# Patient Record
Sex: FEMALE | Race: BLACK OR AFRICAN AMERICAN | NOT HISPANIC OR LATINO | Employment: OTHER | URBAN - METROPOLITAN AREA
[De-identification: names, ages, dates, MRNs, and addresses within clinical notes are randomized per-mention and may not be internally consistent; named-entity substitution may affect disease eponyms.]

---

## 2020-10-11 ENCOUNTER — OFFICE VISIT (OUTPATIENT)
Dept: URGENT CARE | Facility: CLINIC | Age: 65
End: 2020-10-11
Payer: MEDICARE

## 2020-10-11 VITALS
WEIGHT: 165 LBS | RESPIRATION RATE: 20 BRPM | TEMPERATURE: 97 F | SYSTOLIC BLOOD PRESSURE: 157 MMHG | HEIGHT: 65 IN | DIASTOLIC BLOOD PRESSURE: 64 MMHG | BODY MASS INDEX: 27.49 KG/M2 | OXYGEN SATURATION: 97 % | HEART RATE: 76 BPM

## 2020-10-11 DIAGNOSIS — M54.12 CERVICAL RADICULOPATHY: Primary | ICD-10-CM

## 2020-10-11 PROCEDURE — 99214 PR OFFICE/OUTPT VISIT, EST, LEVL IV, 30-39 MIN: ICD-10-PCS | Mod: 25,S$GLB,, | Performed by: INTERNAL MEDICINE

## 2020-10-11 PROCEDURE — 96372 THER/PROPH/DIAG INJ SC/IM: CPT | Mod: S$GLB,,, | Performed by: INTERNAL MEDICINE

## 2020-10-11 PROCEDURE — 99214 OFFICE O/P EST MOD 30 MIN: CPT | Mod: 25,S$GLB,, | Performed by: INTERNAL MEDICINE

## 2020-10-11 PROCEDURE — 96372 PR INJECTION,THERAP/PROPH/DIAG2ST, IM OR SUBCUT: ICD-10-PCS | Mod: S$GLB,,, | Performed by: INTERNAL MEDICINE

## 2020-10-11 RX ORDER — METHYLPREDNISOLONE 4 MG/1
TABLET ORAL
Qty: 1 PACKAGE | Refills: 0 | Status: ON HOLD | OUTPATIENT
Start: 2020-10-11 | End: 2023-04-24

## 2020-10-11 RX ORDER — ROFLUMILAST 500 UG/1
500 TABLET ORAL DAILY
COMMUNITY
Start: 2020-09-16 | End: 2023-07-09 | Stop reason: SDUPTHER

## 2020-10-11 RX ORDER — KETOROLAC TROMETHAMINE 30 MG/ML
30 INJECTION, SOLUTION INTRAMUSCULAR; INTRAVENOUS
Status: COMPLETED | OUTPATIENT
Start: 2020-10-11 | End: 2020-10-11

## 2020-10-11 RX ORDER — NAPROXEN SODIUM 220 MG/1
81 TABLET, FILM COATED ORAL DAILY
Status: ON HOLD | COMMUNITY
End: 2023-12-20 | Stop reason: HOSPADM

## 2020-10-11 RX ORDER — CYCLOBENZAPRINE HCL 5 MG
5 TABLET ORAL 3 TIMES DAILY PRN
Qty: 21 TABLET | Refills: 0 | Status: SHIPPED | OUTPATIENT
Start: 2020-10-11 | End: 2020-10-21

## 2020-10-11 RX ADMIN — KETOROLAC TROMETHAMINE 30 MG: 30 INJECTION, SOLUTION INTRAMUSCULAR; INTRAVENOUS at 03:10

## 2020-10-11 NOTE — PROGRESS NOTES
"Subjective:       Patient ID: Terri Phelan is a 65 y.o. female.    Vitals:  height is 5' 5" (1.651 m) and weight is 74.8 kg (165 lb). Her temperature is 97.3 °F (36.3 °C). Her blood pressure is 157/64 (abnormal) and her pulse is 76. Her respiration is 20 and oxygen saturation is 97%.     Chief Complaint: Neck Pain and Shoulder Pain    Pt has left neck, shoulder, and arm pain that started on Thursday. Some relief when she is sleeping. Took otc pain meds with some no reilief       Constitution: Negative for chills, fatigue and fever.   HENT: Negative for congestion and sore throat.    Neck: Negative for painful lymph nodes.   Cardiovascular: Negative for chest pain and leg swelling.   Eyes: Negative for double vision and blurred vision.   Respiratory: Negative for cough and shortness of breath.    Gastrointestinal: Negative for nausea, vomiting and diarrhea.   Genitourinary: Negative for dysuria, frequency, urgency and history of kidney stones.   Musculoskeletal: Positive for pain and joint pain. Negative for joint swelling, muscle cramps and muscle ache.   Skin: Negative for color change, pale, rash and bruising.   Allergic/Immunologic: Negative for seasonal allergies.   Neurological: Negative for dizziness, history of vertigo, light-headedness, passing out and headaches.   Hematologic/Lymphatic: Negative for swollen lymph nodes.   Psychiatric/Behavioral: Negative for nervous/anxious, sleep disturbance and depression. The patient is not nervous/anxious.        Objective:      Physical Exam   Constitutional: normal  HENT:   Head: Normocephalic and atraumatic.   Neck:      Comments: Increased muscle ton L post neck and upper shoulder with tenderness to palpation same areas with radiation to upper arm   Cardiovascular: Normal rate, regular rhythm and normal pulses.   Pulmonary/Chest: Effort normal and breath sounds normal.   Abdominal: Normal appearance.   Neurological: She is alert.   Skin: Skin is warm and dry. "   Nursing note and vitals reviewed.        Assessment:       1. Cervical radiculopathy        Plan:         Cervical radiculopathy  -     ketorolac injection 30 mg  -     methylPREDNISolone (MEDROL DOSEPACK) 4 mg tablet; use as directed  Dispense: 1 Package; Refill: 0  -     cyclobenzaprine (FLEXERIL) 5 MG tablet; Take 1 tablet (5 mg total) by mouth 3 (three) times daily as needed.  Dispense: 21 tablet; Refill: 0

## 2022-05-15 ENCOUNTER — HOSPITAL ENCOUNTER (EMERGENCY)
Facility: HOSPITAL | Age: 67
Discharge: HOME | End: 2022-05-15
Attending: EMERGENCY MEDICINE
Payer: OTHER GOVERNMENT

## 2022-05-15 VITALS
HEIGHT: 66 IN | SYSTOLIC BLOOD PRESSURE: 151 MMHG | BODY MASS INDEX: 27.32 KG/M2 | DIASTOLIC BLOOD PRESSURE: 72 MMHG | HEART RATE: 90 BPM | TEMPERATURE: 100 F | RESPIRATION RATE: 20 BRPM | WEIGHT: 170 LBS | OXYGEN SATURATION: 98 %

## 2022-05-15 DIAGNOSIS — F41.9 ANXIETY: ICD-10-CM

## 2022-05-15 DIAGNOSIS — Z87.09 HISTORY OF COPD: Primary | ICD-10-CM

## 2022-05-15 PROCEDURE — 99283 EMERGENCY DEPT VISIT LOW MDM: CPT

## 2022-05-15 RX ORDER — LORAZEPAM 1 MG/1
0.5 TABLET ORAL EVERY 8 HOURS PRN
Qty: 5 TABLET | Refills: 0 | Status: SHIPPED | OUTPATIENT
Start: 2022-05-15 | End: 2023-05-15 | Stop reason: SDUPTHER

## 2022-05-16 ENCOUNTER — EXTERNAL HOME HEALTH (OUTPATIENT)
Dept: HOME HEALTH SERVICES | Facility: HOSPITAL | Age: 67
End: 2022-05-16
Payer: OTHER GOVERNMENT
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
## 2022-06-15 ENCOUNTER — DOCUMENT SCAN (OUTPATIENT)
Dept: HOME HEALTH SERVICES | Facility: HOSPITAL | Age: 67
End: 2022-06-15
Payer: OTHER GOVERNMENT

## 2022-06-16 ENCOUNTER — EXTERNAL HOME HEALTH (OUTPATIENT)
Dept: HOME HEALTH SERVICES | Facility: HOSPITAL | Age: 67
End: 2022-06-16
Payer: OTHER GOVERNMENT

## 2022-07-08 ENCOUNTER — DOCUMENT SCAN (OUTPATIENT)
Dept: HOME HEALTH SERVICES | Facility: HOSPITAL | Age: 67
End: 2022-07-08
Payer: OTHER GOVERNMENT

## 2022-09-09 ENCOUNTER — OFFICE VISIT (OUTPATIENT)
Dept: URGENT CARE | Facility: CLINIC | Age: 67
End: 2022-09-09
Payer: MEDICARE

## 2022-09-09 VITALS
BODY MASS INDEX: 28.93 KG/M2 | OXYGEN SATURATION: 95 % | WEIGHT: 180 LBS | HEIGHT: 66 IN | DIASTOLIC BLOOD PRESSURE: 84 MMHG | SYSTOLIC BLOOD PRESSURE: 149 MMHG | RESPIRATION RATE: 14 BRPM | HEART RATE: 68 BPM | TEMPERATURE: 99 F

## 2022-09-09 DIAGNOSIS — J44.1 COPD WITH EXACERBATION: Primary | ICD-10-CM

## 2022-09-09 PROBLEM — Z99.81 OXYGEN DEPENDENT: Status: ACTIVE | Noted: 2022-09-09

## 2022-09-09 PROBLEM — K64.8 PROLAPSED INTERNAL HEMORRHOIDS: Status: ACTIVE | Noted: 2022-09-09

## 2022-09-09 PROBLEM — I25.10 CAD (CORONARY ARTERY DISEASE): Status: ACTIVE | Noted: 2022-09-09

## 2022-09-09 PROBLEM — J43.2 CENTRILOBULAR EMPHYSEMA: Status: ACTIVE | Noted: 2022-06-16

## 2022-09-09 PROBLEM — I25.2 HISTORY OF MYOCARDIAL INFARCTION: Status: ACTIVE | Noted: 2022-09-09

## 2022-09-09 PROBLEM — J96.21 ACUTE ON CHRONIC RESPIRATORY FAILURE WITH HYPOXIA AND HYPERCAPNIA: Status: ACTIVE | Noted: 2017-05-01

## 2022-09-09 PROBLEM — D12.2 ADENOMA OF ASCENDING COLON: Status: ACTIVE | Noted: 2022-09-09

## 2022-09-09 PROBLEM — J40 BRONCHITIS: Status: ACTIVE | Noted: 2020-09-01

## 2022-09-09 PROBLEM — R19.5 MUCOUS IN STOOLS: Status: ACTIVE | Noted: 2022-09-09

## 2022-09-09 PROBLEM — J96.22 ACUTE ON CHRONIC RESPIRATORY FAILURE WITH HYPOXIA AND HYPERCAPNIA: Status: ACTIVE | Noted: 2017-05-01

## 2022-09-09 PROBLEM — Z01.818 ENCOUNTER FOR OTHER PREPROCEDURAL EXAMINATION: Status: ACTIVE | Noted: 2022-07-06

## 2022-09-09 PROBLEM — K64.9 HEMORRHOIDS: Status: ACTIVE | Noted: 2022-09-09

## 2022-09-09 PROBLEM — K57.30 DIVERTICULAR DISEASE OF COLON: Status: ACTIVE | Noted: 2022-09-09

## 2022-09-09 LAB
CTP QC/QA: YES
SARS-COV-2 RDRP RESP QL NAA+PROBE: NEGATIVE

## 2022-09-09 PROCEDURE — U0002 COVID-19 LAB TEST NON-CDC: HCPCS | Mod: QW,S$GLB,, | Performed by: STUDENT IN AN ORGANIZED HEALTH CARE EDUCATION/TRAINING PROGRAM

## 2022-09-09 PROCEDURE — 94640 AIRWAY INHALATION TREATMENT: CPT | Mod: S$GLB,,, | Performed by: STUDENT IN AN ORGANIZED HEALTH CARE EDUCATION/TRAINING PROGRAM

## 2022-09-09 PROCEDURE — 71046 X-RAY EXAM CHEST 2 VIEWS: CPT | Mod: FY,S$GLB,, | Performed by: RADIOLOGY

## 2022-09-09 PROCEDURE — 94640 PR INHAL RX, AIRWAY OBST/DX SPUTUM INDUCT: ICD-10-PCS | Mod: S$GLB,,, | Performed by: STUDENT IN AN ORGANIZED HEALTH CARE EDUCATION/TRAINING PROGRAM

## 2022-09-09 PROCEDURE — 99214 PR OFFICE/OUTPT VISIT, EST, LEVL IV, 30-39 MIN: ICD-10-PCS | Mod: 25,S$GLB,, | Performed by: STUDENT IN AN ORGANIZED HEALTH CARE EDUCATION/TRAINING PROGRAM

## 2022-09-09 PROCEDURE — 71046 XR CHEST PA AND LATERAL: ICD-10-PCS | Mod: FY,S$GLB,, | Performed by: RADIOLOGY

## 2022-09-09 PROCEDURE — 99214 OFFICE O/P EST MOD 30 MIN: CPT | Mod: 25,S$GLB,, | Performed by: STUDENT IN AN ORGANIZED HEALTH CARE EDUCATION/TRAINING PROGRAM

## 2022-09-09 PROCEDURE — U0002: ICD-10-PCS | Mod: QW,S$GLB,, | Performed by: STUDENT IN AN ORGANIZED HEALTH CARE EDUCATION/TRAINING PROGRAM

## 2022-09-09 RX ORDER — ACYCLOVIR 400 MG/1
TABLET ORAL
COMMUNITY
Start: 2022-07-18 | End: 2023-07-09

## 2022-09-09 RX ORDER — BUDESONIDE, GLYCOPYRROLATE, AND FORMOTEROL FUMARATE 160; 9; 4.8 UG/1; UG/1; UG/1
2 AEROSOL, METERED RESPIRATORY (INHALATION) 2 TIMES DAILY
COMMUNITY
Start: 2022-04-20

## 2022-09-09 RX ORDER — ALBUTEROL SULFATE 0.83 MG/ML
2.5 SOLUTION RESPIRATORY (INHALATION)
Status: COMPLETED | OUTPATIENT
Start: 2022-09-09 | End: 2022-09-09

## 2022-09-09 RX ORDER — BUSPIRONE HYDROCHLORIDE 7.5 MG/1
TABLET ORAL
Status: ON HOLD | COMMUNITY
Start: 2022-07-18 | End: 2023-04-25

## 2022-09-09 RX ORDER — IPRATROPIUM BROMIDE 0.5 MG/2.5ML
0.5 SOLUTION RESPIRATORY (INHALATION)
Status: COMPLETED | OUTPATIENT
Start: 2022-09-09 | End: 2022-09-09

## 2022-09-09 RX ORDER — PREDNISONE 20 MG/1
TABLET ORAL
Qty: 10 TABLET | Refills: 0 | OUTPATIENT
Start: 2022-09-09 | End: 2023-02-20

## 2022-09-09 RX ORDER — LEVOCETIRIZINE DIHYDROCHLORIDE 5 MG/1
TABLET, FILM COATED ORAL
Status: ON HOLD | COMMUNITY
Start: 2022-07-18 | End: 2023-04-25

## 2022-09-09 RX ORDER — DICYCLOMINE HYDROCHLORIDE 20 MG/1
TABLET ORAL
COMMUNITY
End: 2023-05-15 | Stop reason: SDUPTHER

## 2022-09-09 RX ORDER — HYDROCHLOROTHIAZIDE 12.5 MG/1
12.5 CAPSULE ORAL DAILY
Status: ON HOLD | COMMUNITY
Start: 2022-05-09 | End: 2023-04-25

## 2022-09-09 RX ORDER — ALENDRONATE SODIUM 35 MG/1
35 TABLET ORAL
COMMUNITY
Start: 2022-07-15

## 2022-09-09 RX ORDER — ROFLUMILAST 500 UG/1
1 TABLET ORAL
Status: ON HOLD | COMMUNITY
End: 2023-04-24

## 2022-09-09 RX ORDER — IPRATROPIUM BROMIDE AND ALBUTEROL SULFATE 2.5; .5 MG/3ML; MG/3ML
SOLUTION RESPIRATORY (INHALATION) EVERY 4 HOURS
Status: ON HOLD | COMMUNITY
Start: 2022-05-06 | End: 2023-07-06 | Stop reason: SDUPTHER

## 2022-09-09 RX ORDER — ALBUTEROL SULFATE 90 UG/1
2 AEROSOL, METERED RESPIRATORY (INHALATION) EVERY 6 HOURS PRN
COMMUNITY
Start: 2022-02-08 | End: 2023-08-17 | Stop reason: SDUPTHER

## 2022-09-09 RX ORDER — ESCITALOPRAM OXALATE 20 MG/1
20 TABLET ORAL DAILY
Status: ON HOLD | COMMUNITY
Start: 2022-07-18 | End: 2023-04-25

## 2022-09-09 RX ADMIN — ALBUTEROL SULFATE 2.5 MG: 0.83 SOLUTION RESPIRATORY (INHALATION) at 06:09

## 2022-09-09 RX ADMIN — IPRATROPIUM BROMIDE 0.5 MG: 0.5 SOLUTION RESPIRATORY (INHALATION) at 06:09

## 2022-09-09 NOTE — PROGRESS NOTES
"Subjective:       Patient ID: Terri Phelan is a 67 y.o. female.    Vitals:  height is 5' 6" (1.676 m) and weight is 81.6 kg (180 lb). Her oral temperature is 98.5 °F (36.9 °C). Her blood pressure is 149/84 (abnormal) and her pulse is 68. Her respiration is 14 and oxygen saturation is 95%.     Chief Complaint: Shortness of Breath    68 yo F with PMH that includes COPD with hypoxia and hypercapnia, home O2 requirement of 3L that keeps her at 94-95%. She has been having increased cough, SOB, and chest tightness for the past 2 days. Denies sputum production, fever, or chills.  She has used her home nebulizer 3 times a day prior to arrival.    Shortness of Breath  This is a new problem. The current episode started in the past 7 days (about 2 days ago). The problem occurs constantly. The problem has been gradually worsening. Associated symptoms include headaches and rhinorrhea. Pertinent negatives include no abdominal pain, chest pain, claudication, coryza, ear pain, fever, hemoptysis, leg pain, leg swelling, neck pain, orthopnea, PND, rash, sore throat, sputum production, swollen glands, syncope, vomiting or wheezing. The symptoms are aggravated by any activity. Associated symptoms comments: cough. She has tried beta agonist inhalers for the symptoms. The treatment provided no relief. Her past medical history is significant for COPD. There is no history of allergies, aspirin allergies, asthma, bronchiolitis, CAD, chronic lung disease, DVT, a heart failure, PE, pneumonia or a recent surgery.     Constitution: Negative for fever.   HENT:  Negative for ear pain and sore throat.    Neck: Negative for neck pain.   Cardiovascular:  Negative for chest pain, leg swelling and passing out.   Respiratory:  Positive for shortness of breath. Negative for sputum production, bloody sputum and wheezing.    Gastrointestinal:  Negative for abdominal pain and vomiting.   Skin:  Negative for rash.   Neurological:  Positive for " headaches.     Objective:      Physical Exam   Constitutional: She is oriented to person, place, and time. She does not appear ill. No distress.   HENT:   Head: Normocephalic.   Eyes: Conjunctivae are normal.   Cardiovascular: Normal rate and regular rhythm.   Pulmonary/Chest: Tachypnea noted. She is in respiratory distress. She has decreased breath sounds (diffuse, slight). She has no wheezes. She has no rhonchi. She has no rales.   Neurological: She is alert and oriented to person, place, and time. Coordination normal.   Psychiatric: Her behavior is normal.   Nursing note and vitals reviewed.      Assessment:       1. COPD with exacerbation        EXAMINATION:  XR CHEST PA AND LATERAL     CLINICAL HISTORY:  Shortness of breath     TECHNIQUE:  PA and lateral views of the chest were performed.     COMPARISON:  09/08/2015.     FINDINGS:  The lungs are hyperexpanded and there is flattening of the hemidiaphragms.  No focal opacities are seen. The pleural spaces are clear.     The cardiac silhouette is unremarkable.  There are atherosclerotic calcifications of the aortic arch.     The visualized osseous structures demonstrate degenerative changes.     Impression:     No acute abnormality.     Hyperexpanded lungs which can be seen with COPD.        Electronically signed by: Arnaud Cunningham  Date:                                            09/09/2022  Time:                                           18:19  Plan:         COPD with exacerbation  -     POCT COVID-19 Rapid Screening  -     XR CHEST PA AND LATERAL; Future; Expected date: 09/09/2022  -     albuterol nebulizer solution 2.5 mg  -     ipratropium 0.02 % nebulizer solution 0.5 mg  -     predniSONE (DELTASONE) 20 MG tablet; Take 2 tabs daily x 3 days, then one tab daily x 4 days  Dispense: 10 tablet; Refill: 0       Initial vitals and exam as documented. Patient treated with a DuoNeb and both respiratory rate and oxygen saturation improved.  Patient was able to be  titrated back down to her home oxygen requirement of 3 L and maintained 95% during her visit.  Patient reports subjective improvement.  Prednisone prescribed per seven-day course with taper.  Patient advised to go to the emergency department for any severe worsening symptoms.  Otherwise, follow-up with pulmonologist or PCP.

## 2022-09-09 NOTE — PATIENT INSTRUCTIONS
Continue with your home albuterol or DuoNeb nebulized treatment every 6 hours for shortness of breath or wheezing for the next 24 hours, then resume your as needed use.    Follow-up with your pulmonologist or primary care physician.

## 2022-12-12 PROBLEM — J96.22 ACUTE ON CHRONIC RESPIRATORY FAILURE WITH HYPOXIA AND HYPERCAPNIA: Status: RESOLVED | Noted: 2017-05-01 | Resolved: 2022-12-12

## 2022-12-12 PROBLEM — J96.21 ACUTE ON CHRONIC RESPIRATORY FAILURE WITH HYPOXIA AND HYPERCAPNIA: Status: RESOLVED | Noted: 2017-05-01 | Resolved: 2022-12-12

## 2023-01-12 ENCOUNTER — OFFICE VISIT (OUTPATIENT)
Dept: URGENT CARE | Facility: CLINIC | Age: 68
End: 2023-01-12
Payer: MEDICARE

## 2023-01-12 VITALS
HEART RATE: 73 BPM | SYSTOLIC BLOOD PRESSURE: 141 MMHG | WEIGHT: 180 LBS | HEIGHT: 66 IN | DIASTOLIC BLOOD PRESSURE: 75 MMHG | TEMPERATURE: 99 F | RESPIRATION RATE: 20 BRPM | OXYGEN SATURATION: 95 % | BODY MASS INDEX: 28.93 KG/M2

## 2023-01-12 DIAGNOSIS — R06.2 WHEEZING: ICD-10-CM

## 2023-01-12 DIAGNOSIS — R09.89 CHEST CONGESTION: Primary | ICD-10-CM

## 2023-01-12 PROCEDURE — 3078F PR MOST RECENT DIASTOLIC BLOOD PRESSURE < 80 MM HG: ICD-10-PCS | Mod: CPTII,S$GLB,,

## 2023-01-12 PROCEDURE — 3078F DIAST BP <80 MM HG: CPT | Mod: CPTII,S$GLB,,

## 2023-01-12 PROCEDURE — 1160F PR REVIEW ALL MEDS BY PRESCRIBER/CLIN PHARMACIST DOCUMENTED: ICD-10-PCS | Mod: CPTII,S$GLB,,

## 2023-01-12 PROCEDURE — 3077F SYST BP >= 140 MM HG: CPT | Mod: CPTII,S$GLB,,

## 2023-01-12 PROCEDURE — 71046 XR CHEST PA AND LATERAL: ICD-10-PCS | Mod: FY,S$GLB,, | Performed by: RADIOLOGY

## 2023-01-12 PROCEDURE — 99213 PR OFFICE/OUTPT VISIT, EST, LEVL III, 20-29 MIN: ICD-10-PCS | Mod: S$GLB,,,

## 2023-01-12 PROCEDURE — 3008F BODY MASS INDEX DOCD: CPT | Mod: CPTII,S$GLB,,

## 2023-01-12 PROCEDURE — 1160F RVW MEDS BY RX/DR IN RCRD: CPT | Mod: CPTII,S$GLB,,

## 2023-01-12 PROCEDURE — 1159F PR MEDICATION LIST DOCUMENTED IN MEDICAL RECORD: ICD-10-PCS | Mod: CPTII,S$GLB,,

## 2023-01-12 PROCEDURE — 71046 X-RAY EXAM CHEST 2 VIEWS: CPT | Mod: FY,S$GLB,, | Performed by: RADIOLOGY

## 2023-01-12 PROCEDURE — 3008F PR BODY MASS INDEX (BMI) DOCUMENTED: ICD-10-PCS | Mod: CPTII,S$GLB,,

## 2023-01-12 PROCEDURE — 99213 OFFICE O/P EST LOW 20 MIN: CPT | Mod: S$GLB,,,

## 2023-01-12 PROCEDURE — 1159F MED LIST DOCD IN RCRD: CPT | Mod: CPTII,S$GLB,,

## 2023-01-12 PROCEDURE — 3077F PR MOST RECENT SYSTOLIC BLOOD PRESSURE >= 140 MM HG: ICD-10-PCS | Mod: CPTII,S$GLB,,

## 2023-01-12 RX ORDER — PREDNISONE 20 MG/1
40 TABLET ORAL DAILY
Qty: 10 TABLET | Refills: 0 | Status: SHIPPED | OUTPATIENT
Start: 2023-01-12 | End: 2023-01-17

## 2023-01-12 NOTE — PROGRESS NOTES
"Subjective:       Patient ID: Terri Phelan is a 67 y.o. female.    Vitals:  height is 5' 6" (1.676 m) and weight is 81.6 kg (180 lb). Her oral temperature is 98.7 °F (37.1 °C). Her blood pressure is 141/75 (abnormal) and her pulse is 73. Her respiration is 20 and oxygen saturation is 95%.     Chief Complaint: Cough    Pt states she has had symptoms since Monday or Sunday. Pt states symptoms are worsening. Pt states she has a hx of bronchitis and feels she has related symptoms to that. Pt denies covid flu exposure and declined covid testing. Pt has been trying albuterol inhaler but continues with chest congestion. She is getting relief from inhaler. She does not believe this is a COPD exacerbation. She is on continuous 3 liters NC and BiPAP at night. She has not needed to increase oxygen. She reports upon symptom start, she had facial/nasal congestion but that has resolved. Post-nasal drip persists with productive cough. She has been taking mucinex since symptom start. Shortness of breath has not increased since symptom start.     Cough  This is a new problem. The current episode started in the past 7 days. The problem has been gradually worsening. The problem occurs constantly. The cough is Productive of sputum. Associated symptoms include chest pain ("chest congestion" when coughing) and wheezing. Pertinent negatives include no chills, fever or sore throat. The symptoms are aggravated by lying down. Treatments tried: mucinex decongestion dm, herbal teas, and soups. The treatment provided mild relief.     Constitution: Negative for chills, fatigue and fever.   HENT:  Positive for congestion. Negative for sinus pain, sinus pressure and sore throat.    Cardiovascular:  Positive for chest pain ("chest congestion" when coughing).   Respiratory:  Positive for cough, COPD and wheezing.    Gastrointestinal:  Negative for nausea, vomiting and diarrhea.     Objective:      Physical Exam   Constitutional: She is oriented " to person, place, and time. She appears well-developed. She is cooperative.  Non-toxic appearance. She does not appear ill. No distress.   HENT:   Head: Normocephalic and atraumatic.   Ears:   Right Ear: Hearing, tympanic membrane, external ear and ear canal normal.   Left Ear: Hearing, tympanic membrane, external ear and ear canal normal.   Nose: Rhinorrhea and congestion present. No mucosal edema or nasal deformity. No epistaxis. Right sinus exhibits no maxillary sinus tenderness and no frontal sinus tenderness. Left sinus exhibits no maxillary sinus tenderness and no frontal sinus tenderness.   Mouth/Throat: Uvula is midline and mucous membranes are normal. No trismus in the jaw. Normal dentition. No uvula swelling. Posterior oropharyngeal erythema present. No oropharyngeal exudate or posterior oropharyngeal edema.   Eyes: Conjunctivae and lids are normal. Pupils are equal, round, and reactive to light. No scleral icterus.   Neck: Trachea normal and phonation normal. Neck supple. No edema present. No erythema present. No neck rigidity present.   Cardiovascular: Normal rate, regular rhythm, normal heart sounds and normal pulses.   Pulmonary/Chest: Effort normal. No accessory muscle usage. No tachypnea. No respiratory distress. She has no decreased breath sounds. She has wheezes (mild) in the right lower field, the left upper field and the left lower field. She has no rhonchi. She has no rales.   Abdominal: Normal appearance.   Musculoskeletal: Normal range of motion.         General: No deformity. Normal range of motion.   Neurological: She is alert and oriented to person, place, and time. She exhibits normal muscle tone. Coordination normal.   Skin: Skin is warm, dry, intact, not diaphoretic and not pale.   Psychiatric: Her speech is normal and behavior is normal. Judgment and thought content normal.   Nursing note and vitals reviewed.        EXAMINATION:  XR CHEST PA AND LATERAL     CLINICAL HISTORY:  Other  specified symptoms and signs involving the circulatory and respiratory systems     TECHNIQUE:  PA and lateral views of the chest were performed.     COMPARISON:  09/09/2022     FINDINGS:  Ninety surgical clip right Randee areolar breast region.  Flattened hemidiaphragms, increased AP dimension of chest, chronic interstitial change fibrosis posterior basilar segment lower lobes.     Impression:     Stable chest.  No active process.        Electronically signed by: Damon Reynolds MD  Date:                                            01/12/2023  Time:                                           15:29  Assessment:       1. Chest congestion    2. Wheezing          Plan:         Chest congestion  -     XR CHEST PA AND LATERAL; Future; Expected date: 01/12/2023  -     predniSONE (DELTASONE) 20 MG tablet; Take 2 tablets (40 mg total) by mouth once daily. for 5 days  Dispense: 10 tablet; Refill: 0    Wheezing  -     XR CHEST PA AND LATERAL; Future; Expected date: 01/12/2023  -     predniSONE (DELTASONE) 20 MG tablet; Take 2 tablets (40 mg total) by mouth once daily. for 5 days  Dispense: 10 tablet; Refill: 0           Discussed results/diagnosis/plan with patient in clinic. Strict precautions given to patient to monitor for worsening signs and symptoms. Advised to follow up with PCP or specialist.  Explained side effects of medications prescribed with patient and informed him/her to discontinue use if he/she has any side effects and to inform UC or PCP if this occurs. All questions answered. Strict ED verses clinic return precautions stressed and given in depth. Advised if symptoms worsens of fail to improve he/she should go to the Emergency Room. Discharge and follow-up instructions given verbally/printed with the patient who expressed understanding and willingness to comply with my recommendations. Patient voiced understanding and in agreement with current treatment plan. Patient exits the exam room in no acute distress.  Conversant and engaged during discharge discussion, verbalized understanding.

## 2023-01-12 NOTE — PATIENT INSTRUCTIONS
X-ray is not concerning for pneumonia. Continue albuterol inhaler for wheezing. Start prednisone daily for the next 5 days. Continue mucinex x 10 days total.     Try a decongestant and corticosteroid nasal spray like flonase for the next few days for sinus relief. An antihistamine like zyrtec, claritin, allegra can also be helpful for sinus relief. Neti pot irrigation, humidifier in your room, saline nasal spray and warm compresses to face can help. (Guaifenesin) Mucinex 1200 mg twice per day for 10 days can help thin secretions for better clearance. Drink plenty of fluids with this. Honey is a natural cough suppressant.     Please only use over the counter cough and cold medications for 3-5 days at a time to avoid rebound symptoms.     Getting plenty of rest can aid in a faster recovery of illnesses.     Avoid OTC cough and cold medications that can raise your BP such as dextromethorphan, pseudoephedrine, phenylephrine, naphazoline and oxymetazoline. Try DASH diet and buy a blood pressure cuff for home monitoring. Check blood pressure at least 2 times per day and create a log. Avoid eating foods that are high in salt.     Alternate Tylenol and ibuprofen every 4 hours as needed for fever and body aches.  Please take NSAIDs with a full glass of water and food to avoid GI upset.  Get plenty of rest.  Drink at least 2 L of water per day to clear/thin secretions.      Please follow-up with your primary care provider for ongoing symptoms and report to the ER if you have chest pain, shortness of breath, palpitations.

## 2023-02-20 ENCOUNTER — HOSPITAL ENCOUNTER (EMERGENCY)
Facility: HOSPITAL | Age: 68
Discharge: HOME | End: 2023-02-20
Attending: EMERGENCY MEDICINE
Payer: MEDICARE

## 2023-02-20 VITALS
DIASTOLIC BLOOD PRESSURE: 75 MMHG | OXYGEN SATURATION: 95 % | RESPIRATION RATE: 18 BRPM | TEMPERATURE: 98 F | SYSTOLIC BLOOD PRESSURE: 172 MMHG | HEART RATE: 103 BPM

## 2023-02-20 DIAGNOSIS — R06.02 SOB (SHORTNESS OF BREATH): ICD-10-CM

## 2023-02-20 DIAGNOSIS — J44.1 COPD EXACERBATION: Primary | ICD-10-CM

## 2023-02-20 DIAGNOSIS — R06.02 SHORTNESS OF BREATH: ICD-10-CM

## 2023-02-20 LAB
ALBUMIN SERPL BCP-MCNC: 3.8 G/DL (ref 3.5–5.2)
ALLENS TEST: ABNORMAL
ALP SERPL-CCNC: 61 U/L (ref 55–135)
ALT SERPL W/O P-5'-P-CCNC: 14 U/L (ref 10–44)
ANION GAP SERPL CALC-SCNC: 13 MMOL/L (ref 8–16)
AST SERPL-CCNC: 18 U/L (ref 10–40)
BASOPHILS # BLD AUTO: 0.03 K/UL (ref 0–0.2)
BASOPHILS NFR BLD: 0.4 % (ref 0–1.9)
BILIRUB SERPL-MCNC: 0.1 MG/DL (ref 0.1–1)
BNP SERPL-MCNC: 23 PG/ML (ref 0–99)
BUN SERPL-MCNC: 14 MG/DL (ref 8–23)
CALCIUM SERPL-MCNC: 9.6 MG/DL (ref 8.7–10.5)
CHLORIDE SERPL-SCNC: 95 MMOL/L (ref 95–110)
CO2 SERPL-SCNC: 36 MMOL/L (ref 23–29)
CREAT SERPL-MCNC: 0.6 MG/DL (ref 0.5–1.4)
DIFFERENTIAL METHOD: ABNORMAL
EOSINOPHIL # BLD AUTO: 0 K/UL (ref 0–0.5)
EOSINOPHIL NFR BLD: 0 % (ref 0–8)
ERYTHROCYTE [DISTWIDTH] IN BLOOD BY AUTOMATED COUNT: 13.3 % (ref 11.5–14.5)
EST. GFR  (NO RACE VARIABLE): >60 ML/MIN/1.73 M^2
GLUCOSE SERPL-MCNC: 160 MG/DL (ref 70–110)
HCO3 UR-SCNC: 41.9 MMOL/L (ref 24–28)
HCT VFR BLD AUTO: 46.3 % (ref 37–48.5)
HCV AB SERPL QL IA: NORMAL
HGB BLD-MCNC: 13.2 G/DL (ref 12–16)
IMM GRANULOCYTES # BLD AUTO: 0.01 K/UL (ref 0–0.04)
IMM GRANULOCYTES NFR BLD AUTO: 0.1 % (ref 0–0.5)
LYMPHOCYTES # BLD AUTO: 0.4 K/UL (ref 1–4.8)
LYMPHOCYTES NFR BLD: 4.6 % (ref 18–48)
MCH RBC QN AUTO: 27.4 PG (ref 27–31)
MCHC RBC AUTO-ENTMCNC: 28.5 G/DL (ref 32–36)
MCV RBC AUTO: 96 FL (ref 82–98)
MONOCYTES # BLD AUTO: 0.1 K/UL (ref 0.3–1)
MONOCYTES NFR BLD: 1.3 % (ref 4–15)
NEUTROPHILS # BLD AUTO: 7.9 K/UL (ref 1.8–7.7)
NEUTROPHILS NFR BLD: 93.6 % (ref 38–73)
NRBC BLD-RTO: 0 /100 WBC
PCO2 BLDA: 87.3 MMHG (ref 35–45)
PH SMN: 7.29 [PH] (ref 7.35–7.45)
PLATELET # BLD AUTO: 171 K/UL (ref 150–450)
PMV BLD AUTO: 10.9 FL (ref 9.2–12.9)
PO2 BLDA: 43 MMHG (ref 40–60)
POC BE: 15 MMOL/L
POC SATURATED O2: 69 % (ref 95–100)
POC TCO2: 44 MMOL/L (ref 24–29)
POTASSIUM SERPL-SCNC: 3.5 MMOL/L (ref 3.5–5.1)
PROT SERPL-MCNC: 7.6 G/DL (ref 6–8.4)
RBC # BLD AUTO: 4.82 M/UL (ref 4–5.4)
SAMPLE: ABNORMAL
SITE: ABNORMAL
SODIUM SERPL-SCNC: 144 MMOL/L (ref 136–145)
TROPONIN I SERPL DL<=0.01 NG/ML-MCNC: <0.006 NG/ML (ref 0–0.03)
WBC # BLD AUTO: 8.43 K/UL (ref 3.9–12.7)

## 2023-02-20 PROCEDURE — 27000221 HC OXYGEN, UP TO 24 HOURS

## 2023-02-20 PROCEDURE — 63600175 PHARM REV CODE 636 W HCPCS: Performed by: EMERGENCY MEDICINE

## 2023-02-20 PROCEDURE — 93005 ELECTROCARDIOGRAM TRACING: CPT

## 2023-02-20 PROCEDURE — 25000242 PHARM REV CODE 250 ALT 637 W/ HCPCS: Performed by: EMERGENCY MEDICINE

## 2023-02-20 PROCEDURE — 93010 EKG 12-LEAD: ICD-10-PCS | Mod: ,,, | Performed by: INTERNAL MEDICINE

## 2023-02-20 PROCEDURE — 99900035 HC TECH TIME PER 15 MIN (STAT)

## 2023-02-20 PROCEDURE — 96374 THER/PROPH/DIAG INJ IV PUSH: CPT

## 2023-02-20 PROCEDURE — 80053 COMPREHEN METABOLIC PANEL: CPT | Performed by: EMERGENCY MEDICINE

## 2023-02-20 PROCEDURE — 85025 COMPLETE CBC W/AUTO DIFF WBC: CPT | Performed by: EMERGENCY MEDICINE

## 2023-02-20 PROCEDURE — 99285 EMERGENCY DEPT VISIT HI MDM: CPT | Mod: 25

## 2023-02-20 PROCEDURE — 83880 ASSAY OF NATRIURETIC PEPTIDE: CPT | Performed by: EMERGENCY MEDICINE

## 2023-02-20 PROCEDURE — 99285 PR EMERGENCY DEPT VISIT,LEVEL V: ICD-10-PCS | Mod: ,,, | Performed by: EMERGENCY MEDICINE

## 2023-02-20 PROCEDURE — 27100098 HC SPACER

## 2023-02-20 PROCEDURE — 86803 HEPATITIS C AB TEST: CPT | Performed by: PHYSICIAN ASSISTANT

## 2023-02-20 PROCEDURE — 99285 EMERGENCY DEPT VISIT HI MDM: CPT | Mod: ,,, | Performed by: EMERGENCY MEDICINE

## 2023-02-20 PROCEDURE — 94640 AIRWAY INHALATION TREATMENT: CPT | Mod: XB

## 2023-02-20 PROCEDURE — 94640 AIRWAY INHALATION TREATMENT: CPT

## 2023-02-20 PROCEDURE — 84484 ASSAY OF TROPONIN QUANT: CPT | Performed by: EMERGENCY MEDICINE

## 2023-02-20 PROCEDURE — 94761 N-INVAS EAR/PLS OXIMETRY MLT: CPT

## 2023-02-20 PROCEDURE — 93010 ELECTROCARDIOGRAM REPORT: CPT | Mod: ,,, | Performed by: INTERNAL MEDICINE

## 2023-02-20 RX ORDER — IPRATROPIUM BROMIDE AND ALBUTEROL SULFATE 2.5; .5 MG/3ML; MG/3ML
3 SOLUTION RESPIRATORY (INHALATION)
Status: COMPLETED | OUTPATIENT
Start: 2023-02-20 | End: 2023-02-20

## 2023-02-20 RX ORDER — ALBUTEROL SULFATE 90 UG/1
2 AEROSOL, METERED RESPIRATORY (INHALATION)
Status: COMPLETED | OUTPATIENT
Start: 2023-02-20 | End: 2023-02-20

## 2023-02-20 RX ORDER — LORAZEPAM 2 MG/ML
0.5 INJECTION INTRAMUSCULAR
Status: COMPLETED | OUTPATIENT
Start: 2023-02-20 | End: 2023-02-20

## 2023-02-20 RX ORDER — PREDNISONE 50 MG/1
50 TABLET ORAL DAILY
Qty: 4 TABLET | Refills: 0 | Status: SHIPPED | OUTPATIENT
Start: 2023-02-20 | End: 2023-02-24

## 2023-02-20 RX ADMIN — LORAZEPAM 0.5 MG: 2 INJECTION INTRAMUSCULAR; INTRAVENOUS at 08:02

## 2023-02-20 RX ADMIN — IPRATROPIUM BROMIDE AND ALBUTEROL SULFATE 3 ML: 2.5; .5 SOLUTION RESPIRATORY (INHALATION) at 06:02

## 2023-02-20 RX ADMIN — ALBUTEROL SULFATE 2 PUFF: 108 INHALANT RESPIRATORY (INHALATION) at 10:02

## 2023-02-21 NOTE — ED NOTES
Hourly patient round completed. Patient is resting comfortably in bed in lowest, locked position with bed rails raised x2 in NAD. Call light is within reach of patient. Patient denies further needs at this time. Daughter remains at bedside.

## 2023-02-21 NOTE — ED NOTES
Pt identifiers Terri Phelan were checked and are correct  LOC: The patient is awake, alert, aware of environment with an appropriate affect. Oriented x4, speaking appropriately  APPEARANCE: Pt resting comfortably, in no acute distress, pt is clean and well groomed, clothing properly fastened  SKIN: Skin warm, dry and intact, normal skin turgor, moist mucus membranes  RESPIRATORY: Airway is open and patent, respirations are spontaneous, even and unlabored, normal effort and rate Coarse wheezing auscultated to lower lung fields, crackles auscultated to upper lung fields   CARDIAC: Normal rate and rhythm, no peripheral edema noted, capillary refill < 3 seconds, bilateral radial pulses 2+  ABDOMEN: Soft, nontender, nondistended. Bowel sounds present to all four quad of abd on auscultation   NEUROLOGIC: PERRL, facial expression is symmetrical, patient moving all extremities spontaneously, normal sensation in all extremities when touched with a finger.  Follows all commands appropriately  MUSCULOSKELETAL: No obvious deformities.

## 2023-02-21 NOTE — ED PROVIDER NOTES
Encounter Date: 2023       History     Chief Complaint   Patient presents with    Shortness of Breath     Via ems ej10 from home - o2 dependent copd pt ran out of oxygen then activated 911 - RA SPO2 67 improved to 98 on 3lpm with improvement in dyspnea and work of breathing per ems     67-year-old female with a long history of COPD presents with shortness of breath.  Worsening symptoms x1 week.  She has only intermittently been taking her steroids and doing pulmonary rehab.  She has been compliant with her nebulizers.  She has had increased dyspnea on exertion and cough.  Today she ran out of her portable oxygen while out with her family had much worsening symptoms.  Improved once she got back on her oxygen.  Admits to having anxiety when she runs out oxygen.  She denies nausea, vomiting, diarrhea, fever, chest pain, abdominal pain, or dysuria.  The patients available PMH, PSH, Social History, medications, allergies, and triage vital signs were reviewed just prior to their medical evaluation.       Review of patient's allergies indicates:   Allergen Reactions    Codeine Nausea And Vomiting    Morphine Other (See Comments)     Past Medical History:   Diagnosis Date    COPD (chronic obstructive pulmonary disease)     Hypertension      Past Surgical History:   Procedure Laterality Date     SECTION      HERNIA REPAIR      HYSTERECTOMY       History reviewed. No pertinent family history.  Social History     Tobacco Use    Smoking status: Former     Packs/day: 1.00     Years: 20.00     Pack years: 20.00     Types: Cigarettes    Smokeless tobacco: Never   Substance Use Topics    Alcohol use: Not Currently     Alcohol/week: 1.0 standard drink     Types: 1 Glasses of wine per week    Drug use: No     Review of Systems   Constitutional:  Negative for fever.   Respiratory:  Positive for cough and shortness of breath.    Cardiovascular:  Negative for chest pain.   Gastrointestinal:  Negative for abdominal pain,  diarrhea, nausea and vomiting.   Genitourinary:  Negative for dysuria.     Physical Exam     Initial Vitals [02/20/23 1605]   BP Pulse Resp Temp SpO2   (!) 146/74 102 (!) 22 97.9 °F (36.6 °C) 98 %      MAP       --         Physical Exam    Nursing note and vitals reviewed.  Constitutional: She appears well-developed and well-nourished. She is not diaphoretic. No distress.   HENT:   Head: Normocephalic and atraumatic.   Nose: Nose normal.   Eyes: Conjunctivae are normal. Right eye exhibits no discharge. Left eye exhibits no discharge.   Neck: Neck supple.   Normal range of motion.  Cardiovascular:  Normal rate, regular rhythm and normal heart sounds.     Exam reveals no gallop and no friction rub.       No murmur heard.  Pulmonary/Chest: No respiratory distress. She has wheezes. She has no rhonchi. She has no rales.   Short choppy sentences, no increased WOB   Abdominal: Abdomen is soft. She exhibits no distension. There is no abdominal tenderness. There is no rebound and no guarding.   Musculoskeletal:         General: No tenderness or edema. Normal range of motion.      Cervical back: Normal range of motion and neck supple.     Neurological: She is alert and oriented to person, place, and time. She has normal strength. GCS score is 15. GCS eye subscore is 4. GCS verbal subscore is 5. GCS motor subscore is 6.   Skin: Skin is warm and dry. No rash noted. No erythema.   Psychiatric: She has a normal mood and affect. Her behavior is normal. Judgment and thought content normal.       ED Course   Procedures  Labs Reviewed   CBC W/ AUTO DIFFERENTIAL - Abnormal; Notable for the following components:       Result Value    MCHC 28.5 (*)     Gran # (ANC) 7.9 (*)     Lymph # 0.4 (*)     Mono # 0.1 (*)     Gran % 93.6 (*)     Lymph % 4.6 (*)     Mono % 1.3 (*)     All other components within normal limits   COMPREHENSIVE METABOLIC PANEL - Abnormal; Notable for the following components:    CO2 36 (*)     Glucose 160 (*)     All  other components within normal limits   ISTAT PROCEDURE - Abnormal; Notable for the following components:    POC PH 7.289 (*)     POC PCO2 87.3 (*)     POC HCO3 41.9 (*)     POC SATURATED O2 69 (*)     POC TCO2 44 (*)     All other components within normal limits   HEPATITIS C ANTIBODY    Narrative:     Release to patient->Immediate   TROPONIN I   B-TYPE NATRIURETIC PEPTIDE        ECG Results              EKG 12-lead (Final result)  Result time 02/21/23 07:54:36      Final result by Interface, Lab In [facility] (02/21/23 07:54:36)                   Narrative:    Test Reason : R06.02,    Vent. Rate : 122 BPM     Atrial Rate : 122 BPM     P-R Int : 142 ms          QRS Dur : 072 ms      QT Int : 332 ms       P-R-T Axes : 070 072 070 degrees     QTc Int : 473 ms    Sinus tachycardia  Possible Left atrial enlargement  Septal infarct ,age undetermined  Abnormal ECG  No previous ECGs available  Confirmed by JOSE RAMIREZ MD ([id number]) on 2/21/2023 7:54:26 AM    Referred By: [email or web address]   SELF           Confirmed By:JOSE RAMIREZ MD                                  Imaging Results              X-Ray Chest AP Portable (Final result)  Result time 02/20/23 21:43:44      Final result by Melissa Buckley MD (02/20/23 21:43:44)                   Impression:      CHF/volume overload.      Electronically signed by: Melissa Buckley  Date:    02/20/2023  Time:    21:43               Narrative:    EXAMINATION:  XR CHEST AP PORTABLE    CLINICAL HISTORY:  Shortness of breath    TECHNIQUE:  Single frontal view of the chest was performed.    COMPARISON:  01/12/2023    FINDINGS:  Cardiac silhouette is borderline enlarged.  Pulmonary vasculature is prominent.  Mild bibasilar scarring and/or atelectasis.  Suspect trace bilateral pleural effusions.                                       Medications   albuterol-ipratropium 2.5 mg-0.5 mg/3 mL nebulizer solution 3 mL (3 mLs Nebulization Given 2/20/23 6184)   LORazepam injection 0.5 mg (0.5  mg Intravenous Given 2/20/23 2008)   albuterol inhaler 2 puff (2 puffs Inhalation Given 2/20/23 5554)     Medical Decision Making:   History:   I obtained history from: someone other than patient.       <> Summary of History: Daughter assists HPI  Old Medical Records: I decided to obtain old medical records.  Clinical Tests:   Lab Tests: Ordered and Reviewed  Radiological Study: Ordered and Reviewed  Medical Tests: Ordered and Reviewed  ED Management:  67-year-old female with a long history of COPD on home oxygen presents with shortness of breath and cough.  Vitals here with hypoxia tachypnea.  Physical exam as above.  Given steroids EN route.  Treated with duo nebs.  Labs unremarkable.  Chest x-ray without evidence of pneumonia.  Suspect acute exacerbation of COPD.  After therapy in the emergency department she returns to her baseline.  We had extensive conversation at bedside.  Will discharge home.  She will call her regular physician tomorrow to arrange close follow-up.  She was given a new albuterol inhaler.  Will give prescription for home steroids.  Patient will return to ED for worsening symptoms, inability to eat/drink, fever greater than 100.4, or any other concerns. Did bedside teaching with return precautions.  All questions answered.  The patient acknowledges understanding.  Gave verbal discharge instructions.                         Clinical Impression:   Final diagnoses:  [R06.02] SOB (shortness of breath)  [R06.02] Shortness of breath  [J44.1] COPD exacerbation (Primary)        ED Disposition Condition    Discharge Stable          ED Prescriptions       Medication Sig Dispense Start Date End Date Auth. Provider    predniSONE (DELTASONE) 50 MG Tab Take 1 tablet (50 mg total) by mouth once daily. Next dose 2/21 with dinner for 4 days 4 tablet 2/20/2023 2/24/2023 Carlos Epperson MD          Follow-up Information       Follow up With Specialties Details Why Contact Info    Follow up with primary  physician as soon as possible.  Call tomorrow for an appointment.        [facility] - Emergency Dept Emergency Medicine  Return to ED for worsening symptoms, inability to eat/drink, fever greater than 100.4, or any other concerns. [address]  [facility] [id number] [phone]             Carlos Epperson MD  02/22/23 8118

## 2023-02-21 NOTE — ED NOTES
Received report from ELLIE Cuadra. Pt sitting up in bed receiving breathing tx. No complaints at this time. Bed locked in lowest position. SR up x 2. Call light within reach. VSS.

## 2023-04-17 ENCOUNTER — TELEPHONE (OUTPATIENT)
Dept: PULMONOLOGY | Facility: CLINIC | Age: 68
End: 2023-04-17
Payer: MEDICARE

## 2023-04-17 DIAGNOSIS — J44.9 CHRONIC OBSTRUCTIVE PULMONARY DISEASE, UNSPECIFIED COPD TYPE: Primary | ICD-10-CM

## 2023-04-18 ENCOUNTER — OFFICE VISIT (OUTPATIENT)
Dept: PULMONOLOGY | Facility: CLINIC | Age: 68
End: 2023-04-18
Payer: OTHER GOVERNMENT

## 2023-04-18 ENCOUNTER — HOSPITAL ENCOUNTER (OUTPATIENT)
Dept: PULMONOLOGY | Facility: CLINIC | Age: 68
Discharge: HOME | End: 2023-04-18
Payer: OTHER GOVERNMENT

## 2023-04-18 ENCOUNTER — HOSPITAL ENCOUNTER (OUTPATIENT)
Dept: PULMONOLOGY | Facility: CLINIC | Age: 68
Discharge: HOME | End: 2023-04-18
Payer: MEDICARE

## 2023-04-18 VITALS
WEIGHT: 185.19 LBS | SYSTOLIC BLOOD PRESSURE: 145 MMHG | OXYGEN SATURATION: 93 % | DIASTOLIC BLOOD PRESSURE: 70 MMHG | BODY MASS INDEX: 29.76 KG/M2 | HEIGHT: 66 IN | HEART RATE: 80 BPM

## 2023-04-18 DIAGNOSIS — J44.9 CHRONIC OBSTRUCTIVE PULMONARY DISEASE, UNSPECIFIED COPD TYPE: ICD-10-CM

## 2023-04-18 DIAGNOSIS — J43.2 CENTRILOBULAR EMPHYSEMA: Primary | ICD-10-CM

## 2023-04-18 PROCEDURE — 94726 PLETHYSMOGRAPHY LUNG VOLUMES: CPT | Mod: PBBFAC | Performed by: INTERNAL MEDICINE

## 2023-04-18 PROCEDURE — 99204 OFFICE O/P NEW MOD 45 MIN: CPT | Mod: 25,GC,S$GLB, | Performed by: INTERNAL MEDICINE

## 2023-04-18 PROCEDURE — 99204 PR OFFICE/OUTPT VISIT, NEW, LEVL IV, 45-59 MIN: ICD-10-PCS | Mod: 25,GC,S$GLB, | Performed by: INTERNAL MEDICINE

## 2023-04-18 PROCEDURE — 94729 DIFFUSING CAPACITY: CPT | Mod: 53,PBBFAC | Performed by: INTERNAL MEDICINE

## 2023-04-18 PROCEDURE — 94729 DIFFUSING CAPACITY: CPT | Mod: 26,53,S$PBB, | Performed by: INTERNAL MEDICINE

## 2023-04-18 PROCEDURE — 94726 PULM FUNCT TST PLETHYSMOGRAP: ICD-10-PCS | Mod: 26,S$PBB,, | Performed by: INTERNAL MEDICINE

## 2023-04-18 PROCEDURE — 94060 EVALUATION OF WHEEZING: CPT | Mod: 26,S$PBB,, | Performed by: INTERNAL MEDICINE

## 2023-04-18 PROCEDURE — 94060 EVALUATION OF WHEEZING: CPT | Mod: PBBFAC | Performed by: INTERNAL MEDICINE

## 2023-04-18 PROCEDURE — 94726 PLETHYSMOGRAPHY LUNG VOLUMES: CPT | Mod: 26,S$PBB,, | Performed by: INTERNAL MEDICINE

## 2023-04-18 PROCEDURE — 94729 PR C02/MEMBANE DIFFUSE CAPACITY: ICD-10-PCS | Mod: 26,53,S$PBB, | Performed by: INTERNAL MEDICINE

## 2023-04-18 PROCEDURE — 94060 PR EVAL OF BRONCHOSPASM: ICD-10-PCS | Mod: 26,S$PBB,, | Performed by: INTERNAL MEDICINE

## 2023-04-18 PROCEDURE — 99214 OFFICE O/P EST MOD 30 MIN: CPT | Mod: PBBFAC,25 | Performed by: INTERNAL MEDICINE

## 2023-04-18 NOTE — ASSESSMENT & PLAN NOTE
Mrs. Phelan has end stage COPD with continuous O2 requirement who is on maximized medical therapy, using her oxygen daily and using nightly positive pressure. She is going to pulmonary rehab at [facility] which she enjoys and is now paying for herself. She is meeting us today to establish care and to set goals of living a quality life. She is now living with her daughter in the area. We will plan to continue her current regimen without any changes at this time. She will follow up with us with PFTs if she cannot obtain recent PFTs performed as [facility].   - continue current medical therapy  - continue pulmonary rehabilitation  - encouraged continued smoking cessation

## 2023-04-18 NOTE — PROGRESS NOTES
Subjective:      Patient ID: Terri Phelan is a 67 y.o. female.    Chief Complaint: COPD    66yo F w/ PMH of end stage COPD, CAD, mood disorder who presents to pulmonary clinic to establish care. She has held a diagnosis of COPD for over 10 years and has been on oxygen for 8 years. She has a long standing smoking history having quit in 2021. She has not been hospitalized within the last year for any breathing issues. She is on continuous oxygen at 3LPM both at day and at night. She also uses a BiPAP nightly for some time, has a history of hypercapnic respiratory failure during her past hospitalizations. She is able to ambulate in her house with some difficulty and occasionally needs a rescue inhaler with her ADLs. She can ambulate with a cart in a store but has to stop and rest. She attributes her need to use a rescue inhaler to her anxiety. She has also been with pulmonary rehab at [facility] for the last year which she enjoys doing and is now paying for herself. She has recently moved to [place] and is looking to establish care with a new pulmonologist.     Her current regimen includes:  - Breztri 2 puffs BID  - Albuterol rescue inhaler 4-5 times per day (used mostly with transitioning)  - DuoNebs BID  - Azithromycin every other day  - roflumilast?   - pulmonary rehab 2x per week  - prednisone 40mg x 5 days PRN for exacerbations  - BiPAP nightly (using more nights than not)    COPD  Associated symptoms include coughing.   Review of Systems   Constitutional: Negative.    Respiratory:  Positive for cough, shortness of breath and dyspnea on extertion. Negative for sputum production, wheezing and previous hospitialization due to pulmonary problems.    Cardiovascular: Negative.    Neurological: Negative.    Psychiatric/Behavioral: Negative.     Objective:     Physical Exam   Constitutional: She is oriented to person, place, and time. She appears well-developed and well-nourished.   HENT:   Head:  Normocephalic.   Cardiovascular: Normal rate and regular rhythm.   No murmur heard.  Pulmonary/Chest: Normal expansion and symmetric chest wall expansion. No stridor. She has decreased breath sounds (diffusely throughout). She has no wheezes. She has no rhonchi. She has no rales.   Musculoskeletal:         General: No edema.   Neurological: She is alert and oriented to person, place, and time.   Skin: Skin is warm and dry. Nails show no clubbing.   Psychiatric: She has a normal mood and affect. Her behavior is normal.   Nursing note and vitals reviewed.  Personal Diagnostic Review    PFTs 4/18/23   FVC  (pre-BD) 1.01   FVC%  40   FEV1 0.44   FEV1%  22   FEV1/FVC  44   FEF 25-75  0.18   FEF 25-75%  10   FVC (post-BD) 0.95   FVC% -6   FEV1 0.42   FEV1% -4.3   FEV1/FVC 45   FEF 25-75 0.16   FEF 25-75% -   TLC  5.34   TLC%  108   RV  4.27   RV%  214   DLCO  -   DLCO%  -   [facility] -   IVC -   Six-Minute Walk    Distance (meter)    O2 jorge l    O2 change      CT Chest 7/21/22:    FINDINGS: There is no mediastinal or hilar adenopathy. The thoracic aorta, heart, and pericardium are within normal limits. There is no pleural effusion or pneumothorax.     There are stable manifestations of emphysema. There is been no change in a 4 mm micronodule in the right upper lobe seen on series 4 image 87. There are no suspicious lung lesions. There are some mild atelectasis in the lung bases.     There is a 2.4 cm left breast mass in the upper outer quadrant.    CXR 3/4/23:    FINDINGS:  Frontal view of the chest demonstrates heart size within normal limits. Atherosclerosis. Left base subsegmental atelectasis. Otherwise, lungs are clear.     IMPRESSION:   Left basilar atelectasis.     No flowsheet data found.     Assessment:     No diagnosis found.     Outpatient Encounter Medications as of 4/18/2023   Medication Sig Dispense Refill    acyclovir (ZOVIRAX) 400 MG tablet TAKE 1 TABLET BY MOUTH THREE TIMES DAILY FOR 10 DAYS AS NEEDED FOR  OUTBREAK      albuterol (PROVENTIL/VENTOLIN HFA) 90 mcg/actuation inhaler Inhale 2 puffs into the lungs every 6 (six) hours as needed.      ALBUTEROL SULFATE (PROVENTIL HFA INHL) Inhale into the lungs.      albuterol-ipratropium (DUO-NEB) 2.5 mg-0.5 mg/3 mL nebulizer solution Take by nebulization every 4 (four) hours.      alendronate (FOSAMAX) 35 MG tablet Take 35 mg by mouth.      aspirin 81 MG Chew Take 81 mg by mouth once daily.      budesonide-glycopyr-formoterol (BREZTRI AEROSPHERE) 160-9-4.8 mcg/actuation HFAA Inhale 2 puffs into the lungs 2 (two) times daily.      busPIRone (BUSPAR) 7.5 MG tablet TAKE 1 TABLET BY MOUTH TWICE DAILY AS NEEDED FOR BREAKTHROUGH ANXIETY      DALIRESP 500 mcg Tab TK 1 T PO D      dicyclomine (BENTYL) 20 mg tablet TAKE ONE TABLET BY MOUTH THREE TIMES DAILY AS NEEDED FOR STOMACH CRAMPS      EScitalopram oxalate (LEXAPRO) 20 MG tablet Take 20 mg by mouth once daily.      fluticasone propionate (FLONASE) 50 mcg/actuation nasal spray 1 spray by Each Nare route once daily.      fluticasone-umeclidin-vilanter (TRELEGY ELLIPTA) 100-62.5-25 mcg DsDv INHALE 1 PUFF BY MOUTH INTO THE LUNGS DAILY      hydroCHLOROthiazide (MICROZIDE) 12.5 mg capsule Take 12.5 mg by mouth once daily.      levocetirizine (XYZAL) 5 MG tablet TAKE 1 TABLET BY MOUTH EVERY DAY IN THE EVENING AS NEEDED FOR ALLERGIES      LORazepam (ATIVAN) 1 MG tablet Take 0.5 tablets (0.5 mg total) by mouth every 8 (eight) hours as needed for Anxiety. 5 tablet 0    losartan-hydrochlorothiazide 50-12.5 mg (HYZAAR) 50-12.5 mg per tablet Take 1 tablet by mouth once daily.      methylPREDNISolone (MEDROL DOSEPACK) 4 mg tablet use as directed (Patient not taking: Reported on 9/9/2022) 1 Package 0    OXYGEN-AIR DELIVERY SYSTEMS MISC 3 L by Nasal route.      promethazine-dextromethorphan (PROMETHAZINE-DM) 6.25-15 mg/5 mL Syrp Take by mouth.      roflumilast (DALIRESP) 500 mcg Tab 1 tablet.      TIOTROPIUM BROMIDE (SPIRIVA WITH HANDIHALER  INHL) Inhale into the lungs.       No facility-administered encounter medications on file as of 4/18/2023.     No orders of the defined types were placed in this encounter.      Plan:     Problem List Items Addressed This Visit          Pulmonary    Centrilobular emphysema - Primary    Overview     Severe obstructive disease, on home O2.            Current Assessment & Plan     Mrs. Phelan has end stage COPD with continuous O2 requirement who is on maximized medical therapy, using her oxygen daily and using nightly positive pressure. She is going to pulmonary rehab at [facility] which she enjoys and is now paying for herself. She is meeting us today to establish care and to set goals of living a quality life. She is now living with her daughter in the area. We will plan to continue her current regimen without any changes at this time. She will follow up with us with PFTs if she cannot obtain recent PFTs performed as [facility].   - continue current medical therapy  - continue pulmonary rehabilitation  - encouraged continued smoking cessation

## 2023-04-19 LAB
ERV LLN: -16449.29
ERV PRE REF: 48.9 %
ERV REF: 0.71
ERVULN: ABNORMAL
FEF 25 75 LLN: 0.7
FEF 25 75 PRE REF: 10.2 %
FEF 25 75 REF: 1.76
FET100 CHG: 2.1 %
FEV05 LLN: 0.9
FEV05 REF: 1.76
FEV1 CHG: -4.3 %
FEV1 FVC LLN: 66
FEV1 FVC PRE REF: 55.7 %
FEV1 FVC REF: 79
FEV1 LLN: 1.41
FEV1 PRE REF: 22.3 %
FEV1 REF: 1.99
FEV1 VOL CHG: -0.02
FRCPLETH LLN: 1.88
FRCPLETH PREREF: 170.5 %
FRCPLETH REF: 2.71
FRCPLETHULN: 3.53
FVC CHG: -5.6 %
FVC LLN: 1.82
FVC PRE REF: 39.8 %
FVC REF: 2.53
FVC VOL CHG: -0.06
LLN IC: -16448
PEF LLN: 3.17
PEF PRE REF: 28.7 %
PEF REF: 5.17
PHYSICIAN COMMENT: ABNORMAL
POST FEF 25 75: 0.16 L/S (ref 0.7–3.35)
POST FET 100: 6.04 SEC
POST FEV1 FVC: 44.56 % (ref 66.49–89.72)
POST FEV1: 0.42 L (ref 1.41–2.54)
POST FEV5: 0.29 L (ref 0.9–2.61)
POST FVC: 0.95 L (ref 1.82–3.28)
POST PEF: 1.57 L/S (ref 3.17–7.18)
PRE ERV: 0.35 L (ref -16449.29–16450.71)
PRE FEF 25 75: 0.18 L/S (ref 0.7–3.35)
PRE FET 100: 5.92 SEC
PRE FEV05 REF: 16.6 %
PRE FEV1 FVC: 43.98 % (ref 66.49–89.72)
PRE FEV1: 0.44 L (ref 1.41–2.54)
PRE FEV5: 0.29 L (ref 0.9–2.61)
PRE FRC PL: 4.62 L (ref 1.88–3.53)
PRE FVC: 1.01 L (ref 1.82–3.28)
PRE IC: 0.72 L (ref -16448–16452)
PRE PEF: 1.48 L/S (ref 3.17–7.18)
PRE REF IC: 36 %
PRE RV: 4.27 L (ref 1.42–2.57)
PRE TLC: 5.34 L (ref 3.95–5.93)
PRE VTG: 4.74 L
RAW PRE REF: 309.8 %
RAW PRE: 9.48 CMH2O*S/L (ref 3.06–3.06)
RAW REF: 3.06
REF IC: 2
RV LLN: 1.42
RV PRE REF: 213.7 %
RV REF: 2
RVTLC LLN: 32
RVTLC PRE REF: 193.2 %
RVTLC PRE: 79.99 % (ref 31.81–50.99)
RVTLC REF: 41
RVTLCULN: 51
RVULN: 2.57
SGAW PRE REF: 21.2 %
SGAW PRE: 0.02 1/(CMH2O*S) (ref 0.1–0.1)
SGAW REF: 0.1
TLC LLN: 3.95
TLC PRE REF: 108.1 %
TLC REF: 4.94
TLC ULN: 5.93
ULN IC: ABNORMAL
VC LLN: 1.82
VC PRE REF: 42.2 %
VC PRE: 1.07 L (ref 1.82–3.28)
VC REF: 2.53
VC ULN: 3.28

## 2023-04-23 ENCOUNTER — HOSPITAL ENCOUNTER (INPATIENT)
Facility: HOSPITAL | Age: 68
LOS: 2 days | Discharge: HOME | DRG: 189 | End: 2023-04-26
Attending: EMERGENCY MEDICINE | Admitting: STUDENT IN AN ORGANIZED HEALTH CARE EDUCATION/TRAINING PROGRAM
Payer: MEDICARE

## 2023-04-23 DIAGNOSIS — R06.02 SOB (SHORTNESS OF BREATH): ICD-10-CM

## 2023-04-23 DIAGNOSIS — J96.22 ACUTE ON CHRONIC RESPIRATORY FAILURE WITH HYPERCAPNIA: Primary | ICD-10-CM

## 2023-04-23 DIAGNOSIS — R00.1 BRADYCARDIA: ICD-10-CM

## 2023-04-23 DIAGNOSIS — J44.1 COPD EXACERBATION: ICD-10-CM

## 2023-04-23 LAB
ALBUMIN SERPL BCP-MCNC: 3.5 G/DL (ref 3.5–5.2)
ALP SERPL-CCNC: 42 U/L (ref 55–135)
ALT SERPL W/O P-5'-P-CCNC: 14 U/L (ref 10–44)
ANION GAP SERPL CALC-SCNC: 9 MMOL/L (ref 8–16)
AST SERPL-CCNC: 16 U/L (ref 10–40)
BASOPHILS # BLD AUTO: 0.04 K/UL (ref 0–0.2)
BASOPHILS NFR BLD: 0.6 % (ref 0–1.9)
BILIRUB SERPL-MCNC: 0.1 MG/DL (ref 0.1–1)
BUN SERPL-MCNC: 15 MG/DL (ref 8–23)
CALCIUM SERPL-MCNC: 9.1 MG/DL (ref 8.7–10.5)
CHLORIDE SERPL-SCNC: 96 MMOL/L (ref 95–110)
CO2 SERPL-SCNC: 39 MMOL/L (ref 23–29)
CREAT SERPL-MCNC: 0.6 MG/DL (ref 0.5–1.4)
DIFFERENTIAL METHOD: ABNORMAL
EOSINOPHIL # BLD AUTO: 0.1 K/UL (ref 0–0.5)
EOSINOPHIL NFR BLD: 1.7 % (ref 0–8)
ERYTHROCYTE [DISTWIDTH] IN BLOOD BY AUTOMATED COUNT: 13.2 % (ref 11.5–14.5)
EST. GFR  (NO RACE VARIABLE): >60 ML/MIN/1.73 M^2
GLUCOSE SERPL-MCNC: 136 MG/DL (ref 70–110)
HCT VFR BLD AUTO: 45.5 % (ref 37–48.5)
HGB BLD-MCNC: 12.9 G/DL (ref 12–16)
IMM GRANULOCYTES # BLD AUTO: 0.01 K/UL (ref 0–0.04)
IMM GRANULOCYTES NFR BLD AUTO: 0.1 % (ref 0–0.5)
LYMPHOCYTES # BLD AUTO: 1.8 K/UL (ref 1–4.8)
LYMPHOCYTES NFR BLD: 26.2 % (ref 18–48)
MCH RBC QN AUTO: 27.4 PG (ref 27–31)
MCHC RBC AUTO-ENTMCNC: 28.4 G/DL (ref 32–36)
MCV RBC AUTO: 97 FL (ref 82–98)
MONOCYTES # BLD AUTO: 0.7 K/UL (ref 0.3–1)
MONOCYTES NFR BLD: 10.4 % (ref 4–15)
NEUTROPHILS # BLD AUTO: 4.2 K/UL (ref 1.8–7.7)
NEUTROPHILS NFR BLD: 61 % (ref 38–73)
NRBC BLD-RTO: 0 /100 WBC
PLATELET # BLD AUTO: 155 K/UL (ref 150–450)
PMV BLD AUTO: 12 FL (ref 9.2–12.9)
POTASSIUM SERPL-SCNC: 4 MMOL/L (ref 3.5–5.1)
PROT SERPL-MCNC: 7 G/DL (ref 6–8.4)
RBC # BLD AUTO: 4.7 M/UL (ref 4–5.4)
SODIUM SERPL-SCNC: 144 MMOL/L (ref 136–145)
TROPONIN I SERPL DL<=0.01 NG/ML-MCNC: 0.01 NG/ML (ref 0–0.03)
WBC # BLD AUTO: 6.95 K/UL (ref 3.9–12.7)

## 2023-04-23 PROCEDURE — 82803 BLOOD GASES ANY COMBINATION: CPT

## 2023-04-23 PROCEDURE — 25000242 PHARM REV CODE 250 ALT 637 W/ HCPCS: Performed by: EMERGENCY MEDICINE

## 2023-04-23 PROCEDURE — 80053 COMPREHEN METABOLIC PANEL: CPT | Performed by: EMERGENCY MEDICINE

## 2023-04-23 PROCEDURE — 85025 COMPLETE CBC W/AUTO DIFF WBC: CPT | Performed by: EMERGENCY MEDICINE

## 2023-04-23 PROCEDURE — 96374 THER/PROPH/DIAG INJ IV PUSH: CPT

## 2023-04-23 PROCEDURE — 94640 AIRWAY INHALATION TREATMENT: CPT

## 2023-04-23 PROCEDURE — 83880 ASSAY OF NATRIURETIC PEPTIDE: CPT | Performed by: EMERGENCY MEDICINE

## 2023-04-23 PROCEDURE — 99285 EMERGENCY DEPT VISIT HI MDM: CPT | Mod: 25

## 2023-04-23 PROCEDURE — 27000190 HC CPAP FULL FACE MASK W/VALVE

## 2023-04-23 PROCEDURE — 99900035 HC TECH TIME PER 15 MIN (STAT)

## 2023-04-23 PROCEDURE — 84484 ASSAY OF TROPONIN QUANT: CPT | Performed by: EMERGENCY MEDICINE

## 2023-04-23 PROCEDURE — 94660 CPAP INITIATION&MGMT: CPT

## 2023-04-23 PROCEDURE — 93010 ELECTROCARDIOGRAM REPORT: CPT | Mod: ,,, | Performed by: INTERNAL MEDICINE

## 2023-04-23 PROCEDURE — 93010 EKG 12-LEAD: ICD-10-PCS | Mod: ,,, | Performed by: INTERNAL MEDICINE

## 2023-04-23 PROCEDURE — 63600175 PHARM REV CODE 636 W HCPCS: Performed by: EMERGENCY MEDICINE

## 2023-04-23 PROCEDURE — 93005 ELECTROCARDIOGRAM TRACING: CPT

## 2023-04-23 RX ORDER — ALBUTEROL SULFATE 2.5 MG/.5ML
10 SOLUTION RESPIRATORY (INHALATION) ONCE
Status: COMPLETED | OUTPATIENT
Start: 2023-04-23 | End: 2023-04-23

## 2023-04-23 RX ORDER — IPRATROPIUM BROMIDE AND ALBUTEROL SULFATE 2.5; .5 MG/3ML; MG/3ML
3 SOLUTION RESPIRATORY (INHALATION)
Status: COMPLETED | OUTPATIENT
Start: 2023-04-23 | End: 2023-04-23

## 2023-04-23 RX ORDER — METHYLPREDNISOLONE SOD SUCC 125 MG
125 VIAL (EA) INJECTION
Status: COMPLETED | OUTPATIENT
Start: 2023-04-23 | End: 2023-04-23

## 2023-04-23 RX ADMIN — ALBUTEROL SULFATE 10 MG: 2.5 SOLUTION RESPIRATORY (INHALATION) at 09:04

## 2023-04-23 RX ADMIN — METHYLPREDNISOLONE SODIUM SUCCINATE 125 MG: 125 INJECTION, POWDER, FOR SOLUTION INTRAMUSCULAR; INTRAVENOUS at 08:04

## 2023-04-23 RX ADMIN — IPRATROPIUM BROMIDE AND ALBUTEROL SULFATE 3 ML: .5; 3 SOLUTION RESPIRATORY (INHALATION) at 08:04

## 2023-04-24 PROBLEM — J96.02 ACUTE HYPERCAPNIC RESPIRATORY FAILURE: Status: ACTIVE | Noted: 2023-04-24

## 2023-04-24 PROBLEM — J96.22 ACUTE ON CHRONIC RESPIRATORY FAILURE WITH HYPERCAPNIA: Status: ACTIVE | Noted: 2023-04-24

## 2023-04-24 PROBLEM — R65.20 SEVERE SEPSIS: Status: ACTIVE | Noted: 2023-04-24

## 2023-04-24 PROBLEM — A41.9 SEVERE SEPSIS: Status: ACTIVE | Noted: 2023-04-24

## 2023-04-24 PROBLEM — G93.41 ACUTE METABOLIC ENCEPHALOPATHY: Status: ACTIVE | Noted: 2023-04-24

## 2023-04-24 LAB
ADENOVIRUS: NOT DETECTED
ALBUMIN SERPL BCP-MCNC: 3.5 G/DL (ref 3.5–5.2)
ALLENS TEST: ABNORMAL
ALP SERPL-CCNC: 45 U/L (ref 55–135)
ALT SERPL W/O P-5'-P-CCNC: 12 U/L (ref 10–44)
ANION GAP SERPL CALC-SCNC: 6 MMOL/L (ref 8–16)
ANION GAP SERPL CALC-SCNC: 6 MMOL/L (ref 8–16)
AST SERPL-CCNC: 16 U/L (ref 10–40)
BASOPHILS # BLD AUTO: 0.01 K/UL (ref 0–0.2)
BASOPHILS NFR BLD: 0.2 % (ref 0–1.9)
BILIRUB SERPL-MCNC: 0.2 MG/DL (ref 0.1–1)
BNP SERPL-MCNC: 15 PG/ML (ref 0–99)
BORDETELLA PARAPERTUSSIS (IS1001): NOT DETECTED
BORDETELLA PERTUSSIS (PTXP): NOT DETECTED
BUN SERPL-MCNC: 14 MG/DL (ref 8–23)
BUN SERPL-MCNC: 14 MG/DL (ref 8–23)
CALCIUM SERPL-MCNC: 9.4 MG/DL (ref 8.7–10.5)
CALCIUM SERPL-MCNC: 9.4 MG/DL (ref 8.7–10.5)
CHLAMYDIA PNEUMONIAE: NOT DETECTED
CHLORIDE SERPL-SCNC: 95 MMOL/L (ref 95–110)
CHLORIDE SERPL-SCNC: 95 MMOL/L (ref 95–110)
CO2 SERPL-SCNC: 42 MMOL/L (ref 23–29)
CO2 SERPL-SCNC: 42 MMOL/L (ref 23–29)
CORONAVIRUS 229E, COMMON COLD VIRUS: NOT DETECTED
CORONAVIRUS HKU1, COMMON COLD VIRUS: NOT DETECTED
CORONAVIRUS NL63, COMMON COLD VIRUS: NOT DETECTED
CORONAVIRUS OC43, COMMON COLD VIRUS: NOT DETECTED
CREAT SERPL-MCNC: 0.6 MG/DL (ref 0.5–1.4)
CREAT SERPL-MCNC: 0.6 MG/DL (ref 0.5–1.4)
DELSYS: ABNORMAL
DIFFERENTIAL METHOD: ABNORMAL
EOSINOPHIL # BLD AUTO: 0 K/UL (ref 0–0.5)
EOSINOPHIL NFR BLD: 0 % (ref 0–8)
EP: 5
EP: 5
ERYTHROCYTE [DISTWIDTH] IN BLOOD BY AUTOMATED COUNT: 13.2 % (ref 11.5–14.5)
ERYTHROCYTE [SEDIMENTATION RATE] IN BLOOD BY WESTERGREN METHOD: 16 MM/H
ERYTHROCYTE [SEDIMENTATION RATE] IN BLOOD BY WESTERGREN METHOD: 16 MM/H
EST. GFR  (NO RACE VARIABLE): >60 ML/MIN/1.73 M^2
EST. GFR  (NO RACE VARIABLE): >60 ML/MIN/1.73 M^2
FIO2: 35
FIO2: 35
FLOW: 3
FLUBV RNA NPH QL NAA+NON-PROBE: NOT DETECTED
GLUCOSE SERPL-MCNC: 136 MG/DL (ref 70–110)
GLUCOSE SERPL-MCNC: 136 MG/DL (ref 70–110)
HCO3 UR-SCNC: 44.8 MMOL/L (ref 24–28)
HCO3 UR-SCNC: 49.6 MMOL/L (ref 24–28)
HCO3 UR-SCNC: 51 MMOL/L (ref 24–28)
HCT VFR BLD AUTO: 41.9 % (ref 37–48.5)
HGB BLD-MCNC: 12.3 G/DL (ref 12–16)
HPIV1 RNA NPH QL NAA+NON-PROBE: NOT DETECTED
HPIV2 RNA NPH QL NAA+NON-PROBE: NOT DETECTED
HPIV3 RNA NPH QL NAA+NON-PROBE: NOT DETECTED
HPIV4 RNA NPH QL NAA+NON-PROBE: NOT DETECTED
HUMAN METAPNEUMOVIRUS: NOT DETECTED
IMM GRANULOCYTES # BLD AUTO: 0.02 K/UL (ref 0–0.04)
IMM GRANULOCYTES NFR BLD AUTO: 0.3 % (ref 0–0.5)
INFLUENZA A (SUBTYPES H1,H1-2009,H3): NOT DETECTED
INFLUENZA A, MOLECULAR: NOT DETECTED
INFLUENZA B, MOLECULAR: NOT DETECTED
IP: 17
IP: 17
LYMPHOCYTES # BLD AUTO: 0.4 K/UL (ref 1–4.8)
LYMPHOCYTES NFR BLD: 7.6 % (ref 18–48)
MAGNESIUM SERPL-MCNC: 1.8 MG/DL (ref 1.6–2.6)
MCH RBC QN AUTO: 27.6 PG (ref 27–31)
MCHC RBC AUTO-ENTMCNC: 29.4 G/DL (ref 32–36)
MCV RBC AUTO: 94 FL (ref 82–98)
MIN VOL: 11
MODE: ABNORMAL
MONOCYTES # BLD AUTO: 0 K/UL (ref 0.3–1)
MONOCYTES NFR BLD: 0.7 % (ref 4–15)
MYCOPLASMA PNEUMONIAE: NOT DETECTED
NEUTROPHILS # BLD AUTO: 5.3 K/UL (ref 1.8–7.7)
NEUTROPHILS NFR BLD: 91.2 % (ref 38–73)
NRBC BLD-RTO: 0 /100 WBC
PCO2 BLDA: 108.2 MMHG (ref 35–45)
PCO2 BLDA: 89.4 MMHG (ref 35–45)
PCO2 BLDA: 99.3 MMHG (ref 35–45)
PH SMN: 7.28 [PH] (ref 7.35–7.45)
PH SMN: 7.31 [PH] (ref 7.35–7.45)
PH SMN: 7.31 [PH] (ref 7.35–7.45)
PHOSPHATE SERPL-MCNC: 3.3 MG/DL (ref 2.7–4.5)
PLATELET # BLD AUTO: 174 K/UL (ref 150–450)
PMV BLD AUTO: 11 FL (ref 9.2–12.9)
PO2 BLDA: 41 MMHG (ref 40–60)
PO2 BLDA: 50 MMHG (ref 40–60)
PO2 BLDA: 61 MMHG (ref 80–100)
POC BE: 19 MMOL/L
POC BE: 23 MMOL/L
POC BE: 24 MMOL/L
POC SATURATED O2: 64 % (ref 95–100)
POC SATURATED O2: 77 % (ref 95–100)
POC SATURATED O2: 86 % (ref 95–100)
POC TCO2: 48 MMOL/L (ref 23–27)
POC TCO2: >50 MMOL/L (ref 24–29)
POC TCO2: >50 MMOL/L (ref 24–29)
POTASSIUM SERPL-SCNC: 4.6 MMOL/L (ref 3.5–5.1)
POTASSIUM SERPL-SCNC: 4.6 MMOL/L (ref 3.5–5.1)
PROCALCITONIN SERPL IA-MCNC: <0.02 NG/ML
PROT SERPL-MCNC: 7 G/DL (ref 6–8.4)
RBC # BLD AUTO: 4.46 M/UL (ref 4–5.4)
RESPIRATORY INFECTION PANEL SOURCE: NORMAL
RSV AG BY MOLECULAR METHOD: NOT DETECTED
RSV RNA NPH QL NAA+NON-PROBE: NOT DETECTED
RV+EV RNA NPH QL NAA+NON-PROBE: NOT DETECTED
SAMPLE: ABNORMAL
SARS-COV-2 RNA RESP QL NAA+PROBE: NOT DETECTED
SARS-COV-2 RNA RESP QL NAA+PROBE: NOT DETECTED
SITE: ABNORMAL
SODIUM SERPL-SCNC: 143 MMOL/L (ref 136–145)
SODIUM SERPL-SCNC: 143 MMOL/L (ref 136–145)
SP02: 93
SP02: 93
SPONT RATE: 12
WBC # BLD AUTO: 5.79 K/UL (ref 3.9–12.7)

## 2023-04-24 PROCEDURE — 99900035 HC TECH TIME PER 15 MIN (STAT)

## 2023-04-24 PROCEDURE — 84145 PROCALCITONIN (PCT): CPT | Performed by: STUDENT IN AN ORGANIZED HEALTH CARE EDUCATION/TRAINING PROGRAM

## 2023-04-24 PROCEDURE — 21400001 HC TELEMETRY ROOM

## 2023-04-24 PROCEDURE — 63700000 PHARM REV CODE 250 ALT 637 W/O HCPCS: Performed by: STUDENT IN AN ORGANIZED HEALTH CARE EDUCATION/TRAINING PROGRAM

## 2023-04-24 PROCEDURE — 27000221 HC OXYGEN, UP TO 24 HOURS

## 2023-04-24 PROCEDURE — 85025 COMPLETE CBC W/AUTO DIFF WBC: CPT | Performed by: STUDENT IN AN ORGANIZED HEALTH CARE EDUCATION/TRAINING PROGRAM

## 2023-04-24 PROCEDURE — 84100 ASSAY OF PHOSPHORUS: CPT | Performed by: STUDENT IN AN ORGANIZED HEALTH CARE EDUCATION/TRAINING PROGRAM

## 2023-04-24 PROCEDURE — 27000190 HC CPAP FULL FACE MASK W/VALVE

## 2023-04-24 PROCEDURE — 63600175 PHARM REV CODE 636 W HCPCS: Performed by: STUDENT IN AN ORGANIZED HEALTH CARE EDUCATION/TRAINING PROGRAM

## 2023-04-24 PROCEDURE — 36415 COLL VENOUS BLD VENIPUNCTURE: CPT | Performed by: STUDENT IN AN ORGANIZED HEALTH CARE EDUCATION/TRAINING PROGRAM

## 2023-04-24 PROCEDURE — 80053 COMPREHEN METABOLIC PANEL: CPT | Performed by: STUDENT IN AN ORGANIZED HEALTH CARE EDUCATION/TRAINING PROGRAM

## 2023-04-24 PROCEDURE — 94660 CPAP INITIATION&MGMT: CPT

## 2023-04-24 PROCEDURE — 25000003 PHARM REV CODE 250: Performed by: STUDENT IN AN ORGANIZED HEALTH CARE EDUCATION/TRAINING PROGRAM

## 2023-04-24 PROCEDURE — 36600 WITHDRAWAL OF ARTERIAL BLOOD: CPT

## 2023-04-24 PROCEDURE — 83735 ASSAY OF MAGNESIUM: CPT | Performed by: STUDENT IN AN ORGANIZED HEALTH CARE EDUCATION/TRAINING PROGRAM

## 2023-04-24 PROCEDURE — 82803 BLOOD GASES ANY COMBINATION: CPT

## 2023-04-24 PROCEDURE — 25000242 PHARM REV CODE 250 ALT 637 W/ HCPCS: Performed by: STUDENT IN AN ORGANIZED HEALTH CARE EDUCATION/TRAINING PROGRAM

## 2023-04-24 PROCEDURE — 87633 RESP VIRUS 12-25 TARGETS: CPT | Performed by: STUDENT IN AN ORGANIZED HEALTH CARE EDUCATION/TRAINING PROGRAM

## 2023-04-24 PROCEDURE — 94761 N-INVAS EAR/PLS OXIMETRY MLT: CPT

## 2023-04-24 PROCEDURE — 0241U SARS-COV2 (COVID) WITH FLU/RSV BY PCR: CPT | Performed by: STUDENT IN AN ORGANIZED HEALTH CARE EDUCATION/TRAINING PROGRAM

## 2023-04-24 PROCEDURE — 87798 DETECT AGENT NOS DNA AMP: CPT | Mod: 59 | Performed by: STUDENT IN AN ORGANIZED HEALTH CARE EDUCATION/TRAINING PROGRAM

## 2023-04-24 PROCEDURE — 94640 AIRWAY INHALATION TREATMENT: CPT

## 2023-04-24 RX ORDER — POLYETHYLENE GLYCOL 3350 17 G/17G
17 POWDER, FOR SOLUTION ORAL DAILY
Status: DISCONTINUED | OUTPATIENT
Start: 2023-04-24 | End: 2023-04-26 | Stop reason: HOSPADM

## 2023-04-24 RX ORDER — PREDNISONE 20 MG/1
40 TABLET ORAL DAILY PRN
Status: ON HOLD | COMMUNITY
Start: 2023-01-16 | End: 2023-04-26 | Stop reason: HOSPADM

## 2023-04-24 RX ORDER — PROCHLORPERAZINE EDISYLATE 5 MG/ML
5 INJECTION INTRAMUSCULAR; INTRAVENOUS EVERY 6 HOURS PRN
Status: DISCONTINUED | OUTPATIENT
Start: 2023-04-24 | End: 2023-04-26 | Stop reason: HOSPADM

## 2023-04-24 RX ORDER — IPRATROPIUM BROMIDE AND ALBUTEROL SULFATE 2.5; .5 MG/3ML; MG/3ML
3 SOLUTION RESPIRATORY (INHALATION)
Status: DISCONTINUED | OUTPATIENT
Start: 2023-04-24 | End: 2023-04-26 | Stop reason: HOSPADM

## 2023-04-24 RX ORDER — ACETAMINOPHEN 325 MG/1
650 TABLET ORAL EVERY 4 HOURS PRN
Status: DISCONTINUED | OUTPATIENT
Start: 2023-04-24 | End: 2023-04-26 | Stop reason: HOSPADM

## 2023-04-24 RX ORDER — SODIUM CHLORIDE 0.9 % (FLUSH) 0.9 %
3 SYRINGE (ML) INJECTION
Status: DISCONTINUED | OUTPATIENT
Start: 2023-04-24 | End: 2023-04-25

## 2023-04-24 RX ORDER — ERGOCALCIFEROL 1.25 MG/1
50000 CAPSULE ORAL
COMMUNITY
Start: 2022-06-06 | End: 2024-01-22

## 2023-04-24 RX ORDER — ACETAMINOPHEN 325 MG/1
650 TABLET ORAL EVERY 8 HOURS PRN
Status: DISCONTINUED | OUTPATIENT
Start: 2023-04-24 | End: 2023-04-26 | Stop reason: HOSPADM

## 2023-04-24 RX ORDER — SODIUM CHLORIDE 9 MG/ML
INJECTION, SOLUTION INTRAVENOUS CONTINUOUS
Status: DISCONTINUED | OUTPATIENT
Start: 2023-04-24 | End: 2023-04-25

## 2023-04-24 RX ORDER — AZITHROMYCIN 250 MG/1
500 TABLET, FILM COATED ORAL EVERY 24 HOURS
Status: DISCONTINUED | OUTPATIENT
Start: 2023-04-24 | End: 2023-04-26 | Stop reason: HOSPADM

## 2023-04-24 RX ORDER — SODIUM CHLORIDE 9 MG/ML
INJECTION, SOLUTION INTRAVENOUS CONTINUOUS
Status: DISCONTINUED | OUTPATIENT
Start: 2023-04-24 | End: 2023-04-24

## 2023-04-24 RX ORDER — ONDANSETRON 2 MG/ML
4 INJECTION INTRAMUSCULAR; INTRAVENOUS EVERY 12 HOURS PRN
Status: DISCONTINUED | OUTPATIENT
Start: 2023-04-24 | End: 2023-04-26 | Stop reason: HOSPADM

## 2023-04-24 RX ORDER — PREDNISONE 20 MG/1
40 TABLET ORAL DAILY
Status: DISCONTINUED | OUTPATIENT
Start: 2023-04-25 | End: 2023-04-26 | Stop reason: HOSPADM

## 2023-04-24 RX ORDER — ALBUTEROL SULFATE 2.5 MG/.5ML
2.5 SOLUTION RESPIRATORY (INHALATION) EVERY 6 HOURS
Status: DISCONTINUED | OUTPATIENT
Start: 2023-04-24 | End: 2023-04-24

## 2023-04-24 RX ORDER — ENOXAPARIN SODIUM 100 MG/ML
40 INJECTION SUBCUTANEOUS EVERY 24 HOURS
Status: DISCONTINUED | OUTPATIENT
Start: 2023-04-24 | End: 2023-04-26 | Stop reason: HOSPADM

## 2023-04-24 RX ORDER — ACETAMINOPHEN 500 MG
1000 TABLET ORAL EVERY 8 HOURS PRN
Status: DISCONTINUED | OUTPATIENT
Start: 2023-04-24 | End: 2023-04-26 | Stop reason: HOSPADM

## 2023-04-24 RX ORDER — AZITHROMYCIN 250 MG/1
250 TABLET, FILM COATED ORAL
Status: ON HOLD | COMMUNITY
Start: 2023-04-08 | End: 2023-04-26 | Stop reason: SDUPTHER

## 2023-04-24 RX ORDER — FLUTICASONE FUROATE AND VILANTEROL 200; 25 UG/1; UG/1
1 POWDER RESPIRATORY (INHALATION) DAILY
Status: DISCONTINUED | OUTPATIENT
Start: 2023-04-24 | End: 2023-04-26 | Stop reason: HOSPADM

## 2023-04-24 RX ORDER — IPRATROPIUM BROMIDE 0.5 MG/2.5ML
0.5 SOLUTION RESPIRATORY (INHALATION) EVERY 12 HOURS
Status: DISCONTINUED | OUTPATIENT
Start: 2023-04-24 | End: 2023-04-24

## 2023-04-24 RX ORDER — DICLOFENAC SODIUM 10 MG/G
2 GEL TOPICAL 2 TIMES DAILY
Status: DISCONTINUED | OUTPATIENT
Start: 2023-04-24 | End: 2023-04-26 | Stop reason: HOSPADM

## 2023-04-24 RX ORDER — MUPIROCIN 20 MG/G
OINTMENT TOPICAL 2 TIMES DAILY
Status: DISCONTINUED | OUTPATIENT
Start: 2023-04-24 | End: 2023-04-26 | Stop reason: HOSPADM

## 2023-04-24 RX ADMIN — MUPIROCIN: 20 OINTMENT TOPICAL at 08:04

## 2023-04-24 RX ADMIN — MUPIROCIN: 20 OINTMENT TOPICAL at 09:04

## 2023-04-24 RX ADMIN — ACETAMINOPHEN 650 MG: 325 TABLET ORAL at 07:04

## 2023-04-24 RX ADMIN — SODIUM CHLORIDE: 9 INJECTION, SOLUTION INTRAVENOUS at 05:04

## 2023-04-24 RX ADMIN — ENOXAPARIN SODIUM 40 MG: 40 INJECTION SUBCUTANEOUS at 05:04

## 2023-04-24 RX ADMIN — CEFTRIAXONE SODIUM 1 G: 1 INJECTION, POWDER, FOR SOLUTION INTRAMUSCULAR; INTRAVENOUS at 05:04

## 2023-04-24 RX ADMIN — METHYLPREDNISOLONE SODIUM SUCCINATE 40 MG: 40 INJECTION, POWDER, FOR SOLUTION INTRAMUSCULAR; INTRAVENOUS at 05:04

## 2023-04-24 RX ADMIN — AZITHROMYCIN MONOHYDRATE 500 MG: 250 TABLET ORAL at 08:04

## 2023-04-24 RX ADMIN — DICLOFENAC SODIUM 2 G: 10 GEL TOPICAL at 08:04

## 2023-04-24 RX ADMIN — POLYETHYLENE GLYCOL 3350 17 G: 17 POWDER, FOR SOLUTION ORAL at 08:04

## 2023-04-24 RX ADMIN — IPRATROPIUM BROMIDE AND ALBUTEROL SULFATE 3 ML: .5; 3 SOLUTION RESPIRATORY (INHALATION) at 01:04

## 2023-04-24 RX ADMIN — IPRATROPIUM BROMIDE AND ALBUTEROL SULFATE 3 ML: .5; 3 SOLUTION RESPIRATORY (INHALATION) at 08:04

## 2023-04-24 RX ADMIN — SODIUM CHLORIDE: 0.9 INJECTION, SOLUTION INTRAVENOUS at 05:04

## 2023-04-24 NOTE — ACP (ADVANCE CARE PLANNING)
Advance Care Planning     Date: 04/24/2023    Code Status  Patient is alert but on bipap during the encounter. In light of the patients advanced and life limiting illness,I engaged the the patient in a conversation about the patient's preferences for care at the very end of life. The patient states she has thought about it and discussed this before with her family. She does not want to have a breathing tube. She subsequently states, she has been having second thoughts on getting on the breathing machine so that if someone needs to see her a last time, can see her. she would like to have this conversation with daughter present. patient is aware she will remain a DNR until further discussion. Palliative care consulted to clarify code status. I spent a total of 8 minutes engaging the patient in this advance care planning discussion.    Alida Garza MD   Internal Medicine Hospitalist

## 2023-04-24 NOTE — ASSESSMENT & PLAN NOTE
Patient with Hypercapnic and Hypoxic Respiratory failure which is Acute on chronic.  she is on home oxygen at 3 LPM. Supplemental oxygen was provided and noted- Oxygen Concentration (%):  [35-40] 35    .   Signs/symptoms of respiratory failure include- respiratory distress. Contributing diagnoses includes - COPD Labs and images were reviewed. Patient Has recent ABG, which has been reviewed. Will treat underlying causes and adjust management of respiratory failure as follows-     Continue on BiPAP for now with current settings.  IPAP of 17, EPAP of 5, FiO2 of 50% and rate of 14.  Repeat ABG in an hour  As per patient's daughter at bedside, patient has indicated that she does not want to be intubated or resuscitated but she has no signed form of DNI and DNR.

## 2023-04-24 NOTE — PLAN OF CARE
The sw met with the pt and Mulugeta Phelan(dtr)[phone] who was at bedside during the assessment. The pt lives with Mulugeta in [place] as of the past 11 months. The pt's independent with her adl's and has a BIPAP,rollator,o2,nebulizer,bsc and shwr chair. The pt still drives but Mulugeta will transport her home at d/c. The pt's looking for local PCP and is agreeable to see Dr. Landry in the PCC after d/c. The sw requested the f/u with Dr. Landry staff. The sw completed the white board in the pt's room and gave her a d/c pamphlet with her name and contact info on it. The sw encouraged them to call if they have nay further questions or concerns. The sw will continue to follow the pt throughout her transitions of care and will assist with any d/c needs.       04/24/23 1305   Discharge Assessment   Assessment Type Discharge Planning Assessment   Confirmed/corrected address, phone number and insurance Yes   Confirmed Demographics Correct on Facesheet   Source of Information patient   When was your last doctors appointment? 04/18/23   Communicated RADHA with patient/caregiver Date not available/Unable to determine   Reason For Admission COPD exacerbation   People in Home child(mary), dependent   Do you expect to return to your current living situation? Yes   Do you have help at home or someone to help you manage your care at home? Yes   Who are your caregiver(s) and their phone number(s)? Mulugeta Phelan(dtr)[phone]   Prior to hospitilization cognitive status: Alert/Oriented   Current cognitive status: Alert/Oriented   Walking or Climbing Stairs ambulation difficulty, requires equipment;stair climbing difficulty, requires equipment;transferring difficulty, requires equipment   Dressing/Bathing bathing difficulty, requires equipment   Home Accessibility wheelchair accessible   Home Layout Able to live on 1st floor   Equipment Currently Used at Home BIPAP;rollator;oxygen;nebulizer;bedside commode;shower chair    Readmission within 30 days? No   Patient currently being followed by outpatient case management? No   Do you currently have service(s) that help you manage your care at home? No   Do you take prescription medications? Yes   Do you have prescription coverage? Yes   Coverage [place] [facility]   Do you have any problems affording any of your prescribed medications? No  ([facility] in [place])   Is the patient taking medications as prescribed? yes   Who is going to help you get home at discharge? Mulugeta Phelan(dtr)[phone]   How do you get to doctors appointments? car, drives self   Are you on dialysis? No   Do you take coumadin? No   Discharge Plan A Home with family   Discharge Plan B Home Health   DME Needed Upon Discharge  none   Discharge Plan discussed with: Patient;Adult children   Discharge Barriers Identified None   Housing Stability   In the last 12 months, how many places have you lived? 2   In the last 12 months, was there a time when you did not have a steady place to sleep or slept in a shelter (including now)? N   Transportation Needs   In the past 12 months, has lack of transportation kept you from medical appointments or from getting medications? no   In the past 12 months, has lack of transportation kept you from meetings, work, or from getting things needed for daily living? No   OTHER   Name(s) of People in Home Mulugeta Phelan(dtr)[phone]

## 2023-04-24 NOTE — HPI
Patient is a 63-year-old female with past medical history of end-stage COPD, CAD, hypertension JUAN who presented with complaints of shortness of breath, generalized weakness and altered mental status of several days duration.  Patient unable to give good history.  Most of the history obtained from daughter at bedside.  She lives for the past 2 days, patient has been feeling very weak, getting short of breath which has been worse despite using her albuterol and oxygen.  Under the of presentation, she was noted to be confused and was brought to the ER for worsening symptoms.  In ER, she was found to be hypoxic and hypercapnic.  She was placed on BiPAP.

## 2023-04-24 NOTE — ASSESSMENT & PLAN NOTE
This patient does have evidence of infective focus  My overall impression is sepsis.  Source: Respiratory  Antibiotics given-   Antibiotics (72h ago, onward)    None        Latest lactate reviewed-  No results for input(s): LACTATE in the last 72 hours.  Organ dysfunction indicated by Acute respiratory failure    Fluid challenge Contraindicated- Fluid bolus is contraindicated in this patient due to Volume overload due to- Lower extremity edema     Post- resuscitation assessment Yes Perfusion exam was performed within 6 hours of septic shock presentation after bolus shows Adequate tissue perfusion assessed by non-invasive monitoring       Will Not start Pressors- Levophed for MAP of 65  Source control achieved by:  Antibiotics, BiPAP

## 2023-04-24 NOTE — ED PROVIDER NOTES
Encounter Date: 2023       History     Chief Complaint   Patient presents with    Shortness of Breath     Pt c/o SOB x 1 day, pt on home O2 2 L NC now on 4.      Patient has a history of COPD and is on 3 L nasal cannula presents to the emergency department with increased shortness of breath that has been going on for the past 2 days.  She denies any chest pain/pressure/tightness.  Denies any fevers/chills.  Mild unproductive cough.  Denies any nausea/vomiting/diarrhea.    Review of patient's allergies indicates:   Allergen Reactions    Codeine Nausea And Vomiting    Morphine Other (See Comments)     Past Medical History:   Diagnosis Date    COPD (chronic obstructive pulmonary disease)     Hypertension      Past Surgical History:   Procedure Laterality Date     SECTION      HERNIA REPAIR      HYSTERECTOMY       History reviewed. No pertinent family history.  Social History     Tobacco Use    Smoking status: Former     Packs/day: 1.00     Years: 20.00     Pack years: 20.00     Types: Cigarettes    Smokeless tobacco: Never   Substance Use Topics    Alcohol use: Not Currently     Alcohol/week: 1.0 standard drink     Types: 1 Glasses of wine per week    Drug use: No     Review of Systems   Respiratory:  Positive for shortness of breath.      Physical Exam     Initial Vitals   BP Pulse Resp Temp SpO2   23 2203 23 2013 23   (!) 160/67 88 (!) 25 99.1 °F (37.3 °C) (!) 77 %      MAP       --                Physical Exam    Vitals reviewed.  Constitutional: She appears well-developed and well-nourished.   HENT:   Head: Normocephalic and atraumatic.   Cardiovascular:  Normal rate and regular rhythm.           No murmur heard.  Pulmonary/Chest:   Tachypneic, decreased breath sounds bilaterally   Abdominal: Abdomen is soft. There is no abdominal tenderness.   Musculoskeletal:         General: Normal range of motion.     Neurological: She is alert and oriented to  person, place, and time. GCS score is 15. GCS eye subscore is 4. GCS verbal subscore is 5. GCS motor subscore is 6.   Skin: Skin is warm and dry.   Psychiatric: She has a normal mood and affect.       ED Course   Critical Care    Date/Time: 4/23/2023 11:30 PM  Performed by: David Summers DO  Authorized by: David Summers DO   Direct patient critical care time: 20 minutes  Additional history critical care time: 10 minutes  Ordering / reviewing critical care time: 10 minutes  Documentation critical care time: 5 minutes  Consulting other physicians critical care time: 5 minutes  Consult with family critical care time: 5 minutes  Total critical care time (exclusive of procedural time) : 55 minutes  Critical care time was exclusive of separately billable procedures and treating other patients.  Critical care was necessary to treat or prevent imminent or life-threatening deterioration of the following conditions: respiratory failure.  Critical care was time spent personally by me on the following activities: development of treatment plan with patient or surrogate, discussions with consultants, evaluation of patient's response to treatment, examination of patient, obtaining history from patient or surrogate, ordering and performing treatments and interventions, ordering and review of laboratory studies, ordering and review of radiographic studies, pulse oximetry, re-evaluation of patient's condition, review of old charts and ventilator management.      Labs Reviewed   CBC W/ AUTO DIFFERENTIAL - Abnormal; Notable for the following components:       Result Value    MCHC 28.4 (*)     All other components within normal limits   COMPREHENSIVE METABOLIC PANEL - Abnormal; Notable for the following components:    CO2 39 (*)     Glucose 136 (*)     Alkaline Phosphatase 42 (*)     All other components within normal limits   TROPONIN I   B-TYPE NATRIURETIC PEPTIDE     EKG Readings: (Independently Interpreted)   Sinus  rhythm rate of 77, right axis deviation, nonspecific T-wave abnormalities interpreted by me   ECG Results              EKG 12-lead (Final result)  Result time 04/24/23 13:08:39      Final result by Interface, Lab In [facility] (04/24/23 13:08:39)                   Narrative:    Test Reason : R06.02,    Vent. Rate : 077 BPM     Atrial Rate : 077 BPM     P-R Int : 148 ms          QRS Dur : 074 ms      QT Int : 356 ms       P-R-T Axes : 070 081 081 degrees     QTc Int : 402 ms    Normal sinus rhythm with sinus arrhythmia  Normal ECG  When compared with ECG of 20-FEB-2023 19:32,  Vent. rate has decreased BY  45 BPM  Criteria for Septal infarct are no longer Present  Confirmed by Stuart Aguilera MD ([id number]) on 4/24/2023 1:08:29 PM    Referred By: DAVIDAERR   SELF           Confirmed By:Stuart Aguilera MD                                  Imaging Results              X-Ray Chest AP Portable (Final result)  Result time 04/23/23 20:56:18      Final result by Arnaud Cunningham DO (04/23/23 20:56:18)                   Impression:      No acute abnormality.      Electronically signed by: Arnaud Cunningham  Date:    04/23/2023  Time:    20:56               Narrative:    EXAMINATION:  XR CHEST AP PORTABLE    CLINICAL HISTORY:  shortness of breath;    TECHNIQUE:  Single frontal view of the chest was performed.    COMPARISON:  02/20/2023.    FINDINGS:  The lungs are hyperexpanded.  Again seen is an opacity in left lung base, stable across multiple priors and likely related to prominent epicardial fat.  The lungs are otherwise clear.  The pleural spaces are clear.  The cardiac silhouette is unremarkable.  There are calcifications of the aortic arch.  The visualized osseous structures demonstrate degenerative changes.                                       Medications   sodium chloride 0.9% flush 3 mL (has no administration in time range)   enoxaparin injection 40 mg (has no administration in time range)   cefTRIAXone (ROCEPHIN) 1 g in  dextrose 5 % in water (D5W) 5 % 50 mL IVPB (MB+) (0 g Intravenous Stopped 4/24/23 0612)     And   azithromycin tablet 500 mg (500 mg Oral Given 4/24/23 0817)   albuterol-ipratropium 2.5 mg-0.5 mg/3 mL nebulizer solution 3 mL (3 mLs Nebulization Given 4/24/23 1326)   ondansetron injection 4 mg (has no administration in time range)   prochlorperazine injection Soln 5 mg (has no administration in time range)   polyethylene glycol packet 17 g (17 g Oral Given 4/24/23 0817)   methylPREDNISolone sodium succinate injection 40 mg (40 mg Intravenous Given 4/24/23 0522)   acetaminophen tablet 650 mg (has no administration in time range)   acetaminophen tablet 650 mg (has no administration in time range)   acetaminophen tablet 1,000 mg (has no administration in time range)   mupirocin 2 % ointment ( Nasal Given 4/24/23 0817)   0.9%  NaCl infusion ( Intravenous Stopped 4/24/23 0827)   diclofenac sodium 1 % gel 2 g (2 g Topical (Top) Given 4/24/23 0817)   fluticasone furoate-vilanteroL 200-25 mcg/dose diskus inhaler 1 puff (0 puffs Inhalation Hold 4/24/23 0945)   albuterol-ipratropium 2.5 mg-0.5 mg/3 mL nebulizer solution 3 mL (3 mLs Nebulization Given 4/23/23 2046)   methylPREDNISolone sodium succinate injection 125 mg (125 mg Intravenous Given 4/23/23 2029)   albuterol sulfate nebulizer solution 10 mg (10 mg Nebulization Given 4/23/23 2157)     Medical Decision Making:   ED Management:  VBG performed showed respiratory acidosis with pCO2 of 108 and a pH is 7.28.  Patient was placed on BiPAP which she tolerated well and repeat VBG was reviewed with improvement however still shows respiratory acidosis.  Patient is more alert now and appears more comfortable.  Will admit for further workup.           ED Course as of 04/24/23 1353   Sun Apr 23, 2023 2124 Comprehensive metabolic panel(!)  Reviewed, nonspecific findings [CD]   2129 Troponin I #1  Reviewed, wnl [CD]   2129 CBC auto differential(!)  Reviewed, nonspecific findings  [CD]   2135 X-Ray Chest AP Portable  Reviewed, no acute findings [CD]   0234 Paged [facility]  [CD]      ED Course User Index  [CD] David Summers DO                 Clinical Impression:   Final diagnoses:  [R06.02] SOB (shortness of breath)  [J44.1] COPD exacerbation        ED Disposition Condition    Admit                 David Summers DO  04/24/23 8149

## 2023-04-24 NOTE — H&P
[facility] Emergency [facility] Medicine  History & Physical    Patient Name: Terri Phelan  MRN: [id number]  Patient Class: Emergency  Admission Date: 2023  Attending Physician: David Summers DO   Primary Care Provider: Sridevi Gibson MD         Patient information was obtained from relative(s) and ER records.     Subjective:     Principal Problem:COPD exacerbation    Chief Complaint:   Chief Complaint   Patient presents with    Shortness of Breath     Pt c/o SOB x 1 day, pt on home O2 2 L NC now on 4.         HPI: Patient is a 63-year-old female with past medical history of end-stage COPD, CAD, hypertension JUAN who presented with complaints of shortness of breath, generalized weakness and altered mental status of several days duration.  Patient unable to give good history.  Most of the history obtained from daughter at bedside.  She lives for the past 2 days, patient has been feeling very weak, getting short of breath which has been worse despite using her albuterol and oxygen.  Under the of presentation, she was noted to be confused and was brought to the ER for worsening symptoms.  In ER, she was found to be hypoxic and hypercapnic.  She was placed on BiPAP.      Past Medical History:   Diagnosis Date    COPD (chronic obstructive pulmonary disease)     Hypertension        Past Surgical History:   Procedure Laterality Date     SECTION      HERNIA REPAIR      HYSTERECTOMY         Review of patient's allergies indicates:   Allergen Reactions    Codeine Nausea And Vomiting    Morphine Other (See Comments)       No current facility-administered medications on file prior to encounter.     Current Outpatient Medications on File Prior to Encounter   Medication Sig    acyclovir (ZOVIRAX) 400 MG tablet TAKE 1 TABLET BY MOUTH THREE TIMES DAILY FOR 10 DAYS AS NEEDED FOR OUTBREAK    albuterol (PROVENTIL/VENTOLIN HFA) 90 mcg/actuation inhaler Inhale 2 puffs into the lungs every 6 (six)  hours as needed.    ALBUTEROL SULFATE (PROVENTIL HFA INHL) Inhale into the lungs.    albuterol-ipratropium (DUO-NEB) 2.5 mg-0.5 mg/3 mL nebulizer solution Take by nebulization every 4 (four) hours.    alendronate (FOSAMAX) 35 MG tablet Take 35 mg by mouth.    aspirin 81 MG Chew Take 81 mg by mouth once daily.    budesonide-glycopyr-formoterol (BREZTRI AEROSPHERE) 160-9-4.8 mcg/actuation HFAA Inhale 2 puffs into the lungs 2 (two) times daily.    busPIRone (BUSPAR) 7.5 MG tablet TAKE 1 TABLET BY MOUTH TWICE DAILY AS NEEDED FOR BREAKTHROUGH ANXIETY    DALIRESP 500 mcg Tab TK 1 T PO D    dicyclomine (BENTYL) 20 mg tablet TAKE ONE TABLET BY MOUTH THREE TIMES DAILY AS NEEDED FOR STOMACH CRAMPS    EScitalopram oxalate (LEXAPRO) 20 MG tablet Take 20 mg by mouth once daily.    fluticasone propionate (FLONASE) 50 mcg/actuation nasal spray 1 spray by Each Nare route once daily.    fluticasone-umeclidin-vilanter (TRELEGY ELLIPTA) 100-62.5-25 mcg DsDv INHALE 1 PUFF BY MOUTH INTO THE LUNGS DAILY    hydroCHLOROthiazide (MICROZIDE) 12.5 mg capsule Take 12.5 mg by mouth once daily.    levocetirizine (XYZAL) 5 MG tablet TAKE 1 TABLET BY MOUTH EVERY DAY IN THE EVENING AS NEEDED FOR ALLERGIES    LORazepam (ATIVAN) 1 MG tablet Take 0.5 tablets (0.5 mg total) by mouth every 8 (eight) hours as needed for Anxiety.    losartan-hydrochlorothiazide 50-12.5 mg (HYZAAR) 50-12.5 mg per tablet Take 1 tablet by mouth once daily.    methylPREDNISolone (MEDROL DOSEPACK) 4 mg tablet use as directed (Patient not taking: Reported on 9/9/2022)    OXYGEN-AIR DELIVERY SYSTEMS MISC 3 L by Nasal route.    promethazine-dextromethorphan (PROMETHAZINE-DM) 6.25-15 mg/5 mL Syrp Take by mouth.    roflumilast (DALIRESP) 500 mcg Tab 1 tablet.    TIOTROPIUM BROMIDE (SPIRIVA WITH HANDIHALER INHL) Inhale into the lungs.     Family History    None       Tobacco Use    Smoking status: Former     Packs/day: 1.00     Years: 20.00     Pack years: 20.00      Types: Cigarettes    Smokeless tobacco: Never   Substance and Sexual Activity    Alcohol use: Not Currently     Alcohol/week: 1.0 standard drink     Types: 1 Glasses of wine per week    Drug use: No    Sexual activity: Not Currently     Review of Systems   Reason unable to perform ROS: lethargic.   Objective:     Vital Signs (Most Recent):  Temp: 99.1 °F (37.3 °C) (04/23/23 2013)  Pulse: 82 (04/23/23 2333)  Resp: (!) 31 (04/23/23 2333)  BP: (!) 171/74 (04/23/23 2333)  SpO2: (!) 92 % (04/23/23 2333)   Vital Signs (24h Range):  Temp:  [99.1 °F (37.3 °C)] 99.1 °F (37.3 °C)  Pulse:  [76-96] 82  Resp:  [24-39] 31  SpO2:  [77 %-100 %] 92 %  BP: (160-171)/(67-74) 171/74     Weight: 80.7 kg (178 lb)  Body mass index is 28.73 kg/m².    Physical Exam  HENT:      Head: Normocephalic.   Cardiovascular:      Rate and Rhythm: Normal rate and regular rhythm.   Pulmonary:      Breath sounds: No rales.      Comments: Mild respiratory distress, BiPAP  Abdominal:      General: There is no distension.      Palpations: Abdomen is soft.   Musculoskeletal:      Cervical back: Neck supple.      Right lower leg: Edema present.      Left lower leg: Edema present.   Skin:     General: Skin is warm.   Neurological:      Comments: Lethargic, barely able to follow commands           Significant Labs: All pertinent labs within the past 24 hours have been reviewed.  CBC:   Recent Labs   Lab 04/23/23 2036   WBC 6.95   HGB 12.9   HCT 45.5        CMP:   Recent Labs   Lab 04/23/23 2036      K 4.0   CL 96   CO2 39*   *   BUN 15   CREATININE 0.6   CALCIUM 9.1   PROT 7.0   ALBUMIN 3.5   BILITOT 0.1   ALKPHOS 42*   AST 16   ALT 14   ANIONGAP 9     Troponin:   Recent Labs   Lab 04/23/23 2036   TROPONINI 0.007       Significant Imaging: I have reviewed all pertinent imaging results/findings within the past 24 hours.  CXR: I have reviewed all pertinent results/findings within the past 24 hours and my personal findings are:   Within normal limits, no acute process    Assessment/Plan:     * COPD exacerbation  Patient does have end-stage COPD  - I have independently reviewed the VBG and it showed pH of 7.28, pCO2 of 108.2, PO2 of 41, extremely high pCO2  - I have independently reviewed the CXR and it showed no acute finding at this time  - Placed on oxygen supplementation as needed. Currently on 3 liters/minutes.  Her baseline is 3 L  - Started on Solumedrol 40mg BID x 5 days. Day 1 of steroid.  She received 125 mg of Solu-Medrol in the ER  - Duonebs Q6h standing and Q4h PRN  - BiPAP continuously for now and then at night  - Continue antibiotics with Rocephin 1g daily and Azithromycin 500mg daily. Day 1 of antibiotics  - Advised smoking cessation  - I have reviewed the note from the ER provider and discussed with him about the results and management of the patient in the ER.        Severe sepsis  This patient does have evidence of infective focus  My overall impression is sepsis.  Source: Respiratory  Antibiotics given-   Antibiotics (72h ago, onward)    None        Latest lactate reviewed-  No results for input(s): LACTATE in the last 72 hours.  Organ dysfunction indicated by Acute respiratory failure    Fluid challenge Contraindicated- Fluid bolus is contraindicated in this patient due to Volume overload due to- Lower extremity edema     Post- resuscitation assessment Yes Perfusion exam was performed within 6 hours of septic shock presentation after bolus shows Adequate tissue perfusion assessed by non-invasive monitoring       Will Not start Pressors- Levophed for MAP of 65  Source control achieved by:  Antibiotics, BiPAP    Acute hypercapnic respiratory failure  Patient with Hypercapnic and Hypoxic Respiratory failure which is Acute on chronic.  she is on home oxygen at 3 LPM. Supplemental oxygen was provided and noted- Oxygen Concentration (%):  [35-40] 35    .   Signs/symptoms of respiratory failure include- respiratory distress.  Contributing diagnoses includes - COPD Labs and images were reviewed. Patient Has recent ABG, which has been reviewed. Will treat underlying causes and adjust management of respiratory failure as follows-     Continue on BiPAP for now with current settings.  IPAP of 17, EPAP of 5, FiO2 of 50% and rate of 14.  Repeat ABG in an hour  As per patient's daughter at bedside, patient has indicated that she does not want to be intubated or resuscitated but she has no signed form of DNI and DNR.    Acute metabolic encephalopathy    Likely secondary to CO2 narcosis  Continue BiPAP for now.  We will reassess mental status.  If not improving, we will consider having CT of the head    Essential hypertension  Continue rest of home medications after verification.  Not verified in the system yet        VTE Risk Mitigation (From admission, onward)    None                     Herman Blandon MD  Department of Hospital Medicine  [place] - Emergency Dept

## 2023-04-24 NOTE — PLAN OF CARE
"Pt is aaox3, clear speech and on topic with daughter at bedside most of the day, pt voiced "feeling better" compared to yesterday. Was able to get off BIPAP to 3L NC maintaining O2 sats >90%. Up to chair recliner earlier noting SOB with exertion but recovers well after 1-2mins with breathing exercise and on 3L. Pt has been on BIPAP PRN now while asleep then placed back on 2-3L while awake and for meals, tolerating change well.       Problem: Adjustment to Illness COPD (Chronic Obstructive Pulmonary Disease)  Goal: Optimal Chronic Illness Coping  Outcome: Ongoing, Progressing     Problem: Functional Ability Impaired COPD (Chronic Obstructive Pulmonary Disease)  Goal: Optimal Level of Functional [place]  Outcome: Ongoing, Progressing     Problem: Infection COPD (Chronic Obstructive Pulmonary Disease)  Goal: Absence of Infection Signs and Symptoms  Outcome: Ongoing, Progressing     Problem: Oral Intake Inadequate COPD (Chronic Obstructive Pulmonary Disease)  Goal: Improved Nutrition Intake  Outcome: Ongoing, Progressing     Problem: Adult Inpatient Plan of Care  Goal: Patient-Specific Goal (Individualized)  Outcome: Ongoing, Progressing     Problem: Nutrition Impaired (Sepsis/Septic Shock)  Goal: Optimal Nutrition Intake  Outcome: Ongoing, Progressing     Problem: Coping Ineffective  Goal: Effective Coping  Outcome: Ongoing, Progressing     "

## 2023-04-24 NOTE — PLAN OF CARE
Received pt drom ER on BIPAP at 35% Pt in no distress, AAOx4, answering questions appropriately.   Zia KENYON notified of patient being in room.  Daughter at bedside.    Problem: Adjustment to Illness COPD (Chronic Obstructive Pulmonary Disease)  Goal: Optimal Chronic Illness Coping  Outcome: Ongoing, Progressing     Problem: Functional Ability Impaired COPD (Chronic Obstructive Pulmonary Disease)  Goal: Optimal Level of Functional [place]  Outcome: Ongoing, Progressing     Problem: Infection COPD (Chronic Obstructive Pulmonary Disease)  Goal: Absence of Infection Signs and Symptoms  Outcome: Ongoing, Progressing     Problem: Oral Intake Inadequate COPD (Chronic Obstructive Pulmonary Disease)  Goal: Improved Nutrition Intake  Outcome: Ongoing, Progressing     Problem: Respiratory Compromise COPD (Chronic Obstructive Pulmonary Disease)  Goal: Effective Oxygenation and Ventilation  Outcome: Ongoing, Progressing     Problem: Adult Inpatient Plan of Care  Goal: Plan of Care Review  Outcome: Ongoing, Progressing  Goal: Patient-Specific Goal (Individualized)  Outcome: Ongoing, Progressing  Goal: Absence of Hospital-Acquired Illness or Injury  Outcome: Ongoing, Progressing  Goal: Optimal Comfort and Wellbeing  Outcome: Ongoing, Progressing  Goal: Readiness for Transition of Care  Outcome: Ongoing, Progressing     Problem: Adjustment to Illness (Sepsis/Septic Shock)  Goal: Optimal Coping  Outcome: Ongoing, Progressing     Problem: Bleeding (Sepsis/Septic Shock)  Goal: Absence of Bleeding  Outcome: Ongoing, Progressing     Problem: Glycemic Control Impaired (Sepsis/Septic Shock)  Goal: Blood Glucose Level Within Desired Range  Outcome: Ongoing, Progressing     Problem: Infection Progression (Sepsis/Septic Shock)  Goal: Absence of Infection Signs and Symptoms  Outcome: Ongoing, Progressing     Problem: Nutrition Impaired (Sepsis/Septic Shock)  Goal: Optimal Nutrition Intake  Outcome: Ongoing, Progressing

## 2023-04-24 NOTE — PROGRESS NOTES
[facility]/Critical Care Consult Note      Patient:Terri Phelan  Age:67 y.o.  MRN:[id number]  Admit date:2023  LOS:0 day(s)     Primary Team: [facility]   Primary Attending: Alida Garza MD    Reason for Consult: ICU admission for BiPAP     HPI:       Patient is a 64 yo F with a PMH of severe COPD on 2L NC at home, CAD, HTN, and JUAN. She presented to the ED with several days of SOB, weakness and worsening confusion. She was placed on BiPAP in the ED for hypoxia and hypercapnia.     She was on BiPAP this AM during rounds, but she reports feeling much better. Her daughter was at bedside, and she reports that the patients confusion is improving compared to admission.     Patient was placed on NC for lunch and tolerated it very well. She did not report any shortness of breath or hypoxia.      MEDICAL HISTORY:      Past Medical History:  Past Medical History:   Diagnosis Date    COPD (chronic obstructive pulmonary disease)     Hypertension         Past Surgical History:  Past Surgical History:   Procedure Laterality Date     SECTION      HERNIA REPAIR      HYSTERECTOMY           Family History:   History reviewed. No pertinent family history.      Social History:  Social History     Socioeconomic History    Marital status: Single   Tobacco Use    Smoking status: Former     Packs/day: 1.00     Years: 20.00     Pack years: 20.00     Types: Cigarettes    Smokeless tobacco: Never   Substance and Sexual Activity    Alcohol use: Not Currently     Alcohol/week: 1.0 standard drink     Types: 1 Glasses of wine per week    Drug use: No    Sexual activity: Not Currently     Social Determinants of Health     Transportation Needs: No Transportation Needs    Lack of Transportation (Medical): No    Lack of Transportation (Non-Medical): No   Housing Stability: Unknown    Number of Places Lived in the Last Year: 2    Unstable Housing in the Last Year: No        Allergies:  Review of  patient's allergies indicates:   Allergen Reactions    Codeine Nausea And Vomiting    Morphine Other (See Comments)       Home Medications:  Current Outpatient Medications   Medication Instructions    acyclovir (ZOVIRAX) 400 MG tablet TAKE 1 TABLET BY MOUTH THREE TIMES DAILY FOR 10 DAYS AS NEEDED FOR OUTBREAK    albuterol (PROVENTIL/VENTOLIN HFA) 90 mcg/actuation inhaler 2 puffs, Inhalation, Every 6 hours PRN    albuterol-ipratropium (DUO-NEB) 2.5 mg-0.5 mg/3 mL nebulizer solution Nebulization, Every 4 hours    alendronate (FOSAMAX) 35 mg, Oral, Every Tuesday    aspirin 81 mg, Oral, Daily    azithromycin (Z-OLY) 250 mg, Oral, Three times weekly    budesonide-glycopyr-formoterol (BREZTRI AEROSPHERE) 160-9-4.8 mcg/actuation HFAA 2 puffs, Inhalation, 2 times daily    busPIRone (BUSPAR) 7.5 MG tablet TAKE 1 TABLET BY MOUTH TWICE DAILY AS NEEDED FOR BREAKTHROUGH ANXIETY    DALIRESP 500 mcg Tab TK 1 T PO D    dextromethorphan-guaiFENesin  mg (MUCINEX DM)  mg per 12 hr tablet 1 tablet, Oral, Every 12 hours    dicyclomine (BENTYL) 20 mg tablet TAKE ONE TABLET BY MOUTH THREE TIMES DAILY AS NEEDED FOR STOMACH CRAMPS    ergocalciferol (ERGOCALCIFEROL) 50,000 Units, Oral, Every Tuesday    EScitalopram oxalate (LEXAPRO) 20 mg, Oral, Daily    fluticasone propionate (FLONASE) 50 mcg/actuation nasal spray 1 spray, Each Nostril, Daily PRN    hydroCHLOROthiazide (MICROZIDE) 12.5 mg, Oral, Daily    levocetirizine (XYZAL) 5 MG tablet TAKE 1 TABLET BY MOUTH EVERY DAY IN THE EVENING AS NEEDED FOR ALLERGIES    LORazepam (ATIVAN) 0.5 mg, Oral, Every 8 hours PRN    losartan-hydrochlorothiazide 50-12.5 mg (HYZAAR) 50-12.5 mg per tablet 1 tablet, Oral, Daily    OXYGEN-AIR DELIVERY SYSTEMS MISC 3 L, Nasal    predniSONE (DELTASONE) 40 mg, Oral, Daily PRN, Take 2 tablets (total of 40mg) PO daily for 5 days PRN for COPD exacerbation    promethazine-dextromethorphan (PROMETHAZINE-DM) 6.25-15 mg/5 mL Syrp Oral     TIOTROPIUM BROMIDE (SPIRIVA WITH HANDIHALER INHL) Inhalation        OBJECTIVE DATA:      Vital Signs:  Vitals:    04/24/23 1430   BP: (!) 127/58   Pulse: 75   Resp: (!) 28   Temp:        Intake/Output:    Intake/Output Summary (Last 24 hours) at 4/24/2023 1502  Last data filed at 4/24/2023 1355  Gross per 24 hour   Intake 769.66 ml   Output 1117 ml   Net -347.34 ml        Physical Exam:  Constitutional: cooperative, calm, appears stated age  HEENT: NCAT, EOMI, MMM  Neck: Supple, normal ROM  Resp: CTABL, no wheeze; on BiPAP  Cardio: RRR, S1 and S2 normal  GI: Soft, non-tender, bowel sounds active  Extremities: BL LE edema, radial peripheral pulses 2+ and symmetric  Skin: No rashes, bruises, or lesions  Neurologic: Alert, following commands, moves extremities spontaneously     Laboratory/Imaging:  Recent Labs   Lab 04/23/23 2036 04/24/23  0700   WBC 6.95 5.79   HGB 12.9 12.3   HCT 45.5 41.9    174    143  143   K 4.0 4.6  4.6   CL 96 95  95   CREATININE 0.6 0.6  0.6   BUN 15 14  14   CO2 39* 42*  42*   ALT 14 12   AST 16 16       Microbiology:  Respiratory PCR Panel negative      Imaging:    CXR FINDINGS: The lungs are hyperexpanded.  Again seen is an opacity in left lung base, stable across multiple priors and likely related to prominent epicardial fat.  The lungs are otherwise clear.  The pleural spaces are clear.  The cardiac silhouette is unremarkable.  There are calcifications of the aortic arch.  The visualized osseous structures demonstrate degenerative changes.    Medications:   albuterol-ipratropium  3 mL Nebulization Q6H WAKE    cefTRIAXone (ROCEPHIN) IVPB  1 g Intravenous Q24H    And    azithromycin  500 mg Oral Daily    diclofenac sodium  2 g Topical (Top) BID    enoxaparin  40 mg Subcutaneous Daily    fluticasone furoate-vilanteroL  1 puff Inhalation Daily    methylPREDNISolone sodium succinate injection  40 mg Intravenous Q12H    mupirocin   Nasal BID    polyethylene glycol   17 g Oral Daily         sodium chloride 0.9% Stopped (04/24/23 0889)        acetaminophen, acetaminophen, acetaminophen, ondansetron, prochlorperazine, sodium chloride 0.9%     Assessment/Plan:     Neuro  Acute Encephalopathy, improving   - Confusion present on admission now resolved with course of BiPAP overnight and this AM  - Alert and back at baseline this afternoon      Cardiovascular  HTN  - Home HCTZ held currently   - BP and HT stable     Respiratory  Acute on chronic hypoxemic and hypercapnic respiratory failure 2/2 COPD Exacerbation   - Patient required BiPAP last night and this AM  - Now back at baseline on NC  - Okay for BiPAP night and with naps  - Continue Duonebs and home inhaler equivalents   - Can switch to oral prednisone for a 5 day course      GI/FEN  - No acute issues      F: none  E: replete as needed   N: Cardiac     Renal   Cr stable at 0.6; net zero     Heme  - stable  - CTM with CBCs     Endocrine  - no acute issues     Infectious Disease  - Antibiotics per primary     Feeding: Cardiac   Analgesia: PRN Tylenol   Sedation: none  Thrombo PPX: Lovenox   Head of Bed: > 30 degrees  Ulcer PPX: n/a  Glucose: 140-180  SBT/SAT: n/a  Bowel Regimen: PEG  Indwelling Lines: PIV   Abx:     Code: Rocephin/Azithromycin      Dispo: Stable for floor with BiPAP night and when sleeping      Estevan Conley MD  [facility]   [facility]   04/24/2023 3:02 PM

## 2023-04-24 NOTE — NURSING
[facility] RN met with patient and daughter Mulugeta at bedside regarding [facility] hospital follow up upon discharge. Pt agreeable to hospital follow up .RN informed pt of scheduled appointment and that appointment will also appear on d/c AVS. Patient informed to bring portable home O2 if used and all medication bottles to [facility] follow up appointment.  [facility] information handout, appointment letter and folder provided to patient. Pt states daughter will provide transportation to appointment.    Patient Contact info: Contact Information   [phone]    Person providing transportation contact info: Mulugeta Phelan (Daughter)   [phone] (Mobile)    Barriers to attending PCC visit: none    Future Appointments   Date Time Provider Department Center   5/15/2023  1:00 PM Bethany Landry MD [facility]

## 2023-04-24 NOTE — ASSESSMENT & PLAN NOTE
Patient does have end-stage COPD  - I have independently reviewed the VBG and it showed pH of 7.28, pCO2 of 108.2, PO2 of 41, extremely high pCO2  - I have independently reviewed the CXR and it showed no acute finding at this time  - Placed on oxygen supplementation as needed. Currently on 3 liters/minutes.  Her baseline is 3 L  - Started on Solumedrol 40mg BID x 5 days. Day 1 of steroid.  She received 125 mg of Solu-Medrol in the ER  - Duonebs Q6h standing and Q4h PRN  - BiPAP continuously for now and then at night  - Continue antibiotics with Rocephin 1g daily and Azithromycin 500mg daily. Day 1 of antibiotics  - Advised smoking cessation  - I have reviewed the note from the ER provider and discussed with him about the results and management of the patient in the ER.

## 2023-04-24 NOTE — SUBJECTIVE & OBJECTIVE
Past Medical History:   Diagnosis Date    COPD (chronic obstructive pulmonary disease)     Hypertension        Past Surgical History:   Procedure Laterality Date     SECTION      HERNIA REPAIR      HYSTERECTOMY         Review of patient's allergies indicates:   Allergen Reactions    Codeine Nausea And Vomiting    Morphine Other (See Comments)       No current facility-administered medications on file prior to encounter.     Current Outpatient Medications on File Prior to Encounter   Medication Sig    acyclovir (ZOVIRAX) 400 MG tablet TAKE 1 TABLET BY MOUTH THREE TIMES DAILY FOR 10 DAYS AS NEEDED FOR OUTBREAK    albuterol (PROVENTIL/VENTOLIN HFA) 90 mcg/actuation inhaler Inhale 2 puffs into the lungs every 6 (six) hours as needed.    ALBUTEROL SULFATE (PROVENTIL HFA INHL) Inhale into the lungs.    albuterol-ipratropium (DUO-NEB) 2.5 mg-0.5 mg/3 mL nebulizer solution Take by nebulization every 4 (four) hours.    alendronate (FOSAMAX) 35 MG tablet Take 35 mg by mouth.    aspirin 81 MG Chew Take 81 mg by mouth once daily.    budesonide-glycopyr-formoterol (BREZTRI AEROSPHERE) 160-9-4.8 mcg/actuation HFAA Inhale 2 puffs into the lungs 2 (two) times daily.    busPIRone (BUSPAR) 7.5 MG tablet TAKE 1 TABLET BY MOUTH TWICE DAILY AS NEEDED FOR BREAKTHROUGH ANXIETY    DALIRESP 500 mcg Tab TK 1 T PO D    dicyclomine (BENTYL) 20 mg tablet TAKE ONE TABLET BY MOUTH THREE TIMES DAILY AS NEEDED FOR STOMACH CRAMPS    EScitalopram oxalate (LEXAPRO) 20 MG tablet Take 20 mg by mouth once daily.    fluticasone propionate (FLONASE) 50 mcg/actuation nasal spray 1 spray by Each Nare route once daily.    fluticasone-umeclidin-vilanter (TRELEGY ELLIPTA) 100-62.5-25 mcg DsDv INHALE 1 PUFF BY MOUTH INTO THE LUNGS DAILY    hydroCHLOROthiazide (MICROZIDE) 12.5 mg capsule Take 12.5 mg by mouth once daily.    levocetirizine (XYZAL) 5 MG tablet TAKE 1 TABLET BY MOUTH EVERY DAY IN THE EVENING AS NEEDED FOR ALLERGIES    LORazepam (ATIVAN) 1 MG  tablet Take 0.5 tablets (0.5 mg total) by mouth every 8 (eight) hours as needed for Anxiety.    losartan-hydrochlorothiazide 50-12.5 mg (HYZAAR) 50-12.5 mg per tablet Take 1 tablet by mouth once daily.    methylPREDNISolone (MEDROL DOSEPACK) 4 mg tablet use as directed (Patient not taking: Reported on 9/9/2022)    OXYGEN-AIR DELIVERY SYSTEMS MISC 3 L by Nasal route.    promethazine-dextromethorphan (PROMETHAZINE-DM) 6.25-15 mg/5 mL Syrp Take by mouth.    roflumilast (DALIRESP) 500 mcg Tab 1 tablet.    TIOTROPIUM BROMIDE (SPIRIVA WITH HANDIHALER INHL) Inhale into the lungs.     Family History    None       Tobacco Use    Smoking status: Former     Packs/day: 1.00     Years: 20.00     Pack years: 20.00     Types: Cigarettes    Smokeless tobacco: Never   Substance and Sexual Activity    Alcohol use: Not Currently     Alcohol/week: 1.0 standard drink     Types: 1 Glasses of wine per week    Drug use: No    Sexual activity: Not Currently     Review of Systems   Reason unable to perform ROS: lethargic.   Objective:     Vital Signs (Most Recent):  Temp: 99.1 °F (37.3 °C) (04/23/23 2013)  Pulse: 82 (04/23/23 2333)  Resp: (!) 31 (04/23/23 2333)  BP: (!) 171/74 (04/23/23 2333)  SpO2: (!) 92 % (04/23/23 2333)   Vital Signs (24h Range):  Temp:  [99.1 °F (37.3 °C)] 99.1 °F (37.3 °C)  Pulse:  [76-96] 82  Resp:  [24-39] 31  SpO2:  [77 %-100 %] 92 %  BP: (160-171)/(67-74) 171/74     Weight: 80.7 kg (178 lb)  Body mass index is 28.73 kg/m².    Physical Exam  HENT:      Head: Normocephalic.   Cardiovascular:      Rate and Rhythm: Normal rate and regular rhythm.   Pulmonary:      Breath sounds: No rales.      Comments: Mild respiratory distress, BiPAP  Abdominal:      General: There is no distension.      Palpations: Abdomen is soft.   Musculoskeletal:      Cervical back: Neck supple.      Right lower leg: Edema present.      Left lower leg: Edema present.   Skin:     General: Skin is warm.   Neurological:      Comments: Lethargic,  barely able to follow commands           Significant Labs: All pertinent labs within the past 24 hours have been reviewed.  CBC:   Recent Labs   Lab 04/23/23 2036   WBC 6.95   HGB 12.9   HCT 45.5        CMP:   Recent Labs   Lab 04/23/23 2036      K 4.0   CL 96   CO2 39*   *   BUN 15   CREATININE 0.6   CALCIUM 9.1   PROT 7.0   ALBUMIN 3.5   BILITOT 0.1   ALKPHOS 42*   AST 16   ALT 14   ANIONGAP 9     Troponin:   Recent Labs   Lab 04/23/23 2036   TROPONINI 0.007       Significant Imaging: I have reviewed all pertinent imaging results/findings within the past 24 hours.  CXR: I have reviewed all pertinent results/findings within the past 24 hours and my personal findings are:  Within normal limits, no acute process

## 2023-04-24 NOTE — EICU
EICU BRIEF ADMIT NOTE:     HISTORY:  Yes Please refer to ER notes for detail     CAMERA ASSESSMENT: Two way audiovisual assessment was done:   On BiPAP, stable vitals. 17/5/35%. Ve > 8 lit/min  Sats > 90%.  Talking to RN. Follows commands, protecting her airways.     Data:   Telemetry was reviewed. Medical records including notes, labs and imaging were reviewed.  Trop neg  Wbc normal  CxR no pneumonia    DISCUSSED with bedside nurse.     ASSESSMENT AND PLAN:    63-year-old female with past medical history of end-stage COPD-on home o2,  CAD, hypertension JUAN  admitted to ICU on BiPAP for    Sepsis: Acute on  chronic type 2 HHRF from COPD exacerbation.  - on duo nebs, steroids, antibiotics, received fluids. Not on pressors. Mental status improving.   -aspiration precautions.     Encephalopathy improving on BiPAP  CT head if not better    HTN  Stable.     BEST PRACTICES REVIEW:     INTUBATED: No   GLYCEMIN CONTROL:  Diabetes: No  STRESS ULCER PROPHYLAXIS: No.  DVT PROPHYLAXIS: Lovenox.           Thank You for allowing EICU to participate in the care of the patient. Please call as needed        Bennie Edouard MD, [facility]  EICU  Critical Care Medicine    6:58    ABG reviewed. Increase IPAP to 20. Follow ABG prn.

## 2023-04-24 NOTE — PHARMACY MED REC
"[facility]           Pharmacy  Admission Medication History     The home medication history was taken by Reinaldo Christensen PharmD.      Medication history obtained from Medications listed below were obtained from: Patient/family and Analytic software- "[employer]    Based on information gathered for medication list, you may go to "Admission" then "Reconcile Home Medications" tabs to review and/or act upon those items.     The home medication list has been updated by the Pharmacy department.   Please read ALL comments highlighted in yellow.   Please address this information as you see fit.    Feel free to contact us if you have any questions or require assistance.    The medications listed below were removed from the home medication list.  Please reorder if appropriate:    Patient reports NOT TAKING the following medication(s):  busPIRone (BUSPAR) 7.5 MG tablet   EScitalopram oxalate (LEXAPRO) 20 MG tablet   hydroCHLOROthiazide (MICROZIDE) 12.5 mg capsule   levocetirizine (XYZAL) 5 MG tablet   losartan-hydrochlorothiazide 50-12.5 mg (HYZAAR) 50-12.5 mg per tablet   promethazine-dextromethorphan (PROMETHAZINE-DM) 6.25-15 mg/5 mL Syrp   TIOTROPIUM BROMIDE (SPIRIVA WITH HANDIHALER INHL)   [DISCONTINUED] ALBUTEROL SULFATE (PROVENTIL HFA INHL)   [DISCONTINUED] fluticasone-umeclidin-vilanter (TRELEGY ELLIPTA) 100-62.5-25 mcg DsDv   [DISCONTINUED] methylPREDNISolone (MEDROL DOSEPACK) 4 mg tablet     Patient reports he/she IS TAKING the following which was not ordered upon admit  Acyclovir ( Not needed at the moment)  Breztri (NF - recommend switch to breo)  Fosamax ( hold while inpatient)  Daliresp ( NF)  Fluticasone nasal spray ( not needed at the moment)  Lorazepam PRN ( consider resuming)  Vit D weekly  Mucinex DM    Potential issues to be addressed PRIOR TO DISCHARGE      No current facility-administered medications on file prior to encounter.     Current Outpatient Medications on File Prior to Encounter "   Medication Sig Dispense Refill    acyclovir (ZOVIRAX) 400 MG tablet TAKE 1 TABLET BY MOUTH THREE TIMES DAILY FOR 10 DAYS AS NEEDED FOR OUTBREAK      albuterol (PROVENTIL/VENTOLIN HFA) 90 mcg/actuation inhaler Inhale 2 puffs into the lungs every 6 (six) hours as needed.      albuterol-ipratropium (DUO-NEB) 2.5 mg-0.5 mg/3 mL nebulizer solution Take by nebulization every 4 (four) hours.      alendronate (FOSAMAX) 35 MG tablet Take 35 mg by mouth every Tuesday.      aspirin 81 MG Chew Take 81 mg by mouth once daily.      azithromycin (Z-OLY) 250 MG tablet Take 250 mg by mouth 3 (three) times a week.      budesonide-glycopyr-formoterol (BREZTRI AEROSPHERE) 160-9-4.8 mcg/actuation HFAA Inhale 2 puffs into the lungs 2 (two) times daily.      DALIRESP 500 mcg Tab TK 1 T PO D      dextromethorphan-guaiFENesin  mg (MUCINEX DM)  mg per 12 hr tablet Take 1 tablet by mouth every 12 (twelve) hours.      dicyclomine (BENTYL) 20 mg tablet TAKE ONE TABLET BY MOUTH THREE TIMES DAILY AS NEEDED FOR STOMACH CRAMPS      ergocalciferol (ERGOCALCIFEROL) 50,000 unit Cap Take 50,000 Units by mouth every Tuesday.      fluticasone propionate (FLONASE) 50 mcg/actuation nasal spray 1 spray by Each Nostril route daily as needed for Allergies.      LORazepam (ATIVAN) 1 MG tablet Take 0.5 tablets (0.5 mg total) by mouth every 8 (eight) hours as needed for Anxiety. 5 tablet 0    OXYGEN-AIR DELIVERY SYSTEMS MISC 3 L by Nasal route.      predniSONE (DELTASONE) 20 MG tablet Take 40 mg by mouth daily as needed. Take 2 tablets (total of 40mg) PO daily for 5 days PRN for COPD exacerbation      busPIRone (BUSPAR) 7.5 MG tablet TAKE 1 TABLET BY MOUTH TWICE DAILY AS NEEDED FOR BREAKTHROUGH ANXIETY      EScitalopram oxalate (LEXAPRO) 20 MG tablet Take 20 mg by mouth once daily.      hydroCHLOROthiazide (MICROZIDE) 12.5 mg capsule Take 12.5 mg by mouth once daily.      levocetirizine (XYZAL) 5 MG tablet TAKE 1 TABLET BY MOUTH EVERY DAY IN THE  EVENING AS NEEDED FOR ALLERGIES      losartan-hydrochlorothiazide 50-12.5 mg (HYZAAR) 50-12.5 mg per tablet Take 1 tablet by mouth once daily.      promethazine-dextromethorphan (PROMETHAZINE-DM) 6.25-15 mg/5 mL Syrp Take by mouth.      TIOTROPIUM BROMIDE (SPIRIVA WITH HANDIHALER INHL) Inhale into the lungs.      [DISCONTINUED] ALBUTEROL SULFATE (PROVENTIL HFA INHL) Inhale into the lungs.      [DISCONTINUED] fluticasone-umeclidin-vilanter (TRELEGY ELLIPTA) 100-62.5-25 mcg DsDv INHALE 1 PUFF BY MOUTH INTO THE LUNGS DAILY      [DISCONTINUED] methylPREDNISolone (MEDROL DOSEPACK) 4 mg tablet use as directed (Patient not taking: Reported on 9/9/2022) 1 Package 0    [DISCONTINUED] roflumilast (DALIRESP) 500 mcg Tab 1 tablet.         Please address this information as you see fit.  Feel free to contact us if you have any questions or require assistance.    Reinaldo Christensen, PharmD  [phone]                 .

## 2023-04-24 NOTE — ASSESSMENT & PLAN NOTE
Likely secondary to CO2 narcosis  Continue BiPAP for now.  We will reassess mental status.  If not improving, we will consider having CT of the head

## 2023-04-25 PROBLEM — Z51.5 PALLIATIVE CARE ENCOUNTER: Status: ACTIVE | Noted: 2023-04-25

## 2023-04-25 PROBLEM — G93.41 ACUTE METABOLIC ENCEPHALOPATHY: Status: RESOLVED | Noted: 2023-04-24 | Resolved: 2023-04-25

## 2023-04-25 LAB
ANION GAP SERPL CALC-SCNC: 9 MMOL/L (ref 8–16)
BASOPHILS # BLD AUTO: 0.03 K/UL (ref 0–0.2)
BASOPHILS NFR BLD: 0.3 % (ref 0–1.9)
BUN SERPL-MCNC: 20 MG/DL (ref 8–23)
CALCIUM SERPL-MCNC: 8.9 MG/DL (ref 8.7–10.5)
CHLORIDE SERPL-SCNC: 99 MMOL/L (ref 95–110)
CO2 SERPL-SCNC: 38 MMOL/L (ref 23–29)
CREAT SERPL-MCNC: 0.7 MG/DL (ref 0.5–1.4)
DIFFERENTIAL METHOD: ABNORMAL
EOSINOPHIL # BLD AUTO: 0.1 K/UL (ref 0–0.5)
EOSINOPHIL NFR BLD: 0.5 % (ref 0–8)
ERYTHROCYTE [DISTWIDTH] IN BLOOD BY AUTOMATED COUNT: 13.4 % (ref 11.5–14.5)
EST. GFR  (NO RACE VARIABLE): >60 ML/MIN/1.73 M^2
GLUCOSE SERPL-MCNC: 98 MG/DL (ref 70–110)
HCT VFR BLD AUTO: 39 % (ref 37–48.5)
HGB BLD-MCNC: 11.4 G/DL (ref 12–16)
IMM GRANULOCYTES # BLD AUTO: 0.03 K/UL (ref 0–0.04)
IMM GRANULOCYTES NFR BLD AUTO: 0.3 % (ref 0–0.5)
LYMPHOCYTES # BLD AUTO: 2.7 K/UL (ref 1–4.8)
LYMPHOCYTES NFR BLD: 24.2 % (ref 18–48)
MCH RBC QN AUTO: 27.5 PG (ref 27–31)
MCHC RBC AUTO-ENTMCNC: 29.2 G/DL (ref 32–36)
MCV RBC AUTO: 94 FL (ref 82–98)
MONOCYTES # BLD AUTO: 1.1 K/UL (ref 0.3–1)
MONOCYTES NFR BLD: 9.7 % (ref 4–15)
NEUTROPHILS # BLD AUTO: 7.2 K/UL (ref 1.8–7.7)
NEUTROPHILS NFR BLD: 65 % (ref 38–73)
NRBC BLD-RTO: 0 /100 WBC
PLATELET # BLD AUTO: 161 K/UL (ref 150–450)
PMV BLD AUTO: 11.2 FL (ref 9.2–12.9)
POTASSIUM SERPL-SCNC: 4 MMOL/L (ref 3.5–5.1)
RBC # BLD AUTO: 4.14 M/UL (ref 4–5.4)
SODIUM SERPL-SCNC: 146 MMOL/L (ref 136–145)
WBC # BLD AUTO: 11.07 K/UL (ref 3.9–12.7)

## 2023-04-25 PROCEDURE — 25000003 PHARM REV CODE 250: Performed by: HOSPITALIST

## 2023-04-25 PROCEDURE — 94660 CPAP INITIATION&MGMT: CPT

## 2023-04-25 PROCEDURE — 99223 PR INITIAL HOSPITAL CARE,LEVL III: ICD-10-PCS | Mod: ,,,

## 2023-04-25 PROCEDURE — 94640 AIRWAY INHALATION TREATMENT: CPT

## 2023-04-25 PROCEDURE — 99497 PR ADVNCD CARE PLAN 30 MIN: ICD-10-PCS | Mod: 25,,,

## 2023-04-25 PROCEDURE — 99900035 HC TECH TIME PER 15 MIN (STAT)

## 2023-04-25 PROCEDURE — 25000003 PHARM REV CODE 250: Performed by: STUDENT IN AN ORGANIZED HEALTH CARE EDUCATION/TRAINING PROGRAM

## 2023-04-25 PROCEDURE — 63600175 PHARM REV CODE 636 W HCPCS: Performed by: STUDENT IN AN ORGANIZED HEALTH CARE EDUCATION/TRAINING PROGRAM

## 2023-04-25 PROCEDURE — 36415 COLL VENOUS BLD VENIPUNCTURE: CPT | Performed by: STUDENT IN AN ORGANIZED HEALTH CARE EDUCATION/TRAINING PROGRAM

## 2023-04-25 PROCEDURE — 80048 BASIC METABOLIC PNL TOTAL CA: CPT | Performed by: STUDENT IN AN ORGANIZED HEALTH CARE EDUCATION/TRAINING PROGRAM

## 2023-04-25 PROCEDURE — 25000242 PHARM REV CODE 250 ALT 637 W/ HCPCS: Performed by: STUDENT IN AN ORGANIZED HEALTH CARE EDUCATION/TRAINING PROGRAM

## 2023-04-25 PROCEDURE — 94761 N-INVAS EAR/PLS OXIMETRY MLT: CPT

## 2023-04-25 PROCEDURE — 99497 ADVNCD CARE PLAN 30 MIN: CPT | Mod: 25,,,

## 2023-04-25 PROCEDURE — 63700000 PHARM REV CODE 250 ALT 637 W/O HCPCS: Performed by: STUDENT IN AN ORGANIZED HEALTH CARE EDUCATION/TRAINING PROGRAM

## 2023-04-25 PROCEDURE — 99223 1ST HOSP IP/OBS HIGH 75: CPT | Mod: ,,,

## 2023-04-25 PROCEDURE — 11000001 HC ACUTE MED/SURG PRIVATE ROOM

## 2023-04-25 PROCEDURE — 85025 COMPLETE CBC W/AUTO DIFF WBC: CPT | Performed by: STUDENT IN AN ORGANIZED HEALTH CARE EDUCATION/TRAINING PROGRAM

## 2023-04-25 PROCEDURE — 27000221 HC OXYGEN, UP TO 24 HOURS

## 2023-04-25 RX ORDER — GUAIFENESIN 600 MG/1
1200 TABLET, EXTENDED RELEASE ORAL 2 TIMES DAILY
Status: DISCONTINUED | OUTPATIENT
Start: 2023-04-25 | End: 2023-04-26 | Stop reason: HOSPADM

## 2023-04-25 RX ADMIN — CEFTRIAXONE SODIUM 1 G: 1 INJECTION, POWDER, FOR SOLUTION INTRAMUSCULAR; INTRAVENOUS at 04:04

## 2023-04-25 RX ADMIN — MUPIROCIN: 20 OINTMENT TOPICAL at 09:04

## 2023-04-25 RX ADMIN — GUAIFENESIN 1200 MG: 600 TABLET, EXTENDED RELEASE ORAL at 08:04

## 2023-04-25 RX ADMIN — GUAIFENESIN 1200 MG: 600 TABLET, EXTENDED RELEASE ORAL at 09:04

## 2023-04-25 RX ADMIN — IPRATROPIUM BROMIDE AND ALBUTEROL SULFATE 3 ML: .5; 3 SOLUTION RESPIRATORY (INHALATION) at 07:04

## 2023-04-25 RX ADMIN — ENOXAPARIN SODIUM 40 MG: 40 INJECTION SUBCUTANEOUS at 05:04

## 2023-04-25 RX ADMIN — IPRATROPIUM BROMIDE AND ALBUTEROL SULFATE 3 ML: .5; 3 SOLUTION RESPIRATORY (INHALATION) at 08:04

## 2023-04-25 RX ADMIN — DICLOFENAC SODIUM 2 G: 10 GEL TOPICAL at 09:04

## 2023-04-25 RX ADMIN — PREDNISONE 40 MG: 20 TABLET ORAL at 08:04

## 2023-04-25 RX ADMIN — MUPIROCIN: 20 OINTMENT TOPICAL at 08:04

## 2023-04-25 RX ADMIN — FLUTICASONE FUROATE AND VILANTEROL TRIFENATATE 1 PUFF: 200; 25 POWDER RESPIRATORY (INHALATION) at 08:04

## 2023-04-25 RX ADMIN — AZITHROMYCIN MONOHYDRATE 500 MG: 250 TABLET ORAL at 08:04

## 2023-04-25 RX ADMIN — IPRATROPIUM BROMIDE AND ALBUTEROL SULFATE 3 ML: .5; 3 SOLUTION RESPIRATORY (INHALATION) at 01:04

## 2023-04-25 NOTE — NURSING TRANSFER
Nursing Transfer Note      4/25/2023     Reason patient is being transferred: med surg step down     Transfer To: med surg [id number]    Transfer via wheelchair    Transfer with 3 L O2, cardiac monitoring, bipap machine    Transported by RN x2     Medicines sent: inhalers     Chart send with patient: Yes    Upon arrival to floor: cardiac monitor applied, patient oriented to room, call bell in reach, and bed in lowest position, report given to ELLIE Valencia.

## 2023-04-25 NOTE — CONSULTS
See note from yesterday, patient seen by outpatient pulmonology on 4/18. Severe COPD (FEV1 16%-22%  predicted), on BiPAP at night - can discharge on similar setting as in in-patient. She has been maximized on outpatient therapy - pulmonary rehab, inhaler therapy. She has been denied LVRS with [place] valve 2/2 hypercarbia.     Would continue home regimen, patient can follow up outpt.     Diya Hernandez MD  Pulm/CC

## 2023-04-25 NOTE — NURSING TRANSFER
Nursing Transfer Note      4/25/2023     Reason patient is being transferred: Step down    Transfer To: [id number]    Transfer via wheelchair    Transfer with O2, cardiac monitoring    Transported by HAROON Reardon RN    Medicines sent: Ointments x's 2 and Breo    Any special needs or follow-up needed: none    Chart send with patient: Yes    Notified: daughter    Patient reassessed at: 4/25/23@1500    Upon arrival to floor: patient oriented to room, call bell in reach, and bed in lowest position, nurse Paulina at the bedside, chair alarm in place and assisted patient to chair on 3 L NC. Meds handed off and chart given to .

## 2023-04-25 NOTE — PLAN OF CARE
Pt tolerated breathing treatment well with NADR. Pt sat's is maintain at 94% on 3 LPM on nasal cannula.

## 2023-04-25 NOTE — CONSULTS
"[facility]  Palliative Medicine  Consult Note    Patient Name: Terri Phelan  MRN: [id number]  Admission Date: 4/23/2023  Hospital Length of Stay: 1 days  Code Status: DNR   Attending Provider: Tirso Valenzuela, *  Consulting Provider: Katie Mclaughlin NP  Primary Care Physician: Sridevi Gibson MD  Principal Problem:COPD exacerbation    Patient information was obtained from patient, relative(s), past medical records and primary team.      Inpatient consult to Palliative Care  Consult performed by: Katie Mclaughlin NP  Consult ordered by: Alida Garza MD  Reason for consult: code stsus clarification/ goasl of care        Assessment/Plan:     Palliative Care  Palliative care encounter  Ms Phelan is a 62 y/o F with a PMH of severe COPD on 2L NC at home, CAD, HTN, and JUAN. She presented to the ED with several days of SOB, weakness and worsening confusion. She was placed on BiPAP in the ED for hypoxia and hypercapnia. Pt seen in the ICU with reported improvement, pt on NC at time of encounter.  Palliative consulted for goals of care/ further clarification of code status.    -At time of initial encounter, daughter Mulugeta present at bedside. Pt eating breakfast with NC in place, sats remained stable throughout encounter.   -At introduction of self and the words "palliative care" pt instantly became tearful.  Emotional support provided as well as education on palliative care services and functions.  Pt expressed that she associated palliative care with imminent end of life.    -Pt did not initially wish to participate in conversation, but requested that I speak with her daughter at the bedside instead. Daughter very agreeable and open to palliative care visit.  -Pt is a , has one child, Mulugeta, who following legal hierarchy, would be pts MPOA.  Pt agrees that daughter is MPOA.   -Daughter reports that the pt has made it known in the past that she would not accept CPR/Intubation , which is why " "pts daughter elected for DNR status on admission.  Apparently family had requested to speak with someone to further understand code status. Upon further discussion, it was the daughter who had questions about temporary intubation.  She wanted to know if this would allow for family to come and say goodbyes to the pt if they were ever in that situation.  We discussed the reality of emergency intubation and what that would look like for the pt- short term and long term.  After further discussion, both pt and daughter are in agreement and pt to remain DNR at this time.  -Pt lives with her daughter, is independent of ADLs, still drives and cares for her self. Pt does use a walker for longer outings, has bipap and home O2 Pt does not currently have home health services but is open to services at time of d/c.  -Pt is current with [facility] pulmonary rehab program and attends therapies with the 3 days a week. Pt reports improvement in overall functional respiratory status since initiation of this program.   -Discussed overall goals of care.  Pt identifies independence as the highest priority.  She also hopes for improvements in her overall health but with  limits to invasive interventions that she would accept. We discussed the outpatient palliative clinic as a resource to the pt and family.  Pt requested that I leave a business with her daughter and they will reach out to me with any additional questions or if they decide they would like to be seen in clinic.          Subjective:     HPI:   Per chart, " Patient is a 63-year-old female with past medical history of end-stage COPD, CAD, hypertension JUAN who presented with complaints of shortness of breath, generalized weakness and altered mental status of several days duration.  Patient unable to give good history.  Most of the history obtained from daughter at bedside.  She lives for the past 2 days, patient has been feeling very weak, getting short of breath which has been " "worse despite using her albuterol and oxygen.  Under the of presentation, she was noted to be confused and was brought to the ER for worsening symptoms.  In ER, she was found to be hypoxic and hypercapnic.  She was placed on BiPAP."    Interval History: Pt seen off Bipap, tolerating NC well, sats stable. No acute events reported overnight.     Past Medical History:   Diagnosis Date    COPD (chronic obstructive pulmonary disease)     Hypertension        Past Surgical History:   Procedure Laterality Date     SECTION      HERNIA REPAIR      HYSTERECTOMY         Review of patient's allergies indicates:   Allergen Reactions    Codeine Nausea And Vomiting    Morphine Other (See Comments)       Medications:  Continuous Infusions:  Scheduled Meds:   albuterol-ipratropium  3 mL Nebulization Q6H WAKE    azithromycin  500 mg Oral Daily    diclofenac sodium  2 g Topical (Top) BID    enoxaparin  40 mg Subcutaneous Daily    fluticasone furoate-vilanteroL  1 puff Inhalation Daily    guaiFENesin  1,200 mg Oral BID    mupirocin   Nasal BID    polyethylene glycol  17 g Oral Daily    predniSONE  40 mg Oral Daily     PRN Meds:acetaminophen, acetaminophen, acetaminophen, ondansetron, prochlorperazine    Family History    None       Tobacco Use    Smoking status: Former     Packs/day: 1.00     Years: 20.00     Pack years: 20.00     Types: Cigarettes    Smokeless tobacco: Never   Substance and Sexual Activity    Alcohol use: Not Currently     Alcohol/week: 1.0 standard drink     Types: 1 Glasses of wine per week    Drug use: No    Sexual activity: Not Currently       Review of Systems   Constitutional:  Positive for activity change. Negative for appetite change, fatigue and fever.   Respiratory:  Positive for shortness of breath (resolving).    Cardiovascular:  Negative for chest pain.   Gastrointestinal: Negative.    Neurological:  Positive for weakness.   Objective:     Vital Signs (Most Recent):  Temp: 98.6 " °F (37 °C) (04/25/23 1110)  Pulse: 86 (04/25/23 1350)  Resp: 19 (04/25/23 1350)  BP: (!) 159/69 (04/25/23 1200)  SpO2: 95 % (04/25/23 1350)   Vital Signs (24h Range):  Temp:  [98.1 °F (36.7 °C)-99.3 °F (37.4 °C)] 98.6 °F (37 °C)  Pulse:  [52-95] 86  Resp:  [18-57] 19  SpO2:  [86 %-100 %] 95 %  BP: (107-159)/(52-90) 159/69     Weight: 83.5 kg (184 lb 1.4 oz)  Body mass index is 29.71 kg/m².    Physical Exam  Vitals and nursing note reviewed.   Constitutional:       General: She is awake. She is not in acute distress.     Appearance: She is overweight. She is not ill-appearing or toxic-appearing.      Interventions: Nasal cannula in place.   HENT:      Head: Normocephalic.      Nose: Nose normal.      Mouth/Throat:      Mouth: Mucous membranes are moist.   Cardiovascular:      Rate and Rhythm: Normal rate.      Pulses: Normal pulses.   Pulmonary:      Effort: Pulmonary effort is normal.   Abdominal:      General: Abdomen is flat.      Palpations: Abdomen is soft.   Musculoskeletal:         General: Normal range of motion.   Skin:     General: Skin is warm and dry.      Capillary Refill: Capillary refill takes less than 2 seconds.   Neurological:      General: No focal deficit present.      Mental Status: She is alert and oriented to person, place, and time.   Psychiatric:         Attention and Perception: Attention normal.         Mood and Affect: Affect is tearful.         Speech: Speech normal.         Behavior: Behavior is withdrawn. Behavior is cooperative.         Thought Content: Thought content normal.       Review of Symptoms      Symptom Assessment (ESAS 0-10 Scale)  Pain:  0  Dyspnea:  2  Anxiety:  4  Nausea:  0  Depression:  0  Anorexia:  0  Fatigue:  4  Insomnia:  0  Restlessness:  0  Agitation:  0         Performance Status:  50    Living Arrangements:  Lives with family and Lives in home    Psychosocial/Cultural:   See Palliative Psychosocial Note: No  Social Issues Identified: Coping deficit  pt/family  Bereavement Risk: No  Caregiver Needs Discussed. Caregiver Distress: Yes: Intensity of family caregiving and Caregiver Burnout Risk  Cultural: none identified   **Primary  to Follow**  Palliative Care  Consult: No    Spiritual:  F - Juhi and Belief:  Lutheran   I - Importance:  High   C - Community:  Family support   A - Address in Care:  Pastoral visits      Time-Based Charting:  Yes  Chart Review: 22 minutes  Face to Face: 18 minutes  Symptom Assessment: 12 minutes  Coordination of Care: 15 minutes  Discharge Planning: 10 minutes  Advance Care Planning: 10 minutes  Goals of Care: 10 minutes    Total Time Spent: 97 minutes      Advance Care Planning   Advance Directives:   Living Will: No    LaPOST: No    Do Not Resuscitate Status: Yes    Medical Power of : Yes    Agent's Name:  Mulugeta Phelan   Agent's Contact Number:  [phone]    Decision Making:  Patient answered questions and Family answered questions  Goals of Care: The patient and family endorses that what is most important right now is to focus on spending time at home, avoiding the hospital, remaining as independent as possible, and improvement in condition but with limits to invasive therapies    Accordingly, we have decided that the best plan to meet the patient's goals includes continuing with treatment       Significant Labs: All pertinent labs within the past 24 hours have been reviewed.  CBC:   Recent Labs   Lab 04/25/23  0506   WBC 11.07   HGB 11.4*   HCT 39.0   MCV 94        BMP:  Recent Labs   Lab 04/25/23  0506   GLU 98   *   K 4.0   CL 99   CO2 38*   BUN 20   CREATININE 0.7   CALCIUM 8.9     LFT:  Lab Results   Component Value Date    AST 16 04/24/2023    ALKPHOS 45 (L) 04/24/2023    BILITOT 0.2 04/24/2023     Albumin:   Albumin   Date Value Ref Range Status   04/24/2023 3.5 3.5 - 5.2 g/dL Final     Protein:   Total Protein   Date Value Ref Range Status   04/24/2023 7.0 6.0 - 8.4  g/dL Final     Lactic acid:   No results found for: LACTATE    Significant Imaging: I have reviewed all pertinent imaging results/findings within the past 24 hours.      Katie Mclaughlin NP  Palliative Medicine  [facility]    Advance Care Planning     Date: 04/25/2023    [facility]  I engaged the patient and family in a conversation about advance care planning and we specifically addressed what the goals of care would be moving forward, in light of the patient's change in clinical status, specifically end stage COPD.  We did specifically address the patient's likely prognosis, which is poor.  We explored the patient's values and preferences for future care.  The patient and family endorses that what is most important right now is to focus on spending time at home, avoiding the hospital, remaining as independent as possible, symptom/pain control and improvement in condition but with limits to invasive therapies    Accordingly, we have decided that the best plan to meet the patient's goals includes continuing with treatment    I did not explain the role for hospice care at this stage of the patient's illness, including its ability to help the patient live with the best quality of life possible.  We will not be making a hospice referral.      Pt will remain a DNR at this time.     I spent a total of 20 minutes engaging the patient in this advance care planning discussion.

## 2023-04-25 NOTE — SUBJECTIVE & OBJECTIVE
Interval History: Pt seen off Bipap, tolerating NC well, sats stable. No acute events reported overnight.     Past Medical History:   Diagnosis Date    COPD (chronic obstructive pulmonary disease)     Hypertension        Past Surgical History:   Procedure Laterality Date     SECTION      HERNIA REPAIR      HYSTERECTOMY         Review of patient's allergies indicates:   Allergen Reactions    Codeine Nausea And Vomiting    Morphine Other (See Comments)       Medications:  Continuous Infusions:  Scheduled Meds:   albuterol-ipratropium  3 mL Nebulization Q6H WAKE    azithromycin  500 mg Oral Daily    diclofenac sodium  2 g Topical (Top) BID    enoxaparin  40 mg Subcutaneous Daily    fluticasone furoate-vilanteroL  1 puff Inhalation Daily    guaiFENesin  1,200 mg Oral BID    mupirocin   Nasal BID    polyethylene glycol  17 g Oral Daily    predniSONE  40 mg Oral Daily     PRN Meds:acetaminophen, acetaminophen, acetaminophen, ondansetron, prochlorperazine    Family History    None       Tobacco Use    Smoking status: Former     Packs/day: 1.00     Years: 20.00     Pack years: 20.00     Types: Cigarettes    Smokeless tobacco: Never   Substance and Sexual Activity    Alcohol use: Not Currently     Alcohol/week: 1.0 standard drink     Types: 1 Glasses of wine per week    Drug use: No    Sexual activity: Not Currently       Review of Systems   Constitutional:  Positive for activity change. Negative for appetite change, fatigue and fever.   Respiratory:  Positive for shortness of breath (resolving).    Cardiovascular:  Negative for chest pain.   Gastrointestinal: Negative.    Neurological:  Positive for weakness.   Objective:     Vital Signs (Most Recent):  Temp: 98.6 °F (37 °C) (23 1110)  Pulse: 86 (23 1350)  Resp: 19 (23 1350)  BP: (!) 159/69 (23 1200)  SpO2: 95 % (23 1350)   Vital Signs (24h Range):  Temp:  [98.1 °F (36.7 °C)-99.3 °F (37.4 °C)] 98.6 °F (37 °C)  Pulse:  [52-95] 86  Resp:   [18-57] 19  SpO2:  [86 %-100 %] 95 %  BP: (107-159)/(52-90) 159/69     Weight: 83.5 kg (184 lb 1.4 oz)  Body mass index is 29.71 kg/m².    Physical Exam  Vitals and nursing note reviewed.   Constitutional:       General: She is awake. She is not in acute distress.     Appearance: She is overweight. She is not ill-appearing or toxic-appearing.      Interventions: Nasal cannula in place.   HENT:      Head: Normocephalic.      Nose: Nose normal.      Mouth/Throat:      Mouth: Mucous membranes are moist.   Cardiovascular:      Rate and Rhythm: Normal rate.      Pulses: Normal pulses.   Pulmonary:      Effort: Pulmonary effort is normal.   Abdominal:      General: Abdomen is flat.      Palpations: Abdomen is soft.   Musculoskeletal:         General: Normal range of motion.   Skin:     General: Skin is warm and dry.      Capillary Refill: Capillary refill takes less than 2 seconds.   Neurological:      General: No focal deficit present.      Mental Status: She is alert and oriented to person, place, and time.   Psychiatric:         Attention and Perception: Attention normal.         Mood and Affect: Affect is tearful.         Speech: Speech normal.         Behavior: Behavior is withdrawn. Behavior is cooperative.         Thought Content: Thought content normal.       Review of Symptoms      Symptom Assessment (ESAS 0-10 Scale)  Pain:  0  Dyspnea:  2  Anxiety:  4  Nausea:  0  Depression:  0  Anorexia:  0  Fatigue:  4  Insomnia:  0  Restlessness:  0  Agitation:  0         Performance Status:  50    Living Arrangements:  Lives with family and Lives in home    Psychosocial/Cultural:   See Palliative Psychosocial Note: No  Social Issues Identified: Coping deficit pt/family  Bereavement Risk: No  Caregiver Needs Discussed. Caregiver Distress: Yes: Intensity of family caregiving and Caregiver Burnout Risk  Cultural: none identified   **Primary  to Follow**  Palliative Care  Consult: No    Spiritual:  F  - Juhi and Belief:  Gnosticist   I - Importance:  High   C - Community:  Family support   A - Address in Care:  Pastoral visits      Time-Based Charting:  Yes  Chart Review: 22 minutes  Face to Face: 18 minutes  Symptom Assessment: 12 minutes  Coordination of Care: 15 minutes  Discharge Planning: 10 minutes  Advance Care Planning: 10 minutes  Goals of Care: 10 minutes    Total Time Spent: 97 minutes      Advance Care Planning   Advance Directives:   Living Will: No    LaPOST: No    Do Not Resuscitate Status: Yes    Medical Power of : Yes    Agent's Name:  Mulugeta Phelan   Agent's Contact Number:  [phone]    Decision Making:  Patient answered questions and Family answered questions  Goals of Care: The patient and family endorses that what is most important right now is to focus on spending time at home, avoiding the hospital, remaining as independent as possible, and improvement in condition but with limits to invasive therapies    Accordingly, we have decided that the best plan to meet the patient's goals includes continuing with treatment       Significant Labs: All pertinent labs within the past 24 hours have been reviewed.  CBC:   Recent Labs   Lab 04/25/23  0506   WBC 11.07   HGB 11.4*   HCT 39.0   MCV 94        BMP:  Recent Labs   Lab 04/25/23  0506   GLU 98   *   K 4.0   CL 99   CO2 38*   BUN 20   CREATININE 0.7   CALCIUM 8.9     LFT:  Lab Results   Component Value Date    AST 16 04/24/2023    ALKPHOS 45 (L) 04/24/2023    BILITOT 0.2 04/24/2023     Albumin:   Albumin   Date Value Ref Range Status   04/24/2023 3.5 3.5 - 5.2 g/dL Final     Protein:   Total Protein   Date Value Ref Range Status   04/24/2023 7.0 6.0 - 8.4 g/dL Final     Lactic acid:   No results found for: LACTATE    Significant Imaging: I have reviewed all pertinent imaging results/findings within the past 24 hours.

## 2023-04-25 NOTE — HPI
"Per chart, " Patient is a 63-year-old female with past medical history of end-stage COPD, CAD, hypertension JUAN who presented with complaints of shortness of breath, generalized weakness and altered mental status of several days duration.  Patient unable to give good history.  Most of the history obtained from daughter at bedside.  She lives for the past 2 days, patient has been feeling very weak, getting short of breath which has been worse despite using her albuterol and oxygen.  Under the of presentation, she was noted to be confused and was brought to the ER for worsening symptoms.  In ER, she was found to be hypoxic and hypercapnic.  She was placed on BiPAP."  "

## 2023-04-25 NOTE — PROGRESS NOTES
[facility] Medicine  Progress Note    Patient Name: Terri Phelan  MRN: [id number]  Patient Class: IP- Inpatient   Admission Date: 4/23/2023  Length of Stay: 1 days  Attending Physician: Tirso Valenzuela, *  Primary Care Provider: Sridevi Gibson MD        Subjective:     Principal Problem:COPD exacerbation        HPI:  Patient is a 63-year-old female with past medical history of end-stage COPD, CAD, hypertension JUAN who presented with complaints of shortness of breath, generalized weakness and altered mental status of several days duration.  Patient unable to give good history.  Most of the history obtained from daughter at bedside.  She lives for the past 2 days, patient has been feeling very weak, getting short of breath which has been worse despite using her albuterol and oxygen.  Under the of presentation, she was noted to be confused and was brought to the ER for worsening symptoms.  In ER, she was found to be hypoxic and hypercapnic.  She was placed on BiPAP.      Overview/Hospital Course:  No notes on file    Interval History:  Breathing doing much better today and improving near baseline.  Mild wheeze but overall greatly improved.    Review of Systems   Respiratory:  Positive for shortness of breath.    Objective:     Vital Signs (Most Recent):  Temp: 98.6 °F (37 °C) (04/25/23 1110)  Pulse: 86 (04/25/23 1350)  Resp: 19 (04/25/23 1350)  BP: (!) 159/69 (04/25/23 1200)  SpO2: 95 % (04/25/23 1350)   Vital Signs (24h Range):  Temp:  [98.1 °F (36.7 °C)-99.3 °F (37.4 °C)] 98.6 °F (37 °C)  Pulse:  [52-95] 86  Resp:  [18-57] 19  SpO2:  [86 %-100 %] 95 %  BP: (107-159)/(52-90) 159/69     Weight: 83.5 kg (184 lb 1.4 oz)  Body mass index is 29.71 kg/m².    Intake/Output Summary (Last 24 hours) at 4/25/2023 1505  Last data filed at 4/25/2023 1441  Gross per 24 hour   Intake 736.19 ml   Output 1000 ml   Net -263.81 ml      Physical Exam  HENT:      Head: Normocephalic.   Cardiovascular:       Rate and Rhythm: Normal rate and regular rhythm.   Pulmonary:      Effort: No respiratory distress.      Breath sounds: Wheezing present. No rales.      Comments: On supplemental O2  Abdominal:      General: There is no distension.      Palpations: Abdomen is soft.   Musculoskeletal:      Cervical back: Neck supple.   Skin:     General: Skin is warm.   Neurological:      Mental Status: She is oriented to person, place, and time. Mental status is at baseline.       Significant Labs: All pertinent labs within the past 24 hours have been reviewed.    Significant Imaging: I have reviewed all pertinent imaging results/findings within the past 24 hours.      Assessment/Plan:      * COPD exacerbation  Patient does have end-stage COPD  - I have independently reviewed the VBG and it showed pH of 7.28, pCO2 of 108.2, PO2 of 41, extremely high pCO2  - I have independently reviewed the CXR and it showed no acute finding at this time  - Placed on oxygen supplementation as needed. Currently on 3 liters/minutes.  Her baseline is 3 L  - Started on Solumedrol 40mg BID x 5 days. Day 2 of steroid.  She received 125 mg of Solu-Medrol in the ER; transition to prednisone  - Duonebs Q6h standing and Q4h PRN  - BiPAP nightly  - initially started on antibiotics with Rocephin 1g daily and Azithromycin 500mg daily; deescalate to azithromycin alone  - Advised smoking cessation    -discussed with pulmonology team and will arrange for follow-up as outpatient; she was resume her pulmonary rehab    Acute on chronic respiratory failure with hypercapnia  Patient with Hypercapnic and Hypoxic Respiratory failure which is Acute on chronic.  she is on home oxygen at 3 LPM. Supplemental oxygen was provided and noted- Oxygen Concentration (%):  [35] 35    .   Signs/symptoms of respiratory failure include- respiratory distress. Contributing diagnoses includes - COPD Labs and images were reviewed. Patient Has recent ABG, which has been reviewed. Will treat  underlying causes and adjust management of respiratory failure as follows-     Continue on BiPAP for now with current settings.  IPAP of 17, EPAP of 5, FiO2 of 50% and rate of 14.  As per patient's daughter at bedside, patient has indicated that she does not want to be intubated or resuscitated but she has no signed form of DNI and DNR.    Essential hypertension  -not currently on home meds; has been normotensive while in house        VTE Risk Mitigation (From admission, onward)         Ordered     enoxaparin injection 40 mg  Daily         04/24/23 0407     IP VTE HIGH RISK PATIENT  Once         04/24/23 0432     Place sequential compression device  Until discontinued         04/24/23 0407                Discharge Planning   RADHA:      Code Status: DNR   Is the patient medically ready for discharge?:     Reason for patient still in hospital (select all that apply): Patient trending condition and Treatment  Discharge Plan A: Home with family                  Tirso Valenzuela MD  Department of Hospital Medicine   [facility]

## 2023-04-25 NOTE — ASSESSMENT & PLAN NOTE
"Ms Phelan is a 64 y/o F with a PMH of severe COPD on 2L NC at home, CAD, HTN, and JUAN. She presented to the ED with several days of SOB, weakness and worsening confusion. She was placed on BiPAP in the ED for hypoxia and hypercapnia. Pt seen in the ICU with reported improvement, pt on NC at time of encounter.  Palliative consulted for goals of care/ further clarification of code status.    -At time of initial encounter, daughter Mulugeta present at bedside. Pt eating breakfast with NC in place, sats remained stable throughout encounter.   -At introduction of self and the words "palliative care" pt instantly became tearful.  Emotional support provided as well as education on palliative care services and functions.  Pt expressed that she associated palliative care with imminent end of life.    -Pt did not initially wish to participate in conversation, but requested that I speak with her daughter at the bedside instead. Daughter very agreeable and open to palliative care visit.  -Pt is a , has one child, Mulugeta, who following legal hierarchy, would be pts MPOA.  Pt agrees that daughter is MPOA.   -Daughter reports that the pt has made it known in the past that she would not accept CPR/Intubation , which is why pts daughter elected for DNR status on admission.  Apparently family had requested to speak with someone to further understand code status. Upon further discussion, it was the daughter who had questions about temporary intubation.  She wanted to know if this would allow for family to come and say goodbyes to the pt if they were ever in that situation.  We discussed the reality of emergency intubation and what that would look like for the pt- short term and long term.  After further discussion, both pt and daughter are in agreement and pt to remain DNR at this time.  -Pt lives with her daughter, is independent of ADLs, still drives and cares for her self. Pt does use a walker for longer outings, has bipap and " home O2 Pt does not currently have home health services but is open to services at time of d/c.  -Pt is current with [facility] pulmonary rehab program and attends therapies with the 3 days a week. Pt reports improvement in overall functional respiratory status since initiation of this program.   -Discussed overall goals of care.  Pt identifies independence as the highest priority.  She also hopes for improvements in her overall health but with  limits to invasive interventions that she would accept. We discussed the outpatient palliative clinic as a resource to the pt and family.  Pt requested that I leave a business with her daughter and they will reach out to me with any additional questions or if they decide they would like to be seen in clinic.

## 2023-04-25 NOTE — ASSESSMENT & PLAN NOTE
Patient does have end-stage COPD  - I have independently reviewed the VBG and it showed pH of 7.28, pCO2 of 108.2, PO2 of 41, extremely high pCO2  - I have independently reviewed the CXR and it showed no acute finding at this time  - Placed on oxygen supplementation as needed. Currently on 3 liters/minutes.  Her baseline is 3 L  - Started on Solumedrol 40mg BID x 5 days. Day 2 of steroid.  She received 125 mg of Solu-Medrol in the ER; transition to prednisone  - Duonebs Q6h standing and Q4h PRN  - BiPAP nightly  - initially started on antibiotics with Rocephin 1g daily and Azithromycin 500mg daily; deescalate to azithromycin alone  - Advised smoking cessation    -discussed with pulmonology team and will arrange for follow-up as outpatient; she was resume her pulmonary rehab

## 2023-04-25 NOTE — SUBJECTIVE & OBJECTIVE
Interval History:  Breathing doing much better today and improving near baseline.  Mild wheeze but overall greatly improved.    Review of Systems   Respiratory:  Positive for shortness of breath.    Objective:     Vital Signs (Most Recent):  Temp: 98.6 °F (37 °C) (04/25/23 1110)  Pulse: 86 (04/25/23 1350)  Resp: 19 (04/25/23 1350)  BP: (!) 159/69 (04/25/23 1200)  SpO2: 95 % (04/25/23 1350)   Vital Signs (24h Range):  Temp:  [98.1 °F (36.7 °C)-99.3 °F (37.4 °C)] 98.6 °F (37 °C)  Pulse:  [52-95] 86  Resp:  [18-57] 19  SpO2:  [86 %-100 %] 95 %  BP: (107-159)/(52-90) 159/69     Weight: 83.5 kg (184 lb 1.4 oz)  Body mass index is 29.71 kg/m².    Intake/Output Summary (Last 24 hours) at 4/25/2023 1505  Last data filed at 4/25/2023 1441  Gross per 24 hour   Intake 736.19 ml   Output 1000 ml   Net -263.81 ml      Physical Exam  HENT:      Head: Normocephalic.   Cardiovascular:      Rate and Rhythm: Normal rate and regular rhythm.   Pulmonary:      Effort: No respiratory distress.      Breath sounds: Wheezing present. No rales.      Comments: On supplemental O2  Abdominal:      General: There is no distension.      Palpations: Abdomen is soft.   Musculoskeletal:      Cervical back: Neck supple.   Skin:     General: Skin is warm.   Neurological:      Mental Status: She is oriented to person, place, and time. Mental status is at baseline.       Significant Labs: All pertinent labs within the past 24 hours have been reviewed.    Significant Imaging: I have reviewed all pertinent imaging results/findings within the past 24 hours.

## 2023-04-25 NOTE — ASSESSMENT & PLAN NOTE
This patient does have evidence of infective focus  My overall impression is sepsis.  Source: Respiratory  Antibiotics given-   Antibiotics (72h ago, onward)    Start     Stop Route Frequency Ordered    04/24/23 0900  azithromycin tablet 500 mg  (CEFTRIAXONE IV/ AZITHROMYCIN PO PANEL)        See Hyperspace for full Linked Orders Report.    -- Oral Daily 04/24/23 0432    04/24/23 0900  mupirocin 2 % ointment         04/29 0859 Nasl 2 times daily 04/24/23 0413        Latest lactate reviewed-  No results for input(s): LACTATE in the last 72 hours.  Organ dysfunction indicated by Acute respiratory failure    Fluid challenge Contraindicated- Fluid bolus is contraindicated in this patient due to Volume overload due to- Lower extremity edema     Post- resuscitation assessment Yes Perfusion exam was performed within 6 hours of septic shock presentation after bolus shows Adequate tissue perfusion assessed by non-invasive monitoring       Will Not start Pressors- Levophed for MAP of 65  Source control achieved by:  Antibiotics, BiPAP

## 2023-04-25 NOTE — ASSESSMENT & PLAN NOTE
Patient with Hypercapnic and Hypoxic Respiratory failure which is Acute on chronic.  she is on home oxygen at 3 LPM. Supplemental oxygen was provided and noted- Oxygen Concentration (%):  [35] 35    .   Signs/symptoms of respiratory failure include- respiratory distress. Contributing diagnoses includes - COPD Labs and images were reviewed. Patient Has recent ABG, which has been reviewed. Will treat underlying causes and adjust management of respiratory failure as follows-     Continue on BiPAP for now with current settings.  IPAP of 17, EPAP of 5, FiO2 of 50% and rate of 14.  As per patient's daughter at bedside, patient has indicated that she does not want to be intubated or resuscitated but she has no signed form of DNI and DNR.

## 2023-04-26 VITALS
BODY MASS INDEX: 29.58 KG/M2 | DIASTOLIC BLOOD PRESSURE: 69 MMHG | OXYGEN SATURATION: 92 % | HEIGHT: 66 IN | RESPIRATION RATE: 22 BRPM | SYSTOLIC BLOOD PRESSURE: 163 MMHG | WEIGHT: 184.06 LBS | HEART RATE: 79 BPM | TEMPERATURE: 98 F

## 2023-04-26 LAB
ANION GAP SERPL CALC-SCNC: 9 MMOL/L (ref 8–16)
BASOPHILS # BLD AUTO: 0.04 K/UL (ref 0–0.2)
BASOPHILS NFR BLD: 0.4 % (ref 0–1.9)
BUN SERPL-MCNC: 22 MG/DL (ref 8–23)
CALCIUM SERPL-MCNC: 8.9 MG/DL (ref 8.7–10.5)
CHLORIDE SERPL-SCNC: 101 MMOL/L (ref 95–110)
CO2 SERPL-SCNC: 36 MMOL/L (ref 23–29)
CREAT SERPL-MCNC: 0.7 MG/DL (ref 0.5–1.4)
DIFFERENTIAL METHOD: ABNORMAL
EOSINOPHIL # BLD AUTO: 0.1 K/UL (ref 0–0.5)
EOSINOPHIL NFR BLD: 1.4 % (ref 0–8)
ERYTHROCYTE [DISTWIDTH] IN BLOOD BY AUTOMATED COUNT: 13.9 % (ref 11.5–14.5)
EST. GFR  (NO RACE VARIABLE): >60 ML/MIN/1.73 M^2
GLUCOSE SERPL-MCNC: 93 MG/DL (ref 70–110)
HCT VFR BLD AUTO: 39.3 % (ref 37–48.5)
HGB BLD-MCNC: 11.6 G/DL (ref 12–16)
IMM GRANULOCYTES # BLD AUTO: 0.02 K/UL (ref 0–0.04)
IMM GRANULOCYTES NFR BLD AUTO: 0.2 % (ref 0–0.5)
LYMPHOCYTES # BLD AUTO: 3.6 K/UL (ref 1–4.8)
LYMPHOCYTES NFR BLD: 37.2 % (ref 18–48)
MCH RBC QN AUTO: 27.5 PG (ref 27–31)
MCHC RBC AUTO-ENTMCNC: 29.5 G/DL (ref 32–36)
MCV RBC AUTO: 93 FL (ref 82–98)
MONOCYTES # BLD AUTO: 0.9 K/UL (ref 0.3–1)
MONOCYTES NFR BLD: 9.6 % (ref 4–15)
NEUTROPHILS # BLD AUTO: 4.9 K/UL (ref 1.8–7.7)
NEUTROPHILS NFR BLD: 51.2 % (ref 38–73)
NRBC BLD-RTO: 0 /100 WBC
PLATELET # BLD AUTO: 171 K/UL (ref 150–450)
PMV BLD AUTO: 11 FL (ref 9.2–12.9)
POTASSIUM SERPL-SCNC: 4 MMOL/L (ref 3.5–5.1)
RBC # BLD AUTO: 4.22 M/UL (ref 4–5.4)
SODIUM SERPL-SCNC: 146 MMOL/L (ref 136–145)
WBC # BLD AUTO: 9.58 K/UL (ref 3.9–12.7)

## 2023-04-26 PROCEDURE — 90677 PCV20 VACCINE IM: CPT | Performed by: HOSPITALIST

## 2023-04-26 PROCEDURE — 94640 AIRWAY INHALATION TREATMENT: CPT

## 2023-04-26 PROCEDURE — 36415 COLL VENOUS BLD VENIPUNCTURE: CPT | Performed by: STUDENT IN AN ORGANIZED HEALTH CARE EDUCATION/TRAINING PROGRAM

## 2023-04-26 PROCEDURE — 94660 CPAP INITIATION&MGMT: CPT

## 2023-04-26 PROCEDURE — 25000242 PHARM REV CODE 250 ALT 637 W/ HCPCS: Performed by: STUDENT IN AN ORGANIZED HEALTH CARE EDUCATION/TRAINING PROGRAM

## 2023-04-26 PROCEDURE — 63600175 PHARM REV CODE 636 W HCPCS: Performed by: HOSPITALIST

## 2023-04-26 PROCEDURE — 94761 N-INVAS EAR/PLS OXIMETRY MLT: CPT

## 2023-04-26 PROCEDURE — 27000221 HC OXYGEN, UP TO 24 HOURS

## 2023-04-26 PROCEDURE — 93010 ELECTROCARDIOGRAM REPORT: CPT | Mod: ,,, | Performed by: INTERNAL MEDICINE

## 2023-04-26 PROCEDURE — 63600175 PHARM REV CODE 636 W HCPCS: Performed by: STUDENT IN AN ORGANIZED HEALTH CARE EDUCATION/TRAINING PROGRAM

## 2023-04-26 PROCEDURE — 85025 COMPLETE CBC W/AUTO DIFF WBC: CPT | Performed by: STUDENT IN AN ORGANIZED HEALTH CARE EDUCATION/TRAINING PROGRAM

## 2023-04-26 PROCEDURE — 25000003 PHARM REV CODE 250: Performed by: HOSPITALIST

## 2023-04-26 PROCEDURE — 99900035 HC TECH TIME PER 15 MIN (STAT)

## 2023-04-26 PROCEDURE — 93005 ELECTROCARDIOGRAM TRACING: CPT

## 2023-04-26 PROCEDURE — 93010 EKG 12-LEAD: ICD-10-PCS | Mod: ,,, | Performed by: INTERNAL MEDICINE

## 2023-04-26 PROCEDURE — 90471 IMMUNIZATION ADMIN: CPT | Performed by: HOSPITALIST

## 2023-04-26 PROCEDURE — 63700000 PHARM REV CODE 250 ALT 637 W/O HCPCS: Performed by: STUDENT IN AN ORGANIZED HEALTH CARE EDUCATION/TRAINING PROGRAM

## 2023-04-26 PROCEDURE — G0009 ADMIN PNEUMOCOCCAL VACCINE: HCPCS | Performed by: HOSPITALIST

## 2023-04-26 PROCEDURE — 80048 BASIC METABOLIC PNL TOTAL CA: CPT | Performed by: STUDENT IN AN ORGANIZED HEALTH CARE EDUCATION/TRAINING PROGRAM

## 2023-04-26 RX ORDER — PREDNISONE 20 MG/1
40 TABLET ORAL DAILY
Qty: 6 TABLET | Refills: 0 | Status: SHIPPED | OUTPATIENT
Start: 2023-04-27 | End: 2023-04-30

## 2023-04-26 RX ORDER — AZITHROMYCIN 250 MG/1
250 TABLET, FILM COATED ORAL
Qty: 12 TABLET | Refills: 11 | Status: SHIPPED | OUTPATIENT
Start: 2023-04-26 | End: 2023-11-22 | Stop reason: SDUPTHER

## 2023-04-26 RX ADMIN — PNEUMOCOCCAL 20-VALENT CONJUGATE VACCINE 0.5 ML
2.2; 2.2; 2.2; 2.2; 2.2; 2.2; 2.2; 2.2; 2.2; 2.2; 2.2; 2.2; 2.2; 2.2; 2.2; 2.2; 4.4; 2.2; 2.2; 2.2 INJECTION, SUSPENSION INTRAMUSCULAR at 02:04

## 2023-04-26 RX ADMIN — GUAIFENESIN 1200 MG: 600 TABLET, EXTENDED RELEASE ORAL at 09:04

## 2023-04-26 RX ADMIN — PREDNISONE 40 MG: 20 TABLET ORAL at 09:04

## 2023-04-26 RX ADMIN — IPRATROPIUM BROMIDE AND ALBUTEROL SULFATE 3 ML: .5; 3 SOLUTION RESPIRATORY (INHALATION) at 01:04

## 2023-04-26 RX ADMIN — FLUTICASONE FUROATE AND VILANTEROL TRIFENATATE 1 PUFF: 200; 25 POWDER RESPIRATORY (INHALATION) at 07:04

## 2023-04-26 RX ADMIN — DICLOFENAC SODIUM 2 G: 10 GEL TOPICAL at 09:04

## 2023-04-26 RX ADMIN — MUPIROCIN: 20 OINTMENT TOPICAL at 09:04

## 2023-04-26 RX ADMIN — AZITHROMYCIN MONOHYDRATE 500 MG: 250 TABLET ORAL at 09:04

## 2023-04-26 RX ADMIN — IPRATROPIUM BROMIDE AND ALBUTEROL SULFATE 3 ML: .5; 3 SOLUTION RESPIRATORY (INHALATION) at 07:04

## 2023-04-26 NOTE — PLAN OF CARE
Introduced as VN and will be reviewing discharge instructions.  Educated patient on reason for admission, home medication list, and discharge instructions including when to return to ED and the following doctor appointments.  Education per flowsheet.  Opportunity given for questions and questions answered.   Nurse notified of   completion of discharge education. Patient is getting respiratory treatment and has to get dressed

## 2023-04-26 NOTE — DISCHARGE SUMMARY
"[facility] Medicine  Discharge Summary      Patient Name: Terri Phelan  MRN: [id number]  SUNDEEP: [id number]  Patient Class: IP- Inpatient  Admission Date: 4/23/2023  Hospital Length of Stay: 2 days  Discharge Date and Time:  04/26/2023 12:43 PM  Attending Physician: Tirso Valenzuela, *   Discharging Provider: Tirso Valenzuela MD  Primary Care Provider: Sridevi Gibson MD    Primary Care Team: Networked reference to record PCT     HPI:   Patient is a 63-year-old female with past medical history of end-stage COPD, CAD, hypertension JUAN who presented with complaints of shortness of breath, generalized weakness and altered mental status of several days duration.  Patient unable to give good history.  Most of the history obtained from daughter at bedside.  She lives for the past 2 days, patient has been feeling very weak, getting short of breath which has been worse despite using her albuterol and oxygen.  Under the of presentation, she was noted to be confused and was brought to the ER for worsening symptoms.  In ER, she was found to be hypoxic and hypercapnic.  She was placed on BiPAP.      * No surgery found *      Hospital Course:   Ms. Phelan presented with acute on chronic hypercapnic respiratory failure secondary to COPD exacerbation.  She was initiated on BiPAP and COPD pathway with steroids, antibiotics, and nebulizer treatments.  She improved greatly with this regimen was able to be weaned off BiPAP back to her home supplemental oxygen.  Her breathing is essentially back to baseline at this time.  Will discharge with 5 day course of steroids.  She will continue her home inhaler and suppressive antibiotic regimen.  She already uses nocturnal NIV.  She will follow-up with pulmonology and continue her pulmonary rehab.    BP (!) 163/69 (Patient Position: Sitting)   Pulse 64   Temp 97.8 °F (36.6 °C) (Oral)   Resp 17   Ht 5' 6" (1.676 m)   Wt 83.5 kg (184 lb 1.4 oz)   SpO2 " (!) 92%   BMI 29.71 kg/m²   Physical Exam  HENT:      Head: Normocephalic.   Cardiovascular:      Rate and Rhythm: Normal rate and regular rhythm.   Pulmonary:      Effort: No respiratory distress.      Breath sounds: Wheezing improved. No rales.      Comments: On supplemental O2  Abdominal:      General: There is no distension.      Palpations: Abdomen is soft.   Musculoskeletal:      Cervical back: Neck supple.   Skin:     General: Skin is warm.   Neurological:      Mental Status: She is oriented to person, place, and time. Mental status is at baseline.        Goals of Care Treatment Preferences:  Code Status: DNR    Health care agent: Mulugeta Phelan  [facility] agent number: [phone]          What is most important right now is to focus on spending time at home, avoiding the hospital, remaining as independent as possible, symptom/pain control, improvement in condition but with limits to invasive therapies.  Accordingly, we have decided that the best plan to meet the patient's goals includes continuing with treatment.      Consults:   Consults (From admission, onward)        Status Ordering Provider     Inpatient consult to Palliative Care  Once        Provider:  Wei Mercado Jr., MD    Completed JERONIMO WELLS     Inpatient consult to Pulmonology  Once        Provider:  Herman Blandon MD    Completed HERMAN BLANDON          No new Assessment & Plan notes have been filed under this hospital service since the last note was generated.  Service: Hospital Medicine    Final Active Diagnoses:    Diagnosis Date Noted POA    PRINCIPAL PROBLEM:  COPD exacerbation [J44.1] 09/08/2015 Yes    Palliative care encounter [Z51.5] 04/25/2023 Not Applicable    Acute on chronic respiratory failure with hypercapnia [J96.22] 04/24/2023 Yes    Essential hypertension [I10] 09/08/2015 Yes      Problems Resolved During this Admission:    Diagnosis Date Noted Date Resolved POA    Acute metabolic encephalopathy [G93.41]  04/24/2023 04/25/2023 Yes       Discharged Condition: good    Disposition: Home or Self Care    Follow Up:   Follow-up Information     Bethany Landry MD Follow up on 5/15/2023.    Specialty: Internal Medicine  Why: 1:00pm [facility] F/U  Contact information:  [address]  [address]  [place] [address]  [phone]             MyChart Follow up.    Why: Please review MyChart for future follow up appointments.                     Patient Instructions:      Ambulatory referral/consult to [facility]   Standing Status: Future   Referral Priority: Routine Referral Type: Consultation   Referral Reason: Specialty Services Required   Number of Visits Requested: 1     Ambulatory referral/consult to Pulmonology   Standing Status: Future   Referral Priority: Routine Referral Type: Consultation   Referral Reason: Specialty Services Required   Requested Specialty: Pulmonary Disease   Number of Visits Requested: 1     Ambulatory referral/consult to Pulmonary Rehab   Standing Status: Future   Referral Priority: Routine Referral Type: Consultation   Referral Reason: Specialty Services Required   Requested Specialty: Pulmonary Disease   Number of Visits Requested: 1       Significant Diagnostic Studies: N/A    Pending Diagnostic Studies:     None         Medications:  Reconciled Home Medications:      Medication List      CHANGE how you take these medications    predniSONE 20 MG tablet  Commonly known as: DELTASONE  Take 2 tablets (40 mg total) by mouth once daily. for 3 days  Start taking on: April 27, 2023  What changed:   · when to take this  · reasons to take this  · additional instructions        CONTINUE taking these medications    acyclovir 400 MG tablet  Commonly known as: ZOVIRAX  TAKE 1 TABLET BY MOUTH THREE TIMES DAILY FOR 10 DAYS AS NEEDED FOR OUTBREAK     albuterol 90 mcg/actuation inhaler  Commonly known as: PROVENTIL/VENTOLIN HFA  Inhale 2 puffs into the lungs every 6 (six) hours as needed.      albuterol-ipratropium 2.5 mg-0.5 mg/3 mL nebulizer solution  Commonly known as: DUO-NEB  Take by nebulization every 4 (four) hours.     alendronate 35 MG tablet  Commonly known as: FOSAMAX  Take 35 mg by mouth every Tuesday.     aspirin 81 MG Chew  Take 81 mg by mouth once daily.     azithromycin 250 MG tablet  Commonly known as: Z-OLY  Take 1 tablet (250 mg total) by mouth 3 (three) times a week.     BREZTRI AEROSPHERE 160-9-4.8 mcg/actuation Hfaa  Generic drug: budesonide-glycopyr-formoterol  Inhale 2 puffs into the lungs 2 (two) times daily.     DALIRESP 500 mcg Tab  Generic drug: roflumilast  TK 1 T PO D     dextromethorphan-guaiFENesin  mg  mg per 12 hr tablet  Commonly known as: MUCINEX DM  Take 1 tablet by mouth every 12 (twelve) hours.     dicyclomine 20 mg tablet  Commonly known as: BENTYL  TAKE ONE TABLET BY MOUTH THREE TIMES DAILY AS NEEDED FOR STOMACH CRAMPS     ergocalciferol 50,000 unit Cap  Commonly known as: ERGOCALCIFEROL  Take 50,000 Units by mouth every Tuesday.     fluticasone propionate 50 mcg/actuation nasal spray  Commonly known as: FLONASE  1 spray by Each Nostril route daily as needed for Allergies.     LORazepam 1 MG tablet  Commonly known as: ATIVAN  Take 0.5 tablets (0.5 mg total) by mouth every 8 (eight) hours as needed for Anxiety.     OXYGEN-AIR DELIVERY SYSTEMS MISC  3 L by Nasal route.            Indwelling Lines/Drains at time of discharge:   Lines/Drains/Airways     Drain  Duration           Female External Urinary Catheter 04/24/23 0000 2 days                Time spent on the discharge of patient: 35 minutes         Tirso Vlaenzuela MD  Department of Hospital Medicine  [facility]

## 2023-04-26 NOTE — NURSING
Patient for discharge today. AVS printed and handed out to patient. Home med delivered at bedside. PIV and tele removed. VN to review AVS and request transport for dc.

## 2023-04-26 NOTE — NURSING
Justyna Pendleton NP notified regarding patients heart rate sinus kalyan. No new orders given at this time.

## 2023-04-26 NOTE — PROGRESS NOTES
[facility]                    Pharmacy       Discharge Medication Education    Patient ACCEPTED medication education. Pharmacy has provided education on the name, indication, and possible side effects of the medication(s) prescribed, using teach-back method.     The following medications have also been discussed, during this admission.        Medication List        CHANGE how you take these medications      predniSONE 20 MG tablet  Commonly known as: DELTASONE  Take 2 tablets (40 mg total) by mouth once daily. for 3 days  Start taking on: April 27, 2023  What changed:   when to take this  reasons to take this  additional instructions            CONTINUE taking these medications      acyclovir 400 MG tablet  Commonly known as: ZOVIRAX     albuterol 90 mcg/actuation inhaler  Commonly known as: PROVENTIL/VENTOLIN HFA     albuterol-ipratropium 2.5 mg-0.5 mg/3 mL nebulizer solution  Commonly known as: DUO-NEB     alendronate 35 MG tablet  Commonly known as: FOSAMAX     aspirin 81 MG Chew     azithromycin 250 MG tablet  Commonly known as: Z-OLY     BREZTRI AEROSPHERE 160-9-4.8 mcg/actuation Hfaa  Generic drug: budesonide-glycopyr-formoterol     DALIRESP 500 mcg Tab  Generic drug: roflumilast     dextromethorphan-guaiFENesin  mg  mg per 12 hr tablet  Commonly known as: MUCINEX DM     dicyclomine 20 mg tablet  Commonly known as: BENTYL     ergocalciferol 50,000 unit Cap  Commonly known as: ERGOCALCIFEROL     fluticasone propionate 50 mcg/actuation nasal spray  Commonly known as: FLONASE     LORazepam 1 MG tablet  Commonly known as: ATIVAN  Take 0.5 tablets (0.5 mg total) by mouth every 8 (eight) hours as needed for Anxiety.     OXYGEN-AIR DELIVERY SYSTEMS [facility]               Where to Get Your Medications        These medications were sent to [facility]  [address], [place] [address]      Hours: Mon-Fri, 8a-5:30p Phone: [phone]   predniSONE 20 MG tablet           Thank you  Reinaldo Christensen, PharmD  [phone]

## 2023-04-26 NOTE — HOSPITAL COURSE
"Ms. Phelan presented with acute on chronic hypercapnic respiratory failure secondary to COPD exacerbation.  She was initiated on BiPAP and COPD pathway with steroids, antibiotics, and nebulizer treatments.  She improved greatly with this regimen was able to be weaned off BiPAP back to her home supplemental oxygen.  Her breathing is essentially back to baseline at this time.  Will discharge with 5 day course of steroids.  She will continue her home inhaler and suppressive antibiotic regimen.  She already uses nocturnal NIV.  She will follow-up with pulmonology and continue her pulmonary rehab.    BP (!) 163/69 (Patient Position: Sitting)   Pulse 64   Temp 97.8 °F (36.6 °C) (Oral)   Resp 17   Ht 5' 6" (1.676 m)   Wt 83.5 kg (184 lb 1.4 oz)   SpO2 (!) 92%   BMI 29.71 kg/m²   Physical Exam  HENT:      Head: Normocephalic.   Cardiovascular:      Rate and Rhythm: Normal rate and regular rhythm.   Pulmonary:      Effort: No respiratory distress.      Breath sounds: Wheezing improved. No rales.      Comments: On supplemental O2  Abdominal:      General: There is no distension.      Palpations: Abdomen is soft.   Musculoskeletal:      Cervical back: Neck supple.   Skin:     General: Skin is warm.   Neurological:      Mental Status: She is oriented to person, place, and time. Mental status is at baseline.   "

## 2023-04-26 NOTE — PLAN OF CARE
Pt will dc with no needs noted. Pts daughter will provide transport home upon dc. SW will continue to follow pt throughout his transitions of care and assist with any dc needs. Pt reports that she is already current with Pulm rehab. Pt reports that her portable O2 tank is at bedside.     SW requested Pulm follow up    Future Appointments   Date Time Provider Department Center   5/15/2023  1:00 PM Bethany Landry MD [facility]        04/26/23 1155   Final Note   Assessment Type Final Discharge Note   Anticipated Discharge Disposition Home   Hospital Resources/Appts/Education Provided Appointments scheduled by Navigator/Coordinator   Post-Acute Status   Discharge Delays None known at this time

## 2023-04-28 ENCOUNTER — PATIENT OUTREACH (OUTPATIENT)
Dept: ADMINISTRATIVE | Facility: CLINIC | Age: 68
End: 2023-04-28
Payer: MEDICARE

## 2023-04-28 NOTE — PROGRESS NOTES
C3 nurse spoke with Terri Phelan for a TCC post hospital discharge follow up call. The patient has a scheduled HOSFU appointment with Bethany Landry on 05/15/2023 @ 1300.    HOSFU past recommended 5-7 days of discharge date 04/26/2023, message sent to physician staff.

## 2023-05-02 ENCOUNTER — TELEPHONE (OUTPATIENT)
Dept: ADMINISTRATIVE | Facility: CLINIC | Age: 68
End: 2023-05-02
Payer: MEDICARE

## 2023-05-02 NOTE — TELEPHONE ENCOUNTER
Patient called requesting a sooner HOSPFU. Message route to provider as there is no sooner appointments in [facility]. Patient verbalized understanding and will be expecting a call from the providers office to discuss appointment details. No further assistance needed.

## 2023-05-15 ENCOUNTER — OFFICE VISIT (OUTPATIENT)
Dept: PRIMARY CARE CLINIC | Facility: CLINIC | Age: 68
End: 2023-05-15
Payer: MEDICARE

## 2023-05-15 VITALS
HEART RATE: 99 BPM | BODY MASS INDEX: 29.96 KG/M2 | TEMPERATURE: 99 F | SYSTOLIC BLOOD PRESSURE: 136 MMHG | WEIGHT: 185.63 LBS | OXYGEN SATURATION: 91 % | DIASTOLIC BLOOD PRESSURE: 70 MMHG

## 2023-05-15 DIAGNOSIS — J96.22 ACUTE ON CHRONIC RESPIRATORY FAILURE WITH HYPOXIA AND HYPERCAPNIA: ICD-10-CM

## 2023-05-15 DIAGNOSIS — J96.21 ACUTE ON CHRONIC RESPIRATORY FAILURE WITH HYPOXIA AND HYPERCAPNIA: ICD-10-CM

## 2023-05-15 DIAGNOSIS — J96.22 ACUTE ON CHRONIC RESPIRATORY FAILURE WITH HYPERCAPNIA: ICD-10-CM

## 2023-05-15 DIAGNOSIS — Z99.81 OXYGEN DEPENDENT: ICD-10-CM

## 2023-05-15 DIAGNOSIS — J44.1 COPD EXACERBATION: Primary | ICD-10-CM

## 2023-05-15 PROBLEM — J40 BRONCHITIS: Status: RESOLVED | Noted: 2020-09-01 | Resolved: 2023-05-15

## 2023-05-15 PROCEDURE — 1159F MED LIST DOCD IN RCRD: CPT | Mod: CPTII,S$GLB,, | Performed by: INTERNAL MEDICINE

## 2023-05-15 PROCEDURE — 3288F PR FALLS RISK ASSESSMENT DOCUMENTED: ICD-10-PCS | Mod: CPTII,S$GLB,, | Performed by: INTERNAL MEDICINE

## 2023-05-15 PROCEDURE — 99205 OFFICE O/P NEW HI 60 MIN: CPT | Mod: S$GLB,,, | Performed by: INTERNAL MEDICINE

## 2023-05-15 PROCEDURE — 1126F PR PAIN SEVERITY QUANTIFIED, NO PAIN PRESENT: ICD-10-PCS | Mod: CPTII,S$GLB,, | Performed by: INTERNAL MEDICINE

## 2023-05-15 PROCEDURE — 1160F RVW MEDS BY RX/DR IN RCRD: CPT | Mod: CPTII,S$GLB,, | Performed by: INTERNAL MEDICINE

## 2023-05-15 PROCEDURE — 99999 PR PBB SHADOW E&M-EST. PATIENT-LVL IV: ICD-10-PCS | Mod: PBBFAC,,, | Performed by: INTERNAL MEDICINE

## 2023-05-15 PROCEDURE — 3288F FALL RISK ASSESSMENT DOCD: CPT | Mod: CPTII,S$GLB,, | Performed by: INTERNAL MEDICINE

## 2023-05-15 PROCEDURE — 3008F BODY MASS INDEX DOCD: CPT | Mod: CPTII,S$GLB,, | Performed by: INTERNAL MEDICINE

## 2023-05-15 PROCEDURE — 99205 PR OFFICE/OUTPT VISIT, NEW, LEVL V, 60-74 MIN: ICD-10-PCS | Mod: S$GLB,,, | Performed by: INTERNAL MEDICINE

## 2023-05-15 PROCEDURE — 3078F DIAST BP <80 MM HG: CPT | Mod: CPTII,S$GLB,, | Performed by: INTERNAL MEDICINE

## 2023-05-15 PROCEDURE — 1126F AMNT PAIN NOTED NONE PRSNT: CPT | Mod: CPTII,S$GLB,, | Performed by: INTERNAL MEDICINE

## 2023-05-15 PROCEDURE — 1111F PR DISCHARGE MEDS RECONCILED W/ CURRENT OUTPATIENT MED LIST: ICD-10-PCS | Mod: CPTII,S$GLB,, | Performed by: INTERNAL MEDICINE

## 2023-05-15 PROCEDURE — 1111F DSCHRG MED/CURRENT MED MERGE: CPT | Mod: CPTII,S$GLB,, | Performed by: INTERNAL MEDICINE

## 2023-05-15 PROCEDURE — 1160F PR REVIEW ALL MEDS BY PRESCRIBER/CLIN PHARMACIST DOCUMENTED: ICD-10-PCS | Mod: CPTII,S$GLB,, | Performed by: INTERNAL MEDICINE

## 2023-05-15 PROCEDURE — 1101F PT FALLS ASSESS-DOCD LE1/YR: CPT | Mod: CPTII,S$GLB,, | Performed by: INTERNAL MEDICINE

## 2023-05-15 PROCEDURE — 3078F PR MOST RECENT DIASTOLIC BLOOD PRESSURE < 80 MM HG: ICD-10-PCS | Mod: CPTII,S$GLB,, | Performed by: INTERNAL MEDICINE

## 2023-05-15 PROCEDURE — 3075F SYST BP GE 130 - 139MM HG: CPT | Mod: CPTII,S$GLB,, | Performed by: INTERNAL MEDICINE

## 2023-05-15 PROCEDURE — 3075F PR MOST RECENT SYSTOLIC BLOOD PRESS GE 130-139MM HG: ICD-10-PCS | Mod: CPTII,S$GLB,, | Performed by: INTERNAL MEDICINE

## 2023-05-15 PROCEDURE — 99999 PR PBB SHADOW E&M-EST. PATIENT-LVL IV: CPT | Mod: PBBFAC,,, | Performed by: INTERNAL MEDICINE

## 2023-05-15 PROCEDURE — 1101F PR PT FALLS ASSESS DOC 0-1 FALLS W/OUT INJ PAST YR: ICD-10-PCS | Mod: CPTII,S$GLB,, | Performed by: INTERNAL MEDICINE

## 2023-05-15 PROCEDURE — 1159F PR MEDICATION LIST DOCUMENTED IN MEDICAL RECORD: ICD-10-PCS | Mod: CPTII,S$GLB,, | Performed by: INTERNAL MEDICINE

## 2023-05-15 PROCEDURE — 3008F PR BODY MASS INDEX (BMI) DOCUMENTED: ICD-10-PCS | Mod: CPTII,S$GLB,, | Performed by: INTERNAL MEDICINE

## 2023-05-15 RX ORDER — PREDNISONE 20 MG/1
40 TABLET ORAL DAILY
Qty: 10 TABLET | Refills: 3 | Status: SHIPPED | OUTPATIENT
Start: 2023-05-15 | End: 2023-05-20

## 2023-05-15 RX ORDER — DICYCLOMINE HYDROCHLORIDE 20 MG/1
20 TABLET ORAL 3 TIMES DAILY PRN
Qty: 30 TABLET | Refills: 1 | Status: SHIPPED | OUTPATIENT
Start: 2023-05-15

## 2023-05-15 RX ORDER — LORAZEPAM 0.5 MG/1
0.5 TABLET ORAL EVERY 8 HOURS PRN
Qty: 30 TABLET | Refills: 0 | Status: ON HOLD | OUTPATIENT
Start: 2023-05-15 | End: 2023-06-16 | Stop reason: SDUPTHER

## 2023-05-15 NOTE — Clinical Note
Ms Phelan will see you on 5/30 in clinic. However, mobility is limited and appts are difficult for her. If you are willing/able to change her to a virtual appt that would be great. Thanks!

## 2023-05-15 NOTE — PROGRESS NOTES
[facility]   New Visit Progress Note   Recent Hospital Discharge     PRESENTING HISTORY     Chief Complaint/Reason for Admission:  Follow up Hospital Discharge   PCP: Sridevi Gibson MD    History of Present Illness:  Ms. Terri Phelan is a 67 y.o. female who was recently admitted to the hospital.    [facility] Medicine  Discharge Summary        Patient Name: Terri Phelan  MRN: [id number]  SUNDEEP: [id number]  Patient Class: IP- Inpatient  Admission Date: 4/23/2023  Hospital Length of Stay: 2 days  Discharge Date and Time:  04/26/2023 12:43 PM  Attending Physician: Tirso Valenzuela, *   Discharging Provider: Tirso Valenzuela MD  Primary Care Provider: Sridevi Gibson MD   ___________________________________________________________________    Today:  Presents to hospital follow up clinic for initial hospital follow up.  Recently hospitalized for management of COPD exacerbation.  Hypercapnic and hypoxic on arrival, requiring continuous Bipap and ICU level of care.   Admitted to [facility] Medicine service.  Steroids, antibiotics, and Nebulizer treatments initiated.   Patient responded well to above interventions and supportive care.  Seen in consultation with palliative care team to continue goals of care discussion and clarify DNR status.   Discharged to home on additional 3 days prednisone.  Already on home oxygen 3L and this was continued.     Patient newly established with [facility] team.  Seen 4/18/23 to establish new care after transferring from [place], [place].  Attending Pulmonary rehabilitation at [facility].  Pulmonary team plan to continue with current medication regimen as well as continuous oxygen and nocturnal BiPAP.    Patient accompanied today by family.  Ambulatory and independent with ADL's.  Still drives.  Reports compliance with all medication and brought medication bottles/inhalers for review.  Wearing 3L NC, pulse dose delivery which is her  baseline.  Does not have local PCP-> recently relocated to this area and needs to establish care.     Review of Systems  General ROS: negative for chills, fever or weight loss  Psychological ROS: negative for hallucination, depression or suicidal ideation  Ophthalmic ROS: negative for blurry vision, photophobia or eye pain  ENT ROS: negative for epistaxis, sore throat or rhinorrhea  Respiratory ROS: no cough, shortness of breath, or wheezing  + dyspnea at rest   Cardiovascular ROS: no chest pain or dyspnea on exertion  Gastrointestinal ROS: no abdominal pain, change in bowel habits, or black/ bloody stools  Genito-Urinary ROS: no dysuria, trouble voiding, or hematuria  Musculoskeletal ROS: negative for gait disturbance or muscular weakness  Neurological ROS: no syncope or seizures; no ataxia  Dermatological ROS: negative for pruritis, rash and jaundice      PAST HISTORY:     Past Medical History:   Diagnosis Date    COPD (chronic obstructive pulmonary disease)     Hypertension        Past Surgical History:   Procedure Laterality Date     SECTION      HERNIA REPAIR      HYSTERECTOMY         No family history on file.      MEDICATIONS & ALLERGIES:     Current Outpatient Medications on File Prior to Visit   Medication Sig Dispense Refill    acyclovir (ZOVIRAX) 400 MG tablet TAKE 1 TABLET BY MOUTH THREE TIMES DAILY FOR 10 DAYS AS NEEDED FOR OUTBREAK      albuterol (PROVENTIL/VENTOLIN HFA) 90 mcg/actuation inhaler Inhale 2 puffs into the lungs every 6 (six) hours as needed.      albuterol-ipratropium (DUO-NEB) 2.5 mg-0.5 mg/3 mL nebulizer solution Take by nebulization every 4 (four) hours.      alendronate (FOSAMAX) 35 MG tablet Take 35 mg by mouth every Tuesday.      aspirin 81 MG Chew Take 81 mg by mouth once daily.      azithromycin (Z-OLY) 250 MG tablet Take 1 tablet (250 mg total) by mouth 3 (three) times a week. 12 tablet 11    budesonide-glycopyr-formoterol (BREZTRI AEROSPHERE) 160-9-4.8 mcg/actuation HFAA  Inhale 2 puffs into the lungs 2 (two) times daily.      DALIRESP 500 mcg Tab TK 1 T PO D      dextromethorphan-guaiFENesin  mg (MUCINEX DM)  mg per 12 hr tablet Take 1 tablet by mouth every 12 (twelve) hours.      dicyclomine (BENTYL) 20 mg tablet TAKE ONE TABLET BY MOUTH THREE TIMES DAILY AS NEEDED FOR STOMACH CRAMPS      ergocalciferol (ERGOCALCIFEROL) 50,000 unit Cap Take 50,000 Units by mouth every Tuesday.      fluticasone propionate (FLONASE) 50 mcg/actuation nasal spray 1 spray by Each Nostril route daily as needed for Allergies.      LORazepam (ATIVAN) 1 MG tablet Take 0.5 tablets (0.5 mg total) by mouth every 8 (eight) hours as needed for Anxiety. 5 tablet 0    OXYGEN-AIR DELIVERY SYSTEMS MISC 3 L by Nasal route.       No current facility-administered medications on file prior to visit.        Review of patient's allergies indicates:   Allergen Reactions    Codeine Nausea And Vomiting    Morphine Other (See Comments)       OBJECTIVE:     Vital Signs:  /70 (BP Location: Left arm, Patient Position: Sitting, BP Method: Small (Automatic))   Pulse 99   Temp 98.6 °F (37 °C) (Oral)   Wt 84.2 kg (185 lb 10 oz)   SpO2 (!) 91%   BMI 29.96 kg/m²   Wt Readings from Last 3 Encounters:   04/24/23 0415 83.5 kg (184 lb 1.4 oz)   04/23/23 2013 80.7 kg (178 lb)   04/18/23 1607 84 kg (185 lb 3 oz)   01/12/23 1445 81.6 kg (180 lb)     Body mass index is 29.96 kg/m².        Physical Exam:  /70 (BP Location: Left arm, Patient Position: Sitting, BP Method: Small (Automatic))   Pulse 99   Temp 98.6 °F (37 °C) (Oral)   Wt 84.2 kg (185 lb 10 oz)   SpO2 (!) 91%   BMI 29.96 kg/m²   General appearance: alert, cooperative, no distress  Constitutional:Oriented to person, place, and time  + appears well-developed and well-nourished.   HEENT: Normocephalic, atraumatic, neck symmetrical, no nasal discharge   Eyes: conjunctivae/corneas clear, PERRL, EOM's intact  Lungs: clear to auscultation bilaterally, no  dullness to percussion bilaterally  + poor inspiratory effort  + visibly short of breath at rest, unable to complete sentences   Heart: regular rate and rhythm without rub; no displacement of the PMI   Abdomen: soft, non-tender; bowel sounds normoactive; no organomegaly  Extremities: extremities symmetric; no clubbing, cyanosis, or edema  Integument: Skin color, texture, turgor normal; no rashes; hair distrubution normal  Neurologic: Alert and oriented X 3, normal strength, normal coordination and gait  Psychiatric: no pressured speech; normal affect; no evidence of impaired cognition     Laboratory  Lab Results   Component Value Date    WBC 9.58 04/26/2023    HGB 11.6 (L) 04/26/2023    HCT 39.3 04/26/2023    MCV 93 04/26/2023     04/26/2023     BMP  Lab Results   Component Value Date     (H) 04/26/2023    K 4.0 04/26/2023     04/26/2023    CO2 36 (H) 04/26/2023    BUN 22 04/26/2023    CREATININE 0.7 04/26/2023    CALCIUM 8.9 04/26/2023    ANIONGAP 9 04/26/2023    EGFRNORACEVR >60 04/26/2023     Lab Results   Component Value Date    ALT 12 04/24/2023    AST 16 04/24/2023    ALKPHOS 45 (L) 04/24/2023    BILITOT 0.2 04/24/2023     No results found for: INR, PROTIME  Lab Results   Component Value Date    HGBA1C 5.9 09/08/2015       Diagnostic Results:    Chest X Ray 4/23/23:  The lungs are hyperexpanded.  Again seen is an opacity in left lung base, stable across multiple priors and likely related to prominent epicardial fat.  The lungs are otherwise clear.  The pleural spaces are clear.  The cardiac silhouette is unremarkable.  There are calcifications of the aortic arch.  The visualized osseous structures demonstrate degenerative changes.    TRANSITION OF CARE:     [facility] Contact Note: 4/28/23    Family and/or Caretaker present at visit?  Yes.  Diagnostic tests reviewed/disposition: No diagnosic tests pending after this hospitalization.  Disease/illness education: Yes   Home  health/community services discussion/referrals: Patient does not have home health established from hospital visit.  They do not need home health.  If needed, we will set up home health for the patient.   Establishment or re-establishment of referral orders for community resources: No other necessary community resources.   Discussion with other health care providers: No discussion with other health care providers necessary.     ASSESSMENT & PLAN:       COPD exacerbation  Acute on chronic respiratory failure with hypoxia and hypercapnia  Oxygen dependent  - recent hospitalization as above   - respiratory status currently at baseline  - refilled prn prednisone which she keeps on hand for exacerbations  - refilled Lorazepam which she uses for anxiety and air hunger  - patient with advanced disease but still fairly independent and quality of life meets her current goals  - continue current regimen  - continue outpatient Pulmonary Rehab at [facility]   - would benefit from follow up in Palliative Care clinic and she is agreeable to this - scheduled for 5/30/23   - see Pulmonary tem 6/13/23   -     predniSONE (DELTASONE) 20 MG tablet; Take 2 tablets (40 mg total) by mouth once daily. for 5 days  Dispense: 10 tablet; Refill: 3  -     Ambulatory referral/consult to CLINIC Palliative Care; Future; Expected date: 05/22/2023  -     LORazepam (ATIVAN) 0.5 MG tablet; Take 1 tablet (0.5 mg total) by mouth every 8 (eight) hours as needed for Anxiety.  Dispense: 30 tablet; Refill: 0      Other orders  -     dicyclomine (BENTYL) 20 mg tablet; Take 1 tablet (20 mg total) by mouth 3 (three) times daily as needed (abdominal pain).  Dispense: 30 tablet; Refill: 1    Patient will be released from hospital follow up clinic.  She will see Dr Sheree Banegas 6/15/23 to establish new primary care.     Instructions for the patient:      Scheduled Follow-up :  Future Appointments   Date Time Provider Department Center   5/30/2023 10:30 AM Katie Mclaughlin  NP [facility] P[facility] [place] [facility]   6/13/2023  3:00 PM Alex Cunningham MD [facility] [address]   6/15/2023  1:30 PM Sheree Banegas DO [facility] IM [place]       Post Visit Medication List:     Medication List            Accurate as of May 15, 2023  3:19 PM. If you have any questions, ask your nurse or doctor.                START taking these medications      predniSONE 20 MG tablet  Commonly known as: DELTASONE  Take 2 tablets (40 mg total) by mouth once daily. for 5 days  Started by: Bethany Landry MD            CHANGE how you take these medications      dicyclomine 20 mg tablet  Commonly known as: BENTYL  Take 1 tablet (20 mg total) by mouth 3 (three) times daily as needed (abdominal pain).  What changed: See the new instructions.  Changed by: Bethany Landry MD     LORazepam 0.5 MG tablet  Commonly known as: ATIVAN  Take 1 tablet (0.5 mg total) by mouth every 8 (eight) hours as needed for Anxiety.  What changed: medication strength  Changed by: Bethany Landry MD            CONTINUE taking these medications      acyclovir 400 MG tablet  Commonly known as: ZOVIRAX     albuterol 90 mcg/actuation inhaler  Commonly known as: PROVENTIL/VENTOLIN HFA     albuterol-ipratropium 2.5 mg-0.5 mg/3 mL nebulizer solution  Commonly known as: DUO-NEB     alendronate 35 MG tablet  Commonly known as: FOSAMAX     aspirin 81 MG Chew     azithromycin 250 MG tablet  Commonly known as: Z-OLY  Take 1 tablet (250 mg total) by mouth 3 (three) times a week.     BREZTRI AEROSPHERE 160-9-4.8 mcg/actuation Hfaa  Generic drug: budesonide-glycopyr-formoterol     DALIRESP 500 mcg Tab  Generic drug: roflumilast     dextromethorphan-guaiFENesin  mg  mg per 12 hr tablet  Commonly known as: MUCINEX DM     ergocalciferol 50,000 unit Cap  Commonly known as: ERGOCALCIFEROL     fluticasone propionate 50 mcg/actuation nasal spray  Commonly known as: FLONASE     OXYGEN-AIR DELIVERY SYSTEMS MISC               Where to Get Your Medications         These medications were sent to [id number] - [place] - [address] AT [address] & [address]  [address] [address]      Phone: [phone]   dicyclomine 20 mg tablet  LORazepam 0.5 MG tablet  predniSONE 20 MG tablet         Signing Physician:  Bethany Landry MD

## 2023-05-22 RX ORDER — TRAZODONE HYDROCHLORIDE 50 MG/1
50 TABLET ORAL NIGHTLY PRN
Qty: 30 TABLET | Refills: 6 | Status: SHIPPED | OUTPATIENT
Start: 2023-05-22 | End: 2024-05-21

## 2023-05-28 ENCOUNTER — HOSPITAL ENCOUNTER (EMERGENCY)
Facility: HOSPITAL | Age: 68
Discharge: HOME | End: 2023-05-28
Attending: EMERGENCY MEDICINE
Payer: MEDICARE

## 2023-05-28 VITALS
DIASTOLIC BLOOD PRESSURE: 59 MMHG | HEART RATE: 74 BPM | TEMPERATURE: 98 F | SYSTOLIC BLOOD PRESSURE: 126 MMHG | OXYGEN SATURATION: 92 % | RESPIRATION RATE: 24 BRPM

## 2023-05-28 DIAGNOSIS — J44.1 COPD EXACERBATION: Primary | ICD-10-CM

## 2023-05-28 DIAGNOSIS — R06.02 SHORTNESS OF BREATH: ICD-10-CM

## 2023-05-28 LAB
ALBUMIN SERPL BCP-MCNC: 3.7 G/DL (ref 3.5–5.2)
ALP SERPL-CCNC: 46 U/L (ref 55–135)
ALT SERPL W/O P-5'-P-CCNC: 16 U/L (ref 10–44)
ANION GAP SERPL CALC-SCNC: 8 MMOL/L (ref 8–16)
AST SERPL-CCNC: 23 U/L (ref 10–40)
BASOPHILS # BLD AUTO: 0.04 K/UL (ref 0–0.2)
BASOPHILS NFR BLD: 0.6 % (ref 0–1.9)
BILIRUB SERPL-MCNC: 0.2 MG/DL (ref 0.1–1)
BNP SERPL-MCNC: 17 PG/ML (ref 0–99)
BUN SERPL-MCNC: 12 MG/DL (ref 8–23)
CALCIUM SERPL-MCNC: 9.8 MG/DL (ref 8.7–10.5)
CHLORIDE SERPL-SCNC: 96 MMOL/L (ref 95–110)
CO2 SERPL-SCNC: 43 MMOL/L (ref 23–29)
CREAT SERPL-MCNC: 0.7 MG/DL (ref 0.5–1.4)
DIFFERENTIAL METHOD: ABNORMAL
EOSINOPHIL # BLD AUTO: 0.2 K/UL (ref 0–0.5)
EOSINOPHIL NFR BLD: 3.3 % (ref 0–8)
ERYTHROCYTE [DISTWIDTH] IN BLOOD BY AUTOMATED COUNT: 12.7 % (ref 11.5–14.5)
EST. GFR  (NO RACE VARIABLE): >60 ML/MIN/1.73 M^2
GLUCOSE SERPL-MCNC: 131 MG/DL (ref 70–110)
HCT VFR BLD AUTO: 45 % (ref 37–48.5)
HGB BLD-MCNC: 12.7 G/DL (ref 12–16)
IMM GRANULOCYTES # BLD AUTO: 0.01 K/UL (ref 0–0.04)
IMM GRANULOCYTES NFR BLD AUTO: 0.1 % (ref 0–0.5)
LYMPHOCYTES # BLD AUTO: 2.9 K/UL (ref 1–4.8)
LYMPHOCYTES NFR BLD: 40.9 % (ref 18–48)
MCH RBC QN AUTO: 27.3 PG (ref 27–31)
MCHC RBC AUTO-ENTMCNC: 28.2 G/DL (ref 32–36)
MCV RBC AUTO: 97 FL (ref 82–98)
MONOCYTES # BLD AUTO: 0.9 K/UL (ref 0.3–1)
MONOCYTES NFR BLD: 12.8 % (ref 4–15)
NEUTROPHILS # BLD AUTO: 3 K/UL (ref 1.8–7.7)
NEUTROPHILS NFR BLD: 42.3 % (ref 38–73)
NRBC BLD-RTO: 0 /100 WBC
PLATELET # BLD AUTO: 215 K/UL (ref 150–450)
PMV BLD AUTO: 11.2 FL (ref 9.2–12.9)
POTASSIUM SERPL-SCNC: 4 MMOL/L (ref 3.5–5.1)
PROT SERPL-MCNC: 7.5 G/DL (ref 6–8.4)
RBC # BLD AUTO: 4.66 M/UL (ref 4–5.4)
SODIUM SERPL-SCNC: 147 MMOL/L (ref 136–145)
TROPONIN I SERPL DL<=0.01 NG/ML-MCNC: <0.006 NG/ML (ref 0–0.03)
WBC # BLD AUTO: 7.05 K/UL (ref 3.9–12.7)

## 2023-05-28 PROCEDURE — 94660 CPAP INITIATION&MGMT: CPT

## 2023-05-28 PROCEDURE — 27000190 HC CPAP FULL FACE MASK W/VALVE

## 2023-05-28 PROCEDURE — 84484 ASSAY OF TROPONIN QUANT: CPT | Performed by: EMERGENCY MEDICINE

## 2023-05-28 PROCEDURE — 93010 EKG 12-LEAD: ICD-10-PCS | Mod: ,,, | Performed by: INTERNAL MEDICINE

## 2023-05-28 PROCEDURE — 80053 COMPREHEN METABOLIC PANEL: CPT | Performed by: EMERGENCY MEDICINE

## 2023-05-28 PROCEDURE — 99285 EMERGENCY DEPT VISIT HI MDM: CPT | Mod: 25

## 2023-05-28 PROCEDURE — 99900035 HC TECH TIME PER 15 MIN (STAT)

## 2023-05-28 PROCEDURE — 93005 ELECTROCARDIOGRAM TRACING: CPT

## 2023-05-28 PROCEDURE — 93010 ELECTROCARDIOGRAM REPORT: CPT | Mod: ,,, | Performed by: INTERNAL MEDICINE

## 2023-05-28 PROCEDURE — 83880 ASSAY OF NATRIURETIC PEPTIDE: CPT | Performed by: EMERGENCY MEDICINE

## 2023-05-28 PROCEDURE — 94640 AIRWAY INHALATION TREATMENT: CPT

## 2023-05-28 PROCEDURE — 25000242 PHARM REV CODE 250 ALT 637 W/ HCPCS: Performed by: EMERGENCY MEDICINE

## 2023-05-28 PROCEDURE — 85025 COMPLETE CBC W/AUTO DIFF WBC: CPT | Performed by: EMERGENCY MEDICINE

## 2023-05-28 PROCEDURE — 94761 N-INVAS EAR/PLS OXIMETRY MLT: CPT

## 2023-05-28 RX ORDER — PREDNISONE 20 MG/1
40 TABLET ORAL DAILY
Qty: 6 TABLET | Refills: 0 | Status: ON HOLD | OUTPATIENT
Start: 2023-05-28 | End: 2023-06-16 | Stop reason: HOSPADM

## 2023-05-28 RX ORDER — ALBUTEROL SULFATE 2.5 MG/.5ML
15 SOLUTION RESPIRATORY (INHALATION)
Status: COMPLETED | OUTPATIENT
Start: 2023-05-28 | End: 2023-05-28

## 2023-05-28 RX ADMIN — ALBUTEROL SULFATE 15 MG: 2.5 SOLUTION RESPIRATORY (INHALATION) at 07:05

## 2023-05-28 NOTE — ED PROVIDER NOTES
Encounter Date: 2023       History     Chief Complaint   Patient presents with    Shortness of Breath     Patient brought in by EMS from home. Reports SOB that started tonight. History of COPD. Patient initially sating 75% on her normal 2 L NC. EMS stated patient was waiting outside. Lips appeared blue. Patient given breathing tx and solumedrol en route with much improvement. Patient arrived on CPAP.      Patient is a 67-year-old female with a history of COPD brought in by EMS from home with shortness of breath.  Symptoms began last night and have worsened.  EMS reports an oxygen saturation of 75% on patient has normal 2 L of oxygen at home.  She was given breathing treatments and intravenous Solu-Medrol enroute to the hospital.  She is had no cough.  She denies chest pain.  No fever or chills.    Review of patient's allergies indicates:   Allergen Reactions    Codeine Nausea And Vomiting    Morphine Other (See Comments)     Past Medical History:   Diagnosis Date    COPD (chronic obstructive pulmonary disease)     Hypertension      Past Surgical History:   Procedure Laterality Date     SECTION      HERNIA REPAIR      HYSTERECTOMY       No family history on file.  Social History     Tobacco Use    Smoking status: Former     Packs/day: 1.00     Years: 20.00     Pack years: 20.00     Types: Cigarettes    Smokeless tobacco: Never   Substance Use Topics    Alcohol use: Not Currently     Alcohol/week: 1.0 standard drink     Types: 1 Glasses of wine per week    Drug use: No     Review of Systems   Constitutional:  Negative for chills and fever.   Respiratory:  Positive for shortness of breath. Negative for cough and chest tightness.    Cardiovascular:  Negative for chest pain.   Neurological:  Negative for syncope.     Physical Exam     Initial Vitals   BP Pulse Resp Temp SpO2   23 0634 23 0634 23 0634 23 0755 23 0634   (!) 162/110 80 (!) 22 97.8 °F (36.6 °C) 100 %      MAP        --                Physical Exam    Nursing note and vitals reviewed.  Constitutional: She appears distressed.   HENT:   Head: Atraumatic.   Eyes: EOM are normal.   Neck: Neck supple.   Cardiovascular:  Normal rate, regular rhythm and normal heart sounds.           Pulmonary/Chest:   Diminished breath sounds bilaterally with faint expiratory wheezing.   Musculoskeletal:         General: No edema. Normal range of motion.      Cervical back: Neck supple.     Neurological: She is alert and oriented to person, place, and time.   Skin: Skin is warm and dry.       ED Course   Critical Care    Date/Time: 5/28/2023 9:53 AM  Performed by: Alirio Talbot MD  Authorized by: Alirio Talbot MD   Direct patient critical care time: 60 minutes  Additional history critical care time: 5 minutes  Ordering / reviewing critical care time: 15 minutes  Documentation critical care time: 15 minutes  Total critical care time (exclusive of procedural time) : 95 minutes  Critical care was time spent personally by me on the following activities: examination of patient, obtaining history from patient or surrogate, ordering and performing treatments and interventions, ordering and review of laboratory studies, ordering and review of radiographic studies, pulse oximetry, re-evaluation of patient's condition, review of old charts and evaluation of patient's response to treatment.      Labs Reviewed   CBC W/ AUTO DIFFERENTIAL - Abnormal; Notable for the following components:       Result Value    MCHC 28.2 (*)     All other components within normal limits   COMPREHENSIVE METABOLIC PANEL - Abnormal; Notable for the following components:    Sodium 147 (*)     CO2 43 (*)     Glucose 131 (*)     Alkaline Phosphatase 46 (*)     All other components within normal limits    Narrative:     CO2 critical result(s) called and verbal readback obtained from   Mulugeta Taylor RN. by RADHA 05/28/2023 07:14   TROPONIN I   B-TYPE NATRIURETIC PEPTIDE      EKG Readings: (Independently Interpreted)   Rhythm: Normal Sinus Rhythm. Heart Rate: 80. Ectopy: No Ectopy. Conduction: Normal. ST Segments: Normal ST Segments. T Waves: Normal. Clinical Impression: Normal Sinus Rhythm     Imaging Results              X-Ray Chest 1 View (Final result)  Result time 05/28/23 08:22:32      Final result by Dayna Hebert MD (05/28/23 08:22:32)                   Impression:      No acute cardiopulmonary disease      Electronically signed by: Dayna Hebert  Date:    05/28/2023  Time:    08:22               Narrative:    EXAMINATION:  XR CHEST 1 VIEW    CLINICAL HISTORY:  COPD exacerbation;    TECHNIQUE:  Single frontal view of the chest was performed.    COMPARISON:  04/23/2023, 09/09/2022    FINDINGS:  The lungs are clear.  No pleural effusion.  Blunting of the left costophrenic angle is unchanged going back at least as far as 09/09/2022.  Heart size is unchanged.                                       Medications   albuterol sulfate nebulizer solution 15 mg (15 mg Nebulization Given 5/28/23 0727)     Medical Decision Making:   Initial Assessment:   67-year-old female with a history of COPD presenting with shortness of breath.  Differential Diagnosis:   COPD exacerbation, pneumonia, pleural effusion, pulmonary embolism, MI.  Independently Interpreted Test(s):   I have ordered and independently interpreted X-rays - see summary below.       <> Summary of X-Ray Reading(s): Chest x-ray without acute cardiopulmonary abnormalities.  Clinical Tests:   Lab Tests: Ordered and Reviewed  The following lab test(s) were unremarkable: CBC       <> Summary of Lab: CMP with a CO2 42 and glucose of 131.  Sodium is 147.  Troponin normal.  BNP normal.    ED Management:  Patient was given an hour long breathing treatment here in the emergency department.  She was able to be weaned off of BiPAP and has had oxygen saturations in the upper 90s on 2 L nasal cannula.  (Patient uses oxygen at home.)   She reported breathing back to baseline and I feel comfortable discharging her to home to continue her current medications as needed.  She should follow up with the primary physician when able but may report to the emergency room for any further breathing difficulties or any other urgent concerns.                        Clinical Impression:   Final diagnoses:  [R06.02] Shortness of breath  [J44.1] COPD exacerbation (Primary)        ED Disposition Condition    Discharge Stable          ED Prescriptions       Medication Sig Dispense Start Date End Date Auth. Provider    predniSONE (DELTASONE) 20 MG tablet Take 2 tablets (40 mg total) by mouth once daily. 6 tablet 5/28/2023 -- Alirio Talbot MD          Follow-up Information       Follow up With Specialties Details Why Contact Info    Sridevi Gibson MD Anesthesiology  As needed [address]  [facility] [address]  [phone]      [place] - Emergency Dept Emergency Medicine  If symptoms worsen [address] [address] [phone]             Alirio Talbot MD  05/28/23 0978

## 2023-05-28 NOTE — ED NOTES
Pt arrives via ems with complaint of shortness of breath. Pt has copd and is normally on 2L NC. Pt was found to be sating in the 70s on her home oxygen. Pt was placed on cpap. Pt was given solumedrol and duo nebs en route. Pt able to speak in clear complete sentences. Pt placed on bipap in emergency room by RT. Pt sating 100% on bipap.

## 2023-05-28 NOTE — RESPIRATORY THERAPY
Pt. on BiPAP with documented settings. Alarms on and functioning properly. Pt appears to be in respiratory distress. Will continue to monitor

## 2023-05-30 ENCOUNTER — OFFICE VISIT (OUTPATIENT)
Dept: PALLIATIVE MEDICINE | Facility: CLINIC | Age: 68
End: 2023-05-30
Payer: MEDICARE

## 2023-05-30 VITALS — OXYGEN SATURATION: 90 % | BODY MASS INDEX: 28.72 KG/M2 | HEART RATE: 91 BPM | WEIGHT: 177.94 LBS

## 2023-05-30 DIAGNOSIS — J44.1 COPD EXACERBATION: ICD-10-CM

## 2023-05-30 DIAGNOSIS — Z71.89 COUNSELING REGARDING ADVANCED CARE PLANNING AND GOALS OF CARE: ICD-10-CM

## 2023-05-30 DIAGNOSIS — J44.9 STAGE 4 VERY SEVERE COPD BY GOLD CLASSIFICATION: ICD-10-CM

## 2023-05-30 DIAGNOSIS — F41.1 GENERALIZED ANXIETY DISORDER: ICD-10-CM

## 2023-05-30 PROCEDURE — 99215 OFFICE O/P EST HI 40 MIN: CPT | Mod: S$GLB,,,

## 2023-05-30 PROCEDURE — 99215 PR OFFICE/OUTPT VISIT, EST, LEVL V, 40-54 MIN: ICD-10-PCS | Mod: S$GLB,,,

## 2023-05-30 PROCEDURE — 99999 PR PBB SHADOW E&M-EST. PATIENT-LVL III: CPT | Mod: PBBFAC,,,

## 2023-05-30 PROCEDURE — 99417 PR PROLONGED SVC, OUTPT, W/WO DIRECT PT CONTACT,  EA ADDTL 15 MIN: ICD-10-PCS | Mod: S$GLB,,,

## 2023-05-30 PROCEDURE — 1123F PR ADV CARE PLAN DISCUSSED, PLAN OR SURROGATE DOCUMENTED: ICD-10-PCS | Mod: CPTII,S$GLB,,

## 2023-05-30 PROCEDURE — 3008F BODY MASS INDEX DOCD: CPT | Mod: CPTII,S$GLB,,

## 2023-05-30 PROCEDURE — 1159F MED LIST DOCD IN RCRD: CPT | Mod: CPTII,S$GLB,,

## 2023-05-30 PROCEDURE — 3288F FALL RISK ASSESSMENT DOCD: CPT | Mod: CPTII,S$GLB,,

## 2023-05-30 PROCEDURE — 1159F PR MEDICATION LIST DOCUMENTED IN MEDICAL RECORD: ICD-10-PCS | Mod: CPTII,S$GLB,,

## 2023-05-30 PROCEDURE — 99417 PROLNG OP E/M EACH 15 MIN: CPT | Mod: S$GLB,,,

## 2023-05-30 PROCEDURE — 3288F PR FALLS RISK ASSESSMENT DOCUMENTED: ICD-10-PCS | Mod: CPTII,S$GLB,,

## 2023-05-30 PROCEDURE — 1101F PT FALLS ASSESS-DOCD LE1/YR: CPT | Mod: CPTII,S$GLB,,

## 2023-05-30 PROCEDURE — 1123F ACP DISCUSS/DSCN MKR DOCD: CPT | Mod: CPTII,S$GLB,,

## 2023-05-30 PROCEDURE — 3008F PR BODY MASS INDEX (BMI) DOCUMENTED: ICD-10-PCS | Mod: CPTII,S$GLB,,

## 2023-05-30 PROCEDURE — 1101F PR PT FALLS ASSESS DOC 0-1 FALLS W/OUT INJ PAST YR: ICD-10-PCS | Mod: CPTII,S$GLB,,

## 2023-05-30 PROCEDURE — 99999 PR PBB SHADOW E&M-EST. PATIENT-LVL III: ICD-10-PCS | Mod: PBBFAC,,,

## 2023-05-30 NOTE — PROGRESS NOTES
Subjective:       Patient ID: Terri Phelan is a 67 y.o. female.    Chief Complaint: No chief complaint on file.    Ms Phelan is a 64 y/o F with a PMH of severe COPD on 2L NC at home, CAD, HTN, and JUAN. Pt was initially seen by palliative care on 4/25/2023 as an inpatient in the ICU admitted for several days of SOB, weakness and worsening confusion. She was placed on BiPAP for hypoxia and hypercapnia. Pt was weaned to NC and discharged home. Pt was not initially receptive to encounter with palliative medicine but has since decided to schedule outpatient visit.     Pt presents today for initial palliative care clinic visit.   Pt recently established care with new pulmonary MD and is pleased with current regimen. Reviewed pts current med regimen and agree that it is medications are optimized. Pt participates in pulmonary rehab twice weekly at [facility].  Pt reports recent exacerbation requiring ED trip, without admission.   Pt currently has an appointment scheduled for 6/15/2025 to establish primary care.     Pt recently ( about 1 year ago) moved in with her daughter, Mulugeta Phelan. Remainder of pts family lives in [place].  Pt is independent of most of her ADLs but has reported increasing weakness and anxiety over the past few weeks that impacts her ability to care for herself.  Daughter reports feeling overwhelmed with the daily care she is providing for her mother. Daughter is requesting information about temporary ( about 1 month in duration) placement for her mother where she will be able to receive 24 hour assistance.  We discussed multiple options including independent living, assisted living and NH placement.  The topic oh home hospice was also carefully introduced givens pts end stage COPD is a qualifying diagnosis.  Pt and daughter agree that they are not ready for this type of service yet, but were open to the information and acknowledge that at some point pts COPD will be life limiting. Placed  outpatient care management referral to offer assistance with current placement request and education.   Pt also inquiring about transportation services.  Pt is still socially active and reports that there has been no decline in the overall quality of her life.      Pt continues to participate with pulmonary rehab at [facility] and reports that she notes much improvement since initiating this program.      Pt reports that her anxiety limits her enjoyment of activities.  Pt remains hyper focused on the fear of running out of O2 as well as not being able to reach services if she should need them- example EMS would have a long response time.  She also expresses a fear of dying.  Emotional support and actively listening utilized.   Pt currently takes ativan 0.5mg prn bid and reports that this offers her relief from excessive anxiety symptoms.     [facility].  Pt selected her daughter, Mulugeta Phelan [phone], as her primary decision maker and Sally Ramirez [phone] as her second choice decision maker. Paper work completed and scanned into chart.   Living will also completed stating that pt would not want nutrition and hydration administered if it were to only artificially prolong the dying process.  Daughter present and in agreement with pts decision.  Living Will document completed and scanned into pts chart. Pt did express that at the end of her life, she would like to be a organ donor and expresses that she is on the registry.     Pending placement, pts daughter will reach out to schedule follow up appointment as needed.     Review of Systems   Constitutional:  Positive for activity change and fatigue. Negative for appetite change.   Respiratory:  Positive for shortness of breath. Negative for chest tightness.    Psychiatric/Behavioral:  The patient is nervous/anxious.        Objective:      Physical Exam  Vitals and nursing note reviewed. Exam conducted with a chaperone present.   Constitutional:        General: She is not in acute distress.     Appearance: Normal appearance. She is well-developed and well-groomed. She is not ill-appearing or toxic-appearing.   HENT:      Head: Normocephalic.      Mouth/Throat:      Mouth: Mucous membranes are moist.   Cardiovascular:      Rate and Rhythm: Normal rate.      Pulses: Normal pulses.   Pulmonary:      Effort: Pulmonary effort is normal. No respiratory distress.      Breath sounds: Decreased breath sounds present. No wheezing.   Abdominal:      General: Abdomen is flat.      Palpations: Abdomen is soft.   Musculoskeletal:         General: Normal range of motion.   Skin:     General: Skin is warm and dry.      Capillary Refill: Capillary refill takes less than 2 seconds.   Neurological:      General: No focal deficit present.      Mental Status: She is alert and oriented to person, place, and time. Mental status is at baseline.   Psychiatric:         Attention and Perception: Attention normal.         Mood and Affect: Mood normal.         Behavior: Behavior is cooperative.         Thought Content: Thought content normal.         Cognition and Memory: Cognition and memory normal.      Comments: Clearly anxious with the introduction of difficult topics         Review of Symptoms      Symptom Assessment (ESAS 0-10 Scale)  Pain:  0  Dyspnea:  3  Anxiety:  5  Nausea:  0  Depression:  0  Anorexia:  0  Fatigue:  3  Insomnia:  0  Restlessness:  0  Agitation:  0       Anxiety:  Is nervous/anxious    Performance Status:  60    Living Arrangements:  Lives with family and Lives in home    Psychosocial/Cultural:   See Palliative Psychosocial Note: No  Social Issues Identified: Coping deficit pt/family  Bereavement Risk: No  Caregiver Needs Discussed. Caregiver Distress: Yes: Issues of guilt and Intensity of family caregiving  Cultural: none identified at this time   **Primary  to Follow**  Palliative Care  Consult: No    Spiritual:  F - Juhi and  Belief:  Restorationism   I - Importance:  High   C - Community:  Family support   A - Address in Care:  Placement, additional resources available      Time-Based Charting:  Yes  Chart Review: 18 minutes  Face to Face: 42 minutes  Symptom Assessment: 10 minutes  Coordination of Care: 5 minutes  Discharge Plannin minutes  Advance Care Plannin minutes  Goals of Care: 8 minutes    Total Time Spent: 91 minutes      Advance Care Planning   Advance Directives:   Living Will: Yes (completed today)    LaPOST: No    Do Not Resuscitate Status: Yes    Medical Power of : Yes    Agent's Name:  Mulugeta Phelan   Agent's Contact Number:  [phone]    Decision Making:  Patient answered questions and Family answered questions  Goals of Care: What is most important right now is to focus on spending time at home, avoiding the hospital, remaining as independent as possible, symptom/pain control, improvement in condition but with limits to invasive therapies. Accordingly, we have decided that the best plan to meet the patient's goals includes continuing with treatment.     Assessment:       1. Stage 4 very severe COPD by GOLD classification    2. COPD exacerbation    3. Counseling regarding advanced care planning and goals of care        Plan:     Diagnoses and all orders for this visit:    Stage 4 very severe COPD by GOLD classification  -     Ambulatory referral/consult to Outpatient Case Management    COPD exacerbation  -     Ambulatory referral/consult to [facility]IN[facility] Palliative Care  -     Ambulatory referral/consult to Outpatient Case Management    Counseling regarding advanced care planning and goals of care      -MPOA documentation completed       -Living will completed       Pt will RTC as needed.    Katie Mclaughlin NP  Palliative Medicine

## 2023-06-05 ENCOUNTER — TELEPHONE (OUTPATIENT)
Dept: PULMONOLOGY | Facility: CLINIC | Age: 68
End: 2023-06-05
Payer: MEDICARE

## 2023-06-07 ENCOUNTER — TELEPHONE (OUTPATIENT)
Dept: PULMONOLOGY | Facility: CLINIC | Age: 68
End: 2023-06-07
Payer: MEDICARE

## 2023-06-07 NOTE — TELEPHONE ENCOUNTER
----- Message from Va Adam CMA sent at 6/7/2023  3:15 PM CDT -----  Regarding: Appt change  Contact: [phone]  Reddy Mireles is requesting a virtual appt on 6/13/23 at 3p    Please call pt to advise.    Thank you

## 2023-06-07 NOTE — TELEPHONE ENCOUNTER
I spoke with patient. Patient has an appointment coming up with Dr Cunningham on 6/13/23 at 3pm and she was asking if it could be changed to a virtual visit . Patient stated it is difficult for her to get around and that is why she is requesting a virtual visit. I let her know I could send message to Dr Cunningham for review and to advise. I let her know once advised I will follow up. Patient verbalized understanding.

## 2023-06-12 ENCOUNTER — PATIENT MESSAGE (OUTPATIENT)
Dept: PALLIATIVE MEDICINE | Facility: CLINIC | Age: 68
End: 2023-06-12
Payer: MEDICARE

## 2023-06-12 NOTE — TELEPHONE ENCOUNTER
Cassy:  Thanks for your help.  If Ms. Hull can come in tomorrow at 4:00, that would work okay for me too.  Otherwise, it will likely be several weeks from now.  DET

## 2023-06-13 ENCOUNTER — OFFICE VISIT (OUTPATIENT)
Dept: PULMONOLOGY | Facility: CLINIC | Age: 68
End: 2023-06-13
Payer: MEDICARE

## 2023-06-13 VITALS
WEIGHT: 177.94 LBS | BODY MASS INDEX: 28.6 KG/M2 | SYSTOLIC BLOOD PRESSURE: 134 MMHG | OXYGEN SATURATION: 95 % | DIASTOLIC BLOOD PRESSURE: 56 MMHG | HEART RATE: 92 BPM | HEIGHT: 66 IN

## 2023-06-13 DIAGNOSIS — J96.22 ACUTE ON CHRONIC RESPIRATORY FAILURE WITH HYPERCAPNIA: ICD-10-CM

## 2023-06-13 DIAGNOSIS — J43.2 CENTRILOBULAR EMPHYSEMA: Primary | ICD-10-CM

## 2023-06-13 PROCEDURE — 99214 OFFICE O/P EST MOD 30 MIN: CPT | Mod: GC,S$GLB,, | Performed by: INTERNAL MEDICINE

## 2023-06-13 PROCEDURE — 99214 PR OFFICE/OUTPT VISIT, EST, LEVL IV, 30-39 MIN: ICD-10-PCS | Mod: GC,S$GLB,, | Performed by: INTERNAL MEDICINE

## 2023-06-13 PROCEDURE — 1126F PR PAIN SEVERITY QUANTIFIED, NO PAIN PRESENT: ICD-10-PCS | Mod: CPTII,S$GLB,, | Performed by: INTERNAL MEDICINE

## 2023-06-13 PROCEDURE — 3075F PR MOST RECENT SYSTOLIC BLOOD PRESS GE 130-139MM HG: ICD-10-PCS | Mod: CPTII,S$GLB,, | Performed by: INTERNAL MEDICINE

## 2023-06-13 PROCEDURE — 3078F DIAST BP <80 MM HG: CPT | Mod: CPTII,S$GLB,, | Performed by: INTERNAL MEDICINE

## 2023-06-13 PROCEDURE — 1101F PT FALLS ASSESS-DOCD LE1/YR: CPT | Mod: CPTII,S$GLB,, | Performed by: INTERNAL MEDICINE

## 2023-06-13 PROCEDURE — 3078F PR MOST RECENT DIASTOLIC BLOOD PRESSURE < 80 MM HG: ICD-10-PCS | Mod: CPTII,S$GLB,, | Performed by: INTERNAL MEDICINE

## 2023-06-13 PROCEDURE — 3288F PR FALLS RISK ASSESSMENT DOCUMENTED: ICD-10-PCS | Mod: CPTII,S$GLB,, | Performed by: INTERNAL MEDICINE

## 2023-06-13 PROCEDURE — 1101F PR PT FALLS ASSESS DOC 0-1 FALLS W/OUT INJ PAST YR: ICD-10-PCS | Mod: CPTII,S$GLB,, | Performed by: INTERNAL MEDICINE

## 2023-06-13 PROCEDURE — 3044F PR MOST RECENT HEMOGLOBIN A1C LEVEL <7.0%: ICD-10-PCS | Mod: CPTII,S$GLB,, | Performed by: INTERNAL MEDICINE

## 2023-06-13 PROCEDURE — 3008F BODY MASS INDEX DOCD: CPT | Mod: CPTII,S$GLB,, | Performed by: INTERNAL MEDICINE

## 2023-06-13 PROCEDURE — 3075F SYST BP GE 130 - 139MM HG: CPT | Mod: CPTII,S$GLB,, | Performed by: INTERNAL MEDICINE

## 2023-06-13 PROCEDURE — 3288F FALL RISK ASSESSMENT DOCD: CPT | Mod: CPTII,S$GLB,, | Performed by: INTERNAL MEDICINE

## 2023-06-13 PROCEDURE — 3008F PR BODY MASS INDEX (BMI) DOCUMENTED: ICD-10-PCS | Mod: CPTII,S$GLB,, | Performed by: INTERNAL MEDICINE

## 2023-06-13 PROCEDURE — 3044F HG A1C LEVEL LT 7.0%: CPT | Mod: CPTII,S$GLB,, | Performed by: INTERNAL MEDICINE

## 2023-06-13 PROCEDURE — 1126F AMNT PAIN NOTED NONE PRSNT: CPT | Mod: CPTII,S$GLB,, | Performed by: INTERNAL MEDICINE

## 2023-06-13 RX ORDER — CITALOPRAM 10 MG/1
10 TABLET ORAL DAILY
COMMUNITY
Start: 2023-04-10 | End: 2023-07-09

## 2023-06-13 NOTE — PROGRESS NOTES
Subjective:      Patient ID: Terri Phelan is a 67 y.o. female.    Chief Complaint: No chief complaint on file.    68yo F w/ PMH of end stage COPD, CAD, mood disorder who presents to pulmonary clinic to establish care. She has held a diagnosis of COPD for over 10 years and has been on oxygen for 8 years. She has a long standing smoking history having quit in 2021. She has not been hospitalized within the last year for any breathing issues. She is on continuous oxygen at 3LPM both at day and at night. She also uses a BiPAP nightly for some time, has a history of hypercapnic respiratory failure during her past hospitalizations. She is able to ambulate in her house with some difficulty and occasionally needs a rescue inhaler with her ADLs. She can ambulate with a cart in a store but has to stop and rest. She attributes her need to use a rescue inhaler to her anxiety. She has also been with pulmonary rehab at [facility] for the last year which she enjoys doing and is now paying for herself. She has recently moved to [place] and is looking to establish care with a new pulmonologist.      Her current regimen includes:  - Breztri 2 puffs BID  - Albuterol rescue inhaler 4-5 times per day (used mostly with transitioning)  - DuoNebs BID  - Azithromycin every other day  - roflumilast daily   - pulmonary rehab 1x per week  - prednisone 40mg x 5 days PRN for exacerbations  - BiPAP nightly    6/13/23: Pt returns to clinic for follow up after two visits to the ER and one hospitalization in the last 2 months. Her exacerbations have no clear etiology as she had no infectious symptoms. She was using her BiPAP more frequently and more regularly. She also tried taking steroids as well prior to going to the ER. She is now not using her albuterol nebulizer treatments BID as she was last time she was here, but otherwise, her regimen remains the same. She is concerned about having repeat hospitalizations and is asking about  ways to help prevent that. She is not going to pulmonary rehab in the last months or so, but plans to restart this as well. She is on 3L NC at home and occasionally bumps this up to 4L when she feels like she is having trouble.   Review of Systems   Constitutional: Negative.    Respiratory:  Positive for cough, shortness of breath and dyspnea on extertion. Negative for sputum production, wheezing and previous hospitialization due to pulmonary problems.    Cardiovascular: Negative.    Neurological: Negative.    Psychiatric/Behavioral: Negative.     All other systems reviewed and are negative.  Objective:     Physical Exam   Constitutional: She is oriented to person, place, and time. She appears well-developed and well-nourished.   HENT:   Head: Normocephalic.   Cardiovascular: Normal rate and regular rhythm.   No murmur heard.  Pulmonary/Chest: Normal expansion and symmetric chest wall expansion. No stridor. She has decreased breath sounds (diffusely throughout). She has no wheezes. She has no rhonchi. She has no rales.   Musculoskeletal:         General: No edema.   Neurological: She is alert and oriented to person, place, and time.   Skin: Skin is warm and dry. Nails show no clubbing.   Psychiatric: She has a normal mood and affect. Her behavior is normal.   Nursing note and vitals reviewed.    Personal Diagnostic Review    PFTs 4/18/23   FVC  (pre-BD) 1.01   FVC%  40   FEV1 0.44   FEV1%  22   FEV1/FVC  44   FEF 25-75  0.18   FEF 25-75%  10   FVC (post-BD) 0.95   FVC% -6   FEV1 0.42   FEV1% -4.3   FEV1/FVC 45   FEF 25-75 0.16   FEF 25-75% -   TLC  5.34   TLC%  108   RV  4.27   RV%  214   DLCO  -   DLCO%  -   [facility] -   IVC -   Six-Minute Walk     Distance (meter)     O2 jorge l     O2 change        CT Chest 7/21/22:  FINDINGS: There is no mediastinal or hilar adenopathy. The thoracic aorta, heart, and pericardium are within normal limits. There is no pleural effusion or pneumothorax.  There are stable manifestations of  emphysema. There is been no change in a 4 mm micronodule in the right upper lobe seen on series 4 image 87. There are no suspicious lung lesions. There are some mild atelectasis in the lung bases.   There is a 2.4 cm left breast mass in the upper outer quadrant.     CXR 3/4/23:  FINDINGS:  Frontal view of the chest demonstrates heart size within normal limits. Atherosclerosis. Left base subsegmental atelectasis. Otherwise, lungs are clear.   IMPRESSION:   Left basilar atelectasis.     No flowsheet data found.     Assessment:     No diagnosis found.     Outpatient Encounter Medications as of 6/13/2023   Medication Sig Dispense Refill    acyclovir (ZOVIRAX) 400 MG tablet TAKE 1 TABLET BY MOUTH THREE TIMES DAILY FOR 10 DAYS AS NEEDED FOR OUTBREAK      albuterol (PROVENTIL/VENTOLIN HFA) 90 mcg/actuation inhaler Inhale 2 puffs into the lungs every 6 (six) hours as needed.      albuterol-ipratropium (DUO-NEB) 2.5 mg-0.5 mg/3 mL nebulizer solution Take by nebulization every 4 (four) hours.      alendronate (FOSAMAX) 35 MG tablet Take 35 mg by mouth every Tuesday.      aspirin 81 MG Chew Take 81 mg by mouth once daily.      azithromycin (Z-OLY) 250 MG tablet Take 1 tablet (250 mg total) by mouth 3 (three) times a week. 12 tablet 11    budesonide-glycopyr-formoterol (BREZTRI AEROSPHERE) 160-9-4.8 mcg/actuation HFAA Inhale 2 puffs into the lungs 2 (two) times daily.      DALIRESP 500 mcg Tab TK 1 T PO D      dextromethorphan-guaiFENesin  mg (MUCINEX DM)  mg per 12 hr tablet Take 1 tablet by mouth every 12 (twelve) hours.      dicyclomine (BENTYL) 20 mg tablet Take 1 tablet (20 mg total) by mouth 3 (three) times daily as needed (abdominal pain). 30 tablet 1    ergocalciferol (ERGOCALCIFEROL) 50,000 unit Cap Take 50,000 Units by mouth every Tuesday.      fluticasone propionate (FLONASE) 50 mcg/actuation nasal spray 1 spray by Each Nostril route daily as needed for Allergies.      LORazepam (ATIVAN) 0.5 MG tablet  Take 1 tablet (0.5 mg total) by mouth every 8 (eight) hours as needed for Anxiety. 30 tablet 0    OXYGEN-AIR DELIVERY SYSTEMS MISC 3 L by Nasal route.      [] predniSONE (DELTASONE) 20 MG tablet Take 2 tablets (40 mg total) by mouth once daily. for 5 days 10 tablet 3    predniSONE (DELTASONE) 20 MG tablet Take 2 tablets (40 mg total) by mouth once daily. 6 tablet 0    traZODone (DESYREL) 50 MG tablet Take 1 tablet (50 mg total) by mouth nightly as needed for Insomnia. 30 tablet 6     No facility-administered encounter medications on file as of 2023.     No orders of the defined types were placed in this encounter.      Plan:     Problem List Items Addressed This Visit          Pulmonary    Centrilobular emphysema - Primary    Overview     Severe obstructive disease, on home O2.          Current Assessment & Plan     Mrs. Phelan continues to have recurrent hospitalizations secondary to her end stage COPD. We discussed restarting scheduled nebulizer treatments at home, starting her PRN steroids earlier, and potentially using her BiPAP throughout the day when she feels like things are starting to get worse. She wants to avoid the ER at all costs and has seen palliative care as well. I did recommend going to the ER if she did not feel better after 24 hours of BiPAP use without improvement as this was a worrisome sign. She continues to want everything done and is looking for an improved quality of life.   - continue maximal medical therapy    - start duonebs BID    - Breztri 2 puffs BID    - Azithromycin every other day    - Roflumilast daily    - Prednisone 40mg qd x 5 days for exacerbations    - BiPAP nightly    - Albuterol PRN    - pulmonary rehab as much as tolerated

## 2023-06-14 NOTE — ASSESSMENT & PLAN NOTE
Mrs. Phelan continues to have recurrent hospitalizations secondary to her end stage COPD. We discussed restarting scheduled nebulizer treatments at home, starting her PRN steroids earlier, and potentially using her BiPAP throughout the day when she feels like things are starting to get worse. She wants to avoid the ER at all costs and has seen palliative care as well. I did recommend going to the ER if she did not feel better after 24 hours of BiPAP use without improvement as this was a worrisome sign. She continues to want everything done and is looking for an improved quality of life.   - continue maximal medical therapy    - start duonebs BID    - Breztri 2 puffs BID    - Azithromycin every other day    - Roflumilast daily    - Prednisone 40mg qd x 5 days for exacerbations    - BiPAP nightly    - Albuterol PRN    - pulmonary rehab as much as tolerated

## 2023-06-15 ENCOUNTER — HOSPITAL ENCOUNTER (INPATIENT)
Facility: HOSPITAL | Age: 68
LOS: 1 days | Discharge: HOME | DRG: 189 | End: 2023-06-16
Attending: EMERGENCY MEDICINE | Admitting: FAMILY MEDICINE
Payer: MEDICARE

## 2023-06-15 DIAGNOSIS — J96.02 ACUTE HYPERCAPNIC RESPIRATORY FAILURE: ICD-10-CM

## 2023-06-15 DIAGNOSIS — Z87.891 FORMER SMOKER: ICD-10-CM

## 2023-06-15 DIAGNOSIS — J44.1 COPD EXACERBATION: Primary | ICD-10-CM

## 2023-06-15 DIAGNOSIS — Z99.81 OXYGEN DEPENDENT: ICD-10-CM

## 2023-06-15 DIAGNOSIS — R06.02 SHORTNESS OF BREATH: ICD-10-CM

## 2023-06-15 DIAGNOSIS — J96.22 ACUTE ON CHRONIC RESPIRATORY FAILURE WITH HYPOXIA AND HYPERCAPNIA: ICD-10-CM

## 2023-06-15 DIAGNOSIS — J96.21 ACUTE ON CHRONIC RESPIRATORY FAILURE WITH HYPOXIA AND HYPERCAPNIA: ICD-10-CM

## 2023-06-15 LAB
ALBUMIN SERPL BCP-MCNC: 3.7 G/DL (ref 3.5–5.2)
ALLENS TEST: ABNORMAL
ALLENS TEST: YES
ALLENS TEST: YES
ALP SERPL-CCNC: 46 U/L (ref 55–135)
ALT SERPL W/O P-5'-P-CCNC: 17 U/L (ref 10–44)
ANION GAP SERPL CALC-SCNC: 7 MMOL/L (ref 8–16)
AST SERPL-CCNC: 16 U/L (ref 10–40)
BASOPHILS # BLD AUTO: 0.03 K/UL (ref 0–0.2)
BASOPHILS NFR BLD: 0.4 % (ref 0–1.9)
BILIRUB SERPL-MCNC: 0.2 MG/DL (ref 0.1–1)
BUN SERPL-MCNC: 18 MG/DL (ref 8–23)
CALCIUM SERPL-MCNC: 9.7 MG/DL (ref 8.7–10.5)
CHLORIDE SERPL-SCNC: 99 MMOL/L (ref 95–110)
CO2 SERPL-SCNC: 41 MMOL/L (ref 23–29)
CREAT SERPL-MCNC: 0.7 MG/DL (ref 0.5–1.4)
DELSYS: ABNORMAL
DIFFERENTIAL METHOD: ABNORMAL
EOSINOPHIL # BLD AUTO: 0.2 K/UL (ref 0–0.5)
EOSINOPHIL NFR BLD: 2.1 % (ref 0–8)
ERYTHROCYTE [DISTWIDTH] IN BLOOD BY AUTOMATED COUNT: 13.6 % (ref 11.5–14.5)
EST. GFR  (NO RACE VARIABLE): >60 ML/MIN/1.73 M^2
FIO2: 21 %
FIO2: 21 %
FIO2: 32
FLOW: 3
GLUCOSE SERPL-MCNC: 102 MG/DL (ref 70–110)
HCO3 UR-SCNC: 44.9 MMOL/L (ref 24–28)
HCT VFR BLD AUTO: 44.3 % (ref 37–48.5)
HGB BLD-MCNC: 12.1 G/DL (ref 12–16)
IMM GRANULOCYTES # BLD AUTO: 0.01 K/UL (ref 0–0.04)
IMM GRANULOCYTES NFR BLD AUTO: 0.1 % (ref 0–0.5)
LYMPHOCYTES # BLD AUTO: 2 K/UL (ref 1–4.8)
LYMPHOCYTES NFR BLD: 28.5 % (ref 18–48)
MCH RBC QN AUTO: 27.4 PG (ref 27–31)
MCHC RBC AUTO-ENTMCNC: 27.3 G/DL (ref 32–36)
MCV RBC AUTO: 100 FL (ref 82–98)
MODE: ABNORMAL
MONOCYTES # BLD AUTO: 0.9 K/UL (ref 0.3–1)
MONOCYTES NFR BLD: 13 % (ref 4–15)
NEUTROPHILS # BLD AUTO: 3.9 K/UL (ref 1.8–7.7)
NEUTROPHILS NFR BLD: 55.9 % (ref 38–73)
NRBC BLD-RTO: 0 /100 WBC
PCO2 BLDA: 74.4 MMHG (ref 35–45)
PCO2 BLDA: 99.8 MMHG (ref 35–45)
PCO2 BLDA: >110 MMHG (ref 35–45)
PH SMN: 7.2 [PH] (ref 7.35–7.45)
PH SMN: 7.27 [PH] (ref 7.35–7.45)
PH SMN: 7.39 [PH] (ref 7.35–7.45)
PLATELET # BLD AUTO: 162 K/UL (ref 150–450)
PMV BLD AUTO: 11.2 FL (ref 9.2–12.9)
PO2 BLDA: 39.8 MMHG (ref 40–60)
PO2 BLDA: 50 MMHG (ref 40–60)
PO2 BLDA: 51.1 MMHG (ref 40–60)
POC BASE DEFICIT: 15.1 MMOL/L (ref -2–2)
POC BE: 20 MMOL/L
POC HCO3: 46.2 MMOL/L (ref 24–28)
POC PERFORMED BY: ABNORMAL
POC PERFORMED BY: ABNORMAL
POC SATURATED O2: 71.6 % (ref 95–100)
POC SATURATED O2: 82 % (ref 95–100)
POC SATURATED O2: 82.7 % (ref 95–100)
POC TCO2: 47 MMOL/L (ref 24–29)
POTASSIUM SERPL-SCNC: 3.9 MMOL/L (ref 3.5–5.1)
PROT SERPL-MCNC: 7.1 G/DL (ref 6–8.4)
RBC # BLD AUTO: 4.42 M/UL (ref 4–5.4)
SAMPLE: ABNORMAL
SITE: ABNORMAL
SODIUM SERPL-SCNC: 147 MMOL/L (ref 136–145)
SP02: 94
SPECIMEN SOURCE: ABNORMAL
SPECIMEN SOURCE: ABNORMAL
TROPONIN I SERPL DL<=0.01 NG/ML-MCNC: <0.006 NG/ML (ref 0–0.03)
WBC # BLD AUTO: 6.98 K/UL (ref 3.9–12.7)

## 2023-06-15 PROCEDURE — 25000242 PHARM REV CODE 250 ALT 637 W/ HCPCS

## 2023-06-15 PROCEDURE — 99900035 HC TECH TIME PER 15 MIN (STAT)

## 2023-06-15 PROCEDURE — 82803 BLOOD GASES ANY COMBINATION: CPT

## 2023-06-15 PROCEDURE — 94761 N-INVAS EAR/PLS OXIMETRY MLT: CPT

## 2023-06-15 PROCEDURE — 93010 EKG 12-LEAD: ICD-10-PCS | Mod: ,,, | Performed by: INTERNAL MEDICINE

## 2023-06-15 PROCEDURE — 93010 ELECTROCARDIOGRAM REPORT: CPT | Mod: ,,, | Performed by: INTERNAL MEDICINE

## 2023-06-15 PROCEDURE — 94640 AIRWAY INHALATION TREATMENT: CPT

## 2023-06-15 PROCEDURE — 27000221 HC OXYGEN, UP TO 24 HOURS

## 2023-06-15 PROCEDURE — 99285 EMERGENCY DEPT VISIT HI MDM: CPT | Mod: 25

## 2023-06-15 PROCEDURE — 25000242 PHARM REV CODE 250 ALT 637 W/ HCPCS: Performed by: EMERGENCY MEDICINE

## 2023-06-15 PROCEDURE — 63600175 PHARM REV CODE 636 W HCPCS: Performed by: FAMILY MEDICINE

## 2023-06-15 PROCEDURE — 25000003 PHARM REV CODE 250

## 2023-06-15 PROCEDURE — 63600175 PHARM REV CODE 636 W HCPCS

## 2023-06-15 PROCEDURE — 80053 COMPREHEN METABOLIC PANEL: CPT | Performed by: EMERGENCY MEDICINE

## 2023-06-15 PROCEDURE — 94660 CPAP INITIATION&MGMT: CPT

## 2023-06-15 PROCEDURE — 20000000 HC ICU ROOM

## 2023-06-15 PROCEDURE — 85025 COMPLETE CBC W/AUTO DIFF WBC: CPT | Performed by: EMERGENCY MEDICINE

## 2023-06-15 PROCEDURE — 27000190 HC CPAP FULL FACE MASK W/VALVE

## 2023-06-15 PROCEDURE — 93005 ELECTROCARDIOGRAM TRACING: CPT

## 2023-06-15 PROCEDURE — 84484 ASSAY OF TROPONIN QUANT: CPT | Performed by: EMERGENCY MEDICINE

## 2023-06-15 RX ORDER — ENOXAPARIN SODIUM 100 MG/ML
40 INJECTION SUBCUTANEOUS EVERY 24 HOURS
Status: DISCONTINUED | OUTPATIENT
Start: 2023-06-15 | End: 2023-06-15

## 2023-06-15 RX ORDER — ACETAMINOPHEN 325 MG/1
650 TABLET ORAL EVERY 4 HOURS PRN
Status: DISCONTINUED | OUTPATIENT
Start: 2023-06-15 | End: 2023-06-16 | Stop reason: HOSPADM

## 2023-06-15 RX ORDER — ALBUTEROL SULFATE 90 UG/1
2 AEROSOL, METERED RESPIRATORY (INHALATION) EVERY 4 HOURS PRN
Status: DISCONTINUED | OUTPATIENT
Start: 2023-06-15 | End: 2023-06-16 | Stop reason: HOSPADM

## 2023-06-15 RX ORDER — PREDNISONE 20 MG/1
40 TABLET ORAL DAILY
Status: DISCONTINUED | OUTPATIENT
Start: 2023-06-15 | End: 2023-06-16 | Stop reason: HOSPADM

## 2023-06-15 RX ORDER — ONDANSETRON 2 MG/ML
4 INJECTION INTRAMUSCULAR; INTRAVENOUS EVERY 8 HOURS PRN
Status: DISCONTINUED | OUTPATIENT
Start: 2023-06-15 | End: 2023-06-16 | Stop reason: HOSPADM

## 2023-06-15 RX ORDER — ROFLUMILAST 500 UG/1
500 TABLET ORAL DAILY
Status: DISCONTINUED | OUTPATIENT
Start: 2023-06-15 | End: 2023-06-16 | Stop reason: HOSPADM

## 2023-06-15 RX ORDER — IPRATROPIUM BROMIDE AND ALBUTEROL SULFATE 2.5; .5 MG/3ML; MG/3ML
3 SOLUTION RESPIRATORY (INHALATION)
Status: COMPLETED | OUTPATIENT
Start: 2023-06-15 | End: 2023-06-15

## 2023-06-15 RX ORDER — IPRATROPIUM BROMIDE AND ALBUTEROL SULFATE 2.5; .5 MG/3ML; MG/3ML
3 SOLUTION RESPIRATORY (INHALATION) EVERY 4 HOURS
Status: DISCONTINUED | OUTPATIENT
Start: 2023-06-15 | End: 2023-06-16 | Stop reason: HOSPADM

## 2023-06-15 RX ORDER — ENOXAPARIN SODIUM 100 MG/ML
40 INJECTION SUBCUTANEOUS EVERY 24 HOURS
Status: DISCONTINUED | OUTPATIENT
Start: 2023-06-15 | End: 2023-06-16 | Stop reason: HOSPADM

## 2023-06-15 RX ORDER — TALC
6 POWDER (GRAM) TOPICAL NIGHTLY PRN
Status: DISCONTINUED | OUTPATIENT
Start: 2023-06-15 | End: 2023-06-16 | Stop reason: HOSPADM

## 2023-06-15 RX ORDER — CITALOPRAM 10 MG/1
10 TABLET ORAL DAILY
Status: DISCONTINUED | OUTPATIENT
Start: 2023-06-15 | End: 2023-06-16 | Stop reason: HOSPADM

## 2023-06-15 RX ORDER — LORAZEPAM 2 MG/ML
0.5 INJECTION INTRAMUSCULAR EVERY 8 HOURS PRN
Status: DISCONTINUED | OUTPATIENT
Start: 2023-06-15 | End: 2023-06-16 | Stop reason: HOSPADM

## 2023-06-15 RX ORDER — TRAZODONE HYDROCHLORIDE 50 MG/1
50 TABLET ORAL NIGHTLY PRN
Status: DISCONTINUED | OUTPATIENT
Start: 2023-06-15 | End: 2023-06-16 | Stop reason: HOSPADM

## 2023-06-15 RX ORDER — HYDRALAZINE HYDROCHLORIDE 20 MG/ML
10 INJECTION INTRAMUSCULAR; INTRAVENOUS EVERY 6 HOURS PRN
Status: DISCONTINUED | OUTPATIENT
Start: 2023-06-15 | End: 2023-06-16 | Stop reason: HOSPADM

## 2023-06-15 RX ORDER — DIAZEPAM 10 MG/2ML
2.5 INJECTION INTRAMUSCULAR ONCE
Status: COMPLETED | OUTPATIENT
Start: 2023-06-15 | End: 2023-06-15

## 2023-06-15 RX ORDER — ERGOCALCIFEROL 1.25 MG/1
50000 CAPSULE ORAL
Status: DISCONTINUED | OUTPATIENT
Start: 2023-06-20 | End: 2023-06-16 | Stop reason: HOSPADM

## 2023-06-15 RX ORDER — NAPROXEN SODIUM 220 MG/1
81 TABLET, FILM COATED ORAL DAILY
Status: DISCONTINUED | OUTPATIENT
Start: 2023-06-15 | End: 2023-06-16 | Stop reason: HOSPADM

## 2023-06-15 RX ORDER — SODIUM CHLORIDE 0.9 % (FLUSH) 0.9 %
3 SYRINGE (ML) INJECTION
Status: DISCONTINUED | OUTPATIENT
Start: 2023-06-15 | End: 2023-06-16 | Stop reason: HOSPADM

## 2023-06-15 RX ADMIN — LORAZEPAM 0.5 MG: 2 INJECTION INTRAMUSCULAR; INTRAVENOUS at 08:06

## 2023-06-15 RX ADMIN — IPRATROPIUM BROMIDE AND ALBUTEROL SULFATE 3 ML: 2.5; .5 SOLUTION RESPIRATORY (INHALATION) at 03:06

## 2023-06-15 RX ADMIN — IPRATROPIUM BROMIDE AND ALBUTEROL SULFATE 3 ML: .5; 3 SOLUTION RESPIRATORY (INHALATION) at 08:06

## 2023-06-15 RX ADMIN — ACETAMINOPHEN 650 MG: 325 TABLET ORAL at 08:06

## 2023-06-15 RX ADMIN — AZITHROMYCIN MONOHYDRATE 500 MG: 500 INJECTION, POWDER, LYOPHILIZED, FOR SOLUTION INTRAVENOUS at 10:06

## 2023-06-15 RX ADMIN — DIAZEPAM 2.5 MG: 10 INJECTION, SOLUTION INTRAMUSCULAR; INTRAVENOUS at 03:06

## 2023-06-15 RX ADMIN — ENOXAPARIN SODIUM 40 MG: 40 INJECTION SUBCUTANEOUS at 06:06

## 2023-06-15 RX ADMIN — PREDNISONE 40 MG: 20 TABLET ORAL at 10:06

## 2023-06-15 RX ADMIN — ASPIRIN 81 MG CHEWABLE TABLET 81 MG: 81 TABLET CHEWABLE at 12:06

## 2023-06-15 RX ADMIN — ROFLUMILAST 500 MCG: 500 TABLET ORAL at 01:06

## 2023-06-15 RX ADMIN — CITALOPRAM HYDROBROMIDE 10 MG: 10 TABLET ORAL at 12:06

## 2023-06-15 RX ADMIN — IPRATROPIUM BROMIDE AND ALBUTEROL SULFATE 3 ML: 2.5; .5 SOLUTION RESPIRATORY (INHALATION) at 07:06

## 2023-06-15 NOTE — PROGRESS NOTES
Future Appointments   Date Time Provider Department Center   6/20/2023  2:00 PM Kev Cox MD [facility] [place]

## 2023-06-15 NOTE — PLAN OF CARE
The sw met with the pt and Mulugeta Phelan(dtr)[phone] who was at bedside during the assessment. The pt lives with Mulugeta in [place] for the past year now. The pt's independent with her adl's and has a BIPAP,rollator,o2,nebulizer,bsc and shwr chair. The pt still drives but Mulugeta will transport her home at d/c. The pt drives herself weekly to Pulmonary Rehab at [facility]. The sw completed the white board in the pt's room with her name and contact info. The sw encouraged them to call if they have any further questions or concerns. The sw will continue to follow the pt throughout her transitions of care and will assist with any d/c needs.        06/15/23 1549   Discharge Assessment   Assessment Type Discharge Planning Assessment   Confirmed/corrected address, phone number and insurance Yes   Confirmed Demographics Correct on Facesheet   Source of Information patient   When was your last doctors appointment?   (last month)   Communicated RADHA with patient/caregiver Date not available/Unable to determine   Reason For Admission Acute hypercapnic respiratory failure   People in Home child(mary), adult   Do you expect to return to your current living situation? Yes   Do you have help at home or someone to help you manage your care at home? Yes   Who are your caregiver(s) and their phone number(s)? Mulugeta Phelan(dtr)[phone]   Prior to hospitilization cognitive status: Alert/Oriented   Current cognitive status: Alert/Oriented   Walking or Climbing Stairs ambulation difficulty, requires equipment;stair climbing difficulty, requires equipment;transferring difficulty, requires equipment   Dressing/Bathing bathing difficulty, requires equipment   Home Accessibility wheelchair accessible   Home Layout Able to live on 1st floor   Equipment Currently Used at Home BIPAP;rollator;oxygen;nebulizer;bedside commode;shower chair   Readmission within 30 days? No   Patient currently being followed by outpatient case  management? No   Do you currently have service(s) that help you manage your care at home? No   Do you take prescription medications? Yes   Do you have prescription coverage? Yes   Coverage [employer]/[facility]   Do you have any problems affording any of your prescribed medications? No   Is the patient taking medications as prescribed? yes   Who is going to help you get home at discharge? Mulugeta Phelan(dtr)[phone]   How do you get to doctors appointments? car, drives self;family or friend will provide   Are you on dialysis? No   Do you take coumadin? No   Discharge Plan A Home Health   Discharge Plan B Skilled Nursing Facility;Rehab   DME Needed Upon Discharge  other (see comments)  (TBD)   Discharge Plan discussed with: Patient;Adult children   Transition of Care Barriers None   Physical Activity   On average, how many days per week do you engage in moderate to strenuous exercise (like a brisk walk)? 3 days   On average, how many minutes do you engage in exercise at this level? 60 min   Housing Stability   In the last 12 months, was there a time when you were not able to pay the mortgage or rent on time? N   In the last 12 months, how many places have you lived? 2   In the last 12 months, was there a time when you did not have a steady place to sleep or slept in a shelter (including now)? N   Transportation Needs   In the past 12 months, has lack of transportation kept you from medical appointments or from getting medications? no   In the past 12 months, has lack of transportation kept you from meetings, work, or from getting things needed for daily living? No   Social Connections   Are you , , , , never , or living with a partner?    Alcohol Use   Q1: How often do you have a drink containing alcohol? Never   Q2: How many drinks containing alcohol do you have on a typical day when you are drinking? None   Q3: How often do you have six or more drinks on one  occasion? Never   OTHER   Name(s) of People in Home Mulugeta Phelan(dtr)[phone]

## 2023-06-15 NOTE — ED PROVIDER NOTES
Emergency Department Encounter  Provider Note  Encounter Date: 6/15/2023    Patient Name: Terri Phelan  MRN: [id number]    History of Present Illness   HPI  History of Present Illness:    Chief Complaint:   Chief Complaint   Patient presents with    Shortness of Breath     Sob started last night when she couldn't get her bipap to seal correctly. Ems arrived and O2 saturations on RA was 77% with end tidal 80, resp rate 32. Diminished breath sounds. Patient was placed on bipap and combivent treatment placed. Patient was wheezing throughout on arrival.        67f pw shortness of breath, started last night; couldn't get her bipap to seal correctly.  Per daughter, they exchanged the oxygen tank last week, but was given the wrong type of tubing.  Before that, patient was traveling, and it took a lot out of her.  So, for the past week, she probably has not been treated appropriately with her machine, and ended up having a bad night.  Today ox sat was reportedly 77% on RA, improved with cpap and pt feels more comfortable. Received a breathing treatment en route as well.     The following PMH/PSH/SocHx/FamHx has been reviewed by myself:    Past Medical History:   Diagnosis Date    COPD (chronic obstructive pulmonary disease)     Hypertension      Past Surgical History:   Procedure Laterality Date     SECTION      HERNIA REPAIR      HYSTERECTOMY       Social History     Tobacco Use    Smoking status: Former     Packs/day: 1.00     Years: 20.00     Pack years: 20.00     Types: Cigarettes    Smokeless tobacco: Never   Substance Use Topics    Alcohol use: Not Currently     Alcohol/week: 1.0 standard drink     Types: 1 Glasses of wine per week    Drug use: No     No family history on file.    Allergies reviewed:  Review of patient's allergies indicates:   Allergen Reactions    Codeine Nausea And Vomiting    Morphine Other (See Comments)       Medications reviewed:  Medication List with Changes/Refills   Current  Medications    ACYCLOVIR (ZOVIRAX) 400 MG TABLET    TAKE 1 TABLET BY MOUTH THREE TIMES DAILY FOR 10 DAYS AS NEEDED FOR OUTBREAK    ALBUTEROL (PROVENTIL/VENTOLIN HFA) 90 MCG/ACTUATION INHALER    Inhale 2 puffs into the lungs every 6 (six) hours as needed.    ALBUTEROL-IPRATROPIUM (DUO-NEB) 2.5 MG-0.5 MG/3 ML NEBULIZER SOLUTION    Take by nebulization every 4 (four) hours.    ALENDRONATE (FOSAMAX) 35 MG TABLET    Take 35 mg by mouth every Tuesday.    ASPIRIN 81 MG CHEW    Take 81 mg by mouth once daily.    AZITHROMYCIN (Z-OLY) 250 MG TABLET    Take 1 tablet (250 mg total) by mouth 3 (three) times a week.    BUDESONIDE-GLYCOPYR-FORMOTEROL (BREZTRI AEROSPHERE) 160-9-4.8 MCG/ACTUATION HFAA    Inhale 2 puffs into the lungs 2 (two) times daily.    CITALOPRAM (CELEXA) 10 MG TABLET        DALIRESP 500 MCG TAB    TK 1 T PO D    DEXTROMETHORPHAN-GUAIFENESIN  MG (MUCINEX DM)  MG PER 12 HR TABLET    Take 1 tablet by mouth every 12 (twelve) hours.    DICYCLOMINE (BENTYL) 20 MG TABLET    Take 1 tablet (20 mg total) by mouth 3 (three) times daily as needed (abdominal pain).    ERGOCALCIFEROL (ERGOCALCIFEROL) 50,000 UNIT CAP    Take 50,000 Units by mouth every Tuesday.    FLUTICASONE PROPIONATE (FLONASE) 50 MCG/ACTUATION NASAL SPRAY    1 spray by Each Nostril route daily as needed for Allergies.    LORAZEPAM (ATIVAN) 0.5 MG TABLET    Take 1 tablet (0.5 mg total) by mouth every 8 (eight) hours as needed for Anxiety.    OXYGEN-AIR DELIVERY SYSTEMS MISC    3 L by Nasal route.    PREDNISONE (DELTASONE) 20 MG TABLET    Take 2 tablets (40 mg total) by mouth once daily.    TRAZODONE (DESYREL) 50 MG TABLET    Take 1 tablet (50 mg total) by mouth nightly as needed for Insomnia.       ROS  Review of Systems:    Constitutional:  Negative for fever.   HENT:  Negative for sore throat.    Eyes:  Negative for redness.   Respiratory:  + for shortness of breath.    Cardiovascular:  Negative for chest pain.   Gastrointestinal:  Negative  for nausea.   Genitourinary:  Negative for dysuria.   Musculoskeletal:  Negative for back pain.   Skin:  Negative for rash.   Neurological:  Negative for weakness.   Hematological:  Does not bruise/bleed easily.   Psychiatric/Behavioral:  The patient is not nervous/anxious.      Physical Exam   Physical Exam    Initial Vitals   BP Pulse Resp Temp SpO2   06/15/23 0736 06/15/23 0736 06/15/23 0739 -- 06/15/23 0736   (!) 151/117 88 20  100 %      MAP       --                  Triage vital signs reviewed.    Constitutional: Well-nourished, well-developed. Not in acute distress. CPAP on, pt comfortable-appearing.   HENT: Normocephalic, atraumatic. Moist mucous membranes.  Eyes: No conjunctival injection.  Resp: Wheezing throughout all lung fields.   Cardio: Regular rate and rhythm.  GI: Abdomen non-distended.   MSK: Extremities without any deformities noted. No lower extremity edema.  Skin: Warm and dry. No rashes or lesions noted.  Neuro: Awake and alert. Moves all extremities.    ED Course   Critical Care    Date/Time: 6/15/2023 9:12 AM  Performed by: Safia Figueroa MD  Authorized by: Safia Figueroa MD   Total critical care time (exclusive of procedural time) : 0 minutes  Comments: Critical Care:    I have personally provided 20 minutes of critical care time exclusive of time spent on separately billable procedures. Time includes review of laboratory data, radiology results, discussion with consultants, and monitoring for potential decompensation. Interventions were performed as documented above.          Medical Decision Making    History Acquisition     The history is provided by the patient.   Assessment requiring an independent historian and why historian was needed: EMS; pt on Cpap, barely able to speak through the noise    Review of prior external/non ED notes: pulm notes, severe COPD    Differential Diagnoses   Based on available information and initial assessment, the working Differential Diagnosis includes, but  is not limited to:  PE, MI/ACS, pneumothorax, pericardial effusion/tamonade, pneumonia, lung abscess, pericarditis/myocarditis, pleural effusion, lung mass, CHF exacerbation, asthma exacerbation, COPD exacerbation, aspirated/ingested foreign body, airway obstruction, CO poisoning, anemia, metabolic derangement, allergy/atopy, influenza, viral URI, viral syndrome.  .    EKG   EKG ordered and independently reviewed by me:   Normal sinus rhythm, rate 74, sinus arrhythmia, normal intervals, normal axis, no STEMI    Labs   Lab tests ordered and independently reviewed by me:    Labs Reviewed   CBC W/ AUTO DIFFERENTIAL - Abnormal; Notable for the following components:       Result Value     (*)     MCHC 27.3 (*)     All other components within normal limits   COMPREHENSIVE METABOLIC PANEL - Abnormal; Notable for the following components:    Sodium 147 (*)     CO2 41 (*)     Alkaline Phosphatase 46 (*)     Anion Gap 7 (*)     All other components within normal limits    Narrative:      CO2  critical result(s) called and verbal readback obtained from   ELLIE Almeida by VALDEZ 06/15/2023 08:35   TROPONIN I         Imaging   Imaging ordered and independently reviewed by me:   Imaging Results              X-Ray Chest AP Portable (Final result)  Result time 06/15/23 08:56:47      Final result by Patricio Garg MD (06/15/23 08:56:47)                   Impression:      No acute process.  Stable examination.      Electronically signed by: Patricio Garg MD  Date:    06/15/2023  Time:    08:56               Narrative:    EXAMINATION:  XR CHEST AP PORTABLE    CLINICAL HISTORY:  Shortness of breath    TECHNIQUE:  Single frontal view of the chest was performed.    COMPARISON:  05/28/2023.    FINDINGS:  Monitoring EKG leads are present.  The trachea is unremarkable.  There are calcifications of the aortic knob.  The cardiomediastinal silhouette is stable.  There is a stable left-sided pleural effusion.  There is no pleural effusion on the  right.  There is no evidence of a pneumothorax.  There is no evidence of pneumomediastinum.  No airspace opacity is identified.  There are degenerative changes in the osseous structures.                                           Additional Consideration   Terri Phelan  presents to the emergency Department today with likely copd exacerbation. Will switch over to bipap her, likely admission. Labs, ekg, cxr pending.     Additional testing considered but not indicated during the course of this workup: further imaging and labwork, not indicated  Co-morbidities/chronic illness/exacerbation of chronic illness considered which impacted clinical decision making: copd, advanced age, htn  Procedures done in the ED or plan for the OR: No  Social determinants of care considered during development of treatment plan include: Decreased medical literacy  Discussion of management or test interpretation with external provider: Yes, describe: admitting team  DNR discussion: No    The patient's list of active medications and allergies as documented per RN staff has been reviewed.  Medications given in the ED and/or prescribed:   Medications   albuterol-ipratropium 2.5 mg-0.5 mg/3 mL nebulizer solution 3 mL (3 mLs Nebulization Given 6/15/23 0815)             ED Course as of 06/15/23 0912   Thu Ryan 15, 2023   0812 POCT Venous Blood Gas(!!)  Acidosis, CO2 retaining [CS]   0910 CBC auto differential(!) [CS]   0910 Comprehensive metabolic panel(!!)  Elevated CO2, above her baseline [CS]   0910 X-Ray Chest AP Portable  baseline [CS]   0911 Pt improving clinically. Will admit to ICU [CS]      ED Course User Index  [CS] Safia Figueroa MD       Explanation of disposition: Admit  Critical Care:    I have personally provided 20 minutes of critical care time exclusive of time spent on separately billable procedures. Time includes review of laboratory data, radiology results, discussion with consultants, and monitoring for potential  decompensation. Interventions were performed as documented above.      Clinical Impression:     1. COPD exacerbation    2. Shortness of breath         ED Disposition Condition    Admit Stable               Safia Figueroa MD  06/15/23 5859

## 2023-06-15 NOTE — PHARMACY MED REC
"    [facility]           Pharmacy  Admission Medication History     The home medication history was taken by Yana Mcdonough.      Medication history obtained from Medications listed below were obtained from: [employer] software- [employer]. Unable to assess patient    Based on information gathered for medication list, you may go to "Admission" then "Reconcile Home Medications" tabs to review and/or act upon those items.     The home medication list has been updated by the Pharmacy department.   Please read ALL comments highlighted in yellow.   Please address this information as you see fit.    Feel free to contact us if you have any questions or require assistance.        No current facility-administered medications on file prior to encounter.     Current Outpatient Medications on File Prior to Encounter   Medication Sig Dispense Refill    albuterol (PROVENTIL/VENTOLIN HFA) 90 mcg/actuation inhaler Inhale 2 puffs into the lungs every 6 (six) hours as needed.      albuterol-ipratropium (DUO-NEB) 2.5 mg-0.5 mg/3 mL nebulizer solution Take by nebulization every 4 (four) hours.      azithromycin (Z-OLY) 250 MG tablet Take 1 tablet (250 mg total) by mouth 3 (three) times a week. 12 tablet 11    budesonide-glycopyr-formoterol (BREZTRI AEROSPHERE) 160-9-4.8 mcg/actuation HFAA Inhale 2 puffs into the lungs 2 (two) times daily.      ergocalciferol (ERGOCALCIFEROL) 50,000 unit Cap Take 50,000 Units by mouth every Tuesday.      predniSONE (DELTASONE) 20 MG tablet Take 2 tablets (40 mg total) by mouth once daily. 6 tablet 0    traZODone (DESYREL) 50 MG tablet Take 1 tablet (50 mg total) by mouth nightly as needed for Insomnia. 30 tablet 6    acyclovir (ZOVIRAX) 400 MG tablet TAKE 1 TABLET BY MOUTH THREE TIMES DAILY FOR 10 DAYS AS NEEDED FOR OUTBREAK      alendronate (FOSAMAX) 35 MG tablet Take 35 mg by mouth every Tuesday.      aspirin 81 MG Chew Take 81 mg by mouth once daily.      citalopram (CELEXA) 10 MG " tablet Take 10 mg by mouth once daily.      DALIRESP 500 mcg Tab Take 500 mcg by mouth once daily.      dextromethorphan-guaiFENesin  mg (MUCINEX DM)  mg per 12 hr tablet Take 1 tablet by mouth every 12 (twelve) hours.      dicyclomine (BENTYL) 20 mg tablet Take 1 tablet (20 mg total) by mouth 3 (three) times daily as needed (abdominal pain). 30 tablet 1    fluticasone propionate (FLONASE) 50 mcg/actuation nasal spray 1 spray by Each Nostril route daily as needed for Allergies.      LORazepam (ATIVAN) 0.5 MG tablet Take 1 tablet (0.5 mg total) by mouth every 8 (eight) hours as needed for Anxiety. 30 tablet 0    OXYGEN-AIR DELIVERY SYSTEMS MISC 3 L by Nasal route.         Please address this information as you see fit.  Feel free to contact us if you have any questions or require assistance.    Yana Mcdonough  [phone]              .

## 2023-06-15 NOTE — CONSULTS
"[facility] ICU  Pulm/CC Consult Note    Attending Physician: Mateo Carvajal III, MD  ICU Attending: Dr. Patrick   Date of Admit: 6/15/2023  Hospital day: 0    Reason for Consult: COPD exacerbation, Acute hypercapnic respiratory failure    History of Present Illness:  Terri Phelan is a 67 y.o.  female with a PMHx COPD (PFTs 2023), CAD (past MI), JUAN (on BiPAP nightly), and HTN presented to ED via EMS with shortness of breath onset last night. Patient's daughter at bedside. Report couldn't get her bipap to seal correctly. Per daughter, they exchanged the oxygen tank last week, but concerned over given the "skinnier" tubing.  Before that, patient was traveling, and it took a lot out of her. Pulse ox sat was reportedly 77% on RA, improved with CPAP and breathing treatment delivered en route.     In ED, initial vitals /117, HR 88, Temp 97.9 F, SpO2 100% on CPAP. Labs include CBC with stable H/H and without leukocytosis, CMP with CO2 41. Troponin negative. EKG unremarkable. Chest XRay with no acute findings, stable with previous exam. VBG with pH 7.196, PCO2 >110, PO2 51.1. Patient transitioned to BiPAP. [facility] consulted for evaluation for admission for acute hypercapnic respiratory failure. Patient admitted to ICU for continuous bipap    Past Medical History:  Past Medical History:   Diagnosis Date    COPD (chronic obstructive pulmonary disease)     Hypertension        Past Surgical History:  Past Surgical History:   Procedure Laterality Date     SECTION      HERNIA REPAIR      HYSTERECTOMY         Allergies:  Review of patient's allergies indicates:   Allergen Reactions    Codeine Nausea And Vomiting    Morphine Other (See Comments)       Family History:  No family history on file.    Social History:  Social History     Tobacco Use    Smoking status: Former     Packs/day: 1.00     Years: 20.00     Pack years: 20.00     Types: Cigarettes    Smokeless tobacco: Never   Substance Use " "Topics    Alcohol use: Not Currently     Alcohol/week: 1.0 standard drink     Types: 1 Glasses of wine per week    Drug use: No       Review of Systems:  Review of Systems   Constitutional:  Negative for chills and fever.   Respiratory:  Positive for shortness of breath.    Cardiovascular:  Negative for chest pain and palpitations.   Gastrointestinal:  Negative for abdominal pain, nausea and vomiting.      Objective:   Last 24 Hour Vital Signs:  BP  Min: 151/117  Max: 197/87  Temp  Av.9 °F (37.2 °C)  Min: 97.9 °F (36.6 °C)  Max: 99.8 °F (37.7 °C)  Pulse  Av.2  Min: 77  Max: 95  Resp  Av.3  Min: 20  Max: 40  SpO2  Av.3 %  Min: 89 %  Max: 100 %  Height  Av' 6" (167.6 cm)  Min: 5' 6" (167.6 cm)  Max: 5' 6" (167.6 cm)  Weight  Av.5 kg (184 lb 1.4 oz)  Min: 83.5 kg (184 lb 1.4 oz)  Max: 83.5 kg (184 lb 1.4 oz)  Body mass index is 29.71 kg/m².  I & O (Last 24H):No intake or output data in the 24 hours ending 06/15/23 1444    Physical Exam  Vitals and nursing note reviewed.   Constitutional:       General: She is not in acute distress.     Appearance: Normal appearance.      Comments: Comfortable on bipap   HENT:      Head: Normocephalic and atraumatic.   Eyes:      Extraocular Movements: Extraocular movements intact.      Pupils: Pupils are equal, round, and reactive to light.   Cardiovascular:      Rate and Rhythm: Normal rate and regular rhythm.      Pulses: Normal pulses.      Heart sounds: Normal heart sounds.   Pulmonary:      Comments: Faint, poor air movement  Abdominal:      General: Abdomen is flat. There is no distension.      Palpations: Abdomen is soft.   Musculoskeletal:      Cervical back: Neck supple.   Skin:     General: Skin is warm and dry.      Capillary Refill: Capillary refill takes less than 2 seconds.   Neurological:      Mental Status: She is alert and oriented to person, place, and time.   Psychiatric:         Mood and Affect: Mood normal.         Behavior: Behavior " normal.         Assessment & Plan:       Neuro/Psych  - AAOx3     #Anxiety  -On ativan 0.5 mg BID, reports daily use  -Would consider continuing half dose to prevent withdrawal and agitation while on Bipap, be cautious of other medications that may cause respiratory distress     CV  Echo 07/2022 w/ EF 55-60%  - No acute concerns at this time.     #HTN  - continue home medications     Pulm  #Acute Hypercapnic Hypoxic Respiratory Failure  #Acute exacerbation on chronic COPD  - Hx of COPD. PFTs 04/2023 with severe obstruction noted.  - Past hospitalizations for COPD exacerbations  - VBG: pH 7.196, PCO2 >110, PO2 51.1 on admission   - CXR with no acute process, stable examination.  -Seen by Dr. Cunningham on 6/13 with recommendation for duonebs BID, Breztri 2 puffs BID, Azithromycin EOD, roflumilast daily, bipap qhs  -Continue prednisone, duonebs q4hr, azithromycin  -I asked patient's family member to bring her breztri inhaler in if possible  -On continuous BiPAP, poor air movement at this time, would recommend continuing until air movement improves    FEN/GI  F: none  E: Na 147, K 3.9, Cl 99, CO2 41  N: regular diet  - No acute concerns at this time.     RENAL  BUN/Cr: 18/0.7, baseline Cr 0.6-0.7  Continue to monitor renal status and urine output  - No acute concerns at this time.     Heme  H/H 12.1/44.3; stable  WBC 6.98  DVT prophylaxis: Lovenox  - No acute concerns at this time.     Endo  - A1c 5.9 2015  - Pending repeat A1c.  - No acute concerns at this time.     ID  - No leukocytosis. Afebrile.  - No acute concerns at this time.     PPx  -VTE: lovenox  -GI: not indicated    MSK/RHEUM:  -- PT/OT for early mobility      Feeding: Regular diet  Analgesia: Multimodal pain control   Sedation: none indicated  DVT prophylaxis: lovenox  Head of Bed: 30 degrees to prevent VAP  Ulcer PPX: none indicated at this time  Glucose: Hypoglycemic precautions, maintain 140-180  SBT/SAT: Daily  Bowels: Bowel regimen for  constipation  Indwelling Lines: L PIV, R PIV  Deescalation Abx: azithromycin day 1, appropriate to continue     Code: Code Status Discussion Note     Patient seen with Dr. Patrick. Attestation to follow. Thank you for allowing us to participate in the care of this patient. We will continue to follow. Please contact me with any questions should they arise.     Angus Alamo DO   Family Medicine PGY-1   [facility]    Pt seen and examined with Pulmonary/Critical Care team. Critical Care time was spent validating the history and physical exam, reviewing the lab and imaging results, and discussing the care of the patient with the bedside nurse. The following additional comments are made:    Acute on chronic hypercapnic respiratory failure secondary to COPD exacerbation.  Has some chronic scarring in LLL with a few B-lines by POCUS but no pleural effusion and no pulmonary edema.  NIV initiated and titrated to work of breathing.  Bronchodilators, steroids, abx.      Critical Care time 35 minutes    Sage Patrick MD  Phone [phone]

## 2023-06-15 NOTE — H&P
"Progress Note  [facility]    Patient Name: Terri Phelan  MRN: [id number]  Admit Date: 6/15/2023  Today's Date: 06/15/2023  Attending Physician: Dr. Mateo Carvajal, III      SUBJECTIVE:     HPI: 66 yo F w/ PMHx of COPD (PFTs April 2023), CAD (past MI), JUAN (on BiPAP nightly), and HTN presented to ED via EMS with shortness of breath onset last night. Patient's daughter at bedside. Report couldn't get her bipap to seal correctly. Per daughter, they exchanged the oxygen tank last week, but concerned over given the "skinnier" tubing.  Before that, patient was traveling, and it took a lot out of her. Pulse ox sat was reportedly 77% on RA, improved with CPAP and breathing treatment delivered en route.    In ED, initial vitals /117, HR 88, Temp 97.9 F, SpO2 100% on CPAP. Labs include CBC with stable H/H and without leukocytosis, CMP with CO2 41. Troponin negative. EKG unremarkable. Chest XRay with no acute findings, stable with previous exam. VBG with pH 7.196, PCO2 >110, PO2 51.1. Patient transitioned to BiPAP. [facility] consulted for evaluation for admission for acute hypercapnic respiratory failure.      Patient seen and examined this morning.     Interval History: Patient placed on continuous BiPAP. Alert, oriented on exam. Admitted to the ICU.      Review of Systems   Constitutional:  Negative for chills and fever.   Respiratory:  Positive for shortness of breath and wheezing. Negative for cough and sputum production.    Cardiovascular:  Negative for chest pain, palpitations and leg swelling.   Gastrointestinal:  Negative for abdominal pain, constipation, diarrhea, nausea and vomiting.   Musculoskeletal:  Negative for myalgias.   Neurological:  Negative for headaches.     OBJECTIVE:     Vital Signs Trends/Hx Reviewed  Vitals:    06/15/23 0932 06/15/23 0933 06/15/23 0942 06/15/23 0943   BP:       BP Location:       Patient Position:       Pulse: 77 78 90    Resp: (!) 27 (!) " 28 (!) 34    Temp:    97.9 °F (36.6 °C)   TempSrc:    Oral   SpO2: (!) 91% (!) 90% (!) 92%        Oxygen Concentration (%):  [35-40] 35        Physical Exam  Vitals and nursing note reviewed.   Constitutional:       General: She is not in acute distress.     Appearance: Normal appearance. She is not toxic-appearing.   HENT:      Head: Normocephalic.      Right Ear: External ear normal.      Left Ear: External ear normal.      Nose: No congestion.      Mouth/Throat:      Pharynx: Oropharynx is clear.   Eyes:      Extraocular Movements: Extraocular movements intact.   Cardiovascular:      Rate and Rhythm: Normal rate and regular rhythm.      Pulses: Normal pulses.      Heart sounds: Normal heart sounds.   Pulmonary:      Breath sounds: Wheezing present.      Comments: Patient on BiPAP  Abdominal:      General: Abdomen is flat.      Palpations: Abdomen is soft.      Tenderness: There is no abdominal tenderness. There is no guarding or rebound.   Musculoskeletal:         General: Normal range of motion.      Cervical back: Normal range of motion.   Skin:     General: Skin is warm.      Capillary Refill: Capillary refill takes less than 2 seconds.   Neurological:      General: No focal deficit present.      Mental Status: She is alert and oriented to person, place, and time. Mental status is at baseline.   Psychiatric:         Mood and Affect: Mood normal.         Behavior: Behavior normal.       Laboratory:  Recent Labs   Lab 06/15/23  0746   WBC 6.98   RBC 4.42   HGB 12.1   HCT 44.3      *   MCH 27.4   MCHC 27.3*     Recent Labs   Lab 06/15/23  0746   *   K 3.9   CL 99   CO2 41*   BUN 18   CREATININE 0.7     Recent Labs   Lab 06/15/23  0752   PH 7.196*   PCO2 >110*   PO2 51.1   POCSATURATED 82.7*         Imaging:   EXAMINATION:  XR CHEST AP PORTABLE     CLINICAL HISTORY:  Shortness of breath     TECHNIQUE:  Single frontal view of the chest was performed.     COMPARISON:  05/28/2023.      FINDINGS:  Monitoring EKG leads are present.  The trachea is unremarkable.  There are calcifications of the aortic knob.  The cardiomediastinal silhouette is stable.  There is a stable left-sided pleural effusion.  There is no pleural effusion on the right.  There is no evidence of a pneumothorax.  There is no evidence of pneumomediastinum.  No airspace opacity is identified.  There are degenerative changes in the osseous structures.     Impression:     No acute process.  Stable examination.        Electronically signed by: Patricio Garg MD  Date:                                            06/15/2023  Time:                                           08:56    ASSESSMENT   Ms. Phelan is a 67 y.o. female who has a past medical history of COPD (chronic obstructive pulmonary disease) and Hypertension.     Admitted for acute hypercapnic respiratory failure. Patient to be admitted to the ICU on continuous BiPAP and will continue to monitor status.    PLAN     Neuro/Psych  - AAOx3    #Hx Anxiety  - On ativan at home  - Holding home medication at this time.     CV  Echo 07/2022 w/ EF 55-60%  - No acute concerns at this time.    #HTN  - continue home medications     Pulm  #Acute Hypercapnic Hypoxic Respiratory Failure  #Acute exacerbation on chronic COPD  - Hx of COPD. PFTs 04/2023 with severe obstruction noted.  - Past hospitalizations for COPD exacerbations  - VBG: pH 7.196, PCO2 >110, PO2 51.1  - CXR with no acute process, stable examination.  - On continuous BiPAP  - Initiate prednisone, azithromycin.    FEN/GI  F: none  E: Na 147, K 3.9, Cl 99, CO2 41  N: regular diet  - No acute concerns at this time.     RENAL  BUN/Cr: 18/0.7, baseline Cr 0.6-0.7  Continue to monitor renal status and urine output  - No acute concerns at this time.     Heme  H/H 12.1/44.3; stable  WBC 6.98  DVT prophylaxis: Lovenox  - No acute concerns at this time.    Endo  - A1c 5.9 2015  - Pending repeat A1c.  - No acute concerns at this  time.    ID  - No leukocytosis. Afebrile.  - No acute concerns at this time.    PPx  -VTE: lovenox  -GI: not indicated      CODE STATUS: Full Code  DISPO: Patient to be admitted to the ICU, on continuous BiPAP. Will continue to monitor condition.    ________________________  Toro Morales MD  [facility] PGY-1

## 2023-06-15 NOTE — HPI
"68 yo F w/ PMHx of COPD (PFTs April 2023), CAD (past MI), JUAN (on BiPAP nightly), and HTN presented to ED via EMS with shortness of breath onset last night. Patient's daughter at bedside. Report couldn't get her bipap to seal correctly. Per daughter, they exchanged the oxygen tank last week, but concerned over given the "skinnier" tubing.  Before that, patient was traveling, and it took a lot out of her. Pulse ox sat was reportedly 77% on RA, improved with CPAP and breathing treatment delivered en route.    In ED, initial vitals /117, HR 88, Temp 97.9 F, SpO2 100% on CPAP. Labs include CBC with stable H/H and without leukocytosis, CMP with CO2 41. Troponin negative. EKG unremarkable. Chest XRay with no acute findings, stable with previous exam. VBG with pH 7.196, PCO2 >110, PO2 51.1. Patient transitioned to BiPAP. [facility] consulted for evaluation for admission for acute hypercapnic respiratory failure.  "

## 2023-06-16 VITALS
RESPIRATION RATE: 22 BRPM | HEIGHT: 66 IN | WEIGHT: 184.06 LBS | OXYGEN SATURATION: 93 % | TEMPERATURE: 98 F | HEART RATE: 84 BPM | SYSTOLIC BLOOD PRESSURE: 155 MMHG | DIASTOLIC BLOOD PRESSURE: 72 MMHG | BODY MASS INDEX: 29.58 KG/M2

## 2023-06-16 PROBLEM — J96.22 ACUTE ON CHRONIC RESPIRATORY FAILURE WITH HYPOXIA AND HYPERCAPNIA: Status: RESOLVED | Noted: 2023-04-24 | Resolved: 2023-06-16

## 2023-06-16 PROBLEM — J96.21 ACUTE ON CHRONIC RESPIRATORY FAILURE WITH HYPOXIA AND HYPERCAPNIA: Status: RESOLVED | Noted: 2023-04-24 | Resolved: 2023-06-16

## 2023-06-16 LAB
ALBUMIN SERPL BCP-MCNC: 3.2 G/DL (ref 3.5–5.2)
ALP SERPL-CCNC: 39 U/L (ref 55–135)
ALT SERPL W/O P-5'-P-CCNC: 13 U/L (ref 10–44)
ANION GAP SERPL CALC-SCNC: 5 MMOL/L (ref 8–16)
AST SERPL-CCNC: 12 U/L (ref 10–40)
BASOPHILS # BLD AUTO: 0.01 K/UL (ref 0–0.2)
BASOPHILS NFR BLD: 0.1 % (ref 0–1.9)
BILIRUB SERPL-MCNC: 0.3 MG/DL (ref 0.1–1)
BUN SERPL-MCNC: 19 MG/DL (ref 8–23)
CALCIUM SERPL-MCNC: 9 MG/DL (ref 8.7–10.5)
CHLORIDE SERPL-SCNC: 98 MMOL/L (ref 95–110)
CO2 SERPL-SCNC: 41 MMOL/L (ref 23–29)
CREAT SERPL-MCNC: 0.6 MG/DL (ref 0.5–1.4)
DIFFERENTIAL METHOD: ABNORMAL
EOSINOPHIL # BLD AUTO: 0 K/UL (ref 0–0.5)
EOSINOPHIL NFR BLD: 0 % (ref 0–8)
ERYTHROCYTE [DISTWIDTH] IN BLOOD BY AUTOMATED COUNT: 13.4 % (ref 11.5–14.5)
EST. GFR  (NO RACE VARIABLE): >60 ML/MIN/1.73 M^2
ESTIMATED AVG GLUCOSE: 108 MG/DL (ref 68–131)
GLUCOSE SERPL-MCNC: 100 MG/DL (ref 70–110)
HBA1C MFR BLD: 5.4 % (ref 4–5.6)
HCT VFR BLD AUTO: 38.5 % (ref 37–48.5)
HGB BLD-MCNC: 11.2 G/DL (ref 12–16)
IMM GRANULOCYTES # BLD AUTO: 0.03 K/UL (ref 0–0.04)
IMM GRANULOCYTES NFR BLD AUTO: 0.3 % (ref 0–0.5)
LYMPHOCYTES # BLD AUTO: 1.2 K/UL (ref 1–4.8)
LYMPHOCYTES NFR BLD: 11.6 % (ref 18–48)
MAGNESIUM SERPL-MCNC: 2 MG/DL (ref 1.6–2.6)
MCH RBC QN AUTO: 27.4 PG (ref 27–31)
MCHC RBC AUTO-ENTMCNC: 29.1 G/DL (ref 32–36)
MCV RBC AUTO: 94 FL (ref 82–98)
MONOCYTES # BLD AUTO: 0.8 K/UL (ref 0.3–1)
MONOCYTES NFR BLD: 8.1 % (ref 4–15)
NEUTROPHILS # BLD AUTO: 8.3 K/UL (ref 1.8–7.7)
NEUTROPHILS NFR BLD: 79.9 % (ref 38–73)
NRBC BLD-RTO: 0 /100 WBC
PHOSPHATE SERPL-MCNC: 3 MG/DL (ref 2.7–4.5)
PLATELET # BLD AUTO: 144 K/UL (ref 150–450)
PMV BLD AUTO: 10.7 FL (ref 9.2–12.9)
POTASSIUM SERPL-SCNC: 4.1 MMOL/L (ref 3.5–5.1)
PROT SERPL-MCNC: 6.1 G/DL (ref 6–8.4)
RBC # BLD AUTO: 4.09 M/UL (ref 4–5.4)
SODIUM SERPL-SCNC: 144 MMOL/L (ref 136–145)
WBC # BLD AUTO: 10.38 K/UL (ref 3.9–12.7)

## 2023-06-16 PROCEDURE — 97165 OT EVAL LOW COMPLEX 30 MIN: CPT

## 2023-06-16 PROCEDURE — 97116 GAIT TRAINING THERAPY: CPT

## 2023-06-16 PROCEDURE — 36415 COLL VENOUS BLD VENIPUNCTURE: CPT

## 2023-06-16 PROCEDURE — 94660 CPAP INITIATION&MGMT: CPT

## 2023-06-16 PROCEDURE — 25000242 PHARM REV CODE 250 ALT 637 W/ HCPCS

## 2023-06-16 PROCEDURE — 97535 SELF CARE MNGMENT TRAINING: CPT

## 2023-06-16 PROCEDURE — 83036 HEMOGLOBIN GLYCOSYLATED A1C: CPT

## 2023-06-16 PROCEDURE — 84100 ASSAY OF PHOSPHORUS: CPT

## 2023-06-16 PROCEDURE — 27000221 HC OXYGEN, UP TO 24 HOURS

## 2023-06-16 PROCEDURE — 99900035 HC TECH TIME PER 15 MIN (STAT)

## 2023-06-16 PROCEDURE — 25000003 PHARM REV CODE 250

## 2023-06-16 PROCEDURE — 94761 N-INVAS EAR/PLS OXIMETRY MLT: CPT

## 2023-06-16 PROCEDURE — 94640 AIRWAY INHALATION TREATMENT: CPT

## 2023-06-16 PROCEDURE — 80053 COMPREHEN METABOLIC PANEL: CPT

## 2023-06-16 PROCEDURE — 63600175 PHARM REV CODE 636 W HCPCS

## 2023-06-16 PROCEDURE — 85025 COMPLETE CBC W/AUTO DIFF WBC: CPT

## 2023-06-16 PROCEDURE — 97161 PT EVAL LOW COMPLEX 20 MIN: CPT

## 2023-06-16 PROCEDURE — 97530 THERAPEUTIC ACTIVITIES: CPT

## 2023-06-16 PROCEDURE — 83735 ASSAY OF MAGNESIUM: CPT

## 2023-06-16 RX ORDER — PREDNISONE 20 MG/1
40 TABLET ORAL DAILY
Qty: 14 TABLET | Refills: 0 | Status: SHIPPED | OUTPATIENT
Start: 2023-06-16 | End: 2023-06-23

## 2023-06-16 RX ORDER — AZITHROMYCIN 250 MG/1
250 TABLET, FILM COATED ORAL DAILY
Qty: 7 TABLET | Refills: 0 | Status: SHIPPED | OUTPATIENT
Start: 2023-06-16 | End: 2023-06-23

## 2023-06-16 RX ORDER — LORAZEPAM 0.5 MG/1
0.5 TABLET ORAL EVERY 8 HOURS PRN
Qty: 30 TABLET | Refills: 0 | Status: SHIPPED | OUTPATIENT
Start: 2023-06-16 | End: 2023-07-07 | Stop reason: SDUPTHER

## 2023-06-16 RX ORDER — ONDANSETRON 4 MG/1
4 TABLET, ORALLY DISINTEGRATING ORAL EVERY 12 HOURS PRN
Qty: 20 TABLET | Refills: 0 | Status: SHIPPED | OUTPATIENT
Start: 2023-06-16 | End: 2023-06-26

## 2023-06-16 RX ADMIN — IPRATROPIUM BROMIDE AND ALBUTEROL SULFATE 3 ML: 2.5; .5 SOLUTION RESPIRATORY (INHALATION) at 11:06

## 2023-06-16 RX ADMIN — IPRATROPIUM BROMIDE AND ALBUTEROL SULFATE 3 ML: 2.5; .5 SOLUTION RESPIRATORY (INHALATION) at 03:06

## 2023-06-16 RX ADMIN — PREDNISONE 40 MG: 20 TABLET ORAL at 09:06

## 2023-06-16 RX ADMIN — IPRATROPIUM BROMIDE AND ALBUTEROL SULFATE 3 ML: 2.5; .5 SOLUTION RESPIRATORY (INHALATION) at 07:06

## 2023-06-16 RX ADMIN — ROFLUMILAST 500 MCG: 500 TABLET ORAL at 09:06

## 2023-06-16 RX ADMIN — IPRATROPIUM BROMIDE AND ALBUTEROL SULFATE 3 ML: 2.5; .5 SOLUTION RESPIRATORY (INHALATION) at 12:06

## 2023-06-16 RX ADMIN — ASPIRIN 81 MG CHEWABLE TABLET 81 MG: 81 TABLET CHEWABLE at 08:06

## 2023-06-16 RX ADMIN — CITALOPRAM HYDROBROMIDE 10 MG: 10 TABLET ORAL at 09:06

## 2023-06-16 RX ADMIN — ONDANSETRON 4 MG: 2 INJECTION INTRAMUSCULAR; INTRAVENOUS at 11:06

## 2023-06-16 RX ADMIN — AZITHROMYCIN MONOHYDRATE 500 MG: 500 INJECTION, POWDER, LYOPHILIZED, FOR SOLUTION INTRAVENOUS at 11:06

## 2023-06-16 NOTE — PROGRESS NOTES
The pt's dtr will transport her home at d/c. The pt has no further Case Management needs and is clear for d/c. The sw stressed the importance of going to her follow up appt's and taking her meds. The pt acknowledged understanding and states she will comply.     [facility] - [facility]  Discharge Final Note    Primary Care Provider: Sridevi Gibson MD    Expected Discharge Date: 6/16/2023    Final Discharge Note (most recent)       Final Note - 06/16/23 1249          Final Note    What phone number can be called within the next 1-3 days to see how you are doing after discharge? [phone]                     Important Message from Medicare             Contact Info       Kev Cox MD   Specialty: Family Medicine    [address] [facility] [address]   Phone: [phone]       Next Steps: Follow up on 6/20/2023    Instructions: 2:00pm Establish Care

## 2023-06-16 NOTE — PLAN OF CARE
Problem: Adult Inpatient Plan of Care  Goal: Plan of Care Review  Outcome: Ongoing, Progressing   A&Ox4. Vital signs stable. No complaints of pain this shift. PRN ativan given for anxiety. Tolerating PO intake. Urine output adequate. Repositioned independently with minimal assistance. Safety precautions in place. Plan of care reviewed with patient and family.

## 2023-06-16 NOTE — PROGRESS NOTES
Future Appointments   Date Time Provider Department Center   6/20/2023  2:00 PM Kev Cox MD [facility]

## 2023-06-16 NOTE — NURSING
Pt discharged. All discharge instructions give and read w/ pt. Two PIVs removed. All questions answered.

## 2023-06-16 NOTE — PT/OT/SLP EVAL
Physical Therapy Evaluation and Discharge    Patient Name:  Terri Phelan   MRN:  [id number]    Recommendations:     Discharge Recommendations: pulmonary rehab   Discharge Equipment Recommendations: none   Barriers to discharge: None    Assessment:     Terri Phelan is a 67 y.o. female admitted with a medical diagnosis of Acute hypercapnic respiratory failure.  At this time, pt is functioning at prior level of function and does not require further PT services.    Recent Surgery: * No surgery found *      Plan:     During this hospitalization, patient does not require further PT services; please re-consult if situation changes:    Plan of Care Expires:  06/17/23    Subjective     Chief Complaint: decreased endurance  Patient/Family Comments/goals: return to OP Pulm rehab  Pain/Comfort:  Pain Rating 1: 0/10  Pain Rating Post-Intervention 1: 0/10    Patients cultural, spiritual, Sikhism conflicts given the current situation: no    Living Environment:  Lives w/daughter, 1st floor apt, 0STE, tub/shower combo  Previous level of function: Herbert with use of rollator PRN, uses BSC by bed  Roles and Routines: Pt drives self to Pulm rehab 3x/week, goes to grocery store  Equipment Used at home: bedside commode, shower chair, oxygen, rollator  Assistance upon Discharge: Daughter    Objective:     Communicated with nurse prior to session.  Patient found up in chair with blood pressure cuff, pulse ox (continuous), telemetry, oxygen  upon PT entry to room.    General Precautions: Standard, fall  Orthopedic Precautions:N/A   Braces: N/A  Respiratory Status: Nasal cannula, flow 3 L/min    Exams:  Cognitive Exam:  Patient is oriented to Person, Place, Time, Situation, and follows multistep commands  Postural Exam:  Patient presented with the following abnormalities:    -       Rounded shoulders  RLE ROM: WFL  RLE Strength: WFL  LLE ROM: WFL  LLE Strength: WFL  Fine Motor Coordination- impaired 2/2 mild tremors noted 2/2  ""anxiety" which pt reports is her baseline    Functional Mobility:  Transfers:     Sit to Stand:  supervision with rolling walker  Gait: Patient amb 100ft using RW with CGA; standing rest break 2/2 O2 sats 85%; increased to 89/90% with pursed lip breathing; pt continued to amb another 50ft back to room with CGA and once again, O2 sats decreased to 85% but quickly recovered to 90-92% with pursed lip breathing~2 min rest  Balance: sit~fair+, stand/amb~fair    AM-PAC 6 CLICK MOBILITY  Total Score:18       Treatment & Education:  Patient educated on role of PT/POC, pacing self/monitoring O2 sats at home, continuing with AROM ex and in coordination with breathing technique; pt performed skills and amb with O2 at 3L as described above.    Patient left up in chair with all lines intact, call button in reach, nurse notified, and dtr present.    GOALS:   Multidisciplinary Problems       Physical Therapy Goals          Problem: Physical Therapy    Goal Priority Disciplines Outcome Goal Variances Interventions   Physical Therapy Goal     PT, PT/OT Adequate for Care Transition                         History:     Past Medical History:   Diagnosis Date    COPD (chronic obstructive pulmonary disease)     Hypertension        Past Surgical History:   Procedure Laterality Date     SECTION      HERNIA REPAIR      HYSTERECTOMY         Time Tracking:     PT Received On: 23  PT Start Time: 1019     PT Stop Time: 1050  PT Total Time (min): 31 min with OT    Billable Minutes: Evaluation 8 and Gait Training 13 Self Care 10      2023  "

## 2023-06-16 NOTE — HOSPITAL COURSE
Patient admitted to [facility] for acute hypercapnic respiratory failure. Patient transferred to the ICU. Remained on BiPAP. Repeat VBG with pH 7.389, PCO2 74.4, PO2 50. Patient transitioned to home baseline NC 6/16 AM. Patient exam greatly improved with increased air movement in bilateral lungs, able to maintain appropriate O2 sats on baseline supplemental O2. PT/OT consulted to evaluate patient. Patient medically stable for discharge at this time. Patient plan discussed with patient. Return precautions discussed with patient. Patient verbally acknowledged understanding and agreement with plan. Patient encouraged to take medications as prescribed and to follow up closely PCP, pulmonologist, and pulmonary rehab.

## 2023-06-16 NOTE — PT/OT/SLP EVAL
Occupational Therapy   Evaluation and Discharge Note    Name: Terri Phelan  MRN: [id number]  Admitting Diagnosis: Acute hypercapnic respiratory failure  Recent Surgery: * No surgery found *      Recommendations:     Discharge Recommendations: pulmonary rehab  Discharge Equipment Recommendations: none  Barriers to discharge:  None    Assessment:     Terri Phelan is a 67 y.o. female with a medical diagnosis of Acute hypercapnic respiratory failure. At this time, patient is functioning at their prior level of function and does not require further acute OT services.     OT eval performed this date in ICU with PT. Pt agreeable to therapy. Pt found seated up in chair on 3L NC. Pt dropping to 85% spO2 during ax, increasing to 90% and above with rest break & PLB. Pt educated on safety awareness for home & monitoring of oxygen saturation. Pt verbalizing good understanding. Recommending pt resume OP Pulm rehab upon d/c. No further acute OT needs noted at this time.     Plan:     During this hospitalization, patient does not require further acute OT services.  Please re-consult if situation changes.    Plan of Care Reviewed with: patient, daughter    Subjective     Chief Complaint: Decreased endurance  Patient/Family Comments/goals: Return to OP Pulm rehab    Occupational Profile:  Living Environment: Lives w/daughter, 1st floor apt, 0STE, tub/shower combo  Previous level of function: Herbert with use of rollator PRN, uses BSC by bed  Roles and Routines: Pt drives self to Pulm rehab 3x/week, goes to grocery store  Equipment Used at home: bedside commode, shower chair, oxygen, rollator  Assistance upon Discharge: Daughter    Pain/Comfort:  Pain Rating 1: 0/10    Patients cultural, spiritual, Synagogue conflicts given the current situation: no    Objective:     Communicated with: nsmarkell prior to session.  Patient found up in chair with blood pressure cuff, pulse ox (continuous), oxygen, telemetry upon OT entry to  "room.    General Precautions: Standard, fall  Orthopedic Precautions: N/A  Braces: N/A  Respiratory Status: Nasal cannula, flow 3 L/min     Occupational Performance:    Bed Mobility:    N/a    Functional Mobility/Transfers:  Patient completed Sit <> Stand Transfer with supervision  with  rolling walker   Functional Mobility: Pt performing functional mobility in room/hallway with Sup/SBA & use of RW.     Cognitive/Visual Perceptual:  Cognitive/Psychosocial Skills:     -       Oriented to: Person, Place, Time, and Situation   -       Follows Commands/attention:Follows multistep  commands  -       Memory: No Deficits noted  -       Safety awareness/insight to disability: intact   -       Mood/Affect/Coping skills/emotional control: Cooperative and Pleasant    Physical Exam:  Upper Extremity Range of Motion:     -       Right Upper Extremity: WFL  -       Left Upper Extremity: WFL  Upper Extremity Strength:    -       Right Upper Extremity: WFL  -       Left Upper Extremity: WFL   Strength:    -       Right Upper Extremity: WFL  -       Left Upper Extremity: WFL  Fine Motor Coordination:    -       Impaired  mild tremors noted 2/2 "anxiety" per pt report this is baseline    [facility] 6 Click ADL:  AMPAC Total Score: 22    Treatment & Education:  OT raza performed this date in ICU with PT. Pt agreeable to therapy.   Pt found seated up in chair on 3L NC.   Pt dropping to 85% spO2 during ax, increasing to 90% and above with rest break & PLB.   Pt educated on safety awareness for home & monitoring of oxygen saturation.   Pt verbalizing good understanding.   Recommending pt resume OP Pulm rehab upon d/c.   No further acute OT needs noted at this time.     Patient left up in chair with all lines intact, call button in reach, nsg notified, and daughter present    GOALS:   Multidisciplinary Problems       Occupational Therapy Goals          Problem: Occupational Therapy    Goal Priority Disciplines Outcome Interventions "   Occupational Therapy Goal     OT, PT/OT Adequate for Care Transition                        History:     Past Medical History:   Diagnosis Date    COPD (chronic obstructive pulmonary disease)     Hypertension          Past Surgical History:   Procedure Laterality Date     SECTION      HERNIA REPAIR      HYSTERECTOMY         Time Tracking:     OT Date of Treatment: 23  OT Start Time: 1019  OT Stop Time: 1050  OT Total Time (min): 31 min with PT    Billable Minutes:Evaluation 8  Therapeutic Activity 23    2023

## 2023-06-16 NOTE — PLAN OF CARE
OT raza performed this date in ICU with PT. Pt agreeable to therapy. Pt found seated up in chair on 3L NC. Pt dropping to 85% spO2 during ax, increasing to 90% and above with rest break & PLB. Pt educated on safety awareness for home & monitoring of oxygen saturation. Pt verbalizing good understanding. Recommending pt resume OP Pulm rehab upon d/c. No further acute OT needs noted at this time.     Problem: Occupational Therapy  Goal: Occupational Therapy Goal  Outcome: Adequate for Care Transition

## 2023-06-16 NOTE — PROGRESS NOTES
Pulmonology/Critical Care Progress Note:    Interval events: greatly improved overnight. Improved air movement, mentation, dyspnea, and reports she is feeling ready to go home. Does still have significant intermittent anxiety, and reports she is using her prn ativan almost daily at home.     Objective:  Vitals: reviewed  General: alert, still with some accessory muscle use and pursed-lip breathing but conversant  HEENT: nonicteric, NC 02 in place, MMM  CV: RRR, no edema  Resp: improved air movement, prolonged expiratory phase but no wheezing, trace accessory muscle use; lung US exam with no effusion, no B-lines  Abd: soft, NTTP  Extr: no edema, no deformities  Neuro: intact, no deficits   24hr Intake/Output: not recorded  Lines and tubes: PIV only    Labs: Impression: no leukocytosis, mild L shift, mild thrombocytopenia, bicarb 41, no LEATHA, no gamma gap    Imaging: no new    Assessment/Plan/Recommendations:  Severe COPD with exacerbation:  Follows with [facility], saw Dr. Cunningham and Ellerman early this week. Improving on inpatient therapy and appears stable to go home with short course of abx/steroids.   -Continue triple therapy with LAMA/LABA/ICS  -continue prn duonebs at home  -continue nightly bipap, can use during the day as needed  -02 supplementation for goal sp02 88-82%  -attending pulmonary rehab, states she will go back on Monday  -she is on both Azithro MWF and roflumilast for suppressive therapy, managed by outpatient pulm.   -will need further discussion on anti-anxiety therapies, appears to be using benzo for dyspnea-induced anxiety    Thank you for the consult. Discussed with attending Dr. Gabriela Adams MD  PCCM fellow     Pt seen and examined with Pulmonary/Critical Care team and this note reviewed and validated with the following additional comments:    Improved from yesterday. Back to her baseline.  WE need to have DME supplier evaluate her home BIPAP.  Should be able to go home  today.    Sage Patrick MD  Phone [phone]

## 2023-06-16 NOTE — PLAN OF CARE
Problem: Physical Therapy  Goal: Physical Therapy Goal  Outcome: Adequate for Care Transition   Patient evaluated with OT; found seated in bedside chair on 3L O2 via NC; patient amb using RW with O2 sats dropping to 85% at 50ft and increasing to 90% and above with standing rest break and deep pursed lip breathing; continued another 50ft back to room; pt educated on safety awareness, O2 sat monitoring at home with good understanding verbalized; recommend pt resume OP Pulmonary Rehab upon d/c from hospital; pt with no further acute PT needs at this time.

## 2023-06-16 NOTE — DISCHARGE SUMMARY
"[facility]  Discharge Summary      Patient Name: Terri Phelan  MRN: [id number]  SUNDEEP: [id number]  Patient Class: IP- Inpatient  Admission Date: 6/15/2023  Hospital Length of Stay: 1 days  Discharge Date and Time:  06/16/2023 1:21 PM  Attending Physician: Mateo Carvajal III, MD   Discharging Provider: Toro Morales MD  Primary Care Provider: Sridevi Gibson MD    Primary Care Team: Networked reference to record PCT     HPI:   68 yo F w/ PMHx of COPD (PFTs April 2023), CAD (past MI), JUAN (on BiPAP nightly), and HTN presented to ED via EMS with shortness of breath onset last night. Patient's daughter at bedside. Report couldn't get her bipap to seal correctly. Per daughter, they exchanged the oxygen tank last week, but concerned over given the "skinnier" tubing.  Before that, patient was traveling, and it took a lot out of her. Pulse ox sat was reportedly 77% on RA, improved with CPAP and breathing treatment delivered en route.    In ED, initial vitals /117, HR 88, Temp 97.9 F, SpO2 100% on CPAP. Labs include CBC with stable H/H and without leukocytosis, CMP with CO2 41. Troponin negative. EKG unremarkable. Chest XRay with no acute findings, stable with previous exam. VBG with pH 7.196, PCO2 >110, PO2 51.1. Patient transitioned to BiPAP. [facility] consulted for evaluation for admission for acute hypercapnic respiratory failure.      * No surgery found *      Hospital Course:   Patient admitted to [facility] for acute hypercapnic respiratory failure. Patient transferred to the ICU. Remained on BiPAP. Repeat VBG with pH 7.389, PCO2 74.4, PO2 50. Patient transitioned to home baseline NC 6/16 AM. Patient exam greatly improved with increased air movement in bilateral lungs, able to maintain appropriate O2 sats on baseline supplemental O2. PT/OT consulted to evaluate patient. Patient medically stable for discharge at this time. Patient plan discussed with " patient. Return precautions discussed with patient. Patient verbally acknowledged understanding and agreement with plan. Patient encouraged to take medications as prescribed and to follow up closely PCP, pulmonologist, and pulmonary rehab.        Goals of Care Treatment Preferences:  Code Status: Full Code    Health care agent: Mulugeta Phelan  [facility] care agent number: [phone]    Living Will: Yes     What is most important right now is to focus on spending time at home, avoiding the hospital, remaining as independent as possible, symptom/pain control, improvement in condition but with limits to invasive therapies.  Accordingly, we have decided that the best plan to meet the patient's goals includes continuing with treatment.      Consults:     Pulmonary  * Acute hypercapnic respiratory failure  - Hx of COPD. PFTs 04/2023 with severe obstruction noted.  - Past hospitalizations for COPD exacerbations  - Initial VBG: pH 7.196, PCO2 >110, PO2 51.1  - CXR with no acute process, stable examination.  - Most recent VBG: pH 7.389, PCO2 74.4, PO2 50  - On BiPAP nightly  - On 3L NC  - Continue prednisone, azithromycin.    Oxygen dependent  Plan:  - as described in acute hypercapnic respiratory failure.    COPD exacerbation  Plan:  - as described in acute hypercapnic respiratory failure.    Cardiac/Vascular  CAD (coronary artery disease)  Plan:  - Continue home medications.      Essential hypertension  Plan:  - Continue to monitor.  - PRN if SBP > 180.    Other  Obstructive sleep apnea syndrome  Plan:  - BiPAP nightly.    Former smoker  Plan:  - Counseled patient on continued cessation.      Final Active Diagnoses:    Diagnosis Date Noted POA    PRINCIPAL PROBLEM:  Acute hypercapnic respiratory failure [J96.02] 06/15/2023 Yes    CAD (coronary artery disease) [I25.10] 09/09/2022 Yes    Oxygen dependent [Z99.81] 09/09/2022 Not Applicable    COPD exacerbation [J44.1] 09/08/2015 Yes    Former smoker [Z87.891] 09/08/2015  Not Applicable    Essential hypertension [I10] 09/08/2015 Yes    Obstructive sleep apnea syndrome [G47.33] 03/31/2015 Yes      Problems Resolved During this Admission:    Diagnosis Date Noted Date Resolved POA    Acute on chronic respiratory failure with hypoxia and hypercapnia [J96.21, J96.22] 04/24/2023 06/16/2023 Yes       Discharged Condition: stable    Disposition: Home or Self Care    Follow Up:   Follow-up Information     Kev Cox MD Follow up on 6/20/2023.    Specialty: Family Medicine  Why: 2:00pm Establish Care  Contact information:  [address] [place] [address]  [phone]                       Patient Instructions:      Diet Adult Regular     Notify your health care provider if you experience any of the following:  temperature >100.4     Notify your health care provider if you experience any of the following:  persistent nausea and vomiting or diarrhea     Notify your health care provider if you experience any of the following:  difficulty breathing or increased cough     Notify your health care provider if you experience any of the following:  severe persistent headache     Notify your health care provider if you experience any of the following:  persistent dizziness, light-headedness, or visual disturbances     Notify your health care provider if you experience any of the following:  increased confusion or weakness     Activity as tolerated       Significant Diagnostic Studies: N/A    Pending Diagnostic Studies:     None         Medications:  Reconciled Home Medications:      Medication List      START taking these medications    ondansetron 4 MG Tbdl  Commonly known as: ZOFRAN-ODT  Take 1 tablet (4 mg total) by mouth every 12 (twelve) hours as needed (nausea).        CHANGE how you take these medications    * azithromycin 250 MG tablet  Commonly known as: Z-OLY  Take 1 tablet (250 mg total) by mouth 3 (three) times a week.  What changed: Another medication with the same name  was added. Make sure you understand how and when to take each.     * azithromycin 250 MG tablet  Commonly known as: Z-OLY  Take 1 tablet (250 mg total) by mouth once daily. for 7 days  What changed: You were already taking a medication with the same name, and this prescription was added. Make sure you understand how and when to take each.         * This list has 2 medication(s) that are the same as other medications prescribed for you. Read the directions carefully, and ask your doctor or other care provider to review them with you.            CONTINUE taking these medications    acyclovir 400 MG tablet  Commonly known as: ZOVIRAX  TAKE 1 TABLET BY MOUTH THREE TIMES DAILY FOR 10 DAYS AS NEEDED FOR OUTBREAK     albuterol 90 mcg/actuation inhaler  Commonly known as: PROVENTIL/VENTOLIN HFA  Inhale 2 puffs into the lungs every 6 (six) hours as needed.     albuterol-ipratropium 2.5 mg-0.5 mg/3 mL nebulizer solution  Commonly known as: DUO-NEB  Take by nebulization every 4 (four) hours.     alendronate 35 MG tablet  Commonly known as: FOSAMAX  Take 35 mg by mouth every Tuesday.     aspirin 81 MG Chew  Take 81 mg by mouth once daily.     BREZTRI AEROSPHERE 160-9-4.8 mcg/actuation Hfaa  Generic drug: budesonide-glycopyr-formoterol  Inhale 2 puffs into the lungs 2 (two) times daily.     citalopram 10 MG tablet  Commonly known as: CeleXA  Take 10 mg by mouth once daily.     DALIRESP 500 mcg Tab  Generic drug: roflumilast  Take 500 mcg by mouth once daily.     dextromethorphan-guaiFENesin  mg  mg per 12 hr tablet  Commonly known as: MUCINEX DM  Take 1 tablet by mouth every 12 (twelve) hours.     dicyclomine 20 mg tablet  Commonly known as: BENTYL  Take 1 tablet (20 mg total) by mouth 3 (three) times daily as needed (abdominal pain).     ergocalciferol 50,000 unit Cap  Commonly known as: ERGOCALCIFEROL  Take 50,000 Units by mouth every Tuesday.     fluticasone propionate 50 mcg/actuation nasal spray  Commonly known  as: FLONASE  1 spray by Each Nostril route daily as needed for Allergies.     LORazepam 0.5 MG tablet  Commonly known as: ATIVAN  Take 1 tablet (0.5 mg total) by mouth every 8 (eight) hours as needed for Anxiety.     OXYGEN-AIR DELIVERY SYSTEMS MISC  3 L by Nasal route.     predniSONE 20 MG tablet  Commonly known as: DELTASONE  Take 2 tablets (40 mg total) by mouth once daily. for 7 days     traZODone 50 MG tablet  Commonly known as: DESYREL  Take 1 tablet (50 mg total) by mouth nightly as needed for Insomnia.            Indwelling Lines/Drains at time of discharge:   Lines/Drains/Airways     None               ________________________  Toro Morales MD  [facility] Family [facility] PGY-1

## 2023-06-19 ENCOUNTER — TELEPHONE (OUTPATIENT)
Dept: ADMINISTRATIVE | Facility: CLINIC | Age: 68
End: 2023-06-19
Payer: MEDICARE

## 2023-06-19 NOTE — PROGRESS NOTES
"Phoned patient in response to reply of "2" to post-discharge texting tracker. Spoke with Ms. Terri Phelan and her daughter Mulugeta who state that the patient was not allowed to get the prescription for lorazepam filled until 6/22. Per fill history, the prescription is due to be filled now. Offered to call [facility] and spoke to Alba who said that the person who turned her away may not have been aware that the patient's previous fill was for only a 10 days supply. She stated that the patient could come in to have filled today. Called Ms. Phelan back to let her know that Alba at [facility] stated that the lorazepam prescription will be filled today. She verbalized understanding.    "

## 2023-06-20 ENCOUNTER — OFFICE VISIT (OUTPATIENT)
Dept: INTERNAL MEDICINE | Facility: CLINIC | Age: 68
End: 2023-06-20
Payer: MEDICARE

## 2023-06-20 VITALS
OXYGEN SATURATION: 88 % | DIASTOLIC BLOOD PRESSURE: 52 MMHG | HEIGHT: 66 IN | BODY MASS INDEX: 30.36 KG/M2 | SYSTOLIC BLOOD PRESSURE: 134 MMHG | RESPIRATION RATE: 20 BRPM | TEMPERATURE: 98 F | WEIGHT: 188.94 LBS | HEART RATE: 99 BPM

## 2023-06-20 DIAGNOSIS — I25.10 CORONARY ARTERY DISEASE, UNSPECIFIED VESSEL OR LESION TYPE, UNSPECIFIED WHETHER ANGINA PRESENT, UNSPECIFIED WHETHER NATIVE OR TRANSPLANTED HEART: ICD-10-CM

## 2023-06-20 DIAGNOSIS — I10 ESSENTIAL HYPERTENSION: ICD-10-CM

## 2023-06-20 DIAGNOSIS — J44.1 COPD EXACERBATION: ICD-10-CM

## 2023-06-20 DIAGNOSIS — Z76.89 ENCOUNTER TO ESTABLISH CARE: Primary | ICD-10-CM

## 2023-06-20 DIAGNOSIS — Z99.81 OXYGEN DEPENDENT: ICD-10-CM

## 2023-06-20 PROBLEM — Z01.818 ENCOUNTER FOR OTHER PREPROCEDURAL EXAMINATION: Status: RESOLVED | Noted: 2022-07-06 | Resolved: 2023-06-20

## 2023-06-20 PROCEDURE — 3044F HG A1C LEVEL LT 7.0%: CPT | Mod: CPTII,S$GLB,, | Performed by: FAMILY MEDICINE

## 2023-06-20 PROCEDURE — 1159F PR MEDICATION LIST DOCUMENTED IN MEDICAL RECORD: ICD-10-PCS | Mod: CPTII,S$GLB,, | Performed by: FAMILY MEDICINE

## 2023-06-20 PROCEDURE — 99999 PR PBB SHADOW E&M-EST. PATIENT-LVL V: CPT | Mod: PBBFAC,,, | Performed by: FAMILY MEDICINE

## 2023-06-20 PROCEDURE — 99999 PR PBB SHADOW E&M-EST. PATIENT-LVL V: ICD-10-PCS | Mod: PBBFAC,,, | Performed by: FAMILY MEDICINE

## 2023-06-20 PROCEDURE — 1100F PTFALLS ASSESS-DOCD GE2>/YR: CPT | Mod: CPTII,S$GLB,, | Performed by: FAMILY MEDICINE

## 2023-06-20 PROCEDURE — 99387 PR PREVENTIVE VISIT,NEW,65 & OVER: ICD-10-PCS | Mod: S$GLB,,, | Performed by: FAMILY MEDICINE

## 2023-06-20 PROCEDURE — 3044F PR MOST RECENT HEMOGLOBIN A1C LEVEL <7.0%: ICD-10-PCS | Mod: CPTII,S$GLB,, | Performed by: FAMILY MEDICINE

## 2023-06-20 PROCEDURE — 3008F BODY MASS INDEX DOCD: CPT | Mod: CPTII,S$GLB,, | Performed by: FAMILY MEDICINE

## 2023-06-20 PROCEDURE — 1126F PR PAIN SEVERITY QUANTIFIED, NO PAIN PRESENT: ICD-10-PCS | Mod: CPTII,S$GLB,, | Performed by: FAMILY MEDICINE

## 2023-06-20 PROCEDURE — 3288F PR FALLS RISK ASSESSMENT DOCUMENTED: ICD-10-PCS | Mod: CPTII,S$GLB,, | Performed by: FAMILY MEDICINE

## 2023-06-20 PROCEDURE — 1160F RVW MEDS BY RX/DR IN RCRD: CPT | Mod: CPTII,S$GLB,, | Performed by: FAMILY MEDICINE

## 2023-06-20 PROCEDURE — 3078F DIAST BP <80 MM HG: CPT | Mod: CPTII,S$GLB,, | Performed by: FAMILY MEDICINE

## 2023-06-20 PROCEDURE — 3288F FALL RISK ASSESSMENT DOCD: CPT | Mod: CPTII,S$GLB,, | Performed by: FAMILY MEDICINE

## 2023-06-20 PROCEDURE — 3075F PR MOST RECENT SYSTOLIC BLOOD PRESS GE 130-139MM HG: ICD-10-PCS | Mod: CPTII,S$GLB,, | Performed by: FAMILY MEDICINE

## 2023-06-20 PROCEDURE — 99387 INIT PM E/M NEW PAT 65+ YRS: CPT | Mod: S$GLB,,, | Performed by: FAMILY MEDICINE

## 2023-06-20 PROCEDURE — 3078F PR MOST RECENT DIASTOLIC BLOOD PRESSURE < 80 MM HG: ICD-10-PCS | Mod: CPTII,S$GLB,, | Performed by: FAMILY MEDICINE

## 2023-06-20 PROCEDURE — 3075F SYST BP GE 130 - 139MM HG: CPT | Mod: CPTII,S$GLB,, | Performed by: FAMILY MEDICINE

## 2023-06-20 PROCEDURE — 1100F PR PT FALLS ASSESS DOC 2+ FALLS/FALL W/INJURY/YR: ICD-10-PCS | Mod: CPTII,S$GLB,, | Performed by: FAMILY MEDICINE

## 2023-06-20 PROCEDURE — 1159F MED LIST DOCD IN RCRD: CPT | Mod: CPTII,S$GLB,, | Performed by: FAMILY MEDICINE

## 2023-06-20 PROCEDURE — 3008F PR BODY MASS INDEX (BMI) DOCUMENTED: ICD-10-PCS | Mod: CPTII,S$GLB,, | Performed by: FAMILY MEDICINE

## 2023-06-20 PROCEDURE — 1160F PR REVIEW ALL MEDS BY PRESCRIBER/CLIN PHARMACIST DOCUMENTED: ICD-10-PCS | Mod: CPTII,S$GLB,, | Performed by: FAMILY MEDICINE

## 2023-06-20 PROCEDURE — 1126F AMNT PAIN NOTED NONE PRSNT: CPT | Mod: CPTII,S$GLB,, | Performed by: FAMILY MEDICINE

## 2023-06-20 NOTE — Clinical Note
Hi Dr. Cunningham, I just saw Ms. Phelan with her daughter to establish care.  I question if the patient fully understands the extent of her COPD.  She reports difficulty in going to outpatient pulmonary rehab.  She questions the possibility of inpatient rehab if possible.  I did offer home health but she adamantly refused. Emigdio

## 2023-06-20 NOTE — PROGRESS NOTES
Subjective     Patient ID: Terri Phelan is a 67 y.o. female.    Chief Complaint: Establish Care    HPI 67-year-old  female with COPD on home oxygen, coronary artery disease, sleep apnea on BiPAP, and hypertension presents to clinic today accompanied by her daughter in order to establish care.  She has had multiple recent hospitalizations secondary to COPD exacerbations and was most recently admitted on Marisol 15, 2023 secondary to shortness of breath which resulted from difficulty obtaining a proper BiPAP seal.  The patient was admitted to the ICU and symptoms improved after being placed on BiPAP.  She has since followed up with pulmonology who has started further treatment with  - start duonebs BID    - Breztri 2 puffs BID    - Azithromycin every other day    - Roflumilast daily    - Prednisone 40mg qd x 5 days for exacerbations    - BiPAP nightly    - Albuterol PRN    - pulmonary rehab as much as tolerated    At this time the patient reports substantial difficulty going to outpatient pulmonary rehab and is interested in possible inpatient care.  I have discussed the potential of home health; however, the patient's previous experience with home health is that she needs further care and a did not provide enough face-to-face time.  Review of Systems   Constitutional:  Negative for appetite change, chills, fatigue and fever.   HENT:  Negative for nasal congestion, ear pain, hearing loss, postnasal drip, rhinorrhea, sinus pressure/congestion, sore throat and tinnitus.    Eyes:  Negative for redness, itching and visual disturbance.   Respiratory:  Negative for cough, chest tightness and shortness of breath.    Cardiovascular:  Negative for chest pain and palpitations.   Gastrointestinal:  Negative for abdominal pain, constipation, diarrhea, nausea and vomiting.   Genitourinary:  Negative for decreased urine volume, difficulty urinating, dysuria, frequency, hematuria and urgency.   Musculoskeletal:   Negative for back pain, myalgias, neck pain and neck stiffness.   Integumentary:  Negative for rash.   Neurological:  Negative for dizziness, light-headedness and headaches.   Psychiatric/Behavioral: Negative.          Objective     Physical Exam  Vitals and nursing note reviewed.   Constitutional:       General: She is not in acute distress.     Appearance: She is well-developed. She is not diaphoretic.   HENT:      Head: Normocephalic and atraumatic.      Right Ear: External ear normal.      Left Ear: External ear normal.      Nose: Nose normal.      Mouth/Throat:      Pharynx: No oropharyngeal exudate.   Eyes:      General: No scleral icterus.        Right eye: No discharge.         Left eye: No discharge.      Conjunctiva/sclera: Conjunctivae normal.      Pupils: Pupils are equal, round, and reactive to light.   Neck:      Thyroid: No thyromegaly.      Vascular: No JVD.      Trachea: No tracheal deviation.   Cardiovascular:      Rate and Rhythm: Normal rate and regular rhythm.      Heart sounds: Normal heart sounds. No murmur heard.    No friction rub. No gallop.   Pulmonary:      Effort: Pulmonary effort is normal. No respiratory distress.      Breath sounds: Normal breath sounds. Decreased air movement present. No stridor. No wheezing or rales.      Comments: Oxygen-dependent  Abdominal:      General: Bowel sounds are normal. There is no distension.      Palpations: Abdomen is soft. There is no mass.      Tenderness: There is no abdominal tenderness. There is no guarding or rebound.   Musculoskeletal:         General: No tenderness. Normal range of motion.      Cervical back: Normal range of motion and neck supple.   Lymphadenopathy:      Cervical: No cervical adenopathy.   Skin:     General: Skin is warm and dry.      Coloration: Skin is not pale.      Findings: No erythema or rash.   Neurological:      Mental Status: She is alert and oriented to person, place, and time.   Psychiatric:         Behavior:  Behavior normal.         Thought Content: Thought content normal.         Judgment: Judgment normal.          Assessment and Plan     1. Encounter to establish care    2. COPD exacerbation    3. Oxygen dependent    4. Coronary artery disease, unspecified vessel or lesion type, unspecified whether angina present, unspecified whether native or transplanted heart  -     Lipid Panel; Future; Expected date: 06/20/2023    5. Essential hypertension        1. Recommend fasting cholesterol level.    2. Continue all COPD medications as prescribed and continue follow-up with pulmonology as scheduled.    3. Hypertension is well controlled off medication.    4. Return to clinic as needed or in 3 months for general follow-up.             Follow up in about 3 months (around 9/20/2023), or if symptoms worsen or fail to improve, for General follow up/CAD.

## 2023-06-21 ENCOUNTER — PATIENT MESSAGE (OUTPATIENT)
Dept: INTERNAL MEDICINE | Facility: CLINIC | Age: 68
End: 2023-06-21
Payer: MEDICARE

## 2023-06-30 DIAGNOSIS — J44.1 COPD EXACERBATION: ICD-10-CM

## 2023-06-30 RX ORDER — LORAZEPAM 0.5 MG/1
0.5 TABLET ORAL EVERY 8 HOURS PRN
Qty: 30 TABLET | Refills: 0 | OUTPATIENT
Start: 2023-06-30 | End: 2023-07-10

## 2023-06-30 NOTE — TELEPHONE ENCOUNTER
----- Message from Mariah Jefferson sent at 6/30/2023  3:40 PM CDT -----  Contact: [phone]  Requesting an RX refill or new RX.  Is this a refill or new RX: Refill  RX name and strength (LORazepam (ATIVAN) 0.5 MG tablet):    Is this a 30 day or 90 day RX: 30  Pharmacy name and phone # (  [id number] - [place]  [facility] AT [facility] & [address]  [place] [address]  Phone: [phone] Fax: [phone]

## 2023-06-30 NOTE — TELEPHONE ENCOUNTER
No care due was identified.  [facility] Embedded Care Due Messages. Reference number: [id number].   6/30/2023 3:47:38 PM CDT

## 2023-07-01 ENCOUNTER — HOSPITAL ENCOUNTER (INPATIENT)
Facility: HOSPITAL | Age: 68
LOS: 4 days | Discharge: HOME HEALTH | DRG: 189 | End: 2023-07-06
Attending: EMERGENCY MEDICINE | Admitting: FAMILY MEDICINE
Payer: MEDICARE

## 2023-07-01 DIAGNOSIS — R06.02 SHORTNESS OF BREATH: ICD-10-CM

## 2023-07-01 DIAGNOSIS — J96.02 ACUTE HYPERCAPNIC RESPIRATORY FAILURE: Primary | ICD-10-CM

## 2023-07-01 DIAGNOSIS — J44.1 COPD EXACERBATION: ICD-10-CM

## 2023-07-01 DIAGNOSIS — J44.9 COPD (CHRONIC OBSTRUCTIVE PULMONARY DISEASE): ICD-10-CM

## 2023-07-01 PROBLEM — F32.9 MAJOR DEPRESSION: Status: ACTIVE | Noted: 2023-07-01

## 2023-07-01 LAB
ALBUMIN SERPL BCP-MCNC: 3.7 G/DL (ref 3.5–5.2)
ALLENS TEST: ABNORMAL
ALP SERPL-CCNC: 50 U/L (ref 55–135)
ALT SERPL W/O P-5'-P-CCNC: 15 U/L (ref 10–44)
ANION GAP SERPL CALC-SCNC: 11 MMOL/L (ref 8–16)
AST SERPL-CCNC: 27 U/L (ref 10–40)
BILIRUB SERPL-MCNC: 0.3 MG/DL (ref 0.1–1)
BNP SERPL-MCNC: 10 PG/ML (ref 0–99)
BUN SERPL-MCNC: 11 MG/DL (ref 8–23)
CALCIUM SERPL-MCNC: 9.1 MG/DL (ref 8.7–10.5)
CHLORIDE SERPL-SCNC: 95 MMOL/L (ref 95–110)
CO2 SERPL-SCNC: 39 MMOL/L (ref 23–29)
CREAT SERPL-MCNC: 0.7 MG/DL (ref 0.5–1.4)
DELSYS: ABNORMAL
ERYTHROCYTE [DISTWIDTH] IN BLOOD BY AUTOMATED COUNT: 13.2 % (ref 11.5–14.5)
ERYTHROCYTE [SEDIMENTATION RATE] IN BLOOD BY WESTERGREN METHOD: 12 MM/H
EST. GFR  (NO RACE VARIABLE): >60 ML/MIN/1.73 M^2
FIO2: 40
GLUCOSE SERPL-MCNC: 110 MG/DL (ref 70–110)
HCO3 UR-SCNC: 46.2 MMOL/L (ref 24–28)
HCT VFR BLD AUTO: 45.7 % (ref 37–48.5)
HGB BLD-MCNC: 12.9 G/DL (ref 12–16)
MCH RBC QN AUTO: 27.9 PG (ref 27–31)
MCHC RBC AUTO-ENTMCNC: 28.2 G/DL (ref 32–36)
MCV RBC AUTO: 99 FL (ref 82–98)
MIN VOL: 7.4
MODE: ABNORMAL
PCO2 BLDA: 103.4 MMHG (ref 35–45)
PH SMN: 7.26 [PH] (ref 7.35–7.45)
PLATELET # BLD AUTO: 116 K/UL (ref 150–450)
PMV BLD AUTO: 11.3 FL (ref 9.2–12.9)
PO2 BLDA: 68 MMHG (ref 80–100)
POC BE: 19 MMOL/L
POC SATURATED O2: 88 % (ref 95–100)
POC TCO2: 49 MMOL/L (ref 23–27)
POTASSIUM SERPL-SCNC: 4.6 MMOL/L (ref 3.5–5.1)
PROT SERPL-MCNC: 7.6 G/DL (ref 6–8.4)
RBC # BLD AUTO: 4.63 M/UL (ref 4–5.4)
SAMPLE: ABNORMAL
SITE: ABNORMAL
SODIUM SERPL-SCNC: 145 MMOL/L (ref 136–145)
SP02: 96
SPONT RATE: 9
TROPONIN I SERPL DL<=0.01 NG/ML-MCNC: <0.006 NG/ML (ref 0–0.03)
WBC # BLD AUTO: 7.67 K/UL (ref 3.9–12.7)

## 2023-07-01 PROCEDURE — 63600175 PHARM REV CODE 636 W HCPCS: Performed by: FAMILY MEDICINE

## 2023-07-01 PROCEDURE — 93010 EKG 12-LEAD: ICD-10-PCS | Mod: ,,, | Performed by: INTERNAL MEDICINE

## 2023-07-01 PROCEDURE — 94640 AIRWAY INHALATION TREATMENT: CPT | Mod: XB

## 2023-07-01 PROCEDURE — 27000221 HC OXYGEN, UP TO 24 HOURS

## 2023-07-01 PROCEDURE — 82803 BLOOD GASES ANY COMBINATION: CPT

## 2023-07-01 PROCEDURE — 99285 EMERGENCY DEPT VISIT HI MDM: CPT | Mod: 25

## 2023-07-01 PROCEDURE — 94660 CPAP INITIATION&MGMT: CPT

## 2023-07-01 PROCEDURE — 83880 ASSAY OF NATRIURETIC PEPTIDE: CPT | Performed by: EMERGENCY MEDICINE

## 2023-07-01 PROCEDURE — 84484 ASSAY OF TROPONIN QUANT: CPT | Performed by: EMERGENCY MEDICINE

## 2023-07-01 PROCEDURE — 93010 ELECTROCARDIOGRAM REPORT: CPT | Mod: ,,, | Performed by: INTERNAL MEDICINE

## 2023-07-01 PROCEDURE — G0378 HOSPITAL OBSERVATION PER HR: HCPCS

## 2023-07-01 PROCEDURE — 99900035 HC TECH TIME PER 15 MIN (STAT)

## 2023-07-01 PROCEDURE — 27000190 HC CPAP FULL FACE MASK W/VALVE

## 2023-07-01 PROCEDURE — 94761 N-INVAS EAR/PLS OXIMETRY MLT: CPT

## 2023-07-01 PROCEDURE — 93005 ELECTROCARDIOGRAM TRACING: CPT

## 2023-07-01 PROCEDURE — 85027 COMPLETE CBC AUTOMATED: CPT | Performed by: EMERGENCY MEDICINE

## 2023-07-01 PROCEDURE — 25000242 PHARM REV CODE 250 ALT 637 W/ HCPCS: Performed by: EMERGENCY MEDICINE

## 2023-07-01 PROCEDURE — 36600 WITHDRAWAL OF ARTERIAL BLOOD: CPT

## 2023-07-01 PROCEDURE — 80053 COMPREHEN METABOLIC PANEL: CPT | Performed by: EMERGENCY MEDICINE

## 2023-07-01 RX ORDER — IPRATROPIUM BROMIDE AND ALBUTEROL SULFATE 2.5; .5 MG/3ML; MG/3ML
3 SOLUTION RESPIRATORY (INHALATION)
Status: COMPLETED | OUTPATIENT
Start: 2023-07-01 | End: 2023-07-01

## 2023-07-01 RX ORDER — IPRATROPIUM BROMIDE 0.5 MG/2.5ML
0.5 SOLUTION RESPIRATORY (INHALATION) EVERY 4 HOURS
Status: DISCONTINUED | OUTPATIENT
Start: 2023-07-02 | End: 2023-07-01

## 2023-07-01 RX ORDER — ALBUTEROL SULFATE 2.5 MG/.5ML
2.5 SOLUTION RESPIRATORY (INHALATION) EVERY 4 HOURS
Status: DISCONTINUED | OUTPATIENT
Start: 2023-07-02 | End: 2023-07-06

## 2023-07-01 RX ORDER — ERGOCALCIFEROL 1.25 MG/1
50000 CAPSULE ORAL
Status: DISCONTINUED | OUTPATIENT
Start: 2023-07-04 | End: 2023-07-06 | Stop reason: HOSPADM

## 2023-07-01 RX ORDER — CITALOPRAM 10 MG/1
10 TABLET ORAL DAILY
Status: DISCONTINUED | OUTPATIENT
Start: 2023-07-02 | End: 2023-07-06 | Stop reason: HOSPADM

## 2023-07-01 RX ORDER — IPRATROPIUM BROMIDE 0.5 MG/2.5ML
0.5 SOLUTION RESPIRATORY (INHALATION) EVERY 4 HOURS
Status: DISCONTINUED | OUTPATIENT
Start: 2023-07-02 | End: 2023-07-06

## 2023-07-01 RX ORDER — NAPROXEN SODIUM 220 MG/1
81 TABLET, FILM COATED ORAL DAILY
Status: DISCONTINUED | OUTPATIENT
Start: 2023-07-02 | End: 2023-07-06 | Stop reason: HOSPADM

## 2023-07-01 RX ORDER — TALC
6 POWDER (GRAM) TOPICAL NIGHTLY PRN
Status: DISCONTINUED | OUTPATIENT
Start: 2023-07-01 | End: 2023-07-06 | Stop reason: HOSPADM

## 2023-07-01 RX ORDER — TRAZODONE HYDROCHLORIDE 50 MG/1
50 TABLET ORAL NIGHTLY PRN
Status: DISCONTINUED | OUTPATIENT
Start: 2023-07-01 | End: 2023-07-06 | Stop reason: HOSPADM

## 2023-07-01 RX ORDER — LORAZEPAM 2 MG/ML
0.5 INJECTION INTRAMUSCULAR EVERY 8 HOURS PRN
Status: DISCONTINUED | OUTPATIENT
Start: 2023-07-01 | End: 2023-07-06 | Stop reason: HOSPADM

## 2023-07-01 RX ORDER — HYDRALAZINE HYDROCHLORIDE 20 MG/ML
10 INJECTION INTRAMUSCULAR; INTRAVENOUS EVERY 6 HOURS PRN
Status: DISCONTINUED | OUTPATIENT
Start: 2023-07-01 | End: 2023-07-06 | Stop reason: HOSPADM

## 2023-07-01 RX ORDER — ENOXAPARIN SODIUM 100 MG/ML
40 INJECTION SUBCUTANEOUS EVERY 24 HOURS
Status: DISCONTINUED | OUTPATIENT
Start: 2023-07-01 | End: 2023-07-02

## 2023-07-01 RX ORDER — LEVALBUTEROL 1.25 MG/.5ML
1.25 SOLUTION, CONCENTRATE RESPIRATORY (INHALATION) EVERY 8 HOURS
Status: DISCONTINUED | OUTPATIENT
Start: 2023-07-02 | End: 2023-07-01

## 2023-07-01 RX ORDER — ACETAMINOPHEN 325 MG/1
650 TABLET ORAL EVERY 8 HOURS PRN
Status: DISCONTINUED | OUTPATIENT
Start: 2023-07-01 | End: 2023-07-01

## 2023-07-01 RX ORDER — CITALOPRAM 10 MG/1
10 TABLET ORAL DAILY
Status: DISCONTINUED | OUTPATIENT
Start: 2023-07-02 | End: 2023-07-01

## 2023-07-01 RX ORDER — SODIUM CHLORIDE 0.9 % (FLUSH) 0.9 %
3 SYRINGE (ML) INJECTION
Status: DISCONTINUED | OUTPATIENT
Start: 2023-07-01 | End: 2023-07-06 | Stop reason: HOSPADM

## 2023-07-01 RX ORDER — IPRATROPIUM BROMIDE AND ALBUTEROL SULFATE 2.5; .5 MG/3ML; MG/3ML
3 SOLUTION RESPIRATORY (INHALATION) EVERY 4 HOURS
Status: DISCONTINUED | OUTPATIENT
Start: 2023-07-02 | End: 2023-07-01

## 2023-07-01 RX ORDER — ACETAMINOPHEN 325 MG/1
650 TABLET ORAL EVERY 4 HOURS PRN
Status: DISCONTINUED | OUTPATIENT
Start: 2023-07-01 | End: 2023-07-06 | Stop reason: HOSPADM

## 2023-07-01 RX ORDER — PREDNISONE 20 MG/1
40 TABLET ORAL DAILY
Status: DISCONTINUED | OUTPATIENT
Start: 2023-07-02 | End: 2023-07-01

## 2023-07-01 RX ORDER — ROFLUMILAST 500 UG/1
500 TABLET ORAL DAILY
Status: DISCONTINUED | OUTPATIENT
Start: 2023-07-02 | End: 2023-07-06 | Stop reason: HOSPADM

## 2023-07-01 RX ADMIN — IPRATROPIUM BROMIDE AND ALBUTEROL SULFATE 3 ML: .5; 3 SOLUTION RESPIRATORY (INHALATION) at 06:07

## 2023-07-01 RX ADMIN — METHYLPREDNISOLONE SODIUM SUCCINATE 40 MG: 40 INJECTION INTRAMUSCULAR; INTRAVENOUS at 11:07

## 2023-07-01 RX ADMIN — ALBUTEROL SULFATE 2.5 MG: 2.5 SOLUTION RESPIRATORY (INHALATION) at 11:07

## 2023-07-01 RX ADMIN — IPRATROPIUM BROMIDE 0.5 MG: 0.5 SOLUTION RESPIRATORY (INHALATION) at 11:07

## 2023-07-01 NOTE — ED PROVIDER NOTES
Emergency Department Encounter  Provider Note    Terri Phelan  [id number]  2023    Evaluation:    History:     Chief Complaint   Patient presents with    COPD     Brought to from home by [facility]E for COPD exacerbation. Pt was 74% on 3L. Duo neb and  125mg solumedrol en route. Currently 94% on 4L         History of Present Illness:  Terri Phelan is a 67 y.o. female who has a past medical history of COPD (chronic obstructive pulmonary disease) and Hypertension.    The patient presents to the ED due to shortness of breath.    Patient presents via EMS.  EMS reports patient has history of COPD and is currently on home oxygen.  Initial O2 94% on 3 liters nasal cannula.  Patient was given a DuoNeb 125 milligram Solu-Medrol EN route.    On arrival, patient is somnolent but arousable. She appears somewhat confused.    Family states she has history of COPD and should be on CPAP at night at home, but doesn't use it regularly.   Last time she presented this way she was hypercapneic and required BiPAP and admission to the ICU.     Past Medical History:   Diagnosis Date    COPD (chronic obstructive pulmonary disease)     Hypertension      Past Surgical History:   Procedure Laterality Date     SECTION      HERNIA REPAIR      HYSTERECTOMY       History reviewed. No pertinent family history.  Social History     Socioeconomic History    Marital status: Single   Tobacco Use    Smoking status: Former     Packs/day: 1.00     Years: 20.00     Pack years: 20.00     Types: Cigarettes    Smokeless tobacco: Never   Substance and Sexual Activity    Alcohol use: Not Currently     Alcohol/week: 1.0 standard drink     Types: 1 Glasses of wine per week    Drug use: No    Sexual activity: Not Currently     Social Determinants of Health     Transportation Needs: No Transportation Needs    Lack of Transportation (Medical): No    Lack of Transportation (Non-Medical): No   Physical Activity: Sufficiently Active    Days of Exercise per  Week: 3 days    Minutes of Exercise per Session: 60 min   Social Connections: Unknown    Marital Status:    Housing Stability: Low Risk     Unable to Pay for Housing in the Last Year: No    Number of Places Lived in the Last Year: 2    Unstable Housing in the Last Year: No     Review of patient's allergies indicates:   Allergen Reactions    Codeine Nausea And Vomiting    Morphine Other (See Comments)       Review of Systems   Respiratory:  Positive for shortness of breath.    Psychiatric/Behavioral:  Positive for confusion.      Physical Exam:     Initial Vitals [07/01/23 1735]   BP Pulse Resp Temp SpO2   (!) 170/60 100 (!) 32 98.2 °F (36.8 °C) (!) 94 %      MAP       --         Physical Exam    Constitutional: She appears well-developed and well-nourished. She is not diaphoretic. She is sleeping.  Non-toxic appearance. She appears ill. No distress.   Somnolent, arousable but confused.    HENT:   Head: Normocephalic and atraumatic.   Eyes: Conjunctivae and EOM are normal. Pupils are equal, round, and reactive to light.   Cardiovascular:  Normal rate and regular rhythm.     Exam reveals no decreased pulses.       Pulmonary/Chest: Tachypnea noted. No respiratory distress. She has decreased breath sounds.   Abdominal: Abdomen is protuberant. There is no abdominal tenderness.     Neurological: She is disoriented. No sensory deficit. She exhibits normal muscle tone. GCS eye subscore is 4. GCS verbal subscore is 3. GCS motor subscore is 6.   Skin: Skin is warm and dry.   Psychiatric: Her affect is blunt. Her speech is delayed. She is inattentive.       ED Course:   Critical Care    Date/Time: 7/1/2023 8:00 PM  Performed by: Ashwin Esqueda MD  Authorized by: Ashwin Esqueda MD   Total critical care time (exclusive of procedural time) : 60 minutes  Critical care was necessary to treat or prevent imminent or life-threatening deterioration of the following conditions: respiratory failure.        Medical Decision  Making:    History Acquisition:   Additional historians utilized:  EMS report, see HPI    Prior medical records were reviewed:   Admitted 06/15 to 6/16 for COPD exacerbation.  Ultimately sent to the ICU on BiPAP. Improved and was discharged home.    The patient's list of active medical problems, social history, medications, and allergies as documented has been reviewed.     Differential Diagnoses:   Based on available information and initial assessment, Differential Diagnosis includes, but is not limited to:  PE, MI/ACS, pneumothorax, pericardial effusion/tamonade, pneumonia, lung abscess, pericarditis/myocarditis, pleural effusion, lung mass, CHF exacerbation, asthma exacerbation, COPD exacerbation, aspirated/ingested foreign body, airway obstruction, CO poisoning, anemia, metabolic derangement, allergy/atopy, influenza, viral URI, viral syndrome.        EKG:   EKG interpretation by ED attending physician:  Sinus tach, rate 109, no ST changes, no ischemia, normal intervals.  Compared with prior EKG dated 06/2023, grossly stable without significant change.      Labs:     Labs Reviewed   CBC WITHOUT DIFFERENTIAL - Abnormal; Notable for the following components:       Result Value    MCV 99 (*)     MCHC 28.2 (*)     Platelets 116 (*)     All other components within normal limits   COMPREHENSIVE METABOLIC PANEL - Abnormal; Notable for the following components:    CO2 39 (*)     Alkaline Phosphatase 50 (*)     All other components within normal limits   ISTAT PROCEDURE - Abnormal; Notable for the following components:    POC PH 7.258 (*)     POC PCO2 103.4 (*)     POC PO2 68 (*)     POC HCO3 46.2 (*)     POC SATURATED O2 88 (*)     POC TCO2 49 (*)     All other components within normal limits   TROPONIN I   B-TYPE NATRIURETIC PEPTIDE     Independent review of the labs ordered include:   See ED course    Imaging:     Imaging Results              X-Ray Chest AP Portable (Final result)  Result time 07/01/23 19:06:05       Final result by Patricio Garg MD (07/01/23 19:06:05)                   Impression:      No acute process.      Electronically signed by: Patricio Garg MD  Date:    07/01/2023  Time:    19:06               Narrative:    EXAMINATION:  XR CHEST AP PORTABLE    CLINICAL HISTORY:  shortness of breath, COPD;    TECHNIQUE:  Single frontal view of the chest was performed.    COMPARISON:  06/15/2023.    FINDINGS:  Monitoring EKG leads are present.  The trachea is unremarkable.  There are calcifications of the aortic knob.  The cardiomediastinal silhouette is stable.  There is no evidence of free air beneath the hemidiaphragms.  There are no pleural effusions.  There is no evidence of a pneumothorax.  No airspace opacity is present.  There are degenerative changes in the osseous structures.                                         Additional Consideration:   Additional testing considered during clinical course: none    Social determinants of health considered during development of treatment plan include: poor access to care, prior alcohol/tobacco abuse    Current co-morbidities considered which impacted clinical decision making: COPD, CAD, HTN    Case discussed with additional provider: none    Medications   albuterol sulfate nebulizer solution 2.5 mg (2.5 mg Nebulization Given 7/2/23 1531)     And   ipratropium 0.02 % nebulizer solution 0.5 mg (0.5 mg Nebulization Given 7/2/23 1531)   aspirin chewable tablet 81 mg (81 mg Oral Given 7/2/23 0937)   ergocalciferol capsule 50,000 Units (has no administration in time range)   roflumilast (DALIRESP) tablet 500 mcg (500 mcg Oral Given 7/2/23 0937)   acetaminophen tablet 650 mg (has no administration in time range)   hydrALAZINE injection 10 mg (has no administration in time range)   LORazepam injection 0.5 mg (has no administration in time range)   melatonin tablet 6 mg (has no administration in time range)   traZODone tablet 50 mg (has no administration in time range)    budesonide-glycopyr-formoterol 160-9-4.8 mcg/actuation HFAA 2 puff (2 puffs Inhalation Given 7/2/23 1129)   citalopram tablet 10 mg (10 mg Oral Given 7/2/23 0937)   sodium chloride 0.9% flush 3 mL (has no administration in time range)   methylPREDNISolone sodium succinate injection 40 mg (40 mg Intravenous Given 7/2/23 1402)   enoxaparin injection 40 mg (40 mg Subcutaneous Given 7/2/23 1651)   albuterol-ipratropium 2.5 mg-0.5 mg/3 mL nebulizer solution 3 mL (3 mLs Nebulization Given 7/1/23 1826)   enoxaparin injection 40 mg (40 mg Subcutaneous Given 7/2/23 0501)   iohexoL (OMNIPAQUE 350) injection 100 mL (100 mLs Intravenous Given 7/2/23 0559)        ED Course as of 07/02/23 1851   Sat Jul 01, 2023 1913 SpO2(!): 94 % [SS]   1913 Resp(!): 32 [SS]   1913 Pulse: 100 [SS]   1913 Temp: 98.2 °F (36.8 °C) [SS]   1913 BP(!): 170/60  Mildly tachypneic on arrival, shallow respirations, O2 sat 90s on 4 liters nasal cannula [SS]   1913 CBC Without Differential(!)  At baseline [SS]   1913 X-Ray Chest AP Portable  CXR independently interpreted: no focal infiltrate, effusion, edema, free air, or other acute process  Agree with radiologist interpretation.    [SS]   1926 Comprehensive metabolic panel(!)  Unremarkable  [SS]   2022 Troponin I  WNL [SS]   2022 Brain natriuretic peptide  WNL [SS]   2036 SpO2(!): 94 % [SS]   2036 Pulse: 84 [SS]   2036 BP: 137/61  Vitals stable on BIPAP. [SS]      ED Course User Index  [SS] Ashwin Esqueda MD       Medical Decision Making:   Initial Assessment:   68 yo F with COPD presents with SOB, confusion and somnolence.  Will obtain labs, VBG, start BiPAP, and reassess.   Independently Interpreted Test(s):   I have ordered and independently interpreted X-rays - see prior notes.  I have ordered and independently interpreted EKG Reading(s) - see prior notes  Clinical Tests:   Lab Tests: Ordered and Reviewed  Radiological Study: Reviewed and Ordered  Medical Tests: Ordered and Reviewed  ED  Management:  Labs reassuring.   VBG consistent with hypercapneic respiratory failure.     Patient has improved and remained stable on BiPAP.  Will admit to [facility] for further management of hypercapnic respiratory failure and COPD exacerbation.     On re-evaluation, the patient's status has improved.  At this time, I believe the patient should be admitted to the hospital for further evaluation and management of COPD exacerbation.  [facility] service was contacted and the case was discussed.   The consulting physician/team agrees with plan and will admit under their service.   The patient and family were updated with test results, overall impression, and further plan of care. All questions were answered. The patient expressed understanding and agrees with the current plan.                 Clinical Impression:       ICD-10-CM ICD-9-CM   1. Acute hypercapnic respiratory failure  J96.02 518.81   2. Shortness of breath  R06.02 786.05   3. COPD exacerbation  J44.1 491.21   4. COPD (chronic obstructive pulmonary disease)  J44.9 496           Follow-up Information    None          ED Disposition Condition    Observation                Ashwin Esqueda MD  07/06/23 1146

## 2023-07-01 NOTE — Clinical Note
Diagnosis: COPD exacerbation [578699]   Future Attending Provider: JERONIMO WELLS [[id number]]   Admitting Provider:: JERONIMO WELLS [[id number]]

## 2023-07-02 PROBLEM — N63.0 BREAST MASS: Chronic | Status: ACTIVE | Noted: 2022-07-02

## 2023-07-02 LAB
ALLENS TEST: ABNORMAL
ANION GAP SERPL CALC-SCNC: 10 MMOL/L (ref 8–16)
BASOPHILS # BLD AUTO: 0.01 K/UL (ref 0–0.2)
BASOPHILS NFR BLD: 0.2 % (ref 0–1.9)
BUN SERPL-MCNC: 17 MG/DL (ref 8–23)
CALCIUM SERPL-MCNC: 9.3 MG/DL (ref 8.7–10.5)
CHLORIDE SERPL-SCNC: 95 MMOL/L (ref 95–110)
CHOLEST SERPL-MCNC: 198 MG/DL (ref 120–199)
CHOLEST/HDLC SERPL: 2.6 {RATIO} (ref 2–5)
CO2 SERPL-SCNC: 39 MMOL/L (ref 23–29)
CREAT SERPL-MCNC: 0.6 MG/DL (ref 0.5–1.4)
CRP SERPL-MCNC: 4.7 MG/L (ref 0–8.2)
D DIMER PPP IA.FEU-MCNC: 1.11 MG/L FEU
DELSYS: ABNORMAL
DIFFERENTIAL METHOD: ABNORMAL
EOSINOPHIL # BLD AUTO: 0 K/UL (ref 0–0.5)
EOSINOPHIL NFR BLD: 0 % (ref 0–8)
ERYTHROCYTE [DISTWIDTH] IN BLOOD BY AUTOMATED COUNT: 13.2 % (ref 11.5–14.5)
ERYTHROCYTE [SEDIMENTATION RATE] IN BLOOD BY WESTERGREN METHOD: 17 MM/H
ERYTHROCYTE [SEDIMENTATION RATE] IN BLOOD BY WESTERGREN METHOD: 22 MM/H
ERYTHROCYTE [SEDIMENTATION RATE] IN BLOOD BY WESTERGREN METHOD: 22 MM/H
EST. GFR  (NO RACE VARIABLE): >60 ML/MIN/1.73 M^2
FIO2: 30
FIO2: 40
FIO2: 40
GLUCOSE SERPL-MCNC: 141 MG/DL (ref 70–110)
HCO3 UR-SCNC: 42.5 MMOL/L (ref 24–28)
HCO3 UR-SCNC: 44.8 MMOL/L (ref 24–28)
HCO3 UR-SCNC: 45.9 MMOL/L (ref 24–28)
HCT VFR BLD AUTO: 41.9 % (ref 37–48.5)
HDLC SERPL-MCNC: 75 MG/DL (ref 40–75)
HDLC SERPL: 37.9 % (ref 20–50)
HGB BLD-MCNC: 12 G/DL (ref 12–16)
IMM GRANULOCYTES # BLD AUTO: 0.02 K/UL (ref 0–0.04)
IMM GRANULOCYTES NFR BLD AUTO: 0.4 % (ref 0–0.5)
LDLC SERPL CALC-MCNC: 113.6 MG/DL (ref 63–159)
LYMPHOCYTES # BLD AUTO: 0.4 K/UL (ref 1–4.8)
LYMPHOCYTES NFR BLD: 7.5 % (ref 18–48)
MAGNESIUM SERPL-MCNC: 1.9 MG/DL (ref 1.6–2.6)
MCH RBC QN AUTO: 27.6 PG (ref 27–31)
MCHC RBC AUTO-ENTMCNC: 28.6 G/DL (ref 32–36)
MCV RBC AUTO: 97 FL (ref 82–98)
MODE: ABNORMAL
MONOCYTES # BLD AUTO: 0 K/UL (ref 0.3–1)
MONOCYTES NFR BLD: 0.5 % (ref 4–15)
NEUTROPHILS # BLD AUTO: 5 K/UL (ref 1.8–7.7)
NEUTROPHILS NFR BLD: 91.4 % (ref 38–73)
NONHDLC SERPL-MCNC: 123 MG/DL
NRBC BLD-RTO: 0 /100 WBC
PCO2 BLDA: 72.5 MMHG (ref 35–45)
PCO2 BLDA: 90.6 MMHG (ref 35–45)
PCO2 BLDA: 97.9 MMHG (ref 35–45)
PH SMN: 7.28 [PH] (ref 7.35–7.45)
PH SMN: 7.3 [PH] (ref 7.35–7.45)
PH SMN: 7.38 [PH] (ref 7.35–7.45)
PHOSPHATE SERPL-MCNC: 3.3 MG/DL (ref 2.7–4.5)
PIP: 30
PIP: 30
PLATELET # BLD AUTO: 144 K/UL (ref 150–450)
PMV BLD AUTO: 11.1 FL (ref 9.2–12.9)
PO2 BLDA: 51 MMHG (ref 80–100)
PO2 BLDA: 68 MMHG (ref 80–100)
PO2 BLDA: 75 MMHG (ref 80–100)
POC BE: 17 MMOL/L
POC BE: 18 MMOL/L
POC BE: 19 MMOL/L
POC SATURATED O2: 82 % (ref 95–100)
POC SATURATED O2: 90 % (ref 95–100)
POC SATURATED O2: 91 % (ref 95–100)
POC TCO2: 45 MMOL/L (ref 23–27)
POC TCO2: 48 MMOL/L (ref 23–27)
POC TCO2: 49 MMOL/L (ref 23–27)
POTASSIUM SERPL-SCNC: 4.3 MMOL/L (ref 3.5–5.1)
RBC # BLD AUTO: 4.34 M/UL (ref 4–5.4)
SAMPLE: ABNORMAL
SITE: ABNORMAL
SODIUM SERPL-SCNC: 144 MMOL/L (ref 136–145)
TRIGL SERPL-MCNC: 47 MG/DL (ref 30–150)
TROPONIN I SERPL DL<=0.01 NG/ML-MCNC: <0.006 NG/ML (ref 0–0.03)
VIT B12 SERPL-MCNC: 767 PG/ML (ref 210–950)
VT: 450
VT: 450
VT: 480
WBC # BLD AUTO: 5.49 K/UL (ref 3.9–12.7)

## 2023-07-02 PROCEDURE — 94660 CPAP INITIATION&MGMT: CPT

## 2023-07-02 PROCEDURE — 36415 COLL VENOUS BLD VENIPUNCTURE: CPT | Performed by: FAMILY MEDICINE

## 2023-07-02 PROCEDURE — 94640 AIRWAY INHALATION TREATMENT: CPT

## 2023-07-02 PROCEDURE — 85379 FIBRIN DEGRADATION QUANT: CPT | Performed by: INTERNAL MEDICINE

## 2023-07-02 PROCEDURE — 84484 ASSAY OF TROPONIN QUANT: CPT | Performed by: INTERNAL MEDICINE

## 2023-07-02 PROCEDURE — 82803 BLOOD GASES ANY COMBINATION: CPT

## 2023-07-02 PROCEDURE — 99900035 HC TECH TIME PER 15 MIN (STAT)

## 2023-07-02 PROCEDURE — 97530 THERAPEUTIC ACTIVITIES: CPT

## 2023-07-02 PROCEDURE — 86140 C-REACTIVE PROTEIN: CPT | Performed by: STUDENT IN AN ORGANIZED HEALTH CARE EDUCATION/TRAINING PROGRAM

## 2023-07-02 PROCEDURE — 36415 COLL VENOUS BLD VENIPUNCTURE: CPT | Performed by: STUDENT IN AN ORGANIZED HEALTH CARE EDUCATION/TRAINING PROGRAM

## 2023-07-02 PROCEDURE — 27000221 HC OXYGEN, UP TO 24 HOURS

## 2023-07-02 PROCEDURE — 11000001 HC ACUTE MED/SURG PRIVATE ROOM

## 2023-07-02 PROCEDURE — 96372 THER/PROPH/DIAG INJ SC/IM: CPT | Performed by: INTERNAL MEDICINE

## 2023-07-02 PROCEDURE — 63600175 PHARM REV CODE 636 W HCPCS: Performed by: FAMILY MEDICINE

## 2023-07-02 PROCEDURE — 25000003 PHARM REV CODE 250: Performed by: FAMILY MEDICINE

## 2023-07-02 PROCEDURE — 85025 COMPLETE CBC W/AUTO DIFF WBC: CPT | Performed by: FAMILY MEDICINE

## 2023-07-02 PROCEDURE — 63600175 PHARM REV CODE 636 W HCPCS: Performed by: STUDENT IN AN ORGANIZED HEALTH CARE EDUCATION/TRAINING PROGRAM

## 2023-07-02 PROCEDURE — 84100 ASSAY OF PHOSPHORUS: CPT | Performed by: FAMILY MEDICINE

## 2023-07-02 PROCEDURE — 25500020 PHARM REV CODE 255: Performed by: STUDENT IN AN ORGANIZED HEALTH CARE EDUCATION/TRAINING PROGRAM

## 2023-07-02 PROCEDURE — 94761 N-INVAS EAR/PLS OXIMETRY MLT: CPT

## 2023-07-02 PROCEDURE — 36600 WITHDRAWAL OF ARTERIAL BLOOD: CPT

## 2023-07-02 PROCEDURE — 82607 VITAMIN B-12: CPT | Performed by: STUDENT IN AN ORGANIZED HEALTH CARE EDUCATION/TRAINING PROGRAM

## 2023-07-02 PROCEDURE — 25000242 PHARM REV CODE 250 ALT 637 W/ HCPCS: Performed by: FAMILY MEDICINE

## 2023-07-02 PROCEDURE — 97161 PT EVAL LOW COMPLEX 20 MIN: CPT

## 2023-07-02 PROCEDURE — 83735 ASSAY OF MAGNESIUM: CPT | Performed by: FAMILY MEDICINE

## 2023-07-02 PROCEDURE — 86022 PLATELET ANTIBODIES: CPT | Performed by: INTERNAL MEDICINE

## 2023-07-02 PROCEDURE — 80048 BASIC METABOLIC PNL TOTAL CA: CPT | Performed by: FAMILY MEDICINE

## 2023-07-02 PROCEDURE — 36415 COLL VENOUS BLD VENIPUNCTURE: CPT | Performed by: INTERNAL MEDICINE

## 2023-07-02 PROCEDURE — 63600175 PHARM REV CODE 636 W HCPCS: Performed by: INTERNAL MEDICINE

## 2023-07-02 PROCEDURE — 80061 LIPID PANEL: CPT | Performed by: FAMILY MEDICINE

## 2023-07-02 PROCEDURE — 96372 THER/PROPH/DIAG INJ SC/IM: CPT | Performed by: STUDENT IN AN ORGANIZED HEALTH CARE EDUCATION/TRAINING PROGRAM

## 2023-07-02 RX ORDER — ENOXAPARIN SODIUM 100 MG/ML
40 INJECTION SUBCUTANEOUS ONCE
Status: COMPLETED | OUTPATIENT
Start: 2023-07-02 | End: 2023-07-02

## 2023-07-02 RX ORDER — ENOXAPARIN SODIUM 100 MG/ML
40 INJECTION SUBCUTANEOUS EVERY 24 HOURS
Status: DISCONTINUED | OUTPATIENT
Start: 2023-07-02 | End: 2023-07-06 | Stop reason: HOSPADM

## 2023-07-02 RX ADMIN — IPRATROPIUM BROMIDE 0.5 MG: 0.5 SOLUTION RESPIRATORY (INHALATION) at 07:07

## 2023-07-02 RX ADMIN — IPRATROPIUM BROMIDE 0.5 MG: 0.5 SOLUTION RESPIRATORY (INHALATION) at 04:07

## 2023-07-02 RX ADMIN — ALBUTEROL SULFATE 2.5 MG: 2.5 SOLUTION RESPIRATORY (INHALATION) at 03:07

## 2023-07-02 RX ADMIN — IPRATROPIUM BROMIDE 0.5 MG: 0.5 SOLUTION RESPIRATORY (INHALATION) at 11:07

## 2023-07-02 RX ADMIN — METHYLPREDNISOLONE SODIUM SUCCINATE 40 MG: 40 INJECTION INTRAMUSCULAR; INTRAVENOUS at 02:07

## 2023-07-02 RX ADMIN — CITALOPRAM HYDROBROMIDE 10 MG: 10 TABLET ORAL at 09:07

## 2023-07-02 RX ADMIN — ALBUTEROL SULFATE 2.5 MG: 2.5 SOLUTION RESPIRATORY (INHALATION) at 11:07

## 2023-07-02 RX ADMIN — ASPIRIN 81 MG CHEWABLE TABLET 81 MG: 81 TABLET CHEWABLE at 09:07

## 2023-07-02 RX ADMIN — LORAZEPAM 0.5 MG: 2 INJECTION INTRAMUSCULAR; INTRAVENOUS at 08:07

## 2023-07-02 RX ADMIN — ROFLUMILAST 500 MCG: 500 TABLET ORAL at 09:07

## 2023-07-02 RX ADMIN — ALBUTEROL SULFATE 2.5 MG: 2.5 SOLUTION RESPIRATORY (INHALATION) at 07:07

## 2023-07-02 RX ADMIN — IOHEXOL 100 ML: 350 INJECTION, SOLUTION INTRAVENOUS at 05:07

## 2023-07-02 RX ADMIN — METHYLPREDNISOLONE SODIUM SUCCINATE 40 MG: 40 INJECTION INTRAMUSCULAR; INTRAVENOUS at 10:07

## 2023-07-02 RX ADMIN — ENOXAPARIN SODIUM 40 MG: 40 INJECTION SUBCUTANEOUS at 05:07

## 2023-07-02 RX ADMIN — ENOXAPARIN SODIUM 40 MG: 40 INJECTION SUBCUTANEOUS at 01:07

## 2023-07-02 RX ADMIN — IPRATROPIUM BROMIDE 0.5 MG: 0.5 SOLUTION RESPIRATORY (INHALATION) at 03:07

## 2023-07-02 RX ADMIN — ALBUTEROL SULFATE 2.5 MG: 2.5 SOLUTION RESPIRATORY (INHALATION) at 04:07

## 2023-07-02 RX ADMIN — ENOXAPARIN SODIUM 40 MG: 40 INJECTION SUBCUTANEOUS at 04:07

## 2023-07-02 RX ADMIN — METHYLPREDNISOLONE SODIUM SUCCINATE 40 MG: 40 INJECTION INTRAMUSCULAR; INTRAVENOUS at 05:07

## 2023-07-02 NOTE — EICU
"eICU Brief Note    Issue: 67 y old woman with shortness of breath with known COPD and on home oxygen .  On NIV for severe hypercapnia   Na 145  pH 7.25 pCO2 103 pO2 68    BP (!) 146/65 (BP Location: Right arm, Patient Position: Lying)   Pulse 80   Temp 98.1 °F (36.7 °C) (Axillary)   Resp (!) 22   Ht 5' 6" (1.676 m)   Wt 82 kg (180 lb 12.4 oz)   SpO2 (!) 94%   Breastfeeding No   BMI 29.18 kg/m²   WBC 7.67  MCV 99  Plt 116    Plan :    COPD exacerbation   On steroids, bronchodilators  Low platelets- ? Cause; check HIT antibody ( low suspicion)   Adjust NIV AVAPS mode- d/w RT - on Vt 450 ml ; ABG 2 am   SCD  D-dimer to assess   Enoxaparin SQ for DVT prophylaxis  CT Chest prior in 7/22 with emphysema and 2.4 cm left breast mass reported     D/w RN and RT and hospitalist   "

## 2023-07-02 NOTE — PT/OT/SLP EVAL
Physical Therapy Evaluation and treatment     Patient Name:  Terri Phelan   MRN:  [id number]    Recommendations:     Discharge Recommendations: other (see comments)   Discharge Equipment Recommendations: none   Barriers to discharge: None    Assessment:     Terri Phelan is a 67 y.o. female admitted with a medical diagnosis of COPD exacerbation.  She presents with the following impairments/functional limitations: gait instability, impaired balance, impaired endurance, impaired functional mobility pt tolerated treatment well but decreased O2 sats decreased functional mobility. Pt will benefit from skilled PT 6x/wk, to progress physically. Pt will need further inpt therapy when medically stable to maximize rehab potential.  Pt is significantly below previous functional level, increased risk of falls and increased burden of care currently.  Pt will be home alone during the day and will have to modified Independent for safe discharge home.   .    Rehab Prognosis: Good; patient would benefit from acute skilled PT services to address these deficits and reach maximum level of function.    Recent Surgery: * No surgery found *      Plan:     During this hospitalization, patient to be seen 6 x/week to address the identified rehab impairments via gait training, therapeutic activities, therapeutic exercises and progress toward the following goals:    Plan of Care Expires:  07/31/23    Subjective     Chief Complaint: pt c/o being anxious with treatment and worried about her daughter  Patient/Family Comments/goals: to get better and go home.   Pain/Comfort:  Pain Rating 1: 0/10  Pain Rating Post-Intervention 1: 0/10    Patients cultural, spiritual, [employer]ec[employer]alism conflicts given the current situation: no    Living Environment:  Pt lives with her daughter who works full-time. They live in 1st floor apt with slab entrance.   Prior to admission, patients level of function was modified Independent. Pt reports using rollator x a  couple weeks.  Equipment used at home: rollator, bedside commode, shower chair, oxygen.  DME owned (not currently used): none.  Upon discharge, patient will have assistance from daughter after work. .    Objective:     Communicated with nurse  prior to session.  Patient found supine with pulse ox (continuous), telemetry, blood pressure cuff, oxygen, PureWick (hep lock IV)  upon PT entry to room.    General Precautions: Standard, fall  Orthopedic Precautions:    Braces:    Respiratory Status: Nasal cannula, flow 4 L/min    Exams:  Cognitive Exam:  Patient is oriented to Person, Place, Time, and Situation. Pt would have tangential comments during treatment.   RLE ROM: WFL  RLE Strength: WFL  LLE ROM: WFL  LLE Strength: WFL    Functional Mobility:  Bed Mobility:  pt needed verbal cues for hand placement and sequencing for functional mobility.    Rolling Left:  stand by assistance  Supine to Sit: minimum assistance.     Transfers:     Sit to Stand:  stand by assistance with no AD    Bed to Chair: stand by assistance with  no AD  using  Stand Pivot    Gait: pt received gait training 2 side steps from bed to bedside chair with SBA.     Balance: pt sat on EOB with SBA. Pt O2 sat goal 88-92%. Pt O2 sats decreased to 85% with supine to sit but returned to 90% with rest period.     Pt white board updated with current therapists name and level of mobility assistance needed.       AM-PAC 6 CLICK MOBILITY  Total Score:14       Treatment & Education:  Pt received verbal instructions in role of PT and POC. Pt verbally expressed understanding of such.     Patient left up in chair with all lines intact, call button in reach, and RN  notified.    GOALS:   Multidisciplinary Problems       Physical Therapy Goals          Problem: Physical Therapy    Goal Priority Disciplines Outcome Goal Variances Interventions   Physical Therapy Goal     PT, PT/OT Ongoing, Progressing     Description: Goals to be met by: 7/31/23     Patient will  increase functional independence with mobility by performin. Supine to sit with Modified [place]  2. Sit to stand transfer with Supervision with AD if needed.   3. Gait  x 150 feet with Supervision using AD if needed.   4. Lower extremity exercise program x15 reps per handout, with supervision to increased tolerance to activities.                          History:     Past Medical History:   Diagnosis Date    COPD (chronic obstructive pulmonary disease)     Hypertension        Past Surgical History:   Procedure Laterality Date     SECTION      HERNIA REPAIR      HYSTERECTOMY         Time Tracking:     PT Received On: 23  PT Start Time: 859     PT Stop Time: 923  PT Total Time (min): 24 min     Billable Minutes: Evaluation 8 min  and Therapeutic Activity 16 min      2023

## 2023-07-02 NOTE — ED NOTES
Report received from ELLIE Brandt  Care assumed    Pt resting on ER stretcher, bed low and locked, SR up, Cardiac Monitor in progress, pt on continuous BIPAP. Pt awakens and is oriented to person and place. Pt not oriented to time or situation.  Daughter at the bedside.   Questions and concerns addressed. VSS.  Instruct to call with problems, needs, or concerns.   Verbalized understanding  Will continue to monitor

## 2023-07-02 NOTE — PLAN OF CARE
07/02/23 1414   Post-Acute Status   Post-Acute Authorization Placement   Post-Acute Placement Status Referrals Sent

## 2023-07-02 NOTE — ASSESSMENT & PLAN NOTE
Patient with Hypoxic Respiratory failure which is Acute on chronic.  she is on home oxygen at 2 LPM. Supplemental oxygen was provided and noted- Oxygen Concentration (%):  [30-40] 30    .   Signs/symptoms of respiratory failure include- tachypnea and respiratory distress. Contributing diagnoses includes - COPD Labs and images were reviewed. Patient Has recent ABG, which has been reviewed. Will treat underlying causes and adjust management of respiratory failure as follows-   On continuous BiPAP wean as tolerated  Consult Pulmonary crit-appreciate recs

## 2023-07-02 NOTE — SUBJECTIVE & OBJECTIVE
Past Medical History:   Diagnosis Date    COPD (chronic obstructive pulmonary disease)     Hypertension        Past Surgical History:   Procedure Laterality Date     SECTION      HERNIA REPAIR      HYSTERECTOMY         Review of patient's allergies indicates:   Allergen Reactions    Codeine Nausea And Vomiting    Morphine Other (See Comments)       No current facility-administered medications on file prior to encounter.     Current Outpatient Medications on File Prior to Encounter   Medication Sig    acyclovir (ZOVIRAX) 400 MG tablet TAKE 1 TABLET BY MOUTH THREE TIMES DAILY FOR 10 DAYS AS NEEDED FOR OUTBREAK    albuterol (PROVENTIL/VENTOLIN HFA) 90 mcg/actuation inhaler Inhale 2 puffs into the lungs every 6 (six) hours as needed.    albuterol-ipratropium (DUO-NEB) 2.5 mg-0.5 mg/3 mL nebulizer solution Take by nebulization every 4 (four) hours.    alendronate (FOSAMAX) 35 MG tablet Take 35 mg by mouth every Tuesday.    aspirin 81 MG Chew Take 81 mg by mouth once daily.    azithromycin (Z-OLY) 250 MG tablet Take 1 tablet (250 mg total) by mouth 3 (three) times a week.    budesonide-glycopyr-formoterol (BREZTRI AEROSPHERE) 160-9-4.8 mcg/actuation HFAA Inhale 2 puffs into the lungs 2 (two) times daily.    citalopram (CELEXA) 10 MG tablet Take 10 mg by mouth once daily.    DALIRESP 500 mcg Tab Take 500 mcg by mouth once daily.    dextromethorphan-guaiFENesin  mg (MUCINEX DM)  mg per 12 hr tablet Take 1 tablet by mouth every 12 (twelve) hours.    dicyclomine (BENTYL) 20 mg tablet Take 1 tablet (20 mg total) by mouth 3 (three) times daily as needed (abdominal pain).    ergocalciferol (ERGOCALCIFEROL) 50,000 unit Cap Take 50,000 Units by mouth every Tuesday.    fluticasone propionate (FLONASE) 50 mcg/actuation nasal spray 1 spray by Each Nostril route daily as needed for Allergies.    LORazepam (ATIVAN) 0.5 MG tablet Take 1 tablet (0.5 mg total) by mouth every 8 (eight) hours as needed for Anxiety.     OXYGEN-AIR DELIVERY SYSTEMS MISC 3 L by Nasal route.    traZODone (DESYREL) 50 MG tablet Take 1 tablet (50 mg total) by mouth nightly as needed for Insomnia.     Family History    None       Tobacco Use    Smoking status: Former     Packs/day: 1.00     Years: 20.00     Pack years: 20.00     Types: Cigarettes    Smokeless tobacco: Never   Substance and Sexual Activity    Alcohol use: Not Currently     Alcohol/week: 1.0 standard drink     Types: 1 Glasses of wine per week    Drug use: No    Sexual activity: Not Currently     Review of Systems   Unable to perform ROS: Acuity of condition   Constitutional:  Negative for chills and fever.   Respiratory:  Positive for shortness of breath.    Cardiovascular:  Negative for chest pain, palpitations and leg swelling.   Neurological:  Positive for weakness.   Psychiatric/Behavioral:  Positive for confusion.    All other systems reviewed and are negative.  Objective:     Vital Signs (Most Recent):  Temp: 98.1 °F (36.7 °C) (07/01/23 2319)  Pulse: 77 (07/01/23 2304)  Resp: 17 (07/01/23 2304)  BP: (!) 140/64 (07/01/23 2202)  SpO2: 96 % (07/01/23 2304) Vital Signs (24h Range):  Temp:  [98.1 °F (36.7 °C)-98.2 °F (36.8 °C)] 98.1 °F (36.7 °C)  Pulse:  [] 77  Resp:  [17-36] 17  SpO2:  [93 %-98 %] 96 %  BP: (128-175)/(59-73) 140/64     Weight: 85.3 kg (188 lb)  Body mass index is 30.34 kg/m².     Physical Exam  Vitals and nursing note reviewed.   Constitutional:       Appearance: She is ill-appearing.   HENT:      Head: Normocephalic and atraumatic.      Nose: No congestion.      Mouth/Throat:      Mouth: Mucous membranes are moist.      Pharynx: No oropharyngeal exudate.   Eyes:      Extraocular Movements: Extraocular movements intact.      Pupils: Pupils are equal, round, and reactive to light.   Cardiovascular:      Rate and Rhythm: Normal rate. Rhythm irregular.      Heart sounds: No murmur heard.    No friction rub. No gallop.   Pulmonary:      Effort: Pulmonary effort is  normal. No respiratory distress.      Breath sounds: No wheezing or rales.   Chest:      Chest wall: No tenderness.   Abdominal:      General: Bowel sounds are normal. There is no distension.      Palpations: Abdomen is soft.      Tenderness: There is no abdominal tenderness. There is no guarding or rebound.   Musculoskeletal:         General: No swelling or tenderness.      Cervical back: No rigidity or tenderness.      Right lower leg: No edema.      Left lower leg: No edema.   Skin:     General: Skin is warm.      Findings: No erythema or rash.   Neurological:      General: No focal deficit present.      Mental Status: She is alert. She is disoriented.      Motor: No weakness.      Gait: Gait normal.   Psychiatric:         Thought Content: Thought content normal.            CRANIAL NERVES     CN III, IV, VI   Pupils are equal, round, and reactive to light.     Significant Labs: All pertinent labs within the past 24 hours have been reviewed.  Recent Lab Results         07/01/23  2200   07/01/23  1835        Albumin   3.7       Alkaline Phosphatase   50       Allens Test Pass         ALT   15       Anion Gap   11       AST   27       BILIRUBIN TOTAL   0.3  Comment: For infants and newborns, interpretation of results should be based  on gestational age, weight and in agreement with clinical  observations.    Premature Infant recommended reference ranges:  Up to 24 hours.............<8.0 mg/dL  Up to 48 hours............<12.0 mg/dL  3-5 days..................<15.0 mg/dL  6-29 days.................<15.0 mg/dL         BNP   10  Comment: Values of less than 100 pg/ml are consistent with non-CHF populations.       Site RR         BUN   11       Calcium   9.1       Chloride   95       CO2   39       Creatinine   0.7       DelSys CPAP/BiPAP         eGFR   >60       FiO2 40         Glucose   110       Hematocrit   45.7       Hemoglobin   12.9       MCH   27.9       MCHC   28.2       MCV   99       Min Vol 7.4         Mode  BiPAP         MPV   11.3       Platelets   116       POC BE 19         POC HCO3 46.2         POC PCO2 103.4         POC PH 7.258         POC PO2 68         POC SATURATED O2 88         POC TCO2 49         Potassium   4.6       PROTEIN TOTAL   7.6       Rate 12         RBC   4.63       RDW   13.2       Sample ARTERIAL         Sodium   145       Sp02 96         Spont Rate 9         Troponin I   <0.006  Comment: The reference interval for Troponin I represents the 99th percentile   cutoff   for our facility and is consistent with 3rd generation assay   performance.         WBC   7.67               Significant Imaging: I have reviewed all pertinent imaging results/findings within the past 24 hours.  I have reviewed and interpreted all pertinent imaging results/findings within the past 24 hours.

## 2023-07-02 NOTE — EICU
ABG slight improvement but still with sev resp acidosis  Check ABG in 2h ; inc RR to 22 from 17- pt matching backup rate per RT   D/w RT

## 2023-07-02 NOTE — PLAN OF CARE
Problem: Physical Therapy  Goal: Physical Therapy Goal  Description: Goals to be met by: 23     Patient will increase functional independence with mobility by performin. Supine to sit with Modified Powell  2. Sit to stand transfer with Supervision with AD if needed.   3. Gait  x 150 feet with Supervision using AD if needed.   4. Lower extremity exercise program x15 reps per handout, with supervision to increased tolerance to activities.     Outcome: Ongoing, Progressing   Evaluation completed and goals appropriate. 2023

## 2023-07-02 NOTE — SUBJECTIVE & OBJECTIVE
Interval History: awake and alert, on continuous BiPAP-wean as tolerated.  Patient reported on 3 L nasal cannula home oxygen, uses BiPAP at night and p.r.n. naps  Trend ABG-improving  Appreciate pulmonary recs    Review of Systems   Unable to perform ROS: Acuity of condition   Constitutional:  Negative for chills and fever.   Respiratory:  Positive for shortness of breath.    Cardiovascular:  Negative for chest pain.   Neurological:  Positive for weakness.   Psychiatric/Behavioral:  Negative for confusion.    All other systems reviewed and are negative.  Objective:     Vital Signs (Most Recent):  Temp: 97.4 °F (36.3 °C) (07/02/23 0345)  Pulse: 75 (07/02/23 0836)  Resp: (!) 34 (07/02/23 0836)  BP: 135/60 (07/02/23 0831)  SpO2: (!) 89 % (07/02/23 0836) Vital Signs (24h Range):  Temp:  [97.4 °F (36.3 °C)-98.2 °F (36.8 °C)] 97.4 °F (36.3 °C)  Pulse:  [] 75  Resp:  [17-36] 34  SpO2:  [89 %-98 %] 89 %  BP: (119-175)/(56-73) 135/60     Weight: 82 kg (180 lb 12.4 oz)  Body mass index is 29.18 kg/m².    Intake/Output Summary (Last 24 hours) at 7/2/2023 1041  Last data filed at 7/2/2023 0621  Gross per 24 hour   Intake 120 ml   Output 350 ml   Net -230 ml         Physical Exam  Vitals and nursing note reviewed.   Constitutional:       General: She is not in acute distress.     Appearance: She is ill-appearing.   HENT:      Head: Normocephalic and atraumatic.      Nose: No congestion.      Mouth/Throat:      Pharynx: No oropharyngeal exudate.   Cardiovascular:      Rate and Rhythm: Normal rate. Rhythm irregular.      Heart sounds: No murmur heard.    No friction rub. No gallop.   Pulmonary:      Effort: Pulmonary effort is normal. No respiratory distress.      Breath sounds: No wheezing or rales.   Chest:      Chest wall: No tenderness.   Abdominal:      General: Bowel sounds are normal. There is no distension.      Palpations: Abdomen is soft.      Tenderness: There is no abdominal tenderness. There is no guarding or  rebound.   Musculoskeletal:         General: No swelling or tenderness.      Cervical back: No rigidity or tenderness.      Right lower leg: No edema.      Left lower leg: No edema.   Skin:     General: Skin is warm.      Findings: No erythema or rash.   Neurological:      General: No focal deficit present.      Mental Status: She is alert and oriented to person, place, and time.      Motor: No weakness.      Gait: Gait normal.   Psychiatric:         Thought Content: Thought content normal.           Significant Labs: A1C:   Recent Labs   Lab 06/16/23  0428   HGBA1C 5.4     ABGs:   Recent Labs   Lab 07/02/23  0224 07/02/23  0454 07/02/23  0830   PH 7.279* 7.302* 7.376   PCO2 97.9* 90.6* 72.5*   HCO3 45.9* 44.8* 42.5*   POCSATURATED 91* 90* 82*   BE 19 18 17   PO2 75* 68* 51*     Blood Culture: No results for input(s): LABBLOO in the last 48 hours.  CBC:   Recent Labs   Lab 07/01/23 1835 07/02/23 0337   WBC 7.67 5.49   HGB 12.9 12.0   HCT 45.7 41.9   * 144*     CMP:   Recent Labs   Lab 07/01/23 1835 07/02/23 0337    144   K 4.6 4.3   CL 95 95   CO2 39* 39*    141*   BUN 11 17   CREATININE 0.7 0.6   CALCIUM 9.1 9.3   PROT 7.6  --    ALBUMIN 3.7  --    BILITOT 0.3  --    ALKPHOS 50*  --    AST 27  --    ALT 15  --    ANIONGAP 11 10     Lipase: No results for input(s): LIPASE in the last 48 hours.  Lipid Panel: No results for input(s): CHOL, HDL, LDLCALC, TRIG, CHOLHDL in the last 48 hours.  Magnesium:   Recent Labs   Lab 07/02/23  0337   MG 1.9     Troponin:   Recent Labs   Lab 07/01/23 1835 07/02/23 0337   TROPONINI <0.006 <0.006     TSH: No results for input(s): TSH in the last 4320 hours.  Urine Culture: No results for input(s): LABURIN in the last 48 hours.  Urine Studies: No results for input(s): COLORU, APPEARANCEUA, PHUR, SPECGRAV, PROTEINUA, GLUCUA, KETONESU, BILIRUBINUA, OCCULTUA, NITRITE, UROBILINOGEN, LEUKOCYTESUR, RBCUA, WBCUA, BACTERIA, SQUAMEPITHEL, HYALINECASTS in the last 48  hours.    Invalid input(s): LAUREN    Significant Imaging: I have reviewed all pertinent imaging results/findings within the past 24 hours.

## 2023-07-02 NOTE — PLAN OF CARE
Patient on BiPAP in no apparent distress, given aerosol treatment, no adverse reactions will continue to monitor.

## 2023-07-02 NOTE — PROGRESS NOTES
[facility]  Progress Note    Patient Name: Terri Phelan  MRN: [id number]  Patient Class: IP- Inpatient   Admission Date: 7/1/2023  Length of Stay: 0 days  Attending Physician: Josephine Xavier*  Primary Care Provider: Kev Cox MD        Subjective:     Principal Problem:COPD exacerbation        HPI:  66 YO F with PMHx significant for COPD on home O2 presented to the ER for worsening SOB. Per patient's daughter at bedside her symptoms have started yesterday with headache, mild SOB, fatigue. Today she wake up, was having more difficulty to breath. Then later she become confuse and was talking without making sense. Since she saw that her mental state has gotten worse and worse, she has decided to call EMS. At home EMS found that her O2 sat was around 74%. Patient is a frequent flyer and las admission was around 2 weeks ago. When seen in the ER, was very sleepy, reactive to pain stimulus. She was placed on BIPAP and tolerate well. She has been admitted for COPD exacerbation.      Overview/Hospital Course:  No notes on file    Interval History: awake and alert, on continuous BiPAP-wean as tolerated.  Patient reported on 3 L nasal cannula home oxygen, uses BiPAP at night and p.r.n. naps  Trend ABG-improving  Appreciate pulmonary recs    Review of Systems   Unable to perform ROS: Acuity of condition   Constitutional:  Negative for chills and fever.   Respiratory:  Positive for shortness of breath.    Cardiovascular:  Negative for chest pain.   Neurological:  Positive for weakness.   Psychiatric/Behavioral:  Negative for confusion.    All other systems reviewed and are negative.  Objective:     Vital Signs (Most Recent):  Temp: 97.4 °F (36.3 °C) (07/02/23 0345)  Pulse: 75 (07/02/23 0836)  Resp: (!) 34 (07/02/23 0836)  BP: 135/60 (07/02/23 0831)  SpO2: (!) 89 % (07/02/23 0836) Vital Signs (24h Range):  Temp:  [97.4 °F (36.3 °C)-98.2 °F (36.8 °C)] 97.4 °F (36.3 °C)  Pulse:   [] 75  Resp:  [17-36] 34  SpO2:  [89 %-98 %] 89 %  BP: (119-175)/(56-73) 135/60     Weight: 82 kg (180 lb 12.4 oz)  Body mass index is 29.18 kg/m².    Intake/Output Summary (Last 24 hours) at 7/2/2023 1041  Last data filed at 7/2/2023 0621  Gross per 24 hour   Intake 120 ml   Output 350 ml   Net -230 ml         Physical Exam  Vitals and nursing note reviewed.   Constitutional:       General: She is not in acute distress.     Appearance: She is ill-appearing.   HENT:      Head: Normocephalic and atraumatic.      Nose: No congestion.      Mouth/Throat:      Pharynx: No oropharyngeal exudate.   Cardiovascular:      Rate and Rhythm: Normal rate. Rhythm irregular.      Heart sounds: No murmur heard.    No friction rub. No gallop.   Pulmonary:      Effort: Pulmonary effort is normal. No respiratory distress.      Breath sounds: No wheezing or rales.   Chest:      Chest wall: No tenderness.   Abdominal:      General: Bowel sounds are normal. There is no distension.      Palpations: Abdomen is soft.      Tenderness: There is no abdominal tenderness. There is no guarding or rebound.   Musculoskeletal:         General: No swelling or tenderness.      Cervical back: No rigidity or tenderness.      Right lower leg: No edema.      Left lower leg: No edema.   Skin:     General: Skin is warm.      Findings: No erythema or rash.   Neurological:      General: No focal deficit present.      Mental Status: She is alert and oriented to person, place, and time.      Motor: No weakness.      Gait: Gait normal.   Psychiatric:         Thought Content: Thought content normal.           Significant Labs: A1C:   Recent Labs   Lab 06/16/23  0428   HGBA1C 5.4     ABGs:   Recent Labs   Lab 07/02/23  0224 07/02/23  0454 07/02/23  0830   PH 7.279* 7.302* 7.376   PCO2 97.9* 90.6* 72.5*   HCO3 45.9* 44.8* 42.5*   POCSATURATED 91* 90* 82*   BE 19 18 17   PO2 75* 68* 51*     Blood Culture: No results for input(s): LABBLOO in the last 48  hours.  CBC:   Recent Labs   Lab 07/01/23 1835 07/02/23 0337   WBC 7.67 5.49   HGB 12.9 12.0   HCT 45.7 41.9   * 144*     CMP:   Recent Labs   Lab 07/01/23 1835 07/02/23 0337    144   K 4.6 4.3   CL 95 95   CO2 39* 39*    141*   BUN 11 17   CREATININE 0.7 0.6   CALCIUM 9.1 9.3   PROT 7.6  --    ALBUMIN 3.7  --    BILITOT 0.3  --    ALKPHOS 50*  --    AST 27  --    ALT 15  --    ANIONGAP 11 10     Lipase: No results for input(s): LIPASE in the last 48 hours.  Lipid Panel: No results for input(s): CHOL, HDL, LDLCALC, TRIG, CHOLHDL in the last 48 hours.  Magnesium:   Recent Labs   Lab 07/02/23 0337   MG 1.9     Troponin:   Recent Labs   Lab 07/01/23 1835 07/02/23 0337   TROPONINI <0.006 <0.006     TSH: No results for input(s): TSH in the last 4320 hours.  Urine Culture: No results for input(s): LABURIN in the last 48 hours.  Urine Studies: No results for input(s): COLORU, APPEARANCEUA, PHUR, SPECGRAV, PROTEINUA, GLUCUA, KETONESU, BILIRUBINUA, OCCULTUA, NITRITE, UROBILINOGEN, LEUKOCYTESUR, RBCUA, WBCUA, BACTERIA, SQUAMEPITHEL, HYALINECASTS in the last 48 hours.    Invalid input(s): WRIGHTSUR    Significant Imaging: I have reviewed all pertinent imaging results/findings within the past 24 hours.      Assessment/Plan:      * COPD exacerbation  Centrilobular emphysema   Oxygen dependent  Former smoker   Report 3 L nasal cannula home oxygen   Cont duoneb  Cont budesonide and solumedrol  Continue continuous BiPAP-wean as tolerated      Major depression  Patient has persistent depression which is mild and is currently controlled. Will Continue anti-depressant medications. We will not consult psychiatry at this time. Patient does not display psychosis at this time. Continue to monitor closely and adjust plan of care as needed.        Acute hypercapnic respiratory failure  Patient with Hypoxic Respiratory failure which is Acute on chronic.  she is on home oxygen at 2 LPM. Supplemental oxygen was  provided and noted- Oxygen Concentration (%):  [30-40] 30    .   Signs/symptoms of respiratory failure include- tachypnea and respiratory distress. Contributing diagnoses includes - COPD Labs and images were reviewed. Patient Has recent ABG, which has been reviewed. Will treat underlying causes and adjust management of respiratory failure as follows-   On continuous BiPAP wean as tolerated  Consult Pulmonary crit-appreciate recs    Obstructive sleep apnea syndrome  Non compliant to CPAP  Requires CPAP q.h.s. and p.r.n. naps      CAD (coronary artery disease)    Follow-up lipid panel    Essential hypertension  Cont current home meds        VTE Risk Mitigation (From admission, onward)         Ordered     enoxaparin injection 40 mg  Daily         07/02/23 0054     IP VTE HIGH RISK PATIENT  Once         07/01/23 2135     Place sequential compression device  Until discontinued         07/01/23 2135                Discharge Planning   RADHA:      Code Status: Full Code   Is the patient medically ready for discharge?:     Reason for patient still in hospital (select all that apply): Patient trending condition               Critical care time spent on the evaluation and treatment of severe organ dysfunction, review of pertinent labs and imaging studies, discussions with consulting providers and discussions with patient/family: 35 minutes.      Josephine Xavier MD  Department of Hospital Medicine   [place] - Intensive Care

## 2023-07-02 NOTE — CONSULTS
"[facility]Critical [facility] Consult Note:  Primary Attending:  Josephine Xavier*   Consultant Attending: Jeff Patrick MD   Consultant Fellow: Cristhian     Admit Date: 2023   Hospital LOS: 0     Subjective:  66yo F with PMHx COPD (on home oxygen 3L NC, BIPAP nightly; last PFT 2023) who presented for worsening SOB x2 days. Patient began to have HA, fatigue and later became confused, which prompted her daughter to call EMS. O2 sat at home on 3L NC was 74%. In ED, pt started on BIPAP and received albuterol-ipratropium, steroid.    This morning, patient's mentation has improved and she is able to answer questions. She denies worsening cough or increased/change in sputum. She reports adherence with home BIPAP, medications, pulmonary rehab. Denies F/C, recent sick contacts. She was transitioned with BIPAP to 3L NC without issue.       PMHx:  COPD (on home oxygen 3L NC, BIPAP nightly; last PFT 2023)  HTN  Depression    Past Surgical History:   Procedure Laterality Date     SECTION      HERNIA REPAIR      HYSTERECTOMY         Review of patient's allergies indicates:   Allergen Reactions    Codeine Nausea And Vomiting    Morphine Other (See Comments)       Social History     Tobacco Use    Smoking status: Former     Packs/day: 1.00     Years: 20.00     Pack years: 20.00     Types: Cigarettes    Smokeless tobacco: Never   Substance Use Topics    Alcohol use: Not Currently     Alcohol/week: 1.0 standard drink     Types: 1 Glasses of wine per week    Drug use: No       History reviewed. No pertinent family history.    ROS:  Review of Systems - A 10 point review of systems was negative except where listed above.    Objective:  Last 24 Hour Vital Signs:  BP  Min: 119/56  Max: 175/73  Temp  Av.1 °F (36.7 °C)  Min: 97.4 °F (36.3 °C)  Max: 98.9 °F (37.2 °C)  Pulse  Av.4  Min: 62  Max: 111  Resp  Av.8  Min: 17  Max: 36  SpO2  Av.8 %  Min: 88 %  Max: 98 %  Height  Av' 6" " "(167.6 cm)  Min: 5' 6" (167.6 cm)  Max: 5' 6" (167.6 cm)  Weight  Av.6 kg (184 lb 6.2 oz)  Min: 82 kg (180 lb 12.4 oz)  Max: 85.3 kg (188 lb)  Body mass index is 29.18 kg/m².  I & O (Last 24H):  Intake/Output Summary (Last 24 hours) at 2023 1200  Last data filed at 2023 0621  Gross per 24 hour   Intake 120 ml   Output 350 ml   Net -230 ml       Physical Exam:  GEN: NAD, resting in bed  HEENT: MMM, no scleral icterus  NECK: Supple, midline trachea  CV: RRR, no murmurs appreciated  PULM: NC in place, no increased work of breathing, rales bilaterally  ABDOMEN: Soft, non-tender, non-distended, no rebound or guarding  SKIN: Warm, dry, intact  MSK: No deformity or lower extremity edema  NEURO: Awake and following commands, at neurologic baseline      I have reviewed all labs / images / cultures  Pertinent labs are as follows:   Recent Labs   Lab 23  0830   PH 7.376   PCO2 72.5*   PO2 51*   HCO3 42.5*   POCSATURATED 82*   BE 17     Recent Labs   Lab 23  0337   WBC 5.49   RBC 4.34   HGB 12.0   HCT 41.9   *   MCV 97   MCH 27.6   MCHC 28.6*     Recent Labs   Lab 23  0337      K 4.3   CL 95   CO2 39*   BUN 17   CREATININE 0.6   MG 1.9       Meds:   albuterol sulfate  2.5 mg Nebulization Q4H    And    ipratropium  0.5 mg Nebulization Q4H    aspirin  81 mg Oral Daily    budesonide-glycopyr-formoterol  2 puff Inhalation BID    citalopram  10 mg Oral Daily    enoxparin  40 mg Subcutaneous Daily    [START ON 2023] ergocalciferol  50,000 Units Oral Every Tues    methylPREDNISolone sodium succinate injection  40 mg Intravenous Q8H    roflumilast  500 mcg Oral Daily       Assessment & Plan: Terri Phelan is a 67 y.o. female with PMHx COPD (on home oxygen 3L NC, BIPAP nightly; last PFT 2023)) who presented to [facility] with COPD exacerbation. Initially on BIPAP but now transitioned to home O2.      Acute hypercapnic respiratory failure  COPD  - pt with worsening SOB and confusion but " no changes in cough/sputum production. On admit, pH 7.2, pCO2 103.4  - reports adherence to home meds, BIPAP nightly  - no hx of eosinophilia  - initially placed on BIPAP and now transitioned to home 3L NC  - follows closely with pulmonologist, Dr. Cunningham  Plan:  - continue duoneb, budesonide-glycopyr-formoterol, roflumilast  - continue home oxygen with goal O2 sats 88-92%  - continue BIPAP nightly  - continue pulmonary rehab on discharge    Essential HTN  - pt with hx of HTN well controlled without medication  - initially hypertensive on admit but now normotensive  - continue to monitor    Depression  - continue home citalopram    HCM  - UTD on COVID, pneumococcal vaccines  - A1C 5.4  - lipid panel pending  - due for DEXA (last 7/24/21)  - due for mammogram (last 2006)  - 7/1/23 CT chest showing 2.4cm suspected mass in upper outer quadrant L breast      Dispo: likely able to step down later this afternoon if continued clinical improvement    Patient seen and examined with Dr. Patrick    We will continue to follow with you, thank you for the opportunity to be involved in /Ms. Phelan's care.    Ange Christensen MD  [facility] Internal Medicine PGY-1    Pt seen and examined with Pulmonary/Critical Care team. Critical Care time was spent validating the history and physical exam, reviewing the lab and imaging results, and discussing the care of the patient with the bedside nurse. The following additional comments are made:    Sever COPD exacerbation with hypercapnia >100.  Improved with NIV, bronchodilators, steroids.  We were able to wean BIPAP today to NC.  Pt is vaccinated, she is on home NIV, she uses her inhalers properly, and she is in pulmonary rehab..    Critical Care time 35 minutes    Sage Patrick MD  Phone [phone]

## 2023-07-02 NOTE — EICU
Borderline ST elevation inferior leads ? Early repolarization  No symptoms   D dimer elevated   Recheck troponin  Add enoxaparin additional 40 mg - got 40 mg earlier  Get CT pul angio- if positive plan full dose anticoagulation to continue  D/w hospitalist who will also review paient      0459 ABG better ; planning CT

## 2023-07-02 NOTE — ASSESSMENT & PLAN NOTE
Patient with Hypoxic Respiratory failure which is Acute on chronic.  she is on home oxygen at 2 LPM. Supplemental oxygen was provided and noted- Oxygen Concentration (%):  [40] 40    .   Signs/symptoms of respiratory failure include- tachypnea and respiratory distress. Contributing diagnoses includes - COPD Labs and images were reviewed. Patient Has recent ABG, which has been reviewed. Will treat underlying causes and adjust management of respiratory failure as follows-

## 2023-07-02 NOTE — CARE UPDATE
I have had some [facility] at a couple points so far over the night about this patient Ms. Phelan.  Earlier in the night, was noted by Dr. Kaiser that upon chart review, the patient also had significant finding of breast mass that appeared to be lost to follow-up and we want to be sure that gets added to her problem list and may warrant further investigation at some juncture and to be considered.  Also it was noted patient having thrombocytopenia of unclear etiology and HIT test ordered. Some additional lab testing was ordered and D-dimer came back elevated.  Then the RN noticed possible FLORI on the tele-monitor and so an EKG was done and troponin was ordered. EKG reveal possible slight FLORI in the inferior leads  Discussed with Dr. Kaiser.  The patient will be evaluated for a PE with CTA and will be started on full treatment dose lovenox. I went to evaluate the patient.  She is arousable but is lethargic/somnolent and confused. Her CO2 is very high (although improving some on BIPAP) and certainly the narcosis could explain this.  She seems quite comfortable without any acute pain/chest pain symptoms or worsening dyspnea.  Per the RN the patient's mental state has been fluctuating over her shift but she she was needle pricked she woke up quite well a little more restless.  We will continue to monitor the patient and follow-up on the testing.

## 2023-07-02 NOTE — ED NOTES
Patient presents to ED vis EMS from home due to COPD exacerbation. Patient O2 sat 74% on 3L NC. Solumedrol given en route with EMS. Patient O2 sat 98% on 4L NC. O2 sat decreased to 3L NC. Respiratory @ bedside. Patient;s daughter @ bedside reports patient has been having episodes of confusion and is not at baseline, patient able to answer orientation questions appropriately at this time.

## 2023-07-02 NOTE — ASSESSMENT & PLAN NOTE
Centrilobular emphysema   Oxygen dependent  Former smoker   Report 3 L nasal cannula home oxygen   Cont duoneb  Cont budesonide and solumedrol  Continue continuous BiPAP-wean as tolerated

## 2023-07-02 NOTE — H&P
[facility] Emergency [facility] Medicine  History & Physical    Patient Name: Terri Phelan  MRN: [id number]  Patient Class: OP- Observation  Admission Date: 2023  Attending Physician: Alida Garza MD   Primary Care Provider: Kev Cox MD         Patient information was obtained from relative(s), EMS personnel, past medical records and ER records.     Subjective:     Principal Problem:<principal problem not specified>    Chief Complaint:   Chief Complaint   Patient presents with    COPD     Brought to from home by EJ for COPD exacerbation. Pt was 74% on 3L. Duo neb and  125mg solumedrol en route. Currently 94% on 4L          HPI: 68 YO F with PMHx significant for COPD on home O2 presented to the ER for worsening SOB. Per patient's daughter at bedside her symptoms have started yesterday with headache, mild SOB, fatigue. Today she wake up, was having more difficulty to breath. Then later she become confuse and was talking without making sense. Since she saw that her mental state has gotten worse and worse, she has decided to call EMS. At home EMS found that her O2 sat was around 74%. Patient is a frequent flyer and las admission was around 2 weeks ago. When seen in the ER, was very sleepy, reactive to pain stimulus. She was placed on BIPAP and tolerate well. She has been admitted for COPD exacerbation.      Past Medical History:   Diagnosis Date    COPD (chronic obstructive pulmonary disease)     Hypertension        Past Surgical History:   Procedure Laterality Date     SECTION      HERNIA REPAIR      HYSTERECTOMY         Review of patient's allergies indicates:   Allergen Reactions    Codeine Nausea And Vomiting    Morphine Other (See Comments)       No current facility-administered medications on file prior to encounter.     Current Outpatient Medications on File Prior to Encounter   Medication Sig    acyclovir (ZOVIRAX) 400 MG tablet TAKE 1 TABLET BY MOUTH THREE TIMES DAILY FOR  10 DAYS AS NEEDED FOR OUTBREAK    albuterol (PROVENTIL/VENTOLIN HFA) 90 mcg/actuation inhaler Inhale 2 puffs into the lungs every 6 (six) hours as needed.    albuterol-ipratropium (DUO-NEB) 2.5 mg-0.5 mg/3 mL nebulizer solution Take by nebulization every 4 (four) hours.    alendronate (FOSAMAX) 35 MG tablet Take 35 mg by mouth every Tuesday.    aspirin 81 MG Chew Take 81 mg by mouth once daily.    azithromycin (Z-OLY) 250 MG tablet Take 1 tablet (250 mg total) by mouth 3 (three) times a week.    budesonide-glycopyr-formoterol (BREZTRI AEROSPHERE) 160-9-4.8 mcg/actuation HFAA Inhale 2 puffs into the lungs 2 (two) times daily.    citalopram (CELEXA) 10 MG tablet Take 10 mg by mouth once daily.    DALIRESP 500 mcg Tab Take 500 mcg by mouth once daily.    dextromethorphan-guaiFENesin  mg (MUCINEX DM)  mg per 12 hr tablet Take 1 tablet by mouth every 12 (twelve) hours.    dicyclomine (BENTYL) 20 mg tablet Take 1 tablet (20 mg total) by mouth 3 (three) times daily as needed (abdominal pain).    ergocalciferol (ERGOCALCIFEROL) 50,000 unit Cap Take 50,000 Units by mouth every Tuesday.    fluticasone propionate (FLONASE) 50 mcg/actuation nasal spray 1 spray by Each Nostril route daily as needed for Allergies.    LORazepam (ATIVAN) 0.5 MG tablet Take 1 tablet (0.5 mg total) by mouth every 8 (eight) hours as needed for Anxiety.    OXYGEN-AIR DELIVERY SYSTEMS MISC 3 L by Nasal route.    traZODone (DESYREL) 50 MG tablet Take 1 tablet (50 mg total) by mouth nightly as needed for Insomnia.     Family History    None       Tobacco Use    Smoking status: Former     Packs/day: 1.00     Years: 20.00     Pack years: 20.00     Types: Cigarettes    Smokeless tobacco: Never   Substance and Sexual Activity    Alcohol use: Not Currently     Alcohol/week: 1.0 standard drink     Types: 1 Glasses of wine per week    Drug use: No    Sexual activity: Not Currently     Review of Systems   Unable to perform ROS:  Acuity of condition   Constitutional:  Negative for chills and fever.   Respiratory:  Positive for shortness of breath.    Cardiovascular:  Negative for chest pain, palpitations and leg swelling.   Neurological:  Positive for weakness.   Psychiatric/Behavioral:  Positive for confusion.    All other systems reviewed and are negative.  Objective:     Vital Signs (Most Recent):  Temp: 98.1 °F (36.7 °C) (07/01/23 2319)  Pulse: 77 (07/01/23 2304)  Resp: 17 (07/01/23 2304)  BP: (!) 140/64 (07/01/23 2202)  SpO2: 96 % (07/01/23 2304) Vital Signs (24h Range):  Temp:  [98.1 °F (36.7 °C)-98.2 °F (36.8 °C)] 98.1 °F (36.7 °C)  Pulse:  [] 77  Resp:  [17-36] 17  SpO2:  [93 %-98 %] 96 %  BP: (128-175)/(59-73) 140/64     Weight: 85.3 kg (188 lb)  Body mass index is 30.34 kg/m².     Physical Exam  Vitals and nursing note reviewed.   Constitutional:       Appearance: She is ill-appearing.   HENT:      Head: Normocephalic and atraumatic.      Nose: No congestion.      Mouth/Throat:      Mouth: Mucous membranes are moist.      Pharynx: No oropharyngeal exudate.   Eyes:      Extraocular Movements: Extraocular movements intact.      Pupils: Pupils are equal, round, and reactive to light.   Cardiovascular:      Rate and Rhythm: Normal rate. Rhythm irregular.      Heart sounds: No murmur heard.    No friction rub. No gallop.   Pulmonary:      Effort: Pulmonary effort is normal. No respiratory distress.      Breath sounds: No wheezing or rales.   Chest:      Chest wall: No tenderness.   Abdominal:      General: Bowel sounds are normal. There is no distension.      Palpations: Abdomen is soft.      Tenderness: There is no abdominal tenderness. There is no guarding or rebound.   Musculoskeletal:         General: No swelling or tenderness.      Cervical back: No rigidity or tenderness.      Right lower leg: No edema.      Left lower leg: No edema.   Skin:     General: Skin is warm.      Findings: No erythema or rash.   Neurological:       General: No focal deficit present.      Mental Status: She is alert. She is disoriented.      Motor: No weakness.      Gait: Gait normal.   Psychiatric:         Thought Content: Thought content normal.            CRANIAL NERVES     CN III, IV, VI   Pupils are equal, round, and reactive to light.     Significant Labs: All pertinent labs within the past 24 hours have been reviewed.  Recent Lab Results         07/01/23  2200   07/01/23  1835        Albumin   3.7       Alkaline Phosphatase   50       Allens Test Pass         ALT   15       Anion Gap   11       AST   27       BILIRUBIN TOTAL   0.3  Comment: For infants and newborns, interpretation of results should be based  on gestational age, weight and in agreement with clinical  observations.    Premature Infant recommended reference ranges:  Up to 24 hours.............<8.0 mg/dL  Up to 48 hours............<12.0 mg/dL  3-5 days..................<15.0 mg/dL  6-29 days.................<15.0 mg/dL         BNP   10  Comment: Values of less than 100 pg/ml are consistent with non-CHF populations.       Site RR         BUN   11       Calcium   9.1       Chloride   95       CO2   39       Creatinine   0.7       DelSys CPAP/BiPAP         eGFR   >60       FiO2 40         Glucose   110       Hematocrit   45.7       Hemoglobin   12.9       MCH   27.9       MCHC   28.2       MCV   99       Min Vol 7.4         Mode BiPAP         MPV   11.3       Platelets   116       POC BE 19         POC HCO3 46.2         POC PCO2 103.4         POC PH 7.258         POC PO2 68         POC SATURATED O2 88         POC TCO2 49         Potassium   4.6       PROTEIN TOTAL   7.6       Rate 12         RBC   4.63       RDW   13.2       Sample ARTERIAL         Sodium   145       Sp02 96         Spont Rate 9         Troponin I   <0.006  Comment: The reference interval for Troponin I represents the 99th percentile   cutoff   for our facility and is consistent with 3rd generation assay   performance.          WBC   7.67               Significant Imaging: I have reviewed all pertinent imaging results/findings within the past 24 hours.  I have reviewed and interpreted all pertinent imaging results/findings within the past 24 hours.    Assessment/Plan:     Major depression  Patient has persistent depression which is mild and is currently controlled. Will Continue anti-depressant medications. We will not consult psychiatry at this time. Patient does not display psychosis at this time. Continue to monitor closely and adjust plan of care as needed.        Acute hypercapnic respiratory failure  Patient with Hypoxic Respiratory failure which is Acute on chronic.  she is on home oxygen at 2 LPM. Supplemental oxygen was provided and noted- Oxygen Concentration (%):  [40] 40    .   Signs/symptoms of respiratory failure include- tachypnea and respiratory distress. Contributing diagnoses includes - COPD Labs and images were reviewed. Patient Has recent ABG, which has been reviewed. Will treat underlying causes and adjust management of respiratory failure as follows-     Obstructive sleep apnea syndrome  Non compliant to CPAP      COPD exacerbation  Cont duoneb  Cont budesonide and solumedrol      Essential hypertension  Cont current home meds        VTE Risk Mitigation (From admission, onward)         Ordered     enoxaparin injection 40 mg  Daily         07/01/23 2135     IP VTE HIGH RISK PATIENT  Once         07/01/23 2135     Place sequential compression device  Until discontinued         07/01/23 2135                   On 07/01/2023, patient should be placed in hospital observation services under my care.        Elliot Brambila MD  Department of Hospital Medicine  [facility] - Emergency Dept

## 2023-07-02 NOTE — PLAN OF CARE
Problem: Adult Inpatient Plan of Care  Goal: Plan of Care Review  Outcome: Ongoing, Progressing  Goal: Patient-Specific Goal (Individualized)  Outcome: Ongoing, Progressing  Goal: Absence of Hospital-Acquired Illness or Injury  Outcome: Ongoing, Progressing  Goal: Optimal Comfort and Wellbeing  Outcome: Ongoing, Progressing  Goal: Readiness for Transition of Care  Outcome: Ongoing, Progressing     Problem: Adjustment to Illness COPD (Chronic Obstructive Pulmonary Disease)  Goal: Optimal Chronic Illness Coping  Outcome: Ongoing, Progressing     Problem: Functional Ability Impaired COPD (Chronic Obstructive Pulmonary Disease)  Goal: Optimal Level of Functional [place]  Outcome: Ongoing, Progressing     Problem: Infection COPD (Chronic Obstructive Pulmonary Disease)  Goal: Absence of Infection Signs and Symptoms  Outcome: Ongoing, Progressing     Problem: Oral Intake Inadequate COPD (Chronic Obstructive Pulmonary Disease)  Goal: Improved Nutrition Intake  Outcome: Ongoing, Progressing     Problem: Respiratory Compromise COPD (Chronic Obstructive Pulmonary Disease)  Goal: Effective Oxygenation and Ventilation  Outcome: Ongoing, Progressing     Problem: Skin Injury Risk Increased  Goal: Skin Health and Integrity  Outcome: Ongoing, Progressing

## 2023-07-02 NOTE — NURSING
ST elevation noted, pt asymptomatic. MD notified. EKG obtained and sent to [facility]. STAT troponin drawn.

## 2023-07-02 NOTE — HPI
68 YO F with PMHx significant for COPD on home O2 presented to the ER for worsening SOB. Per patient's daughter at bedside her symptoms have started yesterday with headache, mild SOB, fatigue. Today she wake up, was having more difficulty to breath. Then later she become confuse and was talking without making sense. Since she saw that her mental state has gotten worse and worse, she has decided to call EMS. At home EMS found that her O2 sat was around 74%. Patient is a frequent flyer and las admission was around 2 weeks ago. When seen in the ER, was very sleepy, reactive to pain stimulus. She was placed on BIPAP and tolerate well. She has been admitted for COPD exacerbation.

## 2023-07-02 NOTE — NURSING
Pt received from ED via stretcher. BiPAP in place, O2 sat 94%. Pt drowsy but easily arousable. Oriented x4 after waking up. Pt transferred to ICU bed. Cardiac monitoring applied. POC reviewed with daughter at bedside. Transfer of care complete.

## 2023-07-03 PROBLEM — R53.81 DEBILITY: Status: ACTIVE | Noted: 2023-07-03

## 2023-07-03 LAB
ANION GAP SERPL CALC-SCNC: 5 MMOL/L (ref 8–16)
AV INDEX (PROSTH): 0.7
AV MEAN GRADIENT: 11 MMHG
AV PEAK GRADIENT: 21 MMHG
AV VALVE AREA: 2.2 CM2
AV VELOCITY RATIO: 0.82
BASOPHILS # BLD AUTO: 0.01 K/UL (ref 0–0.2)
BASOPHILS NFR BLD: 0.1 % (ref 0–1.9)
BSA FOR ECHO PROCEDURE: 1.95 M2
BUN SERPL-MCNC: 22 MG/DL (ref 8–23)
CALCIUM SERPL-MCNC: 9.1 MG/DL (ref 8.7–10.5)
CHLORIDE SERPL-SCNC: 95 MMOL/L (ref 95–110)
CO2 SERPL-SCNC: 40 MMOL/L (ref 23–29)
CREAT SERPL-MCNC: 0.7 MG/DL (ref 0.5–1.4)
CV ECHO LV RWT: 0.4 CM
DIFFERENTIAL METHOD: ABNORMAL
DOP CALC AO PEAK VEL: 2.29 M/S
DOP CALC AO VTI: 48 CM
DOP CALC LVOT AREA: 3.1 CM2
DOP CALC LVOT DIAMETER: 2 CM
DOP CALC LVOT PEAK VEL: 1.88 M/S
DOP CALC LVOT STROKE VOLUME: 105.82 CM3
DOP CALC MV VTI: 32 CM
DOP CALCLVOT PEAK VEL VTI: 33.7 CM
E WAVE DECELERATION TIME: 262.92 MSEC
E/A RATIO: 0.78
E/E' RATIO: 9.22 M/S
ECHO LV POSTERIOR WALL: 0.9 CM (ref 0.6–1.1)
EJECTION FRACTION: 65 %
EOSINOPHIL # BLD AUTO: 0 K/UL (ref 0–0.5)
EOSINOPHIL NFR BLD: 0 % (ref 0–8)
ERYTHROCYTE [DISTWIDTH] IN BLOOD BY AUTOMATED COUNT: 13.7 % (ref 11.5–14.5)
EST. GFR  (NO RACE VARIABLE): >60 ML/MIN/1.73 M^2
FRACTIONAL SHORTENING: 51 % (ref 28–44)
GLUCOSE SERPL-MCNC: 160 MG/DL (ref 70–110)
HCT VFR BLD AUTO: 38.7 % (ref 37–48.5)
HGB BLD-MCNC: 11.5 G/DL (ref 12–16)
IMM GRANULOCYTES # BLD AUTO: 0.04 K/UL (ref 0–0.04)
IMM GRANULOCYTES NFR BLD AUTO: 0.4 % (ref 0–0.5)
INTERVENTRICULAR SEPTUM: 0.88 CM (ref 0.6–1.1)
IVC DIAMETER: 1.83 CM
IVRT: 68.51 MSEC
LA MAJOR: 5.26 CM
LA MINOR: 5.22 CM
LEFT ATRIUM SIZE: 4.42 CM
LEFT ATRIUM VOLUME INDEX MOD: 17.8 ML/M2
LEFT ATRIUM VOLUME MOD: 34.09 CM3
LEFT INTERNAL DIMENSION IN SYSTOLE: 2.2 CM (ref 2.1–4)
LEFT VENTRICLE DIASTOLIC VOLUME INDEX: 47.65 ML/M2
LEFT VENTRICLE DIASTOLIC VOLUME: 91.02 ML
LEFT VENTRICLE MASS INDEX: 68 G/M2
LEFT VENTRICLE SYSTOLIC VOLUME INDEX: 8.4 ML/M2
LEFT VENTRICLE SYSTOLIC VOLUME: 16.13 ML
LEFT VENTRICULAR INTERNAL DIMENSION IN DIASTOLE: 4.47 CM (ref 3.5–6)
LEFT VENTRICULAR MASS: 129.42 G
LV LATERAL E/E' RATIO: 9.22 M/S
LV SEPTAL E/E' RATIO: 9.22 M/S
LVOT MG: 9.2 MMHG
LVOT MV: 1.45 CM/S
LYMPHOCYTES # BLD AUTO: 0.5 K/UL (ref 1–4.8)
LYMPHOCYTES NFR BLD: 5.1 % (ref 18–48)
MAGNESIUM SERPL-MCNC: 2 MG/DL (ref 1.6–2.6)
MCH RBC QN AUTO: 27.3 PG (ref 27–31)
MCHC RBC AUTO-ENTMCNC: 29.7 G/DL (ref 32–36)
MCV RBC AUTO: 92 FL (ref 82–98)
MONOCYTES # BLD AUTO: 0.4 K/UL (ref 0.3–1)
MONOCYTES NFR BLD: 4 % (ref 4–15)
MV A" WAVE DURATION": 91.34 MSEC
MV MEAN GRADIENT: 3 MMHG
MV PEAK A VEL: 1.07 M/S
MV PEAK E VEL: 0.83 M/S
MV PEAK GRADIENT: 8 MMHG
MV STENOSIS PRESSURE HALF TIME: 76.25 MS
MV VALVE AREA BY CONTINUITY EQUATION: 3.31 CM2
MV VALVE AREA P 1/2 METHOD: 2.89 CM2
NEUTROPHILS # BLD AUTO: 8.9 K/UL (ref 1.8–7.7)
NEUTROPHILS NFR BLD: 90.4 % (ref 38–73)
NRBC BLD-RTO: 0 /100 WBC
OHS LV EJECTION FRACTION SIMPSONS BIPLANE MOD: 8 %
PF4 HEPARIN CMPLX AB SER QL: 0.11 OD (ref 0–0.4)
PHOSPHATE SERPL-MCNC: 2 MG/DL (ref 2.7–4.5)
PISA TR MAX VEL: 2.67 M/S
PLATELET # BLD AUTO: 146 K/UL (ref 150–450)
PMV BLD AUTO: 11.5 FL (ref 9.2–12.9)
POTASSIUM SERPL-SCNC: 4.2 MMOL/L (ref 3.5–5.1)
PV PEAK VELOCITY: 1.38 CM/S
RA MAJOR: 4.64 CM
RA PRESSURE: 8 MMHG
RBC # BLD AUTO: 4.22 M/UL (ref 4–5.4)
RV TISSUE DOPPLER FREE WALL SYSTOLIC VELOCITY 1 (APICAL 4 CHAMBER VIEW): 19.69 CM/S
SODIUM SERPL-SCNC: 140 MMOL/L (ref 136–145)
TDI LATERAL: 0.09 M/S
TDI SEPTAL: 0.09 M/S
TDI: 0.09 M/S
TR MAX PG: 29 MMHG
TV REST PULMONARY ARTERY PRESSURE: 37 MMHG
VIT B12 SERPL-MCNC: 687 PG/ML (ref 210–950)
WBC # BLD AUTO: 9.82 K/UL (ref 3.9–12.7)

## 2023-07-03 PROCEDURE — 80048 BASIC METABOLIC PNL TOTAL CA: CPT | Performed by: FAMILY MEDICINE

## 2023-07-03 PROCEDURE — 84100 ASSAY OF PHOSPHORUS: CPT | Performed by: FAMILY MEDICINE

## 2023-07-03 PROCEDURE — 94640 AIRWAY INHALATION TREATMENT: CPT

## 2023-07-03 PROCEDURE — 25000242 PHARM REV CODE 250 ALT 637 W/ HCPCS: Performed by: FAMILY MEDICINE

## 2023-07-03 PROCEDURE — 11000001 HC ACUTE MED/SURG PRIVATE ROOM

## 2023-07-03 PROCEDURE — 63600175 PHARM REV CODE 636 W HCPCS: Performed by: STUDENT IN AN ORGANIZED HEALTH CARE EDUCATION/TRAINING PROGRAM

## 2023-07-03 PROCEDURE — 82607 VITAMIN B-12: CPT | Performed by: INTERNAL MEDICINE

## 2023-07-03 PROCEDURE — 97165 OT EVAL LOW COMPLEX 30 MIN: CPT

## 2023-07-03 PROCEDURE — 94660 CPAP INITIATION&MGMT: CPT

## 2023-07-03 PROCEDURE — 97535 SELF CARE MNGMENT TRAINING: CPT

## 2023-07-03 PROCEDURE — 85025 COMPLETE CBC W/AUTO DIFF WBC: CPT | Performed by: FAMILY MEDICINE

## 2023-07-03 PROCEDURE — 25000003 PHARM REV CODE 250: Performed by: FAMILY MEDICINE

## 2023-07-03 PROCEDURE — 83735 ASSAY OF MAGNESIUM: CPT | Performed by: FAMILY MEDICINE

## 2023-07-03 PROCEDURE — 63600175 PHARM REV CODE 636 W HCPCS: Performed by: FAMILY MEDICINE

## 2023-07-03 PROCEDURE — 97530 THERAPEUTIC ACTIVITIES: CPT

## 2023-07-03 PROCEDURE — 94761 N-INVAS EAR/PLS OXIMETRY MLT: CPT

## 2023-07-03 PROCEDURE — 36415 COLL VENOUS BLD VENIPUNCTURE: CPT | Performed by: INTERNAL MEDICINE

## 2023-07-03 PROCEDURE — 27000221 HC OXYGEN, UP TO 24 HOURS

## 2023-07-03 PROCEDURE — 99900035 HC TECH TIME PER 15 MIN (STAT)

## 2023-07-03 RX ADMIN — ENOXAPARIN SODIUM 40 MG: 40 INJECTION SUBCUTANEOUS at 05:07

## 2023-07-03 RX ADMIN — METHYLPREDNISOLONE SODIUM SUCCINATE 40 MG: 40 INJECTION INTRAMUSCULAR; INTRAVENOUS at 09:07

## 2023-07-03 RX ADMIN — IPRATROPIUM BROMIDE 0.5 MG: 0.5 SOLUTION RESPIRATORY (INHALATION) at 03:07

## 2023-07-03 RX ADMIN — CITALOPRAM HYDROBROMIDE 10 MG: 10 TABLET ORAL at 08:07

## 2023-07-03 RX ADMIN — ALBUTEROL SULFATE 2.5 MG: 2.5 SOLUTION RESPIRATORY (INHALATION) at 11:07

## 2023-07-03 RX ADMIN — IPRATROPIUM BROMIDE 0.5 MG: 0.5 SOLUTION RESPIRATORY (INHALATION) at 04:07

## 2023-07-03 RX ADMIN — METHYLPREDNISOLONE SODIUM SUCCINATE 40 MG: 40 INJECTION INTRAMUSCULAR; INTRAVENOUS at 05:07

## 2023-07-03 RX ADMIN — ALBUTEROL SULFATE 2.5 MG: 2.5 SOLUTION RESPIRATORY (INHALATION) at 08:07

## 2023-07-03 RX ADMIN — IPRATROPIUM BROMIDE 0.5 MG: 0.5 SOLUTION RESPIRATORY (INHALATION) at 07:07

## 2023-07-03 RX ADMIN — ALBUTEROL SULFATE 2.5 MG: 2.5 SOLUTION RESPIRATORY (INHALATION) at 03:07

## 2023-07-03 RX ADMIN — METHYLPREDNISOLONE SODIUM SUCCINATE 40 MG: 40 INJECTION INTRAMUSCULAR; INTRAVENOUS at 01:07

## 2023-07-03 RX ADMIN — IPRATROPIUM BROMIDE 0.5 MG: 0.5 SOLUTION RESPIRATORY (INHALATION) at 11:07

## 2023-07-03 RX ADMIN — ACETAMINOPHEN 650 MG: 325 TABLET ORAL at 12:07

## 2023-07-03 RX ADMIN — ASPIRIN 81 MG CHEWABLE TABLET 81 MG: 81 TABLET CHEWABLE at 08:07

## 2023-07-03 RX ADMIN — IPRATROPIUM BROMIDE 0.5 MG: 0.5 SOLUTION RESPIRATORY (INHALATION) at 08:07

## 2023-07-03 RX ADMIN — ALBUTEROL SULFATE 2.5 MG: 2.5 SOLUTION RESPIRATORY (INHALATION) at 07:07

## 2023-07-03 RX ADMIN — ACETAMINOPHEN 650 MG: 325 TABLET ORAL at 08:07

## 2023-07-03 RX ADMIN — ROFLUMILAST 500 MCG: 500 TABLET ORAL at 08:07

## 2023-07-03 RX ADMIN — ALBUTEROL SULFATE 2.5 MG: 2.5 SOLUTION RESPIRATORY (INHALATION) at 04:07

## 2023-07-03 RX ADMIN — LORAZEPAM 0.5 MG: 2 INJECTION INTRAMUSCULAR; INTRAVENOUS at 09:07

## 2023-07-03 NOTE — PLAN OF CARE
The sw spoke to Susan with [facility] [phone] who states she received the pt's referral and she is currently reviewing it. She will have to read the documentation of the prior admits for the same reasons. She will meet with the pt and speak with her to introduce herself and what their facility offers.        07/03/23 1023   Post-Acute Status   Post-Acute Authorization Placement   Post-Acute Placement Status Referrals Sent   Discharge Plan   Discharge Plan A Rehab   Discharge Plan B Skilled Nursing Facility

## 2023-07-03 NOTE — ASSESSMENT & PLAN NOTE
Centrilobular emphysema   Oxygen dependent  Former smoker   Report 3 L nasal cannula home oxygen   Cont duoneb  Cont budesonide and solumedrol  Continue continuous BiPAP-wean as tolerated- weaned to NC on 7/2

## 2023-07-03 NOTE — PT/OT/SLP EVAL
Occupational Therapy   Evaluation and Treatment    Name: Terri Phelan  MRN: [id number]  Admitting Diagnosis: COPD exacerbation  Recent Surgery: * No surgery found *      Recommendations:     Discharge Recommendations: other (see comments) (High intensity therapy; inpatient rehab)  Discharge Equipment Recommendations:  other (see comments) (Defer to next level of care; anticipate tub transfer bench)  Barriers to discharge:  Other (Comment) (Fall risk)    Assessment:     Terri Phelan is a 67 y.o. female with a medical diagnosis of COPD exacerbation.  She presents with ..The primary encounter diagnosis was Acute hypercapnic respiratory failure. Diagnoses of Shortness of breath, COPD exacerbation, and COPD (chronic obstructive pulmonary disease) were also pertinent to this visit. . Performance deficits affecting function: weakness, impaired endurance, impaired self care skills, impaired functional mobility, gait instability, impaired balance, decreased lower extremity function, decreased upper extremity function, decreased coordination, decreased ROM, impaired cardiopulmonary response to activity.      Occupational therapy evaluation completed, skilled acute OT indicated. At baseline patient is usually functioning at Modified independence level with recent decrease in performance requiring minimal assist for ADLs (ie. Bathing, dressing). Patient on evaluation limited by oxygen/HR and anxiety and thus limited to perform in sitting EOB and short distance transfer to chair. Initiated education and instruction on energy conservation/pacing techniques, adaptive ADL techniques, and safe functional mobility with rolling walker. Anticipate future d/c to inpatient rehab (high intensity therapy) upon medically stable.     Rehab Prognosis: Good; patient would benefit from acute skilled OT services to address these deficits and reach maximum level of function.       Plan:     Patient to be seen 5 x/week to address the above  listed problems via self-care/home management, therapeutic exercises, therapeutic activities  Plan of Care Expires: 07/31/23  Plan of Care Reviewed with: patient, daughter    Subjective     Chief Complaint: Patient reports not feeling; wheezy and breathless   Patient/Family Comments/goals: Patient and family goals include patient returning to driving short distances and regain independence in ADL / IADLs.    Occupational Profile:  Living Environment: Patient resides with daughter on the 1st floor of an apartment with a threshold to enter. Tub/shower with shower chair  Previous level of function/Roles and Routines: Prior to admit patient was modified independence with a recent decrease in performance requiring minimal assist for showering and dressing. Patient recently stopped driving after returning home from last hospitalization (6/16/2023). Patient has had multiple hospitalizations this year. 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   Equipment Used at Home: BIPAP, oxygen, rollator, shower chair, bedside commode  Assistance upon Discharge: Patient will have assistance from daughter after work     Pain/Comfort:  Pain Rating 1: other (see comments) (No pain reported; complained of wheeziness and weakness; moderate breathlessness at rest and sitting EOB)  Pain Rating Post-Intervention 1: other (see comments) (No change reported; breathlessness increased to high with standing/short distance transfer)    Objective:     Communicated with: nursing prior to session.  Patient found HOB elevated with blood pressure cuff, oxygen, PureWick, peripheral IV, telemetry, pulse ox (continuous), Other (comments) (continuous ICU monitoring) upon OT entry to room.    General Precautions: Standard, fall  Orthopedic Precautions: N/A  Braces: N/A  Respiratory Status: Nasal cannula, flow 4 L/min    Occupational Performance:    Bed Mobility:    Patient completed Supine to Sit with contact guard assistance and with hand  hold    Functional Mobility/Transfers:  Patient completed Sit <> Stand Transfer with minimum assist progressing to contact guard assist  with  rolling walker   Patient completed Bed <> Chair Transfer using Step Transfer technique with contact guard assistance with rolling walker  Functional Mobility: Patient completed x3 sit to stand with minimal assist progressing to contact guard assist with no AD    Activities of Daily Living:  Feeding:  modified independence ; with tremulous movement of BUE  Upper Body Dressing: moderate assistance ; for line management   Lower Body Dressing: contact guard assistance ; with adaptive dressing technique of figure 4 while sitting EOB     Cognitive/Visual Perceptual:  Cognitive/Psychosocial Skills:     -       Follows Commands/attention:Follows multistep  commands  -       Communication: clear/fluent  -       Memory: functional cognition intact   -       Safety awareness/insight to disability: intact   -       Mood/Affect/Coping skills/emotional control: Cooperative and Anxious    Physical Exam:  Balance:    -       Standing Balance: 2 (supports self with BUE) on [facility] Standing Balance Scale   Postural examination/scapula alignment:    -       Rounded shoulders  Skin integrity: Visible skin intact  Edema:  Mild BLE  Upper Extremity Range of Motion:     -       Right Upper Extremity: Proximal: 160 degrees shoulder flexion; Distal: WFL elbow flex/ext   -       Left Upper Extremity: WFL  Upper Extremity Strength:    -       Right Upper Extremity: Deficits: grossly 3+/5 strength proximal within range and distal   -       Left Upper Extremity: Deficits: grossly 3+/5 strength proximal and distal    AMPAC 6 Click ADL:  AMPAC Total Score: 16    Treatment & Education:  Educated on role of OT / POC developed. Occupational profile developed in collaboration of patient and daughter. Assessment performed. Bed mobility and ADLs as above; global tremulousness with all movement and  "increased while sitting EOB 2/2 anxiety. SpO2 93% on 4L of O2, HR 80 bpm at rest. SpO2 at max drops to 86% on 4L O2 with quick recovery and HR increase to 110s - 120s. With use of modified esteban, patient reported "moderate" breathlessness at rest and sitting EOB on 4L of O2; Breathlessness increased to "high" with sit to stand and short distance transfer to chair. Patient completed 3x sit to stand as above; unable to complete formal assessment of 5x sit to stand 2/2 oxygen/HR levels. Initiated education on pacing/ energy conservation; follow up warranted. Discussed use of tub transfer bench with daughter present; handout provided.      Patient left HOB elevated with all lines intact, call button in reach, nursing notified, and daughter present    GOALS:   Multidisciplinary Problems       Occupational Therapy Goals          Problem: Occupational Therapy    Goal Priority Disciplines Outcome Interventions   Occupational Therapy Goal     OT, PT/OT Ongoing, Progressing    Description: Goals to be met by: 7/31/2023     Patient will increase functional independence with ADLs by performing:    UE Dressing inclusive of clothing retrieval with Modified Hana and least restrictive device as needed.  LE Dressing with Modified Hana.  Grooming while standing at sink with Stand-by Assistance.  Toileting from toilet with Modified Hana for hygiene and clothing management.   Toilet transfer to toilet with Stand-by Assistance.  Increased functional strength to 4/5 to support engagement in self care routine without adverse signs and symptoms.  Integration of energy conservation techniques to support performing ADL routines in proper contexts with controlled exertion (ie. Mobilize to true bathroom)                                  History:     Past Medical History:   Diagnosis Date    COPD (chronic obstructive pulmonary disease)     Hypertension          Past Surgical History:   Procedure Laterality Date    "  SECTION      HERNIA REPAIR      HYSTERECTOMY         Time Tracking:     OT Date of Treatment: 23  OT Start Time: 1125  OT Stop Time: 1210  OT Total Time (min): 45 min    Billable Minutes:Evaluation 15 min  Self Care/Home Management 15 min  Therapeutic Activity 15 min    7/3/2023

## 2023-07-03 NOTE — PLAN OF CARE
Problem: Adult Inpatient Plan of Care  Goal: Plan of Care Review  Outcome: Ongoing, Progressing  Goal: Patient-Specific Goal (Individualized)  Outcome: Ongoing, Progressing  Goal: Absence of Hospital-Acquired Illness or Injury  Outcome: Ongoing, Progressing  Goal: Optimal Comfort and Wellbeing  Outcome: Ongoing, Progressing  Goal: Readiness for Transition of Care  Outcome: Ongoing, Progressing     Problem: Adjustment to Illness COPD (Chronic Obstructive Pulmonary Disease)  Goal: Optimal Chronic Illness Coping  Outcome: Ongoing, Progressing     Problem: Functional Ability Impaired COPD (Chronic Obstructive Pulmonary Disease)  Goal: Optimal Level of Functional [place]  Outcome: Ongoing, Progressing     Problem: Infection COPD (Chronic Obstructive Pulmonary Disease)  Goal: Absence of Infection Signs and Symptoms  Outcome: Ongoing, Progressing     Problem: Oral Intake Inadequate COPD (Chronic Obstructive Pulmonary Disease)  Goal: Improved Nutrition Intake  Outcome: Ongoing, Progressing     Problem: Respiratory Compromise COPD (Chronic Obstructive Pulmonary Disease)  Goal: Effective Oxygenation and Ventilation  Outcome: Ongoing, Progressing     Problem: Skin Injury Risk Increased  Goal: Skin Health and Integrity  Outcome: Ongoing, Progressing

## 2023-07-03 NOTE — PLAN OF CARE
Pt on BIPAP with documented settings. Alarms are set and working. Will continue to monitor.   The proper method of use, as well as anticipated side effects, of this aerosol treatment are discussed and demonstrated to the patient.   inhaled corticosteroids

## 2023-07-03 NOTE — SUBJECTIVE & OBJECTIVE
Interval History: awake and alert, 3l  NC - at home baseline. Patient reported unable to get out of bed due to fatigue and weakness. Appreciate pulmonary recs  Step down to floor  PT/OT    Review of Systems   Constitutional:  Negative for chills and fever.   Respiratory:  Positive for shortness of breath.    Cardiovascular:  Negative for chest pain.   Neurological:  Positive for weakness.   Psychiatric/Behavioral:  Negative for confusion.    All other systems reviewed and are negative.  Objective:     Vital Signs (Most Recent):  Temp: 98.3 °F (36.8 °C) (07/03/23 0715)  Pulse: 93 (07/03/23 0848)  Resp: (!) 39 (07/03/23 0848)  BP: (!) 159/67 (07/03/23 0800)  SpO2: (!) 92 % (07/03/23 0848) Vital Signs (24h Range):  Temp:  [98.3 °F (36.8 °C)-98.9 °F (37.2 °C)] 98.3 °F (36.8 °C)  Pulse:  [] 93  Resp:  [18-65] 39  SpO2:  [84 %-100 %] 92 %  BP: (127-164)/(57-97) 159/67     Weight: 82 kg (180 lb 12.4 oz)  Body mass index is 29.18 kg/m².    Intake/Output Summary (Last 24 hours) at 7/3/2023 0924  Last data filed at 7/3/2023 0646  Gross per 24 hour   Intake --   Output 650 ml   Net -650 ml           Physical Exam  Vitals and nursing note reviewed.   Constitutional:       General: She is not in acute distress.  HENT:      Head: Normocephalic and atraumatic.   Cardiovascular:      Rate and Rhythm: Normal rate. Rhythm irregular.      Heart sounds: No murmur heard.    No friction rub. No gallop.   Pulmonary:      Effort: Pulmonary effort is normal. No respiratory distress.      Breath sounds: No wheezing.   Abdominal:      General: Bowel sounds are normal. There is no distension.      Palpations: Abdomen is soft.      Tenderness: There is no abdominal tenderness.   Musculoskeletal:         General: No swelling or tenderness.      Cervical back: No rigidity or tenderness.      Right lower leg: No edema.      Left lower leg: No edema.   Skin:     General: Skin is warm.      Findings: No erythema or rash.   Neurological:       General: No focal deficit present.      Mental Status: She is alert and oriented to person, place, and time.      Motor: No weakness.      Gait: Gait normal.   Psychiatric:         Thought Content: Thought content normal.           Significant Labs: A1C:   Recent Labs   Lab 06/16/23  0428   HGBA1C 5.4       ABGs:   Recent Labs   Lab 07/02/23  0224 07/02/23  0454 07/02/23  0830   PH 7.279* 7.302* 7.376   PCO2 97.9* 90.6* 72.5*   HCO3 45.9* 44.8* 42.5*   POCSATURATED 91* 90* 82*   BE 19 18 17   PO2 75* 68* 51*       Blood Culture: No results for input(s): LABBLOO in the last 48 hours.  CBC:   Recent Labs   Lab 07/01/23 1835 07/02/23 0337 07/03/23 0334   WBC 7.67 5.49 9.82   HGB 12.9 12.0 11.5*   HCT 45.7 41.9 38.7   * 144* 146*       CMP:   Recent Labs   Lab 07/01/23 1835 07/02/23 0337 07/03/23 0333    144 140   K 4.6 4.3 4.2   CL 95 95 95   CO2 39* 39* 40*    141* 160*   BUN 11 17 22   CREATININE 0.7 0.6 0.7   CALCIUM 9.1 9.3 9.1   PROT 7.6  --   --    ALBUMIN 3.7  --   --    BILITOT 0.3  --   --    ALKPHOS 50*  --   --    AST 27  --   --    ALT 15  --   --    ANIONGAP 11 10 5*       Lipase: No results for input(s): LIPASE in the last 48 hours.  Lipid Panel:   Recent Labs   Lab 07/02/23  1126   CHOL 198   HDL 75   LDLCALC 113.6   TRIG 47   CHOLHDL 37.9     Magnesium:   Recent Labs   Lab 07/02/23 0337 07/03/23 0333   MG 1.9 2.0       Troponin:   Recent Labs   Lab 07/01/23 1835 07/02/23 0337   TROPONINI <0.006 <0.006       TSH: No results for input(s): TSH in the last 4320 hours.  Urine Culture: No results for input(s): LABURIN in the last 48 hours.  Urine Studies: No results for input(s): COLORU, APPEARANCEUA, PHUR, SPECGRAV, PROTEINUA, GLUCUA, KETONESU, BILIRUBINUA, OCCULTUA, NITRITE, UROBILINOGEN, LEUKOCYTESUR, RBCUA, WBCUA, BACTERIA, SQUAMEPITHEL, HYALINECASTS in the last 48 hours.    Invalid input(s): LAUREN    Significant Imaging: I have reviewed all pertinent imaging  results/findings within the past 24 hours.

## 2023-07-03 NOTE — PLAN OF CARE
Problem: Physical Therapy  Goal: Physical Therapy Goal  Description: Goals to be met by: 23     Patient will increase functional independence with mobility by performin. Supine to sit with Modified [place]  2. Sit to stand transfer with Supervision with AD if needed.   3. Gait  x 150 feet with Supervision using AD if needed.   4. Lower extremity exercise program x15 reps per handout, with supervision to increased tolerance to activities.     Outcome: Ongoing, Progressing   Patient trembling/shaking with anxiety throughout session, varying in intensity- tried deep pursed lip breathing which helped to a limited degree; pt on 3L O2; initial /67 HR 77 O2 sats 97% while seated in bedside chair; pt stood and performed standing exercise with use of RW, then needed to use the BSC for a BM; transferred to BSC with CGA; pt able to cleanse self with set up while seated on BSC; pt stood and transferred to bedside chair; vitals good throughout; will cont with POC; recommend high intensity therapy.

## 2023-07-03 NOTE — PLAN OF CARE
The sw met with the pt and Mulugeta Pehlan(dtr)[phone] who was at bedside during the assessment. The pt has been living with Mulugeta in [place] for the past year. The pt's independent with her ADL's and she has a BIPAP,rollator,o2,nebulizer,bsc shwr chair. The pt was driving previously at her last hospital admit but hasn't due to her medical condition. The pt was going to [facility] for Pulmonary Rehab 3 times a week but has not been able to due to her illness. The pt and her dtr want the pt to go to [facility] at d/c. Susan just met with them and states she will submit to [facility] once the pt's medically stable. The sw completed the white board with her name and contact info. The sw encouraged them to call if they have any further questions or concerns. The sw will continue to follow the pt throughout her transitions of care and will assist with any d/c needs.        07/03/23 1232   Discharge Assessment   Assessment Type Discharge Planning Assessment   Confirmed/corrected address, phone number and insurance Yes   Confirmed Demographics Correct on Facesheet   Source of Information patient   When was your last doctors appointment? 06/20/23   Communicated RADHA with patient/caregiver Date not available/Unable to determine   Reason For Admission COPD exacerbation   People in Home child(mary), adult   Do you expect to return to your current living situation? No  (IP[facility])   Do you have help at home or someone to help you manage your care at home? Yes   Who are your caregiver(s) and their phone number(s)? Mulugeta Phelan(dtr)[phone]   Prior to hospitilization cognitive status: Alert/Oriented   Current cognitive status: Alert/Oriented   Walking or Climbing Stairs ambulation difficulty, requires equipment;stair climbing difficulty, requires equipment;transferring difficulty, requires equipment   Dressing/Bathing bathing difficulty, requires equipment   Home Accessibility wheelchair accessible   Home Layout  Able to live on 1st floor   Equipment Currently Used at Home BIPAP;rollator;oxygen;nebulizer;bedside commode;shower chair   Readmission within 30 days? Yes  (the pt was discharged from [facility] 6/16/23)   Patient currently being followed by outpatient case management? No   Do you currently have service(s) that help you manage your care at home? No   Do you take prescription medications? Yes   Do you have prescription coverage? Yes   Coverage [employer] Medicare/[facility]   Do you have any problems affording any of your prescribed medications? No   Is the patient taking medications as prescribed? yes   Who is going to help you get home at discharge? Mulugeta Phelan(dtr)[phone]   How do you get to doctors appointments? car, drives self;family or friend will provide   Are you on dialysis? No   Do you take coumadin? No   Discharge Plan A Rehab   Discharge Plan B Skilled Nursing Facility   DME Needed Upon Discharge  none   Discharge Plan discussed with: Patient;Adult children   Transition of Care Barriers None   OTHER   Name(s) of People in Home Mulugeta Phelan(dtr)[phone]

## 2023-07-03 NOTE — PROGRESS NOTES
[facility] Medicine  Progress Note    Patient Name: Terri Phelan  MRN: [id number]  Patient Class: IP- Inpatient   Admission Date: 7/1/2023  Length of Stay: 1 days  Attending Physician: Josephine Xavier*  Primary Care Provider: Kev Cox MD        Subjective:     Principal Problem:COPD exacerbation        HPI:  68 YO F with PMHx significant for COPD on home O2 presented to the ER for worsening SOB. Per patient's daughter at bedside her symptoms have started yesterday with headache, mild SOB, fatigue. Today she wake up, was having more difficulty to breath. Then later she become confuse and was talking without making sense. Since she saw that her mental state has gotten worse and worse, she has decided to call EMS. At home EMS found that her O2 sat was around 74%. Patient is a frequent flyer and las admission was around 2 weeks ago. When seen in the ER, was very sleepy, reactive to pain stimulus. She was placed on BIPAP and tolerate well. She has been admitted for COPD exacerbation.      Overview/Hospital Course:  No notes on file    Interval History: awake and alert, 3l  NC - at home baseline. Patient reported unable to get out of bed due to fatigue and weakness. Appreciate pulmonary recs  Step down to floor  PT/OT    Review of Systems   Constitutional:  Negative for chills and fever.   Respiratory:  Positive for shortness of breath.    Cardiovascular:  Negative for chest pain.   Neurological:  Positive for weakness.   Psychiatric/Behavioral:  Negative for confusion.    All other systems reviewed and are negative.  Objective:     Vital Signs (Most Recent):  Temp: 98.3 °F (36.8 °C) (07/03/23 0715)  Pulse: 93 (07/03/23 0848)  Resp: (!) 39 (07/03/23 0848)  BP: (!) 159/67 (07/03/23 0800)  SpO2: (!) 92 % (07/03/23 0848) Vital Signs (24h Range):  Temp:  [98.3 °F (36.8 °C)-98.9 °F (37.2 °C)] 98.3 °F (36.8 °C)  Pulse:  [] 93  Resp:  [18-65] 39  SpO2:  [84 %-100 %] 92 %  BP:  (127-164)/(57-97) 159/67     Weight: 82 kg (180 lb 12.4 oz)  Body mass index is 29.18 kg/m².    Intake/Output Summary (Last 24 hours) at 7/3/2023 0924  Last data filed at 7/3/2023 0646  Gross per 24 hour   Intake --   Output 650 ml   Net -650 ml           Physical Exam  Vitals and nursing note reviewed.   Constitutional:       General: She is not in acute distress.  HENT:      Head: Normocephalic and atraumatic.   Cardiovascular:      Rate and Rhythm: Normal rate. Rhythm irregular.      Heart sounds: No murmur heard.    No friction rub. No gallop.   Pulmonary:      Effort: Pulmonary effort is normal. No respiratory distress.      Breath sounds: No wheezing.   Abdominal:      General: Bowel sounds are normal. There is no distension.      Palpations: Abdomen is soft.      Tenderness: There is no abdominal tenderness.   Musculoskeletal:         General: No swelling or tenderness.      Cervical back: No rigidity or tenderness.      Right lower leg: No edema.      Left lower leg: No edema.   Skin:     General: Skin is warm.      Findings: No erythema or rash.   Neurological:      General: No focal deficit present.      Mental Status: She is alert and oriented to person, place, and time.      Motor: No weakness.      Gait: Gait normal.   Psychiatric:         Thought Content: Thought content normal.           Significant Labs: A1C:   Recent Labs   Lab 06/16/23  0428   HGBA1C 5.4       ABGs:   Recent Labs   Lab 07/02/23  0224 07/02/23  0454 07/02/23  0830   PH 7.279* 7.302* 7.376   PCO2 97.9* 90.6* 72.5*   HCO3 45.9* 44.8* 42.5*   POCSATURATED 91* 90* 82*   BE 19 18 17   PO2 75* 68* 51*       Blood Culture: No results for input(s): LABBLOO in the last 48 hours.  CBC:   Recent Labs   Lab 07/01/23  1835 07/02/23  0337 07/03/23  0334   WBC 7.67 5.49 9.82   HGB 12.9 12.0 11.5*   HCT 45.7 41.9 38.7   * 144* 146*       CMP:   Recent Labs   Lab 07/01/23  1835 07/02/23 0337 07/03/23  0333    144 140   K 4.6 4.3 4.2    CL 95 95 95   CO2 39* 39* 40*    141* 160*   BUN 11 17 22   CREATININE 0.7 0.6 0.7   CALCIUM 9.1 9.3 9.1   PROT 7.6  --   --    ALBUMIN 3.7  --   --    BILITOT 0.3  --   --    ALKPHOS 50*  --   --    AST 27  --   --    ALT 15  --   --    ANIONGAP 11 10 5*       Lipase: No results for input(s): LIPASE in the last 48 hours.  Lipid Panel:   Recent Labs   Lab 07/02/23  1126   CHOL 198   HDL 75   LDLCALC 113.6   TRIG 47   CHOLHDL 37.9     Magnesium:   Recent Labs   Lab 07/02/23  0337 07/03/23  0333   MG 1.9 2.0       Troponin:   Recent Labs   Lab 07/01/23  1835 07/02/23  0337   TROPONINI <0.006 <0.006       TSH: No results for input(s): TSH in the last 4320 hours.  Urine Culture: No results for input(s): LABURIN in the last 48 hours.  Urine Studies: No results for input(s): COLORU, APPEARANCEUA, PHUR, SPECGRAV, PROTEINUA, GLUCUA, KETONESU, BILIRUBINUA, OCCULTUA, NITRITE, UROBILINOGEN, LEUKOCYTESUR, RBCUA, WBCUA, BACTERIA, SQUAMEPITHEL, HYALINECASTS in the last 48 hours.    Invalid input(s): WRIGHTSUR    Significant Imaging: I have reviewed all pertinent imaging results/findings within the past 24 hours.      Assessment/Plan:      * COPD exacerbation  Centrilobular emphysema   Oxygen dependent  Former smoker   Report 3 L nasal cannula home oxygen   Cont duoneb  Cont budesonide and solumedrol  Continue continuous BiPAP-wean as tolerated- weaned to NC on 7/2      Debility  PT/OT      Major depression  Patient has persistent depression which is mild and is currently controlled. Will Continue anti-depressant medications. We will not consult psychiatry at this time. Patient does not display psychosis at this time. Continue to monitor closely and adjust plan of care as needed.        Acute hypercapnic respiratory failure  Patient with Hypoxic Respiratory failure which is Acute on chronic.  she is on home oxygen at 2 LPM. Supplemental oxygen was provided and noted- Oxygen Concentration (%):  [30-40] 30    .    Signs/symptoms of respiratory failure include- tachypnea and respiratory distress. Contributing diagnoses includes - COPD Labs and images were reviewed. Patient Has recent ABG, which has been reviewed. Will treat underlying causes and adjust management of respiratory failure as follows-   On continuous BiPAP wean as tolerated  Consult Pulmonary crit-appreciate recs    Obstructive sleep apnea syndrome  Non compliant to CPAP  Requires CPAP q.h.s. and p.r.n. naps      CAD (coronary artery disease)    Follow-up lipid panel    Essential hypertension  Cont current home meds        VTE Risk Mitigation (From admission, onward)         Ordered     enoxaparin injection 40 mg  Daily         07/02/23 0054     IP VTE HIGH RISK PATIENT  Once         07/01/23 2135     Place sequential compression device  Until discontinued         07/01/23 2135                Discharge Planning   RADHA:      Code Status: Full Code   Is the patient medically ready for discharge?:     Reason for patient still in hospital (select all that apply): Patient trending condition               Critical care time spent on the evaluation and treatment of severe organ dysfunction, review of pertinent labs and imaging studies, discussions with consulting providers and discussions with patient/family: 35 minutes.      Josephine Xavier MD  Department of Hospital Medicine   [facility] - Intensive Care

## 2023-07-03 NOTE — PLAN OF CARE
Occupational therapy evaluation completed, skilled acute OT indicated. At baseline patient is usually functioning at Modified independence level with recent decrease in performance requiring minimal assist for ADLs (ie. Bathing, dressing). Patient on evaluation limited by oxygen/HR and anxiety and thus limited to perform in sitting EOB and short distance transfer to chair. Initiated education and instruction on energy conservation/pacing techniques, adaptive ADL techniques, and safe functional mobility with rolling walker. Anticipate future d/c to inpatient rehab (high intensity therapy) upon medically stable.        Problem: Occupational Therapy  Goal: Occupational Therapy Goal  Description: Goals to be met by: 7/31/2023     Patient will increase functional independence with ADLs by performing:    UE Dressing inclusive of clothing retrieval with Modified [place] and least restrictive device as needed.  LE Dressing with Modified [place].  Grooming while standing at sink with Stand-by Assistance.  Toileting from toilet with Modified [place] for hygiene and clothing management.   Toilet transfer to toilet with Stand-by Assistance.  Increased functional strength to 4/5 to support engagement in self care routine without adverse signs and symptoms.  Integration of energy conservation techniques to support performing ADL routines in proper contexts with controlled exertion (ie. Mobilize to true bathroom)             Outcome: Ongoing, Progressing

## 2023-07-04 LAB
ANION GAP SERPL CALC-SCNC: 8 MMOL/L (ref 8–16)
BASOPHILS # BLD AUTO: 0.01 K/UL (ref 0–0.2)
BASOPHILS NFR BLD: 0.1 % (ref 0–1.9)
BUN SERPL-MCNC: 30 MG/DL (ref 8–23)
CALCIUM SERPL-MCNC: 9.3 MG/DL (ref 8.7–10.5)
CHLORIDE SERPL-SCNC: 99 MMOL/L (ref 95–110)
CO2 SERPL-SCNC: 36 MMOL/L (ref 23–29)
CREAT SERPL-MCNC: 0.7 MG/DL (ref 0.5–1.4)
DIFFERENTIAL METHOD: ABNORMAL
EOSINOPHIL # BLD AUTO: 0 K/UL (ref 0–0.5)
EOSINOPHIL NFR BLD: 0 % (ref 0–8)
ERYTHROCYTE [DISTWIDTH] IN BLOOD BY AUTOMATED COUNT: 14 % (ref 11.5–14.5)
EST. GFR  (NO RACE VARIABLE): >60 ML/MIN/1.73 M^2
GLUCOSE SERPL-MCNC: 147 MG/DL (ref 70–110)
HCT VFR BLD AUTO: 39.6 % (ref 37–48.5)
HGB BLD-MCNC: 11.8 G/DL (ref 12–16)
IMM GRANULOCYTES # BLD AUTO: 0.03 K/UL (ref 0–0.04)
IMM GRANULOCYTES NFR BLD AUTO: 0.3 % (ref 0–0.5)
LYMPHOCYTES # BLD AUTO: 0.4 K/UL (ref 1–4.8)
LYMPHOCYTES NFR BLD: 4.3 % (ref 18–48)
MAGNESIUM SERPL-MCNC: 2.2 MG/DL (ref 1.6–2.6)
MCH RBC QN AUTO: 27.1 PG (ref 27–31)
MCHC RBC AUTO-ENTMCNC: 29.8 G/DL (ref 32–36)
MCV RBC AUTO: 91 FL (ref 82–98)
MONOCYTES # BLD AUTO: 0.3 K/UL (ref 0.3–1)
MONOCYTES NFR BLD: 2.6 % (ref 4–15)
NEUTROPHILS # BLD AUTO: 9.2 K/UL (ref 1.8–7.7)
NEUTROPHILS NFR BLD: 92.7 % (ref 38–73)
NRBC BLD-RTO: 0 /100 WBC
PHOSPHATE SERPL-MCNC: 2.3 MG/DL (ref 2.7–4.5)
PLATELET # BLD AUTO: 148 K/UL (ref 150–450)
PMV BLD AUTO: 11.4 FL (ref 9.2–12.9)
POTASSIUM SERPL-SCNC: 4.4 MMOL/L (ref 3.5–5.1)
RBC # BLD AUTO: 4.35 M/UL (ref 4–5.4)
SODIUM SERPL-SCNC: 143 MMOL/L (ref 136–145)
WBC # BLD AUTO: 9.95 K/UL (ref 3.9–12.7)

## 2023-07-04 PROCEDURE — 97110 THERAPEUTIC EXERCISES: CPT | Mod: CQ

## 2023-07-04 PROCEDURE — 63600175 PHARM REV CODE 636 W HCPCS: Performed by: STUDENT IN AN ORGANIZED HEALTH CARE EDUCATION/TRAINING PROGRAM

## 2023-07-04 PROCEDURE — 27000221 HC OXYGEN, UP TO 24 HOURS

## 2023-07-04 PROCEDURE — 97116 GAIT TRAINING THERAPY: CPT | Mod: CQ

## 2023-07-04 PROCEDURE — 63600175 PHARM REV CODE 636 W HCPCS: Performed by: FAMILY MEDICINE

## 2023-07-04 PROCEDURE — 83735 ASSAY OF MAGNESIUM: CPT | Performed by: FAMILY MEDICINE

## 2023-07-04 PROCEDURE — 85025 COMPLETE CBC W/AUTO DIFF WBC: CPT | Performed by: FAMILY MEDICINE

## 2023-07-04 PROCEDURE — 80048 BASIC METABOLIC PNL TOTAL CA: CPT | Performed by: FAMILY MEDICINE

## 2023-07-04 PROCEDURE — 84100 ASSAY OF PHOSPHORUS: CPT | Performed by: FAMILY MEDICINE

## 2023-07-04 PROCEDURE — 99900035 HC TECH TIME PER 15 MIN (STAT)

## 2023-07-04 PROCEDURE — 94640 AIRWAY INHALATION TREATMENT: CPT

## 2023-07-04 PROCEDURE — 11000001 HC ACUTE MED/SURG PRIVATE ROOM

## 2023-07-04 PROCEDURE — 25000003 PHARM REV CODE 250: Performed by: FAMILY MEDICINE

## 2023-07-04 PROCEDURE — 94660 CPAP INITIATION&MGMT: CPT

## 2023-07-04 PROCEDURE — 36415 COLL VENOUS BLD VENIPUNCTURE: CPT | Performed by: FAMILY MEDICINE

## 2023-07-04 PROCEDURE — 25000242 PHARM REV CODE 250 ALT 637 W/ HCPCS: Performed by: FAMILY MEDICINE

## 2023-07-04 RX ADMIN — IPRATROPIUM BROMIDE 0.5 MG: 0.5 SOLUTION RESPIRATORY (INHALATION) at 04:07

## 2023-07-04 RX ADMIN — IPRATROPIUM BROMIDE 0.5 MG: 0.5 SOLUTION RESPIRATORY (INHALATION) at 08:07

## 2023-07-04 RX ADMIN — LORAZEPAM 0.5 MG: 2 INJECTION INTRAMUSCULAR; INTRAVENOUS at 01:07

## 2023-07-04 RX ADMIN — CITALOPRAM HYDROBROMIDE 10 MG: 10 TABLET ORAL at 08:07

## 2023-07-04 RX ADMIN — IPRATROPIUM BROMIDE 0.5 MG: 0.5 SOLUTION RESPIRATORY (INHALATION) at 12:07

## 2023-07-04 RX ADMIN — ALBUTEROL SULFATE 2.5 MG: 2.5 SOLUTION RESPIRATORY (INHALATION) at 12:07

## 2023-07-04 RX ADMIN — IPRATROPIUM BROMIDE 0.5 MG: 0.5 SOLUTION RESPIRATORY (INHALATION) at 07:07

## 2023-07-04 RX ADMIN — METHYLPREDNISOLONE SODIUM SUCCINATE 40 MG: 40 INJECTION INTRAMUSCULAR; INTRAVENOUS at 05:07

## 2023-07-04 RX ADMIN — ASPIRIN 81 MG CHEWABLE TABLET 81 MG: 81 TABLET CHEWABLE at 08:07

## 2023-07-04 RX ADMIN — ENOXAPARIN SODIUM 40 MG: 40 INJECTION SUBCUTANEOUS at 04:07

## 2023-07-04 RX ADMIN — ERGOCALCIFEROL 50000 UNITS: 1.25 CAPSULE ORAL at 04:07

## 2023-07-04 RX ADMIN — METHYLPREDNISOLONE SODIUM SUCCINATE 40 MG: 40 INJECTION INTRAMUSCULAR; INTRAVENOUS at 09:07

## 2023-07-04 RX ADMIN — ROFLUMILAST 500 MCG: 500 TABLET ORAL at 08:07

## 2023-07-04 RX ADMIN — IPRATROPIUM BROMIDE 0.5 MG: 0.5 SOLUTION RESPIRATORY (INHALATION) at 03:07

## 2023-07-04 RX ADMIN — METHYLPREDNISOLONE SODIUM SUCCINATE 40 MG: 40 INJECTION INTRAMUSCULAR; INTRAVENOUS at 01:07

## 2023-07-04 RX ADMIN — ALBUTEROL SULFATE 2.5 MG: 2.5 SOLUTION RESPIRATORY (INHALATION) at 03:07

## 2023-07-04 RX ADMIN — ALBUTEROL SULFATE 2.5 MG: 2.5 SOLUTION RESPIRATORY (INHALATION) at 04:07

## 2023-07-04 RX ADMIN — ALBUTEROL SULFATE 2.5 MG: 2.5 SOLUTION RESPIRATORY (INHALATION) at 08:07

## 2023-07-04 RX ADMIN — ALBUTEROL SULFATE 2.5 MG: 2.5 SOLUTION RESPIRATORY (INHALATION) at 07:07

## 2023-07-04 NOTE — PLAN OF CARE
Problem: Adult Inpatient Plan of Care  Goal: Optimal Comfort and Wellbeing  Outcome: Ongoing, Progressing     Problem: Adjustment to Illness COPD (Chronic Obstructive Pulmonary Disease)  Goal: Optimal Chronic Illness Coping  Outcome: Ongoing, Progressing     Problem: Infection COPD (Chronic Obstructive Pulmonary Disease)  Goal: Absence of Infection Signs and Symptoms  Outcome: Ongoing, Progressing     Problem: Skin Injury Risk Increased  Goal: Skin Health and Integrity  Outcome: Ongoing, Progressing

## 2023-07-04 NOTE — PT/OT/SLP PROGRESS
Physical Therapy Treatment    Patient Name:  Terri Phelan   MRN:  [id number]    Recommendations:     Discharge Recommendations:  (high intensity therapy)  Discharge Equipment Recommendations: to be determined by next level of care (anticipate TTB)  Barriers to discharge:  fall risk, decreased caregiver assistance, increased level of assistance required    Assessment:     Terri Phelan is a 67 y.o. female admitted with a medical diagnosis of COPD exacerbation.  She presents with the following impairments/functional limitations: weakness, impaired endurance, impaired self care skills, impaired functional mobility, gait instability, impaired balance, decreased coordination, decreased ROM, decreased upper extremity function, decreased lower extremity function, impaired cardiopulmonary response to activity Recommend high intensity therapy; pt reports that she if feeling better this PM; increased tolerance to activity; will cont with POC.    Rehab Prognosis: Good; patient would benefit from acute skilled PT services to address these deficits and reach maximum level of function.    Recent Surgery: * No surgery found *      Plan:     During this hospitalization, patient to be seen 6 x/week to address the identified rehab impairments via gait training, therapeutic activities, therapeutic exercises, neuromuscular re-education and progress toward the following goals:    Plan of Care Expires:  07/31/23    Subjective     Chief Complaint: trembling/shakiness  Patient/Family Comments/goals: Feels better than this AM  Pain/Comfort:  Pain Rating 1: 0/10  Pain Rating Post-Intervention 1: 0/10      Objective:     Communicated with nurse prior to session.  Patient found up in chair with telemetry, pulse ox (continuous), PureWick, oxygen, peripheral IV, blood pressure cuff (full ICU monitoring) upon PT entry to room.     General Precautions: Standard, fall  Orthopedic Precautions: N/A  Braces: N/A  Respiratory Status: Nasal  cannula, flow 3 L/min     Functional Mobility:  Transfers:   pt trembly/shaking with anxiety throughout   Sit to Stand:  contact guard assistance with no AD and rolling walker  Chair<>BSC: contact guard assistance with RW  Gait: Patient able to use RW to take 4-5 small steps chair<>[facility]      AM-PAC 6 CLICK MOBILITY  Turning over in bed (including adjusting bedclothes, sheets and blankets)?: 3  Sitting down on and standing up from a chair with arms (e.g., wheelchair, bedside commode, etc.): 3  Moving from lying on back to sitting on the side of the bed?: 3  Moving to and from a bed to a chair (including a wheelchair)?: 3  Need to walk in hospital room?: 3  Climbing 3-5 steps with a railing?: 1  Basic Mobility Total Score: 16       Treatment & Education:  Patient trembling/shaking with anxiety throughout session, varying in intensity- tried deep pursed lip breathing which helped to a limited degree; pt put on her favorite music; conversed about living with her dtr; pt on 3L O2; initial /67 HR 77 O2 sats 97% while seated in bedside chair; pt stood with VCs to scoot forward and push with hands placed on armrests of chair and CGA; pt stood holding onto therapist's hands and performed dance exercise- upper trunk rotation, ; stepping to either side, forward and back stepping~4ft; pt then performed standing ex with use of RW- knee squats, marches, with standing rest breaks between; pt needed touse the [facility] for a BM; transferred to [facility] with CGA using RW; pt able to cleanse self with set up while seated on BS; pt stood and transferred to bedside chair; vitals good throughout.                                                                                                                                                                                                                                                                                                                                                                                                                                                                                                                                                                                                                                                                                                                                                                                                                                                                                                                                                                                                 n  Patient left up in chair with all lines intact, call button in reach, and nurse notified..    GOALS:   Multidisciplinary Problems       Physical Therapy Goals          Problem: Physical Therapy    Goal Priority Disciplines Outcome Goal Variances Interventions   Physical Therapy Goal     PT, PT/OT Ongoing, Progressing     Description: Goals to be met by: 23     Patient will increase functional independence with mobility by performin. Supine to sit with Modified [place]  2. Sit to stand transfer with Supervision with AD if needed.   3. Gait  x 150 feet with Supervision using AD if needed.   4. Lower extremity exercise program x15 reps per handout, with supervision to increased tolerance to activities.                          Time Tracking:     PT Received On: 23  PT Start Time: 1347     PT Stop Time: 1445  PT Total Time (min): 58 min     Billable Minutes: Therapeutic Activity 38  Self Care20    Treatment Type: Treatment  PT/PTA: PT     Number of PTA visits since last PT visit: 0     2023

## 2023-07-04 NOTE — PROGRESS NOTES
[facility] Medicine  Progress Note    Patient Name: Terri Phelan  MRN: [id number]  Patient Class: IP- Inpatient   Admission Date: 7/1/2023  Length of Stay: 2 days  Attending Physician: Josephine Xavier*  Primary Care Provider: Kev Cox MD        Subjective:     Principal Problem:COPD exacerbation        HPI:  66 YO F with PMHx significant for COPD on home O2 presented to the ER for worsening SOB. Per patient's daughter at bedside her symptoms have started yesterday with headache, mild SOB, fatigue. Today she wake up, was having more difficulty to breath. Then later she become confuse and was talking without making sense. Since she saw that her mental state has gotten worse and worse, she has decided to call EMS. At home EMS found that her O2 sat was around 74%. Patient is a frequent flyer and las admission was around 2 weeks ago. When seen in the ER, was very sleepy, reactive to pain stimulus. She was placed on BIPAP and tolerate well. She has been admitted for COPD exacerbation.      Overview/Hospital Course:  No notes on file    Interval History: awake and alert, reports weakness- appreciate therapy recommendation. Continue aggressive PT/OT- likely need skilled nursing facility placement    Review of Systems   Constitutional:  Negative for chills and fever.   Respiratory:  Positive for shortness of breath.    Cardiovascular:  Negative for chest pain.   Neurological:  Positive for weakness.   Psychiatric/Behavioral:  Negative for confusion.    All other systems reviewed and are negative.  Objective:     Vital Signs (Most Recent):  Temp: 97.5 °F (36.4 °C) (07/04/23 0729)  Pulse: 70 (07/04/23 0814)  Resp: 20 (07/04/23 0814)  BP: (!) 174/76 (07/04/23 0729)  SpO2: 96 % (07/04/23 0814) Vital Signs (24h Range):  Temp:  [96.4 °F (35.8 °C)-98.9 °F (37.2 °C)] 97.5 °F (36.4 °C)  Pulse:  [] 70  Resp:  [18-62] 20  SpO2:  [78 %-97 %] 96 %  BP: (114-174)/() 174/76     Weight:  81.6 kg (180 lb)  Body mass index is 29.05 kg/m².    Intake/Output Summary (Last 24 hours) at 7/4/2023 1109  Last data filed at 7/4/2023 0442  Gross per 24 hour   Intake --   Output 900 ml   Net -900 ml           Physical Exam  Vitals and nursing note reviewed.   Constitutional:       General: She is not in acute distress.  HENT:      Head: Normocephalic and atraumatic.   Cardiovascular:      Rate and Rhythm: Normal rate. Rhythm irregular.      Heart sounds: No murmur heard.    No friction rub. No gallop.   Pulmonary:      Effort: Pulmonary effort is normal. No respiratory distress.      Breath sounds: No wheezing.   Abdominal:      General: Bowel sounds are normal. There is no distension.      Palpations: Abdomen is soft.      Tenderness: There is no abdominal tenderness.   Musculoskeletal:         General: No swelling or tenderness.      Cervical back: No rigidity or tenderness.      Right lower leg: No edema.      Left lower leg: No edema.   Skin:     General: Skin is warm.      Findings: No erythema or rash.   Neurological:      General: No focal deficit present.      Mental Status: She is alert and oriented to person, place, and time.      Motor: No weakness.      Gait: Gait normal.   Psychiatric:         Thought Content: Thought content normal.           Significant Labs: A1C:   Recent Labs   Lab 06/16/23  0428   HGBA1C 5.4       ABGs:   No results for input(s): PH, PCO2, HCO3, POCSATURATED, BE, TOTALHB, COHB, METHB, O2HB, POCFIO2, PO2 in the last 48 hours.    Blood Culture: No results for input(s): LABBLOO in the last 48 hours.  CBC:   Recent Labs   Lab 07/03/23  0334 07/04/23 0314   WBC 9.82 9.95   HGB 11.5* 11.8*   HCT 38.7 39.6   * 148*       CMP:   Recent Labs   Lab 07/03/23  0333 07/04/23 0313    143   K 4.2 4.4   CL 95 99   CO2 40* 36*   * 147*   BUN 22 30*   CREATININE 0.7 0.7   CALCIUM 9.1 9.3   ANIONGAP 5* 8       Lipase: No results for input(s): LIPASE in the last 48  hours.  Lipid Panel:   Recent Labs   Lab 07/02/23  1126   CHOL 198   HDL 75   LDLCALC 113.6   TRIG 47   CHOLHDL 37.9       Magnesium:   Recent Labs   Lab 07/03/23  0333 07/04/23  0313   MG 2.0 2.2       Troponin:   No results for input(s): TROPONINI, TROPONINIHS in the last 48 hours.    TSH: No results for input(s): TSH in the last 4320 hours.  Urine Culture: No results for input(s): LABURIN in the last 48 hours.  Urine Studies: No results for input(s): COLORU, APPEARANCEUA, PHUR, SPECGRAV, PROTEINUA, GLUCUA, KETONESU, BILIRUBINUA, OCCULTUA, NITRITE, UROBILINOGEN, LEUKOCYTESUR, RBCUA, WBCUA, BACTERIA, SQUAMEPITHEL, HYALINECASTS in the last 48 hours.    Invalid input(s): WRIGHTSUR    Significant Imaging: I have reviewed all pertinent imaging results/findings within the past 24 hours.      Assessment/Plan:      * COPD exacerbation  Centrilobular emphysema   Oxygen dependent  Former smoker   Report 3 L nasal cannula home oxygen   Cont duoneb  Cont budesonide and solumedrol  Continue continuous BiPAP-wean as tolerated- weaned to NC on 7/2      Debility  PT/OT  Requires placement       Major depression  Patient has persistent depression which is mild and is currently controlled. Will Continue anti-depressant medications. We will not consult psychiatry at this time. Patient does not display psychosis at this time. Continue to monitor closely and adjust plan of care as needed.        Acute hypercapnic respiratory failure  Patient with Hypoxic Respiratory failure which is Acute on chronic.  she is on home oxygen at 2 LPM. Supplemental oxygen was provided and noted- Oxygen Concentration (%):  [30-40] 30    .   Signs/symptoms of respiratory failure include- tachypnea and respiratory distress. Contributing diagnoses includes - COPD Labs and images were reviewed. Patient Has recent ABG, which has been reviewed. Will treat underlying causes and adjust management of respiratory failure as follows-   On continuous BiPAP wean as  tolerated  Consult Pulmonary crit-appreciate recs    Obstructive sleep apnea syndrome  Non compliant to CPAP  Requires CPAP q.h.s. and p.r.n. naps      CAD (coronary artery disease)    Follow-up lipid panel    Essential hypertension  Cont current home meds        VTE Risk Mitigation (From admission, onward)         Ordered     enoxaparin injection 40 mg  Daily         07/02/23 0054     IP VTE HIGH RISK PATIENT  Once         07/01/23 2135     Place sequential compression device  Until discontinued         07/01/23 2135                Discharge Planning   RADHA:      Code Status: Full Code   Is the patient medically ready for discharge?:     Reason for patient still in hospital (select all that apply): Patient trending condition  Discharge Plan A: Rehab            Josephine Xavier MD  Department of Hospital Medicine   [place] [facility]

## 2023-07-04 NOTE — PT/OT/SLP PROGRESS
Physical Therapy Treatment    Patient Name:  Terri Phelan   MRN:  [id number]    Recommendations:     Discharge Recommendations:  (high intensity therapy)  Discharge Equipment Recommendations: to be determined by next level of care (anticipate TTB)  Barriers to discharge:  decreased caregiver support/decreased strength, and  high fall risk    Assessment:     Terri Phelan is a 67 y.o. female admitted with a medical diagnosis of COPD exacerbation.  She presents with the following impairments/functional limitations: weakness, impaired endurance, impaired self care skills, impaired functional mobility, gait instability, impaired balance, decreased lower extremity function, decreased ROM, decreased upper extremity function, impaired cardiopulmonary response to activity. Pt would continue to benefit from P.T. To address impairments listed above.  .    Rehab Prognosis: Fair; patient would benefit from acute skilled PT services to address these deficits and reach maximum level of function.    Recent Surgery: * No surgery found *      Plan:     During this hospitalization, patient to be seen 6 x/week to address the identified rehab impairments via gait training, therapeutic activities, therapeutic exercises, neuromuscular re-education and progress toward the following goals:    Plan of Care Expires:  07/31/23    Subjective     Patient/Family Comments/goals: pt agreed to tx.  Pain/Comfort:  Pain Rating 1: 0/10  Pain Rating Post-Intervention 1: 0/10      Objective:     Communicated with RN prior to session.  Patient found supine with bed alarm, BIPAP, telemetry, PureWick, peripheral IV upon PT entry to room.     General Precautions: Standard, fall  Orthopedic Precautions: N/A  Braces: N/A  Respiratory Status:  bipap     Functional Mobility:  Bed Mobility:     Rolling Left:  stand by assistance and with b /r for assist  Scooting: stand by assistance, contact guard assistance, and to EOB, slow pace  Supine to Sit: contact  guard assistance  Sit to Supine: minimum assistance and LEs  Transfers:     Sit to Stand:  contact guard assistance with rolling walker and with vc's for hand placement x 2 trials  Gait: 35ft x 2 with RW and close CGA with vc's for directional turns.  RN took pt off Bipap and placed pt on portable O2 @ 3lpm.  Pt ambulated 35ft then took a brief standing rest secondary to mild SOB.  Pt then ambulated ~10 and O2 sats were check, 85%.  RN increased O2 to 4lpm with pt taking a standing rest and performing 3 bouts of PLB.  O2 sats increased to 90%.  Pt continued ambulating 25 more feet back to her room.  Balance: sitting good, standing fair, gait fair      AM-PAC 6 CLICK MOBILITY  Turning over in bed (including adjusting bedclothes, sheets and blankets)?: 3  Sitting down on and standing up from a chair with arms (e.g., wheelchair, bedside commode, etc.): 3  Moving from lying on back to sitting on the side of the bed?: 3  Moving to and from a bed to a chair (including a wheelchair)?: 3  Need to walk in hospital room?: 3  Climbing 3-5 steps with a railing?: 1  Basic Mobility Total Score: 16       Treatment & Education:  Pt found with her Bipap donned and getting ready to take a nap.  Pt agreed to tx and RN (Joe) assisted pt with doffing BIpap and placed pt on portable O2 @ 3lpm for ambulation.  SEated BLE therex: AP, LAQs x 15 reps and hip flexion x 10 reps with a seated rest between bouts last two bouts and pt instructed in PLB secondary to SOB.  Gait training as above with no LOB, but pt ambulating very slowly with mild whole body shaking noted.  Pt returned to bed supine with HOB elevated and RN present.  Rn returned pt to wall unit O2 at this time.    Patient left supine with all lines intact, call button in reach, bed alarm on, and RN present.    GOALS:   Multidisciplinary Problems       Physical Therapy Goals          Problem: Physical Therapy    Goal Priority Disciplines Outcome Goal Variances Interventions    Physical Therapy Goal     PT, PT/OT Ongoing, Progressing     Description: Goals to be met by: 23     Patient will increase functional independence with mobility by performin. Supine to sit with Modified [place]  2. Sit to stand transfer with Supervision with AD if needed.   3. Gait  x 150 feet with Supervision using AD if needed.   4. Lower extremity exercise program x15 reps per handout, with supervision to increased tolerance to activities.                          Time Tracking:     PT Received On: 23  PT Start Time: 1416     PT Stop Time: 1441  PT Total Time (min): 25 min     Billable Minutes: Gait Training 14 and Therapeutic Exercise 11    Treatment Type: Treatment  PT/PTA: PTA     Number of PTA visits since last PT visit: 2023

## 2023-07-05 PROBLEM — J96.22 ACUTE ON CHRONIC RESPIRATORY FAILURE WITH HYPOXIA AND HYPERCAPNIA: Status: ACTIVE | Noted: 2023-06-15

## 2023-07-05 PROBLEM — J96.21 ACUTE ON CHRONIC RESPIRATORY FAILURE WITH HYPOXIA AND HYPERCAPNIA: Status: ACTIVE | Noted: 2023-06-15

## 2023-07-05 LAB
ANION GAP SERPL CALC-SCNC: 8 MMOL/L (ref 8–16)
BASOPHILS # BLD AUTO: 0.01 K/UL (ref 0–0.2)
BASOPHILS NFR BLD: 0.1 % (ref 0–1.9)
BUN SERPL-MCNC: 25 MG/DL (ref 8–23)
CALCIUM SERPL-MCNC: 8.7 MG/DL (ref 8.7–10.5)
CHLORIDE SERPL-SCNC: 101 MMOL/L (ref 95–110)
CO2 SERPL-SCNC: 35 MMOL/L (ref 23–29)
CREAT SERPL-MCNC: 0.7 MG/DL (ref 0.5–1.4)
DIFFERENTIAL METHOD: ABNORMAL
EOSINOPHIL # BLD AUTO: 0 K/UL (ref 0–0.5)
EOSINOPHIL NFR BLD: 0 % (ref 0–8)
ERYTHROCYTE [DISTWIDTH] IN BLOOD BY AUTOMATED COUNT: 14.1 % (ref 11.5–14.5)
EST. GFR  (NO RACE VARIABLE): >60 ML/MIN/1.73 M^2
GLUCOSE SERPL-MCNC: 137 MG/DL (ref 70–110)
HCO3 UR-SCNC: ABNORMAL MMOL/L
HCT VFR BLD AUTO: 39.1 % (ref 37–48.5)
HGB BLD-MCNC: 11.6 G/DL (ref 12–16)
IMM GRANULOCYTES # BLD AUTO: 0.05 K/UL (ref 0–0.04)
IMM GRANULOCYTES NFR BLD AUTO: 0.6 % (ref 0–0.5)
LYMPHOCYTES # BLD AUTO: 0.5 K/UL (ref 1–4.8)
LYMPHOCYTES NFR BLD: 5.7 % (ref 18–48)
MAGNESIUM SERPL-MCNC: 2.2 MG/DL (ref 1.6–2.6)
MCH RBC QN AUTO: 27.4 PG (ref 27–31)
MCHC RBC AUTO-ENTMCNC: 29.7 G/DL (ref 32–36)
MCV RBC AUTO: 92 FL (ref 82–98)
MONOCYTES # BLD AUTO: 0.4 K/UL (ref 0.3–1)
MONOCYTES NFR BLD: 4.2 % (ref 4–15)
NEUTROPHILS # BLD AUTO: 7.8 K/UL (ref 1.8–7.7)
NEUTROPHILS NFR BLD: 89.4 % (ref 38–73)
NRBC BLD-RTO: 0 /100 WBC
PCO2 BLDA: >130 MMHG (ref 35–45)
PH SMN: 7.22 [PH] (ref 7.35–7.45)
PHOSPHATE SERPL-MCNC: 3 MG/DL (ref 2.7–4.5)
PLATELET # BLD AUTO: 143 K/UL (ref 150–450)
PMV BLD AUTO: 11.9 FL (ref 9.2–12.9)
PO2 BLDA: 22 MMHG (ref 40–60)
POC BE: ABNORMAL MMOL/L
POC SATURATED O2: ABNORMAL %
POC TCO2: ABNORMAL MMOL/L
POTASSIUM SERPL-SCNC: 4.4 MMOL/L (ref 3.5–5.1)
RBC # BLD AUTO: 4.24 M/UL (ref 4–5.4)
SAMPLE: ABNORMAL
SODIUM SERPL-SCNC: 144 MMOL/L (ref 136–145)
WBC # BLD AUTO: 8.77 K/UL (ref 3.9–12.7)

## 2023-07-05 PROCEDURE — 27000221 HC OXYGEN, UP TO 24 HOURS

## 2023-07-05 PROCEDURE — 99900035 HC TECH TIME PER 15 MIN (STAT)

## 2023-07-05 PROCEDURE — 83735 ASSAY OF MAGNESIUM: CPT | Performed by: FAMILY MEDICINE

## 2023-07-05 PROCEDURE — 63600175 PHARM REV CODE 636 W HCPCS: Performed by: FAMILY MEDICINE

## 2023-07-05 PROCEDURE — 84100 ASSAY OF PHOSPHORUS: CPT | Performed by: FAMILY MEDICINE

## 2023-07-05 PROCEDURE — 80048 BASIC METABOLIC PNL TOTAL CA: CPT | Performed by: FAMILY MEDICINE

## 2023-07-05 PROCEDURE — 25000003 PHARM REV CODE 250: Performed by: FAMILY MEDICINE

## 2023-07-05 PROCEDURE — 97530 THERAPEUTIC ACTIVITIES: CPT

## 2023-07-05 PROCEDURE — 97116 GAIT TRAINING THERAPY: CPT

## 2023-07-05 PROCEDURE — 63600175 PHARM REV CODE 636 W HCPCS: Performed by: HOSPITALIST

## 2023-07-05 PROCEDURE — 94640 AIRWAY INHALATION TREATMENT: CPT

## 2023-07-05 PROCEDURE — 11000001 HC ACUTE MED/SURG PRIVATE ROOM

## 2023-07-05 PROCEDURE — 25000242 PHARM REV CODE 250 ALT 637 W/ HCPCS: Performed by: FAMILY MEDICINE

## 2023-07-05 PROCEDURE — 85025 COMPLETE CBC W/AUTO DIFF WBC: CPT | Performed by: FAMILY MEDICINE

## 2023-07-05 PROCEDURE — 36415 COLL VENOUS BLD VENIPUNCTURE: CPT | Performed by: FAMILY MEDICINE

## 2023-07-05 PROCEDURE — 97535 SELF CARE MNGMENT TRAINING: CPT | Mod: CO

## 2023-07-05 PROCEDURE — 63600175 PHARM REV CODE 636 W HCPCS: Performed by: STUDENT IN AN ORGANIZED HEALTH CARE EDUCATION/TRAINING PROGRAM

## 2023-07-05 RX ORDER — PREDNISONE 20 MG/1
40 TABLET ORAL DAILY
Status: DISCONTINUED | OUTPATIENT
Start: 2023-07-05 | End: 2023-07-06 | Stop reason: HOSPADM

## 2023-07-05 RX ADMIN — IPRATROPIUM BROMIDE 0.5 MG: 0.5 SOLUTION RESPIRATORY (INHALATION) at 04:07

## 2023-07-05 RX ADMIN — ALBUTEROL SULFATE 2.5 MG: 2.5 SOLUTION RESPIRATORY (INHALATION) at 07:07

## 2023-07-05 RX ADMIN — ALBUTEROL SULFATE 2.5 MG: 2.5 SOLUTION RESPIRATORY (INHALATION) at 08:07

## 2023-07-05 RX ADMIN — ALBUTEROL SULFATE 2.5 MG: 2.5 SOLUTION RESPIRATORY (INHALATION) at 05:07

## 2023-07-05 RX ADMIN — ASPIRIN 81 MG CHEWABLE TABLET 81 MG: 81 TABLET CHEWABLE at 08:07

## 2023-07-05 RX ADMIN — IPRATROPIUM BROMIDE 0.5 MG: 0.5 SOLUTION RESPIRATORY (INHALATION) at 08:07

## 2023-07-05 RX ADMIN — IPRATROPIUM BROMIDE 0.5 MG: 0.5 SOLUTION RESPIRATORY (INHALATION) at 12:07

## 2023-07-05 RX ADMIN — ROFLUMILAST 500 MCG: 500 TABLET ORAL at 08:07

## 2023-07-05 RX ADMIN — ALBUTEROL SULFATE 2.5 MG: 2.5 SOLUTION RESPIRATORY (INHALATION) at 04:07

## 2023-07-05 RX ADMIN — ALBUTEROL SULFATE 2.5 MG: 2.5 SOLUTION RESPIRATORY (INHALATION) at 11:07

## 2023-07-05 RX ADMIN — IPRATROPIUM BROMIDE 0.5 MG: 0.5 SOLUTION RESPIRATORY (INHALATION) at 11:07

## 2023-07-05 RX ADMIN — CITALOPRAM HYDROBROMIDE 10 MG: 10 TABLET ORAL at 08:07

## 2023-07-05 RX ADMIN — PREDNISONE 40 MG: 20 TABLET ORAL at 10:07

## 2023-07-05 RX ADMIN — IPRATROPIUM BROMIDE 0.5 MG: 0.5 SOLUTION RESPIRATORY (INHALATION) at 07:07

## 2023-07-05 RX ADMIN — METHYLPREDNISOLONE SODIUM SUCCINATE 40 MG: 40 INJECTION INTRAMUSCULAR; INTRAVENOUS at 06:07

## 2023-07-05 RX ADMIN — ENOXAPARIN SODIUM 40 MG: 40 INJECTION SUBCUTANEOUS at 04:07

## 2023-07-05 RX ADMIN — ALBUTEROL SULFATE 2.5 MG: 2.5 SOLUTION RESPIRATORY (INHALATION) at 12:07

## 2023-07-05 RX ADMIN — IPRATROPIUM BROMIDE 0.5 MG: 0.5 SOLUTION RESPIRATORY (INHALATION) at 05:07

## 2023-07-05 NOTE — PLAN OF CARE
KATIE communicated with Hillary with [facility]. Hillary reports that we are pending auth at this time. Hillary reports that she will keep SW updated.     KATIE met with pt at bedside during rounding with MD. Pt reports that [facility]. Pt made aware that we are pending auth at this time.     SW will continue to follow pt throughout her transitions of care and assist with any dc needs.     Future Appointments   Date Time Provider Department Center   9/20/2023 10:30 AM Kev Cox MD [facility]        07/05/23 0905   Post-Acute Status   Post-Acute Authorization Placement

## 2023-07-05 NOTE — PT/OT/SLP PROGRESS
Physical Therapy Treatment    Patient Name:  Terri Phelan   MRN:  [id number]    Recommendations:     Discharge Recommendations:  (high intensity therapy)  Discharge Equipment Recommendations: to be determined by next level of care (anticipate TTB)  Barriers to discharge: Decreased caregiver support and decreased strength/high fall risk    Assessment:     Terri Phelan is a 67 y.o. female admitted with a medical diagnosis of COPD exacerbation.  She presents with the following impairments/functional limitations: weakness, impaired endurance, impaired self care skills, impaired functional mobility, gait instability, impaired balance, decreased ROM, decreased lower extremity function, impaired cardiopulmonary response to activity Patient improving with mobility and activity tolerance; cont with POC..    Rehab Prognosis: Good and Fair; patient would benefit from acute skilled PT services to address these deficits and reach maximum level of function.    Recent Surgery: * No surgery found *      Plan:     During this hospitalization, patient to be seen 6 x/week to address the identified rehab impairments via gait training, therapeutic activities, therapeutic exercises, neuromuscular re-education and progress toward the following goals:    Plan of Care Expires:  07/31/23    Subjective     Pain/Comfort:  Pain Rating 1: 0/10  Pain Rating Post-Intervention 1: 0/10      Objective:     Communicated with nurse prior to session.  Patient found HOB elevated with bed alarm, telemetry, PureWick, peripheral IV upon PT entry to room.     General Precautions: Standard, fall  Orthopedic Precautions: N/A  Braces: N/A  Respiratory Status: Nasal cannula, flow 3 L/min     Functional Mobility:  Bed Mobility:     Rolling Left:  stand by assistance and use of side rail  Scooting: stand by assistance and contact guard assistance, slow movement to EOB  Supine to Sit: contact guard assistance and stand by assistance  Transfers:     Sit to  Stand:  contact guard assistance with rolling walker and VCs for hand placement  Toilet Transfer: stand by assistance with  rolling walker  using  Step Transfer and guard rail  Gait: pt amb ~40ft using RW with CGA/SBA and slow rocky, minimal trembling/shakiness, short step length, PLB as needed for mild SOB; pt sat at EOB with VCs for hand placement; pt stood and amb ~12ft to bathroom, toileted with SBA/S then amb 12 feet back to bed; after sitting~5 min, had to go to bathroom again then amb ~another 15ft to bedside chair      AM-PAC 6 CLICK MOBILITY  Turning over in bed (including adjusting bedclothes, sheets and blankets)?: 3  Sitting down on and standing up from a chair with arms (e.g., wheelchair, bedside commode, etc.): 3  Moving from lying on back to sitting on the side of the bed?: 3  Moving to and from a bed to a chair (including a wheelchair)?: 3  Need to walk in hospital room?: 3  Climbing 3-5 steps with a railing?: 1  Basic Mobility Total Score: 16       Treatment & Education:  Patient resting in bed; sup to sit with SBA/CGA; sit to stand with CGA; pt stood at RW for extended period of time while adult brief being adjusted by PT and pt had a severe bout of shaking which calmed after pt started to amb using the RW with CGA; pt amb ~40ft using RW with CGA/SBA and slow rocky, minimal trembling/shakiness, PLB as needed for mild SOB; pt sat at EOB with VCs for hand placement; pt stood and amb ~12ft to bathroom, toileted with SBA/S then amb 12 feet back to bed; after sitting~5 min, had to go to bathroom again then amb ~another 15ft to bedside chair; instructed on BUE/LE AROM to be done throughout the day along with deep breathing; O2 sats initially at rest 95% hr 74 on 3L; with amb O2 sats decreased to 89% HR 88; following amb, O2 sats 92% with ~3 min rest, HR 81 and PLB and stabilizing at 93% HR 83; will cont with POC.    Patient left up in chair with all lines intact, call button in reach, chair alarm on,  and nurse notified..    GOALS:   Multidisciplinary Problems       Physical Therapy Goals          Problem: Physical Therapy    Goal Priority Disciplines Outcome Goal Variances Interventions   Physical Therapy Goal     PT, PT/OT Ongoing, Progressing     Description: Goals to be met by: 23     Patient will increase functional independence with mobility by performin. Supine to sit with Modified [place]  2. Sit to stand transfer with Supervision with AD if needed.   3. Gait  x 150 feet with Supervision using AD if needed.   4. Lower extremity exercise program x15 reps per handout, with supervision to increased tolerance to activities.                          Time Tracking:     PT Received On: 23  PT Start Time: 1019     PT Stop Time: 1112  PT Total Time (min): 53 min     Billable Minutes: Gait Training 25 and Therapeutic Activity 31    Treatment Type: Treatment  PT/PTA: PT     Number of PTA visits since last PT visit: 0     2023

## 2023-07-05 NOTE — PT/OT/SLP PROGRESS
"Occupational Therapy   Treatment    Name: Terri Phelan  MRN: [id number]  Admitting Diagnosis:  COPD exacerbation       Recommendations:     Discharge Recommendations:  (high intensity therapy)  Discharge Equipment Recommendations:  to be determined by next level of care (anticipate TTB)  Barriers to discharge:   (fall risk)    Assessment:   Patient making good progress towards goals. Still tremulous /c activity, but able to follow VCs for PLB/calming techniques. Patient will benefit from continued skilled OT to address deficits and improve performance in functional ADL tasks. Cont OT.    Terri Phelan is a 67 y.o. female with a medical diagnosis of COPD exacerbation. Performance deficits affecting function are weakness, impaired endurance, impaired self care skills, impaired functional mobility, gait instability, impaired balance, decreased coordination, impaired cardiopulmonary response to activity.     Rehab Prognosis:  Good; patient would benefit from acute skilled OT services to address these deficits and reach maximum level of function.       Plan:     Patient to be seen 5 x/week to address the above listed problems via self-care/home management, therapeutic activities, therapeutic exercises  Plan of Care Expires: 07/31/23  Plan of Care Reviewed with: patient    Subjective     Chief Complaint: "Everyone was rooting for me today"  Patient/Family Comments/goals: return to PLOF  Pain/Comfort:  Pain Rating 1: 0/10  Pain Rating Post-Intervention 1: 0/10    Objective:     Communicated with: nurse, [place] prior to session.  Patient found up in chair with bed alarm, telemetry upon OT entry to room.    General Precautions: Standard, fall      Functional Mobility/Transfers:  Patient completed Sit <> Stand Transfer with contact guard assistance  with  rolling walker   Patient completed Toilet Transfer Step Transfer technique with contact guard assistance and minimum assistance with  rolling walker    Activities of " Daily Living:  Grooming: set-up and CGA for balance in stance at sink    Toileting: contact guard assistance and increased time    [facility] 6 Click ADL: 19    Treatment & Education:  Patient reports being in bedside chair most of day. Agreeable to OT activity. Requesting to use the bathroom. Instructed in sit > stand using RW as above. Patient ambulated and completed toileting and transfer /c CGA and VCs for technique. Increased time required for toileting. Patient ambulated to EOB for seated rest break; noted to be tachypneic and required VCs for PLB/calming techniques. Patient recovered ~2 mins before being able to continue activity at sink for G/H tasks. Did use rescue inhaler x 1 while in stance at sink. Continued education re: energy conservation methods/techniques, as well as routine modification. All questions answered within OT scope of practice.     Patient left up in chair with all lines intact, call button in reach, and chair alarm on    GOALS:   Multidisciplinary Problems       Occupational Therapy Goals          Problem: Occupational Therapy    Goal Priority Disciplines Outcome Interventions   Occupational Therapy Goal     OT, PT/OT Ongoing, Progressing    Description: Goals to be met by: 7/31/2023     Patient will increase functional independence with ADLs by performing:    UE Dressing inclusive of clothing retrieval with Modified [place] and least restrictive device as needed.  LE Dressing with Modified [place].  Grooming while standing at sink with Stand-by Assistance.  Toileting from toilet with Modified [place] for hygiene and clothing management.   Toilet transfer to toilet with Stand-by Assistance.  Increased functional strength to 4/5 to support engagement in self care routine without adverse signs and symptoms.  Integration of energy conservation techniques to support performing ADL routines in proper contexts with controlled exertion (ie. Mobilize to true bathroom)                                   Time Tracking:     OT Date of Treatment: 07/05/23  OT Start Time: 1446  OT Stop Time: 1524  OT Total Time (min): 38 min    Billable Minutes:Self Care/Home Management 38    OT/HUNTER: HUNTER     Number of HUNTER visits since last OT visit: 1    7/5/2023

## 2023-07-05 NOTE — ASSESSMENT & PLAN NOTE
Patient with Hypoxic Respiratory failure which is Acute on chronic.  she is on home oxygen at 2 LPM. Supplemental oxygen was provided and noted- Oxygen Concentration (%):  [30] 30    .   Signs/symptoms of respiratory failure include- tachypnea and respiratory distress. Contributing diagnoses includes - COPD Labs and images were reviewed. Patient Has recent ABG, which has been reviewed. Will treat underlying causes and adjust management of respiratory failure as follows-   On continuous BiPAP wean as tolerated  Consult Pulmonary crit-appreciate recs

## 2023-07-05 NOTE — PLAN OF CARE
Problem: Physical Therapy  Goal: Physical Therapy Goal  Description: Goals to be met by: 23     Patient will increase functional independence with mobility by performin. Supine to sit with Modified [place]  2. Sit to stand transfer with Supervision with AD if needed.   3. Gait  x 150 feet with Supervision using AD if needed.   4. Lower extremity exercise program x15 reps per handout, with supervision to increased tolerance to activities.     Outcome: Ongoing, Progressing   Patient resting in bed; sup to sit with SBA/CGA; sit to stand with CGA; pt stood at RW for extended period of time while adult brief being adjusted by PT and pt had a severe bout of shaking which calmed after pt started to amb using the RW with CGA; pt amb ~40ft using RW with CGA/SBA and slow rocky, minimal trembling/shakiness, PLB as needed for mild SOB; pt sat at EOB with VCs for hand placement; pt stood and amb ~12ft to bathroom, toileted with SBA/S then amb 12 feet back to bed; after sitting~5 min, had to go to bathroom again then amb ~another 15ft to bedside chair; instructed on BUE/LE AROM to be done throughout the day along with deep breathing; O2 sats initially at rest 95% hr 74 on 3L; with amb O2 sats decreased to 89% HR 88; following amb, O2 sats 92% with ~3 min rest, HR 81 and PLB and stabilizing at 93% HR 83; will cont with POC.

## 2023-07-05 NOTE — PLAN OF CARE
Patient on oxygen with documented flow.  Will attempt to wean per O2 order protocol. The proper method of use, as well as anticipated side effects, of this aerosol treatment are discussed and demonstrated to the patient.  Pt on BIPAP with documented settings. Alarms are set and working. Will continue to monitor.

## 2023-07-05 NOTE — ASSESSMENT & PLAN NOTE
Centrilobular emphysema   Oxygen dependent  Former smoker   Report 3 L nasal cannula home oxygen  Cont duoneb  Cont budesonide and switch Solu-Medrol to prednisone  Continue continuous BiPAP-wean as tolerated- weaned to NC on 7/2

## 2023-07-05 NOTE — PLAN OF CARE
Problem: Occupational Therapy  Goal: Occupational Therapy Goal  Description: Goals to be met by: 7/31/2023     Patient will increase functional independence with ADLs by performing:    UE Dressing inclusive of clothing retrieval with Modified [place] and least restrictive device as needed.  LE Dressing with Modified [place].  Grooming while standing at sink with Stand-by Assistance.  Toileting from toilet with Modified [place] for hygiene and clothing management.   Toilet transfer to toilet with Stand-by Assistance.  Increased functional strength to 4/5 to support engagement in self care routine without adverse signs and symptoms.  Integration of energy conservation techniques to support performing ADL routines in proper contexts with controlled exertion (ie. Mobilize to true bathroom)     Outcome: Ongoing, Progressing     Patient making good progress towards goals. Still tremulous /c activity, but able to follow VCs for PLB/calming techniques. Patient will benefit from continued skilled OT to address deficits and improve performance in functional ADL tasks. Cont OT.

## 2023-07-05 NOTE — SUBJECTIVE & OBJECTIVE
Interval History:  Doing well today, breathing essentially back to baseline but patient feels weak.  We are waiting for authorization for rehabilitation.  Otherwise wheezing doing better today and she is on her home O2.    Review of Systems   Constitutional:  Negative for chills and fever.   Respiratory:  Positive for shortness of breath.    Cardiovascular:  Negative for chest pain.   Neurological:  Positive for weakness.   Psychiatric/Behavioral:  Negative for confusion.    All other systems reviewed and are negative.  Objective:     Vital Signs (Most Recent):  Temp: 97.8 °F (36.6 °C) (07/05/23 1135)  Pulse: 73 (07/05/23 1159)  Resp: 18 (07/05/23 1135)  BP: 138/63 (07/05/23 1135)  SpO2: 96 % (07/05/23 1135) Vital Signs (24h Range):  Temp:  [96.2 °F (35.7 °C)-98.8 °F (37.1 °C)] 97.8 °F (36.6 °C)  Pulse:  [54-76] 73  Resp:  [16-22] 18  SpO2:  [92 %-98 %] 96 %  BP: (138-153)/(63-67) 138/63     Weight: 81.6 kg (180 lb)  Body mass index is 29.05 kg/m².    Intake/Output Summary (Last 24 hours) at 7/5/2023 1525  Last data filed at 7/5/2023 0642  Gross per 24 hour   Intake --   Output 700 ml   Net -700 ml           Physical Exam  Vitals and nursing note reviewed.   Constitutional:       General: She is not in acute distress.  HENT:      Head: Normocephalic and atraumatic.   Cardiovascular:      Rate and Rhythm: Normal rate. Rhythm irregular.      Heart sounds: No murmur heard.    No friction rub. No gallop.   Pulmonary:      Effort: Pulmonary effort is normal. No respiratory distress.      Breath sounds: No wheezing.   Abdominal:      General: Bowel sounds are normal. There is no distension.      Palpations: Abdomen is soft.      Tenderness: There is no abdominal tenderness.   Musculoskeletal:         General: No swelling or tenderness.      Cervical back: No rigidity or tenderness.      Right lower leg: No edema.      Left lower leg: No edema.   Skin:     General: Skin is warm.      Findings: No erythema or rash.    Neurological:      General: No focal deficit present.      Mental Status: She is alert and oriented to person, place, and time.      Motor: No weakness.      Gait: Gait normal.   Psychiatric:         Thought Content: Thought content normal.           Significant Labs: A1C:   Recent Labs   Lab 06/16/23  0428   HGBA1C 5.4       ABGs:   No results for input(s): PH, PCO2, HCO3, POCSATURATED, BE, TOTALHB, COHB, METHB, O2HB, POCFIO2, PO2 in the last 48 hours.    Blood Culture: No results for input(s): LABBLOO in the last 48 hours.  CBC:   Recent Labs   Lab 07/04/23 0314 07/05/23 0259   WBC 9.95 8.77   HGB 11.8* 11.6*   HCT 39.6 39.1   * 143*       CMP:   Recent Labs   Lab 07/04/23 0313 07/05/23 0259    144   K 4.4 4.4   CL 99 101   CO2 36* 35*   * 137*   BUN 30* 25*   CREATININE 0.7 0.7   CALCIUM 9.3 8.7   ANIONGAP 8 8       Lipase: No results for input(s): LIPASE in the last 48 hours.  Lipid Panel: No results for input(s): CHOL, HDL, LDLCALC, TRIG, CHOLHDL in the last 48 hours.    Magnesium:   Recent Labs   Lab 07/04/23 0313 07/05/23 0259   MG 2.2 2.2       Troponin:   No results for input(s): TROPONINI, TROPONINIHS in the last 48 hours.    TSH: No results for input(s): TSH in the last 4320 hours.  Urine Culture: No results for input(s): LABURIN in the last 48 hours.  Urine Studies: No results for input(s): COLORU, APPEARANCEUA, PHUR, SPECGRAV, PROTEINUA, GLUCUA, KETONESU, BILIRUBINUA, OCCULTUA, NITRITE, UROBILINOGEN, LEUKOCYTESUR, RBCUA, WBCUA, BACTERIA, SQUAMEPITHEL, HYALINECASTS in the last 48 hours.    Invalid input(s): WRIGHTSUR    Significant Imaging: I have reviewed all pertinent imaging results/findings within the past 24 hours.

## 2023-07-05 NOTE — PROGRESS NOTES
[facility] Medicine  Progress Note    Patient Name: Terri Phelan  MRN: [id number]  Patient Class: IP- Inpatient   Admission Date: 7/1/2023  Length of Stay: 3 days  Attending Physician: Tirso Valenzuela, *  Primary Care Provider: Kev Cox MD        Subjective:     Principal Problem:COPD exacerbation        HPI:  66 YO F with PMHx significant for COPD on home O2 presented to the ER for worsening SOB. Per patient's daughter at bedside her symptoms have started yesterday with headache, mild SOB, fatigue. Today she wake up, was having more difficulty to breath. Then later she become confuse and was talking without making sense. Since she saw that her mental state has gotten worse and worse, she has decided to call EMS. At home EMS found that her O2 sat was around 74%. Patient is a frequent flyer and las admission was around 2 weeks ago. When seen in the ER, was very sleepy, reactive to pain stimulus. She was placed on BIPAP and tolerate well. She has been admitted for COPD exacerbation.      Overview/Hospital Course:  No notes on file    Interval History:  Doing well today, breathing essentially back to baseline but patient feels weak.  We are waiting for authorization for rehabilitation.  Otherwise wheezing doing better today and she is on her home O2.    Review of Systems   Constitutional:  Negative for chills and fever.   Respiratory:  Positive for shortness of breath.    Cardiovascular:  Negative for chest pain.   Neurological:  Positive for weakness.   Psychiatric/Behavioral:  Negative for confusion.    All other systems reviewed and are negative.  Objective:     Vital Signs (Most Recent):  Temp: 97.8 °F (36.6 °C) (07/05/23 1135)  Pulse: 73 (07/05/23 1159)  Resp: 18 (07/05/23 1135)  BP: 138/63 (07/05/23 1135)  SpO2: 96 % (07/05/23 1135) Vital Signs (24h Range):  Temp:  [96.2 °F (35.7 °C)-98.8 °F (37.1 °C)] 97.8 °F (36.6 °C)  Pulse:  [54-76] 73  Resp:  [16-22] 18  SpO2:  [92 %-98  %] 96 %  BP: (138-153)/(63-67) 138/63     Weight: 81.6 kg (180 lb)  Body mass index is 29.05 kg/m².    Intake/Output Summary (Last 24 hours) at 7/5/2023 1525  Last data filed at 7/5/2023 0642  Gross per 24 hour   Intake --   Output 700 ml   Net -700 ml           Physical Exam  Vitals and nursing note reviewed.   Constitutional:       General: She is not in acute distress.  HENT:      Head: Normocephalic and atraumatic.   Cardiovascular:      Rate and Rhythm: Normal rate. Rhythm irregular.      Heart sounds: No murmur heard.    No friction rub. No gallop.   Pulmonary:      Effort: Pulmonary effort is normal. No respiratory distress.      Breath sounds: No wheezing.   Abdominal:      General: Bowel sounds are normal. There is no distension.      Palpations: Abdomen is soft.      Tenderness: There is no abdominal tenderness.   Musculoskeletal:         General: No swelling or tenderness.      Cervical back: No rigidity or tenderness.      Right lower leg: No edema.      Left lower leg: No edema.   Skin:     General: Skin is warm.      Findings: No erythema or rash.   Neurological:      General: No focal deficit present.      Mental Status: She is alert and oriented to person, place, and time.      Motor: No weakness.      Gait: Gait normal.   Psychiatric:         Thought Content: Thought content normal.           Significant Labs: A1C:   Recent Labs   Lab 06/16/23  0428   HGBA1C 5.4       ABGs:   No results for input(s): PH, PCO2, HCO3, POCSATURATED, BE, TOTALHB, COHB, METHB, O2HB, POCFIO2, PO2 in the last 48 hours.    Blood Culture: No results for input(s): LABBLOO in the last 48 hours.  CBC:   Recent Labs   Lab 07/04/23 0314 07/05/23  0259   WBC 9.95 8.77   HGB 11.8* 11.6*   HCT 39.6 39.1   * 143*       CMP:   Recent Labs   Lab 07/04/23 0313 07/05/23  0259    144   K 4.4 4.4   CL 99 101   CO2 36* 35*   * 137*   BUN 30* 25*   CREATININE 0.7 0.7   CALCIUM 9.3 8.7   ANIONGAP 8 8       Lipase: No  results for input(s): LIPASE in the last 48 hours.  Lipid Panel: No results for input(s): CHOL, HDL, LDLCALC, TRIG, CHOLHDL in the last 48 hours.    Magnesium:   Recent Labs   Lab 07/04/23  0313 07/05/23  0259   MG 2.2 2.2       Troponin:   No results for input(s): TROPONINI, TROPONINIHS in the last 48 hours.    TSH: No results for input(s): TSH in the last 4320 hours.  Urine Culture: No results for input(s): LABURIN in the last 48 hours.  Urine Studies: No results for input(s): COLORU, APPEARANCEUA, PHUR, SPECGRAV, PROTEINUA, GLUCUA, KETONESU, BILIRUBINUA, OCCULTUA, NITRITE, UROBILINOGEN, LEUKOCYTESUR, RBCUA, WBCUA, BACTERIA, SQUAMEPITHEL, HYALINECASTS in the last 48 hours.    Invalid input(s): WRIGHTSUR    Significant Imaging: I have reviewed all pertinent imaging results/findings within the past 24 hours.      Assessment/Plan:      * COPD exacerbation  Centrilobular emphysema   Oxygen dependent  Former smoker   Report 3 L nasal cannula home oxygen  Cont duoneb  Cont budesonide and switch Solu-Medrol to prednisone  Continue continuous BiPAP-wean as tolerated- weaned to NC on 7/2      Debility  PT/OT  Requires placement       Major depression  Patient has persistent depression which is mild and is currently controlled. Will Continue anti-depressant medications. We will not consult psychiatry at this time. Patient does not display psychosis at this time. Continue to monitor closely and adjust plan of care as needed.        Acute on chronic respiratory failure with hypoxia and hypercapnia  Patient with Hypoxic Respiratory failure which is Acute on chronic.  she is on home oxygen at 2 LPM. Supplemental oxygen was provided and noted- Oxygen Concentration (%):  [30] 30    .   Signs/symptoms of respiratory failure include- tachypnea and respiratory distress. Contributing diagnoses includes - COPD Labs and images were reviewed. Patient Has recent ABG, which has been reviewed. Will treat underlying causes and adjust  management of respiratory failure as follows-   On continuous BiPAP wean as tolerated  Consult Pulmonary crit-appreciate recs    Obstructive sleep apnea syndrome  Non compliant to CPAP  Requires CPAP q.h.s. and p.r.n. naps      CAD (coronary artery disease)  -continue aspirin    Essential hypertension  Cont current home meds        VTE Risk Mitigation (From admission, onward)         Ordered     enoxaparin injection 40 mg  Daily         07/02/23 0054     IP VTE HIGH RISK PATIENT  Once         07/01/23 2135     Place sequential compression device  Until discontinued         07/01/23 2135                Discharge Planning   RADHA:      Code Status: Full Code   Is the patient medically ready for discharge?:     Reason for patient still in hospital (select all that apply): Pending disposition  Discharge Plan A: Rehab                  Tirso Valenzuela MD  Department of Hospital Medicine   [facility]

## 2023-07-06 VITALS
WEIGHT: 180 LBS | DIASTOLIC BLOOD PRESSURE: 60 MMHG | HEART RATE: 87 BPM | OXYGEN SATURATION: 94 % | BODY MASS INDEX: 28.93 KG/M2 | HEIGHT: 66 IN | RESPIRATION RATE: 16 BRPM | TEMPERATURE: 98 F | SYSTOLIC BLOOD PRESSURE: 135 MMHG

## 2023-07-06 PROCEDURE — 63600175 PHARM REV CODE 636 W HCPCS: Performed by: HOSPITALIST

## 2023-07-06 PROCEDURE — 25000003 PHARM REV CODE 250: Performed by: FAMILY MEDICINE

## 2023-07-06 PROCEDURE — 94660 CPAP INITIATION&MGMT: CPT

## 2023-07-06 PROCEDURE — 97530 THERAPEUTIC ACTIVITIES: CPT | Mod: CQ

## 2023-07-06 PROCEDURE — 94640 AIRWAY INHALATION TREATMENT: CPT

## 2023-07-06 PROCEDURE — 99900035 HC TECH TIME PER 15 MIN (STAT)

## 2023-07-06 PROCEDURE — 97530 THERAPEUTIC ACTIVITIES: CPT

## 2023-07-06 PROCEDURE — 94761 N-INVAS EAR/PLS OXIMETRY MLT: CPT

## 2023-07-06 PROCEDURE — 97116 GAIT TRAINING THERAPY: CPT | Mod: CQ

## 2023-07-06 PROCEDURE — 27000221 HC OXYGEN, UP TO 24 HOURS

## 2023-07-06 PROCEDURE — 63600175 PHARM REV CODE 636 W HCPCS: Performed by: FAMILY MEDICINE

## 2023-07-06 RX ORDER — ALBUTEROL SULFATE 90 UG/1
2 AEROSOL, METERED RESPIRATORY (INHALATION) EVERY 4 HOURS PRN
Status: DISCONTINUED | OUTPATIENT
Start: 2023-07-06 | End: 2023-07-06 | Stop reason: HOSPADM

## 2023-07-06 RX ORDER — PREDNISONE 20 MG/1
40 TABLET ORAL DAILY
Qty: 6 TABLET | Refills: 0 | Status: SHIPPED | OUTPATIENT
Start: 2023-07-06 | End: 2023-07-09

## 2023-07-06 RX ORDER — PREDNISONE 20 MG/1
40 TABLET ORAL DAILY
Qty: 6 TABLET | Refills: 0
Start: 2023-07-06 | End: 2023-07-06 | Stop reason: SDUPTHER

## 2023-07-06 RX ORDER — IPRATROPIUM BROMIDE AND ALBUTEROL SULFATE 2.5; .5 MG/3ML; MG/3ML
3 SOLUTION RESPIRATORY (INHALATION) EVERY 6 HOURS PRN
Qty: 75 ML | Refills: 0 | Status: ON HOLD
Start: 2023-07-06 | End: 2023-12-20 | Stop reason: HOSPADM

## 2023-07-06 RX ORDER — IPRATROPIUM BROMIDE AND ALBUTEROL SULFATE 2.5; .5 MG/3ML; MG/3ML
3 SOLUTION RESPIRATORY (INHALATION) EVERY 6 HOURS PRN
Qty: 75 ML | Refills: 0
Start: 2023-07-06 | End: 2023-07-06 | Stop reason: SDUPTHER

## 2023-07-06 RX ADMIN — ASPIRIN 81 MG CHEWABLE TABLET 81 MG: 81 TABLET CHEWABLE at 09:07

## 2023-07-06 RX ADMIN — LORAZEPAM 0.5 MG: 2 INJECTION INTRAMUSCULAR; INTRAVENOUS at 11:07

## 2023-07-06 RX ADMIN — PREDNISONE 40 MG: 20 TABLET ORAL at 09:07

## 2023-07-06 RX ADMIN — CITALOPRAM HYDROBROMIDE 10 MG: 10 TABLET ORAL at 09:07

## 2023-07-06 RX ADMIN — ROFLUMILAST 500 MCG: 500 TABLET ORAL at 09:07

## 2023-07-06 NOTE — PHYSICIAN QUERY
PT Name: Terri Phelan  MR #: [id number]    DOCUMENTATION CLARIFICATION     CDS: Annie Whitfield RN, CCDS   Contact Information: [email or web address]  This form is a permanent document in the medical record.     Query Date: July 6, 2023    By submitting this query, we are merely seeking further clarification of documentation. Please utilize your independent clinical judgment when addressing the question(s) below.    The Medical Record contains the following:   Indicators   Supporting Clinical Findings Location in Medical Record   x AMS, Confusion,  LOC, etc.  When seen in the ER, was very sleepy, reactive to pain stimulus...she is disoriented    She is arousable but is lethargic/somnolent and confused. Her CO2 is very high (although improving some on BIPAP) and certainly the narcosis could explain this.   7/1 H&P, Dr. Brambila      7/2 Care Update, Dr. Jonathan alonso Acute/Chronic Illness 68 YO F with PMHx significant for COPD on home O2 presented to the ER for worsening SOB. Per patient's daughter at bedside her symptoms have started yesterday with headache, mild SOB, fatigue.  Acute hypercapnic respiratory failure  Hypoxic respiratory failure which is acute on chronic 7/1 H&P, Dr. Kosta alonso Radiology Findings Ventricles / Sulci:  Normal for age.     Infarction / Ischemia:  No acute infarction.   Intracranial Hemorrhage:  None present.   Mass Lesion / Mass Effect:  None present.   Extra-axial fluid collection: None   Other Parenchymal Abnormality: Unremarkable   Calvaria / Soft Tissues:  Unremarkable   Visible Orbits: Unremarkable.   Paranasal Sinuses: Unremarkable.   Other:  None of significance. 7/1 CT Head without Contrast   x ABG  07/01/23 22:00 07/02/23 08:30   POC PH 7.258 (LL) 7.376   POC PCO2 103.4 (HH) 72.5 (HH)   POC PO2 68 (L) 51 (LL)   POC HCO3 46.2 (H) 42.5 (H)   POC SATURATED O2 88 (L) 82 (L)   Sample Arterial Arterial   POC TCO2 49 (H) 45 (H)   POC BE 19 17   FiO2 40 30   DelSys CPAP/BiPAP  CPAP/BiPAP     ABG's    Medication     x Treatment         Supplemental oxygen was provided and noted- Oxygen Concentration (%):  [40] 40  Cont duoneb  Cont budesonide and solumedrol    Continuous BiPAP 7/1 H&P, Dr. Brambila          Orders    Other       The noted clinical guidelines are only system guidelines and do not replace the providers clinical judgment.    The National [place] of Neurologic Disorders and Stroke (NINDS) of the NIH describes encephalopathy as any diffuse disease of the brain that alters brain function or structure.    Provider, please specify the diagnosis or diagnoses associated with above clinical findings.    [ x  ] Metabolic Encephalopathy - Due to electrolyte imbalance, metabolic derangements, or infectious processes, includes Septic Encephalopathy, Uremic Encephalopathy   [   ] Encephalopathy, unspecified      [   ] Other Clarification (please specify condition): __________   [   ] Clinically Undetermined       Please document in your progress notes daily for the duration of treatment until resolved, and include in your discharge summary.    References:  VANDANA Cueva RN, CCDS. (2018, June 9). Notes from the Instructor: Encephalopathy tips. Retrieved October 22, 2020, from [email or web address]    ICD-9-CM Coding Clinic First Quarter 2013, Effective with discharges: October 21, 2013 [facility] [employer] § Seizure with encephalopathy due to postictal state (2013).    ICD-10-CM/PCS  Integrated Codebook (Version V.20.8.10.0) [Computer software]. (2020). Retrieved October 21, 2020.    National [place] of Neurological Disorders and Stroke. (2019, March 27). Retrieved October 22, 2020, from https://www.ninds.nih.gov/Disorders/All-Disorders/Stzaaqqyhkjank-Gahzmbxdtqt-Rvcf    Form No. 02449

## 2023-07-06 NOTE — PLAN OF CARE
Continue OT POC. Improved sit to stand and transfer to toilet, however, patient continues to be a fall risk as evidenced by Timed Up and Go score is below age related norms. Recommend moderate intensity therapy; if to d/c to home, recommend home health OT/PT, family support, and DME of tub transfer bench (handout provided).        Problem: Occupational Therapy  Goal: Occupational Therapy Goal  Description: Goals to be met by: 7/31/2023     Patient will increase functional independence with ADLs by performing:    UE Dressing inclusive of clothing retrieval with Modified [place] and least restrictive device as needed.  LE Dressing with Modified [place].  Grooming while standing at sink with Stand-by Assistance.  Toileting from toilet with Modified [place] for hygiene and clothing management.   Toilet transfer to toilet with Stand-by Assistance.  Increased functional strength to 4/5 to support engagement in self care routine without adverse signs and symptoms.  Integration of energy conservation techniques to support performing ADL routines in proper contexts with controlled exertion (ie. Mobilize to true bathroom)             Outcome: Ongoing, Progressing

## 2023-07-06 NOTE — DISCHARGE SUMMARY
"[facility] Medicine  Discharge Summary      Patient Name: Terri Phelan  MRN: [id number]  SUNDEEP: [id number]  Patient Class: IP- Inpatient  Admission Date: 7/1/2023  Hospital Length of Stay: 4 days  Discharge Date and Time:  07/06/2023 3:49 PM  Attending Physician: Tirso Valenzuela, *   Discharging Provider: Tirso Valenzuela MD  Primary Care Provider: Kev Cox MD    Primary Care Team: Networked reference to record PCT     HPI:   66 YO F with PMHx significant for COPD on home O2 presented to the ER for worsening SOB. Per patient's daughter at bedside her symptoms have started yesterday with headache, mild SOB, fatigue. Today she wake up, was having more difficulty to breath. Then later she become confuse and was talking without making sense. Since she saw that her mental state has gotten worse and worse, she has decided to call EMS. At home EMS found that her O2 sat was around 74%. Patient is a frequent flyer and las admission was around 2 weeks ago. When seen in the ER, was very sleepy, reactive to pain stimulus. She was placed on BIPAP and tolerate well. She has been admitted for COPD exacerbation.      * No surgery found *      Hospital Course:   Ms. Phelan presented with acute on chronic hypoxic and hypercapnic respiratory failure due to COPD exacerbation.  Initially evaluated in the ICU and placed on BiPAP; now transition to home 3 L nasal cannula.  Continuing home inhaler regimen and roflumilast.  She will continue BiPAP nightly and pulmonary rehab.  She is treated with steroids while in house; will finish short course at time of discharge.  She was evaluated by PT/OT who initially recommended inpatient rehabilitation; patient has progressed and now stable for home health at time of discharge.    BP (!) 145/66   Pulse 77   Temp 97.2 °F (36.2 °C)   Resp 18   Ht 5' 6" (1.676 m)   Wt 81.6 kg (180 lb)   SpO2 (!) 90%   Breastfeeding No   BMI 29.05 kg/m²   Physical " Exam  Vitals and nursing note reviewed.   Constitutional:       General: She is not in acute distress.  HENT:      Head: Normocephalic and atraumatic.   Cardiovascular:      Rate and Rhythm: Normal rate. Rhythm irregular.      Heart sounds: No murmur heard.    No friction rub. No gallop.   Pulmonary:      Effort: Pulmonary effort is normal. No respiratory distress.      Breath sounds: No wheezing.   Abdominal:      General: Bowel sounds are normal. There is no distension.      Palpations: Abdomen is soft.      Tenderness: There is no abdominal tenderness.   Musculoskeletal:         General: No swelling or tenderness.      Cervical back: No rigidity or tenderness.      Right lower leg: No edema.      Left lower leg: No edema.   Skin:     General: Skin is warm.      Findings: No erythema or rash.   Neurological:      General: No focal deficit present.      Mental Status: She is alert and oriented to person, place, and time.      Motor: No weakness.      Gait: Gait normal.   Psychiatric:         Thought Content: Thought content normal.        Goals of Care Treatment Preferences:  Code Status: Full Code    Health care agent: Mulugeta Phelan  [facility] care agent number: [phone]    Living Will: Yes     What is most important right now is to focus on spending time at home, avoiding the hospital, remaining as independent as possible, symptom/pain control, improvement in condition but with limits to invasive therapies.  Accordingly, we have decided that the best plan to meet the patient's goals includes continuing with treatment.      Consults:   Consults (From admission, onward)        Status Ordering Provider     IP consult to case management  Once        Provider:  (Not yet assigned)    Completed JERONIMO WELLS     Inpatient consult to Pulmonology  Once        Provider:  (Not yet assigned)    Completed SHASHI ALONSO          No new Assessment & Plan notes have been filed under this hospital service since the last  note was generated.  Service: Hospital Medicine    Final Active Diagnoses:    Diagnosis Date Noted POA    PRINCIPAL PROBLEM:  COPD exacerbation [J44.1] 09/08/2015 Yes    Debility [R53.81] 07/03/2023 Yes    Major depression [F32.9] 07/01/2023 Yes    Acute on chronic respiratory failure with hypoxia and hypercapnia [J96.21, J96.22] 06/15/2023 Yes    Oxygen dependent [Z99.81] 09/09/2022 Not Applicable    CAD (coronary artery disease) [I25.10] 09/09/2022 Yes    Breast mass [N63.0] 07/02/2022 Yes     Chronic    Centrilobular emphysema [J43.2] 06/16/2022 Yes    Essential hypertension [I10] 09/08/2015 Yes    Former smoker [Z87.891] 09/08/2015 Not Applicable    Obstructive sleep apnea syndrome [G47.33] 03/31/2015 Yes      Problems Resolved During this Admission:       Discharged Condition: fair    Disposition: Home or Self Care    Follow Up:   Follow-up Information     [facility]. Call.    Why: As needed  Contact information:  Private Caregiver Service    [employer] Aid & Attendance Program for Veterans  [facility] is proud to be an authorized Veterans Administration vendor. We help veterans access their pension benefits for assistance with living at home, disability, mobilit...           [facility]. Call.    Specialty: Rehab Facility  Why: REHAB. Call for assessment for outpt review for inpatient rehab.  Contact information:  [address] [facility] [address]  [phone]             [facility]. Call.    Why: TRANSPORTATION TO APPOINTMENTS AS NEEDED  Contact information:  [address]  [facility] [address]  [phone]           [place]. Call in 1 week(s).    Specialty: Rehabilitation  Why: REHAB; Call for home assessment for admission to  Contact information:  [address], [address]  [facility] [address] [phone]                       Patient Instructions:      Ambulatory referral[facility]consult to [facility]   Standing Status: Future   Referral Priority:  Routine Referral Type: Consultation   Referral Reason: Specialty Services Required   Number of Visits Requested: 1     Ambulatory referral/consult to Pulmonology   Standing Status: Future   Referral Priority: Routine Referral Type: Consultation   Referral Reason: Specialty Services Required   Requested Specialty: Pulmonary Disease   Number of Visits Requested: 1     Ambulatory referral/consult to Pulmonary Rehab   Standing Status: Future   Referral Priority: Routine Referral Type: Consultation   Referral Reason: Specialty Services Required   Requested Specialty: Pulmonary Disease   Number of Visits Requested: 1       Significant Diagnostic Studies: N/A    Pending Diagnostic Studies:     None         Medications:  Reconciled Home Medications:      Medication List      START taking these medications    predniSONE 20 MG tablet  Commonly known as: DELTASONE  Take 2 tablets (40 mg total) by mouth once daily. for 3 days        CHANGE how you take these medications    albuterol-ipratropium 2.5 mg-0.5 mg/3 mL nebulizer solution  Commonly known as: DUO-NEB  Take 3 mLs by nebulization every 6 (six) hours as needed for Wheezing.  What changed:   · how much to take  · when to take this  · reasons to take this        CONTINUE taking these medications    acyclovir 400 MG tablet  Commonly known as: ZOVIRAX  TAKE 1 TABLET BY MOUTH THREE TIMES DAILY FOR 10 DAYS AS NEEDED FOR OUTBREAK     albuterol 90 mcg/actuation inhaler  Commonly known as: PROVENTIL/VENTOLIN HFA  Inhale 2 puffs into the lungs every 6 (six) hours as needed.     alendronate 35 MG tablet  Commonly known as: FOSAMAX  Take 35 mg by mouth every Tuesday.     aspirin 81 MG Chew  Take 81 mg by mouth once daily.     azithromycin 250 MG tablet  Commonly known as: Z-OLY  Take 1 tablet (250 mg total) by mouth 3 (three) times a week.     LOREN AEROSPHERE 160-9-4.8 mcg/actuation Hfaa  Generic drug: budesonide-glycopyr-formoterol  Inhale 2 puffs into the lungs 2 (two) times  daily.     citalopram 10 MG tablet  Commonly known as: CeleXA  Take 10 mg by mouth once daily.     DALIRESP 500 mcg Tab  Generic drug: roflumilast  Take 500 mcg by mouth once daily.     dextromethorphan-guaiFENesin  mg  mg per 12 hr tablet  Commonly known as: MUCINEX DM  Take 1 tablet by mouth every 12 (twelve) hours.     dicyclomine 20 mg tablet  Commonly known as: BENTYL  Take 1 tablet (20 mg total) by mouth 3 (three) times daily as needed (abdominal pain).     ergocalciferol 50,000 unit Cap  Commonly known as: ERGOCALCIFEROL  Take 50,000 Units by mouth every Tuesday.     fluticasone propionate 50 mcg/actuation nasal spray  Commonly known as: FLONASE  1 spray by Each Nostril route daily as needed for Allergies.     LORazepam 0.5 MG tablet  Commonly known as: ATIVAN  Take 1 tablet (0.5 mg total) by mouth every 8 (eight) hours as needed for Anxiety.     OXYGEN-AIR DELIVERY SYSTEMS MISC  3 L by Nasal route.     traZODone 50 MG tablet  Commonly known as: DESYREL  Take 1 tablet (50 mg total) by mouth nightly as needed for Insomnia.            Indwelling Lines/Drains at time of discharge:   Lines/Drains/Airways     None                 Time spent on the discharge of patient: 32 minutes         Tirso Valenzuela MD  Department of Hospital Medicine  [place] [facility]

## 2023-07-06 NOTE — PT/OT/SLP PROGRESS
Occupational Therapy   Treatment    Name: Terri Phelan  MRN: [id number]  Admitting Diagnosis:  COPD exacerbation       Recommendations:     Discharge Recommendations: other (see comments) (moderate intensity therapy; if d/c to home: home health OT/PT)  Discharge Equipment Recommendations:  other (see comments), to be determined by next level of care (anticipate tub transfer bench)  Barriers to discharge:  Other (Comment) (fall risk; low activity tolerance)    Assessment:     Terri Phelan is a 67 y.o. female with a medical diagnosis of COPD exacerbation.  She presents with The primary encounter diagnosis was Acute hypercapnic respiratory failure. Diagnoses of Shortness of breath, COPD exacerbation, and COPD (chronic obstructive pulmonary disease) were also pertinent to this visit. . Performance deficits affecting function are weakness, impaired endurance, impaired self care skills, impaired functional mobility, gait instability, impaired balance, decreased lower extremity function, impaired cardiopulmonary response to activity, decreased upper extremity function, decreased coordination.     Continue OT POC. Improved sit to stand and transfer to toilet, however, patient continues to be a fall risk as evidenced by Timed Up and Go score is below age related norms. Recommend moderate intensity therapy; if to d/c to home, recommend home health OT/PT, family support, and DME of tub transfer bench (handout provided).     Rehab Prognosis:  Good; patient would benefit from acute skilled OT services to address these deficits and reach maximum level of function.       Plan:     Patient to be seen 5 x/week to address the above listed problems via self-care/home management, therapeutic activities, therapeutic exercises  Plan of Care Expires: 07/31/23  Plan of Care Reviewed with: patient    Subjective     Chief Complaint: Patient endorses frustration with rehab plans post discharge.   Patient/Family Comments/goals: to  regain strength / endurance  Pain/Comfort:  Pain Rating 1: 0/10  Pain Addressed 1: Nurse notified  Pain Rating Post-Intervention 1: 0/10    Objective:     Communicated with: nursing prior to session.  Patient found sitting edge of bed with telemetry, oxygen upon OT entry to room.    General Precautions: Standard, fall    Orthopedic Precautions:N/A  Braces: N/A  Respiratory Status: Nasal cannula, flow 2 L/min     Occupational Performance:       Functional Mobility/Transfers:  Patient completed Sit <> Stand Transfer with contact guard assistance  with  rolling walker   Patient completed Toilet Transfer Step Transfer technique with contact guard assistance with  rolling walker  Functional Mobility: Patient ambulated in/out of bathroom with contact guard assist and rolling walker. Timed Up and Go completed in 24 seconds with contact guard assist and rolling walker.         [facility] 6 Click ADL: 19    Treatment & Education:  Patient agreeable to OT session. Patient upset with discharge plans upon entry; use therapeutic use of self and empathetic listening. Patient completed functional mobility as above; cueing for posture and pursed lip breathing. Timed Up and Go completed in 24 seconds as above; below age related norms.  entered session via [employer]. Guided through brief self - empowerment to support patient's continued self-initiation for continued rehabilitation efforts upon d/c from hospital. Patient return to bed.        On 2L, SpO2 95%, 75 bpm at rest; SpO2 92%, 85 bpm post functional mobility.     Patient left sitting edge of bed with all lines intact, call button in reach, and nursing notified    GOALS:   Multidisciplinary Problems       Occupational Therapy Goals          Problem: Occupational Therapy    Goal Priority Disciplines Outcome Interventions   Occupational Therapy Goal     OT, PT/OT Ongoing, Progressing    Description: Goals to be met by: 7/31/2023     Patient will increase functional  independence with ADLs by performing:    UE Dressing inclusive of clothing retrieval with Modified [place] and least restrictive device as needed.  LE Dressing with Modified [place].  Grooming while standing at sink with Stand-by Assistance.  Toileting from toilet with Modified [place] for hygiene and clothing management.   Toilet transfer to toilet with Stand-by Assistance.  Increased functional strength to 4/5 to support engagement in self care routine without adverse signs and symptoms.  Integration of energy conservation techniques to support performing ADL routines in proper contexts with controlled exertion (ie. Mobilize to true bathroom)                                  Time Tracking:     OT Date of Treatment: 07/06/23  OT Start Time: 1540  OT Stop Time: 1553  OT Total Time (min): 13 min    Billable Minutes:Therapeutic Activity 13 min    OT/HUNTER: OT     Number of HUNTER visits since last OT visit: 1 7/6/2023

## 2023-07-06 NOTE — PLAN OF CARE
[facility]    FACILITY TRANSFER ORDERS      Patient Name: Terri Phelan  YOB: 1955    PCP: Kev Cox MD   PCP Address: [address] [facility] / [address] [facility]  PCP Phone Number: [phone]  PCP Fax: [phone]    Encounter Date: 07/06/2023    Admit to: IPR    Vital Signs:  Routine    Diagnoses:   Active Hospital Problems    Diagnosis  POA    *COPD exacerbation [J44.1]  Yes    Debility [R53.81]  Yes    Major depression [F32.9]  Yes    Acute on chronic respiratory failure with hypoxia and hypercapnia [J96.21, J96.22]  Yes    Oxygen dependent [Z99.81]  Not Applicable    CAD (coronary artery disease) [I25.10]  Yes    Breast mass [N63.0]  Yes     Chronic     On CT report 7/2022      Centrilobular emphysema [J43.2]  Yes     Severe obstructive disease, on home O2.       Essential hypertension [I10]  Yes    Former smoker [Z87.891]  Not Applicable    Obstructive sleep apnea syndrome [G47.33]  Yes      Resolved Hospital Problems   No resolved problems to display.       Allergies:  Review of patient's allergies indicates:   Allergen Reactions    Codeine Nausea And Vomiting    Morphine Other (See Comments)       Diet: regular diet    Activities: Activity as tolerated    Goals of Care Treatment Preferences:  Code Status: Full Code    Health care agent: Mulugeta Phelan  [facility] agent number: [phone]    Living Will: Yes     What is most important right now is to focus on spending time at home, avoiding the hospital, remaining as independent as possible, symptom/pain control, improvement in condition but with limits to invasive therapies.  Accordingly, we have decided that the best plan to meet the patient's goals includes continuing with treatment.      Nursing: per facility protocol     Labs: per facility protocol    CONSULTS:    Physical Therapy to evaluate and treat. , Occupational Therapy to evaluate and treat., and  to evaluate for community  resources/long-range planning.    MISCELLANEOUS CARE:  Routine Skin for Bedridden Patients: Apply moisture barrier cream to all skin folds and wet areas in perineal area daily and after baths and all bowel movements.  Home O2 3L NC  BiPAP qhs and with naps 10/5    WOUND CARE ORDERS  None    Medications: Review discharge medications with patient and family and provide education.      Current Discharge Medication List        START taking these medications    Details   predniSONE (DELTASONE) 20 MG tablet Take 2 tablets (40 mg total) by mouth once daily. for 3 days  Qty: 6 tablet, Refills: 0           CONTINUE these medications which have CHANGED    Details   albuterol-ipratropium (DUO-NEB) 2.5 mg-0.5 mg/3 mL nebulizer solution Take 3 mLs by nebulization every 6 (six) hours as needed for Wheezing.  Qty: 75 mL, Refills: 0           CONTINUE these medications which have NOT CHANGED    Details   acyclovir (ZOVIRAX) 400 MG tablet TAKE 1 TABLET BY MOUTH THREE TIMES DAILY FOR 10 DAYS AS NEEDED FOR OUTBREAK      albuterol (PROVENTIL/VENTOLIN HFA) 90 mcg/actuation inhaler Inhale 2 puffs into the lungs every 6 (six) hours as needed.      alendronate (FOSAMAX) 35 MG tablet Take 35 mg by mouth every Tuesday.      aspirin 81 MG Chew Take 81 mg by mouth once daily.      azithromycin (Z-OLY) 250 MG tablet Take 1 tablet (250 mg total) by mouth 3 (three) times a week.  Qty: 12 tablet, Refills: 11      budesonide-glycopyr-formoterol (BREZTRI AEROSPHERE) 160-9-4.8 mcg/actuation HFAA Inhale 2 puffs into the lungs 2 (two) times daily.      citalopram (CELEXA) 10 MG tablet Take 10 mg by mouth once daily.      DALIRESP 500 mcg Tab Take 500 mcg by mouth once daily.      dextromethorphan-guaiFENesin  mg (MUCINEX DM)  mg per 12 hr tablet Take 1 tablet by mouth every 12 (twelve) hours.      dicyclomine (BENTYL) 20 mg tablet Take 1 tablet (20 mg total) by mouth 3 (three) times daily as needed (abdominal pain).  Qty: 30 tablet,  Refills: 1      ergocalciferol (ERGOCALCIFEROL) 50,000 unit Cap Take 50,000 Units by mouth every Tuesday.      fluticasone propionate (FLONASE) 50 mcg/actuation nasal spray 1 spray by Each Nostril route daily as needed for Allergies.      LORazepam (ATIVAN) 0.5 MG tablet Take 1 tablet (0.5 mg total) by mouth every 8 (eight) hours as needed for Anxiety.  Qty: 30 tablet, Refills: 0    Associated Diagnoses: COPD exacerbation      OXYGEN-AIR DELIVERY SYSTEMS MISC 3 L by Nasal route.      traZODone (DESYREL) 50 MG tablet Take 1 tablet (50 mg total) by mouth nightly as needed for Insomnia.  Qty: 30 tablet, Refills: 6                Immunizations Administered as of 7/6/2023       Name Date Dose VIS Date Route Exp Date    COVID-19, MRNA, LN-S, PF (Moderna) 4/6/2022 0.25 mL -- Intramuscular --    Site: Right arm     Lot: [id number]     COVID-19, MRNA, LN-S, PF (Moderna) 11/3/2021 0.25 mL -- Intramuscular --    Site: Right arm     Lot: [id number]     COVID-19, MRNA, LN-S, PF (Moderna) 3/25/2021 0.5 mL -- Intramuscular --    Site: Left arm     Lot: [id number]     COVID-19, MRNA, LN-S, PF (Moderna) 2/25/2021 0.5 mL -- Intramuscular --    Site: Left arm     Lot: [id number]             This patient has had both covid vaccinations    Some patients may experience side effects after vaccination.  These may include fever, headache, muscle or joint aches.  Most symptoms resolve with 24-48 hours and do not require urgent medical evaluation unless they persist for more than 72 hours or symptoms are concerning for an unrelated medical condition.          _________________________________  Tirso Valenzuela MD  07/06/2023

## 2023-07-06 NOTE — HOSPITAL COURSE
"Ms. Phelan presented with acute on chronic hypoxic and hypercapnic respiratory failure due to COPD exacerbation.  Initially evaluated in the ICU and placed on BiPAP; now transition to home 3 L nasal cannula.  Continuing home inhaler regimen and roflumilast.  She will continue BiPAP nightly and pulmonary rehab.  She is treated with steroids while in house; will finish short course at time of discharge.  She was evaluated by PT/OT who initially recommended inpatient rehabilitation; patient has progressed and now stable for home health at time of discharge.    BP (!) 145/66   Pulse 77   Temp 97.2 °F (36.2 °C)   Resp 18   Ht 5' 6" (1.676 m)   Wt 81.6 kg (180 lb)   SpO2 (!) 90%   Breastfeeding No   BMI 29.05 kg/m²   Physical Exam  Vitals and nursing note reviewed.   Constitutional:       General: She is not in acute distress.  HENT:      Head: Normocephalic and atraumatic.   Cardiovascular:      Rate and Rhythm: Normal rate. Rhythm irregular.      Heart sounds: No murmur heard.    No friction rub. No gallop.   Pulmonary:      Effort: Pulmonary effort is normal. No respiratory distress.      Breath sounds: No wheezing.   Abdominal:      General: Bowel sounds are normal. There is no distension.      Palpations: Abdomen is soft.      Tenderness: There is no abdominal tenderness.   Musculoskeletal:         General: No swelling or tenderness.      Cervical back: No rigidity or tenderness.      Right lower leg: No edema.      Left lower leg: No edema.   Skin:     General: Skin is warm.      Findings: No erythema or rash.   Neurological:      General: No focal deficit present.      Mental Status: She is alert and oriented to person, place, and time.      Motor: No weakness.      Gait: Gait normal.   Psychiatric:         Thought Content: Thought content normal.   "

## 2023-07-06 NOTE — PLAN OF CARE
KATIE staffed pt with supervisor Mitra. Supervisor Mitra spoke with pt and pts niece regarding pts dc home with HH. Pt has been made aware that she will dc home with Home Health. SW made aware from [facility] nurse that pt will be denied for IPR and SNF. Pt was explained barriers of why she will not be getting approved for SNF/IPR from insurance company. Pt verbally completed pts choice form to dc with [facility]. Pts choice form provided to Criss to place in chart.  Pt reports that her daughter will provide transport home. Pt made aware that her daughter must bring portable O2 tank to hospital upon dc.     SW spoke with pt and pts daughter via vidyo to discuss dc planning. Pts daughter made aware of stated above. Pts daughter declined shower chair/ttb. Pts daughter reports that she will order dme online.     SW will continue to follow pt throughout her transitions of care and assist with any dc needs.     [employer] Alert: YES response from [facility] re: Referral [id number] for patient in [facility] [id number] A: Yes, willing to accept patient PATIENT WILL NOW BE SEEN TOMORROW     SW requested PULM follow up.       Cleared from CM . Bedside Nurse and VN notified.    Future Appointments   Date Time Provider Department Center   7/17/2023 10:30 AM Bethany Landry MD [facility]   9/20/2023 10:30 AM Kev Cox MD [facility] IM [place]        07/06/23 1531   Final Note   Assessment Type Final Discharge Note   Anticipated Discharge Disposition [facility]   Hospital Resources/Appts/Education Provided Appointments scheduled by Navigator/Coordinator   Post-Acute Status   Discharge Delays None known at this time

## 2023-07-06 NOTE — PT/OT/SLP PROGRESS
Physical Therapy Treatment    Patient Name:  Terri Phelan   MRN:  [id number]    Recommendations:     Discharge Recommendations:  (high intensity therapy)  Discharge Equipment Recommendations: to be determined by next level of care (anticipate TTB)  Barriers to discharge:  decreased endurance/strength/ fall risk    Assessment:     Terri Phelan is a 67 y.o. female admitted with a medical diagnosis of COPD exacerbation.  She presents with the following impairments/functional limitations: weakness, impaired endurance, impaired self care skills, impaired functional mobility, gait instability, impaired balance, decreased upper extremity function, decreased lower extremity function, decreased coordination, impaired cardiopulmonary response to activity.  Pt would continue to benefit from P.T. To address impairments listed above.  .    Rehab Prognosis: Fair; patient would benefit from acute skilled PT services to address these deficits and reach maximum level of function.    Recent Surgery: * No surgery found *      Plan:     During this hospitalization, patient to be seen 6 x/week to address the identified rehab impairments via gait training, therapeutic activities, therapeutic exercises, neuromuscular re-education and progress toward the following goals:    Plan of Care Expires:  07/31/23    Subjective       Patient/Family Comments/goals: Pt agreed to tx    Pain/Comfort:  Pain Rating 1: 0/10  Pain Rating Post-Intervention 1: 0/10      Objective:     Communicated with RN prior to session.  Patient found sitting up in b/s chair with bed alarm, telemetry, peripheral IV upon PT entry to room.     General Precautions: Standard, fall  Orthopedic Precautions: N/A  Braces: N/A  Respiratory Status: Nasal cannula, flow 2 L/min     Functional Mobility:  Transfers:     Sit to Stand:  contact guard assistance with rolling walker and x 3 reps with vc's for hand placement  Bed to Chair: contact guard assistance and minimum  assistance with  rolling walker  using  Step Transfer  Gait: 20ft x 2 with RW and Isabelle/CGA with a standing rest between bouts.  After a seated rest, pt ambulated 32ft x 2 with RW and Isabelle/CGA.  Pt required standing rest and a seated rest secondary to decreased strength/endurance and mild SOB.  Pt was instructed in PLB bouts to assist with decreasing SOB.  Occasional Isabelle for RW management.  Balance: sitting good, standing fair/fair+ Rw, gait fair /fair- with RW      AM-PAC 6 CLICK MOBILITY  Turning over in bed (including adjusting bedclothes, sheets and blankets)?: 3  Sitting down on and standing up from a chair with arms (e.g., wheelchair, bedside commode, etc.): 3  Moving from lying on back to sitting on the side of the bed?: 3  Moving to and from a bed to a chair (including a wheelchair)?: 3  Need to walk in hospital room?: 3  Climbing 3-5 steps with a railing?: 1  Basic Mobility Total Score: 16       Treatment & Education:  Pt found sitting up in b/s chair on wall unit O2 @ 2lpm extended line upon entering the room.  Pt performed seated BLE therex: AP, LAQ, hip flexion 10 to 15 reps with brief rest breaks secondary to  strength/endurance and mild SOB. Gait training as above with occasional vc's for closer proximity to Rw, and for energy conservation techniques with gait secondary to pt becoming SOB with exertion.     Wall unit O2 @ 2lpm  O2 sats prior to activity 93%  After 1st bout of gait 89% and up to 91 with bouts of PLB  After 2nd bout of gait 85% and up to 91% with bouts of PLB and normal inhalation/exhalation.    Pt left up in b/s chair at end of tx.  Purewick disconnected prior to gait and reconnected at end of tx.      Patient left up in chair with all lines intact, call button in reach, chair alarm on, and RN notified..    GOALS:   Multidisciplinary Problems       Physical Therapy Goals          Problem: Physical Therapy    Goal Priority Disciplines Outcome Goal Variances Interventions    Physical Therapy Goal     PT, PT/OT Ongoing, Progressing     Description: Goals to be met by: 23     Patient will increase functional independence with mobility by performin. Supine to sit with Modified [place]  2. Sit to stand transfer with Supervision with AD if needed.   3. Gait  x 150 feet with Supervision using AD if needed.   4. Lower extremity exercise program x15 reps per handout, with supervision to increased tolerance to activities.                          Time Tracking:     PT Received On: 23  PT Start Time: 1215     PT Stop Time: 1239  PT Total Time (min): 24 min     Billable Minutes: Gait Training 14 and Therapeutic Activity 10    Treatment Type: Treatment  PT/PTA: PTA     Number of PTA visits since last PT visit: 2023

## 2023-07-06 NOTE — PLAN OF CARE
CM contacted to assist with difficult discussion of IPR denial.  Contacted Anne with [facility] who informed this CM of [employer]'s refusal to approve IPR or SNF level of care. Pt does not have rehab dx, is at OF, has no medical necessity to be under care of a rehab physician at this time.  Pt is medically stable for d/c per primary team.  Pt states she would like to dis-enroll from [facility] and will find a PCP.  Explained to pt IPR still requires [employer] approval.  Pt inquired about [place] [employer] benefits, informed this is listed as her secondary insurance.  Spoke with both pt and niece who states her needs are to have someone with her daily for  assistance with ADL's, supervision.  Discussed this is all considered FPC care which is not a covered benefit by insurance.  Discussed FPC care being met at private caregiver level vs FPC care at NH, long term medicaid criteria, admission process for NH as well as financial responsibilities.  Niece states pt is not interested in NH, we discussed private caregivers and informed CM team could provide resources.  Discussed home health level of care to include nursing assessment, medication management, therapy for home safety eval and ongoing treatment in hopes to optimize her mobility and help her return to outpt pulmonary rehab.  Pt is interested in returning to independent level of care but will need to adjust to her changing needs, home health services may be best to help her adjust to those changes as her COPD has caused limitations.  After long discussion, pt and niece(2 different conversations) verbalized understanding pt is medically ready for d/c and hospital can no longer assist with placement for post acute services not supported by insurance.  CM team will arrange approved level of care with  for d/c today.    Mitra Parra RN [facility]  Supervisor Case Management-[place]  [phone]

## 2023-07-06 NOTE — PLAN OF CARE
TN spoke with pt to answer questions about denial for IPR. Barriers discussed. Resources for in home caregivers given. IPR for potential placement from home for later added to AVS. Agreeable to [facility] for HH. Assigned SW to F/U. Reports daughter will  at [place].

## 2023-07-06 NOTE — PLAN OF CARE
Introduced as VN and will be reviewing discharge instructions.  Educated patient on reason for admission, home medication list, and discharge instructions including when to return to ED and the following doctor appointments.  Education per flowsheet.  Opportunity given for questions and questions answered. Nurse  notified of   completion of discharge education. Patient needs to get dressed and have IV/monitor removed. Then wheelchair can be requested

## 2023-07-06 NOTE — PLAN OF CARE
[place] - Telemetry      HOME HEALTH ORDERS  FACE TO FACE ENCOUNTER    Patient Name: Terri Phelan  YOB: 1955    PCP: Kev Cox MD   PCP Address: [address] [place] [address] / [place] [id number]  PCP Phone Number: [phone]  PCP Fax: [phone]    Encounter Date: 7/1/23    Admit to Home Health    Diagnoses:  Active Hospital Problems    Diagnosis  POA    *COPD exacerbation [J44.1]  Yes    Debility [R53.81]  Yes    Major depression [F32.9]  Yes    Acute on chronic respiratory failure with hypoxia and hypercapnia [J96.21, J96.22]  Yes    Oxygen dependent [Z99.81]  Not Applicable    CAD (coronary artery disease) [I25.10]  Yes    Breast mass [N63.0]  Yes     Chronic     On CT report 7/2022      Centrilobular emphysema [J43.2]  Yes     Severe obstructive disease, on home O2.       Essential hypertension [I10]  Yes    Former smoker [Z87.891]  Not Applicable    Obstructive sleep apnea syndrome [G47.33]  Yes      Resolved Hospital Problems   No resolved problems to display.       Follow Up Appointments:  Future Appointments   Date Time Provider Department Center   9/20/2023 10:30 AM Kev Cox MD [facility]       Allergies:  Review of patient's allergies indicates:   Allergen Reactions    Codeine Nausea And Vomiting    Morphine Other (See Comments)       Medications: Review discharge medications with patient and family and provide education.    Current Facility-Administered Medications   Medication Dose Route Frequency Provider Last Rate Last Admin    acetaminophen tablet 650 mg  650 mg Oral Q4H PRN Elliot Brambila MD   650 mg at 07/03/23 2004    albuterol inhaler 2 puff  2 puff Inhalation Q4H PRN Musa Kowalski MD        aspirin chewable tablet 81 mg  81 mg Oral Daily Elliot Brambila MD   81 mg at 07/06/23 0952    budesonide-glycopyr-formoterol 160-9-4.8 mcg/actuation HFAA 2 puff  2 puff Inhalation BID Elliot Brambila MD   2 puff at 07/05/23 2106    citalopram tablet 10 mg  10 mg  Oral Daily Elliot Brambila MD   10 mg at 07/06/23 0952    enoxaparin injection 40 mg  40 mg Subcutaneous Daily Alida Garza MD   40 mg at 07/05/23 1601    ergocalciferol capsule 50,000 Units  50,000 Units Oral Every Tues Elliot Brambila MD   50,000 Units at 07/04/23 1653    hydrALAZINE injection 10 mg  10 mg Intravenous Q6H PRN Elliot Brambila MD        LORazepam injection 0.5 mg  0.5 mg Intravenous Q8H PRN Elliot Brambila MD   0.5 mg at 07/04/23 1306    melatonin tablet 6 mg  6 mg Oral Nightly PRN Elliot Brambila MD        predniSONE tablet 40 mg  40 mg Oral Daily Tirso Valenzuela MD   40 mg at 07/06/23 0952    roflumilast (DALIRESP) tablet 500 mcg  500 mcg Oral Daily Elliot Brambila MD   500 mcg at 07/06/23 0952    sodium chloride 0.9% flush 3 mL  3 mL Intravenous PRN Elliot Brambila MD        traZODone tablet 50 mg  50 mg Oral Nightly PRN Elliot Brambila MD         Current Discharge Medication List        START taking these medications    Details   predniSONE (DELTASONE) 20 MG tablet Take 2 tablets (40 mg total) by mouth once daily. for 3 days  Qty: 6 tablet, Refills: 0           CONTINUE these medications which have CHANGED    Details   albuterol-ipratropium (DUO-NEB) 2.5 mg-0.5 mg/3 mL nebulizer solution Take 3 mLs by nebulization every 6 (six) hours as needed for Wheezing.  Qty: 75 mL, Refills: 0           CONTINUE these medications which have NOT CHANGED    Details   acyclovir (ZOVIRAX) 400 MG tablet TAKE 1 TABLET BY MOUTH THREE TIMES DAILY FOR 10 DAYS AS NEEDED FOR OUTBREAK      albuterol (PROVENTIL/VENTOLIN HFA) 90 mcg/actuation inhaler Inhale 2 puffs into the lungs every 6 (six) hours as needed.      alendronate (FOSAMAX) 35 MG tablet Take 35 mg by mouth every Tuesday.      aspirin 81 MG Chew Take 81 mg by mouth once daily.      azithromycin (Z-OLY) 250 MG tablet Take 1 tablet (250 mg total) by mouth 3 (three) times a week.  Qty: 12 tablet, Refills: 11      budesonide-glycopyr-formoterol (BREZTRI  AEROSPHERE) 160-9-4.8 mcg/actuation HFAA Inhale 2 puffs into the lungs 2 (two) times daily.      citalopram (CELEXA) 10 MG tablet Take 10 mg by mouth once daily.      DALIRESP 500 mcg Tab Take 500 mcg by mouth once daily.      dextromethorphan-guaiFENesin  mg (MUCINEX DM)  mg per 12 hr tablet Take 1 tablet by mouth every 12 (twelve) hours.      dicyclomine (BENTYL) 20 mg tablet Take 1 tablet (20 mg total) by mouth 3 (three) times daily as needed (abdominal pain).  Qty: 30 tablet, Refills: 1      ergocalciferol (ERGOCALCIFEROL) 50,000 unit Cap Take 50,000 Units by mouth every Tuesday.      fluticasone propionate (FLONASE) 50 mcg/actuation nasal spray 1 spray by Each Nostril route daily as needed for Allergies.      LORazepam (ATIVAN) 0.5 MG tablet Take 1 tablet (0.5 mg total) by mouth every 8 (eight) hours as needed for Anxiety.  Qty: 30 tablet, Refills: 0    Associated Diagnoses: COPD exacerbation      OXYGEN-AIR DELIVERY SYSTEMS MISC 3 L by Nasal route.      traZODone (DESYREL) 50 MG tablet Take 1 tablet (50 mg total) by mouth nightly as needed for Insomnia.  Qty: 30 tablet, Refills: 6               I have seen and examined this patient within the last 30 days. My clinical findings that support the need for the home health skilled services and home bound status are the following:no   Weakness/numbness causing balance and gait disturbance due to COPD Exacerbation making it taxing to leave home.     Diet:   regular diet    Labs:  N/a    Referrals/ Consults  Physical Therapy to evaluate and treat. Evaluate for home safety and equipment needs; Establish/upgrade home exercise program. Perform / instruct on therapeutic exercises, gait training, transfer training, and Range of Motion.  Occupational Therapy to evaluate and treat. Evaluate home environment for safety and equipment needs. Perform/Instruct on transfers, ADL training, ROM, and therapeutic exercises.   to evaluate for community  resources/long-range planning.  Aide to provide assistance with personal care, ADLs, and vital signs.    Activities:   activity as tolerated    Nursing:   Agency to admit patient within 24 hours of hospital discharge unless specified on physician order or at patient request    SN to complete comprehensive assessment including routine vital signs. Instruct on disease process and s/s of complications to report to MD. Review/verify medication list sent home with the patient at time of discharge  and instruct patient/caregiver as needed. Frequency may be adjusted depending on start of care date.     Skilled nurse to perform up to 3 visits PRN for symptoms related to diagnosis    Notify MD if SBP > 160 or < 90; DBP > 90 or < 50; HR > 120 or < 50; Temp > 101; O2 < 88%; Other:       Ok to schedule additional visits based on staff availability and patient request on consecutive days within the home health episode.    When multiple disciplines ordered:    Start of Care occurs on Sunday - Wednesday schedule remaining discipline evaluations as ordered on separate consecutive days following the start of care.    Thursday SOC -schedule subsequent evaluations Friday and Monday the following week.     Friday - Saturday SOC - schedule subsequent discipline evaluations on consecutive days starting Monday of the following week.    For all post-discharge communication and subsequent orders please contact patient's primary care physician. If unable to reach primary care physician or do not receive response within 30 minutes, please contact Bethany Landry for clinical staff order clarification    Miscellaneous   Routine Skin for Bedridden Patients: Instruct patient/caregiver to apply moisture barrier cream to all skin folds and wet areas in perineal area daily and after baths and all bowel movements.  Home Oxygen:  No change    Home Health Aide:  Nursing Three times weekly, Physical Therapy Three times weekly, Occupational Therapy  Three times weekly, Medical Social Work Weekly, Respiratory Therapy Daily, and Home Health Aide Daily    Wound Care Orders  no    I certify that this patient is confined to her home and needs intermittent skilled nursing care, physical therapy, and occupational therapy.

## 2023-07-07 ENCOUNTER — PATIENT OUTREACH (OUTPATIENT)
Dept: ADMINISTRATIVE | Facility: CLINIC | Age: 68
End: 2023-07-07
Payer: MEDICARE

## 2023-07-07 DIAGNOSIS — J44.1 COPD EXACERBATION: ICD-10-CM

## 2023-07-07 RX ORDER — LORAZEPAM 0.5 MG/1
0.5 TABLET ORAL EVERY 8 HOURS PRN
Qty: 30 TABLET | Refills: 0 | Status: SHIPPED | OUTPATIENT
Start: 2023-07-07 | End: 2023-07-28 | Stop reason: SDUPTHER

## 2023-07-07 NOTE — TELEPHONE ENCOUNTER
----- Message from Mariah Jefferson sent at 7/7/2023  9:06 AM CDT -----  Contact: [phone]  Requesting an RX refill or new RX.  Is this a refill or new RX: Refill   RX name and strength (LORazepam (ATIVAN) 0.5 MG tablet):    Is this a 30 day or 90 day RX: 30  Pharmacy name and phone # (  [id number] - [place]  [facility] AT [facility] & [address]  [place] [address]  Phone: [phone] Fax: [phone]     Patients state that she need the medication for today. Patient is out of the medication.

## 2023-07-07 NOTE — TELEPHONE ENCOUNTER
No care due was identified.  Health [facility] Embedded Care Due Messages. Reference number: [id number].   7/07/2023 9:40:01 AM CDT

## 2023-07-07 NOTE — PROGRESS NOTES
C3 nurse spoke with Terri Phelan  for a TCC post hospital discharge follow up call. The patient has a scheduled HOSFU appointment with Bethany Landry on 7/17/23 @ 1030.

## 2023-07-09 ENCOUNTER — HOSPITAL ENCOUNTER (EMERGENCY)
Facility: HOSPITAL | Age: 68
Discharge: HOME | End: 2023-07-09
Attending: EMERGENCY MEDICINE
Payer: MEDICARE

## 2023-07-09 ENCOUNTER — NURSE TRIAGE (OUTPATIENT)
Dept: ADMINISTRATIVE | Facility: CLINIC | Age: 68
End: 2023-07-09
Payer: MEDICARE

## 2023-07-09 VITALS
HEART RATE: 68 BPM | RESPIRATION RATE: 25 BRPM | DIASTOLIC BLOOD PRESSURE: 64 MMHG | BODY MASS INDEX: 30.53 KG/M2 | HEIGHT: 66 IN | SYSTOLIC BLOOD PRESSURE: 137 MMHG | OXYGEN SATURATION: 98 % | TEMPERATURE: 98 F | WEIGHT: 190 LBS

## 2023-07-09 DIAGNOSIS — R60.0 PERIPHERAL EDEMA: ICD-10-CM

## 2023-07-09 LAB
ALBUMIN SERPL BCP-MCNC: 3.6 G/DL (ref 3.5–5.2)
ALP SERPL-CCNC: 52 U/L (ref 55–135)
ALT SERPL W/O P-5'-P-CCNC: 27 U/L (ref 10–44)
ANION GAP SERPL CALC-SCNC: 10 MMOL/L (ref 8–16)
AST SERPL-CCNC: 21 U/L (ref 10–40)
BASOPHILS # BLD AUTO: 0.02 K/UL (ref 0–0.2)
BASOPHILS NFR BLD: 0.2 % (ref 0–1.9)
BILIRUB SERPL-MCNC: 0.2 MG/DL (ref 0.1–1)
BNP SERPL-MCNC: 43 PG/ML (ref 0–99)
BUN SERPL-MCNC: 19 MG/DL (ref 8–23)
CALCIUM SERPL-MCNC: 9 MG/DL (ref 8.7–10.5)
CHLORIDE SERPL-SCNC: 99 MMOL/L (ref 95–110)
CO2 SERPL-SCNC: 34 MMOL/L (ref 23–29)
CREAT SERPL-MCNC: 0.8 MG/DL (ref 0.5–1.4)
D DIMER PPP IA.FEU-MCNC: 0.76 MG/L FEU
DIFFERENTIAL METHOD: ABNORMAL
EOSINOPHIL # BLD AUTO: 0.2 K/UL (ref 0–0.5)
EOSINOPHIL NFR BLD: 1.8 % (ref 0–8)
ERYTHROCYTE [DISTWIDTH] IN BLOOD BY AUTOMATED COUNT: 14.4 % (ref 11.5–14.5)
EST. GFR  (NO RACE VARIABLE): >60 ML/MIN/1.73 M^2
GLUCOSE SERPL-MCNC: 88 MG/DL (ref 70–110)
HCT VFR BLD AUTO: 40 % (ref 37–48.5)
HGB BLD-MCNC: 12.1 G/DL (ref 12–16)
IMM GRANULOCYTES # BLD AUTO: 0.08 K/UL (ref 0–0.04)
IMM GRANULOCYTES NFR BLD AUTO: 0.6 % (ref 0–0.5)
LYMPHOCYTES # BLD AUTO: 3.8 K/UL (ref 1–4.8)
LYMPHOCYTES NFR BLD: 29.4 % (ref 18–48)
MCH RBC QN AUTO: 27.8 PG (ref 27–31)
MCHC RBC AUTO-ENTMCNC: 30.3 G/DL (ref 32–36)
MCV RBC AUTO: 92 FL (ref 82–98)
MONOCYTES # BLD AUTO: 1.4 K/UL (ref 0.3–1)
MONOCYTES NFR BLD: 11.1 % (ref 4–15)
NEUTROPHILS # BLD AUTO: 7.4 K/UL (ref 1.8–7.7)
NEUTROPHILS NFR BLD: 56.9 % (ref 38–73)
NRBC BLD-RTO: 0 /100 WBC
PLATELET # BLD AUTO: 181 K/UL (ref 150–450)
PMV BLD AUTO: 11.4 FL (ref 9.2–12.9)
POTASSIUM SERPL-SCNC: 4.2 MMOL/L (ref 3.5–5.1)
PROT SERPL-MCNC: 6.7 G/DL (ref 6–8.4)
RBC # BLD AUTO: 4.36 M/UL (ref 4–5.4)
SODIUM SERPL-SCNC: 143 MMOL/L (ref 136–145)
TROPONIN I SERPL DL<=0.01 NG/ML-MCNC: <0.006 NG/ML (ref 0–0.03)
WBC # BLD AUTO: 12.92 K/UL (ref 3.9–12.7)

## 2023-07-09 PROCEDURE — 84484 ASSAY OF TROPONIN QUANT: CPT | Performed by: EMERGENCY MEDICINE

## 2023-07-09 PROCEDURE — 85379 FIBRIN DEGRADATION QUANT: CPT | Performed by: EMERGENCY MEDICINE

## 2023-07-09 PROCEDURE — 83880 ASSAY OF NATRIURETIC PEPTIDE: CPT | Performed by: EMERGENCY MEDICINE

## 2023-07-09 PROCEDURE — 93010 EKG 12-LEAD: ICD-10-PCS | Mod: ,,, | Performed by: INTERNAL MEDICINE

## 2023-07-09 PROCEDURE — 63600175 PHARM REV CODE 636 W HCPCS: Performed by: EMERGENCY MEDICINE

## 2023-07-09 PROCEDURE — 96374 THER/PROPH/DIAG INJ IV PUSH: CPT | Mod: 59

## 2023-07-09 PROCEDURE — 25500020 PHARM REV CODE 255: Performed by: EMERGENCY MEDICINE

## 2023-07-09 PROCEDURE — 93005 ELECTROCARDIOGRAM TRACING: CPT

## 2023-07-09 PROCEDURE — 99285 EMERGENCY DEPT VISIT HI MDM: CPT | Mod: 25

## 2023-07-09 PROCEDURE — 93010 ELECTROCARDIOGRAM REPORT: CPT | Mod: ,,, | Performed by: INTERNAL MEDICINE

## 2023-07-09 PROCEDURE — 96375 TX/PRO/DX INJ NEW DRUG ADDON: CPT

## 2023-07-09 PROCEDURE — 85025 COMPLETE CBC W/AUTO DIFF WBC: CPT | Performed by: EMERGENCY MEDICINE

## 2023-07-09 PROCEDURE — 80053 COMPREHEN METABOLIC PANEL: CPT | Performed by: EMERGENCY MEDICINE

## 2023-07-09 RX ORDER — LORAZEPAM 2 MG/ML
0.5 INJECTION INTRAMUSCULAR
Status: COMPLETED | OUTPATIENT
Start: 2023-07-09 | End: 2023-07-09

## 2023-07-09 RX ORDER — ACETAMINOPHEN 500 MG
500 TABLET ORAL EVERY 6 HOURS PRN
Status: ON HOLD | COMMUNITY
End: 2023-12-20 | Stop reason: HOSPADM

## 2023-07-09 RX ORDER — FUROSEMIDE 20 MG/1
20 TABLET ORAL DAILY PRN
Qty: 30 TABLET | Refills: 0 | Status: SHIPPED | OUTPATIENT
Start: 2023-07-09 | End: 2023-08-07

## 2023-07-09 RX ORDER — CALCIUM CITRATE/VITAMIN D3 200MG-6.25
2 TABLET ORAL DAILY
Status: ON HOLD | COMMUNITY
End: 2023-12-29 | Stop reason: HOSPADM

## 2023-07-09 RX ORDER — FUROSEMIDE 10 MG/ML
40 INJECTION INTRAMUSCULAR; INTRAVENOUS
Status: COMPLETED | OUTPATIENT
Start: 2023-07-09 | End: 2023-07-09

## 2023-07-09 RX ORDER — ROFLUMILAST 500 UG/1
500 TABLET ORAL DAILY
COMMUNITY
Start: 2023-06-23

## 2023-07-09 RX ADMIN — FUROSEMIDE 40 MG: 10 INJECTION, SOLUTION INTRAMUSCULAR; INTRAVENOUS at 05:07

## 2023-07-09 RX ADMIN — IOHEXOL 100 ML: 350 INJECTION, SOLUTION INTRAVENOUS at 06:07

## 2023-07-09 RX ADMIN — LORAZEPAM 0.5 MG: 2 INJECTION INTRAMUSCULAR; INTRAVENOUS at 06:07

## 2023-07-09 NOTE — TELEPHONE ENCOUNTER
Pt called in stating she was d/c 7/6. States had a purewick in the hospital. States she thinks the purewick  cut her/ caused a sore in the vaginal area. Noticed bleeding after discharge. Using aquafor to area- not helping. States she has occasional oozing/ notices blood when she wipes. No burning with urination.    Reports b/l LE swelling. States had some swelling while in the hospital but slightly worse since d/c. Right >left. New PCP has not seen pt with this complaint. Has had to use fluid pills briefly in the past. Pt would like NT to call hospital medicine because she does not want to go back in for this- asking for RX for fluid pill. Advised would reach out to [facility] med OCP.     Dr. Valenzuela with [facility] called - spoke with him about pt and swelling concerns and her request to have meds called in. Advised by provider that he did not recall pt having any significant swelling to LE and that he thinks pt should be evaluated in person for swelling.    Pt called back. Advised per Dr. Valenzuela to go to ED/ UC for eval. Advised ED may be better as there is uncertainty that UC would have US capability should it be needed to investigate the R>L swelling. Pt states she understands and will get ready to go in for eval.       Reason for Disposition   Small cut (scratch) or abrasion (scrape) is also present   [1] Thigh, calf, or ankle swelling AND [2] bilateral AND [3] 1 side is more swollen    Additional Information   Negative: [1] Major bleeding (e.g., actively dripping or spurting) AND [2] can't be stopped   Negative: Shock suspected (e.g., cold/pale/clammy skin, too weak to stand, low BP, rapid pulse)   Negative: Sounds like a life-threatening emergency to the triager   Negative: [1] MODERATE-SEVERE pain AND [2] lasts > 1 hour   Negative: Bleeding from inside the vagina  (Exception: Bleeding is definitely from menstrual period.)   Negative: Bleeding from inside the rectum   Negative: Skin is split  open or gaping (or length > 1/2 inch or 12 mm)   Negative: [1] Bleeding from wound AND [2] won't stop after 10 minutes of direct pressure (using correct technique)   Negative: Can't urinate or difficulty passing urine   Negative: Blood in urine (red, pink, or tea-colored)   Negative: [1] Abdominal pain AND [2] after rectal or vaginal penetration (e.g., penis, foreign body)   Negative: Sounds like a serious injury to the triager   Negative: Pain or burning with passing urine (urination)   Negative: Patient is confused or is an unreliable provider of information (e.g., dementia, severe intellectual disability, alcohol intoxication)   Negative: [1] MILD pain AND [2] lasts > 1 day   Negative: Large bruise or swelling > 2 inches (5 cm)   Negative: Suspicious history for the injury   Negative: [1] No prior tetanus shots (or is not fully vaccinated) AND [2] any wound (e.g., cut, scrape)   Negative: [1] HIV positive or severe immunodeficiency (severely weak immune system) AND [2] DIRTY cut or scrape   Negative: [1] Last tetanus shot > 5 years ago AND [2] any cut or scrape   Negative: [1] Bruising AND [2] lasts > 7 days   Negative: SEVERE difficulty breathing (e.g., struggling for each breath, speaks in single words)   Negative: Looks like a broken bone or dislocated joint (e.g., crooked or deformed)   Negative: Difficulty breathing at rest   Negative: Entire foot is cool or blue in comparison to other side   Negative: [1] Can't walk or can barely walk AND [2] new-onset   Negative: [1] Difficulty breathing with exertion (e.g., walking) AND [2] new-onset or worsening   Negative: [1] Red area or streak AND [2] fever   Negative: [1] Swelling is painful to touch AND [2] fever   Negative: [1] Cast on leg or ankle AND [2] now increased pain   Negative: Patient sounds very sick or weak to the triager   Negative: SEVERE leg swelling (e.g., swelling extends above knee, entire leg is swollen, weeping fluid)   Negative: [1] Red area or  streak [2] large (> 2 in. or 5 cm)   Negative: [1] Thigh or calf pain AND [2] only 1 side AND [3] present > 1 hour   Negative: [1] Thigh, calf, or ankle swelling AND [2] only 1 side   Negative: Sounds like a life-threatening emergency to the triager    Protocols used: Genital Injury - Female-A-AH, Leg Swelling and Edema-A-AH

## 2023-07-09 NOTE — ED NOTES
Pt in CT requesting rescue inhaler and stating she is claustrophobic. Pt tachypnic. MD informed. Orders to follow

## 2023-07-09 NOTE — ED PROVIDER NOTES
Encounter Date: 2023       History     Chief Complaint   Patient presents with    Leg Swelling     Swelling to both legs X7 days. Has been present since being discharged from her last stay. Denies pain and discoloration to the extremities.       The patient is a 67-year-old female who came to the emergency department with bilateral lower extremity swelling and pain in her perineum.  The patient states she was in the hospital from  through  for COPD exacerbation.  She states she was in the ICU for 4 days.  The patient states she had edema to her legs which decreased some while she was in the hospital but has never gone completely down.  She states now the edema is mildly worse and is worse in the right leg than the left.  She states the pain in her perineum is from the pure wick.  She denies any chest pain or shortness of breath.  She has no pain to the popliteal region of her legs.    Review of patient's allergies indicates:   Allergen Reactions    Codeine Nausea And Vomiting    Morphine Other (See Comments)     Past Medical History:   Diagnosis Date    COPD (chronic obstructive pulmonary disease)     Hypertension      Past Surgical History:   Procedure Laterality Date     SECTION      HERNIA REPAIR      HYSTERECTOMY       History reviewed. No pertinent family history.  Social History     Tobacco Use    Smoking status: Former     Packs/day: 1.00     Years: 20.00     Pack years: 20.00     Types: Cigarettes    Smokeless tobacco: Never   Substance Use Topics    Alcohol use: Not Currently     Alcohol/week: 1.0 standard drink     Types: 1 Glasses of wine per week    Drug use: No     Review of Systems   Constitutional:  Positive for activity change. Negative for appetite change, fatigue and fever.   HENT:  Negative for sore throat.    Respiratory:  Negative for shortness of breath.    Cardiovascular:  Positive for leg swelling. Negative for chest pain.   Gastrointestinal:  Negative for nausea.    Genitourinary:  Positive for dysuria and genital sores. Negative for difficulty urinating, frequency and pelvic pain.   Musculoskeletal:  Negative for back pain.   Skin:  Negative for rash.   Neurological:  Negative for dizziness, weakness, light-headedness and headaches.   Hematological:  Does not bruise/bleed easily.     Physical Exam     Initial Vitals [07/09/23 1252]   BP Pulse Resp Temp SpO2   132/60 76 18 98.4 °F (36.9 °C) 95 %      MAP       --         Physical Exam    Nursing note and vitals reviewed.  Constitutional: She appears well-developed and well-nourished.   HENT:   Head: Normocephalic and atraumatic.   Neck: Neck supple.   Normal range of motion.  Pulmonary/Chest: Breath sounds normal.   Abdominal: Abdomen is soft.   Genitourinary:    No vaginal discharge (No vaginal discharge, no vaginal bleeding).      Genitourinary Comments: There is a small tender area of swelling to the prepuce above the clitoris with a small pinhole area of drainage. No exudative drainage.     Musculoskeletal:         General: Edema (1+ pitting edema to her left lower leg and foot, 2+ pitting edema to her right lower leg and right foot.  No popliteal tenderness.) present.      Cervical back: Normal range of motion and neck supple.     Neurological: She is alert and oriented to person, place, and time.   Skin: Skin is warm and dry.   Psychiatric: She has a normal mood and affect. Her behavior is normal. Judgment and thought content normal.       ED Course   Procedures  Labs Reviewed   CBC W/ AUTO DIFFERENTIAL - Abnormal; Notable for the following components:       Result Value    WBC 12.92 (*)     MCHC 30.3 (*)     Immature Granulocytes 0.6 (*)     Immature Grans (Abs) 0.08 (*)     Mono # 1.4 (*)     All other components within normal limits   COMPREHENSIVE METABOLIC PANEL - Abnormal; Notable for the following components:    CO2 34 (*)     Alkaline Phosphatase 52 (*)     All other components within normal limits   D DIMER,  QUANTITATIVE - Abnormal; Notable for the following components:    D-Dimer 0.76 (*)     All other components within normal limits   TROPONIN I   B-TYPE NATRIURETIC PEPTIDE     EKG Readings: (Independently Interpreted)   Initial: 1516. Rhythm: Normal Sinus Rhythm. Heart Rate: 69. Ectopy: No Ectopy. Conduction: Normal. ST Segments: Normal ST Segments. T Waves: Normal. Clinical Impression: Normal Sinus Rhythm     Imaging Results              CTA Chest Non-Coronary (PE Studies) (Final result)  Result time 07/09/23 18:53:36      Final result by Wilma Garcia MD (07/09/23 18:53:36)                   Impression:      No evidence of pulmonary thromboemboli or significant acute lung interstitial airspace disease.    Emphysematous changes in the upper lungs.    No cardiomegaly or right ventricular strain.    Please see above for incidental additional nonacute findings.      Electronically signed by: Wilma Garcia  Date:    07/09/2023  Time:    18:53               Narrative:    EXAMINATION:  CTA CHEST NON CORONARY (PE STUDIES)    CLINICAL HISTORY:  Pulmonary embolism (PE) suspected, positive D-dimer;    TECHNIQUE:  Low dose axial images, sagittal and coronal reformations were obtained from the thoracic inlet to the lung bases following the IV administration of 100 mL of Omnipaque 350.    COMPARISON:  None    FINDINGS:  Pulmonary vasculature: There is no evidence of pulmonary thromboemboli.  Pulmonary arteries distribute normally and are normal in caliber.  There are four pulmonary veins.    Chio/Mediastinum: No pathologic yariel enlargement.    Airways: Patent.    Lungs/Pleura: Centrilobular emphysematous changes are noted in the lung apices most predominantly.  No nodules or pleural or pericardial effusion or thickening seen.  Lung base subsegmental atelectasis or scarring greater on the left is also noted.  No suspicious nodules, consolidation is seen.  Clear lungs. No pleural effusion or thickening.    Aorta:  Left-sided aortic arch.  There is no aneurysm or dissection.  Mild to moderate aortic atherosclerosis noted.    Heart: There is no right ventricular strain or cardiomegaly.  There is no pericardial effusion..    Base of Neck: No significant abnormality.    Thoracic soft tissues: Unremarkable.    Esophagus: Unremarkable.    Upper Abdomen: No abnormality of the partially imaged upper abdomen.    Bones: No acute fracture. No suspicious lytic or sclerotic lesions.  Spondylosis of the spine without suspicious lesions noted.                                       US Lower Extremity Veins Bilateral (Final result)  Result time 07/09/23 16:33:32      Final result by Arnaud Cunningham DO (07/09/23 16:33:32)                   Impression:      No evidence of deep venous thrombosis in either lower extremity.      Electronically signed by: Arnaud Cunningham  Date:    07/09/2023  Time:    16:33               Narrative:    EXAMINATION:  US LOWER EXTREMITY VEINS BILATERAL    CLINICAL HISTORY:  leg swelling;    TECHNIQUE:  Duplex and color flow Doppler and dynamic compression was performed of the bilateral lower extremity veins was performed.    COMPARISON:  None    FINDINGS:  Right thigh veins: The common femoral, femoral, popliteal, upper greater saphenous, and deep femoral veins are patent and free of thrombus. The veins are normally compressible and have normal phasic flow and augmentation response.    Right calf veins: The visualized calf veins are patent.    Left thigh veins: The common femoral, femoral, popliteal, upper greater saphenous, and deep femoral veins are patent and free of thrombus. The veins are normally compressible and have normal phasic flow and augmentation response.    Left calf veins: The visualized calf veins are patent.    Miscellaneous: There is soft tissue edema in the right calf, somewhat limiting evaluation.                                       X-Ray Chest AP Portable (Final result)  Result time 07/09/23  15:40:11      Final result by Simran Horne MD (07/09/23 15:40:11)                   Impression:      As above.      Electronically signed by: Simran Horne MD  Date:    07/09/2023  Time:    15:40               Narrative:    EXAMINATION:  XR CHEST AP PORTABLE    CLINICAL HISTORY:  peripheral edema;    TECHNIQUE:  Single frontal view of the chest was performed.    COMPARISON:  07/01/2023    FINDINGS:  Small left pleural effusion.  Chronic lung changes are seen.  No consolidation is identified.  Heart is normal in size.  Calcified atheromatous disease affects the aorta.  Age-appropriate degenerative changes affect the skeleton.                                    X-Rays:   Independently Interpreted Readings:   Other Readings:  Chest x-ray shows no infiltrates or effusions  Medications   furosemide injection 40 mg (40 mg Intravenous Given 7/9/23 1720)   iohexoL (OMNIPAQUE 350) injection 100 mL (100 mLs Intravenous Given 7/9/23 1821)   LORazepam injection 0.5 mg (0.5 mg Intravenous Given 7/9/23 1815)     Medical Decision Making:   Differential Diagnosis:   Chronic peripheral edema, CHF, DVT  Clinical Tests:   Lab Tests: Ordered  Radiological Study: Ordered  Medical Tests: Ordered  ED Management:  The patient is a 67-year-old female who has COPD and was recently in the hospital for 6 days for COPD exacerbation.  She had papilledema during her hospital stay which have persisted upon discharge.  She also has a lesion to her perineum that is painful especially with urination.  She states this lesion appeared after the pure wick was removed.  It is possible that it is a pressure sore from the PureWick being in place.    The patient has a negative DVT workup.  She had positive D-dimer and CTA of the chest.  This is negative for a pulmonary embolus.  The patient is feeling much better and after the Lasix 40 mg IV she has no more a to her left leg and 1+ pitting edema to her right lower extremity.  At this time I will  discharge the patient, her potassium was 4.2.  I recommend that she takes her benzodiazepine as needed for any cramping and eat bananas when she is taking the Lasix.  She also needs to drink lots of water and apply compression stockings to her lower legs.           ED Course as of 07/09/23 1944   Sun Jul 09, 2023   1801 D-dimer is positive.  CTA ordered.  The patient was given Lasix 40 mg IV for the peripheral edema [ST]      ED Course User Index  [ST] Vivian Cunningham MD                 Clinical Impression:   Final diagnoses:  [R60.9] Peripheral edema        ED Disposition Condition    Discharge Stable          ED Prescriptions       Medication Sig Dispense Start Date End Date Auth. Provider    furosemide (LASIX) 20 MG tablet Take 1 tablet (20 mg total) by mouth daily as needed (leg swelling). 30 tablet 7/9/2023 7/8/2024 Vivian Cunningham MD          Follow-up Information       Follow up With Specialties Details Why Contact Info    Kev Cox MD Family Medicine   [address] [facility] [id number]  [phone]               Vivian Cunningham MD  07/09/23 1944

## 2023-07-09 NOTE — ED NOTES
Present to ED with BLE swelling x 4 days. Reports being d/c form hospital on 7/4/23 leg were swollen prior to d/c but has gotten increasingly worse.  Pt live with SOB. Wear 3 L O2 at home. Denies chest pain or abd pain. Speaking in short sentence. Noted swelling to BLE. No distress noted

## 2023-07-09 NOTE — PHARMACY MED REC
"Admission Medication History     The home medication history was taken by Irene Fischer CPhT.    Medication history obtained from, Patient Verified    You may go to "Admission" then "Reconcile Home Medications" tabs to review and/or act upon these items.     The home medication list has been updated by the Pharmacy department.   Please read ALL comments highlighted in yellow.   Please address this information as you see fit.    Feel free to contact us if you have any questions or require assistance.      The medications listed below were removed from the home medication list.  Please reorder if appropriate:  Patient reports no longer taking the following medication(s):  Acyclovir 400 mg  Citalopram 10 mg      Irene Fischer CPhT.  Ext [phone]               .        "

## 2023-07-10 NOTE — TELEPHONE ENCOUNTER
I called pt and she states that she went to ER yesterday and was seen for the vaginal issue and some swelling. She said it isn't bothering her today but she will call if she has anymore issues

## 2023-07-11 ENCOUNTER — TELEPHONE (OUTPATIENT)
Dept: ADMINISTRATIVE | Facility: CLINIC | Age: 68
End: 2023-07-11
Payer: MEDICARE

## 2023-07-11 NOTE — TELEPHONE ENCOUNTER
1st attempt to contact Terri Phelan after patient selected option 5 on PD tracker requesting hospital follow-up appointment assistance. No answer. Left voicemail that C3 nurse will attempt another outreach and advised patient to contact [facility] for any immediate assistance.

## 2023-07-11 NOTE — TELEPHONE ENCOUNTER
"C3 hnurse successfully contacted and spoke with Terri Phelan after patient had selected option 5 on PD tracker requesting hospital follow up appointment assistance. The patient expresses concerns that she "isn't tech savvy," and is having difficulties finding her upcoming appointments within the portal system. C3 nurse was able to provide the patient with all of her upcoming appointment information as requested. The patient denied any further needs or concerns at this time, advised to contact [facility] for any immediate assistance.   "

## 2023-07-17 ENCOUNTER — SSC ENCOUNTER (OUTPATIENT)
Dept: ADMINISTRATIVE | Facility: OTHER | Age: 68
End: 2023-07-17
Payer: MEDICARE

## 2023-07-17 ENCOUNTER — OFFICE VISIT (OUTPATIENT)
Dept: PRIMARY CARE CLINIC | Facility: CLINIC | Age: 68
End: 2023-07-17
Payer: MEDICARE

## 2023-07-17 DIAGNOSIS — J96.02 ACUTE HYPERCAPNIC RESPIRATORY FAILURE: Primary | ICD-10-CM

## 2023-07-17 DIAGNOSIS — Z99.81 OXYGEN DEPENDENT: ICD-10-CM

## 2023-07-17 DIAGNOSIS — J44.1 COPD EXACERBATION: ICD-10-CM

## 2023-07-17 PROCEDURE — G0180 PR HOME HEALTH MD CERTIFICATION: ICD-10-PCS | Mod: ,,, | Performed by: HOSPITALIST

## 2023-07-17 PROCEDURE — 1111F DSCHRG MED/CURRENT MED MERGE: CPT | Mod: CPTII,95,, | Performed by: INTERNAL MEDICINE

## 2023-07-17 PROCEDURE — 1111F PR DISCHARGE MEDS RECONCILED W/ CURRENT OUTPATIENT MED LIST: ICD-10-PCS | Mod: CPTII,95,, | Performed by: INTERNAL MEDICINE

## 2023-07-17 PROCEDURE — G0180 MD CERTIFICATION HHA PATIENT: HCPCS | Mod: ,,, | Performed by: HOSPITALIST

## 2023-07-17 PROCEDURE — 3044F HG A1C LEVEL LT 7.0%: CPT | Mod: CPTII,95,, | Performed by: INTERNAL MEDICINE

## 2023-07-17 PROCEDURE — 99214 PR OFFICE/OUTPT VISIT, EST, LEVL IV, 30-39 MIN: ICD-10-PCS | Mod: 95,,, | Performed by: INTERNAL MEDICINE

## 2023-07-17 PROCEDURE — 99214 OFFICE O/P EST MOD 30 MIN: CPT | Mod: 95,,, | Performed by: INTERNAL MEDICINE

## 2023-07-17 PROCEDURE — 3044F PR MOST RECENT HEMOGLOBIN A1C LEVEL <7.0%: ICD-10-PCS | Mod: CPTII,95,, | Performed by: INTERNAL MEDICINE

## 2023-07-17 NOTE — PROGRESS NOTES
Referral for case management received from [facility]  via the [facility] email box/[employer] secure email portal. Patient is being enrolled to Outpatient Case Management. Please see the information below:    7/6 Shortness of breath - ICD10  [facility] coming next week, virtual f/u with Dr Landry/Hospital f/u 7/18  # [phone]    Provider Name LORENA SPIVEY MD saw at the end of June next appt 9/20 #  [phone] .  [address] [place], [place] [address] Primary Care Physician Number/Center Number [id number] Current Effective Date 07/01/2023    [facility] to complete task, #[phone], member reached. Is requesting CM. States she needs transportation RCA; COPD, O2 24/7, depression, anxiety, positive BH score. Provided [facility] [phone]. Feels all alone needs the support of a CM. Is established with [facility] Spivey. Offered/ordered [employer], set to delived 7/19.    Please contact [facility] with any questions (ext. [phone]).    Thank you,    Christy Esquivel, [facility]  Outpatient Case Mg[facility]nt  [phone]

## 2023-07-17 NOTE — PROGRESS NOTES
[facility]   New Visit Progress Note   Recent Hospital Discharge     PRESENTING HISTORY     Chief Complaint/Reason for Admission:  Follow up Hospital Discharge   PCP: Kev Cox MD    History of Present Illness:  Ms. Terri Phelan is a 67 y.o. female who was recently admitted to the hospital.    [facility] Medicine  Discharge Summary        Patient Name: Terri hPelan  MRN: [id number]  SUNDEEP: [id number]  Patient Class: IP- Inpatient  Admission Date: 7/1/2023  Hospital Length of Stay: 4 days  Discharge Date and Time:  07/06/2023 3:49 PM  Attending Physician: Tirso Valenzuela, *   Discharging Provider: Tirso Valenzuela MD  Primary Care Provider: Kev Cox MD  ___________________________________________________________________    Today:  Presents to [facility] for initial hospital follow up.  Recently hospitalized for management of COPD exacerbation.  Admitted to [facility] Medicine service.  Required ICU level of care with continuous Bipap for stabilization.   Nebulizers and steroids administered.  Patient responded well to above interventions and supportive care.  Weaned to 3 L NC which is her baseline.  Discharged to home with [facility]-> skilled nursing, PT,OT.     Patient seen today via telemedicine.  Reports compliance with home inhalers and oral medications.  Has home BiPap ([place] is provider) which she uses nightly and prn.  Wearing NC 3 L and is comfortable.  Was attending Pulmonary Rehab at [facility] prior to hospitalization and she would like to return.   I explained she will be receiving Home Health unil ~ 9/14/23 and can't have both home health and outpatient pulmonary rehab at same time. I recommended she complete home health then resume pulmonary rehab if her Pulmonary team is in agreement with this plan.      Review of Systems  General ROS: negative for chills, fever or weight loss  Psychological ROS: negative for hallucination,  depression or suicidal ideation  Ophthalmic ROS: negative for blurry vision, photophobia or eye pain  ENT ROS: negative for epistaxis, sore throat or rhinorrhea  Respiratory ROS: no cough, shortness of breath, or wheezing  Cardiovascular ROS: no chest pain or dyspnea on exertion  Gastrointestinal ROS: no abdominal pain, change in bowel habits, or black/ bloody stools  Genito-Urinary ROS: no dysuria, trouble voiding, or hematuria  Musculoskeletal ROS: negative for gait disturbance or muscular weakness  Neurological ROS: no syncope or seizures; no ataxia  Dermatological ROS: negative for pruritis, rash and jaundice      PAST HISTORY:     Past Medical History:   Diagnosis Date    COPD (chronic obstructive pulmonary disease)     Hypertension        Past Surgical History:   Procedure Laterality Date     SECTION      HERNIA REPAIR      HYSTERECTOMY         No family history on file.      MEDICATIONS & ALLERGIES:     Current Outpatient Medications on File Prior to Visit   Medication Sig Dispense Refill    acetaminophen (TYLENOL) 500 MG tablet Take 500 mg by mouth every 6 (six) hours as needed for Pain.      albuterol (PROVENTIL/VENTOLIN HFA) 90 mcg/actuation inhaler Inhale 2 puffs into the lungs every 6 (six) hours as needed.      albuterol-ipratropium (DUO-NEB) 2.5 mg-0.5 mg/3 mL nebulizer solution Take 3 mLs by nebulization every 6 (six) hours as needed for Wheezing. 75 mL 0    alendronate (FOSAMAX) 35 MG tablet Take 35 mg by mouth every Tuesday.      aspirin 81 MG Chew Take 81 mg by mouth once daily.      azithromycin (Z-OLY) 250 MG tablet Take 1 tablet (250 mg total) by mouth 3 (three) times a week. (Patient taking differently: Take 250 mg by mouth every Mon, Wed, Fri.) 12 tablet 11    budesonide-glycopyr-formoterol (BREZTRI AEROSPHERE) 160-9-4.8 mcg/actuation HFAA Inhale 2 puffs into the lungs 2 (two) times daily.      dextromethorphan-guaiFENesin  mg (MUCINEX DM)  mg per 12 hr tablet Take 1 tablet  by mouth every 12 (twelve) hours.      dicyclomine (BENTYL) 20 mg tablet Take 1 tablet (20 mg total) by mouth 3 (three) times daily as needed (abdominal pain). 30 tablet 1    ergocalciferol (ERGOCALCIFEROL) 50,000 unit Cap Take 50,000 Units by mouth every Tuesday.      fluticasone propionate (FLONASE) 50 mcg/actuation nasal spray 1 spray by Each Nostril route daily as needed for Allergies.      furosemide (LASIX) 20 MG tablet Take 1 tablet (20 mg total) by mouth daily as needed (leg swelling). 30 tablet 0    LORazepam (ATIVAN) 0.5 MG tablet Take 1 tablet (0.5 mg total) by mouth every 8 (eight) hours as needed for Anxiety. 30 tablet 0    multivit with min-folic acid (WOMEN'S MULTIVITAMIN GUMMIES) 200 mcg Chew Take 2 tablets by mouth once daily.      OXYGEN-AIR DELIVERY SYSTEMS MISC 3 L by Nasal route.      roflumilast (DALIRESP) 500 mcg Tab Take 1 tablet by mouth once daily.      traZODone (DESYREL) 50 MG tablet Take 1 tablet (50 mg total) by mouth nightly as needed for Insomnia. 30 tablet 6     No current facility-administered medications on file prior to visit.        Review of patient's allergies indicates:   Allergen Reactions    Codeine Nausea And Vomiting    Morphine Other (See Comments)       OBJECTIVE:        Physical Exam:    General appearance: alert, cooperative, no distress  Constitutional:Oriented to person, place, and time  + appears well-developed and well-nourished.   Psychiatric: no pressured speech; normal affect; no evidence of impaired cognition     Laboratory  Lab Results   Component Value Date    WBC 12.92 (H) 07/09/2023    HGB 12.1 07/09/2023    HCT 40.0 07/09/2023    MCV 92 07/09/2023     07/09/2023     BMP  Lab Results   Component Value Date     07/09/2023    K 4.2 07/09/2023    CL 99 07/09/2023    CO2 34 (H) 07/09/2023    BUN 19 07/09/2023    CREATININE 0.8 07/09/2023    CALCIUM 9.0 07/09/2023    ANIONGAP 10 07/09/2023    EGFRNORACEVR >60 07/09/2023     Lab Results   Component  Value Date    ALT 27 07/09/2023    AST 21 07/09/2023    ALKPHOS 52 (L) 07/09/2023    BILITOT 0.2 07/09/2023     Lab Results   Component Value Date    INR 1.0 05/03/2022     Lab Results   Component Value Date    HGBA1C 5.4 06/16/2023       Diagnostic Results:    CTA Chest Non Coronary 7/9/23:  No evidence of pulmonary thromboemboli or significant acute lung interstitial airspace disease.  Emphysematous changes in the upper lungs.  No cardiomegaly or right ventricular strain.    2 D echo 7/3/23:  The left ventricle is normal in size with normal systolic function.  The estimated ejection fraction is 65%.  Normal left ventricular diastolic function.  With normal right ventricular systolic function.  The estimated PA systolic pressure is 37 mmHg.  Intermediate central venous pressure (8 mmHg).      ASSESSMENT & PLAN:       Acute hypercapnic respiratory failure  COPD exacerbation  Oxygen dependent  - recent hospitalization as above  - respiratory status currently at baseline per patient report- 3 L NC with nightly use of Bipap  - continue current medication regimen  - see Pulmonary team 7/25/23  - current with [facility] with skilled nurse, PT, OT with tentative discharge date 9/14/23, at which point she can resume outpatient Pulmonary Rehab at [facility] if approved by her Pulmonary team     Patient will be released from hospital follow up clinic.  She will see her PCP, Dr Kev Cox, 9/20/23.    Instructions for the patient:      Scheduled Follow-up :  Future Appointments   Date Time Provider Department Center   7/25/2023  3:00 PM Alex Cunningham MD [facility] [address]   9/20/2023 10:30 AM Kev Cox MD [facility] IM [place]       Post Visit Medication List:     Medication List            Accurate as of July 17, 2023 11:59 PM. If you have any questions, ask your nurse or doctor.                CHANGE how you take these medications      azithromycin 250 MG tablet  Commonly known as: Z-OLY  Take 1 tablet (250  mg total) by mouth 3 (three) times a week.  What changed: when to take this            CONTINUE taking these medications      acetaminophen 500 MG tablet  Commonly known as: TYLENOL     albuterol 90 mcg/actuation inhaler  Commonly known as: PROVENTIL/VENTOLIN HFA     albuterol-ipratropium 2.5 mg-0.5 mg/3 mL nebulizer solution  Commonly known as: DUO-NEB  Take 3 mLs by nebulization every 6 (six) hours as needed for Wheezing.     alendronate 35 MG tablet  Commonly known as: FOSAMAX     aspirin 81 MG Chew     BREZTRI AEROSPHERE 160-9-4.8 mcg/actuation Hfaa  Generic drug: budesonide-glycopyr-formoterol     dextromethorphan-guaiFENesin  mg  mg per 12 hr tablet  Commonly known as: MUCINEX DM     dicyclomine 20 mg tablet  Commonly known as: BENTYL  Take 1 tablet (20 mg total) by mouth 3 (three) times daily as needed (abdominal pain).     ergocalciferol 50,000 unit Cap  Commonly known as: ERGOCALCIFEROL     fluticasone propionate 50 mcg/actuation nasal spray  Commonly known as: FLONASE     furosemide 20 MG tablet  Commonly known as: LASIX  Take 1 tablet (20 mg total) by mouth daily as needed (leg swelling).     LORazepam 0.5 MG tablet  Commonly known as: ATIVAN  Take 1 tablet (0.5 mg total) by mouth every 8 (eight) hours as needed for Anxiety.     OXYGEN-AIR DELIVERY SYSTEMS MISC     roflumilast 500 mcg Tab  Commonly known as: DALIRESP     traZODone 50 MG tablet  Commonly known as: DESYREL  Take 1 tablet (50 mg total) by mouth nightly as needed for Insomnia.     WOMEN'S MULTIVITAMIN GUMMIES 200 mcg Chew  Generic drug: multivit with min-folic acid            The patient location is: [place]   The chief complaint leading to consultation is: Hospital follow up, COPD exacerbation     Visit type: audiovisual    Face to Face time with patient: 15 minutes   25 minutes of total time spent on the encounter, which includes face to face time and non-face to face time preparing to see the patient (eg, review of tests),  Obtaining and/or reviewing separately obtained history, Documenting clinical information in the electronic or other health record, Independently interpreting results (not separately reported) and communicating results to the patient/family/caregiver, or Care coordination (not separately reported).     Each patient to whom he or she provides medical services by telemedicine is:  (1) informed of the relationship between the physician and patient and the respective role of any other health care provider with respect to management of the patient; and (2) notified that he or she may decline to receive medical services by telemedicine and may withdraw from such care at any time.         Signing Physician:  Bethany Landry MD

## 2023-07-20 ENCOUNTER — TELEPHONE (OUTPATIENT)
Dept: PRIMARY CARE CLINIC | Facility: CLINIC | Age: 68
End: 2023-07-20
Payer: MEDICARE

## 2023-07-20 NOTE — TELEPHONE ENCOUNTER
Spoke to patient and notified that we will continue home health services until home health discharge date and then resume pulmonary rehab. Notified that Dr. Cunningham would make the recommendation at that time to start back at phase two or move to phase 3.

## 2023-07-23 ENCOUNTER — NURSE TRIAGE (OUTPATIENT)
Dept: ADMINISTRATIVE | Facility: CLINIC | Age: 68
End: 2023-07-23
Payer: MEDICARE

## 2023-07-23 NOTE — TELEPHONE ENCOUNTER
Patient requesting a refill for Albuterol inhaler. The only albuterol inhaler noted in her chart is Proventil/Ventolin which was prescribed outside of [facility] in 2022. She has 18 puffs left which will last today but patient will need a refill for tomorrow. She  is not scheduled to see her pulmonologist until Tuesday. Will route a message to f/u with the patient. Also referred to [facility] virtual visit. Advised the patient to call back with any further questions or concerns.        Reason for Disposition   [1] Caller has NON-URGENT medicine question about med that PCP prescribed AND [2] triager unable to answer question    Protocols used: Medication Refill and Renewal Call-A-

## 2023-07-25 ENCOUNTER — OFFICE VISIT (OUTPATIENT)
Dept: PULMONOLOGY | Facility: CLINIC | Age: 68
End: 2023-07-25
Payer: MEDICARE

## 2023-07-25 VITALS
HEIGHT: 66 IN | WEIGHT: 190.06 LBS | SYSTOLIC BLOOD PRESSURE: 130 MMHG | HEART RATE: 82 BPM | DIASTOLIC BLOOD PRESSURE: 66 MMHG | OXYGEN SATURATION: 96 % | BODY MASS INDEX: 30.54 KG/M2

## 2023-07-25 DIAGNOSIS — J96.21 ACUTE ON CHRONIC RESPIRATORY FAILURE WITH HYPOXIA AND HYPERCAPNIA: ICD-10-CM

## 2023-07-25 DIAGNOSIS — G47.33 OBSTRUCTIVE SLEEP APNEA SYNDROME: Primary | ICD-10-CM

## 2023-07-25 DIAGNOSIS — R06.00 DYSPNEA, UNSPECIFIED TYPE: Chronic | ICD-10-CM

## 2023-07-25 DIAGNOSIS — J96.22 ACUTE ON CHRONIC RESPIRATORY FAILURE WITH HYPOXIA AND HYPERCAPNIA: ICD-10-CM

## 2023-07-25 DIAGNOSIS — J96.02 ACUTE HYPERCAPNIC RESPIRATORY FAILURE: ICD-10-CM

## 2023-07-25 DIAGNOSIS — J43.2 CENTRILOBULAR EMPHYSEMA: ICD-10-CM

## 2023-07-25 DIAGNOSIS — Z99.81 OXYGEN DEPENDENT: ICD-10-CM

## 2023-07-25 DIAGNOSIS — J96.12 CHRONIC HYPERCAPNIC RESPIRATORY FAILURE: ICD-10-CM

## 2023-07-25 PROBLEM — I25.10 ATHSCL HEART DISEASE OF NATIVE CORONARY ARTERY W/O ANG PCTRS: Status: ACTIVE | Noted: 2023-07-06

## 2023-07-25 PROBLEM — Z87.891 HISTORY OF TOBACCO ABUSE: Status: ACTIVE | Noted: 2022-06-16

## 2023-07-25 PROBLEM — R91.1 PULMONARY NODULE: Status: ACTIVE | Noted: 2022-06-16

## 2023-07-25 PROCEDURE — 3075F PR MOST RECENT SYSTOLIC BLOOD PRESS GE 130-139MM HG: ICD-10-PCS | Mod: HCNC,CPTII,S$GLB, | Performed by: INTERNAL MEDICINE

## 2023-07-25 PROCEDURE — 1159F MED LIST DOCD IN RCRD: CPT | Mod: HCNC,CPTII,S$GLB, | Performed by: INTERNAL MEDICINE

## 2023-07-25 PROCEDURE — 3044F HG A1C LEVEL LT 7.0%: CPT | Mod: HCNC,CPTII,S$GLB, | Performed by: INTERNAL MEDICINE

## 2023-07-25 PROCEDURE — 3078F PR MOST RECENT DIASTOLIC BLOOD PRESSURE < 80 MM HG: ICD-10-PCS | Mod: HCNC,CPTII,S$GLB, | Performed by: INTERNAL MEDICINE

## 2023-07-25 PROCEDURE — 3078F DIAST BP <80 MM HG: CPT | Mod: HCNC,CPTII,S$GLB, | Performed by: INTERNAL MEDICINE

## 2023-07-25 PROCEDURE — 99215 OFFICE O/P EST HI 40 MIN: CPT | Mod: HCNC,GC,S$GLB, | Performed by: INTERNAL MEDICINE

## 2023-07-25 PROCEDURE — 1159F PR MEDICATION LIST DOCUMENTED IN MEDICAL RECORD: ICD-10-PCS | Mod: HCNC,CPTII,S$GLB, | Performed by: INTERNAL MEDICINE

## 2023-07-25 PROCEDURE — 3008F BODY MASS INDEX DOCD: CPT | Mod: HCNC,CPTII,S$GLB, | Performed by: INTERNAL MEDICINE

## 2023-07-25 PROCEDURE — 99215 PR OFFICE/OUTPT VISIT, EST, LEVL V, 40-54 MIN: ICD-10-PCS | Mod: HCNC,GC,S$GLB, | Performed by: INTERNAL MEDICINE

## 2023-07-25 PROCEDURE — 1101F PT FALLS ASSESS-DOCD LE1/YR: CPT | Mod: HCNC,CPTII,S$GLB, | Performed by: INTERNAL MEDICINE

## 2023-07-25 PROCEDURE — 1101F PR PT FALLS ASSESS DOC 0-1 FALLS W/OUT INJ PAST YR: ICD-10-PCS | Mod: HCNC,CPTII,S$GLB, | Performed by: INTERNAL MEDICINE

## 2023-07-25 PROCEDURE — 3288F FALL RISK ASSESSMENT DOCD: CPT | Mod: HCNC,CPTII,S$GLB, | Performed by: INTERNAL MEDICINE

## 2023-07-25 PROCEDURE — 1111F DSCHRG MED/CURRENT MED MERGE: CPT | Mod: HCNC,CPTII,S$GLB, | Performed by: INTERNAL MEDICINE

## 2023-07-25 PROCEDURE — 3075F SYST BP GE 130 - 139MM HG: CPT | Mod: HCNC,CPTII,S$GLB, | Performed by: INTERNAL MEDICINE

## 2023-07-25 PROCEDURE — 1111F PR DISCHARGE MEDS RECONCILED W/ CURRENT OUTPATIENT MED LIST: ICD-10-PCS | Mod: HCNC,CPTII,S$GLB, | Performed by: INTERNAL MEDICINE

## 2023-07-25 PROCEDURE — 3288F PR FALLS RISK ASSESSMENT DOCUMENTED: ICD-10-PCS | Mod: HCNC,CPTII,S$GLB, | Performed by: INTERNAL MEDICINE

## 2023-07-25 PROCEDURE — 3008F PR BODY MASS INDEX (BMI) DOCUMENTED: ICD-10-PCS | Mod: HCNC,CPTII,S$GLB, | Performed by: INTERNAL MEDICINE

## 2023-07-25 PROCEDURE — 3044F PR MOST RECENT HEMOGLOBIN A1C LEVEL <7.0%: ICD-10-PCS | Mod: HCNC,CPTII,S$GLB, | Performed by: INTERNAL MEDICINE

## 2023-07-25 NOTE — PROGRESS NOTES
Subjective:      Patient ID: Terri Phelan is a 68 y.o. female.    Chief Complaint: Emphysema    HPI 67 yo F with h/o severe end stage COPD/emphysema, chronic hypoxemic and hypercapnic respiratory failure (on 3L NC and BIPAP nightly), CAD, and frequent hospitalizations who presents as hospital fu.     Recently discharged from [facility] after hypercapnic RF and edema (was given lasix on discharge but was told to take prn so hasn't been taking). She is also complaining of significant anxiety associated with her dyspnea and her PCP gave her a small dose of ativan which she feels like helps for 4ish hours. She says she has had a tough week with death in family recently but celebrated her 68th birthday yesterday. She is hoping to get 5-10 more years and is looking for other options which could possibly help her prolong her life and stay out of the hospital. She asked specifically about modafinil today as a possible option she had found. She also follows with palliative care in the outpatient setting. She feels like her BIPAP helps her get some good sleep and she isn't able to sleep without it. She also uses it occasionally during the daytime when she is feeling sleepy.       Her current regimen includes:  - Breztri 2 puffs BID  - Albuterol rescue inhaler 4-5 times per day (used mostly with transitioning)  - DuoNebs BID  - Azithromycin MWF  - roflumilast daily   - pulmonary rehab 1x per week  - prednisone 40mg x 5 days PRN for exacerbations  - BiPAP nightly    Review of Systems   Constitutional:  Positive for fatigue. Negative for fever and weakness.   HENT:  Negative for sore throat and congestion.    Respiratory:  Positive for cough, sputum production, chest tightness, shortness of breath, wheezing, orthopnea, dyspnea on extertion and use of rescue inhaler.    Cardiovascular:  Positive for leg swelling. Negative for chest pain.   Genitourinary:  Negative for difficulty urinating.   Musculoskeletal:  Negative for gait  problem.   Skin:  Negative for rash.   Gastrointestinal:  Negative for nausea, vomiting and abdominal pain.   Neurological:  Negative for dizziness.   Psychiatric/Behavioral:  The patient is nervous/anxious.      Objective:     Physical Exam   Constitutional: She is oriented to person, place, and time. She appears well-developed. No distress.   Chronically ill appearing. In wheelchair with NC in place   HENT:   Head: Normocephalic.   Cardiovascular: Normal rate and regular rhythm.   No murmur heard.  Pulmonary/Chest: No stridor.   Distant breath sounds b/l, worse at bases. Prolonged expiration and intermittent pursed lip breathing during discussion.    Abdominal: Soft. She exhibits no distension.   Musculoskeletal:         General: Edema (2+ pitting edema to b/l LE) present. Normal range of motion.      Cervical back: Normal range of motion and neck supple.   Neurological: She is alert and oriented to person, place, and time.   Skin: Skin is warm and dry.   Nursing note and vitals reviewed.    Personal Diagnostic Review     PFTs 4/18/23   FVC  (pre-BD) 1.01   FVC%  40   FEV1 0.44   FEV1%  22   FEV1/FVC  44   FEF 25-75  0.18   FEF 25-75%  10   FVC (post-BD) 0.95   FVC% -6   FEV1 0.42   FEV1% -4.3   FEV1/FVC 45   FEF 25-75 0.16   FEF 25-75% -   TLC  5.34   TLC%  108   RV  4.27   RV%  214   DLCO  -   DLCO%  -   VA -   IVC -   Six-Minute Walk     Distance (meter)     O2 jorge l     O2 change       CT Chest 7/9/23:   FINDINGS:  Pulmonary vasculature: There is no evidence of pulmonary thromboemboli. Pulmonary arteries distribute normally and are normal in caliber.  There are four pulmonary veins.  Chio/Mediastinum: No pathologic yariel enlargement.  Airways: Patent.     Lungs/Pleura: Centrilobular emphysematous changes are noted in the lung apices most predominantly.  No nodules or pleural or pericardial effusion or thickening seen.  Lung base subsegmental atelectasis or scarring greater on the left is also noted.  No  suspicious nodules, consolidation is seen.  Clear lungs. No pleural effusion or thickening.     Aorta: Left-sided aortic arch.  There is no aneurysm or dissection.  Mild to moderate aortic atherosclerosis noted.  Heart: There is no right ventricular strain or cardiomegaly.  There is no pericardial effusion..  Base of Neck: No significant abnormality.  Thoracic soft tissues: Unremarkable.  Esophagus: Unremarkable.  Upper Abdomen: No abnormality of the partially imaged upper abdomen.  Bones: No acute fracture. No suspicious lytic or sclerotic lesions.  Spondylosis of the spine without suspicious lesions noted.     Impression:  No evidence of pulmonary thromboemboli or significant acute lung interstitial airspace disease.  Emphysematous changes in the upper lungs.  No cardiomegaly or right ventricular strain.  Please see above for incidental additional nonacute findings.    No flowsheet data found.     Assessment:     1. Obstructive sleep apnea syndrome    2. Centrilobular emphysema    3. Oxygen dependent    4. Acute on chronic respiratory failure with hypoxia and hypercapnia    5. Chronic hypercapnic respiratory failure    6. Dyspnea, unspecified type         Outpatient Encounter Medications as of 7/25/2023   Medication Sig Dispense Refill    acetaminophen (TYLENOL) 500 MG tablet Take 500 mg by mouth every 6 (six) hours as needed for Pain.      albuterol (PROVENTIL/VENTOLIN HFA) 90 mcg/actuation inhaler Inhale 2 puffs into the lungs every 6 (six) hours as needed.      albuterol-ipratropium (DUO-NEB) 2.5 mg-0.5 mg/3 mL nebulizer solution Take 3 mLs by nebulization every 6 (six) hours as needed for Wheezing. 75 mL 0    alendronate (FOSAMAX) 35 MG tablet Take 35 mg by mouth every Tuesday.      aspirin 81 MG Chew Take 81 mg by mouth once daily.      azithromycin (Z-OLY) 250 MG tablet Take 1 tablet (250 mg total) by mouth 3 (three) times a week. (Patient taking differently: Take 250 mg by mouth every Mon, Wed, Fri.) 12  tablet 11    budesonide-glycopyr-formoterol (BREZTRI AEROSPHERE) 160-9-4.8 mcg/actuation HFAA Inhale 2 puffs into the lungs 2 (two) times daily.      dextromethorphan-guaiFENesin  mg (MUCINEX DM)  mg per 12 hr tablet Take 1 tablet by mouth every 12 (twelve) hours.      dicyclomine (BENTYL) 20 mg tablet Take 1 tablet (20 mg total) by mouth 3 (three) times daily as needed (abdominal pain). 30 tablet 1    ergocalciferol (ERGOCALCIFEROL) 50,000 unit Cap Take 50,000 Units by mouth every Tuesday.      fluticasone propionate (FLONASE) 50 mcg/actuation nasal spray 1 spray by Each Nostril route daily as needed for Allergies.      furosemide (LASIX) 20 MG tablet Take 1 tablet (20 mg total) by mouth daily as needed (leg swelling). 30 tablet 0    multivit with min-folic acid (WOMEN'S MULTIVITAMIN GUMMIES) 200 mcg Chew Take 2 tablets by mouth once daily.      OXYGEN-AIR DELIVERY SYSTEMS MISC 3 L by Nasal route.      roflumilast (DALIRESP) 500 mcg Tab Take 1 tablet by mouth once daily.      traZODone (DESYREL) 50 MG tablet Take 1 tablet (50 mg total) by mouth nightly as needed for Insomnia. 30 tablet 6    LORazepam (ATIVAN) 0.5 MG tablet Take 1 tablet (0.5 mg total) by mouth every 8 (eight) hours as needed for Anxiety. 30 tablet 0     No facility-administered encounter medications on file as of 7/25/2023.     Orders Placed This Encounter   Procedures    Ambulatory referral/consult to Sleep Disorders     Standing Status:   Future     Standing Expiration Date:   8/25/2024     Referral Priority:   Routine     Referral Type:   Consultation     Referred to Provider:   Adrian Painter MD     Requested Specialty:   Sleep Medicine     Number of Visits Requested:   1       Plan:     69 yo F with recurrent hospitalizations for severe end stage COPD/emphysema and hypoxic/hypercapnic respiratory failure here for hospital follow up. Will refer to Dr. Painter with sleep medicine for optimization of BIPAP. Will consider further referral to  transplant team for ideas regarding other possible interventions she might be appropriate for. She requested modafinil today after reviewing a case study but it is contraindicated in the setting of prior CAD and without evidence for use in hypercapnic RF outside of excessive daytime somnolence. Continue to fu with palliative and attempt to address end stage COPD.     Problem List Items Addressed This Visit          Pulmonary    Dyspnea (Chronic)    Overview     Severe dyspnea contributing to anxiety. Takes Ativan PRN.          Centrilobular emphysema    Overview     Severe obstructive disease, on home O2.          Current Assessment & Plan     Continue home medications including:   - Breztri 2 puffs BID  - Albuterol rescue inhaler 4-5 times per day (used mostly with transitioning)  - DuoNebs BID  - Azithromycin MWF  - Roflumilast daily   - pulmonary rehab 1x per week  - prednisone 40mg x 5 days PRN for exacerbations  - BiPAP nightly         Oxygen dependent    Overview     Requires 3L NC.         Acute on chronic respiratory failure with hypoxia and hypercapnia    Overview     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   Has admissions roughly monthly for hypercapnic RF. Pt compliant with BIPAP at home, states it's the only thing that helps her get some sleep.          Current Assessment & Plan     Patient with Hypoxic and Hypercapnic Respiratory failure which is Chronic.  she is on home oxygen at 3 LPM. Supplemental oxygen was provided and noted- [unfilled].   Signs/symptoms of respiratory failure include- tachypnea, increased work of breathing, respiratory distress and wheezing. Contributing diagnoses includes - COPD Labs and images were reviewed. Patient Has recent ABG, which has been reviewed.   Continue treating underlying severe COPD. Will refer to Dr. Painter for follow up of BIPAP to see if further optimization can be made (vs possible Trelegy?)           Relevant Orders     Ambulatory referral/consult to Sleep Disorders       Other    Obstructive sleep apnea syndrome - Primary    Relevant Orders    Ambulatory referral/consult to Sleep Disorders     Other Visit Diagnoses       Chronic hypercapnic respiratory failure        Relevant Orders    Ambulatory referral/consult to Sleep Disorders            Anupama Corral MD  [facility] Pulmonary and Critical Care Fellow   07/25/2023 3:57 PM

## 2023-07-25 NOTE — ASSESSMENT & PLAN NOTE
Continue home medications including:   - Breztri 2 puffs BID  - Albuterol rescue inhaler 4-5 times per day (used mostly with transitioning)  - DuoNebs BID  - Azithromycin MWF  - Roflumilast daily   - pulmonary rehab 1x per week  - prednisone 40mg x 5 days PRN for exacerbations  - BiPAP nightly

## 2023-07-25 NOTE — ASSESSMENT & PLAN NOTE
Patient with Hypoxic and Hypercapnic Respiratory failure which is Chronic.  she is on home oxygen at 3 LPM. Supplemental oxygen was provided and noted- [unfilled].   Signs/symptoms of respiratory failure include- tachypnea, increased work of breathing, respiratory distress and wheezing. Contributing diagnoses includes - COPD Labs and images were reviewed. Patient Has recent ABG, which has been reviewed.   Continue treating underlying severe COPD. Will refer to Dr. Painter for follow up of BIPAP to see if further optimization can be made (vs possible Trelegy?)

## 2023-07-26 ENCOUNTER — TELEPHONE (OUTPATIENT)
Dept: INTERNAL MEDICINE | Facility: CLINIC | Age: 68
End: 2023-07-26
Payer: MEDICARE

## 2023-07-26 NOTE — TELEPHONE ENCOUNTER
----- Message from Letty Fischer sent at 7/26/2023 10:17 AM CDT -----  Contact: Terri maravilla [phone]  [email or web address]     Patient is calling for Medical Advice regarding:    How long has patient had these symptoms:    Pharmacy name and phone#:    Would like response via [employer]:call back    Comments: Pt was requesting a call back from the nurse to schedule a hosfu. Pt was discharged on 7/6 and is also having swelling in her legs

## 2023-07-26 NOTE — TELEPHONE ENCOUNTER
Called pt and she states she was discharged for the hosp on 7/6 and didn't make a f/u apt but is now having some swelling in her legs and would like to be seen. I told her dr orr will be on vacation but I could schedule her with another provider. She agreed and I scheduled her with dr jarrod HERNANDEZ

## 2023-07-28 DIAGNOSIS — J44.1 COPD EXACERBATION: ICD-10-CM

## 2023-07-28 RX ORDER — LORAZEPAM 0.5 MG/1
0.5 TABLET ORAL EVERY 8 HOURS PRN
Qty: 30 TABLET | Refills: 0 | Status: ON HOLD | OUTPATIENT
Start: 2023-07-28 | End: 2023-08-10 | Stop reason: HOSPADM

## 2023-07-28 NOTE — TELEPHONE ENCOUNTER
No care due was identified.  [facility] Embedded Care Due Messages. Reference number: [id number].   7/28/2023 7:42:22 AM CDT

## 2023-07-31 ENCOUNTER — TELEPHONE (OUTPATIENT)
Dept: PULMONOLOGY | Facility: CLINIC | Age: 68
End: 2023-07-31
Payer: MEDICARE

## 2023-07-31 NOTE — TELEPHONE ENCOUNTER
Received a Epic message that Mrs Phelan needs a appointment with Dr Painter. His schedule is getting ready to open so I will call Mrs Phelan tomorrow. Angela Finney LPN

## 2023-07-31 NOTE — TELEPHONE ENCOUNTER
----- Message from Mari Bass MA sent at 7/31/2023  3:15 PM CDT -----  Regarding: FW: Pt advice  Contact: [phone]    ----- Message -----  From: Renard Luther  Sent: 7/31/2023   3:09 PM CDT  To: [facility] Staff  Subject: Pt advice                                        Pt is calling calling to schedule from referral. Pt is looking for an call back from the nurse. Would like an appt and scheduled. DX:G47.33 (ICD-10-CM) - Obstructive sleep apnea syndrome  J96.21,J96.22 (ICD-10-CM) - Acute on chronic respiratory failure with hypoxia and hypercapnia  J96.12 (ICD-10-CM) - Chronic hypercapnic respiratory failure Please call

## 2023-08-04 ENCOUNTER — TELEPHONE (OUTPATIENT)
Dept: INTERNAL MEDICINE | Facility: CLINIC | Age: 68
End: 2023-08-04
Payer: MEDICARE

## 2023-08-04 NOTE — TELEPHONE ENCOUNTER
----- Message from Evangelina Jamison sent at 8/3/2023 11:17 AM CDT -----  Contact: [phone] or [phone]  [facility] is calling they are needing the BCC form for the pt please advise     Fax [phone]

## 2023-08-04 NOTE — TELEPHONE ENCOUNTER
Contacted [facility] to ask for the form to be refaxed , spoke with adan who states that I could give a verbal stating that the pt has CAD (coronary artery disease . Verbal given .   Case #-[id number]

## 2023-08-07 ENCOUNTER — HOSPITAL ENCOUNTER (INPATIENT)
Facility: HOSPITAL | Age: 68
LOS: 2 days | Discharge: HOME HEALTH | DRG: 189 | End: 2023-08-10
Attending: EMERGENCY MEDICINE | Admitting: HOSPITALIST
Payer: MEDICARE

## 2023-08-07 ENCOUNTER — TELEPHONE (OUTPATIENT)
Dept: INTERNAL MEDICINE | Facility: CLINIC | Age: 68
End: 2023-08-07

## 2023-08-07 ENCOUNTER — LAB VISIT (OUTPATIENT)
Dept: LAB | Facility: HOSPITAL | Age: 68
End: 2023-08-07
Attending: INTERNAL MEDICINE
Payer: MEDICARE

## 2023-08-07 ENCOUNTER — OFFICE VISIT (OUTPATIENT)
Dept: INTERNAL MEDICINE | Facility: CLINIC | Age: 68
End: 2023-08-07
Payer: MEDICARE

## 2023-08-07 VITALS
OXYGEN SATURATION: 90 % | HEART RATE: 85 BPM | HEIGHT: 65 IN | DIASTOLIC BLOOD PRESSURE: 74 MMHG | BODY MASS INDEX: 31.04 KG/M2 | WEIGHT: 186.31 LBS | TEMPERATURE: 98 F | SYSTOLIC BLOOD PRESSURE: 154 MMHG | RESPIRATION RATE: 20 BRPM

## 2023-08-07 DIAGNOSIS — M79.89 LEG SWELLING: ICD-10-CM

## 2023-08-07 DIAGNOSIS — I51.7 RIGHT VENTRICULAR DILATION: ICD-10-CM

## 2023-08-07 DIAGNOSIS — J90 PLEURAL EFFUSION, BILATERAL: ICD-10-CM

## 2023-08-07 DIAGNOSIS — J96.22 ACUTE ON CHRONIC RESPIRATORY FAILURE WITH HYPOXIA AND HYPERCAPNIA: ICD-10-CM

## 2023-08-07 DIAGNOSIS — R60.0 LOCALIZED EDEMA: ICD-10-CM

## 2023-08-07 DIAGNOSIS — R06.89 HYPERCAPNIA: ICD-10-CM

## 2023-08-07 DIAGNOSIS — J96.21 ACUTE ON CHRONIC RESPIRATORY FAILURE WITH HYPOXIA AND HYPERCAPNIA: ICD-10-CM

## 2023-08-07 DIAGNOSIS — Z09 HOSPITAL DISCHARGE FOLLOW-UP: Primary | ICD-10-CM

## 2023-08-07 DIAGNOSIS — R06.03 ACUTE RESPIRATORY DISTRESS: Chronic | ICD-10-CM

## 2023-08-07 DIAGNOSIS — J44.1 COPD EXACERBATION: ICD-10-CM

## 2023-08-07 DIAGNOSIS — J96.12 CHRONIC HYPERCAPNIC RESPIRATORY FAILURE: ICD-10-CM

## 2023-08-07 DIAGNOSIS — J44.1 COPD EXACERBATION: Primary | ICD-10-CM

## 2023-08-07 LAB
ALBUMIN SERPL BCP-MCNC: 3.5 G/DL (ref 3.5–5.2)
ALBUMIN SERPL BCP-MCNC: 3.5 G/DL (ref 3.5–5.2)
ALLENS TEST: ABNORMAL
ALP SERPL-CCNC: 47 U/L (ref 55–135)
ALP SERPL-CCNC: 48 U/L (ref 55–135)
ALT SERPL W/O P-5'-P-CCNC: 13 U/L (ref 10–44)
ALT SERPL W/O P-5'-P-CCNC: 16 U/L (ref 10–44)
ANION GAP SERPL CALC-SCNC: 6 MMOL/L (ref 8–16)
ANION GAP SERPL CALC-SCNC: 8 MMOL/L (ref 8–16)
AST SERPL-CCNC: 16 U/L (ref 10–40)
AST SERPL-CCNC: 17 U/L (ref 10–40)
BASOPHILS # BLD AUTO: 0.03 K/UL (ref 0–0.2)
BASOPHILS # BLD AUTO: 0.05 K/UL (ref 0–0.2)
BASOPHILS NFR BLD: 0.4 % (ref 0–1.9)
BASOPHILS NFR BLD: 0.6 % (ref 0–1.9)
BILIRUB SERPL-MCNC: 0.2 MG/DL (ref 0.1–1)
BILIRUB SERPL-MCNC: 0.2 MG/DL (ref 0.1–1)
BNP SERPL-MCNC: 25 PG/ML (ref 0–99)
BUN SERPL-MCNC: 15 MG/DL (ref 8–23)
BUN SERPL-MCNC: 15 MG/DL (ref 8–23)
CALCIUM SERPL-MCNC: 9.3 MG/DL (ref 8.7–10.5)
CALCIUM SERPL-MCNC: 9.3 MG/DL (ref 8.7–10.5)
CHLORIDE SERPL-SCNC: 95 MMOL/L (ref 95–110)
CHLORIDE SERPL-SCNC: 97 MMOL/L (ref 95–110)
CO2 SERPL-SCNC: 41 MMOL/L (ref 23–29)
CO2 SERPL-SCNC: 42 MMOL/L (ref 23–29)
CREAT SERPL-MCNC: 0.7 MG/DL (ref 0.5–1.4)
CREAT SERPL-MCNC: 0.7 MG/DL (ref 0.5–1.4)
DELSYS: ABNORMAL
DIFFERENTIAL METHOD: ABNORMAL
DIFFERENTIAL METHOD: ABNORMAL
EOSINOPHIL # BLD AUTO: 0.1 K/UL (ref 0–0.5)
EOSINOPHIL # BLD AUTO: 0.1 K/UL (ref 0–0.5)
EOSINOPHIL NFR BLD: 1 % (ref 0–8)
EOSINOPHIL NFR BLD: 1.3 % (ref 0–8)
ERYTHROCYTE [DISTWIDTH] IN BLOOD BY AUTOMATED COUNT: 13.7 % (ref 11.5–14.5)
ERYTHROCYTE [DISTWIDTH] IN BLOOD BY AUTOMATED COUNT: 14.1 % (ref 11.5–14.5)
EST. GFR  (NO RACE VARIABLE): >60 ML/MIN/1.73 M^2
EST. GFR  (NO RACE VARIABLE): >60 ML/MIN/1.73 M^2
FLOW: 3
GLUCOSE SERPL-MCNC: 111 MG/DL (ref 70–110)
GLUCOSE SERPL-MCNC: 132 MG/DL (ref 70–110)
HCO3 UR-SCNC: 45.4 MMOL/L (ref 24–28)
HCT VFR BLD AUTO: 43.6 % (ref 37–48.5)
HCT VFR BLD AUTO: 44 % (ref 37–48.5)
HGB BLD-MCNC: 12.1 G/DL (ref 12–16)
HGB BLD-MCNC: 12.3 G/DL (ref 12–16)
IMM GRANULOCYTES # BLD AUTO: 0.02 K/UL (ref 0–0.04)
IMM GRANULOCYTES # BLD AUTO: 0.03 K/UL (ref 0–0.04)
IMM GRANULOCYTES NFR BLD AUTO: 0.2 % (ref 0–0.5)
IMM GRANULOCYTES NFR BLD AUTO: 0.4 % (ref 0–0.5)
LYMPHOCYTES # BLD AUTO: 2.4 K/UL (ref 1–4.8)
LYMPHOCYTES # BLD AUTO: 2.9 K/UL (ref 1–4.8)
LYMPHOCYTES NFR BLD: 29.4 % (ref 18–48)
LYMPHOCYTES NFR BLD: 33.2 % (ref 18–48)
MCH RBC QN AUTO: 26.9 PG (ref 27–31)
MCH RBC QN AUTO: 27.5 PG (ref 27–31)
MCHC RBC AUTO-ENTMCNC: 27.5 G/DL (ref 32–36)
MCHC RBC AUTO-ENTMCNC: 28.2 G/DL (ref 32–36)
MCV RBC AUTO: 97 FL (ref 82–98)
MCV RBC AUTO: 98 FL (ref 82–98)
MODE: ABNORMAL
MONOCYTES # BLD AUTO: 1 K/UL (ref 0.3–1)
MONOCYTES # BLD AUTO: 1.1 K/UL (ref 0.3–1)
MONOCYTES NFR BLD: 12.5 % (ref 4–15)
MONOCYTES NFR BLD: 12.6 % (ref 4–15)
NEUTROPHILS # BLD AUTO: 4.6 K/UL (ref 1.8–7.7)
NEUTROPHILS # BLD AUTO: 4.6 K/UL (ref 1.8–7.7)
NEUTROPHILS NFR BLD: 52.2 % (ref 38–73)
NEUTROPHILS NFR BLD: 56.2 % (ref 38–73)
NRBC BLD-RTO: 0 /100 WBC
NRBC BLD-RTO: 0 /100 WBC
PCO2 BLDA: 106.6 MMHG (ref 35–45)
PH SMN: 7.24 [PH] (ref 7.35–7.45)
PLATELET # BLD AUTO: 159 K/UL (ref 150–450)
PLATELET # BLD AUTO: 180 K/UL (ref 150–450)
PMV BLD AUTO: 11.3 FL (ref 9.2–12.9)
PMV BLD AUTO: 12.9 FL (ref 9.2–12.9)
PO2 BLDA: 81 MMHG (ref 80–100)
POC BE: 18 MMOL/L
POC SATURATED O2: 92 % (ref 95–100)
POC TCO2: 49 MMOL/L (ref 23–27)
POTASSIUM SERPL-SCNC: 3.9 MMOL/L (ref 3.5–5.1)
POTASSIUM SERPL-SCNC: 4.1 MMOL/L (ref 3.5–5.1)
PROT SERPL-MCNC: 6.6 G/DL (ref 6–8.4)
PROT SERPL-MCNC: 6.8 G/DL (ref 6–8.4)
RBC # BLD AUTO: 4.48 M/UL (ref 4–5.4)
RBC # BLD AUTO: 4.5 M/UL (ref 4–5.4)
SAMPLE: ABNORMAL
SITE: ABNORMAL
SODIUM SERPL-SCNC: 144 MMOL/L (ref 136–145)
SODIUM SERPL-SCNC: 145 MMOL/L (ref 136–145)
TROPONIN I SERPL DL<=0.01 NG/ML-MCNC: 0.01 NG/ML (ref 0–0.03)
WBC # BLD AUTO: 8.09 K/UL (ref 3.9–12.7)
WBC # BLD AUTO: 8.8 K/UL (ref 3.9–12.7)

## 2023-08-07 PROCEDURE — 93010 EKG 12-LEAD: ICD-10-PCS | Mod: ,,, | Performed by: INTERNAL MEDICINE

## 2023-08-07 PROCEDURE — 3044F PR MOST RECENT HEMOGLOBIN A1C LEVEL <7.0%: ICD-10-PCS | Mod: CPTII,S$GLB,, | Performed by: INTERNAL MEDICINE

## 2023-08-07 PROCEDURE — 63600175 PHARM REV CODE 636 W HCPCS: Mod: HCNC | Performed by: EMERGENCY MEDICINE

## 2023-08-07 PROCEDURE — 99999 PR PBB SHADOW E&M-EST. PATIENT-LVL IV: CPT | Mod: PBBFAC,HCNC,, | Performed by: INTERNAL MEDICINE

## 2023-08-07 PROCEDURE — 99900035 HC TECH TIME PER 15 MIN (STAT): Mod: HCNC

## 2023-08-07 PROCEDURE — 36415 COLL VENOUS BLD VENIPUNCTURE: CPT | Mod: HCNC,PO | Performed by: INTERNAL MEDICINE

## 2023-08-07 PROCEDURE — 3288F FALL RISK ASSESSMENT DOCD: CPT | Mod: CPTII,S$GLB,, | Performed by: INTERNAL MEDICINE

## 2023-08-07 PROCEDURE — 99285 EMERGENCY DEPT VISIT HI MDM: CPT | Mod: 25

## 2023-08-07 PROCEDURE — 85025 COMPLETE CBC W/AUTO DIFF WBC: CPT | Mod: HCNC | Performed by: INTERNAL MEDICINE

## 2023-08-07 PROCEDURE — 3008F PR BODY MASS INDEX (BMI) DOCUMENTED: ICD-10-PCS | Mod: CPTII,S$GLB,, | Performed by: INTERNAL MEDICINE

## 2023-08-07 PROCEDURE — 1101F PT FALLS ASSESS-DOCD LE1/YR: CPT | Mod: CPTII,S$GLB,, | Performed by: INTERNAL MEDICINE

## 2023-08-07 PROCEDURE — 1126F PR PAIN SEVERITY QUANTIFIED, NO PAIN PRESENT: ICD-10-PCS | Mod: CPTII,S$GLB,, | Performed by: INTERNAL MEDICINE

## 2023-08-07 PROCEDURE — 3078F DIAST BP <80 MM HG: CPT | Mod: CPTII,S$GLB,, | Performed by: INTERNAL MEDICINE

## 2023-08-07 PROCEDURE — 99214 PR OFFICE/OUTPT VISIT, EST, LEVL IV, 30-39 MIN: ICD-10-PCS | Mod: S$GLB,,, | Performed by: INTERNAL MEDICINE

## 2023-08-07 PROCEDURE — 3288F PR FALLS RISK ASSESSMENT DOCUMENTED: ICD-10-PCS | Mod: CPTII,S$GLB,, | Performed by: INTERNAL MEDICINE

## 2023-08-07 PROCEDURE — 36600 WITHDRAWAL OF ARTERIAL BLOOD: CPT | Mod: HCNC

## 2023-08-07 PROCEDURE — 99214 OFFICE O/P EST MOD 30 MIN: CPT | Mod: S$GLB,,, | Performed by: INTERNAL MEDICINE

## 2023-08-07 PROCEDURE — 82803 BLOOD GASES ANY COMBINATION: CPT | Mod: HCNC

## 2023-08-07 PROCEDURE — 83880 ASSAY OF NATRIURETIC PEPTIDE: CPT | Performed by: EMERGENCY MEDICINE

## 2023-08-07 PROCEDURE — 80053 COMPREHEN METABOLIC PANEL: CPT | Mod: HCNC | Performed by: INTERNAL MEDICINE

## 2023-08-07 PROCEDURE — 3077F PR MOST RECENT SYSTOLIC BLOOD PRESSURE >= 140 MM HG: ICD-10-PCS | Mod: CPTII,S$GLB,, | Performed by: INTERNAL MEDICINE

## 2023-08-07 PROCEDURE — 27000190 HC CPAP FULL FACE MASK W/VALVE: Mod: HCNC

## 2023-08-07 PROCEDURE — 96375 TX/PRO/DX INJ NEW DRUG ADDON: CPT

## 2023-08-07 PROCEDURE — 93005 ELECTROCARDIOGRAM TRACING: CPT | Mod: HCNC

## 2023-08-07 PROCEDURE — 1160F PR REVIEW ALL MEDS BY PRESCRIBER/CLIN PHARMACIST DOCUMENTED: ICD-10-PCS | Mod: CPTII,S$GLB,, | Performed by: INTERNAL MEDICINE

## 2023-08-07 PROCEDURE — 3077F SYST BP >= 140 MM HG: CPT | Mod: CPTII,S$GLB,, | Performed by: INTERNAL MEDICINE

## 2023-08-07 PROCEDURE — 84484 ASSAY OF TROPONIN QUANT: CPT | Performed by: EMERGENCY MEDICINE

## 2023-08-07 PROCEDURE — 85025 COMPLETE CBC W/AUTO DIFF WBC: CPT | Mod: 91 | Performed by: EMERGENCY MEDICINE

## 2023-08-07 PROCEDURE — 1126F AMNT PAIN NOTED NONE PRSNT: CPT | Mod: CPTII,S$GLB,, | Performed by: INTERNAL MEDICINE

## 2023-08-07 PROCEDURE — 94660 CPAP INITIATION&MGMT: CPT | Mod: HCNC

## 2023-08-07 PROCEDURE — 94761 N-INVAS EAR/PLS OXIMETRY MLT: CPT | Mod: HCNC

## 2023-08-07 PROCEDURE — 1160F RVW MEDS BY RX/DR IN RCRD: CPT | Mod: CPTII,S$GLB,, | Performed by: INTERNAL MEDICINE

## 2023-08-07 PROCEDURE — 93010 ELECTROCARDIOGRAM REPORT: CPT | Mod: ,,, | Performed by: INTERNAL MEDICINE

## 2023-08-07 PROCEDURE — 1101F PR PT FALLS ASSESS DOC 0-1 FALLS W/OUT INJ PAST YR: ICD-10-PCS | Mod: CPTII,S$GLB,, | Performed by: INTERNAL MEDICINE

## 2023-08-07 PROCEDURE — 25000242 PHARM REV CODE 250 ALT 637 W/ HCPCS: Mod: HCNC | Performed by: EMERGENCY MEDICINE

## 2023-08-07 PROCEDURE — 3008F BODY MASS INDEX DOCD: CPT | Mod: CPTII,S$GLB,, | Performed by: INTERNAL MEDICINE

## 2023-08-07 PROCEDURE — 96365 THER/PROPH/DIAG IV INF INIT: CPT

## 2023-08-07 PROCEDURE — 80053 COMPREHEN METABOLIC PANEL: CPT | Mod: 91 | Performed by: EMERGENCY MEDICINE

## 2023-08-07 PROCEDURE — 1159F PR MEDICATION LIST DOCUMENTED IN MEDICAL RECORD: ICD-10-PCS | Mod: CPTII,S$GLB,, | Performed by: INTERNAL MEDICINE

## 2023-08-07 PROCEDURE — 3044F HG A1C LEVEL LT 7.0%: CPT | Mod: CPTII,S$GLB,, | Performed by: INTERNAL MEDICINE

## 2023-08-07 PROCEDURE — 1159F MED LIST DOCD IN RCRD: CPT | Mod: CPTII,S$GLB,, | Performed by: INTERNAL MEDICINE

## 2023-08-07 PROCEDURE — 99999 PR PBB SHADOW E&M-EST. PATIENT-LVL IV: ICD-10-PCS | Mod: PBBFAC,HCNC,, | Performed by: INTERNAL MEDICINE

## 2023-08-07 PROCEDURE — 94640 AIRWAY INHALATION TREATMENT: CPT | Mod: HCNC

## 2023-08-07 PROCEDURE — 25000003 PHARM REV CODE 250: Mod: HCNC | Performed by: EMERGENCY MEDICINE

## 2023-08-07 PROCEDURE — 3078F PR MOST RECENT DIASTOLIC BLOOD PRESSURE < 80 MM HG: ICD-10-PCS | Mod: CPTII,S$GLB,, | Performed by: INTERNAL MEDICINE

## 2023-08-07 RX ORDER — METHYLPREDNISOLONE SOD SUCC 125 MG
125 VIAL (EA) INJECTION
Status: COMPLETED | OUTPATIENT
Start: 2023-08-07 | End: 2023-08-07

## 2023-08-07 RX ORDER — FUROSEMIDE 40 MG/1
40 TABLET ORAL DAILY
Qty: 30 TABLET | Refills: 11 | Status: ON HOLD | OUTPATIENT
Start: 2023-08-07 | End: 2023-12-20

## 2023-08-07 RX ORDER — PREDNISONE 20 MG/1
40 TABLET ORAL DAILY
Status: DISCONTINUED | OUTPATIENT
Start: 2023-08-08 | End: 2023-08-10 | Stop reason: HOSPADM

## 2023-08-07 RX ORDER — LANOLIN ALCOHOL/MO/W.PET/CERES
800 CREAM (GRAM) TOPICAL
Status: DISCONTINUED | OUTPATIENT
Start: 2023-08-08 | End: 2023-08-08

## 2023-08-07 RX ORDER — FUROSEMIDE 10 MG/ML
20 INJECTION INTRAMUSCULAR; INTRAVENOUS
Status: COMPLETED | OUTPATIENT
Start: 2023-08-07 | End: 2023-08-07

## 2023-08-07 RX ORDER — SODIUM CHLORIDE 0.9 % (FLUSH) 0.9 %
10 SYRINGE (ML) INJECTION
Status: DISCONTINUED | OUTPATIENT
Start: 2023-08-08 | End: 2023-08-10 | Stop reason: HOSPADM

## 2023-08-07 RX ORDER — SODIUM,POTASSIUM PHOSPHATES 280-250MG
2 POWDER IN PACKET (EA) ORAL
Status: DISCONTINUED | OUTPATIENT
Start: 2023-08-08 | End: 2023-08-08

## 2023-08-07 RX ORDER — ONDANSETRON 4 MG/1
8 TABLET, ORALLY DISINTEGRATING ORAL EVERY 8 HOURS PRN
Status: DISCONTINUED | OUTPATIENT
Start: 2023-08-08 | End: 2023-08-10 | Stop reason: HOSPADM

## 2023-08-07 RX ORDER — ALBUTEROL SULFATE 2.5 MG/.5ML
2.5 SOLUTION RESPIRATORY (INHALATION) EVERY 4 HOURS
Status: DISCONTINUED | OUTPATIENT
Start: 2023-08-08 | End: 2023-08-08

## 2023-08-07 RX ORDER — TALC
6 POWDER (GRAM) TOPICAL NIGHTLY PRN
Status: DISCONTINUED | OUTPATIENT
Start: 2023-08-08 | End: 2023-08-10 | Stop reason: HOSPADM

## 2023-08-07 RX ORDER — IPRATROPIUM BROMIDE 0.5 MG/2.5ML
0.5 SOLUTION RESPIRATORY (INHALATION) EVERY 4 HOURS
Status: DISCONTINUED | OUTPATIENT
Start: 2023-08-08 | End: 2023-08-08

## 2023-08-07 RX ORDER — ACETAMINOPHEN 325 MG/1
650 TABLET ORAL EVERY 4 HOURS PRN
Status: DISCONTINUED | OUTPATIENT
Start: 2023-08-08 | End: 2023-08-10 | Stop reason: HOSPADM

## 2023-08-07 RX ORDER — IPRATROPIUM BROMIDE AND ALBUTEROL SULFATE 2.5; .5 MG/3ML; MG/3ML
3 SOLUTION RESPIRATORY (INHALATION)
Status: COMPLETED | OUTPATIENT
Start: 2023-08-07 | End: 2023-08-07

## 2023-08-07 RX ADMIN — IPRATROPIUM BROMIDE AND ALBUTEROL SULFATE 3 ML: 2.5; .5 SOLUTION RESPIRATORY (INHALATION) at 09:08

## 2023-08-07 RX ADMIN — AZITHROMYCIN MONOHYDRATE 500 MG: 500 INJECTION, POWDER, LYOPHILIZED, FOR SOLUTION INTRAVENOUS at 11:08

## 2023-08-07 RX ADMIN — FUROSEMIDE 20 MG: 10 INJECTION, SOLUTION INTRAMUSCULAR; INTRAVENOUS at 09:08

## 2023-08-07 RX ADMIN — METHYLPREDNISOLONE SODIUM SUCCINATE 125 MG: 125 INJECTION, POWDER, FOR SOLUTION INTRAMUSCULAR; INTRAVENOUS at 09:08

## 2023-08-07 RX ADMIN — CEFTRIAXONE SODIUM 1 G: 1 INJECTION, POWDER, FOR SOLUTION INTRAMUSCULAR; INTRAVENOUS at 10:08

## 2023-08-07 NOTE — TELEPHONE ENCOUNTER
Attempted to contact patient again.  Unable to leave a message.  Mailbox is full.  Called patient's daughter who reports she spoke to her mother earlier today.  Advised her that I recommend her mother go back to the hospital due to abnormal labs.  She verbalized understanding, and stated she would take her mother back to the hospital today.

## 2023-08-07 NOTE — PROGRESS NOTES
Subjective:       Patient ID: Terri Phelan is a 68 y.o. female.    Chief Complaint: Follow-up    HPI    68-year-old female here for hospital follow-up.  She tried to get in sooner. Originally scheduled for September.    Family and/or Caretaker present at visit?  Yes.  Diagnostic tests reviewed/disposition: I have reviewed all completed as well as pending diagnostic tests at the time of discharge.  Disease/illness education: COPD  Home health/community services discussion/referrals: Patient has home health established at [facility] .   Establishment or re-establishment of referral orders for community resources: No other necessary community resources.   Discussion with other health care providers: No discussion with other health care providers necessary.  I reviewed and reconciled the medications from hospital discharge.    She has had her days.  She feels like she has been doing pretty good overall.  She reports that her breathing is better than before the hospitalization.  She had no discrete event that tipped her over.  She is on Breztri and albuterol as needed.    She has had swelling since she left the hospital.  She started swelling when she was in the bed.  No improvement since discharge.  She went to the ER for this.  She had a negative DVT study.  They are both swollen with on leg not larger than the other.    Discharge summary:  HPI:   66 YO F with PMHx significant for COPD on home O2 presented to the ER for worsening SOB. Per patient's daughter at bedside her symptoms have started yesterday with headache, mild SOB, fatigue. Today she wake up, was having more difficulty to breath. Then later she become confuse and was talking without making sense. Since she saw that her mental state has gotten worse and worse, she has decided to call EMS. At home EMS found that her O2 sat was around 74%. Patient is a frequent flyer and las admission was around 2 weeks ago. When seen in the ER, was very sleepy, reactive to  pain stimulus. She was placed on BIPAP and tolerate well. She has been admitted for COPD exacerbation.        * No surgery found *       Hospital Course:   Ms. Phelan presented with acute on chronic hypoxic and hypercapnic respiratory failure due to COPD exacerbation.  Initially evaluated in the ICU and placed on BiPAP; now transition to home 3 L nasal cannula.  Continuing home inhaler regimen and roflumilast.  She will continue BiPAP nightly and pulmonary rehab.  She is treated with steroids while in house; will finish short course at time of discharge.  She was evaluated by PT/OT who initially recommended inpatient rehabilitation; patient has progressed and now stable for home health at time of discharge.    Review of Systems      Objective:      Physical Exam  Vitals reviewed.   Constitutional:       Appearance: She is well-developed.   HENT:      Head: Normocephalic and atraumatic.      Mouth/Throat:      Pharynx: No oropharyngeal exudate.   Eyes:      General: No scleral icterus.        Right eye: No discharge.         Left eye: No discharge.      Pupils: Pupils are equal, round, and reactive to light.   Neck:      Thyroid: No thyromegaly.      Trachea: No tracheal deviation.   Cardiovascular:      Rate and Rhythm: Normal rate and regular rhythm.      Heart sounds: Normal heart sounds. No murmur heard.     No friction rub. No gallop.   Pulmonary:      Effort: Pulmonary effort is normal. No respiratory distress.      Breath sounds: Normal breath sounds. No wheezing or rales.   Chest:      Chest wall: No tenderness.   Abdominal:      General: Bowel sounds are normal. There is no distension.      Palpations: Abdomen is soft. There is no mass.      Tenderness: There is no abdominal tenderness. There is no guarding or rebound.   Musculoskeletal:         General: Swelling (2+ pitting edema) present. No tenderness. Normal range of motion.      Cervical back: Normal range of motion and neck supple.   Skin:      General: Skin is warm and dry.      Coloration: Skin is not pale.      Findings: No erythema or rash.   Neurological:      Mental Status: She is alert and oriented to person, place, and time.   Psychiatric:         Behavior: Behavior normal.         Assessment:       1. Hospital discharge follow-up    2. COPD exacerbation    3. Leg swelling  - CV US Lower Extremity Veins Bilateral Insufficiency; Future  - COMPRESSION STOCKINGS  - CBC Auto Differential; Future  - Comprehensive Metabolic Panel; Future    4. Localized edema  - CV US Lower Extremity Veins Bilateral Insufficiency; Future      Plan:       1/2.  You breast tree, albuterol as needed.  3/4. Check ultrasound lower extremity, compression stockings ordered.  Check CBC, CMP.  Increase Lasix to 40 mg daily.  Return to clinic in 2 weeks or sooner if needed.

## 2023-08-07 NOTE — TELEPHONE ENCOUNTER
Called patient.  Left a message to contact the on-call nurse regarding abnormal labs.  CO2 is very high at 41.  Recommend patient go to the emergency department.

## 2023-08-08 LAB
ALLENS TEST: ABNORMAL
ANION GAP SERPL CALC-SCNC: 7 MMOL/L (ref 8–16)
BASOPHILS # BLD AUTO: 0.02 K/UL (ref 0–0.2)
BASOPHILS NFR BLD: 0.3 % (ref 0–1.9)
BUN SERPL-MCNC: 12 MG/DL (ref 8–23)
CALCIUM SERPL-MCNC: 9.1 MG/DL (ref 8.7–10.5)
CHLORIDE SERPL-SCNC: 95 MMOL/L (ref 95–110)
CO2 SERPL-SCNC: 41 MMOL/L (ref 23–29)
CREAT SERPL-MCNC: 0.6 MG/DL (ref 0.5–1.4)
DELSYS: ABNORMAL
DIFFERENTIAL METHOD: ABNORMAL
EOSINOPHIL # BLD AUTO: 0 K/UL (ref 0–0.5)
EOSINOPHIL NFR BLD: 0 % (ref 0–8)
EP: 5
EP: 5
EP: 8
ERYTHROCYTE [DISTWIDTH] IN BLOOD BY AUTOMATED COUNT: 13.6 % (ref 11.5–14.5)
ERYTHROCYTE [SEDIMENTATION RATE] IN BLOOD BY WESTERGREN METHOD: 18 MM/H
ERYTHROCYTE [SEDIMENTATION RATE] IN BLOOD BY WESTERGREN METHOD: 18 MM/H
EST. GFR  (NO RACE VARIABLE): >60 ML/MIN/1.73 M^2
FIO2: 30
FIO2: 30
FIO2: 40
GLUCOSE SERPL-MCNC: 140 MG/DL (ref 70–110)
HCO3 UR-SCNC: 46.5 MMOL/L (ref 24–28)
HCO3 UR-SCNC: 49.9 MMOL/L (ref 24–28)
HCO3 UR-SCNC: 50.6 MMOL/L (ref 24–28)
HCT VFR BLD AUTO: 43.2 % (ref 37–48.5)
HCT VFR BLD CALC: 41 %PCV (ref 36–54)
HGB BLD-MCNC: 12.1 G/DL (ref 12–16)
HGB BLD-MCNC: 14 G/DL
IMM GRANULOCYTES # BLD AUTO: 0.03 K/UL (ref 0–0.04)
IMM GRANULOCYTES NFR BLD AUTO: 0.4 % (ref 0–0.5)
IP: 16
IP: 20
IP: 20
LYMPHOCYTES # BLD AUTO: 0.4 K/UL (ref 1–4.8)
LYMPHOCYTES NFR BLD: 5.1 % (ref 18–48)
MAGNESIUM SERPL-MCNC: 1.9 MG/DL (ref 1.6–2.6)
MCH RBC QN AUTO: 26.8 PG (ref 27–31)
MCHC RBC AUTO-ENTMCNC: 28 G/DL (ref 32–36)
MCV RBC AUTO: 96 FL (ref 82–98)
MIN VOL: 8
MIN VOL: 8.4
MODE: ABNORMAL
MONOCYTES # BLD AUTO: 0.1 K/UL (ref 0.3–1)
MONOCYTES NFR BLD: 0.9 % (ref 4–15)
NEUTROPHILS # BLD AUTO: 6.6 K/UL (ref 1.8–7.7)
NEUTROPHILS NFR BLD: 93.3 % (ref 38–73)
NRBC BLD-RTO: 0 /100 WBC
PCO2 BLDA: 103 MMHG (ref 35–45)
PCO2 BLDA: 108.9 MMHG (ref 35–45)
PCO2 BLDA: 98.3 MMHG (ref 35–45)
PH SMN: 7.26 [PH] (ref 7.35–7.45)
PH SMN: 7.27 [PH] (ref 7.35–7.45)
PH SMN: 7.32 [PH] (ref 7.35–7.45)
PLATELET # BLD AUTO: 148 K/UL (ref 150–450)
PMV BLD AUTO: 11.3 FL (ref 9.2–12.9)
PO2 BLDA: 36 MMHG (ref 40–60)
PO2 BLDA: 55 MMHG (ref 80–100)
PO2 BLDA: 63 MMHG (ref 80–100)
POC BE: 19 MMOL/L
POC BE: 23 MMOL/L
POC BE: 24 MMOL/L
POC SATURATED O2: 54 % (ref 95–100)
POC SATURATED O2: 82 % (ref 95–100)
POC SATURATED O2: 85 % (ref 95–100)
POC TCO2: 50 MMOL/L (ref 23–27)
POC TCO2: >50 MMOL/L (ref 23–27)
POC TCO2: >50 MMOL/L (ref 24–29)
POCT GLUCOSE: 177 MG/DL (ref 70–110)
POTASSIUM BLD-SCNC: 3.7 MMOL/L (ref 3.5–5.1)
POTASSIUM SERPL-SCNC: 4.3 MMOL/L (ref 3.5–5.1)
RBC # BLD AUTO: 4.52 M/UL (ref 4–5.4)
SAMPLE: ABNORMAL
SITE: ABNORMAL
SODIUM BLD-SCNC: 141 MMOL/L (ref 136–145)
SODIUM SERPL-SCNC: 143 MMOL/L (ref 136–145)
SP02: 93
SP02: 93
SPONT RATE: 18
WBC # BLD AUTO: 7.04 K/UL (ref 3.9–12.7)

## 2023-08-08 PROCEDURE — 25000003 PHARM REV CODE 250: Performed by: INTERNAL MEDICINE

## 2023-08-08 PROCEDURE — 99497 PR ADVNCD CARE PLAN 30 MIN: ICD-10-PCS | Mod: ,,,

## 2023-08-08 PROCEDURE — 25000242 PHARM REV CODE 250 ALT 637 W/ HCPCS: Mod: HCNC | Performed by: INTERNAL MEDICINE

## 2023-08-08 PROCEDURE — 94640 AIRWAY INHALATION TREATMENT: CPT

## 2023-08-08 PROCEDURE — 99223 1ST HOSP IP/OBS HIGH 75: CPT | Mod: ,,,

## 2023-08-08 PROCEDURE — 99497 ADVNCD CARE PLAN 30 MIN: CPT | Mod: ,,,

## 2023-08-08 PROCEDURE — 80048 BASIC METABOLIC PNL TOTAL CA: CPT | Performed by: HOSPITALIST

## 2023-08-08 PROCEDURE — 99900035 HC TECH TIME PER 15 MIN (STAT): Mod: HCNC

## 2023-08-08 PROCEDURE — 27100171 HC OXYGEN HIGH FLOW UP TO 24 HOURS: Mod: HCNC

## 2023-08-08 PROCEDURE — 25000242 PHARM REV CODE 250 ALT 637 W/ HCPCS: Mod: HCNC | Performed by: HOSPITALIST

## 2023-08-08 PROCEDURE — 82803 BLOOD GASES ANY COMBINATION: CPT | Mod: HCNC

## 2023-08-08 PROCEDURE — 63600175 PHARM REV CODE 636 W HCPCS: Performed by: STUDENT IN AN ORGANIZED HEALTH CARE EDUCATION/TRAINING PROGRAM

## 2023-08-08 PROCEDURE — 27000190 HC CPAP FULL FACE MASK W/VALVE: Mod: HCNC

## 2023-08-08 PROCEDURE — 94660 CPAP INITIATION&MGMT: CPT | Mod: HCNC

## 2023-08-08 PROCEDURE — 36600 WITHDRAWAL OF ARTERIAL BLOOD: CPT | Mod: HCNC

## 2023-08-08 PROCEDURE — 20000000 HC ICU ROOM

## 2023-08-08 PROCEDURE — 83735 ASSAY OF MAGNESIUM: CPT | Performed by: HOSPITALIST

## 2023-08-08 PROCEDURE — 63600175 PHARM REV CODE 636 W HCPCS

## 2023-08-08 PROCEDURE — 63600175 PHARM REV CODE 636 W HCPCS: Performed by: INTERNAL MEDICINE

## 2023-08-08 PROCEDURE — 96367 TX/PROPH/DG ADDL SEQ IV INF: CPT

## 2023-08-08 PROCEDURE — 94761 N-INVAS EAR/PLS OXIMETRY MLT: CPT | Mod: HCNC

## 2023-08-08 PROCEDURE — 85025 COMPLETE CBC W/AUTO DIFF WBC: CPT | Performed by: HOSPITALIST

## 2023-08-08 PROCEDURE — 25000003 PHARM REV CODE 250: Performed by: HOSPITALIST

## 2023-08-08 PROCEDURE — 63600175 PHARM REV CODE 636 W HCPCS: Performed by: HOSPITALIST

## 2023-08-08 PROCEDURE — 25000003 PHARM REV CODE 250: Performed by: STUDENT IN AN ORGANIZED HEALTH CARE EDUCATION/TRAINING PROGRAM

## 2023-08-08 PROCEDURE — 36415 COLL VENOUS BLD VENIPUNCTURE: CPT | Performed by: HOSPITALIST

## 2023-08-08 PROCEDURE — 99223 PR INITIAL HOSPITAL CARE,LEVL III: ICD-10-PCS | Mod: ,,,

## 2023-08-08 RX ORDER — MUPIROCIN 20 MG/G
OINTMENT TOPICAL 2 TIMES DAILY
Status: DISCONTINUED | OUTPATIENT
Start: 2023-08-08 | End: 2023-08-10 | Stop reason: HOSPADM

## 2023-08-08 RX ORDER — AZITHROMYCIN 250 MG/1
250 TABLET, FILM COATED ORAL
Status: DISCONTINUED | OUTPATIENT
Start: 2023-08-09 | End: 2023-08-10 | Stop reason: HOSPADM

## 2023-08-08 RX ORDER — IPRATROPIUM BROMIDE AND ALBUTEROL SULFATE 2.5; .5 MG/3ML; MG/3ML
3 SOLUTION RESPIRATORY (INHALATION) EVERY 6 HOURS
Status: DISCONTINUED | OUTPATIENT
Start: 2023-08-08 | End: 2023-08-08

## 2023-08-08 RX ORDER — AZITHROMYCIN 250 MG/1
500 TABLET, FILM COATED ORAL DAILY
Status: DISCONTINUED | OUTPATIENT
Start: 2023-08-08 | End: 2023-08-08

## 2023-08-08 RX ORDER — SODIUM CHLORIDE 9 MG/ML
INJECTION, SOLUTION INTRAVENOUS
Status: DISCONTINUED | OUTPATIENT
Start: 2023-08-08 | End: 2023-08-10 | Stop reason: HOSPADM

## 2023-08-08 RX ORDER — FUROSEMIDE 40 MG/1
40 TABLET ORAL DAILY
Status: DISCONTINUED | OUTPATIENT
Start: 2023-08-08 | End: 2023-08-08

## 2023-08-08 RX ORDER — FUROSEMIDE 10 MG/ML
20 INJECTION INTRAMUSCULAR; INTRAVENOUS ONCE
Status: COMPLETED | OUTPATIENT
Start: 2023-08-08 | End: 2023-08-08

## 2023-08-08 RX ORDER — ROFLUMILAST 500 UG/1
1 TABLET ORAL DAILY
Status: DISCONTINUED | OUTPATIENT
Start: 2023-08-08 | End: 2023-08-10 | Stop reason: HOSPADM

## 2023-08-08 RX ORDER — NAPROXEN SODIUM 220 MG/1
81 TABLET, FILM COATED ORAL DAILY
Status: DISCONTINUED | OUTPATIENT
Start: 2023-08-08 | End: 2023-08-10 | Stop reason: HOSPADM

## 2023-08-08 RX ORDER — TRAZODONE HYDROCHLORIDE 50 MG/1
50 TABLET ORAL NIGHTLY PRN
Status: DISCONTINUED | OUTPATIENT
Start: 2023-08-08 | End: 2023-08-10 | Stop reason: HOSPADM

## 2023-08-08 RX ORDER — ATORVASTATIN CALCIUM 40 MG/1
40 TABLET, FILM COATED ORAL NIGHTLY
Status: DISCONTINUED | OUTPATIENT
Start: 2023-08-08 | End: 2023-08-10 | Stop reason: HOSPADM

## 2023-08-08 RX ORDER — MAGNESIUM SULFATE HEPTAHYDRATE 40 MG/ML
2 INJECTION, SOLUTION INTRAVENOUS ONCE
Status: COMPLETED | OUTPATIENT
Start: 2023-08-08 | End: 2023-08-08

## 2023-08-08 RX ORDER — ENOXAPARIN SODIUM 100 MG/ML
40 INJECTION SUBCUTANEOUS EVERY 24 HOURS
Status: DISCONTINUED | OUTPATIENT
Start: 2023-08-08 | End: 2023-08-10 | Stop reason: HOSPADM

## 2023-08-08 RX ORDER — DIAZEPAM 2 MG/1
2 TABLET ORAL DAILY PRN
Status: DISCONTINUED | OUTPATIENT
Start: 2023-08-08 | End: 2023-08-10 | Stop reason: HOSPADM

## 2023-08-08 RX ORDER — IPRATROPIUM BROMIDE AND ALBUTEROL SULFATE 2.5; .5 MG/3ML; MG/3ML
3 SOLUTION RESPIRATORY (INHALATION) EVERY 4 HOURS
Status: DISCONTINUED | OUTPATIENT
Start: 2023-08-08 | End: 2023-08-10 | Stop reason: HOSPADM

## 2023-08-08 RX ADMIN — FUROSEMIDE 20 MG: 10 INJECTION, SOLUTION INTRAMUSCULAR; INTRAVENOUS at 05:08

## 2023-08-08 RX ADMIN — ACETAMINOPHEN 650 MG: 325 TABLET ORAL at 05:08

## 2023-08-08 RX ADMIN — ASPIRIN 81 MG CHEWABLE TABLET 81 MG: 81 TABLET CHEWABLE at 09:08

## 2023-08-08 RX ADMIN — MUPIROCIN: 20 OINTMENT TOPICAL at 12:08

## 2023-08-08 RX ADMIN — IPRATROPIUM BROMIDE AND ALBUTEROL SULFATE 3 ML: 2.5; .5 SOLUTION RESPIRATORY (INHALATION) at 07:08

## 2023-08-08 RX ADMIN — MAGNESIUM SULFATE 2 G: 2 INJECTION INTRAVENOUS at 09:08

## 2023-08-08 RX ADMIN — IPRATROPIUM BROMIDE AND ALBUTEROL SULFATE 3 ML: 2.5; .5 SOLUTION RESPIRATORY (INHALATION) at 11:08

## 2023-08-08 RX ADMIN — ROFLUMILAST 500 MCG: 500 TABLET ORAL at 09:08

## 2023-08-08 RX ADMIN — ALBUTEROL SULFATE 2.5 MG: 2.5 SOLUTION RESPIRATORY (INHALATION) at 12:08

## 2023-08-08 RX ADMIN — TRAZODONE HYDROCHLORIDE 50 MG: 50 TABLET ORAL at 09:08

## 2023-08-08 RX ADMIN — CEFTRIAXONE SODIUM 1 G: 1 INJECTION, POWDER, FOR SOLUTION INTRAMUSCULAR; INTRAVENOUS at 11:08

## 2023-08-08 RX ADMIN — MUPIROCIN: 20 OINTMENT TOPICAL at 08:08

## 2023-08-08 RX ADMIN — IPRATROPIUM BROMIDE AND ALBUTEROL SULFATE 3 ML: 2.5; .5 SOLUTION RESPIRATORY (INHALATION) at 03:08

## 2023-08-08 RX ADMIN — SODIUM CHLORIDE: 9 INJECTION, SOLUTION INTRAVENOUS at 09:08

## 2023-08-08 RX ADMIN — ATORVASTATIN CALCIUM 40 MG: 40 TABLET, FILM COATED ORAL at 08:08

## 2023-08-08 RX ADMIN — IPRATROPIUM BROMIDE 0.5 MG: 0.5 SOLUTION RESPIRATORY (INHALATION) at 12:08

## 2023-08-08 RX ADMIN — PREDNISONE 40 MG: 20 TABLET ORAL at 09:08

## 2023-08-08 RX ADMIN — FUROSEMIDE 40 MG: 40 TABLET ORAL at 09:08

## 2023-08-08 RX ADMIN — ENOXAPARIN SODIUM 40 MG: 40 INJECTION SUBCUTANEOUS at 05:08

## 2023-08-08 NOTE — ASSESSMENT & PLAN NOTE
Patient with Hypercapnic and Hypoxic Respiratory failure which is Acute on chronic.  she is not on home oxygen. Supplemental oxygen was provided and noted- Oxygen Concentration (%):  [30-44] 44    .   Signs/symptoms of respiratory failure include- tachypnea, increased work of breathing and respiratory distress. Contributing diagnoses includes - COPD Labs and images were reviewed. Patient Has recent ABG, which has been reviewed. Will treat underlying causes and adjust management of respiratory failure as follows-     - Continue duonebs and COPD management as below  - Repeat ABG in AM and wean BiPAP as needed  - Continuous pulse oximetry  - Holding on CAP (ceftriaxone) coverage given lack of cough, sputum, fevers, and lobar PNA on CXR  - PO azithromycin

## 2023-08-08 NOTE — SUBJECTIVE & OBJECTIVE
Past Medical History:   Diagnosis Date    COPD (chronic obstructive pulmonary disease)     Hypertension        Past Surgical History:   Procedure Laterality Date     SECTION      HERNIA REPAIR      HYSTERECTOMY         Review of patient's allergies indicates:   Allergen Reactions    Codeine Nausea And Vomiting    Morphine Other (See Comments)       No current facility-administered medications on file prior to encounter.     Current Outpatient Medications on File Prior to Encounter   Medication Sig    acetaminophen (TYLENOL) 500 MG tablet Take 500 mg by mouth every 6 (six) hours as needed for Pain.    albuterol (PROVENTIL/VENTOLIN HFA) 90 mcg/actuation inhaler Inhale 2 puffs into the lungs every 6 (six) hours as needed.    albuterol-ipratropium (DUO-NEB) 2.5 mg-0.5 mg/3 mL nebulizer solution Take 3 mLs by nebulization every 6 (six) hours as needed for Wheezing.    alendronate (FOSAMAX) 35 MG tablet Take 35 mg by mouth every Tuesday.    aspirin 81 MG Chew Take 81 mg by mouth once daily.    azithromycin (Z-OLY) 250 MG tablet Take 1 tablet (250 mg total) by mouth 3 (three) times a week. (Patient taking differently: Take 250 mg by mouth every Mon, Wed, Fri.)    budesonide-glycopyr-formoterol (BREZTRI AEROSPHERE) 160-9-4.8 mcg/actuation HFAA Inhale 2 puffs into the lungs 2 (two) times daily.    dextromethorphan-guaiFENesin  mg (MUCINEX DM)  mg per 12 hr tablet Take 1 tablet by mouth every 12 (twelve) hours.    ergocalciferol (ERGOCALCIFEROL) 50,000 unit Cap Take 50,000 Units by mouth every Tuesday.    fluticasone propionate (FLONASE) 50 mcg/actuation nasal spray 1 spray by Each Nostril route daily as needed for Allergies.    OXYGEN-AIR DELIVERY SYSTEMS MISC 3 L by Nasal route.    roflumilast (DALIRESP) 500 mcg Tab Take 1 tablet by mouth once daily.    traZODone (DESYREL) 50 MG tablet Take 1 tablet (50 mg total) by mouth nightly as needed for Insomnia.    dicyclomine (BENTYL) 20 mg tablet Take 1 tablet  (20 mg total) by mouth 3 (three) times daily as needed (abdominal pain).    furosemide (LASIX) 40 MG tablet Take 1 tablet (40 mg total) by mouth once daily.    LORazepam (ATIVAN) 0.5 MG tablet Take 1 tablet (0.5 mg total) by mouth every 8 (eight) hours as needed for Anxiety.    multivit with min-folic acid (WOMEN'S MULTIVITAMIN GUMMIES) 200 mcg Chew Take 2 tablets by mouth once daily.     Family History    None       Tobacco Use    Smoking status: Former     Current packs/day: 1.00     Average packs/day: 1 pack/day for 20.0 years (20.0 ttl pk-yrs)     Types: Cigarettes    Smokeless tobacco: Never   Substance and Sexual Activity    Alcohol use: Not Currently     Alcohol/week: 1.0 standard drink of alcohol     Types: 1 Glasses of wine per week    Drug use: No    Sexual activity: Not Currently     Review of Systems   All other systems reviewed and are negative.    Objective:     Vital Signs (Most Recent):  Temp: 98 °F (36.7 °C) (08/08/23 0315)  Pulse: 66 (08/08/23 0600)  Resp: (!) 21 (08/08/23 0600)  BP: (!) 127/58 (08/08/23 0600)  SpO2: 99 % (08/08/23 0600) Vital Signs (24h Range):  Temp:  [97.7 °F (36.5 °C)-98.9 °F (37.2 °C)] 98 °F (36.7 °C)  Pulse:  [61-85] 66  Resp:  [18-34] 21  SpO2:  [89 %-99 %] 99 %  BP: (117-163)/(56-74) 127/58     Weight: 82.5 kg (181 lb 14.1 oz)  Body mass index is 30.27 kg/m².     Physical Exam  Vitals and nursing note reviewed.   Constitutional:       General: She is not in acute distress.     Appearance: She is obese.      Comments: BiPAP in place   HENT:      Head: Normocephalic and atraumatic.      Mouth/Throat:      Mouth: Mucous membranes are dry.   Eyes:      Extraocular Movements: Extraocular movements intact.      Pupils: Pupils are equal, round, and reactive to light.   Cardiovascular:      Rate and Rhythm: Normal rate and regular rhythm.      Pulses: Normal pulses.      Heart sounds: Normal heart sounds.   Pulmonary:      Effort: Pulmonary effort is normal.      Breath sounds: No  stridor. Wheezing and rales present.   Abdominal:      General: Abdomen is flat. Bowel sounds are normal. There is no distension.      Palpations: Abdomen is soft.      Tenderness: There is no abdominal tenderness.   Musculoskeletal:      Right lower le+ Edema present.      Left lower le+ Edema present.   Skin:     General: Skin is warm.      Capillary Refill: Capillary refill takes less than 2 seconds.   Neurological:      General: No focal deficit present.      Mental Status: She is alert and oriented to person, place, and time. Mental status is at baseline.              CRANIAL NERVES     CN III, IV, VI   Pupils are equal, round, and reactive to light.       Significant Labs: All pertinent labs within the past 24 hours have been reviewed.    Significant Imaging: I have reviewed all pertinent imaging results/findings within the past 24 hours.

## 2023-08-08 NOTE — HPI
69 yo female with a PMHx of COPD and former tobacco use, HTN, and mild pulmonary hypertension here for SOB.    Patient states she has been feeling more dyspneic over last few days.  Has noticed audible wheezing as well.  Feels like previous COPD exacerbations.  Inhalers did not help.  Has not had increase in cough or sputum production.  Denies any fevers, chills, chest pain, orthopnea, PND, abdominal pain, nausea, vomiting.  Does have some chronic leg swelling which she is on Lasix 4.  Recent echo showed very minimal elevated RVSP of 37 with RAP of 8.    In the ED, patient was noted to have O2 saturations in high 80s.  Other vital signs were stable.  Patient was tripoding and an acute respiratory distress.  BiPAP was placed at settings of 16/10.  ABG at that time showed a pH of around 7.2 and a CO2 of 100.  Checks x-ray with some atelectasis but no obvious lobar pneumonia.  Patient was given Solu-Medrol, antibiotics, and DuoNebs.  Transfer to ICU for continued care.

## 2023-08-08 NOTE — CONSULTS
[facility] Pulmonary/Critical Care Consult Note      Patient:Terri Phelan  Age:68 y.o.  MRN:[id number]  Admit date:2023   LOS:0 day(s)     Primary Attending: Tirso Valenzuela MD  Consult Attending: Katie Adams MD  Consult Fellow: Magdaleno Rivers MD  Reason for Consult: respiratory failure 2/2 COPD exacerbation     HPI:       Ms. Terri Phelan in a 68 y.o. F with a PMHx of poorly controlled COPD, HTN, CAD, and ~40 pack-year smoking hx who [place] [facility] [place]'s appointment on  for elevated CO2 on lab testing. She had a CO2 of 41 while at the doctor's office, and the lab  advised her to go to the ED for emergent treatment of hypercapnia. She endorses having a HA, SOB, weakness, and confusion for the past 2 weeks. Of note, she is on home O2 at 3L and has had several recent hospitalizations for COPD exacerbations.    In the ED, workup was remarkable for a CO2 42 on CMP, ABG with pH 7.237, pCO2 106.6, pO2 81, and HCO3 45.4. CXR showed basilar lung densities c/w small pleural effusions, atelectasis, and mild infiltrates. She received solumedrol 125, Lasix 40, and ceft/azithro 1x before ICU admission.      MEDICAL HISTORY:      Past Medical History:  Past Medical History:   Diagnosis Date    COPD (chronic obstructive pulmonary disease)     Hypertension         Past Surgical History:  Past Surgical History:   Procedure Laterality Date     SECTION      HERNIA REPAIR      HYSTERECTOMY           Family History:   History reviewed. No pertinent family history.      Social History:  Social History     Tobacco Use    Smoking status: Former     Current packs/day: 1.00     Average packs/day: 1 pack/day for 20.0 years (20.0 ttl pk-yrs)     Types: Cigarettes    Smokeless tobacco: Never   Substance Use Topics    Alcohol use: Not Currently     Alcohol/week: 1.0 standard drink of alcohol     Types: 1 Glasses of wine per week    Drug use: No         Allergies:  Review of patient's allergies indicates:    Allergen Reactions    Codeine Nausea And Vomiting    Morphine Other (See Comments)       Home Medications:  Current Outpatient Medications   Medication Instructions    acetaminophen (TYLENOL) 500 mg, Oral, Every 6 hours PRN    albuterol (PROVENTIL/VENTOLIN HFA) 90 mcg/actuation inhaler 2 puffs, Inhalation, Every 6 hours PRN    albuterol-ipratropium (DUO-NEB) 2.5 mg-0.5 mg/3 mL nebulizer solution 3 mLs, Nebulization, Every 6 hours PRN    alendronate (FOSAMAX) 35 mg, Oral, Every Tuesday    aspirin 81 mg, Oral, Daily    azithromycin (Z-OLY) 250 mg, Oral, Three times weekly    budesonide-glycopyr-formoterol (BREZTRI AEROSPHERE) 160-9-4.8 mcg/actuation HFAA 2 puffs, Inhalation, 2 times daily    dextromethorphan-guaiFENesin  mg (MUCINEX DM)  mg per 12 hr tablet 1 tablet, Oral, Every 12 hours    dicyclomine (BENTYL) 20 mg, Oral, 3 times daily PRN    ergocalciferol (ERGOCALCIFEROL) 50,000 Units, Oral, Every Tuesday    fluticasone propionate (FLONASE) 50 mcg/actuation nasal spray 1 spray, Each Nostril, Daily PRN    furosemide (LASIX) 40 mg, Oral, Daily    LORazepam (ATIVAN) 0.5 mg, Oral, Every 8 hours PRN    multivit with min-folic acid (WOMEN'S MULTIVITAMIN GUMMIES) 200 mcg Chew 2 tablets, Oral, Daily    OXYGEN-AIR DELIVERY SYSTEMS MISC 3 L, Nasal    roflumilast (DALIRESP) 500 mcg Tab 1 tablet, Oral, Daily    traZODone (DESYREL) 50 mg, Oral, Nightly PRN        OBJECTIVE DATA:      Vital Signs:  Vitals:    08/08/23 1030   BP: (!) 147/64   Pulse: 88   Resp: (!) 22   Temp:        Intake/Output:    Intake/Output Summary (Last 24 hours) at 8/8/2023 1049  Last data filed at 8/8/2023 1000  Gross per 24 hour   Intake 360.17 ml   Output 1650 ml   Net -1289.83 ml          Physical Exam:  Constitutional: cooperative, calm, appears stated age. Obese  HEENT: Normocephalic, atraumatic, dry mucous membranes  Neck: Supple, no masses, trachea not deviated.  Resp: Symmetrical, normal effort of breathing, wheezing bilaterally    Cardio: RRR, S1 and S2 normal, no murmurs or added sounds.  GI: Soft, non-tender, bowel sounds active.  Extremities: 1+ BLE edema, peripheral pulses 2+ and symmetric, extremities warm.  Skin: No rashes, bruises, or lesions.    Neurologic: Alert and oriented x3, follows commands, moves extremities spontaneously. Some long pauses during conversation     Laboratory/Imaging:  Recent Labs   Lab 23  1047 23  2120 23  0243 23  0430   WBC 8.09 8.80  --  7.04   HGB 12.1 12.3  --  12.1   HCT 44.0 43.6 41 43.2    159  --  148*    145  --  143   K 3.9 4.1  --  4.3   CL 95 97  --  95   CREATININE 0.7 0.7  --  0.6   BUN 15 15  --  12   CO2 41* 42*  --  41*   ALT 13 16  --   --    AST 16 17  --   --        Microbiology:  None     [id number] CXR: Mild density at the lung bases likely relates to small pleural effusions, atelectasis and mild basilar infiltrates.    Medications:  Scheduled:    albuterol-ipratropium  3 mL Nebulization Q4H    aspirin  81 mg Oral Daily    atorvastatin  40 mg Oral QHS    azithromycin  500 mg Oral Daily    budesonide-glycopyr-formoterol  2 puff Inhalation BID    enoxparin  40 mg Subcutaneous Q24H (prophylaxis, 1700)    furosemide  40 mg Oral Daily    magnesium sulfate IVPB  2 g Intravenous Once    mupirocin   Nasal BID    predniSONE  40 mg Oral Daily    roflumilast  1 tablet Oral Daily      PRN:   sodium chloride 0.9%, acetaminophen, melatonin, ondansetron, sodium chloride 0.9%, traZODone     Problem List:  Patient Active Problem List   Diagnosis    Hypertension    Former smoker    COPD (chronic obstructive pulmonary disease)    Adenoma of ascending colon    CAD (coronary artery disease)    Centrilobular emphysema    Diverticular disease of colon    Hemorrhoids    History of myocardial infarction    Mucous in stools    Obstructive sleep apnea syndrome    Oxygen dependent    Prolapsed internal hemorrhoids    Palliative care encounter    Acute on chronic  respiratory failure with hypoxia and hypercapnia    Major depression    Breast mass    Debility    Dyspnea    Athscl heart disease of native coronary artery w/o ang pctrs    Pulmonary nodule    History of tobacco abuse       Assessment/Plan:     Terri Phelan is a 68 y.o. female with a PMHx of poorly controlled COPD, HTN, CAD, and 40 pack-year smoking hx who presents with SOB, HA, weakness, and confusion for 2 weeks. Current presentation suggestive of respiratory failure d/t COPD exacerbation. ABG shows respiratory acidosis with adequate metabolic compensation. CXR with nonspecific minor basilar densities. Current episode likely precipitated to poor  control of severe COPD given hx of recent hospitalizations, underlying infectious etiology unlikely.      Neuro  # Anxiety  - Takes Ativan PRN at home for situational anxiety and anxiety related to dyspnea   - Diazepam 2mg PRN daily for anxiety       Cardiovascular  # Hypertension   - Documented history of HTN but not on vasoactive agents at home   - BP controlled without intervention   - Continue to monitor     # Coronary Artery Disease    - Continue home aspirin 81 and atorvastatin 40      Respiratory  # Acute on chronic respiratory failure 2/2 COPD exacerbation  - Currently on BiPAP 20/5 @ 30%, satting well  - ABG 8/8 --> pH 7.26, pCO2 103, pO2 63, HCO3 47  - Adjust ventilation settings as needed to reduce hypercapnea   - Continue DuoNebs q4h, roflumilast 500, and prednisone 40  - Continue ceftriaxone 1g daily and continue azithromycin 250 qMWF  - Patient follows with palliative care and has emphasized this visit that she does not wish to be intubated. Code currently DNR.   - CXR notable for bilateral pleural effusions  - S/p 40mg PO lasix today  - 20mg IV lasix today and likely again tomorrow pending fluid assessment         GI/FEN  - Regular adult diet  - No IVFs  - Replenish electrolytes as needed   - No acute issues      Renal  - BUN 12, creat 0.6, GFR >60  -  intake 250 mL, UOP 1450 mL, net -1200 mL since admission  - Diuresis with lasix as above   - Strict I&Os       Heme  - H&H 12.1/43.2  - No active bleeding  - No acute issues      Infectious Disease  # Potential Lower Respiratory Infection   - CXR w/ basilar lung densities, potentially inflitrates  - Afebrile, no signs and symptoms of infection, WBC 7.04 with 93.3% neutrophils  - Uncertain if infectious etiology   - Ceftriaxone 1g daily, azithromycin 250mg qMWF      Endocrine  - Glucose 140, goal 140-180  - No acute issues      Feeding: reg diet  Analgesia: N/A  Sedation: N/A  Thrombo PPX: Lovenox  Head of Bed: Elevated >30 degrees   Ulcer PPX: Not indicated   Glucose: Goal 140-180  SBT/SAT: wean BiPAP as tolerated  Bowels: None  Indwelling Lines: PIV x2  Deescalation Abx: CTX and azithromycin as above     Code: DNR  Dispo: Continue ICU care.      Thank you for allowing us to participate in the care of this patient. We will continue to follow.        Ministerio Barron, MS4  [facility] [employer]    I have reviewed Student Doctor Beatrice's documentation and have made adjustments as necessary.     Elio Miller MD   [facility], HO-1  Pulmonary/Critical Care Service

## 2023-08-08 NOTE — ED PROVIDER NOTES
Encounter Date: 2023       History     Chief Complaint   Patient presents with    abnormal labs     PT instructed to come to the ER because she was notified about elevated CO2. PT has COPD.   Home oxygen 3LNC     Patient presents to the emergency department at the request of primary care provider for elevated CO2 level.  CO2 level was 41 on blood work taken today, it is usually in the 30s.  Patient has a history of COPD and is on 3 L nasal cannula at all times.  She is complaining of shortness of breath.  She denies any chest pain/pressure/tightness.  Denies any nausea or vomiting.      Review of patient's allergies indicates:   Allergen Reactions    Codeine Nausea And Vomiting    Morphine Other (See Comments)     Past Medical History:   Diagnosis Date    COPD (chronic obstructive pulmonary disease)     Hypertension      Past Surgical History:   Procedure Laterality Date     SECTION      HERNIA REPAIR      HYSTERECTOMY       History reviewed. No pertinent family history.  Social History     Tobacco Use    Smoking status: Former     Current packs/day: 1.00     Average packs/day: 1 pack/day for 20.0 years (20.0 ttl pk-yrs)     Types: Cigarettes    Smokeless tobacco: Never   Substance Use Topics    Alcohol use: Not Currently     Alcohol/week: 1.0 standard drink of alcohol     Types: 1 Glasses of wine per week    Drug use: No     Review of Systems   Respiratory:  Positive for shortness of breath.        Physical Exam     Initial Vitals [23]   BP Pulse Resp Temp SpO2   (!) 152/63 84 20 98.3 °F (36.8 °C) 96 %      MAP       --         Physical Exam    Vitals reviewed.  Constitutional: She appears well-developed.   Cardiovascular:  Normal rate and regular rhythm.           No murmur heard.  Pulmonary/Chest:   Decreased breath sounds bilaterally no wheezes   Abdominal: Abdomen is soft. There is no abdominal tenderness.   Musculoskeletal:         General: Normal range of motion.     Neurological: She  is alert and oriented to person, place, and time. She has normal strength.   Skin: Skin is warm.         ED Course   Critical Care    Date/Time: 8/7/2023 10:15 PM    Performed by: David Summers DO  Authorized by: David Summers DO  Direct patient critical care time: 20 minutes  Additional history critical care time: 10 minutes  Ordering / reviewing critical care time: 10 minutes  Documentation critical care time: 5 minutes  Consulting other physicians critical care time: 5 minutes  Total critical care time (exclusive of procedural time) : 50 minutes  Critical care time was exclusive of separately billable procedures and treating other patients.  Critical care was necessary to treat or prevent imminent or life-threatening deterioration of the following conditions: respiratory failure.  Critical care was time spent personally by me on the following activities: development of treatment plan with patient or surrogate, discussions with consultants, interpretation of cardiac output measurements, evaluation of patient's response to treatment, examination of patient, obtaining history from patient or surrogate, ordering and performing treatments and interventions, ordering and review of laboratory studies, ordering and review of radiographic studies, pulse oximetry, re-evaluation of patient's condition and review of old charts.        Labs Reviewed   CBC W/ AUTO DIFFERENTIAL - Abnormal; Notable for the following components:       Result Value    MCHC 28.2 (*)     Mono # 1.1 (*)     All other components within normal limits   COMPREHENSIVE METABOLIC PANEL - Abnormal; Notable for the following components:    CO2 42 (*)     Glucose 132 (*)     Alkaline Phosphatase 47 (*)     Anion Gap 6 (*)     All other components within normal limits    Narrative:        CO2 critical result(s) called and verbal readback obtained from   Trevon Baxter RN. by [id number] 08/07/2023 22:04   ISTAT PROCEDURE - Abnormal; Notable for  the following components:    POC PH 7.237 (*)     POC PCO2 106.6 (*)     POC HCO3 45.4 (*)     POC SATURATED O2 92 (*)     POC TCO2 49 (*)     All other components within normal limits   TROPONIN I   B-TYPE NATRIURETIC PEPTIDE     EKG Readings: (Independently Interpreted)   Sinus rhythm rate of 76, right axis deviation no ST or T-wave abnormalities interpreted by me     ECG Results              EKG 12-lead (Final result)  Result time 08/08/23 11:24:30      Final result by Interface, Lab [facility] (08/08/23 11:24:30)                   Narrative:    Test Reason : J44.1,    Vent. Rate : 076 BPM     Atrial Rate : 076 BPM     P-R Int : 134 ms          QRS Dur : 072 ms      QT Int : 360 ms       P-R-T Axes : 060 078 070 degrees     QTc Int : 405 ms    Normal sinus rhythm  Normal ECG  When compared with ECG of 09-JUL-2023 15:16,  No significant change was found  Confirmed by Katya KENYON, Musa MEYER ([id number]) on 8/8/2023 11:24:24 AM    Referred By: PANCHITO   SELF           Confirmed By:Musa Aldana MD                                  Imaging Results              X-Ray Chest AP Portable (Final result)  Result time 08/07/23 22:11:07      Final result by Jonathan Brunner MD (08/07/23 22:11:07)                   Impression:      Mild density at the lung bases likely relates to small pleural effusions, atelectasis and mild basilar infiltrates.      Electronically signed by: Jonathan Brunner  Date:    08/07/2023  Time:    22:11               Narrative:    EXAMINATION:  XR CHEST AP PORTABLE    CLINICAL HISTORY:  Chronic obstructive pulmonary disease with (acute) exacerbation    TECHNIQUE:  Single frontal view of the chest was performed.    COMPARISON:  Chest radiograph July 9, 2023    FINDINGS:  Single portable chest view is submitted.  Objects overlie the patient.  The cardiomediastinal silhouette appears stable.  Aortic atherosclerotic change noted.    Accentuated attenuation of the lung bases is in part due to overlying soft  tissue attenuation however there is suspected small pleural effusions, left greater than right, as well as atelectasis and mild basilar infiltrates.  There is no evidence for pneumothorax.    The osseous structures demonstrate chronic change.                                       Medications   sodium chloride 0.9% flush 10 mL (has no administration in time range)   acetaminophen tablet 650 mg (has no administration in time range)   ondansetron disintegrating tablet 8 mg (has no administration in time range)   melatonin tablet 6 mg (has no administration in time range)   predniSONE tablet 40 mg (40 mg Oral Given 8/8/23 0924)   albuterol-ipratropium 2.5 mg-0.5 mg/3 mL nebulizer solution 3 mL (3 mLs Nebulization Given 8/8/23 1135)   aspirin chewable tablet 81 mg (81 mg Oral Given 8/8/23 0924)   furosemide tablet 40 mg (40 mg Oral Given 8/8/23 0924)   roflumilast (DALIRESP) tablet 500 mcg (500 mcg Oral Given 8/8/23 0924)   traZODone tablet 50 mg (has no administration in time range)   atorvastatin tablet 40 mg (has no administration in time range)   0.9%  NaCl infusion ( Intravenous Stopped 8/8/23 1255)   enoxaparin injection 40 mg (has no administration in time range)   mupirocin 2 % ointment ( Nasal Given 8/8/23 1224)   budesonide-glycopyr-formoterol 160-9-4.8 mcg/actuation HFAA 2 puff (2 puffs Inhalation Given 8/8/23 1226)   cefTRIAXone (ROCEPHIN) 1 g in dextrose 5 % in water (D5W) 100 mL IVPB (MB+) (has no administration in time range)   azithromycin tablet 250 mg (has no administration in time range)   albuterol-ipratropium 2.5 mg-0.5 mg/3 mL nebulizer solution 3 mL (3 mLs Nebulization Given 8/7/23 2132)   methylPREDNISolone sodium succinate injection 125 mg (125 mg Intravenous Given 8/7/23 2138)   furosemide injection 20 mg (20 mg Intravenous Given 8/7/23 2144)   cefTRIAXone (ROCEPHIN) 1 g in dextrose 5 % in water (D5W) 100 mL IVPB (MB+) (0 g Intravenous Stopped 8/7/23 2316)   azithromycin (ZITHROMAX) 500 mg in  dextrose 5 % (D5W) 250 mL IVPB (Vial-Mate) (0 mg Intravenous Stopped 8/8/23 0036)   magnesium sulfate 2g in water 50mL IVPB (premix) ( Intravenous Verify Only 8/8/23 1100)     Medical Decision Making:   ED Management:  Repeat VBG shows no change in patient's pCO2 however her mental status is improving.  We will increase the pressure support on BiPAP, [facility] has been called for admission they will admit the patient to the ICU.  She will be re-evaluated in the ICU to interpret further blood gas results.             ED Course as of 08/08/23 1507   Mon Aug 07, 2023   2125 ISTAT PROCEDURE(!!)  Respiratory acidosis, we will place patient on BiPAP [CD]   2214 Comprehensive metabolic panel(!!)  CO2 of 42 with hyperglycemia [CD]   2214 CBC auto differential(!)  Nonspecific [CD]   2214 Troponin I  Within normal limits [CD]   2214 Brain natriuretic peptide  Within normal limits [CD]   2214 X-Ray Chest AP Portable  Mild densities noted in the lung bases [CD]      ED Course User Index  [CD] David Summers DO                 Clinical Impression:   Final diagnoses:  [J44.1] COPD exacerbation (Primary)  [R06.89] Hypercapnia        ED Disposition Condition    Observation                 David Summers DO  08/08/23 1512

## 2023-08-08 NOTE — TREATMENT PLAN
Seen and examined. Ms. Phelan presents with acute on chronic hypoxic and hypercapnic respiratory failure due to COPD exacerbation. Initially on BiPAP, now on NC. Continue steroids, nebs, azithromycin. Discussed with Pulmonology, will have US assessment with Pulm.    Tirso Valenzuela MD  [facility]

## 2023-08-08 NOTE — ASSESSMENT & PLAN NOTE
Mrs Phelan is a 68 y/oF with a pmx advanced COPD and former tobacco use, HTN, and mild pulmonary hypertension who presented with acute on chronic hypoxic and hypercapnic respiratory failure due to COPD exacerbation.  Pt was  initially on continuous BiPAP in the ICU, now on NC, tolerating well. Palliative care consulted for goals of care/ advanced care planning in a pt with end stage copd.     -Pt is well known to this palliative care provider from previous admissions and clinic follow up. Pt seen resting in bed, comfortably on NC just finished breakfast.  Pt reports feeling improved since admission and back near baseline.   -Pt lives at home with her daughter/MPOA, Mulugeta Phelan.  Pt remains independent of her ADLs.  Pt is currently receiving home health but is anxious to resume pulmonary rehab at [facility] as this intervention greatly improves the quality of pts life.   -Pt recently met with new pulmonologist and remains optimistic about possible improvements to daily medication regimens.   -Code status discussed.  DNR order entered into chart  See separate ACP note.   -Pts end stage COPD is a qualifying hospice diagnosis.  However, this option does not align with pts goals of care at this time.  Pt accepts terminal diagnosis. However, is not willing to transition to solely comfort focused care as she improves with each admission and returns to baseline and has unquestioned capacity to set these goals. Pt remains appreciative of all treatment teams.  Extensive hospice discussion held during last clinic appointment with pts daughter present.  Family aware of hospice resources available should goals change.   -Pt will follow up in the outpatient palliative care clinic in 2 weeks.  Appt date 8/22/2023.

## 2023-08-08 NOTE — MEDICAL/APP STUDENT
[facility] Pulmonary/Critical Care Consult Note      Patient:Terri Phelan  Age:68 y.o.  MRN:[id number]  Admit date:2023   LOS:0 day(s)     Primary Attending: Tirso Valenzuela MD  Consult Attending: Katie Adams MD  Consult Fellow: Magdaleno Rivers MD  Reason for Consult: respiratory failure 2/2 COPD exacerbation     HPI:       Ms. Terri Phelan in a 68 y.o. F with a PMHx of poorly controlled COPD, HTN, CAD, and ~40 pack-year smoking hx who presented [place] [facility] [place] a doctor's appointment on  for elevated CO2 on lab testing. She has a CO2 of 41 while at the doctor's office, and her doctor sent her to the ED for emergent treatment of hypercapnia. She endorses having a HA, SOB, weakness, and confusion for the past 2 weeks. Of note, she is on home O2 at 3L and has had several recent hospitalizations for COPD exacerbations.    In the ED, workup was remarkable for a CO2 42 on CMP, ABG with pH 7.237, pCO2 106.6, pO2 81, and HCO3 45.4. CXR showed basilar lung densities c/w small pleural effusions, atelectasis, and mild infiltrates. She received solumedrol 125, Lasix 40, and ceft/azithro 1x before ICU admission.      MEDICAL HISTORY:      Past Medical History:  Past Medical History:   Diagnosis Date    COPD (chronic obstructive pulmonary disease)     Hypertension         Past Surgical History:  Past Surgical History:   Procedure Laterality Date     SECTION      HERNIA REPAIR      HYSTERECTOMY           Family History:   History reviewed. No pertinent family history.      Social History:  Social History     Tobacco Use    Smoking status: Former     Current packs/day: 1.00     Average packs/day: 1 pack/day for 20.0 years (20.0 ttl pk-yrs)     Types: Cigarettes    Smokeless tobacco: Never   Substance Use Topics    Alcohol use: Not Currently     Alcohol/week: 1.0 standard drink of alcohol     Types: 1 Glasses of wine per week    Drug use: No         Allergies:  Review of patient's allergies indicates:    Allergen Reactions    Codeine Nausea And Vomiting    Morphine Other (See Comments)       Home Medications:  Current Outpatient Medications   Medication Instructions    acetaminophen (TYLENOL) 500 mg, Oral, Every 6 hours PRN    albuterol (PROVENTIL/VENTOLIN HFA) 90 mcg/actuation inhaler 2 puffs, Inhalation, Every 6 hours PRN    albuterol-ipratropium (DUO-NEB) 2.5 mg-0.5 mg/3 mL nebulizer solution 3 mLs, Nebulization, Every 6 hours PRN    alendronate (FOSAMAX) 35 mg, Oral, Every Tuesday    aspirin 81 mg, Oral, Daily    azithromycin (Z-OLY) 250 mg, Oral, Three times weekly    budesonide-glycopyr-formoterol (BREZTRI AEROSPHERE) 160-9-4.8 mcg/actuation HFAA 2 puffs, Inhalation, 2 times daily    dextromethorphan-guaiFENesin  mg (MUCINEX DM)  mg per 12 hr tablet 1 tablet, Oral, Every 12 hours    dicyclomine (BENTYL) 20 mg, Oral, 3 times daily PRN    ergocalciferol (ERGOCALCIFEROL) 50,000 Units, Oral, Every Tuesday    fluticasone propionate (FLONASE) 50 mcg/actuation nasal spray 1 spray, Each Nostril, Daily PRN    furosemide (LASIX) 40 mg, Oral, Daily    LORazepam (ATIVAN) 0.5 mg, Oral, Every 8 hours PRN    multivit with min-folic acid (WOMEN'S MULTIVITAMIN GUMMIES) 200 mcg Chew 2 tablets, Oral, Daily    OXYGEN-AIR DELIVERY SYSTEMS MISC 3 L, Nasal    roflumilast (DALIRESP) 500 mcg Tab 1 tablet, Oral, Daily    traZODone (DESYREL) 50 mg, Oral, Nightly PRN        OBJECTIVE DATA:      Vital Signs:  Vitals:    08/08/23 0924   BP: (!) 145/65   Pulse:    Resp:    Temp:        Intake/Output:    Intake/Output Summary (Last 24 hours) at 8/8/2023 0952  Last data filed at 8/8/2023 0700  Gross per 24 hour   Intake 250.02 ml   Output 1450 ml   Net -1199.98 ml        Physical Exam:  Constitutional: cooperative, calm, appears stated age. Obese  HEENT: NCAT, PERRL, dry mucous membranes  Neck: Supple, no masses, trachea not deviated.  Resp: Symmetrical, normal effort of breathing, wheezing bilaterally   Cardio: RRR, S1 and S2  normal, no murmurs or added sounds.  GI: Soft, non-tender, bowel sounds active.  Extremities: 1+ BLE edema, peripheral pulses 2+ and symmetric, extremities warm.  Skin: No rashes, bruises, or lesions.    Neurologic: Alert and oriented x3, follows commands, moves extremities spontaneously. Some long pauses d/t issues concentrating during conversation     Laboratory/Imaging:  Recent Labs   Lab 08/07/23  1047 08/07/23  2120 08/08/23  0243 08/08/23  0430   WBC 8.09 8.80  --  7.04   HGB 12.1 12.3  --  12.1   HCT 44.0 43.6 41 43.2    159  --  148*    145  --  143   K 3.9 4.1  --  4.3   CL 95 97  --  95   CREATININE 0.7 0.7  --  0.6   BUN 15 15  --  12   CO2 41* 42*  --  41*   ALT 13 16  --   --    AST 16 17  --   --        Microbiology:  None     Imaging:  Imaging Results              X-Ray Chest AP Portable (Final result)  Result time 08/07/23 22:11:07      Final result by Jonathan Brunner MD (08/07/23 22:11:07)                   Impression:      Mild density at the lung bases likely relates to small pleural effusions, atelectasis and mild basilar infiltrates.      Electronically signed by: Jonathan Brunner  Date:    08/07/2023  Time:    22:11               Narrative:    EXAMINATION:  XR CHEST AP PORTABLE    CLINICAL HISTORY:  Chronic obstructive pulmonary disease with (acute) exacerbation    TECHNIQUE:  Single frontal view of the chest was performed.    COMPARISON:  Chest radiograph July 9, 2023    FINDINGS:  Single portable chest view is submitted.  Objects overlie the patient.  The cardiomediastinal silhouette appears stable.  Aortic atherosclerotic change noted.    Accentuated attenuation of the lung bases is in part due to overlying soft tissue attenuation however there is suspected small pleural effusions, left greater than right, as well as atelectasis and mild basilar infiltrates.  There is no evidence for pneumothorax.    The osseous structures demonstrate chronic change.                                        Medications:   albuterol-ipratropium  3 mL Nebulization Q4H    aspirin  81 mg Oral Daily    atorvastatin  40 mg Oral QHS    azithromycin  500 mg Oral Daily    furosemide  40 mg Oral Daily    magnesium sulfate IVPB  2 g Intravenous Once    predniSONE  40 mg Oral Daily    roflumilast  1 tablet Oral Daily             sodium chloride 0.9%, acetaminophen, melatonin, ondansetron, sodium chloride 0.9%, traZODone     Problem List:  Patient Active Problem List   Diagnosis    Hypertension    Former smoker    COPD (chronic obstructive pulmonary disease)    Adenoma of ascending colon    CAD (coronary artery disease)    Centrilobular emphysema    Diverticular disease of colon    Hemorrhoids    History of myocardial infarction    Mucous in stools    Obstructive sleep apnea syndrome    Oxygen dependent    Prolapsed internal hemorrhoids    Palliative care encounter    Acute on chronic respiratory failure with hypoxia and hypercapnia    Major depression    Breast mass    Debility    Dyspnea    Athscl heart disease of native coronary artery w/o ang pctrs    Pulmonary nodule    History of tobacco abuse       Assessment/Plan:     Terri Phelan is a 68 y.o. female with a PMHx of poorly controlled COPD, HTN, CAD, and 40 pack-year smoking hx who presents with SOB, HA, weakness, and confusion for 2 weeks. Current presentation suggestive of respiratory failure d/t COPD exacerbation. ABG shows respiratory acidosis with inadequate metabolic compensation. CXR with nonspecific minor basilar densities. Current episode likely precipitated to poor  control of severe COPD given hx of recent hospitalizations, underlying infectious etiology unlikely.      Neuro  - no acute issues  - not on sedation  - AAOx4, mental status good albeit with minor issues concentrating during conversation  - intermittent L calf pain from [place] horses      Cardiovascular  # HTN  - BP 120s-140s/50-60s overnight  - not on vasoactive agents    # CAD   - on  aspirin 81 and atorvastatin 40      Respiratory  # Acute on chronic respiratory failure 2/2 COPD exacerbation  - on BiPAP 20/5 @ 30%, satting well  - ABG 8/8 --> pH 7.26, pCO2 103, pO2 63, HCO3 47  - increase ventilation settings *** to reduce hypercapnia  - continue DuoNebs q4h, roflumilast 500, and prednisone 40  - stop ceftriaxone, continue azithromycin 500      GI/FEN  - no acute issues  - except for CO2 (41), electrolytes wnl; AG 6  - regular adult diet  - no IVFs      Renal  - no acute issues  - BUN 12, creat 0.6, GFR >60  - intake 250 mL, UOP 1450 mL, net -1200 mL since admission      Heme  - no acute issues  - H&H 12.1/43.2  - no active bleeding      Infectious Disease  # Potential LRI  - CXR w/ basilar lung densities, potentially inflitrates  - afebrile, no S&S of infection, WBC 7.04 with 93.3% neutrophils  - low suspicion for infection  - stop ceftriaxone, continue azithromycin 500       Endocrine  - no acute issues  - glucose 140, goal 140-180      Feeding: reg diet  Analgesia: N/A  Sedation: N/A  Thrombo PPX: SCD socks   Head of Bed: elevated  Ulcer PPX: none  Glucose: goal 140-180  SBT/SAT: wean BiPAP as tolerated  Bowels: Last Bowel Movement: 08/08/23  Indwelling Lines: none  Deescalation Abx: stop ceft, cont azithro    Code: Full  Dispo: Remain in MICU     Thank you for allowing us to participate in the care of this patient. We will continue to follow.        Ministerio Barron, MS4  [employer] [employer]

## 2023-08-08 NOTE — ASSESSMENT & PLAN NOTE
- No increased sputum or cough, afebrile, no leuykocytos. CXR without lobar PNA or obvious consolidation  - Continue prednisone 40mg daily  - Continue duonebs q4h + roflumilast  - PO azithromycin. Holding further abx given lack of infectious symptoms/concern  - BiPAP management as above with repeat ABG  - Pulmonology consult

## 2023-08-08 NOTE — NURSING
Pt arrived from ED at 0006 via stretcher, cardiac monitors in place, HR 70s, BP 140s/60s, on BIPAP (20/8, fio2 40%), spo2 96%, azithromycin infusing. Pt AAOx4. Daughter at bedside. RT at bedside. [place] NP notified of arrival and to bedside. BIPAP settings changed to 20/5 and fio2 30% per RT. SpO2 goal of 88% per NP verbal order. Purewick in place. Pt oriented to room. Call bell within reach, bed in lowest position, wheels locked, bed alarm on.

## 2023-08-08 NOTE — ED NOTES
RN first encounter with pt. Pt brought to room via wheel chair and arrives on her home oxygen at 3L NC. Pt has hx of copd. Pt reports having doctor's visit today and routine labs done. Pt was advised to go to ER due to elevated co2 levels. Pt does endorse some increased confusion, headache, shortness of breath, and weakness over the past two weeks. Per pts family member that is at the bed side, pts MD wanted to increase the patient's lasix dose but the pharmacy did not have the medication and pt did not take today. Pt also endorse bilateral lower extremity edema. Pt aao x 4. Pt on 3L NC sating 93%. Pt connected to cardiac monitoring, automatic bp cuff, and cont pulse ox.

## 2023-08-08 NOTE — H&P
[facility] Medicine  History & Physical    Patient Name: Terri Phelan  MRN: [id number]  Patient Class: OP- Observation  Admission Date: 2023  Attending Physician: Tirso Valenzuela,*   Primary Care Provider: Kev Cox MD         Patient information was obtained from patient, past medical records and ER records.     Subjective:     Principal Problem:<principal problem not specified>    Chief Complaint:   Chief Complaint   Patient presents with    abnormal labs     PT instructed to come to the ER because she was notified about elevated CO2. PT has COPD.   Home oxygen 3LNC        HPI: 69 yo female with a PMHx of COPD and former tobacco use, HTN, and mild pulmonary hypertension here for SOB.    Patient states she has been feeling more dyspneic over last few days.  Has noticed audible wheezing as well.  Feels like previous COPD exacerbations.  Inhalers did not help.  Has not had increase in cough or sputum production.  Denies any fevers, chills, chest pain, orthopnea, PND, abdominal pain, nausea, vomiting.  Does have some chronic leg swelling which she is on Lasix 4.  Recent echo showed very minimal elevated RVSP of 37 with RAP of 8.    In the ED, patient was noted to have O2 saturations in high 80s.  Other vital signs were stable.  Patient was tripoding and an acute respiratory distress.  BiPAP was placed at settings of 16/10.  ABG at that time showed a pH of around 7.2 and a CO2 of 100.  Checks x-ray with some atelectasis but no obvious lobar pneumonia.  Patient was given Solu-Medrol, antibiotics, and DuoNebs.  Transfer to ICU for continued care.      Past Medical History:   Diagnosis Date    COPD (chronic obstructive pulmonary disease)     Hypertension        Past Surgical History:   Procedure Laterality Date     SECTION      HERNIA REPAIR      HYSTERECTOMY         Review of patient's allergies indicates:   Allergen Reactions    Codeine Nausea And Vomiting     Morphine Other (See Comments)       No current facility-administered medications on file prior to encounter.     Current Outpatient Medications on File Prior to Encounter   Medication Sig    acetaminophen (TYLENOL) 500 MG tablet Take 500 mg by mouth every 6 (six) hours as needed for Pain.    albuterol (PROVENTIL/VENTOLIN HFA) 90 mcg/actuation inhaler Inhale 2 puffs into the lungs every 6 (six) hours as needed.    albuterol-ipratropium (DUO-NEB) 2.5 mg-0.5 mg/3 mL nebulizer solution Take 3 mLs by nebulization every 6 (six) hours as needed for Wheezing.    alendronate (FOSAMAX) 35 MG tablet Take 35 mg by mouth every Tuesday.    aspirin 81 MG Chew Take 81 mg by mouth once daily.    azithromycin (Z-OLY) 250 MG tablet Take 1 tablet (250 mg total) by mouth 3 (three) times a week. (Patient taking differently: Take 250 mg by mouth every Mon, Wed, Fri.)    budesonide-glycopyr-formoterol (BREZTRI AEROSPHERE) 160-9-4.8 mcg/actuation HFAA Inhale 2 puffs into the lungs 2 (two) times daily.    dextromethorphan-guaiFENesin  mg (MUCINEX DM)  mg per 12 hr tablet Take 1 tablet by mouth every 12 (twelve) hours.    ergocalciferol (ERGOCALCIFEROL) 50,000 unit Cap Take 50,000 Units by mouth every Tuesday.    fluticasone propionate (FLONASE) 50 mcg/actuation nasal spray 1 spray by Each Nostril route daily as needed for Allergies.    OXYGEN-AIR DELIVERY SYSTEMS MISC 3 L by Nasal route.    roflumilast (DALIRESP) 500 mcg Tab Take 1 tablet by mouth once daily.    traZODone (DESYREL) 50 MG tablet Take 1 tablet (50 mg total) by mouth nightly as needed for Insomnia.    dicyclomine (BENTYL) 20 mg tablet Take 1 tablet (20 mg total) by mouth 3 (three) times daily as needed (abdominal pain).    furosemide (LASIX) 40 MG tablet Take 1 tablet (40 mg total) by mouth once daily.    LORazepam (ATIVAN) 0.5 MG tablet Take 1 tablet (0.5 mg total) by mouth every 8 (eight) hours as needed for Anxiety.    multivit with min-folic  acid (WOMEN'S MULTIVITAMIN GUMMIES) 200 mcg Chew Take 2 tablets by mouth once daily.     Family History    None       Tobacco Use    Smoking status: Former     Current packs/day: 1.00     Average packs/day: 1 pack/day for 20.0 years (20.0 ttl pk-yrs)     Types: Cigarettes    Smokeless tobacco: Never   Substance and Sexual Activity    Alcohol use: Not Currently     Alcohol/week: 1.0 standard drink of alcohol     Types: 1 Glasses of wine per week    Drug use: No    Sexual activity: Not Currently     Review of Systems   All other systems reviewed and are negative.    Objective:     Vital Signs (Most Recent):  Temp: 98 °F (36.7 °C) (23 0315)  Pulse: 66 (23 0600)  Resp: (!) 21 (23)  BP: (!) 127/58 (23)  SpO2: 99 % (23) Vital Signs (24h Range):  Temp:  [97.7 °F (36.5 °C)-98.9 °F (37.2 °C)] 98 °F (36.7 °C)  Pulse:  [61-85] 66  Resp:  [18-34] 21  SpO2:  [89 %-99 %] 99 %  BP: (117-163)/(56-74) 127/58     Weight: 82.5 kg (181 lb 14.1 oz)  Body mass index is 30.27 kg/m².     Physical Exam  Vitals and nursing note reviewed.   Constitutional:       General: She is not in acute distress.     Appearance: She is obese.      Comments: BiPAP in place   HENT:      Head: Normocephalic and atraumatic.      Mouth/Throat:      Mouth: Mucous membranes are dry.   Eyes:      Extraocular Movements: Extraocular movements intact.      Pupils: Pupils are equal, round, and reactive to light.   Cardiovascular:      Rate and Rhythm: Normal rate and regular rhythm.      Pulses: Normal pulses.      Heart sounds: Normal heart sounds.   Pulmonary:      Effort: Pulmonary effort is normal.      Breath sounds: No stridor. Wheezing and rales present.   Abdominal:      General: Abdomen is flat. Bowel sounds are normal. There is no distension.      Palpations: Abdomen is soft.      Tenderness: There is no abdominal tenderness.   Musculoskeletal:      Right lower le+ Edema present.      Left lower le+  Edema present.   Skin:     General: Skin is warm.      Capillary Refill: Capillary refill takes less than 2 seconds.   Neurological:      General: No focal deficit present.      Mental Status: She is alert and oriented to person, place, and time. Mental status is at baseline.              CRANIAL NERVES     CN III, IV, VI   Pupils are equal, round, and reactive to light.       Significant Labs: All pertinent labs within the past 24 hours have been reviewed.    Significant Imaging: I have reviewed all pertinent imaging results/findings within the past 24 hours.    Assessment/Plan:     Acute on chronic respiratory failure with hypoxia and hypercapnia  Patient with Hypercapnic and Hypoxic Respiratory failure which is Acute on chronic.  she is not on home oxygen. Supplemental oxygen was provided and noted- Oxygen Concentration (%):  [30-44] 44    .   Signs/symptoms of respiratory failure include- tachypnea, increased work of breathing and respiratory distress. Contributing diagnoses includes - COPD Labs and images were reviewed. Patient Has recent ABG, which has been reviewed. Will treat underlying causes and adjust management of respiratory failure as follows-     - Continue duonebs and COPD management as below  - Repeat ABG in AM and wean BiPAP as needed  - Continuous pulse oximetry  - Holding on CAP (ceftriaxone) coverage given lack of cough, sputum, fevers, and lobar PNA on CXR  - PO azithromycin    COPD (chronic obstructive pulmonary disease)  - No increased sputum or cough, afebrile, no leuykocytos. CXR without lobar PNA or obvious consolidation  - Continue prednisone 40mg daily  - Continue duonebs q4h + roflumilast  - PO azithromycin. Holding further abx given lack of infectious symptoms/concern  - BiPAP management as above with repeat ABG  - Pulmonology consult      Athscl heart disease of native coronary artery w/o ang pctrs  - Continue aspirin, statin      Hypertension  - Hx of HTN but does not appear to be on  medications  - Will continue to monitor inpatient  - PCP follow up        VTE Risk Mitigation (From admission, onward)         Ordered     IP VTE HIGH RISK PATIENT  Once         08/07/23 2337     Place sequential compression device  Until discontinued         08/07/23 2337              Critical care time spent on the evaluation and treatment of severe organ dysfunction, review of pertinent labs and imaging studies, discussions with consulting providers and discussions with patient/family: 45 minutes.     On 08/08/2023, patient should be placed in hospital observation services under my care.        Musa Kowalski MD  Department of Hospital Medicine  [place] - Intensive Care

## 2023-08-08 NOTE — PLAN OF CARE
The sw faxed the pt's info to [facility] via [employer] b/c she's current with the agency.        08/08/23 6831   Post-Acute Status   Post-Acute Authorization Home Health   Home Health Status Referrals Sent   Discharge Plan   Discharge Plan A Home Health  ([facility])   Discharge Plan B Other  (TBD)

## 2023-08-08 NOTE — CONSULTS
[place] - Intensive Care  Palliative Medicine  Consult Note    Patient Name: Terri Phelan  MRN: [id number]  Admission Date: 8/7/2023  Hospital Length of Stay: 0 days  Code Status: DNR   Attending Provider: Tirso Valenzuela,*  Consulting Provider: Katie Mclaughlin NP  Primary Care Physician: Kev Cox MD  Principal Problem:<principal problem not specified>    Patient information was obtained from patient, past medical records and primary team.      Inpatient consult to Palliative Care  Consult performed by: Katie Mclaughlin NP  Consult ordered by: Magdaleno Rivers MD  Reason for consult: Goals of care/ advanced care planning in end stage COPD        Assessment/Plan:     Palliative Care  Palliative care encounter  Mrs Phelan is a 68 y/oF with a pmx advanced COPD and former tobacco use, HTN, and mild pulmonary hypertension who presented with acute on chronic hypoxic and hypercapnic respiratory failure due to COPD exacerbation.  Pt was  initially on continuous BiPAP in the ICU, now on NC, tolerating well. Palliative care consulted for goals of care/ advanced care planning in a pt with end stage copd.     -Pt is well known to this palliative care provider from previous admissions and clinic follow up. Pt seen resting in bed, comfortably on NC just finished breakfast.  Pt reports feeling improved since admission and back near baseline.   -Pt lives at home with her daughter/MPOA, Mulugeta Phelan.  Pt remains independent of her ADLs.  Pt is currently receiving home health but is anxious to resume pulmonary rehab at [facility] as this intervention greatly improves the quality of pts life.   -Pt recently met with new pulmonologist and remains optimistic about possible improvements to daily medication regimens.   -Code status discussed.  DNR order entered into chart  See separate ACP note.   -Pts end stage COPD is a qualifying hospice diagnosis.  However, this option does not align with pts goals of care  "at this time.  Pt accepts terminal diagnosis. However, is not willing to transition to solely comfort focused care as she improves with each admission and returns to baseline and has unquestioned capacity to set these goals. Pt remains appreciative of all treatment teams.  Extensive hospice discussion held during last clinic appointment with pts daughter present.  Family aware of hospice resources available should goals change.   -Pt will follow up in the outpatient palliative care clinic in 2 weeks.  Appt date 2023.         Subjective:     HPI:   Per chart, "67 yo female with a PMHx of COPD and former tobacco use, HTN, and mild pulmonary hypertension here for SOB.     Patient states she has been feeling more dyspneic over last few days.  Has noticed audible wheezing as well.  Feels like previous COPD exacerbations.  Inhalers did not help.  Has not had increase in cough or sputum production.  Denies any fevers, chills, chest pain, orthopnea, PND, abdominal pain, nausea, vomiting.  Does have some chronic leg swelling which she is on Lasix 4.  Recent echo showed very minimal elevated RVSP of 37 with RAP of 8.     In the ED, patient was noted to have O2 saturations in high 80s.  Other vital signs were stable.  Patient was tripoding and an acute respiratory distress.  BiPAP was placed at settings of 16/10.  ABG at that time showed a pH of around 7.2 and a CO2 of 100.  Checks x-ray with some atelectasis but no obvious lobar pneumonia.  Patient was given Solu-Medrol, antibiotics, and DuoNebs.  Transfer to ICU for continued care."    Interval History: No acute events reported overnight.  Pt now weaned from continuous BiPAP and on NC.     Past Medical History:   Diagnosis Date    COPD (chronic obstructive pulmonary disease)     Hypertension        Past Surgical History:   Procedure Laterality Date     SECTION      HERNIA REPAIR      HYSTERECTOMY         Review of patient's allergies indicates:   Allergen " Reactions    Codeine Nausea And Vomiting    Morphine Hallucinations       Medications:  Continuous Infusions:  Scheduled Meds:   albuterol-ipratropium  3 mL Nebulization Q4H    aspirin  81 mg Oral Daily    atorvastatin  40 mg Oral QHS    [START ON 8/9/2023] azithromycin  250 mg Oral Every Mon, Wed, Fri    budesonide-glycopyr-formoterol  2 puff Inhalation BID    cefTRIAXone (ROCEPHIN) IVPB  1 g Intravenous Q24H    enoxparin  40 mg Subcutaneous Q24H (prophylaxis, 1700)    furosemide (LASIX) injection  20 mg Intravenous Once    mupirocin   Nasal BID    predniSONE  40 mg Oral Daily    roflumilast  1 tablet Oral Daily     PRN Meds:sodium chloride 0.9%, acetaminophen, diazePAM, melatonin, ondansetron, sodium chloride 0.9%, traZODone    Family History    None       Tobacco Use    Smoking status: Former     Current packs/day: 1.00     Average packs/day: 1 pack/day for 20.0 years (20.0 ttl pk-yrs)     Types: Cigarettes    Smokeless tobacco: Never   Substance and Sexual Activity    Alcohol use: Not Currently     Alcohol/week: 1.0 standard drink of alcohol     Types: 1 Glasses of wine per week    Drug use: No    Sexual activity: Not Currently       Review of Systems   Constitutional:  Positive for fatigue. Negative for fever.   Respiratory:  Positive for shortness of breath. Negative for stridor.    Cardiovascular:  Negative for chest pain.   Gastrointestinal: Negative.    Genitourinary: Negative.    Musculoskeletal: Negative.    Neurological: Negative.    Psychiatric/Behavioral:  The patient is nervous/anxious.      Objective:     Vital Signs (Most Recent):  Temp: 97.6 °F (36.4 °C) (08/08/23 1130)  Pulse: 79 (08/08/23 1539)  Resp: (!) 27 (08/08/23 1539)  BP: (!) 153/70 (08/08/23 1400)  SpO2: (!) 91 % (08/08/23 1539) Vital Signs (24h Range):  Temp:  [97.4 °F (36.3 °C)-98.9 °F (37.2 °C)] 97.6 °F (36.4 °C)  Pulse:  [60-91] 79  Resp:  [18-40] 27  SpO2:  [69 %-99 %] 91 %  BP: (116-163)/(55-94) 153/70     Weight:  82.5 kg (181 lb 14.1 oz)  Body mass index is 30.27 kg/m².       Physical Exam  Vitals and nursing note reviewed.   Constitutional:       General: She is not in acute distress.     Appearance: Normal appearance. She is not toxic-appearing.   HENT:      Head: Normocephalic.      Nose: Nose normal.      Mouth/Throat:      Mouth: Mucous membranes are moist.   Eyes:      Pupils: Pupils are equal, round, and reactive to light.   Cardiovascular:      Rate and Rhythm: Normal rate.   Pulmonary:      Effort: Pulmonary effort is normal. No accessory muscle usage.      Comments: NC  Musculoskeletal:         General: Normal range of motion.   Skin:     General: Skin is warm and dry.   Neurological:      General: No focal deficit present.      Mental Status: She is alert and oriented to person, place, and time.   Psychiatric:         Mood and Affect: Mood normal.         Behavior: Behavior normal.         Thought Content: Thought content normal.            Review of Symptoms      Symptom Assessment (ESAS 0-10 Scale)  Pain:  0  Dyspnea:  2  Anxiety:  3  Nausea:  0  Depression:  0  Anorexia:  0  Fatigue:  2  Insomnia:  0  Restlessness:  0  Agitation:  0       Anxiety:  Is nervous/anxious    Performance Status:  60    Living Arrangements:  Lives with family and Lives in home    Psychosocial/Cultural:   See Palliative Psychosocial Note: No  Social Issues Identified: Coping deficit pt/family  Bereavement Risk: No  Caregiver Needs Discussed. Caregiver Distress: Yes: Intensity of family caregiving and Caregiver Burnout Risk  Cultural: none identified   **Primary  to Follow**  Palliative Care  Consult: No    Spiritual:  F - Juhi and Belief:  Synagogue   I - Importance:  High   C - Community:  Family support      Time-Based Charting:  Yes  Chart Review: 22 minutes  Face to Face: 25 minutes  Symptom Assessment: 9 minutes  Coordination of Care: 12 minutes  Discharge Plannin minutes  Advance Care Plannin  minutes  Goals of Care: 22 minutes    Total Time Spent: 110 minutes        Advance Care Planning  Advance Directives:   Living Will: Yes        Copy on chart: Yes    LaPOST: No    Do Not Resuscitate Status: Yes    Medical Power of : Yes    Agent's Name:  Mulugeta Thompson's Contact Number:  [phone]    Decision Making:  Patient answered questions  Goals of Care: What is most important right now is to focus on spending time at home, avoiding the hospital, remaining as independent as possible, symptom/pain control, improvement in condition but with limits to invasive therapies. Accordingly, we have decided that the best plan to meet the patient's goals includes continuing with treatment.         Significant Labs: All pertinent labs within the past 24 hours have been reviewed.  CBC:   Recent Labs   Lab 08/08/23 0430   WBC 7.04   HGB 12.1   HCT 43.2   MCV 96   *     BMP:  Recent Labs   Lab 08/08/23 0430   *      K 4.3   CL 95   CO2 41*   BUN 12   CREATININE 0.6   CALCIUM 9.1   MG 1.9     LFT:  Lab Results   Component Value Date    AST 17 08/07/2023    ALKPHOS 47 (L) 08/07/2023    BILITOT 0.2 08/07/2023     Albumin:   Albumin   Date Value Ref Range Status   08/07/2023 3.5 3.5 - 5.2 g/dL Final     Protein:   Total Protein   Date Value Ref Range Status   08/07/2023 6.6 6.0 - 8.4 g/dL Final     Lactic acid:   Lab Results   Component Value Date    LACTATE 0.9 07/01/2022       Significant Imaging: I have reviewed all pertinent imaging results/findings within the past 24 hours.      Katie Mclaughlin NP  Palliative Medicine  [place] - Intensive Care

## 2023-08-08 NOTE — PLAN OF CARE
"  Care Plan    Pt remains in ICU at this time. Pt AAOx4. Continuous BIPAP 20/5, Fio2 44%, SpO2 goal 88%. HR NSR 60s-80s, SBP 120s-160s. ANDRA LE edema remains unchanged from admit to ICU. Purewick in place. UOP 1300 ml overnight.    POC reviewed with pt and family. Questions and concerns addressed. Safety and infection precautions in place. See below and flowsheets for full assessment and VS info.     Neuro:  Becket Coma Scale  Best Eye Response: 4-->(E4) spontaneous  Best Motor Response: 6-->(M6) obeys commands  Best Verbal Response: 5-->(V5) oriented  Becket Coma Scale Score: 15  Assessment Qualifiers: patient not sedated/intubated  Pupil PERRLA: yes  24 hr Temp:  [97.7 °F (36.5 °C)-98.9 °F (37.2 °C)]      CV:  Rhythm: normal sinus rhythm  DVT prophylaxis: VTE Required Core Measure: (SCDs) Sequential compression device initiated/maintained    Resp:     Oxygen Concentration (%): 44    GI/:  GI prophylaxis: no  Diet/Nutrition Received: NPO  Last Bowel Movement: 08/08/23  Voiding Characteristics: external catheter, voids spontaneously without difficulty   Intake/Output Summary (Last 24 hours) at 8/8/2023 0503  Last data filed at 8/8/2023 0139  Gross per 24 hour   Intake 250.02 ml   Output 900 ml   Net -649.98 ml       Labs/Accuchecks:  Recent Labs   Lab 08/08/23  0430   WBC 7.04   RBC 4.52   HGB 12.1   HCT 43.2   *      Recent Labs   Lab 08/07/23 2120      K 4.1   CO2 42*   CL 97   BUN 15   CREATININE 0.7   ALKPHOS 47*   ALT 16   AST 17   BILITOT 0.2    No results for input(s): "PROTIME", "INR", "APTT", "HEPANTIXA" in the last 168 hours.   Recent Labs   Lab 08/07/23 2120   TROPONINI 0.008       Electrolytes: N/A - electrolytes WDL  Accuchecks: none    Gtts/LDAs:      Lines/Drains/Airways       Drain  Duration             Female External Urinary Catheter 08/07/23 2140 <1 day              Peripheral Intravenous Line  Duration                  Peripheral IV - Single Lumen 08/07/23 2121 20 G " Anterior;Left Forearm <1 day         Peripheral IV - Single Lumen 08/08/23 0315 22 G Posterior;Right Forearm <1 day                    Skin/Wounds     Wounds: No  Wound care consulted: No    Consults

## 2023-08-08 NOTE — ACP (ADVANCE CARE PLANNING)
Advance Care Planning     Date: 08/08/2023    Code Status  In light of the patients advanced and life limiting illness,I engaged the the patient in a voluntary conversation about the patient's preferences for care  at the very end of life. The patient wishes to have a natural, peaceful death.  Along those lines, the patient does not wish to have CPR or other invasive treatments performed when her heart and/or breathing stops. I communicated to the patient that a DNR order would be placed in her medical record to reflect this preference.  I spent a total of 22 minutes engaging the patient in this advance care planning discussion.        Pt well known to this palliative team. Pt would not accept intubation/compressions at the end of life.  Pts daughter, Mulugeta Phelan, is MPOA.  Pt reiterates that if she were to lose capacity, daughter has authority to make all decisions including altering code status.  Pt agreeable to DNR status.            Katie Mclaughlin NP  Palliative Medicine

## 2023-08-08 NOTE — PROGRESS NOTES
Future Appointments   Date Time Provider Department Center   8/11/2023  9:00 AM VASCULAR, [facility] [place]   8/22/2023  9:30 AM Katie Mclaughlin, [facility]   9/20/2023 10:30 AM Kev Cox MD [facility] [place]

## 2023-08-08 NOTE — PLAN OF CARE
The sw met with the pt to complete the assessment. The pt lives with Mulugeta Phelan(dtr)[phone] in [place]. The pt doesn't drive so Mulugeta transports her to dr del rio's and errands. She will transport the pt home at d/c. The pt's independent with her ADL's and has the dme listed below. The pt has a bath bench on order with [facility]. The pt used to go to Pulmonary rehab at [facility] but hasn't been able to go since she has hh with [facility]-NO. The sw completed the white board in the pt's room with her name and contact info. The sw encouraged them to call if they have any further questions or concerns. The sw will continue to follow the pt throughout her transitions of care.        08/08/23 1419   Discharge Assessment   Assessment Type Discharge Planning Assessment   Confirmed/corrected address, phone number and insurance Yes   Confirmed Demographics Correct on Facesheet   Source of Information patient   When was your last doctors appointment? 08/07/23   Communicated RADHA with patient/caregiver Date not available/Unable to determine   Reason For Admission Acute on chronic respiratory failure with hypoxia and hypercapnia   People in Home child(mary), adult   Do you expect to return to your current living situation? Yes   Do you have help at home or someone to help you manage your care at home? Yes   Who are your caregiver(s) and their phone number(s)? Mulugeta Phelan(dtr)[phone]   Prior to hospitilization cognitive status: Alert/Oriented   Current cognitive status: Alert/Oriented   Walking or Climbing Stairs ambulation difficulty, requires equipment;stair climbing difficulty, requires equipment;transferring difficulty, requires equipment   Dressing/Bathing bathing difficulty, requires equipment   Home Accessibility wheelchair accessible   Home Layout Able to live on 1st floor   Equipment Currently Used at Home BIPAP;rollator;oxygen;nebulizer;bedside commode;shower chair   Do you currently have  service(s) that help you manage your care at home?   (the pt's current with [facility]-NO)   Do you take prescription medications? Yes   Do you have prescription coverage? Yes   Coverage [employer]/[facility]   Do you have any problems affording any of your prescribed medications? No  (the pt receives her meds affordably at [facility] in [place])   Is the patient taking medications as prescribed? yes   Who is going to help you get home at discharge? Mulugeta Phelan(dtr)[phone]   How do you get to doctors appointments? family or friend will provide   Are you on dialysis? No   Do you take coumadin? No   Discharge Plan A Home Health   Discharge Plan B Other  (TBD)   DME Needed Upon Discharge  other (see comments)  (TBD)   Discharge Plan discussed with: Patient   Transition of Care Barriers None   Physical Activity   On average, how many days per week do you engage in moderate to strenuous exercise (like a brisk walk)? 0 days   On average, how many minutes do you engage in exercise at this level? 0 min   Financial Resource Strain   How hard is it for you to pay for the very basics like food, housing, medical care, and heating? Somewhat   Housing Stability   In the last 12 months, was there a time when you were not able to pay the mortgage or rent on time? N   In the last 12 months, how many places have you lived? 1   In the last 12 months, was there a time when you did not have a steady place to sleep or slept in a shelter (including now)? N   Transportation Needs   In the past 12 months, has lack of transportation kept you from medical appointments or from getting medications? no   In the past 12 months, has lack of transportation kept you from meetings, work, or from getting things needed for daily living? No   Food Insecurity   Within the past 12 months, you worried that your food would run out before you got the money to buy more. Never true   Within the past 12 months, the food you bought just didn't  last and you didn't have money to get more. Never true   Stress   Do you feel stress - tense, restless, nervous, or anxious, or unable to sleep at night because your mind is troubled all the time - these days? Very much   Social Connections   In a typical week, how many times do you talk on the phone with family, friends, or neighbors? More than 3   How often do you get together with friends or relatives? More than 3   How often do you attend Samaritan or Latter day services? Never   Do you belong to any clubs or organizations such as Samaritan groups, unions, fraternal or athletic groups, or school groups? No   How often do you attend meetings of the clubs or organizations you belong to? Never   Are you , , , , never , or living with a partner?    Alcohol Use   Q1: How often do you have a drink containing alcohol? Never   Q2: How many drinks containing alcohol do you have on a typical day when you are drinking? None   Q3: How often do you have six or more drinks on one occasion? Never   OTHER   Name(s) of People in Home Mulugeta Phelan(dtr)[phone]

## 2023-08-08 NOTE — HPI
"Per chart, "69 yo female with a PMHx of COPD and former tobacco use, HTN, and mild pulmonary hypertension here for SOB.     Patient states she has been feeling more dyspneic over last few days.  Has noticed audible wheezing as well.  Feels like previous COPD exacerbations.  Inhalers did not help.  Has not had increase in cough or sputum production.  Denies any fevers, chills, chest pain, orthopnea, PND, abdominal pain, nausea, vomiting.  Does have some chronic leg swelling which she is on Lasix 4.  Recent echo showed very minimal elevated RVSP of 37 with RAP of 8.     In the ED, patient was noted to have O2 saturations in high 80s.  Other vital signs were stable.  Patient was tripoding and an acute respiratory distress.  BiPAP was placed at settings of 16/10.  ABG at that time showed a pH of around 7.2 and a CO2 of 100.  Checks x-ray with some atelectasis but no obvious lobar pneumonia.  Patient was given Solu-Medrol, antibiotics, and DuoNebs.  Transfer to ICU for continued care."  "

## 2023-08-08 NOTE — SUBJECTIVE & OBJECTIVE
Interval History: No acute events reported overnight.  Pt now weaned from continuous BiPAP and on NC.     Past Medical History:   Diagnosis Date    COPD (chronic obstructive pulmonary disease)     Hypertension        Past Surgical History:   Procedure Laterality Date     SECTION      HERNIA REPAIR      HYSTERECTOMY         Review of patient's allergies indicates:   Allergen Reactions    Codeine Nausea And Vomiting    Morphine Hallucinations       Medications:  Continuous Infusions:  Scheduled Meds:   albuterol-ipratropium  3 mL Nebulization Q4H    aspirin  81 mg Oral Daily    atorvastatin  40 mg Oral QHS    [START ON 2023] azithromycin  250 mg Oral Every Mon, Wed, Fri    budesonide-glycopyr-formoterol  2 puff Inhalation BID    cefTRIAXone (ROCEPHIN) IVPB  1 g Intravenous Q24H    enoxparin  40 mg Subcutaneous Q24H (prophylaxis, 1700)    furosemide (LASIX) injection  20 mg Intravenous Once    mupirocin   Nasal BID    predniSONE  40 mg Oral Daily    roflumilast  1 tablet Oral Daily     PRN Meds:sodium chloride 0.9%, acetaminophen, diazePAM, melatonin, ondansetron, sodium chloride 0.9%, traZODone    Family History    None       Tobacco Use    Smoking status: Former     Current packs/day: 1.00     Average packs/day: 1 pack/day for 20.0 years (20.0 ttl pk-yrs)     Types: Cigarettes    Smokeless tobacco: Never   Substance and Sexual Activity    Alcohol use: Not Currently     Alcohol/week: 1.0 standard drink of alcohol     Types: 1 Glasses of wine per week    Drug use: No    Sexual activity: Not Currently       Review of Systems   Constitutional:  Positive for fatigue. Negative for fever.   Respiratory:  Positive for shortness of breath. Negative for stridor.    Cardiovascular:  Negative for chest pain.   Gastrointestinal: Negative.    Genitourinary: Negative.    Musculoskeletal: Negative.    Neurological: Negative.    Psychiatric/Behavioral:  The patient is nervous/anxious.      Objective:     Vital Signs (Most  Recent):  Temp: 97.6 °F (36.4 °C) (08/08/23 1130)  Pulse: 79 (08/08/23 1539)  Resp: (!) 27 (08/08/23 1539)  BP: (!) 153/70 (08/08/23 1400)  SpO2: (!) 91 % (08/08/23 1539) Vital Signs (24h Range):  Temp:  [97.4 °F (36.3 °C)-98.9 °F (37.2 °C)] 97.6 °F (36.4 °C)  Pulse:  [60-91] 79  Resp:  [18-40] 27  SpO2:  [69 %-99 %] 91 %  BP: (116-163)/(55-94) 153/70     Weight: 82.5 kg (181 lb 14.1 oz)  Body mass index is 30.27 kg/m².       Physical Exam  Vitals and nursing note reviewed.   Constitutional:       General: She is not in acute distress.     Appearance: Normal appearance. She is not toxic-appearing.   HENT:      Head: Normocephalic.      Nose: Nose normal.      Mouth/Throat:      Mouth: Mucous membranes are moist.   Eyes:      Pupils: Pupils are equal, round, and reactive to light.   Cardiovascular:      Rate and Rhythm: Normal rate.   Pulmonary:      Effort: Pulmonary effort is normal. No accessory muscle usage.      Comments: NC  Musculoskeletal:         General: Normal range of motion.   Skin:     General: Skin is warm and dry.   Neurological:      General: No focal deficit present.      Mental Status: She is alert and oriented to person, place, and time.   Psychiatric:         Mood and Affect: Mood normal.         Behavior: Behavior normal.         Thought Content: Thought content normal.            Review of Symptoms      Symptom Assessment (ESAS 0-10 Scale)  Pain:  0  Dyspnea:  2  Anxiety:  3  Nausea:  0  Depression:  0  Anorexia:  0  Fatigue:  2  Insomnia:  0  Restlessness:  0  Agitation:  0       Anxiety:  Is nervous/anxious    Performance Status:  60    Living Arrangements:  Lives with family and Lives in home    Psychosocial/Cultural:   See Palliative Psychosocial Note: No  Social Issues Identified: Coping deficit pt/family  Bereavement Risk: No  Caregiver Needs Discussed. Caregiver Distress: Yes: Intensity of family caregiving and Caregiver Burnout Risk  Cultural: none identified   **Primary   to Follow**  Palliative Care  Consult: No    Spiritual:  F - Juhi and Belief:  Voodoo   I - Importance:  High   C - Community:  Family support      Time-Based Charting:  Yes  Chart Review: 22 minutes  Face to Face: 25 minutes  Symptom Assessment: 9 minutes  Coordination of Care: 12 minutes  Discharge Plannin minutes  Advance Care Plannin minutes  Goals of Care: 22 minutes    Total Time Spent: 110 minutes        Advance Care Planning   Advance Directives:   Living Will: Yes        Copy on chart: Yes    LaPOST: No    Do Not Resuscitate Status: Yes    Medical Power of : Yes    Agent's Name:  Mulugeta Edwards   Agent's Contact Number:  [phone]    Decision Making:  Patient answered questions  Goals of Care: What is most important right now is to focus on spending time at home, avoiding the hospital, remaining as independent as possible, symptom/pain control, improvement in condition but with limits to invasive therapies. Accordingly, we have decided that the best plan to meet the patient's goals includes continuing with treatment.         Significant Labs: All pertinent labs within the past 24 hours have been reviewed.  CBC:   Recent Labs   Lab 23  0430   WBC 7.04   HGB 12.1   HCT 43.2   MCV 96   *     BMP:  Recent Labs   Lab 23  0430   *      K 4.3   CL 95   CO2 41*   BUN 12   CREATININE 0.6   CALCIUM 9.1   MG 1.9     LFT:  Lab Results   Component Value Date    AST 17 2023    ALKPHOS 47 (L) 2023    BILITOT 0.2 2023     Albumin:   Albumin   Date Value Ref Range Status   2023 3.5 3.5 - 5.2 g/dL Final     Protein:   Total Protein   Date Value Ref Range Status   2023 6.6 6.0 - 8.4 g/dL Final     Lactic acid:   Lab Results   Component Value Date    LACTATE 0.9 2022       Significant Imaging: I have reviewed all pertinent imaging results/findings within the past 24 hours.

## 2023-08-08 NOTE — ASSESSMENT & PLAN NOTE
- Hx of HTN but does not appear to be on medications  - Will continue to monitor inpatient  - PCP follow up

## 2023-08-08 NOTE — PROGRESS NOTES
67 yo female ex smoker w/ PMHx COPD here w/ acute SOB.     Noted to be in acute on chronic type 2 respiratory failure.   Now on BIPAP.     7.24/107 noted in the ED.     CXR:  - Mild density at the lung bases likely relates to small pleural effusions, atelectasis and mild basilar infiltrates.    S/p solumedrol 125mg IV x 1 and lasix 20mg IV x 1  Ceftriaxone and azithro also given x 1.     Prednisone 40mg PO added once daily from tomorrow.   Nebs optimized.     Agree w/ above plan.   VTE prophy at all times. SCDs noted  Can continue abx in am if real suspicion of infection if noted, or else only PO azithro would suffice.     ECHO in am.   Please call for further assistance.

## 2023-08-09 PROBLEM — J96.12 CHRONIC HYPERCAPNIC RESPIRATORY FAILURE: Status: ACTIVE | Noted: 2023-08-09

## 2023-08-09 LAB
ANION GAP SERPL CALC-SCNC: 9 MMOL/L (ref 8–16)
BASOPHILS # BLD AUTO: 0.02 K/UL (ref 0–0.2)
BASOPHILS NFR BLD: 0.1 % (ref 0–1.9)
BUN SERPL-MCNC: 18 MG/DL (ref 8–23)
CALCIUM SERPL-MCNC: 8.9 MG/DL (ref 8.7–10.5)
CHLORIDE SERPL-SCNC: 95 MMOL/L (ref 95–110)
CO2 SERPL-SCNC: 41 MMOL/L (ref 23–29)
CREAT SERPL-MCNC: 0.7 MG/DL (ref 0.5–1.4)
DIFFERENTIAL METHOD: ABNORMAL
EOSINOPHIL # BLD AUTO: 0 K/UL (ref 0–0.5)
EOSINOPHIL NFR BLD: 0.1 % (ref 0–8)
ERYTHROCYTE [DISTWIDTH] IN BLOOD BY AUTOMATED COUNT: 13.7 % (ref 11.5–14.5)
EST. GFR  (NO RACE VARIABLE): >60 ML/MIN/1.73 M^2
GLUCOSE SERPL-MCNC: 103 MG/DL (ref 70–110)
HCT VFR BLD AUTO: 41.8 % (ref 37–48.5)
HGB BLD-MCNC: 12.1 G/DL (ref 12–16)
IMM GRANULOCYTES # BLD AUTO: 0.06 K/UL (ref 0–0.04)
IMM GRANULOCYTES NFR BLD AUTO: 0.4 % (ref 0–0.5)
LYMPHOCYTES # BLD AUTO: 2.5 K/UL (ref 1–4.8)
LYMPHOCYTES NFR BLD: 17.6 % (ref 18–48)
MCH RBC QN AUTO: 26.8 PG (ref 27–31)
MCHC RBC AUTO-ENTMCNC: 28.9 G/DL (ref 32–36)
MCV RBC AUTO: 93 FL (ref 82–98)
MONOCYTES # BLD AUTO: 1.4 K/UL (ref 0.3–1)
MONOCYTES NFR BLD: 9.7 % (ref 4–15)
NEUTROPHILS # BLD AUTO: 10.2 K/UL (ref 1.8–7.7)
NEUTROPHILS NFR BLD: 72.1 % (ref 38–73)
NRBC BLD-RTO: 0 /100 WBC
PLATELET # BLD AUTO: 154 K/UL (ref 150–450)
PMV BLD AUTO: 11.7 FL (ref 9.2–12.9)
POTASSIUM SERPL-SCNC: 3.7 MMOL/L (ref 3.5–5.1)
RBC # BLD AUTO: 4.51 M/UL (ref 4–5.4)
SODIUM SERPL-SCNC: 145 MMOL/L (ref 136–145)
WBC # BLD AUTO: 14.23 K/UL (ref 3.9–12.7)

## 2023-08-09 PROCEDURE — 97535 SELF CARE MNGMENT TRAINING: CPT

## 2023-08-09 PROCEDURE — 11000001 HC ACUTE MED/SURG PRIVATE ROOM

## 2023-08-09 PROCEDURE — 27100171 HC OXYGEN HIGH FLOW UP TO 24 HOURS

## 2023-08-09 PROCEDURE — 27100098 HC SPACER

## 2023-08-09 PROCEDURE — 63700000 PHARM REV CODE 250 ALT 637 W/O HCPCS: Performed by: STUDENT IN AN ORGANIZED HEALTH CARE EDUCATION/TRAINING PROGRAM

## 2023-08-09 PROCEDURE — 63600175 PHARM REV CODE 636 W HCPCS: Performed by: INTERNAL MEDICINE

## 2023-08-09 PROCEDURE — 99900035 HC TECH TIME PER 15 MIN (STAT)

## 2023-08-09 PROCEDURE — 63600175 PHARM REV CODE 636 W HCPCS: Performed by: HOSPITALIST

## 2023-08-09 PROCEDURE — 94640 AIRWAY INHALATION TREATMENT: CPT

## 2023-08-09 PROCEDURE — 63600175 PHARM REV CODE 636 W HCPCS: Performed by: STUDENT IN AN ORGANIZED HEALTH CARE EDUCATION/TRAINING PROGRAM

## 2023-08-09 PROCEDURE — 25000003 PHARM REV CODE 250: Performed by: INTERNAL MEDICINE

## 2023-08-09 PROCEDURE — 80048 BASIC METABOLIC PNL TOTAL CA: CPT | Performed by: HOSPITALIST

## 2023-08-09 PROCEDURE — 94761 N-INVAS EAR/PLS OXIMETRY MLT: CPT

## 2023-08-09 PROCEDURE — 25000242 PHARM REV CODE 250 ALT 637 W/ HCPCS: Performed by: HOSPITALIST

## 2023-08-09 PROCEDURE — 85025 COMPLETE CBC W/AUTO DIFF WBC: CPT | Performed by: HOSPITALIST

## 2023-08-09 PROCEDURE — 25000003 PHARM REV CODE 250: Performed by: STUDENT IN AN ORGANIZED HEALTH CARE EDUCATION/TRAINING PROGRAM

## 2023-08-09 PROCEDURE — 36415 COLL VENOUS BLD VENIPUNCTURE: CPT | Performed by: HOSPITALIST

## 2023-08-09 PROCEDURE — 97165 OT EVAL LOW COMPLEX 30 MIN: CPT

## 2023-08-09 PROCEDURE — 94660 CPAP INITIATION&MGMT: CPT

## 2023-08-09 RX ORDER — ALBUTEROL SULFATE 90 UG/1
2 AEROSOL, METERED RESPIRATORY (INHALATION) EVERY 6 HOURS PRN
Status: DISCONTINUED | OUTPATIENT
Start: 2023-08-09 | End: 2023-08-10 | Stop reason: HOSPADM

## 2023-08-09 RX ORDER — FUROSEMIDE 10 MG/ML
20 INJECTION INTRAMUSCULAR; INTRAVENOUS ONCE
Status: COMPLETED | OUTPATIENT
Start: 2023-08-09 | End: 2023-08-09

## 2023-08-09 RX ORDER — FUROSEMIDE 40 MG/1
40 TABLET ORAL DAILY
Status: DISCONTINUED | OUTPATIENT
Start: 2023-08-10 | End: 2023-08-10 | Stop reason: HOSPADM

## 2023-08-09 RX ORDER — FUROSEMIDE 40 MG/1
40 TABLET ORAL DAILY
Status: DISCONTINUED | OUTPATIENT
Start: 2023-08-09 | End: 2023-08-09

## 2023-08-09 RX ORDER — GUAIFENESIN 600 MG/1
600 TABLET, EXTENDED RELEASE ORAL 2 TIMES DAILY
Status: DISCONTINUED | OUTPATIENT
Start: 2023-08-09 | End: 2023-08-10 | Stop reason: HOSPADM

## 2023-08-09 RX ADMIN — GUAIFENESIN 600 MG: 600 TABLET, EXTENDED RELEASE ORAL at 08:08

## 2023-08-09 RX ADMIN — IPRATROPIUM BROMIDE AND ALBUTEROL SULFATE 3 ML: 2.5; .5 SOLUTION RESPIRATORY (INHALATION) at 12:08

## 2023-08-09 RX ADMIN — IPRATROPIUM BROMIDE AND ALBUTEROL SULFATE 3 ML: 2.5; .5 SOLUTION RESPIRATORY (INHALATION) at 03:08

## 2023-08-09 RX ADMIN — IPRATROPIUM BROMIDE AND ALBUTEROL SULFATE 3 ML: 2.5; .5 SOLUTION RESPIRATORY (INHALATION) at 04:08

## 2023-08-09 RX ADMIN — MUPIROCIN: 20 OINTMENT TOPICAL at 08:08

## 2023-08-09 RX ADMIN — IPRATROPIUM BROMIDE AND ALBUTEROL SULFATE 3 ML: 2.5; .5 SOLUTION RESPIRATORY (INHALATION) at 11:08

## 2023-08-09 RX ADMIN — IPRATROPIUM BROMIDE AND ALBUTEROL SULFATE 3 ML: 2.5; .5 SOLUTION RESPIRATORY (INHALATION) at 08:08

## 2023-08-09 RX ADMIN — CEFTRIAXONE SODIUM 1 G: 1 INJECTION, POWDER, FOR SOLUTION INTRAMUSCULAR; INTRAVENOUS at 10:08

## 2023-08-09 RX ADMIN — FUROSEMIDE 20 MG: 10 INJECTION, SOLUTION INTRAMUSCULAR; INTRAVENOUS at 11:08

## 2023-08-09 RX ADMIN — ROFLUMILAST 500 MCG: 500 TABLET ORAL at 09:08

## 2023-08-09 RX ADMIN — ENOXAPARIN SODIUM 40 MG: 40 INJECTION SUBCUTANEOUS at 04:08

## 2023-08-09 RX ADMIN — GUAIFENESIN 600 MG: 600 TABLET, EXTENDED RELEASE ORAL at 06:08

## 2023-08-09 RX ADMIN — DIAZEPAM 2 MG: 2 TABLET ORAL at 01:08

## 2023-08-09 RX ADMIN — IPRATROPIUM BROMIDE AND ALBUTEROL SULFATE 3 ML: 2.5; .5 SOLUTION RESPIRATORY (INHALATION) at 07:08

## 2023-08-09 RX ADMIN — PREDNISONE 40 MG: 20 TABLET ORAL at 09:08

## 2023-08-09 RX ADMIN — AZITHROMYCIN MONOHYDRATE 250 MG: 250 TABLET ORAL at 09:08

## 2023-08-09 RX ADMIN — ATORVASTATIN CALCIUM 40 MG: 40 TABLET, FILM COATED ORAL at 08:08

## 2023-08-09 RX ADMIN — ASPIRIN 81 MG CHEWABLE TABLET 81 MG: 81 TABLET CHEWABLE at 09:08

## 2023-08-09 RX ADMIN — TRAZODONE HYDROCHLORIDE 50 MG: 50 TABLET ORAL at 08:08

## 2023-08-09 RX ADMIN — MUPIROCIN: 20 OINTMENT TOPICAL at 09:08

## 2023-08-09 NOTE — ASSESSMENT & PLAN NOTE
- No increased sputum or cough, afebrile, no leuykocytos. CXR without lobar PNA or obvious consolidation  - Continue prednisone 40mg daily  - Continue duonebs q4h + roflumilast  - PO azithromycin. Holding further abx given lack of infectious symptoms/concern  - BiPAP management as above with repeat ABG; will plan to switch BiPAP settings on home machine  - Pulmonology consult

## 2023-08-09 NOTE — NURSING TRANSFER
Nursing Transfer Note      8/9/2023   1:50 PM    Nurse giving handoff:Darius LEE  Nurse receiving handoff:Kelli Lee    Reason patient is being transferred: no longer critical    Transfer To: 512    Transfer via wheelchair    Transfer with O2, cardiac monitoring    Transported by Elaina LEE    Transfer Vital Signs:  Blood Pressure:139/61  Heart Rate:90  O2:92  Temperature:98.4  Respirations:29    Telemetry: Rhythm Normal Sinus  Order for Tele Monitor? Yes    Additional Lines: Oxygen    4eyes on Skin: yes    Medicines sent: inhaler    Patient belongings transferred with patient: Yes    Chart send with patient: Yes    Notified: daughter    Patient reassessed at: 1335     Upon arrival to floor: cardiac monitor applied, patient oriented to room, call bell in reach, and bed in lowest position

## 2023-08-09 NOTE — PROGRESS NOTES
[facility] Medicine  Progress Note    Patient Name: Terri Phelan  MRN: [id number]  Patient Class: IP- Inpatient   Admission Date: 8/7/2023  Length of Stay: 1 days  Attending Physician: Tirso Valenzuela,*  Primary Care Provider: Kev Cox MD        Subjective:     Principal Problem:COPD exacerbation        HPI:  69 yo female with a PMHx of COPD and former tobacco use, HTN, and mild pulmonary hypertension here for SOB.    Patient states she has been feeling more dyspneic over last few days.  Has noticed audible wheezing as well.  Feels like previous COPD exacerbations.  Inhalers did not help.  Has not had increase in cough or sputum production.  Denies any fevers, chills, chest pain, orthopnea, PND, abdominal pain, nausea, vomiting.  Does have some chronic leg swelling which she is on Lasix 4.  Recent echo showed very minimal elevated RVSP of 37 with RAP of 8.    In the ED, patient was noted to have O2 saturations in high 80s.  Other vital signs were stable.  Patient was tripoding and an acute respiratory distress.  BiPAP was placed at settings of 16/10.  ABG at that time showed a pH of around 7.2 and a CO2 of 100.  Checks x-ray with some atelectasis but no obvious lobar pneumonia.  Patient was given Solu-Medrol, antibiotics, and DuoNebs.  Transfer to ICU for continued care.      Overview/Hospital Course:  No notes on file    Interval History:  Looks much more comfortable today, speaking in full sentences and without substantial tachypnea.  States that she feels close to her normal baseline.  She is eager to work with physical therapy today and is hoping that she will be able to get up into a chair.  Stable for step-down at this time.    Review of Systems   Respiratory:  Positive for shortness of breath.      Objective:     Vital Signs (Most Recent):  Temp: 98.4 °F (36.9 °C) (08/09/23 0715)  Pulse: 74 (08/09/23 0759)  Resp: 18 (08/09/23 0759)  BP: (!) 148/65 (08/09/23  0715)  SpO2: (!) 93 % (23 0759) Vital Signs (24h Range):  Temp:  [97.6 °F (36.4 °C)-99 °F (37.2 °C)] 98.4 °F (36.9 °C)  Pulse:  [61-91] 74  Resp:  [18-55] 18  SpO2:  [85 %-96 %] 93 %  BP: (123-175)/(58-96) 148/65     Weight: 86.5 kg (190 lb 11.2 oz)  Body mass index is 31.73 kg/m².    Intake/Output Summary (Last 24 hours) at 2023 1021  Last data filed at 2023 0634  Gross per 24 hour   Intake 619.39 ml   Output 1300 ml   Net -680.61 ml         Physical Exam  Vitals and nursing note reviewed.   Constitutional:       General: She is not in acute distress.     Appearance: She is obese.   HENT:      Head: Normocephalic and atraumatic.      Mouth/Throat:      Mouth: Mucous membranes are dry.   Eyes:      Extraocular Movements: Extraocular movements intact.      Pupils: Pupils are equal, round, and reactive to light.   Cardiovascular:      Rate and Rhythm: Normal rate and regular rhythm.      Pulses: Normal pulses.      Heart sounds: Normal heart sounds.   Pulmonary:      Effort: Pulmonary effort is normal.      Breath sounds: No stridor. Wheezing (Improving) present.      Comments: On nasal cannula oxygen  Abdominal:      General: Abdomen is flat. Bowel sounds are normal. There is no distension.      Palpations: Abdomen is soft.      Tenderness: There is no abdominal tenderness.   Musculoskeletal:      Right lower le+ Edema present.      Left lower le+ Edema present.   Skin:     General: Skin is warm.      Capillary Refill: Capillary refill takes less than 2 seconds.   Neurological:      General: No focal deficit present.      Mental Status: She is alert and oriented to person, place, and time. Mental status is at baseline.             Significant Labs: All pertinent labs within the past 24 hours have been reviewed.    Significant Imaging: I have reviewed all pertinent imaging results/findings within the past 24 hours.      Assessment/Plan:      * COPD exacerbation  - No increased sputum or cough,  afebrile, no leuykocytos. CXR without lobar PNA or obvious consolidation  - Continue prednisone 40mg daily  - Continue duonebs q4h + roflumilast  - PO azithromycin. Holding further abx given lack of infectious symptoms/concern  - BiPAP management as above with repeat ABG; will plan to switch BiPAP settings on home machine  - Pulmonology consult      Athscl heart disease of native coronary artery w/o ang pctrs  - Continue aspirin, statin      Debility  - PT/OT consult, will likely need HH vs Pulm Rehab      Acute on chronic respiratory failure with hypoxia and hypercapnia  Patient with Hypercapnic and Hypoxic Respiratory failure which is Acute on chronic.  she is not on home oxygen. Supplemental oxygen was provided and noted- Oxygen Concentration (%):  [28] 28    .   Signs/symptoms of respiratory failure include- tachypnea, increased work of breathing and respiratory distress. Contributing diagnoses includes - COPD Labs and images were reviewed. Patient Has recent ABG, which has been reviewed. Will treat underlying causes and adjust management of respiratory failure as follows-     - Continue duonebs and COPD management as below  - weaned to nasal cannula; pulmonology following and adjusting home BiPAP settings to 18/8  - Continuous pulse oximetry  - PO azithromycin  - given dose of IV Lasix given edema; some B-lines on bedside ultrasound    Palliative care encounter  -DNR      Obstructive sleep apnea syndrome  -BiPAP nightly and with naps   -assessed by pulmonology and changing home settings to 18/8      Hypertension  - Hx of HTN but does not appear to be on medications  - Will continue to monitor inpatient  - PCP follow up        VTE Risk Mitigation (From admission, onward)         Ordered     enoxaparin injection 40 mg  Every 24 hours         08/08/23 1029     Place ANDRES hose  Until discontinued         08/08/23 1619     IP VTE HIGH RISK PATIENT  Once         08/07/23 2337     Place sequential compression device   Until discontinued         08/07/23 3040                Discharge Planning   RADHA:      Code Status: DNR   Is the patient medically ready for discharge?:     Reason for patient still in hospital (select all that apply): Patient trending condition, Treatment, Consult recommendations and PT / OT recommendations  Discharge Plan A: Home Health ([facility] -NO)                Tirso Valenzuela MD  Department of Hospital Medicine   [facility] - Intensive Care

## 2023-08-09 NOTE — PLAN OF CARE
Pt AAOx4. No complaints of nausea, vomiting, or diarrhea. No complaints of pain. On 2L NC then transitioned to bipap while sleeping. Urine output WNL. Safety precautions maintained. Tolerated all medications well.

## 2023-08-09 NOTE — PLAN OF CARE
Ms. Phelan's respiratory status greatly improved overnight and she appears to have returned to her baseline. On exam she has good air movement and no appreciable wheezes bilaterally. She is satting at goal on her home O2 NC. She responded well to IV lasix and put out 1.5L of urine and has improved lower extremity edema on exam. Given her greatly improved clinical status, she was stepped down to the floor this morning for further management and discharge preparation.     This patient was discussed on rounds with Dr. Adams who agrees with the patient's assessment and plan.     Elio Miller MD   [facility] Internal Medicine, -  Pulmonary/Critical Care Service

## 2023-08-09 NOTE — PLAN OF CARE
Problem: Occupational Therapy  Goal: Occupational Therapy Goal  Description: Goals to be met by: 9/9/23     Patient will increase functional independence with ADLs by performing:    LE Dressing with Modified [place].  Grooming while standing with Modified [place].  Toileting from toilet with Modified [place] for hygiene and clothing management.   Supine to sit with Modified [place].  Step transfer with Modified [place]  Toilet transfer to toilet with Modified [place].  Increased functional strength to WFL for self care skills and functional mobility.  Upper extremity exercise program x10 reps per handout, with independence.    Outcome: Ongoing, Progressing       Pt would benefit from cont OT services in order to maximize functional independence. Per chart, pt will d/c with low intensity therapy; pt states HH then would like to proceed with OP pulm rehab

## 2023-08-09 NOTE — PT/OT/SLP EVAL
Occupational Therapy   Evaluation/treatment     Name: Terri Phelan  MRN: [id number]  Admitting Diagnosis: COPD exacerbation  Recent Surgery: * No surgery found *      Recommendations:     Discharge Recommendations:  (low intensity therapy)  Discharge Equipment Recommendations:     Barriers to discharge:  Decreased caregiver support    Assessment:     Terri Phelan is a 68 y.o. female with a medical diagnosis of COPD exacerbation.  She presents with The primary encounter diagnosis was COPD exacerbation. Diagnoses of Hypercapnia, Right ventricular dilation, Acute respiratory distress, Pleural effusion, bilateral, Acute on chronic respiratory failure with hypoxia and hypercapnia [J96.21, J96.22], and Chronic hypercapnic respiratory failure were also pertinent to this visit.  . Performance deficits affecting function: impaired endurance, impaired cardiopulmonary response to activity, gait instability, decreased safety awareness, impaired functional mobility, impaired self care skills, impaired coordination.      Pt would benefit from cont OT services in order to maximize functional independence. Per chart, pt will d/c with low intensity therapy; pt states HH then would like to proceed with OP pulm rehab    Rehab Prognosis: Good; patient would benefit from acute skilled OT services to address these deficits and reach maximum level of function.       Plan:     Patient to be seen 3 x/week to address the above listed problems via self-care/home management, therapeutic activities, therapeutic exercises  Plan of Care Expires: 09/09/23  Plan of Care Reviewed with: patient    Subjective     Chief Complaint: pt reports SOB; does not like being in bed   Patient/Family Comments/goals: return home; ambulate; get UIC     Occupational Profile:  Living Environment: with dghtr in 1st floor apt, no steps to enter t/s combo   Previous level of function: pt reports mod I ADLs and functional mobility, but does require assist with  bathing recently from [facility]; reports TTB on order at this time but not currently received. HAs been on 3L O2 at home; uses rollator PRN for mobility, most recently with increased use 2/2 SOB   Equipment Used at Home: shower chair, rollator, bedside commode (bath bench on order)  Assistance upon Discharge: limited; ratnar works during the day; requesting info about further assist she can receive at home     Pain/Comfort:  Pain Rating 1:  (does not rate; reports LLE pain)    Patients cultural, spiritual, Hindu conflicts given the current situation:      Objective:     Communicated with: nsg prior to session.  Patient found supine with oxygen upon OT entry to room.    General Precautions: Standard, fall  Orthopedic Precautions: N/A  Braces: N/A  Respiratory Status: Nasal cannula, flow 2 L/min    Occupational Performance:    Bed Mobility:    Patient completed Scooting/Bridging with contact guard assistance  Patient completed Supine to Sit with minimum assistance    Functional Mobility/Transfers:  Patient completed Sit <> Stand Transfer with stand by assistance and contact guard assistance  with  rolling walker   Patient completed Bed <> Chair Transfer using Step Transfer technique with stand by assistance with rolling walker  Patient completed Toilet Transfer Step Transfer technique with stand by assistance with  rolling walker  Functional Mobility: SBA-CGA with RW; impaired safety awareness with mobility     Activities of Daily Living:  Grooming: stand by assistance at sink   Toileting: contact guard assistance      Cognitive/Visual Perceptual:  Appears anxious   Impaired insight to medical issues/physical deficits  Impaired safety awareness     Physical Exam:  Balance:    -       WFL seated; fair + standing   Postural examination/scapula alignment:    -       Rounded shoulders  Upper Extremity Range of Motion:   BUE WFL   Upper Extremity Strength:  BUE WFL     AMPAC 6 Click ADL:  AMPAC Total Score: 19    Treatment  "& Education:  Pt reports " I'm ready to go"   Pt found on 2L O2; demo's SOB with minimal axs on 2LO2  Pt with impaired safety awareness throughout session- requires cues to pace self and move at a slower speed; attempts to rush through axs 2/2 SOB and push self further than her ax tolerance causing increased SOB   With mobility in room with RW on 2LO2, pt's O2 dropping to 85%; required elevating to 3L O2 to return to low 90's   Able to perform toileting and G&H axs at sink- however, increased work of breathing noted during session   T/f to b/s chair; pt continuing to report that she would like to ambulate in hallway despite demo'ing accessory muscle breathing, increased work of breathing, and SOB; education again provide on pacing and self awareness of SOB; pt also anxious regarding speaking with  again today     Patient left up in chair with all lines intact, call button in reach, chair alarm on, and nsg notified    GOALS:   Multidisciplinary Problems       Occupational Therapy Goals          Problem: Occupational Therapy    Goal Priority Disciplines Outcome Interventions   Occupational Therapy Goal     OT, PT/OT Ongoing, Progressing    Description: Goals to be met by: 9/9/23     Patient will increase functional independence with ADLs by performing:    LE Dressing with Modified [place].  Grooming while standing with Modified [place].  Toileting from toilet with Modified [place] for hygiene and clothing management.   Supine to sit with Modified [place].  Step transfer with Modified [place]  Toilet transfer to toilet with Modified [place].  Increased functional strength to WFL for self care skills and functional mobility.  Upper extremity exercise program x10 reps per handout, with independence.                         History:     Past Medical History:   Diagnosis Date    COPD (chronic obstructive pulmonary disease)     Hypertension          Past Surgical History: "   Procedure Laterality Date     SECTION      HERNIA REPAIR      HYSTERECTOMY         Time Tracking:     OT Date of Treatment: 23  OT Start Time:   OT Stop Time: 1502  OT Total Time (min): 40 min    Billable Minutes:Evaluation 10  Self Care/Home Management 30    2023

## 2023-08-09 NOTE — MEDICAL/APP STUDENT
[facility] Pulmonary/Critical Care Consult Note      Patient:Terri Phelan  Age:68 y.o.  MRN:[id number]  Admit date:2023   LOS:1 day(s)     Primary Attending: Tirso Valenzuela MD  Consult Attending: Katie Adams MD  Consult Fellow: Magdaleno Rivers MD  Reason for Consult: respiratory failure 2/2 COPD exacerbation     HPI:       Ms. Terri Phelna in a 68 y.o. F with a PMHx of poorly controlled COPD, HTN, CAD, and ~40 pack-year smoking hx who presented to the [facility] ED from a doctor's appointment on  for elevated CO2 on lab testing. She has a CO2 of 41 while at the doctor's office, and her doctor sent her to the ED for emergent treatment of hypercapnia. She endorses having a HA, SOB, weakness, and confusion for the past 2 weeks. Of note, she is on home O2 at 3L and has had several recent hospitalizations for COPD exacerbations.    In the ED, workup was remarkable for a CO2 42 on CMP, ABG with pH 7.237, pCO2 106.6, pO2 81, and HCO3 45.4. CXR showed basilar lung densities c/w small pleural effusions, atelectasis, and mild infiltrates. She received solumedrol 125, Lasix 40, and ceft/azithro 1x before ICU admission.      MEDICAL HISTORY:      Past Medical History:  Past Medical History:   Diagnosis Date    COPD (chronic obstructive pulmonary disease)     Hypertension         Past Surgical History:  Past Surgical History:   Procedure Laterality Date     SECTION      HERNIA REPAIR      HYSTERECTOMY           Family History:   History reviewed. No pertinent family history.      Social History:  Social History     Tobacco Use    Smoking status: Former     Current packs/day: 1.00     Average packs/day: 1 pack/day for 20.0 years (20.0 ttl pk-yrs)     Types: Cigarettes    Smokeless tobacco: Never   Substance Use Topics    Alcohol use: Not Currently     Alcohol/week: 1.0 standard drink of alcohol     Types: 1 Glasses of wine per week    Drug use: No         Allergies:  Review of patient's allergies indicates:    Allergen Reactions    Codeine Nausea And Vomiting    Morphine Hallucinations       Home Medications:  Current Outpatient Medications   Medication Instructions    acetaminophen (TYLENOL) 500 mg, Oral, Every 6 hours PRN    albuterol (PROVENTIL/VENTOLIN HFA) 90 mcg/actuation inhaler 2 puffs, Inhalation, Every 6 hours PRN    albuterol-ipratropium (DUO-NEB) 2.5 mg-0.5 mg/3 mL nebulizer solution 3 mLs, Nebulization, Every 6 hours PRN    alendronate (FOSAMAX) 35 mg, Oral, Every Tuesday    aspirin 81 mg, Oral, Daily    azithromycin (Z-OLY) 250 mg, Oral, Three times weekly    budesonide-glycopyr-formoterol (BREZTRI AEROSPHERE) 160-9-4.8 mcg/actuation HFAA 2 puffs, Inhalation, 2 times daily    dextromethorphan-guaiFENesin  mg (MUCINEX DM)  mg per 12 hr tablet 1 tablet, Oral, Every 12 hours    dicyclomine (BENTYL) 20 mg, Oral, 3 times daily PRN    ergocalciferol (ERGOCALCIFEROL) 50,000 Units, Oral, Every Tuesday    fluticasone propionate (FLONASE) 50 mcg/actuation nasal spray 1 spray, Each Nostril, Daily PRN    furosemide (LASIX) 40 mg, Oral, Daily    LORazepam (ATIVAN) 0.5 mg, Oral, Every 8 hours PRN    multivit with min-folic acid (WOMEN'S MULTIVITAMIN GUMMIES) 200 mcg Chew 2 tablets, Oral, Daily    OXYGEN-AIR DELIVERY SYSTEMS MISC 3 L, Nasal    roflumilast (DALIRESP) 500 mcg Tab 1 tablet, Oral, Daily    traZODone (DESYREL) 50 mg, Oral, Nightly PRN        OBJECTIVE DATA:      Vital Signs:  Vitals:    08/09/23 0630   BP: (!) 156/69   Pulse: 74   Resp: (!) 33   Temp:        Intake/Output:    Intake/Output Summary (Last 24 hours) at 8/9/2023 0657  Last data filed at 8/9/2023 0634  Gross per 24 hour   Intake 969.54 ml   Output 1650 ml   Net -680.46 ml          Physical Exam:  Constitutional: cooperative, calm, appears stated age. Obese  HEENT: NCAT, PERRL, dry mucous membranes  Neck: Supple, no masses, trachea not deviated.  Resp: Symmetrical, normal effort of breathing, wheezing bilaterally   Cardio: RRR, S1 and  S2 normal, no murmurs or added sounds.  GI: Soft, non-tender, bowel sounds active.  Extremities: 1+ BLE edema, peripheral pulses 2+ and symmetric, extremities warm.  Skin: No rashes, bruises, or lesions.    Neurologic: Alert and oriented x3, follows commands, moves extremities spontaneously. Some long pauses d/t issues concentrating during conversation     Laboratory/Imaging:  Recent Labs   Lab 08/07/23  1047 08/07/23  2120 08/08/23  0243 08/08/23  0430 08/09/23  0517   WBC 8.09 8.80  --  7.04 14.23*   HGB 12.1 12.3  --  12.1 12.1   HCT 44.0 43.6 41 43.2 41.8    159  --  148* 154    145  --  143 145   K 3.9 4.1  --  4.3 3.7   CL 95 97  --  95 95   CREATININE 0.7 0.7  --  0.6 0.7   BUN 15 15  --  12 18   CO2 41* 42*  --  41* 41*   ALT 13 16  --   --   --    AST 16 17  --   --   --          Microbiology:  None     Imaging:  Imaging Results              X-Ray Chest AP Portable (Final result)  Result time 08/07/23 22:11:07      Final result by Jonathan Brunner MD (08/07/23 22:11:07)                   Impression:      Mild density at the lung bases likely relates to small pleural effusions, atelectasis and mild basilar infiltrates.      Electronically signed by: Jonathan Brunner  Date:    08/07/2023  Time:    22:11               Narrative:    EXAMINATION:  XR CHEST AP PORTABLE    CLINICAL HISTORY:  Chronic obstructive pulmonary disease with (acute) exacerbation    TECHNIQUE:  Single frontal view of the chest was performed.    COMPARISON:  Chest radiograph July 9, 2023    FINDINGS:  Single portable chest view is submitted.  Objects overlie the patient.  The cardiomediastinal silhouette appears stable.  Aortic atherosclerotic change noted.    Accentuated attenuation of the lung bases is in part due to overlying soft tissue attenuation however there is suspected small pleural effusions, left greater than right, as well as atelectasis and mild basilar infiltrates.  There is no evidence for pneumothorax.    The  osseous structures demonstrate chronic change.                                       Medications:   albuterol-ipratropium  3 mL Nebulization Q4H    aspirin  81 mg Oral Daily    atorvastatin  40 mg Oral QHS    azithromycin  250 mg Oral Every Mon, Wed, Fri    budesonide-glycopyr-formoterol  2 puff Inhalation BID    cefTRIAXone (ROCEPHIN) IVPB  1 g Intravenous Q24H    enoxparin  40 mg Subcutaneous Q24H (prophylaxis, 1700)    guaiFENesin  600 mg Oral BID    mupirocin   Nasal BID    predniSONE  40 mg Oral Daily    roflumilast  1 tablet Oral Daily             sodium chloride 0.9%, acetaminophen, diazePAM, melatonin, ondansetron, sodium chloride 0.9%, traZODone     Problem List:  Patient Active Problem List   Diagnosis    Hypertension    Former smoker    COPD exacerbation    Adenoma of ascending colon    CAD (coronary artery disease)    Centrilobular emphysema    Diverticular disease of colon    Hemorrhoids    History of myocardial infarction    Mucous in stools    Obstructive sleep apnea syndrome    Oxygen dependent    Prolapsed internal hemorrhoids    Palliative care encounter    Acute on chronic respiratory failure with hypoxia and hypercapnia    Major depression    Breast mass    Debility    Dyspnea    Athscl heart disease of native coronary artery w/o ang pctrs    Pulmonary nodule    History of tobacco abuse       Assessment/Plan:     Terri Phelan is a 68 y.o. female with a PMHx of poorly controlled COPD, HTN, CAD, and 40 pack-year smoking hx who presents with SOB, HA, weakness, and confusion for 2 weeks. Current presentation suggestive of respiratory failure d/t COPD exacerbation. ABG shows respiratory acidosis with inadequate metabolic compensation. CXR with nonspecific minor basilar densities. Current episode likely precipitated to poor  control of severe COPD given hx of recent hospitalizations, underlying infectious etiology unlikely.      Neuro  - no acute issues  - not on sedation  - AAOx4, mental status  good albeit with minor issues concentrating during conversation  - intermittent L calf pain from [place] horses      Cardiovascular  # HTN  - BP 120s-140s/50-60s overnight  - not on vasoactive agents    # CAD   - on aspirin 81 and atorvastatin 40      Respiratory  # Acute on chronic respiratory failure 2/2 COPD exacerbation  - on BiPAP 20/5 @ 30%, satting well  - ABG 8/8 --> pH 7.26, pCO2 103, pO2 63, HCO3 47  - increase ventilation settings *** to reduce hypercapnia  - continue DuoNebs q4h, roflumilast 500, and prednisone 40  - stop ceftriaxone, continue azithromycin 500      GI/FEN  - no acute issues  - except for CO2 (41), electrolytes wnl; AG 6  - regular adult diet  - no IVFs      Renal  - no acute issues  - BUN 12, creat 0.6, GFR >60  - intake 250 mL, UOP 1450 mL, net -1200 mL since admission      Heme  - no acute issues  - H&H 12.1/43.2  - no active bleeding      Infectious Disease  # Potential LRI  - CXR w/ basilar lung densities, potentially inflitrates  - afebrile, no S&S of infection, WBC 7.04 with 93.3% neutrophils  - low suspicion for infection  - stop ceftriaxone, continue azithromycin 500       Endocrine  - no acute issues  - glucose 140, goal 140-180      Feeding: reg diet  Analgesia: N/A  Sedation: N/A  Thrombo PPX: SCD socks   Head of Bed: elevated  Ulcer PPX: none  Glucose: goal 140-180  SBT/SAT: wean BiPAP as tolerated  Bowels: Last Bowel Movement: 08/08/23  Indwelling Lines: none  Deescalation Abx: stop ceft, cont azithro    Code: Full  Dispo: Remain in MICU     Thank you for allowing us to participate in the care of this patient. We will continue to follow.        Ministerio Barron, MS4  [employer]

## 2023-08-09 NOTE — ASSESSMENT & PLAN NOTE
Patient with Hypercapnic and Hypoxic Respiratory failure which is Acute on chronic.  she is not on home oxygen. Supplemental oxygen was provided and noted- Oxygen Concentration (%):  [28] 28    .   Signs/symptoms of respiratory failure include- tachypnea, increased work of breathing and respiratory distress. Contributing diagnoses includes - COPD Labs and images were reviewed. Patient Has recent ABG, which has been reviewed. Will treat underlying causes and adjust management of respiratory failure as follows-     - Continue duonebs and COPD management as below  - weaned to nasal cannula; pulmonology following and adjusting home BiPAP settings to 18/8  - Continuous pulse oximetry  - PO azithromycin  - given dose of IV Lasix given edema; some B-lines on bedside ultrasound

## 2023-08-09 NOTE — PLAN OF CARE
Pt is AAOx4. Pt given medications as ordered per MAR. Tolerating Regular diet. Cardiac monitoring in place. Pt on 2L NC. Personal Bipap at bedside. Up to toilet with walker and 1 person assist. Scheduled breathing treatments given. PRN Daily Valium given for anxiety. Safety maintained. Bed alarm set. Instructed to use call light for assistance. Wctm.        Problem: Adult Inpatient Plan of Care  Goal: Optimal Comfort and Wellbeing  Outcome: Ongoing, Progressing     Problem: Adjustment to Illness COPD (Chronic Obstructive Pulmonary Disease)  Goal: Optimal Chronic Illness Coping  Outcome: Ongoing, Progressing     Problem: Respiratory Compromise COPD (Chronic Obstructive Pulmonary Disease)  Goal: Effective Oxygenation and Ventilation  Outcome: Ongoing, Progressing     Problem: Anxiety  Goal: Anxiety Reduction or Resolution  Outcome: Ongoing, Progressing

## 2023-08-09 NOTE — NURSING
Report received from ELLIE Mccoy. Pt received in stable condition in no acute distress. PT on 2L NC. Bed weight and VS taken. Safety maintained. Instructed to use call light for assistance. Chelsey.

## 2023-08-09 NOTE — SUBJECTIVE & OBJECTIVE
Interval History:  Looks much more comfortable today, speaking in full sentences and without substantial tachypnea.  States that she feels close to her normal baseline.  She is eager to work with physical therapy today and is hoping that she will be able to get up into a chair.  Stable for step-down at this time.    Review of Systems   Respiratory:  Positive for shortness of breath.      Objective:     Vital Signs (Most Recent):  Temp: 98.4 °F (36.9 °C) (23)  Pulse: 74 (23)  Resp: 18 (23)  BP: (!) 148/65 (23)  SpO2: (!) 93 % (23) Vital Signs (24h Range):  Temp:  [97.6 °F (36.4 °C)-99 °F (37.2 °C)] 98.4 °F (36.9 °C)  Pulse:  [61-91] 74  Resp:  [18-55] 18  SpO2:  [85 %-96 %] 93 %  BP: (123-175)/(58-96) 148/65     Weight: 86.5 kg (190 lb 11.2 oz)  Body mass index is 31.73 kg/m².    Intake/Output Summary (Last 24 hours) at 2023 1021  Last data filed at 2023 0634  Gross per 24 hour   Intake 619.39 ml   Output 1300 ml   Net -680.61 ml         Physical Exam  Vitals and nursing note reviewed.   Constitutional:       General: She is not in acute distress.     Appearance: She is obese.   HENT:      Head: Normocephalic and atraumatic.      Mouth/Throat:      Mouth: Mucous membranes are dry.   Eyes:      Extraocular Movements: Extraocular movements intact.      Pupils: Pupils are equal, round, and reactive to light.   Cardiovascular:      Rate and Rhythm: Normal rate and regular rhythm.      Pulses: Normal pulses.      Heart sounds: Normal heart sounds.   Pulmonary:      Effort: Pulmonary effort is normal.      Breath sounds: No stridor. Wheezing (Improving) present.      Comments: On nasal cannula oxygen  Abdominal:      General: Abdomen is flat. Bowel sounds are normal. There is no distension.      Palpations: Abdomen is soft.      Tenderness: There is no abdominal tenderness.   Musculoskeletal:      Right lower le+ Edema present.      Left lower le+ Edema  present.   Skin:     General: Skin is warm.      Capillary Refill: Capillary refill takes less than 2 seconds.   Neurological:      General: No focal deficit present.      Mental Status: She is alert and oriented to person, place, and time. Mental status is at baseline.             Significant Labs: All pertinent labs within the past 24 hours have been reviewed.    Significant Imaging: I have reviewed all pertinent imaging results/findings within the past 24 hours.

## 2023-08-09 NOTE — PLAN OF CARE
Problem: Adult Inpatient Plan of Care  Goal: Plan of Care Review  Outcome: Ongoing, Progressing  -POC discussed with patient and daughter at bedside.   -Code status changed to DNR. Palliative care following.   -OOB in chair majority of shift.     Problem: Obstructive Sleep Apnea Risk or Actual  Goal: Unobstructed Breathing During Sleep  Outcome: Ongoing, Progressing  -Bipap qhs.      Problem: Functional Ability Impaired COPD (Chronic Obstructive Pulmonary Disease)  Goal: Optimal Level of Functional [place]  Outcome: Ongoing, Progressing  -2L-3L NC to maintain Spo2 goal of 88-92%.   -SANDERS.      Problem: Fluid Volume Excess  Goal: Fluid Balance  Outcome: Ongoing, Progressing  -Lasix 20mg IVP administered for BLE edema.    Problem: Anxiety  Goal: Anxiety Reduction or Resolution  Outcome: Ongoing, Progressing  -Diazepam 2mg prn ordered. Declined this shift.     Problem: Electrolyte Imbalance  Goal: Electrolyte Balance  Outcome: Ongoing, Progressing  -Mg++ 1.9.  Mg++ IVPB administered.

## 2023-08-10 ENCOUNTER — EXTERNAL HOME HEALTH (OUTPATIENT)
Dept: HOME HEALTH SERVICES | Facility: HOSPITAL | Age: 68
End: 2023-08-10
Payer: MEDICARE

## 2023-08-10 VITALS
WEIGHT: 186.94 LBS | RESPIRATION RATE: 22 BRPM | OXYGEN SATURATION: 95 % | HEART RATE: 90 BPM | BODY MASS INDEX: 31.14 KG/M2 | HEIGHT: 65 IN | TEMPERATURE: 98 F | DIASTOLIC BLOOD PRESSURE: 65 MMHG | SYSTOLIC BLOOD PRESSURE: 140 MMHG

## 2023-08-10 LAB
ANION GAP SERPL CALC-SCNC: 6 MMOL/L (ref 8–16)
BASOPHILS # BLD AUTO: 0.02 K/UL (ref 0–0.2)
BASOPHILS NFR BLD: 0.2 % (ref 0–1.9)
BUN SERPL-MCNC: 19 MG/DL (ref 8–23)
CALCIUM SERPL-MCNC: 8.9 MG/DL (ref 8.7–10.5)
CHLORIDE SERPL-SCNC: 97 MMOL/L (ref 95–110)
CO2 SERPL-SCNC: 41 MMOL/L (ref 23–29)
CREAT SERPL-MCNC: 0.6 MG/DL (ref 0.5–1.4)
DIFFERENTIAL METHOD: ABNORMAL
EOSINOPHIL # BLD AUTO: 0 K/UL (ref 0–0.5)
EOSINOPHIL NFR BLD: 0.1 % (ref 0–8)
ERYTHROCYTE [DISTWIDTH] IN BLOOD BY AUTOMATED COUNT: 14.3 % (ref 11.5–14.5)
EST. GFR  (NO RACE VARIABLE): >60 ML/MIN/1.73 M^2
GLUCOSE SERPL-MCNC: 80 MG/DL (ref 70–110)
HCT VFR BLD AUTO: 40.1 % (ref 37–48.5)
HGB BLD-MCNC: 11.9 G/DL (ref 12–16)
IMM GRANULOCYTES # BLD AUTO: 0.05 K/UL (ref 0–0.04)
IMM GRANULOCYTES NFR BLD AUTO: 0.4 % (ref 0–0.5)
LYMPHOCYTES # BLD AUTO: 2.3 K/UL (ref 1–4.8)
LYMPHOCYTES NFR BLD: 18.8 % (ref 18–48)
MCH RBC QN AUTO: 27.1 PG (ref 27–31)
MCHC RBC AUTO-ENTMCNC: 29.7 G/DL (ref 32–36)
MCV RBC AUTO: 91 FL (ref 82–98)
MONOCYTES # BLD AUTO: 1.2 K/UL (ref 0.3–1)
MONOCYTES NFR BLD: 9.5 % (ref 4–15)
NEUTROPHILS # BLD AUTO: 8.8 K/UL (ref 1.8–7.7)
NEUTROPHILS NFR BLD: 71 % (ref 38–73)
NRBC BLD-RTO: 0 /100 WBC
PLATELET # BLD AUTO: 147 K/UL (ref 150–450)
PMV BLD AUTO: 11.7 FL (ref 9.2–12.9)
POTASSIUM SERPL-SCNC: 3.6 MMOL/L (ref 3.5–5.1)
RBC # BLD AUTO: 4.39 M/UL (ref 4–5.4)
SODIUM SERPL-SCNC: 144 MMOL/L (ref 136–145)
WBC # BLD AUTO: 12.32 K/UL (ref 3.9–12.7)

## 2023-08-10 PROCEDURE — 97530 THERAPEUTIC ACTIVITIES: CPT

## 2023-08-10 PROCEDURE — 99900035 HC TECH TIME PER 15 MIN (STAT)

## 2023-08-10 PROCEDURE — 85025 COMPLETE CBC W/AUTO DIFF WBC: CPT | Performed by: HOSPITALIST

## 2023-08-10 PROCEDURE — 94761 N-INVAS EAR/PLS OXIMETRY MLT: CPT

## 2023-08-10 PROCEDURE — 94640 AIRWAY INHALATION TREATMENT: CPT

## 2023-08-10 PROCEDURE — 27000221 HC OXYGEN, UP TO 24 HOURS

## 2023-08-10 PROCEDURE — 80048 BASIC METABOLIC PNL TOTAL CA: CPT | Performed by: HOSPITALIST

## 2023-08-10 PROCEDURE — 25000003 PHARM REV CODE 250: Performed by: INTERNAL MEDICINE

## 2023-08-10 PROCEDURE — 25000003 PHARM REV CODE 250: Performed by: STUDENT IN AN ORGANIZED HEALTH CARE EDUCATION/TRAINING PROGRAM

## 2023-08-10 PROCEDURE — 25000242 PHARM REV CODE 250 ALT 637 W/ HCPCS: Performed by: HOSPITALIST

## 2023-08-10 PROCEDURE — 25000003 PHARM REV CODE 250: Performed by: HOSPITALIST

## 2023-08-10 PROCEDURE — 36415 COLL VENOUS BLD VENIPUNCTURE: CPT | Performed by: HOSPITALIST

## 2023-08-10 PROCEDURE — 97161 PT EVAL LOW COMPLEX 20 MIN: CPT

## 2023-08-10 PROCEDURE — 97116 GAIT TRAINING THERAPY: CPT

## 2023-08-10 PROCEDURE — 63600175 PHARM REV CODE 636 W HCPCS: Performed by: INTERNAL MEDICINE

## 2023-08-10 RX ORDER — POTASSIUM CHLORIDE 20 MEQ/1
40 TABLET, EXTENDED RELEASE ORAL ONCE
Status: COMPLETED | OUTPATIENT
Start: 2023-08-10 | End: 2023-08-10

## 2023-08-10 RX ORDER — DIAZEPAM 2 MG/1
2 TABLET ORAL DAILY PRN
Qty: 30 TABLET | Refills: 0 | Status: SHIPPED | OUTPATIENT
Start: 2023-08-10 | End: 2023-09-12 | Stop reason: SDUPTHER

## 2023-08-10 RX ORDER — AMOXICILLIN AND CLAVULANATE POTASSIUM 875; 125 MG/1; MG/1
1 TABLET, FILM COATED ORAL 2 TIMES DAILY
Qty: 4 TABLET | Refills: 0 | Status: SHIPPED | OUTPATIENT
Start: 2023-08-10 | End: 2023-08-12

## 2023-08-10 RX ORDER — PREDNISONE 20 MG/1
40 TABLET ORAL DAILY
Qty: 4 TABLET | Refills: 0 | Status: SHIPPED | OUTPATIENT
Start: 2023-08-11 | End: 2023-08-13

## 2023-08-10 RX ORDER — POTASSIUM CHLORIDE 750 MG/1
10 TABLET, EXTENDED RELEASE ORAL DAILY
Qty: 30 TABLET | Refills: 11 | Status: SHIPPED | OUTPATIENT
Start: 2023-08-10 | End: 2023-12-27

## 2023-08-10 RX ORDER — FLUCONAZOLE 100 MG/1
100 TABLET ORAL ONCE AS NEEDED
Qty: 1 TABLET | Refills: 0 | Status: SHIPPED | OUTPATIENT
Start: 2023-08-10 | End: 2023-08-11

## 2023-08-10 RX ORDER — ATORVASTATIN CALCIUM 40 MG/1
40 TABLET, FILM COATED ORAL NIGHTLY
Qty: 90 TABLET | Refills: 3 | Status: SHIPPED | OUTPATIENT
Start: 2023-08-10 | End: 2023-09-20

## 2023-08-10 RX ADMIN — IPRATROPIUM BROMIDE AND ALBUTEROL SULFATE 3 ML: 2.5; .5 SOLUTION RESPIRATORY (INHALATION) at 04:08

## 2023-08-10 RX ADMIN — IPRATROPIUM BROMIDE AND ALBUTEROL SULFATE 3 ML: 2.5; .5 SOLUTION RESPIRATORY (INHALATION) at 11:08

## 2023-08-10 RX ADMIN — IPRATROPIUM BROMIDE AND ALBUTEROL SULFATE 3 ML: 2.5; .5 SOLUTION RESPIRATORY (INHALATION) at 07:08

## 2023-08-10 RX ADMIN — ASPIRIN 81 MG CHEWABLE TABLET 81 MG: 81 TABLET CHEWABLE at 09:08

## 2023-08-10 RX ADMIN — MUPIROCIN: 20 OINTMENT TOPICAL at 09:08

## 2023-08-10 RX ADMIN — GUAIFENESIN 600 MG: 600 TABLET, EXTENDED RELEASE ORAL at 09:08

## 2023-08-10 RX ADMIN — ALBUTEROL SULFATE 2 PUFF: 90 AEROSOL, METERED RESPIRATORY (INHALATION) at 08:08

## 2023-08-10 RX ADMIN — FUROSEMIDE 40 MG: 40 TABLET ORAL at 09:08

## 2023-08-10 RX ADMIN — ROFLUMILAST 500 MCG: 500 TABLET ORAL at 12:08

## 2023-08-10 RX ADMIN — POTASSIUM CHLORIDE 40 MEQ: 1500 TABLET, EXTENDED RELEASE ORAL at 09:08

## 2023-08-10 RX ADMIN — PREDNISONE 40 MG: 20 TABLET ORAL at 09:08

## 2023-08-10 NOTE — DISCHARGE SUMMARY
[facility] Medicine  Discharge Summary      Patient Name: Terri Phelan  MRN: [id number]  SUNDEEP: [id number]  Patient Class: IP- Inpatient  Admission Date: 8/7/2023  Hospital Length of Stay: 2 days  Discharge Date and Time:  08/10/2023 1:34 PM  Attending Physician: Tirso Valenzuela.,*   Discharging Provider: Tirso Valenzuela MD  Primary Care Provider: Kev Cox MD    Primary Care Team: Networked reference to record PCT     HPI:   69 yo female with a PMHx of COPD and former tobacco use, HTN, and mild pulmonary hypertension here for SOB.    Patient states she has been feeling more dyspneic over last few days.  Has noticed audible wheezing as well.  Feels like previous COPD exacerbations.  Inhalers did not help.  Has not had increase in cough or sputum production.  Denies any fevers, chills, chest pain, orthopnea, PND, abdominal pain, nausea, vomiting.  Does have some chronic leg swelling which she is on Lasix 4.  Recent echo showed very minimal elevated RVSP of 37 with RAP of 8.    In the ED, patient was noted to have O2 saturations in high 80s.  Other vital signs were stable.  Patient was tripoding and an acute respiratory distress.  BiPAP was placed at settings of 16/10.  ABG at that time showed a pH of around 7.2 and a CO2 of 100.  Checks x-ray with some atelectasis but no obvious lobar pneumonia.  Patient was given Solu-Medrol, antibiotics, and DuoNebs.  Transfer to ICU for continued care.      * No surgery found *      Hospital Course:   Ms. Phelan presented with acute on chronic hypoxic and hypercapnic respiratory failure due to COPD exacerbation. Initiated on IV steroids, nebs, and antibiotics with BiPAP usage. She is on maximum COPD therapies with LAMA/LABA/ICS, MWF azithro, QD daliresp, and home O2. Adjusted BiPAP to 18/8. Stable to discharge with home health today; pending progression can move to Pulmonary Rehab. Also requesting therapy to help deal with life changes  "associated with her illness; will refer to outpatient Psych.    BP (!) 140/65 (BP Location: Left arm, Patient Position: Sitting)   Pulse 90   Temp 98.2 °F (36.8 °C) (Oral)   Resp (!) 22   Ht 5' 5" (1.651 m)   Wt 84.8 kg (186 lb 15.2 oz)   SpO2 95%   Breastfeeding No   BMI 31.11 kg/m²   Physical Exam  Vitals and nursing note reviewed.   Constitutional:       General: She is not in acute distress.     Appearance: She is obese.   HENT:      Head: Normocephalic and atraumatic.      Mouth/Throat:      Mouth: Mucous membranes are dry.   Eyes:      Extraocular Movements: Extraocular movements intact.      Pupils: Pupils are equal, round, and reactive to light.   Cardiovascular:      Rate and Rhythm: Normal rate and regular rhythm.      Pulses: Normal pulses.      Heart sounds: Normal heart sounds.   Pulmonary:      Effort: Pulmonary effort is normal.      Breath sounds: No stridor. Wheezing (Improving) present.      Comments: On nasal cannula oxygen  Abdominal:      General: Abdomen is flat. Bowel sounds are normal. There is no distension.      Palpations: Abdomen is soft.      Tenderness: There is no abdominal tenderness.   Musculoskeletal:      Right lower le+ Edema present.      Left lower le+ Edema present.   Skin:     General: Skin is warm.      Capillary Refill: Capillary refill takes less than 2 seconds.   Neurological:      General: No focal deficit present.      Mental Status: She is alert and oriented to person, place, and time. Mental status is at baseline.        Goals of Care Treatment Preferences:  Code Status: DNR    Health care agent: Mulugeta Edwards  [facility] care agent number: [phone]    Living Will: Yes     What is most important right now is to focus on spending time at home, avoiding the hospital, remaining as independent as possible, symptom/pain control, improvement in condition but with limits to invasive therapies.  Accordingly, we have decided that the best plan to meet the " patient's goals includes continuing with treatment.      Consults:   Consults (From admission, onward)        Status Ordering Provider     Inpatient consult to Palliative Care  Once        Provider:  Wei Mercado Jr., MD    Completed OK YEH     Inpatient consult to Pulmonology  Once        Provider:  (Not yet assigned)    MIRTA Handy          No new Assessment & Plan notes have been filed under this hospital service since the last note was generated.  Service: Hospital Medicine    Final Active Diagnoses:    Diagnosis Date Noted POA    PRINCIPAL PROBLEM:  COPD exacerbation [J44.1] 12/17/2014 Yes    Chronic hypercapnic respiratory failure [J96.12] 08/09/2023 Yes    Athscl heart disease of native coronary artery w/o ang pctrs [I25.10] 07/06/2023 Yes    Debility [R53.81] 07/03/2023 Yes    Acute on chronic respiratory failure with hypoxia and hypercapnia [J96.21, J96.22] 06/15/2023 Yes    Palliative care encounter [Z51.5] 04/25/2023 Not Applicable    Hypertension [I10] 09/08/2015 Yes    Obstructive sleep apnea syndrome [G47.33] 03/31/2015 Yes      Problems Resolved During this Admission:       Discharged Condition: fair    Disposition: Home or Self Care    Follow Up:   Follow-up Information     Bethany Landry MD Follow up on 8/23/2023.    Specialty: Internal Medicine  Why: 9:30am hospital follow up  Contact information:  [address]  [address]  [place] [facility] [place] [address]  [phone]             Home Health Agency Follow up.    Why: Home Health Agency will contact patient to resume home health services.           MyChart Follow up.    Why: Please review [employer] for future follow up appointments.           [facility] Follow up.    Why: [facility] will contact patient of future follow up appointments.           [facility] Follow up. Call.    Why: [facility] - Patient will have to contact [facility] to make appointment.  They can schedule with Social  Worker at different Clinic locations or patient can schedule a virtual appointment if set up on My Chart.    [facility] - [phone]                     Patient Instructions:      BIPAP FOR HOME USE     Order Specific Question Answer Comments   Length of need (1-99 months): 99    Type:  Bipap S/T    IPAP: cmH20 (4-30): 18    EPAP: cmH20 (4-25): 8    Breath Rate: BPM (Off, 1-30): 14    Humidification (): Heated    Choose ONE mask type and its corresponding cushions and/or pillows:  Full Face Mask, 1 per 90 days:  Full Face Cushion, (3 per 90 days)    Choose EITHER Heated or Non-Heated Tubjing  Heated Tubing, 1 per 6 months    All other supplies as needed as listed below:  Headgear, 1 per 180 days    All other supplies as needed as listed below:  Chin Strap, 1 per 180 days    All other supplies as needed as listed below:  Disposable Filter, 6 per 90 days      Ambulatory referral/consult to Pulmonology   Standing Status: Future   Referral Priority: Routine Referral Type: Consultation   Referral Reason: Specialty Services Required   Requested Specialty: Pulmonary Disease   Number of Visits Requested: 1     Ambulatory referral/consult to Psychiatry   Standing Status: Future   Referral Priority: Routine Referral Type: Psychiatric   Referral Reason: Specialty Services Required   Requested Specialty: Psychiatry   Number of Visits Requested: 1     Diet Adult Regular     Notify your health care provider if you experience any of the following:  temperature >100.4     Notify your health care provider if you experience any of the following:  persistent nausea and vomiting or diarrhea     Notify your health care provider if you experience any of the following:  severe uncontrolled pain     Notify your health care provider if you experience any of the following:  difficulty breathing or increased cough     Notify your health care provider if you experience any of the following:  severe  persistent headache     Notify your health care provider if you experience any of the following:  persistent dizziness, light-headedness, or visual disturbances     Notify your health care provider if you experience any of the following:  increased confusion or weakness     Activity as tolerated       Significant Diagnostic Studies: N/A    Pending Diagnostic Studies:     None         Medications:  Reconciled Home Medications:      Medication List      START taking these medications    amoxicillin-clavulanate 875-125mg 875-125 mg per tablet  Commonly known as: AUGMENTIN  Take 1 tablet by mouth 2 (two) times daily. for 2 days     atorvastatin 40 MG tablet  Commonly known as: LIPITOR  Take 1 tablet (40 mg total) by mouth every evening.     diazePAM 2 MG tablet  Commonly known as: VALIUM  Take 1 tablet (2 mg total) by mouth daily as needed for Anxiety.     predniSONE 20 MG tablet  Commonly known as: DELTASONE  Take 2 tablets (40 mg total) by mouth once daily. for 2 days  Start taking on: August 11, 2023        CHANGE how you take these medications    azithromycin 250 MG tablet  Commonly known as: Z-OLY  Take 1 tablet (250 mg total) by mouth 3 (three) times a week.  What changed: when to take this        CONTINUE taking these medications    acetaminophen 500 MG tablet  Commonly known as: TYLENOL  Take 500 mg by mouth every 6 (six) hours as needed for Pain.     albuterol 90 mcg/actuation inhaler  Commonly known as: PROVENTIL/VENTOLIN HFA  Inhale 2 puffs into the lungs every 6 (six) hours as needed.     albuterol-ipratropium 2.5 mg-0.5 mg/3 mL nebulizer solution  Commonly known as: DUO-NEB  Take 3 mLs by nebulization every 6 (six) hours as needed for Wheezing.     alendronate 35 MG tablet  Commonly known as: FOSAMAX  Take 35 mg by mouth every Tuesday.     aspirin 81 MG Chew  Take 81 mg by mouth once daily.     BREZTRI AEROSPHERE 160-9-4.8 mcg/actuation Hfaa  Generic drug: budesonide-glycopyr-formoterol  Inhale 2 puffs into  the lungs 2 (two) times daily.     dextromethorphan-guaiFENesin  mg  mg per 12 hr tablet  Commonly known as: MUCINEX DM  Take 1 tablet by mouth every 12 (twelve) hours.     dicyclomine 20 mg tablet  Commonly known as: BENTYL  Take 1 tablet (20 mg total) by mouth 3 (three) times daily as needed (abdominal pain).     ergocalciferol 50,000 unit Cap  Commonly known as: ERGOCALCIFEROL  Take 50,000 Units by mouth every Tuesday.     fluticasone propionate 50 mcg/actuation nasal spray  Commonly known as: FLONASE  1 spray by Each Nostril route daily as needed for Allergies.     furosemide 40 MG tablet  Commonly known as: LASIX  Take 1 tablet (40 mg total) by mouth once daily.     OXYGEN-AIR DELIVERY SYSTEMS MISC  3 L by Nasal route.     roflumilast 500 mcg Tab  Commonly known as: DALIRESP  Take 1 tablet by mouth once daily.     traZODone 50 MG tablet  Commonly known as: DESYREL  Take 1 tablet (50 mg total) by mouth nightly as needed for Insomnia.     WOMEN'S MULTIVITAMIN GUMMIES 200 mcg Chew  Generic drug: multivit with min-folic acid  Take 2 tablets by mouth once daily.        STOP taking these medications    LORazepam 0.5 MG tablet  Commonly known as: ATIVAN            Indwelling Lines/Drains at time of discharge:   Lines/Drains/Airways     Drain  Duration           Female External Urinary Catheter 08/07/23 2140 2 days                Time spent on the discharge of patient: 36 minutes         Tirso Valenzuela MD  Department of Hospital Medicine  [facility]

## 2023-08-10 NOTE — PLAN OF CARE
Problem: Physical Therapy  Goal: Physical Therapy Goal  Description: Goals to be met by: 9/10/23     Patient will increase functional independence with mobility by performin. Sit to stand transfer with Modified [place]  2. Bed to chair transfer with Modified [place] using rollator  3. Gait  x 100 feet with Modified [place] using rollator while maintaining SpO2 >88%.     Outcome: Ongoing, Progressing     PT Eval completed, note to follow. Pt decreased to 83% after ambulating ~50 ft with RW and SBA on 2 L O2 - pt recovered to 90% after ~1 minute with a seated rest break. Nsg and Dr. Valenzuela notified. Pt presenting with anxious demeanor. Pt educated on PLB, box breathing, and diaphragmatic breathing. Pt also educated on using her pulse oximeter to monitor and ambulating short bouts with multiple rest breaks.  Recommending low intensity therapy ( PT/OT) upon d/c.

## 2023-08-10 NOTE — PLAN OF CARE
KATIE sent HH orders via [employer]. Pt will be contacted by  agency to resume services. Pts family will provide transport home. SW will continue to follow pt throughout her transitions of care and assist with any dc needs. Pts family will bring portable O2 to hospital upon dc.     SED Web Alert: YES response from [facility] re: Referral [id number] for patient in [facility] [id number] A: Yes, willing to accept patient THANKS FOR THE UPDATE.    Cleared from  . Bedside Nurse and VN notified.    KATIE requested Pulm follow up  KATIE requested Psych follow up    Future Appointments   Date Time Provider Department Center   8/11/2023  9:00 AM VASCULAR, [place] [facility] [place]   8/22/2023  9:30 AM Katie Mclaughlin, RAJEEV [facility] PALLMED [place] [facility]   8/23/2023  9:30 AM Bethany Landry MD [facility] IMPRI [place] [facility]   9/20/2023 10:30 AM Kev Cox MD [facility] IM [place]        08/10/23 0743   Final Note   Assessment Type Final Discharge Note   Anticipated Discharge Disposition [facility] Resources/Appts/Education Provided Appointments scheduled by Navigator/Coordinator   Post-Acute Status   Discharge Delays None known at this time

## 2023-08-10 NOTE — NURSING
"VN cued into room and permission given to adjust camera towards patient and daughter.  AVS reviewed with both patient and daughter and they verbalized understanding on starting new medications, stopping and continuing home medications.  Patient is requesting for Diflucan as she states, "I always have yeast infections whenever I am on an antibiotic."  Dr. Valenzuela notified and ordered.  Patient received medications from Outpatient Pharmacy.  Instructed for patient to call for wheelchair per Transport when she is ready to leave.  Voiced no other concerns.  "

## 2023-08-10 NOTE — PT/OT/SLP EVAL
Physical Therapy Evaluation and Treatment    Patient Name:  Terri Phelan   MRN:  [id number]    Recommendations:     Discharge Recommendations: home health PT, home health OT (low intensity therapy)   Discharge Equipment Recommendations: none   Barriers to discharge: None    Assessment:     Terri Phelan is a 68 y.o. female admitted with a medical diagnosis of COPD exacerbation.  She presents with the following impairments/functional limitations: weakness, gait instability, impaired balance, impaired endurance, impaired self care skills, impaired functional mobility, decreased safety awareness, impaired cardiopulmonary response to activity .Pt decreased to 83% after ambulating ~50 ft with RW and SBA on 2 L O2 - pt recovered to 90% after ~1 minute with a seated rest break. Nsg and Dr. Valenzuela notified. Pt presenting with anxious demeanor. Pt educated on PLB, box breathing, and diaphragmatic breathing. Pt also educated on using her pulse oximeter to monitor and ambulating short bouts with multiple rest breaks.  Recommending low intensity therapy (HH PT/OT) upon d/c.     Rehab Prognosis: Good; patient would benefit from acute skilled PT services to address these deficits and reach maximum level of function.    Recent Surgery: * No surgery found *      Plan:     During this hospitalization, patient to be seen 5 x/week to address the identified rehab impairments via gait training, therapeutic activities, therapeutic exercises, neuromuscular re-education and progress toward the following goals:    Plan of Care Expires:  09/10/23    Subjective     Chief Complaint: increased anxiety  Patient/Family Comments/goals: to decreased anxiety  Pain/Comfort:  Pain Rating 1: 0/10    Patients cultural, spiritual, Baptism conflicts given the current situation: no    Living Environment:  Pt lives with dghtr in 1st floor apt, no steps to enter t/s combo   Prior to admission, patients level of function was Mod I for ADLs and  functional mobility, but does require assist with bathing recently from [facility]; reports TTB on order at this time but not currently received. Previously on 3L O2 at home; uses rollator PRN for mobility, most recently with increased use 2/2 SOB .  Equipment used at home: shower chair, bedside commode, rollator (TTB ordered).   Upon discharge, patient will have assistance from daughter who works during the day.    Objective:     Communicated with nsg prior to session.  Patient found HOB elevated with PureWick, oxygen, telemetry  upon PT entry to room.    General Precautions: Standard, fall  Orthopedic Precautions:N/A   Braces: N/A  Respiratory Status: Nasal cannula, flow 2 L/min    Exams:  Cognitive Exam:  Patient is oriented WFL  Postural Exam:  Patient presented with the following abnormalities:    -       Rounded shoulders  Sensation: denies any numbness/tingling BLE  Skin Integrity/Edema:      -       Skin integrity: Visible skin intact  -       Edema: None noted BLE  RLE ROM: WFL  RLE Strength: WFL  LLE ROM: WFL  LLE Strength: WFL    Functional Mobility:  Bed Mobility:     Scooting: stand by assistance  Supine to Sit: stand by assistance  Transfers:     Sit to Stand:  stand by assistance with rolling walker  Gait: Pt ambulated ~50 ft with RW and SBA; decreased speed/rocky noted      AM-PAC 6 CLICK MOBILITY  Total Score:18       Treatment & Education:  Pt educated on role of PT/POC and pt agreeable to participate  Pt decreased to 83% after ambulating ~50 ft with RW and SBA on 2 L O2 - pt recovered to 90% after ~1 minute with a seated rest break.   Nsg and Dr. Valenzuela notified.   Pt presenting with anxious demeanor.   Pt educated on PLB, box breathing, and diaphragmatic breathing.   Pt also educated on using her pulse oximeter to monitor (pt reports her parameters are 88-92%) and ambulating short bouts (~10 ft at a time) with multiple rest breaks.    Pt up in chair and 91-92% on 2 L at rest; BLE  reclined    Patient left up in chair with all lines intact, call button in reach, chair alarm on, and nsg notified.    GOALS:   Multidisciplinary Problems       Physical Therapy Goals          Problem: Physical Therapy    Goal Priority Disciplines Outcome Goal Variances Interventions   Physical Therapy Goal     PT, PT/OT Ongoing, Progressing     Description: Goals to be met by: 9/10/23     Patient will increase functional independence with mobility by performin. Sit to stand transfer with Modified [place]  2. Bed to chair transfer with Modified [place] using rollator  3. Gait  x 100 feet with Modified [place] using rollator while maintaining SpO2 >88%.                          History:     Past Medical History:   Diagnosis Date    COPD (chronic obstructive pulmonary disease)     Hypertension        Past Surgical History:   Procedure Laterality Date     SECTION      HERNIA REPAIR      HYSTERECTOMY         Time Tracking:     PT Received On: 08/10/23  PT Start Time: 1041     PT Stop Time: 1117  PT Total Time (min): 36 min     Billable Minutes: Evaluation 10, Gait Training 10, and Therapeutic Activity 16      08/10/2023

## 2023-08-10 NOTE — PLAN OF CARE
Pt AAOx3. Medications administered per order. 2L NC; bipap QHS. Cardiac monitor maintained. Purewick maintained, output monitored. Pt denies pain, discomfort, or nausea. Encouraged to call with questions/concerns/assistance. Safety.

## 2023-08-10 NOTE — PLAN OF CARE
Patient on oxygen with documented flow.  Will attempt to wean per O2 order protocol. The proper method of use, as well as anticipated side effects, of this aerosol treatment are discussed and demonstrated to the patient.  The proper method of use, as well as anticipated side effects, of this metered-dose inhaler are discussed and demonstrated to the patient. Pt on BIPAP with documented settings. Alarms are set and working. Will continue to monitor.

## 2023-08-10 NOTE — PLAN OF CARE
[facility]      HOME HEALTH ORDERS  FACE TO FACE ENCOUNTER    Patient Name: Terri Phelan  YOB: 1955    PCP: Kev Cox MD   PCP Address: [address] [facility] / [place] [address]  PCP Phone Number: [phone]  PCP Fax: [phone]    Encounter Date: 8/7/23    Admit to Home Health    Diagnoses:  Active Hospital Problems    Diagnosis  POA    *COPD exacerbation [J44.1]  Yes    Chronic hypercapnic respiratory failure [J96.12]  Yes    Athscl heart disease of native coronary artery w/o ang pctrs [I25.10]  Yes    Debility [R53.81]  Yes    Acute on chronic respiratory failure with hypoxia and hypercapnia [J96.21, J96.22]  Yes     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   Has admissions roughly monthly for hypercapnic RF. Pt compliant with BIPAP at home, states it's the only thing that helps her get some sleep.       Palliative care encounter [Z51.5]  Not Applicable    Hypertension [I10]  Yes    Obstructive sleep apnea syndrome [G47.33]  Yes     Changed settings to 18/8 on 8/9/23        Resolved Hospital Problems   No resolved problems to display.       Follow Up Appointments:  Future Appointments   Date Time Provider Department Center   8/11/2023  9:00 AM [facility], [place] [facility] [place]   8/22/2023  9:30 AM Katie Mclaughlin NP [facility] PALLMED [facility]   8/23/2023  9:30 AM Bethany Landry MD [facility] IMPRI [facility]   9/20/2023 10:30 AM Kev Cox MD [facility] IM [place]       Allergies:  Review of patient's allergies indicates:   Allergen Reactions    Codeine Nausea And Vomiting    Morphine Hallucinations       Medications: Review discharge medications with patient and family and provide education.    Current Facility-Administered Medications   Medication Dose Route Frequency Provider Last Rate Last Admin    0.9%  NaCl infusion   Intravenous PRN Tirso Valenzuela MD   Stopped at 08/08/23 1255    acetaminophen tablet 650 mg   650 mg Oral Q4H PRN Musa Kowalski MD   650 mg at 08/08/23 1703    albuterol inhaler 2 puff  2 puff Inhalation Q6H PRN Tirso Valenzuela MD        albuterol-ipratropium 2.5 mg-0.5 mg/3 mL nebulizer solution 3 mL  3 mL Nebulization Q4H Tirso Valenzuela MD   3 mL at 08/10/23 0445    aspirin chewable tablet 81 mg  81 mg Oral Daily Musa Kowalski MD   81 mg at 08/09/23 0933    atorvastatin tablet 40 mg  40 mg Oral QHS Musa Kowalski MD   40 mg at 08/09/23 2033    azithromycin tablet 250 mg  250 mg Oral Every Mon, Wed, Fri Magdaleno Rivers MD   250 mg at 08/09/23 0934    budesonide-glycopyr-formoterol 160-9-4.8 mcg/actuation HFAA 2 puff  2 puff Inhalation BID Tirso Valenzuela MD   2 puff at 08/09/23 2028    cefTRIAXone (ROCEPHIN) 1 g in dextrose 5 % in water (D5W) 100 mL IVPB (MB+)  1 g Intravenous Q24H Magdaleno Rivers MD   Stopped at 08/09/23 2303    diazePAM tablet 2 mg  2 mg Oral Daily PRN Magdaleno Rivers MD   2 mg at 08/09/23 1316    enoxaparin injection 40 mg  40 mg Subcutaneous Q24H (prophylaxis, 1700) Tirso Valenzuela MD   40 mg at 08/09/23 1603    furosemide tablet 40 mg  40 mg Oral Daily Magdaleno Rivers MD        guaiFENesin 12 hr tablet 600 mg  600 mg Oral BID Musa Kowalski MD   600 mg at 08/09/23 2033    melatonin tablet 6 mg  6 mg Oral Nightly PRN Musa Kowalski MD        mupirocin 2 % ointment   Nasal BID Tirso Valenzuela MD   Given at 08/09/23 2033    ondansetron disintegrating tablet 8 mg  8 mg Oral Q8H PRN Musa Kowalski MD        potassium chloride SA CR tablet 40 mEq  40 mEq Oral Once Tirso Valenzuela MD        predniSONE tablet 40 mg  40 mg Oral Daily Musa Kowalski MD   40 mg at 08/09/23 0933    roflumilast (DALIRESP) tablet 500 mcg  1 tablet Oral Daily Musa Kowalski MD   500 mcg at 08/09/23 0933    sodium chloride 0.9% flush 10 mL  10 mL Intravenous PRN Musa Kowalski MD         traZODone tablet 50 mg  50 mg Oral Nightly PRN Musa Kowalski MD   50 mg at 08/09/23 2036     Current Discharge Medication List        START taking these medications    Details   amoxicillin-clavulanate 875-125mg (AUGMENTIN) 875-125 mg per tablet Take 1 tablet by mouth 2 (two) times daily. for 2 days  Qty: 4 tablet, Refills: 0      atorvastatin (LIPITOR) 40 MG tablet Take 1 tablet (40 mg total) by mouth every evening.  Qty: 90 tablet, Refills: 3      predniSONE (DELTASONE) 20 MG tablet Take 2 tablets (40 mg total) by mouth once daily. for 2 days  Qty: 4 tablet, Refills: 0           CONTINUE these medications which have NOT CHANGED    Details   acetaminophen (TYLENOL) 500 MG tablet Take 500 mg by mouth every 6 (six) hours as needed for Pain.      albuterol (PROVENTIL/VENTOLIN HFA) 90 mcg/actuation inhaler Inhale 2 puffs into the lungs every 6 (six) hours as needed.      albuterol-ipratropium (DUO-NEB) 2.5 mg-0.5 mg/3 mL nebulizer solution Take 3 mLs by nebulization every 6 (six) hours as needed for Wheezing.  Qty: 75 mL, Refills: 0      alendronate (FOSAMAX) 35 MG tablet Take 35 mg by mouth every Tuesday.      aspirin 81 MG Chew Take 81 mg by mouth once daily.      azithromycin (Z-OLY) 250 MG tablet Take 1 tablet (250 mg total) by mouth 3 (three) times a week.  Qty: 12 tablet, Refills: 11      budesonide-glycopyr-formoterol (BREZTRI AEROSPHERE) 160-9-4.8 mcg/actuation HFAA Inhale 2 puffs into the lungs 2 (two) times daily.      dextromethorphan-guaiFENesin  mg (MUCINEX DM)  mg per 12 hr tablet Take 1 tablet by mouth every 12 (twelve) hours.      ergocalciferol (ERGOCALCIFEROL) 50,000 unit Cap Take 50,000 Units by mouth every Tuesday.      fluticasone propionate (FLONASE) 50 mcg/actuation nasal spray 1 spray by Each Nostril route daily as needed for Allergies.      OXYGEN-AIR DELIVERY SYSTEMS MISC 3 L by Nasal route.      roflumilast (DALIRESP) 500 mcg Tab Take 1 tablet by mouth once daily.       traZODone (DESYREL) 50 MG tablet Take 1 tablet (50 mg total) by mouth nightly as needed for Insomnia.  Qty: 30 tablet, Refills: 6      dicyclomine (BENTYL) 20 mg tablet Take 1 tablet (20 mg total) by mouth 3 (three) times daily as needed (abdominal pain).  Qty: 30 tablet, Refills: 1      furosemide (LASIX) 40 MG tablet Take 1 tablet (40 mg total) by mouth once daily.  Qty: 30 tablet, Refills: 11      multivit with min-folic acid (WOMEN'S MULTIVITAMIN GUMMIES) 200 mcg Chew Take 2 tablets by mouth once daily.           STOP taking these medications       LORazepam (ATIVAN) 0.5 MG tablet Comments:   Reason for Stopping:                 I have seen and examined this patient within the last 30 days. My clinical findings that support the need for the home health skilled services and home bound status are the following:no   Weakness/numbness causing balance and gait disturbance due to COPD Exacerbation making it taxing to leave home.     Diet:   3 gram sodium diet    Labs:  SN to perform labs:  BMP: Weekly; 1 week(s) and Report Lab results to PCP.    Referrals/ Consults  Physical Therapy to evaluate and treat. Evaluate for home safety and equipment needs; Establish/upgrade home exercise program. Perform / instruct on therapeutic exercises, gait training, transfer training, and Range of Motion.  Occupational Therapy to evaluate and treat. Evaluate home environment for safety and equipment needs. Perform/Instruct on transfers, ADL training, ROM, and therapeutic exercises.   to evaluate for community resources/long-range planning.  Aide to provide assistance with personal care, ADLs, and vital signs.    Activities:   activity as tolerated    Nursing:   Agency to admit patient within 24 hours of hospital discharge unless specified on physician order or at patient request    SN to complete comprehensive assessment including routine vital signs. Instruct on disease process and s/s of complications to report to MD.  Review/verify medication list sent home with the patient at time of discharge  and instruct patient/caregiver as needed. Frequency may be adjusted depending on start of care date.     Skilled nurse to perform up to 3 visits PRN for symptoms related to diagnosis    Notify MD if SBP > 160 or < 90; DBP > 90 or < 50; HR > 120 or < 50; Temp > 101; O2 < 88%; Other:       Ok to schedule additional visits based on staff availability and patient request on consecutive days within the home health episode.    When multiple disciplines ordered:    Start of Care occurs on Sunday - Wednesday schedule remaining discipline evaluations as ordered on separate consecutive days following the start of care.    Thursday SOC -schedule subsequent evaluations Friday and Monday the following week.     Friday - Saturday SOC - schedule subsequent discipline evaluations on consecutive days starting Monday of the following week.    For all post-discharge communication and subsequent orders please contact patient's primary care physician. If unable to reach primary care physician or do not receive response within 30 minutes, please contact Bethany Landry for clinical staff order clarification    Miscellaneous   Routine Skin for Bedridden Patients: Instruct patient/caregiver to apply moisture barrier cream to all skin folds and wet areas in perineal area daily and after baths and all bowel movements.  Heart Failure:      SN to instruct on the following:    Instruct on the definition of CHF.   Instruct on the signs/sympoms of CHF to be reported.   Instruct on and monitor daily weights.   Instruct on factors that cause exacerbation.   Instruct on action, dose, schedule, and side effects of medications.   Instruct on diet as prescribed.   Instruct on activity allowed.   Instruct on life-style modifications for life long management of CHF   SN to assess compliance with daily weights, diet, medications, fluid retention,    safety precautions,  activities permitted and life-style modifications.   Additional 1-2 SN visits per week as needed for signs and symptoms     of CHF exacerbation.      For Weight Gain > 2-3 lbs in 1 day or 4-6 lbs over 1 week notify PCP:  CHF DIURETIC SLIDING SCALE     Skilled nurse visits daily to instruct and monitor medication adherence until target weight. Then resume prior order frequency.     If weight gain exceeds 5 lbs over target weight, call MD  If weight gain of 3-4 lbs over target weight, then:     Increase Furosemide - current dose (mg/day) to 40mg double dose and frequency of twice daily until target weight achieved or maximum 3 days.     If already on max oral daily dose, see IV diuretic instructions.    After 3 days of increased oral diuretic dose, get BMP. If patient is on increased oral diuretic dose greater than 5 days, repeat BMP at day 7 of increased dose.     Potassium supplementation   Scr > 1.5 mg/dl  Scr  1.5 mg/dl    K < 3.0 - NOTIFY MD and 40 mEq bid  40 mEq tid   K- 3.1-3.3  20 mEq bid  20 mEq tid    K 3.4-3.7  10 mEq bid  10 mEq tid      If target weight not reached after 5 days of increased oral diuretic, proceed to IV diuretic with daily patient contact to include face-to-face visit and telephone encounters.       Home Oxygen:  No change    Home Health Aide:  Nursing Twice weekly, Physical Therapy Three times weekly, Occupational Therapy Three times weekly, Respiratory Therapy Three times weekly, and Home Health Aide Daily    Wound Care Orders  no    I certify that this patient is confined to her home and needs intermittent skilled nursing care, physical therapy, and occupational therapy.

## 2023-08-10 NOTE — HOSPITAL COURSE
"Ms. Phelan presented with acute on chronic hypoxic and hypercapnic respiratory failure due to COPD exacerbation. Initiated on IV steroids, nebs, and antibiotics with BiPAP usage. She is on maximum COPD therapies with LAMA/LABA/ICS, MWF azithro, QD daliresp, and home O2. Adjusted BiPAP to 18/8. Stable to discharge with home health today; pending progression can move to Pulmonary Rehab. Also requesting therapy to help deal with life changes associated with her illness; will refer to outpatient Psych.    BP (!) 140/65 (BP Location: Left arm, Patient Position: Sitting)   Pulse 90   Temp 98.2 °F (36.8 °C) (Oral)   Resp (!) 22   Ht 5' 5" (1.651 m)   Wt 84.8 kg (186 lb 15.2 oz)   SpO2 95%   Breastfeeding No   BMI 31.11 kg/m²   Physical Exam  Vitals and nursing note reviewed.   Constitutional:       General: She is not in acute distress.     Appearance: She is obese.   HENT:      Head: Normocephalic and atraumatic.      Mouth/Throat:      Mouth: Mucous membranes are dry.   Eyes:      Extraocular Movements: Extraocular movements intact.      Pupils: Pupils are equal, round, and reactive to light.   Cardiovascular:      Rate and Rhythm: Normal rate and regular rhythm.      Pulses: Normal pulses.      Heart sounds: Normal heart sounds.   Pulmonary:      Effort: Pulmonary effort is normal.      Breath sounds: No stridor. Wheezing (Improving) present.      Comments: On nasal cannula oxygen  Abdominal:      General: Abdomen is flat. Bowel sounds are normal. There is no distension.      Palpations: Abdomen is soft.      Tenderness: There is no abdominal tenderness.   Musculoskeletal:      Right lower le+ Edema present.      Left lower le+ Edema present.   Skin:     General: Skin is warm.      Capillary Refill: Capillary refill takes less than 2 seconds.   Neurological:      General: No focal deficit present.      Mental Status: She is alert and oriented to person, place, and time. Mental status is at baseline.   "

## 2023-08-14 ENCOUNTER — PATIENT OUTREACH (OUTPATIENT)
Dept: ADMINISTRATIVE | Facility: CLINIC | Age: 68
End: 2023-08-14
Payer: MEDICARE

## 2023-08-14 NOTE — PROGRESS NOTES
C3 nurse spoke with Terri Phelan  for a TCC post hospital discharge follow up call. The patient has a scheduled HOSFU appointment with Bethany Landry on 8/23/23 @ 9267.

## 2023-08-15 ENCOUNTER — OUTPATIENT CASE MANAGEMENT (OUTPATIENT)
Dept: ADMINISTRATIVE | Facility: OTHER | Age: 68
End: 2023-08-15
Payer: MEDICARE

## 2023-08-16 ENCOUNTER — DOCUMENT SCAN (OUTPATIENT)
Dept: HOME HEALTH SERVICES | Facility: HOSPITAL | Age: 68
End: 2023-08-16
Payer: MEDICARE

## 2023-08-17 ENCOUNTER — OFFICE VISIT (OUTPATIENT)
Dept: PRIMARY CARE CLINIC | Facility: CLINIC | Age: 68
End: 2023-08-17
Payer: MEDICARE

## 2023-08-17 ENCOUNTER — DOCUMENT SCAN (OUTPATIENT)
Dept: HOME HEALTH SERVICES | Facility: HOSPITAL | Age: 68
End: 2023-08-17
Payer: MEDICARE

## 2023-08-17 ENCOUNTER — PATIENT OUTREACH (OUTPATIENT)
Dept: ADMINISTRATIVE | Facility: OTHER | Age: 68
End: 2023-08-17
Payer: MEDICARE

## 2023-08-17 ENCOUNTER — TELEPHONE (OUTPATIENT)
Dept: ADMINISTRATIVE | Facility: CLINIC | Age: 68
End: 2023-08-17
Payer: MEDICARE

## 2023-08-17 DIAGNOSIS — J43.2 CENTRILOBULAR EMPHYSEMA: ICD-10-CM

## 2023-08-17 DIAGNOSIS — J44.1 COPD EXACERBATION: ICD-10-CM

## 2023-08-17 DIAGNOSIS — J96.21 ACUTE ON CHRONIC RESPIRATORY FAILURE WITH HYPOXIA AND HYPERCAPNIA: Primary | ICD-10-CM

## 2023-08-17 DIAGNOSIS — Z99.81 OXYGEN DEPENDENT: ICD-10-CM

## 2023-08-17 DIAGNOSIS — J96.22 ACUTE ON CHRONIC RESPIRATORY FAILURE WITH HYPOXIA AND HYPERCAPNIA: Primary | ICD-10-CM

## 2023-08-17 PROCEDURE — 99214 PR OFFICE/OUTPT VISIT, EST, LEVL IV, 30-39 MIN: ICD-10-PCS | Mod: 95,,, | Performed by: INTERNAL MEDICINE

## 2023-08-17 PROCEDURE — 1111F PR DISCHARGE MEDS RECONCILED W/ CURRENT OUTPATIENT MED LIST: ICD-10-PCS | Mod: CPTII,95,, | Performed by: INTERNAL MEDICINE

## 2023-08-17 PROCEDURE — 1111F DSCHRG MED/CURRENT MED MERGE: CPT | Mod: CPTII,95,, | Performed by: INTERNAL MEDICINE

## 2023-08-17 PROCEDURE — 99214 OFFICE O/P EST MOD 30 MIN: CPT | Mod: 95,,, | Performed by: INTERNAL MEDICINE

## 2023-08-17 PROCEDURE — 3044F PR MOST RECENT HEMOGLOBIN A1C LEVEL <7.0%: ICD-10-PCS | Mod: CPTII,95,, | Performed by: INTERNAL MEDICINE

## 2023-08-17 PROCEDURE — 3044F HG A1C LEVEL LT 7.0%: CPT | Mod: CPTII,95,, | Performed by: INTERNAL MEDICINE

## 2023-08-17 RX ORDER — ALBUTEROL SULFATE 90 UG/1
2 AEROSOL, METERED RESPIRATORY (INHALATION) EVERY 6 HOURS PRN
Qty: 18 G | Refills: 6 | Status: SHIPPED | OUTPATIENT
Start: 2023-08-17

## 2023-08-17 NOTE — PROGRESS NOTES
Pt is requesting caregiver services and is going to call [facility] for assistance as given resource. She is a membership. Pt received her discharge meals while I was on the phone with her. Pt will call if any more resources are needed. No need to follow.

## 2023-08-17 NOTE — PROGRESS NOTES
CHW - Initial Contact    This Community Health Worker completed OR updated the Social Determinant of Health questionnaire with patient via telephone today.    Pt identified barriers of most importance are: Pt is requesting caregiver services and is going to call [facility] for assistance as given resource. She is a membership. Pt received her discharge meals while I was on the phone with her.    Referrals to community agencies completed with patient/caregiver consent outside of [facility] include: yes  Referrals were put through [facility] - no:   Support and Services: Residential Care  Other information discussed the patient needs / wants help with: SDOH   Follow up required: no  No future outreach task assigned

## 2023-08-17 NOTE — PROGRESS NOTES
[facility]   New Visit Progress Note   Recent Hospital Discharge     PRESENTING HISTORY     Chief Complaint/Reason for Admission:  Follow up Hospital Discharge   PCP: Kev Cox MD    History of Present Illness:  Ms. Terri Phelan is a 68 y.o. female who was recently admitted to the hospital.    [facility]  Discharge Summary        Patient Name: Terri Phelan  MRN: [id number]  SUNDEEP: [id number]  Patient Class: IP- Inpatient  Admission Date: 8/7/2023  Hospital Length of Stay: 2 days  Discharge Date and Time:  08/10/2023 1:34 PM  Attending Physician: Tirso Valenzuela,*   Discharging Provider: Tirso Valenzuela MD  Primary Care Provider: Kev Cox MD  ___________________________________________________________________    Today:  [facility].  Recently hospitalized for management of COPD exacerbation.  Admitted to [facility] service.  Required continuous BiPap and ICU level of care.  Treated with IV steroids, empiric antibiotics, Duonebs, and supportive care.  Patient responded well to above interventions and supportive care.  Home Bipap setting adjusted to 18/8.  Outpatient therapy maximized with LAMA/LABA/ICS, MWF azithro, QD daliresp, and home O2.  Patient discharged with [facility] home health services in place, with plan for Pulmonary Rehab in future.    Moved from [place] and established new Pulmonary care with [facility] 4/18/23-> noted reviewed.   Therapy was thought to be maximized at that time and no changes were made.   Smoking cessation encouraged.   Patient counseled on severity of disease and prognosis.     Patient seen today via telemedicine.   Wearing 2 L NC Oxgyen which is her baseline.   She has adjusted her home Bipap settings a directed by hospital team.   Plans to return to Pulmonary Rehab at [facility] near end of September.   Ambulating independently inside, but uses  rollator if she leaves the home.   Home health team is providing skilled nurse, PT, and OT.   Getting low sodium meals delivered from [employer].   She manages her own medications and reports compliance- meds are placed in weekly pill divider.     Review of Systems  General ROS: negative for chills, fever or weight loss  Psychological ROS: negative for hallucination, depression or suicidal ideation  Ophthalmic ROS: negative for blurry vision, photophobia or eye pain  ENT ROS: negative for epistaxis, sore throat or rhinorrhea  Respiratory ROS: + shortness of breath, no wheezing  Cardiovascular ROS: no chest pain + dyspnea on exertion  Gastrointestinal ROS: no abdominal pain, change in bowel habits, or black/ bloody stools  Genito-Urinary ROS: no dysuria, trouble voiding, or hematuria  Musculoskeletal ROS: negative for gait disturbance or muscular weakness  Neurological ROS: no syncope or seizures; no ataxia  Dermatological ROS: negative for pruritis, rash and jaundice      PAST HISTORY:     Past Medical History:   Diagnosis Date    COPD (chronic obstructive pulmonary disease)     Hypertension        Past Surgical History:   Procedure Laterality Date     SECTION      HERNIA REPAIR      HYSTERECTOMY         No family history on file.      MEDICATIONS & ALLERGIES:     Current Outpatient Medications on File Prior to Visit   Medication Sig Dispense Refill    acetaminophen (TYLENOL) 500 MG tablet Take 500 mg by mouth every 6 (six) hours as needed for Pain.      albuterol (PROVENTIL/VENTOLIN HFA) 90 mcg/actuation inhaler Inhale 2 puffs into the lungs every 6 (six) hours as needed.      albuterol-ipratropium (DUO-NEB) 2.5 mg-0.5 mg/3 mL nebulizer solution Take 3 mLs by nebulization every 6 (six) hours as needed for Wheezing. 75 mL 0    alendronate (FOSAMAX) 35 MG tablet Take 35 mg by mouth every Tuesday.      aspirin 81 MG Chew Take 81 mg by mouth once daily.      atorvastatin (LIPITOR) 40 MG tablet Take 1 tablet (40 mg  total) by mouth every evening. 90 tablet 3    azithromycin (Z-OLY) 250 MG tablet Take 1 tablet (250 mg total) by mouth 3 (three) times a week. (Patient taking differently: Take 250 mg by mouth every Mon, Wed, Fri.) 12 tablet 11    budesonide-glycopyr-formoterol (BREZTRI AEROSPHERE) 160-9-4.8 mcg/actuation HFAA Inhale 2 puffs into the lungs 2 (two) times daily.      dextromethorphan-guaiFENesin  mg (MUCINEX DM)  mg per 12 hr tablet Take 1 tablet by mouth every 12 (twelve) hours.      diazePAM (VALIUM) 2 MG tablet Take 1 tablet (2 mg total) by mouth daily as needed for Anxiety. 30 tablet 0    dicyclomine (BENTYL) 20 mg tablet Take 1 tablet (20 mg total) by mouth 3 (three) times daily as needed (abdominal pain). 30 tablet 1    ergocalciferol (ERGOCALCIFEROL) 50,000 unit Cap Take 50,000 Units by mouth every Tuesday.      fluticasone propionate (FLONASE) 50 mcg/actuation nasal spray 1 spray by Each Nostril route daily as needed for Allergies.      furosemide (LASIX) 40 MG tablet Take 1 tablet (40 mg total) by mouth once daily. 30 tablet 11    multivit with min-folic acid (WOMEN'S MULTIVITAMIN GUMMIES) 200 mcg Chew Take 2 tablets by mouth once daily.      OXYGEN-AIR DELIVERY SYSTEMS MISC 3 L by Nasal route.      potassium chloride (KLOR-CON) 10 MEQ TbSR Take 1 tablet (10 mEq total) by mouth once daily. for 365 doses 30 tablet 11    roflumilast (DALIRESP) 500 mcg Tab Take 1 tablet by mouth once daily.      traZODone (DESYREL) 50 MG tablet Take 1 tablet (50 mg total) by mouth nightly as needed for Insomnia. 30 tablet 6     No current facility-administered medications on file prior to visit.        Review of patient's allergies indicates:   Allergen Reactions    Codeine Nausea And Vomiting    Morphine Hallucinations       OBJECTIVE:        Physical Exam:    General appearance: alert, cooperative, no distress  Constitutional:Oriented to person, place, and time  + appears well-developed and well-nourished.    Neurologic: Alert and oriented X 3,  Psychiatric: no pressured speech; normal affect; no evidence of impaired cognition     Laboratory  Lab Results   Component Value Date    WBC 12.32 08/10/2023    HGB 11.9 (L) 08/10/2023    HCT 40.1 08/10/2023    MCV 91 08/10/2023     (L) 08/10/2023     BMP  Lab Results   Component Value Date     08/16/2023    K 3.7 08/16/2023    CL 97 08/16/2023    CO2 35 (H) 08/16/2023    BUN 13 08/16/2023    CREATININE 0.55 08/16/2023    CALCIUM 8.9 08/16/2023    ANIONGAP 7 (L) 08/16/2023    EGFRNORACEVR >60.0 08/16/2023     Lab Results   Component Value Date    ALT 16 08/07/2023    AST 17 08/07/2023    ALKPHOS 47 (L) 08/07/2023    BILITOT 0.2 08/07/2023     Lab Results   Component Value Date    INR 1.0 05/03/2022     Lab Results   Component Value Date    HGBA1C 5.4 06/16/2023       Diagnostic Results:    Chest X Ray 8/7/23:  Mild density at the lung bases likely relates to small pleural effusions, atelectasis and mild basilar infiltrates.    CTA Chest Non Coronary 7/9/23:  No evidence of pulmonary thromboemboli or significant acute lung interstitial airspace disease.  Emphysematous changes in the upper lungs.  No cardiomegaly or right ventricular strain.      ASSESSMENT & PLAN:       Acute on chronic respiratory failure with hypoxia and hypercapnia  Centrilobular emphysema  COPD exacerbation  Oxygen dependent  - recent hospitalization as above  - continue current management  - see Palliative Care team 8/22/23  - return to Pulmonary Rehab as scheduled   - see PCP 9/20/23       Other orders  -     albuterol (PROVENTIL/VENTOLIN HFA) 90 mcg/actuation inhaler; Inhale 2 puffs into the lungs every 6 (six) hours as needed for Wheezing or Shortness of Breath.  Dispense: 18 g; Refill: 6    Patient will be released from hospital follow up clinic.  She will see her PCP, Dr Cox, 9/20/23.     Instructions for the patient:      Scheduled Follow-up :  Future Appointments   Date Time Provider  Department Center   8/22/2023  9:30 AM Katie Mclaughlin NP [facility] POLINA [place]   9/20/2023 10:30 AM Kev Cox MD [facility] IM [place]       Post Visit Medication List:     Medication List            Accurate as of August 17, 2023  1:12 PM. If you have any questions, ask your nurse or doctor.                CHANGE how you take these medications      albuterol 90 mcg/actuation inhaler  Commonly known as: PROVENTIL/VENTOLIN HFA  Inhale 2 puffs into the lungs every 6 (six) hours as needed for Wheezing or Shortness of Breath.  What changed: reasons to take this  Changed by: Bethany Landry MD     azithromycin 250 MG tablet  Commonly known as: Z-OLY  Take 1 tablet (250 mg total) by mouth 3 (three) times a week.  What changed: when to take this            CONTINUE taking these medications      acetaminophen 500 MG tablet  Commonly known as: TYLENOL     albuterol-ipratropium 2.5 mg-0.5 mg/3 mL nebulizer solution  Commonly known as: DUO-NEB  Take 3 mLs by nebulization every 6 (six) hours as needed for Wheezing.     alendronate 35 MG tablet  Commonly known as: FOSAMAX     aspirin 81 MG Chew     atorvastatin 40 MG tablet  Commonly known as: LIPITOR  Take 1 tablet (40 mg total) by mouth every evening.     BREZTRI AEROSPHERE 160-9-4.8 mcg/actuation Hfaa  Generic drug: budesonide-glycopyr-formoterol     dextromethorphan-guaiFENesin  mg  mg per 12 hr tablet  Commonly known as: MUCINEX DM     diazePAM 2 MG tablet  Commonly known as: VALIUM  Take 1 tablet (2 mg total) by mouth daily as needed for Anxiety.     dicyclomine 20 mg tablet  Commonly known as: BENTYL  Take 1 tablet (20 mg total) by mouth 3 (three) times daily as needed (abdominal pain).     ergocalciferol 50,000 unit Cap  Commonly known as: ERGOCALCIFEROL     fluticasone propionate 50 mcg/actuation nasal spray  Commonly known as: FLONASE     furosemide 40 MG tablet  Commonly known as: LASIX  Take 1 tablet (40 mg total) by mouth once daily.      OXYGEN-AIR DELIVERY SYSTEMS MISC     potassium chloride 10 MEQ Tbsr  Commonly known as: KLOR-CON  Take 1 tablet (10 mEq total) by mouth once daily. for 365 doses     roflumilast 500 mcg Tab  Commonly known as: DALIRESP     traZODone 50 MG tablet  Commonly known as: DESYREL  Take 1 tablet (50 mg total) by mouth nightly as needed for Insomnia.     WOMEN'S MULTIVITAMIN GUMMIES 200 mcg Chew  Generic drug: multivit with min-folic acid               Where to Get Your Medications        These medications were sent to [id number] - [place], [place] - [address] AT [facility] [address] [facility] [address] [facility] [address]  [address], [place] [address]      Phone: [phone]   albuterol 90 mcg/actuation inhaler     The patient location is: [place]   The chief complaint leading to consultation is: COPD exacerbation     Visit type: audiovisual      Face to Face time with patient:20 min   35 minutes of total time spent on the encounter, which includes face to face time and non-face to face time preparing to see the patient (eg, review of tests), Obtaining and/or reviewing separately obtained history, Documenting clinical information in the electronic or other health record, Independently interpreting results (not separately reported) and communicating results to the patient/family/caregiver, or Care coordination (not separately reported).     Each patient to whom he or she provides medical services by telemedicine is:  (1) informed of the relationship between the physician and patient and the respective role of any other health care provider with respect to management of the patient; and (2) notified that he or she may decline to receive medical services by telemedicine and may withdraw from such care at any time.     Signing Physician:  Bethany Landry MD

## 2023-08-17 NOTE — PROGRESS NOTES
CHW - Case Closure    This Community Health Worker spoke to patient via telephone today.   Pt/Caregiver reported: Pt is requesting caregiver services and is going to call  for assistance as given resource. She is a membership. Pt received her discharge meals while I was on the phone with her.   Pt/Caregiver denied any additional needs at this time and agrees with episode closure at this time.  Provided patient with Community Health Worker's contact information and encouraged him/her to contact this Community Health Worker if additional needs arise.

## 2023-08-22 ENCOUNTER — OFFICE VISIT (OUTPATIENT)
Dept: PALLIATIVE MEDICINE | Facility: CLINIC | Age: 68
End: 2023-08-22
Payer: MEDICARE

## 2023-08-22 DIAGNOSIS — J44.9 STAGE 4 VERY SEVERE COPD BY GOLD CLASSIFICATION: Primary | ICD-10-CM

## 2023-08-22 DIAGNOSIS — F41.1 GENERALIZED ANXIETY DISORDER: ICD-10-CM

## 2023-08-22 PROCEDURE — 1123F ACP DISCUSS/DSCN MKR DOCD: CPT | Mod: CPTII,95,,

## 2023-08-22 PROCEDURE — 3044F PR MOST RECENT HEMOGLOBIN A1C LEVEL <7.0%: ICD-10-PCS | Mod: CPTII,95,,

## 2023-08-22 PROCEDURE — 1123F PR ADV CARE PLAN DISCUSSED, PLAN OR SURROGATE DOCUMENTED: ICD-10-PCS | Mod: CPTII,95,,

## 2023-08-22 PROCEDURE — 1111F DSCHRG MED/CURRENT MED MERGE: CPT | Mod: CPTII,95,,

## 2023-08-22 PROCEDURE — 3044F HG A1C LEVEL LT 7.0%: CPT | Mod: CPTII,95,,

## 2023-08-22 PROCEDURE — 99215 OFFICE O/P EST HI 40 MIN: CPT | Mod: 95,,,

## 2023-08-22 PROCEDURE — 1111F PR DISCHARGE MEDS RECONCILED W/ CURRENT OUTPATIENT MED LIST: ICD-10-PCS | Mod: CPTII,95,,

## 2023-08-22 PROCEDURE — 99215 PR OFFICE/OUTPT VISIT, EST, LEVL V, 40-54 MIN: ICD-10-PCS | Mod: 95,,,

## 2023-08-22 NOTE — PROGRESS NOTES
Subjective:       Patient ID: Terri Phelan is a 68 y.o. female.    Chief Complaint: No chief complaint on file.    Ms Phelan is a 64 y/o F with a PMH of severe COPD on 2L NC at home, CAD, HTN, and JUAN. Pt was initially seen by palliative care on 4/25/2023 as an inpatient in the ICU admitted for several days of SOB, weakness and worsening confusion. She was placed on BiPAP for hypoxia and hypercapnia.  Pt was not initially receptive to encounter with palliative medicine but has since followed in the outpatient clinic.  Pt again seen recently during admission 8/7/2023 for similar presentation.  Pt has since been seen with the [facility] and is scheduled to see her new pcp tomorrow. Pt looks and reports feeling much improved since last admission.     Pt continues to report that her anxiety limits her enjoyment of activities.  Pt remains hyper focused on the fear of running out of O2 as well as not being able to reach services if she should need them- example EMS would have a long response time.Pt reports that her anxiety medication was recently changed to 2mg valium prn daily from ativan. Pt reports that the valium is working well, however she feels that it wears off quicker.  Pt reports taking 1 pill daily and takes it when she feels anxiety is becoming overwhelming. Pt reports that at times she is hesitant to take her medication because she finds herself focused on the possibility that she may need it later in the day. Discussed splitting the valium and taking 1 mg twice daily prn instead. Pt will break pills in half this week and monitor to see if this regimen offers her more relief.  Pt will reach out via [employer] next week to update on status.  Im hopeful this will improves symptoms and reduce pts focus on the pill.  Pt reports starting therapy yesterday to address her anxiety as well. Expresses will continue this as she found it beneficial.       Pt moved out of her daughters apartment and moved  into the apartment next door to increase her independence.  Daughter sleeps over a few nights a week and checks in on pt throughout the day.   Pt currently receives home health and plans to resume  pulmonary rehab twice weekly at [facility] once she has completed her home health services. Initial issues with bloodwork at home provider, now resolved. Pt in the process of establishing a new primary care physician and remains pleased with her pulmonologist.      Pt recently ( about 1 year ago) moved in with her daughter, Mulugeta Phelan. Remainder of pts family lives in [place].  Pt is independent of most of her ADLs but has reported increasing weakness and anxiety over the past few months that impacts her ability to care for herself.  Daughter reports feeling overwhelmed with the daily care she is providing for her mother. Daughter is requesting information about temporary ( about 1 month in duration) placement for her mother where she will be able to receive 24 hour assistance.  We discussed multiple options including independent living, assisted living and NH placement.  The topic [place] home hospice was also carefully introduced givens pts end stage COPD is a qualifying diagnosis.  Pt and daughter agree that they are not ready for this type of service yet, but were open to the information and acknowledge that at some point pts COPD will be life limiting. Placed outpatient care management referral to offer assistance with current placement request and education.   Pt also inquiring about transportation services.  Pt is still socially active and reports that there has been no decline in the overall quality of her life.        [facility].  Pt selected her daughter, Mulugeta Phelan [phone], as her primary decision maker and Sally Ramirez [phone] as her second choice decision maker. Paper work completed and scanned into chart.   Living will also completed stating that pt would not want nutrition and  hydration administered if it were to only artificially prolong the dying process.  Daughter present and in agreement with pts decision.  Living Will document completed and scanned into pts chart. Pt did express that at the end of her life, she would like to be a organ donor and expresses that she is on the registry.     Pt reports a jittery feeling that she contributes to use of steroids. Discussed stopping caffeine intake as she feels that this makes the jitteriness worse. Pt will switch to decaf and is trying to cut out sugar and salt from her diet.     [facility] and in clinic in about 4 weeks.        Review of Systems   Constitutional:  Positive for fatigue.   HENT: Negative.     Eyes: Negative.    Respiratory:  Positive for shortness of breath.    Cardiovascular: Negative.    Gastrointestinal: Negative.    Endocrine: Negative.    Genitourinary: Negative.    Psychiatric/Behavioral:  The patient is nervous/anxious.        Objective:      Physical Exam  Vitals reviewed: virtual appointment.   Constitutional:       General: She is not in acute distress.     Appearance: Normal appearance. She is overweight.      Interventions: Nasal cannula in place.   Pulmonary:      Effort: Pulmonary effort is normal.   Neurological:      Mental Status: She is alert.   Psychiatric:         Mood and Affect: Mood normal.         Behavior: Behavior normal. Behavior is cooperative.         Thought Content: Thought content normal.         Judgment: Judgment normal.        Review of Symptoms      Symptom Assessment (ESAS 0-10 Scale)  Pain:  0  Dyspnea:  3  Anxiety:  6  Nausea:  0  Depression:  0  Anorexia:  0  Fatigue:  4  Insomnia:  0  Restlessness:  0  Agitation:  0       Anxiety:  Is nervous/anxious    Performance Status:  70    Living Arrangements:  Lives alone and Lives in apartment    Psychosocial/Cultural:   See Palliative Psychosocial Note: No  Social Issues Identified: Coping deficit pt/family and  Mental Health  Bereavement Risk: No  Caregiver Needs Discussed. Caregiver Distress: Yes: Intensity of family caregiving and Caregiver Burnout Risk  Cultural: none identified   **Primary  to Follow**  Palliative Care  Consult: No    Spiritual:  F - Juhi and Belief:  Yazdanism   I - Importance:  High   C - Community:  Family and Worship support      Time-Based Charting:  Yes  Chart Review: 10 minutes  Face to Face: 31 minutes  Symptom Assessment: 5 minutes  Coordination of Care: 2 minutes  Discharge Plannin minutes  Advance Care Plannin minutes  Goals of Care: 5 minutes    Total Time Spent: 60 minutes      Advance Care Planning   Advance Directives:   Living Will: Yes    [place]: No    Do Not Resuscitate Status: Yes    Medical Power of : Yes    Agent's Name:  Mulugeta Phelan   Agent's Contact Number:  [phone]    Decision Making:  Patient answered questions  Goals of Care: What is most important right now is to focus on spending time at home, avoiding the hospital, remaining as independent as possible, symptom/pain control, improvement in condition but with limits to invasive therapies. Accordingly, we have decided that the best plan to meet the patient's goals includes continuing with treatment.       Assessment:   Diagnoses and all orders for this visit:    Stage 4 very severe COPD by GOLD classification    Generalized anxiety disorder        No diagnosis found.    Plan:         Diagnoses and all orders for this visit:    Stage 4 very severe COPD by GOLD classification  -continue current treatment plan,  services, and will resume pulmonary rehab soon    Generalized anxiety disorder  -continue therapy  -valium adjustments as discussed     -RTC in 4 weeks     Katie Mclaughlin NP  Palliative Medicine

## 2023-08-23 ENCOUNTER — OFFICE VISIT (OUTPATIENT)
Dept: INTERNAL MEDICINE | Facility: CLINIC | Age: 68
End: 2023-08-23
Payer: MEDICARE

## 2023-08-23 DIAGNOSIS — Z99.81 OXYGEN DEPENDENT: ICD-10-CM

## 2023-08-23 DIAGNOSIS — Z09 FOLLOW-UP EXAM: Primary | ICD-10-CM

## 2023-08-23 DIAGNOSIS — J44.1 COPD EXACERBATION: ICD-10-CM

## 2023-08-23 DIAGNOSIS — J96.12 CHRONIC HYPERCAPNIC RESPIRATORY FAILURE: ICD-10-CM

## 2023-08-23 DIAGNOSIS — I70.0 AORTIC ATHEROSCLEROSIS: ICD-10-CM

## 2023-08-23 PROCEDURE — 1159F MED LIST DOCD IN RCRD: CPT | Mod: CPTII,95,, | Performed by: FAMILY MEDICINE

## 2023-08-23 PROCEDURE — 3044F PR MOST RECENT HEMOGLOBIN A1C LEVEL <7.0%: ICD-10-PCS | Mod: CPTII,95,, | Performed by: FAMILY MEDICINE

## 2023-08-23 PROCEDURE — 99215 PR OFFICE/OUTPT VISIT, EST, LEVL V, 40-54 MIN: ICD-10-PCS | Mod: 95,,, | Performed by: FAMILY MEDICINE

## 2023-08-23 PROCEDURE — 1160F RVW MEDS BY RX/DR IN RCRD: CPT | Mod: CPTII,95,, | Performed by: FAMILY MEDICINE

## 2023-08-23 PROCEDURE — 1111F PR DISCHARGE MEDS RECONCILED W/ CURRENT OUTPATIENT MED LIST: ICD-10-PCS | Mod: CPTII,95,, | Performed by: FAMILY MEDICINE

## 2023-08-23 PROCEDURE — 1111F DSCHRG MED/CURRENT MED MERGE: CPT | Mod: CPTII,95,, | Performed by: FAMILY MEDICINE

## 2023-08-23 PROCEDURE — 1160F PR REVIEW ALL MEDS BY PRESCRIBER/CLIN PHARMACIST DOCUMENTED: ICD-10-PCS | Mod: CPTII,95,, | Performed by: FAMILY MEDICINE

## 2023-08-23 PROCEDURE — 1159F PR MEDICATION LIST DOCUMENTED IN MEDICAL RECORD: ICD-10-PCS | Mod: CPTII,95,, | Performed by: FAMILY MEDICINE

## 2023-08-23 PROCEDURE — 3044F HG A1C LEVEL LT 7.0%: CPT | Mod: CPTII,95,, | Performed by: FAMILY MEDICINE

## 2023-08-23 PROCEDURE — 99215 OFFICE O/P EST HI 40 MIN: CPT | Mod: 95,,, | Performed by: FAMILY MEDICINE

## 2023-08-23 RX ORDER — FLUTICASONE PROPIONATE 50 MCG
1 SPRAY, SUSPENSION (ML) NASAL DAILY PRN
Qty: 16 G | Refills: 11 | Status: SHIPPED | OUTPATIENT
Start: 2023-08-23

## 2023-08-23 NOTE — PROGRESS NOTES
Subjective     Patient ID: Terri Phelan is a 68 y.o. female.    Chief Complaint: Follow-up  The patient location is:  [place]  The chief complaint leading to consultation is:  Follow-up    Visit type: audiovisual    Face to Face time with patient:  8 minutes  40 minutes of total time spent on the encounter, which includes face to face time and non-face to face time preparing to see the patient (eg, review of tests), Obtaining and/or reviewing separately obtained history, Documenting clinical information in the electronic or other health record, Independently interpreting results (not separately reported) and communicating results to the patient/family/caregiver, or Care coordination (not separately reported).         Each patient to whom he or she provides medical services by telemedicine is:  (1) informed of the relationship between the physician and patient and the respective role of any other health care provider with respect to management of the patient; and (2) notified that he or she may decline to receive medical services by telemedicine and may withdraw from such care at any time.    Notes:  68-year-old  female with severe COPD on 2 L nasal cannula oxygen, coronary artery disease, hypertension, aortic atherosclerosis, and sleep apnea is evaluated through telemedicine visit for follow-up.  She has had multiple recent hospitalization secondary to COPD exacerbations.  Her last was an admission on August 7, 2023 and discharged on August 10, 2023.  She has already [facility] both [facility] and palliative care.  During her admission she was placed on BiPAP for hypoxia and hypercapnia.  She is on maximal medical therapy and recently BiPAP settings have been adjusted.  The patient reports improvement of her symptoms.  She was given Valium to use secondary to anxiety in relation to her medical condition.  It was recommended that she take a half tablet twice daily instead of a  whole tablet daily.  She reports that yesterday she only took a half tablet with improvement of symptoms.  At this time she has home health care who has been working with her in the home.  Upon completion of home health she will start pulmonary rehab at [facility].  At this time she notes improvement of her energy and has been keeping busy around her house.    Follow-up  Pertinent negatives include no abdominal pain, chest pain, chills, congestion, coughing, fatigue, fever, headaches, myalgias, nausea, neck pain, rash, sore throat or vomiting.     Review of Systems   Constitutional:  Negative for appetite change, chills, fatigue and fever.   HENT:  Negative for nasal congestion, ear pain, hearing loss, postnasal drip, rhinorrhea, sinus pressure/congestion, sore throat and tinnitus.    Eyes:  Negative for redness, itching and visual disturbance.   Respiratory:  Negative for cough, chest tightness and shortness of breath.    Cardiovascular:  Negative for chest pain and palpitations.   Gastrointestinal:  Negative for abdominal pain, constipation, diarrhea, nausea and vomiting.   Genitourinary:  Negative for decreased urine volume, difficulty urinating, dysuria, frequency, hematuria and urgency.   Musculoskeletal:  Negative for back pain, myalgias, neck pain and neck stiffness.   Integumentary:  Negative for rash.   Neurological:  Negative for dizziness, light-headedness and headaches.   Psychiatric/Behavioral: Negative.            Objective     Physical Exam  Constitutional:       Appearance: Normal appearance.   HENT:      Head: Normocephalic and atraumatic.   Pulmonary:      Effort: Pulmonary effort is normal.   Neurological:      Mental Status: She is alert and oriented to person, place, and time.   Psychiatric:         Mood and Affect: Mood normal.         Behavior: Behavior normal.         Thought Content: Thought content normal.         Judgment: Judgment normal.            Assessment and Plan     1.  Follow-up exam    2. Chronic hypercapnic respiratory failure  -     BASIC METABOLIC PANEL; Future; Expected date: 08/23/2023    3. COPD exacerbation    4. Oxygen dependent  Overview:  Requires 3L NC.      5. Aortic atherosclerosis    Other orders  -     fluticasone propionate (FLONASE) 50 mcg/actuation nasal spray; 1 spray (50 mcg total) by Each Nostril route daily as needed for Allergies.  Dispense: 16 g; Refill: 11        1. Hospital records have been reviewed.  2. Repeat BMP for further evaluation of chronic hypercapnia.    3. Continue current pulmonary treatment plan.  Continue home health services at this time and resume pulmonary rehab soon.  4. Continue aspirin 81 mg daily and Lipitor 40 mg nightly.  Atherosclerosis versus is stable.  5. Return to clinic as needed or as previously scheduled next month for annual physical exam.      40 minutes have been spent on this clinic visit.       Follow up if symptoms worsen or fail to improve.

## 2023-08-28 ENCOUNTER — TELEPHONE (OUTPATIENT)
Dept: PULMONOLOGY | Facility: CLINIC | Age: 68
End: 2023-08-28
Payer: MEDICARE

## 2023-08-28 ENCOUNTER — OUTPATIENT CASE MANAGEMENT (OUTPATIENT)
Dept: ADMINISTRATIVE | Facility: OTHER | Age: 68
End: 2023-08-28
Payer: MEDICARE

## 2023-08-28 NOTE — TELEPHONE ENCOUNTER
Attempted to contact patient in regards to her message but some one answered pt telephone and hung up.

## 2023-08-28 NOTE — TELEPHONE ENCOUNTER
----- Message from Heidi Talbot sent at 8/28/2023  4:49 PM CDT -----  Regarding: Appt  Contact: Pt [phone]  Pt is calling stating her oxygen levels are up and down states she needs to see someone before 9/26 please call

## 2023-08-28 NOTE — PROGRESS NOTES
Outpatient Care Management  Plan of Care Follow Up Visit    Patient: Terri Phelan  MRN: [id number]  Date of Service: 08/28/2023  Completed by: Raven Cook RN  Referral Date: 05/30/2023    Reason for Visit   Patient presents with    Nursing Assessment       Brief Summary: OPCM follow up call completed with Terri. Care plan reviewed and updated along with continuous education on Avoiding Triggers and Exercising.   Next Steps: Terri agrees to follow up call on or around 9-11-23.

## 2023-08-29 ENCOUNTER — TELEPHONE (OUTPATIENT)
Dept: PULMONOLOGY | Facility: CLINIC | Age: 68
End: 2023-08-29
Payer: MEDICARE

## 2023-08-29 ENCOUNTER — OFFICE VISIT (OUTPATIENT)
Dept: PULMONOLOGY | Facility: CLINIC | Age: 68
End: 2023-08-29
Payer: MEDICARE

## 2023-08-29 VITALS
BODY MASS INDEX: 29.61 KG/M2 | DIASTOLIC BLOOD PRESSURE: 62 MMHG | OXYGEN SATURATION: 90 % | WEIGHT: 177.69 LBS | SYSTOLIC BLOOD PRESSURE: 124 MMHG | HEIGHT: 65 IN | HEART RATE: 91 BPM

## 2023-08-29 DIAGNOSIS — R06.00 DYSPNEA, UNSPECIFIED TYPE: Chronic | ICD-10-CM

## 2023-08-29 DIAGNOSIS — Z79.52 CURRENT CHRONIC USE OF SYSTEMIC STEROIDS: ICD-10-CM

## 2023-08-29 DIAGNOSIS — J44.1 COPD EXACERBATION: ICD-10-CM

## 2023-08-29 DIAGNOSIS — F41.9 ANXIETY: Chronic | ICD-10-CM

## 2023-08-29 DIAGNOSIS — J96.21 ACUTE ON CHRONIC RESPIRATORY FAILURE WITH HYPOXIA AND HYPERCAPNIA: ICD-10-CM

## 2023-08-29 DIAGNOSIS — J96.22 ACUTE ON CHRONIC RESPIRATORY FAILURE WITH HYPOXIA AND HYPERCAPNIA: ICD-10-CM

## 2023-08-29 DIAGNOSIS — Z99.81 OXYGEN DEPENDENT: ICD-10-CM

## 2023-08-29 DIAGNOSIS — J43.2 CENTRILOBULAR EMPHYSEMA: Primary | ICD-10-CM

## 2023-08-29 PROBLEM — Z79.82 LONG TERM (CURRENT) USE OF ASPIRIN: Status: ACTIVE | Noted: 2023-07-17

## 2023-08-29 PROBLEM — Z91.81 HISTORY OF FALLING: Status: ACTIVE | Noted: 2023-07-17

## 2023-08-29 PROCEDURE — 1159F PR MEDICATION LIST DOCUMENTED IN MEDICAL RECORD: ICD-10-PCS | Mod: CPTII,S$GLB,, | Performed by: INTERNAL MEDICINE

## 2023-08-29 PROCEDURE — 3008F PR BODY MASS INDEX (BMI) DOCUMENTED: ICD-10-PCS | Mod: CPTII,S$GLB,, | Performed by: INTERNAL MEDICINE

## 2023-08-29 PROCEDURE — 1126F PR PAIN SEVERITY QUANTIFIED, NO PAIN PRESENT: ICD-10-PCS | Mod: CPTII,S$GLB,, | Performed by: INTERNAL MEDICINE

## 2023-08-29 PROCEDURE — 1101F PT FALLS ASSESS-DOCD LE1/YR: CPT | Mod: CPTII,S$GLB,, | Performed by: INTERNAL MEDICINE

## 2023-08-29 PROCEDURE — 3288F PR FALLS RISK ASSESSMENT DOCUMENTED: ICD-10-PCS | Mod: CPTII,S$GLB,, | Performed by: INTERNAL MEDICINE

## 2023-08-29 PROCEDURE — 3078F PR MOST RECENT DIASTOLIC BLOOD PRESSURE < 80 MM HG: ICD-10-PCS | Mod: CPTII,S$GLB,, | Performed by: INTERNAL MEDICINE

## 2023-08-29 PROCEDURE — 3074F SYST BP LT 130 MM HG: CPT | Mod: CPTII,S$GLB,, | Performed by: INTERNAL MEDICINE

## 2023-08-29 PROCEDURE — 99214 OFFICE O/P EST MOD 30 MIN: CPT | Mod: GC,S$GLB,, | Performed by: INTERNAL MEDICINE

## 2023-08-29 PROCEDURE — 3078F DIAST BP <80 MM HG: CPT | Mod: CPTII,S$GLB,, | Performed by: INTERNAL MEDICINE

## 2023-08-29 PROCEDURE — 1101F PR PT FALLS ASSESS DOC 0-1 FALLS W/OUT INJ PAST YR: ICD-10-PCS | Mod: CPTII,S$GLB,, | Performed by: INTERNAL MEDICINE

## 2023-08-29 PROCEDURE — 99214 PR OFFICE/OUTPT VISIT, EST, LEVL IV, 30-39 MIN: ICD-10-PCS | Mod: GC,S$GLB,, | Performed by: INTERNAL MEDICINE

## 2023-08-29 PROCEDURE — 1111F PR DISCHARGE MEDS RECONCILED W/ CURRENT OUTPATIENT MED LIST: ICD-10-PCS | Mod: CPTII,S$GLB,, | Performed by: INTERNAL MEDICINE

## 2023-08-29 PROCEDURE — 3044F PR MOST RECENT HEMOGLOBIN A1C LEVEL <7.0%: ICD-10-PCS | Mod: CPTII,S$GLB,, | Performed by: INTERNAL MEDICINE

## 2023-08-29 PROCEDURE — 3008F BODY MASS INDEX DOCD: CPT | Mod: CPTII,S$GLB,, | Performed by: INTERNAL MEDICINE

## 2023-08-29 PROCEDURE — 3074F PR MOST RECENT SYSTOLIC BLOOD PRESSURE < 130 MM HG: ICD-10-PCS | Mod: CPTII,S$GLB,, | Performed by: INTERNAL MEDICINE

## 2023-08-29 PROCEDURE — 1159F MED LIST DOCD IN RCRD: CPT | Mod: CPTII,S$GLB,, | Performed by: INTERNAL MEDICINE

## 2023-08-29 PROCEDURE — 1111F DSCHRG MED/CURRENT MED MERGE: CPT | Mod: CPTII,S$GLB,, | Performed by: INTERNAL MEDICINE

## 2023-08-29 PROCEDURE — 1126F AMNT PAIN NOTED NONE PRSNT: CPT | Mod: CPTII,S$GLB,, | Performed by: INTERNAL MEDICINE

## 2023-08-29 PROCEDURE — 3288F FALL RISK ASSESSMENT DOCD: CPT | Mod: CPTII,S$GLB,, | Performed by: INTERNAL MEDICINE

## 2023-08-29 PROCEDURE — 3044F HG A1C LEVEL LT 7.0%: CPT | Mod: CPTII,S$GLB,, | Performed by: INTERNAL MEDICINE

## 2023-08-29 RX ORDER — PREDNISONE 20 MG/1
2 TABLET ORAL DAILY
Status: ON HOLD | COMMUNITY
Start: 2023-08-17 | End: 2023-12-20

## 2023-08-29 RX ORDER — PREDNISONE 10 MG/1
TABLET ORAL
Qty: 53 TABLET | Refills: 0 | Status: SHIPPED | OUTPATIENT
Start: 2023-08-29 | End: 2023-09-23

## 2023-08-29 RX ORDER — PREDNISONE 20 MG/1
20 TABLET ORAL 2 TIMES DAILY
Status: ON HOLD | COMMUNITY
Start: 2023-08-28 | End: 2023-12-20 | Stop reason: HOSPADM

## 2023-08-29 NOTE — TELEPHONE ENCOUNTER
Spoke with patient, informed her that I have received her message. Patient states that her oxygen is fluctuating from 68-90 percent. Patient states that she sleeps with a Bi-pap machine with oxygen connected but wakes up to her oxygen being 68 and as time goes on, patient oxygen jumps to 90 percent. Patient states that this morning her oxygen sat at 89. Patient wants to schedule appointment today with Dr Cunningham. I verbalized to pt that I understand and advised pt that I can schedule her appointment with Dr Cunningham on today for 2:30 pm. Patient verbalized that she understand and accepted the appointment.

## 2023-08-29 NOTE — TELEPHONE ENCOUNTER
----- Message from Kiley Nunez sent at 8/29/2023  8:58 AM CDT -----  Contact: [phone]  The patient would like a call back at your earliest convince.due to some release with problem shes having   The pt can be reached at [phone]

## 2023-08-29 NOTE — PROGRESS NOTES
Pulmonology Clinic Note     Patient ID: Terri Phelan is a 68 y.o. female.    Chief Complaint: O2 low at home     Subjective:      69 yo F with h/o severe end stage COPD/emphysema, chronic hypoxemic and hypercapnic respiratory failure (on 3L NC and BIPAP nightly), CAD, and frequent hospitalizations who presents as hospital fu and with concern for low oxygen numbers at home.     Ms. Phelan had a recent hospitalization at [facility] for hypercapnic RF. During that hospital stay she was placed on BIPAP with inc settings (18/8) which she has continued since she went home. Still having significant anxiety, especially when taking her pulse ox numbers because she sees them drop. She checks her pulse ox at least 10 times a day. Yesterday, she saw her number lower which made her anxious. She started prednisone because she had a rx at home. She continues to use her rescue inhaler PRN, Breztri (budesonide-glycopyr-formoterol). Saw palliative while inpatient and has upcoming appt. Has not set up appt with sleep medicine yet. No recent fevers/chills, worsening SOB, chest pain, N/V, or LE swelling. She has been on steroids 3 times this month so far and at least 1-2 times monthly each month this year.     Her current regimen includes:  - Breztri 2 puffs BID  - Albuterol rescue inhaler 4-5 times per day (used mostly with transitioning)  - DuoNebs BID  - Azithromycin MWF  - roflumilast daily   - pulmonary rehab 1x per week  - prednisone 40mg x 5 days PRN for exacerbations  - BiPAP nightly - settings increased to 18/8 after last hospital stay     Review of Systems   Constitutional:  Positive for fatigue. Negative for fever and weakness.   HENT:  Negative for sore throat and congestion.    Respiratory:  Positive for cough, sputum production, chest tightness, shortness of breath, wheezing, orthopnea, dyspnea on extertion and use of rescue inhaler.    Cardiovascular:  Positive for leg swelling. Negative for chest pain.    Genitourinary:  Negative for difficulty urinating.   Musculoskeletal:  Negative for gait problem.   Skin:  Negative for rash.   Gastrointestinal:  Negative for nausea, vomiting and abdominal pain.   Neurological:  Negative for dizziness.   Psychiatric/Behavioral:  The patient is nervous/anxious.        Objective:     Physical Exam   Constitutional: She is oriented to person, place, and time. She appears well-developed. No distress.   Chronically ill appearing. In wheelchair with NC in place   HENT:   Head: Normocephalic.   Cardiovascular: Normal rate and regular rhythm.   No murmur heard.  Pulmonary/Chest: No stridor.   Distant breath sounds b/l, worse at bases. Prolonged expiration and intermittent pursed lip breathing during discussion.    Abdominal: Soft. She exhibits no distension.   Musculoskeletal:         General: Edema (2+ pitting edema to b/l LE) present. Normal range of motion.      Cervical back: Normal range of motion and neck supple.   Neurological: She is alert and oriented to person, place, and time.   Skin: Skin is warm and dry.   Nursing note and vitals reviewed.      Personal Diagnostic Review     PFTs 4/18/23   FVC  (pre-BD) 1.01   FVC%  40   FEV1 0.44   FEV1%  22   FEV1/FVC  44   FEF 25-75  0.18   FEF 25-75%  10   FVC (post-BD) 0.95   FVC% -6   FEV1 0.42   FEV1% -4.3   FEV1/FVC 45   FEF 25-75 0.16   FEF 25-75% -   TLC  5.34   TLC%  108   RV  4.27   RV%  214   DLCO  -   DLCO%  -   [facility] -   IVC -   Six-Minute Walk     Distance (meter)     O2 jorge l     O2 change       CT Chest 7/9/23:   FINDINGS:  Pulmonary vasculature: There is no evidence of pulmonary thromboemboli. Pulmonary arteries distribute normally and are normal in caliber.  There are four pulmonary veins.  Chio/Mediastinum: No pathologic yariel enlargement.  Airways: Patent.     Lungs/Pleura: Centrilobular emphysematous changes are noted in the lung apices most predominantly.  No nodules or pleural or pericardial effusion or thickening  "seen.  Lung base subsegmental atelectasis or scarring greater on the left is also noted.  No suspicious nodules, consolidation is seen.  Clear lungs. No pleural effusion or thickening.     Aorta: Left-sided aortic arch.  There is no aneurysm or dissection.  Mild to moderate aortic atherosclerosis noted.  Heart: There is no right ventricular strain or cardiomegaly.  There is no pericardial effusion..  Base of Neck: No significant abnormality.  Thoracic soft tissues: Unremarkable.  Esophagus: Unremarkable.  Upper Abdomen: No abnormality of the partially imaged upper abdomen.  Bones: No acute fracture. No suspicious lytic or sclerotic lesions.  Spondylosis of the spine without suspicious lesions noted.     Impression:  No evidence of pulmonary thromboemboli or significant acute lung interstitial airspace disease.  Emphysematous changes in the upper lungs.  No cardiomegaly or right ventricular strain.  Please see above for incidental additional nonacute findings.        8/29/2023     3:01 PM 8/10/2023    11:40 AM 8/10/2023    11:18 AM 8/10/2023     7:52 AM 8/10/2023     7:06 AM 8/10/2023     4:45 AM 8/10/2023     3:55 AM   Pulmonary Function Tests   SpO2 90 % 95 % 94 % 96 % 93 % 94 % 95 %   Height 5' 5" (1.651 m)         Weight 80.6 kg (177 lb 11.1 oz)         BMI (Calculated) 29.6            Assessment:     1. Centrilobular emphysema    2. Oxygen dependent    3. Acute on chronic respiratory failure with hypoxia and hypercapnia    4. Current chronic use of systemic steroids    5. Anxiety           Outpatient Encounter Medications as of 8/29/2023   Medication Sig Dispense Refill    acetaminophen (TYLENOL) 500 MG tablet Take 500 mg by mouth every 6 (six) hours as needed for Pain.      albuterol (PROVENTIL/VENTOLIN HFA) 90 mcg/actuation inhaler Inhale 2 puffs into the lungs every 6 (six) hours as needed for Wheezing or Shortness of Breath. 18 g 6    albuterol-ipratropium (DUO-NEB) 2.5 mg-0.5 mg/3 mL nebulizer solution Take " 3 mLs by nebulization every 6 (six) hours as needed for Wheezing. 75 mL 0    alendronate (FOSAMAX) 35 MG tablet Take 35 mg by mouth every Tuesday.      aspirin 81 MG Chew Take 81 mg by mouth once daily.      atorvastatin (LIPITOR) 40 MG tablet Take 1 tablet (40 mg total) by mouth every evening. 90 tablet 3    azithromycin (Z-OLY) 250 MG tablet Take 1 tablet (250 mg total) by mouth 3 (three) times a week. (Patient taking differently: Take 250 mg by mouth every Mon, Wed, Fri.) 12 tablet 11    budesonide-glycopyr-formoterol (BREZTRI AEROSPHERE) 160-9-4.8 mcg/actuation HFAA Inhale 2 puffs into the lungs 2 (two) times daily.      chlorpheniramine-pseudoephedrine-acetaminophen (SINUTAB) 2- mg per tablet 2 tablet 3 TIMES DAILY (route: oral)      dextromethorphan-guaiFENesin  mg (MUCINEX DM)  mg per 12 hr tablet Take 1 tablet by mouth every 12 (twelve) hours.      diazePAM (VALIUM) 2 MG tablet Take 1 tablet (2 mg total) by mouth daily as needed for Anxiety. 30 tablet 0    dicyclomine (BENTYL) 20 mg tablet Take 1 tablet (20 mg total) by mouth 3 (three) times daily as needed (abdominal pain). 30 tablet 1    ergocalciferol (ERGOCALCIFEROL) 50,000 unit Cap Take 50,000 Units by mouth every Tuesday.      fluticasone propionate (FLONASE) 50 mcg/actuation nasal spray 1 spray (50 mcg total) by Each Nostril route daily as needed for Allergies. 16 g 11    furosemide (LASIX) 40 MG tablet Take 1 tablet (40 mg total) by mouth once daily. 30 tablet 11    multivit with min-folic acid (WOMEN'S MULTIVITAMIN GUMMIES) 200 mcg Chew Take 2 tablets by mouth once daily.      OXYGEN-AIR DELIVERY SYSTEMS MISC 3 L by Nasal route.      potassium chloride (KLOR-CON) 10 MEQ TbSR Take 1 tablet (10 mEq total) by mouth once daily. for 365 doses 30 tablet 11    predniSONE (DELTASONE) 20 MG tablet Take 20 mg by mouth 2 (two) times daily.      predniSONE (DELTASONE) 20 MG tablet Take 2 tablets by mouth once daily.      roflumilast (DALIRESP)  500 mcg Tab Take 1 tablet by mouth once daily.      traZODone (DESYREL) 50 MG tablet Take 1 tablet (50 mg total) by mouth nightly as needed for Insomnia. 30 tablet 6    predniSONE (DELTASONE) 10 MG tablet Take 4 tablets (40 mg total) by mouth once daily for 5 days, THEN 3 tablets (30 mg total) once daily for 5 days, THEN 2 tablets (20 mg total) once daily for 5 days, THEN 1 tablet (10 mg total) once daily for 5 days, THEN 0.5 tablets (5 mg total) once daily for 5 days. 53 tablet 0     No facility-administered encounter medications on file as of 8/29/2023.     No orders of the defined types were placed in this encounter.      Plan:     69 yo F with recurrent hospitalizations for severe end stage COPD/emphysema and hypoxic/hypercapnic respiratory failure here for reported low O2 sats and recent hospital stay.   Set up appt with Dr. Painter, continue using bipap nightly at 18/8 and will start during the day while watching tv.     Problem List Items Addressed This Visit          Psychiatric    Anxiety (Chronic)    Overview     Anxiety associated with feelings of SOB, diagnosis, and hope for new medications that can help. Exacerbated by chronic need to check oxygen numbers and frequent hospitalizations. Following with palliative.          Current Assessment & Plan     Continue to fu with palliative care.   Negotiated wean for how many times a day she checks her oxygen, ideally pt would only check when feeling sx.             Pulmonary    Dyspnea (Chronic)    Overview     Severe dyspnea contributing to anxiety.   Continue to see palliative, benzos prn.          Centrilobular emphysema - Primary    Overview     Severe obstructive disease, on home O2.          Oxygen dependent    Overview     Requires 2-3L NC.         Acute on chronic respiratory failure with hypoxia and hypercapnia    Overview     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   Has admissions roughly monthly for hypercapnic RF. Pt  compliant with BIPAP at home, states it's the only thing that helps her get some sleep.          Current Assessment & Plan     Continue current regimen:   - Breztri 2 puffs BID  - Albuterol rescue inhaler 4-5 times per day (used mostly with transitioning)  - DuoNebs BID  - Azithromycin MWF  - roflumilast daily   - BiPAP nightly - settings increased to 18/8 after last hospital stay - will also start to use during the day when watching tv, etc              Endocrine    Current chronic use of systemic steroids    Overview     Persistent use of steroids several times a month with worsening after coming off.          Current Assessment & Plan     Will start a 3 week prednisone taper today due to significant amount of time on steroids and pt started her exacerbation dosing yesterday.             Anupama Corral MD  [employer] [facility]

## 2023-08-30 PROBLEM — Z79.52 CURRENT CHRONIC USE OF SYSTEMIC STEROIDS: Status: ACTIVE | Noted: 2023-08-30

## 2023-08-30 PROBLEM — F41.9 ANXIETY: Chronic | Status: ACTIVE | Noted: 2023-08-30

## 2023-08-31 NOTE — ASSESSMENT & PLAN NOTE
Continue to fu with palliative care.   Negotiated wean for how many times a day she checks her oxygen, ideally pt would only check when feeling sx.

## 2023-08-31 NOTE — ASSESSMENT & PLAN NOTE
Will start a 3 week prednisone taper today due to significant amount of time on steroids and pt started her exacerbation dosing yesterday.

## 2023-08-31 NOTE — ASSESSMENT & PLAN NOTE
Continue current regimen:   - Breztri 2 puffs BID  - Albuterol rescue inhaler 4-5 times per day (used mostly with transitioning)  - DuoNebs BID  - Azithromycin MWF  - roflumilast daily   - BiPAP nightly - settings increased to 18/8 after last hospital stay - will also start to use during the day when watching tv, etc

## 2023-09-01 ENCOUNTER — OUTPATIENT CASE MANAGEMENT (OUTPATIENT)
Dept: ADMINISTRATIVE | Facility: OTHER | Age: 68
End: 2023-09-01
Payer: MEDICARE

## 2023-09-01 ENCOUNTER — DOCUMENT SCAN (OUTPATIENT)
Dept: HOME HEALTH SERVICES | Facility: HOSPITAL | Age: 68
End: 2023-09-01
Payer: MEDICARE

## 2023-09-05 ENCOUNTER — OFFICE VISIT (OUTPATIENT)
Dept: SLEEP MEDICINE | Facility: CLINIC | Age: 68
End: 2023-09-05
Payer: MEDICARE

## 2023-09-05 ENCOUNTER — OUTPATIENT CASE MANAGEMENT (OUTPATIENT)
Dept: ADMINISTRATIVE | Facility: OTHER | Age: 68
End: 2023-09-05
Payer: MEDICARE

## 2023-09-05 VITALS
HEART RATE: 81 BPM | HEIGHT: 65 IN | BODY MASS INDEX: 29.66 KG/M2 | OXYGEN SATURATION: 93 % | DIASTOLIC BLOOD PRESSURE: 76 MMHG | WEIGHT: 178 LBS | SYSTOLIC BLOOD PRESSURE: 134 MMHG

## 2023-09-05 DIAGNOSIS — G47.33 OBSTRUCTIVE SLEEP APNEA SYNDROME: ICD-10-CM

## 2023-09-05 DIAGNOSIS — J43.2 CENTRILOBULAR EMPHYSEMA: Primary | ICD-10-CM

## 2023-09-05 PROCEDURE — 1160F PR REVIEW ALL MEDS BY PRESCRIBER/CLIN PHARMACIST DOCUMENTED: ICD-10-PCS | Mod: CPTII,S$GLB,, | Performed by: INTERNAL MEDICINE

## 2023-09-05 PROCEDURE — 3044F HG A1C LEVEL LT 7.0%: CPT | Mod: CPTII,S$GLB,, | Performed by: INTERNAL MEDICINE

## 2023-09-05 PROCEDURE — 3078F PR MOST RECENT DIASTOLIC BLOOD PRESSURE < 80 MM HG: ICD-10-PCS | Mod: CPTII,S$GLB,, | Performed by: INTERNAL MEDICINE

## 2023-09-05 PROCEDURE — 1160F RVW MEDS BY RX/DR IN RCRD: CPT | Mod: CPTII,S$GLB,, | Performed by: INTERNAL MEDICINE

## 2023-09-05 PROCEDURE — 99999 PR PBB SHADOW E&M-EST. PATIENT-LVL IV: ICD-10-PCS | Mod: PBBFAC,,, | Performed by: INTERNAL MEDICINE

## 2023-09-05 PROCEDURE — 1101F PR PT FALLS ASSESS DOC 0-1 FALLS W/OUT INJ PAST YR: ICD-10-PCS | Mod: CPTII,S$GLB,, | Performed by: INTERNAL MEDICINE

## 2023-09-05 PROCEDURE — 1159F PR MEDICATION LIST DOCUMENTED IN MEDICAL RECORD: ICD-10-PCS | Mod: CPTII,S$GLB,, | Performed by: INTERNAL MEDICINE

## 2023-09-05 PROCEDURE — 1111F PR DISCHARGE MEDS RECONCILED W/ CURRENT OUTPATIENT MED LIST: ICD-10-PCS | Mod: CPTII,S$GLB,, | Performed by: INTERNAL MEDICINE

## 2023-09-05 PROCEDURE — 1111F DSCHRG MED/CURRENT MED MERGE: CPT | Mod: CPTII,S$GLB,, | Performed by: INTERNAL MEDICINE

## 2023-09-05 PROCEDURE — 3075F SYST BP GE 130 - 139MM HG: CPT | Mod: CPTII,S$GLB,, | Performed by: INTERNAL MEDICINE

## 2023-09-05 PROCEDURE — 3288F FALL RISK ASSESSMENT DOCD: CPT | Mod: CPTII,S$GLB,, | Performed by: INTERNAL MEDICINE

## 2023-09-05 PROCEDURE — 3078F DIAST BP <80 MM HG: CPT | Mod: CPTII,S$GLB,, | Performed by: INTERNAL MEDICINE

## 2023-09-05 PROCEDURE — 99214 PR OFFICE/OUTPT VISIT, EST, LEVL IV, 30-39 MIN: ICD-10-PCS | Mod: S$GLB,,, | Performed by: INTERNAL MEDICINE

## 2023-09-05 PROCEDURE — 99214 OFFICE O/P EST MOD 30 MIN: CPT | Mod: S$GLB,,, | Performed by: INTERNAL MEDICINE

## 2023-09-05 PROCEDURE — 1159F MED LIST DOCD IN RCRD: CPT | Mod: CPTII,S$GLB,, | Performed by: INTERNAL MEDICINE

## 2023-09-05 PROCEDURE — 99999 PR PBB SHADOW E&M-EST. PATIENT-LVL IV: CPT | Mod: PBBFAC,,, | Performed by: INTERNAL MEDICINE

## 2023-09-05 PROCEDURE — 3008F BODY MASS INDEX DOCD: CPT | Mod: CPTII,S$GLB,, | Performed by: INTERNAL MEDICINE

## 2023-09-05 PROCEDURE — 3075F PR MOST RECENT SYSTOLIC BLOOD PRESS GE 130-139MM HG: ICD-10-PCS | Mod: CPTII,S$GLB,, | Performed by: INTERNAL MEDICINE

## 2023-09-05 PROCEDURE — 3044F PR MOST RECENT HEMOGLOBIN A1C LEVEL <7.0%: ICD-10-PCS | Mod: CPTII,S$GLB,, | Performed by: INTERNAL MEDICINE

## 2023-09-05 PROCEDURE — 3288F PR FALLS RISK ASSESSMENT DOCUMENTED: ICD-10-PCS | Mod: CPTII,S$GLB,, | Performed by: INTERNAL MEDICINE

## 2023-09-05 PROCEDURE — 3008F PR BODY MASS INDEX (BMI) DOCUMENTED: ICD-10-PCS | Mod: CPTII,S$GLB,, | Performed by: INTERNAL MEDICINE

## 2023-09-05 PROCEDURE — 1101F PT FALLS ASSESS-DOCD LE1/YR: CPT | Mod: CPTII,S$GLB,, | Performed by: INTERNAL MEDICINE

## 2023-09-05 NOTE — PROGRESS NOTES
Subjective:   Patient ID: Terri Phelan is a 68 y.o. female    Chief Complaint:   Chief Complaint   Patient presents with    Apnea    Shortness of Breath       Referred by Dr. Alex Cunningham    HPI  Terri Phelan is a 68 y.o. female who was referred by Dr. Alex Cunningham   for a sleep evaluation for underlying sleep disordered breathing. Relevant medical history includes COPD with several exacerbation.    Sleep history: prior sleep study at [facility]. She does not remember result. She was never issued a cpap.  No prior sleep study available. She does snore but no apnea. Does have poor sleep maintenance and daytime fatigue with periodic naps.     COPD history: multiple exacerbations requiring frequent steroid bursts. On Breztri and prn albuterol and nebulizer with Azithromycin and Roflumilast. She is on BiPAP nightly.    Currently on Ayde Löwenstein BiPAP ST mode with BUR 14 and IPAP 18 EPAP 8. Review of data reveals > 4hr of use per 24 hour period. No apnea metrics are recorded on this device. She uses airfit F20 mask. She attaches O2 at  2 - 3 Lpm to the device.    Prior to this deviec , she had a Trilogy device, but it was recalled. She has been on NIPPV for several years.     BMI 29.6. baseline pCO2 in 70's to 80's.    BT of 10/11 pm with SOL of < 30 minutes, one major awakening around 2/3 AM and back to sleep from 4 - 7 am. Occasional naps during the day.    Most Recent Vital Signs:    The patient's body mass index is 29.62 kg/m².    Wt Readings from Last 5 Encounters:   09/05/23 80.7 kg (178 lb)   08/29/23 80.6 kg (177 lb 11.1 oz)   08/09/23 84.8 kg (186 lb 15.2 oz)   08/07/23 84.5 kg (186 lb 4.6 oz)   07/25/23 86.2 kg (190 lb 0.6 oz)     BMI Readings from Last 5 Encounters:   09/05/23 29.62 kg/m²   08/29/23 29.57 kg/m²   08/09/23 31.11 kg/m²   08/07/23 31.00 kg/m²   07/25/23 30.67 kg/m²     Pulse Readings from Last 3 Encounters:   09/05/23 81   08/29/23 91   08/10/23 90         Current Outpatient  Medications:     acetaminophen (TYLENOL) 500 MG tablet, Take 500 mg by mouth every 6 (six) hours as needed for Pain., Disp: , Rfl:     albuterol (PROVENTIL/VENTOLIN HFA) 90 mcg/actuation inhaler, Inhale 2 puffs into the lungs every 6 (six) hours as needed for Wheezing or Shortness of Breath., Disp: 18 g, Rfl: 6    albuterol-ipratropium (DUO-NEB) 2.5 mg-0.5 mg/3 mL nebulizer solution, Take 3 mLs by nebulization every 6 (six) hours as needed for Wheezing., Disp: 75 mL, Rfl: 0    alendronate (FOSAMAX) 35 MG tablet, Take 35 mg by mouth every Tuesday., Disp: , Rfl:     aspirin 81 MG Chew, Take 81 mg by mouth once daily., Disp: , Rfl:     atorvastatin (LIPITOR) 40 MG tablet, Take 1 tablet (40 mg total) by mouth every evening., Disp: 90 tablet, Rfl: 3    azithromycin (Z-OLY) 250 MG tablet, Take 1 tablet (250 mg total) by mouth 3 (three) times a week. (Patient taking differently: Take 250 mg by mouth every Mon, Wed, Fri.), Disp: 12 tablet, Rfl: 11    budesonide-glycopyr-formoterol (BREZTRI AEROSPHERE) 160-9-4.8 mcg/actuation HFAA, Inhale 2 puffs into the lungs 2 (two) times daily., Disp: , Rfl:     chlorpheniramine-pseudoephedrine-acetaminophen (SINUTAB) 2- mg per tablet, 2 tablet 3 TIMES DAILY (route: oral), Disp: , Rfl:     dextromethorphan-guaiFENesin  mg (MUCINEX DM)  mg per 12 hr tablet, Take 1 tablet by mouth every 12 (twelve) hours., Disp: , Rfl:     diazePAM (VALIUM) 2 MG tablet, Take 1 tablet (2 mg total) by mouth daily as needed for Anxiety., Disp: 30 tablet, Rfl: 0    dicyclomine (BENTYL) 20 mg tablet, Take 1 tablet (20 mg total) by mouth 3 (three) times daily as needed (abdominal pain)., Disp: 30 tablet, Rfl: 1    ergocalciferol (ERGOCALCIFEROL) 50,000 unit Cap, Take 50,000 Units by mouth every Tuesday., Disp: , Rfl:     fluticasone propionate (FLONASE) 50 mcg/actuation nasal spray, 1 spray (50 mcg total) by Each Nostril route daily as needed for Allergies., Disp: 16 g, Rfl: 11    furosemide  (LASIX) 40 MG tablet, Take 1 tablet (40 mg total) by mouth once daily., Disp: 30 tablet, Rfl: 11    multivit with min-folic acid (WOMEN'S MULTIVITAMIN GUMMIES) 200 mcg Chew, Take 2 tablets by mouth once daily., Disp: , Rfl:     OXYGEN-AIR DELIVERY SYSTEMS MISC, 3 L by Nasal route., Disp: , Rfl:     potassium chloride (KLOR-CON) 10 MEQ TbSR, Take 1 tablet (10 mEq total) by mouth once daily. for 365 doses, Disp: 30 tablet, Rfl: 11    predniSONE (DELTASONE) 10 MG tablet, Take 4 tablets (40 mg total) by mouth once daily for 5 days, THEN 3 tablets (30 mg total) once daily for 5 days, THEN 2 tablets (20 mg total) once daily for 5 days, THEN 1 tablet (10 mg total) once daily for 5 days, THEN 0.5 tablets (5 mg total) once daily for 5 days., Disp: 53 tablet, Rfl: 0    predniSONE (DELTASONE) 20 MG tablet, Take 20 mg by mouth 2 (two) times daily., Disp: , Rfl:     predniSONE (DELTASONE) 20 MG tablet, Take 2 tablets by mouth once daily., Disp: , Rfl:     roflumilast (DALIRESP) 500 mcg Tab, Take 1 tablet by mouth once daily., Disp: , Rfl:     traZODone (DESYREL) 50 MG tablet, Take 1 tablet (50 mg total) by mouth nightly as needed for Insomnia., Disp: 30 tablet, Rfl: 6         Objective:   Vitals reviewed   Physical Exam  Constitutional:       Appearance: Normal appearance.   HENT:      Head: Normocephalic.      Mouth/Throat:      Comments: Mallampati II  Cardiovascular:      Rate and Rhythm: Normal rate and regular rhythm.   Pulmonary:      Effort: Pulmonary effort is normal. No respiratory distress.      Breath sounds: No wheezing or rales.      Comments: Decreased breath sounds b/l  Musculoskeletal:      Cervical back: Normal range of motion and neck supple.   Neurological:      General: No focal deficit present.      Mental Status: She is alert and oriented to person, place, and time.   Psychiatric:         Mood and Affect: Mood normal.         Behavior: Behavior normal.         Assessment:     Problem List Items Addressed  This Visit          Pulmonary    Centrilobular emphysema - Primary    Overview     Severe obstructive disease, on home O2.             Other    Obstructive sleep apnea syndrome    Overview     Changed settings to 18/8 on 8/9/23              Plan:       - data revieved on Ayde Janae NIV  - on BiPAP ST IPAP 18, EPAP 8, BUR of 14  - no sleep apnea metrics are recorded on this device  - discussed role of sleep study, and need to test initially on room air to get adequate study result. Patient currently not interested in removing O2 to undergo sleep study  - current settings are appropriate and recommend no change at this time. She also feels comfortable with current mode. She is getting an adequate delta of 10 cmH2O.  - if further changes need to be made in the future, can change to [id number]  - recommend to use while  sleeping including naps and at least 1 hr during the daytime  - on optimal COPD therapy at this time

## 2023-09-05 NOTE — PROGRESS NOTES
Outpatient Care Management  Plan of Care Follow Up Visit    Patient: Terri Phelan  MRN: [id number]  Date of Service: 09/05/2023  Completed by: Raven Cook RN  Referral Date: 05/30/2023    No chief complaint on file.      Brief Summary: LYNN completed follow up call with Terri. Care plan reviewed and updated along with continued Education on Avoiding triggers that exacerbate COPD.  Next Steps: Terri agrees to follow up call on or around 9-19-23.

## 2023-09-12 NOTE — TELEPHONE ENCOUNTER
No care due was identified.  [facility] Embedded Care Due Messages. Reference number: [id number].   9/12/2023 4:55:33 PM CDT

## 2023-09-12 NOTE — TELEPHONE ENCOUNTER
----- Message from Jeny Soriano sent at 9/12/2023  4:50 PM CDT -----  Contact: [phone] @ patient  Requesting an RX refill or new RX.diazePAM (VALIUM) 2 MG tablet  Is this a refill or new RX: refill  RX name and strength (copy/paste from chart):    Is this a 30 day or 90 day RX:   Pharmacy name and phone #  [id number] - [place] - [address] [facility] [place]   Phone:  [phone]  The doctors have asked that we provide their patients with the following 2 reminders -- prescription refills can take up to 72 hours, and a friendly reminder that in the future you can use your [facility] account to request refills:

## 2023-09-13 RX ORDER — DIAZEPAM 2 MG/1
2 TABLET ORAL DAILY PRN
Qty: 30 TABLET | Refills: 0 | Status: SHIPPED | OUTPATIENT
Start: 2023-09-13 | End: 2023-10-24

## 2023-09-19 ENCOUNTER — OFFICE VISIT (OUTPATIENT)
Dept: PALLIATIVE MEDICINE | Facility: CLINIC | Age: 68
End: 2023-09-19
Payer: MEDICARE

## 2023-09-19 DIAGNOSIS — F41.1 GENERALIZED ANXIETY DISORDER: ICD-10-CM

## 2023-09-19 DIAGNOSIS — G47.33 OBSTRUCTIVE SLEEP APNEA SYNDROME: ICD-10-CM

## 2023-09-19 DIAGNOSIS — J44.9 STAGE 4 VERY SEVERE COPD BY GOLD CLASSIFICATION: Primary | ICD-10-CM

## 2023-09-19 PROCEDURE — 3044F PR MOST RECENT HEMOGLOBIN A1C LEVEL <7.0%: ICD-10-PCS | Mod: CPTII,95,,

## 2023-09-19 PROCEDURE — 1123F ACP DISCUSS/DSCN MKR DOCD: CPT | Mod: CPTII,95,,

## 2023-09-19 PROCEDURE — 99215 OFFICE O/P EST HI 40 MIN: CPT | Mod: 95,,,

## 2023-09-19 PROCEDURE — 1123F PR ADV CARE PLAN DISCUSSED, PLAN OR SURROGATE DOCUMENTED: ICD-10-PCS | Mod: CPTII,95,,

## 2023-09-19 PROCEDURE — 99215 PR OFFICE/OUTPT VISIT, EST, LEVL V, 40-54 MIN: ICD-10-PCS | Mod: 95,,,

## 2023-09-19 PROCEDURE — 3044F HG A1C LEVEL LT 7.0%: CPT | Mod: CPTII,95,,

## 2023-09-19 NOTE — PROGRESS NOTES
Subjective:       Patient ID: Terri Phelan is a 68 y.o. female.    Chief Complaint: No chief complaint on file.       Ms Phelan is a 69 y/o F with a PMH of severe COPD on 2L NC at home, CAD, HTN, and JUAN. Pt was initially seen by palliative care on 4/25/2023 as an inpatient in the ICU admitted for several days of SOB, weakness and worsening confusion. She was placed on BiPAP for hypoxia and hypercapnia.  Pt was not initially receptive to encounter with palliative medicine but has since followed in the outpatient clinic.  Pt again seen recently during admission 8/7/2023 for similar presentation .Pt since established care with new  PCP on 8/23/2023 and has appointment with PCP tomorrow as well- plans to discuss RSV vaccine and some occasional muscle cramping she contributes to Lipitor.   Pt looks and reports feeling much improved since last admission.      Pt continues to report that her anxiety limits her enjoyment of activities.  Pt remains hyper focused on the fear of running out of O2 as well as not being able to reach services if she should need them- example EMS would have a long response time.  Pt reports that her anxiety medication was recently changed to 2mg valium prn daily from ativan. Pt reports that the valium is working well, however she feels that it wears off quicker.  Pt reports taking 1 pill daily and takes it when she feels anxiety is becoming overwhelming. Pt reports that at times she is hesitant to take her medication because she finds herself focused on the possibility that she may need it later in the day. Discussed splitting the valium and taking 1 mg twice daily prn instead. Pt will break pills in half this week and monitor to see if this regimen offers her more relief.   Pt reports some relief by splitting pill in half for mild anxiety, but reports that some times this does not provide adequate relief.  Pt will now take 1/2 or 1 valium depending on how she interprets the severity of  her anxiety.  Pt no longer requiring use of valium daily and is very proud of this.  Pt reports will continue therapy to address her anxiety non pharmaceutically as well.      Recently saw sleep med on 9/5/2023 and minor adjustments were made to Bipap. Pt uses as directed.     Pt moved out of her daughters apartment and moved into the apartment next door to increase her independence.  Daughter sleeps over a few nights a week and checks in on pt throughout the day. Pt has completed home health and [facility]  Will place referral so pt can resume. Pt receiving meal via [employer] service and reports this is helpful.      Remainder of pts family lives in [place].  Pt is independent of most of her ADLs but has reported increasing weakness and anxiety over the past few months that impacts her ability to care for herself.  Topic of hospice previously discussed.  Pt and daughter agree that they are not ready for this type of service yet, but were open to the information and acknowledge that at some point pts COPD will be life limiting.  Pt is still socially active and reports that there has been no decline in the overall quality of her life.        MPOA established during previous visit.  Pt selected her daughter, Mulugeta Phelan [phone], as her primary decision maker and Sally Ramirez [phone] as her second choice decision maker. Paper work completed and scanned into chart.   Living will also completed stating that pt would not want nutrition and hydration administered if it were to only artificially prolong the dying process.  Daughter present and in agreement with pts decision.  Living Will document completed and scanned into pts chart. Pt did express that at the end of her life, she would like to be a organ donor and expresses that she is on the registry.      Pt will follow up in clinic in about 6 weeks.    Review of Systems   Constitutional:  Positive for activity change and  fatigue. Negative for appetite change.   HENT: Negative.     Respiratory:  Positive for shortness of breath.    Cardiovascular: Negative.    Gastrointestinal: Negative.    Genitourinary: Negative.    Psychiatric/Behavioral:  The patient is nervous/anxious.          Objective:      Physical Exam  Vitals reviewed: virtual visit.   Constitutional:       Appearance: Normal appearance. She is well-groomed.      Interventions: Nasal cannula in place.   HENT:      Head: Normocephalic.   Pulmonary:      Effort: Pulmonary effort is normal.   Neurological:      Mental Status: She is alert and oriented to person, place, and time.   Psychiatric:         Mood and Affect: Mood normal.         Behavior: Behavior normal. Behavior is cooperative.         Thought Content: Thought content normal.         Judgment: Judgment normal.        Review of Symptoms      Symptom Assessment (ESAS 0-10 Scale)  Pain:  0  Dyspnea:  4  Anxiety:  6  Nausea:  0  Depression:  0  Anorexia:  0  Fatigue:  3  Insomnia:  0  Restlessness:  0  Agitation:  0       Anxiety:  Is nervous/anxious    Performance Status:  70    Living Arrangements:  Lives alone and Lives in apartment    Psychosocial/Cultural:   See Palliative Psychosocial Note: No  Social Issues Identified: Coping deficit pt/family  Bereavement Risk: No  Caregiver Needs Discussed. Caregiver Distress: Yes: Intensity of family caregiving and Caregiver Burnout Risk  Cultural: none identified   **Primary  to Follow**  Palliative Care  Consult: No    Spiritual:  F - Juhi and Belief:  Mu-ism   I - Importance:  High   C - Community:  Family and Worship support      Time-Based Charting:  Yes  Chart Review: 13 minutes  Face to Face: 32 minutes  Symptom Assessment: 7 minutes    Total Time Spent: 52 minutes      Advance Care Planning   Advance Directives:   Living Will: Yes    LaPOST: No    Medical Power of : Yes    Agent's Name:  Mulugeta Isabelse   Agent's Contact Number:   [phone]    Decision Making:  Patient answered questions  Goals of Care: What is most important right now is to focus on spending time at home, avoiding the hospital, remaining as independent as possible, symptom/pain control, improvement in condition but with limits to invasive therapies. Accordingly, we have decided that the best plan to meet the patient's goals includes continuing with treatment.       Assessment:       1. Stage 4 very severe COPD by GOLD classification    2. Generalized anxiety disorder    3. Obstructive sleep apnea syndrome        Plan:         Diagnoses and all orders for this visit:    Stage 4 very severe COPD by GOLD classification  -     Ambulatory referral/consult to Pulmonary Rehab; Future    Generalized anxiety disorder   - continue home valium as needed   Obstructive sleep apnea syndrome  -     Ambulatory referral/consult to Pulmonary Rehab; Future        Pt will return to clinic in 6 weeks.     Katie Mclaughlin NP  Palliative Medicine

## 2023-09-20 ENCOUNTER — OUTPATIENT CASE MANAGEMENT (OUTPATIENT)
Dept: ADMINISTRATIVE | Facility: OTHER | Age: 68
End: 2023-09-20
Payer: MEDICARE

## 2023-09-20 ENCOUNTER — OFFICE VISIT (OUTPATIENT)
Dept: INTERNAL MEDICINE | Facility: CLINIC | Age: 68
End: 2023-09-20
Payer: MEDICARE

## 2023-09-20 VITALS
WEIGHT: 181.69 LBS | DIASTOLIC BLOOD PRESSURE: 64 MMHG | HEART RATE: 89 BPM | OXYGEN SATURATION: 93 % | TEMPERATURE: 98 F | BODY MASS INDEX: 30.27 KG/M2 | RESPIRATION RATE: 16 BRPM | SYSTOLIC BLOOD PRESSURE: 132 MMHG | HEIGHT: 65 IN

## 2023-09-20 DIAGNOSIS — I25.2 HISTORY OF MYOCARDIAL INFARCTION: ICD-10-CM

## 2023-09-20 DIAGNOSIS — I10 PRIMARY HYPERTENSION: ICD-10-CM

## 2023-09-20 DIAGNOSIS — Z87.891 HISTORY OF TOBACCO ABUSE: ICD-10-CM

## 2023-09-20 DIAGNOSIS — I25.10 CORONARY ARTERY DISEASE, UNSPECIFIED VESSEL OR LESION TYPE, UNSPECIFIED WHETHER ANGINA PRESENT, UNSPECIFIED WHETHER NATIVE OR TRANSPLANTED HEART: ICD-10-CM

## 2023-09-20 DIAGNOSIS — I25.10 ATHSCL HEART DISEASE OF NATIVE CORONARY ARTERY W/O ANG PCTRS: ICD-10-CM

## 2023-09-20 DIAGNOSIS — F33.0 MILD EPISODE OF RECURRENT MAJOR DEPRESSIVE DISORDER: ICD-10-CM

## 2023-09-20 DIAGNOSIS — R91.1 PULMONARY NODULE: ICD-10-CM

## 2023-09-20 DIAGNOSIS — Z12.31 VISIT FOR SCREENING MAMMOGRAM: ICD-10-CM

## 2023-09-20 DIAGNOSIS — Z00.01 ENCOUNTER FOR GENERAL ADULT MEDICAL EXAMINATION WITH ABNORMAL FINDINGS: Primary | ICD-10-CM

## 2023-09-20 DIAGNOSIS — G47.33 OBSTRUCTIVE SLEEP APNEA SYNDROME: ICD-10-CM

## 2023-09-20 DIAGNOSIS — R53.81 DEBILITY: ICD-10-CM

## 2023-09-20 DIAGNOSIS — F41.9 ANXIETY: Chronic | ICD-10-CM

## 2023-09-20 DIAGNOSIS — Z78.0 MENOPAUSE: ICD-10-CM

## 2023-09-20 DIAGNOSIS — I70.0 AORTIC ATHEROSCLEROSIS: ICD-10-CM

## 2023-09-20 DIAGNOSIS — Z99.81 OXYGEN DEPENDENT: ICD-10-CM

## 2023-09-20 DIAGNOSIS — J43.2 CENTRILOBULAR EMPHYSEMA: ICD-10-CM

## 2023-09-20 PROCEDURE — 3078F DIAST BP <80 MM HG: CPT | Mod: CPTII,S$GLB,, | Performed by: FAMILY MEDICINE

## 2023-09-20 PROCEDURE — 3008F BODY MASS INDEX DOCD: CPT | Mod: CPTII,S$GLB,, | Performed by: FAMILY MEDICINE

## 2023-09-20 PROCEDURE — 3078F PR MOST RECENT DIASTOLIC BLOOD PRESSURE < 80 MM HG: ICD-10-PCS | Mod: CPTII,S$GLB,, | Performed by: FAMILY MEDICINE

## 2023-09-20 PROCEDURE — 1159F PR MEDICATION LIST DOCUMENTED IN MEDICAL RECORD: ICD-10-PCS | Mod: CPTII,S$GLB,, | Performed by: FAMILY MEDICINE

## 2023-09-20 PROCEDURE — 3075F PR MOST RECENT SYSTOLIC BLOOD PRESS GE 130-139MM HG: ICD-10-PCS | Mod: CPTII,S$GLB,, | Performed by: FAMILY MEDICINE

## 2023-09-20 PROCEDURE — 1126F AMNT PAIN NOTED NONE PRSNT: CPT | Mod: CPTII,S$GLB,, | Performed by: FAMILY MEDICINE

## 2023-09-20 PROCEDURE — 3044F PR MOST RECENT HEMOGLOBIN A1C LEVEL <7.0%: ICD-10-PCS | Mod: CPTII,S$GLB,, | Performed by: FAMILY MEDICINE

## 2023-09-20 PROCEDURE — 3075F SYST BP GE 130 - 139MM HG: CPT | Mod: CPTII,S$GLB,, | Performed by: FAMILY MEDICINE

## 2023-09-20 PROCEDURE — 99999 PR PBB SHADOW E&M-EST. PATIENT-LVL V: ICD-10-PCS | Mod: PBBFAC,,, | Performed by: FAMILY MEDICINE

## 2023-09-20 PROCEDURE — 1160F PR REVIEW ALL MEDS BY PRESCRIBER/CLIN PHARMACIST DOCUMENTED: ICD-10-PCS | Mod: CPTII,S$GLB,, | Performed by: FAMILY MEDICINE

## 2023-09-20 PROCEDURE — 3008F PR BODY MASS INDEX (BMI) DOCUMENTED: ICD-10-PCS | Mod: CPTII,S$GLB,, | Performed by: FAMILY MEDICINE

## 2023-09-20 PROCEDURE — 1159F MED LIST DOCD IN RCRD: CPT | Mod: CPTII,S$GLB,, | Performed by: FAMILY MEDICINE

## 2023-09-20 PROCEDURE — 1160F RVW MEDS BY RX/DR IN RCRD: CPT | Mod: CPTII,S$GLB,, | Performed by: FAMILY MEDICINE

## 2023-09-20 PROCEDURE — 99397 PER PM REEVAL EST PAT 65+ YR: CPT | Mod: S$GLB,,, | Performed by: FAMILY MEDICINE

## 2023-09-20 PROCEDURE — 1126F PR PAIN SEVERITY QUANTIFIED, NO PAIN PRESENT: ICD-10-PCS | Mod: CPTII,S$GLB,, | Performed by: FAMILY MEDICINE

## 2023-09-20 PROCEDURE — 99999 PR PBB SHADOW E&M-EST. PATIENT-LVL V: CPT | Mod: PBBFAC,,, | Performed by: FAMILY MEDICINE

## 2023-09-20 PROCEDURE — 99397 PR PREVENTIVE VISIT,EST,65 & OVER: ICD-10-PCS | Mod: S$GLB,,, | Performed by: FAMILY MEDICINE

## 2023-09-20 PROCEDURE — 3044F HG A1C LEVEL LT 7.0%: CPT | Mod: CPTII,S$GLB,, | Performed by: FAMILY MEDICINE

## 2023-09-20 NOTE — PROGRESS NOTES
Subjective     Patient ID: Terri Phelan is a 68 y.o. female.    Chief Complaint: Annual Exam and Medication Refill (Discuss Lipitor Rx)  68-year-old  female presents to clinic today unaccompanied for annual physical exam.  She is previously had multiple hospitalizations secondary to COPD exacerbations with her last admission being an ICU admission.  At this time the patient is being followed by palliative care and is also being set up with pulmonary rehab.  She does note improvement in her symptoms and has been more ambulatory.  She reported anxiety secondary to fear of her oxygen running out while she is out; therefore, she used Valium as needed but reports that this anxiety has improved.  She continues to be treated for coronary artery disease which is currently stable on aspirin 81 mg daily, Lasix 40 mg daily, potassium 10 mEq daily, and Lipitor 40 mg daily.  She has been noticing muscle cramps to the lower extremities and arms which is possibly secondary to Lipitor; therefore, we have discussed discontinuation of Lipitor at this time.  She reports a past surgical history of , hernia repair, and hysterectomy.  Vaccinations have been discussed and will be obtained through pharmacy.  Mammogram has been ordered.  The patient believes that she is up-to-date with colonoscopy which was last performed at [facility].  Medication Refill  Associated symptoms include myalgias. Pertinent negatives include no abdominal pain, chest pain, chills, congestion, coughing, fatigue, fever, headaches, nausea, neck pain, rash, sore throat or vomiting.     Review of Systems   Constitutional:  Negative for appetite change, chills, fatigue and fever.   HENT:  Negative for nasal congestion, ear pain, hearing loss, postnasal drip, rhinorrhea, sinus pressure/congestion, sore throat and tinnitus.    Eyes:  Negative for redness, itching and visual disturbance.   Respiratory:  Negative for cough, chest  tightness and shortness of breath.    Cardiovascular:  Negative for chest pain and palpitations.   Gastrointestinal:  Negative for abdominal pain, constipation, diarrhea, nausea and vomiting.   Genitourinary:  Negative for decreased urine volume, difficulty urinating, dysuria, frequency, hematuria and urgency.   Musculoskeletal:  Positive for myalgias. Negative for back pain, neck pain and neck stiffness.   Integumentary:  Negative for rash.   Neurological:  Negative for dizziness, light-headedness and headaches.   Psychiatric/Behavioral: Negative.            Objective     Physical Exam  Vitals and nursing note reviewed.   Constitutional:       General: She is not in acute distress.     Appearance: She is well-developed. She is not diaphoretic.   HENT:      Head: Normocephalic and atraumatic.      Comments: Nasal oxygen in place.     Right Ear: External ear normal.      Left Ear: External ear normal.      Nose: Nose normal.      Mouth/Throat:      Pharynx: No oropharyngeal exudate.   Eyes:      General: No scleral icterus.        Right eye: No discharge.         Left eye: No discharge.      Conjunctiva/sclera: Conjunctivae normal.      Pupils: Pupils are equal, round, and reactive to light.   Neck:      Thyroid: No thyromegaly.      Vascular: No JVD.      Trachea: No tracheal deviation.   Cardiovascular:      Rate and Rhythm: Normal rate and regular rhythm.      Heart sounds: Normal heart sounds. No murmur heard.     No friction rub. No gallop.   Pulmonary:      Effort: Pulmonary effort is normal. No respiratory distress.      Breath sounds: Normal breath sounds. No stridor. No wheezing or rales.   Abdominal:      General: Bowel sounds are normal. There is no distension.      Palpations: Abdomen is soft. There is no mass.      Tenderness: There is no abdominal tenderness. There is no guarding or rebound.   Musculoskeletal:         General: No tenderness. Normal range of motion.      Cervical back: Normal range of  motion and neck supple.   Lymphadenopathy:      Cervical: No cervical adenopathy.   Skin:     General: Skin is warm and dry.      Coloration: Skin is not pale.      Findings: No erythema or rash.   Neurological:      Mental Status: She is alert and oriented to person, place, and time.   Psychiatric:         Behavior: Behavior normal.         Thought Content: Thought content normal.         Judgment: Judgment normal.            Assessment and Plan     1. Encounter for general adult medical examination with abnormal findings    2. Coronary artery disease, unspecified vessel or lesion type, unspecified whether angina present, unspecified whether native or transplanted heart    3. History of myocardial infarction    4. Aortic atherosclerosis    5. Athscl heart disease of native coronary artery w/o ang pctrs    6. Primary hypertension    7. Centrilobular emphysema  Overview:  Severe obstructive disease, on home O2.       8. Oxygen dependent  Overview:  Requires 2-3L NC.      9. Pulmonary nodule    10. History of tobacco abuse    11. Obstructive sleep apnea syndrome  Overview:  Changed settings to 18/8 on 8/9/23      12. Adenoma of ascending colon    13. Mild episode of recurrent major depressive disorder    14. Anxiety  Overview:  Anxiety associated with feelings of SOB, diagnosis, and hope for new medications that can help. Exacerbated by chronic need to check oxygen numbers and frequent hospitalizations. Following with palliative.       15. Debility    16. Visit for screening mammogram  -     Mammo Digital Screening Bilat w/ Nba; Future; Expected date: 09/20/2023        1. Previous labs have been reviewed and are all stable.    2. Continue aspirin 81 mg daily, Lasix 40 mg daily, and potassium chloride 10 mEq daily.  Coronary artery disease and hypertension are stable.    3. Secondary to suspected statin myalgia I recommend discontinuation of Lipitor.  4. Continue follow-up with pulmonology and pulmonary rehab as  scheduled.  Oxygen-dependent centrilobular emphysema remains stable.    5. Continue use of BiPAP nightly.  Sleep apnea is stable.    6. Continue use of Valium as needed for anxiety.  Patient reports improvement of her anxiety symptoms.    7. Screening mammogram.    8. Return to clinic as needed or in 1 year for annual physical exam.               Follow up in about 1 year (around 9/20/2024), or if symptoms worsen or fail to improve, for Annual exam.

## 2023-09-20 NOTE — PROGRESS NOTES
Outpatient Care Management  Plan of Care Follow Up Visit    Patient: Terri Phelan  MRN: [id number]  Date of Service: 09/20/2023  Completed by: Raven Cook RN  Referral Date: 05/30/2023    No chief complaint on file.      Brief Summary: OPCM follow up call completed with Terri. Continued education on importance of avoiding triggers that flare up triggers affecting COPD.   Next Steps: Terri agrees to follow up call on or around 10-10-23.

## 2023-09-20 NOTE — Clinical Note
Megan,  The patient reports that she believes she is up-to-date with a colonoscopy.  She states that her last colonoscopy was performed at [facility].  Can you obtain the report?  Thank you, Dr. Cox

## 2023-09-25 ENCOUNTER — TELEPHONE (OUTPATIENT)
Dept: HEMATOLOGY/ONCOLOGY | Facility: CLINIC | Age: 68
End: 2023-09-25
Payer: MEDICARE

## 2023-09-25 ENCOUNTER — TELEPHONE (OUTPATIENT)
Dept: INTERNAL MEDICINE | Facility: CLINIC | Age: 68
End: 2023-09-25
Payer: MEDICARE

## 2023-09-25 NOTE — TELEPHONE ENCOUNTER
The referral has already been placed by the patient's palliative care provider.  She should reach out to Katie Mclaughlin NP for further information regarding this referral.  Please inform the patient.  Thank you.

## 2023-09-25 NOTE — TELEPHONE ENCOUNTER
----- Message from Paulina Cardoso sent at 9/25/2023  1:34 PM CDT -----  Regarding: Call back  Contact: [phone]  Who Called: PT     Patient called in requesting to speak with you. Did not specify why. Please advise. [phone] please fax the referral to Pulmonary department.

## 2023-09-25 NOTE — TELEPHONE ENCOUNTER
----- Message from Letyt Fischer sent at 9/25/2023 10:40 AM CDT -----  Contact: Terri maravilla [phone]  1MEDICALADVICE     Patient is calling for Medical Advice regarding:    How long has patient had these symptoms:    Pharmacy name and phone#:    Would like response via [employer]: call back    Comments: Pt is calling to get a referral to [facility]. Please call pt once referral has been faxed. [facility] # [phone]  Fax#[phone]

## 2023-09-25 NOTE — TELEPHONE ENCOUNTER
Spoke with pt and informed that The referral has already been placed by the patient's palliative care provider.  She should reach out to Katie Mclaughlin NP for further information regarding this referral . Pt vu and states she will call katie.    Pt states she believes she is coming down with a cold , she was congested this morning but symptoms improved after using mucinex . I advised the pt to call office back if otc meds don't help .

## 2023-09-25 NOTE — TELEPHONE ENCOUNTER
Pt is calling to get a referral to [facility].    [facility] # [phone]  Fax#[phone]

## 2023-09-25 NOTE — TELEPHONE ENCOUNTER
----- Message from Paulina Cardoso sent at 9/25/2023  1:34 PM CDT -----  Regarding: Call back  Contact: [phone]  Who Called: PT     Patient called in requesting to speak with you. Did not specify why. Please advise. [phone] please fax the referral to Pulmonary department.

## 2023-09-26 ENCOUNTER — TELEPHONE (OUTPATIENT)
Dept: HEMATOLOGY/ONCOLOGY | Facility: CLINIC | Age: 68
End: 2023-09-26
Payer: MEDICARE

## 2023-09-26 NOTE — TELEPHONE ENCOUNTER
Spoke with Vivian at [facility] about the pt not having a spirometry test. Ask for Vivian to  call me back

## 2023-09-29 ENCOUNTER — TELEPHONE (OUTPATIENT)
Dept: PULMONOLOGY | Facility: CLINIC | Age: 68
End: 2023-09-29
Payer: MEDICARE

## 2023-09-29 DIAGNOSIS — J44.9 CHRONIC OBSTRUCTIVE PULMONARY DISEASE, UNSPECIFIED COPD TYPE: Primary | ICD-10-CM

## 2023-09-29 NOTE — TELEPHONE ENCOUNTER
Spoke with patient, informed her that I have received her message. Patient states that she wants to reschedule her appointment that she has on 10/11/23 at the [place] location to 10/10/23 prior to Dr Cunningham appointment. I verbalized to pt that I understand and advised pt that the way that her test has been entered into the system, is not allowing me to reschedule patient appointment here at [facility]. Pt verbalized that she understand and states that she will keep her appointment on 10/11/23 at the [facility]. I verbalized to pt that I understand.

## 2023-09-29 NOTE — TELEPHONE ENCOUNTER
----- Message from Charanjit Villanueva sent at 9/29/2023 11:54 AM CDT -----  Regarding: PFT order requested  Contact: [phone]  Hi, pt called to request an order for a PFT test. Pt says she will need this completed before she could start her rehabilitation. Pls call the pt at [phone] to confirm order has been placed.

## 2023-10-06 ENCOUNTER — TELEPHONE (OUTPATIENT)
Dept: PULMONOLOGY | Facility: CLINIC | Age: 68
End: 2023-10-06
Payer: MEDICARE

## 2023-10-06 DIAGNOSIS — J44.9 CHRONIC OBSTRUCTIVE PULMONARY DISEASE, UNSPECIFIED COPD TYPE: Primary | ICD-10-CM

## 2023-10-10 ENCOUNTER — HOSPITAL ENCOUNTER (OUTPATIENT)
Dept: PULMONOLOGY | Facility: CLINIC | Age: 68
Discharge: HOME | End: 2023-10-10
Payer: MEDICARE

## 2023-10-10 ENCOUNTER — OFFICE VISIT (OUTPATIENT)
Dept: PULMONOLOGY | Facility: CLINIC | Age: 68
End: 2023-10-10
Payer: MEDICARE

## 2023-10-10 VITALS
HEIGHT: 65 IN | HEART RATE: 81 BPM | SYSTOLIC BLOOD PRESSURE: 126 MMHG | WEIGHT: 187.38 LBS | BODY MASS INDEX: 31.22 KG/M2 | DIASTOLIC BLOOD PRESSURE: 62 MMHG | OXYGEN SATURATION: 90 %

## 2023-10-10 DIAGNOSIS — J96.21 ACUTE ON CHRONIC RESPIRATORY FAILURE WITH HYPOXIA AND HYPERCAPNIA: ICD-10-CM

## 2023-10-10 DIAGNOSIS — J43.2 CENTRILOBULAR EMPHYSEMA: Primary | ICD-10-CM

## 2023-10-10 DIAGNOSIS — J44.9 CHRONIC OBSTRUCTIVE PULMONARY DISEASE, UNSPECIFIED COPD TYPE: ICD-10-CM

## 2023-10-10 DIAGNOSIS — J96.22 ACUTE ON CHRONIC RESPIRATORY FAILURE WITH HYPOXIA AND HYPERCAPNIA: ICD-10-CM

## 2023-10-10 DIAGNOSIS — J96.12 CHRONIC HYPERCAPNIC RESPIRATORY FAILURE: ICD-10-CM

## 2023-10-10 LAB
DLCO ADJ PRE: 3.91 ML/(MIN*MMHG) (ref 15.99–27.45)
DLCO SINGLE BREATH LLN: 15.99
DLCO SINGLE BREATH PRE REF: 17.1 %
DLCO SINGLE BREATH REF: 21.72
DLCOC SBVA LLN: 2.94
DLCOC SBVA PRE REF: 54.2 %
DLCOC SBVA REF: 4.4
DLCOC SINGLE BREATH LLN: 15.99
DLCOC SINGLE BREATH PRE REF: 18 %
DLCOC SINGLE BREATH REF: 21.72
DLCOCSBVAULN: 5.86
DLCOCSINGLEBREATHULN: 27.45
DLCOSINGLEBREATHULN: 27.45
DLCOVA LLN: 2.94
DLCOVA PRE REF: 51.5 %
DLCOVA PRE: 2.26 ML/(MIN*MMHG*L) (ref 2.94–5.86)
DLCOVA REF: 4.4
DLCOVAULN: 5.86
DLVAADJ PRE: 2.38 ML/(MIN*MMHG*L) (ref 2.94–5.86)
ERV LLN: -16449.31
ERV PRE REF: 78.5 %
ERV REF: 0.69
ERVULN: ABNORMAL
FEF 25 75 LLN: 0.69
FEF 25 75 PRE REF: 14.1 %
FEF 25 75 REF: 1.74
FEV05 LLN: 0.89
FEV05 REF: 1.74
FEV1 FVC LLN: 66
FEV1 FVC PRE REF: 59 %
FEV1 FVC REF: 79
FEV1 LLN: 1.4
FEV1 PRE REF: 26.6 %
FEV1 REF: 1.97
FRCPLETH LLN: 1.89
FRCPLETH PREREF: 72.7 %
FRCPLETH REF: 2.71
FRCPLETHULN: 3.53
FVC LLN: 1.8
FVC PRE REF: 44.8 %
FVC REF: 2.52
IVC PRE: 1.32 L (ref 1.8–3.27)
IVC SINGLE BREATH LLN: 1.8
IVC SINGLE BREATH PRE REF: 52.3 %
IVC SINGLE BREATH REF: 2.52
IVCSINGLEBREATHULN: 3.27
LLN IC: -16448.01
PEF LLN: 3.09
PEF PRE REF: 31.9 %
PEF REF: 5.09
PHYSICIAN COMMENT: ABNORMAL
PRE DLCO: 3.72 ML/(MIN*MMHG) (ref 15.99–27.45)
PRE ERV: 0.55 L (ref -16449.31–16450.69)
PRE FEF 25 75: 0.25 L/S (ref 0.69–3.33)
PRE FET 100: 5.81 SEC
PRE FEV05 REF: 20.4 %
PRE FEV1 FVC: 46.55 % (ref 66.37–89.72)
PRE FEV1: 0.52 L (ref 1.4–2.53)
PRE FEV5: 0.36 L (ref 0.89–2.6)
PRE FRC PL: 1.97 L (ref 1.89–3.53)
PRE FVC: 1.13 L (ref 1.8–3.27)
PRE IC: 0.77 L (ref -16448.01–16451.99)
PRE PEF: 1.63 L/S (ref 3.09–7.1)
PRE REF IC: 38.7 %
PRE RV: 1.42 L (ref 1.44–2.59)
PRE TLC: 2.74 L (ref 3.95–5.93)
RAW PRE REF: 293.5 %
RAW PRE: 8.98 CMH2O*S/L (ref 3.06–3.06)
RAW REF: 3.06
REF IC: 1.99
RV LLN: 1.44
RV PRE REF: 70.7 %
RV REF: 2.01
RVTLC LLN: 32
RVTLC PRE REF: 124.5 %
RVTLC PRE: 51.98 % (ref 32.15–51.33)
RVTLC REF: 42
RVTLCULN: 51
RVULN: 2.59
SGAW PRE REF: 47.8 %
SGAW PRE: 0.05 1/(CMH2O*S) (ref 0.1–0.1)
SGAW REF: 0.1
TLC LLN: 3.95
TLC PRE REF: 55.5 %
TLC REF: 4.94
TLC ULN: 5.93
ULN IC: ABNORMAL
VA PRE: 1.64 L (ref 4.79–4.79)
VA SINGLE BREATH LLN: 4.79
VA SINGLE BREATH PRE REF: 34.3 %
VA SINGLE BREATH REF: 4.79
VASINGLEBREATHULN: 4.79
VC LLN: 1.8
VC PRE REF: 52.3 %
VC PRE: 1.32 L (ref 1.8–3.27)
VC REF: 2.52
VC ULN: 3.27

## 2023-10-10 PROCEDURE — 3288F FALL RISK ASSESSMENT DOCD: CPT | Mod: CPTII,S$GLB,, | Performed by: INTERNAL MEDICINE

## 2023-10-10 PROCEDURE — 1159F MED LIST DOCD IN RCRD: CPT | Mod: CPTII,S$GLB,, | Performed by: INTERNAL MEDICINE

## 2023-10-10 PROCEDURE — 99213 PR OFFICE/OUTPT VISIT, EST, LEVL III, 20-29 MIN: ICD-10-PCS | Mod: 25,S$GLB,, | Performed by: INTERNAL MEDICINE

## 2023-10-10 PROCEDURE — 94726 PLETHYSMOGRAPHY LUNG VOLUMES: CPT | Mod: S$GLB,,, | Performed by: INTERNAL MEDICINE

## 2023-10-10 PROCEDURE — 1159F PR MEDICATION LIST DOCUMENTED IN MEDICAL RECORD: ICD-10-PCS | Mod: CPTII,S$GLB,, | Performed by: INTERNAL MEDICINE

## 2023-10-10 PROCEDURE — 3044F PR MOST RECENT HEMOGLOBIN A1C LEVEL <7.0%: ICD-10-PCS | Mod: CPTII,S$GLB,, | Performed by: INTERNAL MEDICINE

## 2023-10-10 PROCEDURE — 99999 PR PBB SHADOW E&M-EST. PATIENT-LVL IV: ICD-10-PCS | Mod: PBBFAC,,, | Performed by: INTERNAL MEDICINE

## 2023-10-10 PROCEDURE — 3074F SYST BP LT 130 MM HG: CPT | Mod: CPTII,S$GLB,, | Performed by: INTERNAL MEDICINE

## 2023-10-10 PROCEDURE — 1126F AMNT PAIN NOTED NONE PRSNT: CPT | Mod: CPTII,S$GLB,, | Performed by: INTERNAL MEDICINE

## 2023-10-10 PROCEDURE — 94729 PR C02/MEMBANE DIFFUSE CAPACITY: ICD-10-PCS | Mod: S$GLB,,, | Performed by: INTERNAL MEDICINE

## 2023-10-10 PROCEDURE — 1101F PR PT FALLS ASSESS DOC 0-1 FALLS W/OUT INJ PAST YR: ICD-10-PCS | Mod: CPTII,S$GLB,, | Performed by: INTERNAL MEDICINE

## 2023-10-10 PROCEDURE — 3008F PR BODY MASS INDEX (BMI) DOCUMENTED: ICD-10-PCS | Mod: CPTII,S$GLB,, | Performed by: INTERNAL MEDICINE

## 2023-10-10 PROCEDURE — 94010 BREATHING CAPACITY TEST: ICD-10-PCS | Mod: S$GLB,,, | Performed by: INTERNAL MEDICINE

## 2023-10-10 PROCEDURE — 1101F PT FALLS ASSESS-DOCD LE1/YR: CPT | Mod: CPTII,S$GLB,, | Performed by: INTERNAL MEDICINE

## 2023-10-10 PROCEDURE — 3008F BODY MASS INDEX DOCD: CPT | Mod: CPTII,S$GLB,, | Performed by: INTERNAL MEDICINE

## 2023-10-10 PROCEDURE — 3078F DIAST BP <80 MM HG: CPT | Mod: CPTII,S$GLB,, | Performed by: INTERNAL MEDICINE

## 2023-10-10 PROCEDURE — 99999 PR PBB SHADOW E&M-EST. PATIENT-LVL IV: CPT | Mod: PBBFAC,,, | Performed by: INTERNAL MEDICINE

## 2023-10-10 PROCEDURE — 3074F PR MOST RECENT SYSTOLIC BLOOD PRESSURE < 130 MM HG: ICD-10-PCS | Mod: CPTII,S$GLB,, | Performed by: INTERNAL MEDICINE

## 2023-10-10 PROCEDURE — 94726 PULM FUNCT TST PLETHYSMOGRAP: ICD-10-PCS | Mod: S$GLB,,, | Performed by: INTERNAL MEDICINE

## 2023-10-10 PROCEDURE — 1126F PR PAIN SEVERITY QUANTIFIED, NO PAIN PRESENT: ICD-10-PCS | Mod: CPTII,S$GLB,, | Performed by: INTERNAL MEDICINE

## 2023-10-10 PROCEDURE — 3288F PR FALLS RISK ASSESSMENT DOCUMENTED: ICD-10-PCS | Mod: CPTII,S$GLB,, | Performed by: INTERNAL MEDICINE

## 2023-10-10 PROCEDURE — 3044F HG A1C LEVEL LT 7.0%: CPT | Mod: CPTII,S$GLB,, | Performed by: INTERNAL MEDICINE

## 2023-10-10 PROCEDURE — 3078F PR MOST RECENT DIASTOLIC BLOOD PRESSURE < 80 MM HG: ICD-10-PCS | Mod: CPTII,S$GLB,, | Performed by: INTERNAL MEDICINE

## 2023-10-10 PROCEDURE — 94010 BREATHING CAPACITY TEST: CPT | Mod: S$GLB,,, | Performed by: INTERNAL MEDICINE

## 2023-10-10 PROCEDURE — 94729 DIFFUSING CAPACITY: CPT | Mod: S$GLB,,, | Performed by: INTERNAL MEDICINE

## 2023-10-10 PROCEDURE — 99213 OFFICE O/P EST LOW 20 MIN: CPT | Mod: 25,S$GLB,, | Performed by: INTERNAL MEDICINE

## 2023-10-10 RX ORDER — LORAZEPAM 2 MG/ML
INJECTION INTRAMUSCULAR
Status: ON HOLD | COMMUNITY
Start: 2023-07-09 | End: 2023-12-20 | Stop reason: HOSPADM

## 2023-10-10 NOTE — PROGRESS NOTES
Subjective:      Patient ID: Terri Phelan is a 68 y.o. female.    Chief Complaint: 6wk follow up,.    HPI  The patient was seen in person at this visit on 10/10/2023.      67 yo F with h/o severe end stage COPD/emphysema, chronic hypoxemic and hypercapnic respiratory failure (on 3L NC and BIPAP nightly), CAD, and frequent hospitalizations.  She was most recently seen in Pulmonary Clinic by me on 8/29.  At my request, he was evaluated by Dr. Painter in Sleep to evaluate her use of NIPPV at home.  As per Dr. Painter, she is currently on Ayde Löwenstein BiPAP ST mode with BUR 14 and IPAP 18 EPAP 8. Review of data reveals > 4hr of use per 24 hour period. No apnea metrics are recorded on this device. She uses airfit F20 mask. She attaches O2 at  2 - 3 Lpm to the device.  Prior to this deviec , she had a Trilogy device, but it was recalled. She has been on NIPPV for several years.     She has been hospitalized 4 times in 2023 => April 23-26; Marisol 15-16; July 1-6; and August 7-10.     She reports doing pretty well since we saw her last 6 weeks prior to this visit.  She denies increased cough or decreased activity tolerance.  She is now using pulsed oxygen by nasal cannula at 2 lpm.  Her regimen in unchanged from her baseline:  - Breztri 2 puffs BID  - Albuterol rescue inhaler 4-5 times per day (used mostly with transitioning)  - DuoNebs BID  - Azithromycin MWF  - roflumilast daily   - pulmonary rehab 1x per week  - prednisone 40mg x 5 days PRN for exacerbations  - BiPAP nightly - settings increased to 18/8 after last hospital stay     Review of Systems   Constitutional:  Negative for activity change and fatigue.   Respiratory:  Positive for shortness of breath, dyspnea on extertion and use of rescue inhaler. Negative for cough, sputum production and asthma nighttime symptoms.    Cardiovascular:  Negative for chest pain and leg swelling.     Objective:     Physical Exam   Constitutional: She is oriented to person, place, and  time. No distress.   Cardiovascular: Normal rate, regular rhythm and normal heart sounds. Exam reveals no gallop.   No murmur heard.  Pulmonary/Chest: Normal expansion, symmetric chest wall expansion and effort normal. No stridor. No respiratory distress. She has decreased breath sounds. She has no wheezes. She has no rhonchi. She has no rales.   Musculoskeletal:         General: No edema.   Neurological: She is alert and oriented to person, place, and time. Gait normal.   Skin: No cyanosis. Nails show no clubbing.   Psychiatric: She has a normal mood and affect. Judgment normal.   Nursing note and vitals reviewed.    Personal Diagnostic Review    Selected findings on Chest CTA from 7/9/2023 =>  Pulmonary vasculature: There is no evidence of pulmonary thromboemboli.  Pulmonary arteries distribute normally and are normal in caliber.  There are four pulmonary veins.  Chio/Mediastinum: No pathologic yariel enlargement.  Airways: Patent.  Lungs/Pleura: Centrilobular emphysematous changes are noted in the lung apices most predominantly.  No nodules or pleural or pericardial effusion or thickening seen.  Lung base subsegmental atelectasis or scarring greater on the left is also noted.  No suspicious nodules, consolidation is seen.  Clear lungs. No pleural effusion or thickening.  Heart: There is no right ventricular strain or cardiomegaly.  There is no pericardial effusion..    Impression:     - No evidence of pulmonary thromboemboli or significant acute lung interstitial airspace disease.  - Emphysematous changes in the upper lungs.  - No cardiomegaly or right ventricular strain.      PFTs show severe obstruction and DLCO impairment.  Although lung volumes show restriction, I suspect this is more likely related to air trapping and technical aspects of testing.  As compared to prior testing 6 months ago, spirometry is stable or modestly improved.    PFTs 4/18/23 10/10/23   FVC  (pre-BD) 1.01 1.13   FVC%  40 45   FEV1 0.44  "0.52   FEV1%  22 27   FEV1/FVC  44 47   FVC (post-BD) 0.95    FVC% -6    FEV1 0.42    FEV1% -4.3    FEV1/FVC 45    TLC  5.34 2.74   TLC%  108 56   RV  4.27 1.42   RV%  214 71   DLCO    (uncorr) - 3.72   DLCO   (daniel)  - 3.91   DLCO%  - 18   VA - 1.64   IVC - 1.32   Six-Minute Walk      Distance (meter)      O2 jorge l      O2 change              10/10/2023     1:56 PM 9/20/2023    10:05 AM 9/5/2023     2:02 PM 8/29/2023     3:01 PM 8/10/2023    11:40 AM 8/10/2023    11:18 AM 8/10/2023     7:52 AM   Pulmonary Function Tests   SpO2 90 % 93 % 93 % 90 % 95 % 94 % 96 %   Height 5' 5" (1.651 m) 5' 5" (1.651 m) 5' 5" (1.651 m) 5' 5" (1.651 m)      Weight 85 kg (187 lb 6.3 oz) 82.4 kg (181 lb 10.5 oz) 80.7 kg (178 lb) 80.6 kg (177 lb 11.1 oz)      BMI (Calculated) 31.2 30.2 29.6 29.6           Assessment:     1. Centrilobular emphysema    2. Acute on chronic respiratory failure with hypoxia and hypercapnia    3. Chronic hypercapnic respiratory failure         Outpatient Encounter Medications as of 10/10/2023   Medication Sig Dispense Refill    acetaminophen (TYLENOL) 500 MG tablet Take 500 mg by mouth every 6 (six) hours as needed for Pain.      albuterol (PROVENTIL/VENTOLIN HFA) 90 mcg/actuation inhaler Inhale 2 puffs into the lungs every 6 (six) hours as needed for Wheezing or Shortness of Breath. 18 g 6    albuterol-ipratropium (DUO-NEB) 2.5 mg-0.5 mg/3 mL nebulizer solution Take 3 mLs by nebulization every 6 (six) hours as needed for Wheezing. 75 mL 0    alendronate (FOSAMAX) 35 MG tablet Take 35 mg by mouth every Tuesday.      aspirin 81 MG Chew Take 81 mg by mouth once daily.      azithromycin (Z-OLY) 250 MG tablet Take 1 tablet (250 mg total) by mouth 3 (three) times a week. (Patient taking differently: Take 250 mg by mouth every Mon, Wed, Fri.) 12 tablet 11    budesonide-glycopyr-formoterol (BREZTRI AEROSPHERE) 160-9-4.8 mcg/actuation HFAA Inhale 2 puffs into the lungs 2 (two) times daily.      " chlorpheniramine-pseudoephedrine-acetaminophen (SINUTAB) 2- mg per tablet 2 tablet 3 TIMES DAILY (route: oral)      dextromethorphan-guaiFENesin  mg (MUCINEX DM)  mg per 12 hr tablet Take 1 tablet by mouth every 12 (twelve) hours.      dicyclomine (BENTYL) 20 mg tablet Take 1 tablet (20 mg total) by mouth 3 (three) times daily as needed (abdominal pain). 30 tablet 1    ergocalciferol (ERGOCALCIFEROL) 50,000 unit Cap Take 50,000 Units by mouth every Tuesday.      fluticasone propionate (FLONASE) 50 mcg/actuation nasal spray 1 spray (50 mcg total) by Each Nostril route daily as needed for Allergies. 16 g 11    furosemide (LASIX) 40 MG tablet Take 1 tablet (40 mg total) by mouth once daily. 30 tablet 11    LORazepam (ATIVAN) 2 mg/mL injection       multivit with min-folic acid (WOMEN'S MULTIVITAMIN GUMMIES) 200 mcg Chew Take 2 tablets by mouth once daily.      OXYGEN-AIR DELIVERY SYSTEMS MISC 3 L by Nasal route.      potassium chloride (KLOR-CON) 10 MEQ TbSR Take 1 tablet (10 mEq total) by mouth once daily. for 365 doses 30 tablet 11    predniSONE (DELTASONE) 20 MG tablet Take 20 mg by mouth 2 (two) times daily.      predniSONE (DELTASONE) 20 MG tablet Take 2 tablets by mouth once daily.      roflumilast (DALIRESP) 500 mcg Tab Take 1 tablet by mouth once daily.      traZODone (DESYREL) 50 MG tablet Take 1 tablet (50 mg total) by mouth nightly as needed for Insomnia. 30 tablet 6    [DISCONTINUED] diazePAM (VALIUM) 2 MG tablet Take 1 tablet (2 mg total) by mouth daily as needed for Anxiety. 30 tablet 0    [] predniSONE (DELTASONE) 10 MG tablet Take 4 tablets (40 mg total) by mouth once daily for 5 days, THEN 3 tablets (30 mg total) once daily for 5 days, THEN 2 tablets (20 mg total) once daily for 5 days, THEN 1 tablet (10 mg total) once daily for 5 days, THEN 0.5 tablets (5 mg total) once daily for 5 days. 53 tablet 0    [DISCONTINUED] atorvastatin (LIPITOR) 40 MG tablet Take 1 tablet (40 mg  total) by mouth every evening. 90 tablet 3    [DISCONTINUED] diazePAM (VALIUM) 2 MG tablet Take 1 tablet (2 mg total) by mouth daily as needed for Anxiety. 30 tablet 0     No facility-administered encounter medications on file as of 10/10/2023.     Orders Placed This Encounter   Procedures    Ambulatory referral/consult to Pulmonary Rehab     Standing Status:   Future     Standing Expiration Date:   11/10/2024     Referral Priority:   Routine     Referral Type:   Consultation     Referral Reason:   Specialty Services Required     Requested Specialty:   Pulmonary Disease     Number of Visits Requested:   1       Plan:   Ms. Phelan seems stable as compared to her prior visit 6 weeks ago and has not required ED/hospital care.  I think she will benefit from resuming pulmonary rehabilitation.  We will plan to see her back in another 6 weeks (11/21) to keep an eye on her respiratory status.    Problem List Items Addressed This Visit       Acute on chronic respiratory failure with hypoxia and hypercapnia    Overview     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   Has admissions roughly monthly for hypercapnic RF. Pt compliant with BIPAP at home, states it's the only thing that helps her get some sleep.          Relevant Orders    Ambulatory referral/consult to Pulmonary Rehab    Centrilobular emphysema - Primary    Overview     Severe obstructive disease, on home O2 and NIPPV at night and prn.   Chest CTA from July 2023 with apical centrilobular emphysema present.    PFTs 4/18/23 10/10/23   FVC  (pre-BD) 1.01 1.13   FVC%  40 45   FEV1 0.44 0.52   FEV1%  22 27   FEV1/FVC  44 47            Current Assessment & Plan     Continue current bronchodilator regimen.  Continue supplemental oxygen (pulsed) and NIPPV.  Recommend pulmonary rehabilitation.         Relevant Orders    Ambulatory referral/consult to Pulmonary Rehab    Chronic hypercapnic respiratory failure    Current Assessment & Plan      Patient with Hypercapnic and Hypoxic Respiratory failure which is Chronic.  she is on home oxygen at 2 LPM. Supplemental oxygen was provided and noted- [unfilled].   Signs/symptoms of respiratory failure include- cyanosis. Contributing diagnoses includes - COPD Labs and images were reviewed. Patient Has not had a recent ABG. Will treat underlying causes and adjust management of respiratory failure as follows-     Continue current regimen for COPD (emphysema).  Continue nocturnal/prn NIPPV => settings/compliance reviewed by Dr. Painter (Pulmonary/Sleep) at 9/5/2023 visit.            Note copied to Dr. Cox    Total Time = 25 minutes    Alex Cunningham MD  Pulmonary Disease  [facility]

## 2023-10-12 ENCOUNTER — OUTPATIENT CASE MANAGEMENT (OUTPATIENT)
Dept: ADMINISTRATIVE | Facility: OTHER | Age: 68
End: 2023-10-12
Payer: MEDICARE

## 2023-10-16 NOTE — PROGRESS NOTES
Outpatient Care Management  Plan of Care Follow Up Visit    Patient: Terri Phelan  MRN: [id number]  Date of Service: 10/12/2023  Completed by: Raven Cook RN  Referral Date: 05/30/2023    No chief complaint on file.      Brief Summary: OPCM follow up call completed with Terri. Continued education on importance of avoiding pollutants that flare up COPD and also continue exercising as tolerated.   Next Steps: Terri agrees to follow up call on or around 11-7-23.

## 2023-10-18 ENCOUNTER — PATIENT OUTREACH (OUTPATIENT)
Dept: ADMINISTRATIVE | Facility: HOSPITAL | Age: 68
End: 2023-10-18
Payer: MEDICARE

## 2023-10-18 NOTE — LETTER
AUTHORIZATION FOR RELEASE OF   CONFIDENTIAL INFORMATION      We are seeing Terri Phelan, date of birth 1955, in the clinic at [facility]. Kev Cox MD is the patient's PCP. Terri Phelan has an outstanding lab/procedure at the time we reviewed her chart. In order to help keep her health information updated, she has authorized us to request the following medical record(s):        (  )  MAMMOGRAM                                      (  x)  COLONOSCOPY      (  )  PAP SMEAR                                          (  )  OUTSIDE LAB RESULTS     (  )  DEXA SCAN                                          (  )  EYE EXAM            (  )  FOOT EXAM                                          (  )  ENTIRE RECORD     (  )  OUTSIDE IMMUNIZATIONS                 (  )  _______________         Please fax records to [facility], Imsais, Richard K., MD, [phone]     If you have any questions, please contact Megan at [phone].           Patient Name: Terri Phelan  : 1955  Patient Phone #: [phone]

## 2023-10-24 RX ORDER — DIAZEPAM 2 MG/1
TABLET ORAL
Qty: 30 TABLET | Refills: 0 | Status: SHIPPED | OUTPATIENT
Start: 2023-10-24 | End: 2023-11-29 | Stop reason: SDUPTHER

## 2023-10-24 NOTE — TELEPHONE ENCOUNTER
No care due was identified.  [facility] Embedded Care Due Messages. Reference number: [id number].   10/24/2023 10:48:49 AM CDT

## 2023-10-30 NOTE — ASSESSMENT & PLAN NOTE
· Continue current bronchodilator regimen.  · Continue supplemental oxygen (pulsed) and NIPPV.  · Recommend pulmonary rehabilitation.

## 2023-10-30 NOTE — ASSESSMENT & PLAN NOTE
Patient with Hypercapnic and Hypoxic Respiratory failure which is Chronic.  she is on home oxygen at 2 LPM. Supplemental oxygen was provided and noted- [unfilled].   Signs/symptoms of respiratory failure include- cyanosis. Contributing diagnoses includes - COPD Labs and images were reviewed. Patient Has not had a recent ABG. Will treat underlying causes and adjust management of respiratory failure as follows-     · Continue current regimen for COPD (emphysema).  · Continue nocturnal/prn NIPPV => settings/compliance reviewed by Dr. Painter (Pulmonary/Sleep) at 9/5/2023 visit.

## 2023-11-01 ENCOUNTER — OFFICE VISIT (OUTPATIENT)
Dept: URGENT CARE | Facility: CLINIC | Age: 68
End: 2023-11-01
Payer: MEDICARE

## 2023-11-01 ENCOUNTER — TELEPHONE (OUTPATIENT)
Dept: INTERNAL MEDICINE | Facility: CLINIC | Age: 68
End: 2023-11-01
Payer: MEDICARE

## 2023-11-01 VITALS
HEIGHT: 65 IN | TEMPERATURE: 99 F | HEART RATE: 91 BPM | WEIGHT: 187 LBS | SYSTOLIC BLOOD PRESSURE: 139 MMHG | DIASTOLIC BLOOD PRESSURE: 78 MMHG | BODY MASS INDEX: 31.16 KG/M2 | RESPIRATION RATE: 20 BRPM | OXYGEN SATURATION: 91 %

## 2023-11-01 DIAGNOSIS — J44.1 COPD EXACERBATION: Primary | ICD-10-CM

## 2023-11-01 PROCEDURE — 99214 PR OFFICE/OUTPT VISIT, EST, LEVL IV, 30-39 MIN: ICD-10-PCS | Mod: 25,S$GLB,, | Performed by: FAMILY MEDICINE

## 2023-11-01 PROCEDURE — 96372 THER/PROPH/DIAG INJ SC/IM: CPT | Mod: S$GLB,,, | Performed by: FAMILY MEDICINE

## 2023-11-01 PROCEDURE — 96372 PR INJECTION,THERAP/PROPH/DIAG2ST, IM OR SUBCUT: ICD-10-PCS | Mod: S$GLB,,, | Performed by: FAMILY MEDICINE

## 2023-11-01 PROCEDURE — 99214 OFFICE O/P EST MOD 30 MIN: CPT | Mod: 25,S$GLB,, | Performed by: FAMILY MEDICINE

## 2023-11-01 RX ORDER — PREDNISONE 20 MG/1
40 TABLET ORAL DAILY
Qty: 10 TABLET | Refills: 0 | Status: SHIPPED | OUTPATIENT
Start: 2023-11-01 | End: 2023-11-06

## 2023-11-01 NOTE — TELEPHONE ENCOUNTER
Spoke with pt who states she was in her apartment when she started smelling cleaning supplies and found out that they were having renovations . She immediately left her apartment and feels that she is starting to have early stages of copd exacerbation and is requesting medication before she ends up in icu . Is poke with covering physician dr Baltazar Winn who suggests the pt be seen in urgent care due to her needing to have her lungs listened to best determine appropriate steroids . Advise given to pt , pt is calling daughter to bring her .

## 2023-11-01 NOTE — PROGRESS NOTES
"Subjective:      Patient ID: Terri Phelan is a 68 y.o. female.    Vitals:  height is 5' 5" (1.651 m) and weight is 84.8 kg (187 lb). Her oral temperature is 98.5 °F (36.9 °C). Her blood pressure is 139/78 and her pulse is 91. Her respiration is 20 and oxygen saturation is 91% (abnormal).     Chief Complaint: Shortness of Breath    This is a 68 y.o. female who presents today with a chief complaint of SOB that began per patient 7 days ago. Pt states that she feels like she is exacerbation. After the resurfaced a tub about 2 weeks ago with a lot of fumes.   Pt states that she has taken her normal medication to help with symptoms.    Shortness of Breath  This is a new problem. The current episode started in the past 7 days. The problem occurs constantly. The problem has been gradually worsening. The average episode lasts 2 weeks. Associated symptoms include headaches (5 days ago only). Pertinent negatives include no abdominal pain, chest pain, claudication, coryza, ear pain, fever, hemoptysis, leg pain, leg swelling, neck pain, orthopnea, PND, rash, rhinorrhea, sore throat, sputum production, swollen glands, syncope, vomiting or wheezing. Nothing aggravates the symptoms. Risk factors: COPD. She has tried nothing for the symptoms. The treatment provided mild relief. Her past medical history is significant for COPD. There is no history of allergies, aspirin allergies, asthma, bronchiolitis, CAD, chronic lung disease, DVT, a heart failure, PE, pneumonia or a recent surgery.       Constitution: Negative for fever.   HENT:  Negative for ear pain and sore throat.    Neck: Negative for neck pain.   Cardiovascular:  Negative for chest pain, leg swelling and passing out.   Respiratory:  Positive for shortness of breath. Negative for sputum production, bloody sputum and wheezing.    Gastrointestinal:  Negative for abdominal pain and vomiting.   Skin:  Negative for rash.   Neurological:  Positive for headaches (5 days ago " only).      Objective:     Physical Exam   Constitutional: She is oriented to person, place, and time. She appears well-developed. She is cooperative.  Non-toxic appearance. She does not appear ill. No distress. Nasal cannula in place.   HENT:   Head: Normocephalic and atraumatic.   Ears:   Right Ear: Hearing, tympanic membrane, external ear and ear canal normal.   Left Ear: Hearing, tympanic membrane, external ear and ear canal normal.   Nose: Nose normal. No mucosal edema, rhinorrhea or nasal deformity. No epistaxis. Right sinus exhibits no maxillary sinus tenderness and no frontal sinus tenderness. Left sinus exhibits no maxillary sinus tenderness and no frontal sinus tenderness.   Mouth/Throat: Uvula is midline, oropharynx is clear and moist and mucous membranes are normal. No trismus in the jaw. Normal dentition. No uvula swelling. No oropharyngeal exudate, posterior oropharyngeal edema or posterior oropharyngeal erythema.   Eyes: Conjunctivae and lids are normal. No scleral icterus.   Neck: Trachea normal and phonation normal. Neck supple. No edema present. No erythema present. No neck rigidity present.   Cardiovascular: Normal rate, regular rhythm, normal heart sounds and normal pulses.   Pulmonary/Chest: Effort normal. No respiratory distress. She has decreased breath sounds. She has no wheezes. She has no rhonchi. She has no rales.         Comments: Increased expiratory phase     Abdominal: Normal appearance.   Musculoskeletal: Normal range of motion.         General: No deformity. Normal range of motion.   Neurological: She is alert and oriented to person, place, and time. She exhibits normal muscle tone. Coordination normal.   Skin: Skin is warm, dry, intact, not diaphoretic and not pale.   Psychiatric: Her speech is normal and behavior is normal. Judgment and thought content normal.   Nursing note and vitals reviewed.      Assessment:     1. COPD exacerbation        Plan:       COPD exacerbation    Other  orders  -     methylPREDNISolone sod suc(PF) injection 40 mg  -     predniSONE (DELTASONE) 20 MG tablet; Take 2 tablets (40 mg total) by mouth once daily. for 5 days  Dispense: 10 tablet; Refill: 0                    Thank you for choosing [facility]!     Our goal in the Urgent Care is to always provide outstanding medical care. You may receive a survey by mail or e-mail in the next week regarding your experience today. We would greatly appreciate you completing and returning the survey. Your feedback provides us with a way to recognize our staff who provide very good care, and it helps us learn how to improve when your experience was below our aspiration of excellence.       We appreciate you trusting us with your medical care. We hope you feel better soon. We will be happy to take care of you for all of your future medical needs.  You must understand that you've received an Urgent Care treatment only and that you may be released before all your medical problems are known or treated. You, the patient, will arrange for follow up care as instructed.  Follow up with your PCP or specialty clinic as directed in the next 1-2 weeks if not improved or as needed.  You can call [phone] to schedule an appointment with the appropriate provider.  Another option is to follow up with [facility] ([email or web address]) virtually for quick simple medical advice.  If your condition worsens we recommend that you receive another evaluation at the emergency room immediately or contact your primary medical clinics after hours call service to discuss your concerns.  Please return here or go to the Emergency Department for any concerns or worsening of condition.      *If you were prescribed a narcotic or controlled medication, do not drive or operate heavy equipment or machinery while taking these medications.

## 2023-11-03 ENCOUNTER — TELEPHONE (OUTPATIENT)
Dept: INTERNAL MEDICINE | Facility: CLINIC | Age: 68
End: 2023-11-03
Payer: MEDICARE

## 2023-11-03 NOTE — TELEPHONE ENCOUNTER
Bmp previously ordered , I have scheduled the pt . She wanted to have them done at her home but no longer has hh and does not want to reestablish with them .

## 2023-11-03 NOTE — TELEPHONE ENCOUNTER
----- Message from Aiden Galindo sent at 11/3/2023  3:43 PM CDT -----  Contact: [phone]  [email or web address]     Patient is calling for Medical Advice regarding: pt would like to discuss to continue getting blood drawn for c02     How long has patient had these symptoms:    Pharmacy name and phone#:    Would like response via [employer]hart:  call back     Comments:

## 2023-11-07 ENCOUNTER — LAB VISIT (OUTPATIENT)
Dept: LAB | Facility: HOSPITAL | Age: 68
End: 2023-11-07
Attending: FAMILY MEDICINE
Payer: MEDICARE

## 2023-11-07 DIAGNOSIS — J96.12 CHRONIC HYPERCAPNIC RESPIRATORY FAILURE: ICD-10-CM

## 2023-11-07 LAB
ANION GAP SERPL CALC-SCNC: 10 MMOL/L (ref 8–16)
BUN SERPL-MCNC: 22 MG/DL (ref 8–23)
CALCIUM SERPL-MCNC: 9.1 MG/DL (ref 8.7–10.5)
CHLORIDE SERPL-SCNC: 103 MMOL/L (ref 95–110)
CO2 SERPL-SCNC: 35 MMOL/L (ref 23–29)
CREAT SERPL-MCNC: 0.8 MG/DL (ref 0.5–1.4)
EST. GFR  (NO RACE VARIABLE): >60 ML/MIN/1.73 M^2
GLUCOSE SERPL-MCNC: 96 MG/DL (ref 70–110)
POTASSIUM SERPL-SCNC: 5 MMOL/L (ref 3.5–5.1)
SODIUM SERPL-SCNC: 148 MMOL/L (ref 136–145)

## 2023-11-07 PROCEDURE — 80048 BASIC METABOLIC PNL TOTAL CA: CPT | Performed by: FAMILY MEDICINE

## 2023-11-07 PROCEDURE — 36415 COLL VENOUS BLD VENIPUNCTURE: CPT | Performed by: FAMILY MEDICINE

## 2023-11-09 ENCOUNTER — TELEPHONE (OUTPATIENT)
Dept: PULMONOLOGY | Facility: CLINIC | Age: 68
End: 2023-11-09
Payer: MEDICARE

## 2023-11-09 NOTE — TELEPHONE ENCOUNTER
----- Message from Adina Zapien sent at 11/9/2023  8:25 AM CST -----  Pt would like to be called back regarding  Rx  for  Pulm  rehab,  [facility] didn't received referral  for oxygen     Pt can be reached at  [phone]

## 2023-11-09 NOTE — TELEPHONE ENCOUNTER
Left message on pt voicemail, informing her that I have received her message. I advised pt that if she has any questions or concerns, she may contact the office. Office number has been provided.

## 2023-11-13 ENCOUNTER — OUTPATIENT CASE MANAGEMENT (OUTPATIENT)
Dept: ADMINISTRATIVE | Facility: OTHER | Age: 68
End: 2023-11-13
Payer: MEDICARE

## 2023-11-13 PROBLEM — J96.21 ACUTE ON CHRONIC RESPIRATORY FAILURE WITH HYPOXIA AND HYPERCAPNIA: Status: RESOLVED | Noted: 2023-06-15 | Resolved: 2023-11-13

## 2023-11-13 PROBLEM — J96.12 CHRONIC HYPERCAPNIC RESPIRATORY FAILURE: Status: RESOLVED | Noted: 2023-08-09 | Resolved: 2023-11-13

## 2023-11-13 PROBLEM — J96.22 ACUTE ON CHRONIC RESPIRATORY FAILURE WITH HYPOXIA AND HYPERCAPNIA: Status: RESOLVED | Noted: 2023-06-15 | Resolved: 2023-11-13

## 2023-11-13 NOTE — PROGRESS NOTES
Outpatient Care Management  Plan of Care Follow Up Visit    Patient: Terri Phelan  MRN: [id number]  Date of Service: 11/13/2023  Completed by: Raven Cook RN  Referral Date: 05/30/2023    Reason for Visit   Patient presents with    OPCM RN Follow Up Call       Brief Summary: OPCM follow up call completed with Terri. Continued Education on avoiding air Pollutants.   Next Steps: Terri agrees to follow up call around 12-15-23.

## 2023-11-21 ENCOUNTER — OFFICE VISIT (OUTPATIENT)
Dept: PULMONOLOGY | Facility: CLINIC | Age: 68
End: 2023-11-21
Payer: MEDICARE

## 2023-11-21 ENCOUNTER — LAB VISIT (OUTPATIENT)
Dept: LAB | Facility: HOSPITAL | Age: 68
End: 2023-11-21
Payer: MEDICARE

## 2023-11-21 VITALS
HEART RATE: 76 BPM | DIASTOLIC BLOOD PRESSURE: 70 MMHG | HEIGHT: 65 IN | BODY MASS INDEX: 30.81 KG/M2 | SYSTOLIC BLOOD PRESSURE: 124 MMHG | WEIGHT: 184.94 LBS | OXYGEN SATURATION: 96 %

## 2023-11-21 DIAGNOSIS — J44.9 CHRONIC OBSTRUCTIVE PULMONARY DISEASE, UNSPECIFIED COPD TYPE: Primary | ICD-10-CM

## 2023-11-21 DIAGNOSIS — J96.11 CHRONIC HYPOXEMIC RESPIRATORY FAILURE: ICD-10-CM

## 2023-11-21 DIAGNOSIS — J43.2 CENTRILOBULAR EMPHYSEMA: ICD-10-CM

## 2023-11-21 DIAGNOSIS — Z79.52 CURRENT CHRONIC USE OF SYSTEMIC STEROIDS: ICD-10-CM

## 2023-11-21 DIAGNOSIS — J44.9 CHRONIC OBSTRUCTIVE PULMONARY DISEASE, UNSPECIFIED COPD TYPE: ICD-10-CM

## 2023-11-21 LAB
BASOPHILS # BLD AUTO: 0.03 K/UL (ref 0–0.2)
BASOPHILS NFR BLD: 0.4 % (ref 0–1.9)
DIFFERENTIAL METHOD: ABNORMAL
EOSINOPHIL # BLD AUTO: 0.1 K/UL (ref 0–0.5)
EOSINOPHIL NFR BLD: 1.1 % (ref 0–8)
ERYTHROCYTE [DISTWIDTH] IN BLOOD BY AUTOMATED COUNT: 15.9 % (ref 11.5–14.5)
HCT VFR BLD AUTO: 49.4 % (ref 37–48.5)
HGB BLD-MCNC: 13.9 G/DL (ref 12–16)
IGE SERPL-ACNC: <35 IU/ML (ref 0–100)
IMM GRANULOCYTES # BLD AUTO: 0.02 K/UL (ref 0–0.04)
IMM GRANULOCYTES NFR BLD AUTO: 0.3 % (ref 0–0.5)
LYMPHOCYTES # BLD AUTO: 2.5 K/UL (ref 1–4.8)
LYMPHOCYTES NFR BLD: 32.7 % (ref 18–48)
MCH RBC QN AUTO: 26.7 PG (ref 27–31)
MCHC RBC AUTO-ENTMCNC: 28.1 G/DL (ref 32–36)
MCV RBC AUTO: 95 FL (ref 82–98)
MONOCYTES # BLD AUTO: 0.9 K/UL (ref 0.3–1)
MONOCYTES NFR BLD: 11.3 % (ref 4–15)
NEUTROPHILS # BLD AUTO: 4.1 K/UL (ref 1.8–7.7)
NEUTROPHILS NFR BLD: 54.2 % (ref 38–73)
NRBC BLD-RTO: 0 /100 WBC
PLATELET # BLD AUTO: 169 K/UL (ref 150–450)
PMV BLD AUTO: 12.4 FL (ref 9.2–12.9)
RBC # BLD AUTO: 5.2 M/UL (ref 4–5.4)
WBC # BLD AUTO: 7.52 K/UL (ref 3.9–12.7)

## 2023-11-21 PROCEDURE — 1101F PT FALLS ASSESS-DOCD LE1/YR: CPT | Mod: CPTII,S$GLB,, | Performed by: INTERNAL MEDICINE

## 2023-11-21 PROCEDURE — 99214 OFFICE O/P EST MOD 30 MIN: CPT | Mod: GC,S$GLB,, | Performed by: INTERNAL MEDICINE

## 2023-11-21 PROCEDURE — 3074F SYST BP LT 130 MM HG: CPT | Mod: CPTII,S$GLB,, | Performed by: INTERNAL MEDICINE

## 2023-11-21 PROCEDURE — 85025 COMPLETE CBC W/AUTO DIFF WBC: CPT | Performed by: STUDENT IN AN ORGANIZED HEALTH CARE EDUCATION/TRAINING PROGRAM

## 2023-11-21 PROCEDURE — 1101F PR PT FALLS ASSESS DOC 0-1 FALLS W/OUT INJ PAST YR: ICD-10-PCS | Mod: CPTII,S$GLB,, | Performed by: INTERNAL MEDICINE

## 2023-11-21 PROCEDURE — 90694 FLU VACCINE - QUADRIVALENT - ADJUVANTED: ICD-10-PCS | Mod: S$GLB,,, | Performed by: INTERNAL MEDICINE

## 2023-11-21 PROCEDURE — 82785 ASSAY OF IGE: CPT | Performed by: STUDENT IN AN ORGANIZED HEALTH CARE EDUCATION/TRAINING PROGRAM

## 2023-11-21 PROCEDURE — 3074F PR MOST RECENT SYSTOLIC BLOOD PRESSURE < 130 MM HG: ICD-10-PCS | Mod: CPTII,S$GLB,, | Performed by: INTERNAL MEDICINE

## 2023-11-21 PROCEDURE — 3288F FALL RISK ASSESSMENT DOCD: CPT | Mod: CPTII,S$GLB,, | Performed by: INTERNAL MEDICINE

## 2023-11-21 PROCEDURE — 3078F PR MOST RECENT DIASTOLIC BLOOD PRESSURE < 80 MM HG: ICD-10-PCS | Mod: CPTII,S$GLB,, | Performed by: INTERNAL MEDICINE

## 2023-11-21 PROCEDURE — 1159F MED LIST DOCD IN RCRD: CPT | Mod: CPTII,S$GLB,, | Performed by: INTERNAL MEDICINE

## 2023-11-21 PROCEDURE — 36415 COLL VENOUS BLD VENIPUNCTURE: CPT | Performed by: STUDENT IN AN ORGANIZED HEALTH CARE EDUCATION/TRAINING PROGRAM

## 2023-11-21 PROCEDURE — 90694 VACC AIIV4 NO PRSRV 0.5ML IM: CPT | Mod: S$GLB,,, | Performed by: INTERNAL MEDICINE

## 2023-11-21 PROCEDURE — 3288F PR FALLS RISK ASSESSMENT DOCUMENTED: ICD-10-PCS | Mod: CPTII,S$GLB,, | Performed by: INTERNAL MEDICINE

## 2023-11-21 PROCEDURE — G0008 FLU VACCINE - QUADRIVALENT - ADJUVANTED: ICD-10-PCS | Mod: S$GLB,,, | Performed by: INTERNAL MEDICINE

## 2023-11-21 PROCEDURE — 3044F HG A1C LEVEL LT 7.0%: CPT | Mod: CPTII,S$GLB,, | Performed by: INTERNAL MEDICINE

## 2023-11-21 PROCEDURE — 3044F PR MOST RECENT HEMOGLOBIN A1C LEVEL <7.0%: ICD-10-PCS | Mod: CPTII,S$GLB,, | Performed by: INTERNAL MEDICINE

## 2023-11-21 PROCEDURE — G0008 ADMIN INFLUENZA VIRUS VAC: HCPCS | Mod: S$GLB,,, | Performed by: INTERNAL MEDICINE

## 2023-11-21 PROCEDURE — 3008F BODY MASS INDEX DOCD: CPT | Mod: CPTII,S$GLB,, | Performed by: INTERNAL MEDICINE

## 2023-11-21 PROCEDURE — 3008F PR BODY MASS INDEX (BMI) DOCUMENTED: ICD-10-PCS | Mod: CPTII,S$GLB,, | Performed by: INTERNAL MEDICINE

## 2023-11-21 PROCEDURE — 1159F PR MEDICATION LIST DOCUMENTED IN MEDICAL RECORD: ICD-10-PCS | Mod: CPTII,S$GLB,, | Performed by: INTERNAL MEDICINE

## 2023-11-21 PROCEDURE — 3078F DIAST BP <80 MM HG: CPT | Mod: CPTII,S$GLB,, | Performed by: INTERNAL MEDICINE

## 2023-11-21 PROCEDURE — 99214 PR OFFICE/OUTPT VISIT, EST, LEVL IV, 30-39 MIN: ICD-10-PCS | Mod: GC,S$GLB,, | Performed by: INTERNAL MEDICINE

## 2023-11-21 NOTE — ASSESSMENT & PLAN NOTE
Patient with Hypercapnic and Hypoxic Respiratory failure which is Chronic.  she is on home oxygen at 2 LPM. S  Signs/symptoms of respiratory failure include- increased work of breathing, respiratory distress, and wheezing. Contributing diagnoses includes - COPD Labs and images were reviewed. Patient Has recent ABG, which has been reviewed. Will treat underlying causes and adjust management of respiratory failure as follows: SEE COPD, continue home inhalers

## 2023-11-21 NOTE — ASSESSMENT & PLAN NOTE
Continue home meds including:  - Breztrio 2 puffs BID   - Albuterol PRN   - Duonebs BID  - Azithromycin MWF  - Has prednisone 5days prn for exacerbations   - Continue nightly BIPAP  - Starting up with pulmonary rehab

## 2023-11-21 NOTE — PROGRESS NOTES
Subjective:      Patient ID: Terri Phelan is a 68 y.o. female.    Chief Complaint: No chief complaint on file.    HPI The patient was seen in person at this visit on 11/21/2023       69 yo F with h/o severe end stage COPD/emphysema, chronic hypoxemic and hypercapnic respiratory failure (on 3L NC and BIPAP nightly), CAD, and frequent hospitalizations.  She was most recently seen in pulmonary clinic on 10/10. Has been seen at urgent care x1 since then and was on a 5 day course of steroids. She is continued on the same inhalers since that time. She has been doing overall well since the last visit. No big current concerns and no repeat hospitalizations.      Updated hospitalizations for 2023 => April 23-26; Marisol 15-16; July 1-6; and August 7-10.     Baseline oxygen by nasal cannula at 2 lpm.  Her regimen in unchanged from her baseline:  - Breztri 2 puffs BID  - Albuterol rescue inhaler 4-5 times per day (used mostly with transitioning)  - DuoNebs BID  - Azithromycin MWF  - roflumilast daily   - pulmonary rehab 1x per week  - prednisone 40mg x 5 days PRN for exacerbations  - BiPAP nightly - settings increased to 18/8 after last hospital stay       Review of Systems   Constitutional:  Negative for fever, activity change and fatigue.   Respiratory:  Positive for shortness of breath, dyspnea on extertion and use of rescue inhaler. Negative for cough, sputum production and asthma nighttime symptoms.    Cardiovascular:  Negative for chest pain and leg swelling.     Objective:     Physical Exam   Constitutional: She is oriented to person, place, and time. She appears well-developed. No distress.   HENT:   Head: Normocephalic.   Mouth/Throat: No oropharyngeal exudate.   Cardiovascular: Normal rate, regular rhythm and normal heart sounds. Exam reveals no gallop.   No murmur heard.  Pulmonary/Chest: Normal expansion, symmetric chest wall expansion and effort normal. No stridor. No respiratory distress. She has decreased  breath sounds. She has no wheezes. She has no rhonchi. She has no rales.   Abdominal: Soft.   Musculoskeletal:         General: No edema.   Neurological: She is alert and oriented to person, place, and time. Gait normal.   Skin: No cyanosis. Nails show no clubbing.   Psychiatric: She has a normal mood and affect. Judgment normal.   Nursing note and vitals reviewed.    Personal Diagnostic Review    Selected findings on Chest CTA from 7/9/2023 =>  Pulmonary vasculature: There is no evidence of pulmonary thromboemboli.  Pulmonary arteries distribute normally and are normal in caliber.  There are four pulmonary veins.  Chio/Mediastinum: No pathologic yariel enlargement.  Airways: Patent.  Lungs/Pleura: Centrilobular emphysematous changes are noted in the lung apices most predominantly.  No nodules or pleural or pericardial effusion or thickening seen.  Lung base subsegmental atelectasis or scarring greater on the left is also noted.  No suspicious nodules, consolidation is seen.  Clear lungs. No pleural effusion or thickening.  Heart: There is no right ventricular strain or cardiomegaly.  There is no pericardial effusion..    Impression:     - No evidence of pulmonary thromboemboli or significant acute lung interstitial airspace disease.  - Emphysematous changes in the upper lungs.  - No cardiomegaly or right ventricular strain.      PFTs show severe obstruction and DLCO impairment.  Although lung volumes show restriction, I suspect this is more likely related to air trapping and technical aspects of testing.  As compared to prior testing 6 months ago, spirometry is stable or modestly improved.    PFTs 4/18/23 10/10/23   FVC  (pre-BD) 1.01 1.13   FVC%  40 45   FEV1 0.44 0.52   FEV1%  22 27   FEV1/FVC  44 47   FVC (post-BD) 0.95    FVC% -6    FEV1 0.42    FEV1% -4.3    FEV1/FVC 45    TLC  5.34 2.74   TLC%  108 56   RV  4.27 1.42   RV%  214 71   DLCO    (uncorr) - 3.72   DLCO   (daniel)  - 3.91   DLCO%  - 18   VA - 1.64  "  IVC - 1.32   Six-Minute Walk      Distance (meter)      O2 jorge l      O2 change              11/21/2023     1:29 PM 11/1/2023     6:34 PM 10/10/2023     1:56 PM 9/20/2023    10:05 AM 9/5/2023     2:02 PM 8/29/2023     3:01 PM 8/10/2023    11:40 AM   Pulmonary Function Tests   SpO2 96 % 91 % 90 % 93 % 93 % 90 % 95 %   Height 5' 5" (1.651 m) 5' 5" (1.651 m) 5' 5" (1.651 m) 5' 5" (1.651 m) 5' 5" (1.651 m) 5' 5" (1.651 m)    Weight 83.9 kg (184 lb 15.5 oz) 84.8 kg (187 lb) 85 kg (187 lb 6.3 oz) 82.4 kg (181 lb 10.5 oz) 80.7 kg (178 lb) 80.6 kg (177 lb 11.1 oz)    BMI (Calculated) 30.8 31.1 31.2 30.2 29.6 29.6         Assessment:     1. Chronic obstructive pulmonary disease, unspecified COPD type    2. Centrilobular emphysema    3. Current chronic use of systemic steroids    4. Chronic hypoxemic respiratory failure           Outpatient Encounter Medications as of 11/21/2023   Medication Sig Dispense Refill    acetaminophen (TYLENOL) 500 MG tablet Take 500 mg by mouth every 6 (six) hours as needed for Pain.      albuterol (PROVENTIL/VENTOLIN HFA) 90 mcg/actuation inhaler Inhale 2 puffs into the lungs every 6 (six) hours as needed for Wheezing or Shortness of Breath. 18 g 6    albuterol-ipratropium (DUO-NEB) 2.5 mg-0.5 mg/3 mL nebulizer solution Take 3 mLs by nebulization every 6 (six) hours as needed for Wheezing. 75 mL 0    alendronate (FOSAMAX) 35 MG tablet Take 35 mg by mouth every Tuesday.      aspirin 81 MG Chew Take 81 mg by mouth once daily.      azithromycin (Z-OLY) 250 MG tablet Take 1 tablet (250 mg total) by mouth 3 (three) times a week. (Patient taking differently: Take 250 mg by mouth every Mon, Wed, Fri.) 12 tablet 11    budesonide-glycopyr-formoterol (BREZTRI AEROSPHERE) 160-9-4.8 mcg/actuation HFAA Inhale 2 puffs into the lungs 2 (two) times daily.      chlorpheniramine-pseudoephedrine-acetaminophen (SINUTAB) 2- mg per tablet 2 tablet 3 TIMES DAILY (route: oral)      dextromethorphan-guaiFENesin "  mg (MUCINEX DM)  mg per 12 hr tablet Take 1 tablet by mouth every 12 (twelve) hours.      diazePAM (VALIUM) 2 MG tablet TAKE 1 TABLET(2 MG) BY MOUTH DAILY AS NEEDED FOR ANXIETY 30 tablet 0    dicyclomine (BENTYL) 20 mg tablet Take 1 tablet (20 mg total) by mouth 3 (three) times daily as needed (abdominal pain). 30 tablet 1    ergocalciferol (ERGOCALCIFEROL) 50,000 unit Cap Take 50,000 Units by mouth every Tuesday.      fluticasone propionate (FLONASE) 50 mcg/actuation nasal spray 1 spray (50 mcg total) by Each Nostril route daily as needed for Allergies. 16 g 11    furosemide (LASIX) 40 MG tablet Take 1 tablet (40 mg total) by mouth once daily. 30 tablet 11    LORazepam (ATIVAN) 2 mg/mL injection       multivit with min-folic acid (WOMEN'S MULTIVITAMIN GUMMIES) 200 mcg Chew Take 2 tablets by mouth once daily.      OXYGEN-AIR DELIVERY SYSTEMS MISC 3 L by Nasal route.      potassium chloride (KLOR-CON) 10 MEQ TbSR Take 1 tablet (10 mEq total) by mouth once daily. for 365 doses 30 tablet 11    predniSONE (DELTASONE) 20 MG tablet Take 20 mg by mouth 2 (two) times daily.      predniSONE (DELTASONE) 20 MG tablet Take 2 tablets by mouth once daily.      roflumilast (DALIRESP) 500 mcg Tab Take 1 tablet by mouth once daily.      traZODone (DESYREL) 50 MG tablet Take 1 tablet (50 mg total) by mouth nightly as needed for Insomnia. 30 tablet 6    [] predniSONE (DELTASONE) 20 MG tablet Take 2 tablets (40 mg total) by mouth once daily. for 5 days 10 tablet 0    [DISCONTINUED] diazePAM (VALIUM) 2 MG tablet Take 1 tablet (2 mg total) by mouth daily as needed for Anxiety. 30 tablet 0     No facility-administered encounter medications on file as of 2023.     Orders Placed This Encounter   Procedures    CBC Auto Differential     Standing Status:   Future     Standing Expiration Date:   2025    IGE     Standing Status:   Future     Standing Expiration Date:   2025       Plan:   Ms. Tapan blackman  stable as compared to her prior visit 6 weeks ago and has not required ED/hospital care.  I think she will benefit from resuming pulmonary rehabilitation.  We will plan to see her back in another 6 weeks (11/21) to keep an eye on her respiratory status.    Problem List Items Addressed This Visit          Pulmonary    Centrilobular emphysema    Overview     Severe obstructive disease, on home O2 and NIPPV at night and prn.   Chest CTA from July 2023 with apical centrilobular emphysema present.    PFTs 4/18/23 10/10/23   FVC  (pre-BD) 1.01 1.13   FVC%  40 45   FEV1 0.44 0.52   FEV1%  22 27   FEV1/FVC  44 47            Current Assessment & Plan     Continue home meds including:  - Breztrio 2 puffs BID   - Albuterol PRN   - Duonebs BID  - Azithromycin MWF  - Has prednisone 5days prn for exacerbations   - Continue nightly BIPAP  - Starting up with pulmonary rehab         Relevant Orders    CBC Auto Differential    IGE    Chronic hypoxemic respiratory failure    Overview     Uses baseline 2L NC at home         Current Assessment & Plan     Patient with Hypercapnic and Hypoxic Respiratory failure which is Chronic.  she is on home oxygen at 2 LPM. S  Signs/symptoms of respiratory failure include- increased work of breathing, respiratory distress, and wheezing. Contributing diagnoses includes - COPD Labs and images were reviewed. Patient Has recent ABG, which has been reviewed. Will treat underlying causes and adjust management of respiratory failure as follows: SEE COPD, continue home inhalers            Endocrine    Current chronic use of systemic steroids    Overview     Persistent use of steroids several times a month with worsening after coming off.          Current Assessment & Plan     Most recent use for 5 days after urgent care visit. PRN steroids for exacerbation ordered.           Other Visit Diagnoses       Chronic obstructive pulmonary disease, unspecified COPD type    -  Primary    Relevant Orders    CBC Auto  Differential    IGE          Will send IgE and CBC to look for eosinophils. Fu in 6-8 weeks.   Received pulmonary rehab orders from [facility] - signed and given to pt.   Flu shot given today.       MD SETH Hooker/[facility]   11/21/2023 1:37 PM

## 2023-11-22 RX ORDER — AZITHROMYCIN 250 MG/1
250 TABLET, FILM COATED ORAL
Qty: 12 TABLET | Refills: 11 | Status: SHIPPED | OUTPATIENT
Start: 2023-11-22 | End: 2024-11-21

## 2023-11-27 ENCOUNTER — TELEPHONE (OUTPATIENT)
Dept: ADMINISTRATIVE | Facility: CLINIC | Age: 68
End: 2023-11-27
Payer: MEDICARE

## 2023-11-28 ENCOUNTER — HOSPITAL ENCOUNTER (OUTPATIENT)
Dept: RADIOLOGY | Facility: HOSPITAL | Age: 68
Discharge: HOME | End: 2023-11-28
Attending: FAMILY MEDICINE
Payer: MEDICARE

## 2023-11-28 VITALS — WEIGHT: 184 LBS | BODY MASS INDEX: 30.66 KG/M2 | HEIGHT: 65 IN

## 2023-11-28 DIAGNOSIS — Z12.31 VISIT FOR SCREENING MAMMOGRAM: ICD-10-CM

## 2023-11-28 DIAGNOSIS — Z78.0 MENOPAUSE: ICD-10-CM

## 2023-11-28 PROCEDURE — 77080 DXA BONE DENSITY AXIAL: CPT | Mod: TC

## 2023-11-28 PROCEDURE — 77080 DXA BONE DENSITY AXIAL: CPT | Mod: 26,,, | Performed by: INTERNAL MEDICINE

## 2023-11-28 PROCEDURE — 77063 BREAST TOMOSYNTHESIS BI: CPT | Mod: 26,,, | Performed by: RADIOLOGY

## 2023-11-28 PROCEDURE — 77063 MAMMO DIGITAL SCREENING BILAT WITH TOMO: ICD-10-PCS | Mod: 26,,, | Performed by: RADIOLOGY

## 2023-11-28 PROCEDURE — 77067 MAMMO DIGITAL SCREENING BILAT WITH TOMO: ICD-10-PCS | Mod: 26,,, | Performed by: RADIOLOGY

## 2023-11-28 PROCEDURE — 77067 SCR MAMMO BI INCL CAD: CPT | Mod: TC

## 2023-11-28 PROCEDURE — 77067 SCR MAMMO BI INCL CAD: CPT | Mod: 26,,, | Performed by: RADIOLOGY

## 2023-11-28 PROCEDURE — 77080 DXA BONE DENSITY AXIAL SKELETON 1 OR MORE SITES: ICD-10-PCS | Mod: 26,,, | Performed by: INTERNAL MEDICINE

## 2023-11-29 NOTE — TELEPHONE ENCOUNTER
No care due was identified.  [facility] Embedded Care Due Messages. Reference number: [id number].   11/29/2023 5:57:13 PM CST

## 2023-11-30 RX ORDER — DIAZEPAM 2 MG/1
2 TABLET ORAL DAILY PRN
Qty: 30 TABLET | Refills: 0 | Status: SHIPPED | OUTPATIENT
Start: 2023-11-30 | End: 2024-01-13 | Stop reason: SDUPTHER

## 2023-12-05 ENCOUNTER — OFFICE VISIT (OUTPATIENT)
Dept: PALLIATIVE MEDICINE | Facility: CLINIC | Age: 68
End: 2023-12-05
Payer: MEDICARE

## 2023-12-05 DIAGNOSIS — Z99.81 OXYGEN DEPENDENT: ICD-10-CM

## 2023-12-05 DIAGNOSIS — J44.9 STAGE 4 VERY SEVERE COPD BY GOLD CLASSIFICATION: ICD-10-CM

## 2023-12-05 DIAGNOSIS — F41.1 GENERALIZED ANXIETY DISORDER: Primary | ICD-10-CM

## 2023-12-05 PROCEDURE — 1123F ACP DISCUSS/DSCN MKR DOCD: CPT | Mod: CPTII,95,,

## 2023-12-05 PROCEDURE — 1123F PR ADV CARE PLAN DISCUSSED, PLAN OR SURROGATE DOCUMENTED: ICD-10-PCS | Mod: CPTII,95,,

## 2023-12-05 PROCEDURE — 3044F HG A1C LEVEL LT 7.0%: CPT | Mod: CPTII,95,,

## 2023-12-05 PROCEDURE — 3044F PR MOST RECENT HEMOGLOBIN A1C LEVEL <7.0%: ICD-10-PCS | Mod: CPTII,95,,

## 2023-12-05 PROCEDURE — 99215 PR OFFICE/OUTPT VISIT, EST, LEVL V, 40-54 MIN: ICD-10-PCS | Mod: 95,,,

## 2023-12-05 PROCEDURE — 99215 OFFICE O/P EST HI 40 MIN: CPT | Mod: 95,,,
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
## 2023-12-06 ENCOUNTER — TELEPHONE (OUTPATIENT)
Dept: PULMONOLOGY | Facility: CLINIC | Age: 68
End: 2023-12-06
Payer: MEDICARE

## 2023-12-06 NOTE — TELEPHONE ENCOUNTER
----- Message from Cheyenne Castellano sent at 12/6/2023  3:28 PM CST -----  Regarding: flare up COPD  Contact: [phone]  Treri Phelan calling regarding Patient Advice (message) stated she is having flare up with COPD is requesting antibiotics       [id number] - [place] [address] AT [facility] & [address]  [place] [address]  Phone: [phone] Fax: [phone]

## 2023-12-06 NOTE — TELEPHONE ENCOUNTER
Spoke with patient, informed her that I ave received her message. Pt states that she does not feel good and think antibiotics will help her symptoms. I advised pt that I will forward her message to Dr Cunningham to review/advise.

## 2023-12-07 ENCOUNTER — TELEPHONE (OUTPATIENT)
Dept: PULMONOLOGY | Facility: CLINIC | Age: 68
End: 2023-12-07
Payer: MEDICARE

## 2023-12-07 NOTE — TELEPHONE ENCOUNTER
----- Message from Betina Kelly sent at 12/7/2023 12:49 PM CST -----  Regarding: Pt Advice (2nd Attempt)  Contact: [phone]  CONSULT/ADVISORY    Name of Caller:  FRANKO SAHNI [[id number]]    Contact Preference:   [phone]      Nature of Call:  Pt is requesting an antibiotic called in. States she has COPD and her sputum is brownish.  Pt states she called on yesterday and was hoping something would've been called into the pharmacy.  Pt has a bad cough and is requesting a call back.      [id number] - [place], [address] AT [facility] & [address]  [place] [address]  Phone: [phone] Fax: [phone]

## 2023-12-07 NOTE — TELEPHONE ENCOUNTER
Spoke with patient, informed her that I have received her message. I also advised pt that I will forward her message to Dr uCnningham to review/advise. Pt verbalized that she understand.

## 2023-12-08 DIAGNOSIS — J44.9 CHRONIC OBSTRUCTIVE PULMONARY DISEASE, UNSPECIFIED COPD TYPE: Primary | ICD-10-CM

## 2023-12-08 RX ORDER — AMOXICILLIN AND CLAVULANATE POTASSIUM 875; 125 MG/1; MG/1
1 TABLET, FILM COATED ORAL 2 TIMES DAILY
Qty: 14 TABLET | Refills: 0 | Status: SHIPPED | OUTPATIENT
Start: 2023-12-08 | End: 2023-12-15

## 2023-12-14 ENCOUNTER — OFFICE VISIT (OUTPATIENT)
Dept: URGENT CARE | Facility: CLINIC | Age: 68
End: 2023-12-14
Payer: MEDICARE

## 2023-12-14 VITALS
SYSTOLIC BLOOD PRESSURE: 165 MMHG | BODY MASS INDEX: 30.67 KG/M2 | DIASTOLIC BLOOD PRESSURE: 80 MMHG | TEMPERATURE: 99 F | RESPIRATION RATE: 19 BRPM | HEART RATE: 85 BPM | OXYGEN SATURATION: 89 % | HEIGHT: 65 IN | WEIGHT: 184.06 LBS

## 2023-12-14 DIAGNOSIS — R06.00 DYSPNEA, UNSPECIFIED TYPE: Primary | ICD-10-CM

## 2023-12-14 DIAGNOSIS — I50.33 ACUTE ON CHRONIC DIASTOLIC CONGESTIVE HEART FAILURE: ICD-10-CM

## 2023-12-14 PROCEDURE — 96372 PR INJECTION,THERAP/PROPH/DIAG2ST, IM OR SUBCUT: ICD-10-PCS | Mod: 59,S$GLB,, | Performed by: FAMILY MEDICINE

## 2023-12-14 PROCEDURE — 94640 AIRWAY INHALATION TREATMENT: CPT | Mod: S$GLB,,, | Performed by: FAMILY MEDICINE

## 2023-12-14 PROCEDURE — 71046 XR CHEST PA AND LATERAL: ICD-10-PCS | Mod: FY,S$GLB,, | Performed by: RADIOLOGY

## 2023-12-14 PROCEDURE — 71046 X-RAY EXAM CHEST 2 VIEWS: CPT | Mod: FY,S$GLB,, | Performed by: RADIOLOGY

## 2023-12-14 PROCEDURE — 96372 THER/PROPH/DIAG INJ SC/IM: CPT | Mod: 59,S$GLB,, | Performed by: FAMILY MEDICINE

## 2023-12-14 PROCEDURE — 94640 PR INHAL RX, AIRWAY OBST/DX SPUTUM INDUCT: ICD-10-PCS | Mod: S$GLB,,, | Performed by: FAMILY MEDICINE

## 2023-12-14 PROCEDURE — 99214 PR OFFICE/OUTPT VISIT, EST, LEVL IV, 30-39 MIN: ICD-10-PCS | Mod: 25,S$GLB,, | Performed by: FAMILY MEDICINE

## 2023-12-14 PROCEDURE — 99214 OFFICE O/P EST MOD 30 MIN: CPT | Mod: 25,S$GLB,, | Performed by: FAMILY MEDICINE

## 2023-12-14 RX ORDER — DEXAMETHASONE SODIUM PHOSPHATE 100 MG/10ML
10 INJECTION INTRAMUSCULAR; INTRAVENOUS
Status: COMPLETED | OUTPATIENT
Start: 2023-12-14 | End: 2023-12-14

## 2023-12-14 RX ORDER — ALBUTEROL SULFATE 0.83 MG/ML
2.5 SOLUTION RESPIRATORY (INHALATION)
Status: COMPLETED | OUTPATIENT
Start: 2023-12-14 | End: 2023-12-14

## 2023-12-14 RX ADMIN — ALBUTEROL SULFATE 2.5 MG: 0.83 SOLUTION RESPIRATORY (INHALATION) at 01:12

## 2023-12-14 RX ADMIN — DEXAMETHASONE SODIUM PHOSPHATE 10 MG: 100 INJECTION INTRAMUSCULAR; INTRAVENOUS at 01:12

## 2023-12-14 NOTE — PROGRESS NOTES
"Subjective:      Patient ID: Terri Phelan is a 68 y.o. female.    Vitals:  height is 5' 5" (1.651 m) and weight is 83.5 kg (184 lb 1.4 oz). Her oral temperature is 98.6 °F (37 °C). Her blood pressure is 165/80 (abnormal) and her pulse is 85. Her respiration is 19 and oxygen saturation is 89% (abnormal).     Chief Complaint: Insomnia    This is a 68 y.o. female who presents today with a chief complaint possible exasperation with COPD, Coughing with sputum, of loss of sleep that started 5 days ago. Pt states that they have been taking only Amoxicillin to help with symptoms.     Other  This is a new problem. The current episode started in the past 7 days. The problem occurs constantly. The problem has been gradually worsening. Associated symptoms include congestion (nasal/ chest congestion) and coughing. Pertinent negatives include no abdominal pain, anorexia, arthralgias, change in bowel habit, chest pain, chills, diaphoresis, fatigue, fever, headaches, joint swelling, myalgias, nausea, neck pain, numbness, rash, sore throat, swollen glands, urinary symptoms, vertigo, visual change, vomiting or weakness. Nothing aggravates the symptoms. She has tried nothing for the symptoms. The treatment provided no relief.       Constitution: Negative for chills, sweating, fatigue and fever.   HENT:  Positive for congestion (nasal/ chest congestion). Negative for sore throat.    Neck: Negative for neck pain.   Cardiovascular:  Negative for chest pain.   Respiratory:  Positive for cough.    Gastrointestinal:  Negative for abdominal pain, nausea and vomiting.   Musculoskeletal:  Negative for joint pain, joint swelling and muscle ache.   Skin:  Negative for rash.   Neurological:  Negative for history of vertigo, headaches and numbness.      Objective:     Physical Exam   Constitutional: She is oriented to person, place, and time. She appears well-developed. She is cooperative.  Non-toxic appearance. She does not appear ill. No " distress.   HENT:   Head: Normocephalic and atraumatic.   Ears:   Right Ear: Hearing, tympanic membrane, external ear and ear canal normal.   Left Ear: Hearing, tympanic membrane, external ear and ear canal normal.   Nose: Nose normal. No mucosal edema, rhinorrhea or nasal deformity. No epistaxis. Right sinus exhibits no maxillary sinus tenderness and no frontal sinus tenderness. Left sinus exhibits no maxillary sinus tenderness and no frontal sinus tenderness.   Mouth/Throat: Uvula is midline, oropharynx is clear and moist and mucous membranes are normal. No trismus in the jaw. Normal dentition. No uvula swelling. No oropharyngeal exudate, posterior oropharyngeal edema or posterior oropharyngeal erythema.   Eyes: Conjunctivae and lids are normal. No scleral icterus.   Neck: Trachea normal and phonation normal. Neck supple. No edema present. No erythema present. No neck rigidity present.   Cardiovascular: Normal rate, regular rhythm, normal heart sounds and normal pulses.   Pulmonary/Chest: Effort normal. No respiratory distress. She has decreased breath sounds. She has no wheezes. She has no rhonchi. She has rales in the right lower field and the left lower field.   Abdominal: Normal appearance.   Musculoskeletal: Normal range of motion.         General: No deformity. Normal range of motion.   Neurological: She is alert and oriented to person, place, and time. She exhibits normal muscle tone. Coordination normal.   Skin: Skin is warm, dry, intact, not diaphoretic and not pale.   Psychiatric: Her speech is normal and behavior is normal. Judgment and thought content normal.   Nursing note and vitals reviewed.    XR CHEST PA AND LATERAL    Result Date: 12/14/2023  EXAMINATION: XR CHEST PA AND LATERAL CLINICAL HISTORY: Dyspnea, unspecified TECHNIQUE: PA and lateral views of the chest were performed. COMPARISON: 08/07/2023 FINDINGS: The heart size is normal.  Mediastinum shows aortic atherosclerosis.  Lungs are well  expanded, possibly with emphysema.  Lungs are clear without acute consolidation or pleural fluid.  Skeletal structures show degenerative spine changes.  Small metal density is noted at right breast.     Possible emphysema.  No acute cardiopulmonary disease. Electronically signed by: Jason Heard MD Date:    12/14/2023 Time:    14:02    Assessment:     1. Dyspnea, unspecified type    2. Acute on chronic diastolic congestive heart failure        Plan:       Dyspnea, unspecified type  -     dexAMETHasone injection 10 mg  -     albuterol nebulizer solution 2.5 mg  -     XR CHEST PA AND LATERAL; Future; Expected date: 12/14/2023    Acute on chronic diastolic congestive heart failure  - Lasix 40 mg po this evening and then restart 40 mg po daily in the am.         Medical Decision Making:   Initial Assessment:   Very limited air movement, only able to speak in 2-3 word sentences. Most recent Echo showed an EF of 65% and pt reports that she has stopped taking her lasix.          Thank you for choosing [facility]!     Our goal in the Urgent Care is to always provide outstanding medical care. You may receive a survey by mail or e-mail in the next week regarding your experience today. We would greatly appreciate you completing and returning the survey. Your feedback provides us with a way to recognize our staff who provide very good care, and it helps us learn how to improve when your experience was below our aspiration of excellence.       We appreciate you trusting us with your medical care. We hope you feel better soon. We will be happy to take care of you for all of your future medical needs.  You must understand that you've received an Urgent Care treatment only and that you may be released before all your medical problems are known or treated. You, the patient, will arrange for follow up care as instructed.  Follow up with your PCP or specialty clinic as directed in the next 1-2 weeks if not improved or as  needed.  You can call [phone] to schedule an appointment with the appropriate provider.  Another option is to follow up with [facility] ([email or web address]) virtually for quick simple medical advice.  If your condition worsens we recommend that you receive another evaluation at the emergency room immediately or contact your primary medical clinics after hours call service to discuss your concerns.  Please return here or go to the Emergency Department for any concerns or worsening of condition.      *If you were prescribed a narcotic or controlled medication, do not drive or operate heavy equipment or machinery while taking these medications.

## 2023-12-18 ENCOUNTER — HOSPITAL ENCOUNTER (INPATIENT)
Facility: HOSPITAL | Age: 68
LOS: 1 days | Discharge: HOME | DRG: 190 | End: 2023-12-20
Attending: EMERGENCY MEDICINE | Admitting: FAMILY MEDICINE
Payer: MEDICARE

## 2023-12-18 DIAGNOSIS — R06.02 SOB (SHORTNESS OF BREATH): ICD-10-CM

## 2023-12-18 DIAGNOSIS — R92.8 ABNORMAL MAMMOGRAM: ICD-10-CM

## 2023-12-18 DIAGNOSIS — J44.1 COPD EXACERBATION: Primary | ICD-10-CM

## 2023-12-18 PROCEDURE — 99285 EMERGENCY DEPT VISIT HI MDM: CPT

## 2023-12-18 NOTE — Clinical Note
Diagnosis: SOB (shortness of breath) [614332]   Future Attending Provider: MIGUEL A SMITH III [[id number]]   Admitting Provider:: MIGUEL A SMITH III [[id number]]   Reason for IP Medical Treatment  (Clinical interventions that can only be accomplished in the IP setting? ) :: copd, hypoxia   I certify that Inpatient services for greater than or equal to 2 midnights are medically necessary:: Yes   Plans for Post-Acute care--if anticipated (pick the single best option):: A. No post acute care anticipated at this time

## 2023-12-19 ENCOUNTER — TELEPHONE (OUTPATIENT)
Dept: PULMONOLOGY | Facility: CLINIC | Age: 68
End: 2023-12-19
Payer: MEDICARE

## 2023-12-19 PROBLEM — J44.1 ACUTE EXACERBATION OF CHRONIC OBSTRUCTIVE PULMONARY DISEASE (COPD): Status: ACTIVE | Noted: 2023-12-19

## 2023-12-19 PROBLEM — J96.02 ACUTE HYPERCAPNIC RESPIRATORY FAILURE: Status: ACTIVE | Noted: 2023-12-19

## 2023-12-19 PROBLEM — J96.02 ACUTE HYPERCAPNIC RESPIRATORY FAILURE: Chronic | Status: ACTIVE | Noted: 2023-12-19

## 2023-12-19 LAB
ALBUMIN SERPL BCP-MCNC: 3.3 G/DL (ref 3.5–5.2)
ALLENS TEST: ABNORMAL
ALP SERPL-CCNC: 61 U/L (ref 55–135)
ALT SERPL W/O P-5'-P-CCNC: 15 U/L (ref 10–44)
ANION GAP SERPL CALC-SCNC: 9 MMOL/L (ref 8–16)
AST SERPL-CCNC: 20 U/L (ref 10–40)
BASOPHILS # BLD AUTO: 0.05 K/UL (ref 0–0.2)
BASOPHILS NFR BLD: 0.4 % (ref 0–1.9)
BILIRUB SERPL-MCNC: 0.2 MG/DL (ref 0.1–1)
BNP SERPL-MCNC: 61 PG/ML (ref 0–99)
BUN SERPL-MCNC: 19 MG/DL (ref 8–23)
CALCIUM SERPL-MCNC: 8.8 MG/DL (ref 8.7–10.5)
CHLORIDE SERPL-SCNC: 95 MMOL/L (ref 95–110)
CK SERPL-CCNC: 100 U/L (ref 20–180)
CO2 SERPL-SCNC: 40 MMOL/L (ref 23–29)
CREAT SERPL-MCNC: 0.9 MG/DL (ref 0.5–1.4)
DIFFERENTIAL METHOD: ABNORMAL
EOSINOPHIL # BLD AUTO: 0.1 K/UL (ref 0–0.5)
EOSINOPHIL NFR BLD: 1 % (ref 0–8)
ERYTHROCYTE [DISTWIDTH] IN BLOOD BY AUTOMATED COUNT: 16 % (ref 11.5–14.5)
EST. GFR  (NO RACE VARIABLE): >60 ML/MIN/1.73 M^2
FIO2: 28 %
GLUCOSE SERPL-MCNC: 123 MG/DL (ref 70–110)
HCT VFR BLD AUTO: 47.2 % (ref 37–48.5)
HGB BLD-MCNC: 13.6 G/DL (ref 12–16)
IMM GRANULOCYTES # BLD AUTO: 0.09 K/UL (ref 0–0.04)
IMM GRANULOCYTES NFR BLD AUTO: 0.7 % (ref 0–0.5)
LPM: 2
LYMPHOCYTES # BLD AUTO: 4 K/UL (ref 1–4.8)
LYMPHOCYTES NFR BLD: 33.1 % (ref 18–48)
MAGNESIUM SERPL-MCNC: 2 MG/DL (ref 1.6–2.6)
MCH RBC QN AUTO: 27 PG (ref 27–31)
MCHC RBC AUTO-ENTMCNC: 28.8 G/DL (ref 32–36)
MCV RBC AUTO: 94 FL (ref 82–98)
MONOCYTES # BLD AUTO: 1.6 K/UL (ref 0.3–1)
MONOCYTES NFR BLD: 13.5 % (ref 4–15)
NEUTROPHILS # BLD AUTO: 6.2 K/UL (ref 1.8–7.7)
NEUTROPHILS NFR BLD: 51.3 % (ref 38–73)
NRBC BLD-RTO: 0 /100 WBC
PCO2 BLDA: 109 MMHG (ref 35–45)
PH SMN: 7.28 [PH] (ref 7.35–7.45)
PLATELET # BLD AUTO: 125 K/UL (ref 150–450)
PMV BLD AUTO: 11 FL (ref 9.2–12.9)
PO2 BLDA: 49.8 MMHG (ref 80–100)
POC BASE DEFICIT: 18.2 MMOL/L (ref -2–2)
POC HCO3: 50.7 MMOL/L (ref 24–28)
POC PERFORMED BY: ABNORMAL
POC SATURATED O2: 81.2 % (ref 95–100)
POTASSIUM SERPL-SCNC: 4.5 MMOL/L (ref 3.5–5.1)
PROCALCITONIN SERPL IA-MCNC: 0.02 NG/ML
PROT SERPL-MCNC: 6.5 G/DL (ref 6–8.4)
RBC # BLD AUTO: 5.04 M/UL (ref 4–5.4)
SODIUM SERPL-SCNC: 144 MMOL/L (ref 136–145)
SPECIMEN SOURCE: ABNORMAL
TROPONIN I SERPL DL<=0.01 NG/ML-MCNC: <0.006 NG/ML (ref 0–0.03)
WBC # BLD AUTO: 12.01 K/UL (ref 3.9–12.7)

## 2023-12-19 PROCEDURE — 83880 ASSAY OF NATRIURETIC PEPTIDE: CPT | Performed by: EMERGENCY MEDICINE

## 2023-12-19 PROCEDURE — 27000190 HC CPAP FULL FACE MASK W/VALVE

## 2023-12-19 PROCEDURE — 94761 N-INVAS EAR/PLS OXIMETRY MLT: CPT | Mod: XB

## 2023-12-19 PROCEDURE — 25000003 PHARM REV CODE 250: Performed by: EMERGENCY MEDICINE

## 2023-12-19 PROCEDURE — 25000003 PHARM REV CODE 250

## 2023-12-19 PROCEDURE — 84145 PROCALCITONIN (PCT): CPT

## 2023-12-19 PROCEDURE — 36415 COLL VENOUS BLD VENIPUNCTURE: CPT

## 2023-12-19 PROCEDURE — 94640 AIRWAY INHALATION TREATMENT: CPT

## 2023-12-19 PROCEDURE — 99900035 HC TECH TIME PER 15 MIN (STAT)

## 2023-12-19 PROCEDURE — 94644 CONT INHLJ TX 1ST HOUR: CPT

## 2023-12-19 PROCEDURE — 63600175 PHARM REV CODE 636 W HCPCS

## 2023-12-19 PROCEDURE — 63600175 PHARM REV CODE 636 W HCPCS: Performed by: EMERGENCY MEDICINE

## 2023-12-19 PROCEDURE — 80053 COMPREHEN METABOLIC PANEL: CPT | Performed by: EMERGENCY MEDICINE

## 2023-12-19 PROCEDURE — 83880 ASSAY OF NATRIURETIC PEPTIDE: CPT | Mod: 91

## 2023-12-19 PROCEDURE — 83735 ASSAY OF MAGNESIUM: CPT | Performed by: EMERGENCY MEDICINE

## 2023-12-19 PROCEDURE — 94660 CPAP INITIATION&MGMT: CPT

## 2023-12-19 PROCEDURE — 25000242 PHARM REV CODE 250 ALT 637 W/ HCPCS

## 2023-12-19 PROCEDURE — 82803 BLOOD GASES ANY COMBINATION: CPT

## 2023-12-19 PROCEDURE — 11000001 HC ACUTE MED/SURG PRIVATE ROOM

## 2023-12-19 PROCEDURE — 82550 ASSAY OF CK (CPK): CPT | Performed by: EMERGENCY MEDICINE

## 2023-12-19 PROCEDURE — 63700000 PHARM REV CODE 250 ALT 637 W/O HCPCS

## 2023-12-19 PROCEDURE — 84484 ASSAY OF TROPONIN QUANT: CPT | Performed by: EMERGENCY MEDICINE

## 2023-12-19 PROCEDURE — 27000221 HC OXYGEN, UP TO 24 HOURS

## 2023-12-19 PROCEDURE — 25000242 PHARM REV CODE 250 ALT 637 W/ HCPCS: Performed by: EMERGENCY MEDICINE

## 2023-12-19 PROCEDURE — 27100171 HC OXYGEN HIGH FLOW UP TO 24 HOURS

## 2023-12-19 PROCEDURE — 36600 WITHDRAWAL OF ARTERIAL BLOOD: CPT

## 2023-12-19 PROCEDURE — 85025 COMPLETE CBC W/AUTO DIFF WBC: CPT | Performed by: EMERGENCY MEDICINE

## 2023-12-19 PROCEDURE — 25000003 PHARM REV CODE 250: Performed by: FAMILY MEDICINE

## 2023-12-19 PROCEDURE — 93005 ELECTROCARDIOGRAM TRACING: CPT

## 2023-12-19 RX ORDER — AZITHROMYCIN 250 MG/1
250 TABLET, FILM COATED ORAL DAILY
Status: DISCONTINUED | OUTPATIENT
Start: 2023-12-20 | End: 2023-12-20 | Stop reason: HOSPADM

## 2023-12-19 RX ORDER — IBUPROFEN 200 MG
24 TABLET ORAL
Status: DISCONTINUED | OUTPATIENT
Start: 2023-12-19 | End: 2023-12-20 | Stop reason: HOSPADM

## 2023-12-19 RX ORDER — INSULIN ASPART 100 [IU]/ML
1-10 INJECTION, SOLUTION INTRAVENOUS; SUBCUTANEOUS
Status: DISCONTINUED | OUTPATIENT
Start: 2023-12-19 | End: 2023-12-20 | Stop reason: HOSPADM

## 2023-12-19 RX ORDER — ENOXAPARIN SODIUM 100 MG/ML
40 INJECTION SUBCUTANEOUS EVERY 24 HOURS
Status: DISCONTINUED | OUTPATIENT
Start: 2023-12-19 | End: 2023-12-20 | Stop reason: HOSPADM

## 2023-12-19 RX ORDER — AMOXICILLIN 250 MG
1 CAPSULE ORAL 2 TIMES DAILY PRN
Status: DISCONTINUED | OUTPATIENT
Start: 2023-12-19 | End: 2023-12-20 | Stop reason: HOSPADM

## 2023-12-19 RX ORDER — FUROSEMIDE 10 MG/ML
40 INJECTION INTRAMUSCULAR; INTRAVENOUS ONCE
Status: COMPLETED | OUTPATIENT
Start: 2023-12-19 | End: 2023-12-19

## 2023-12-19 RX ORDER — AZITHROMYCIN 250 MG/1
500 TABLET, FILM COATED ORAL ONCE
Status: COMPLETED | OUTPATIENT
Start: 2023-12-19 | End: 2023-12-19

## 2023-12-19 RX ORDER — PREDNISONE 20 MG/1
60 TABLET ORAL
Status: COMPLETED | OUTPATIENT
Start: 2023-12-19 | End: 2023-12-19

## 2023-12-19 RX ORDER — NAPROXEN SODIUM 220 MG/1
81 TABLET, FILM COATED ORAL DAILY
Status: DISCONTINUED | OUTPATIENT
Start: 2023-12-19 | End: 2023-12-20 | Stop reason: HOSPADM

## 2023-12-19 RX ORDER — PREDNISONE 20 MG/1
40 TABLET ORAL DAILY
Status: DISCONTINUED | OUTPATIENT
Start: 2023-12-20 | End: 2023-12-20 | Stop reason: HOSPADM

## 2023-12-19 RX ORDER — ERGOCALCIFEROL 1.25 MG/1
50000 CAPSULE ORAL
Status: DISCONTINUED | OUTPATIENT
Start: 2023-12-19 | End: 2023-12-20 | Stop reason: HOSPADM

## 2023-12-19 RX ORDER — METHOCARBAMOL 500 MG/1
500 TABLET, FILM COATED ORAL ONCE
Status: COMPLETED | OUTPATIENT
Start: 2023-12-19 | End: 2023-12-19

## 2023-12-19 RX ORDER — ACETAMINOPHEN 325 MG/1
650 TABLET ORAL EVERY 4 HOURS PRN
Status: DISCONTINUED | OUTPATIENT
Start: 2023-12-19 | End: 2023-12-20 | Stop reason: HOSPADM

## 2023-12-19 RX ORDER — PREDNISONE 20 MG/1
40 TABLET ORAL DAILY
Status: DISCONTINUED | OUTPATIENT
Start: 2023-12-20 | End: 2023-12-19

## 2023-12-19 RX ORDER — IPRATROPIUM BROMIDE AND ALBUTEROL SULFATE 2.5; .5 MG/3ML; MG/3ML
9 SOLUTION RESPIRATORY (INHALATION) ONCE
Status: COMPLETED | OUTPATIENT
Start: 2023-12-19 | End: 2023-12-19

## 2023-12-19 RX ORDER — GLUCAGON 1 MG
1 KIT INJECTION
Status: DISCONTINUED | OUTPATIENT
Start: 2023-12-19 | End: 2023-12-20 | Stop reason: HOSPADM

## 2023-12-19 RX ORDER — SODIUM CHLORIDE 9 MG/ML
INJECTION, SOLUTION INTRAVENOUS
Status: DISCONTINUED | OUTPATIENT
Start: 2023-12-19 | End: 2023-12-20 | Stop reason: HOSPADM

## 2023-12-19 RX ORDER — TALC
6 POWDER (GRAM) TOPICAL NIGHTLY PRN
Status: DISCONTINUED | OUTPATIENT
Start: 2023-12-19 | End: 2023-12-20 | Stop reason: HOSPADM

## 2023-12-19 RX ORDER — ROFLUMILAST 500 UG/1
500 TABLET ORAL DAILY
Status: DISCONTINUED | OUTPATIENT
Start: 2023-12-19 | End: 2023-12-20 | Stop reason: HOSPADM

## 2023-12-19 RX ORDER — IBUPROFEN 600 MG/1
600 TABLET ORAL
Status: COMPLETED | OUTPATIENT
Start: 2023-12-19 | End: 2023-12-19

## 2023-12-19 RX ORDER — FUROSEMIDE 10 MG/ML
40 INJECTION INTRAMUSCULAR; INTRAVENOUS DAILY
Status: DISCONTINUED | OUTPATIENT
Start: 2023-12-20 | End: 2023-12-20 | Stop reason: HOSPADM

## 2023-12-19 RX ORDER — SODIUM CHLORIDE 0.9 % (FLUSH) 0.9 %
5 SYRINGE (ML) INJECTION
Status: DISCONTINUED | OUTPATIENT
Start: 2023-12-19 | End: 2023-12-20 | Stop reason: HOSPADM

## 2023-12-19 RX ORDER — ACETAMINOPHEN 500 MG
1000 TABLET ORAL
Status: DISPENSED | OUTPATIENT
Start: 2023-12-19 | End: 2023-12-19

## 2023-12-19 RX ORDER — NALOXONE HCL 0.4 MG/ML
0.02 VIAL (ML) INJECTION
Status: DISCONTINUED | OUTPATIENT
Start: 2023-12-19 | End: 2023-12-20 | Stop reason: HOSPADM

## 2023-12-19 RX ORDER — FLUTICASONE FUROATE AND VILANTEROL 100; 25 UG/1; UG/1
1 POWDER RESPIRATORY (INHALATION) DAILY
Status: DISCONTINUED | OUTPATIENT
Start: 2023-12-19 | End: 2023-12-19

## 2023-12-19 RX ORDER — IBUPROFEN 200 MG
16 TABLET ORAL
Status: DISCONTINUED | OUTPATIENT
Start: 2023-12-19 | End: 2023-12-20 | Stop reason: HOSPADM

## 2023-12-19 RX ORDER — ONDANSETRON 2 MG/ML
4 INJECTION INTRAMUSCULAR; INTRAVENOUS EVERY 8 HOURS PRN
Status: DISCONTINUED | OUTPATIENT
Start: 2023-12-19 | End: 2023-12-20 | Stop reason: HOSPADM

## 2023-12-19 RX ORDER — IPRATROPIUM BROMIDE AND ALBUTEROL SULFATE 2.5; .5 MG/3ML; MG/3ML
3 SOLUTION RESPIRATORY (INHALATION) EVERY 6 HOURS
Status: DISCONTINUED | OUTPATIENT
Start: 2023-12-19 | End: 2023-12-20 | Stop reason: HOSPADM

## 2023-12-19 RX ADMIN — IPRATROPIUM BROMIDE AND ALBUTEROL SULFATE 3 ML: 2.5; .5 SOLUTION RESPIRATORY (INHALATION) at 12:12

## 2023-12-19 RX ADMIN — PREDNISONE 60 MG: 20 TABLET ORAL at 01:12

## 2023-12-19 RX ADMIN — AZITHROMYCIN DIHYDRATE 500 MG: 250 TABLET ORAL at 08:12

## 2023-12-19 RX ADMIN — FLUTICASONE FUROATE AND VILANTEROL TRIFENATATE 1 PUFF: 100; 25 POWDER RESPIRATORY (INHALATION) at 08:12

## 2023-12-19 RX ADMIN — ENOXAPARIN SODIUM 40 MG: 40 INJECTION SUBCUTANEOUS at 05:12

## 2023-12-19 RX ADMIN — FUROSEMIDE 40 MG: 10 INJECTION, SOLUTION INTRAVENOUS at 11:12

## 2023-12-19 RX ADMIN — IPRATROPIUM BROMIDE AND ALBUTEROL SULFATE 9 ML: .5; 3 SOLUTION RESPIRATORY (INHALATION) at 01:12

## 2023-12-19 RX ADMIN — ASPIRIN 81 MG CHEWABLE TABLET 81 MG: 81 TABLET CHEWABLE at 09:12

## 2023-12-19 RX ADMIN — IBUPROFEN 600 MG: 600 TABLET, FILM COATED ORAL at 01:12

## 2023-12-19 RX ADMIN — CEFTRIAXONE SODIUM 1 G: 1 INJECTION, POWDER, FOR SOLUTION INTRAMUSCULAR; INTRAVENOUS at 11:12

## 2023-12-19 RX ADMIN — IPRATROPIUM BROMIDE AND ALBUTEROL SULFATE 3 ML: 2.5; .5 SOLUTION RESPIRATORY (INHALATION) at 07:12

## 2023-12-19 RX ADMIN — METHOCARBAMOL 500 MG: 500 TABLET ORAL at 01:12

## 2023-12-19 RX ADMIN — ERGOCALCIFEROL 50000 UNITS: 1.25 CAPSULE ORAL at 05:12

## 2023-12-19 RX ADMIN — SODIUM CHLORIDE: 9 INJECTION, SOLUTION INTRAVENOUS at 11:12

## 2023-12-19 RX ADMIN — IPRATROPIUM BROMIDE AND ALBUTEROL SULFATE 3 ML: 2.5; .5 SOLUTION RESPIRATORY (INHALATION) at 08:12

## 2023-12-19 NOTE — TELEPHONE ENCOUNTER
----- Message from Kimberly Dwyer sent at 12/19/2023 11:29 AM CST -----  Regarding: HFU  Patient will be discharging from [facility] and a HFU was requested with Dr Cunningham by DC provider.  Please schedule and message me back so DC can relay appointment information to patient prior to discharge.  Patient is established.    Patient is scheduled mid January. A sooner appt was requested.    DX: Acute exacerbation of chronic obstructive pulmonary disease    Sheree Meier  Physician Referral Specialist/Discharge

## 2023-12-19 NOTE — H&P
Family Medicine  History & Physical    Patient Name: Terri Phelan  MRN: [id number]  Patient Class: IP- Inpatient  Admission Date: 12/18/2023  Attending Physician: Mateo Carvajal III, MD   Primary Care Provider: Kev Cox MD         Patient information was obtained from patient, relative(s), and ER records.     Subjective:     Principal Problem:Acute exacerbation of chronic obstructive pulmonary disease (COPD)    Chief Complaint:   Chief Complaint   Patient presents with    Shortness of Breath     Arrives via ems from home with reported increasing sob for 12hrs. Pt. Wears o2 2l continuously at home. Ems reports sao2-92% (baseline) but reports dyspnea with exertion.         HPI: Patient is a 68 year old female with a PMHx of COPD (2L NC baseline), HTN (not on medications) who presents with 1 week of shortness of breath. She went to an urgent care last week where she was treated with steroids, DuoNeb, and Lasix for her shortness of breath. She had taken a break from her lasix 6 weeks prior due to leg cramping after starting lasix. She states that she knows she is in COPD exacerbation because she gets headaches when her CO2 level rises. She has been having an increase in her oxygen requirement but denies more sputum production in the past week. She has been having a headache since this morning at pulmonary rehab. She denies any fevers, chills recent illnesses or sick contacts, chests pain. She denies nausea, vomiting dizziness.     In the ED, her vitals: Temp 98.4, HR 96, /69, RR 24, SpO2 of 92% on 2L NC at rest. When she moved she desaturated to 76% on 2L NC. CBC significant for platelet of 125, CMP significant for CO2 of 40, CK negative, troponin negative. EKG showed normal sinus rhythm and CXR showed no acute abnormality. ABG ws ordered and showed pH of 7.277, CO2 109, O2 49.8, and HCO3 of 50.7. In the ED she was treated with duo nebs and prednisone. Patient was admitted to [facility] family medicine for  COPD exacerbation and hypercapnia.     Past Medical History:   Diagnosis Date    COPD (chronic obstructive pulmonary disease)     Hypertension        Past Surgical History:   Procedure Laterality Date    BREAST BIOPSY N/A     pt unsure which breast but it was benign-(calcium)     SECTION      HERNIA REPAIR      HYSTERECTOMY         Review of patient's allergies indicates:   Allergen Reactions    Codeine Nausea And Vomiting    Lipitor [atorvastatin] Other (See Comments)     Muscle fatigue      Morphine Hallucinations       No current facility-administered medications on file prior to encounter.     Current Outpatient Medications on File Prior to Encounter   Medication Sig    aspirin 81 MG Chew Take 81 mg by mouth once daily.    azithromycin (Z-OLY) 250 MG tablet Take 1 tablet (250 mg total) by mouth every Mon, Wed, Fri.    fluticasone propionate (FLONASE) 50 mcg/actuation nasal spray 1 spray (50 mcg total) by Each Nostril route daily as needed for Allergies.    furosemide (LASIX) 40 MG tablet Take 1 tablet (40 mg total) by mouth once daily.    multivit with min-folic acid (WOMEN'S MULTIVITAMIN GUMMIES) 200 mcg Chew Take 2 tablets by mouth once daily.    potassium chloride (KLOR-CON) 10 MEQ TbSR Take 1 tablet (10 mEq total) by mouth once daily. for 365 doses    predniSONE (DELTASONE) 20 MG tablet Take 20 mg by mouth 2 (two) times daily.    roflumilast (DALIRESP) 500 mcg Tab Take 1 tablet by mouth once daily.    acetaminophen (TYLENOL) 500 MG tablet Take 500 mg by mouth every 6 (six) hours as needed for Pain.    albuterol (PROVENTIL/VENTOLIN HFA) 90 mcg/actuation inhaler Inhale 2 puffs into the lungs every 6 (six) hours as needed for Wheezing or Shortness of Breath.    albuterol-ipratropium (DUO-NEB) 2.5 mg-0.5 mg/3 mL nebulizer solution Take 3 mLs by nebulization every 6 (six) hours as needed for Wheezing.    alendronate (FOSAMAX) 35 MG tablet Take 35 mg by mouth every Tuesday.     budesonide-glycopyr-formoterol (BREZTRI AEROSPHERE) 160-9-4.8 mcg/actuation HFAA Inhale 2 puffs into the lungs 2 (two) times daily.    chlorpheniramine-pseudoephedrine-acetaminophen (SINUTAB) 2- mg per tablet 2 tablet 3 TIMES DAILY (route: oral)    dextromethorphan-guaiFENesin  mg (MUCINEX DM)  mg per 12 hr tablet Take 1 tablet by mouth every 12 (twelve) hours.    diazePAM (VALIUM) 2 MG tablet Take 1 tablet (2 mg total) by mouth daily as needed for Anxiety.    dicyclomine (BENTYL) 20 mg tablet Take 1 tablet (20 mg total) by mouth 3 (three) times daily as needed (abdominal pain).    ergocalciferol (ERGOCALCIFEROL) 50,000 unit Cap Take 50,000 Units by mouth every Tuesday.    LORazepam (ATIVAN) 2 mg/mL injection     OXYGEN-AIR DELIVERY SYSTEMS MISC 3 L by Nasal route.    predniSONE (DELTASONE) 20 MG tablet Take 2 tablets by mouth once daily.    traZODone (DESYREL) 50 MG tablet Take 1 tablet (50 mg total) by mouth nightly as needed for Insomnia.     Family History    None       Tobacco Use    Smoking status: Former     Current packs/day: 1.00     Average packs/day: 1 pack/day for 20.0 years (20.0 ttl pk-yrs)     Types: Cigarettes     Passive exposure: Past    Smokeless tobacco: Never   Substance and Sexual Activity    Alcohol use: Not Currently     Alcohol/week: 1.0 standard drink of alcohol     Types: 1 Glasses of wine per week    Drug use: No    Sexual activity: Not Currently     Review of Systems   Constitutional:  Negative for activity change, chills and fever.   HENT:  Negative for rhinorrhea and sore throat.    Respiratory:  Positive for chest tightness and shortness of breath. Negative for cough.    Cardiovascular:  Negative for chest pain, palpitations and leg swelling.   Gastrointestinal:  Negative for abdominal distention, abdominal pain, nausea and vomiting.   Skin:  Negative for pallor, rash and wound.   Neurological:  Positive for headaches.   Psychiatric/Behavioral:  Negative for  confusion.      Objective:     Vital Signs (Most Recent):  Temp: 98 °F (36.7 °C) (12/19/23 0445)  Pulse: 85 (12/19/23 0445)  Resp: 18 (12/19/23 0257)  BP: 138/61 (12/19/23 0445)  SpO2: (!) 94 % (12/19/23 0531) Vital Signs (24h Range):  Temp:  [98 °F (36.7 °C)-98.4 °F (36.9 °C)] 98 °F (36.7 °C)  Pulse:  [77-96] 85  Resp:  [18-28] 18  SpO2:  [92 %-95 %] 94 %  BP: (131-179)/(58-69) 138/61     Weight: 83.1 kg (183 lb 3.2 oz)  Body mass index is 30.49 kg/m².     Physical Exam  Vitals and nursing note reviewed.   Constitutional:       General: She is not in acute distress.     Appearance: Normal appearance. She is obese. She is not ill-appearing, toxic-appearing or diaphoretic.   HENT:      Head: Normocephalic and atraumatic.      Right Ear: External ear normal.      Left Ear: External ear normal.      Nose: Nose normal. No congestion or rhinorrhea.      Mouth/Throat:      Mouth: Mucous membranes are moist.      Pharynx: No oropharyngeal exudate or posterior oropharyngeal erythema.   Eyes:      General: No scleral icterus.     Extraocular Movements: Extraocular movements intact.      Pupils: Pupils are equal, round, and reactive to light.   Cardiovascular:      Rate and Rhythm: Normal rate and regular rhythm.      Pulses: Normal pulses.   Pulmonary:      Effort: No respiratory distress.      Breath sounds: Wheezing (bilateral) present.   Abdominal:      General: Abdomen is flat. There is no distension.   Musculoskeletal:         General: Normal range of motion.      Cervical back: Normal range of motion.      Right lower leg: No edema.      Left lower leg: No edema.   Skin:     General: Skin is warm.      Findings: No rash.   Neurological:      General: No focal deficit present.      Mental Status: She is alert and oriented to person, place, and time.              CRANIAL NERVES     CN III, IV, VI   Pupils are equal, round, and reactive to light.       Significant Labs: All pertinent labs within the past 24 hours have  been reviewed.  ABGs:   Recent Labs   Lab 12/19/23  0515   PH 7.277*   PCO2 109*   HCO3 50.7*   POCSATURATED 81.2*   PO2 49.8*     CBC:   Recent Labs   Lab 12/19/23  0019   WBC 12.01   HGB 13.6   HCT 47.2   *     CMP:   Recent Labs   Lab 12/19/23  0019      K 4.5   CL 95   CO2 40*   *   BUN 19   CREATININE 0.9   CALCIUM 8.8   PROT 6.5   ALBUMIN 3.3*   BILITOT 0.2   ALKPHOS 61   AST 20   ALT 15   ANIONGAP 9       Significant Imaging: I have reviewed all pertinent imaging results/findings within the past 24 hours.  CXR: I have reviewed all pertinent results/findings within the past 24 hours and my personal findings are:  No cardiomegaly and no pulmonary congestion  EKG: I have reviewed all pertinent results/findings within the past 24 hours and my personal findings are: Normal Sinus rhythm  Assessment/Plan:     * Acute exacerbation of chronic obstructive pulmonary disease (COPD)  Patient's COPD is with exacerbation noted by worsening of baseline hypoxia currently. Continue scheduled inhalers  Duonebs, Steroids, and Supplemental oxygen and monitor respiratory status closely.   Patient with acute on COPD exacerbation associated with hypercapnia and hypoxia. ABG on admit showed pH of 7.277, CO2 109, O2 49.8, and HCO3 of 50.7.    Plan:  Bipap at night (home settings 18/8)  Scheduled Duonebs  Daily prednisone  Chest physiotherapy  Monitor O2 requirements, wean to home 2L when possible    HTN (hypertension)  Chronic, uncontrolled. Latest blood pressure and vitals reviewed-     Temp:  [98 °F (36.7 °C)-98.4 °F (36.9 °C)]   Pulse:  [77-96]   Resp:  [18-28]   BP: (131-179)/(58-69)   SpO2:  [92 %-95 %] .   Home meds for hypertension were reviewed and noted below.   Hypertension Medications               furosemide (LASIX) 40 MG tablet Take 1 tablet (40 mg total) by mouth once daily.            Will utilize p.r.n. blood pressure medication only if patient's blood pressure greater than 180/110 and she develops  symptoms such as worsening chest pain or shortness of breath.    Plan:  Patient not on any antihypertensives at home. Will allow -180s while in hospital  Encourage patient to f/u with PCP for hypertension management      VTE Risk Mitigation (From admission, onward)           Ordered     enoxaparin injection 40 mg  Daily         12/19/23 0336     IP VTE HIGH RISK PATIENT  Once         12/19/23 0336     Place sequential compression device  Until discontinued         12/19/23 0336                    Manuel Miles MD  [facility], PGY-1  12/19/2023

## 2023-12-19 NOTE — NURSING
Patient is asking to be placed back on Bipap, requested RT - Jodie see the patient, currently the patient is being placed on Bipap by RT, am meds held at patient request until later  due to SOB at this time. MD aware.

## 2023-12-19 NOTE — ED PROVIDER NOTES
Encounter Date: 2023       History     Chief Complaint   Patient presents with    Shortness of Breath     Arrives via ems from home with reported increasing sob for 12hrs. Pt. Wears o2 2l continuously at home. Ems reports sao2-92% (baseline) but reports dyspnea with exertion.      68-year-old female with a history of COPD and hypertension presents with 1 week of shortness of breath.  Patient was seen at urgent care last week where she was given steroids, DuoNeb treatments and restarted on her Lasix.  Patient had been off of Lasix for about 6 weeks because of cramping it was causing.  Today around noon after pulmonary rehab, the patient noticed worsening of her breathing and increase in her CO2.  Patient is normally on 2 L at baseline and baseline O2 sat is around 92%.  Symptoms are worse with exertion.      Review of patient's allergies indicates:   Allergen Reactions    Codeine Nausea And Vomiting    Lipitor [atorvastatin] Other (See Comments)     Muscle fatigue      Morphine Hallucinations     Past Medical History:   Diagnosis Date    COPD (chronic obstructive pulmonary disease)     Hypertension      Past Surgical History:   Procedure Laterality Date    BREAST BIOPSY N/A     pt unsure which breast but it was benign-(calcium)     SECTION      HERNIA REPAIR      HYSTERECTOMY       No family history on file.  Social History     Tobacco Use    Smoking status: Former     Current packs/day: 1.00     Average packs/day: 1 pack/day for 20.0 years (20.0 ttl pk-yrs)     Types: Cigarettes     Passive exposure: Past    Smokeless tobacco: Never   Substance Use Topics    Alcohol use: Not Currently     Alcohol/week: 1.0 standard drink of alcohol     Types: 1 Glasses of wine per week    Drug use: No     Review of Systems   Constitutional:  Negative for fever.   HENT:  Positive for congestion.    Eyes:  Negative for pain.   Respiratory:  Positive for cough and shortness of breath.    Cardiovascular:  Negative for  chest pain.   Gastrointestinal:  Negative for abdominal pain, nausea and vomiting.   Genitourinary:  Negative for difficulty urinating.   Musculoskeletal:  Negative for back pain and neck pain.   Neurological:  Negative for headaches.   Psychiatric/Behavioral:  Negative for confusion.        Physical Exam     Initial Vitals [12/18/23 2336]   BP Pulse Resp Temp SpO2   (!) 179/69 96 (!) 24 98.4 °F (36.9 °C) (!) 92 %      MAP       --         Physical Exam    Nursing note and vitals reviewed.  HENT:   Head: Atraumatic.   Eyes: Conjunctivae and EOM are normal.   Neck:   Normal range of motion.  Cardiovascular:      Exam reveals no gallop and no friction rub.       No murmur heard.  Pulmonary/Chest:   Mild tachypnea.  Lung sounds diminished in all lung fields   Abdominal: Abdomen is soft. There is no abdominal tenderness.   Musculoskeletal:         General: Edema (1+ jail up the legs bilaterally) present.      Cervical back: Normal range of motion.     Neurological: She is alert and oriented to person, place, and time.   Psychiatric: She has a normal mood and affect.         ED Course   Critical Care    Date/Time: 12/19/2023 2:17 AM    Performed by: Ashwin Dominguez MD  Authorized by: Ashwin Dominguez MD  Direct patient critical care time: 27 minutes  Ordering / reviewing critical care time: 10 minutes  Documentation critical care time: 10 minutes  Consulting other physicians critical care time: 5 minutes  Total critical care time (exclusive of procedural time) : 52 minutes  Critical care was necessary to treat or prevent imminent or life-threatening deterioration of the following conditions: respiratory failure.  Critical care was time spent personally by me on the following activities: discussions with consultants, development of treatment plan with patient or surrogate, evaluation of patient's response to treatment, examination of patient, obtaining history from patient or surrogate, ordering and performing  treatments and interventions, ordering and review of laboratory studies, ordering and review of radiographic studies, pulse oximetry, re-evaluation of patient's condition and review of old charts.        Labs Reviewed   CBC W/ AUTO DIFFERENTIAL - Abnormal; Notable for the following components:       Result Value    MCHC 28.8 (*)     RDW 16.0 (*)     Platelets 125 (*)     Immature Granulocytes 0.7 (*)     Immature Grans (Abs) 0.09 (*)     Mono # 1.6 (*)     All other components within normal limits   COMPREHENSIVE METABOLIC PANEL - Abnormal; Notable for the following components:    CO2 40 (*)     Glucose 123 (*)     Albumin 3.3 (*)     All other components within normal limits   TROPONIN I   MAGNESIUM   CK   CK   NT-PRO NATRIURETIC PEPTIDE     EKG Readings: (Independently Interpreted)   Initial Reading: No STEMI. Rhythm: Normal Sinus Rhythm. Heart Rate: 81. Ectopy: No Ectopy. Conduction: Normal.       Imaging Results              X-Ray Chest AP Portable (Final result)  Result time 12/19/23 00:38:19      Final result by Arnaud Cunningham DO (12/19/23 00:38:19)                   Impression:      No acute abnormality.      Electronically signed by: Arnaud Cunningham  Date:    12/19/2023  Time:    00:38               Narrative:    EXAMINATION:  XR CHEST AP PORTABLE    CLINICAL HISTORY:  sob;    TECHNIQUE:  Single frontal view of the chest was performed.    COMPARISON:  12/14/2023.    FINDINGS:  The lungs are well expanded and clear. No focal opacities are seen. The pleural spaces are clear. The cardiac silhouette is unremarkable.  There are calcifications of the aortic arch.  The visualized osseous structures demonstrate degenerative changes.                                    X-Rays:   Independently Interpreted Readings:   Chest X-Ray: No infiltrates.  Normal heart size.  No acute abnormalities.     Medications   acetaminophen tablet 1,000 mg (1,000 mg Oral Not Given 12/19/23 0125)   albuterol-ipratropium 2.5 mg-0.5 mg/3  mL nebulizer solution 9 mL (9 mLs Nebulization Given 12/19/23 0130)   predniSONE tablet 60 mg (60 mg Oral Given 12/19/23 0125)   ibuprofen tablet 600 mg (600 mg Oral Given 12/19/23 0132)     Medical Decision Making  68-year-old female with COPD and hypertension presenting with shortness of breath worsening today.  On 2 L nasal cannula patient has O2 sats of 92%.  She is afebrile.  She has not tachycardic.  Mild hypertension noted.  Lung sounds are diminished in all lung fields.  She has mild peripheral edema.  Cardiac workup initiated.    Amount and/or Complexity of Data Reviewed  Labs: ordered. Decision-making details documented in ED Course.  Radiology: ordered.    Risk  OTC drugs.  Prescription drug management.      Additional MDM:   Differential Diagnosis:   Differential diagnosis includes but not limited to COPD exacerbation, bronchitis with bronchospasm, CHF, pneumonia amongst others             ED Course as of 12/19/23 0218   Tue Dec 19, 2023   0055 Comprehensive Metabolic Panel(!) [SN]   0055 Magnesium : 2.0 [SN]   0105 Troponin I: <0.006 [SN]   0123 CPK: 100 [SN]   0134 CBC Auto Differential(!) [SN]   0217 Attempted to ambulate patient but O2 sats dropped 76% on baseline 2 L.  Discussed with family Medicine need for admission for further DuoNeb treatments given hypoxia. [SN]      ED Course User Index  [SN] Ashwin Dominguez MD                             Clinical Impression:  Final diagnoses:  [R06.02] SOB (shortness of breath)  [J44.1] COPD exacerbation (Primary)          ED Disposition Condition    Admit Stable                Ashwin Dominguez MD  12/19/23 0218

## 2023-12-19 NOTE — HOSPITAL COURSE
Patient received rocephin, azithromycin on am of admission for CAP coverage. Duonebs, steroids and home medications for COPD. Lasix for possible pulmonary edema. Subsequent BNP, procalcitonin negative. She had rapid improvement of respiratory status with above treatment. Patient felt back to baseline without cough, mucus production, wheezing. Patient discharged on 12/20 to complete 3 more days of oral prednisone 40 mg. Patient was not discharged on antibiotics other than her home azithromycin MWF as ultimately felt this presentation was secondary to COPD exacerbation as opposed to bacterial pneumonia. ED precautions discussed. Patient advised to use lasix PRN w/ KCL 10 mEq tablets when lower extremity edema develops. Provided rx for robaxin 500 mg PRN as patient has history of cramps due to lasix. Patient to follow up with her outpatient pulmonologist.

## 2023-12-19 NOTE — SUBJECTIVE & OBJECTIVE
Past Medical History:   Diagnosis Date    COPD (chronic obstructive pulmonary disease)     Hypertension        Past Surgical History:   Procedure Laterality Date    BREAST BIOPSY N/A     pt unsure which breast but it was benign-(calcium)     SECTION      HERNIA REPAIR      HYSTERECTOMY         Review of patient's allergies indicates:   Allergen Reactions    Codeine Nausea And Vomiting    Lipitor [atorvastatin] Other (See Comments)     Muscle fatigue      Morphine Hallucinations       No current facility-administered medications on file prior to encounter.     Current Outpatient Medications on File Prior to Encounter   Medication Sig    aspirin 81 MG Chew Take 81 mg by mouth once daily.    azithromycin (Z-OLY) 250 MG tablet Take 1 tablet (250 mg total) by mouth every Mon, Wed, Fri.    fluticasone propionate (FLONASE) 50 mcg/actuation nasal spray 1 spray (50 mcg total) by Each Nostril route daily as needed for Allergies.    furosemide (LASIX) 40 MG tablet Take 1 tablet (40 mg total) by mouth once daily.    multivit with min-folic acid (WOMEN'S MULTIVITAMIN GUMMIES) 200 mcg Chew Take 2 tablets by mouth once daily.    potassium chloride (KLOR-CON) 10 MEQ TbSR Take 1 tablet (10 mEq total) by mouth once daily. for 365 doses    predniSONE (DELTASONE) 20 MG tablet Take 20 mg by mouth 2 (two) times daily.    roflumilast (DALIRESP) 500 mcg Tab Take 1 tablet by mouth once daily.    acetaminophen (TYLENOL) 500 MG tablet Take 500 mg by mouth every 6 (six) hours as needed for Pain.    albuterol (PROVENTIL/VENTOLIN HFA) 90 mcg/actuation inhaler Inhale 2 puffs into the lungs every 6 (six) hours as needed for Wheezing or Shortness of Breath.    albuterol-ipratropium (DUO-NEB) 2.5 mg-0.5 mg/3 mL nebulizer solution Take 3 mLs by nebulization every 6 (six) hours as needed for Wheezing.    alendronate (FOSAMAX) 35 MG tablet Take 35 mg by mouth every Tuesday.    budesonide-glycopyr-formoterol (BREZTRI AEROSPHERE) 160-9-4.8  mcg/actuation HFAA Inhale 2 puffs into the lungs 2 (two) times daily.    chlorpheniramine-pseudoephedrine-acetaminophen (SINUTAB) 2- mg per tablet 2 tablet 3 TIMES DAILY (route: oral)    dextromethorphan-guaiFENesin  mg (MUCINEX DM)  mg per 12 hr tablet Take 1 tablet by mouth every 12 (twelve) hours.    diazePAM (VALIUM) 2 MG tablet Take 1 tablet (2 mg total) by mouth daily as needed for Anxiety.    dicyclomine (BENTYL) 20 mg tablet Take 1 tablet (20 mg total) by mouth 3 (three) times daily as needed (abdominal pain).    ergocalciferol (ERGOCALCIFEROL) 50,000 unit Cap Take 50,000 Units by mouth every Tuesday.    LORazepam (ATIVAN) 2 mg/mL injection     OXYGEN-AIR DELIVERY SYSTEMS MISC 3 L by Nasal route.    predniSONE (DELTASONE) 20 MG tablet Take 2 tablets by mouth once daily.    traZODone (DESYREL) 50 MG tablet Take 1 tablet (50 mg total) by mouth nightly as needed for Insomnia.     Family History    None       Tobacco Use    Smoking status: Former     Current packs/day: 1.00     Average packs/day: 1 pack/day for 20.0 years (20.0 ttl pk-yrs)     Types: Cigarettes     Passive exposure: Past    Smokeless tobacco: Never   Substance and Sexual Activity    Alcohol use: Not Currently     Alcohol/week: 1.0 standard drink of alcohol     Types: 1 Glasses of wine per week    Drug use: No    Sexual activity: Not Currently     Review of Systems   Constitutional:  Negative for activity change, chills and fever.   HENT:  Negative for rhinorrhea and sore throat.    Respiratory:  Positive for chest tightness and shortness of breath. Negative for cough.    Cardiovascular:  Negative for chest pain, palpitations and leg swelling.   Gastrointestinal:  Negative for abdominal distention, abdominal pain, nausea and vomiting.   Skin:  Negative for pallor, rash and wound.   Neurological:  Positive for headaches.   Psychiatric/Behavioral:  Negative for confusion.      Objective:     Vital Signs (Most Recent):  Temp: 98 °F  (36.7 °C) (12/19/23 0445)  Pulse: 85 (12/19/23 0445)  Resp: 18 (12/19/23 0257)  BP: 138/61 (12/19/23 0445)  SpO2: (!) 94 % (12/19/23 0531) Vital Signs (24h Range):  Temp:  [98 °F (36.7 °C)-98.4 °F (36.9 °C)] 98 °F (36.7 °C)  Pulse:  [77-96] 85  Resp:  [18-28] 18  SpO2:  [92 %-95 %] 94 %  BP: (131-179)/(58-69) 138/61     Weight: 83.1 kg (183 lb 3.2 oz)  Body mass index is 30.49 kg/m².     Physical Exam  Vitals and nursing note reviewed.   Constitutional:       General: She is not in acute distress.     Appearance: Normal appearance. She is obese. She is not ill-appearing, toxic-appearing or diaphoretic.   HENT:      Head: Normocephalic and atraumatic.      Right Ear: External ear normal.      Left Ear: External ear normal.      Nose: Nose normal. No congestion or rhinorrhea.      Mouth/Throat:      Mouth: Mucous membranes are moist.      Pharynx: No oropharyngeal exudate or posterior oropharyngeal erythema.   Eyes:      General: No scleral icterus.     Extraocular Movements: Extraocular movements intact.      Pupils: Pupils are equal, round, and reactive to light.   Cardiovascular:      Rate and Rhythm: Normal rate and regular rhythm.      Pulses: Normal pulses.   Pulmonary:      Effort: No respiratory distress.      Breath sounds: Wheezing (bilateral) present.   Abdominal:      General: Abdomen is flat. There is no distension.   Musculoskeletal:         General: Normal range of motion.      Cervical back: Normal range of motion.      Right lower leg: No edema.      Left lower leg: No edema.   Skin:     General: Skin is warm.      Findings: No rash.   Neurological:      General: No focal deficit present.      Mental Status: She is alert and oriented to person, place, and time.              CRANIAL NERVES     CN III, IV, VI   Pupils are equal, round, and reactive to light.       Significant Labs: All pertinent labs within the past 24 hours have been reviewed.  ABGs:   Recent Labs   Lab 12/19/23 0515   PH 7.277*    PCO2 109*   HCO3 50.7*   POCSATURATED 81.2*   PO2 49.8*     CBC:   Recent Labs   Lab 12/19/23  0019   WBC 12.01   HGB 13.6   HCT 47.2   *     CMP:   Recent Labs   Lab 12/19/23 0019      K 4.5   CL 95   CO2 40*   *   BUN 19   CREATININE 0.9   CALCIUM 8.8   PROT 6.5   ALBUMIN 3.3*   BILITOT 0.2   ALKPHOS 61   AST 20   ALT 15   ANIONGAP 9       Significant Imaging: I have reviewed all pertinent imaging results/findings within the past 24 hours.  CXR: I have reviewed all pertinent results/findings within the past 24 hours and my personal findings are:  No cardiomegaly and no pulmonary congestion  EKG: I have reviewed all pertinent results/findings within the past 24 hours and my personal findings are: Normal Sinus rhythm

## 2023-12-19 NOTE — ED NOTES
Pt reports to ED via EMS with c/o SOB with exertion that began about 12 hours ago. Pt on 2 L NC at baseline at home and reports baseline SPO2 90-92%.

## 2023-12-19 NOTE — PLAN OF CARE
Problem: Adult Inpatient Plan of Care  Goal: Plan of Care Review  Outcome: Ongoing, Progressing     VIRTUAL NURSE:  Cued into patient's room.  Permission received per patient to turn camera to view patient.  Introduced as VN for night shift that will be working with floor nurse and nursing assistant.  Educated patient on VN's role in patient care and  VIP model.  Plan of care reviewed with patient.  Education per flowsheet.   Informed patient that staff will round on them every 2 hours but to use call light for any other needs they may have; informed of fall risk and fall precautions.  Patient verbalized understanding.  Call light within reach; bed siderails up x3.  Opportunity given for questions and questions answered.  Admission assessment questions answered.  Patient denies complaints or any needs at this time. Instructed to call for assistance.  Will cont to monitor and intervene as needed.    Labs, notes, orders, and careplan initiated.       12/19/23 0457   Patient Request   Patient Requested no complaints or needs currently   Admission   Initial VN Admission Questions Complete   Communication Issues? Technical Issue   Shift   Virtual Nurse - Rounding Complete   Virtual Nurse - Patient Verbalized Approval Of Camera Use;VN Rounding   Type of Frequent Check   Type Patient Rounds   Safety/Activity   Patient Rounds bed in low position;placement of personal items at bedside;bed wheels locked;call light in patient/parent reach;visualized patient;clutter free environment maintained   Safety Promotion/Fall Prevention assistive device/personal item within reach;bed alarm set;high risk medications identified;Fall Risk reviewed with patient/family;diversional activities provided;medications reviewed;nonskid shoes/socks when out of bed;room near unit station;side rails raised x 3;supervised activity;Supervised toileting - stay within arms reach;instructed to call staff for mobility   Safety Precautions emergency  equipment at bedside   Positioning   Body Position neutral body alignment;neutral head position;position changed independently   Head of Bed (HOB) Positioning HOB at 60-90 degrees   Pain/Comfort/Sleep   Preferred Pain Scale number (Numeric Rating Pain Scale)   Comfort/Acceptable Pain Level 0   Pain Rating (0-10): Rest 0   Sleep/Rest/Relaxation no problem identified;awake

## 2023-12-19 NOTE — PLAN OF CARE
CM met with pt - spoke per phone with pt's daughter Mulugeta  [phone]   Dx:  COPD Exacerbation     Pt has a home ventilator, Home O2 (daughter to bring port tanks for transport), RW, bsc, ttb   Future Appointments   Date Time Provider Department Center   1/16/2024  1:30 PM Alex Cunningham MD [facility] Brandan Mascorro     CM has requested pcp f/u apt.

## 2023-12-19 NOTE — ED NOTES
Ambulatory SPO2 on 2 LPM assessed. Patient walked approx 20 feet and became very short of breath with SPO2 rapidly decreasing to 76%. Patient was returned to room and O2 increased to 4 LPM until SPO2 returned to her baseline. MD heredia.

## 2023-12-19 NOTE — ASSESSMENT & PLAN NOTE
Chronic, uncontrolled. Latest blood pressure and vitals reviewed-     Temp:  [98 °F (36.7 °C)-98.4 °F (36.9 °C)]   Pulse:  [77-96]   Resp:  [18-28]   BP: (131-179)/(58-69)   SpO2:  [92 %-95 %] .   Home meds for hypertension were reviewed and noted below.   Hypertension Medications               furosemide (LASIX) 40 MG tablet Take 1 tablet (40 mg total) by mouth once daily.            Will utilize p.r.n. blood pressure medication only if patient's blood pressure greater than 180/110 and she develops symptoms such as worsening chest pain or shortness of breath.    Plan:  Patient not on any antihypertensives at home. Will allow -180s while in hospital  Encourage patient to f/u with PCP for hypertension management

## 2023-12-19 NOTE — ASSESSMENT & PLAN NOTE
Patient's COPD is with exacerbation noted by worsening of baseline hypoxia currently. Continue scheduled inhalers  Duonebs, Steroids, and Supplemental oxygen and monitor respiratory status closely.   Patient with acute on COPD exacerbation associated with hypercapnia and hypoxia. ABG on admit showed pH of 7.277, CO2 109, O2 49.8, and HCO3 of 50.7.    Plan:  Bipap at night (home settings 18/8)  Scheduled Duonebs  Daily prednisone  Chest physiotherapy  Monitor O2 requirements, wean to home 2L when possible

## 2023-12-19 NOTE — HPI
Patient is a 68 year old female with a PMHx of COPD (2L NC baseline), HTN (not on medications) who presents with 1 week of shortness of breath. She went to an urgent care last week where she was treated with steroids, DuoNeb, and Lasix for her shortness of breath. She had taken a break from her lasix 6 weeks prior due to leg cramping after starting lasix. She states that she knows she is in COPD exacerbation because she gets headaches when her CO2 level rises. She has been having an increase in her oxygen requirement but denies more sputum production in the past week. She has been having a headache since this morning at pulmonary rehab. She denies any fevers, chills recent illnesses or sick contacts, chests pain. She denies nausea, vomiting dizziness.     In the ED, her vitals: Temp 98.4, HR 96, /69, RR 24, SpO2 of 92% on 2L NC at rest. When she moved she desaturated to 76% on 2L NC. CBC significant for platelet of 125, CMP significant for CO2 of 40, CK negative, troponin negative. EKG showed normal sinus rhythm and CXR showed no acute abnormality. ABG ws ordered and showed pH of 7.277, CO2 109, O2 49.8, and HCO3 of 50.7. In the ED she was treated with duo nebs and prednisone. Patient was admitted to [facility] family medicine for COPD exacerbation and hypercapnia.

## 2023-12-19 NOTE — PROGRESS NOTES
Future Appointments   Date Time Provider Department Center   1/16/2024  1:30 PM Alex Cunningham MD [facility]

## 2023-12-19 NOTE — CARE UPDATE
Procalcitonin, BNP labs never resulted. Contacted laboratory who were unable to locate specimen. Labs discontinued and reordered STAT.    Angus Alamo DO   [facility] PGY-2

## 2023-12-19 NOTE — CODE/ RAPID DOCUMENTATION
RAPID RESPONSE NURSE PROACTIVE ROUNDING NOTE       Time of Visit: 730    Admit Date: 2023  LOS: 0  Code Status: DNR   Date of Visit: 2023  : 1955  Age: 68 y.o.  Sex: female  Race: Black or   Bed: K532/K532 A:   MRN: [id number]  Was the patient discharged from an ICU this admission? No   Was the patient discharged from a PACU within last 24 hours? No   Did the patient receive conscious sedation/general anesthesia in last 24 hours? No   Was the patient in the ED within the past 24 hours? Yes   Was the patient on NIPPV within the past 24 hours? Yes   Attending Physician: Mateo Carvajal III, MD  Primary Service: Family Medicine   Time spent at the bedside: 15 -30 min    SITUATION    Notified by Epic patient alert  Reason for alert: Staff concern and labs    Diagnosis: Acute exacerbation of chronic obstructive pulmonary disease (COPD)   has a past medical history of COPD (chronic obstructive pulmonary disease) and Hypertension.    Last Vitals:  Temp: 98.7 °F (37.1 °C) (726)  Pulse: 81 ( 08)  Resp: 20 (847)  BP: 163/73 (726)  SpO2: 91 % (847)    24 Hour Vitals Range:  Temp:  [98 °F (36.7 °C)-98.7 °F (37.1 °C)]   Pulse:  [77-96]   Resp:  [18-28]   BP: (131-179)/(58-73)   SpO2:  [91 %-99 %]     Clinical Issues: Respiratory    ASSESSMENT/INTERVENTIONS    Pt seen d/t staff concern for airway protection and increased Co2 per ABG. Pt sitting on side of bed on 2L NC. Pt endorsed wearing bipap for a little overnight. MD at bedside. POC discussed and education provided regarding COPD and ADL's. Pt had no complaints at this time. Pt VSS.     RECOMMENDATIONS  Monitor airway and Co2.  Be compliant with Bipap and wearing oxygen appropriately.     Discussed plan of care with Bedside RNAnnie    PROVIDER ESCALATION    Physician escalation: Yes    Orders received and case discussed with Dr. TD Mosher .    Disposition:Remain in room 532    FOLLOW UP    Call back the  Rapid Response Nurse, Bert at [phone] for additional questions or concerns.

## 2023-12-19 NOTE — NURSING
Received report from Bo, received the patient lying supine in bed with bed locked in low and alarm on with rails up x2, call light in her left hand, instructed patient to call for any needs, bipap in progress at present.

## 2023-12-20 ENCOUNTER — TELEPHONE (OUTPATIENT)
Dept: INTERNAL MEDICINE | Facility: CLINIC | Age: 68
End: 2023-12-20
Payer: MEDICARE

## 2023-12-20 VITALS
RESPIRATION RATE: 18 BRPM | DIASTOLIC BLOOD PRESSURE: 58 MMHG | OXYGEN SATURATION: 93 % | TEMPERATURE: 98 F | BODY MASS INDEX: 32.65 KG/M2 | HEIGHT: 65 IN | WEIGHT: 196 LBS | HEART RATE: 91 BPM | SYSTOLIC BLOOD PRESSURE: 124 MMHG

## 2023-12-20 LAB
ALBUMIN SERPL BCP-MCNC: 3.1 G/DL (ref 3.5–5.2)
ALP SERPL-CCNC: 41 U/L (ref 55–135)
ALT SERPL W/O P-5'-P-CCNC: 15 U/L (ref 10–44)
ANION GAP SERPL CALC-SCNC: 6 MMOL/L (ref 8–16)
AST SERPL-CCNC: 16 U/L (ref 10–40)
BASOPHILS # BLD AUTO: 0.04 K/UL (ref 0–0.2)
BASOPHILS NFR BLD: 0.3 % (ref 0–1.9)
BILIRUB SERPL-MCNC: 0.3 MG/DL (ref 0.1–1)
BUN SERPL-MCNC: 19 MG/DL (ref 8–23)
CALCIUM SERPL-MCNC: 9 MG/DL (ref 8.7–10.5)
CHLORIDE SERPL-SCNC: 94 MMOL/L (ref 95–110)
CO2 SERPL-SCNC: 46 MMOL/L (ref 23–29)
CREAT SERPL-MCNC: 0.7 MG/DL (ref 0.5–1.4)
DIFFERENTIAL METHOD: ABNORMAL
EOSINOPHIL # BLD AUTO: 0.1 K/UL (ref 0–0.5)
EOSINOPHIL NFR BLD: 0.4 % (ref 0–8)
ERYTHROCYTE [DISTWIDTH] IN BLOOD BY AUTOMATED COUNT: 15.8 % (ref 11.5–14.5)
EST. GFR  (NO RACE VARIABLE): >60 ML/MIN/1.73 M^2
GLUCOSE SERPL-MCNC: 99 MG/DL (ref 70–110)
HCT VFR BLD AUTO: 46 % (ref 37–48.5)
HGB BLD-MCNC: 13.3 G/DL (ref 12–16)
IMM GRANULOCYTES # BLD AUTO: 0.06 K/UL (ref 0–0.04)
IMM GRANULOCYTES NFR BLD AUTO: 0.5 % (ref 0–0.5)
LYMPHOCYTES # BLD AUTO: 2.9 K/UL (ref 1–4.8)
LYMPHOCYTES NFR BLD: 22.7 % (ref 18–48)
MAGNESIUM SERPL-MCNC: 2.1 MG/DL (ref 1.6–2.6)
MCH RBC QN AUTO: 27.1 PG (ref 27–31)
MCHC RBC AUTO-ENTMCNC: 28.9 G/DL (ref 32–36)
MCV RBC AUTO: 94 FL (ref 82–98)
MONOCYTES # BLD AUTO: 1.1 K/UL (ref 0.3–1)
MONOCYTES NFR BLD: 8.9 % (ref 4–15)
NEUTROPHILS # BLD AUTO: 8.6 K/UL (ref 1.8–7.7)
NEUTROPHILS NFR BLD: 67.2 % (ref 38–73)
NRBC BLD-RTO: 0 /100 WBC
PHOSPHATE SERPL-MCNC: 2.7 MG/DL (ref 2.7–4.5)
PLATELET # BLD AUTO: 154 K/UL (ref 150–450)
PMV BLD AUTO: 11.2 FL (ref 9.2–12.9)
POTASSIUM SERPL-SCNC: 3.8 MMOL/L (ref 3.5–5.1)
PROT SERPL-MCNC: 6.2 G/DL (ref 6–8.4)
RBC # BLD AUTO: 4.91 M/UL (ref 4–5.4)
SODIUM SERPL-SCNC: 146 MMOL/L (ref 136–145)
WBC # BLD AUTO: 12.79 K/UL (ref 3.9–12.7)

## 2023-12-20 PROCEDURE — 25000003 PHARM REV CODE 250

## 2023-12-20 PROCEDURE — 85025 COMPLETE CBC W/AUTO DIFF WBC: CPT

## 2023-12-20 PROCEDURE — 36415 COLL VENOUS BLD VENIPUNCTURE: CPT

## 2023-12-20 PROCEDURE — 63700000 PHARM REV CODE 250 ALT 637 W/O HCPCS

## 2023-12-20 PROCEDURE — 94640 AIRWAY INHALATION TREATMENT: CPT

## 2023-12-20 PROCEDURE — 25000242 PHARM REV CODE 250 ALT 637 W/ HCPCS: Performed by: STUDENT IN AN ORGANIZED HEALTH CARE EDUCATION/TRAINING PROGRAM

## 2023-12-20 PROCEDURE — 94761 N-INVAS EAR/PLS OXIMETRY MLT: CPT

## 2023-12-20 PROCEDURE — 84100 ASSAY OF PHOSPHORUS: CPT

## 2023-12-20 PROCEDURE — 63600175 PHARM REV CODE 636 W HCPCS

## 2023-12-20 PROCEDURE — 99900035 HC TECH TIME PER 15 MIN (STAT)

## 2023-12-20 PROCEDURE — 80053 COMPREHEN METABOLIC PANEL: CPT

## 2023-12-20 PROCEDURE — 25000242 PHARM REV CODE 250 ALT 637 W/ HCPCS

## 2023-12-20 PROCEDURE — 83735 ASSAY OF MAGNESIUM: CPT

## 2023-12-20 PROCEDURE — 94660 CPAP INITIATION&MGMT: CPT

## 2023-12-20 PROCEDURE — 27000221 HC OXYGEN, UP TO 24 HOURS

## 2023-12-20 RX ORDER — FLUTICASONE PROPIONATE 50 MCG
2 SPRAY, SUSPENSION (ML) NASAL DAILY
Status: DISCONTINUED | OUTPATIENT
Start: 2023-12-20 | End: 2023-12-20 | Stop reason: HOSPADM

## 2023-12-20 RX ORDER — PREDNISONE 20 MG/1
40 TABLET ORAL DAILY
Qty: 6 TABLET | Refills: 0 | Status: SHIPPED | OUTPATIENT
Start: 2023-12-21 | End: 2023-12-24

## 2023-12-20 RX ORDER — FUROSEMIDE 40 MG/1
40 TABLET ORAL
Qty: 30 TABLET | Refills: 11 | Status: ON HOLD | OUTPATIENT
Start: 2023-12-20 | End: 2023-12-29

## 2023-12-20 RX ORDER — PREDNISONE 20 MG/1
40 TABLET ORAL DAILY
Status: CANCELLED | OUTPATIENT
Start: 2023-12-20

## 2023-12-20 RX ORDER — METHOCARBAMOL 500 MG/1
500 TABLET, FILM COATED ORAL
Qty: 15 TABLET | Refills: 0 | Status: SHIPPED | OUTPATIENT
Start: 2023-12-20 | End: 2024-01-04

## 2023-12-20 RX ADMIN — IPRATROPIUM BROMIDE AND ALBUTEROL SULFATE 3 ML: 2.5; .5 SOLUTION RESPIRATORY (INHALATION) at 08:12

## 2023-12-20 RX ADMIN — ASPIRIN 81 MG CHEWABLE TABLET 81 MG: 81 TABLET CHEWABLE at 09:12

## 2023-12-20 RX ADMIN — PREDNISONE 40 MG: 20 TABLET ORAL at 09:12

## 2023-12-20 RX ADMIN — ROFLUMILAST 500 MCG: 500 TABLET ORAL at 09:12

## 2023-12-20 RX ADMIN — FUROSEMIDE 40 MG: 10 INJECTION, SOLUTION INTRAVENOUS at 09:12

## 2023-12-20 RX ADMIN — IPRATROPIUM BROMIDE AND ALBUTEROL SULFATE 3 ML: 2.5; .5 SOLUTION RESPIRATORY (INHALATION) at 01:12

## 2023-12-20 RX ADMIN — FLUTICASONE PROPIONATE 100 MCG: 50 SPRAY, METERED NASAL at 12:12

## 2023-12-20 RX ADMIN — IPRATROPIUM BROMIDE AND ALBUTEROL SULFATE 3 ML: 2.5; .5 SOLUTION RESPIRATORY (INHALATION) at 02:12

## 2023-12-20 RX ADMIN — CEFTRIAXONE SODIUM 1 G: 1 INJECTION, POWDER, FOR SOLUTION INTRAMUSCULAR; INTRAVENOUS at 10:12

## 2023-12-20 RX ADMIN — AZITHROMYCIN DIHYDRATE 250 MG: 250 TABLET ORAL at 09:12

## 2023-12-20 NOTE — DISCHARGE SUMMARY
[facility] Medicine  Discharge Summary      Patient Name: Terri Phelan  MRN: [id number]  SUNDEEP: [id number]  Patient Class: IP- Inpatient  Admission Date: 12/18/2023  Hospital Length of Stay: 1 days  Discharge Date and Time: 12/20/2023  4:55 PM  Attending Physician: No att. providers found   Discharging Provider: Angus Alamo DO  Primary Care Provider: Kev Cox MD    Primary Care Team: Networked reference to record PCT     HPI:   Patient is a 68 year old female with a PMHx of COPD (2L NC baseline), HTN (not on medications) who presents with 1 week of shortness of breath. She went to an urgent care last week where she was treated with steroids, DuoNeb, and Lasix for her shortness of breath. She had taken a break from her lasix 6 weeks prior due to leg cramping after starting lasix. She states that she knows she is in COPD exacerbation because she gets headaches when her CO2 level rises. She has been having an increase in her oxygen requirement but denies more sputum production in the past week. She has been having a headache since this morning at pulmonary rehab. She denies any fevers, chills recent illnesses or sick contacts, chests pain. She denies nausea, vomiting dizziness.     In the ED, her vitals: Temp 98.4, HR 96, /69, RR 24, SpO2 of 92% on 2L NC at rest. When she moved she desaturated to 76% on 2L NC. CBC significant for platelet of 125, CMP significant for CO2 of 40, CK negative, troponin negative. EKG showed normal sinus rhythm and CXR showed no acute abnormality. ABG ws ordered and showed pH of 7.277, CO2 109, O2 49.8, and HCO3 of 50.7. In the ED she was treated with duo nebs and prednisone. Patient was admitted to [facility] for COPD exacerbation and hypercapnia.     * No surgery found *      Hospital Course:   Patient received rocephin, azithromycin on am of admission for CAP coverage. Duonebs, steroids and home medications for COPD. Lasix for possible  pulmonary edema. Subsequent BNP, procalcitonin negative. She had rapid improvement of respiratory status with above treatment. Patient felt back to baseline without cough, mucus production, wheezing. Patient discharged on 12/20 to complete 3 more days of oral prednisone 40 mg. Patient was not discharged on antibiotics other than her home azithromycin MWF as ultimately felt this presentation was secondary to COPD exacerbation as opposed to bacterial pneumonia. ED precautions discussed. Patient advised to use lasix PRN w/ KCL 10 mEq tablets when lower extremity edema develops. Provided rx for robaxin 500 mg PRN as patient has history of cramps due to lasix. Patient to follow up with her outpatient pulmonologist.       Physical Exam  Vitals and nursing note reviewed.   Constitutional:       Appearance: Normal appearance.   Cardiovascular:      Rate and Rhythm: Normal rate and regular rhythm.      Pulses: Normal pulses.      Heart sounds: Normal heart sounds.   Pulmonary:      Effort: Pulmonary effort is normal.      Breath sounds: Normal breath sounds. No wheezing.      Comments: Patient back on baseline 2L nasal cannula with appropriate SpO2  Neurological:      Mental Status: She is alert.          Goals of Care Treatment Preferences:  Code Status: DNR    Health care [facility] number: [phone]    Living Will: Yes     What is most important right now is to focus on spending time at home, avoiding the hospital, remaining as independent as possible, symptom/pain control, improvement in condition but with limits to invasive therapies.  Accordingly, we have decided that the best plan to meet the patient's goals includes continuing with treatment.      Consults:   Consults (From admission, onward)          Status Ordering Provider     IP consult to case management  Once        Provider:  (Not yet assigned)    Completed MIGUEL A SMITH III            Pulmonary  * Acute hypercapnic  respiratory failure  Patient with Hypercapnic Respiratory failure which is Acute on chronic.  she is on home oxygen at 2 LPM.   ABG on presentation  pO2 50; pCO2 109; pH 7.277;  HCO3 51  .   Signs/symptoms of respiratory failure include- tachypnea, increased work of breathing, and respiratory distress. Contributing diagnoses includes - CHF, COPD, and Pneumonia       Plan:  Bipap at night (home settings 18/8)  Scheduled Duonebs  Home Breztri   Daily prednisone  Roflumilast (homemed)  Chest physiotherapy  Rocephin and azithromycin (started 12/19)  Lasix 40 mg IV daily   Fluid restriction- 1000 ml daily  Monitor O2 requirements, wean to home 2L when possible    Acute exacerbation of chronic obstructive pulmonary disease (COPD)  See Acute hypercapnic respiratory failure      Final Active Diagnoses:    Diagnosis Date Noted POA    PRINCIPAL PROBLEM:  Acute hypercapnic respiratory failure [J96.02] 12/19/2023 Yes     Chronic    Acute exacerbation of chronic obstructive pulmonary disease (COPD) [J44.1] 12/19/2023 Yes    HTN (hypertension) [I10] 09/08/2015 Yes      Problems Resolved During this Admission:       Discharged Condition: stable    Disposition: Home or Self Care    Follow Up:   Follow-up Information       Alex Cunningham MD Follow up on 1/16/2024.    Specialty: Pulmonary Disease  Why: 1:30 pm  -- Office confirmed - this is the soonest available apt.  Contact information:  [address] [place] [address] [phone]               Kev Cox MD Follow up.    Specialty: Family Medicine  Why: YOU WILL BE CONTACTED WITH A HOSPITAL FOLLOW UP APT PER  HARJEET  Contact information:  [address] [facility] [address]  [phone]                           Patient Instructions:      Notify your health care provider if you experience any of the following:  difficulty breathing or increased cough     Notify your health care provider if you experience any of the following:  increased  confusion or weakness     Notify your health care provider if you experience any of the following:  persistent dizziness, light-headedness, or visual disturbances     Activity as tolerated       Significant Diagnostic Studies: Labs: All labs within the past 24 hours have been reviewed      Medications:  Reconciled Home Medications:      Medication List        CHANGE how you take these medications      furosemide 40 MG tablet  Commonly known as: LASIX  Take 1 tablet (40 mg total) by mouth as needed. For lower extremity swelling, increased weight  What changed:   when to take this  reasons to take this  additional instructions     predniSONE 20 MG tablet  Commonly known as: DELTASONE  Take 2 tablets (40 mg total) by mouth once daily. for 3 days  Start taking on: December 21, 2023  What changed:   when to take this  Another medication with the same name was removed. Continue taking this medication, and follow the directions you see here.            CONTINUE taking these medications      albuterol 90 mcg/actuation inhaler  Commonly known as: PROVENTIL/VENTOLIN HFA  Inhale 2 puffs into the lungs every 6 (six) hours as needed for Wheezing or Shortness of Breath.     alendronate 35 MG tablet  Commonly known as: FOSAMAX  Take 35 mg by mouth every Tuesday.     azithromycin 250 MG tablet  Commonly known as: Z-OLY  Take 1 tablet (250 mg total) by mouth every Mon, Wed, Fri.     BREZTRI AEROSPHERE 160-9-4.8 mcg/actuation Hfaa  Generic drug: budesonide-glycopyr-formoterol  Inhale 2 puffs into the lungs 2 (two) times daily.     chlorpheniramine-pseudoephedrine-acetaminophen 2- mg per tablet  Commonly known as: SINUTAB  2 tablet 3 TIMES DAILY (route: oral)     dextromethorphan-guaiFENesin  mg  mg per 12 hr tablet  Commonly known as: MUCINEX DM  Take 1 tablet by mouth every 12 (twelve) hours.     diazePAM 2 MG tablet  Commonly known as: VALIUM  Take 1 tablet (2 mg total) by mouth daily as needed for Anxiety.      dicyclomine 20 mg tablet  Commonly known as: BENTYL  Take 1 tablet (20 mg total) by mouth 3 (three) times daily as needed (abdominal pain).     ergocalciferol 50,000 unit Cap  Commonly known as: ERGOCALCIFEROL  Take 50,000 Units by mouth every Tuesday.     fluticasone propionate 50 mcg/actuation nasal spray  Commonly known as: FLONASE  1 spray (50 mcg total) by Each Nostril route daily as needed for Allergies.     potassium chloride 10 MEQ Tbsr  Commonly known as: KLOR-CON  Take 1 tablet (10 mEq total) by mouth once daily. for 365 doses     roflumilast 500 mcg Tab  Commonly known as: DALIRESP  Take 1 tablet by mouth once daily.     traZODone 50 MG tablet  Commonly known as: DESYREL  Take 1 tablet (50 mg total) by mouth nightly as needed for Insomnia.     WOMEN'S MULTIVITAMIN GUMMIES 200 mcg Chew  Generic drug: multivit with min-folic acid  Take 2 tablets by mouth once daily.            STOP taking these medications      acetaminophen 500 MG tablet  Commonly known as: TYLENOL     albuterol-ipratropium 2.5 mg-0.5 mg/3 mL nebulizer solution  Commonly known as: DUO-NEB     aspirin 81 MG Chew     LORazepam 2 mg/mL injection  Commonly known as: ATIVAN            ASK your doctor about these medications      methocarbamoL 500 MG Tab  Commonly known as: ROBAXIN  Take 1 tablet (500 mg total) by mouth as needed (for cramping).     OXYGEN-AIR DELIVERY SYSTEMS MISC  3 L by Nasal route.              Indwelling Lines/Drains at time of discharge:   Lines/Drains/Airways       None                   Time spent on the discharge of patient: 35 minutes         Angus Alamo DO  Department of Hospital Medicine  [place] [facility]

## 2023-12-20 NOTE — NURSING
Rapid Response Nurse Follow-up Note     Followed up with patient for proactive rounding.   No acute issues at this time.     -O2 sat 96%. No respiratory distress noted    Reviewed plan of care with bedside RNTunde .   Team will continue to follow.     Please call Rapid Response RN, Marisa Nicole RN with any questions or concerns at [phone].

## 2023-12-20 NOTE — CODE/ RAPID DOCUMENTATION
RAPID RESPONSE NURSE PROACTIVE ROUNDING NOTE       Time of Visit: 815    Admit Date: 2023  LOS: 1  Code Status: DNR   Date of Visit: 2023  : 1955  Age: 68 y.o.  Sex: female  Race: Black or   Bed: K532/K532 A:   MRN: [id number]  Was the patient discharged from an ICU this admission? No   Was the patient discharged from a PACU within last 24 hours? No   Did the patient receive conscious sedation/general anesthesia in last 24 hours? No   Was the patient in the ED within the past 24 hours? No   Was the patient on NIPPV within the past 24 hours? Yes   Attending Physician: Tirso Valenzuela,*  Primary Service: Hospitalist   Time spent at the bedside: < 15 min    SITUATION    Notified by Epic patient alert  Reason for alert: Labs    Diagnosis: Acute hypercapnic respiratory failure   has a past medical history of COPD (chronic obstructive pulmonary disease) and Hypertension.    Last Vitals:  Temp: 97.9 °F (36.6 °C) (712)  Pulse: 63 (816)  Resp: 18 (816)  BP: 128/60 (712)  SpO2: 94 % (816)    24 Hour Vitals Range:  Temp:  [97.6 °F (36.4 °C)-98.4 °F (36.9 °C)]   Pulse:  [63-85]   Resp:  [17-24]   BP: (128-168)/(60-72)   SpO2:  [62 %-96 %]     Clinical Issues: Respiratory    ASSESSMENT/INTERVENTIONS    Pt resting comfortably in bed on Bipap. Pt complained of being thirsty. Pt educated on aspiration risk while on bipap. Pt curious about when she will be discharged home. Pt VSS. Discussed compliance and importance of bipap and managing COPD.       RECOMMENDATIONS  Monitor labs   Continue to wear Bipap     Discussed plan of care with bedside RNPaulina    PROVIDER ESCALATION    Physician escalation: No    Disposition:Remain in room 532    FOLLOW UP    Call back the Rapid Response Nursemaryam  at [phone] for additional questions or concerns.

## 2023-12-20 NOTE — PLAN OF CARE
Pt for d/c to home today   CM spoke with pt's daughter Mulugeta - she is en route to pick pt up - confirmed that she will have pt's portable O2 tank with her.    No dme, no hh - per note - pt is currently seen at  [facility]     Discharging nurse to review all d/c meds/instructions   Alex keene MD  Pulmonary Disease [phone] [address] [facility] [address]      Next Steps: Follow up on 1/16/2024  Instructions: 1:30 pm  -- Office confirmed - this is the soonest available apt.    Kev Cox MD PCP - General Family Medicine [phone] [address] [facility] [address]     Next Steps: Follow up  Instructions: YOU WILL BE CONTACTED WITH A HOSPITAL FOLLOW UP APT PER  HARJEET        12/20/23 1315   Final Note   Assessment Type Final Discharge Note   Anticipated Discharge Disposition Home   What phone number can be called within the next 1-3 days to see how you are doing after discharge? [phone]   Hospital Resources/Appts/Education Provided Appointments scheduled and added to AVS   Post-Acute Status   Discharge Delays None known at this time

## 2023-12-20 NOTE — PLAN OF CARE
VN attempted to review AVS with patient.   Patient  very anxious, visually SOB. Daughter calming patient at bedside.   Message sent to MD and charge nurse called.

## 2023-12-20 NOTE — NURSING
Received the patient lying supine with Bipap in progress, eyes closed but easlily arousable and responsive, bed locked in low with alarm on and call light in reach at present.

## 2023-12-20 NOTE — SUBJECTIVE & OBJECTIVE
Interval History: Feeling better this am, afternoon after lasix, bipap, duonebs. Unclear if volume overload v PNA v COPD exacerbation. Will continue lasix, steroids, antibiotics    Review of Systems   Constitutional:  Negative for chills, diaphoresis and fever.   Respiratory:  Positive for cough and shortness of breath. Negative for wheezing.    Cardiovascular:  Positive for leg swelling. Negative for chest pain.   Gastrointestinal:  Negative for abdominal pain.   Neurological:  Negative for headaches.   Psychiatric/Behavioral:  Negative for agitation and confusion. The patient is not nervous/anxious.      Objective:     Vital Signs (Most Recent):  Temp: 98.2 °F (36.8 °C) (12/19/23 1527)  Pulse: 84 (12/19/23 1527)  Resp: (!) 21 (12/19/23 1527)  BP: 134/64 (12/19/23 1527)  SpO2: (!) 90 % (12/19/23 1535) Vital Signs (24h Range):  Temp:  [97.6 °F (36.4 °C)-98.7 °F (37.1 °C)] 98.2 °F (36.8 °C)  Pulse:  [77-96] 84  Resp:  [18-28] 21  SpO2:  [90 %-99 %] 90 %  BP: (131-179)/(58-73) 134/64     Weight: 83.1 kg (183 lb 3.2 oz)  Body mass index is 30.49 kg/m².    Intake/Output Summary (Last 24 hours) at 12/19/2023 1839  Last data filed at 12/19/2023 1811  Gross per 24 hour   Intake 120 ml   Output 1500 ml   Net -1380 ml         Physical Exam  Vitals and nursing note reviewed.   Constitutional:       Appearance: Normal appearance.   HENT:      Head: Normocephalic and atraumatic.   Cardiovascular:      Rate and Rhythm: Normal rate and regular rhythm.      Pulses: Normal pulses.      Heart sounds: Normal heart sounds.   Pulmonary:      Breath sounds: No wheezing or rhonchi.      Comments: On bipap at time of physical exam  Neurological:      Mental Status: She is alert and oriented to person, place, and time.             Significant Labs: All pertinent labs within the past 24 hours have been reviewed.    Significant Imaging: I have reviewed all pertinent imaging results/findings within the past 24 hours.

## 2023-12-20 NOTE — ASSESSMENT & PLAN NOTE
Patient with Hypercapnic Respiratory failure which is Acute on chronic.  she is on home oxygen at 2 LPM.   ABG on presentation  pO2 50; pCO2 109; pH 7.277;  HCO3 51  .   Signs/symptoms of respiratory failure include- tachypnea, increased work of breathing, and respiratory distress. Contributing diagnoses includes - CHF, COPD, and Pneumonia       Plan:  Bipap at night (home settings 18/8)  Scheduled Duonebs  Home Breztri   Daily prednisone  Roflumilast (homemed)  Chest physiotherapy  Rocephin and azithromycin (started 12/19)  Lasix 40 mg IV daily   Fluid restriction- 1000 ml daily  Monitor O2 requirements, wean to home 2L when possible

## 2023-12-20 NOTE — PLAN OF CARE
Pt on BIPAP with documented settings. Alarms are set and working. Will continue to monitor.

## 2023-12-20 NOTE — PLAN OF CARE
Patient feels much better and states she feels ready to discharge home.  Patient's discharge instructions given by bedside RN and reviewed via this VN.  Education provided on new medication, diagnosis, and follow-up appointments.  Teach back method used. Patient verbalized understanding. All questions answered. Transport to [facility] requested. Floor nurse notified.      12/20/23 0472   AVS Confirmation   Discharge instructions and AVS given to and reviewed with patient and/or significant other. Yes

## 2023-12-20 NOTE — TELEPHONE ENCOUNTER
----- Message from Mariah Jefferson sent at 12/20/2023 12:32 PM CST -----  Contact: [phone]  Patient needs a Hosp follow up appt with their PCP only.       When is the next available appointment:  01/09/2024    Symptoms:      Discharge date: 12/20/2023    Needs to be seen by: Dr. Kenny Angulo    Would you prefer an answer via [employer]?: phone call      Comments:

## 2023-12-21 ENCOUNTER — OUTPATIENT CASE MANAGEMENT (OUTPATIENT)
Dept: ADMINISTRATIVE | Facility: OTHER | Age: 68
End: 2023-12-21
Payer: MEDICARE

## 2023-12-21 LAB — NT-PROBNP SERPL IA-MCNC: 103 PG/ML

## 2023-12-21 NOTE — PROGRESS NOTES
12/21/23 Completed post discharge follow up call. Pt doing well and denies any needs at this time. RN OPCM

## 2023-12-27 ENCOUNTER — HOSPITAL ENCOUNTER (INPATIENT)
Facility: HOSPITAL | Age: 68
LOS: 2 days | Discharge: HOME HEALTH | DRG: 190 | End: 2023-12-29
Attending: EMERGENCY MEDICINE | Admitting: FAMILY MEDICINE
Payer: MEDICARE

## 2023-12-27 DIAGNOSIS — R06.02 SHORTNESS OF BREATH: ICD-10-CM

## 2023-12-27 DIAGNOSIS — R06.89 CO2 NARCOSIS: Primary | ICD-10-CM

## 2023-12-27 DIAGNOSIS — J44.1 ACUTE EXACERBATION OF CHRONIC OBSTRUCTIVE PULMONARY DISEASE (COPD): ICD-10-CM

## 2023-12-27 DIAGNOSIS — J44.1 COPD EXACERBATION: ICD-10-CM

## 2023-12-27 LAB
ALBUMIN SERPL BCP-MCNC: 3.5 G/DL (ref 3.5–5.2)
ALLENS TEST: ABNORMAL
ALLENS TEST: YES
ALLENS TEST: YES
ALP SERPL-CCNC: 45 U/L (ref 55–135)
ALT SERPL W/O P-5'-P-CCNC: 21 U/L (ref 10–44)
ANION GAP SERPL CALC-SCNC: 9 MMOL/L (ref 8–16)
AST SERPL-CCNC: 20 U/L (ref 10–40)
BASOPHILS # BLD AUTO: 0.06 K/UL (ref 0–0.2)
BASOPHILS NFR BLD: 0.5 % (ref 0–1.9)
BILIRUB SERPL-MCNC: 0.4 MG/DL (ref 0.1–1)
BNP SERPL-MCNC: 86 PG/ML (ref 0–99)
BUN SERPL-MCNC: 17 MG/DL (ref 8–23)
CALCIUM SERPL-MCNC: 8.6 MG/DL (ref 8.7–10.5)
CHLORIDE SERPL-SCNC: 99 MMOL/L (ref 95–110)
CO2 SERPL-SCNC: 38 MMOL/L (ref 23–29)
CREAT SERPL-MCNC: 0.7 MG/DL (ref 0.5–1.4)
DIFFERENTIAL METHOD BLD: ABNORMAL
EOSINOPHIL # BLD AUTO: 0.1 K/UL (ref 0–0.5)
EOSINOPHIL NFR BLD: 0.7 % (ref 0–8)
ERYTHROCYTE [DISTWIDTH] IN BLOOD BY AUTOMATED COUNT: 16.1 % (ref 11.5–14.5)
EST. GFR  (NO RACE VARIABLE): >60 ML/MIN/1.73 M^2
FIO2: 36 %
FIO2: 40 %
FIO2: 40 %
GLUCOSE SERPL-MCNC: 130 MG/DL (ref 70–110)
HCT VFR BLD AUTO: 47.5 % (ref 37–48.5)
HGB BLD-MCNC: 13.7 G/DL (ref 12–16)
IMM GRANULOCYTES # BLD AUTO: 0.11 K/UL (ref 0–0.04)
IMM GRANULOCYTES NFR BLD AUTO: 1 % (ref 0–0.5)
INFLUENZA A, MOLECULAR: NEGATIVE
INFLUENZA B, MOLECULAR: NEGATIVE
LPM: 4
LYMPHOCYTES # BLD AUTO: 2.2 K/UL (ref 1–4.8)
LYMPHOCYTES NFR BLD: 19 % (ref 18–48)
MAGNESIUM SERPL-MCNC: 2.1 MG/DL (ref 1.6–2.6)
MCH RBC QN AUTO: 27.5 PG (ref 27–31)
MCHC RBC AUTO-ENTMCNC: 28.8 G/DL (ref 32–36)
MCV RBC AUTO: 95 FL (ref 82–98)
MONOCYTES # BLD AUTO: 1.4 K/UL (ref 0.3–1)
MONOCYTES NFR BLD: 12.1 % (ref 4–15)
NEUTROPHILS # BLD AUTO: 7.6 K/UL (ref 1.8–7.7)
NEUTROPHILS NFR BLD: 66.7 % (ref 38–73)
NRBC BLD-RTO: 0 /100 WBC
PCO2 BLDA: 109 MMHG (ref 35–45)
PCO2 BLDA: 97.5 MMHG (ref 35–45)
PCO2 BLDA: >110 MMHG (ref 35–45)
PEEP: 7
PEEP: 7
PH SMN: 7.2 [PH] (ref 7.35–7.45)
PH SMN: 7.22 [PH] (ref 7.35–7.45)
PH SMN: 7.29 [PH] (ref 7.35–7.45)
PLATELET # BLD AUTO: 124 K/UL (ref 150–450)
PMV BLD AUTO: 11.8 FL (ref 9.2–12.9)
PO2 BLDA: 60.9 MMHG (ref 80–100)
PO2 BLDA: 65.6 MMHG (ref 80–100)
PO2 BLDA: 74.4 MMHG (ref 80–100)
POC BASE DEFICIT: 12.3 MMOL/L (ref -2–2)
POC BASE DEFICIT: 15.1 MMOL/L (ref -2–2)
POC HCO3: 44.9 MMOL/L (ref 24–28)
POC HCO3: 46.6 MMOL/L (ref 24–28)
POC PERFORMED BY: ABNORMAL
POC SATURATED O2: 88.2 % (ref 95–100)
POC SATURATED O2: 92.1 % (ref 95–100)
POC SATURATED O2: 92.6 % (ref 95–100)
POTASSIUM SERPL-SCNC: 3.9 MMOL/L (ref 3.5–5.1)
PROT SERPL-MCNC: 6.8 G/DL (ref 6–8.4)
RBC # BLD AUTO: 4.99 M/UL (ref 4–5.4)
SARS-COV-2 RDRP RESP QL NAA+PROBE: NEGATIVE
SODIUM SERPL-SCNC: 146 MMOL/L (ref 136–145)
SPECIMEN SOURCE: ABNORMAL
SPECIMEN SOURCE: NORMAL
TROPONIN I SERPL DL<=0.01 NG/ML-MCNC: 0.01 NG/ML (ref 0–0.03)
WBC # BLD AUTO: 11.45 K/UL (ref 3.9–12.7)

## 2023-12-27 PROCEDURE — 93005 ELECTROCARDIOGRAM TRACING: CPT

## 2023-12-27 PROCEDURE — 99900035 HC TECH TIME PER 15 MIN (STAT)

## 2023-12-27 PROCEDURE — 80053 COMPREHEN METABOLIC PANEL: CPT | Performed by: EMERGENCY MEDICINE

## 2023-12-27 PROCEDURE — 94761 N-INVAS EAR/PLS OXIMETRY MLT: CPT | Mod: XB

## 2023-12-27 PROCEDURE — 27100171 HC OXYGEN HIGH FLOW UP TO 24 HOURS

## 2023-12-27 PROCEDURE — 36600 WITHDRAWAL OF ARTERIAL BLOOD: CPT

## 2023-12-27 PROCEDURE — 63600175 PHARM REV CODE 636 W HCPCS: Performed by: EMERGENCY MEDICINE

## 2023-12-27 PROCEDURE — 83880 ASSAY OF NATRIURETIC PEPTIDE: CPT | Performed by: EMERGENCY MEDICINE

## 2023-12-27 PROCEDURE — 5A09357 ASSISTANCE WITH RESPIRATORY VENTILATION, LESS THAN 24 CONSECUTIVE HOURS, CONTINUOUS POSITIVE AIRWAY PRESSURE: ICD-10-PCS | Performed by: EMERGENCY MEDICINE

## 2023-12-27 PROCEDURE — 94640 AIRWAY INHALATION TREATMENT: CPT

## 2023-12-27 PROCEDURE — 94645 CONT INHLJ TX EACH ADDL HOUR: CPT

## 2023-12-27 PROCEDURE — 96374 THER/PROPH/DIAG INJ IV PUSH: CPT

## 2023-12-27 PROCEDURE — 87502 INFLUENZA DNA AMP PROBE: CPT | Performed by: EMERGENCY MEDICINE

## 2023-12-27 PROCEDURE — 63600175 PHARM REV CODE 636 W HCPCS: Performed by: FAMILY MEDICINE

## 2023-12-27 PROCEDURE — 99285 EMERGENCY DEPT VISIT HI MDM: CPT | Mod: 25

## 2023-12-27 PROCEDURE — 94660 CPAP INITIATION&MGMT: CPT

## 2023-12-27 PROCEDURE — 25000242 PHARM REV CODE 250 ALT 637 W/ HCPCS: Performed by: FAMILY MEDICINE

## 2023-12-27 PROCEDURE — 84484 ASSAY OF TROPONIN QUANT: CPT | Performed by: EMERGENCY MEDICINE

## 2023-12-27 PROCEDURE — 63600175 PHARM REV CODE 636 W HCPCS: Performed by: STUDENT IN AN ORGANIZED HEALTH CARE EDUCATION/TRAINING PROGRAM

## 2023-12-27 PROCEDURE — 93010 ELECTROCARDIOGRAM REPORT: CPT | Mod: ,,, | Performed by: INTERNAL MEDICINE

## 2023-12-27 PROCEDURE — 25000242 PHARM REV CODE 250 ALT 637 W/ HCPCS

## 2023-12-27 PROCEDURE — U0002 COVID-19 LAB TEST NON-CDC: HCPCS | Performed by: EMERGENCY MEDICINE

## 2023-12-27 PROCEDURE — 25000242 PHARM REV CODE 250 ALT 637 W/ HCPCS: Performed by: EMERGENCY MEDICINE

## 2023-12-27 PROCEDURE — 25000003 PHARM REV CODE 250: Performed by: FAMILY MEDICINE

## 2023-12-27 PROCEDURE — 82803 BLOOD GASES ANY COMBINATION: CPT

## 2023-12-27 PROCEDURE — 85025 COMPLETE CBC W/AUTO DIFF WBC: CPT | Performed by: EMERGENCY MEDICINE

## 2023-12-27 PROCEDURE — 20000000 HC ICU ROOM

## 2023-12-27 PROCEDURE — 83735 ASSAY OF MAGNESIUM: CPT | Performed by: EMERGENCY MEDICINE

## 2023-12-27 PROCEDURE — 27000190 HC CPAP FULL FACE MASK W/VALVE

## 2023-12-27 RX ORDER — METHYLPREDNISOLONE SOD SUCC 125 MG
125 VIAL (EA) INJECTION
Status: COMPLETED | OUTPATIENT
Start: 2023-12-27 | End: 2023-12-27

## 2023-12-27 RX ORDER — POTASSIUM CHLORIDE 750 MG/1
10 CAPSULE, EXTENDED RELEASE ORAL DAILY
COMMUNITY
Start: 2023-12-11

## 2023-12-27 RX ORDER — IPRATROPIUM BROMIDE AND ALBUTEROL SULFATE 2.5; .5 MG/3ML; MG/3ML
3 SOLUTION RESPIRATORY (INHALATION) EVERY 4 HOURS
Status: DISCONTINUED | OUTPATIENT
Start: 2023-12-27 | End: 2023-12-29 | Stop reason: HOSPADM

## 2023-12-27 RX ORDER — FUROSEMIDE 10 MG/ML
40 INJECTION INTRAMUSCULAR; INTRAVENOUS ONCE
Status: COMPLETED | OUTPATIENT
Start: 2023-12-27 | End: 2023-12-27

## 2023-12-27 RX ORDER — DOXYCYCLINE HYCLATE 100 MG
100 TABLET ORAL EVERY 12 HOURS
Status: DISCONTINUED | OUTPATIENT
Start: 2023-12-27 | End: 2023-12-29 | Stop reason: HOSPADM

## 2023-12-27 RX ORDER — ACETAMINOPHEN 325 MG/1
650 TABLET ORAL EVERY 4 HOURS PRN
Status: DISCONTINUED | OUTPATIENT
Start: 2023-12-27 | End: 2023-12-29 | Stop reason: HOSPADM

## 2023-12-27 RX ORDER — HYDRALAZINE HYDROCHLORIDE 20 MG/ML
10 INJECTION INTRAMUSCULAR; INTRAVENOUS EVERY 6 HOURS PRN
Status: DISCONTINUED | OUTPATIENT
Start: 2023-12-27 | End: 2023-12-29 | Stop reason: HOSPADM

## 2023-12-27 RX ORDER — POLYETHYLENE GLYCOL 3350 17 G/17G
17 POWDER, FOR SOLUTION ORAL DAILY
Status: DISCONTINUED | OUTPATIENT
Start: 2023-12-27 | End: 2023-12-29 | Stop reason: HOSPADM

## 2023-12-27 RX ORDER — ENOXAPARIN SODIUM 100 MG/ML
40 INJECTION SUBCUTANEOUS EVERY 24 HOURS
Status: DISCONTINUED | OUTPATIENT
Start: 2023-12-27 | End: 2023-12-29 | Stop reason: HOSPADM

## 2023-12-27 RX ORDER — IPRATROPIUM BROMIDE AND ALBUTEROL SULFATE 2.5; .5 MG/3ML; MG/3ML
3 SOLUTION RESPIRATORY (INHALATION)
Status: DISCONTINUED | OUTPATIENT
Start: 2023-12-27 | End: 2023-12-27

## 2023-12-27 RX ORDER — SODIUM CHLORIDE 9 MG/ML
INJECTION, SOLUTION INTRAVENOUS CONTINUOUS
Status: DISCONTINUED | OUTPATIENT
Start: 2023-12-27 | End: 2023-12-27

## 2023-12-27 RX ORDER — DIAZEPAM 2 MG/1
2 TABLET ORAL DAILY PRN
Status: DISCONTINUED | OUTPATIENT
Start: 2023-12-27 | End: 2023-12-29 | Stop reason: HOSPADM

## 2023-12-27 RX ORDER — SODIUM CHLORIDE 0.9 % (FLUSH) 0.9 %
10 SYRINGE (ML) INJECTION
Status: DISCONTINUED | OUTPATIENT
Start: 2023-12-27 | End: 2023-12-29 | Stop reason: HOSPADM

## 2023-12-27 RX ORDER — IPRATROPIUM BROMIDE AND ALBUTEROL SULFATE 2.5; .5 MG/3ML; MG/3ML
3 SOLUTION RESPIRATORY (INHALATION)
Status: COMPLETED | OUTPATIENT
Start: 2023-12-27 | End: 2023-12-27

## 2023-12-27 RX ORDER — MUPIROCIN 20 MG/G
OINTMENT TOPICAL 2 TIMES DAILY
Status: DISCONTINUED | OUTPATIENT
Start: 2023-12-27 | End: 2023-12-29 | Stop reason: HOSPADM

## 2023-12-27 RX ORDER — ALBUTEROL SULFATE 2.5 MG/.5ML
15 SOLUTION RESPIRATORY (INHALATION)
Status: COMPLETED | OUTPATIENT
Start: 2023-12-27 | End: 2023-12-27

## 2023-12-27 RX ADMIN — FUROSEMIDE 40 MG: 10 INJECTION, SOLUTION INTRAVENOUS at 04:12

## 2023-12-27 RX ADMIN — IPRATROPIUM BROMIDE AND ALBUTEROL SULFATE 3 ML: 2.5; .5 SOLUTION RESPIRATORY (INHALATION) at 03:12

## 2023-12-27 RX ADMIN — IPRATROPIUM BROMIDE AND ALBUTEROL SULFATE 3 ML: 2.5; .5 SOLUTION RESPIRATORY (INHALATION) at 08:12

## 2023-12-27 RX ADMIN — ALBUTEROL SULFATE 15 MG: 2.5 SOLUTION RESPIRATORY (INHALATION) at 11:12

## 2023-12-27 RX ADMIN — MUPIROCIN: 20 OINTMENT TOPICAL at 08:12

## 2023-12-27 RX ADMIN — METHYLPREDNISOLONE SODIUM SUCCINATE 125 MG: 125 INJECTION, POWDER, FOR SOLUTION INTRAMUSCULAR; INTRAVENOUS at 11:12

## 2023-12-27 RX ADMIN — METHYLPREDNISOLONE SODIUM SUCCINATE 80 MG: 40 INJECTION, POWDER, FOR SOLUTION INTRAMUSCULAR; INTRAVENOUS at 02:12

## 2023-12-27 RX ADMIN — ENOXAPARIN SODIUM 40 MG: 40 INJECTION SUBCUTANEOUS at 04:12

## 2023-12-27 RX ADMIN — IPRATROPIUM BROMIDE AND ALBUTEROL SULFATE 3 ML: .5; 3 SOLUTION RESPIRATORY (INHALATION) at 10:12

## 2023-12-27 RX ADMIN — DOXYCYCLINE HYCLATE 100 MG: 100 TABLET, COATED ORAL at 08:12

## 2023-12-27 NOTE — CONSULTS
"Consult Note  [facility]    Attending: Katie Adams MD  Admit Date: 12/27/2023  Today's Date: 12/27/2023       HPI: 67 y/o F w/pmhx COPD, JUAN (2lpm home oxy), resent admission for similar complaints on 12/18/23 arrived w/EMS for several days SOB worsening today. Endorses cough with increased sputum, denies fevers. Daughter states pt has been more somnolent and has been compliant with her CPAP. In Emergency, required BiPAP. ABG shows respiratory acidosis with hypoxia and hypercapnea. CXR without acute abn. Pt admitted to ICU for acute hypoxia and hypercapnea on chronic respiratory failure.       SUBJECTIVE:       Review of Systems   Constitutional:  Negative for chills and fever.   Respiratory:  Positive for cough, sputum production, shortness of breath and wheezing.    Cardiovascular:  Positive for leg swelling. Negative for chest pain and palpitations.   Gastrointestinal:  Negative for abdominal pain, heartburn, nausea and vomiting.   Genitourinary:  Negative for dysuria.   Musculoskeletal:  Negative for joint pain and myalgias.   Neurological:  Negative for dizziness and headaches.       OBJECTIVE:     Vital Signs Trends/Hx Reviewed  Vitals:    12/27/23 1346 12/27/23 1402 12/27/23 1435 12/27/23 1445   BP:  (!) 149/65 135/88 (!) 143/91   BP Location:   Right arm    Patient Position:   Lying    Pulse: 87 86 86 84   Resp: (!) 27  (!) 24 (!) 29   Temp:   99.2 °F (37.3 °C)    TempSrc:   Axillary    SpO2: 95% (!) 90% (!) 89% 96%   Weight:   86 kg (189 lb 9.5 oz)    Height:   5' 6" (1.676 m)        Oxygen Concentration (%):  [28-50] 35        Physical Exam  Constitutional:       Appearance: Normal appearance. She is obese.   HENT:      Head: Normocephalic and atraumatic.   Cardiovascular:      Rate and Rhythm: Normal rate and regular rhythm.      Pulses: Normal pulses.      Heart sounds: Normal heart sounds. No murmur heard.  Pulmonary:      Effort: Respiratory distress present.      Breath " sounds: Wheezing present.      Comments: On BiPAP    Curtain sign+  B-lines  Abdominal:      General: Abdomen is flat.      Palpations: Abdomen is soft.      Tenderness: There is no abdominal tenderness.   Musculoskeletal:      Right lower leg: Edema present.      Left lower leg: Edema present.   Neurological:      General: No focal deficit present.      Mental Status: She is alert and oriented to person, place, and time.   Psychiatric:         Mood and Affect: Mood normal.         Behavior: Behavior normal.         Thought Content: Thought content normal.         Judgment: Judgment normal.         Laboratory:  Recent Labs   Lab 12/27/23  1118   WBC 11.45   RBC 4.99   HGB 13.7   HCT 47.5   *   MCV 95   MCH 27.5   MCHC 28.8*     Recent Labs   Lab 12/27/23  1118   *   K 3.9   CL 99   CO2 38*   BUN 17   CREATININE 0.7   CALCIUM 8.6*   MG 2.1     Recent Labs   Lab 12/27/23  1201   PH 7.223*   PCO2 109*   PO2 60.9*   HCO3 44.9*   POCSATURATED 88.2*         Chest Imaging:   CXR 12/27/23  No definite evidence of acute detrimental change relative to prior study performed 12/19/2023, 00:26 hours     ASSESSMENT & RECOMMENDATIONS     Neuro/Psych  A+Ox3 no issues     CV  #HTN in ER  Bp up to 201/83, now 143/91  Denies chest pain, orthopnea  Trop, BNP wnl  EKG nsr, no ST changes    PLAN:  Monitor       Pulm  #Respiratory Failure  BiPAP settings 18/6, 35% FiO2  Most recent ABG 7.22/109/60/44 BD 12  CXR unremarkable    PLAN:  Wean BiPAP settings as tolerated       FEN/GI  F hypervolemic  E Na+ 146, K+ 3.9, Mg++ 2.1  N: Regular diet    GI  No known issues     RENAL  UOP: ?cc , In: ?cc, net ?cc in last 24 hrs  BUN/Cr: 17/0.7, baseline wnl    PLAN:  Continue to monitor renal status and urine output     Heme  H/H 13.7; baseline wnl  WBC 11  DVT prophylaxis: Lovenox    Endo  No known issues at this time.  Glu 130    ID  #SOB (pneumonia?)  Started on doxy 100mg bid  Afebrile, no WBC elevation  Imaging  unremarkable  Increased sputum and possible fevers at home    PLAN:  Consider de-escalation    Dispo  67 y/o F w/frequent visits and admissions for similar complaints currently treated for hypercapneic, hypoxic resp failure and volume overload. Was hypertensive in ER, but has resolved. Continue BiPAP, methylprednisolone, duonebs. Wean BiPAP, consider step down.     F NPO  A n/a  S n/a  T lovenox  H 30  U defer  G goal 140-180 (130)  S n/a   B miralax  I PIV  D Ezra Martell M.D.  [facility], PGY-2   12/27/2023 3:09 PM

## 2023-12-27 NOTE — ASSESSMENT & PLAN NOTE
Chronic, controlled. Latest blood pressure and vitals reviewed-     Temp:  [98.5 °F (36.9 °C)-99.2 °F (37.3 °C)]   Pulse:  [75-89]   Resp:  [20-35]   BP: (135-201)/(63-88)   SpO2:  [74 %-100 %] .   Home meds for hypertension were reviewed and noted below.   Hypertension Medications               furosemide (LASIX) 40 MG tablet Take 1 tablet (40 mg total) by mouth as needed. For lower extremity swelling, increased weight            While in the hospital, will manage blood pressure as follows; Continue home antihypertensive regimen    Will utilize p.r.n. blood pressure medication only if patient's blood pressure greater than 180/110 and she develops symptoms such as worsening chest pain or shortness of breath.

## 2023-12-27 NOTE — ED PROVIDER NOTES
Encounter Date: 2023       History     Chief Complaint   Patient presents with    Shortness of Breath     Pt with history of COPD and on oxygen at home has had increased shortness of breath since yesterday. Pt arrived via EMS from home awake and alert.      68-year-old female with past medical history of COPD and hypertension presents to the ED via EMS with worsening shortness of breath starting 2 days ago.  Pt has has home O2 2L NC.  Patient was given 1 dose of DuoNeb prior to ED arrival with mild improvement of her symptoms.  Patient notes feeling extremely fatigued, reports symptoms are similar to her previous COPD exacerbations.  Has never been intubated before. Patient is normally on 2 L at baseline and baseline O2 sat is around 92%. No recent sick contacts.  No fever, nausea, vomiting, chest pain, abdominal pain, diarrhea/constipation.  No other acute complaints today.        Review of patient's allergies indicates:   Allergen Reactions    Codeine Nausea And Vomiting    Lipitor [atorvastatin] Other (See Comments)     Muscle fatigue      Morphine Hallucinations     Past Medical History:   Diagnosis Date    COPD (chronic obstructive pulmonary disease)     Hypertension      Past Surgical History:   Procedure Laterality Date    BREAST BIOPSY N/A     pt unsure which breast but it was benign-(calcium)     SECTION      HERNIA REPAIR      HYSTERECTOMY       No family history on file.  Social History     Tobacco Use    Smoking status: Former     Current packs/day: 1.00     Average packs/day: 1 pack/day for 20.0 years (20.0 ttl pk-yrs)     Types: Cigarettes     Passive exposure: Past    Smokeless tobacco: Never   Substance Use Topics    Alcohol use: Not Currently     Alcohol/week: 1.0 standard drink of alcohol     Types: 1 Glasses of wine per week    Drug use: No     Review of Systems   Constitutional:  Negative for chills and fever.   HENT:  Negative for congestion.    Respiratory:  Positive for shortness  of breath. Negative for cough, chest tightness and wheezing.    Cardiovascular:  Negative for chest pain, palpitations and leg swelling.   Gastrointestinal:  Negative for abdominal pain, nausea and vomiting.   Genitourinary:  Negative for flank pain and frequency.   Neurological:  Negative for headaches.       Physical Exam     Initial Vitals [12/27/23 0936]   BP Pulse Resp Temp SpO2   (!) 174/80 88 20 98.5 °F (36.9 °C) 100 %      MAP       --         Physical Exam    Constitutional: She appears well-developed and well-nourished. She is not diaphoretic.   Patient appears somnolent but arousable.    HENT:   Head: Normocephalic and atraumatic.   Right Ear: External ear normal.   Left Ear: External ear normal.   Eyes: EOM are normal. Pupils are equal, round, and reactive to light.   Neck: Neck supple.   Normal range of motion.  Cardiovascular:  Normal rate and normal heart sounds.           Pulmonary/Chest: She has no wheezes. She has no rhonchi. She has no rales.   Lung sounds diminished in all lung fields.  No wheezing, rhonchi or rales appreciated on exam. No increased WOB noted.   Speaking full sentences, no labored breathing, no signs of respiratory distress.    Abdominal: Abdomen is soft. Bowel sounds are normal. She exhibits no distension. There is no abdominal tenderness.   Musculoskeletal:         General: Normal range of motion.      Cervical back: Normal range of motion and neck supple.     Neurological: She is alert and oriented to person, place, and time. GCS score is 15. GCS eye subscore is 4. GCS verbal subscore is 5. GCS motor subscore is 6.   Skin: Skin is warm. Capillary refill takes less than 2 seconds.   Psychiatric: She has a normal mood and affect. Her behavior is normal. Judgment and thought content normal.         ED Course   Procedures  Labs Reviewed   CBC W/ AUTO DIFFERENTIAL - Abnormal; Notable for the following components:       Result Value    MCHC 28.8 (*)     RDW 16.1 (*)     Platelets  124 (*)     Immature Granulocytes 1.0 (*)     Immature Grans (Abs) 0.11 (*)     Mono # 1.4 (*)     All other components within normal limits   COMPREHENSIVE METABOLIC PANEL - Abnormal; Notable for the following components:    Sodium 146 (*)     CO2 38 (*)     Glucose 130 (*)     Calcium 8.6 (*)     Alkaline Phosphatase 45 (*)     All other components within normal limits   INFLUENZA A & B BY MOLECULAR   SARS-COV-2 RNA AMPLIFICATION, QUAL   TROPONIN I   MAGNESIUM   B-TYPE NATRIURETIC PEPTIDE          Imaging Results              X-Ray Chest AP Portable (Final result)  Result time 12/27/23 10:28:30      Final result by Zheng Lares MD (12/27/23 10:28:30)                   Impression:      No definite evidence of acute detrimental change relative to prior study performed 12/19/2023, 00:26 hours.      Electronically signed by: Zheng Lares  Date:    12/27/2023  Time:    10:28               Narrative:    EXAMINATION:  XR CHEST AP PORTABLE    CLINICAL HISTORY:  Shortness of breath    TECHNIQUE:  Single frontal view of the chest was performed.    COMPARISON:  Chest radiograph performed 12/19/2023, 00:26 hours.    FINDINGS:  Monitoring leads overlie the chest.    Grossly unchanged cardiomediastinal contours, again noting prominence of the cardiac silhouette and central pulmonary vasculature.  Atherosclerosis of the aorta, as before.    Patchy opacities in the mid lower lung zones demonstrate relatively similar configuration to 12/19/2023, 00:26 hours examination.    Persistent suspected small left greater than right pleural effusions.    No definite pneumothorax.    No acute change in the visualized abdomen    Osseous and soft tissue structures appear without definite acute change.                                       Medications   sodium chloride 0.9% flush 10 mL (has no administration in time range)   enoxaparin injection 40 mg (40 mg Subcutaneous Given 12/27/23 1630)   doxycycline tablet 100 mg (100 mg Oral  Given 12/27/23 2053)   acetaminophen tablet 650 mg (has no administration in time range)   polyethylene glycol packet 17 g (17 g Oral Not Given 12/27/23 1441)   albuterol-ipratropium 2.5 mg-0.5 mg/3 mL nebulizer solution 3 mL (3 mLs Nebulization Given 12/27/23 2015)   diazePAM tablet 2 mg (has no administration in time range)   hydrALAZINE injection 10 mg (has no administration in time range)   mupirocin 2 % ointment ( Nasal Given 12/27/23 2053)   methylPREDNISolone sodium succinate injection 40 mg (has no administration in time range)   albuterol-ipratropium 2.5 mg-0.5 mg/3 mL nebulizer solution 3 mL (3 mLs Nebulization Given 12/27/23 1012)   methylPREDNISolone sodium succinate injection 125 mg (125 mg Intravenous Given 12/27/23 1144)   albuterol sulfate nebulizer solution 15 mg (15 mg Nebulization Given 12/27/23 1140)   furosemide injection 40 mg (40 mg Intravenous Given 12/27/23 1630)     Medical Decision Making  Differential Diagnosis includes, but is not limited to:  PE, MI/ACS, pneumothorax, pericardial effusion/tamonade, pneumonia, lung abscess, pericarditis/myocarditis, pleural effusion, lung mass, CHF exacerbation, asthma exacerbation, COPD exacerbation, aspirated/ingested foreign body, airway obstruction, CO poisoning, anemia, metabolic derangement, allergy/atopy, influenza, viral URI, viral syndrome.    ED management     68 year-old female with past medical history COPD and hypertension presents to the ED with worsening shortness of breath starting 2 days ago.  Patient is not toxic appearing, hemodynamically stable and resting uncomfortably on bed. Afebrile with vitals WNL. Mild respiratory distress on exam.  Physical exam stated above.  Lab/imaging results discussed on ED course.  While in ED, patient was on 3 L nasal cannula O2 stat of 89-93%.  Mild hypertension noted.  Lung sounds are diminished in all lung fields, no peripheral edema noted.  Cardiac workup initiated.  Given steroids EN route.  Treated  with duo nebs.  Upon re-evaluation, pt was placed hypoxic on bipap. ABG showed respiratory acidosis with hypoxia and hypercapnea. CXR without acute abn. Pt admitted to ICU for further care d/t acute hypoxia and hypercapnea on chronic respiratory failure.  Critical care time spent on the evaluation and treatment of severe organ dysfunction, review of pertinent labs and imaging studies, discussions with consulting providers and discussions with patient/family: >30 minutes.        I have discussed the specifics of the workup with the patient and the patient has verbalized understanding of the details of the workup, the diagnosis, the treatment plan, and the need for outpatient follow-up with PCP. ED precautions given. Discussed with pt about returning to the ED, if symptoms fail to improve or worsen. Care of patient also discussed with Dr. Talbot who agrees with assessment and plan.    RESULTS:  Documented in ED course.  Labs/ekg interpreted by myself and Dr. Talbot.                Amount and/or Complexity of Data Reviewed  Labs: ordered. Decision-making details documented in ED Course.  Radiology: ordered. Decision-making details documented in ED Course.    Risk  Prescription drug management.  Decision regarding hospitalization.               ED Course as of 12/27/23 2135   Wed Dec 27, 2023   1000 Discussed with my attending Dr. Talbot who agrees with the plan and treatment. [NW]   1032 COVID-19 Rapid Screening  Negative [NW]   1032 Influenza A & B by Molecular  Negative [NW]   1032 X-Ray Chest AP Portable  Independently reviewed by myself and radiologist.    FINDINGS:  Monitoring leads overlie the chest.     Grossly unchanged cardiomediastinal contours, again noting prominence of the cardiac silhouette and central pulmonary vasculature.  Atherosclerosis of the aorta, as before.     Patchy opacities in the mid lower lung zones demonstrate relatively similar configuration to 12/19/2023, 00:26 hours examination.      Persistent suspected small left greater than right pleural effusions.     No definite pneumothorax.     No acute change in the visualized abdomen     Osseous and soft tissue structures appear without definite acute change.     Impression:     No definite evidence of acute detrimental change relative to prior study performed 12/19/2023, 00:26 hours.   [NW]   1118 POC PCO2(!): >110  Upon re-evaluation, pt no longer endorses SOB. Will start patient on BiPAP.     [NW]   1118 POC PH(!!): 7.204 [NW]   1210 Magnesium : 2.1 [NW]   1210 Comprehensive metabolic panel(!) [NW]      ED Course User Index  [NW] Sharonda Frausto PA-C                             Clinical Impression:  Final diagnoses:  [R06.02] Shortness of breath  [R06.89] CO2 narcosis (Primary)  [J44.1] COPD exacerbation          ED Disposition Condition    Admit Stable                Sharonda Frausto PA-C  12/27/23 5448

## 2023-12-27 NOTE — H&P
[facility] Medicine  History & Physical    Patient Name: Terri Phelan  MRN: [id number]  Patient Class: IP- Inpatient  Admission Date: 2023  Attending Physician: Kayley Christensen MD   Primary Care Provider: Kev Cox MD         Patient information was obtained from patient, relative(s), and ER records.     Subjective:     Principal Problem:Acute exacerbation of chronic obstructive pulmonary disease (COPD)    Chief Complaint:   Chief Complaint   Patient presents with    Shortness of Breath     Pt with history of COPD and on oxygen at home has had increased shortness of breath since yesterday. Pt arrived via EMS from home awake and alert.         HPI: Pt with hx of COPD and JUAN on 2LPM of home oxygen presented to the ED for sob for a few days that has worsened today. Pt has cough but denies any fevers. Daughter states pt has been more somnolent and has been compliant with her cpap. In the ED, pt was placed hypoxic on bipap. ABG shows respiratory acidosis with hypoxia and hypercapnea. CXR without acute abn. Pt admitted to ICU for acute hypoxia and hypercapnea on chronic respiratory failure.     Past Medical History:   Diagnosis Date    COPD (chronic obstructive pulmonary disease)     Hypertension        Past Surgical History:   Procedure Laterality Date    BREAST BIOPSY N/A     pt unsure which breast but it was benign-(calcium)     SECTION      HERNIA REPAIR      HYSTERECTOMY         Review of patient's allergies indicates:   Allergen Reactions    Codeine Nausea And Vomiting    Lipitor [atorvastatin] Other (See Comments)     Muscle fatigue      Morphine Hallucinations       No current facility-administered medications on file prior to encounter.     Current Outpatient Medications on File Prior to Encounter   Medication Sig    albuterol (PROVENTIL/VENTOLIN HFA) 90 mcg/actuation inhaler Inhale 2 puffs into the lungs every 6 (six) hours as needed for Wheezing or  Shortness of Breath.    azithromycin (Z-OLY) 250 MG tablet Take 1 tablet (250 mg total) by mouth every Mon, Wed, Fri.    budesonide-glycopyr-formoterol (BREZTRI AEROSPHERE) 160-9-4.8 mcg/actuation HFAA Inhale 2 puffs into the lungs 2 (two) times daily.    dextromethorphan-guaiFENesin  mg (MUCINEX DM)  mg per 12 hr tablet Take 1 tablet by mouth every 12 (twelve) hours.    diazePAM (VALIUM) 2 MG tablet Take 1 tablet (2 mg total) by mouth daily as needed for Anxiety.    dicyclomine (BENTYL) 20 mg tablet Take 1 tablet (20 mg total) by mouth 3 (three) times daily as needed (abdominal pain).    ergocalciferol (ERGOCALCIFEROL) 50,000 unit Cap Take 50,000 Units by mouth every Tuesday.    fluticasone propionate (FLONASE) 50 mcg/actuation nasal spray 1 spray (50 mcg total) by Each Nostril route daily as needed for Allergies.    furosemide (LASIX) 40 MG tablet Take 1 tablet (40 mg total) by mouth as needed. For lower extremity swelling, increased weight    methocarbamoL (ROBAXIN) 500 MG Tab Take 1 tablet (500 mg total) by mouth as needed (for cramping).    OXYGEN-AIR DELIVERY SYSTEMS MISC 2 L by Nasal route continuous.    potassium chloride (MICRO-K) 10 MEQ CpSR Take 10 mEq by mouth once daily.    roflumilast (DALIRESP) 500 mcg Tab Take 500 mcg by mouth once daily.    traZODone (DESYREL) 50 MG tablet Take 1 tablet (50 mg total) by mouth nightly as needed for Insomnia.    [DISCONTINUED] potassium chloride (KLOR-CON) 10 MEQ TbSR Take 1 tablet (10 mEq total) by mouth once daily. for 365 doses    alendronate (FOSAMAX) 35 MG tablet Take 35 mg by mouth every Tuesday.    chlorpheniramine-pseudoephedrine-acetaminophen (SINUTAB) 2- mg per tablet 2 tablet 3 TIMES DAILY (route: oral)    multivit with min-folic acid (WOMEN'S MULTIVITAMIN GUMMIES) 200 mcg Chew Take 2 tablets by mouth once daily.     Family History    None       Tobacco Use    Smoking status: Former     Current packs/day: 1.00     Average packs/day: 1  pack/day for 20.0 years (20.0 ttl pk-yrs)     Types: Cigarettes     Passive exposure: Past    Smokeless tobacco: Never   Substance and Sexual Activity    Alcohol use: Not Currently     Alcohol/week: 1.0 standard drink of alcohol     Types: 1 Glasses of wine per week    Drug use: No    Sexual activity: Not Currently     Review of Systems   Unable to perform ROS: Acuity of condition   All other systems reviewed and are negative.    Objective:     Vital Signs (Most Recent):  Temp: 99.2 °F (37.3 °C) (12/27/23 1435)  Pulse: 86 (12/27/23 1435)  Resp: (!) 24 (12/27/23 1435)  BP: 135/88 (12/27/23 1435)  SpO2: (!) 89 % (12/27/23 1435) Vital Signs (24h Range):  Temp:  [98.5 °F (36.9 °C)-99.2 °F (37.3 °C)] 99.2 °F (37.3 °C)  Pulse:  [75-89] 86  Resp:  [20-35] 24  SpO2:  [74 %-100 %] 89 %  BP: (135-201)/(63-88) 135/88     Weight: 86 kg (189 lb 9.5 oz)  Body mass index is 30.6 kg/m².     Physical Exam  Vitals and nursing note reviewed.   Constitutional:       Appearance: She is well-developed.      Comments: Somonolent but arousable, on bipap   HENT:      Head: Normocephalic and atraumatic.      Nose: Nose normal.   Eyes:      Extraocular Movements: EOM normal.      Conjunctiva/sclera: Conjunctivae normal.   Cardiovascular:      Rate and Rhythm: Normal rate and regular rhythm.      Heart sounds: Normal heart sounds.   Pulmonary:      Effort: Pulmonary effort is normal.      Breath sounds: Normal breath sounds. No wheezing.      Comments: Diminished and tight  Abdominal:      General: Bowel sounds are normal.      Palpations: Abdomen is soft. There is no mass.      Tenderness: There is no abdominal tenderness. There is no guarding or rebound.   Musculoskeletal:         General: No tenderness. Normal range of motion.      Cervical back: Normal range of motion and neck supple.   Skin:     General: Skin is warm.      Findings: No rash.   Neurological:      Mental Status: She is oriented to person, place, and time.      Cranial  Nerves: No cranial nerve deficit.   Psychiatric:         Behavior: Behavior normal.         Thought Content: Thought content normal.              CRANIAL NERVES     CN III, IV, VI   Extraocular motions are normal.        Significant Labs: All pertinent labs within the past 24 hours have been reviewed.  BMP:   Recent Labs   Lab 12/27/23  1118   *   *   K 3.9   CL 99   CO2 38*   BUN 17   CREATININE 0.7   CALCIUM 8.6*   MG 2.1     CBC:   Recent Labs   Lab 12/27/23  1118   WBC 11.45   HGB 13.7   HCT 47.5   *       Significant Imaging: I have reviewed all pertinent imaging results/findings within the past 24 hours.  I have reviewed and interpreted all pertinent imaging results/findings within the past 24 hours.  Assessment/Plan:     * Acute exacerbation of chronic obstructive pulmonary disease (COPD)  Patient's COPD is with exacerbation noted by worsening of baseline hypoxia currently.  Patient is currently on COPD Pathway. Continue scheduled inhalers Steroids, Antibiotics, and Supplemental oxygen and monitor respiratory status closely.     Cont on bipap  Pulm consulted  Steroids, doxy and nebs    Oxygen dependent  On home oxygen of 2LPM      HTN (hypertension)  Chronic, controlled. Latest blood pressure and vitals reviewed-     Temp:  [98.5 °F (36.9 °C)-99.2 °F (37.3 °C)]   Pulse:  [75-89]   Resp:  [20-35]   BP: (135-201)/(63-88)   SpO2:  [74 %-100 %] .   Home meds for hypertension were reviewed and noted below.   Hypertension Medications               furosemide (LASIX) 40 MG tablet Take 1 tablet (40 mg total) by mouth as needed. For lower extremity swelling, increased weight            While in the hospital, will manage blood pressure as follows; Continue home antihypertensive regimen    Will utilize p.r.n. blood pressure medication only if patient's blood pressure greater than 180/110 and she develops symptoms such as worsening chest pain or shortness of breath.      VTE Risk Mitigation (From  admission, onward)           Ordered     enoxaparin injection 40 mg  Daily         12/27/23 1243     IP VTE HIGH RISK PATIENT  Once         12/27/23 1243     Place sequential compression device  Until discontinued         12/27/23 1243                  Critical care time spent on the evaluation and treatment of severe organ dysfunction, review of pertinent labs and imaging studies, discussions with consulting providers and discussions with patient/family: >30 minutes.                  Kayley Christensen MD  Department of Hospital Medicine  [place] - Intensive Care

## 2023-12-27 NOTE — HPI
Pt with hx of COPD and JUAN on 2LPM of home oxygen presented to the ED for sob for a few days that has worsened today. Pt has cough but denies any fevers. Daughter states pt has been more somnolent and has been compliant with her cpap. In the ED, pt was placed hypoxic on bipap. ABG shows respiratory acidosis with hypoxia and hypercapnea. CXR without acute abn. Pt admitted to ICU for acute hypoxia and hypercapnea on chronic respiratory failure.

## 2023-12-27 NOTE — SUBJECTIVE & OBJECTIVE
Past Medical History:   Diagnosis Date    COPD (chronic obstructive pulmonary disease)     Hypertension        Past Surgical History:   Procedure Laterality Date    BREAST BIOPSY N/A     pt unsure which breast but it was benign-(calcium)     SECTION      HERNIA REPAIR      HYSTERECTOMY         Review of patient's allergies indicates:   Allergen Reactions    Codeine Nausea And Vomiting    Lipitor [atorvastatin] Other (See Comments)     Muscle fatigue      Morphine Hallucinations       No current facility-administered medications on file prior to encounter.     Current Outpatient Medications on File Prior to Encounter   Medication Sig    albuterol (PROVENTIL/VENTOLIN HFA) 90 mcg/actuation inhaler Inhale 2 puffs into the lungs every 6 (six) hours as needed for Wheezing or Shortness of Breath.    azithromycin (Z-OLY) 250 MG tablet Take 1 tablet (250 mg total) by mouth every Mon, Wed, Fri.    budesonide-glycopyr-formoterol (BREZTRI AEROSPHERE) 160-9-4.8 mcg/actuation HFAA Inhale 2 puffs into the lungs 2 (two) times daily.    dextromethorphan-guaiFENesin  mg (MUCINEX DM)  mg per 12 hr tablet Take 1 tablet by mouth every 12 (twelve) hours.    diazePAM (VALIUM) 2 MG tablet Take 1 tablet (2 mg total) by mouth daily as needed for Anxiety.    dicyclomine (BENTYL) 20 mg tablet Take 1 tablet (20 mg total) by mouth 3 (three) times daily as needed (abdominal pain).    ergocalciferol (ERGOCALCIFEROL) 50,000 unit Cap Take 50,000 Units by mouth every Tuesday.    fluticasone propionate (FLONASE) 50 mcg/actuation nasal spray 1 spray (50 mcg total) by Each Nostril route daily as needed for Allergies.    furosemide (LASIX) 40 MG tablet Take 1 tablet (40 mg total) by mouth as needed. For lower extremity swelling, increased weight    methocarbamoL (ROBAXIN) 500 MG Tab Take 1 tablet (500 mg total) by mouth as needed (for cramping).    OXYGEN-AIR DELIVERY SYSTEMS MISC 2 L by Nasal route continuous.    potassium chloride  (MICRO-K) 10 MEQ CpSR Take 10 mEq by mouth once daily.    roflumilast (DALIRESP) 500 mcg Tab Take 500 mcg by mouth once daily.    traZODone (DESYREL) 50 MG tablet Take 1 tablet (50 mg total) by mouth nightly as needed for Insomnia.    [DISCONTINUED] potassium chloride (KLOR-CON) 10 MEQ TbSR Take 1 tablet (10 mEq total) by mouth once daily. for 365 doses    alendronate (FOSAMAX) 35 MG tablet Take 35 mg by mouth every Tuesday.    chlorpheniramine-pseudoephedrine-acetaminophen (SINUTAB) 2- mg per tablet 2 tablet 3 TIMES DAILY (route: oral)    multivit with min-folic acid (WOMEN'S MULTIVITAMIN GUMMIES) 200 mcg Chew Take 2 tablets by mouth once daily.     Family History    None       Tobacco Use    Smoking status: Former     Current packs/day: 1.00     Average packs/day: 1 pack/day for 20.0 years (20.0 ttl pk-yrs)     Types: Cigarettes     Passive exposure: Past    Smokeless tobacco: Never   Substance and Sexual Activity    Alcohol use: Not Currently     Alcohol/week: 1.0 standard drink of alcohol     Types: 1 Glasses of wine per week    Drug use: No    Sexual activity: Not Currently     Review of Systems   Unable to perform ROS: Acuity of condition   All other systems reviewed and are negative.    Objective:     Vital Signs (Most Recent):  Temp: 99.2 °F (37.3 °C) (12/27/23 1435)  Pulse: 86 (12/27/23 1435)  Resp: (!) 24 (12/27/23 1435)  BP: 135/88 (12/27/23 1435)  SpO2: (!) 89 % (12/27/23 1435) Vital Signs (24h Range):  Temp:  [98.5 °F (36.9 °C)-99.2 °F (37.3 °C)] 99.2 °F (37.3 °C)  Pulse:  [75-89] 86  Resp:  [20-35] 24  SpO2:  [74 %-100 %] 89 %  BP: (135-201)/(63-88) 135/88     Weight: 86 kg (189 lb 9.5 oz)  Body mass index is 30.6 kg/m².     Physical Exam  Vitals and nursing note reviewed.   Constitutional:       Appearance: She is well-developed.      Comments: Somonolent but arousable, on bipap   HENT:      Head: Normocephalic and atraumatic.      Nose: Nose normal.   Eyes:      Extraocular Movements: EOM  normal.      Conjunctiva/sclera: Conjunctivae normal.   Cardiovascular:      Rate and Rhythm: Normal rate and regular rhythm.      Heart sounds: Normal heart sounds.   Pulmonary:      Effort: Pulmonary effort is normal.      Breath sounds: Normal breath sounds. No wheezing.      Comments: Diminished and tight  Abdominal:      General: Bowel sounds are normal.      Palpations: Abdomen is soft. There is no mass.      Tenderness: There is no abdominal tenderness. There is no guarding or rebound.   Musculoskeletal:         General: No tenderness. Normal range of motion.      Cervical back: Normal range of motion and neck supple.   Skin:     General: Skin is warm.      Findings: No rash.   Neurological:      Mental Status: She is oriented to person, place, and time.      Cranial Nerves: No cranial nerve deficit.   Psychiatric:         Behavior: Behavior normal.         Thought Content: Thought content normal.              CRANIAL NERVES     CN III, IV, VI   Extraocular motions are normal.        Significant Labs: All pertinent labs within the past 24 hours have been reviewed.  BMP:   Recent Labs   Lab 12/27/23  1118   *   *   K 3.9   CL 99   CO2 38*   BUN 17   CREATININE 0.7   CALCIUM 8.6*   MG 2.1     CBC:   Recent Labs   Lab 12/27/23  1118   WBC 11.45   HGB 13.7   HCT 47.5   *       Significant Imaging: I have reviewed all pertinent imaging results/findings within the past 24 hours.  I have reviewed and interpreted all pertinent imaging results/findings within the past 24 hours.

## 2023-12-27 NOTE — ASSESSMENT & PLAN NOTE
Patient's COPD is with exacerbation noted by worsening of baseline hypoxia currently.  Patient is currently on COPD Pathway. Continue scheduled inhalers Steroids, Antibiotics, and Supplemental oxygen and monitor respiratory status closely.     Cont on bipap  Pulm consulted  Steroids, doxy and nebs

## 2023-12-27 NOTE — PHARMACY MED REC
"  [facility]           Pharmacy  Admission Medication History     The home medication history was taken by Yana Mcdonough.      Medication history obtained from Medications listed below were obtained from: Patient/family    Based on information gathered for medication list, you may go to "Admission" then "Reconcile Home Medications" tabs to review and/or act upon those items.     The home medication list has been updated by the Pharmacy department.   Please read ALL comments highlighted in yellow.   Please address this information as you see fit.    Feel free to contact us if you have any questions or require assistance.        No current facility-administered medications on file prior to encounter.     Current Outpatient Medications on File Prior to Encounter   Medication Sig Dispense Refill    albuterol (PROVENTIL/VENTOLIN HFA) 90 mcg/actuation inhaler Inhale 2 puffs into the lungs every 6 (six) hours as needed for Wheezing or Shortness of Breath. 18 g 6    azithromycin (Z-OLY) 250 MG tablet Take 1 tablet (250 mg total) by mouth every Mon, Wed, Fri. 12 tablet 11    budesonide-glycopyr-formoterol (BREZTRI AEROSPHERE) 160-9-4.8 mcg/actuation HFAA Inhale 2 puffs into the lungs 2 (two) times daily.      dextromethorphan-guaiFENesin  mg (MUCINEX DM)  mg per 12 hr tablet Take 1 tablet by mouth every 12 (twelve) hours.      diazePAM (VALIUM) 2 MG tablet Take 1 tablet (2 mg total) by mouth daily as needed for Anxiety. 30 tablet 0    dicyclomine (BENTYL) 20 mg tablet Take 1 tablet (20 mg total) by mouth 3 (three) times daily as needed (abdominal pain). 30 tablet 1    ergocalciferol (ERGOCALCIFEROL) 50,000 unit Cap Take 50,000 Units by mouth every Tuesday.      fluticasone propionate (FLONASE) 50 mcg/actuation nasal spray 1 spray (50 mcg total) by Each Nostril route daily as needed for Allergies. 16 g 11    furosemide (LASIX) 40 MG tablet Take 1 tablet (40 mg total) by mouth as needed. For " lower extremity swelling, increased weight 30 tablet 11    methocarbamoL (ROBAXIN) 500 MG Tab Take 1 tablet (500 mg total) by mouth as needed (for cramping). 15 tablet 0    OXYGEN-AIR DELIVERY SYSTEMS MISC 2 L by Nasal route continuous.      potassium chloride (MICRO-K) 10 MEQ CpSR Take 10 mEq by mouth once daily.      roflumilast (DALIRESP) 500 mcg Tab Take 500 mcg by mouth once daily.      traZODone (DESYREL) 50 MG tablet Take 1 tablet (50 mg total) by mouth nightly as needed for Insomnia. 30 tablet 6    [DISCONTINUED] potassium chloride (KLOR-CON) 10 MEQ TbSR Take 1 tablet (10 mEq total) by mouth once daily. for 365 doses 30 tablet 11    alendronate (FOSAMAX) 35 MG tablet Take 35 mg by mouth every Tuesday.      chlorpheniramine-pseudoephedrine-acetaminophen (SINUTAB) 2- mg per tablet 2 tablet 3 TIMES DAILY (route: oral)      multivit with min-folic acid (WOMEN'S MULTIVITAMIN GUMMIES) 200 mcg Chew Take 2 tablets by mouth once daily.         Please address this information as you see fit.  Feel free to contact us if you have any questions or require assistance.    Yana Mcdonough  [phone]                .

## 2023-12-27 NOTE — DISCHARGE INSTRUCTIONS
Ms. Phelan,     Thank you for letting me care for you today! It was nice meeting you, and I hope you feel better soon.   If you would like access to your chart and what was done today please utilize the [facility].   Please don't hesitate to return if your symptoms worsen or you develop any other worrisome symptoms.    Our goal in the emergency department is to always give you outstanding care and exceptional service. You may receive a survey by mail or e-mail in the next week regarding your experience in our ED. We would greatly appreciate you completing and returning the survey. Your feedback provides us with a way to recognize our staff who give very good care and it helps us learn how to improve when your experience was below our aspiration of excellence.     Sincerely,    Sharonda Frausto PA-C  Emergency Department Physician Assistant  [facility] [place]

## 2023-12-27 NOTE — ED NOTES
Pt arrive from home via EMS complains of SOB X2 days.  Pt has has home O2 2L NC. Pt has a hx of COPD and HTN.

## 2023-12-28 LAB
ALLENS TEST: ABNORMAL
ANION GAP SERPL CALC-SCNC: 8 MMOL/L (ref 8–16)
BASOPHILS # BLD AUTO: 0.01 K/UL (ref 0–0.2)
BASOPHILS NFR BLD: 0.1 % (ref 0–1.9)
BUN SERPL-MCNC: 20 MG/DL (ref 8–23)
CALCIUM SERPL-MCNC: 8.3 MG/DL (ref 8.7–10.5)
CHLORIDE SERPL-SCNC: 95 MMOL/L (ref 95–110)
CO2 SERPL-SCNC: 40 MMOL/L (ref 23–29)
CREAT SERPL-MCNC: 0.6 MG/DL (ref 0.5–1.4)
DIFFERENTIAL METHOD BLD: ABNORMAL
EOSINOPHIL # BLD AUTO: 0 K/UL (ref 0–0.5)
EOSINOPHIL NFR BLD: 0 % (ref 0–8)
ERYTHROCYTE [DISTWIDTH] IN BLOOD BY AUTOMATED COUNT: 15.6 % (ref 11.5–14.5)
EST. GFR  (NO RACE VARIABLE): >60 ML/MIN/1.73 M^2
GLUCOSE SERPL-MCNC: 117 MG/DL (ref 70–110)
HCO3 UR-SCNC: 45 MMOL/L (ref 24–28)
HCT VFR BLD AUTO: 45.3 % (ref 37–48.5)
HGB BLD-MCNC: 13.4 G/DL (ref 12–16)
IMM GRANULOCYTES # BLD AUTO: 0.03 K/UL (ref 0–0.04)
IMM GRANULOCYTES NFR BLD AUTO: 0.3 % (ref 0–0.5)
LYMPHOCYTES # BLD AUTO: 0.6 K/UL (ref 1–4.8)
LYMPHOCYTES NFR BLD: 6.6 % (ref 18–48)
MAGNESIUM SERPL-MCNC: 2.1 MG/DL (ref 1.6–2.6)
MCH RBC QN AUTO: 27.2 PG (ref 27–31)
MCHC RBC AUTO-ENTMCNC: 29.6 G/DL (ref 32–36)
MCV RBC AUTO: 92 FL (ref 82–98)
MONOCYTES # BLD AUTO: 0.6 K/UL (ref 0.3–1)
MONOCYTES NFR BLD: 6.1 % (ref 4–15)
NEUTROPHILS # BLD AUTO: 7.9 K/UL (ref 1.8–7.7)
NEUTROPHILS NFR BLD: 86.9 % (ref 38–73)
NRBC BLD-RTO: 0 /100 WBC
PCO2 BLDA: 80.5 MMHG (ref 35–45)
PH SMN: 7.36 [PH] (ref 7.35–7.45)
PHOSPHATE SERPL-MCNC: 2.9 MG/DL (ref 2.7–4.5)
PLATELET # BLD AUTO: 126 K/UL (ref 150–450)
PMV BLD AUTO: 11.4 FL (ref 9.2–12.9)
PO2 BLDA: 54 MMHG (ref 80–100)
POC BE: 19 MMOL/L
POC SATURATED O2: 84 % (ref 95–100)
POC TCO2: 47 MMOL/L (ref 23–27)
POTASSIUM SERPL-SCNC: 4.3 MMOL/L (ref 3.5–5.1)
RBC # BLD AUTO: 4.92 M/UL (ref 4–5.4)
SAMPLE: ABNORMAL
SITE: ABNORMAL
SODIUM SERPL-SCNC: 143 MMOL/L (ref 136–145)
WBC # BLD AUTO: 9.07 K/UL (ref 3.9–12.7)

## 2023-12-28 PROCEDURE — 80048 BASIC METABOLIC PNL TOTAL CA: CPT | Performed by: FAMILY MEDICINE

## 2023-12-28 PROCEDURE — 94640 AIRWAY INHALATION TREATMENT: CPT

## 2023-12-28 PROCEDURE — 99900035 HC TECH TIME PER 15 MIN (STAT)

## 2023-12-28 PROCEDURE — 25000242 PHARM REV CODE 250 ALT 637 W/ HCPCS: Performed by: FAMILY MEDICINE

## 2023-12-28 PROCEDURE — 85025 COMPLETE CBC W/AUTO DIFF WBC: CPT

## 2023-12-28 PROCEDURE — 94660 CPAP INITIATION&MGMT: CPT

## 2023-12-28 PROCEDURE — 25000003 PHARM REV CODE 250: Performed by: FAMILY MEDICINE

## 2023-12-28 PROCEDURE — 82803 BLOOD GASES ANY COMBINATION: CPT

## 2023-12-28 PROCEDURE — 36415 COLL VENOUS BLD VENIPUNCTURE: CPT | Performed by: FAMILY MEDICINE

## 2023-12-28 PROCEDURE — 27100171 HC OXYGEN HIGH FLOW UP TO 24 HOURS

## 2023-12-28 PROCEDURE — 11000001 HC ACUTE MED/SURG PRIVATE ROOM

## 2023-12-28 PROCEDURE — 63600175 PHARM REV CODE 636 W HCPCS: Performed by: STUDENT IN AN ORGANIZED HEALTH CARE EDUCATION/TRAINING PROGRAM

## 2023-12-28 PROCEDURE — 63600175 PHARM REV CODE 636 W HCPCS: Performed by: FAMILY MEDICINE

## 2023-12-28 PROCEDURE — 36600 WITHDRAWAL OF ARTERIAL BLOOD: CPT

## 2023-12-28 PROCEDURE — 83735 ASSAY OF MAGNESIUM: CPT

## 2023-12-28 PROCEDURE — 94761 N-INVAS EAR/PLS OXIMETRY MLT: CPT

## 2023-12-28 PROCEDURE — 84100 ASSAY OF PHOSPHORUS: CPT

## 2023-12-28 RX ORDER — FLUTICASONE FUROATE AND VILANTEROL 200; 25 UG/1; UG/1
1 POWDER RESPIRATORY (INHALATION) DAILY
Status: DISCONTINUED | OUTPATIENT
Start: 2023-12-28 | End: 2023-12-29 | Stop reason: HOSPADM

## 2023-12-28 RX ADMIN — METHYLPREDNISOLONE SODIUM SUCCINATE 40 MG: 40 INJECTION, POWDER, FOR SOLUTION INTRAMUSCULAR; INTRAVENOUS at 08:12

## 2023-12-28 RX ADMIN — DOXYCYCLINE HYCLATE 100 MG: 100 TABLET, COATED ORAL at 08:12

## 2023-12-28 RX ADMIN — IPRATROPIUM BROMIDE AND ALBUTEROL SULFATE 3 ML: 2.5; .5 SOLUTION RESPIRATORY (INHALATION) at 01:12

## 2023-12-28 RX ADMIN — MUPIROCIN: 20 OINTMENT TOPICAL at 08:12

## 2023-12-28 RX ADMIN — FLUTICASONE FUROATE AND VILANTEROL TRIFENATATE 1 PUFF: 200; 25 POWDER RESPIRATORY (INHALATION) at 02:12

## 2023-12-28 RX ADMIN — DIAZEPAM 2 MG: 2 TABLET ORAL at 12:12

## 2023-12-28 RX ADMIN — TIOTROPIUM BROMIDE INHALATION SPRAY 2 PUFF: 3.12 SPRAY, METERED RESPIRATORY (INHALATION) at 02:12

## 2023-12-28 RX ADMIN — DIAZEPAM 2 MG: 2 TABLET ORAL at 08:12

## 2023-12-28 RX ADMIN — IPRATROPIUM BROMIDE AND ALBUTEROL SULFATE 3 ML: 2.5; .5 SOLUTION RESPIRATORY (INHALATION) at 11:12

## 2023-12-28 RX ADMIN — IPRATROPIUM BROMIDE AND ALBUTEROL SULFATE 3 ML: 2.5; .5 SOLUTION RESPIRATORY (INHALATION) at 04:12

## 2023-12-28 RX ADMIN — ACETAMINOPHEN 650 MG: 325 TABLET ORAL at 08:12

## 2023-12-28 RX ADMIN — IPRATROPIUM BROMIDE AND ALBUTEROL SULFATE 3 ML: 2.5; .5 SOLUTION RESPIRATORY (INHALATION) at 03:12

## 2023-12-28 RX ADMIN — IPRATROPIUM BROMIDE AND ALBUTEROL SULFATE 3 ML: 2.5; .5 SOLUTION RESPIRATORY (INHALATION) at 07:12

## 2023-12-28 RX ADMIN — ENOXAPARIN SODIUM 40 MG: 40 INJECTION SUBCUTANEOUS at 04:12

## 2023-12-28 RX ADMIN — IPRATROPIUM BROMIDE AND ALBUTEROL SULFATE 3 ML: 2.5; .5 SOLUTION RESPIRATORY (INHALATION) at 12:12

## 2023-12-28 RX ADMIN — POLYETHYLENE GLYCOL (3350) 17 G: 17 POWDER, FOR SOLUTION ORAL at 08:12

## 2023-12-28 NOTE — SUBJECTIVE & OBJECTIVE
Interval History: awake and alert, patient concern her home BiPAP mask is not well fitted like what she had in the hospital.  On 2 L nasal cannula-at baseline  Also reported cramps in her fingers and toes    Review of Systems   Constitutional:  Positive for activity change.   Respiratory:  Negative for chest tightness and wheezing.    Cardiovascular:  Negative for chest pain.   Genitourinary:  Negative for dysuria.   Neurological:  Negative for dizziness.     Objective:     Vital Signs (Most Recent):  Temp: 97.6 °F (36.4 °C) (12/28/23 1605)  Pulse: 87 (12/28/23 1605)  Resp: 18 (12/28/23 1605)  BP: 138/63 (12/28/23 1605)  SpO2: (!) 90 % (12/28/23 1605) Vital Signs (24h Range):  Temp:  [97.6 °F (36.4 °C)-98.4 °F (36.9 °C)] 97.6 °F (36.4 °C)  Pulse:  [65-91] 87  Resp:  [18-40] 18  SpO2:  [89 %-97 %] 90 %  BP: (126-155)/(55-68) 138/63     Weight: 84.9 kg (187 lb 2.7 oz)  Body mass index is 30.21 kg/m².    Intake/Output Summary (Last 24 hours) at 12/28/2023 1729  Last data filed at 12/28/2023 1100  Gross per 24 hour   Intake 240 ml   Output 1300 ml   Net -1060 ml         Physical Exam  Vitals and nursing note reviewed.   Constitutional:       Appearance: She is well-developed.   HENT:      Head: Normocephalic and atraumatic.   Cardiovascular:      Rate and Rhythm: Normal rate and regular rhythm.      Heart sounds: Normal heart sounds.   Pulmonary:      Effort: Pulmonary effort is normal.      Breath sounds: Normal breath sounds. No wheezing.   Abdominal:      General: Bowel sounds are normal.      Palpations: Abdomen is soft.      Tenderness: There is no abdominal tenderness.   Musculoskeletal:         General: No tenderness. Normal range of motion.      Cervical back: Normal range of motion and neck supple.   Skin:     General: Skin is warm.      Findings: No rash.   Neurological:      Mental Status: She is oriented to person, place, and time.      Cranial Nerves: No cranial nerve deficit.   Psychiatric:          "Behavior: Behavior normal.         Thought Content: Thought content normal.             Significant Labs: A1C: No results for input(s): "HGBA1C" in the last 4320 hours.  ABGs:   Recent Labs   Lab 12/27/23  1201 12/27/23  2034 12/28/23  0833   PH 7.223* 7.288* 7.355   PCO2 109* 97.5* 80.5*   HCO3 44.9* 46.6* 45.0*   POCSATURATED 88.2* 92.1* 84   BE  --   --  19*   PO2 60.9* 65.6* 54*     Blood Culture: No results for input(s): "LABBLOO" in the last 48 hours.  CBC:   Recent Labs   Lab 12/27/23  1118 12/28/23  0416   WBC 11.45 9.07   HGB 13.7 13.4   HCT 47.5 45.3   * 126*     CMP:   Recent Labs   Lab 12/27/23  1118 12/28/23  0416   * 143   K 3.9 4.3   CL 99 95   CO2 38* 40*   * 117*   BUN 17 20   CREATININE 0.7 0.6   CALCIUM 8.6* 8.3*   PROT 6.8  --    ALBUMIN 3.5  --    BILITOT 0.4  --    ALKPHOS 45*  --    AST 20  --    ALT 21  --    ANIONGAP 9 8     Lactic Acid: No results for input(s): "LACTATE" in the last 48 hours.  Lipase: No results for input(s): "LIPASE" in the last 48 hours.  Lipid Panel: No results for input(s): "CHOL", "HDL", "LDLCALC", "TRIG", "CHOLHDL" in the last 48 hours.  Magnesium:   Recent Labs   Lab 12/27/23  1118 12/28/23  0416   MG 2.1 2.1     Troponin:   Recent Labs   Lab 12/27/23 1118   TROPONINI 0.008     TSH: No results for input(s): "TSH" in the last 4320 hours.  Urine Culture: No results for input(s): "LABURIN" in the last 48 hours.  Urine Studies: No results for input(s): "COLORU", "APPEARANCEUA", "PHUR", "SPECGRAV", "PROTEINUA", "GLUCUA", "KETONESU", "BILIRUBINUA", "OCCULTUA", "NITRITE", "UROBILINOGEN", "LEUKOCYTESUR", "RBCUA", "WBCUA", "BACTERIA", "SQUAMEPITHEL", "HYALINECASTS" in the last 48 hours.    Invalid input(s): "WRIGHTSUR"    Significant Imaging: I have reviewed all pertinent imaging results/findings within the past 24 hours.  "

## 2023-12-28 NOTE — ACP (ADVANCE CARE PLANNING)
Spoke with patient's daughter. She shared that they have met with palliative care and Ms. Phelan has shared that she would not want to be intubated if her breathing status worsened and would not want CPR in the event that her heart stopped.     - code status changed to DNR    Nadege Kyle MD  [facility] PCCM Fellow

## 2023-12-28 NOTE — CONSULTS
Consult Note  [facility]    Attending: Katie Adams MD  Admit Date: 12/27/2023  Today's Date: 12/28/2023       HPI: 67 y/o F w/pmhx COPD, JUAN (2lpm home oxy), resent admission for similar complaints on 12/18/23 arrived w/EMS for several days SOB worsening today. Endorses cough with increased sputum, denies fevers. Daughter states pt has been more somnolent and has been compliant with her CPAP. In Emergency, required BiPAP. ABG shows respiratory acidosis with hypoxia and hypercapnea. CXR without acute abn. Pt admitted to ICU for acute hypoxia and hypercapnea on chronic respiratory failure.       SUBJECTIVE:     OVERNIGHT  Feeling better, able to speak in full sentences through BiPAP this AM  Wheeze, diminished lung sounds diffusely.  Trial off BiPAP doing well on NC.      Review of Systems   Constitutional:  Negative for chills and fever.   Respiratory:  Positive for cough, sputum production, shortness of breath and wheezing.    Cardiovascular:  Positive for leg swelling. Negative for chest pain and palpitations.   Gastrointestinal:  Negative for abdominal pain, heartburn, nausea and vomiting.   Genitourinary:  Negative for dysuria.   Musculoskeletal:  Negative for joint pain and myalgias.   Neurological:  Negative for dizziness and headaches.       OBJECTIVE:     Vital Signs Trends/Hx Reviewed  Vitals:    12/28/23 0354 12/28/23 0400 12/28/23 0500 12/28/23 0600   BP:  (!) 155/67 135/61 133/60   BP Location:  Right arm     Patient Position:  Lying     Pulse: 66 65 67 65   Resp: (!) 22 (!) 27 (!) 22 (!) 24   Temp:  98 °F (36.7 °C)     TempSrc:  Axillary     SpO2: 96% 96% 97% 96%   Weight:       Height:           Oxygen Concentration (%):  [28-50] 40        Physical Exam  Constitutional:       Appearance: Normal appearance. She is obese.   HENT:      Head: Normocephalic and atraumatic.   Cardiovascular:      Rate and Rhythm: Normal rate and regular rhythm.      Pulses: Normal pulses.      Heart  sounds: Normal heart sounds. No murmur heard.  Pulmonary:      Effort: Respiratory distress present.      Breath sounds: Wheezing present.      Comments: On BiPAP    Curtain sign+  B-lines  Abdominal:      General: Abdomen is flat.      Palpations: Abdomen is soft.      Tenderness: There is no abdominal tenderness.   Musculoskeletal:      Right lower leg: Edema present.      Left lower leg: Edema present.   Neurological:      General: No focal deficit present.      Mental Status: She is alert and oriented to person, place, and time.   Psychiatric:         Mood and Affect: Mood normal.         Behavior: Behavior normal.         Thought Content: Thought content normal.         Judgment: Judgment normal.         Laboratory:  Recent Labs   Lab 12/28/23 0416   WBC 9.07   RBC 4.92   HGB 13.4   HCT 45.3   *   MCV 92   MCH 27.2   MCHC 29.6*       Recent Labs   Lab 12/28/23 0416      K 4.3   CL 95   CO2 40*   BUN 20   CREATININE 0.6   CALCIUM 8.3*   MG 2.1       Recent Labs   Lab 12/27/23 2034   PH 7.288*   PCO2 97.5*   PO2 65.6*   HCO3 46.6*   POCSATURATED 92.1*           Chest Imaging:   CXR 12/27/23  No definite evidence of acute detrimental change relative to prior study performed 12/19/2023, 00:26 hours     ASSESSMENT & RECOMMENDATIONS     Neuro/Psych  A+Ox3 no issues     CV  #HTN in ER  Bp up to 201/83, now 141/93  Denies chest pain, orthopnea  Trop, BNP wnl  EKG nsr, no ST changes    PLAN:  Monitor       Pulm  #Respiratory Failure  BiPAP settings 18/6, 35% FiO2... transition to NC  Most recent ABG (patient requested) 7.35/80/54/45  CXR unremarkable    PLAN:  Trial of home O2 setting    #COPD exacerbation  Several ER and admissions this year, most recent on 12/18  Possible overload included, received 40mg lasix yesterday, good urine output, no crackles on exam today, wheeze only  Increased sputum production, possible infectious exposure over holiday  40pk yr hx former smoker  At home 2lpm oxy, Breztri,  roflumilast, albuterol  POCUS today reveals scant B-lines    PLAN:  Duonebs  BiPAP prn  Methylpred 40mg (consider switch to PO)   Doxy 100mg bid      FEN/GI  F hypervolemic  E Na+ 143, K+ 4.3, Mg++ 2.1  N: Regular diet    GI  No known issues     RENAL  UOP: 1200cc , In: 0cc, net 1200cc DOWN in last 24 hrs  Down 1.2L on admit  BUN/Cr: 20/0.6, baseline wnl    PLAN:  Continue to monitor renal status and urine output     Heme  H/H 13.7; baseline wnl  WBC 11  DVT prophylaxis: Lovenox    Endo  No known issues at this time.  24hr Glu range 117-130    ID  #SOB   Started on doxy 100mg bid  Afebrile, no WBC elevation  Imaging unremarkable  Increased sputum and possible fevers at home    PLAN:  Consider de-escalation    Dispo  67 y/o F w/frequent visits and admissions for similar complaints currently treated for hypercapneic, hypoxic resp failure and volume overload. Was hypertensive in ER, but has resolved. Condiser BiPAP qhs, methylprednisolone, duonebs. Step down.     F NPO  A n/a  S n/a  T lovenox  H 30  U defer  G goal 140-180 (117)  S n/a   B miralax  I PIV  D Doxy      Benjamín Martell M.D.  [facility], PGY-2   12/28/2023 3:09 PM     Pt seen and examined with Pulmonary/Critical Care team and this note reviewed and validated with the following additional comments:  Much improved.  Etiology of exacerbation unclear.  May have had some volume overload. Should be OK to floor.    Medical Decision Making (MDM) was complex.  The number and complexity of problems addressed was high.  The amount and complexity of data reviewed was high.  The risk of complications and/or morbidity/mortality was high.  The tests ordered were none.  I communicated with the following providers HM.  Time spent in the care of this patient was 25 minutes    Sage Patrick MD  Phone [phone]

## 2023-12-28 NOTE — PLAN OF CARE
The sw met with the pt to complete the assessment. The pt's known to the Sw from past admits. The pt lives with Mulugeta Phelan(dtr)[phone] in [place]. The pt's independent with her ADL's and has the dme listed below. The pt's doesn't drive anymore so her dtr transports her to dr del rio's and errands. She will transport the pt home at d/c. The pt refuses hh stating she follows with [facility]. The sw completed the white board in the pt's room with her name and contact info. The Sw encouraged the pt to call if she has any further questions or concerns. The Sw will continue to follow the pt throughout her transitions of care and will assist with any d/c needs. The pt was d/c'd from [facility] 12/20/23. The pt's current with Palliative Care([facility]) out pt.     [facility] - Intensive Care  Initial Discharge Assessment       Primary Care Provider: Kev Cox MD    Admission Diagnosis: Shortness of breath [R06.02]  COPD exacerbation [J44.1]  CO2 narcosis [R06.89]    Admission Date: 12/27/2023  Expected Discharge Date:     Transition of Care Barriers: (P) None    Payor: [employer] MEDICARE / Plan: [employer] MEDICARE HMO / Product Type: Capitation /     Extended Emergency Contact Information  Primary Emergency Contact: Mulugeta Phelan   [facility]  Home Phone: [phone]  Mobile Phone: [phone]  Relation: Daughter    Discharge Plan A: (P) Home with family  Discharge Plan B: (P) Other (TBD)      [id number] - [place] [id number] [facility] [address] [facility] [address]  [address] [facility] [address]  Phone: [phone] Fax: [phone]      Initial Assessment (most recent)       Adult Discharge Assessment - 12/28/23 0908          Discharge Assessment    Assessment Type Discharge Planning Assessment (P)      Confirmed/corrected address, phone number and insurance Yes (P)      Confirmed Demographics Correct on Facesheet (P)       Source of Information patient (P)      When was your last doctors appointment? 12/14/23 (P)      Communicated RADHA with patient/caregiver Date not available/Unable to determine (P)      Reason For Admission Acute exacerbation of COPD (P)      People in Home child(mary), adult (P)      Do you expect to return to your current living situation? Yes (P)      Do you have help at home or someone to help you manage your care at home? Yes (P)      Who are your caregiver(s) and their phone number(s)? Mulugeta Phelan(dtr)[phone] (P)      Prior to hospitilization cognitive status: Alert/Oriented (P)      Current cognitive status: Alert/Oriented (P)      Walking or Climbing Stairs Difficulty yes (P)      Walking or Climbing Stairs ambulation difficulty, requires equipment;stair climbing difficulty, requires equipment;transferring difficulty, requires equipment (P)      Dressing/Bathing Difficulty yes (P)      Dressing/Bathing bathing difficulty, requires equipment (P)      Home Accessibility wheelchair accessible (P)      Home Layout Able to live on 1st floor (P)      Equipment Currently Used at Home ventilator;oxygen;walker, rolling;bedside commode;bath bench (P)      Readmission within 30 days? Yes (P)    the pt d/c'd from [facility] 12/20/23    Patient currently being followed by outpatient case management? No (P)      Do you currently have service(s) that help you manage your care at home? No (P)      Do you take prescription medications? Yes (P)      Do you have prescription coverage? Yes (P)      Coverage [employer] Medicare/ (P)      Do you have any problems affording any of your prescribed medications? No (P)      Is the patient taking medications as prescribed? yes (P)      Who is going to help you get home at discharge? Mulugeta Phelan(dtr)[phone] (P)      How do you get to doctors appointments? family or friend will provide (P)      Are you on dialysis? No (P)      Do you take coumadin? No (P)       Discharge Plan A Home with family (P)      Discharge Plan B Other (P)    TBD    DME Needed Upon Discharge  other (see comments) (P)    TBD    Discharge Plan discussed with: Patient (P)      Transition of Care Barriers None (P)         Physical Activity    On average, how many days per week do you engage in moderate to strenuous exercise (like a brisk walk)? 0 days (P)      On average, how many minutes do you engage in exercise at this level? 0 min (P)         Financial Resource Strain    How hard is it for you to pay for the very basics like food, housing, medical care, and heating? Not hard at all (P)         Housing Stability    In the last 12 months, was there a time when you were not able to pay the mortgage or rent on time? No (P)      In the last 12 months, how many places have you lived? 1 (P)      In the last 12 months, was there a time when you did not have a steady place to sleep or slept in a shelter (including now)? No (P)         Transportation Needs    In the past 12 months, has lack of transportation kept you from medical appointments or from getting medications? No (P)      In the past 12 months, has lack of transportation kept you from meetings, work, or from getting things needed for daily living? No (P)         Food Insecurity    Within the past 12 months, you worried that your food would run out before you got the money to buy more. Never true (P)      Within the past 12 months, the food you bought just didn't last and you didn't have money to get more. Never true (P)         Stress    Do you feel stress - tense, restless, nervous, or anxious, or unable to sleep at night because your mind is troubled all the time - these days? Very much (P)         Social Connections    In a typical week, how many times do you talk on the phone with family, friends, or neighbors? More than three times a week (P)      How often do you get together with friends or relatives? More than three times a week (P)       How often do you attend Restoration or Gnosticism services? Never (P)      Do you belong to any clubs or organizations such as Restoration groups, unions, fraternal or athletic groups, or school groups? No (P)      How often do you attend meetings of the clubs or organizations you belong to? Never (P)      Are you , , , , never , or living with a partner?  (P)         Alcohol Use    Q1: How often do you have a drink containing alcohol? Never (P)      Q2: How many drinks containing alcohol do you have on a typical day when you are drinking? Patient does not drink (P)      Q3: How often do you have six or more drinks on one occasion? Never (P)         OTHER    Name(s) of People in Home Mulugeta Phelan(dtr)[phone] (P)

## 2023-12-28 NOTE — PROGRESS NOTES
"[facility]           Pharmacy  Admission Medication Reconciliation     Based on information gathered for medication list, you may go to "Admission" then "Reconcile Home Medications" tabs to review and/or act upon those items.     The home medication list has been updated by the Pharmacy department.   Please read ALL comments highlighted in red.   Please address this information as you see fit.    Feel free to contact us if you have any questions or require assistance.    Home medication list has been compared to current inpatient medications. Please review the following discrepancies noted below:      Patient reports STILL TAKING the following medication(s) which was not ordered upon admit  Furosemide  Roflumilast  Trazodone  Breztri    Feel free to contact us if you have any questions or require assistance.    Radha Hsu, PharmD  [phone]        "

## 2023-12-28 NOTE — NURSING
Transferred to [facility] on 2L NC, VSS. Chart and belongings sent with patient. Receiving nurse Tosha at bedside on time of patient arrival.

## 2023-12-28 NOTE — NURSING
Patient transferred to room 457 from ICU, report received from Radha ICU RN. Vitals stable, safety maintained, bed in lowest position, bed alarm on, bed wheels locked, call light with in reach.

## 2023-12-29 VITALS
SYSTOLIC BLOOD PRESSURE: 107 MMHG | HEART RATE: 77 BPM | TEMPERATURE: 98 F | WEIGHT: 187.19 LBS | OXYGEN SATURATION: 91 % | DIASTOLIC BLOOD PRESSURE: 52 MMHG | BODY MASS INDEX: 30.08 KG/M2 | HEIGHT: 66 IN | RESPIRATION RATE: 20 BRPM

## 2023-12-29 LAB
ANION GAP SERPL CALC-SCNC: 6 MMOL/L (ref 8–16)
BASOPHILS # BLD AUTO: 0.02 K/UL (ref 0–0.2)
BASOPHILS NFR BLD: 0.2 % (ref 0–1.9)
BUN SERPL-MCNC: 28 MG/DL (ref 8–23)
CALCIUM SERPL-MCNC: 8.8 MG/DL (ref 8.7–10.5)
CHLORIDE SERPL-SCNC: 97 MMOL/L (ref 95–110)
CO2 SERPL-SCNC: 41 MMOL/L (ref 23–29)
CREAT SERPL-MCNC: 0.7 MG/DL (ref 0.5–1.4)
DIFFERENTIAL METHOD BLD: ABNORMAL
EOSINOPHIL # BLD AUTO: 0 K/UL (ref 0–0.5)
EOSINOPHIL NFR BLD: 0.2 % (ref 0–8)
ERYTHROCYTE [DISTWIDTH] IN BLOOD BY AUTOMATED COUNT: 15.9 % (ref 11.5–14.5)
EST. GFR  (NO RACE VARIABLE): >60 ML/MIN/1.73 M^2
GLUCOSE SERPL-MCNC: 98 MG/DL (ref 70–110)
HCT VFR BLD AUTO: 43.3 % (ref 37–48.5)
HGB BLD-MCNC: 12.6 G/DL (ref 12–16)
IMM GRANULOCYTES # BLD AUTO: 0.05 K/UL (ref 0–0.04)
IMM GRANULOCYTES NFR BLD AUTO: 0.5 % (ref 0–0.5)
LYMPHOCYTES # BLD AUTO: 1.5 K/UL (ref 1–4.8)
LYMPHOCYTES NFR BLD: 14 % (ref 18–48)
MAGNESIUM SERPL-MCNC: 2.3 MG/DL (ref 1.6–2.6)
MCH RBC QN AUTO: 26.9 PG (ref 27–31)
MCHC RBC AUTO-ENTMCNC: 29.1 G/DL (ref 32–36)
MCV RBC AUTO: 92 FL (ref 82–98)
MONOCYTES # BLD AUTO: 1.1 K/UL (ref 0.3–1)
MONOCYTES NFR BLD: 10.1 % (ref 4–15)
NEUTROPHILS # BLD AUTO: 8.3 K/UL (ref 1.8–7.7)
NEUTROPHILS NFR BLD: 75 % (ref 38–73)
NRBC BLD-RTO: 0 /100 WBC
PHOSPHATE SERPL-MCNC: 2.5 MG/DL (ref 2.7–4.5)
PLATELET # BLD AUTO: 132 K/UL (ref 150–450)
PMV BLD AUTO: 11.6 FL (ref 9.2–12.9)
POTASSIUM SERPL-SCNC: 4.1 MMOL/L (ref 3.5–5.1)
RBC # BLD AUTO: 4.69 M/UL (ref 4–5.4)
SODIUM SERPL-SCNC: 144 MMOL/L (ref 136–145)
WBC # BLD AUTO: 11.02 K/UL (ref 3.9–12.7)

## 2023-12-29 PROCEDURE — 27100171 HC OXYGEN HIGH FLOW UP TO 24 HOURS

## 2023-12-29 PROCEDURE — 63600175 PHARM REV CODE 636 W HCPCS: Performed by: STUDENT IN AN ORGANIZED HEALTH CARE EDUCATION/TRAINING PROGRAM

## 2023-12-29 PROCEDURE — 36415 COLL VENOUS BLD VENIPUNCTURE: CPT | Performed by: FAMILY MEDICINE

## 2023-12-29 PROCEDURE — 84100 ASSAY OF PHOSPHORUS: CPT

## 2023-12-29 PROCEDURE — 94660 CPAP INITIATION&MGMT: CPT

## 2023-12-29 PROCEDURE — 25000003 PHARM REV CODE 250: Performed by: FAMILY MEDICINE

## 2023-12-29 PROCEDURE — 94640 AIRWAY INHALATION TREATMENT: CPT

## 2023-12-29 PROCEDURE — 94761 N-INVAS EAR/PLS OXIMETRY MLT: CPT

## 2023-12-29 PROCEDURE — 25000242 PHARM REV CODE 250 ALT 637 W/ HCPCS: Performed by: FAMILY MEDICINE

## 2023-12-29 PROCEDURE — 83735 ASSAY OF MAGNESIUM: CPT

## 2023-12-29 PROCEDURE — 80048 BASIC METABOLIC PNL TOTAL CA: CPT | Performed by: FAMILY MEDICINE

## 2023-12-29 PROCEDURE — 85025 COMPLETE CBC W/AUTO DIFF WBC: CPT

## 2023-12-29 PROCEDURE — 99900035 HC TECH TIME PER 15 MIN (STAT)

## 2023-12-29 PROCEDURE — 27000221 HC OXYGEN, UP TO 24 HOURS

## 2023-12-29 RX ORDER — POLYETHYLENE GLYCOL 3350 17 G/17G
17 POWDER, FOR SOLUTION ORAL DAILY
Qty: 510 G | Refills: 0 | Status: SHIPPED | OUTPATIENT
Start: 2023-12-29

## 2023-12-29 RX ORDER — PREDNISONE 20 MG/1
40 TABLET ORAL DAILY
Qty: 10 TABLET | Refills: 0 | Status: SHIPPED | OUTPATIENT
Start: 2023-12-29 | End: 2024-01-03

## 2023-12-29 RX ORDER — POLYETHYLENE GLYCOL 3350 17 G/17G
17 POWDER, FOR SOLUTION ORAL DAILY
Qty: 510 G | Refills: 0 | Status: SHIPPED | OUTPATIENT
Start: 2023-12-29 | End: 2023-12-29

## 2023-12-29 RX ORDER — DOXYCYCLINE HYCLATE 100 MG
100 TABLET ORAL EVERY 12 HOURS
Qty: 14 TABLET | Refills: 0 | Status: SHIPPED | OUTPATIENT
Start: 2023-12-29 | End: 2024-01-05

## 2023-12-29 RX ORDER — DOXYCYCLINE HYCLATE 100 MG
100 TABLET ORAL EVERY 12 HOURS
Qty: 14 TABLET | Refills: 0 | Status: SHIPPED | OUTPATIENT
Start: 2023-12-29 | End: 2023-12-29

## 2023-12-29 RX ORDER — FUROSEMIDE 40 MG/1
40 TABLET ORAL DAILY
Qty: 30 TABLET | Refills: 4 | Status: SHIPPED | OUTPATIENT
Start: 2023-12-29 | End: 2024-12-28

## 2023-12-29 RX ORDER — PREDNISONE 20 MG/1
40 TABLET ORAL DAILY
Qty: 10 TABLET | Refills: 0 | Status: SHIPPED | OUTPATIENT
Start: 2023-12-29 | End: 2023-12-29

## 2023-12-29 RX ADMIN — FLUTICASONE FUROATE AND VILANTEROL TRIFENATATE 1 PUFF: 200; 25 POWDER RESPIRATORY (INHALATION) at 10:12

## 2023-12-29 RX ADMIN — MUPIROCIN: 20 OINTMENT TOPICAL at 10:12

## 2023-12-29 RX ADMIN — METHYLPREDNISOLONE SODIUM SUCCINATE 40 MG: 40 INJECTION, POWDER, FOR SOLUTION INTRAMUSCULAR; INTRAVENOUS at 10:12

## 2023-12-29 RX ADMIN — IPRATROPIUM BROMIDE AND ALBUTEROL SULFATE 3 ML: 2.5; .5 SOLUTION RESPIRATORY (INHALATION) at 12:12

## 2023-12-29 RX ADMIN — IPRATROPIUM BROMIDE AND ALBUTEROL SULFATE 3 ML: 2.5; .5 SOLUTION RESPIRATORY (INHALATION) at 03:12

## 2023-12-29 RX ADMIN — DIAZEPAM 2 MG: 2 TABLET ORAL at 01:12

## 2023-12-29 RX ADMIN — DOXYCYCLINE HYCLATE 100 MG: 100 TABLET, COATED ORAL at 10:12

## 2023-12-29 RX ADMIN — TIOTROPIUM BROMIDE INHALATION SPRAY 2 PUFF: 3.12 SPRAY, METERED RESPIRATORY (INHALATION) at 10:12

## 2023-12-29 RX ADMIN — IPRATROPIUM BROMIDE AND ALBUTEROL SULFATE 3 ML: 2.5; .5 SOLUTION RESPIRATORY (INHALATION) at 07:12

## 2023-12-29 NOTE — PROGRESS NOTES
[facility] Medicine  Progress Note    Patient Name: Terri Phelan  MRN: [id number]  Patient Class: IP- Inpatient   Admission Date: 12/27/2023  Length of Stay: 2 days  Attending Physician: Josephine Xavier*  Primary Care Provider: Kev Cox MD        Subjective:     Principal Problem:Acute exacerbation of chronic obstructive pulmonary disease (COPD)        HPI:  Pt with hx of COPD and JUAN on 2LPM of home oxygen presented to the ED for sob for a few days that has worsened today. Pt has cough but denies any fevers. Daughter states pt has been more somnolent and has been compliant with her cpap. In the ED, pt was placed hypoxic on bipap. ABG shows respiratory acidosis with hypoxia and hypercapnea. CXR without acute abn. Pt admitted to ICU for acute hypoxia and hypercapnea on chronic respiratory failure.     Overview/Hospital Course:  No notes on file    Interval History:  Awake and alert, no new complaint  Possible discharge today on doxycycline and prednisone    Review of Systems   Constitutional:  Positive for activity change.   Respiratory:  Negative for chest tightness and wheezing.    Cardiovascular:  Negative for chest pain.   Genitourinary:  Negative for dysuria.   Neurological:  Negative for dizziness.     Objective:     Vital Signs (Most Recent):  Temp: 97.5 °F (36.4 °C) (12/29/23 0820)  Pulse: 65 (12/29/23 1051)  Resp: 18 (12/29/23 1051)  BP: 131/61 (12/29/23 0820)  SpO2: (!) 94 % (12/29/23 1051) Vital Signs (24h Range):  Temp:  [96.7 °F (35.9 °C)-98.5 °F (36.9 °C)] 97.5 °F (36.4 °C)  Pulse:  [62-94] 65  Resp:  [16-25] 18  SpO2:  [90 %-98 %] 94 %  BP: (131-163)/(61-89) 131/61     Weight: 84.9 kg (187 lb 2.7 oz)  Body mass index is 30.21 kg/m².    Intake/Output Summary (Last 24 hours) at 12/29/2023 1200  Last data filed at 12/29/2023 0446  Gross per 24 hour   Intake --   Output 400 ml   Net -400 ml         Physical Exam  Vitals and nursing note reviewed.   Constitutional:   "     Appearance: She is well-developed.   HENT:      Head: Normocephalic and atraumatic.   Cardiovascular:      Rate and Rhythm: Normal rate and regular rhythm.      Heart sounds: Normal heart sounds.   Pulmonary:      Effort: Pulmonary effort is normal.      Breath sounds: Normal breath sounds. No wheezing.   Abdominal:      General: Bowel sounds are normal.      Palpations: Abdomen is soft.      Tenderness: There is no abdominal tenderness.   Musculoskeletal:         General: No tenderness. Normal range of motion.      Cervical back: Normal range of motion and neck supple.   Skin:     General: Skin is warm.      Findings: No rash.   Neurological:      Mental Status: She is oriented to person, place, and time.      Cranial Nerves: No cranial nerve deficit.   Psychiatric:         Behavior: Behavior normal.         Thought Content: Thought content normal.             Significant Labs: A1C: No results for input(s): "HGBA1C" in the last 4320 hours.  ABGs:   Recent Labs   Lab 12/27/23 2034 12/28/23  0833   PH 7.288* 7.355   PCO2 97.5* 80.5*   HCO3 46.6* 45.0*   POCSATURATED 92.1* 84   BE  --  19*   PO2 65.6* 54*     Blood Culture: No results for input(s): "LABBLOO" in the last 48 hours.  CBC:   Recent Labs   Lab 12/28/23 0416 12/29/23  0244   WBC 9.07 11.02   HGB 13.4 12.6   HCT 45.3 43.3   * 132*     CMP:   Recent Labs   Lab 12/28/23 0416 12/29/23  0244    144   K 4.3 4.1   CL 95 97   CO2 40* 41*   * 98   BUN 20 28*   CREATININE 0.6 0.7   CALCIUM 8.3* 8.8   ANIONGAP 8 6*     Coagulation: No results for input(s): "PT", "INR", "APTT" in the last 48 hours.  Lactic Acid: No results for input(s): "LACTATE" in the last 48 hours.  Lipase: No results for input(s): "LIPASE" in the last 48 hours.  Lipid Panel: No results for input(s): "CHOL", "HDL", "LDLCALC", "TRIG", "CHOLHDL" in the last 48 hours.  Magnesium:   Recent Labs   Lab 12/28/23 0416 12/29/23  0244   MG 2.1 2.3     Troponin: No results for " "input(s): "TROPONINI", "TROPONINIHS" in the last 48 hours.  TSH: No results for input(s): "TSH" in the last 4320 hours.  Urine Culture: No results for input(s): "LABURIN" in the last 48 hours.  Urine Studies: No results for input(s): "COLORU", "APPEARANCEUA", "PHUR", "SPECGRAV", "PROTEINUA", "GLUCUA", "KETONESU", "BILIRUBINUA", "OCCULTUA", "NITRITE", "UROBILINOGEN", "LEUKOCYTESUR", "RBCUA", "WBCUA", "BACTERIA", "SQUAMEPITHEL", "HYALINECASTS" in the last 48 hours.    Invalid input(s): "WRIGHTSUR"    Significant Imaging: I have reviewed all pertinent imaging results/findings within the past 24 hours.    Assessment/Plan:      * Acute exacerbation of chronic obstructive pulmonary disease (COPD)  Patient's COPD is with exacerbation noted by worsening of baseline hypoxia currently.  Patient is currently on COPD Pathway. Continue scheduled inhalers Steroids, Antibiotics, and Supplemental oxygen and monitor respiratory status closely.     Cont on bipap q.h.s.  Pulm consulted  Steroids, doxy and nebs    Oxygen dependent  On home oxygen of 2LPM      HTN (hypertension)  Chronic, controlled. Latest blood pressure and vitals reviewed-     Temp:  [96.7 °F (35.9 °C)-98.5 °F (36.9 °C)]   Pulse:  [62-94]   Resp:  [16-25]   BP: (131-163)/(61-89)   SpO2:  [90 %-98 %] .   Home meds for hypertension were reviewed and noted below.   Hypertension Medications               furosemide (LASIX) 40 MG tablet Take 1 tablet (40 mg total) by mouth as needed. For lower extremity swelling, increased weight            While in the hospital, will manage blood pressure as follows; Continue home antihypertensive regimen    Will utilize p.r.n. blood pressure medication only if patient's blood pressure greater than 180/110 and she develops symptoms such as worsening chest pain or shortness of breath.      VTE Risk Mitigation (From admission, onward)           Ordered     enoxaparin injection 40 mg  Daily         12/27/23 1243     IP VTE HIGH RISK PATIENT "  Once         12/27/23 1243     Place sequential compression device  Until discontinued         12/27/23 1243                    Discharge Planning   RADHA: 12/29/2023     Code Status: DNR   Is the patient medically ready for discharge?:     Reason for patient still in hospital (select all that apply): Pending disposition  Discharge Plan A: Home with family                  Josephine MICHELLE Xavier MD  Department of [facility] Medicine   [facility]

## 2023-12-29 NOTE — SUBJECTIVE & OBJECTIVE
Interval History:  Awake and alert, no new complaint  Possible discharge today on doxycycline and prednisone    Review of Systems   Constitutional:  Positive for activity change.   Respiratory:  Negative for chest tightness and wheezing.    Cardiovascular:  Negative for chest pain.   Genitourinary:  Negative for dysuria.   Neurological:  Negative for dizziness.     Objective:     Vital Signs (Most Recent):  Temp: 97.5 °F (36.4 °C) (12/29/23 0820)  Pulse: 65 (12/29/23 1051)  Resp: 18 (12/29/23 1051)  BP: 131/61 (12/29/23 0820)  SpO2: (!) 94 % (12/29/23 1051) Vital Signs (24h Range):  Temp:  [96.7 °F (35.9 °C)-98.5 °F (36.9 °C)] 97.5 °F (36.4 °C)  Pulse:  [62-94] 65  Resp:  [16-25] 18  SpO2:  [90 %-98 %] 94 %  BP: (131-163)/(61-89) 131/61     Weight: 84.9 kg (187 lb 2.7 oz)  Body mass index is 30.21 kg/m².    Intake/Output Summary (Last 24 hours) at 12/29/2023 1200  Last data filed at 12/29/2023 0446  Gross per 24 hour   Intake --   Output 400 ml   Net -400 ml         Physical Exam  Vitals and nursing note reviewed.   Constitutional:       Appearance: She is well-developed.   HENT:      Head: Normocephalic and atraumatic.   Cardiovascular:      Rate and Rhythm: Normal rate and regular rhythm.      Heart sounds: Normal heart sounds.   Pulmonary:      Effort: Pulmonary effort is normal.      Breath sounds: Normal breath sounds. No wheezing.   Abdominal:      General: Bowel sounds are normal.      Palpations: Abdomen is soft.      Tenderness: There is no abdominal tenderness.   Musculoskeletal:         General: No tenderness. Normal range of motion.      Cervical back: Normal range of motion and neck supple.   Skin:     General: Skin is warm.      Findings: No rash.   Neurological:      Mental Status: She is oriented to person, place, and time.      Cranial Nerves: No cranial nerve deficit.   Psychiatric:         Behavior: Behavior normal.         Thought Content: Thought content normal.             Significant Labs:  "A1C: No results for input(s): "HGBA1C" in the last 4320 hours.  ABGs:   Recent Labs   Lab 12/27/23 2034 12/28/23  0833   PH 7.288* 7.355   PCO2 97.5* 80.5*   HCO3 46.6* 45.0*   POCSATURATED 92.1* 84   BE  --  19*   PO2 65.6* 54*     Blood Culture: No results for input(s): "LABBLOO" in the last 48 hours.  CBC:   Recent Labs   Lab 12/28/23 0416 12/29/23  0244   WBC 9.07 11.02   HGB 13.4 12.6   HCT 45.3 43.3   * 132*     CMP:   Recent Labs   Lab 12/28/23 0416 12/29/23  0244    144   K 4.3 4.1   CL 95 97   CO2 40* 41*   * 98   BUN 20 28*   CREATININE 0.6 0.7   CALCIUM 8.3* 8.8   ANIONGAP 8 6*     Coagulation: No results for input(s): "PT", "INR", "APTT" in the last 48 hours.  Lactic Acid: No results for input(s): "LACTATE" in the last 48 hours.  Lipase: No results for input(s): "LIPASE" in the last 48 hours.  Lipid Panel: No results for input(s): "CHOL", "HDL", "LDLCALC", "TRIG", "CHOLHDL" in the last 48 hours.  Magnesium:   Recent Labs   Lab 12/28/23 0416 12/29/23  0244   MG 2.1 2.3     Troponin: No results for input(s): "TROPONINI", "TROPONINIHS" in the last 48 hours.  TSH: No results for input(s): "TSH" in the last 4320 hours.  Urine Culture: No results for input(s): "LABURIN" in the last 48 hours.  Urine Studies: No results for input(s): "COLORU", "APPEARANCEUA", "PHUR", "SPECGRAV", "PROTEINUA", "GLUCUA", "KETONESU", "BILIRUBINUA", "OCCULTUA", "NITRITE", "UROBILINOGEN", "LEUKOCYTESUR", "RBCUA", "WBCUA", "BACTERIA", "SQUAMEPITHEL", "HYALINECASTS" in the last 48 hours.    Invalid input(s): "WRIGHTSUR"    Significant Imaging: I have reviewed all pertinent imaging results/findings within the past 24 hours.  "

## 2023-12-29 NOTE — PLAN OF CARE
Problem: Adult Inpatient Plan of Care  Goal: Plan of Care Review  Outcome: Ongoing, Progressing     Problem: COPD (Chronic Obstructive Pulmonary Disease) Comorbidity  Goal: Maintenance of COPD Symptom Control  Outcome: Ongoing, Progressing     Problem: Obstructive Sleep Apnea Risk or Actual Comorbidity Management  Goal: Unobstructed Breathing During Sleep  Outcome: Ongoing, Progressing

## 2023-12-29 NOTE — PROGRESS NOTES
[facility] Medicine  Progress Note    Patient Name: Terri Phelan  MRN: [id number]  Patient Class: IP- Inpatient   Admission Date: 12/27/2023  Length of Stay: 1 days  Attending Physician: Josephine Xavier*  Primary Care Provider: Kev Cox MD        Subjective:     Principal Problem:Acute exacerbation of chronic obstructive pulmonary disease (COPD)        HPI:  Pt with hx of COPD and JUAN on 2LPM of home oxygen presented to the ED for sob for a few days that has worsened today. Pt has cough but denies any fevers. Daughter states pt has been more somnolent and has been compliant with her cpap. In the ED, pt was placed hypoxic on bipap. ABG shows respiratory acidosis with hypoxia and hypercapnea. CXR without acute abn. Pt admitted to ICU for acute hypoxia and hypercapnea on chronic respiratory failure.     Overview/Hospital Course:  No notes on file    Interval History: awake and alert, patient concern her home BiPAP mask is not well fitted like what she had in the hospital.  On 2 L nasal cannula-at baseline  Also reported cramps in her fingers and toes    Review of Systems   Constitutional:  Positive for activity change.   Respiratory:  Negative for chest tightness and wheezing.    Cardiovascular:  Negative for chest pain.   Genitourinary:  Negative for dysuria.   Neurological:  Negative for dizziness.     Objective:     Vital Signs (Most Recent):  Temp: 97.6 °F (36.4 °C) (12/28/23 1605)  Pulse: 87 (12/28/23 1605)  Resp: 18 (12/28/23 1605)  BP: 138/63 (12/28/23 1605)  SpO2: (!) 90 % (12/28/23 1605) Vital Signs (24h Range):  Temp:  [97.6 °F (36.4 °C)-98.4 °F (36.9 °C)] 97.6 °F (36.4 °C)  Pulse:  [65-91] 87  Resp:  [18-40] 18  SpO2:  [89 %-97 %] 90 %  BP: (126-155)/(55-68) 138/63     Weight: 84.9 kg (187 lb 2.7 oz)  Body mass index is 30.21 kg/m².    Intake/Output Summary (Last 24 hours) at 12/28/2023 0359  Last data filed at 12/28/2023 1100  Gross per 24 hour   Intake 240 ml  "  Output 1300 ml   Net -1060 ml         Physical Exam  Vitals and nursing note reviewed.   Constitutional:       Appearance: She is well-developed.   HENT:      Head: Normocephalic and atraumatic.   Cardiovascular:      Rate and Rhythm: Normal rate and regular rhythm.      Heart sounds: Normal heart sounds.   Pulmonary:      Effort: Pulmonary effort is normal.      Breath sounds: Normal breath sounds. No wheezing.   Abdominal:      General: Bowel sounds are normal.      Palpations: Abdomen is soft.      Tenderness: There is no abdominal tenderness.   Musculoskeletal:         General: No tenderness. Normal range of motion.      Cervical back: Normal range of motion and neck supple.   Skin:     General: Skin is warm.      Findings: No rash.   Neurological:      Mental Status: She is oriented to person, place, and time.      Cranial Nerves: No cranial nerve deficit.   Psychiatric:         Behavior: Behavior normal.         Thought Content: Thought content normal.             Significant Labs: A1C: No results for input(s): "HGBA1C" in the last 4320 hours.  ABGs:   Recent Labs   Lab 12/27/23  1201 12/27/23  2034 12/28/23  0833   PH 7.223* 7.288* 7.355   PCO2 109* 97.5* 80.5*   HCO3 44.9* 46.6* 45.0*   POCSATURATED 88.2* 92.1* 84   BE  --   --  19*   PO2 60.9* 65.6* 54*     Blood Culture: No results for input(s): "LABBLOO" in the last 48 hours.  CBC:   Recent Labs   Lab 12/27/23  1118 12/28/23  0416   WBC 11.45 9.07   HGB 13.7 13.4   HCT 47.5 45.3   * 126*     CMP:   Recent Labs   Lab 12/27/23  1118 12/28/23  0416   * 143   K 3.9 4.3   CL 99 95   CO2 38* 40*   * 117*   BUN 17 20   CREATININE 0.7 0.6   CALCIUM 8.6* 8.3*   PROT 6.8  --    ALBUMIN 3.5  --    BILITOT 0.4  --    ALKPHOS 45*  --    AST 20  --    ALT 21  --    ANIONGAP 9 8     Lactic Acid: No results for input(s): "LACTATE" in the last 48 hours.  Lipase: No results for input(s): "LIPASE" in the last 48 hours.  Lipid Panel: No results for " "input(s): "CHOL", "HDL", "LDLCALC", "TRIG", "CHOLHDL" in the last 48 hours.  Magnesium:   Recent Labs   Lab 12/27/23  1118 12/28/23  0416   MG 2.1 2.1     Troponin:   Recent Labs   Lab 12/27/23  1118   TROPONINI 0.008     TSH: No results for input(s): "TSH" in the last 4320 hours.  Urine Culture: No results for input(s): "LABURIN" in the last 48 hours.  Urine Studies: No results for input(s): "COLORU", "APPEARANCEUA", "PHUR", "SPECGRAV", "PROTEINUA", "GLUCUA", "KETONESU", "BILIRUBINUA", "OCCULTUA", "NITRITE", "UROBILINOGEN", "LEUKOCYTESUR", "RBCUA", "WBCUA", "BACTERIA", "SQUAMEPITHEL", "HYALINECASTS" in the last 48 hours.    Invalid input(s): "WRIGHTSUR"    Significant Imaging: I have reviewed all pertinent imaging results/findings within the past 24 hours.    Assessment/Plan:      * Acute exacerbation of chronic obstructive pulmonary disease (COPD)  Patient's COPD is with exacerbation noted by worsening of baseline hypoxia currently.  Patient is currently on COPD Pathway. Continue scheduled inhalers Steroids, Antibiotics, and Supplemental oxygen and monitor respiratory status closely.     Cont on bipap q.h.s.  Pulm consulted  Steroids, doxy and nebs    Oxygen dependent  On home oxygen of 2LPM      HTN (hypertension)  Chronic, controlled. Latest blood pressure and vitals reviewed-     Temp:  [98.5 °F (36.9 °C)-99.2 °F (37.3 °C)]   Pulse:  [75-89]   Resp:  [20-35]   BP: (135-201)/(63-88)   SpO2:  [74 %-100 %] .   Home meds for hypertension were reviewed and noted below.   Hypertension Medications               furosemide (LASIX) 40 MG tablet Take 1 tablet (40 mg total) by mouth as needed. For lower extremity swelling, increased weight            While in the hospital, will manage blood pressure as follows; Continue home antihypertensive regimen    Will utilize p.r.n. blood pressure medication only if patient's blood pressure greater than 180/110 and she develops symptoms such as worsening chest pain or shortness of " breath.      VTE Risk Mitigation (From admission, onward)           Ordered     enoxaparin injection 40 mg  Daily         12/27/23 1243     IP VTE HIGH RISK PATIENT  Once         12/27/23 1243     Place sequential compression device  Until discontinued         12/27/23 1243                    Discharge Planning   RADHA:      Code Status: DNR   Is the patient medically ready for discharge?:     Reason for patient still in hospital (select all that apply): Patient trending condition  Discharge Plan A: Home with family                  Josephine Xavier MD  Department of [facility] Medicine   [facility]

## 2023-12-29 NOTE — PLAN OF CARE
[facility] - Telemetry  Discharge Reassessment    Primary Care Provider: Kev Cox MD    Expected Discharge Date: 12/29/2023    Reassessment (most recent)       Discharge Reassessment - 12/29/23 1020          Discharge Reassessment    Assessment Type Discharge Planning Reassessment (P)      Discharge Plan discussed with: Patient (P)      Communicated RADHA with patient/caregiver Yes (P)      Discharge Plan A Home with family (P)      DME Needed Upon Discharge  none (P)                    1020  Patient resting quietly in bed when CM rounded. No family present. Patient stepdown from ICU on 12/28/2023, was admitted with COPD exacerbation, & is being followed by pulm. Pox 92% on 2L O2 via NC this AM. Pt uses 2L O2 at all times at home. DC order noted.     Patient in agreement with plan to discharge home today, stated that her daughter will provide transportation and bring the pt's portable O2 tank to the bedside at time of discharge, & verbalized understanding regarding the hospital follow up appointment.    1040  Message sent to nurse Li, virtual nurse Atul, & pharmacist Trevor informing that the pt is cleared to discharge.       Will continue to follow.

## 2023-12-29 NOTE — PROGRESS NOTES
[facility]                    Pharmacy       Discharge Medication Education    Patient ACCEPTED medication education. Pharmacy has provided education on the name, indication, and possible side effects of the medication(s) prescribed, using teach-back method.     The following medications have also been discussed, during this admission.        Medication List        START taking these medications      doxycycline 100 MG tablet  Commonly known as: VIBRA-TABS  Take 1 tablet (100 mg total) by mouth every 12 (twelve) hours. for 7 days     polyethylene glycol 17 gram/dose powder  Commonly known as: GLYCOLAX  Take 17 g by mouth once daily.     predniSONE 20 MG tablet  Commonly known as: DELTASONE  Take 2 tablets (40 mg total) by mouth once daily. for 5 days            CHANGE how you take these medications      furosemide 40 MG tablet  Commonly known as: LASIX  Take 1 tablet (40 mg total) by mouth once daily.  What changed:   when to take this  reasons to take this  additional instructions            CONTINUE taking these medications      albuterol 90 mcg/actuation inhaler  Commonly known as: PROVENTIL/VENTOLIN HFA  Inhale 2 puffs into the lungs every 6 (six) hours as needed for Wheezing or Shortness of Breath.     alendronate 35 MG tablet  Commonly known as: FOSAMAX     azithromycin 250 MG tablet  Commonly known as: Z-OLY  Take 1 tablet (250 mg total) by mouth every Mon, Wed, Fri.     BREZTRI AEROSPHERE 160-9-4.8 mcg/actuation Hfaa  Generic drug: budesonide-glycopyr-formoterol     dextromethorphan-guaiFENesin  mg  mg per 12 hr tablet  Commonly known as: MUCINEX DM     diazePAM 2 MG tablet  Commonly known as: VALIUM  Take 1 tablet (2 mg total) by mouth daily as needed for Anxiety.     dicyclomine 20 mg tablet  Commonly known as: BENTYL  Take 1 tablet (20 mg total) by mouth 3 (three) times daily as needed (abdominal pain).     ergocalciferol 50,000 unit Cap  Commonly known as:  ERGOCALCIFEROL     fluticasone propionate 50 mcg/actuation nasal spray  Commonly known as: FLONASE  1 spray (50 mcg total) by Each Nostril route daily as needed for Allergies.     methocarbamoL 500 MG Tab  Commonly known as: ROBAXIN  Take 1 tablet (500 mg total) by mouth as needed (for cramping).     OXYGEN-AIR DELIVERY SYSTEMS MISC     potassium chloride 10 MEQ Cpsr  Commonly known as: MICRO-K     roflumilast 500 mcg Tab  Commonly known as: DALIRESP     traZODone 50 MG tablet  Commonly known as: DESYREL  Take 1 tablet (50 mg total) by mouth nightly as needed for Insomnia.            STOP taking these medications      chlorpheniramine-pseudoephedrine-acetaminophen 2- mg per tablet  Commonly known as: SINUTAB     WOMEN'S MULTIVITAMIN GUMMIES 200 mcg Chew  Generic drug: multivit with min-folic acid               Where to Get Your Medications        These medications were sent to [facility] [address], [place] [address]      Hours: Mon-Fri, 8a-5:30p Phone: [phone]   doxycycline 100 MG tablet  furosemide 40 MG tablet  polyethylene glycol 17 gram/dose powder  predniSONE 20 MG tablet          Thank you  Teresa Birmingham, PharmD  [phone]

## 2023-12-29 NOTE — ASSESSMENT & PLAN NOTE
Chronic, controlled. Latest blood pressure and vitals reviewed-     Temp:  [96.7 °F (35.9 °C)-98.5 °F (36.9 °C)]   Pulse:  [62-94]   Resp:  [16-25]   BP: (131-163)/(61-89)   SpO2:  [90 %-98 %] .   Home meds for hypertension were reviewed and noted below.   Hypertension Medications               furosemide (LASIX) 40 MG tablet Take 1 tablet (40 mg total) by mouth as needed. For lower extremity swelling, increased weight            While in the hospital, will manage blood pressure as follows; Continue home antihypertensive regimen    Will utilize p.r.n. blood pressure medication only if patient's blood pressure greater than 180/110 and she develops symptoms such as worsening chest pain or shortness of breath.

## 2023-12-29 NOTE — PLAN OF CARE
VN reviewed discharge instructions with pt. Using teach back method.  AVS printed and handed to pt by bedside nurse.  Reviewed follow-up appointments, medications, diet, and importance of medication compliance.  Reviewed home care instructions, treatment plan, self-management, and when to seek medical attention.  Allowed time for questions.  Pt requested new rx to be sent to [address]. Secure chat sent to dr smith. All questions answered.  Patient verbalized complete understanding of discharge instructions and voices no concerns.     Discharge instructions complete.  Bedside delivery done.  Pt waiting on ride home.   Bedside nurse notified.

## 2023-12-29 NOTE — DISCHARGE SUMMARY
[facility] Medicine  Discharge Summary      Patient Name: Terri Phelan  MRN: [id number]  SUNDEEP: [id number]  Patient Class: IP- Inpatient  Admission Date: 12/27/2023  Hospital Length of Stay: 2 days  Discharge Date and Time: 12/29/2023  3:19 PM  Attending Physician: Josephine Xavier*   Discharging Provider: Josephine Xavier MD  Primary Care Provider: Kev Cox MD    Primary Care Team: Networked reference to record PCT     HPI:   Pt with hx of COPD and JUAN on 2LPM of home oxygen presented to the ED for sob for a few days that has worsened today. Pt has cough but denies any fevers. Daughter states pt has been more somnolent and has been compliant with her cpap. In the ED, pt was placed hypoxic on bipap. ABG shows respiratory acidosis with hypoxia and hypercapnea. CXR without acute abn. Pt admitted to ICU for acute hypoxia and hypercapnea on chronic respiratory failure.     * No surgery found *      Hospital Course:   No notes on file     Goals of Care Treatment Preferences:  Code Status: DNR    Health care agent: Mulugeta Phelan  [facility] agent number: [phone]    Living Will: Yes     What is most important right now is to focus on spending time at home, avoiding the hospital, remaining as independent as possible, symptom/pain control, improvement in condition but with limits to invasive therapies.  Accordingly, we have decided that the best plan to meet the patient's goals includes continuing with treatment.      Consults:   Consults (From admission, onward)          Status Ordering Provider     Inpatient consult to Respiratory Care  Once        Provider:  (Not yet assigned)    Acknowledged JANE ADAMS            Pulmonary  * Acute exacerbation of chronic obstructive pulmonary disease (COPD)  Patient's COPD is with exacerbation noted by worsening of baseline hypoxia currently.  Patient is currently on COPD Pathway. Continue scheduled inhalers Steroids,  Antibiotics, and Supplemental oxygen and monitor respiratory status closely.     Cont on bipap q.h.s.  Pulm consulted  Steroids, doxy and nebs    Oxygen dependent  On home oxygen of 2LPM      Cardiac/Vascular  HTN (hypertension)  Chronic, controlled. Latest blood pressure and vitals reviewed-     Temp:  [96.7 °F (35.9 °C)-98.5 °F (36.9 °C)]   Pulse:  [62-94]   Resp:  [16-25]   BP: (131-163)/(61-89)   SpO2:  [90 %-98 %] .   Home meds for hypertension were reviewed and noted below.   Hypertension Medications               furosemide (LASIX) 40 MG tablet Take 1 tablet (40 mg total) by mouth as needed. For lower extremity swelling, increased weight            While in the hospital, will manage blood pressure as follows; Continue home antihypertensive regimen    Will utilize p.r.n. blood pressure medication only if patient's blood pressure greater than 180/110 and she develops symptoms such as worsening chest pain or shortness of breath.      Final Active Diagnoses:    Diagnosis Date Noted POA    PRINCIPAL PROBLEM:  Acute exacerbation of chronic obstructive pulmonary disease (COPD) [J44.1] 12/19/2023 Yes    CO2 narcosis [R06.89] 12/27/2023 Yes    Oxygen dependent [Z99.81] 09/09/2022 Not Applicable    HTN (hypertension) [I10] 09/08/2015 Yes    COPD exacerbation [J44.1] 12/17/2014 Yes      Problems Resolved During this Admission:       Discharged Condition: stable    Disposition: Home-Health Care [facility]    Follow Up:   Follow-up Information       Bethany Landry MD Follow up on 1/8/2024.    Specialty: Internal Medicine  Why: 2:00pm HSP F/U:COPD  Contact information:  [address] [facility]  [address]  [facility] [address]  [phone]               Alex Cunningham MD Follow up on 1/16/2024.    Specialty: Pulmonary Disease  Why: 1:30Ppm HSP F/U:6-8 week f/u COPD  Contact information:  [facility] [address]  [phone]                           Patient Instructions:      ACCEPT - Ambulatory  referral/consult to Cardiology   Standing Status: Future   Referral Priority: Routine Referral Type: Consultation   Referral Reason: Specialty Services Required   Requested Specialty: Cardiology   Number of Visits Requested: 1     Activity as tolerated       Significant Diagnostic Studies:     Pending Diagnostic Studies:       None           Medications:  Reconciled Home Medications:      Medication List        START taking these medications      doxycycline 100 MG tablet  Commonly known as: VIBRA-TABS  Take 1 tablet (100 mg total) by mouth every 12 (twelve) hours. for 7 days     polyethylene glycol 17 gram/dose powder  Commonly known as: GLYCOLAX  Take 17 g by mouth once daily.     predniSONE 20 MG tablet  Commonly known as: DELTASONE  Take 2 tablets (40 mg total) by mouth once daily. for 5 days            CHANGE how you take these medications      furosemide 40 MG tablet  Commonly known as: LASIX  Take 1 tablet (40 mg total) by mouth once daily.  What changed:   when to take this  reasons to take this  additional instructions            CONTINUE taking these medications      albuterol 90 mcg/actuation inhaler  Commonly known as: PROVENTIL/VENTOLIN HFA  Inhale 2 puffs into the lungs every 6 (six) hours as needed for Wheezing or Shortness of Breath.     alendronate 35 MG tablet  Commonly known as: FOSAMAX  Take 35 mg by mouth every Tuesday.     azithromycin 250 MG tablet  Commonly known as: Z-OLY  Take 1 tablet (250 mg total) by mouth every Mon, Wed, Fri.     BREZTRI AEROSPHERE 160-9-4.8 mcg/actuation Hfaa  Generic drug: budesonide-glycopyr-formoterol  Inhale 2 puffs into the lungs 2 (two) times daily.     dextromethorphan-guaiFENesin  mg  mg per 12 hr tablet  Commonly known as: MUCINEX DM  Take 1 tablet by mouth every 12 (twelve) hours.     diazePAM 2 MG tablet  Commonly known as: VALIUM  Take 1 tablet (2 mg total) by mouth daily as needed for Anxiety.     dicyclomine 20 mg tablet  Commonly known as:  BENTYL  Take 1 tablet (20 mg total) by mouth 3 (three) times daily as needed (abdominal pain).     ergocalciferol 50,000 unit Cap  Commonly known as: ERGOCALCIFEROL  Take 50,000 Units by mouth every Tuesday.     fluticasone propionate 50 mcg/actuation nasal spray  Commonly known as: FLONASE  1 spray (50 mcg total) by Each Nostril route daily as needed for Allergies.     methocarbamoL 500 MG Tab  Commonly known as: ROBAXIN  Take 1 tablet (500 mg total) by mouth as needed (for cramping).     OXYGEN-AIR DELIVERY SYSTEMS MISC  2 L by Nasal route continuous.     potassium chloride 10 MEQ Cpsr  Commonly known as: MICRO-K  Take 10 mEq by mouth once daily.     roflumilast 500 mcg Tab  Commonly known as: DALIRESP  Take 500 mcg by mouth once daily.     traZODone 50 MG tablet  Commonly known as: DESYREL  Take 1 tablet (50 mg total) by mouth nightly as needed for Insomnia.            STOP taking these medications      chlorpheniramine-pseudoephedrine-acetaminophen 2- mg per tablet  Commonly known as: SINUTAB     WOMEN'S MULTIVITAMIN GUMMIES 200 mcg Chew  Generic drug: multivit with min-folic acid              Indwelling Lines/Drains at time of discharge:   Lines/Drains/Airways       Drain  Duration             Female External Urinary Catheter w/ Suction 12/27/23 1500 1 day                    Time spent on the discharge of patient: 35 minutes         Josephine Xavier MD  Department of Hospital Medicine  [facility] - Telemetry

## 2023-12-29 NOTE — NURSING
Ambulated pt to hallway with O2, pt with steady gait, slight sob when ambulated but sats 87-88% on 2 L O2 when ambulating. Pt assisted to edge of bed, sitting up to eat lunch. Sats return to 91-92% on 2 L O2. Pt states she would like to review AVS after eating, pt's daughter on the way to  pt.

## 2023-12-29 NOTE — ASSESSMENT & PLAN NOTE
Patient's COPD is with exacerbation noted by worsening of baseline hypoxia currently.  Patient is currently on COPD Pathway. Continue scheduled inhalers Steroids, Antibiotics, and Supplemental oxygen and monitor respiratory status closely.     Cont on bipap q.h.s.  Pulm consulted  Steroids, doxy and nebs

## 2023-12-30 NOTE — PLAN OF CARE
[facility] - Telemetry  Discharge Final Note    Primary Care Provider: Kev Cox MD    Expected Discharge Date: 12/29/2023    Final Discharge Note (most recent)       Final Note - 12/30/23 0705          Final Note    Assessment Type Final Discharge Note (P)      Anticipated Discharge Disposition Home or Self Care (P)      Hospital Resources/Appts/Education Provided Appointments scheduled and added to AVS (P)                         Contact Info       Bethany Landry MD   Specialty: Internal Medicine    [address]  [address]  [facility] [address]   Phone: [phone]       Next Steps: Follow up on 1/8/2024    Instructions: 2:00pm HSP F/U:COPD    Alex Cunningham MD   Specialty: Pulmonary Disease    [facility] [address]   Phone: [phone]       Next Steps: Follow up on 1/16/2024    Instructions: 1:30Ppm HSP F/U:6-8 week f/u COPD

## 2024-01-09 ENCOUNTER — OUTPATIENT CASE MANAGEMENT (OUTPATIENT)
Dept: ADMINISTRATIVE | Facility: OTHER | Age: 69
End: 2024-01-09
Payer: MEDICARE

## 2024-01-09 NOTE — PROGRESS NOTES
"Outpatient Care Management  Plan of Care Follow Up Visit    Patient: Terri Phelan  MRN: [id number]  Date of Service: 01/09/2024  Completed by: Raven Cook RN  Referral Date: 05/30/2023    Reason for Visit   Patient presents with    OPCM RN Follow Up Call       Brief Summary: OPCM reached out to Terri and she's still in SNF unit. Terri stated "I should be out by the end of the week."  Next Steps: CM will reach out 1-16-24 for follow up.     "

## 2024-01-10 ENCOUNTER — TELEPHONE (OUTPATIENT)
Dept: INTERNAL MEDICINE | Facility: CLINIC | Age: 69
End: 2024-01-10
Payer: MEDICARE

## 2024-01-10 DIAGNOSIS — J96.11 CHRONIC HYPOXEMIC RESPIRATORY FAILURE: Primary | ICD-10-CM

## 2024-01-10 DIAGNOSIS — J43.2 CENTRILOBULAR EMPHYSEMA: ICD-10-CM

## 2024-01-10 DIAGNOSIS — Z99.81 OXYGEN DEPENDENT: ICD-10-CM

## 2024-01-10 NOTE — TELEPHONE ENCOUNTER
----- Message from Lindy Berry sent at 1/10/2024  4:24 PM CST -----  Contact: Pham/ with [facility]   1MEDICALADVICE     Patient is calling for Medical Advice regarding:Pham/ states the pt is discharging from [facility]. Pham is requesting home health order for [facility].    How long has patient had these symptoms:N/A    Pharmacy name and phone#:N/A    Would like response via Genword[employer]:  Callback    Comments:

## 2024-01-11 NOTE — TELEPHONE ENCOUNTER
Spoke with Pham the discharging nurse at hospital to let her know HH orders have been entered for [facility].

## 2024-01-13 NOTE — TELEPHONE ENCOUNTER
No care due was identified.  [facility] Embedded Care Due Messages. Reference number: [id number].   1/13/2024 10:39:40 AM CST

## 2024-01-16 ENCOUNTER — OFFICE VISIT (OUTPATIENT)
Dept: PULMONOLOGY | Facility: CLINIC | Age: 69
End: 2024-01-16
Payer: MEDICARE

## 2024-01-16 VITALS
WEIGHT: 188.5 LBS | HEART RATE: 69 BPM | SYSTOLIC BLOOD PRESSURE: 132 MMHG | BODY MASS INDEX: 30.29 KG/M2 | OXYGEN SATURATION: 96 % | HEIGHT: 66 IN | DIASTOLIC BLOOD PRESSURE: 78 MMHG

## 2024-01-16 DIAGNOSIS — J43.2 CENTRILOBULAR EMPHYSEMA: Primary | ICD-10-CM

## 2024-01-16 DIAGNOSIS — Z99.81 OXYGEN DEPENDENT: ICD-10-CM

## 2024-01-16 DIAGNOSIS — J96.12 CHRONIC RESPIRATORY FAILURE WITH HYPOXIA AND HYPERCAPNIA: ICD-10-CM

## 2024-01-16 DIAGNOSIS — J96.11 CHRONIC RESPIRATORY FAILURE WITH HYPOXIA AND HYPERCAPNIA: ICD-10-CM

## 2024-01-16 PROCEDURE — 1101F PT FALLS ASSESS-DOCD LE1/YR: CPT | Mod: CPTII,S$GLB,, | Performed by: INTERNAL MEDICINE

## 2024-01-16 PROCEDURE — 1111F DSCHRG MED/CURRENT MED MERGE: CPT | Mod: CPTII,S$GLB,, | Performed by: INTERNAL MEDICINE

## 2024-01-16 PROCEDURE — 3288F FALL RISK ASSESSMENT DOCD: CPT | Mod: CPTII,S$GLB,, | Performed by: INTERNAL MEDICINE

## 2024-01-16 PROCEDURE — 1157F ADVNC CARE PLAN IN RCRD: CPT | Mod: CPTII,S$GLB,, | Performed by: INTERNAL MEDICINE

## 2024-01-16 PROCEDURE — 3008F BODY MASS INDEX DOCD: CPT | Mod: CPTII,S$GLB,, | Performed by: INTERNAL MEDICINE

## 2024-01-16 PROCEDURE — 3078F DIAST BP <80 MM HG: CPT | Mod: CPTII,S$GLB,, | Performed by: INTERNAL MEDICINE

## 2024-01-16 PROCEDURE — 1159F MED LIST DOCD IN RCRD: CPT | Mod: CPTII,S$GLB,, | Performed by: INTERNAL MEDICINE

## 2024-01-16 PROCEDURE — 99214 OFFICE O/P EST MOD 30 MIN: CPT | Mod: S$GLB,,, | Performed by: INTERNAL MEDICINE

## 2024-01-16 PROCEDURE — 1126F AMNT PAIN NOTED NONE PRSNT: CPT | Mod: CPTII,S$GLB,, | Performed by: INTERNAL MEDICINE

## 2024-01-16 PROCEDURE — 3075F SYST BP GE 130 - 139MM HG: CPT | Mod: CPTII,S$GLB,, | Performed by: INTERNAL MEDICINE

## 2024-01-16 PROCEDURE — 99999 PR PBB SHADOW E&M-EST. PATIENT-LVL IV: CPT | Mod: PBBFAC,,, | Performed by: INTERNAL MEDICINE

## 2024-01-16 PROCEDURE — 4010F ACE/ARB THERAPY RXD/TAKEN: CPT | Mod: CPTII,S$GLB,, | Performed by: INTERNAL MEDICINE

## 2024-01-16 RX ORDER — BUSPIRONE HYDROCHLORIDE 7.5 MG/1
7.5 TABLET ORAL 2 TIMES DAILY
COMMUNITY
End: 2024-03-05 | Stop reason: ALTCHOICE

## 2024-01-16 RX ORDER — ACETAMINOPHEN 325 MG/1
650 TABLET ORAL EVERY 6 HOURS PRN
COMMUNITY
Start: 2024-01-12 | End: 2024-01-22

## 2024-01-16 RX ORDER — DIAZEPAM 2 MG/1
2 TABLET ORAL DAILY PRN
Qty: 30 TABLET | Refills: 0 | Status: SHIPPED | OUTPATIENT
Start: 2024-01-16 | End: 2024-03-02 | Stop reason: SDUPTHER

## 2024-01-16 RX ORDER — NAPROXEN SODIUM 220 MG/1
1 TABLET, FILM COATED ORAL DAILY
COMMUNITY

## 2024-01-16 RX ORDER — CITALOPRAM 10 MG/1
10 TABLET ORAL
COMMUNITY
End: 2024-03-05 | Stop reason: ALTCHOICE

## 2024-01-16 RX ORDER — PREDNISONE 20 MG/1
2 TABLET ORAL DAILY
Status: ON HOLD | COMMUNITY
Start: 2024-01-16 | End: 2024-03-30

## 2024-01-16 RX ORDER — HYDROXYZINE HYDROCHLORIDE 50 MG/1
50 TABLET, FILM COATED ORAL 3 TIMES DAILY PRN
COMMUNITY
End: 2024-05-06 | Stop reason: HOSPADM

## 2024-01-16 RX ORDER — LOSARTAN POTASSIUM 50 MG/1
50 TABLET ORAL
COMMUNITY
End: 2024-01-22 | Stop reason: SDUPTHER

## 2024-01-16 NOTE — PROGRESS NOTES
Subjective:      Patient ID: Terri Phelan is a 68 y.o. female.    Chief Complaint: No chief complaint on file.    HPI  The patient was seen/evaluated in person at this visit on 1/16/2024.    Since her last Pulmonary Clinic visit with me on 10/10/2023, Ms. Phelan required extended inpatient care for acute/chronic hypercapnic respiratory failure presumably due to COPD exacerbation.  This was her first hospital care since August.  [facility] 12/18 to 12/20 and 12/27 to 12/29  [facility] 12/31 to 1/5  [facility] 1/5 to 1/12  Since leaving the hospital last Friday, she is getting close to her baseline.  She remains compliant with nocturnal BiPAP and naps.  Her daughter reports that Ms. Phelan has been improving and is not really napping after her hospital stay(s).  She remains compliant with BiPAP and medications.      She reports some medication changes from her [facility] discharge, including a new prescription for ARB.  She denies any long-term history of hypertension, although I did see an ARB on past Medications imported from outside [facility].      Review of Systems   Constitutional:  Positive for activity change and fatigue.   Respiratory:  Positive for shortness of breath, wheezing, dyspnea on extertion and use of rescue inhaler. Negative for cough, sputum production, asthma nighttime symptoms and somnolence.    Cardiovascular:  Negative for leg swelling.     Objective:     Physical Exam   Constitutional: She is oriented to person, place, and time. No distress.   Cardiovascular: Normal rate, regular rhythm and normal heart sounds. Exam reveals no gallop.   No murmur heard.  Pulmonary/Chest: Normal expansion, symmetric chest wall expansion and effort normal. No stridor. No respiratory distress. She has decreased breath sounds. She has no wheezes. She has no rhonchi. She has no rales.   Musculoskeletal:         General: No edema.   Neurological: She is alert and oriented to person, place, and time. Gait normal.  "  Psychiatric: She has a normal mood and affect. Judgment normal.   Nursing note and vitals reviewed.    Personal Diagnostic Review     08/07/23 21:18 08/07/23 22:39 08/08/23 02:43 08/08/23 07:22 12/19/23 05:15 12/27/23 11:07 12/27/23 12:01 12/27/23 20:34   POC PH 7.237 (LL) 7.269 (L) 7.320 (L) 7.263 (LL) 7.277 (LL) 7.204 (LL) 7.223 (LL) 7.288 (LL)   POC PCO2 106.6 (HH) 108.9 (H) 98.3 (HH) 103.0 (HH) 109 (HH) >110 ! (>) 109 (HH) 97.5 (HH)   POC PO2 81 36 (L) 55 (LL) 63 (L) 49.8 (LL) 74.4 (L) 60.9 (L) 65.6 (L)   POC HCO3 45.4 (H) 49.9 (H) 50.6 (H) 46.5 (H) 50.7 (H)  44.9 (H) 46.6 (H)   POC SATURATED O2 92 (L) 54 (L) 82 (L) 85 (L) 81.2 (L) 92.6 (L) 88.2 (L) 92.1 (L)   Sample Arterial    VENOUS Arterial    Arterial    Arterial    Arterial    Arterial    Arterial      POC TCO2 49 (H) >50 (H) >50 (H) 50 (H)       POC BE 18 23 24 19 18.2  12.3 15.1   FiO2  40 30 30 28.0 36.0 40.0 40.0   PEEP       7.0 7.0   Flow 3          Mode Nasal Can BiPAP BiPAP BiPAP       Rate   18 18             11/21/23   14:55    Eosinophil % 1.1   Eos # 0.1   IgE <35           1/16/2024     1:30 PM 12/29/2023     1:35 PM 12/29/2023    12:23 PM 12/29/2023    10:51 AM 12/29/2023     8:20 AM 12/29/2023     7:57 AM 12/29/2023     3:54 AM   Pulmonary Function Tests   SpO2 96 % 91 % 90 % 94 % 93 % 97 % 97 %   Height 5' 6" (1.676 m)         Weight 85.5 kg (188 lb 7.9 oz)         BMI (Calculated) 30.4              Assessment:     1. Centrilobular emphysema    2. Oxygen dependent    3. Chronic respiratory failure with hypoxia and hypercapnia       Outpatient Encounter Medications as of 1/16/2024   Medication Sig Dispense Refill    acetaminophen (TYLENOL) 325 MG tablet Take 650 mg by mouth every 6 (six) hours as needed.      albuterol (PROVENTIL/VENTOLIN HFA) 90 mcg/actuation inhaler Inhale 2 puffs into the lungs every 6 (six) hours as needed for Wheezing or Shortness of Breath. 18 g 6    alendronate (FOSAMAX) 35 MG tablet Take 35 mg by mouth every " Tuesday.      aspirin 81 MG Chew Take 1 tablet by mouth once daily.      azithromycin (Z-OLY) 250 MG tablet Take 1 tablet (250 mg total) by mouth every Mon, Wed, Fri. 12 tablet 11    budesonide-glycopyr-formoterol (BREZTRI AEROSPHERE) 160-9-4.8 mcg/actuation HFAA Inhale 2 puffs into the lungs 2 (two) times daily.      busPIRone (BUSPAR) 7.5 MG tablet Take 7.5 mg by mouth 2 (two) times daily.      citalopram (CELEXA) 10 MG tablet Take 10 mg by mouth.      dextromethorphan-guaiFENesin  mg (MUCINEX DM)  mg per 12 hr tablet Take 1 tablet by mouth every 12 (twelve) hours.      diazePAM (VALIUM) 2 MG tablet Take 1 tablet (2 mg total) by mouth daily as needed for Anxiety. 30 tablet 0    dicyclomine (BENTYL) 20 mg tablet Take 1 tablet (20 mg total) by mouth 3 (three) times daily as needed (abdominal pain). 30 tablet 1    ergocalciferol (ERGOCALCIFEROL) 50,000 unit Cap Take 50,000 Units by mouth every Tuesday.      fluticasone propionate (FLONASE) 50 mcg/actuation nasal spray 1 spray (50 mcg total) by Each Nostril route daily as needed for Allergies. 16 g 11    furosemide (LASIX) 40 MG tablet Take 1 tablet (40 mg total) by mouth once daily. 30 tablet 4    hydrOXYzine (ATARAX) 50 MG tablet Take 50 mg by mouth 3 (three) times daily as needed.      losartan (COZAAR) 50 MG tablet Take 50 mg by mouth.      OXYGEN-AIR DELIVERY SYSTEMS MISC 2 L by Nasal route continuous.      polyethylene glycol (GLYCOLAX) 17 gram/dose powder Take 17 g by mouth once daily. 510 g 0    potassium chloride (MICRO-K) 10 MEQ CpSR Take 10 mEq by mouth once daily.      predniSONE (DELTASONE) 20 MG tablet Take 2 tablets by mouth once daily.      roflumilast (DALIRESP) 500 mcg Tab Take 500 mcg by mouth once daily.      traZODone (DESYREL) 50 MG tablet Take 1 tablet (50 mg total) by mouth nightly as needed for Insomnia. 30 tablet 6    [] doxycycline (VIBRA-TABS) 100 MG tablet Take 1 tablet (100 mg total) by mouth every 12 (twelve) hours.  for 7 days 14 tablet 0    [] methocarbamoL (ROBAXIN) 500 MG Tab Take 1 tablet (500 mg total) by mouth as needed (for cramping). 15 tablet 0    [] predniSONE (DELTASONE) 20 MG tablet Take 2 tablets (40 mg total) by mouth once daily. for 3 days 6 tablet 0    [] predniSONE (DELTASONE) 20 MG tablet Take 2 tablets (40 mg total) by mouth once daily. for 5 days 10 tablet 0    [DISCONTINUED] acetaminophen (TYLENOL) 500 MG tablet Take 500 mg by mouth every 6 (six) hours as needed for Pain.      [DISCONTINUED] albuterol-ipratropium (DUO-NEB) 2.5 mg-0.5 mg/3 mL nebulizer solution Take 3 mLs by nebulization every 6 (six) hours as needed for Wheezing. 75 mL 0    [DISCONTINUED] aspirin 81 MG Chew Take 81 mg by mouth once daily.      [DISCONTINUED] chlorpheniramine-pseudoephedrine-acetaminophen (SINUTAB) 2- mg per tablet 2 tablet 3 TIMES DAILY (route: oral)      [DISCONTINUED] diazePAM (VALIUM) 2 MG tablet Take 1 tablet (2 mg total) by mouth daily as needed for Anxiety. 30 tablet 0    [DISCONTINUED] furosemide (LASIX) 40 MG tablet Take 1 tablet (40 mg total) by mouth once daily. 30 tablet 11    [DISCONTINUED] furosemide (LASIX) 40 MG tablet Take 1 tablet (40 mg total) by mouth as needed. For lower extremity swelling, increased weight (Patient taking differently: Take 40 mg by mouth once daily.) 30 tablet 11    [DISCONTINUED] LORazepam (ATIVAN) 2 mg/mL injection       [DISCONTINUED] multivit with min-folic acid (WOMEN'S MULTIVITAMIN GUMMIES) 200 mcg Chew Take 2 tablets by mouth once daily.      [DISCONTINUED] potassium chloride (KLOR-CON) 10 MEQ TbSR Take 1 tablet (10 mEq total) by mouth once daily. for 365 doses 30 tablet 11    [DISCONTINUED] predniSONE (DELTASONE) 20 MG tablet Take 20 mg by mouth 2 (two) times daily.      [DISCONTINUED] predniSONE (DELTASONE) 20 MG tablet Take 2 tablets by mouth once daily.      [] acetaminophen tablet 1,000 mg       [] albuterol-ipratropium 2.5 mg-0.5  mg/3 mL nebulizer solution 9 mL       [] ibuprofen tablet 600 mg       [] predniSONE tablet 60 mg       [DISCONTINUED] 0.9%  NaCl infusion       [DISCONTINUED] acetaminophen tablet 650 mg       [DISCONTINUED] acetaminophen tablet 650 mg       [DISCONTINUED] acetaminophen tablet       [DISCONTINUED] albuterol nebulizer solution       [DISCONTINUED] albuterol-ipratropium 2.5 mg-0.5 mg/3 mL nebulizer solution 3 mL       [DISCONTINUED] albuterol-ipratropium 2.5 mg-0.5 mg/3 mL nebulizer solution 3 mL       [DISCONTINUED] arformoteroL nebulizer solution       [DISCONTINUED] aspirin chewable tablet 81 mg       [DISCONTINUED] aspirin chewable tablet       [DISCONTINUED] azithromycin tablet 250 mg       [DISCONTINUED] benzonatate capsule       [DISCONTINUED] budesonide nebulizer solution       [DISCONTINUED] budesonide-glycopyr-formoterol 160-9-4.8 mcg/actuation HFAA 2 puff       [DISCONTINUED] busPIRone tablet       [DISCONTINUED] calcium carbonate 200 mg calcium (500 mg) chewable tablet       [DISCONTINUED] cefTRIAXone (ROCEPHIN) 1 g in dextrose 5 % in water (D5W) 100 mL IVPB (MB+)       [DISCONTINUED] citalopram tablet       [DISCONTINUED] cyclobenzaprine tablet       [DISCONTINUED] dextrose 10% bolus 125 mL 125 mL       [DISCONTINUED] dextrose 10% bolus 250 mL 250 mL       [DISCONTINUED] diazePAM tablet 2 mg       [DISCONTINUED] dicyclomine capsule       [DISCONTINUED] doxycycline tablet 100 mg       [DISCONTINUED] enoxaparin injection 40 mg       [DISCONTINUED] enoxaparin injection 40 mg       [DISCONTINUED] enoxaparin injection       [DISCONTINUED] ergocalciferol capsule 50,000 Units       [DISCONTINUED] fluticasone furoate-vilanteroL 200-25 mcg/dose diskus inhaler 1 puff       [DISCONTINUED] furosemide injection 40 mg       [DISCONTINUED] furosemide tablet       [DISCONTINUED] GENERIC EXTERNAL MEDICATION       [DISCONTINUED] glucagon (human recombinant) injection 1 mg       [DISCONTINUED] glucose  chewable tablet 16 g       [DISCONTINUED] glucose chewable tablet 24 g       [DISCONTINUED] hydrALAZINE injection 10 mg       [DISCONTINUED] hydrOXYzine HCL tablet       [DISCONTINUED] insulin aspart U-100 pen 1-10 Units       [DISCONTINUED] losartan tablet       [DISCONTINUED] melatonin tablet 6 mg       [DISCONTINUED] methylPREDNISolone sodium succinate injection 40 mg       [DISCONTINUED] mirtazapine disintegrating tablet       [DISCONTINUED] mupirocin 2 % ointment       [DISCONTINUED] naloxone 0.4 mg/mL injection 0.02 mg       [DISCONTINUED] ondansetron injection 4 mg       [DISCONTINUED] ondansetron injection       [DISCONTINUED] polyethylene glycol packet 17 g       [DISCONTINUED] polyethylene glycol packet       [DISCONTINUED] predniSONE tablet 40 mg       [DISCONTINUED] predniSONE tablet       [DISCONTINUED] roflumilast (DALIRESP) tablet 500 mcg       [DISCONTINUED] roflumilast (DALIRESP) tablet       [DISCONTINUED] Saccharomyces boulardii capsule       [DISCONTINUED] senna-docusate 8.6-50 mg per tablet 1 tablet       [DISCONTINUED] sodium chloride 0.9% flush 10 mL       [DISCONTINUED] sodium chloride 0.9% flush 5 mL       [DISCONTINUED] tiotropium bromide 2.5 mcg/actuation inhaler 2 puff       [DISCONTINUED] tiotropium-olodateroL 2.5-2.5 mcg/actuation Mist        No facility-administered encounter medications on file as of 1/16/2024.     No orders of the defined types were placed in this encounter.      Plan:   Improving since recent extended hospital care => about 2 inpatient weeks and 1 SNF week over the last month.  Continue current pulmonary medications.  Okay to resume Pulmonary Rehab at [facility]  Will ask Dr. Cox to review BP regimen => does she still need ARB?      Problem List Items Addressed This Visit       Centrilobular emphysema - Primary    Overview     Severe obstructive disease, on home O2 and NIPPV at night and prn.   Chest CTA from July 2023 with apical centrilobular emphysema  present.    PFTs 4/18/23 10/10/23   FVC  (pre-BD) 1.01 1.13   FVC%  40 45   FEV1 0.44 0.52   FEV1%  22 27   FEV1/FVC  44 47            Current Assessment & Plan     Continue current medication regimen and NIPPV at night and with naps.  No evidence to suggest a target for any biologic therapy.         Chronic respiratory failure with hypoxia and hypercapnia    Overview     Baseline requirement for supplemental oxygen and NIPPV.  Recent hospitalization(s) for acute on chronic hypercapnic respiratory failure => returned to baseline gas exchange and functional status at discharge.         Current Assessment & Plan     Patient with Hypercapnic and Hypoxic Respiratory failure which is Chronic.  she is on home oxygen at 2-3 LPM. Supplemental oxygen was provided and noted- [unfilled].   Signs/symptoms of respiratory failure include- increased work of breathing and lethargy. Contributing diagnoses includes - COPD Labs and images were reviewed. Patient Has recent ABG, which has been reviewed. Will treat underlying causes and adjust management of respiratory failure as follows-     Continue treatment for COPD/emphysema.  Supplemental oxygen back to baseline.  Continue NIPPV nightly => home settings previously reviewed/approved by Dr. Painter (Pulmonary/Sleep).         Oxygen dependent    Overview     Requires 2-3L NC.         Current Assessment & Plan     Continue supplemental oxygen at baseline -- SpO2 at rest is 96% on 2 lpm at this visit.            Note copied to Dr. Cox (Primary Care) -- to review need for BP medication(s).    Total Time = 30 minutes    Alex Cunningham MD  Pulmonary Disease  [facility] 9th Fl

## 2024-01-17 ENCOUNTER — PATIENT MESSAGE (OUTPATIENT)
Dept: ADMINISTRATIVE | Facility: CLINIC | Age: 69
End: 2024-01-17
Payer: MEDICARE

## 2024-01-17 ENCOUNTER — PATIENT OUTREACH (OUTPATIENT)
Dept: ADMINISTRATIVE | Facility: CLINIC | Age: 69
End: 2024-01-17
Payer: MEDICARE

## 2024-01-17 ENCOUNTER — TELEPHONE (OUTPATIENT)
Dept: INTERNAL MEDICINE | Facility: CLINIC | Age: 69
End: 2024-01-17
Payer: MEDICARE

## 2024-01-17 PROBLEM — J96.12 CHRONIC RESPIRATORY FAILURE WITH HYPOXIA AND HYPERCAPNIA: Status: ACTIVE | Noted: 2023-12-19

## 2024-01-17 PROBLEM — J96.11 CHRONIC RESPIRATORY FAILURE WITH HYPOXIA AND HYPERCAPNIA: Status: ACTIVE | Noted: 2023-12-19

## 2024-01-17 PROBLEM — R06.89 CO2 NARCOSIS: Chronic | Status: ACTIVE | Noted: 2023-12-27

## 2024-01-17 NOTE — TELEPHONE ENCOUNTER
----- Message from Evangelina Jamison sent at 1/17/2024  9:35 AM CST -----  Contact: [phone]  [email or web address]     Patient is calling for Medical Advice regarding:HFU for 02/05     How long has patient had these symptoms:    Pharmacy name and phone#:    Would like response via AVIAhart:  no     Comments:  [employer] nurse is calling for the pt she is asking for the pt to have a sooner appt than the one she has on 02/05 for medications she is needing this is a HFU appt please give return call

## 2024-01-17 NOTE — PROGRESS NOTES
C3 nurse attempted to contact Terri Phelan for a TCC post hospital discharge follow up call. No answer. Left voicemail with callback information. The patient has a scheduled HOSFU appointment with Kev Cox MD (PCP) on 1/22/24 @ 1:30pm.

## 2024-01-17 NOTE — ASSESSMENT & PLAN NOTE
Continue current medication regimen and NIPPV at night and with naps.  No evidence to suggest a target for any biologic therapy.

## 2024-01-17 NOTE — ASSESSMENT & PLAN NOTE
Patient with Hypercapnic and Hypoxic Respiratory failure which is Chronic.  she is on home oxygen at 2-3 LPM. Supplemental oxygen was provided and noted- [unfilled].   Signs/symptoms of respiratory failure include- increased work of breathing and lethargy. Contributing diagnoses includes - COPD Labs and images were reviewed. Patient Has recent ABG, which has been reviewed. Will treat underlying causes and adjust management of respiratory failure as follows-     Continue treatment for COPD/emphysema.  Supplemental oxygen back to baseline.  Continue NIPPV nightly => home settings previously reviewed/approved by Dr. Painter (Pulmonary/Sleep).

## 2024-01-18 NOTE — TELEPHONE ENCOUNTER
Spoke with pt , asked if she had any concerns after our office received tcc call , pt states she has a visit Monday and will speak with the dr at that time .

## 2024-01-18 NOTE — PROGRESS NOTES
"C3 nurse spoke with Terri Phelan for a TCC post hospital discharge follow up call. The patient has a scheduled HOS appointment with Kev Cox MD (PCP) on 1/22/24 @ 1:30pm.     Patient inquired about Home Health initiation. She stated that the nurse that scheduled her appointment with her PCP told her that HH would initiate on 1/23/24, but she is unsure why it is so far out from her DC date? She is not opposed to changed HH companies if needed to initiate services sooner.     She also inquired about services available to her for a "" to come out to her home a few days a week to interact with her so that she is not lonesome all the time. She would like to know if there is anything like this available in her area?     She also noted that she was put on Losartan while IP and was sent home with a new prescription for this medication. She noted that she did not have BP problems prior to admit and was not taking Losartan, and is upset about having it prescribed upon DC. She is taking it, but prefers not to if she does not have to. She is checking her BP and noted that it has been WNL.     Message sent to PCP staff to assist with patient's above concerns.   "

## 2024-01-22 ENCOUNTER — OFFICE VISIT (OUTPATIENT)
Dept: INTERNAL MEDICINE | Facility: CLINIC | Age: 69
End: 2024-01-22
Payer: MEDICARE

## 2024-01-22 VITALS
RESPIRATION RATE: 19 BRPM | WEIGHT: 177.5 LBS | TEMPERATURE: 97 F | HEART RATE: 105 BPM | SYSTOLIC BLOOD PRESSURE: 130 MMHG | BODY MASS INDEX: 28.53 KG/M2 | DIASTOLIC BLOOD PRESSURE: 70 MMHG | HEIGHT: 66 IN | OXYGEN SATURATION: 87 %

## 2024-01-22 DIAGNOSIS — F33.0 MILD EPISODE OF RECURRENT MAJOR DEPRESSIVE DISORDER: ICD-10-CM

## 2024-01-22 DIAGNOSIS — J96.11 CHRONIC HYPOXEMIC RESPIRATORY FAILURE: ICD-10-CM

## 2024-01-22 DIAGNOSIS — Z99.81 OXYGEN DEPENDENT: ICD-10-CM

## 2024-01-22 DIAGNOSIS — I25.2 HISTORY OF MYOCARDIAL INFARCTION: ICD-10-CM

## 2024-01-22 DIAGNOSIS — F41.9 ANXIETY: ICD-10-CM

## 2024-01-22 DIAGNOSIS — I70.0 AORTIC ATHEROSCLEROSIS: ICD-10-CM

## 2024-01-22 DIAGNOSIS — J43.2 CENTRILOBULAR EMPHYSEMA: ICD-10-CM

## 2024-01-22 DIAGNOSIS — G47.33 OBSTRUCTIVE SLEEP APNEA SYNDROME: ICD-10-CM

## 2024-01-22 DIAGNOSIS — Z09 HOSPITAL DISCHARGE FOLLOW-UP: Primary | ICD-10-CM

## 2024-01-22 DIAGNOSIS — I50.33 ACUTE ON CHRONIC DIASTOLIC CONGESTIVE HEART FAILURE: ICD-10-CM

## 2024-01-22 DIAGNOSIS — I25.10 ATHSCL HEART DISEASE OF NATIVE CORONARY ARTERY W/O ANG PCTRS: ICD-10-CM

## 2024-01-22 DIAGNOSIS — I25.10 CORONARY ARTERY DISEASE, UNSPECIFIED VESSEL OR LESION TYPE, UNSPECIFIED WHETHER ANGINA PRESENT, UNSPECIFIED WHETHER NATIVE OR TRANSPLANTED HEART: ICD-10-CM

## 2024-01-22 DIAGNOSIS — I10 PRIMARY HYPERTENSION: ICD-10-CM

## 2024-01-22 PROCEDURE — 99999 PR PBB SHADOW E&M-EST. PATIENT-LVL V: CPT | Mod: PBBFAC,,, | Performed by: FAMILY MEDICINE

## 2024-01-22 PROCEDURE — 3288F FALL RISK ASSESSMENT DOCD: CPT | Mod: CPTII,S$GLB,, | Performed by: FAMILY MEDICINE

## 2024-01-22 PROCEDURE — 1159F MED LIST DOCD IN RCRD: CPT | Mod: CPTII,S$GLB,, | Performed by: FAMILY MEDICINE

## 2024-01-22 PROCEDURE — 1111F DSCHRG MED/CURRENT MED MERGE: CPT | Mod: CPTII,S$GLB,, | Performed by: FAMILY MEDICINE

## 2024-01-22 PROCEDURE — 3075F SYST BP GE 130 - 139MM HG: CPT | Mod: CPTII,S$GLB,, | Performed by: FAMILY MEDICINE

## 2024-01-22 PROCEDURE — 1125F AMNT PAIN NOTED PAIN PRSNT: CPT | Mod: CPTII,S$GLB,, | Performed by: FAMILY MEDICINE

## 2024-01-22 PROCEDURE — 1101F PT FALLS ASSESS-DOCD LE1/YR: CPT | Mod: CPTII,S$GLB,, | Performed by: FAMILY MEDICINE

## 2024-01-22 PROCEDURE — 3078F DIAST BP <80 MM HG: CPT | Mod: CPTII,S$GLB,, | Performed by: FAMILY MEDICINE

## 2024-01-22 PROCEDURE — 1160F RVW MEDS BY RX/DR IN RCRD: CPT | Mod: CPTII,S$GLB,, | Performed by: FAMILY MEDICINE

## 2024-01-22 PROCEDURE — 99214 OFFICE O/P EST MOD 30 MIN: CPT | Mod: S$GLB,,, | Performed by: FAMILY MEDICINE

## 2024-01-22 PROCEDURE — 1157F ADVNC CARE PLAN IN RCRD: CPT | Mod: CPTII,S$GLB,, | Performed by: FAMILY MEDICINE

## 2024-01-22 PROCEDURE — 4010F ACE/ARB THERAPY RXD/TAKEN: CPT | Mod: CPTII,S$GLB,, | Performed by: FAMILY MEDICINE

## 2024-01-22 RX ORDER — CHOLECALCIFEROL (VITAMIN D3) 50 MCG
2000 TABLET ORAL DAILY
COMMUNITY

## 2024-01-22 RX ORDER — LOSARTAN POTASSIUM 50 MG/1
50 TABLET ORAL DAILY
Qty: 90 TABLET | Refills: 3 | Status: SHIPPED | OUTPATIENT
Start: 2024-01-22 | End: 2025-01-21

## 2024-01-22 NOTE — PROGRESS NOTES
Transitional Care Note  Subjective:       Patient ID: Terri Phelan is a 68 y.o. female.  Chief Complaint: Hospital Follow Up    Family and/or Caretaker present at visit?  Yes.  Diagnostic tests reviewed/disposition: I have reviewed all completed as well as pending diagnostic tests at the time of discharge.  Disease/illness education:  Hypertension and COPD  Home health/community services discussion/referrals: Patient has home health established at [facility] .   Establishment or re-establishment of referral orders for community resources: No other necessary community resources.   Discussion with other health care providers: No discussion with other health care providers necessary.   HPI 68-year-old  female with centrilobular emphysema on home oxygen, chronic diastolic heart failure, coronary artery disease, atherosclerosis, previous MI, hypertension, anxiety, depression, and sleep apnea presents to clinic today accompanied by her daughter for hospital follow-up secondary to multiple hospital admissions for acute hypoxemic respiratory failure.  [facility] 12/18 to 12/20 and 12/27 to 12/29  [facility] 12/31 to 1/5  [facility] 1/5 to 1/12  She has since followed up with her pulmonologist and the patient is close to baseline.  She continues to remain compliant with her BiPAP and home oxygen.  She is scheduled to start pulmonary rehab at [facility].  During her hospitalization she was started on losartan 50 mg daily for further blood pressure control and at this time blood pressure is well-controlled on losartan.  Review of Systems   Constitutional:  Negative for appetite change, chills, fatigue and fever.   HENT:  Negative for congestion, ear pain, hearing loss, postnasal drip, rhinorrhea, sinus pressure, sore throat and tinnitus.    Eyes:  Negative for redness, itching and visual disturbance.   Respiratory:  Negative for cough, chest tightness and shortness of breath.    Cardiovascular:   Negative for chest pain and palpitations.   Gastrointestinal:  Negative for abdominal pain, constipation, diarrhea, nausea and vomiting.   Genitourinary:  Negative for decreased urine volume, difficulty urinating, dysuria, frequency, hematuria and urgency.   Musculoskeletal:  Negative for back pain, myalgias, neck pain and neck stiffness.   Skin:  Negative for rash.   Neurological:  Negative for dizziness, light-headedness and headaches.   Psychiatric/Behavioral: Negative.         Objective:      Physical Exam  Vitals and nursing note reviewed.   Constitutional:       General: She is not in acute distress.     Appearance: She is well-developed. She is not diaphoretic.   HENT:      Head: Normocephalic and atraumatic.      Right Ear: External ear normal.      Left Ear: External ear normal.      Nose: Nose normal.      Mouth/Throat:      Pharynx: No oropharyngeal exudate.   Eyes:      General: No scleral icterus.        Right eye: No discharge.         Left eye: No discharge.      Conjunctiva/sclera: Conjunctivae normal.      Pupils: Pupils are equal, round, and reactive to light.   Neck:      Thyroid: No thyromegaly.      Vascular: No JVD.      Trachea: No tracheal deviation.   Cardiovascular:      Rate and Rhythm: Normal rate and regular rhythm.      Heart sounds: Normal heart sounds. No murmur heard.     No friction rub. No gallop.   Pulmonary:      Effort: Pulmonary effort is normal. No respiratory distress.      Breath sounds: Normal breath sounds. No stridor. No wheezing or rales.   Abdominal:      General: Bowel sounds are normal. There is no distension.      Palpations: Abdomen is soft. There is no mass.      Tenderness: There is no abdominal tenderness. There is no guarding or rebound.   Musculoskeletal:         General: No tenderness. Normal range of motion.      Cervical back: Normal range of motion and neck supple.   Lymphadenopathy:      Cervical: No cervical adenopathy.   Skin:     General: Skin is warm  and dry.      Coloration: Skin is not pale.      Findings: No erythema or rash.   Neurological:      Mental Status: She is alert and oriented to person, place, and time.   Psychiatric:         Behavior: Behavior normal.         Thought Content: Thought content normal.         Judgment: Judgment normal.         Assessment:       1. Hospital discharge follow-up    2. Chronic hypoxemic respiratory failure    3. Centrilobular emphysema    4. Oxygen dependent    5. Acute on chronic diastolic congestive heart failure    6. Coronary artery disease, unspecified vessel or lesion type, unspecified whether angina present, unspecified whether native or transplanted heart    7. Athscl heart disease of native coronary artery w/o ang pctrs    8. History of myocardial infarction    9. Aortic atherosclerosis    10. Primary hypertension    11. Anxiety    12. Mild episode of recurrent major depressive disorder    13. Obstructive sleep apnea syndrome        Plan:         1. Hospital records have been reviewed.    2. Continue Breztri and home oxygen as prescribed and continue follow-up with pulmonology as scheduled.  COPD is stable.    3. Continue losartan 50 mg daily and Lasix 40 mg daily.  Diastolic dysfunction and hypertension are well controlled.  4. Continue low-fat diet.  Atherosclerosis versus remained stable.  Patient has previously attempted treatment with Lipitor but developed statin myalgia.    5. Continue BuSpar and Celexa as prescribed.  Anxiety and depression are stable.  6. Continue BiPAP nightly.  Sleep apnea is stable.  7. Return to clinic as needed or in 8 months for annual physical exam.

## 2024-01-23 PROCEDURE — G0180 MD CERTIFICATION HHA PATIENT: HCPCS | Mod: ,,, | Performed by: FAMILY MEDICINE

## 2024-01-25 ENCOUNTER — OUTPATIENT CASE MANAGEMENT (OUTPATIENT)
Dept: ADMINISTRATIVE | Facility: OTHER | Age: 69
End: 2024-01-25
Payer: MEDICARE

## 2024-01-26 ENCOUNTER — NURSE TRIAGE (OUTPATIENT)
Dept: ADMINISTRATIVE | Facility: CLINIC | Age: 69
End: 2024-01-26
Payer: MEDICARE

## 2024-01-26 NOTE — TELEPHONE ENCOUNTER
Spoke with patient who states she was started on Celexa and Buspirone after a ED visit on 12/31/2023 ([place]).  Patient states since starting the medication she has been having muscle twitching, poor coordination, loss of appetite, itchy eyes, and heavy sweating.  Patient states it has become more difficult to breath than usual.  She has COPD.  She has SOB even at rest states it is moderate difficulty.   States her breathing is slow, shallow, and weak.  Patient has to hold onto things to get around her home. Per protocol advised that patient call [id number].  Patient started yelling stating she is not going to the hospital and wants to speak with Dr. Cox's office. Patient states she was taking trazodone and diazepam previously.   Dispo reiterated.  Message sent to office and PCP.     Reason for Disposition   Slow, shallow and weak breathing    Additional Information   Negative: SEVERE difficulty breathing (e.g., struggling for each breath, speaks in single words, pulse > 120)   Negative: Breathing stopped and hasn't returned   Negative: Choking on something   Negative: Bluish (or gray) lips or face   Negative: Difficult to awaken or acting confused (e.g., disoriented, slurred speech)   Negative: Passed out (i.e., fainted, collapsed and was not responding)   Negative: Wheezing started suddenly after medicine, an allergic food, or bee sting   Negative: Stridor (harsh sound while breathing in)    Protocols used: Breathing Difficulty-A-OH

## 2024-01-26 NOTE — TELEPHONE ENCOUNTER
"Pt contacted office complaining of Celexa and Buspirone after a ED visit on 12/31/2023 ([place]).  Patient states since starting the medication she has been having muscle twitching, poor coordination, loss of appetite, itchy eyes, and heavy sweating.  Patient states it has become more difficult to breath than usual.  She has COPD.  She has SOB even at rest states it is moderate difficulty.   States her breathing is slow, shallow, and weak.  Patient has to hold onto things to get around her home. Per protocol advised that patient call [id number].     Pt is refusing to go to the er , she states her home health nurse told her that these symptoms were caused by Buspar . She is requesting a visit with pcp , I informed the pt that this is not something that should wait for pcp to become available and should be handled as a medical emergency . Pt states the last time she went to the er she spend 12 days and she does not want to do that again.    She is asking me to schedule her a "f/u" visit with pcp , she was last seen 1/22/24 she said that the reason for her visit is her symptoms . She states she will stop medications and she know she will be feeling better by Sunday . Stressed to her that symptoms could be a sign of stroke , heart attack etc and that she should not wait since today is Friday . Pt continues to decline er visit . I will schedule her and place on wait list .  "

## 2024-02-02 ENCOUNTER — OFFICE VISIT (OUTPATIENT)
Dept: INTERNAL MEDICINE | Facility: CLINIC | Age: 69
End: 2024-02-02
Payer: MEDICARE

## 2024-02-02 ENCOUNTER — NURSE TRIAGE (OUTPATIENT)
Dept: ADMINISTRATIVE | Facility: CLINIC | Age: 69
End: 2024-02-02
Payer: MEDICARE

## 2024-02-02 ENCOUNTER — PATIENT MESSAGE (OUTPATIENT)
Dept: INTERNAL MEDICINE | Facility: CLINIC | Age: 69
End: 2024-02-02
Payer: MEDICARE

## 2024-02-02 DIAGNOSIS — M25.512 CHRONIC LEFT SHOULDER PAIN: Primary | ICD-10-CM

## 2024-02-02 DIAGNOSIS — G89.29 CHRONIC LEFT SHOULDER PAIN: Primary | ICD-10-CM

## 2024-02-02 PROCEDURE — 1159F MED LIST DOCD IN RCRD: CPT | Mod: CPTII,95,, | Performed by: INTERNAL MEDICINE

## 2024-02-02 PROCEDURE — 1157F ADVNC CARE PLAN IN RCRD: CPT | Mod: CPTII,95,, | Performed by: INTERNAL MEDICINE

## 2024-02-02 PROCEDURE — 99214 OFFICE O/P EST MOD 30 MIN: CPT | Mod: 95,,, | Performed by: INTERNAL MEDICINE

## 2024-02-02 PROCEDURE — 4010F ACE/ARB THERAPY RXD/TAKEN: CPT | Mod: CPTII,95,, | Performed by: INTERNAL MEDICINE

## 2024-02-02 PROCEDURE — 1160F RVW MEDS BY RX/DR IN RCRD: CPT | Mod: CPTII,95,, | Performed by: INTERNAL MEDICINE

## 2024-02-02 RX ORDER — IBUPROFEN 800 MG/1
800 TABLET ORAL 3 TIMES DAILY PRN
Qty: 90 TABLET | Refills: 0 | Status: SHIPPED | OUTPATIENT
Start: 2024-02-02 | End: 2024-02-02 | Stop reason: SDUPTHER

## 2024-02-02 RX ORDER — IBUPROFEN 800 MG/1
800 TABLET ORAL 3 TIMES DAILY PRN
Qty: 90 TABLET | Refills: 0 | Status: SHIPPED | OUTPATIENT
Start: 2024-02-02

## 2024-02-02 NOTE — PROGRESS NOTES
The patient location is: [place]  The chief complaint leading to consultation is:  Left shoulder pain  Visit type: Virtual visit with synchronous audio and video  Total time spent with patient: 15 minutes  Each patient to whom he or she provides medical services by telemedicine is:  (1) informed of the relationship between the physician and patient and the respective role of any other health care provider with respect to management of the patient; and (2) notified that he or she may decline to receive medical services by telemedicine and may withdraw from such care at any time.      CHIEF COMPLAINT     No chief complaint on file.  TELEMEDICINE VISIT    HPI     Terri Phelan is 68 y.o. female here today for   Multiple medical comorbidities with acute on chronic left shoulder pain.  Reports she has an old rotator cuff injury.  Reports that it has been more active the last few days.  Reports she has gotten most relief from prescription strength ibuprofen.  She has been out of it and is requesting refill.    No CKD not on blood thinners no aspirin allergy    Personally Reviewed Patient's Medical, surgical, family and social hx. Changes updated in [employer].  Care Team updated in Epic    Review of Systems:  ROS    Health Maintenance:   Reviewed with patient  Due for the following:      PHYSICAL EXAM       Gen: Well Appearing, NAD  HEENT: PERR, EOMI  Chest: Normal work of breathing,   Neuro: A&Ox3,  speech normal  Mood; Mood normal, behavior normal, thought process linear       LABS     Labs reviewed; Notable for  BMP  Lab Results   Component Value Date     12/29/2023    K 3.8 01/03/2024    CL 97 12/29/2023    CO2 41 (HH) 12/29/2023    BUN 28 (H) 12/29/2023    CREATININE 0.7 12/29/2023    CALCIUM 8.8 12/29/2023    ANIONGAP 6 (L) 12/29/2023    EGFRNORACEVR >60 12/29/2023       ASSESSMENT     1. Chronic left shoulder pain                  Plan     Terri Phelan is a 68 y.o. female  1. Chronic left shoulder pain  Acute  on chronic left shoulder pain  Will refill her ibuprofen 800 t.i.d.  Recommend home exercise program versus physical therapy      Lai Arora MD          Answers submitted by the patient for this visit:  Review of Systems Questionnaire (Submitted on 2/2/2024)  activity change: No  unexpected weight change: No  rhinorrhea: No  trouble swallowing: No  visual disturbance: No  chest tightness: No  polyuria: No  difficulty urinating: No  menstrual problem: No  joint swelling: No  arthralgias: Yes  confusion: No  dysphoric mood: No

## 2024-02-02 NOTE — TELEPHONE ENCOUNTER
Pt calling and c/o left shoulder pain and has hx of torn rotator cuff and she is requesting 800 mg ibuprofen rx and she said that she had an old rx when she was in [place] and its out but it really helped her with the inflammation and pain.

## 2024-02-02 NOTE — TELEPHONE ENCOUNTER
Request was sent to you yesterday for diazepam, pt already received new script on 1/16, she states no need for refill at this time

## 2024-02-02 NOTE — TELEPHONE ENCOUNTER
Pt calling and c/o left shoulder pain and has hx of torn rotator cuff and she is requesting 800 mg ibuprofen rx and she said that she had an old rx when she was in [place] and its out but it really helped her with the inflammation and pain. Pt triaged and care advice to be seen within 3 days and pt said that she would accept virtual for today and she doesn't think she can go 3 days without anything. Pt told to call back if any other questions or concerns or any worsening                     Reason for Disposition   MODERATE pain (e.g., interferes with normal activities) and present > 3 days    Additional Information   Negative: Shock suspected (e.g., cold/pale/clammy skin, too weak to stand, low BP, rapid pulse)   Negative: Similar pain previously and it was from 'heart attack'   Negative: Similar pain previously and it was from 'angina' and not relieved by nitroglycerin   Negative: Sounds like a life-threatening emergency to the triager   Negative: Difficulty breathing or unusual sweating (e.g., sweating without exertion)   Negative: Pain lasting > 5 minutes and pain also present in chest  (Exception: Pain is clearly made worse by movement.)   Negative: Age > 40 and no obvious cause and pain even when not moving the arm  (Exception: Pain is clearly made worse by moving arm or bending neck.)   Negative: Red area or streak and fever   Negative: Swollen joint and fever   Negative: Entire arm is swollen   Negative: Patient sounds very sick or weak to the triager   Negative: SEVERE pain (e.g., excruciating, unable to do any normal activities)   Negative: Shoulder pains with exertion (e.g., walking) and pain goes away on resting and not present now   Negative: Painful rash with multiple small blisters grouped together (i.e., dermatomal distribution or 'band' or 'stripe')   Negative: Looks like a boil, infected sore, deep ulcer or other infected rash (spreading redness, pus)   Negative: Localized rash is very painful  (no fever)   Negative: Weakness (i.e., loss of strength) in hand or fingers  (Exception: Not truly weak; hand feels weak because of pain.)   Negative: Numbness (i.e., loss of sensation) in hand or fingers   Negative: Unable to use arm at all and because of shoulder pain or stiffness   Negative: Patient wants to be seen    Protocols used: Shoulder Pain-A-[place]

## 2024-02-04 ENCOUNTER — ON-DEMAND VIRTUAL (OUTPATIENT)
Dept: URGENT CARE | Facility: CLINIC | Age: 69
End: 2024-02-04
Payer: MEDICARE

## 2024-02-04 DIAGNOSIS — H57.9 ITCHY EYES: Primary | ICD-10-CM

## 2024-02-04 PROCEDURE — 99212 OFFICE O/P EST SF 10 MIN: CPT | Mod: 95,,, | Performed by: FAMILY MEDICINE

## 2024-02-04 NOTE — PROGRESS NOTES
Subjective:      Patient ID: Terri Phelan is a 68 y.o. female.    Vitals:  vitals were not taken for this visit.     Chief Complaint: Eye Problem (Itchy eyes)      Visit Type: TELE AUDIOVISUAL    Present with the patient at the time of consultation: TELEMED PRESENT WITH PATIENT: None    Past Medical History:   Diagnosis Date    COPD (chronic obstructive pulmonary disease)     Hypertension      Past Surgical History:   Procedure Laterality Date    BREAST BIOPSY N/A     pt unsure which breast but it was benign-(calcium)     SECTION      HERNIA REPAIR      HYSTERECTOMY       Review of patient's allergies indicates:   Allergen Reactions    Codeine Nausea And Vomiting    Lipitor [atorvastatin] Other (See Comments)     Muscle fatigue      Morphine Hallucinations     Current Outpatient Medications on File Prior to Visit   Medication Sig Dispense Refill    albuterol (PROVENTIL/VENTOLIN HFA) 90 mcg/actuation inhaler Inhale 2 puffs into the lungs every 6 (six) hours as needed for Wheezing or Shortness of Breath. 18 g 6    alendronate (FOSAMAX) 35 MG tablet Take 35 mg by mouth every Tuesday.      aspirin 81 MG Chew Take 1 tablet by mouth once daily.      azithromycin (Z-OLY) 250 MG tablet Take 1 tablet (250 mg total) by mouth every Mon, Wed, Fri. 12 tablet 11    budesonide-glycopyr-formoterol (BREZTRI AEROSPHERE) 160-9-4.8 mcg/actuation HFAA Inhale 2 puffs into the lungs 2 (two) times daily.      busPIRone (BUSPAR) 7.5 MG tablet Take 7.5 mg by mouth 2 (two) times daily.      calcium carb-mag hydrox-simeth 1,200 mg-270 mg -80 mg/10 mL Susp Take 1,200 mg by mouth once daily.      cholecalciferol, vitamin D3, (VITAMIN D3) 50 mcg (2,000 unit) Tab Take 2,000 Units by mouth once daily.      citalopram (CELEXA) 10 MG tablet Take 10 mg by mouth.      dextromethorphan-guaiFENesin  mg (MUCINEX DM)  mg per 12 hr tablet Take 1 tablet by mouth every 12 (twelve) hours.      diazePAM (VALIUM) 2 MG tablet Take 1  tablet (2 mg total) by mouth daily as needed for Anxiety. 30 tablet 0    dicyclomine (BENTYL) 20 mg tablet Take 1 tablet (20 mg total) by mouth 3 (three) times daily as needed (abdominal pain). 30 tablet 1    fluticasone propionate (FLONASE) 50 mcg/actuation nasal spray 1 spray (50 mcg total) by Each Nostril route daily as needed for Allergies. 16 g 11    furosemide (LASIX) 40 MG tablet Take 1 tablet (40 mg total) by mouth once daily. 30 tablet 4    hydrOXYzine (ATARAX) 50 MG tablet Take 50 mg by mouth 3 (three) times daily as needed.      ibuprofen (ADVIL,MOTRIN) 800 MG tablet Take 1 tablet (800 mg total) by mouth 3 (three) times daily as needed for Pain. 90 tablet 0    losartan (COZAAR) 50 MG tablet Take 1 tablet (50 mg total) by mouth once daily. 90 tablet 3    OXYGEN-AIR DELIVERY SYSTEMS MISC 2 L by Nasal route continuous.      polyethylene glycol (GLYCOLAX) 17 gram/dose powder Take 17 g by mouth once daily. (Patient not taking: Reported on 1/18/2024) 510 g 0    potassium chloride (MICRO-K) 10 MEQ CpSR Take 10 mEq by mouth once daily.      predniSONE (DELTASONE) 20 MG tablet Take 2 tablets by mouth once daily.      roflumilast (DALIRESP) 500 mcg Tab Take 500 mcg by mouth once daily.      traZODone (DESYREL) 50 MG tablet Take 1 tablet (50 mg total) by mouth nightly as needed for Insomnia. (Patient not taking: Reported on 1/18/2024) 30 tablet 6     No current facility-administered medications on file prior to visit.     No family history on file.        Ohs Peq Odvv Intake    2/4/2024 10:15 AM CST - Filed by Patient   What is your current physical address in the event of a medical emergency? [address]   [address] [place]. [id number]   Are you able to take your vital signs? No   Please attach any relevant images or files          69 yo female with 3 days of itchy eyes  No sick contacts  No exposure to smoke recently.    Eye Problem   Associated symptoms include itching. Pertinent negatives include no eye redness.        Eyes:  Positive for eye itching. Negative for eye pain and eye redness.        Objective:   The physical exam was conducted virtually.  Physical Exam   Constitutional: She is oriented to person, place, and time. She does not appear ill. No distress.   HENT:   Head: Normocephalic and atraumatic.   Pulmonary/Chest: Effort normal. No respiratory distress.   Neurological: She is oriented to person, place, and time.       Assessment:     1. Itchy eyes        Plan:       Itchy eyes      Please use over the counter allergy eye drops.  Please use warm compresses to wipe away crusting.

## 2024-02-04 NOTE — PATIENT INSTRUCTIONS
Please use over the counter allergy eye drops.  Please use warm compresses to wipe away crusting.  Examples of these are allergan and acular.

## 2024-02-05 ENCOUNTER — OUTPATIENT CASE MANAGEMENT (OUTPATIENT)
Dept: ADMINISTRATIVE | Facility: OTHER | Age: 69
End: 2024-02-05
Payer: MEDICARE

## 2024-02-05 NOTE — PROGRESS NOTES
Outpatient Care Management  Plan of Care Follow Up Visit    Patient: Terri Phelan  MRN: [id number]  Date of Service: 02/05/2024  Completed by: Raven Cook RN  Referral Date: 05/30/2023    Reason for Visit   Patient presents with    OPCM RN Follow Up Call       Brief Summary: OPCM follow up call completed with Terri. Continued Education on Importance of Managing her COPD Management.  Next Steps: Terri agrees to follow up in approximately 4 weeks on or around 3-4-24.

## 2024-02-23 ENCOUNTER — EXTERNAL HOME HEALTH (OUTPATIENT)
Dept: HOME HEALTH SERVICES | Facility: HOSPITAL | Age: 69
End: 2024-02-23
Payer: MEDICARE

## 2024-02-26 PROBLEM — J96.11 CHRONIC HYPOXEMIC RESPIRATORY FAILURE: Status: RESOLVED | Noted: 2023-11-21 | Resolved: 2024-02-26

## 2024-03-03 NOTE — TELEPHONE ENCOUNTER
No care due was identified.  [facility] Embedded Care Due Messages. Reference number: [id number].   3/02/2024 9:52:24 PM CST

## 2024-03-04 ENCOUNTER — OUTPATIENT CASE MANAGEMENT (OUTPATIENT)
Dept: ADMINISTRATIVE | Facility: OTHER | Age: 69
End: 2024-03-04
Payer: MEDICARE

## 2024-03-04 RX ORDER — DIAZEPAM 2 MG/1
2 TABLET ORAL DAILY PRN
Qty: 30 TABLET | Refills: 0 | Status: SHIPPED | OUTPATIENT
Start: 2024-03-04 | End: 2024-04-05 | Stop reason: SDUPTHER

## 2024-03-04 NOTE — PROGRESS NOTES
Outpatient Care Management  Plan of Care Follow Up Visit    Patient: Terri Phelan  MRN: [id number]  Date of Service: 03/04/2024  Completed by: Raven Cook RN  Referral Date: 05/30/2023    Reason for Visit   Patient presents with    OPCM RN Follow Up Call       Brief Summary: OPCM follow up call completed with Terri. Continued education and importance of making positive actions for better health care.   Next Steps: CM to close case as goals have been met.

## 2024-03-05 ENCOUNTER — OFFICE VISIT (OUTPATIENT)
Dept: PULMONOLOGY | Facility: CLINIC | Age: 69
End: 2024-03-05
Payer: MEDICARE

## 2024-03-05 VITALS
HEART RATE: 84 BPM | OXYGEN SATURATION: 95 % | WEIGHT: 181.19 LBS | BODY MASS INDEX: 29.12 KG/M2 | SYSTOLIC BLOOD PRESSURE: 128 MMHG | HEIGHT: 66 IN | DIASTOLIC BLOOD PRESSURE: 74 MMHG

## 2024-03-05 DIAGNOSIS — J96.12 CHRONIC RESPIRATORY FAILURE WITH HYPOXIA AND HYPERCAPNIA: Primary | ICD-10-CM

## 2024-03-05 DIAGNOSIS — Z99.81 OXYGEN DEPENDENT: ICD-10-CM

## 2024-03-05 DIAGNOSIS — J96.11 CHRONIC RESPIRATORY FAILURE WITH HYPOXIA AND HYPERCAPNIA: Primary | ICD-10-CM

## 2024-03-05 DIAGNOSIS — J43.2 CENTRILOBULAR EMPHYSEMA: ICD-10-CM

## 2024-03-05 PROCEDURE — 3074F SYST BP LT 130 MM HG: CPT | Mod: CPTII,S$GLB,, | Performed by: INTERNAL MEDICINE

## 2024-03-05 PROCEDURE — 3008F BODY MASS INDEX DOCD: CPT | Mod: CPTII,S$GLB,, | Performed by: INTERNAL MEDICINE

## 2024-03-05 PROCEDURE — 4010F ACE/ARB THERAPY RXD/TAKEN: CPT | Mod: CPTII,S$GLB,, | Performed by: INTERNAL MEDICINE

## 2024-03-05 PROCEDURE — 1101F PT FALLS ASSESS-DOCD LE1/YR: CPT | Mod: CPTII,S$GLB,, | Performed by: INTERNAL MEDICINE

## 2024-03-05 PROCEDURE — 3288F FALL RISK ASSESSMENT DOCD: CPT | Mod: CPTII,S$GLB,, | Performed by: INTERNAL MEDICINE

## 2024-03-05 PROCEDURE — 3078F DIAST BP <80 MM HG: CPT | Mod: CPTII,S$GLB,, | Performed by: INTERNAL MEDICINE

## 2024-03-05 PROCEDURE — 1159F MED LIST DOCD IN RCRD: CPT | Mod: CPTII,S$GLB,, | Performed by: INTERNAL MEDICINE

## 2024-03-05 PROCEDURE — 99213 OFFICE O/P EST LOW 20 MIN: CPT | Mod: GC,S$GLB,, | Performed by: INTERNAL MEDICINE

## 2024-03-05 PROCEDURE — 1157F ADVNC CARE PLAN IN RCRD: CPT | Mod: CPTII,S$GLB,, | Performed by: INTERNAL MEDICINE

## 2024-03-05 NOTE — PROGRESS NOTES
Subjective:      Patient ID: Terri Phelan is a 68 y.o. female.    Chief Complaint: COPD and Shortness of Breath    Started on celexa and buspar after last hospitalization, came off of them since then. Otherwise, her breathing is feeling ok. She sometimes has some feelings of anxiety. No recent illnesses. [facility] - [address] from her house, considering moving her PCP/Would be ok if it changed her primary. Continues to use 2L NC, occasionally turns it up if she feels winded but then turns it back down when she is feeling better. Using albuterol less, down to a few times a day.     No recent hospitalizations. No recent illnesses. Overall doing well. Needs to re-establish with pulmonary rehab after last hospitalization.     COPD  Associated symptoms include coughing. Pertinent negatives include no chest pain, nausea or vomiting.   Shortness of Breath  Associated symptoms include wheezing. Pertinent negatives include no chest pain, leg swelling, sputum production or vomiting. Her past medical history is significant for COPD.     Review of Systems   Constitutional:  Negative for weight loss, weight gain and activity change.   Respiratory:  Positive for cough, shortness of breath and wheezing. Negative for sputum production.    Cardiovascular:  Negative for chest pain and leg swelling.   Gastrointestinal:  Negative for nausea and vomiting.     Objective:     Physical Exam   Constitutional: She is oriented to person, place, and time. No distress.   Sitting in wheelchair, conversing appropriately. NC in place. Occasional pursed lip breathing.    HENT:   Head: Normocephalic.   Nose: Nose normal.   NC in place   Cardiovascular: Normal rate and regular rhythm.   Pulmonary/Chest: Normal expansion. She has no wheezes. She has no rhonchi.   Occasional pursed lip breathing but overall lungs clear to auscultation without wheezing. Last used albuterol this morning.    Musculoskeletal:         General: No edema.       "Cervical back: Normal range of motion.   Neurological: She is alert and oriented to person, place, and time.   Skin: Skin is warm and dry.   Nursing note and vitals reviewed.    Personal Diagnostic Review      3/5/2024     1:20 PM 1/22/2024     1:41 PM 1/16/2024     1:30 PM 12/29/2023     1:35 PM 12/29/2023    12:23 PM 12/29/2023    10:51 AM 12/29/2023     8:20 AM   Pulmonary Function Tests   SpO2 95 % 87 % 96 % 91 % 90 % 94 % 93 %   Height 5' 6" (1.676 m) 5' 6" (1.676 m) 5' 6" (1.676 m)       Weight 82.2 kg (181 lb 3.5 oz) 80.5 kg (177 lb 7.5 oz) 85.5 kg (188 lb 7.9 oz)       BMI (Calculated) 29.3 28.7 30.4           08/07/23 21:18 08/07/23 22:39 08/08/23 02:43 08/08/23 07:22 12/19/23 05:15 12/27/23 11:07 12/27/23 12:01 12/27/23 20:34   POC PH 7.237 (LL) 7.269 (L) 7.320 (L) 7.263 (LL) 7.277 (LL) 7.204 (LL) 7.223 (LL) 7.288 (LL)   POC PCO2 106.6 (HH) 108.9 (H) 98.3 (HH) 103.0 (HH) 109 (HH) >110 ! (>) 109 (HH) 97.5 (HH)   POC PO2 81 36 (L) 55 (LL) 63 (L) 49.8 (LL) 74.4 (L) 60.9 (L) 65.6 (L)   POC HCO3 45.4 (H) 49.9 (H) 50.6 (H) 46.5 (H) 50.7 (H)   44.9 (H) 46.6 (H)   POC SATURATED O2 92 (L) 54 (L) 82 (L) 85 (L) 81.2 (L) 92.6 (L) 88.2 (L) 92.1 (L)   Sample Arterial    VENOUS Arterial    Arterial    Arterial    Arterial    Arterial    Arterial      POC TCO2 49 (H) >50 (H) >50 (H) 50 (H)           POC BE 18 23 24 19 18.2   12.3 15.1   FiO2   40 30 30 28.0 36.0 40.0 40.0   PEEP             7.0 7.0   Flow 3                 Mode Nasal Can BiPAP BiPAP BiPAP           Rate     18 18                 PFTs 4/18/23 10/10/23   FVC  (pre-BD) 1.01 1.13   FVC%  40 45   FEV1 0.44 0.52   FEV1%  22 27   FEV1/FVC  44 47   FVC (post-BD) 0.95     FVC% -6     FEV1 0.42     FEV1% -4.3     FEV1/FVC 45     TLC  5.34 2.74   TLC%  108 56   RV  4.27 1.42   RV%  214 71   DLCO    (uncorr) - 3.72   DLCO   (daniel)  - 3.91   DLCO%  - 18   VA - 1.64   IVC - 1.32   Six-Minute Walk       Distance (meter)       O2 jorge l       O2 change              "   Assessment:     1. Chronic respiratory failure with hypoxia and hypercapnia    2. Centrilobular emphysema    3. Oxygen dependent         Outpatient Encounter Medications as of 3/5/2024   Medication Sig Dispense Refill    albuterol (PROVENTIL/VENTOLIN HFA) 90 mcg/actuation inhaler Inhale 2 puffs into the lungs every 6 (six) hours as needed for Wheezing or Shortness of Breath. 18 g 6    alendronate (FOSAMAX) 35 MG tablet Take 35 mg by mouth every Tuesday.      aspirin 81 MG Chew Take 1 tablet by mouth once daily.      azithromycin (Z-OLY) 250 MG tablet Take 1 tablet (250 mg total) by mouth every Mon, Wed, Fri. 12 tablet 11    budesonide-glycopyr-formoterol (BREZTRI AEROSPHERE) 160-9-4.8 mcg/actuation HFAA Inhale 2 puffs into the lungs 2 (two) times daily.      calcium carb-mag hydrox-simeth 1,200 mg-270 mg -80 mg/10 mL Susp Take 1,200 mg by mouth once daily.      cholecalciferol, vitamin D3, (VITAMIN D3) 50 mcg (2,000 unit) Tab Take 2,000 Units by mouth once daily.      dextromethorphan-guaiFENesin  mg (MUCINEX DM)  mg per 12 hr tablet Take 1 tablet by mouth every 12 (twelve) hours.      diazePAM (VALIUM) 2 MG tablet Take 1 tablet (2 mg total) by mouth daily as needed for Anxiety. 30 tablet 0    dicyclomine (BENTYL) 20 mg tablet Take 1 tablet (20 mg total) by mouth 3 (three) times daily as needed (abdominal pain). 30 tablet 1    fluticasone propionate (FLONASE) 50 mcg/actuation nasal spray 1 spray (50 mcg total) by Each Nostril route daily as needed for Allergies. 16 g 11    furosemide (LASIX) 40 MG tablet Take 1 tablet (40 mg total) by mouth once daily. 30 tablet 4    hydrOXYzine (ATARAX) 50 MG tablet Take 50 mg by mouth 3 (three) times daily as needed.      ibuprofen (ADVIL,MOTRIN) 800 MG tablet Take 1 tablet (800 mg total) by mouth 3 (three) times daily as needed for Pain. 90 tablet 0    losartan (COZAAR) 50 MG tablet Take 1 tablet (50 mg total) by mouth once daily. 90 tablet 3    OXYGEN-AIR DELIVERY  SYSTEMS MISC 2 L by Nasal route continuous.      potassium chloride (MICRO-K) 10 MEQ CpSR Take 10 mEq by mouth once daily.      predniSONE (DELTASONE) 20 MG tablet Take 2 tablets by mouth once daily.      roflumilast (DALIRESP) 500 mcg Tab Take 500 mcg by mouth once daily.      traZODone (DESYREL) 50 MG tablet Take 1 tablet (50 mg total) by mouth nightly as needed for Insomnia. 30 tablet 6    busPIRone (BUSPAR) 7.5 MG tablet Take 7.5 mg by mouth 2 (two) times daily.      citalopram (CELEXA) 10 MG tablet Take 10 mg by mouth.      polyethylene glycol (GLYCOLAX) 17 gram/dose powder Take 17 g by mouth once daily. (Patient not taking: Reported on 1/18/2024) 510 g 0    [DISCONTINUED] diazePAM (VALIUM) 2 MG tablet Take 1 tablet (2 mg total) by mouth daily as needed for Anxiety. 30 tablet 0     No facility-administered encounter medications on file as of 3/5/2024.     Orders Placed This Encounter   Procedures    Ambulatory referral/consult to Pulmonary Rehab     Standing Status:   Future     Standing Expiration Date:   4/5/2025     Referral Priority:   Routine     Referral Type:   Consultation     Referral Reason:   Specialty Services Required     Requested Specialty:   Pulmonary Disease     Number of Visits Requested:   1       Plan:     No recent hospitalizations. Continue current medications. Re-establish with pulmonary rehab. Will see for fu at the end of May.     Problem List Items Addressed This Visit          Pulmonary    Centrilobular emphysema    Overview     Severe obstructive disease, on home O2 and NIPPV at night and prn.   Chest CTA from July 2023 with apical centrilobular emphysema present.    PFTs 4/18/23 10/10/23   FVC  (pre-BD) 1.01 1.13   FVC%  40 45   FEV1 0.44 0.52   FEV1%  22 27   FEV1/FVC  44 47            Current Assessment & Plan     Continue current therapies.   Baseline oxygen by nasal cannula at 2 lpm.  Her regimen in unchanged from her baseline:  - Breztri 2 puffs BID  - Albuterol rescue inhaler  4-5 times per day (used mostly with transitioning)  - DuoNebs BID  - Azithromycin MWF  - roflumilast daily   - pulmonary rehab   - prednisone 40mg x 5 days PRN for exacerbations  - BiPAP nightly - settings increased to 18/8 after last hospital stay          Oxygen dependent    Overview     Requires 2-3L NC.         Chronic respiratory failure with hypoxia and hypercapnia - Primary    Overview     Baseline requirement for supplemental oxygen and NIPPV.  Recent hospitalization(s) for acute on chronic hypercapnic respiratory failure => returned to baseline gas exchange and functional status at discharge.         Current Assessment & Plan     Continue current 2-3L oxygen and BIPAP nightly and with naps.          Relevant Orders    Ambulatory referral/consult to Pulmonary Rehab     MD ZAC Hooker[facility]   03/05/2024 1:54 PM

## 2024-03-05 NOTE — ASSESSMENT & PLAN NOTE
Continue current therapies.   Baseline oxygen by nasal cannula at 2 lpm.  Her regimen in unchanged from her baseline:  - Breztri 2 puffs BID  - Albuterol rescue inhaler 4-5 times per day (used mostly with transitioning)  - DuoNebs BID  - Azithromycin MWF  - roflumilast daily   - pulmonary rehab   - prednisone 40mg x 5 days PRN for exacerbations  - BiPAP nightly - settings increased to 18/8 after last hospital stay

## 2024-03-12 ENCOUNTER — OFFICE VISIT (OUTPATIENT)
Dept: PRIMARY CARE CLINIC | Facility: CLINIC | Age: 69
End: 2024-03-12
Payer: MEDICARE

## 2024-03-12 VITALS
HEIGHT: 66 IN | OXYGEN SATURATION: 94 % | DIASTOLIC BLOOD PRESSURE: 60 MMHG | SYSTOLIC BLOOD PRESSURE: 105 MMHG | BODY MASS INDEX: 29.41 KG/M2 | HEART RATE: 91 BPM | WEIGHT: 183 LBS

## 2024-03-12 DIAGNOSIS — J96.12 CHRONIC RESPIRATORY FAILURE WITH HYPOXIA AND HYPERCAPNIA: ICD-10-CM

## 2024-03-12 DIAGNOSIS — E66.01 MORBID (SEVERE) OBESITY DUE TO EXCESS CALORIES: Primary | ICD-10-CM

## 2024-03-12 DIAGNOSIS — F33.0 MILD EPISODE OF RECURRENT MAJOR DEPRESSIVE DISORDER: ICD-10-CM

## 2024-03-12 DIAGNOSIS — I10 PRIMARY HYPERTENSION: ICD-10-CM

## 2024-03-12 DIAGNOSIS — J96.11 CHRONIC RESPIRATORY FAILURE WITH HYPOXIA AND HYPERCAPNIA: ICD-10-CM

## 2024-03-12 DIAGNOSIS — I25.10 CORONARY ARTERY DISEASE, UNSPECIFIED VESSEL OR LESION TYPE, UNSPECIFIED WHETHER ANGINA PRESENT, UNSPECIFIED WHETHER NATIVE OR TRANSPLANTED HEART: ICD-10-CM

## 2024-03-12 PROCEDURE — 4010F ACE/ARB THERAPY RXD/TAKEN: CPT | Mod: CPTII,S$GLB,, | Performed by: INTERNAL MEDICINE

## 2024-03-12 PROCEDURE — 3078F DIAST BP <80 MM HG: CPT | Mod: CPTII,S$GLB,, | Performed by: INTERNAL MEDICINE

## 2024-03-12 PROCEDURE — 1126F AMNT PAIN NOTED NONE PRSNT: CPT | Mod: CPTII,S$GLB,, | Performed by: INTERNAL MEDICINE

## 2024-03-12 PROCEDURE — 1159F MED LIST DOCD IN RCRD: CPT | Mod: CPTII,S$GLB,, | Performed by: INTERNAL MEDICINE

## 2024-03-12 PROCEDURE — 1160F RVW MEDS BY RX/DR IN RCRD: CPT | Mod: CPTII,S$GLB,, | Performed by: INTERNAL MEDICINE

## 2024-03-12 PROCEDURE — 99999 PR PBB SHADOW E&M-EST. PATIENT-LVL IV: CPT | Mod: PBBFAC,,, | Performed by: INTERNAL MEDICINE

## 2024-03-12 PROCEDURE — 3288F FALL RISK ASSESSMENT DOCD: CPT | Mod: CPTII,S$GLB,, | Performed by: INTERNAL MEDICINE

## 2024-03-12 PROCEDURE — 99215 OFFICE O/P EST HI 40 MIN: CPT | Mod: S$GLB,,, | Performed by: INTERNAL MEDICINE

## 2024-03-12 PROCEDURE — 3008F BODY MASS INDEX DOCD: CPT | Mod: CPTII,S$GLB,, | Performed by: INTERNAL MEDICINE

## 2024-03-12 PROCEDURE — 1101F PT FALLS ASSESS-DOCD LE1/YR: CPT | Mod: CPTII,S$GLB,, | Performed by: INTERNAL MEDICINE

## 2024-03-12 PROCEDURE — 1123F ACP DISCUSS/DSCN MKR DOCD: CPT | Mod: CPTII,S$GLB,, | Performed by: INTERNAL MEDICINE

## 2024-03-12 PROCEDURE — 3074F SYST BP LT 130 MM HG: CPT | Mod: CPTII,S$GLB,, | Performed by: INTERNAL MEDICINE

## 2024-03-12 NOTE — PROGRESS NOTES
Primary Care Provider Appointment   [facility]        Subjective:       Patient ID:  Terri is a 68 y.o. female being seen for [facility] (Copd )      Chief Complaint: Establish Care (Copd )    68-year-old  female with centrilobular emphysema  is here to [facility]. She is on home oxygen, has chronic diastolic heart failure, coronary artery disease, atherosclerosis, previous MI, hypertension, anxiety, depression, and sleep apnea .She had multiple hospital admissions for acute hypoxemic respiratory failure.  She follows pulmonologist Alex Rivas.She saw him earlier this month and the patient is close to baseline.  She is compliant with her BiPAP and home oxygen.  She is scheduled to start pulmonary rehab at [facility] the next few weeks .   She requests to see a SW for counseling for anxiety/depression  She otherwise has no c/o today         Past Medical History:   Diagnosis Date    COPD (chronic obstructive pulmonary disease)     Hypertension        Review of Systems   Constitutional:  Negative for chills and fever.   Eyes:  Negative for blurred vision.   Respiratory:  Negative for cough and shortness of breath.    Cardiovascular:  Negative for chest pain and palpitations.   Gastrointestinal:  Negative for heartburn and nausea.   Musculoskeletal:  Negative for myalgias.   Skin:  Negative for rash.   Neurological:  Negative for dizziness.   Psychiatric/Behavioral:  Negative for depression. The patient is not nervous/anxious.                Health Maintenance         Date Due Completion Date    High Dose Statin Never done ---    Colorectal Cancer Screening Never done ---    Mammogram 11/28/2024 11/28/2023    Hemoglobin A1c (Diabetic Prevention Screening) 06/16/2026 6/16/2023    DEXA Scan 11/28/2026 11/28/2023    Lipid Panel 07/02/2028 7/2/2023    TETANUS VACCINE 10/24/2032 10/24/2022            Advance Care Planning     Date: 03/12/2024    Living  "Will  During this visit, I engaged the patient  in the voluntary advance care planning process.  The patient and I reviewed the role for advance directives and their purpose in directing future healthcare if the patient's unable to speak for him/herself.  At this point in time, the patient does have full decision-making capacity.  We discussed different extreme health states that she could experience, and reviewed what kind of medical care she would want in those situations.  The patient communicated that if she were comatose and had little chance of a meaningful recovery, she would not want machines/life-sustaining treatments used. I The patient has completed a living will to reflect these preferences.  I spent a total of 15 minutes engaging the patient in this advance care planning discussion.               Objective:      Vitals:    03/12/24 0918   BP: 105/60   BP Location: Right arm   Patient Position: Sitting   BP Method: Medium (Manual)   Pulse: 91   SpO2: (!) 94%   Weight: 83 kg (182 lb 15.7 oz)   Height: 5' 6" (1.676 m)     Estimated body mass index is 29.53 kg/m² as calculated from the following:    Height as of this encounter: 5' 6" (1.676 m).    Weight as of this encounter: 83 kg (182 lb 15.7 oz).  Physical Exam  Constitutional:       Appearance: Normal appearance. She is normal weight.      Comments: Has her O2 cyclinder with NC in place   HENT:      Head: Normocephalic and atraumatic.      Nose: Nose normal.      Mouth/Throat:      Mouth: Mucous membranes are moist.   Eyes:      Pupils: Pupils are equal, round, and reactive to light.   Cardiovascular:      Rate and Rhythm: Normal rate and regular rhythm.      Pulses: Normal pulses.      Heart sounds: Normal heart sounds. No murmur heard.  Pulmonary:      Breath sounds: Normal breath sounds. No wheezing.      Comments: Occasional pursing of lips noticed   Abdominal:      General: Bowel sounds are normal.      Palpations: Abdomen is soft.   Skin:     " General: Skin is warm.   Neurological:      General: No focal deficit present.      Mental Status: She is alert and oriented to person, place, and time.      Cranial Nerves: No cranial nerve deficit.   Psychiatric:         Mood and Affect: Mood normal.         Assessment and Plan:         1. Morbid (severe) obesity due to excess calories    2. Mild episode of recurrent major depressive disorder  Assessment & Plan:  She lost her sister few years ago   She was following a  for counseling but needs a new one  Will refer to our [facility]  Cont current meds        3. Chronic respiratory failure with hypoxia and hypercapnia  Overview:  Baseline requirement for supplemental oxygen and NIPPV.  Recent hospitalization(s) for acute on chronic hypercapnic respiratory failure => returned to baseline gas exchange and functional status at discharge.    Assessment & Plan:  Patient with Hypercapnic and hypoxic Respiratory failure which is Chronic.  she is 2L Supplemental oxygen was provided  Signs/symptoms of respiratory failure include- tachypnea, increased work of breathing, respiratory distress, use of accessory muscles, wheezing, and stridor. Contributing diagnoses includes - COPD Labs and images were reviewed. Patient Has recent ABG, which has been reviewed. Will treat underlying causes and adjust management of respiratory failure as follows- cont current care with BIPAP and Supp O2 as managed by her pulmonologist      4. Primary hypertension  Assessment & Plan:  BP at goal  Cont current meds       5. Coronary artery disease, unspecified vessel or lesion type, unspecified whether angina present, unspecified whether native or transplanted heart  Assessment & Plan:  Cont ASA  Reported allergy to lipitor due to myalgia            Follow Up:   F/p in 2 months         Dr. Shanti Akasapu [facility]

## 2024-03-12 NOTE — ASSESSMENT & PLAN NOTE
She lost her sister few years ago   She was following a SW for counseling but needs a new one  Will refer to our  SW  Cont current meds

## 2024-03-12 NOTE — ASSESSMENT & PLAN NOTE
Patient with Hypercapnic and hypoxic Respiratory failure which is Chronic.  she is 2L Supplemental oxygen was provided  Signs/symptoms of respiratory failure include- tachypnea, increased work of breathing, respiratory distress, use of accessory muscles, wheezing, and stridor. Contributing diagnoses includes - COPD Labs and images were reviewed. Patient Has recent ABG, which has been reviewed. Will treat underlying causes and adjust management of respiratory failure as follows- cont current care with BIPAP and Supp O2 as managed by her pulmonologist

## 2024-03-14 ENCOUNTER — TELEPHONE (OUTPATIENT)
Dept: PRIMARY CARE CLINIC | Facility: CLINIC | Age: 69
End: 2024-03-14
Payer: MEDICARE

## 2024-03-14 PROBLEM — E66.01 MORBID (SEVERE) OBESITY DUE TO EXCESS CALORIES: Status: RESOLVED | Noted: 2024-03-12 | Resolved: 2024-03-14

## 2024-03-14 NOTE — TELEPHONE ENCOUNTER
----- Message from Olivia Barone sent at 3/12/2024  3:22 PM CDT -----  Contact: Pt [phone]  Pt called and stated that she would like a call back but did not leave details.     Thank you

## 2024-03-15 ENCOUNTER — TELEPHONE (OUTPATIENT)
Dept: PULMONOLOGY | Facility: CLINIC | Age: 69
End: 2024-03-15
Payer: MEDICARE

## 2024-03-15 NOTE — TELEPHONE ENCOUNTER
----- Message from Meme Hein sent at 3/15/2024  4:03 PM CDT -----  Regarding: orders  Contact: [phone]  Caller Kevin Díaz  calling in needing orders for  nebulizer please call to discuss Further

## 2024-03-15 NOTE — TELEPHONE ENCOUNTER
Spoke with staff at [facility], informed her that I have received her message. Staff states that I can disregard her message.

## 2024-03-21 ENCOUNTER — TELEPHONE (OUTPATIENT)
Dept: PULMONOLOGY | Facility: CLINIC | Age: 69
End: 2024-03-21
Payer: MEDICARE

## 2024-03-21 DIAGNOSIS — J43.2 CENTRILOBULAR EMPHYSEMA: Primary | ICD-10-CM

## 2024-03-21 NOTE — TELEPHONE ENCOUNTER
----- Message from Veena Linda sent at 3/21/2024 10:10 AM CDT -----  Regarding: pt advice  Contact: Kevin Munson @[phone]  Caller asking if order was sent for pt to get a new nebulizer. Caller spoke to Anastasiia on Fri in regards to this with no response. Pls call to discuss.

## 2024-03-21 NOTE — TELEPHONE ENCOUNTER
I called Mrs Phelan back and she is asking for a new nebulizer. She said the one she has now is 8 years old and not working well. I told her I will message Dr Cunningham. She saw Dr Cunningham earlier this month and forgot to ask for a new machine.Angela Shaver

## 2024-03-29 ENCOUNTER — HOSPITAL ENCOUNTER (INPATIENT)
Facility: HOSPITAL | Age: 69
LOS: 1 days | Discharge: HOME | DRG: 189 | End: 2024-03-31
Attending: STUDENT IN AN ORGANIZED HEALTH CARE EDUCATION/TRAINING PROGRAM | Admitting: HOSPITALIST
Payer: MEDICARE

## 2024-03-29 DIAGNOSIS — R07.9 CHEST PAIN: ICD-10-CM

## 2024-03-29 DIAGNOSIS — J44.1 ACUTE EXACERBATION OF CHRONIC OBSTRUCTIVE PULMONARY DISEASE (COPD): Primary | ICD-10-CM

## 2024-03-29 DIAGNOSIS — J96.22 ACUTE ON CHRONIC RESPIRATORY FAILURE WITH HYPERCAPNIA: ICD-10-CM

## 2024-03-29 DIAGNOSIS — R06.02 SOB (SHORTNESS OF BREATH): ICD-10-CM

## 2024-03-29 LAB
ALBUMIN SERPL BCP-MCNC: 3.4 G/DL (ref 3.5–5.2)
ALP SERPL-CCNC: 55 U/L (ref 55–135)
ALT SERPL W/O P-5'-P-CCNC: 17 U/L (ref 10–44)
ANION GAP SERPL CALC-SCNC: 9 MMOL/L (ref 8–16)
AST SERPL-CCNC: 25 U/L (ref 10–40)
BASOPHILS # BLD AUTO: 0.03 K/UL (ref 0–0.2)
BASOPHILS NFR BLD: 0.4 % (ref 0–1.9)
BILIRUB SERPL-MCNC: 0.2 MG/DL (ref 0.1–1)
BNP SERPL-MCNC: 15 PG/ML (ref 0–99)
BUN SERPL-MCNC: 10 MG/DL (ref 8–23)
CALCIUM SERPL-MCNC: 9.2 MG/DL (ref 8.7–10.5)
CHLORIDE SERPL-SCNC: 97 MMOL/L (ref 95–110)
CO2 SERPL-SCNC: 40 MMOL/L (ref 23–29)
CREAT SERPL-MCNC: 0.8 MG/DL (ref 0.5–1.4)
DIFFERENTIAL METHOD BLD: ABNORMAL
EOSINOPHIL # BLD AUTO: 0.2 K/UL (ref 0–0.5)
EOSINOPHIL NFR BLD: 2.5 % (ref 0–8)
ERYTHROCYTE [DISTWIDTH] IN BLOOD BY AUTOMATED COUNT: 13.5 % (ref 11.5–14.5)
EST. GFR  (NO RACE VARIABLE): >60 ML/MIN/1.73 M^2
FIO2: 28 %
GLUCOSE SERPL-MCNC: 113 MG/DL (ref 70–110)
HCT VFR BLD AUTO: 45.1 % (ref 37–48.5)
HGB BLD-MCNC: 13.6 G/DL (ref 12–16)
IMM GRANULOCYTES # BLD AUTO: 0.03 K/UL (ref 0–0.04)
IMM GRANULOCYTES NFR BLD AUTO: 0.4 % (ref 0–0.5)
LPM: 2
LYMPHOCYTES # BLD AUTO: 2.5 K/UL (ref 1–4.8)
LYMPHOCYTES NFR BLD: 31.5 % (ref 18–48)
MCH RBC QN AUTO: 28.3 PG (ref 27–31)
MCHC RBC AUTO-ENTMCNC: 30.2 G/DL (ref 32–36)
MCV RBC AUTO: 94 FL (ref 82–98)
MONOCYTES # BLD AUTO: 1 K/UL (ref 0.3–1)
MONOCYTES NFR BLD: 12 % (ref 4–15)
NEUTROPHILS # BLD AUTO: 4.3 K/UL (ref 1.8–7.7)
NEUTROPHILS NFR BLD: 53.2 % (ref 38–73)
NRBC BLD-RTO: 0 /100 WBC
PCO2 BLDA: 103 MMHG (ref 35–45)
PH SMN: 7.28 [PH] (ref 7.35–7.45)
PLATELET # BLD AUTO: 190 K/UL (ref 150–450)
PMV BLD AUTO: 11.4 FL (ref 9.2–12.9)
PO2 BLDA: 24.8 MMHG (ref 40–60)
POC BASE DEFICIT: 15.7 MMOL/L (ref -2–2)
POC HCO3: 47.8 MMOL/L (ref 24–28)
POC PERFORMED BY: ABNORMAL
POC SATURATED O2: 40.9 % (ref 95–100)
POTASSIUM SERPL-SCNC: 5 MMOL/L (ref 3.5–5.1)
PROT SERPL-MCNC: 7.4 G/DL (ref 6–8.4)
RBC # BLD AUTO: 4.81 M/UL (ref 4–5.4)
SODIUM SERPL-SCNC: 146 MMOL/L (ref 136–145)
SPECIMEN SOURCE: ABNORMAL
TROPONIN I SERPL DL<=0.01 NG/ML-MCNC: <0.006 NG/ML (ref 0–0.03)
WBC # BLD AUTO: 8 K/UL (ref 3.9–12.7)

## 2024-03-29 PROCEDURE — 82803 BLOOD GASES ANY COMBINATION: CPT

## 2024-03-29 PROCEDURE — 96374 THER/PROPH/DIAG INJ IV PUSH: CPT

## 2024-03-29 PROCEDURE — 93005 ELECTROCARDIOGRAM TRACING: CPT

## 2024-03-29 PROCEDURE — 96375 TX/PRO/DX INJ NEW DRUG ADDON: CPT

## 2024-03-29 PROCEDURE — 25000242 PHARM REV CODE 250 ALT 637 W/ HCPCS: Performed by: STUDENT IN AN ORGANIZED HEALTH CARE EDUCATION/TRAINING PROGRAM

## 2024-03-29 PROCEDURE — 99291 CRITICAL CARE FIRST HOUR: CPT

## 2024-03-29 PROCEDURE — 80053 COMPREHEN METABOLIC PANEL: CPT | Performed by: STUDENT IN AN ORGANIZED HEALTH CARE EDUCATION/TRAINING PROGRAM

## 2024-03-29 PROCEDURE — 94640 AIRWAY INHALATION TREATMENT: CPT | Mod: XB

## 2024-03-29 PROCEDURE — 83880 ASSAY OF NATRIURETIC PEPTIDE: CPT | Performed by: STUDENT IN AN ORGANIZED HEALTH CARE EDUCATION/TRAINING PROGRAM

## 2024-03-29 PROCEDURE — 85025 COMPLETE CBC W/AUTO DIFF WBC: CPT | Performed by: STUDENT IN AN ORGANIZED HEALTH CARE EDUCATION/TRAINING PROGRAM

## 2024-03-29 PROCEDURE — 93010 ELECTROCARDIOGRAM REPORT: CPT | Mod: ,,, | Performed by: INTERNAL MEDICINE

## 2024-03-29 PROCEDURE — 99900035 HC TECH TIME PER 15 MIN (STAT)

## 2024-03-29 PROCEDURE — 84484 ASSAY OF TROPONIN QUANT: CPT | Performed by: STUDENT IN AN ORGANIZED HEALTH CARE EDUCATION/TRAINING PROGRAM

## 2024-03-29 PROCEDURE — 63600175 PHARM REV CODE 636 W HCPCS: Performed by: STUDENT IN AN ORGANIZED HEALTH CARE EDUCATION/TRAINING PROGRAM

## 2024-03-29 RX ORDER — DEXAMETHASONE SODIUM PHOSPHATE 4 MG/ML
8 INJECTION, SOLUTION INTRA-ARTICULAR; INTRALESIONAL; INTRAMUSCULAR; INTRAVENOUS; SOFT TISSUE
Status: COMPLETED | OUTPATIENT
Start: 2024-03-29 | End: 2024-03-29

## 2024-03-29 RX ORDER — KETOROLAC TROMETHAMINE 30 MG/ML
15 INJECTION, SOLUTION INTRAMUSCULAR; INTRAVENOUS
Status: COMPLETED | OUTPATIENT
Start: 2024-03-29 | End: 2024-03-29

## 2024-03-29 RX ORDER — IPRATROPIUM BROMIDE 0.5 MG/2.5ML
0.5 SOLUTION RESPIRATORY (INHALATION) ONCE
Status: COMPLETED | OUTPATIENT
Start: 2024-03-30 | End: 2024-03-30

## 2024-03-29 RX ORDER — IPRATROPIUM BROMIDE AND ALBUTEROL SULFATE 2.5; .5 MG/3ML; MG/3ML
3 SOLUTION RESPIRATORY (INHALATION)
Status: COMPLETED | OUTPATIENT
Start: 2024-03-29 | End: 2024-03-29

## 2024-03-29 RX ORDER — ALBUTEROL SULFATE 2.5 MG/.5ML
10 SOLUTION RESPIRATORY (INHALATION) ONCE
Status: COMPLETED | OUTPATIENT
Start: 2024-03-30 | End: 2024-03-30

## 2024-03-29 RX ADMIN — IPRATROPIUM BROMIDE AND ALBUTEROL SULFATE 3 ML: 2.5; .5 SOLUTION RESPIRATORY (INHALATION) at 11:03

## 2024-03-29 RX ADMIN — KETOROLAC TROMETHAMINE 15 MG: 30 INJECTION, SOLUTION INTRAMUSCULAR; INTRAVENOUS at 11:03

## 2024-03-29 RX ADMIN — DEXAMETHASONE SODIUM PHOSPHATE 8 MG: 4 INJECTION, SOLUTION INTRA-ARTICULAR; INTRALESIONAL; INTRAMUSCULAR; INTRAVENOUS; SOFT TISSUE at 11:03

## 2024-03-29 NOTE — Clinical Note
Diagnosis: SOB (shortness of breath) [283248]  Future Attending Provider: RAMEZ COLEMAN [[id number]]  Reason for IP Medical Treatment  (Clinical interventions that can only be accomplished in the IP setting? ) :: Acute resp failure  I certify valeriano t Inpatient services for greater than or equal to 2 midnights are medically necessary:: Yes  Plans for Post-Acute care--if anticipated (pick the single best option):: A. No post acute care anticipated at this time  Special Needs:: No Special Needs [1 ]   Type Of Destruction Used: Curettage

## 2024-03-30 PROBLEM — J96.22 ACUTE ON CHRONIC RESPIRATORY FAILURE WITH HYPOXIA AND HYPERCAPNIA: Status: ACTIVE | Noted: 2023-12-19

## 2024-03-30 PROBLEM — J96.21 ACUTE ON CHRONIC RESPIRATORY FAILURE WITH HYPOXIA AND HYPERCAPNIA: Status: ACTIVE | Noted: 2023-12-19

## 2024-03-30 LAB
ADENOVIRUS: NOT DETECTED
ANION GAP SERPL CALC-SCNC: 9 MMOL/L (ref 8–16)
BASOPHILS # BLD AUTO: 0.02 K/UL (ref 0–0.2)
BASOPHILS NFR BLD: 0.2 % (ref 0–1.9)
BORDETELLA PARAPERTUSSIS (IS1001): NOT DETECTED
BORDETELLA PERTUSSIS (PTXP): NOT DETECTED
BUN SERPL-MCNC: 13 MG/DL (ref 8–23)
CALCIUM SERPL-MCNC: 9 MG/DL (ref 8.7–10.5)
CHLAMYDIA PNEUMONIAE: NOT DETECTED
CHLORIDE SERPL-SCNC: 99 MMOL/L (ref 95–110)
CO2 SERPL-SCNC: 37 MMOL/L (ref 23–29)
CORONAVIRUS 229E, COMMON COLD VIRUS: NOT DETECTED
CORONAVIRUS HKU1, COMMON COLD VIRUS: NOT DETECTED
CORONAVIRUS NL63, COMMON COLD VIRUS: NOT DETECTED
CORONAVIRUS OC43, COMMON COLD VIRUS: NOT DETECTED
CREAT SERPL-MCNC: 0.8 MG/DL (ref 0.5–1.4)
DIFFERENTIAL METHOD BLD: ABNORMAL
EOSINOPHIL # BLD AUTO: 0 K/UL (ref 0–0.5)
EOSINOPHIL NFR BLD: 0.1 % (ref 0–8)
ERYTHROCYTE [DISTWIDTH] IN BLOOD BY AUTOMATED COUNT: 13.5 % (ref 11.5–14.5)
EST. GFR  (NO RACE VARIABLE): >60 ML/MIN/1.73 M^2
FIO2: 35 %
FIO2: 40 %
FLUBV RNA NPH QL NAA+NON-PROBE: NOT DETECTED
GLUCOSE SERPL-MCNC: 185 MG/DL (ref 70–110)
HCT VFR BLD AUTO: 45.2 % (ref 37–48.5)
HGB BLD-MCNC: 13.2 G/DL (ref 12–16)
HPIV1 RNA NPH QL NAA+NON-PROBE: NOT DETECTED
HPIV2 RNA NPH QL NAA+NON-PROBE: NOT DETECTED
HPIV3 RNA NPH QL NAA+NON-PROBE: NOT DETECTED
HPIV4 RNA NPH QL NAA+NON-PROBE: NOT DETECTED
HUMAN METAPNEUMOVIRUS: NOT DETECTED
IMM GRANULOCYTES # BLD AUTO: 0.04 K/UL (ref 0–0.04)
IMM GRANULOCYTES NFR BLD AUTO: 0.4 % (ref 0–0.5)
INFLUENZA A (SUBTYPES H1,H1-2009,H3): NOT DETECTED
LYMPHOCYTES # BLD AUTO: 0.3 K/UL (ref 1–4.8)
LYMPHOCYTES NFR BLD: 2.9 % (ref 18–48)
MCH RBC QN AUTO: 27.8 PG (ref 27–31)
MCHC RBC AUTO-ENTMCNC: 29.2 G/DL (ref 32–36)
MCV RBC AUTO: 95 FL (ref 82–98)
MONOCYTES # BLD AUTO: 0.1 K/UL (ref 0.3–1)
MONOCYTES NFR BLD: 1.1 % (ref 4–15)
MYCOPLASMA PNEUMONIAE: NOT DETECTED
NEUTROPHILS # BLD AUTO: 9.3 K/UL (ref 1.8–7.7)
NEUTROPHILS NFR BLD: 95.3 % (ref 38–73)
NRBC BLD-RTO: 0 /100 WBC
PCO2 BLDA: 92 MMHG (ref 35–45)
PCO2 BLDA: 92.9 MMHG (ref 35–45)
PEEP: 6
PEEP: 6
PH SMN: 7.26 [PH] (ref 7.35–7.45)
PH SMN: 7.3 [PH] (ref 7.35–7.45)
PLATELET # BLD AUTO: 182 K/UL (ref 150–450)
PMV BLD AUTO: 10.6 FL (ref 9.2–12.9)
PO2 BLDA: 43.5 MMHG (ref 40–60)
PO2 BLDA: 63.3 MMHG (ref 40–60)
POC BASE DEFICIT: 10.4 MMOL/L (ref -2–2)
POC BASE DEFICIT: 14.5 MMOL/L (ref -2–2)
POC HCO3: 41.2 MMOL/L (ref 24–28)
POC HCO3: 45.4 MMOL/L (ref 24–28)
POC PERFORMED BY: ABNORMAL
POC PERFORMED BY: ABNORMAL
POC SATURATED O2: 75.7 % (ref 95–100)
POC SATURATED O2: 90.5 % (ref 95–100)
POC SET RR: 14
POC SET RR: 14
POTASSIUM SERPL-SCNC: 4.1 MMOL/L (ref 3.5–5.1)
RBC # BLD AUTO: 4.75 M/UL (ref 4–5.4)
RESPIRATORY INFECTION PANEL SOURCE: NORMAL
RSV RNA NPH QL NAA+NON-PROBE: NOT DETECTED
RV+EV RNA NPH QL NAA+NON-PROBE: NOT DETECTED
SARS-COV-2 RNA RESP QL NAA+PROBE: NOT DETECTED
SODIUM SERPL-SCNC: 145 MMOL/L (ref 136–145)
SPECIMEN SOURCE: ABNORMAL
SPECIMEN SOURCE: ABNORMAL
WBC # BLD AUTO: 9.77 K/UL (ref 3.9–12.7)

## 2024-03-30 PROCEDURE — 11000001 HC ACUTE MED/SURG PRIVATE ROOM

## 2024-03-30 PROCEDURE — 25000003 PHARM REV CODE 250: Performed by: HOSPITALIST

## 2024-03-30 PROCEDURE — 99900035 HC TECH TIME PER 15 MIN (STAT)

## 2024-03-30 PROCEDURE — 25000242 PHARM REV CODE 250 ALT 637 W/ HCPCS: Performed by: FAMILY MEDICINE

## 2024-03-30 PROCEDURE — 25000242 PHARM REV CODE 250 ALT 637 W/ HCPCS: Performed by: HOSPITALIST

## 2024-03-30 PROCEDURE — 80048 BASIC METABOLIC PNL TOTAL CA: CPT | Performed by: FAMILY MEDICINE

## 2024-03-30 PROCEDURE — 94761 N-INVAS EAR/PLS OXIMETRY MLT: CPT | Mod: XB

## 2024-03-30 PROCEDURE — 25000003 PHARM REV CODE 250: Performed by: FAMILY MEDICINE

## 2024-03-30 PROCEDURE — 25000242 PHARM REV CODE 250 ALT 637 W/ HCPCS: Performed by: STUDENT IN AN ORGANIZED HEALTH CARE EDUCATION/TRAINING PROGRAM

## 2024-03-30 PROCEDURE — 63600175 PHARM REV CODE 636 W HCPCS: Performed by: FAMILY MEDICINE

## 2024-03-30 PROCEDURE — 94660 CPAP INITIATION&MGMT: CPT

## 2024-03-30 PROCEDURE — 94644 CONT INHLJ TX 1ST HOUR: CPT

## 2024-03-30 PROCEDURE — 82803 BLOOD GASES ANY COMBINATION: CPT

## 2024-03-30 PROCEDURE — 5A09357 ASSISTANCE WITH RESPIRATORY VENTILATION, LESS THAN 24 CONSECUTIVE HOURS, CONTINUOUS POSITIVE AIRWAY PRESSURE: ICD-10-PCS | Performed by: STUDENT IN AN ORGANIZED HEALTH CARE EDUCATION/TRAINING PROGRAM

## 2024-03-30 PROCEDURE — 87633 RESP VIRUS 12-25 TARGETS: CPT | Performed by: HEALTH CARE PROVIDER

## 2024-03-30 PROCEDURE — 27000190 HC CPAP FULL FACE MASK W/VALVE

## 2024-03-30 PROCEDURE — 63600175 PHARM REV CODE 636 W HCPCS: Performed by: HOSPITALIST

## 2024-03-30 PROCEDURE — 85025 COMPLETE CBC W/AUTO DIFF WBC: CPT | Performed by: FAMILY MEDICINE

## 2024-03-30 PROCEDURE — 94640 AIRWAY INHALATION TREATMENT: CPT

## 2024-03-30 PROCEDURE — 27000221 HC OXYGEN, UP TO 24 HOURS

## 2024-03-30 RX ORDER — SODIUM CHLORIDE 0.9 % (FLUSH) 0.9 %
10 SYRINGE (ML) INJECTION
Status: DISCONTINUED | OUTPATIENT
Start: 2024-03-30 | End: 2024-03-31 | Stop reason: HOSPADM

## 2024-03-30 RX ORDER — PREDNISONE 20 MG/1
40 TABLET ORAL DAILY
Status: DISCONTINUED | OUTPATIENT
Start: 2024-03-30 | End: 2024-03-31 | Stop reason: HOSPADM

## 2024-03-30 RX ORDER — IBUPROFEN 200 MG
16 TABLET ORAL
Status: DISCONTINUED | OUTPATIENT
Start: 2024-03-30 | End: 2024-03-31 | Stop reason: HOSPADM

## 2024-03-30 RX ORDER — DIAZEPAM 2 MG/1
2 TABLET ORAL DAILY PRN
Status: DISCONTINUED | OUTPATIENT
Start: 2024-03-30 | End: 2024-03-31 | Stop reason: HOSPADM

## 2024-03-30 RX ORDER — IBUPROFEN 200 MG
24 TABLET ORAL
Status: DISCONTINUED | OUTPATIENT
Start: 2024-03-30 | End: 2024-03-31 | Stop reason: HOSPADM

## 2024-03-30 RX ORDER — ALBUTEROL SULFATE 90 UG/1
2 AEROSOL, METERED RESPIRATORY (INHALATION) EVERY 6 HOURS PRN
Status: DISCONTINUED | OUTPATIENT
Start: 2024-03-30 | End: 2024-03-31 | Stop reason: HOSPADM

## 2024-03-30 RX ORDER — POTASSIUM CHLORIDE 750 MG/1
10 TABLET, EXTENDED RELEASE ORAL DAILY
Status: DISCONTINUED | OUTPATIENT
Start: 2024-03-30 | End: 2024-03-31 | Stop reason: HOSPADM

## 2024-03-30 RX ORDER — SODIUM CHLORIDE 0.9 % (FLUSH) 0.9 %
3 SYRINGE (ML) INJECTION
Status: DISCONTINUED | OUTPATIENT
Start: 2024-03-30 | End: 2024-03-31 | Stop reason: HOSPADM

## 2024-03-30 RX ORDER — ACETAMINOPHEN 325 MG/1
650 TABLET ORAL EVERY 4 HOURS PRN
Status: DISCONTINUED | OUTPATIENT
Start: 2024-03-30 | End: 2024-03-31 | Stop reason: HOSPADM

## 2024-03-30 RX ORDER — TRAZODONE HYDROCHLORIDE 50 MG/1
50 TABLET ORAL NIGHTLY PRN
Status: DISCONTINUED | OUTPATIENT
Start: 2024-03-30 | End: 2024-03-31 | Stop reason: HOSPADM

## 2024-03-30 RX ORDER — DICYCLOMINE HYDROCHLORIDE 20 MG/1
20 TABLET ORAL 3 TIMES DAILY PRN
Status: DISCONTINUED | OUTPATIENT
Start: 2024-03-30 | End: 2024-03-31 | Stop reason: HOSPADM

## 2024-03-30 RX ORDER — ONDANSETRON HYDROCHLORIDE 2 MG/ML
4 INJECTION, SOLUTION INTRAVENOUS EVERY 12 HOURS PRN
Status: DISCONTINUED | OUTPATIENT
Start: 2024-03-30 | End: 2024-03-31 | Stop reason: HOSPADM

## 2024-03-30 RX ORDER — PREDNISONE 20 MG/1
20 TABLET ORAL DAILY
Status: DISCONTINUED | OUTPATIENT
Start: 2024-03-30 | End: 2024-03-30

## 2024-03-30 RX ORDER — GLUCAGON 1 MG
1 KIT INJECTION
Status: DISCONTINUED | OUTPATIENT
Start: 2024-03-30 | End: 2024-03-31 | Stop reason: HOSPADM

## 2024-03-30 RX ORDER — FLUTICASONE PROPIONATE 50 MCG
1 SPRAY, SUSPENSION (ML) NASAL DAILY
Status: DISCONTINUED | OUTPATIENT
Start: 2024-03-30 | End: 2024-03-31 | Stop reason: HOSPADM

## 2024-03-30 RX ORDER — SODIUM CHLORIDE 0.9 % (FLUSH) 0.9 %
10 SYRINGE (ML) INJECTION EVERY 12 HOURS PRN
Status: DISCONTINUED | OUTPATIENT
Start: 2024-03-30 | End: 2024-03-31 | Stop reason: HOSPADM

## 2024-03-30 RX ORDER — ACETAMINOPHEN 500 MG
500 TABLET ORAL EVERY 6 HOURS PRN
COMMUNITY

## 2024-03-30 RX ORDER — ROFLUMILAST 500 UG/1
500 TABLET ORAL DAILY
Status: DISCONTINUED | OUTPATIENT
Start: 2024-03-30 | End: 2024-03-31 | Stop reason: HOSPADM

## 2024-03-30 RX ORDER — FUROSEMIDE 40 MG/1
40 TABLET ORAL DAILY
Status: DISCONTINUED | OUTPATIENT
Start: 2024-03-30 | End: 2024-03-31 | Stop reason: HOSPADM

## 2024-03-30 RX ORDER — NAPROXEN SODIUM 220 MG/1
81 TABLET, FILM COATED ORAL DAILY
Status: DISCONTINUED | OUTPATIENT
Start: 2024-03-30 | End: 2024-03-31 | Stop reason: HOSPADM

## 2024-03-30 RX ORDER — ENOXAPARIN SODIUM 100 MG/ML
40 INJECTION SUBCUTANEOUS EVERY 24 HOURS
Status: DISCONTINUED | OUTPATIENT
Start: 2024-03-30 | End: 2024-03-31 | Stop reason: HOSPADM

## 2024-03-30 RX ORDER — DIAZEPAM 2 MG/1
2 TABLET ORAL DAILY PRN
Status: DISCONTINUED | OUTPATIENT
Start: 2024-03-30 | End: 2024-03-30

## 2024-03-30 RX ORDER — TALC
6 POWDER (GRAM) TOPICAL NIGHTLY PRN
Status: DISCONTINUED | OUTPATIENT
Start: 2024-03-30 | End: 2024-03-31 | Stop reason: HOSPADM

## 2024-03-30 RX ORDER — LOSARTAN POTASSIUM 50 MG/1
50 TABLET ORAL DAILY
Status: DISCONTINUED | OUTPATIENT
Start: 2024-03-30 | End: 2024-03-31 | Stop reason: HOSPADM

## 2024-03-30 RX ORDER — IPRATROPIUM BROMIDE AND ALBUTEROL SULFATE 2.5; .5 MG/3ML; MG/3ML
3 SOLUTION RESPIRATORY (INHALATION)
Status: DISCONTINUED | OUTPATIENT
Start: 2024-03-30 | End: 2024-03-31 | Stop reason: HOSPADM

## 2024-03-30 RX ORDER — DEXAMETHASONE SODIUM PHOSPHATE 100 MG/10ML
INJECTION INTRAMUSCULAR; INTRAVENOUS
Status: ON HOLD | COMMUNITY
Start: 2023-12-14 | End: 2024-03-30

## 2024-03-30 RX ORDER — NALOXONE HCL 0.4 MG/ML
0.02 VIAL (ML) INJECTION
Status: DISCONTINUED | OUTPATIENT
Start: 2024-03-30 | End: 2024-03-31 | Stop reason: HOSPADM

## 2024-03-30 RX ORDER — POLYETHYLENE GLYCOL 3350 17 G/17G
17 POWDER, FOR SOLUTION ORAL DAILY
Status: DISCONTINUED | OUTPATIENT
Start: 2024-03-30 | End: 2024-03-31 | Stop reason: HOSPADM

## 2024-03-30 RX ADMIN — DIAZEPAM 2 MG: 2 TABLET ORAL at 04:03

## 2024-03-30 RX ADMIN — FLUTICASONE PROPIONATE 50 MCG: 50 SPRAY, METERED NASAL at 08:03

## 2024-03-30 RX ADMIN — ACETAMINOPHEN 650 MG: 325 TABLET ORAL at 10:03

## 2024-03-30 RX ADMIN — POTASSIUM CHLORIDE 10 MEQ: 750 TABLET, EXTENDED RELEASE ORAL at 09:03

## 2024-03-30 RX ADMIN — ENOXAPARIN SODIUM 40 MG: 40 INJECTION SUBCUTANEOUS at 04:03

## 2024-03-30 RX ADMIN — BUDESONIDE, GLYCOPYRROLATE, AND FORMOTEROL FUMARATE 2 PUFF: 160; 9; 4.8 AEROSOL, METERED RESPIRATORY (INHALATION) at 11:03

## 2024-03-30 RX ADMIN — AZITHROMYCIN MONOHYDRATE 500 MG: 500 INJECTION, POWDER, LYOPHILIZED, FOR SOLUTION INTRAVENOUS at 08:03

## 2024-03-30 RX ADMIN — IPRATROPIUM BROMIDE 0.5 MG: 0.5 SOLUTION RESPIRATORY (INHALATION) at 12:03

## 2024-03-30 RX ADMIN — GUAIFENESIN AND DEXTROMETHORPHAN HYDROBROMIDE 1 TABLET: 600; 30 TABLET, EXTENDED RELEASE ORAL at 08:03

## 2024-03-30 RX ADMIN — BUDESONIDE, GLYCOPYRROLATE, AND FORMOTEROL FUMARATE 2 PUFF: 160; 9; 4.8 AEROSOL, METERED RESPIRATORY (INHALATION) at 10:03

## 2024-03-30 RX ADMIN — IPRATROPIUM BROMIDE AND ALBUTEROL SULFATE 3 ML: 2.5; .5 SOLUTION RESPIRATORY (INHALATION) at 08:03

## 2024-03-30 RX ADMIN — PREDNISONE 40 MG: 20 TABLET ORAL at 08:03

## 2024-03-30 RX ADMIN — LOSARTAN POTASSIUM 50 MG: 50 TABLET, FILM COATED ORAL at 08:03

## 2024-03-30 RX ADMIN — ROFLUMILAST 500 MCG: 500 TABLET ORAL at 12:03

## 2024-03-30 RX ADMIN — ASPIRIN 81 MG CHEWABLE TABLET 81 MG: 81 TABLET CHEWABLE at 08:03

## 2024-03-30 RX ADMIN — TRAZODONE HYDROCHLORIDE 50 MG: 50 TABLET ORAL at 10:03

## 2024-03-30 RX ADMIN — ACETAMINOPHEN 650 MG: 325 TABLET ORAL at 09:03

## 2024-03-30 RX ADMIN — GUAIFENESIN AND DEXTROMETHORPHAN HYDROBROMIDE 1 TABLET: 600; 30 TABLET, EXTENDED RELEASE ORAL at 10:03

## 2024-03-30 RX ADMIN — ALBUTEROL SULFATE 10 MG: 2.5 SOLUTION RESPIRATORY (INHALATION) at 12:03

## 2024-03-30 RX ADMIN — FUROSEMIDE 40 MG: 40 TABLET ORAL at 08:03

## 2024-03-30 RX ADMIN — IPRATROPIUM BROMIDE AND ALBUTEROL SULFATE 3 ML: 2.5; .5 SOLUTION RESPIRATORY (INHALATION) at 02:03

## 2024-03-30 NOTE — ASSESSMENT & PLAN NOTE
Patient with Hypercapnic Respiratory failure which is Acute on chronic.  she is on home oxygen at 2 LPM. Supplemental oxygen was provided and noted- Oxygen Concentration (%):  [40] 40    .   Signs/symptoms of respiratory failure include- tachypnea, respiratory distress, and lethargy. Contributing diagnoses includes - COPD and Pleural effusion Labs and images were reviewed. Patient Has recent ABG, which has been reviewed. Will treat underlying causes and adjust management of respiratory failure as follows- cont BIPAP and duoneb

## 2024-03-30 NOTE — ED NOTES
"Pt placed on bipap by resp per dr order.  Pt resting in bed with HOB up 75' for ease of resp.  Family at bedside, call light in reach.  Pt on 4l O2 NBP- states "I live at 88%".  Resp even and unlabored  "

## 2024-03-30 NOTE — ASSESSMENT & PLAN NOTE
Chronic, controlled. Latest blood pressure and vitals reviewed-     Temp:  [98.4 °F (36.9 °C)]   Pulse:  []   Resp:  [20-25]   BP: (128-156)/(60-67)   SpO2:  [88 %-97 %] .   Home meds for hypertension were reviewed and noted below.   Hypertension Medications               furosemide (LASIX) 40 MG tablet Take 1 tablet (40 mg total) by mouth once daily.    losartan (COZAAR) 50 MG tablet Take 1 tablet (50 mg total) by mouth once daily.            While in the hospital, will manage blood pressure as follows; Continue home antihypertensive regimen    Will utilize p.r.n. blood pressure medication only if patient's blood pressure greater than 180/110 and she develops symptoms such as worsening chest pain or shortness of breath.   General

## 2024-03-30 NOTE — HPI
68 year old female obese with a PMHx of COPD  with chronic hypercapnic respiratory failure on home O2 2L NC baseline and BIPAP dependent, HTN presented to the ER for notion of headache, SOB, mild dizziness/confusion. Per patient was in her usual state of health and has been compliant with BIPAP regimen and meds, today she started to have persistent headache, associated with worsening SOB, since whenever she felt like she knew it's because of her CO2 high, then she became more somnolent, she has decided to bring her to the ER. In the ER, her VBG showed CO2 103, PH 7.27. labs with Na+ 146, CO2 40. CXR no new acute changes, small pleural effusion. Denies CP, no palpitation, no PND, no N/V, nor diarrhea or constipation. She has been admitted for acute on chronic hypercapnic/hypoxic respiratory failure.

## 2024-03-30 NOTE — SUBJECTIVE & OBJECTIVE
Past Medical History:   Diagnosis Date    COPD (chronic obstructive pulmonary disease)     Hypertension        Past Surgical History:   Procedure Laterality Date    BREAST BIOPSY N/A     pt unsure which breast but it was benign-(calcium)     SECTION      HERNIA REPAIR      HYSTERECTOMY         Review of patient's allergies indicates:   Allergen Reactions    Codeine Nausea And Vomiting    Lipitor [atorvastatin] Other (See Comments)     Muscle fatigue      Morphine Hallucinations       No current facility-administered medications on file prior to encounter.     Current Outpatient Medications on File Prior to Encounter   Medication Sig    albuterol (PROVENTIL/VENTOLIN HFA) 90 mcg/actuation inhaler Inhale 2 puffs into the lungs every 6 (six) hours as needed for Wheezing or Shortness of Breath.    alendronate (FOSAMAX) 35 MG tablet Take 35 mg by mouth every Tuesday.    aspirin 81 MG Chew Take 1 tablet by mouth once daily.    azithromycin (Z-OLY) 250 MG tablet Take 1 tablet (250 mg total) by mouth every Mon, Wed, Fri.    budesonide-glycopyr-formoterol (BREZTRI AEROSPHERE) 160-9-4.8 mcg/actuation HFAA Inhale 2 puffs into the lungs 2 (two) times daily.    calcium carb-mag hydrox-simeth 1,200 mg-270 mg -80 mg/10 mL Susp Take 1,200 mg by mouth once daily.    cholecalciferol, vitamin D3, (VITAMIN D3) 50 mcg (2,000 unit) Tab Take 2,000 Units by mouth once daily.    dextromethorphan-guaiFENesin  mg (MUCINEX DM)  mg per 12 hr tablet Take 1 tablet by mouth every 12 (twelve) hours.    diazePAM (VALIUM) 2 MG tablet Take 1 tablet (2 mg total) by mouth daily as needed for Anxiety.    dicyclomine (BENTYL) 20 mg tablet Take 1 tablet (20 mg total) by mouth 3 (three) times daily as needed (abdominal pain).    fluticasone propionate (FLONASE) 50 mcg/actuation nasal spray 1 spray (50 mcg total) by Each Nostril route daily as needed for Allergies.    furosemide (LASIX) 40 MG tablet Take 1 tablet (40 mg total) by mouth  once daily.    hydrOXYzine (ATARAX) 50 MG tablet Take 50 mg by mouth 3 (three) times daily as needed.    ibuprofen (ADVIL,MOTRIN) 800 MG tablet Take 1 tablet (800 mg total) by mouth 3 (three) times daily as needed for Pain.    losartan (COZAAR) 50 MG tablet Take 1 tablet (50 mg total) by mouth once daily.    OXYGEN-AIR DELIVERY SYSTEMS MISC 2 L by Nasal route continuous.    potassium chloride (MICRO-K) 10 MEQ CpSR Take 10 mEq by mouth once daily.    predniSONE (DELTASONE) 20 MG tablet Take 2 tablets by mouth once daily.    roflumilast (DALIRESP) 500 mcg Tab Take 500 mcg by mouth once daily.    traZODone (DESYREL) 50 MG tablet Take 1 tablet (50 mg total) by mouth nightly as needed for Insomnia.     Family History    None       Tobacco Use    Smoking status: Former     Current packs/day: 0.00     Average packs/day: 1 pack/day for 20.0 years (20.0 ttl pk-yrs)     Types: Cigarettes     Quit date: 1/1/2021     Years since quitting: 3.2     Passive exposure: Past    Smokeless tobacco: Never   Substance and Sexual Activity    Alcohol use: Not Currently     Alcohol/week: 1.0 standard drink of alcohol     Types: 1 Glasses of wine per week    Drug use: No    Sexual activity: Not Currently     Review of Systems   Constitutional:  Positive for activity change and fatigue. Negative for appetite change, chills and fever.   HENT:  Positive for congestion.    Respiratory:  Positive for shortness of breath. Negative for cough, chest tightness and wheezing.    Cardiovascular:  Negative for chest pain, palpitations and leg swelling.   Gastrointestinal:  Negative for abdominal distention, abdominal pain, nausea and vomiting.   Musculoskeletal:  Negative for arthralgias, neck pain and neck stiffness.   Neurological:  Positive for headaches. Negative for dizziness, seizures, syncope, speech difficulty and weakness.   Psychiatric/Behavioral:  Negative for agitation, confusion and hallucinations.    All other systems reviewed and are  negative.    Objective:     Vital Signs (Most Recent):  Temp: 98.4 °F (36.9 °C) (03/29/24 2230)  Pulse: 97 (03/30/24 0225)  Resp: (!) 25 (03/30/24 0000)  BP: 139/62 (03/30/24 0200)  SpO2: 96 % (03/30/24 0225) Vital Signs (24h Range):  Temp:  [98.4 °F (36.9 °C)] 98.4 °F (36.9 °C)  Pulse:  [] 97  Resp:  [20-25] 25  SpO2:  [88 %-97 %] 96 %  BP: (128-156)/(60-67) 139/62     Weight: 82.6 kg (182 lb)  Body mass index is 29.38 kg/m².     Physical Exam  Vitals and nursing note reviewed.   Constitutional:       General: She is in acute distress.      Appearance: She is ill-appearing. She is not toxic-appearing.   HENT:      Head: Normocephalic and atraumatic.      Nose: No congestion.      Mouth/Throat:      Mouth: Mucous membranes are moist.      Pharynx: No oropharyngeal exudate.   Eyes:      Extraocular Movements: Extraocular movements intact.      Pupils: Pupils are equal, round, and reactive to light.   Cardiovascular:      Rate and Rhythm: Normal rate. Rhythm irregular.      Heart sounds: No murmur heard.     No friction rub. No gallop.   Pulmonary:      Effort: Respiratory distress present.      Breath sounds: Rhonchi present. No rales.      Comments: B/L decrease BS  Chest:      Chest wall: No tenderness.   Abdominal:      General: Bowel sounds are normal. There is no distension.      Palpations: Abdomen is soft.      Tenderness: There is no abdominal tenderness. There is no guarding or rebound.   Musculoskeletal:         General: No swelling or tenderness.      Cervical back: No rigidity or tenderness.      Right lower leg: No edema.      Left lower leg: No edema.   Skin:     Findings: No erythema or rash.   Neurological:      General: No focal deficit present.      Mental Status: She is alert and oriented to person, place, and time.      Cranial Nerves: No cranial nerve deficit.      Motor: Weakness present.      Coordination: Coordination normal.   Psychiatric:         Thought Content: Thought content  "normal.              CRANIAL NERVES     CN III, IV, VI   Pupils are equal, round, and reactive to light.       Significant Labs: All pertinent labs within the past 24 hours have been reviewed.  Blood Culture: No results for input(s): "LABBLOO" in the last 48 hours.  BMP:   Recent Labs   Lab 03/29/24 2308   *   *   K 5.0   CL 97   CO2 40*   BUN 10   CREATININE 0.8   CALCIUM 9.2     CBC:   Recent Labs   Lab 03/29/24 2308   WBC 8.00   HGB 13.6   HCT 45.1        CMP:   Recent Labs   Lab 03/29/24 2308   *   K 5.0   CL 97   CO2 40*   *   BUN 10   CREATININE 0.8   CALCIUM 9.2   PROT 7.4   ALBUMIN 3.4*   BILITOT 0.2   ALKPHOS 55   AST 25   ALT 17   ANIONGAP 9     Cardiac Markers:   Recent Labs   Lab 03/29/24 2308   BNP 15     Respiratory Culture: No results for input(s): "GSRESP", "RESPIRATORYC" in the last 48 hours.  Troponin:   Recent Labs   Lab 03/29/24 2308   TROPONINI <0.006     TSH: No results for input(s): "TSH" in the last 4320 hours.  Urine Culture: No results for input(s): "LABURIN" in the last 48 hours.  Urine Studies: No results for input(s): "COLORU", "APPEARANCEUA", "PHUR", "SPECGRAV", "PROTEINUA", "GLUCUA", "KETONESU", "BILIRUBINUA", "OCCULTUA", "NITRITE", "UROBILINOGEN", "LEUKOCYTESUR", "RBCUA", "WBCUA", "BACTERIA", "SQUAMEPITHEL", "HYALINECASTS" in the last 48 hours.    Invalid input(s): "WRIGHTSUR"  Recent Lab Results         03/30/24  0121   03/29/24 2318   03/29/24 2308        Base Deficit 14.5  Comment: Value above reference range   15.7  Comment: Value above reference range         Performed By: [id number]   [id number]         Specimen source Venous   Venous         Albumin     3.4       ALP     55       ALT     17       Anion Gap     9       AST     25       Baso #     0.03       Basophil %     0.4       BILIRUBIN TOTAL     0.2  Comment: For infants and newborns, interpretation of results should be based  on gestational age, weight and in agreement with " clinical  observations.    Premature Infant recommended reference ranges:  Up to 24 hours.............<8.0 mg/dL  Up to 48 hours............<12.0 mg/dL  3-5 days..................<15.0 mg/dL  6-29 days.................<15.0 mg/dL         BNP     15  Comment: Values of less than 100 pg/ml are consistent with non-CHF populations.       BUN     10       Calcium     9.2       Chloride     97       CO2     40       Creatinine     0.8       Differential Method     Automated       eGFR     >60       Eos #     0.2       Eos %     2.5       FiO2 40.0   28.0         Glucose     113       Gran # (ANC)     4.3       Gran %     53.2       Hematocrit     45.1       Hemoglobin     13.6       Immature Grans (Abs)     0.03  Comment: Mild elevation in immature granulocytes is non specific and   can be seen in a variety of conditions including stress response,   acute inflammation, trauma and pregnancy. Correlation with other   laboratory and clinical findings is essential.         Immature Granulocytes     0.4       LPM   2.0         Lymph #     2.5       Lymph %     31.5       MCH     28.3       MCHC     30.2       MCV     94       Mono #     1.0       Mono %     12.0       MPV     11.4       nRBC     0       PEEP 6.0           Platelet Count     190       POC HCO3 45.4  Comment: Value above reference range   47.8  Comment: Value above reference range         POC PCO2 92.9  Comment: Value above reference range   103  Comment: Value above reference range         POC PH 7.297  Comment: Value below reference range   7.277  Comment:  notified  at    read back  Value below critical limit           POC PO2 43.5   24.8  Comment:  notified  at    read back  Value below critical limit           POC SATURATED O2 75.7  Comment:  notified  at    read back  Value below critical limit     40.9  Comment:  notified  at    read back  Value below critical limit           POC Set RR 14.0           Potassium     5.0  Comment: Specimen moderately  hemolyzed       PROTEIN TOTAL     7.4  Comment: Specimen moderately hemolyzed       RBC     4.81       RDW     13.5       Sodium     146       Troponin I     <0.006  Comment: The reference interval for Troponin I represents the 99th percentile   cutoff   for our facility and is consistent with 3rd generation assay   performance.         WBC     8.00               Significant Imaging: I have reviewed all pertinent imaging results/findings within the past 24 hours.

## 2024-03-30 NOTE — CONSULTS
[facility] Pulmonary/Critical Care Consult Note      Patient:Terri Phelan  Age:68 y.o.  MRN:[id number]  Admit date:3/29/2024  LOS:0 day(s)     Primary Team: [facility]  Primary Attending: Bianca    Reason for Consult: COPD exacerbation, hypercapnic respiratory failure     HPI:       Terri Phelan is a 68 year old woman with PMH COPD and chronic hypoxic and hypercapnic respiratory failure on 2L NC at home. She presented to [facility] overnight 3/29 with complaints of SOB, HA, and increased sleepiness/confusion. In ER her VBG revealed pH 7.27 and PCO2 104. CXR unremarkable. She denied fevers, chills, cough, sputum production, chest pain, recent illness or sick contacts. She uses BIPAP regularly at home overnight with sleep. She was placed on BIPAP in the ER with improvement in her mentation and hypercapnia. Started on prednisone 40 mg, azithro, and nebs. Transitioned to 2L NC in am.     In terms of her GOLD 4E COPD, she follows with Dr. Cunningham at [facility]. She is currently prescibed breztri, albuterol, nebs, azithro MWF, roflumilast. She uses BIPAP nightly. She is scheduled to begin pulmonary rehab soon. Uses 2-3L NC at baseline.      MEDICAL HISTORY:      Past Medical History:  Past Medical History:   Diagnosis Date    COPD (chronic obstructive pulmonary disease)     Hypertension         Past Surgical History:  Past Surgical History:   Procedure Laterality Date    BREAST BIOPSY N/A     pt unsure which breast but it was benign-(calcium)     SECTION      HERNIA REPAIR      HYSTERECTOMY           Family History:   No family history on file.      Social History:  Social History     Socioeconomic History    Marital status:    Tobacco Use    Smoking status: Former     Current packs/day: 0.00     Average packs/day: 1 pack/day for 20.0 years (20.0 ttl pk-yrs)     Types: Cigarettes     Quit date: 2021     Years since quitting: 3.2     Passive exposure: Past    Smokeless tobacco: Never    Substance and Sexual Activity    Alcohol use: Not Currently     Alcohol/week: 1.0 standard drink of alcohol     Types: 1 Glasses of wine per week    Drug use: No    Sexual activity: Not Currently     Social Determinants of Health     Financial Resource Strain: Low Risk  (2/2/2024)    Overall Financial Resource Strain (CARDIA)     Difficulty of Paying Living Expenses: Not very hard   Food Insecurity: No Food Insecurity (2/2/2024)    Hunger Vital Sign     Worried About Running Out of Food in the Last Year: Never true     Ran Out of Food in the Last Year: Never true   Transportation Needs: No Transportation Needs (2/2/2024)    PRAPARE - Transportation     Lack of Transportation (Medical): No     Lack of Transportation (Non-Medical): No   Physical Activity: Inactive (2/2/2024)    Exercise Vital Sign     Days of Exercise per Week: 2 days     Minutes of Exercise per Session: 0 min   Stress: Stress Concern Present (2/2/2024)    [employer] - Occupational Stress Questionnaire     Feeling of Stress : To some extent   Social Connections: Socially Isolated (2/2/2024)    Social Connection and Isolation Panel [NHANES]     Frequency of Communication with Friends and Family: More than three times a week     Frequency of Social Gatherings with Friends and Family: Once a week     Attends Denominational Services: Never     Active Member of Clubs or Organizations: No     Attends Club or Organization Meetings: Never     Marital Status:    Housing Stability: Low Risk  (2/2/2024)    Housing Stability Vital Sign     Unable to Pay for Housing in the Last Year: No     Number of Places Lived in the Last Year: 1     Unstable Housing in the Last Year: No        Allergies:  Review of patient's allergies indicates:   Allergen Reactions    Codeine Nausea And Vomiting    Lipitor [atorvastatin] Other (See Comments)     Muscle fatigue      Morphine Hallucinations       Home Medications:  Current Outpatient  Medications   Medication Instructions    albuterol (PROVENTIL/VENTOLIN HFA) 90 mcg/actuation inhaler 2 puffs, Inhalation, Every 6 hours PRN    alendronate (FOSAMAX) 35 mg, Oral, Every Tuesday    aspirin 81 MG Chew 1 tablet, Oral, Daily    azithromycin (Z-OLY) 250 mg, Oral, Every Mon, Wed, Fri    budesonide-glycopyr-formoterol (BREZTRI AEROSPHERE) 160-9-4.8 mcg/actuation HFAA 2 puffs, Inhalation, 2 times daily    calcium carb-mag hydrox-simeth 1,200 mg-270 mg -80 mg/10 mL Susp 1,200 mg, Oral, Daily    cholecalciferol (vitamin D3) (VITAMIN D3) 2,000 Units, Oral, Daily    dextromethorphan-guaiFENesin  mg (MUCINEX DM)  mg per 12 hr tablet 1 tablet, Oral, Every 12 hours    diazePAM (VALIUM) 2 mg, Oral, Daily PRN    dicyclomine (BENTYL) 20 mg, Oral, 3 times daily PRN    fluticasone propionate (FLONASE) 50 mcg, Each Nostril, Daily PRN    furosemide (LASIX) 40 mg, Oral, Daily    hydrOXYzine (ATARAX) 50 mg, Oral, 3 times daily PRN    ibuprofen (ADVIL,MOTRIN) 800 mg, Oral, 3 times daily PRN    losartan (COZAAR) 50 mg, Oral, Daily    OXYGEN-AIR DELIVERY SYSTEMS MISC 2 L, Nasal, Continuous    potassium chloride (MICRO-K) 10 MEQ CpSR 10 mEq, Oral, Daily    predniSONE (DELTASONE) 20 MG tablet 2 tablets, Oral, Daily    roflumilast (DALIRESP) 500 mcg, Oral, Daily    traZODone (DESYREL) 50 mg, Oral, Nightly PRN        OBJECTIVE DATA:      Vital Signs:  Vitals:    03/30/24 1003   BP: 130/60   Pulse: 86   Resp: 18   Temp: 98.2 °F (36.8 °C)       Intake/Output:  No intake or output data in the 24 hours ending 03/30/24 1043     Physical Exam:  Constitutional: cooperative, calm, appears stated age  HEENT: NCAT, EOMI, MMM  Neck: Supple, normal ROM  Resp: CTABL, no wheeze  Cardio: RRR, S1 and S2 normal  GI: Soft, non-tender, bowel sounds active  Extremities: No edema, peripheral pulses 2+ and symmetric  Skin: No rashes, bruises, or lesions  Neurologic: A&Ox3, follows commands, moves extremities spontaneously      Laboratory/Imaging:  Recent Labs   Lab 03/29/24  2308 03/30/24  0435   WBC 8.00 9.77   HGB 13.6 13.2   HCT 45.1 45.2    182   * 145   K 5.0 4.1   CL 97 99   CREATININE 0.8 0.8   BUN 10 13   CO2 40* 37*   ALT 17  --    AST 25  --         Imaging:  Reviewed    Medications:   albuterol-ipratropium  3 mL Nebulization Q6H WAKE    aspirin  81 mg Oral Daily    azithromycin  500 mg Intravenous Q24H    budesonide-glycopyr-formoterol  2 puff Inhalation BID    dextromethorphan-guaiFENesin  mg  1 tablet Oral Q12H    enoxparin  40 mg Subcutaneous Daily    fluticasone propionate  1 spray Each Nostril Daily    furosemide  40 mg Oral Daily    losartan  50 mg Oral Daily    polyethylene glycol  17 g Oral Daily    potassium chloride  10 mEq Oral Daily    predniSONE  40 mg Oral Daily    roflumilast  500 mcg Oral Daily             acetaminophen, albuterol, dextrose 10%, dextrose 10%, diazePAM, dicyclomine, glucagon (human recombinant), glucose, glucose, melatonin, naloxone, ondansetron, sodium chloride 0.9%, sodium chloride 0.9%, sodium chloride 0.9%, traZODone     Assessment/Plan:     COPD Exacerbation  - GOLD 4E COPD, follows with [facility] outpatient  - continue LABA/LAMA/ICS and roflumilast  - prednisone 40 mg daily x 5 days, azithro 250 mg x 5 days  - Q6 duonebs    Acute on Chronic Hypercapnic and Hypoxic Respiratory Failure  - improved overnight with BIPAP, back on baseline 2L NC   - continue BIPAP at night and with naps  - reports compliance with her BIPAP at home    PCCM will sign off at this time. Please reach out with any questions, concerns, or changes to patient's respiratory status.     Breonna Saenz MD  [facility] PCCM Fellow  03/30/2024 10:43 AM

## 2024-03-30 NOTE — ED PROVIDER NOTES
ED Provider Note - 3/29/2024    History     Chief Complaint   Patient presents with    Shortness of Breath     Reports headache and increased SOB for the last week. States she believes CO2 is elevated which was cause of headache in past. Normal oxygen level is 88-91% on 2 L NC. Denies chest pain. No medicine taken today for headache.       CINDY Phelan is a 68 y.o. year old female with past medical and surgical history as seen below, presenting with chief complaint of shortness of breath.  Worsening over the past several days.  Concerned that her CO2 is elevated given persistent headache.  Patient does have history of chronic hypercapnic respiratory failure.  Has not taken any medicine for headache but has tried her home albuterol.    Past Medical History:   Diagnosis Date    COPD (chronic obstructive pulmonary disease)     Hypertension      Past Surgical History:   Procedure Laterality Date    BREAST BIOPSY N/A     pt unsure which breast but it was benign-(calcium)     SECTION      HERNIA REPAIR      HYSTERECTOMY           No family history on file.  Social History     Tobacco Use    Smoking status: Former     Current packs/day: 0.00     Average packs/day: 1 pack/day for 20.0 years (20.0 ttl pk-yrs)     Types: Cigarettes     Quit date: 2021     Years since quitting: 3.2     Passive exposure: Past    Smokeless tobacco: Never   Substance Use Topics    Alcohol use: Not Currently     Alcohol/week: 1.0 standard drink of alcohol     Types: 1 Glasses of wine per week    Drug use: No     Social Determinants of Health with Concerns     Tobacco Use: Medium Risk (3/12/2024)    Patient History     Smoking Tobacco Use: Former     Smokeless Tobacco Use: Never     Passive Exposure: Past   Physical Activity: Inactive (2024)    Exercise Vital Sign     Days of Exercise per Week: 2 days     Minutes of Exercise per Session: 0 min   Stress: Stress Concern Present (2024)    [facility]  [facility]upational [facility] - Occupational Stress Questionnaire     Feeling of Stress : To some extent   Social Connections: Socially Isolated (2/2/2024)    Social Connection and Isolation Panel [NHANES]     Frequency of Communication with Friends and Family: More than three times a week     Frequency of Social Gatherings with Friends and Family: Once a week     Attends Confucianism Services: Never     Active Member of Clubs or Organizations: No     Attends Club or Organization Meetings: Never     Marital Status:       Review of patient's allergies indicates:   Allergen Reactions    Codeine Nausea And Vomiting    Lipitor [atorvastatin] Other (See Comments)     Muscle fatigue      Morphine Hallucinations       Review of Systems     A full Review of Systems (ROS) was performed and was negative unless otherwise stated in the HPI.      Physical Exam     Vitals:    03/30/24 0225 03/30/24 0300 03/30/24 0400 03/30/24 0435   BP:  135/60 (!) 118/56    Pulse: 97 89 84 80   Resp:    20   Temp:    97.5 °F (36.4 °C)   TempSrc:    Axillary   SpO2: 96% 97% 97% 96%   Weight:       Height:            Physical Exam    Nursing note and vitals reviewed.  Constitutional: She appears well-developed and well-nourished. No distress.   HENT:   Head: Normocephalic and atraumatic.   Right Ear: External ear normal.   Left Ear: External ear normal.   Nose: Nose normal.   Mouth/Throat: Oropharynx is clear and moist.   Eyes: Conjunctivae and EOM are normal. Pupils are equal, round, and reactive to light.   Neck: Neck supple.   Normal range of motion.  Cardiovascular:  Normal rate, regular rhythm, normal heart sounds and intact distal pulses.           Pulmonary/Chest: No accessory muscle usage or stridor. Tachypnea noted. No respiratory distress. She has decreased breath sounds. She has wheezes. She has no rhonchi. She has no rales.   Abdominal: Abdomen is soft. Bowel sounds are normal. There is no abdominal tenderness.   Musculoskeletal:          General: No tenderness or edema. Normal range of motion.      Cervical back: Normal range of motion and neck supple.     Neurological: She is alert and oriented to person, place, and time. She has normal strength. No cranial nerve deficit or sensory deficit.   Skin: Skin is warm and dry. No rash noted.   Psychiatric: She has a normal mood and affect. Thought content normal.         Lab Results- Independently reviewed by myself      Labs Reviewed   CBC W/ AUTO DIFFERENTIAL - Abnormal; Notable for the following components:       Result Value    MCHC 30.2 (*)     All other components within normal limits   COMPREHENSIVE METABOLIC PANEL - Abnormal; Notable for the following components:    Sodium 146 (*)     CO2 40 (*)     Glucose 113 (*)     Albumin 3.4 (*)     All other components within normal limits   CBC W/ AUTO DIFFERENTIAL - Abnormal; Notable for the following components:    MCHC 29.2 (*)     Gran # (ANC) 9.3 (*)     Lymph # 0.3 (*)     Mono # 0.1 (*)     Gran % 95.3 (*)     Lymph % 2.9 (*)     Mono % 1.1 (*)     All other components within normal limits   BASIC METABOLIC PANEL - Abnormal; Notable for the following components:    CO2 37 (*)     Glucose 185 (*)     All other components within normal limits   CULTURE, RESPIRATORY   CULTURE, RESPIRATORY   TROPONIN I   B-TYPE NATRIURETIC PEPTIDE           Imaging     Imaging Results              X-Ray Chest AP Portable (Final result)  Result time 03/29/24 23:40:49      Final result by Sridevi Sevilla MD (03/29/24 23:40:49)                   Impression:      Blunting of the left costophrenic angle, which may suggest pleural thickening and/or small pleural fluid.  A similar appearance was seen on the previous exam.      Electronically signed by: Sridevi Sevilla  Date:    03/29/2024  Time:    23:40               Narrative:    EXAMINATION:  AP PORTABLE CHEST    CLINICAL HISTORY:  Asthma;    TECHNIQUE:  AP portable chest radiograph was  submitted.    COMPARISON:  12/31/2023    FINDINGS:  AP portable chest radiograph demonstrates a cardiac silhouette within normal limits.  Vascular calcification is seen at the aortic knob.  There is blunting of the left costophrenic angle, which may suggest pleural thickening or small pleural fluid.  No pneumothorax is detected.  There is diffuse idiopathic skeletal hyperostosis.  The bones are diffusely osteopenic.                                    X-Rays:   Independently Interpreted Readings:   Chest X-Ray: No infiltrates.               ED Course         Critical Care    Date/Time: 3/30/2024 5:01 AM    Performed by: Slick Dunn MD  Authorized by: Slick Dunn MD  Direct patient critical care time: 9 minutes  Additional history critical care time: 7 minutes  Ordering / reviewing critical care time: 7 minutes  Documentation critical care time: 8 minutes  Consulting other physicians critical care time: 7 minutes  Total critical care time (exclusive of procedural time) : 38 minutes  Critical care time was exclusive of separately billable procedures and treating other patients and teaching time.  Critical care was necessary to treat or prevent imminent or life-threatening deterioration of the following conditions: respiratory failure.  Critical care was time spent personally by me on the following activities: blood draw for specimens, discussions with consultants, interpretation of cardiac output measurements, evaluation of patient's response to treatment, examination of patient, obtaining history from patient or surrogate, ordering and review of laboratory studies, ordering and performing treatments and interventions, ordering and review of radiographic studies, pulse oximetry, re-evaluation of patient's condition and review of old charts.               Orders Placed This Encounter    Critical Care    Culture, Respiratory    Culture, Respiratory with Gram Stain    X-Ray Chest AP Portable    CBC auto  differential    Comprehensive metabolic panel    Troponin I    Brain natriuretic peptide    CBC auto differential    Basic metabolic panel    Diet NPO    Cardiac Monitoring - Adult    Bladder scan    Recheck Blood Glucose:    Tobacco cessation education    Place sequential compression device    Full code    Inpatient consult to Respiratory Care    Oxygen Continuous    Inhalation Treatment Q5 Min    POCT Venous Blood Gas    POCT Venous Blood Gas    Inhalation Treatment Once    Inhalation Treatment Once    Bipap Continuous    POCT Venous Blood Gas    POCT Venous Blood Gas    Pulse Oximetry Q4H    POCT Venous Blood Gas    EKG 12-lead    EKG 12-lead    Saline lock IV    Saline lock IV    Possible Hospitalization    Admit to Inpatient    albuterol-ipratropium 2.5 mg-0.5 mg/3 mL nebulizer solution 3 mL    dexAMETHasone injection 8 mg    ketorolac injection 15 mg    albuterol sulfate nebulizer solution 10 mg    ipratropium 0.02 % nebulizer solution 0.5 mg    albuterol inhaler 2 puff    aspirin chewable tablet 81 mg    budesonide-glycopyr-formoterol 160-9-4.8 mcg/actuation HFAA 2 puff    diazePAM tablet 2 mg    losartan tablet 50 mg    roflumilast (DALIRESP) tablet 500 mcg    trazodone split tablet 50 mg    sodium chloride 0.9% flush 10 mL    naloxone 0.4 mg/mL injection 0.02 mg    glucose chewable tablet 16 g    glucose chewable tablet 24 g    glucagon (human recombinant) injection 1 mg    sodium chloride 0.9% flush 10 mL    enoxaparin injection 40 mg    acetaminophen tablet 650 mg    dextrose 10% bolus 125 mL 125 mL    dextrose 10% bolus 250 mL 250 mL    dextromethorphan-guaiFENesin  mg per 12 hr tablet 1 tablet    dicyclomine tablet 20 mg    fluticasone propionate 50 mcg/actuation nasal spray 50 mcg    predniSONE tablet 20 mg    IP VTE HIGH RISK PATIENT    Progressive Mobility Protocol (mobilize patient to their highest level of functioning at least twice daily)    Progressive Mobility Protocol (mobilize patient to  their highest level of functioning at least twice daily)          ED Course as of 03/30/24 0505   Fri Mar 29, 2024   2259 EKG 12-lead  Independent Interpretation of EKG:  Rhythm: Normal Sinus  Rate: 91  Axis: Normal  QTC: 425  No STEMI   [KB]   2317 CBC auto differential(!)  CBC: No significant anemia, platelet disorder, or leukocytosis.   [KB]   2323 POC PCO2(!): 103  Expected pCO2 86.7 per Winter's formula [KB]      ED Course User Index  [KB] Slick Dunn MD              Medical Decision Making       The patient's list of active medical problems, social history, medications, and allergies as documented per RN staff has been reviewed.     Comorbidities taken into consideration for development of diagnosis and treatment plan include COPD, HTN, and Tobacco Abuse.      Medical Decision Making  68-year-old female presenting with acute on chronic hypercapnic respiratory failure.  Albuterol intensification followed by continuous albuterol plus BiPAP therapy did improve CO2 retention somewhat but not back to return baseline.  Given this have discussed case with Hospital Medicine who will continue patient's evaluation and management.    Amount and/or Complexity of Data Reviewed  External Data Reviewed: labs, radiology and notes.  Labs: ordered. Decision-making details documented in ED Course.  Radiology: ordered and independent interpretation performed.  ECG/medicine tests: ordered and independent interpretation performed. Decision-making details documented in ED Course.    Risk  Prescription drug management.  Decision regarding hospitalization.      Medical Decision Making:   Differential Diagnosis:   Viral syndrome, pneumonia, COPD exacerbation, hypercapnia, acute on chronic hypercapnic respiratory failure                   Clinical Impression         Disposition   ED Disposition Condition    Admit Fair            Diagnosis    ICD-10-CM ICD-9-CM   1. Acute exacerbation of chronic obstructive pulmonary disease (COPD)   J44.1 491.21   2. SOB (shortness of breath)  R06.02 786.05   3. Chest pain  R07.9 786.50   4. Acute on chronic respiratory failure with hypercapnia  J96.22 518.84           Slick Dunn MD        03/30/2024          DISCLAIMER: This note was prepared with [employer] voice recognition transcription software. Garbled syntax, mangled pronouns, and other bizarre constructions may be attributed to that software system.       Slick Dunn MD  03/30/24 9675

## 2024-03-30 NOTE — PROGRESS NOTES
"VIRTUAL NURSE: Cued into patient's room. Permission received per patient to turn camera to view patient. Introduced as VN that will be working with floor nurse this shift. Educated the patient on VN's role in patient care. Plan of care reviewed with patient. Informed patient that staff will round on them every 2 hours but to use call light for any other needs they may have. Also informed of fall risk and fall precautions. Patient verbalized understanding. Call light within reach; bed siderails up x2. Opportunity given for questions and questions answered. Admission assessment questions completed. Patient denies complaints or any needs at this time.   03/30/24 1100   Admission   Initial VN Admission Questions Complete   Communication Issues? None   Shift   Virtual Nurse - Patient Verbalized Approval Of Camera Use   Safety/Activity   Patient Rounds bed in low position;placement of personal items at bedside;call light in patient/parent reach;visualized patient   Safety Promotion/Fall Prevention Fall Risk reviewed with patient/family;instructed to call staff for mobility;assistive device/personal item within reach;side rails raised x 2;medications reviewed   Positioning   Body Position position changed independently   Pain/Comfort/Sleep   Preferred Pain Scale number (Numeric Rating Pain Scale)   Pain Body Location head   Pain Rating (0-10): Rest 2   Quality aching  (states "it's slowly going away")         "

## 2024-03-30 NOTE — TREATMENT PLAN
Seen and examined.  Currently improved respiratory function this morning, speaking in full sentences without distress on nasal cannula oxygen.  Reports some increase in cough and shortness of breath prior to admission.  Will continue COPD pathway with steroids, nebulizers, and antibiotic.  She is bring her home Breztri inhaler and will continue while in house.  Now stable for the floor given significant improvement; does need nocturnal NIPPV.    Tirso Valenzuela MD  [facility] Medicine

## 2024-03-30 NOTE — H&P
[facility] Medicine  History & Physical    Patient Name: Terri Phelan  MRN: [id number]  Patient Class: IP- Inpatient  Admission Date: 3/29/2024  Attending Physician: Tirso Valenzuela,*   Primary Care Provider: Jaimie Reynoso MD         Patient information was obtained from relative(s), past medical records, and ER records.     Subjective:     Principal Problem:<principal problem not specified>    Chief Complaint:   Chief Complaint   Patient presents with    Shortness of Breath     Reports headache and increased SOB for the last week. States she believes CO2 is elevated which was cause of headache in past. Normal oxygen level is 88-91% on 2 L NC. Denies chest pain. No medicine taken today for headache.        HPI: 68 year old female obese with a PMHx of COPD  with chronic hypercapnic respiratory failure on home O2 2L NC baseline and BIPAP dependent, HTN presented to the ER for notion of headache, SOB, mild dizziness/confusion. Per patient was in her usual state of health and has been compliant with BIPAP regimen and meds, today she started to have persistent headache, associated with worsening SOB, since whenever she felt like she knew it's because of her CO2 high, then she became more somnolent, she has decided to bring her to the ER. In the ER, her VBG showed CO2 103, PH 7.27. labs with Na+ 146, CO2 40. CXR no new acute changes, small pleural effusion. Denies CP, no palpitation, no PND, no N/V, nor diarrhea or constipation. She has been admitted for acute on chronic hypercapnic/hypoxic respiratory failure.     Past Medical History:   Diagnosis Date    COPD (chronic obstructive pulmonary disease)     Hypertension        Past Surgical History:   Procedure Laterality Date    BREAST BIOPSY N/A     pt unsure which breast but it was benign-(calcium)     SECTION      HERNIA REPAIR      HYSTERECTOMY         Review of patient's allergies indicates:   Allergen Reactions    Codeine  Nausea And Vomiting    Lipitor [atorvastatin] Other (See Comments)     Muscle fatigue      Morphine Hallucinations       No current facility-administered medications on file prior to encounter.     Current Outpatient Medications on File Prior to Encounter   Medication Sig    albuterol (PROVENTIL/VENTOLIN HFA) 90 mcg/actuation inhaler Inhale 2 puffs into the lungs every 6 (six) hours as needed for Wheezing or Shortness of Breath.    alendronate (FOSAMAX) 35 MG tablet Take 35 mg by mouth every Tuesday.    aspirin 81 MG Chew Take 1 tablet by mouth once daily.    azithromycin (Z-OLY) 250 MG tablet Take 1 tablet (250 mg total) by mouth every Mon, Wed, Fri.    budesonide-glycopyr-formoterol (BREZTRI AEROSPHERE) 160-9-4.8 mcg/actuation HFAA Inhale 2 puffs into the lungs 2 (two) times daily.    calcium carb-mag hydrox-simeth 1,200 mg-270 mg -80 mg/10 mL Susp Take 1,200 mg by mouth once daily.    cholecalciferol, vitamin D3, (VITAMIN D3) 50 mcg (2,000 unit) Tab Take 2,000 Units by mouth once daily.    dextromethorphan-guaiFENesin  mg (MUCINEX DM)  mg per 12 hr tablet Take 1 tablet by mouth every 12 (twelve) hours.    diazePAM (VALIUM) 2 MG tablet Take 1 tablet (2 mg total) by mouth daily as needed for Anxiety.    dicyclomine (BENTYL) 20 mg tablet Take 1 tablet (20 mg total) by mouth 3 (three) times daily as needed (abdominal pain).    fluticasone propionate (FLONASE) 50 mcg/actuation nasal spray 1 spray (50 mcg total) by Each Nostril route daily as needed for Allergies.    furosemide (LASIX) 40 MG tablet Take 1 tablet (40 mg total) by mouth once daily.    hydrOXYzine (ATARAX) 50 MG tablet Take 50 mg by mouth 3 (three) times daily as needed.    ibuprofen (ADVIL,MOTRIN) 800 MG tablet Take 1 tablet (800 mg total) by mouth 3 (three) times daily as needed for Pain.    losartan (COZAAR) 50 MG tablet Take 1 tablet (50 mg total) by mouth once daily.    OXYGEN-AIR DELIVERY SYSTEMS MISC 2 L by Nasal route continuous.     potassium chloride (MICRO-K) 10 MEQ CpSR Take 10 mEq by mouth once daily.    predniSONE (DELTASONE) 20 MG tablet Take 2 tablets by mouth once daily.    roflumilast (DALIRESP) 500 mcg Tab Take 500 mcg by mouth once daily.    traZODone (DESYREL) 50 MG tablet Take 1 tablet (50 mg total) by mouth nightly as needed for Insomnia.     Family History    None       Tobacco Use    Smoking status: Former     Current packs/day: 0.00     Average packs/day: 1 pack/day for 20.0 years (20.0 ttl pk-yrs)     Types: Cigarettes     Quit date: 1/1/2021     Years since quitting: 3.2     Passive exposure: Past    Smokeless tobacco: Never   Substance and Sexual Activity    Alcohol use: Not Currently     Alcohol/week: 1.0 standard drink of alcohol     Types: 1 Glasses of wine per week    Drug use: No    Sexual activity: Not Currently     Review of Systems   Constitutional:  Positive for activity change and fatigue. Negative for appetite change, chills and fever.   HENT:  Positive for congestion.    Respiratory:  Positive for shortness of breath. Negative for cough, chest tightness and wheezing.    Cardiovascular:  Negative for chest pain, palpitations and leg swelling.   Gastrointestinal:  Negative for abdominal distention, abdominal pain, nausea and vomiting.   Musculoskeletal:  Negative for arthralgias, neck pain and neck stiffness.   Neurological:  Positive for headaches. Negative for dizziness, seizures, syncope, speech difficulty and weakness.   Psychiatric/Behavioral:  Negative for agitation, confusion and hallucinations.    All other systems reviewed and are negative.    Objective:     Vital Signs (Most Recent):  Temp: 98.4 °F (36.9 °C) (03/29/24 2230)  Pulse: 97 (03/30/24 0225)  Resp: (!) 25 (03/30/24 0000)  BP: 139/62 (03/30/24 0200)  SpO2: 96 % (03/30/24 0225) Vital Signs (24h Range):  Temp:  [98.4 °F (36.9 °C)] 98.4 °F (36.9 °C)  Pulse:  [] 97  Resp:  [20-25] 25  SpO2:  [88 %-97 %] 96 %  BP: (128-156)/(60-67) 139/62  "    Weight: 82.6 kg (182 lb)  Body mass index is 29.38 kg/m².     Physical Exam  Vitals and nursing note reviewed.   Constitutional:       General: She is in acute distress.      Appearance: She is ill-appearing. She is not toxic-appearing.   HENT:      Head: Normocephalic and atraumatic.      Nose: No congestion.      Mouth/Throat:      Mouth: Mucous membranes are moist.      Pharynx: No oropharyngeal exudate.   Eyes:      Extraocular Movements: Extraocular movements intact.      Pupils: Pupils are equal, round, and reactive to light.   Cardiovascular:      Rate and Rhythm: Normal rate. Rhythm irregular.      Heart sounds: No murmur heard.     No friction rub. No gallop.   Pulmonary:      Effort: Respiratory distress present.      Breath sounds: Rhonchi present. No rales.   Chest:      Chest wall: No tenderness.   Abdominal:      General: Bowel sounds are normal. There is no distension.      Palpations: Abdomen is soft.      Tenderness: There is no abdominal tenderness. There is no guarding or rebound.   Musculoskeletal:         General: No swelling or tenderness.      Cervical back: No rigidity or tenderness.      Right lower leg: No edema.      Left lower leg: No edema.   Skin:     Findings: No erythema or rash.   Neurological:      General: No focal deficit present.      Mental Status: She is alert and oriented to person, place, and time.      Cranial Nerves: No cranial nerve deficit.      Motor: Weakness present.      Coordination: Coordination normal.   Psychiatric:         Thought Content: Thought content normal.              CRANIAL NERVES     CN III, IV, VI   Pupils are equal, round, and reactive to light.       Significant Labs: All pertinent labs within the past 24 hours have been reviewed.  Blood Culture: No results for input(s): "LABBLOO" in the last 48 hours.  BMP:   Recent Labs   Lab 03/29/24  2308   *   *   K 5.0   CL 97   CO2 40*   BUN 10   CREATININE 0.8   CALCIUM 9.2     CBC:   Recent " "Labs   Lab 03/29/24 2308   WBC 8.00   HGB 13.6   HCT 45.1        CMP:   Recent Labs   Lab 03/29/24 2308   *   K 5.0   CL 97   CO2 40*   *   BUN 10   CREATININE 0.8   CALCIUM 9.2   PROT 7.4   ALBUMIN 3.4*   BILITOT 0.2   ALKPHOS 55   AST 25   ALT 17   ANIONGAP 9     Cardiac Markers:   Recent Labs   Lab 03/29/24 2308   BNP 15     Respiratory Culture: No results for input(s): "GSRESP", "RESPIRATORYC" in the last 48 hours.  Troponin:   Recent Labs   Lab 03/29/24 2308   TROPONINI <0.006     TSH: No results for input(s): "TSH" in the last 4320 hours.  Urine Culture: No results for input(s): "LABURIN" in the last 48 hours.  Urine Studies: No results for input(s): "COLORU", "APPEARANCEUA", "PHUR", "SPECGRAV", "PROTEINUA", "GLUCUA", "KETONESU", "BILIRUBINUA", "OCCULTUA", "NITRITE", "UROBILINOGEN", "LEUKOCYTESUR", "RBCUA", "WBCUA", "BACTERIA", "SQUAMEPITHEL", "HYALINECASTS" in the last 48 hours.    Invalid input(s): "WRIGHTSUR"  Recent Lab Results         03/30/24  0121   03/29/24 2318   03/29/24 2308        Base Deficit 14.5  Comment: Value above reference range   15.7  Comment: Value above reference range         Performed By: [id number]   [id number]         Specimen source Venous   Venous         Albumin     3.4       ALP     55       ALT     17       Anion Gap     9       AST     25       Baso #     0.03       Basophil %     0.4       BILIRUBIN TOTAL     0.2  Comment: For infants and newborns, interpretation of results should be based  on gestational age, weight and in agreement with clinical  observations.    Premature Infant recommended reference ranges:  Up to 24 hours.............<8.0 mg/dL  Up to 48 hours............<12.0 mg/dL  3-5 days..................<15.0 mg/dL  6-29 days.................<15.0 mg/dL         BNP     15  Comment: Values of less than 100 pg/ml are consistent with non-CHF populations.       BUN     10       Calcium     9.2       Chloride     97       CO2     40       Creatinine   "   0.8       Differential Method     Automated       eGFR     >60       Eos #     0.2       Eos %     2.5       FiO2 40.0   28.0         Glucose     113       Gran # (ANC)     4.3       Gran %     53.2       Hematocrit     45.1       Hemoglobin     13.6       Immature Grans (Abs)     0.03  Comment: Mild elevation in immature granulocytes is non specific and   can be seen in a variety of conditions including stress response,   acute inflammation, trauma and pregnancy. Correlation with other   laboratory and clinical findings is essential.         Immature Granulocytes     0.4       LPM   2.0         Lymph #     2.5       Lymph %     31.5       MCH     28.3       MCHC     30.2       MCV     94       Mono #     1.0       Mono %     12.0       MPV     11.4       nRBC     0       PEEP 6.0           Platelet Count     190       POC HCO3 45.4  Comment: Value above reference range   47.8  Comment: Value above reference range         POC PCO2 92.9  Comment: Value above reference range   103  Comment: Value above reference range         POC PH 7.297  Comment: Value below reference range   7.277  Comment:  notified  at    read back  Value below critical limit           POC PO2 43.5   24.8  Comment:  notified  at    read back  Value below critical limit           POC SATURATED O2 75.7  Comment:  notified  at    read back  Value below critical limit     40.9  Comment:  notified  at    read back  Value below critical limit           POC Set RR 14.0           Potassium     5.0  Comment: Specimen moderately hemolyzed       PROTEIN TOTAL     7.4  Comment: Specimen moderately hemolyzed       RBC     4.81       RDW     13.5       Sodium     146       Troponin I     <0.006  Comment: The reference interval for Troponin I represents the 99th percentile   cutoff   for our facility and is consistent with 3rd generation assay   performance.         WBC     8.00               Significant Imaging: I have reviewed all pertinent imaging  results/findings within the past 24 hours.  Assessment/Plan:     Chronic respiratory failure with hypoxia and hypercapnia  Patient with Hypercapnic Respiratory failure which is Acute on chronic.  she is on home oxygen at 2 LPM. Supplemental oxygen was provided and noted- Oxygen Concentration (%):  [40] 40    .   Signs/symptoms of respiratory failure include- tachypnea, respiratory distress, and lethargy. Contributing diagnoses includes - COPD and Pleural effusion Labs and images were reviewed. Patient Has recent ABG, which has been reviewed. Will treat underlying causes and adjust management of respiratory failure as follows- cont BIPAP and duoneb    Acute exacerbation of chronic obstructive pulmonary disease (COPD)  Patient's COPD is with exacerbation noted by continued dyspnea and worsening of baseline hypoxia currently.  Patient is currently off COPD Pathway. Continue scheduled inhalers Steroids and Supplemental oxygen and monitor respiratory status closely.     Current chronic use of systemic steroids  Cont prednisone      Obstructive sleep apnea syndrome  Cont resp care  CPAP/BIPAP continuous      HTN (hypertension)  Chronic, controlled. Latest blood pressure and vitals reviewed-     Temp:  [98.4 °F (36.9 °C)]   Pulse:  []   Resp:  [20-25]   BP: (128-156)/(60-67)   SpO2:  [88 %-97 %] .   Home meds for hypertension were reviewed and noted below.   Hypertension Medications               furosemide (LASIX) 40 MG tablet Take 1 tablet (40 mg total) by mouth once daily.    losartan (COZAAR) 50 MG tablet Take 1 tablet (50 mg total) by mouth once daily.            While in the hospital, will manage blood pressure as follows; Continue home antihypertensive regimen    Will utilize p.r.n. blood pressure medication only if patient's blood pressure greater than 180/110 and she develops symptoms such as worsening chest pain or shortness of breath.      VTE Risk Mitigation (From admission, onward)           Ordered      enoxaparin injection 40 mg  Daily         03/30/24 0146     IP VTE HIGH RISK PATIENT  Once         03/30/24 0146     Place sequential compression device  Until discontinued         03/30/24 0146                     Time spent > 40 min               Elliot Brambila MD  Department of Hospital Medicine  [place] - Emergency Dept

## 2024-03-30 NOTE — ED NOTES
Pt sleeping in NAD with even unlabored resp, bipap in place.  Dr notified via secure chat of recent VBG and bipap setting changes.  Awaiting possible downgrade from ICU to floor admission.  Vss per monitor

## 2024-03-30 NOTE — PHARMACY MED REC
"Admission Medication History     The home medication history was taken by Irene Fischer CPhT.    Medication history obtained from, Patient Verified    You may go to "Admission" then "Reconcile Home Medications" tabs to review and/or act upon these items.     The home medication list has been updated by the Pharmacy department.   Please read ALL comments highlighted in yellow.   Please address this information as you see fit.    Feel free to contact us if you have any questions or require assistance.      The medications listed below were removed from the home medication list.  Please reorder if appropriate:  Patient reports no longer taking the following medication(s):  Prednisone 20 mg        Irene Fischer CPhT.  Ext [phone]               .          "

## 2024-03-30 NOTE — ED NOTES
Messaged MD Valenzuela, patient is asking about her lasix and potassium supplements, which are not ordered. Also seeing if she can be downgraded. MD will be down soon to see patient. Patient vitals stable. Patient not in distress on nasal cannula.

## 2024-03-31 VITALS
DIASTOLIC BLOOD PRESSURE: 55 MMHG | TEMPERATURE: 98 F | WEIGHT: 186.94 LBS | SYSTOLIC BLOOD PRESSURE: 134 MMHG | OXYGEN SATURATION: 92 % | HEART RATE: 84 BPM | RESPIRATION RATE: 19 BRPM | BODY MASS INDEX: 30.04 KG/M2 | HEIGHT: 66 IN

## 2024-03-31 LAB
ANION GAP SERPL CALC-SCNC: 8 MMOL/L (ref 8–16)
BASOPHILS # BLD AUTO: 0.02 K/UL (ref 0–0.2)
BASOPHILS NFR BLD: 0.1 % (ref 0–1.9)
BUN SERPL-MCNC: 20 MG/DL (ref 8–23)
CALCIUM SERPL-MCNC: 9.2 MG/DL (ref 8.7–10.5)
CHLORIDE SERPL-SCNC: 101 MMOL/L (ref 95–110)
CO2 SERPL-SCNC: 35 MMOL/L (ref 23–29)
CREAT SERPL-MCNC: 0.7 MG/DL (ref 0.5–1.4)
DIFFERENTIAL METHOD BLD: ABNORMAL
EOSINOPHIL # BLD AUTO: 0 K/UL (ref 0–0.5)
EOSINOPHIL NFR BLD: 0.1 % (ref 0–8)
ERYTHROCYTE [DISTWIDTH] IN BLOOD BY AUTOMATED COUNT: 13.5 % (ref 11.5–14.5)
EST. GFR  (NO RACE VARIABLE): >60 ML/MIN/1.73 M^2
GLUCOSE SERPL-MCNC: 99 MG/DL (ref 70–110)
HCT VFR BLD AUTO: 42.2 % (ref 37–48.5)
HGB BLD-MCNC: 12.5 G/DL (ref 12–16)
IMM GRANULOCYTES # BLD AUTO: 0.04 K/UL (ref 0–0.04)
IMM GRANULOCYTES NFR BLD AUTO: 0.3 % (ref 0–0.5)
LYMPHOCYTES # BLD AUTO: 1.4 K/UL (ref 1–4.8)
LYMPHOCYTES NFR BLD: 10 % (ref 18–48)
MCH RBC QN AUTO: 27.8 PG (ref 27–31)
MCHC RBC AUTO-ENTMCNC: 29.6 G/DL (ref 32–36)
MCV RBC AUTO: 94 FL (ref 82–98)
MONOCYTES # BLD AUTO: 1.3 K/UL (ref 0.3–1)
MONOCYTES NFR BLD: 9 % (ref 4–15)
NEUTROPHILS # BLD AUTO: 11.2 K/UL (ref 1.8–7.7)
NEUTROPHILS NFR BLD: 80.5 % (ref 38–73)
NRBC BLD-RTO: 0 /100 WBC
PLATELET # BLD AUTO: 170 K/UL (ref 150–450)
PMV BLD AUTO: 11.6 FL (ref 9.2–12.9)
POTASSIUM SERPL-SCNC: 4.6 MMOL/L (ref 3.5–5.1)
RBC # BLD AUTO: 4.49 M/UL (ref 4–5.4)
SODIUM SERPL-SCNC: 144 MMOL/L (ref 136–145)
WBC # BLD AUTO: 13.95 K/UL (ref 3.9–12.7)

## 2024-03-31 PROCEDURE — 94761 N-INVAS EAR/PLS OXIMETRY MLT: CPT

## 2024-03-31 PROCEDURE — 27000221 HC OXYGEN, UP TO 24 HOURS

## 2024-03-31 PROCEDURE — 25000003 PHARM REV CODE 250: Performed by: NURSE PRACTITIONER

## 2024-03-31 PROCEDURE — 85025 COMPLETE CBC W/AUTO DIFF WBC: CPT | Performed by: FAMILY MEDICINE

## 2024-03-31 PROCEDURE — 36415 COLL VENOUS BLD VENIPUNCTURE: CPT | Performed by: FAMILY MEDICINE

## 2024-03-31 PROCEDURE — 99900035 HC TECH TIME PER 15 MIN (STAT)

## 2024-03-31 PROCEDURE — 25000242 PHARM REV CODE 250 ALT 637 W/ HCPCS: Performed by: HOSPITALIST

## 2024-03-31 PROCEDURE — 94640 AIRWAY INHALATION TREATMENT: CPT

## 2024-03-31 PROCEDURE — 25000242 PHARM REV CODE 250 ALT 637 W/ HCPCS: Performed by: FAMILY MEDICINE

## 2024-03-31 PROCEDURE — 94660 CPAP INITIATION&MGMT: CPT

## 2024-03-31 PROCEDURE — 25000003 PHARM REV CODE 250: Performed by: HOSPITALIST

## 2024-03-31 PROCEDURE — 80048 BASIC METABOLIC PNL TOTAL CA: CPT | Performed by: FAMILY MEDICINE

## 2024-03-31 PROCEDURE — 25000003 PHARM REV CODE 250: Performed by: FAMILY MEDICINE

## 2024-03-31 PROCEDURE — 63600175 PHARM REV CODE 636 W HCPCS: Performed by: HOSPITALIST

## 2024-03-31 RX ORDER — PREDNISONE 20 MG/1
40 TABLET ORAL DAILY
Qty: 6 TABLET | Refills: 0 | Status: SHIPPED | OUTPATIENT
Start: 2024-04-01 | End: 2024-04-04

## 2024-03-31 RX ORDER — DIAZEPAM 2 MG/1
2 TABLET ORAL ONCE
Status: COMPLETED | OUTPATIENT
Start: 2024-03-31 | End: 2024-03-31

## 2024-03-31 RX ADMIN — FLUTICASONE PROPIONATE 50 MCG: 50 SPRAY, METERED NASAL at 08:03

## 2024-03-31 RX ADMIN — GUAIFENESIN AND DEXTROMETHORPHAN HYDROBROMIDE 1 TABLET: 600; 30 TABLET, EXTENDED RELEASE ORAL at 08:03

## 2024-03-31 RX ADMIN — LOSARTAN POTASSIUM 50 MG: 50 TABLET, FILM COATED ORAL at 08:03

## 2024-03-31 RX ADMIN — DIAZEPAM 2 MG: 2 TABLET ORAL at 12:03

## 2024-03-31 RX ADMIN — ROFLUMILAST 500 MCG: 500 TABLET ORAL at 08:03

## 2024-03-31 RX ADMIN — IPRATROPIUM BROMIDE AND ALBUTEROL SULFATE 3 ML: 2.5; .5 SOLUTION RESPIRATORY (INHALATION) at 07:03

## 2024-03-31 RX ADMIN — ASPIRIN 81 MG CHEWABLE TABLET 81 MG: 81 TABLET CHEWABLE at 08:03

## 2024-03-31 RX ADMIN — BUDESONIDE, GLYCOPYRROLATE, AND FORMOTEROL FUMARATE 2 PUFF: 160; 9; 4.8 AEROSOL, METERED RESPIRATORY (INHALATION) at 07:03

## 2024-03-31 RX ADMIN — POTASSIUM CHLORIDE 10 MEQ: 750 TABLET, EXTENDED RELEASE ORAL at 08:03

## 2024-03-31 RX ADMIN — FUROSEMIDE 40 MG: 40 TABLET ORAL at 08:03

## 2024-03-31 RX ADMIN — PREDNISONE 40 MG: 20 TABLET ORAL at 08:03

## 2024-03-31 RX ADMIN — AZITHROMYCIN MONOHYDRATE 500 MG: 500 INJECTION, POWDER, LYOPHILIZED, FOR SOLUTION INTRAVENOUS at 08:03

## 2024-03-31 NOTE — PROGRESS NOTES
Virtual Nurse:Discharge orders noted; additional clinical references attached.  and pharmacy tech notified.  Patient's discharge instruction packet given by bedside RN.    Cued into patient's room.  Permission received per patient to turn camera to view patient.  Introduced as VN that will be instructing on discharge instructions.  Educated patient on reason for admission; medications to hold, continue, and start, appointment to follow-up with doctor, and when to return to ED. Teach back method used. Verbalized understanding  Number given for 24/7 Nurse Line. Opportunity given for questions and questions answered.  Bedside nurse updated.        03/31/24 1254   Shift   Virtual Nurse - Patient Verbalized Approval Of Camera Use;VN Rounding   Type of Frequent Check   Type Patient Rounds   Safety/Activity   Patient Rounds visualized patient

## 2024-03-31 NOTE — HOSPITAL COURSE
"Ms. Phelan presents with acute on chronic hypercapnic and hypoxemic respiratory failure due to acute COPD exacerbation.  Initially required BiPAP for several hours in the ED; started on COPD pathway with steroids, nebulizers, and antibiotics.  She was weaned off BiPAP to nasal cannula oxygen and has had significant improvement in her respiratory status.  Now appears to be back to her respiratory baseline.  Discussed the pathophysiology of her severe disease with the patient and her daughter over the phone at bedside today.  She would certainly benefit from pulmonary rehab at discharge and is compliant with her home BiPAP at night and with naps.  Discussed potentially using a fan to help with dyspnea symptoms at home during the day.    BP (!) 109/57   Pulse 91   Temp 98.2 °F (36.8 °C)   Resp 18   Ht 5' 6" (1.676 m)   Wt 84.8 kg (186 lb 15.2 oz)   SpO2 98%   Breastfeeding No   BMI 30.17 kg/m²   Physical Exam  Vitals and nursing note reviewed.   Constitutional:       Appearance: She is chronically ill-appearing. She is not toxic-appearing.   HENT:      Head: Normocephalic and atraumatic.      Nose: No congestion.      Mouth/Throat:      Mouth: Mucous membranes are moist.      Pharynx: No oropharyngeal exudate.   Eyes:      Extraocular Movements: Extraocular movements intact.      Pupils: Pupils are equal, round, and reactive to light.   Cardiovascular:      Rate and Rhythm: Normal rate. Rhythm irregular.      Heart sounds: No murmur heard.     No friction rub. No gallop.   Pulmonary:      Effort:  No respiratory distress  Chest:      Chest wall: No tenderness.   Abdominal:      General: Bowel sounds are normal. There is no distension.      Palpations: Abdomen is soft.      Tenderness: There is no abdominal tenderness. There is no guarding or rebound.   Musculoskeletal:         General: No swelling or tenderness.      Cervical back: No rigidity or tenderness.      Right lower leg: No edema.      Left lower " leg: No edema.   Skin:     Findings: No erythema or rash.   Neurological:      General: No focal deficit present.      Mental Status: She is alert and oriented to person, place, and time.      Cranial Nerves: No cranial nerve deficit.      Motor: Weakness present.      Coordination: Coordination normal.   Psychiatric:         Thought Content: Thought content normal.                  CRANIAL NERVES      CN III, IV, VI   Pupils are equal, round, and reactive to light.

## 2024-03-31 NOTE — DISCHARGE SUMMARY
[facility] Medicine  Discharge Summary      Patient Name: Terri Phlean  MRN: [id number]  SUNDEEP: [id number]  Patient Class: IP- Inpatient  Admission Date: 3/29/2024  Hospital Length of Stay: 1 days  Discharge Date and Time:  03/31/2024 11:16 AM  Attending Physician: Tirso Valenzuela.,*   Discharging Provider: Tirso Valenzuela MD  Primary Care Provider: Jaimie Reynoso MD    Primary Care Team: Networked reference to record PCT     HPI:   68 year old female obese with a PMHx of COPD  with chronic hypercapnic respiratory failure on home O2 2L NC baseline and BIPAP dependent, HTN presented to the ER for notion of headache, SOB, mild dizziness/confusion. Per patient was in her usual state of health and has been compliant with BIPAP regimen and meds, today she started to have persistent headache, associated with worsening SOB, since whenever she felt like she knew it's because of her CO2 high, then she became more somnolent, she has decided to bring her to the ER. In the ER, her VBG showed CO2 103, PH 7.27. labs with Na+ 146, CO2 40. CXR no new acute changes, small pleural effusion. Denies CP, no palpitation, no PND, no N/V, nor diarrhea or constipation. She has been admitted for acute on chronic hypercapnic/hypoxic respiratory failure.     * No surgery found *      Hospital Course:   Ms. Phelan presents with acute on chronic hypercapnic and hypoxemic respiratory failure due to acute COPD exacerbation.  Initially required BiPAP for several hours in the ED; started on COPD pathway with steroids, nebulizers, and antibiotics.  She was weaned off BiPAP to nasal cannula oxygen and has had significant improvement in her respiratory status.  Now appears to be back to her respiratory baseline.  Discussed the pathophysiology of her severe disease with the patient and her daughter over the phone at bedside today.  She would certainly benefit from pulmonary rehab at discharge and is compliant with  "her home BiPAP at night and with naps.  Discussed potentially using a fan to help with dyspnea symptoms at home during the day.    BP (!) 109/57   Pulse 91   Temp 98.2 °F (36.8 °C)   Resp 18   Ht 5' 6" (1.676 m)   Wt 84.8 kg (186 lb 15.2 oz)   SpO2 98%   Breastfeeding No   BMI 30.17 kg/m²   Physical Exam  Vitals and nursing note reviewed.   Constitutional:       Appearance: She is chronically ill-appearing. She is not toxic-appearing.   HENT:      Head: Normocephalic and atraumatic.      Nose: No congestion.      Mouth/Throat:      Mouth: Mucous membranes are moist.      Pharynx: No oropharyngeal exudate.   Eyes:      Extraocular Movements: Extraocular movements intact.      Pupils: Pupils are equal, round, and reactive to light.   Cardiovascular:      Rate and Rhythm: Normal rate. Rhythm irregular.      Heart sounds: No murmur heard.     No friction rub. No gallop.   Pulmonary:      Effort:  No respiratory distress  Chest:      Chest wall: No tenderness.   Abdominal:      General: Bowel sounds are normal. There is no distension.      Palpations: Abdomen is soft.      Tenderness: There is no abdominal tenderness. There is no guarding or rebound.   Musculoskeletal:         General: No swelling or tenderness.      Cervical back: No rigidity or tenderness.      Right lower leg: No edema.      Left lower leg: No edema.   Skin:     Findings: No erythema or rash.   Neurological:      General: No focal deficit present.      Mental Status: She is alert and oriented to person, place, and time.      Cranial Nerves: No cranial nerve deficit.      Motor: Weakness present.      Coordination: Coordination normal.   Psychiatric:         Thought Content: Thought content normal.                  CRANIAL NERVES      CN III, IV, VI   Pupils are equal, round, and reactive to light.     Goals of Care Treatment Preferences:  Code Status: Full Code    Health care agent: Mulugeta Augustse  [facility] agent number: " [phone]    Living Will: Yes     What is most important right now is to focus on spending time at home, avoiding the hospital, remaining as independent as possible, symptom/pain control, improvement in condition but with limits to invasive therapies.  Accordingly, we have decided that the best plan to meet the patient's goals includes continuing with treatment.      Consults:   Consults (From admission, onward)          Status Ordering Provider     Inpatient consult to Pulmonology  Once        Provider:  (Not yet assigned)    Completed RAMEZ COLEMAN     Inpatient consult to Respiratory Care  Once        Provider:  (Not yet assigned)    Acknowledged SHASHI ALONSO            No new Assessment & Plan notes have been filed under this hospital service since the last note was generated.  Service: Hospital Medicine    Final Active Diagnoses:    Diagnosis Date Noted POA    PRINCIPAL PROBLEM:  Acute on chronic respiratory failure with hypoxia and hypercapnia [J96.21, J96.22] 12/19/2023 Yes    Acute exacerbation of chronic obstructive pulmonary disease (COPD) [J44.1] 12/19/2023 Yes    Current chronic use of systemic steroids [Z79.52] 08/30/2023 Not Applicable    HTN (hypertension) [I10] 09/08/2015 Yes    Obstructive sleep apnea syndrome [G47.33] 03/31/2015 Yes      Problems Resolved During this Admission:       Discharged Condition: good    Disposition: Home or Self Care    Follow Up:   Follow-up Information       Alex Cunningham MD Follow up on 5/21/2024.    Specialty: Pulmonary Disease  Why: OUT PATIENT  SERVICES/Pulmonolgy- Please report to the clinic at 1:30 pm to meet with the MD  Contact information:  [address] [facility] [address] [phone]               Jaimie Reynoso MD Follow up.    Specialty: Internal Medicine  Why: OUT PATIENT  SERVICES/PCP- Please call to schedule a followup appointment within 7 days of discharge  Contact information:  [address] [facility] [place]  [address]  [phone]                           Patient Instructions:      Ambulatory referral[facility] to [facility]   Standing Status: Future   Referral Priority: Routine Referral Type: Consultation   Referral Reason: Specialty Services Required   Number of Visits Requested: 1     Ambulatory referral/consult to Pulmonology   Standing Status: Future   Referral Priority: Routine Referral Type: Consultation   Referral Reason: Specialty Services Required   Requested Specialty: Pulmonary Disease   Number of Visits Requested: 1     Ambulatory referral/consult to Pulmonary Rehab   Standing Status: Future   Referral Priority: Routine Referral Type: Consultation   Referral Reason: Specialty Services Required   Requested Specialty: Pulmonary Disease   Number of Visits Requested: 1       Significant Diagnostic Studies: N/A    Pending Diagnostic Studies:       None           Medications:  Reconciled Home Medications:      Medication List        START taking these medications      predniSONE 20 MG tablet  Commonly known as: DELTASONE  Take 2 tablets (40 mg total) by mouth once daily. for 3 days  Start taking on: April 1, 2024            CONTINUE taking these medications      acetaminophen 500 MG tablet  Commonly known as: TYLENOL  Take 500 mg by mouth every 6 (six) hours as needed for Pain.     albuterol 90 mcg/actuation inhaler  Commonly known as: PROVENTIL/VENTOLIN HFA  Inhale 2 puffs into the lungs every 6 (six) hours as needed for Wheezing or Shortness of Breath.     alendronate 35 MG tablet  Commonly known as: FOSAMAX  Take 35 mg by mouth every Tuesday.     aspirin 81 MG Chew  Take 1 tablet by mouth once daily.     azithromycin 250 MG tablet  Commonly known as: Z-OLY  Take 1 tablet (250 mg total) by mouth every Mon, Wed, Fri.     BREZTRI AEROSPHERE 160-9-4.8 mcg/actuation Hfaa  Generic drug: budesonide-glycopyr-formoterol  Inhale 2 puffs into the lungs 2 (two) times daily.     calcium carb-mag hydrox-simeth 1,200 mg-270  mg -80 mg/10 mL Susp  Take 1,200 mg by mouth once daily.     cholecalciferol (vitamin D3) 50 mcg (2,000 unit) Tab  Commonly known as: VITAMIN D3  Take 2,000 Units by mouth once daily.     dextromethorphan-guaiFENesin  mg  mg per 12 hr tablet  Commonly known as: MUCINEX DM  Take 1 tablet by mouth 2 (two) times daily as needed.     diazePAM 2 MG tablet  Commonly known as: VALIUM  Take 1 tablet (2 mg total) by mouth daily as needed for Anxiety.     dicyclomine 20 mg tablet  Commonly known as: BENTYL  Take 1 tablet (20 mg total) by mouth 3 (three) times daily as needed (abdominal pain).     fluticasone propionate 50 mcg/actuation nasal spray  Commonly known as: FLONASE  1 spray (50 mcg total) by Each Nostril route daily as needed for Allergies.     furosemide 40 MG tablet  Commonly known as: LASIX  Take 1 tablet (40 mg total) by mouth once daily.     hydrOXYzine 50 MG tablet  Commonly known as: ATARAX  Take 50 mg by mouth 3 (three) times daily as needed.     ibuprofen 800 MG tablet  Commonly known as: ADVIL,MOTRIN  Take 1 tablet (800 mg total) by mouth 3 (three) times daily as needed for Pain.     losartan 50 MG tablet  Commonly known as: COZAAR  Take 1 tablet (50 mg total) by mouth once daily.     OXYGEN-AIR DELIVERY SYSTEMS MISC  2 L by Nasal route continuous.     potassium chloride 10 MEQ Cpsr  Commonly known as: MICRO-K  Take 10 mEq by mouth once daily.     roflumilast 500 mcg Tab  Commonly known as: DALIRESP  Take 500 mcg by mouth once daily.     traZODone 50 MG tablet  Commonly known as: DESYREL  Take 1 tablet (50 mg total) by mouth nightly as needed for Insomnia.              Indwelling Lines/Drains at time of discharge:   Lines/Drains/Airways       None                   Time spent on the discharge of patient: 34 minutes         Tirso Valenzuela MD  Department of Hospital Medicine  [facility]

## 2024-03-31 NOTE — PROGRESS NOTES
[facility] - Telemetry  Discharge Final Note    Primary Care Provider: Jaimie Reynoso MD    Expected Discharge Date: 3/31/2024        Important Message from Medicare             Contact Info       Alex Cunningham MD   Specialty: Pulmonary Disease    [address] [facility] [address]   Phone: [phone]       Next Steps: Follow up on 5/21/2024    Instructions: OUT PATIENT  SERVICES/Pulmonolgy- Please report to the clinic at 1:30 pm to meet with the Jaimie Rubin MD   Specialty: Internal Medicine   Relationship: PCP - General    [address] [facility] [address]   Phone: [phone]       Next Steps: Follow up    Instructions: OUT PATIENT  SERVICES/PCP- Please call to schedule a followup appointment within 7 days of discharge          Patient is cleared for discharge per CM . Patient's daughter will provide discharge transportation home

## 2024-04-01 ENCOUNTER — TELEPHONE (OUTPATIENT)
Dept: PULMONOLOGY | Facility: CLINIC | Age: 69
End: 2024-04-01
Payer: MEDICARE

## 2024-04-01 LAB
OHS QRS DURATION: 66 MS
OHS QRS DURATION: 68 MS
OHS QTC CALCULATION: 420 MS
OHS QTC CALCULATION: 425 MS

## 2024-04-01 NOTE — TELEPHONE ENCOUNTER
----- Message from Lu REYES Route sent at 4/1/2024 12:04 PM CDT -----  Regarding: Hospital Follow Up  Contact: pt.  [phone]  Pt is calling in ref to a hospital follow up. Pt was discharged on 3/31  and told to follow up within 2 weeks. Patient Requesting Call Back @  [phone]

## 2024-04-01 NOTE — TELEPHONE ENCOUNTER
Spoke with pt, informed her that I have received her message. Pt states that she will like to keep her original appointment with Dr Cunningham on 5/21/24 for 130 pm. I verbalized to pt that I understand.

## 2024-04-04 ENCOUNTER — TELEPHONE (OUTPATIENT)
Dept: PRIMARY CARE CLINIC | Facility: CLINIC | Age: 69
End: 2024-04-04

## 2024-04-04 ENCOUNTER — OFFICE VISIT (OUTPATIENT)
Dept: PRIMARY CARE CLINIC | Facility: CLINIC | Age: 69
End: 2024-04-04
Payer: MEDICARE

## 2024-04-04 VITALS
OXYGEN SATURATION: 94 % | WEIGHT: 186.94 LBS | SYSTOLIC BLOOD PRESSURE: 152 MMHG | RESPIRATION RATE: 16 BRPM | BODY MASS INDEX: 30.04 KG/M2 | HEIGHT: 66 IN | HEART RATE: 78 BPM | DIASTOLIC BLOOD PRESSURE: 62 MMHG

## 2024-04-04 DIAGNOSIS — F33.0 MILD EPISODE OF RECURRENT MAJOR DEPRESSIVE DISORDER: ICD-10-CM

## 2024-04-04 DIAGNOSIS — I10 PRIMARY HYPERTENSION: ICD-10-CM

## 2024-04-04 DIAGNOSIS — J44.1 COPD EXACERBATION: Primary | ICD-10-CM

## 2024-04-04 PROBLEM — I50.33 ACUTE ON CHRONIC DIASTOLIC CONGESTIVE HEART FAILURE: Status: RESOLVED | Noted: 2024-01-22 | Resolved: 2024-04-04

## 2024-04-04 PROCEDURE — 1159F MED LIST DOCD IN RCRD: CPT | Mod: CPTII,S$GLB,, | Performed by: INTERNAL MEDICINE

## 2024-04-04 PROCEDURE — 3077F SYST BP >= 140 MM HG: CPT | Mod: CPTII,S$GLB,, | Performed by: INTERNAL MEDICINE

## 2024-04-04 PROCEDURE — 3078F DIAST BP <80 MM HG: CPT | Mod: CPTII,S$GLB,, | Performed by: INTERNAL MEDICINE

## 2024-04-04 PROCEDURE — 1157F ADVNC CARE PLAN IN RCRD: CPT | Mod: CPTII,S$GLB,, | Performed by: INTERNAL MEDICINE

## 2024-04-04 PROCEDURE — 4010F ACE/ARB THERAPY RXD/TAKEN: CPT | Mod: CPTII,S$GLB,, | Performed by: INTERNAL MEDICINE

## 2024-04-04 PROCEDURE — 1111F DSCHRG MED/CURRENT MED MERGE: CPT | Mod: CPTII,S$GLB,, | Performed by: INTERNAL MEDICINE

## 2024-04-04 PROCEDURE — 3008F BODY MASS INDEX DOCD: CPT | Mod: CPTII,S$GLB,, | Performed by: INTERNAL MEDICINE

## 2024-04-04 PROCEDURE — 99215 OFFICE O/P EST HI 40 MIN: CPT | Mod: S$GLB,,, | Performed by: INTERNAL MEDICINE

## 2024-04-04 PROCEDURE — 1101F PT FALLS ASSESS-DOCD LE1/YR: CPT | Mod: CPTII,S$GLB,, | Performed by: INTERNAL MEDICINE

## 2024-04-04 PROCEDURE — 99999 PR PBB SHADOW E&M-EST. PATIENT-LVL IV: CPT | Mod: PBBFAC,,, | Performed by: INTERNAL MEDICINE

## 2024-04-04 PROCEDURE — 1160F RVW MEDS BY RX/DR IN RCRD: CPT | Mod: CPTII,S$GLB,, | Performed by: INTERNAL MEDICINE

## 2024-04-04 PROCEDURE — 3288F FALL RISK ASSESSMENT DOCD: CPT | Mod: CPTII,S$GLB,, | Performed by: INTERNAL MEDICINE

## 2024-04-04 PROCEDURE — 1126F AMNT PAIN NOTED NONE PRSNT: CPT | Mod: CPTII,S$GLB,, | Performed by: INTERNAL MEDICINE

## 2024-04-04 NOTE — PATIENT INSTRUCTIONS
Chi Murray,     If you are due for any health screening(s) below please notify me so we can arrange them to be ordered and scheduled. Most healthy patients at your age complete them, but you are free to accept or refuse.     If you can't do it, I'll definitely understand. If you can, I'd certainly appreciate it!    All of your core healthy metrics are met.      Its time for your colon cancer screening     Colorectal cancer is one of the leading causes of cancer death for men and women but it doesnt have to be. Screenings can prevent colorectal cancer or find it early enough to treat and cure the disease.     Our records indicate that you may be overdue for colon cancer screening. A colonoscopy or stool screening test can help identify patients at risk for developing colon cancer. Cancer screenings save lives, so schedule yours today to stay healthy.     A colonoscopy is the preferred test for detecting colon cancer. It is needed only once every 10 years if results are negative. While you are sedated, a flexible, lighted tube with a tiny camera is inserted into the rectum and advanced through the colon to look for cancers.     An alternative screening test that is used at home and returned to the lab may also be used. It detects hidden blood in bowel movements which could indicate cancer in the colon. If results are positive, you will need a colonoscopy to determine if the blood is a sign of cancer. This type of follow up (diagnostic) colonoscopy usually requires additional copays as required by your insurance provider.     If you recently had your colon cancer screening performed outside of [facility], please let your Health care team know so that they can update your health record. Please contact your PCP if you have any questions.    Lets manage your high blood pressure     Your blood pressure was above 140/90 today during your visit. We recommend that you schedule a nurse visit in two weeks to check  your blood pressure and discuss ways to support your health goals.     You can also manage your health and record your blood pressure from the comfort of home by keeping a daily blood pressure log. These results are shared with and reviewed by your provider. Please print this form (Daily Blood Pressure Log) to assist you in keeping track of your blood pressure at home.     Schedule your nurse visit in two weeks to learn more about how to track and manage high blood pressure.    Daily Blood Pressure Log    Name:__________________________________                  Date of Birth:_________    Average Blood Pressure:  __________      Date: Time  (a.m.) Blood  Pressure: Pulse  Rate: Time  (p.m.) Blood  Pressure : Pulse  Rate:   Sample 8:37 127/83 84

## 2024-04-04 NOTE — ASSESSMENT & PLAN NOTE
Blood pressure above goal today.  Continue current medications and will reassess in 4 weeks as she was on steroids until yesterday.

## 2024-04-04 NOTE — ASSESSMENT & PLAN NOTE
Patient is back to her baseline on 2 L home oxygen  She finished 5 days of prednisone as of yesterday   She has 2 refills of prednisone left for any acute exacerbations  She has follow-up with her pulmonologist next month.    She is still waiting to be scheduled for pulmonary rehab  Introduced the concept of hospice and patient is not currently receptive at this time.  She says her sister was under hospice before she  but she has currently not ready to consider it.  Patient is a DNR.  If it comes to the end of life, as per the patient she would like to be in a hospital setting than being at home.  Continue current medications and BiPAP at night and call the clinic if any symptoms worsen.

## 2024-04-04 NOTE — TELEPHONE ENCOUNTER
LCSW met with patient in the lobby at her request to see the .  LCSW explained role of LCSW and patient became tearful.  She started with a therapist in [place] before [facility] and was no longer able to see her due to insurance issues.  She is interested in scheduling with this therapist.  LCSW will request referral from PCP/Dr. Reynoso and will call patient to schedule at [phone].

## 2024-04-04 NOTE — PROGRESS NOTES
Clinic Note  4/4/2024      Subjective:       Patient ID:  Terri is a 68 y.o. female being seen for an established visit.    Chief Complaint: Follow-up    Patient is here for hospital discharge follow-up   -she was seen on 29th of March for COPD exacerbation  -she thinks the seasonal allergies probably caused her exacerbation  -she received steroids while she was at the hospital and was discharged on 3 days' course of prednisone that finished yesterday.  Patient feels like she is back to her baseline.  She is currently at 2 L home O2 and when she transfers are moves she bumps it to 3 L  She has follow-up with her pulmonologist next month and is waiting to be scheduled for pulmonary rehab  Her blood pressure is above goal today, she has been on steroids for the last 5 days.          Follow-up  Associated symptoms include coughing. Pertinent negatives include no chest pain, chills, congestion or fever.       Review of Systems   Constitutional:  Negative for chills and fever.   HENT:  Negative for congestion and sinus pain.    Respiratory:  Positive for cough and shortness of breath. Negative for hemoptysis, sputum production and wheezing.    Cardiovascular:  Negative for chest pain and leg swelling.       Medication List with Changes/Refills   Current Medications    ACETAMINOPHEN (TYLENOL) 500 MG TABLET    Take 500 mg by mouth every 6 (six) hours as needed for Pain.    ALBUTEROL (PROVENTIL/VENTOLIN HFA) 90 MCG/ACTUATION INHALER    Inhale 2 puffs into the lungs every 6 (six) hours as needed for Wheezing or Shortness of Breath.    ALENDRONATE (FOSAMAX) 35 MG TABLET    Take 35 mg by mouth every Tuesday.    ASPIRIN 81 MG CHEW    Take 1 tablet by mouth once daily.    AZITHROMYCIN (Z-OLY) 250 MG TABLET    Take 1 tablet (250 mg total) by mouth every Mon, Wed, Fri.    BUDESONIDE-GLYCOPYR-FORMOTEROL (BREZTRI AEROSPHERE) 160-9-4.8 MCG/ACTUATION HFAA    Inhale 2 puffs into the lungs 2 (two) times daily.    CALCIUM CARB-MAG  "HYDROX-SIMETH 1,200 MG-270 MG -80 MG/10 ML SUSP    Take 1,200 mg by mouth once daily.    CHOLECALCIFEROL, VITAMIN D3, (VITAMIN D3) 50 MCG (2,000 UNIT) TAB    Take 2,000 Units by mouth once daily.    DEXTROMETHORPHAN-GUAIFENESIN  MG (MUCINEX DM)  MG PER 12 HR TABLET    Take 1 tablet by mouth 2 (two) times daily as needed.    DIAZEPAM (VALIUM) 2 MG TABLET    Take 1 tablet (2 mg total) by mouth daily as needed for Anxiety.    DICYCLOMINE (BENTYL) 20 MG TABLET    Take 1 tablet (20 mg total) by mouth 3 (three) times daily as needed (abdominal pain).    FLUTICASONE PROPIONATE (FLONASE) 50 MCG/ACTUATION NASAL SPRAY    1 spray (50 mcg total) by Each Nostril route daily as needed for Allergies.    FUROSEMIDE (LASIX) 40 MG TABLET    Take 1 tablet (40 mg total) by mouth once daily.    HYDROXYZINE (ATARAX) 50 MG TABLET    Take 50 mg by mouth 3 (three) times daily as needed.    IBUPROFEN (ADVIL,MOTRIN) 800 MG TABLET    Take 1 tablet (800 mg total) by mouth 3 (three) times daily as needed for Pain.    LOSARTAN (COZAAR) 50 MG TABLET    Take 1 tablet (50 mg total) by mouth once daily.    OXYGEN-AIR DELIVERY SYSTEMS MISC    2 L by Nasal route continuous.    POTASSIUM CHLORIDE (MICRO-K) 10 MEQ CPSR    Take 10 mEq by mouth once daily.    PREDNISONE (DELTASONE) 20 MG TABLET    Take 2 tablets (40 mg total) by mouth once daily. for 3 days    ROFLUMILAST (DALIRESP) 500 MCG TAB    Take 500 mcg by mouth once daily.    TRAZODONE (DESYREL) 50 MG TABLET    Take 1 tablet (50 mg total) by mouth nightly as needed for Insomnia.           Objective:      BP (!) 152/62 (BP Location: Left arm, Patient Position: Sitting, BP Method: Small (Manual))   Pulse 78   Resp 16   Ht 5' 6" (1.676 m)   Wt 84.8 kg (186 lb 15.2 oz)   SpO2 (!) 94%   BMI 30.17 kg/m²   Estimated body mass index is 30.17 kg/m² as calculated from the following:    Height as of this encounter: 5' 6" (1.676 m).    Weight as of this encounter: 84.8 kg (186 lb 15.2 " oz).  Physical Exam  Constitutional:       Appearance: Normal appearance. She is normal weight.      Comments: She has her oxygen tank with her.   HENT:      Head: Normocephalic and atraumatic.      Nose: Nose normal.      Mouth/Throat:      Mouth: Mucous membranes are moist.   Eyes:      Pupils: Pupils are equal, round, and reactive to light.   Cardiovascular:      Rate and Rhythm: Normal rate and regular rhythm.      Pulses: Normal pulses.      Heart sounds: Normal heart sounds. No murmur heard.  Pulmonary:      Effort: Pulmonary effort is normal.      Breath sounds: Normal breath sounds.      Comments: Occasional pursing of lips noted  Abdominal:      General: Bowel sounds are normal.      Palpations: Abdomen is soft.   Skin:     General: Skin is warm.   Neurological:      General: No focal deficit present.      Mental Status: She is alert and oriented to person, place, and time.      Cranial Nerves: No cranial nerve deficit.   Psychiatric:         Mood and Affect: Mood normal.           Assessment and Plan:         Problem List Items Addressed This Visit          Pulmonary    COPD exacerbation - Primary    Current Assessment & Plan     Patient is back to her baseline on 2 L home oxygen  She finished 5 days of prednisone as of yesterday   She has 2 refills of prednisone left for any acute exacerbations  She has follow-up with her pulmonologist next month.    She is still waiting to be scheduled for pulmonary rehab  Introduced the concept of hospice and patient is not currently receptive at this time.  She says her sister was under hospice before she  but she has currently not ready to consider it.  Patient is a DNR.  If it comes to the end of life, as per the patient she would like to be in a hospital setting than being at home.  Continue current medications and BiPAP at night and call the clinic if any symptoms worsen.            Cardiac/Vascular    HTN (hypertension)    Current Assessment & Plan     Blood  pressure above goal today.  Continue current medications and will reassess in 4 weeks as she was on steroids until yesterday.            Follow Up:   Follow up in about 4 weeks (around 5/2/2024).    Other Orders Placed This Visit:  No orders of the defined types were placed in this encounter.        Jaimie Reynoso

## 2024-04-05 ENCOUNTER — PATIENT MESSAGE (OUTPATIENT)
Dept: PRIMARY CARE CLINIC | Facility: CLINIC | Age: 69
End: 2024-04-05
Payer: MEDICARE

## 2024-04-05 ENCOUNTER — TELEPHONE (OUTPATIENT)
Dept: PRIMARY CARE CLINIC | Facility: CLINIC | Age: 69
End: 2024-04-05
Payer: MEDICARE

## 2024-04-05 RX ORDER — DIAZEPAM 2 MG/1
2 TABLET ORAL DAILY PRN
Qty: 30 TABLET | Refills: 0 | Status: SHIPPED | OUTPATIENT
Start: 2024-04-05 | End: 2024-05-06 | Stop reason: SDUPTHER

## 2024-04-05 NOTE — TELEPHONE ENCOUNTER
Collaborated with Dr. Gupta about referral this morning. Contacted patient regarding referral for behavioral health services. Patient is scheduled 4/11/24 for intake.  Patient was encouraged to contact LCSW with any need to reschedule.

## 2024-04-11 ENCOUNTER — TELEPHONE (OUTPATIENT)
Dept: PRIMARY CARE CLINIC | Facility: CLINIC | Age: 69
End: 2024-04-11
Payer: MEDICARE

## 2024-04-17 ENCOUNTER — CLINICAL SUPPORT (OUTPATIENT)
Dept: PRIMARY CARE CLINIC | Facility: CLINIC | Age: 69
End: 2024-04-17
Payer: MEDICARE

## 2024-04-17 DIAGNOSIS — J44.1 COPD EXACERBATION: Primary | ICD-10-CM

## 2024-04-17 DIAGNOSIS — F43.21 GRIEF: ICD-10-CM

## 2024-04-17 DIAGNOSIS — F33.0 MILD EPISODE OF RECURRENT MAJOR DEPRESSIVE DISORDER: ICD-10-CM

## 2024-04-17 PROCEDURE — 99499 UNLISTED E&M SERVICE: CPT | Mod: HCNC,S$GLB,,

## 2024-04-25 NOTE — PROGRESS NOTES
"[facility] BEHAVIORAL HEALTH INTAKE    DATE:  4/25/2024  REFERRAL SOURCE:  Jaimie Reynoso MD  TYPE OF VISIT:  In person  LENGTH OF SESSION: 60  .  HISTORY OF PRESENTING ILLNESS:  Terri Phelan, a 68 y.o. female with history of Adjustment disorders; with mixed emotional features [F43.23] and grief.    CHIEF COMPLAINT/REASON FOR ENCOUNTER: Pt presented for initial assessment. Met with patient. Pt's chief complaint includes the following: depression and anxiety.  "Learning to live with COPD" and lost her sister to COPD in 2019    Patient does not currently have a psychiatrist.    Patient does not currently have a therapist.     Currently prescribed Psychiatric medications: yes  Pt is taking diazepam (Valium) 2mg once daily prn for Anxiety. Pt is taking Trazadone 50mg prn qhs for sleep.  They are not interested in medication changes.    Current symptoms:  Depression: tearfulness  Anxiety: excessive worrying.  Insomnia: difficulty falling asleep.  Fiona:  denies.  Psychosis: denies .      Session Content/Presenting Problem Hx:  Pt arrived to session with O2 tank with Nasal cannula.  She is having difficulty "living with COPD."  Sgnicho was dx in 2016 and her sister passed with COPD in 2019.  She had another sister pass in 01/2023 with Diabetes complications.  She wants to find a way "to live my best life." She enjoys socializing and networking but has been more fatigued as of late due to COPD exacerbation.  She has been staying with her dtr in [facility] for appx 1 year since her disease progressed and she needed some assistance.  They tried hired caregivers at patient's home in [place] but did not have success with "quality help."  She calls a friend or family member to help her feel calm.  She drives and is independent with ADL's. She finds it harder to do things with "lugging O2 tank with her" and ensuring she has enough oxygen when she goes somewhere.  The majority of this session was focused on rapport-building and " assessing patient's areas of clinical concern. Patient was receptive to therapist, cooperative, and openly shared issues of present concern.  Educated the patient on how behavior and mood are related: unpleasant events (or an absence of pleasant events) are associated with low mood and pleasant events are associated with better mood.     Current social stressors:   Limited social interaction    Risk assessment:  Patient reports no suicidal ideation  Patient reports no homicidal ideation  Patient reports no self-injurious behavior  Patient reports no violent behavior    PSYCHIATRIC HISTORY:  History of Fiona or diagnosis of Bipolar Disorder in the past:  No  History of Psychosis or diagnosis of Schizophrenia in the past:  No  Previous Psychiatric Hospitalizations:  No  Previous SI/HI:   No  Previous Suicide Attempts:  No  Previous Medication Trials: No   Previous Psychiatric Outpatient Treatment:  Yes - saw a therapist via telehealth during COVID and ended when therapist could no longer accept her insurance.   History of Trauma:  No  History of Violence:  No  Access to a Gun:  No    SUBSTANCE ABUSE HISTORY:  Tobacco:  Yes - quite 12/2000   Alcohol: none  Illicit Substances: No  Misuse of Prescription Medications:  No    MEDICAL HISTORY:  Past Medical History:   Diagnosis Date    COPD (chronic obstructive pulmonary disease)     Hypertension        NEUROLOGIC HISTORY:  Seizures:  No  Head trauma:  No  Memory loss:  No    SOCIAL HISTORY (MARRIAGE, EMPLOYMENT, etc.):  Living Situation: has a home in [place] but has been staying with dtr in [place] for almost a year due to decline in health  Relationship Status:   Children/Family: 35yo dtr  Supports:dtr, has friends considers sisters that all live in [place]  Education/Vocation: bachelor's degree in social work; 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   Religious/Spirituality:   Hobbies and Interests: socializing/networking    PSYCHIATRIC FAMILY HISTORY: none      MENTAL HEALTH  STATUS EXAM  General Appearance:  age appropriate, casually dressed, on Oxygen via nasal cannula   Speech: normal tone, normal rate, normal pitch, normal volume      Level of Cooperation: cooperative      Thought Processes: normal and logical   Mood: steady      Thought Content: normal, no suicidality, no homicidality, delusions, or paranoia   Affect: congruent and appropriate   Orientation: Oriented x3   Memory: intact for content of interview   Attention Span & Concentration: able to focus   Fund of General Knowledge: intact and appropriate to age and level of education   Abstract Reasoning: Not formally tested   Judgment & Insight: good     Language  intact       IMPRESSION:   My diagnostic impression is Adjustment disorders; with mixed emotional features [F43.23], as evidenced by ARNOL-7 and self-report and GDS    PROVISIONAL DIAGNOSES:  1. COPD exacerbation    2. Mild episode of recurrent major depressive disorder    3. Grief         STRENGTHS AND LIABILITIES: Strength: Patient is expressive/articulate., Strength: Patient is intelligent., Strength: Patient has positive support network., Liability: Patient has poor health.    TREATMENT GOALS: Anxiety: reducing time spent worrying (<30 minutes/day)  Depression: increasing interest in usual activities      PLAN: This is patient's initial session.  The majority of this session was focused on rapport-building and assessing patient's areas of clinical concern. Will focus on goal setting at next session.  CBT and Interpersonal Processing Therapy  will be utilized in future individual  therapy sessions to increase insight and support.     RETURN TO CLINIC: 4/25/24

## 2024-04-26 ENCOUNTER — TELEPHONE (OUTPATIENT)
Dept: PRIMARY CARE CLINIC | Facility: CLINIC | Age: 69
End: 2024-04-26
Payer: MEDICARE

## 2024-04-26 NOTE — TELEPHONE ENCOUNTER
Spoke to pt to inform her that the LCSW is out of the office today and his appointment with her will be rescheduled as soon as she gets back in office

## 2024-05-06 ENCOUNTER — OFFICE VISIT (OUTPATIENT)
Dept: PRIMARY CARE CLINIC | Facility: CLINIC | Age: 69
End: 2024-05-06
Payer: MEDICARE

## 2024-05-06 VITALS
OXYGEN SATURATION: 90 % | SYSTOLIC BLOOD PRESSURE: 122 MMHG | DIASTOLIC BLOOD PRESSURE: 64 MMHG | BODY MASS INDEX: 29.96 KG/M2 | WEIGHT: 185.63 LBS | HEART RATE: 93 BPM

## 2024-05-06 DIAGNOSIS — I10 PRIMARY HYPERTENSION: ICD-10-CM

## 2024-05-06 DIAGNOSIS — J43.2 CENTRILOBULAR EMPHYSEMA: ICD-10-CM

## 2024-05-06 DIAGNOSIS — F41.9 ANXIETY: Primary | Chronic | ICD-10-CM

## 2024-05-06 PROCEDURE — 1101F PT FALLS ASSESS-DOCD LE1/YR: CPT | Mod: HCNC,CPTII,S$GLB, | Performed by: INTERNAL MEDICINE

## 2024-05-06 PROCEDURE — 3008F BODY MASS INDEX DOCD: CPT | Mod: HCNC,CPTII,S$GLB, | Performed by: INTERNAL MEDICINE

## 2024-05-06 PROCEDURE — 4010F ACE/ARB THERAPY RXD/TAKEN: CPT | Mod: HCNC,CPTII,S$GLB, | Performed by: INTERNAL MEDICINE

## 2024-05-06 PROCEDURE — 1157F ADVNC CARE PLAN IN RCRD: CPT | Mod: HCNC,CPTII,S$GLB, | Performed by: INTERNAL MEDICINE

## 2024-05-06 PROCEDURE — 1159F MED LIST DOCD IN RCRD: CPT | Mod: HCNC,CPTII,S$GLB, | Performed by: INTERNAL MEDICINE

## 2024-05-06 PROCEDURE — 3074F SYST BP LT 130 MM HG: CPT | Mod: HCNC,CPTII,S$GLB, | Performed by: INTERNAL MEDICINE

## 2024-05-06 PROCEDURE — 1160F RVW MEDS BY RX/DR IN RCRD: CPT | Mod: HCNC,CPTII,S$GLB, | Performed by: INTERNAL MEDICINE

## 2024-05-06 PROCEDURE — 3288F FALL RISK ASSESSMENT DOCD: CPT | Mod: HCNC,CPTII,S$GLB, | Performed by: INTERNAL MEDICINE

## 2024-05-06 PROCEDURE — 99999 PR PBB SHADOW E&M-EST. PATIENT-LVL IV: CPT | Mod: PBBFAC,HCNC,, | Performed by: INTERNAL MEDICINE

## 2024-05-06 PROCEDURE — 3078F DIAST BP <80 MM HG: CPT | Mod: HCNC,CPTII,S$GLB, | Performed by: INTERNAL MEDICINE

## 2024-05-06 PROCEDURE — 1126F AMNT PAIN NOTED NONE PRSNT: CPT | Mod: HCNC,CPTII,S$GLB, | Performed by: INTERNAL MEDICINE

## 2024-05-06 PROCEDURE — 99215 OFFICE O/P EST HI 40 MIN: CPT | Mod: HCNC,S$GLB,, | Performed by: INTERNAL MEDICINE

## 2024-05-06 RX ORDER — DIAZEPAM 2 MG/1
2 TABLET ORAL DAILY PRN
Qty: 30 TABLET | Refills: 0 | Status: SHIPPED | OUTPATIENT
Start: 2024-05-06

## 2024-05-06 NOTE — PROGRESS NOTES
Clinic Note  5/6/2024      Subjective:       Patient ID:  Terri is a 68 y.o. female being seen for an established visit.    Chief Complaint: Shortness of Breath    She is here for BP f/p  BP at goal today  No other active issues  Requests refill of valium      Shortness of Breath  Pertinent negatives include no chest pain, fever or rash.       Review of Systems   Constitutional:  Negative for chills and fever.   Eyes:  Negative for blurred vision.   Respiratory:  Positive for shortness of breath. Negative for cough.    Cardiovascular:  Negative for chest pain and palpitations.   Gastrointestinal:  Negative for heartburn and nausea.   Musculoskeletal:  Negative for myalgias.   Skin:  Negative for rash.   Neurological:  Negative for dizziness.   Psychiatric/Behavioral:  The patient is nervous/anxious.        Medication List with Changes/Refills   Current Medications    ACETAMINOPHEN (TYLENOL) 500 MG TABLET    Take 500 mg by mouth every 6 (six) hours as needed for Pain.    ALBUTEROL (PROVENTIL/VENTOLIN HFA) 90 MCG/ACTUATION INHALER    Inhale 2 puffs into the lungs every 6 (six) hours as needed for Wheezing or Shortness of Breath.    ALENDRONATE (FOSAMAX) 35 MG TABLET    Take 35 mg by mouth every Tuesday.    ASPIRIN 81 MG CHEW    Take 1 tablet by mouth once daily.    AZITHROMYCIN (Z-OLY) 250 MG TABLET    Take 1 tablet (250 mg total) by mouth every Mon, Wed, Fri.    BUDESONIDE-GLYCOPYR-FORMOTEROL (BREZTRI AEROSPHERE) 160-9-4.8 MCG/ACTUATION HFAA    Inhale 2 puffs into the lungs 2 (two) times daily.    CALCIUM CARB-MAG HYDROX-SIMETH 1,200 MG-270 MG -80 MG/10 ML SUSP    Take 1,200 mg by mouth once daily.    CHOLECALCIFEROL, VITAMIN D3, (VITAMIN D3) 50 MCG (2,000 UNIT) TAB    Take 2,000 Units by mouth once daily.    DEXTROMETHORPHAN-GUAIFENESIN  MG (MUCINEX DM)  MG PER 12 HR TABLET    Take 1 tablet by mouth 2 (two) times daily as needed.    DICYCLOMINE (BENTYL) 20 MG TABLET    Take 1 tablet (20 mg total) by  "mouth 3 (three) times daily as needed (abdominal pain).    FLUTICASONE PROPIONATE (FLONASE) 50 MCG/ACTUATION NASAL SPRAY    1 spray (50 mcg total) by Each Nostril route daily as needed for Allergies.    FUROSEMIDE (LASIX) 40 MG TABLET    Take 1 tablet (40 mg total) by mouth once daily.    IBUPROFEN (ADVIL,MOTRIN) 800 MG TABLET    Take 1 tablet (800 mg total) by mouth 3 (three) times daily as needed for Pain.    LOSARTAN (COZAAR) 50 MG TABLET    Take 1 tablet (50 mg total) by mouth once daily.    OXYGEN-AIR DELIVERY SYSTEMS MISC    2 L by Nasal route continuous.    POTASSIUM CHLORIDE (MICRO-K) 10 MEQ CPSR    Take 10 mEq by mouth once daily.    ROFLUMILAST (DALIRESP) 500 MCG TAB    Take 500 mcg by mouth once daily.    TRAZODONE (DESYREL) 50 MG TABLET    Take 1 tablet (50 mg total) by mouth nightly as needed for Insomnia.   Changed and/or Refilled Medications    Modified Medication Previous Medication    DIAZEPAM (VALIUM) 2 MG TABLET diazePAM (VALIUM) 2 MG tablet       Take 1 tablet (2 mg total) by mouth daily as needed for Anxiety.    Take 1 tablet (2 mg total) by mouth daily as needed for Anxiety.   Discontinued Medications    HYDROXYZINE (ATARAX) 50 MG TABLET    Take 50 mg by mouth 3 (three) times daily as needed.           Objective:      /64 (BP Location: Right arm, Patient Position: Sitting, BP Method: Medium (Manual))   Pulse 93   Wt 84.2 kg (185 lb 10 oz)   SpO2 (!) 90%   BMI 29.96 kg/m²   Estimated body mass index is 29.96 kg/m² as calculated from the following:    Height as of 4/4/24: 5' 6" (1.676 m).    Weight as of this encounter: 84.2 kg (185 lb 10 oz).  Physical Exam  Constitutional:       Appearance: Normal appearance. She is normal weight.      Comments: Has O2 cylinder   HENT:      Head: Normocephalic and atraumatic.      Nose: Nose normal.      Mouth/Throat:      Mouth: Mucous membranes are moist.   Eyes:      Pupils: Pupils are equal, round, and reactive to light.   Cardiovascular:      " Rate and Rhythm: Normal rate and regular rhythm.      Pulses: Normal pulses.      Heart sounds: Normal heart sounds. No murmur heard.  Pulmonary:      Effort: Pulmonary effort is normal.      Breath sounds: Normal breath sounds.   Abdominal:      General: Bowel sounds are normal.      Palpations: Abdomen is soft.   Skin:     General: Skin is warm.   Neurological:      General: No focal deficit present.      Mental Status: She is alert and oriented to person, place, and time.      Cranial Nerves: No cranial nerve deficit.   Psychiatric:         Mood and Affect: Mood normal.           Assessment and Plan:         Problem List Items Addressed This Visit          Psychiatric    Anxiety - Primary (Chronic)    Overview     Anxiety associated with feelings of SOB, diagnosis, and hope for new medications that can help. Exacerbated by chronic need to check oxygen numbers and frequent hospitalizations. Following with palliative.          Current Assessment & Plan     Refer to psych  Refilled valium         Relevant Orders    Ambulatory referral/consult to Psychiatry       Pulmonary    Centrilobular emphysema    Overview     Severe obstructive disease, on home O2 and NIPPV at night and prn.   Chest CTA from July 2023 with apical centrilobular emphysema present.    PFTs 4/18/23 10/10/23   FVC  (pre-BD) 1.01 1.13   FVC%  40 45   FEV1 0.44 0.52   FEV1%  22 27   FEV1/FVC  44 47            Current Assessment & Plan     Stable at present  Waiting to start pulm rehab            Cardiac/Vascular    HTN (hypertension)    Current Assessment & Plan     Controlled on current meds             Follow Up:   Follow up in about 3 months (around 8/6/2024).    Other Orders Placed This Visit:  Orders Placed This Encounter   Procedures    Ambulatory referral/consult to Psychiatry         Jaimie Reynoso

## 2024-05-14 ENCOUNTER — HOSPITAL ENCOUNTER (INPATIENT)
Facility: HOSPITAL | Age: 69
LOS: 6 days | Discharge: HOME HEALTH | DRG: 191 | End: 2024-05-20
Attending: STUDENT IN AN ORGANIZED HEALTH CARE EDUCATION/TRAINING PROGRAM | Admitting: FAMILY MEDICINE
Payer: MEDICARE

## 2024-05-14 DIAGNOSIS — R06.02 SOB (SHORTNESS OF BREATH): ICD-10-CM

## 2024-05-14 DIAGNOSIS — J96.02 ACUTE HYPERCAPNIC RESPIRATORY FAILURE: ICD-10-CM

## 2024-05-14 DIAGNOSIS — R07.9 CHEST PAIN: ICD-10-CM

## 2024-05-14 DIAGNOSIS — J44.1 COPD EXACERBATION: Primary | ICD-10-CM

## 2024-05-14 PROBLEM — I50.30 (HFPEF) HEART FAILURE WITH PRESERVED EJECTION FRACTION: Status: ACTIVE | Noted: 2024-05-14

## 2024-05-14 LAB
ALBUMIN SERPL BCP-MCNC: 3.7 G/DL (ref 3.5–5.2)
ALP SERPL-CCNC: 55 U/L (ref 55–135)
ALT SERPL W/O P-5'-P-CCNC: 17 U/L (ref 10–44)
ANION GAP SERPL CALC-SCNC: 13 MMOL/L (ref 8–16)
AST SERPL-CCNC: 17 U/L (ref 10–40)
BASOPHILS # BLD AUTO: 0.03 K/UL (ref 0–0.2)
BASOPHILS NFR BLD: 0.4 % (ref 0–1.9)
BILIRUB SERPL-MCNC: 0.2 MG/DL (ref 0.1–1)
BNP SERPL-MCNC: 135 PG/ML (ref 0–99)
BUN SERPL-MCNC: 14 MG/DL (ref 8–23)
CALCIUM SERPL-MCNC: 9.2 MG/DL (ref 8.7–10.5)
CHLORIDE SERPL-SCNC: 97 MMOL/L (ref 95–110)
CO2 SERPL-SCNC: 39 MMOL/L (ref 23–29)
CREAT SERPL-MCNC: 0.8 MG/DL (ref 0.5–1.4)
DIFFERENTIAL METHOD BLD: ABNORMAL
EOSINOPHIL # BLD AUTO: 0 K/UL (ref 0–0.5)
EOSINOPHIL NFR BLD: 0 % (ref 0–8)
ERYTHROCYTE [DISTWIDTH] IN BLOOD BY AUTOMATED COUNT: 13.4 % (ref 11.5–14.5)
EST. GFR  (NO RACE VARIABLE): >60 ML/MIN/1.73 M^2
FIO2: 21 %
FIO2: 35 %
GLUCOSE SERPL-MCNC: 143 MG/DL (ref 70–110)
HCT VFR BLD AUTO: 45.2 % (ref 37–48.5)
HGB BLD-MCNC: 13.2 G/DL (ref 12–16)
IMM GRANULOCYTES # BLD AUTO: 0.02 K/UL (ref 0–0.04)
IMM GRANULOCYTES NFR BLD AUTO: 0.3 % (ref 0–0.5)
INFLUENZA A, MOLECULAR: NEGATIVE
INFLUENZA B, MOLECULAR: NEGATIVE
LYMPHOCYTES # BLD AUTO: 0.8 K/UL (ref 1–4.8)
LYMPHOCYTES NFR BLD: 11.2 % (ref 18–48)
MCH RBC QN AUTO: 27.5 PG (ref 27–31)
MCHC RBC AUTO-ENTMCNC: 29.2 G/DL (ref 32–36)
MCV RBC AUTO: 94 FL (ref 82–98)
MONOCYTES # BLD AUTO: 0.2 K/UL (ref 0.3–1)
MONOCYTES NFR BLD: 2.4 % (ref 4–15)
NEUTROPHILS # BLD AUTO: 5.8 K/UL (ref 1.8–7.7)
NEUTROPHILS NFR BLD: 85.7 % (ref 38–73)
NRBC BLD-RTO: 0 /100 WBC
PCO2 BLDA: 85.6 MMHG (ref 35–45)
PCO2 BLDA: 89.6 MMHG (ref 35–45)
PH SMN: 7.32 [PH] (ref 7.35–7.45)
PH SMN: 7.33 [PH] (ref 7.35–7.45)
PLATELET # BLD AUTO: 163 K/UL (ref 150–450)
PMV BLD AUTO: 11.6 FL (ref 9.2–12.9)
PO2 BLDA: 45.2 MMHG (ref 40–60)
PO2 BLDA: 57.1 MMHG (ref 40–60)
POC BASE DEFICIT: 15.2 MMOL/L (ref -2–2)
POC BASE DEFICIT: 15.7 MMOL/L (ref -2–2)
POC HCO3: 45.2 MMOL/L (ref 24–28)
POC HCO3: 45.9 MMOL/L (ref 24–28)
POC PERFORMED BY: ABNORMAL
POC PERFORMED BY: ABNORMAL
POC SATURATED O2: 82.7 % (ref 95–100)
POC SATURATED O2: 89.7 % (ref 95–100)
POTASSIUM SERPL-SCNC: 4 MMOL/L (ref 3.5–5.1)
PROCALCITONIN SERPL IA-MCNC: 0.02 NG/ML
PROT SERPL-MCNC: 7.3 G/DL (ref 6–8.4)
RBC # BLD AUTO: 4.8 M/UL (ref 4–5.4)
SARS-COV-2 RDRP RESP QL NAA+PROBE: NEGATIVE
SODIUM SERPL-SCNC: 149 MMOL/L (ref 136–145)
SPECIMEN SOURCE: ABNORMAL
SPECIMEN SOURCE: ABNORMAL
SPECIMEN SOURCE: NORMAL
TROPONIN I SERPL DL<=0.01 NG/ML-MCNC: <0.006 NG/ML (ref 0–0.03)
WBC # BLD AUTO: 6.8 K/UL (ref 3.9–12.7)

## 2024-05-14 PROCEDURE — 94660 CPAP INITIATION&MGMT: CPT | Mod: HCNC

## 2024-05-14 PROCEDURE — 87502 INFLUENZA DNA AMP PROBE: CPT | Mod: HCNC | Performed by: STUDENT IN AN ORGANIZED HEALTH CARE EDUCATION/TRAINING PROGRAM

## 2024-05-14 PROCEDURE — 93010 ELECTROCARDIOGRAM REPORT: CPT | Mod: HCNC,,, | Performed by: INTERNAL MEDICINE

## 2024-05-14 PROCEDURE — 25000242 PHARM REV CODE 250 ALT 637 W/ HCPCS: Mod: HCNC | Performed by: STUDENT IN AN ORGANIZED HEALTH CARE EDUCATION/TRAINING PROGRAM

## 2024-05-14 PROCEDURE — 99900035 HC TECH TIME PER 15 MIN (STAT): Mod: HCNC

## 2024-05-14 PROCEDURE — U0002 COVID-19 LAB TEST NON-CDC: HCPCS | Mod: HCNC | Performed by: STUDENT IN AN ORGANIZED HEALTH CARE EDUCATION/TRAINING PROGRAM

## 2024-05-14 PROCEDURE — 99285 EMERGENCY DEPT VISIT HI MDM: CPT | Mod: 25,HCNC

## 2024-05-14 PROCEDURE — 80053 COMPREHEN METABOLIC PANEL: CPT | Mod: HCNC | Performed by: EMERGENCY MEDICINE

## 2024-05-14 PROCEDURE — 94640 AIRWAY INHALATION TREATMENT: CPT | Mod: HCNC

## 2024-05-14 PROCEDURE — 84145 PROCALCITONIN (PCT): CPT | Mod: HCNC | Performed by: STUDENT IN AN ORGANIZED HEALTH CARE EDUCATION/TRAINING PROGRAM

## 2024-05-14 PROCEDURE — 82803 BLOOD GASES ANY COMBINATION: CPT | Mod: HCNC

## 2024-05-14 PROCEDURE — 27100171 HC OXYGEN HIGH FLOW UP TO 24 HOURS: Mod: HCNC

## 2024-05-14 PROCEDURE — 83880 ASSAY OF NATRIURETIC PEPTIDE: CPT | Mod: HCNC | Performed by: EMERGENCY MEDICINE

## 2024-05-14 PROCEDURE — 85025 COMPLETE CBC W/AUTO DIFF WBC: CPT | Mod: HCNC | Performed by: EMERGENCY MEDICINE

## 2024-05-14 PROCEDURE — 5A09557 ASSISTANCE WITH RESPIRATORY VENTILATION, GREATER THAN 96 CONSECUTIVE HOURS, CONTINUOUS POSITIVE AIRWAY PRESSURE: ICD-10-PCS | Performed by: STUDENT IN AN ORGANIZED HEALTH CARE EDUCATION/TRAINING PROGRAM

## 2024-05-14 PROCEDURE — 63600175 PHARM REV CODE 636 W HCPCS: Mod: HCNC | Performed by: STUDENT IN AN ORGANIZED HEALTH CARE EDUCATION/TRAINING PROGRAM

## 2024-05-14 PROCEDURE — 93005 ELECTROCARDIOGRAM TRACING: CPT | Mod: HCNC

## 2024-05-14 PROCEDURE — 84484 ASSAY OF TROPONIN QUANT: CPT | Mod: HCNC | Performed by: EMERGENCY MEDICINE

## 2024-05-14 PROCEDURE — 12000002 HC ACUTE/MED SURGE SEMI-PRIVATE ROOM: Mod: HCNC

## 2024-05-14 PROCEDURE — 94761 N-INVAS EAR/PLS OXIMETRY MLT: CPT | Mod: HCNC,XB

## 2024-05-14 PROCEDURE — 27000190 HC CPAP FULL FACE MASK W/VALVE: Mod: HCNC

## 2024-05-14 RX ORDER — TRAZODONE HYDROCHLORIDE 50 MG/1
50 TABLET ORAL NIGHTLY PRN
Status: DISCONTINUED | OUTPATIENT
Start: 2024-05-15 | End: 2024-05-20 | Stop reason: HOSPADM

## 2024-05-14 RX ORDER — AZITHROMYCIN 250 MG/1
250 TABLET, FILM COATED ORAL
Status: DISCONTINUED | OUTPATIENT
Start: 2024-05-15 | End: 2024-05-20 | Stop reason: HOSPADM

## 2024-05-14 RX ORDER — FUROSEMIDE 40 MG/1
40 TABLET ORAL DAILY
Status: DISCONTINUED | OUTPATIENT
Start: 2024-05-15 | End: 2024-05-20 | Stop reason: HOSPADM

## 2024-05-14 RX ORDER — ENOXAPARIN SODIUM 100 MG/ML
40 INJECTION SUBCUTANEOUS EVERY 24 HOURS
Status: DISCONTINUED | OUTPATIENT
Start: 2024-05-15 | End: 2024-05-20 | Stop reason: HOSPADM

## 2024-05-14 RX ORDER — IPRATROPIUM BROMIDE AND ALBUTEROL SULFATE 2.5; .5 MG/3ML; MG/3ML
3 SOLUTION RESPIRATORY (INHALATION)
Status: COMPLETED | OUTPATIENT
Start: 2024-05-14 | End: 2024-05-14

## 2024-05-14 RX ORDER — IBUPROFEN 200 MG
16 TABLET ORAL
Status: DISCONTINUED | OUTPATIENT
Start: 2024-05-15 | End: 2024-05-20 | Stop reason: HOSPADM

## 2024-05-14 RX ORDER — NALOXONE HCL 0.4 MG/ML
0.02 VIAL (ML) INJECTION
Status: DISCONTINUED | OUTPATIENT
Start: 2024-05-15 | End: 2024-05-20 | Stop reason: HOSPADM

## 2024-05-14 RX ORDER — LOSARTAN POTASSIUM 50 MG/1
50 TABLET ORAL DAILY
Status: DISCONTINUED | OUTPATIENT
Start: 2024-05-15 | End: 2024-05-20 | Stop reason: HOSPADM

## 2024-05-14 RX ORDER — PREDNISONE 20 MG/1
40 TABLET ORAL DAILY
Status: COMPLETED | OUTPATIENT
Start: 2024-05-14 | End: 2024-05-18

## 2024-05-14 RX ORDER — IPRATROPIUM BROMIDE AND ALBUTEROL SULFATE 2.5; .5 MG/3ML; MG/3ML
3 SOLUTION RESPIRATORY (INHALATION) EVERY 8 HOURS
Status: DISCONTINUED | OUTPATIENT
Start: 2024-05-15 | End: 2024-05-15

## 2024-05-14 RX ORDER — ROFLUMILAST 500 UG/1
500 TABLET ORAL DAILY
Status: DISCONTINUED | OUTPATIENT
Start: 2024-05-15 | End: 2024-05-20 | Stop reason: HOSPADM

## 2024-05-14 RX ORDER — IBUPROFEN 200 MG
24 TABLET ORAL
Status: DISCONTINUED | OUTPATIENT
Start: 2024-05-15 | End: 2024-05-20 | Stop reason: HOSPADM

## 2024-05-14 RX ORDER — FUROSEMIDE 10 MG/ML
40 INJECTION INTRAMUSCULAR; INTRAVENOUS ONCE
Status: COMPLETED | OUTPATIENT
Start: 2024-05-14 | End: 2024-05-14

## 2024-05-14 RX ORDER — SODIUM CHLORIDE 0.9 % (FLUSH) 0.9 %
10 SYRINGE (ML) INJECTION EVERY 12 HOURS PRN
Status: DISCONTINUED | OUTPATIENT
Start: 2024-05-15 | End: 2024-05-20 | Stop reason: HOSPADM

## 2024-05-14 RX ORDER — NAPROXEN SODIUM 220 MG/1
81 TABLET, FILM COATED ORAL DAILY
Status: DISCONTINUED | OUTPATIENT
Start: 2024-05-15 | End: 2024-05-20 | Stop reason: HOSPADM

## 2024-05-14 RX ORDER — GLUCAGON 1 MG
1 KIT INJECTION
Status: DISCONTINUED | OUTPATIENT
Start: 2024-05-15 | End: 2024-05-20 | Stop reason: HOSPADM

## 2024-05-14 RX ADMIN — IPRATROPIUM BROMIDE AND ALBUTEROL SULFATE 3 ML: .5; 3 SOLUTION RESPIRATORY (INHALATION) at 08:05

## 2024-05-14 RX ADMIN — PREDNISONE 40 MG: 20 TABLET ORAL at 11:05

## 2024-05-14 RX ADMIN — FUROSEMIDE 40 MG: 10 INJECTION, SOLUTION INTRAMUSCULAR; INTRAVENOUS at 11:05

## 2024-05-14 NOTE — Clinical Note
Diagnosis: SOB (shortness of breath) [[id number]]   Future Attending Provider: ARMANDO MARTINEZ [[id number]]   Reason for IP Medical Treatment  (Clinical interventions that can only be accomplished in the IP setting? ) :: copd exaccerbation   I certify that Inpatient services for greater than or equal to 2 midnights are medically necessary:: Yes   Plans for Post-Acute care--if anticipated (pick the single best option):: C. Discharge home with home health services

## 2024-05-15 ENCOUNTER — TELEPHONE (OUTPATIENT)
Dept: PRIMARY CARE CLINIC | Facility: CLINIC | Age: 69
End: 2024-05-15

## 2024-05-15 PROBLEM — J96.11 CHRONIC RESPIRATORY FAILURE WITH HYPOXIA AND HYPERCAPNIA: Status: ACTIVE | Noted: 2023-08-09

## 2024-05-15 LAB
FIO2: 28 %
FIO2: 30 %
PCO2 BLDA: 82.5 MMHG (ref 35–45)
PCO2 BLDA: 83.4 MMHG (ref 35–45)
PH SMN: 7.36 [PH] (ref 7.35–7.45)
PH SMN: 7.38 [PH] (ref 7.35–7.45)
PO2 BLDA: 41.5 MMHG (ref 40–60)
PO2 BLDA: 51.9 MMHG (ref 40–60)
POC BASE DEFICIT: 16.9 MMOL/L (ref -2–2)
POC BASE DEFICIT: 19.8 MMOL/L (ref -2–2)
POC HCO3: 46.7 MMOL/L (ref 24–28)
POC HCO3: 49.3 MMOL/L (ref 24–28)
POC PERFORMED BY: ABNORMAL
POC PERFORMED BY: ABNORMAL
POC SATURATED O2: 78.6 % (ref 95–100)
POC SATURATED O2: 88.5 % (ref 95–100)
SPECIMEN SOURCE: ABNORMAL
SPECIMEN SOURCE: ABNORMAL

## 2024-05-15 PROCEDURE — 27100171 HC OXYGEN HIGH FLOW UP TO 24 HOURS: Mod: HCNC

## 2024-05-15 PROCEDURE — 25000003 PHARM REV CODE 250: Mod: HCNC | Performed by: FAMILY MEDICINE

## 2024-05-15 PROCEDURE — 25000003 PHARM REV CODE 250: Mod: HCNC | Performed by: STUDENT IN AN ORGANIZED HEALTH CARE EDUCATION/TRAINING PROGRAM

## 2024-05-15 PROCEDURE — 25000242 PHARM REV CODE 250 ALT 637 W/ HCPCS: Mod: HCNC | Performed by: STUDENT IN AN ORGANIZED HEALTH CARE EDUCATION/TRAINING PROGRAM

## 2024-05-15 PROCEDURE — 94640 AIRWAY INHALATION TREATMENT: CPT | Mod: HCNC

## 2024-05-15 PROCEDURE — 63700000 PHARM REV CODE 250 ALT 637 W/O HCPCS: Mod: HCNC | Performed by: STUDENT IN AN ORGANIZED HEALTH CARE EDUCATION/TRAINING PROGRAM

## 2024-05-15 PROCEDURE — 82803 BLOOD GASES ANY COMBINATION: CPT | Mod: HCNC

## 2024-05-15 PROCEDURE — 94761 N-INVAS EAR/PLS OXIMETRY MLT: CPT | Mod: HCNC,XB

## 2024-05-15 PROCEDURE — 99900035 HC TECH TIME PER 15 MIN (STAT): Mod: HCNC

## 2024-05-15 PROCEDURE — 63600175 PHARM REV CODE 636 W HCPCS: Mod: HCNC | Performed by: STUDENT IN AN ORGANIZED HEALTH CARE EDUCATION/TRAINING PROGRAM

## 2024-05-15 PROCEDURE — 94660 CPAP INITIATION&MGMT: CPT | Mod: HCNC

## 2024-05-15 PROCEDURE — 11000001 HC ACUTE MED/SURG PRIVATE ROOM: Mod: HCNC

## 2024-05-15 RX ORDER — FLUTICASONE FUROATE AND VILANTEROL 100; 25 UG/1; UG/1
1 POWDER RESPIRATORY (INHALATION) DAILY
Status: DISCONTINUED | OUTPATIENT
Start: 2024-05-15 | End: 2024-05-17

## 2024-05-15 RX ORDER — IPRATROPIUM BROMIDE AND ALBUTEROL SULFATE 2.5; .5 MG/3ML; MG/3ML
3 SOLUTION RESPIRATORY (INHALATION)
Status: DISCONTINUED | OUTPATIENT
Start: 2024-05-15 | End: 2024-05-20 | Stop reason: HOSPADM

## 2024-05-15 RX ORDER — GUAIFENESIN 100 MG/5ML
200 SOLUTION ORAL EVERY 4 HOURS PRN
Status: DISCONTINUED | OUTPATIENT
Start: 2024-05-15 | End: 2024-05-20 | Stop reason: HOSPADM

## 2024-05-15 RX ORDER — ALBUTEROL SULFATE 90 UG/1
2 AEROSOL, METERED RESPIRATORY (INHALATION) EVERY 6 HOURS PRN
Status: DISCONTINUED | OUTPATIENT
Start: 2024-05-15 | End: 2024-05-20 | Stop reason: HOSPADM

## 2024-05-15 RX ORDER — METHOCARBAMOL 500 MG/1
500 TABLET, FILM COATED ORAL 4 TIMES DAILY
Status: DISCONTINUED | OUTPATIENT
Start: 2024-05-15 | End: 2024-05-17

## 2024-05-15 RX ORDER — SIMETHICONE 125 MG
125 TABLET,CHEWABLE ORAL EVERY 6 HOURS PRN
Status: DISCONTINUED | OUTPATIENT
Start: 2024-05-15 | End: 2024-05-20 | Stop reason: HOSPADM

## 2024-05-15 RX ADMIN — ROFLUMILAST 500 MCG: 500 TABLET ORAL at 11:05

## 2024-05-15 RX ADMIN — PREDNISONE 40 MG: 20 TABLET ORAL at 09:05

## 2024-05-15 RX ADMIN — METHOCARBAMOL 500 MG: 500 TABLET ORAL at 08:05

## 2024-05-15 RX ADMIN — METHOCARBAMOL 500 MG: 500 TABLET ORAL at 09:05

## 2024-05-15 RX ADMIN — SIMETHICONE 125 MG: 125 TABLET, CHEWABLE ORAL at 03:05

## 2024-05-15 RX ADMIN — ALBUTEROL SULFATE 2 PUFF: 90 AEROSOL, METERED RESPIRATORY (INHALATION) at 11:05

## 2024-05-15 RX ADMIN — FLUTICASONE FUROATE AND VILANTEROL TRIFENATATE 1 PUFF: 100; 25 POWDER RESPIRATORY (INHALATION) at 11:05

## 2024-05-15 RX ADMIN — ASPIRIN 81 MG CHEWABLE TABLET 81 MG: 81 TABLET CHEWABLE at 09:05

## 2024-05-15 RX ADMIN — TRAZODONE HYDROCHLORIDE 50 MG: 50 TABLET ORAL at 10:05

## 2024-05-15 RX ADMIN — ENOXAPARIN SODIUM 40 MG: 40 INJECTION SUBCUTANEOUS at 04:05

## 2024-05-15 RX ADMIN — GUAIFENESIN 200 MG: 200 SOLUTION ORAL at 08:05

## 2024-05-15 RX ADMIN — AZITHROMYCIN DIHYDRATE 250 MG: 250 TABLET ORAL at 04:05

## 2024-05-15 RX ADMIN — IPRATROPIUM BROMIDE AND ALBUTEROL SULFATE 3 ML: 2.5; .5 SOLUTION RESPIRATORY (INHALATION) at 03:05

## 2024-05-15 RX ADMIN — METHOCARBAMOL 500 MG: 500 TABLET ORAL at 04:05

## 2024-05-15 RX ADMIN — IPRATROPIUM BROMIDE AND ALBUTEROL SULFATE 3 ML: 2.5; .5 SOLUTION RESPIRATORY (INHALATION) at 07:05

## 2024-05-15 RX ADMIN — GUAIFENESIN 200 MG: 200 SOLUTION ORAL at 01:05

## 2024-05-15 RX ADMIN — LOSARTAN POTASSIUM 50 MG: 50 TABLET, FILM COATED ORAL at 09:05

## 2024-05-15 RX ADMIN — TRAZODONE HYDROCHLORIDE 50 MG: 50 TABLET ORAL at 04:05

## 2024-05-15 RX ADMIN — IPRATROPIUM BROMIDE AND ALBUTEROL SULFATE 3 ML: 2.5; .5 SOLUTION RESPIRATORY (INHALATION) at 11:05

## 2024-05-15 RX ADMIN — METHOCARBAMOL 500 MG: 500 TABLET ORAL at 01:05

## 2024-05-15 RX ADMIN — FUROSEMIDE 40 MG: 40 TABLET ORAL at 09:05

## 2024-05-15 RX ADMIN — IPRATROPIUM BROMIDE AND ALBUTEROL SULFATE 3 ML: .5; 3 SOLUTION RESPIRATORY (INHALATION) at 06:05

## 2024-05-15 NOTE — ASSESSMENT & PLAN NOTE
Patient is end-stage COPD presented with mild exacerbation.  Start prednisone 40 mg for 5 days  DuoNebs Q 8h  Continue BiPAP at night.  Continue home azithromycin  Procalcitonin is negative.  We will hold on starting cap coverage.

## 2024-05-15 NOTE — ED PROVIDER NOTES
Encounter Date: 2024       History     Chief Complaint   Patient presents with    Shortness of Breath     Pt to the ER with worsening of SOB by EMS EJ 35. Pt on 3 L NC all the time because pt hx of COPD with base line pulse ox 88-92%. EMS found pt with labored RR only sating 88% on her NC. EMS established an IV, provided 125mg Solu-Medrol, and a Duo Neb.      HPI  68-year-old female history of COPD on 2 L nasal at CT meal at home presents brought in by EMS for shortness of breath, for what patient's self describes as a COPD exacerbation.  She states that she has been up titrating her oxygen over the last week from her usual 2 up to 3-4 L, and it is still dyspneic on this.  She denies chest pain, leg swelling, fever, or any other systemic or infectious symptoms or other acute complaints.  Review of patient's allergies indicates:   Allergen Reactions    Codeine Nausea And Vomiting    Lipitor [atorvastatin] Other (See Comments)     Muscle fatigue      Morphine Hallucinations       Past Medical History:   Diagnosis Date    COPD (chronic obstructive pulmonary disease)     Hypertension      Past Surgical History:   Procedure Laterality Date    BREAST BIOPSY N/A     pt unsure which breast but it was benign-(calcium)     SECTION      HERNIA REPAIR      HYSTERECTOMY       No family history on file.  Social History     Tobacco Use    Smoking status: Former     Current packs/day: 0.00     Average packs/day: 1 pack/day for 20.0 years (20.0 ttl pk-yrs)     Types: Cigarettes     Quit date: 2021     Years since quitting: 3.3     Passive exposure: Past    Smokeless tobacco: Never   Substance Use Topics    Alcohol use: Not Currently     Alcohol/week: 1.0 standard drink of alcohol     Types: 1 Glasses of wine per week    Drug use: No   Patient's medical, surgical, social, and family history reviewed.  Review of Systems    Physical Exam     Initial Vitals   BP Pulse Resp Temp SpO2   24  05/14/24 2003 05/14/24 1939 05/14/24 1939   (!) 122/57 100 (!) 30 99 °F (37.2 °C) (!) 91 %      MAP       --                Physical Exam  Physical  General: Awake, alert, mild use of accessory muscles to breathe  Head: Normocephalic, atraumatic  Neck: Trachea midline, full range of motion, no meningismus  ENT: Atraumatic, Airway Patent  Cardio: Regular Rate, Regular Rhythm, Heart Sounds Normal, Capillary refill normal  Chest:  Tachypnea with mild use of accessory muscles to breathe, bibasilar wheezing and slightly decreased air movement.    Abdomen: Soft, Nontender, no involuntary guarding, rigidity, or rebound.  Upper Ext: Atraumatic, Inspection normal, no swelling  Lower Ext: Atraumatic, Inspection normal, no swelling  Neuro: No gross cranial nerve abnormality, no lateralization, no gross sensory or motor deficits    ED Course   Procedures  Labs Reviewed   CBC W/ AUTO DIFFERENTIAL - Abnormal; Notable for the following components:       Result Value    MCHC 29.2 (*)     Lymph # 0.8 (*)     Mono # 0.2 (*)     Gran % 85.7 (*)     Lymph % 11.2 (*)     Mono % 2.4 (*)     All other components within normal limits   COMPREHENSIVE METABOLIC PANEL - Abnormal; Notable for the following components:    Sodium 149 (*)     CO2 39 (*)     Glucose 143 (*)     All other components within normal limits   B-TYPE NATRIURETIC PEPTIDE - Abnormal; Notable for the following components:     (*)     All other components within normal limits   INFLUENZA A & B BY MOLECULAR   TROPONIN I   PROCALCITONIN   SARS-COV-2 RNA AMPLIFICATION, QUAL          Imaging Results              X-Ray Chest AP (Final result)  Result time 05/14/24 20:38:34      Final result by Arnaud Cunningham DO (05/14/24 20:38:34)                   Impression:      No acute abnormality.      Electronically signed by: Arnaud Cunningham  Date:    05/14/2024  Time:    20:38               Narrative:    EXAMINATION:  XR CHEST AP PORTABLE    CLINICAL  HISTORY:  sob;    TECHNIQUE:  Single frontal view of the chest was performed.    COMPARISON:  03/29/2024.    FINDINGS:  The lungs are well expanded and clear. No focal opacities are seen. The pleural spaces are clear. The cardiac silhouette is unremarkable.  There are calcifications of the aortic arch.  The visualized osseous structures demonstrate degenerative changes.                                       Medications   sodium chloride 0.9% flush 10 mL (has no administration in time range)   naloxone 0.4 mg/mL injection 0.02 mg (has no administration in time range)   glucose chewable tablet 16 g (has no administration in time range)   glucose chewable tablet 24 g (has no administration in time range)   glucagon (human recombinant) injection 1 mg (has no administration in time range)   enoxaparin injection 40 mg (has no administration in time range)   dextrose 10% bolus 125 mL 125 mL (has no administration in time range)   dextrose 10% bolus 250 mL 250 mL (has no administration in time range)   azithromycin tablet 250 mg (has no administration in time range)   aspirin chewable tablet 81 mg (has no administration in time range)   losartan tablet 50 mg (has no administration in time range)   roflumilast (DALIRESP) tablet 500 mcg (has no administration in time range)   traZODone tablet 50 mg (50 mg Oral Given 5/15/24 0427)   predniSONE tablet 40 mg (40 mg Oral Given 5/14/24 2353)   albuterol-ipratropium 2.5 mg-0.5 mg/3 mL nebulizer solution 3 mL (has no administration in time range)   furosemide tablet 40 mg (has no administration in time range)   simethicone chewable tablet 125 mg (125 mg Oral Given 5/15/24 0354)   methocarbamoL tablet 500 mg (500 mg Oral Given 5/15/24 0400)   albuterol-ipratropium 2.5 mg-0.5 mg/3 mL nebulizer solution 3 mL (3 mLs Nebulization Given 5/14/24 2017)   furosemide injection 40 mg (40 mg Intravenous Given 5/14/24 2346)     Medical Decision Making  Risk  Prescription drug  management.  Decision regarding hospitalization.    68-year-old female with history of advanced COPD presents for worsening dyspnea.  She had increased work of breathing, and on VBG she had respiratory acidosis that was uncompensated, indicating acute exacerbation requiring treatment with positive pressure ventilation.    As such BiPAP was started, with frequent reassessment and titration.    Given further nebs, already got steroids.  Will evaluate for acute pathology requiring intervention with appropriate studies, treat symptomatically, and reassess.    Studies reviewed negative for other acute processes such as pneumonia, COVID, or influenza, or any findings of heart failure.  All findings are consistent with COPD exacerbation    All resulted studies reviewed and treatments given. Patient to be admitted for further evaluation and treatment. All elements of care discussed with the admitting team who will continue care and accepts admission. .                35 minutes were spent in the critical care of this patient. Patient presented with acute hypercapnic respiratory failure in the setting of COPD exacerbation which is an acute life and limb threatening time-sensitive medical condition with high risk of decompensation requiring resuscitation including noninvasive positive pressure ventilation/BiPAP titration.  Time spent including time at the present bedside, time obtaining additional ancillary information, ordering and interpreting studies, reassessments, ordering interventions, discussion with patient and family,.  This does not include time spent on separately billed for procedures.  I personally performed the critical care time documented here and it does not include time spent by a APPs or residents.                 Clinical Impression:  Final diagnoses:  [R06.02] SOB (shortness of breath)  [R07.9] Chest pain  [J44.1] COPD exacerbation (Primary)  [J96.02] Acute hypercapnic respiratory failure          ED  Disposition Condition    Admit Stable                Manuel Hernandez MD  05/15/24 0516

## 2024-05-15 NOTE — ED NOTES
Blood drawn for repeat VBG to assess bipap settings.  Pt states resp unlabored- no complaints.  Lasix working well AEB purewick suction canister 3/4 full

## 2024-05-15 NOTE — ED NOTES
Pt napping with HOB up for comfort.  Bipap remains in place, resp changing setting based on VBG drawn.  Pt with no complaints.  Denies SOB, no work of breathing

## 2024-05-15 NOTE — PLAN OF CARE
05/15/24 1600   Admission   Initial VN Admission Questions Complete   Communication Issues? None   Shift   Virtual Nurse - Patient Verbalized Approval Of Camera Use   Safety/Activity   Patient Rounds visualized patient;bed in low position;bed wheels locked;call light in patient/parent reach;clutter free environment maintained;ID band on;placement of personal items at bedside

## 2024-05-15 NOTE — TELEPHONE ENCOUNTER
Patient was scheduled for 11am virtual visit and per chart review patient arrived in ED last night and remained.  LCSW received a message from call center that patient called at 11:50am.  LCSW returned call and left VM for patient.

## 2024-05-15 NOTE — NURSING
RAPID RESPONSE NURSE PROACTIVE ROUNDING NOTE       Time of Visit: 234    Admit Date: 2024  LOS: 1  Code Status: Full Code   Date of Visit: 05/15/2024  : 1955  Age: 68 y.o.  Sex: female  Race: Black or   Bed: ED 12/EXAM 12:   MRN: [id number]  Was the patient discharged from an ICU this admission? No   Was the patient discharged from a PACU within last 24 hours? No   Did the patient receive conscious sedation/general anesthesia in last 24 hours? No   Was the patient in the ED within the past 24 hours? No   Was the patient on NIPPV within the past 24 hours? Yes   Attending Physician: Josephine Xavier  Primary Service: Hospitalist,Hospice and Palliative Medicine   Time spent at the bedside: 15 -30 min    SITUATION    Notified by charge RN during rounding  Reason for alert: Hypercapnic/ COPD exacerbation    Diagnosis: COPD exacerbation   has a past medical history of COPD (chronic obstructive pulmonary disease) and Hypertension.    24 Hour Vitals Range:  Temp:  [99 °F (37.2 °C)]   Pulse:  []   Resp:  [20-30]   BP: (122-165)/(57-90)   SpO2:  [90 %-95 %]     Clinical Issues: Respiratory    ASSESSMENT/INTERVENTIONS    Pt lying in stretcher awake, and oriented x4. Pt calm and pleasant; following all commands.   Currently on BiPAP 18/8, 30% fiO2. Expiratory wheezes noted on auscultation.   Pt reports her SOB and SANDERS started last Wednesday. Pt is currently denying SOB at this time.     RECOMMENDATIONS    Chart, labs, orders, and vitals reviewed:    -Continue pt on BiPAP  -Repeat blood gas  -Team currently diuresing- Lasix IV  -Prednisone 40 mg, oral      Discussed plan of care with bedside RNDuncan    PROVIDER ESCALATION    Physician escalation: No    Orders received and case discussed with NA.    Disposition: Pt to be admmited to hosp    FOLLOW UP    Call back the Rapid Response NurseEmily at [phone] for additional questions or concerns.

## 2024-05-15 NOTE — NURSING
Report received from ELLIE Hayden. Patient received from ED in stable condition in no acute distress. Bed weight and VS taken. Cardiac monitoring in place. Patient on 2-3L NC. Purewick in place. SCDs in place. PRN Mucinex given. Safety maintained. Bed alarm set. Instructed to use call light for assistance. Will continue to monitor.

## 2024-05-15 NOTE — ASSESSMENT & PLAN NOTE
+3 lower extremity edema  JVD is normal, BNP is mildly elevated  Will give 1 dose of IV Lasix  Resume oral Lasix tomorrow

## 2024-05-15 NOTE — ED NOTES
Completed duoneb., tolerated well. Mild dyspnea at rest. Sao2-93%, pt. Reports she feels improvement. Repositioned in bed for comfort. Pure wick replaced at pt. Request to avoid fatigue after lasix administration. Pt. Is talking on phone to daughter. Personal items at bedside. V.s.s..

## 2024-05-15 NOTE — ASSESSMENT & PLAN NOTE
Appears euvolemic on exam   Mildly elevated BNP    S/p 1 dose of IV lasix, resume home dose oral lasix

## 2024-05-15 NOTE — ED NOTES
Pt. Is resting quietly without distress. V.s.s.. pt. States she is at baseline. Tolerated 100% of lunch tray. Urine out is approx. 200cc per pure wick. V.s.s.. transported to floor per stretcher via hospital transport personnel with portable o2 3l.pt. belongings-purse, glasses, phone/ and medications (inhalers) sent to floor with patient.

## 2024-05-15 NOTE — PLAN OF CARE
Pt on 3L NC wears @ home. Tx as scheduled, bipap qhs, no complaints of dyspnea @ this time. Will continue to monitor.

## 2024-05-15 NOTE — ED NOTES
Pt. Ate 100% of breakfast tray. Assisted up to bedside commode for bowel movement. ( Pt. Had a large, soft brown stool). Pt. Has some increased dyspnea with exertion, sao2 drops to mid/low 80's. Pt. Is requesting neb treatment and a rescue inhaler to have at bedside prn like she uses at home. Message sent to admit Sofya Garza via secure chat. Resp. Called for treatment.

## 2024-05-15 NOTE — MEDICAL/APP STUDENT
[facility] Medicine Student   History and Physical  05/15/2024  9:37 AM    SUBJECTIVE:     Chief Complaint:  Shortness of breath    History of Present Illness:  Terri Phelan is a 68 y.o. female with a relevant medical history of COPD (GOLD IV, on 2L NC at home, BiPap at night) and HTN who presents with increasing shortness of breath.    Ms. Phelan reports she has been increasingly short of breath for the past week, with increasing O2 requirement (3-4L from baseline 2L). This is also associated with increased tremor. She also reports increased leg swelling. She states this feels the same as her typical COPD exacerbation. There were no known inciting events. She denies recent illness, medication change. She has an ongoing dry cough that is unchanged. She denies chest pain, palpitations, dizziness, and LoC.     She has baseline lower extremity pitting edema, for which she wears compression stockings.There have been multiple COPD exacerbations this year. She is planning to start pulmonary rehab soon, pending return call from program.    In ED the patient was given DuoNeb, prednisone, and BiPap. She reports her airway symptoms have now completely resolved.      Review of Systems   Constitutional:  Negative for chills, fever and malaise/fatigue.   Respiratory:  Positive for cough and shortness of breath. Negative for hemoptysis and sputum production.    Cardiovascular:  Positive for leg swelling. Negative for chest pain and palpitations.   Gastrointestinal:  Negative for abdominal pain, diarrhea, nausea and vomiting.   Genitourinary:  Negative for dysuria, frequency and urgency.   Neurological:  Positive for tremors. Negative for dizziness and loss of consciousness.         HISTORY:     Past Medical History:   Diagnosis Date    COPD (chronic obstructive pulmonary disease)     Hypertension        Past Surgical History:   Procedure Laterality Date    BREAST BIOPSY N/A     pt unsure which breast but it was  benign-(calcium)     SECTION      HERNIA REPAIR      HYSTERECTOMY         No family history on file.    Social History     Socioeconomic History    Marital status:    Tobacco Use    Smoking status: Former     Current packs/day: 0.00     Average packs/day: 1 pack/day for 20.0 years (20.0 ttl pk-yrs)     Types: Cigarettes     Quit date: 2021     Years since quitting: 3.3     Passive exposure: Past    Smokeless tobacco: Never   Substance and Sexual Activity    Alcohol use: Not Currently     Alcohol/week: 1.0 standard drink of alcohol     Types: 1 Glasses of wine per week    Drug use: No    Sexual activity: Not Currently     Social Determinants of Health     Financial Resource Strain: Low Risk  (2024)    Overall Financial Resource Strain (CARDIA)     Difficulty of Paying Living Expenses: Not very hard   Food Insecurity: No Food Insecurity (2024)    Hunger Vital Sign     Worried About Running Out of Food in the Last Year: Never true     Ran Out of Food in the Last Year: Never true   Transportation Needs: No Transportation Needs (2024)    PRAPARE - Transportation     Lack of Transportation (Medical): No     Lack of Transportation (Non-Medical): No   Physical Activity: Inactive (2024)    Exercise Vital Sign     Days of Exercise per Week: 2 days     Minutes of Exercise per Session: 0 min   Stress: Stress Concern Present (2024)    [employer] - Occupational Stress Questionnaire     Feeling of Stress : To some extent   Housing Stability: Low Risk  (2024)    Housing Stability Vital Sign     Unable to Pay for Housing in the Last Year: No     Number of Places Lived in the Last Year: 1     Unstable Housing in the Last Year: No     The patient lives in an apartment next door to her daughter. She lives independently, manages ADLs as needed.      MEDICATIONS & ALLERGIES:     No current facility-administered medications on file prior to encounter.     Current  Outpatient Medications on File Prior to Encounter   Medication Sig Dispense Refill    acetaminophen (TYLENOL) 500 MG tablet Take 500 mg by mouth every 6 (six) hours as needed for Pain.      albuterol (PROVENTIL/VENTOLIN HFA) 90 mcg/actuation inhaler Inhale 2 puffs into the lungs every 6 (six) hours as needed for Wheezing or Shortness of Breath. 18 g 6    alendronate (FOSAMAX) 35 MG tablet Take 35 mg by mouth every Tuesday.      aspirin 81 MG Chew Take 1 tablet by mouth once daily.      azithromycin (Z-OLY) 250 MG tablet Take 1 tablet (250 mg total) by mouth every Mon, Wed, Fri. 12 tablet 11    budesonide-glycopyr-formoterol (BREZTRI AEROSPHERE) 160-9-4.8 mcg/actuation HFAA Inhale 2 puffs into the lungs 2 (two) times daily.      calcium carb-mag hydrox-simeth 1,200 mg-270 mg -80 mg/10 mL Susp Take 1,200 mg by mouth once daily.      cholecalciferol, vitamin D3, (VITAMIN D3) 50 mcg (2,000 unit) Tab Take 2,000 Units by mouth once daily.      dextromethorphan-guaiFENesin  mg (MUCINEX DM)  mg per 12 hr tablet Take 1 tablet by mouth 2 (two) times daily as needed.      diazePAM (VALIUM) 2 MG tablet Take 1 tablet (2 mg total) by mouth daily as needed for Anxiety. 30 tablet 0    dicyclomine (BENTYL) 20 mg tablet Take 1 tablet (20 mg total) by mouth 3 (three) times daily as needed (abdominal pain). 30 tablet 1    fluticasone propionate (FLONASE) 50 mcg/actuation nasal spray 1 spray (50 mcg total) by Each Nostril route daily as needed for Allergies. 16 g 11    furosemide (LASIX) 40 MG tablet Take 1 tablet (40 mg total) by mouth once daily. 30 tablet 4    ibuprofen (ADVIL,MOTRIN) 800 MG tablet Take 1 tablet (800 mg total) by mouth 3 (three) times daily as needed for Pain. 90 tablet 0    losartan (COZAAR) 50 MG tablet Take 1 tablet (50 mg total) by mouth once daily. 90 tablet 3    OXYGEN-AIR DELIVERY SYSTEMS MISC 2 L by Nasal route continuous.      potassium chloride (MICRO-K) 10 MEQ CpSR Take 10 mEq by mouth once  daily.      roflumilast (DALIRESP) 500 mcg Tab Take 500 mcg by mouth once daily.      traZODone (DESYREL) 50 MG tablet Take 1 tablet (50 mg total) by mouth nightly as needed for Insomnia. 30 tablet 6     No recent medication changes.     Review of patient's allergies indicates:   Allergen Reactions    Codeine Nausea And Vomiting    Lipitor [atorvastatin] Other (See Comments)     Muscle fatigue      Morphine Hallucinations       OBJECTIVE:     Vital Signs Recent:  Temp: 98.2 °F (36.8 °C) (05/15/24 0805)  Pulse: 66 (05/15/24 0800)  Resp: 20 (05/15/24 0800)  BP: 139/63 (05/15/24 0930)  SpO2: (!) 89 % (05/15/24 0800)  Oxygen Documentation:    Flow (L/min) (Oxygen Therapy): 3  Oxygen Concentration (%): 28        Device (Oxygen Therapy): nasal cannula     $ Is the patient on High Flow Oxygen?: Yes   Vital Signs Range (Last 24H):  Temp:  [98.2 °F (36.8 °C)-99 °F (37.2 °C)]   Pulse:  []   Resp:  [17-30]   BP: (122-165)/(57-90)   SpO2:  [89 %-95 %]   Oxygen Concentration (%): 28 (05/15/24 0655)    Weight:  Body mass index is 29.86 kg/m².  Wt Readings from Last 3 Encounters:   05/14/24 83.9 kg (185 lb)   05/06/24 84.2 kg (185 lb 10 oz)   04/04/24 84.8 kg (186 lb 15.2 oz)        Physical Exam  Constitutional:       Appearance: Normal appearance.   Cardiovascular:      Rate and Rhythm: Normal rate and regular rhythm.      Pulses: Normal pulses.      Heart sounds: Normal heart sounds.   Pulmonary:      Effort: Pulmonary effort is normal.      Breath sounds: Normal breath sounds.   Abdominal:      General: Abdomen is flat.      Palpations: Abdomen is soft.      Tenderness: There is no abdominal tenderness.   Musculoskeletal:      Right lower leg: Edema present.      Left lower leg: Edema present.      Comments: 2+ pitting edema to level of midthigh   Skin:     General: Skin is warm and dry.      Findings: No rash.   Neurological:      Mental Status: She is alert and oriented to person, place, and time.            Sodium  (mmol/L)   Date Value   05/14/2024 149 (H)   03/31/2024 144   03/30/2024 145     Potassium (mmol/L)   Date Value   05/14/2024 4.0   03/31/2024 4.6   03/30/2024 4.1     Chloride (mmol/L)   Date Value   05/14/2024 97   03/31/2024 101   03/30/2024 99     CO2 (mmol/L)   Date Value   05/14/2024 39 (H)   03/31/2024 35 (H)   03/30/2024 37 (H)     BUN (mg/dL)   Date Value   05/14/2024 14   03/31/2024 20   03/30/2024 13     Creatinine (mg/dL)   Date Value   05/14/2024 0.8   03/31/2024 0.7   03/30/2024 0.8     Glucose (mg/dL)   Date Value   05/14/2024 143 (H)   03/31/2024 99   03/30/2024 185 (H)     Calcium (mg/dL)   Date Value   05/14/2024 9.2   03/31/2024 9.2   03/30/2024 9.0     Magnesium (mg/dL)   Date Value   12/29/2023 2.3   12/28/2023 2.1   12/27/2023 2.1     Phosphorus (mg/dL)   Date Value   12/29/2023 2.5 (L)   12/28/2023 2.9   12/20/2023 2.7     Alkaline Phosphatase (U/L)   Date Value   05/14/2024 55   03/29/2024 55   12/27/2023 45 (L)     ALT (U/L)   Date Value   05/14/2024 17   03/29/2024 17   12/27/2023 21     AST (U/L)   Date Value   05/14/2024 17   03/29/2024 25   12/27/2023 20     Albumin (g/dL)   Date Value   05/14/2024 3.7   03/29/2024 3.4 (L)   12/27/2023 3.5     Total Protein (g/dL)   Date Value   05/14/2024 7.3   03/29/2024 7.4   12/27/2023 6.8     Total Bilirubin (mg/dL)   Date Value   05/14/2024 0.2   03/29/2024 0.2   12/27/2023 0.4     INR (no units)   Date Value   05/03/2022 1.0       WBC (K/uL)   Date Value   05/14/2024 6.80   03/31/2024 13.95 (H)   03/30/2024 9.77     Hemoglobin (g/dL)   Date Value   05/14/2024 13.2   03/31/2024 12.5   03/30/2024 13.2     Platelets (K/uL)   Date Value   05/14/2024 163   03/31/2024 170   03/30/2024 182       Diagnostic Results:  CXR no acute findings    ASSESSMENT -- PLAN:   Terri Phelan is a 68 y.o. female with a relevant medical history of COPD (GOLD IV, on 2L NC at home, BiPap at night) and HTN who is being treated for COPD exacerbation.      Active Hospital  Problems    Diagnosis  POA    *COPD exacerbation [J44.1]  Yes    (HFpEF) heart failure with preserved ejection fraction [I50.30]  Unknown    History of myocardial infarction [I25.2]  Not Applicable    HTN (hypertension) [I10]  Yes      Resolved Hospital Problems   No resolved problems to display.        1.COPD Exacerbation (Acute Hypercapnic Respiratory Failure)  ASSESSMENT: COPD (GOLD IV) with frequent exacerbations. Currently clinically improved, down to 3L NC and asymptomatic.  PLAN:  -continue DuoNeb, prednisone 40  -respiratory therapy   -wean O2 as tolerated (goal saturation 88-92)  -continue home meds  -pulmonary rehab on d/c    2. Lower Extremity Edema  ASSESSMENT: Possibly due to Pulmonary Hypertension c/b Cor Pulmonale. Last echo July 2023: PA pressure 37, systolic/diastolic function normal. On home furosemide 20  PLAN:  -furosemide 40  -echo    3. Hypertension  ASSESSMENT: On home losartan 50  PLAN:  -continue home meds

## 2024-05-15 NOTE — SUBJECTIVE & OBJECTIVE
Past Medical History:   Diagnosis Date    COPD (chronic obstructive pulmonary disease)     Hypertension        Past Surgical History:   Procedure Laterality Date    BREAST BIOPSY N/A     pt unsure which breast but it was benign-(calcium)     SECTION      HERNIA REPAIR      HYSTERECTOMY         Review of patient's allergies indicates:   Allergen Reactions    Codeine Nausea And Vomiting    Lipitor [atorvastatin] Other (See Comments)     Muscle fatigue      Morphine Hallucinations       No current facility-administered medications on file prior to encounter.     Current Outpatient Medications on File Prior to Encounter   Medication Sig    acetaminophen (TYLENOL) 500 MG tablet Take 500 mg by mouth every 6 (six) hours as needed for Pain.    albuterol (PROVENTIL/VENTOLIN HFA) 90 mcg/actuation inhaler Inhale 2 puffs into the lungs every 6 (six) hours as needed for Wheezing or Shortness of Breath.    alendronate (FOSAMAX) 35 MG tablet Take 35 mg by mouth every Tuesday.    aspirin 81 MG Chew Take 1 tablet by mouth once daily.    azithromycin (Z-OLY) 250 MG tablet Take 1 tablet (250 mg total) by mouth every Mon, Wed, Fri.    budesonide-glycopyr-formoterol (BREZTRI AEROSPHERE) 160-9-4.8 mcg/actuation HFAA Inhale 2 puffs into the lungs 2 (two) times daily.    calcium carb-mag hydrox-simeth 1,200 mg-270 mg -80 mg/10 mL Susp Take 1,200 mg by mouth once daily.    cholecalciferol, vitamin D3, (VITAMIN D3) 50 mcg (2,000 unit) Tab Take 2,000 Units by mouth once daily.    dextromethorphan-guaiFENesin  mg (MUCINEX DM)  mg per 12 hr tablet Take 1 tablet by mouth 2 (two) times daily as needed.    diazePAM (VALIUM) 2 MG tablet Take 1 tablet (2 mg total) by mouth daily as needed for Anxiety.    dicyclomine (BENTYL) 20 mg tablet Take 1 tablet (20 mg total) by mouth 3 (three) times daily as needed (abdominal pain).    fluticasone propionate (FLONASE) 50 mcg/actuation nasal spray 1 spray (50 mcg total) by Each Nostril  route daily as needed for Allergies.    furosemide (LASIX) 40 MG tablet Take 1 tablet (40 mg total) by mouth once daily.    ibuprofen (ADVIL,MOTRIN) 800 MG tablet Take 1 tablet (800 mg total) by mouth 3 (three) times daily as needed for Pain.    losartan (COZAAR) 50 MG tablet Take 1 tablet (50 mg total) by mouth once daily.    OXYGEN-AIR DELIVERY SYSTEMS MISC 2 L by Nasal route continuous.    potassium chloride (MICRO-K) 10 MEQ CpSR Take 10 mEq by mouth once daily.    roflumilast (DALIRESP) 500 mcg Tab Take 500 mcg by mouth once daily.    traZODone (DESYREL) 50 MG tablet Take 1 tablet (50 mg total) by mouth nightly as needed for Insomnia.     Family History    None       Tobacco Use    Smoking status: Former     Current packs/day: 0.00     Average packs/day: 1 pack/day for 20.0 years (20.0 ttl pk-yrs)     Types: Cigarettes     Quit date: 1/1/2021     Years since quitting: 3.3     Passive exposure: Past    Smokeless tobacco: Never   Substance and Sexual Activity    Alcohol use: Not Currently     Alcohol/week: 1.0 standard drink of alcohol     Types: 1 Glasses of wine per week    Drug use: No    Sexual activity: Not Currently     Review of Systems   Respiratory:  Positive for cough, shortness of breath and wheezing.    All other systems reviewed and are negative.    Objective:     Vital Signs (Most Recent):  Temp: 99 °F (37.2 °C) (05/14/24 1939)  Pulse: 70 (05/14/24 2309)  Resp: 20 (05/14/24 2103)  BP: (!) 148/66 (05/14/24 2259)  SpO2: 95 % (05/14/24 2309) Vital Signs (24h Range):  Temp:  [99 °F (37.2 °C)] 99 °F (37.2 °C)  Pulse:  [] 70  Resp:  [20-30] 20  SpO2:  [90 %-95 %] 95 %  BP: (122-148)/(57-90) 148/66     Weight: 83.9 kg (185 lb)  Body mass index is 29.86 kg/m².     Physical Exam  Constitutional:       General: She is in acute distress.   HENT:      Right Ear: External ear normal.      Left Ear: External ear normal.      Mouth/Throat:      Mouth: Mucous membranes are moist.      Pharynx: Oropharynx is  clear.   Eyes:      Conjunctiva/sclera: Conjunctivae normal.      Pupils: Pupils are equal, round, and reactive to light.   Cardiovascular:      Rate and Rhythm: Normal rate and regular rhythm.   Pulmonary:      Breath sounds: Rhonchi present.   Abdominal:      General: Abdomen is flat. Bowel sounds are normal.      Palpations: Abdomen is soft.   Musculoskeletal:         General: Normal range of motion.   Skin:     General: Skin is warm.      Capillary Refill: Capillary refill takes less than 2 seconds.   Neurological:      General: No focal deficit present.      Mental Status: She is alert and oriented to person, place, and time.              CRANIAL NERVES     CN III, IV, VI   Pupils are equal, round, and reactive to light.       Significant Labs: All pertinent labs within the past 24 hours have been reviewed.    Significant Imaging: I have reviewed all pertinent imaging results/findings within the past 24 hours.

## 2024-05-15 NOTE — SUBJECTIVE & OBJECTIVE
Interval History: SOB has improved. Compliant with home COPD regimen. Attributes exacerbation to recent unexpected death of a close friend.     Review of Systems  Objective:     Vital Signs (Most Recent):  Temp: 98 °F (36.7 °C) (05/15/24 1635)  Pulse: 73 (05/15/24 1635)  Resp: 20 (05/15/24 1635)  BP: (!) 146/63 (05/15/24 1635)  SpO2: (!) 93 % (05/15/24 1635) Vital Signs (24h Range):  Temp:  [98 °F (36.7 °C)-99 °F (37.2 °C)] 98 °F (36.7 °C)  Pulse:  [] 73  Resp:  [17-30] 20  SpO2:  [89 %-98 %] 93 %  BP: (122-165)/(57-90) 146/63     Weight: 84.5 kg (186 lb 4.6 oz)  Body mass index is 30.07 kg/m².    Intake/Output Summary (Last 24 hours) at 5/15/2024 1822  Last data filed at 5/15/2024 1700  Gross per 24 hour   Intake --   Output 1600 ml   Net -1600 ml         Physical Exam  Vitals and nursing note reviewed.   Constitutional:       General: She is not in acute distress.  Cardiovascular:      Rate and Rhythm: Normal rate and regular rhythm.      Heart sounds: Normal heart sounds. No murmur heard.  Pulmonary:      Effort: Pulmonary effort is normal. No respiratory distress.      Breath sounds: No wheezing.      Comments: Decreased breath sounds b/l  Abdominal:      Palpations: Abdomen is soft.      Tenderness: There is no abdominal tenderness.   Musculoskeletal:      Right lower leg: No edema.      Left lower leg: No edema.      Comments: Compression stockings on   Skin:     General: Skin is warm and dry.      Findings: No bruising or rash.   Neurological:      General: No focal deficit present.      Mental Status: She is alert and oriented to person, place, and time.             Significant Labs: All pertinent labs within the past 24 hours have been reviewed.    Significant Imaging: I have reviewed all pertinent imaging results/findings within the past 24 hours.

## 2024-05-15 NOTE — ED NOTES
Care hand off from TERRANCE Rosas RN. Pt. Presented to ED for worsening sob x3 days. Being admitted for copd exacerbation. Currently on bipap-I/E-18/8, fio2-28%. Unsuccessful attempt to wean since arrival but remained significantly sob without support. Pt. Is on o22l nasal cannula at home continuously and titrated up to 3 with exertion/sob. Pt. Appears comfortable without dyspnea and tolerating bipap. States she is feeling better since arrival. V.s.s..

## 2024-05-15 NOTE — ASSESSMENT & PLAN NOTE
On home oxygen 2 L NC  Patient is end-stage COPD   Continue prednisone 40 mg for 5 days  Schedule duonebs Q4, breo  Continue BiPAP qhs  Continue home azithromycin 3 times a week, roflumilast

## 2024-05-15 NOTE — H&P
[facility] Emergency [facility]  [facility] Medicine  History & Physical    Patient Name: Terri Phelan  MRN: [id number]  Patient Class: IP- Inpatient  Admission Date: 2024  Attending Physician: Danny Saba MD  Primary Care Provider: Jaimie Reynoso MD         Patient information was obtained from patient and ER records.     Subjective:     Principal Problem:COPD exacerbation    Chief Complaint:   Chief Complaint   Patient presents with    Shortness of Breath     Pt to the ER with worsening of SOB by EMS EJ 35. Pt on 3 L NC all the time because pt hx of COPD with base line pulse ox 88-92%. EMS found pt with labored RR only sating 88% on her NC. EMS established an IV, provided 125mg Solu-Medrol, and a Duo Neb.         HPI: 68 years old woman with past medical history of COPD stage gold D on home oxygen and BiPAP at night, heart failure with a preserved ejection fraction, CAD, hypertension who presented with worsening shortness of breath along with worsening chronic cough and change in sputum color.  She also stated that her lower extremity edema is worse than usual.  She endorsed compliance with all her medications, and she denies any history of current smoking.  BNP is slightly elevated, procalcitonin  is negative.     Past Medical History:   Diagnosis Date    COPD (chronic obstructive pulmonary disease)     Hypertension        Past Surgical History:   Procedure Laterality Date    BREAST BIOPSY N/A     pt unsure which breast but it was benign-(calcium)     SECTION      HERNIA REPAIR      HYSTERECTOMY         Review of patient's allergies indicates:   Allergen Reactions    Codeine Nausea And Vomiting    Lipitor [atorvastatin] Other (See Comments)     Muscle fatigue      Morphine Hallucinations       No current facility-administered medications on file prior to encounter.     Current Outpatient Medications on File Prior to Encounter   Medication Sig    acetaminophen (TYLENOL) 500 MG tablet Take 500  mg by mouth every 6 (six) hours as needed for Pain.    albuterol (PROVENTIL/VENTOLIN HFA) 90 mcg/actuation inhaler Inhale 2 puffs into the lungs every 6 (six) hours as needed for Wheezing or Shortness of Breath.    alendronate (FOSAMAX) 35 MG tablet Take 35 mg by mouth every Tuesday.    aspirin 81 MG Chew Take 1 tablet by mouth once daily.    azithromycin (Z-OLY) 250 MG tablet Take 1 tablet (250 mg total) by mouth every Mon, Wed, Fri.    budesonide-glycopyr-formoterol (BREZTRI AEROSPHERE) 160-9-4.8 mcg/actuation HFAA Inhale 2 puffs into the lungs 2 (two) times daily.    calcium carb-mag hydrox-simeth 1,200 mg-270 mg -80 mg/10 mL Susp Take 1,200 mg by mouth once daily.    cholecalciferol, vitamin D3, (VITAMIN D3) 50 mcg (2,000 unit) Tab Take 2,000 Units by mouth once daily.    dextromethorphan-guaiFENesin  mg (MUCINEX DM)  mg per 12 hr tablet Take 1 tablet by mouth 2 (two) times daily as needed.    diazePAM (VALIUM) 2 MG tablet Take 1 tablet (2 mg total) by mouth daily as needed for Anxiety.    dicyclomine (BENTYL) 20 mg tablet Take 1 tablet (20 mg total) by mouth 3 (three) times daily as needed (abdominal pain).    fluticasone propionate (FLONASE) 50 mcg/actuation nasal spray 1 spray (50 mcg total) by Each Nostril route daily as needed for Allergies.    furosemide (LASIX) 40 MG tablet Take 1 tablet (40 mg total) by mouth once daily.    ibuprofen (ADVIL,MOTRIN) 800 MG tablet Take 1 tablet (800 mg total) by mouth 3 (three) times daily as needed for Pain.    losartan (COZAAR) 50 MG tablet Take 1 tablet (50 mg total) by mouth once daily.    OXYGEN-AIR DELIVERY SYSTEMS MISC 2 L by Nasal route continuous.    potassium chloride (MICRO-K) 10 MEQ CpSR Take 10 mEq by mouth once daily.    roflumilast (DALIRESP) 500 mcg Tab Take 500 mcg by mouth once daily.    traZODone (DESYREL) 50 MG tablet Take 1 tablet (50 mg total) by mouth nightly as needed for Insomnia.     Family History    None       Tobacco Use    Smoking  status: Former     Current packs/day: 0.00     Average packs/day: 1 pack/day for 20.0 years (20.0 ttl pk-yrs)     Types: Cigarettes     Quit date: 1/1/2021     Years since quitting: 3.3     Passive exposure: Past    Smokeless tobacco: Never   Substance and Sexual Activity    Alcohol use: Not Currently     Alcohol/week: 1.0 standard drink of alcohol     Types: 1 Glasses of wine per week    Drug use: No    Sexual activity: Not Currently     Review of Systems   Respiratory:  Positive for cough, shortness of breath and wheezing.    All other systems reviewed and are negative.    Objective:     Vital Signs (Most Recent):  Temp: 99 °F (37.2 °C) (05/14/24 1939)  Pulse: 70 (05/14/24 2309)  Resp: 20 (05/14/24 2103)  BP: (!) 148/66 (05/14/24 2259)  SpO2: 95 % (05/14/24 2309) Vital Signs (24h Range):  Temp:  [99 °F (37.2 °C)] 99 °F (37.2 °C)  Pulse:  [] 70  Resp:  [20-30] 20  SpO2:  [90 %-95 %] 95 %  BP: (122-148)/(57-90) 148/66     Weight: 83.9 kg (185 lb)  Body mass index is 29.86 kg/m².     Physical Exam  Constitutional:       General: She is in acute distress.   HENT:      Right Ear: External ear normal.      Left Ear: External ear normal.      Mouth/Throat:      Mouth: Mucous membranes are moist.      Pharynx: Oropharynx is clear.   Eyes:      Conjunctiva/sclera: Conjunctivae normal.      Pupils: Pupils are equal, round, and reactive to light.   Cardiovascular:      Rate and Rhythm: Normal rate and regular rhythm.   Pulmonary:      Breath sounds: Rhonchi present.   Abdominal:      General: Abdomen is flat. Bowel sounds are normal.      Palpations: Abdomen is soft.   Musculoskeletal:         General: Normal range of motion.   Skin:     General: Skin is warm.      Capillary Refill: Capillary refill takes less than 2 seconds.   Neurological:      General: No focal deficit present.      Mental Status: She is alert and oriented to person, place, and time.              CRANIAL NERVES     CN III, IV, VI   Pupils are equal,  round, and reactive to light.       Significant Labs: All pertinent labs within the past 24 hours have been reviewed.    Significant Imaging: I have reviewed all pertinent imaging results/findings within the past 24 hours.  Assessment/Plan:     * COPD exacerbation  Patient is end-stage COPD presented with mild exacerbation.  Start prednisone 40 mg for 5 days  DuoNebs Q 8h  Continue BiPAP at night.  Continue home azithromycin  Procalcitonin is negative.  We will hold on starting cap coverage.    (HFpEF) heart failure with preserved ejection fraction    +3 lower extremity edema  JVD is normal, BNP is mildly elevated  Will give 1 dose of IV Lasix  Resume oral Lasix tomorrow    History of myocardial infarction  Continue home aspirin  Continue home statin      HTN (hypertension)  Continue home losartan      VTE Risk Mitigation (From admission, onward)           Ordered     enoxaparin injection 40 mg  Daily         05/14/24 2330     IP VTE HIGH RISK PATIENT  Once         05/14/24 2330     Place sequential compression device  Until discontinued         05/14/24 2330                                    Danny Saba MD  Department of Hospital Medicine  [place] - Emergency Dept

## 2024-05-15 NOTE — RESPIRATORY THERAPY
Patient came in and placed on BIpap due to SOB and high CO2 levels. Vbg done, treatment given, patient is now resting. Will continue to monitor.

## 2024-05-15 NOTE — PROGRESS NOTES
[facility] Medicine  Progress Note    Patient Name: Terri Phelan  MRN: [id number]  Patient Class: IP- Inpatient   Admission Date: 5/14/2024  Length of Stay: 1 days  Attending Physician: Alida Garza MD  Primary Care Provider: Jaimie Reynoso MD        Subjective:     Principal Problem:COPD exacerbation        HPI:  68 years old woman with past medical history of COPD stage gold D on home oxygen and BiPAP at night, heart failure with a preserved ejection fraction, CAD, hypertension who presented with worsening shortness of breath along with worsening chronic cough and change in sputum color.  She also stated that her lower extremity edema is worse than usual.  She endorsed compliance with all her medications, and she denies any history of current smoking.  BNP is slightly elevated, procalcitonin  is negative.     Overview/Hospital Course:  No notes on file    Interval History: SOB has improved. Compliant with home COPD regimen. Attributes exacerbation to recent unexpected death of a close friend.     Review of Systems  Objective:     Vital Signs (Most Recent):  Temp: 98 °F (36.7 °C) (05/15/24 1635)  Pulse: 73 (05/15/24 1635)  Resp: 20 (05/15/24 1635)  BP: (!) 146/63 (05/15/24 1635)  SpO2: (!) 93 % (05/15/24 1635) Vital Signs (24h Range):  Temp:  [98 °F (36.7 °C)-99 °F (37.2 °C)] 98 °F (36.7 °C)  Pulse:  [] 73  Resp:  [17-30] 20  SpO2:  [89 %-98 %] 93 %  BP: (122-165)/(57-90) 146/63     Weight: 84.5 kg (186 lb 4.6 oz)  Body mass index is 30.07 kg/m².    Intake/Output Summary (Last 24 hours) at 5/15/2024 1822  Last data filed at 5/15/2024 1700  Gross per 24 hour   Intake --   Output 1600 ml   Net -1600 ml         Physical Exam  Vitals and nursing note reviewed.   Constitutional:       General: She is not in acute distress.  Cardiovascular:      Rate and Rhythm: Normal rate and regular rhythm.      Heart sounds: Normal heart sounds. No murmur heard.  Pulmonary:      Effort: Pulmonary  effort is normal. No respiratory distress.      Breath sounds: No wheezing.      Comments: Decreased breath sounds b/l  Abdominal:      Palpations: Abdomen is soft.      Tenderness: There is no abdominal tenderness.   Musculoskeletal:      Right lower leg: No edema.      Left lower leg: No edema.      Comments: Compression stockings on   Skin:     General: Skin is warm and dry.      Findings: No bruising or rash.   Neurological:      General: No focal deficit present.      Mental Status: She is alert and oriented to person, place, and time.             Significant Labs: All pertinent labs within the past 24 hours have been reviewed.    Significant Imaging: I have reviewed all pertinent imaging results/findings within the past 24 hours.    Assessment/Plan:      * COPD exacerbation  On home oxygen 2 L NC  Patient is end-stage COPD   Continue prednisone 40 mg for 5 days  Schedule duonebs Q4, breo  Continue BiPAP qhs  Continue home azithromycin 3 times a week, roflumilast        (HFpEF) heart failure with preserved ejection fraction  Appears euvolemic on exam   Mildly elevated BNP    S/p 1 dose of IV lasix, resume home dose oral lasix    History of myocardial infarction  Continue home aspirin  Continue home statin      HTN (hypertension)  Continue home losartan      VTE Risk Mitigation (From admission, onward)           Ordered     enoxaparin injection 40 mg  Daily         05/14/24 2330     IP VTE HIGH RISK PATIENT  Once         05/14/24 2330     Place sequential compression device  Until discontinued         05/14/24 2330                    Discharge Planning   RADHA:      Code Status: Full Code   Is the patient medically ready for discharge?:     Reason for patient still in hospital (select all that apply): Patient trending condition and Treatment             Alida Garza MD  Department of Hospital Medicine   [facility]

## 2024-05-15 NOTE — ED NOTES
"Pt sitting up in bed with bipap in place, resp even and unlabored.  States she is breathing "much better now".  Family remains at bedside aware of POC.  Vss per monitor.  Skin warm and dry  "

## 2024-05-15 NOTE — HPI
68 years old woman with past medical history of COPD stage gold D on home oxygen and BiPAP at night, heart failure with a preserved ejection fraction, CAD, hypertension who presented with worsening shortness of breath along with worsening chronic cough and change in sputum color.  She also stated that her lower extremity edema is worse than usual.  She endorsed compliance with all her medications, and she denies any history of current smoking.  BNP is slightly elevated, procalcitonin  is negative.

## 2024-05-16 PROCEDURE — 25000003 PHARM REV CODE 250: Mod: HCNC | Performed by: STUDENT IN AN ORGANIZED HEALTH CARE EDUCATION/TRAINING PROGRAM

## 2024-05-16 PROCEDURE — 63600175 PHARM REV CODE 636 W HCPCS: Mod: HCNC | Performed by: STUDENT IN AN ORGANIZED HEALTH CARE EDUCATION/TRAINING PROGRAM

## 2024-05-16 PROCEDURE — 25000242 PHARM REV CODE 250 ALT 637 W/ HCPCS: Mod: HCNC | Performed by: STUDENT IN AN ORGANIZED HEALTH CARE EDUCATION/TRAINING PROGRAM

## 2024-05-16 PROCEDURE — 99900035 HC TECH TIME PER 15 MIN (STAT): Mod: HCNC

## 2024-05-16 PROCEDURE — 94640 AIRWAY INHALATION TREATMENT: CPT | Mod: HCNC

## 2024-05-16 PROCEDURE — 25000003 PHARM REV CODE 250: Mod: HCNC | Performed by: FAMILY MEDICINE

## 2024-05-16 PROCEDURE — 27000221 HC OXYGEN, UP TO 24 HOURS: Mod: HCNC

## 2024-05-16 PROCEDURE — 94761 N-INVAS EAR/PLS OXIMETRY MLT: CPT | Mod: HCNC

## 2024-05-16 PROCEDURE — 11000001 HC ACUTE MED/SURG PRIVATE ROOM: Mod: HCNC

## 2024-05-16 PROCEDURE — 94660 CPAP INITIATION&MGMT: CPT | Mod: HCNC

## 2024-05-16 RX ORDER — BENZONATATE 100 MG/1
200 CAPSULE ORAL 3 TIMES DAILY PRN
Status: DISCONTINUED | OUTPATIENT
Start: 2024-05-16 | End: 2024-05-20 | Stop reason: HOSPADM

## 2024-05-16 RX ORDER — POTASSIUM CHLORIDE 750 MG/1
10 TABLET, EXTENDED RELEASE ORAL DAILY
Status: DISCONTINUED | OUTPATIENT
Start: 2024-05-16 | End: 2024-05-20 | Stop reason: HOSPADM

## 2024-05-16 RX ORDER — FLUTICASONE PROPIONATE 50 MCG
2 SPRAY, SUSPENSION (ML) NASAL DAILY
Status: DISCONTINUED | OUTPATIENT
Start: 2024-05-16 | End: 2024-05-20 | Stop reason: HOSPADM

## 2024-05-16 RX ADMIN — IPRATROPIUM BROMIDE AND ALBUTEROL SULFATE 3 ML: 2.5; .5 SOLUTION RESPIRATORY (INHALATION) at 11:05

## 2024-05-16 RX ADMIN — IPRATROPIUM BROMIDE AND ALBUTEROL SULFATE 3 ML: 2.5; .5 SOLUTION RESPIRATORY (INHALATION) at 07:05

## 2024-05-16 RX ADMIN — IPRATROPIUM BROMIDE AND ALBUTEROL SULFATE 3 ML: 2.5; .5 SOLUTION RESPIRATORY (INHALATION) at 03:05

## 2024-05-16 RX ADMIN — ROFLUMILAST 500 MCG: 500 TABLET ORAL at 07:05

## 2024-05-16 RX ADMIN — IPRATROPIUM BROMIDE AND ALBUTEROL SULFATE 3 ML: 2.5; .5 SOLUTION RESPIRATORY (INHALATION) at 08:05

## 2024-05-16 RX ADMIN — BENZONATATE 200 MG: 100 CAPSULE ORAL at 01:05

## 2024-05-16 RX ADMIN — ENOXAPARIN SODIUM 40 MG: 40 INJECTION SUBCUTANEOUS at 06:05

## 2024-05-16 RX ADMIN — PREDNISONE 40 MG: 20 TABLET ORAL at 07:05

## 2024-05-16 RX ADMIN — GUAIFENESIN 200 MG: 200 SOLUTION ORAL at 02:05

## 2024-05-16 RX ADMIN — ASPIRIN 81 MG CHEWABLE TABLET 81 MG: 81 TABLET CHEWABLE at 07:05

## 2024-05-16 RX ADMIN — FLUTICASONE FUROATE AND VILANTEROL TRIFENATATE 1 PUFF: 100; 25 POWDER RESPIRATORY (INHALATION) at 07:05

## 2024-05-16 RX ADMIN — FUROSEMIDE 40 MG: 40 TABLET ORAL at 07:05

## 2024-05-16 RX ADMIN — POTASSIUM CHLORIDE 10 MEQ: 750 TABLET, EXTENDED RELEASE ORAL at 10:05

## 2024-05-16 RX ADMIN — LOSARTAN POTASSIUM 50 MG: 50 TABLET, FILM COATED ORAL at 07:05

## 2024-05-16 RX ADMIN — GUAIFENESIN 200 MG: 200 SOLUTION ORAL at 01:05

## 2024-05-16 RX ADMIN — METHOCARBAMOL 500 MG: 500 TABLET ORAL at 08:05

## 2024-05-16 RX ADMIN — FLUTICASONE PROPIONATE 100 MCG: 50 SPRAY, METERED NASAL at 01:05

## 2024-05-16 RX ADMIN — METHOCARBAMOL 500 MG: 500 TABLET ORAL at 07:05

## 2024-05-16 NOTE — PLAN OF CARE
Received pt on documented 02; no distress noted. Tolerated tx well; will cont to monitor   Problem: Adult Inpatient Plan of Care  Goal: Plan of Care Review  Outcome: Progressing

## 2024-05-16 NOTE — PLAN OF CARE
A&O x4. Complaints of cough and nasal congestion. States that she has some relief with flonase nasal spray and guaifenesin oral syrup. Pt denies N/V, SOB, distress, and pain. Medications given per MAR. Vital signs stable throughout the night. Cardiac monitor in place. Stayed in bed through the night, voiding via purewick. Wearing bipap while sleeping. Weight shifting encouraged. Safety precautions maintained. Bed alarm set. Call bell within reach. Patient encouraged to call for assistance.

## 2024-05-16 NOTE — PROGRESS NOTES
[facility] Medicine  Progress Note    Patient Name: Terri Phelan  MRN: [id number]  Patient Class: IP- Inpatient   Admission Date: 5/14/2024  Length of Stay: 2 days  Attending Physician: Alida Garza MD  Primary Care Provider: Jaimie Reynoso MD        Subjective:     Principal Problem:COPD exacerbation        HPI:  68 years old woman with past medical history of COPD stage gold D on home oxygen and BiPAP at night, heart failure with a preserved ejection fraction, CAD, hypertension who presented with worsening shortness of breath along with worsening chronic cough and change in sputum color.  She also stated that her lower extremity edema is worse than usual.  She endorsed compliance with all her medications, and she denies any history of current smoking.  BNP is slightly elevated, procalcitonin  is negative.     Overview/Hospital Course:  No notes on file    Interval History: patient sitting in chair. Reports feeling better. SOB has improved but not yet at baseline. She is looking forward to pulmonary rehab. Has close pulm follow up.     Review of Systems  Objective:     Vital Signs (Most Recent):  Temp: 98.9 °F (37.2 °C) (05/16/24 1114)  Pulse: 85 (05/16/24 1301)  Resp: 20 (05/16/24 1124)  BP: (!) 125/56 (05/16/24 1114)  SpO2: (!) 94 % (05/16/24 1124) Vital Signs (24h Range):  Temp:  [97.8 °F (36.6 °C)-98.9 °F (37.2 °C)] 98.9 °F (37.2 °C)  Pulse:  [51-85] 85  Resp:  [18-20] 20  SpO2:  [92 %-98 %] 94 %  BP: (125-146)/(56-64) 125/56     Weight: 87.5 kg (192 lb 14.4 oz)  Body mass index is 31.14 kg/m².    Intake/Output Summary (Last 24 hours) at 5/16/2024 1439  Last data filed at 5/16/2024 1324  Gross per 24 hour   Intake 840 ml   Output 1450 ml   Net -610 ml         Physical Exam  Vitals and nursing note reviewed.   Constitutional:       General: She is not in acute distress.  Cardiovascular:      Rate and Rhythm: Normal rate and regular rhythm.      Heart sounds: Normal heart sounds. No  murmur heard.  Pulmonary:      Effort: Pulmonary effort is normal. No respiratory distress.      Breath sounds: No wheezing.      Comments: Decreased breath sounds b/l  Abdominal:      Palpations: Abdomen is soft.      Tenderness: There is no abdominal tenderness.   Musculoskeletal:      Right lower leg: No edema.      Left lower leg: No edema.      Comments: Compression stockings on   Skin:     General: Skin is warm and dry.      Findings: No bruising or rash.   Neurological:      General: No focal deficit present.      Mental Status: She is alert and oriented to person, place, and time.             Significant Labs: All pertinent labs within the past 24 hours have been reviewed.    Significant Imaging: I have reviewed all pertinent imaging results/findings within the past 24 hours.    Assessment/Plan:      * COPD exacerbation  Symptoms improving -  On home oxygen 2 L NC  Patient is end-stage COPD   Continue prednisone 40 mg for 5 days  Schedule duonebs Q4, breo  Continue BiPAP qhs  Continue home azithromycin 3 times a week, roflumilast    Pulm rehab at OP  OP pulm f/up        (HFpEF) heart failure with preserved ejection fraction  Appears euvolemic on exam   Mildly elevated BNP    S/p 1 dose of IV lasix, resume home dose oral lasix    History of myocardial infarction  Continue home aspirin  Continue home statin      HTN (hypertension)  Continue home losartan      VTE Risk Mitigation (From admission, onward)           Ordered     enoxaparin injection 40 mg  Daily         05/14/24 2330     IP VTE HIGH RISK PATIENT  Once         05/14/24 2330     Place sequential compression device  Until discontinued         05/14/24 2330                    Discharge Planning   RADHA:      Code Status: Full Code   Is the patient medically ready for discharge?:     Reason for patient still in hospital (select all that apply): Patient trending condition and Treatment  Discharge Plan A: Home with family            Alida Garza,  MD  Department of [facility] Medicine   [facility] Surg

## 2024-05-16 NOTE — MEDICAL/APP STUDENT
[facility] Medicine Student   Progress Note    Admit Date: 5/14/2024  Hospital Day: 2  05/16/2024  10:02 AM    SUBJECTIVE:   Ms. Terri Phelan is a 68 y.o. female with a relevant medical history of COPD (GOLD IV) who is being followed up for COPD exacerbation.    Interval history:  No acute events overnight. The patient was weaned to 2L NC. She reports her breathing feels much better today, but she feels like she needs to cough up sputum. Cough syrup given overnight improved her symptoms.    Review of Systems   Constitutional:  Negative for chills and fever.   Respiratory:  Positive for cough and sputum production. Negative for hemoptysis and shortness of breath.    Cardiovascular:  Negative for chest pain, palpitations and orthopnea.   Gastrointestinal:  Negative for diarrhea, nausea and vomiting.   Genitourinary:  Negative for dysuria, frequency and urgency.   Neurological:  Negative for dizziness and loss of consciousness.       Please refer to the H&P for past medical, family, and social history.    OBJECTIVE:     Vital Signs Recent:  Temp: 98.9 °F (37.2 °C) (05/16/24 0739)  Pulse: (!) 56 (05/16/24 0747)  Resp: 18 (05/16/24 0747)  BP: 136/64 (05/16/24 0739)  SpO2: 98 % (05/16/24 0747)  Oxygen Documentation:    Flow (L/min) (Oxygen Therapy): 2  Oxygen Concentration (%): 28        Device (Oxygen Therapy): nasal cannula  $ Is the patient on Low Flow Oxygen?: Yes  $ Is the patient on High Flow Oxygen?: Yes   Vital Signs Range (Last 24H):  Temp:  [97.8 °F (36.6 °C)-98.9 °F (37.2 °C)]   Pulse:  [51-74]   Resp:  [18-21]   BP: (127-146)/(58-64)   SpO2:  [92 %-98 %]   Oxygen Concentration (%): 28 (05/16/24 0411)    I & O (Last 24H):  Intake/Output Summary (Last 24 hours) at 5/16/2024 1002  Last data filed at 5/16/2024 0415  Gross per 24 hour   Intake 120 ml   Output 1000 ml   Net -880 ml        Physical Exam:  Physical Exam  Constitutional:       Appearance: Normal appearance.   Cardiovascular:      Rate and Rhythm:  Normal rate and regular rhythm.      Pulses: Normal pulses.      Heart sounds: Normal heart sounds.   Pulmonary:      Effort: Pulmonary effort is normal.      Comments: Reduced breath sounds  Abdominal:      General: Abdomen is flat.      Palpations: Abdomen is soft.      Tenderness: There is no abdominal tenderness.   Musculoskeletal:      Right lower leg: Edema present.      Left lower leg: Edema present.   Neurological:      Mental Status: She is alert.         Labs:   Recent Labs   Lab 05/1956   *   K 4.0   CL 97   CO2 39*   BUN 14   CREATININE 0.8   *   CALCIUM 9.2     Recent Labs   Lab 05/1956   ALKPHOS 55   ALT 17   AST 17   ALBUMIN 3.7   PROT 7.3   BILITOT 0.2     Recent Labs   Lab 05/1956   WBC 6.80   HGB 13.2   HCT 45.2            Scheduled Meds:   albuterol-ipratropium  3 mL Nebulization Q4H WAKE    aspirin  81 mg Oral Daily    azithromycin  250 mg Oral Every Mon, Wed, Fri    enoxparin  40 mg Subcutaneous Daily    fluticasone furoate-vilanteroL  1 puff Inhalation Daily    fluticasone propionate  2 spray Each Nostril Daily    furosemide  40 mg Oral Daily    losartan  50 mg Oral Daily    methocarbamoL  500 mg Oral QID    potassium chloride SA  10 mEq Oral Daily    predniSONE  40 mg Oral Daily    roflumilast  500 mcg Oral Daily     Continuous Infusions:  PRN Meds:  Current Facility-Administered Medications:     albuterol, 2 puff, Inhalation, Q6H PRN    benzonatate, 200 mg, Oral, TID PRN    dextrose 10%, 12.5 g, Intravenous, PRN    dextrose 10%, 25 g, Intravenous, PRN    glucagon (human recombinant), 1 mg, Intramuscular, PRN    glucose, 16 g, Oral, PRN    glucose, 24 g, Oral, PRN    guaiFENesin 100 mg/5 ml, 200 mg, Oral, Q4H PRN    naloxone, 0.02 mg, Intravenous, PRN    simethicone, 125 mg, Oral, Q6H PRN    sodium chloride 0.9%, 10 mL, Intravenous, Q12H PRN    traZODone, 50 mg, Oral, Nightly PRN    ASSESSMENT/PLAN:   Ms. Terri Phelan is a 68 y.o. female with a  relevant medical history of COPD (GOLD IV) who is being followed up for COPD exacerbation.    Active Hospital Problems    Diagnosis  POA    *COPD exacerbation [J44.1]  Yes    (HFpEF) heart failure with preserved ejection fraction [I50.30]  Yes    Chronic respiratory failure with hypoxia and hypercapnia [J96.11, J96.12]  Yes    History of myocardial infarction [I25.2]  Not Applicable    HTN (hypertension) [I10]  Yes      Resolved Hospital Problems   No resolved problems to display.       1.COPD Exacerbation  ASSESSMENT:  Clinically improved today, back to baseline.  PLAN:  -resume home regimen  -pulmonary rehab once feasible    2. Lower Extremity Edema  ASSESSMENT: Improved today, back to baseline.  PLAN:  -compression stockings  -furosemide 20    Discharge planning:   Returned to baseline. D/c to home with goal of pulmonary rehab soon pending placement

## 2024-05-16 NOTE — PLAN OF CARE
SW met with pt at bedside to discuss dc planning. Pt was completing breathing treatment. Pt provided permission for SW to contact her daughter to complete assessment. Pts daughter answered/confirmed all assessment questions. Pt resides in home alone but daughter stays next door. Pt is independent in ADLs. Pt drives short distance but daughter brings her anywhere that may be too far. Pt has concentrator, portable O2, and rollator at home. Pts daughter will like HH upon dc for her mom. Pts daughter will provide transport home upon dc. SW will continue to follow pt throughout her transitions of care and assist with any dc needs.     SW sent HH referral via [employer].   Pts daughter made aware of possible dc tomorrow per MD.       Mulugeta Phelan (Daughter)  [phone]     Future Appointments   Date Time Provider Department Center   5/21/2024  1:30 PM Alex [facility] MD [facility]   5/28/2024 10:00 AM Jaimie Reynoso MD [facility] 65+ [place]   8/6/2024 10:20 AM Jaimie Reynoso MD [facility] 65PLUS 65+ [place]        05/16/24 1120   Discharge Assessment   Assessment Type Discharge Planning Assessment   Confirmed/corrected address, phone number and insurance Yes   Confirmed Demographics Correct on Facesheet   Source of Information family   Communicated RADHA with patient/caregiver Yes   Reason For Admission COPD exacerbation   People in Home child(mary), adult   Do you expect to return to your current living situation? Yes   Do you have help at home or someone to help you manage your care at home? Yes   Who are your caregiver(s) and their phone number(s)? TapanMulugeta (Daughter)  [phone]   Prior to hospitilization cognitive status: Alert/Oriented   Current cognitive status: Alert/Oriented   Home Layout Able to live on 1st floor   Equipment Currently Used at Home oxygen;rollator   Readmission within 30 days? No   Patient currently being followed by outpatient case management? No   Do you currently have  service(s) that help you manage your care at home? No   Do you take prescription medications? Yes   Do you have prescription coverage? Yes   Coverage [employer] MANAGED MEDICARE - HU[employer] MEDICARE HMO - CAPITATED   Do you have any problems affording any of your prescribed medications? No   Is the patient taking medications as prescribed? yes   Who is going to help you get home at discharge? Mulugeta Phelan (Daughter)  [phone]   How do you get to doctors appointments? family or friend will provide;car, drives self   Are you on dialysis? No   Do you take coumadin? No   Discharge Plan A Home with family   DME Needed Upon Discharge  none   Discharge Plan discussed with: Adult children   Transition of Care Barriers None   Alcohol Use   Q1: How often do you have a drink containing alcohol? Never   Q2: How many drinks containing alcohol do you have on a typical day when you are drinking? None   Q3: How often do you have six or more drinks on one occasion? Never   Utilities   In the past 12 months has the electric, gas, oil, or water company threatened to shut off services in your home? No   Health Literacy   How often do you need to have someone help you when you read instructions, pamphlets, or other written material from your doctor or pharmacy? Never   OTHER   Name(s) of People in Home Mulugeta Phelan (Daughter)  [phone]

## 2024-05-16 NOTE — ASSESSMENT & PLAN NOTE
Symptoms improving -  On home oxygen 2 L NC  Patient is end-stage COPD   Continue prednisone 40 mg for 5 days  Schedule duonebs Q4, breo  Continue BiPAP qhs  Continue home azithromycin 3 times a week, roflumilast    Pulm rehab at OP  OP pulm f/up

## 2024-05-16 NOTE — SUBJECTIVE & OBJECTIVE
Interval History: patient sitting in chair. Reports feeling better. SOB has improved but not yet at baseline. She is looking forward to pulmonary rehab. Has close pulm follow up.     Review of Systems  Objective:     Vital Signs (Most Recent):  Temp: 98.9 °F (37.2 °C) (05/16/24 1114)  Pulse: 85 (05/16/24 1301)  Resp: 20 (05/16/24 1124)  BP: (!) 125/56 (05/16/24 1114)  SpO2: (!) 94 % (05/16/24 1124) Vital Signs (24h Range):  Temp:  [97.8 °F (36.6 °C)-98.9 °F (37.2 °C)] 98.9 °F (37.2 °C)  Pulse:  [51-85] 85  Resp:  [18-20] 20  SpO2:  [92 %-98 %] 94 %  BP: (125-146)/(56-64) 125/56     Weight: 87.5 kg (192 lb 14.4 oz)  Body mass index is 31.14 kg/m².    Intake/Output Summary (Last 24 hours) at 5/16/2024 1439  Last data filed at 5/16/2024 1324  Gross per 24 hour   Intake 840 ml   Output 1450 ml   Net -610 ml         Physical Exam  Vitals and nursing note reviewed.   Constitutional:       General: She is not in acute distress.  Cardiovascular:      Rate and Rhythm: Normal rate and regular rhythm.      Heart sounds: Normal heart sounds. No murmur heard.  Pulmonary:      Effort: Pulmonary effort is normal. No respiratory distress.      Breath sounds: No wheezing.      Comments: Decreased breath sounds b/l  Abdominal:      Palpations: Abdomen is soft.      Tenderness: There is no abdominal tenderness.   Musculoskeletal:      Right lower leg: No edema.      Left lower leg: No edema.      Comments: Compression stockings on   Skin:     General: Skin is warm and dry.      Findings: No bruising or rash.   Neurological:      General: No focal deficit present.      Mental Status: She is alert and oriented to person, place, and time.             Significant Labs: All pertinent labs within the past 24 hours have been reviewed.    Significant Imaging: I have reviewed all pertinent imaging results/findings within the past 24 hours.

## 2024-05-17 PROCEDURE — 94660 CPAP INITIATION&MGMT: CPT | Mod: HCNC

## 2024-05-17 PROCEDURE — 99900035 HC TECH TIME PER 15 MIN (STAT): Mod: HCNC

## 2024-05-17 PROCEDURE — 27100098 HC SPACER: Mod: HCNC

## 2024-05-17 PROCEDURE — 94640 AIRWAY INHALATION TREATMENT: CPT | Mod: HCNC

## 2024-05-17 PROCEDURE — 25000242 PHARM REV CODE 250 ALT 637 W/ HCPCS: Mod: HCNC | Performed by: STUDENT IN AN ORGANIZED HEALTH CARE EDUCATION/TRAINING PROGRAM

## 2024-05-17 PROCEDURE — 25000003 PHARM REV CODE 250: Mod: HCNC | Performed by: STUDENT IN AN ORGANIZED HEALTH CARE EDUCATION/TRAINING PROGRAM

## 2024-05-17 PROCEDURE — 63600175 PHARM REV CODE 636 W HCPCS: Mod: HCNC | Performed by: STUDENT IN AN ORGANIZED HEALTH CARE EDUCATION/TRAINING PROGRAM

## 2024-05-17 PROCEDURE — 25000003 PHARM REV CODE 250: Mod: HCNC | Performed by: FAMILY MEDICINE

## 2024-05-17 PROCEDURE — 27000221 HC OXYGEN, UP TO 24 HOURS: Mod: HCNC

## 2024-05-17 PROCEDURE — 27000190 HC CPAP FULL FACE MASK W/VALVE: Mod: HCNC

## 2024-05-17 PROCEDURE — 63700000 PHARM REV CODE 250 ALT 637 W/O HCPCS: Mod: HCNC | Performed by: STUDENT IN AN ORGANIZED HEALTH CARE EDUCATION/TRAINING PROGRAM

## 2024-05-17 PROCEDURE — 94761 N-INVAS EAR/PLS OXIMETRY MLT: CPT | Mod: HCNC

## 2024-05-17 PROCEDURE — 21400001 HC TELEMETRY ROOM: Mod: HCNC

## 2024-05-17 RX ORDER — GUAIFENESIN 600 MG/1
600 TABLET, EXTENDED RELEASE ORAL 2 TIMES DAILY
Status: DISCONTINUED | OUTPATIENT
Start: 2024-05-17 | End: 2024-05-20 | Stop reason: HOSPADM

## 2024-05-17 RX ORDER — FLUTICASONE FUROATE AND VILANTEROL 200; 25 UG/1; UG/1
1 POWDER RESPIRATORY (INHALATION) DAILY
Status: DISCONTINUED | OUTPATIENT
Start: 2024-05-17 | End: 2024-05-20 | Stop reason: HOSPADM

## 2024-05-17 RX ORDER — HYDROXYZINE HYDROCHLORIDE 25 MG/1
25 TABLET, FILM COATED ORAL 3 TIMES DAILY PRN
Status: DISCONTINUED | OUTPATIENT
Start: 2024-05-17 | End: 2024-05-20 | Stop reason: HOSPADM

## 2024-05-17 RX ORDER — METHOCARBAMOL 500 MG/1
500 TABLET, FILM COATED ORAL EVERY 8 HOURS PRN
Status: DISCONTINUED | OUTPATIENT
Start: 2024-05-17 | End: 2024-05-20 | Stop reason: HOSPADM

## 2024-05-17 RX ADMIN — GUAIFENESIN 600 MG: 600 TABLET, EXTENDED RELEASE ORAL at 10:05

## 2024-05-17 RX ADMIN — PREDNISONE 40 MG: 20 TABLET ORAL at 09:05

## 2024-05-17 RX ADMIN — IPRATROPIUM BROMIDE AND ALBUTEROL SULFATE 3 ML: 2.5; .5 SOLUTION RESPIRATORY (INHALATION) at 03:05

## 2024-05-17 RX ADMIN — IPRATROPIUM BROMIDE AND ALBUTEROL SULFATE 3 ML: 2.5; .5 SOLUTION RESPIRATORY (INHALATION) at 07:05

## 2024-05-17 RX ADMIN — GUAIFENESIN 200 MG: 200 SOLUTION ORAL at 09:05

## 2024-05-17 RX ADMIN — ALBUTEROL SULFATE 2 PUFF: 90 AEROSOL, METERED RESPIRATORY (INHALATION) at 08:05

## 2024-05-17 RX ADMIN — ENOXAPARIN SODIUM 40 MG: 40 INJECTION SUBCUTANEOUS at 05:05

## 2024-05-17 RX ADMIN — HYDROXYZINE HYDROCHLORIDE 25 MG: 25 TABLET ORAL at 01:05

## 2024-05-17 RX ADMIN — LOSARTAN POTASSIUM 50 MG: 50 TABLET, FILM COATED ORAL at 09:05

## 2024-05-17 RX ADMIN — GUAIFENESIN 600 MG: 600 TABLET, EXTENDED RELEASE ORAL at 12:05

## 2024-05-17 RX ADMIN — FLUTICASONE PROPIONATE 100 MCG: 50 SPRAY, METERED NASAL at 09:05

## 2024-05-17 RX ADMIN — FLUTICASONE FUROATE AND VILANTEROL TRIFENATATE 1 PUFF: 100; 25 POWDER RESPIRATORY (INHALATION) at 07:05

## 2024-05-17 RX ADMIN — ROFLUMILAST 500 MCG: 500 TABLET ORAL at 09:05

## 2024-05-17 RX ADMIN — AZITHROMYCIN DIHYDRATE 250 MG: 250 TABLET ORAL at 05:05

## 2024-05-17 RX ADMIN — IPRATROPIUM BROMIDE AND ALBUTEROL SULFATE 3 ML: 2.5; .5 SOLUTION RESPIRATORY (INHALATION) at 11:05

## 2024-05-17 RX ADMIN — POTASSIUM CHLORIDE 10 MEQ: 750 TABLET, EXTENDED RELEASE ORAL at 09:05

## 2024-05-17 RX ADMIN — ASPIRIN 81 MG CHEWABLE TABLET 81 MG: 81 TABLET CHEWABLE at 09:05

## 2024-05-17 RX ADMIN — FUROSEMIDE 40 MG: 40 TABLET ORAL at 09:05

## 2024-05-17 RX ADMIN — CEFTRIAXONE SODIUM 1 G: 1 INJECTION, POWDER, FOR SOLUTION INTRAMUSCULAR; INTRAVENOUS at 12:05

## 2024-05-17 NOTE — PROGRESS NOTES
[facility] Medicine  Progress Note    Patient Name: Terri Phelan  MRN: [id number]  Patient Class: IP- Inpatient   Admission Date: 5/14/2024  Length of Stay: 3 days  Attending Physician: Alida Garza MD  Primary Care Provider: Jaimie Reynoso MD        Subjective:     Principal Problem:COPD exacerbation        HPI:  68 years old woman with past medical history of COPD stage gold D on home oxygen and BiPAP at night, heart failure with a preserved ejection fraction, CAD, hypertension who presented with worsening shortness of breath along with worsening chronic cough and change in sputum color.  She also stated that her lower extremity edema is worse than usual.  She endorsed compliance with all her medications, and she denies any history of current smoking.  BNP is slightly elevated, procalcitonin  is negative.     Overview/Hospital Course:  No notes on file    Interval History: reports SOB this morning after using the bedside commode. On baseline oxygen 2L NC. Anxious. Spoke with patient's daughter on speaker on the patient's phone. Updated on plan of care.     Review of Systems  Objective:     Vital Signs (Most Recent):  Temp: 98.4 °F (36.9 °C) (05/17/24 1207)  Pulse: 83 (05/17/24 1217)  Resp: 18 (05/17/24 1207)  BP: 127/62 (05/17/24 1207)  SpO2: (!) 90 % (05/17/24 1207) Vital Signs (24h Range):  Temp:  [98.1 °F (36.7 °C)-99.2 °F (37.3 °C)] 98.4 °F (36.9 °C)  Pulse:  [53-91] 83  Resp:  [17-23] 18  SpO2:  [89 %-96 %] 90 %  BP: (113-134)/(51-63) 127/62     Weight: 90.6 kg (199 lb 11.8 oz)  Body mass index is 32.24 kg/m².    Intake/Output Summary (Last 24 hours) at 5/17/2024 1445  Last data filed at 5/17/2024 1027  Gross per 24 hour   Intake 340 ml   Output 200 ml   Net 140 ml         Physical Exam  Vitals and nursing note reviewed.   Constitutional:       General: She is not in acute distress.  Cardiovascular:      Rate and Rhythm: Normal rate and regular rhythm.      Heart sounds: Normal heart  sounds. No murmur heard.  Pulmonary:      Effort: Pulmonary effort is normal. No respiratory distress.      Breath sounds: No wheezing.      Comments: Decreased breath sounds b/l  Abdominal:      Palpations: Abdomen is soft.      Tenderness: There is no abdominal tenderness.   Musculoskeletal:      Right lower leg: No edema.      Left lower leg: No edema.      Comments: Compression stockings on   Skin:     General: Skin is warm and dry.      Findings: No bruising or rash.   Neurological:      General: No focal deficit present.      Mental Status: She is alert and oriented to person, place, and time.             Significant Labs: All pertinent labs within the past 24 hours have been reviewed.    Significant Imaging: I have reviewed all pertinent imaging results/findings within the past 24 hours.    Assessment/Plan:      * COPD exacerbation  Symptoms improving -  On home oxygen 2 L NC  Patient is end-stage COPD   Continue prednisone 40 mg for 5 days  Schedule duonebs Q4, breo  Continue BiPAP qhs  Continue home azithromycin 3 times a week, roflumilast    Pulm rehab at OP  OP pulm f/up    Add ceftriaxone, optimize dose of inhalers      (HFpEF) heart failure with preserved ejection fraction  Appears euvolemic on exam   Mildly elevated BNP    S/p 1 dose of IV lasix, resume home dose oral lasix    History of myocardial infarction  Continue home aspirin  Continue home statin      HTN (hypertension)  Continue home losartan      VTE Risk Mitigation (From admission, onward)           Ordered     enoxaparin injection 40 mg  Daily         05/14/24 2330     IP VTE HIGH RISK PATIENT  Once         05/14/24 2330     Place sequential compression device  Until discontinued         05/14/24 2330                    Discharge Planning   RADHA:      Code Status: Full Code   Is the patient medically ready for discharge?:     Reason for patient still in hospital (select all that apply): Patient trending condition and Treatment  Discharge Plan  A: Home with family, Home Health   Discharge Delays: None known at this time        Alida Garza MD  Department of Hospital Medicine   [facility]

## 2024-05-17 NOTE — SUBJECTIVE & OBJECTIVE
Interval History: reports SOB this morning after using the bedside commode. On baseline oxygen 2L NC. Anxious. Spoke with patient's daughter on speaker on the patient's phone. Updated on plan of care.     Review of Systems  Objective:     Vital Signs (Most Recent):  Temp: 98.4 °F (36.9 °C) (05/17/24 1207)  Pulse: 83 (05/17/24 1217)  Resp: 18 (05/17/24 1207)  BP: 127/62 (05/17/24 1207)  SpO2: (!) 90 % (05/17/24 1207) Vital Signs (24h Range):  Temp:  [98.1 °F (36.7 °C)-99.2 °F (37.3 °C)] 98.4 °F (36.9 °C)  Pulse:  [53-91] 83  Resp:  [17-23] 18  SpO2:  [89 %-96 %] 90 %  BP: (113-134)/(51-63) 127/62     Weight: 90.6 kg (199 lb 11.8 oz)  Body mass index is 32.24 kg/m².    Intake/Output Summary (Last 24 hours) at 5/17/2024 1445  Last data filed at 5/17/2024 1027  Gross per 24 hour   Intake 340 ml   Output 200 ml   Net 140 ml         Physical Exam  Vitals and nursing note reviewed.   Constitutional:       General: She is not in acute distress.  Cardiovascular:      Rate and Rhythm: Normal rate and regular rhythm.      Heart sounds: Normal heart sounds. No murmur heard.  Pulmonary:      Effort: Pulmonary effort is normal. No respiratory distress.      Breath sounds: No wheezing.      Comments: Decreased breath sounds b/l  Abdominal:      Palpations: Abdomen is soft.      Tenderness: There is no abdominal tenderness.   Musculoskeletal:      Right lower leg: No edema.      Left lower leg: No edema.      Comments: Compression stockings on   Skin:     General: Skin is warm and dry.      Findings: No bruising or rash.   Neurological:      General: No focal deficit present.      Mental Status: She is alert and oriented to person, place, and time.             Significant Labs: All pertinent labs within the past 24 hours have been reviewed.    Significant Imaging: I have reviewed all pertinent imaging results/findings within the past 24 hours.

## 2024-05-17 NOTE — NURSING
Patient had an episode of SOB when coming back to bed from [facility]. Vitals checked. Respiratory called for breathing TT. Maintained 2LNC. MD notified.

## 2024-05-17 NOTE — PLAN OF CARE
Patient is AAOx4. Tolerated cardiac diet. Ambulated upto [facility] with assistance. IV  abx maintained. Heart monitoring continued. Instructed to use call light for assistance. Purewick maintained. On oxygen. Bed alarm set and maintained at lowest position. Call light within reach.

## 2024-05-17 NOTE — PLAN OF CARE
05/17/24 1113   Post-Acute Status   Post-Acute Authorization Home Health   Home Health Status Referrals Sent   Hospital Resources/Appts/Education Provided Appointments scheduled and added to AVS   Discharge Delays None known at this time

## 2024-05-17 NOTE — PLAN OF CARE
SW met with pt at bedside during rounding with MD. Daughter was via telephone during encounter. No dc today. HH referrals sent via [employer].Pt and daughter aware of HH barriers. Pts daughter will provide transport home. SW will continue to follow pt throughout her transitions of care and assist with any dc needs.       Future Appointments   Date Time Provider Department Center   5/21/2024  1:30 PM Alex Cunningham MD [facility]   5/28/2024 10:00 AM Jaimie Reynoso MD [facility] 65[facility] 65+ [place]   8/6/2024 10:20 AM Jaimie Reynoso MD [facility] 65PLUS 65+ [place]

## 2024-05-17 NOTE — ASSESSMENT & PLAN NOTE
Symptoms improving -  On home oxygen 2 L NC  Patient is end-stage COPD   Continue prednisone 40 mg for 5 days  Schedule duonebs Q4, breo  Continue BiPAP qhs  Continue home azithromycin 3 times a week, roflumilast    Pulm rehab at OP  OP pulm f/up    Add ceftriaxone, optimize dose of inhalers

## 2024-05-18 LAB
ANION GAP SERPL CALC-SCNC: 7 MMOL/L (ref 8–16)
BASOPHILS # BLD AUTO: 0.02 K/UL (ref 0–0.2)
BASOPHILS NFR BLD: 0.2 % (ref 0–1.9)
BUN SERPL-MCNC: 25 MG/DL (ref 8–23)
CALCIUM SERPL-MCNC: 8.6 MG/DL (ref 8.7–10.5)
CHLORIDE SERPL-SCNC: 97 MMOL/L (ref 95–110)
CO2 SERPL-SCNC: 40 MMOL/L (ref 23–29)
CREAT SERPL-MCNC: 0.7 MG/DL (ref 0.5–1.4)
DIFFERENTIAL METHOD BLD: ABNORMAL
EOSINOPHIL # BLD AUTO: 0.1 K/UL (ref 0–0.5)
EOSINOPHIL NFR BLD: 0.6 % (ref 0–8)
ERYTHROCYTE [DISTWIDTH] IN BLOOD BY AUTOMATED COUNT: 13.9 % (ref 11.5–14.5)
EST. GFR  (NO RACE VARIABLE): >60 ML/MIN/1.73 M^2
GLUCOSE SERPL-MCNC: 86 MG/DL (ref 70–110)
HCT VFR BLD AUTO: 40.5 % (ref 37–48.5)
HGB BLD-MCNC: 12.1 G/DL (ref 12–16)
IMM GRANULOCYTES # BLD AUTO: 0.06 K/UL (ref 0–0.04)
IMM GRANULOCYTES NFR BLD AUTO: 0.6 % (ref 0–0.5)
LYMPHOCYTES # BLD AUTO: 2.9 K/UL (ref 1–4.8)
LYMPHOCYTES NFR BLD: 26.6 % (ref 18–48)
MCH RBC QN AUTO: 27.7 PG (ref 27–31)
MCHC RBC AUTO-ENTMCNC: 29.9 G/DL (ref 32–36)
MCV RBC AUTO: 93 FL (ref 82–98)
MONOCYTES # BLD AUTO: 1.1 K/UL (ref 0.3–1)
MONOCYTES NFR BLD: 10 % (ref 4–15)
NEUTROPHILS # BLD AUTO: 6.7 K/UL (ref 1.8–7.7)
NEUTROPHILS NFR BLD: 62 % (ref 38–73)
NRBC BLD-RTO: 0 /100 WBC
PLATELET # BLD AUTO: 172 K/UL (ref 150–450)
PMV BLD AUTO: 11.8 FL (ref 9.2–12.9)
POTASSIUM SERPL-SCNC: 3.9 MMOL/L (ref 3.5–5.1)
RBC # BLD AUTO: 4.37 M/UL (ref 4–5.4)
SODIUM SERPL-SCNC: 144 MMOL/L (ref 136–145)
WBC # BLD AUTO: 10.8 K/UL (ref 3.9–12.7)

## 2024-05-18 PROCEDURE — 21400001 HC TELEMETRY ROOM: Mod: HCNC

## 2024-05-18 PROCEDURE — 94761 N-INVAS EAR/PLS OXIMETRY MLT: CPT | Mod: HCNC

## 2024-05-18 PROCEDURE — 94640 AIRWAY INHALATION TREATMENT: CPT | Mod: HCNC

## 2024-05-18 PROCEDURE — 63600175 PHARM REV CODE 636 W HCPCS: Mod: HCNC | Performed by: STUDENT IN AN ORGANIZED HEALTH CARE EDUCATION/TRAINING PROGRAM

## 2024-05-18 PROCEDURE — 94660 CPAP INITIATION&MGMT: CPT | Mod: HCNC

## 2024-05-18 PROCEDURE — 27000221 HC OXYGEN, UP TO 24 HOURS: Mod: HCNC

## 2024-05-18 PROCEDURE — 25000003 PHARM REV CODE 250: Mod: HCNC | Performed by: STUDENT IN AN ORGANIZED HEALTH CARE EDUCATION/TRAINING PROGRAM

## 2024-05-18 PROCEDURE — 27100171 HC OXYGEN HIGH FLOW UP TO 24 HOURS: Mod: HCNC

## 2024-05-18 PROCEDURE — 25000242 PHARM REV CODE 250 ALT 637 W/ HCPCS: Mod: HCNC | Performed by: STUDENT IN AN ORGANIZED HEALTH CARE EDUCATION/TRAINING PROGRAM

## 2024-05-18 PROCEDURE — 99900035 HC TECH TIME PER 15 MIN (STAT): Mod: HCNC

## 2024-05-18 PROCEDURE — 80048 BASIC METABOLIC PNL TOTAL CA: CPT | Mod: HCNC | Performed by: STUDENT IN AN ORGANIZED HEALTH CARE EDUCATION/TRAINING PROGRAM

## 2024-05-18 PROCEDURE — 85025 COMPLETE CBC W/AUTO DIFF WBC: CPT | Mod: HCNC | Performed by: STUDENT IN AN ORGANIZED HEALTH CARE EDUCATION/TRAINING PROGRAM

## 2024-05-18 PROCEDURE — 36415 COLL VENOUS BLD VENIPUNCTURE: CPT | Mod: HCNC | Performed by: STUDENT IN AN ORGANIZED HEALTH CARE EDUCATION/TRAINING PROGRAM

## 2024-05-18 RX ADMIN — HYDROXYZINE HYDROCHLORIDE 25 MG: 25 TABLET ORAL at 01:05

## 2024-05-18 RX ADMIN — CEFTRIAXONE SODIUM 1 G: 1 INJECTION, POWDER, FOR SOLUTION INTRAMUSCULAR; INTRAVENOUS at 01:05

## 2024-05-18 RX ADMIN — ASPIRIN 81 MG CHEWABLE TABLET 81 MG: 81 TABLET CHEWABLE at 08:05

## 2024-05-18 RX ADMIN — GUAIFENESIN 600 MG: 600 TABLET, EXTENDED RELEASE ORAL at 10:05

## 2024-05-18 RX ADMIN — FUROSEMIDE 40 MG: 40 TABLET ORAL at 08:05

## 2024-05-18 RX ADMIN — IPRATROPIUM BROMIDE AND ALBUTEROL SULFATE 3 ML: 2.5; .5 SOLUTION RESPIRATORY (INHALATION) at 07:05

## 2024-05-18 RX ADMIN — GUAIFENESIN 600 MG: 600 TABLET, EXTENDED RELEASE ORAL at 08:05

## 2024-05-18 RX ADMIN — ENOXAPARIN SODIUM 40 MG: 40 INJECTION SUBCUTANEOUS at 04:05

## 2024-05-18 RX ADMIN — IPRATROPIUM BROMIDE AND ALBUTEROL SULFATE 3 ML: 2.5; .5 SOLUTION RESPIRATORY (INHALATION) at 11:05

## 2024-05-18 RX ADMIN — IPRATROPIUM BROMIDE AND ALBUTEROL SULFATE 3 ML: 2.5; .5 SOLUTION RESPIRATORY (INHALATION) at 03:05

## 2024-05-18 RX ADMIN — FLUTICASONE PROPIONATE 100 MCG: 50 SPRAY, METERED NASAL at 08:05

## 2024-05-18 RX ADMIN — ALBUTEROL SULFATE 2 PUFF: 90 AEROSOL, METERED RESPIRATORY (INHALATION) at 07:05

## 2024-05-18 RX ADMIN — POTASSIUM CHLORIDE 10 MEQ: 750 TABLET, EXTENDED RELEASE ORAL at 08:05

## 2024-05-18 RX ADMIN — PREDNISONE 40 MG: 20 TABLET ORAL at 08:05

## 2024-05-18 RX ADMIN — FLUTICASONE FUROATE AND VILANTEROL TRIFENATATE 1 PUFF: 200; 25 POWDER RESPIRATORY (INHALATION) at 07:05

## 2024-05-18 RX ADMIN — LOSARTAN POTASSIUM 50 MG: 50 TABLET, FILM COATED ORAL at 08:05

## 2024-05-18 RX ADMIN — ROFLUMILAST 500 MCG: 500 TABLET ORAL at 08:05

## 2024-05-18 RX ADMIN — TRAZODONE HYDROCHLORIDE 50 MG: 50 TABLET ORAL at 10:05

## 2024-05-18 NOTE — ASSESSMENT & PLAN NOTE
On home oxygen 2 L NC  Patient is end-stage COPD     Symptoms have resolved  Currently on baseline home oxygen    Plan:  Continue prednisone 40 mg for 5 days  Schedule duonebs Q4, breo  Continue BiPAP qhs  Continue home azithromycin 3 times a week, roflumilast  Pulm rehab at OP  OP pulm f/up  Add ceftriaxone, optimize dose of inhalers

## 2024-05-18 NOTE — SUBJECTIVE & OBJECTIVE
Interval History:  No acute events overnight   Patient denies shortness or breath or chest pain   Reports some mild anxiety   Otherwise patient is eager to go home    Review of Systems   Constitutional:  Negative for chills and fever.   Respiratory:  Negative for shortness of breath and wheezing.    Cardiovascular:  Negative for chest pain, palpitations and leg swelling.   Gastrointestinal:  Negative for abdominal pain, diarrhea and nausea.   Neurological:  Negative for dizziness and light-headedness.   Psychiatric/Behavioral:  Negative for agitation and confusion.      Objective:     Vital Signs (Most Recent):  Temp: 98 °F (36.7 °C) (05/18/24 0806)  Pulse: 86 (05/18/24 0806)  Resp: 20 (05/18/24 0806)  BP: (!) 129/59 (05/18/24 0806)  SpO2: (!) 94 % (05/18/24 0806) Vital Signs (24h Range):  Temp:  [97.5 °F (36.4 °C)-98.4 °F (36.9 °C)] 98 °F (36.7 °C)  Pulse:  [] 86  Resp:  [16-27] 20  SpO2:  [90 %-96 %] 94 %  BP: (104-129)/(51-62) 129/59     Weight: 90.6 kg (199 lb 11.8 oz)  Body mass index is 32.24 kg/m².    Intake/Output Summary (Last 24 hours) at 5/18/2024 1103  Last data filed at 5/18/2024 0851  Gross per 24 hour   Intake 193.58 ml   Output 700 ml   Net -506.42 ml         Physical Exam  Constitutional:       General: She is not in acute distress.     Appearance: Normal appearance. She is not ill-appearing.   HENT:      Mouth/Throat:      Mouth: Mucous membranes are moist.      Pharynx: Oropharynx is clear.   Eyes:      Extraocular Movements: Extraocular movements intact.      Conjunctiva/sclera: Conjunctivae normal.   Cardiovascular:      Rate and Rhythm: Normal rate and regular rhythm.   Pulmonary:      Effort: Pulmonary effort is normal.      Breath sounds: Normal breath sounds.   Abdominal:      General: Bowel sounds are normal. There is no distension.      Palpations: Abdomen is soft.      Tenderness: There is no abdominal tenderness.   Musculoskeletal:         General: Normal range of motion.       Right lower leg: No edema.      Left lower leg: No edema.   Skin:     General: Skin is warm and dry.   Neurological:      General: No focal deficit present.      Mental Status: She is alert and oriented to person, place, and time.   Psychiatric:         Mood and Affect: Mood normal.         Behavior: Behavior normal.             Significant Labs: All pertinent labs within the past 24 hours have been reviewed.    Significant Imaging: I have reviewed all pertinent imaging results/findings within the past 24 hours.

## 2024-05-18 NOTE — NURSING
Patient is AAOx4. Tolerated cardiac diet. Ambulated upto [facility] with assistance. IV  abx maintained. Heart monitoring continued. Instructed to use call light for assistance. Purewick maintained. On oxygen. Bed alarm set and maintained at lowest position. Call light within reach.

## 2024-05-18 NOTE — PROGRESS NOTES
[facility] Medicine  Progress Note    Patient Name: Terri Phelan  MRN: [id number]  Patient Class: IP- Inpatient   Admission Date: 5/14/2024  Length of Stay: 4 days  Attending Physician: Kev Gabriel MD  Primary Care Provider: Jaimie Reynoso MD        Subjective:     Principal Problem:COPD exacerbation        HPI:  68 years old woman with past medical history of COPD stage gold D on home oxygen and BiPAP at night, heart failure with a preserved ejection fraction, CAD, hypertension who presented with worsening shortness of breath along with worsening chronic cough and change in sputum color.  She also stated that her lower extremity edema is worse than usual.  She endorsed compliance with all her medications, and she denies any history of current smoking.  BNP is slightly elevated, procalcitonin  is negative.     Overview/Hospital Course:  No notes on file    Interval History:  No acute events overnight   Patient denies shortness or breath or chest pain   Reports some mild anxiety   Otherwise patient is eager to go home    Review of Systems   Constitutional:  Negative for chills and fever.   Respiratory:  Negative for shortness of breath and wheezing.    Cardiovascular:  Negative for chest pain, palpitations and leg swelling.   Gastrointestinal:  Negative for abdominal pain, diarrhea and nausea.   Neurological:  Negative for dizziness and light-headedness.   Psychiatric/Behavioral:  Negative for agitation and confusion.      Objective:     Vital Signs (Most Recent):  Temp: 98 °F (36.7 °C) (05/18/24 0806)  Pulse: 86 (05/18/24 0806)  Resp: 20 (05/18/24 0806)  BP: (!) 129/59 (05/18/24 0806)  SpO2: (!) 94 % (05/18/24 0806) Vital Signs (24h Range):  Temp:  [97.5 °F (36.4 °C)-98.4 °F (36.9 °C)] 98 °F (36.7 °C)  Pulse:  [] 86  Resp:  [16-27] 20  SpO2:  [90 %-96 %] 94 %  BP: (104-129)/(51-62) 129/59     Weight: 90.6 kg (199 lb 11.8 oz)  Body mass index is 32.24 kg/m².    Intake/Output Summary  (Last 24 hours) at 5/18/2024 1103  Last data filed at 5/18/2024 0851  Gross per 24 hour   Intake 193.58 ml   Output 700 ml   Net -506.42 ml         Physical Exam  Constitutional:       General: She is not in acute distress.     Appearance: Normal appearance. She is not ill-appearing.   HENT:      Mouth/Throat:      Mouth: Mucous membranes are moist.      Pharynx: Oropharynx is clear.   Eyes:      Extraocular Movements: Extraocular movements intact.      Conjunctiva/sclera: Conjunctivae normal.   Cardiovascular:      Rate and Rhythm: Normal rate and regular rhythm.   Pulmonary:      Effort: Pulmonary effort is normal.      Breath sounds: Normal breath sounds.   Abdominal:      General: Bowel sounds are normal. There is no distension.      Palpations: Abdomen is soft.      Tenderness: There is no abdominal tenderness.   Musculoskeletal:         General: Normal range of motion.      Right lower leg: No edema.      Left lower leg: No edema.   Skin:     General: Skin is warm and dry.   Neurological:      General: No focal deficit present.      Mental Status: She is alert and oriented to person, place, and time.   Psychiatric:         Mood and Affect: Mood normal.         Behavior: Behavior normal.             Significant Labs: All pertinent labs within the past 24 hours have been reviewed.    Significant Imaging: I have reviewed all pertinent imaging results/findings within the past 24 hours.    Assessment/Plan:      * COPD exacerbation  On home oxygen 2 L NC  Patient is end-stage COPD     Symptoms have resolved  Currently on baseline home oxygen    Plan:  Continue prednisone 40 mg for 5 days  Schedule duonebs Q4, breo  Continue BiPAP qhs  Continue home azithromycin 3 times a week, roflumilast  Pulm rehab at OP  OP pulm f/up  Add ceftriaxone, optimize dose of inhalers    (HFpEF) heart failure with preserved ejection fraction  Appears euvolemic on exam   Mildly elevated BNP    S/p 1 dose of IV lasix, resume home dose oral  lasix    History of myocardial infarction  Continue home aspirin  Continue home statin      HTN (hypertension)  Continue home losartan      VTE Risk Mitigation (From admission, onward)           Ordered     enoxaparin injection 40 mg  Daily         05/14/24 2330     IP VTE HIGH RISK PATIENT  Once         05/14/24 2330     Place sequential compression device  Until discontinued         05/14/24 2330                    Discharge Planning   RADHA:      Code Status: Full Code   Is the patient medically ready for discharge?:     Reason for patient still in hospital (select all that apply): Pending disposition  Discharge Plan A: Home with family, Home Health   Discharge Delays: None known at this time              Kev Gabriel MD  Department of Hospital Medicine   [facility] Surg

## 2024-05-19 LAB
ANION GAP SERPL CALC-SCNC: 7 MMOL/L (ref 8–16)
BUN SERPL-MCNC: 23 MG/DL (ref 8–23)
CALCIUM SERPL-MCNC: 8.7 MG/DL (ref 8.7–10.5)
CHLORIDE SERPL-SCNC: 102 MMOL/L (ref 95–110)
CO2 SERPL-SCNC: 38 MMOL/L (ref 23–29)
CREAT SERPL-MCNC: 0.6 MG/DL (ref 0.5–1.4)
EST. GFR  (NO RACE VARIABLE): >60 ML/MIN/1.73 M^2
GLUCOSE SERPL-MCNC: 84 MG/DL (ref 70–110)
POTASSIUM SERPL-SCNC: 3.6 MMOL/L (ref 3.5–5.1)
SODIUM SERPL-SCNC: 147 MMOL/L (ref 136–145)

## 2024-05-19 PROCEDURE — 27100171 HC OXYGEN HIGH FLOW UP TO 24 HOURS: Mod: HCNC

## 2024-05-19 PROCEDURE — 21400001 HC TELEMETRY ROOM: Mod: HCNC

## 2024-05-19 PROCEDURE — 99900035 HC TECH TIME PER 15 MIN (STAT): Mod: HCNC

## 2024-05-19 PROCEDURE — 63600175 PHARM REV CODE 636 W HCPCS: Mod: HCNC | Performed by: STUDENT IN AN ORGANIZED HEALTH CARE EDUCATION/TRAINING PROGRAM

## 2024-05-19 PROCEDURE — 80048 BASIC METABOLIC PNL TOTAL CA: CPT | Mod: HCNC | Performed by: STUDENT IN AN ORGANIZED HEALTH CARE EDUCATION/TRAINING PROGRAM

## 2024-05-19 PROCEDURE — 25000003 PHARM REV CODE 250: Mod: HCNC | Performed by: STUDENT IN AN ORGANIZED HEALTH CARE EDUCATION/TRAINING PROGRAM

## 2024-05-19 PROCEDURE — 94660 CPAP INITIATION&MGMT: CPT | Mod: HCNC

## 2024-05-19 PROCEDURE — 36415 COLL VENOUS BLD VENIPUNCTURE: CPT | Mod: HCNC | Performed by: STUDENT IN AN ORGANIZED HEALTH CARE EDUCATION/TRAINING PROGRAM

## 2024-05-19 PROCEDURE — 25000242 PHARM REV CODE 250 ALT 637 W/ HCPCS: Mod: HCNC | Performed by: STUDENT IN AN ORGANIZED HEALTH CARE EDUCATION/TRAINING PROGRAM

## 2024-05-19 PROCEDURE — 94640 AIRWAY INHALATION TREATMENT: CPT | Mod: HCNC

## 2024-05-19 PROCEDURE — 94761 N-INVAS EAR/PLS OXIMETRY MLT: CPT | Mod: HCNC

## 2024-05-19 PROCEDURE — 25000003 PHARM REV CODE 250: Mod: HCNC | Performed by: HOSPITALIST

## 2024-05-19 RX ORDER — CEFUROXIME AXETIL 250 MG/1
500 TABLET ORAL EVERY 12 HOURS
Status: DISCONTINUED | OUTPATIENT
Start: 2024-05-19 | End: 2024-05-20 | Stop reason: HOSPADM

## 2024-05-19 RX ADMIN — GUAIFENESIN 600 MG: 600 TABLET, EXTENDED RELEASE ORAL at 08:05

## 2024-05-19 RX ADMIN — ALBUTEROL SULFATE 2 PUFF: 90 AEROSOL, METERED RESPIRATORY (INHALATION) at 06:05

## 2024-05-19 RX ADMIN — FLUTICASONE PROPIONATE 100 MCG: 50 SPRAY, METERED NASAL at 08:05

## 2024-05-19 RX ADMIN — FLUTICASONE FUROATE AND VILANTEROL TRIFENATATE 1 PUFF: 200; 25 POWDER RESPIRATORY (INHALATION) at 07:05

## 2024-05-19 RX ADMIN — POTASSIUM CHLORIDE 10 MEQ: 750 TABLET, EXTENDED RELEASE ORAL at 08:05

## 2024-05-19 RX ADMIN — IPRATROPIUM BROMIDE AND ALBUTEROL SULFATE 3 ML: 2.5; .5 SOLUTION RESPIRATORY (INHALATION) at 03:05

## 2024-05-19 RX ADMIN — IPRATROPIUM BROMIDE AND ALBUTEROL SULFATE 3 ML: 2.5; .5 SOLUTION RESPIRATORY (INHALATION) at 07:05

## 2024-05-19 RX ADMIN — CEFUROXIME AXETIL 500 MG: 250 TABLET ORAL at 10:05

## 2024-05-19 RX ADMIN — ENOXAPARIN SODIUM 40 MG: 40 INJECTION SUBCUTANEOUS at 04:05

## 2024-05-19 RX ADMIN — IPRATROPIUM BROMIDE AND ALBUTEROL SULFATE 3 ML: 2.5; .5 SOLUTION RESPIRATORY (INHALATION) at 11:05

## 2024-05-19 RX ADMIN — LOSARTAN POTASSIUM 50 MG: 50 TABLET, FILM COATED ORAL at 08:05

## 2024-05-19 RX ADMIN — FUROSEMIDE 40 MG: 40 TABLET ORAL at 08:05

## 2024-05-19 RX ADMIN — ASPIRIN 81 MG CHEWABLE TABLET 81 MG: 81 TABLET CHEWABLE at 08:05

## 2024-05-19 RX ADMIN — ROFLUMILAST 500 MCG: 500 TABLET ORAL at 08:05

## 2024-05-19 RX ADMIN — CEFUROXIME AXETIL 500 MG: 250 TABLET ORAL at 08:05

## 2024-05-19 NOTE — PLAN OF CARE
Patient is AAOx4. Tolerated cardiac diet. Ambulated upto [facility] with assistance. PO abx maintained. Heart monitoring continued. Instructed to use call light for assistance. Purewick maintained. On oxygen. Bed alarm set and maintained at lowest position. Call light within reach. No complains of pain.

## 2024-05-19 NOTE — SUBJECTIVE & OBJECTIVE
Interval History:  No acute events overnight   Patient denies shortness or breath or chest pain     Review of Systems   Constitutional:  Negative for chills and fever.   Respiratory:  Negative for shortness of breath and wheezing.    Cardiovascular:  Negative for chest pain, palpitations and leg swelling.   Gastrointestinal:  Negative for abdominal pain, diarrhea and nausea.   Neurological:  Negative for dizziness and light-headedness.   Psychiatric/Behavioral:  Negative for agitation and confusion.      Objective:     Vital Signs (Most Recent):  Temp: 97.5 °F (36.4 °C) (05/19/24 0811)  Pulse: 67 (05/19/24 1111)  Resp: 16 (05/19/24 1111)  BP: (!) 119/58 (05/19/24 0811)  SpO2: 96 % (05/19/24 1111) Vital Signs (24h Range):  Temp:  [97.4 °F (36.3 °C)-98.2 °F (36.8 °C)] 97.5 °F (36.4 °C)  Pulse:  [53-89] 67  Resp:  [16-22] 16  SpO2:  [91 %-98 %] 96 %  BP: (110-129)/(51-61) 119/58     Weight: 90.3 kg (199 lb 1.2 oz)  Body mass index is 32.13 kg/m².    Intake/Output Summary (Last 24 hours) at 5/19/2024 1153  Last data filed at 5/19/2024 0800  Gross per 24 hour   Intake 918.47 ml   Output 1001 ml   Net -82.53 ml         Physical Exam  Constitutional:       General: She is not in acute distress.     Appearance: Normal appearance. She is not ill-appearing.   HENT:      Mouth/Throat:      Mouth: Mucous membranes are moist.      Pharynx: Oropharynx is clear.   Eyes:      Extraocular Movements: Extraocular movements intact.      Conjunctiva/sclera: Conjunctivae normal.   Cardiovascular:      Rate and Rhythm: Normal rate and regular rhythm.   Pulmonary:      Effort: Pulmonary effort is normal.      Breath sounds: Normal breath sounds.   Abdominal:      General: Bowel sounds are normal. There is no distension.      Palpations: Abdomen is soft.      Tenderness: There is no abdominal tenderness.   Musculoskeletal:         General: Normal range of motion.      Right lower leg: No edema.      Left lower leg: No edema.   Skin:      General: Skin is warm and dry.   Neurological:      General: No focal deficit present.      Mental Status: She is alert and oriented to person, place, and time.   Psychiatric:         Mood and Affect: Mood normal.         Behavior: Behavior normal.             Significant Labs: All pertinent labs within the past 24 hours have been reviewed.    Significant Imaging: I have reviewed all pertinent imaging results/findings within the past 24 hours.   Report using a coping skill to overcome sadness and worry in order to socialize with peers daily

## 2024-05-19 NOTE — PROGRESS NOTES
[facility] Medicine  Progress Note    Patient Name: Terri Phelan  MRN: [id number]  Patient Class: IP- Inpatient   Admission Date: 5/14/2024  Length of Stay: 5 days  Attending Physician: Kev Gabriel MD  Primary Care Provider: Jaimie Reynoso MD        Subjective:     Principal Problem:COPD exacerbation        HPI:  68 years old woman with past medical history of COPD stage gold D on home oxygen and BiPAP at night, heart failure with a preserved ejection fraction, CAD, hypertension who presented with worsening shortness of breath along with worsening chronic cough and change in sputum color.  She also stated that her lower extremity edema is worse than usual.  She endorsed compliance with all her medications, and she denies any history of current smoking.  BNP is slightly elevated, procalcitonin  is negative.     Overview/Hospital Course:  No notes on file    Interval History:  No acute events overnight   Patient denies shortness or breath or chest pain     Review of Systems   Constitutional:  Negative for chills and fever.   Respiratory:  Negative for shortness of breath and wheezing.    Cardiovascular:  Negative for chest pain, palpitations and leg swelling.   Gastrointestinal:  Negative for abdominal pain, diarrhea and nausea.   Neurological:  Negative for dizziness and light-headedness.   Psychiatric/Behavioral:  Negative for agitation and confusion.      Objective:     Vital Signs (Most Recent):  Temp: 97.5 °F (36.4 °C) (05/19/24 0811)  Pulse: 67 (05/19/24 1111)  Resp: 16 (05/19/24 1111)  BP: (!) 119/58 (05/19/24 0811)  SpO2: 96 % (05/19/24 1111) Vital Signs (24h Range):  Temp:  [97.4 °F (36.3 °C)-98.2 °F (36.8 °C)] 97.5 °F (36.4 °C)  Pulse:  [53-89] 67  Resp:  [16-22] 16  SpO2:  [91 %-98 %] 96 %  BP: (110-129)/(51-61) 119/58     Weight: 90.3 kg (199 lb 1.2 oz)  Body mass index is 32.13 kg/m².    Intake/Output Summary (Last 24 hours) at 5/19/2024 1153  Last data filed at 5/19/2024  0800  Gross per 24 hour   Intake 918.47 ml   Output 1001 ml   Net -82.53 ml         Physical Exam  Constitutional:       General: She is not in acute distress.     Appearance: Normal appearance. She is not ill-appearing.   HENT:      Mouth/Throat:      Mouth: Mucous membranes are moist.      Pharynx: Oropharynx is clear.   Eyes:      Extraocular Movements: Extraocular movements intact.      Conjunctiva/sclera: Conjunctivae normal.   Cardiovascular:      Rate and Rhythm: Normal rate and regular rhythm.   Pulmonary:      Effort: Pulmonary effort is normal.      Breath sounds: Normal breath sounds.   Abdominal:      General: Bowel sounds are normal. There is no distension.      Palpations: Abdomen is soft.      Tenderness: There is no abdominal tenderness.   Musculoskeletal:         General: Normal range of motion.      Right lower leg: No edema.      Left lower leg: No edema.   Skin:     General: Skin is warm and dry.   Neurological:      General: No focal deficit present.      Mental Status: She is alert and oriented to person, place, and time.   Psychiatric:         Mood and Affect: Mood normal.         Behavior: Behavior normal.             Significant Labs: All pertinent labs within the past 24 hours have been reviewed.    Significant Imaging: I have reviewed all pertinent imaging results/findings within the past 24 hours.    Assessment/Plan:      * COPD exacerbation  On home oxygen 2 L NC  Patient is end-stage COPD     Symptoms have resolved  Currently on baseline home oxygen    Plan:  Continue prednisone 40 mg for 5 days  Schedule duonebs Q4, breo  Continue BiPAP qhs  Continue home azithromycin 3 times a week, roflumilast  Pulm rehab at OP  OP pulm f/up  Ceftriaxone de-escalated to p.o. Ceftin    (HFpEF) heart failure with preserved ejection fraction  Appears euvolemic on exam   Mildly elevated BNP    S/p 1 dose of IV lasix, resume home dose oral lasix    History of myocardial infarction  Continue home  aspirin  Continue home statin      HTN (hypertension)  Continue home losartan      VTE Risk Mitigation (From admission, onward)           Ordered     enoxaparin injection 40 mg  Daily         05/14/24 2330     IP VTE HIGH RISK PATIENT  Once         05/14/24 2330     Place sequential compression device  Until discontinued         05/14/24 2330                    Discharge Planning   RADHA:      Code Status: Full Code   Is the patient medically ready for discharge?:     Reason for patient still in hospital (select all that apply): Pending disposition  Discharge Plan A: Home with family, Home Health   Discharge Delays: None known at this time              Kev Gabriel MD  Department of Hospital Medicine   [facility]

## 2024-05-19 NOTE — PLAN OF CARE
Awake in room, sitting on side of bed call light within reach bed in low position, rescue inhaler effective for wheezing and shortness of breath.

## 2024-05-19 NOTE — ASSESSMENT & PLAN NOTE
On home oxygen 2 L NC  Patient is end-stage COPD     Symptoms have resolved  Currently on baseline home oxygen    Plan:  Continue prednisone 40 mg for 5 days  Schedule duonebs Q4, breo  Continue BiPAP qhs  Continue home azithromycin 3 times a week, roflumilast  Pulm rehab at OP  OP pulm f/up  Ceftriaxone de-escalated to p.o. Ceftin

## 2024-05-19 NOTE — PLAN OF CARE
Patient received on  2  Lpm NC with SpO2    95%. Pt with no apparent distress noted. Will continue to monitor.

## 2024-05-19 NOTE — PLAN OF CARE
SW made aware by nurse that pt will like to speak with SW regarding HH. SW expressed that no HH agency has accepted pt at this time. SW explained barriers of getting HH at this time. Barriers being insurance. SW also suggested possible outpatient PT/OT. SW expressed that she will resend HH referrals. SW resent HH referrals and extended area to 50 miles. SW will continue to follow pt throughout her transitions of care and assist with any dc needs.       SW received call from pts daughter to discuss HH barriers. SW explained at this time no HH agency has accepted pt. SW made her aware of HH barriers.     Future Appointments   Date Time Provider Department Center   5/23/2024  2:30 PM Alex Cunningham MD [facility]   5/28/2024 10:00 AM Jaimie Reynoso MD [facility] 65+ [place]   8/6/2024 10:20 AM Jaimie Reynoso MD [facility] 65[facility] 65+ [place]        05/19/24 1211   Post-Acute Status   Post-Acute Authorization Home Health   Home Health Status Referrals Sent   Discharge Plan   Discharge Plan A Home with family;Home Health

## 2024-05-19 NOTE — PLAN OF CARE
Patient received on    2Lpm NC with SpO2    94%. Pt with no apparent distress noted. Will continue to monitor.

## 2024-05-20 ENCOUNTER — TELEPHONE (OUTPATIENT)
Dept: PULMONOLOGY | Facility: CLINIC | Age: 69
End: 2024-05-20
Payer: MEDICARE

## 2024-05-20 VITALS
HEIGHT: 66 IN | DIASTOLIC BLOOD PRESSURE: 56 MMHG | OXYGEN SATURATION: 92 % | RESPIRATION RATE: 17 BRPM | WEIGHT: 199.06 LBS | BODY MASS INDEX: 31.99 KG/M2 | HEART RATE: 84 BPM | SYSTOLIC BLOOD PRESSURE: 124 MMHG | TEMPERATURE: 98 F

## 2024-05-20 LAB
OHS QRS DURATION: 68 MS
OHS QTC CALCULATION: 410 MS

## 2024-05-20 PROCEDURE — 27100171 HC OXYGEN HIGH FLOW UP TO 24 HOURS: Mod: HCNC

## 2024-05-20 PROCEDURE — 94761 N-INVAS EAR/PLS OXIMETRY MLT: CPT | Mod: HCNC

## 2024-05-20 PROCEDURE — 25000003 PHARM REV CODE 250: Mod: HCNC | Performed by: HOSPITALIST

## 2024-05-20 PROCEDURE — 25000242 PHARM REV CODE 250 ALT 637 W/ HCPCS: Mod: HCNC | Performed by: STUDENT IN AN ORGANIZED HEALTH CARE EDUCATION/TRAINING PROGRAM

## 2024-05-20 PROCEDURE — 25000003 PHARM REV CODE 250: Mod: HCNC | Performed by: STUDENT IN AN ORGANIZED HEALTH CARE EDUCATION/TRAINING PROGRAM

## 2024-05-20 PROCEDURE — 99900035 HC TECH TIME PER 15 MIN (STAT): Mod: HCNC

## 2024-05-20 PROCEDURE — 94640 AIRWAY INHALATION TREATMENT: CPT | Mod: HCNC

## 2024-05-20 PROCEDURE — 94660 CPAP INITIATION&MGMT: CPT | Mod: HCNC

## 2024-05-20 RX ORDER — METHOCARBAMOL 500 MG/1
500 TABLET, FILM COATED ORAL EVERY 8 HOURS PRN
Qty: 30 TABLET | Refills: 0 | Status: SHIPPED | OUTPATIENT
Start: 2024-05-20 | End: 2024-05-30

## 2024-05-20 RX ORDER — CEFUROXIME AXETIL 500 MG/1
500 TABLET ORAL EVERY 12 HOURS
Qty: 4 TABLET | Refills: 0 | Status: SHIPPED | OUTPATIENT
Start: 2024-05-20 | End: 2024-05-22

## 2024-05-20 RX ORDER — BENZONATATE 200 MG/1
200 CAPSULE ORAL 3 TIMES DAILY PRN
Qty: 30 CAPSULE | Refills: 0 | Status: SHIPPED | OUTPATIENT
Start: 2024-05-20 | End: 2024-05-30

## 2024-05-20 RX ORDER — HYDROXYZINE HYDROCHLORIDE 25 MG/1
25 TABLET, FILM COATED ORAL 3 TIMES DAILY PRN
Qty: 30 TABLET | Refills: 0 | Status: SHIPPED | OUTPATIENT
Start: 2024-05-20

## 2024-05-20 RX ADMIN — IPRATROPIUM BROMIDE AND ALBUTEROL SULFATE 3 ML: 2.5; .5 SOLUTION RESPIRATORY (INHALATION) at 11:05

## 2024-05-20 RX ADMIN — ROFLUMILAST 500 MCG: 500 TABLET ORAL at 08:05

## 2024-05-20 RX ADMIN — FLUTICASONE PROPIONATE 100 MCG: 50 SPRAY, METERED NASAL at 08:05

## 2024-05-20 RX ADMIN — ASPIRIN 81 MG CHEWABLE TABLET 81 MG: 81 TABLET CHEWABLE at 08:05

## 2024-05-20 RX ADMIN — HYDROXYZINE HYDROCHLORIDE 25 MG: 25 TABLET ORAL at 11:05

## 2024-05-20 RX ADMIN — LOSARTAN POTASSIUM 50 MG: 50 TABLET, FILM COATED ORAL at 08:05

## 2024-05-20 RX ADMIN — CEFUROXIME AXETIL 500 MG: 250 TABLET ORAL at 08:05

## 2024-05-20 RX ADMIN — FUROSEMIDE 40 MG: 40 TABLET ORAL at 08:05

## 2024-05-20 RX ADMIN — IPRATROPIUM BROMIDE AND ALBUTEROL SULFATE 3 ML: 2.5; .5 SOLUTION RESPIRATORY (INHALATION) at 08:05

## 2024-05-20 RX ADMIN — FLUTICASONE FUROATE AND VILANTEROL TRIFENATATE 1 PUFF: 200; 25 POWDER RESPIRATORY (INHALATION) at 08:05

## 2024-05-20 RX ADMIN — GUAIFENESIN 600 MG: 600 TABLET, EXTENDED RELEASE ORAL at 08:05

## 2024-05-20 RX ADMIN — POTASSIUM CHLORIDE 10 MEQ: 750 TABLET, EXTENDED RELEASE ORAL at 08:05

## 2024-05-20 NOTE — PROGRESS NOTES
Virtual Nurse:Discharge orders noted; additional clinical references attached.  and pharmacy tech notified.  Patient's discharge instruction packet given by bedside RN.    Cued into patient's room.  Permission received per patient to turn camera to view patient. Introduced as VN that will be instructing on discharge instructions.  Daughter, Mulugeta, at bedside.  Educated patient and daughter on reason for admission; medications to hold, continue, and start, appointment to follow-up with doctor, and when to return to ED. Teach back method used. Verbalized understanding  Number given for 24/7 Nurse Line. Opportunity given for questions and questions answered.  Bedside nurse updated. Transport to [facility] requested.        05/20/24 1309   Shift   Virtual Nurse - Patient Verbalized Approval Of VN Rounding;Camera Use   Type of Frequent Check   Type Patient Rounds   Safety/Activity   Patient Rounds visualized patient

## 2024-05-20 NOTE — PLAN OF CARE
[facility]      HOME HEALTH ORDERS  FACE TO FACE ENCOUNTER    Patient Name: Terri Phelan  YOB: 1955    PCP: Jaimie Reynoso MD   PCP Address: [address] / [place] [address]  PCP Phone Number: [phone]  PCP Fax: [phone]    Encounter Date: 5/14/24    Admit to Home Health    Diagnoses:  Active Hospital Problems    Diagnosis  POA    *COPD exacerbation [J44.1]  Yes    (HFpEF) heart failure with preserved ejection fraction [I50.30]  Yes    Chronic respiratory failure with hypoxia and hypercapnia [J96.11, J96.12]  Yes    History of myocardial infarction [I25.2]  Not Applicable    HTN (hypertension) [I10]  Yes      Resolved Hospital Problems   No resolved problems to display.       Follow Up Appointments:  Future Appointments   Date Time Provider Department Center   5/23/2024  2:30 PM Alex Cunningham MD [facility]   5/28/2024 10:00 AM Jaimie Reynoso MD [facility] 65[facility] 65+ [place]   8/6/2024 10:20 AM Jaimie Reynoso MD [facility] 65[facility]US 65+ [place]       Allergies:  Review of patient's allergies indicates:   Allergen Reactions    Codeine Nausea And Vomiting    Lipitor [atorvastatin] Other (See Comments)     Muscle fatigue      Morphine Hallucinations       Medications: Review discharge medications with patient and family and provide education.    Current Facility-Administered Medications   Medication Dose Route Frequency Provider Last Rate Last Admin    albuterol inhaler 2 puff  2 puff Inhalation Q6H PRN Alida Garza MD   2 puff at 05/19/24 0608    albuterol-ipratropium 2.5 mg-0.5 mg/3 mL nebulizer solution 3 mL  3 mL Nebulization Q4H WAKE Alida Garza MD   3 mL at 05/20/24 0844    aspirin chewable tablet 81 mg  81 mg Oral Daily Danny Saba MD   81 mg at 05/20/24 0858    azithromycin tablet 250 mg  250 mg Oral Every Mon, Wed, Fri Danny Saba MD   250 mg at 05/17/24 1728    benzonatate capsule 200 mg  200 mg Oral TID PRN Unis,  MD Dex   200 mg at 05/16/24 0127    cefUROXime tablet 500 mg  500 mg Oral Q12H Kev Gabriel MD   500 mg at 05/20/24 0858    dextrose 10% bolus 125 mL 125 mL  12.5 g Intravenous PRN Danny Saba MD        dextrose 10% bolus 250 mL 250 mL  25 g Intravenous PRN Danny Saba MD        enoxaparin injection 40 mg  40 mg Subcutaneous Daily Danny Saba MD   40 mg at 05/19/24 1651    fluticasone furoate-vilanteroL 200-25 mcg/dose diskus inhaler 1 puff  1 puff Inhalation Daily Alida Garza MD   1 puff at 05/20/24 0844    fluticasone propionate 50 mcg/actuation nasal spray 100 mcg  2 spray Each Nostril Daily Dex Vinson MD   100 mcg at 05/20/24 0859    furosemide tablet 40 mg  40 mg Oral Daily Danny Saba MD   40 mg at 05/20/24 0859    glucagon (human recombinant) injection 1 mg  1 mg Intramuscular PRN Danny Saba MD        glucose chewable tablet 16 g  16 g Oral PRN Danny Saba MD        glucose chewable tablet 24 g  24 g Oral PRN Danny Saba MD        guaiFENesin 100 mg/5 ml syrup 200 mg  200 mg Oral Q4H PRN Josephine Xavier MD   200 mg at 05/17/24 0953    guaiFENesin 12 hr tablet 600 mg  600 mg Oral BID Alida Garza MD   600 mg at 05/20/24 0859    hydrOXYzine HCL tablet 25 mg  25 mg Oral TID PRN Alida Garza MD   25 mg at 05/20/24 1121    losartan tablet 50 mg  50 mg Oral Daily Danny Saba MD   50 mg at 05/20/24 0859    methocarbamoL tablet 500 mg  500 mg Oral Q8H PRN Alida Garza MD        naloxone 0.4 mg/mL injection 0.02 mg  0.02 mg Intravenous PRN Danny Saba MD        potassium chloride SA CR tablet 10 mEq  10 mEq Oral Daily Alida Garza MD   10 mEq at 05/20/24 0858    roflumilast (DALIRESP) tablet 500 mcg  500 mcg Oral Daily Danny Saba MD   500 mcg at 05/20/24 0858    simethicone chewable tablet 125 mg  125 mg Oral Q6H PRN Dex Vinson MD   125 mg at  05/15/24 0354    sodium chloride 0.9% flush 10 mL  10 mL Intravenous Q12H PRN Danny Saba MD        traZODone tablet 50 mg  50 mg Oral Nightly PRN Danny Saba MD   50 mg at 05/18/24 2486     Current Discharge Medication List        START taking these medications    Details   benzonatate (TESSALON) 200 MG capsule Take 1 capsule (200 mg total) by mouth 3 (three) times daily as needed for Cough.  Qty: 30 capsule, Refills: 0      cefUROXime (CEFTIN) 500 MG tablet Take 1 tablet (500 mg total) by mouth every 12 (twelve) hours. for 2 days  Qty: 4 tablet, Refills: 0      hydrOXYzine HCL (ATARAX) 25 MG tablet Take 1 tablet (25 mg total) by mouth 3 (three) times daily as needed for Anxiety or Itching.  Qty: 30 tablet, Refills: 0      methocarbamoL (ROBAXIN) 500 MG Tab Take 1 tablet (500 mg total) by mouth every 8 (eight) hours as needed (muscle spasms).  Qty: 30 tablet, Refills: 0           CONTINUE these medications which have NOT CHANGED    Details   acetaminophen (TYLENOL) 500 MG tablet Take 500 mg by mouth every 6 (six) hours as needed for Pain.      albuterol (PROVENTIL/VENTOLIN HFA) 90 mcg/actuation inhaler Inhale 2 puffs into the lungs every 6 (six) hours as needed for Wheezing or Shortness of Breath.  Qty: 18 g, Refills: 6      alendronate (FOSAMAX) 35 MG tablet Take 35 mg by mouth every Tuesday.      aspirin 81 MG Chew Take 1 tablet by mouth once daily.      azithromycin (Z-OLY) 250 MG tablet Take 1 tablet (250 mg total) by mouth every Mon, Wed, Fri.  Qty: 12 tablet, Refills: 11      budesonide-glycopyr-formoterol (BREZTRI AEROSPHERE) 160-9-4.8 mcg/actuation HFAA Inhale 2 puffs into the lungs 2 (two) times daily.      cholecalciferol, vitamin D3, (VITAMIN D3) 50 mcg (2,000 unit) Tab Take 2,000 Units by mouth once daily.      dextromethorphan-guaiFENesin  mg (MUCINEX DM)  mg per 12 hr tablet Take 1 tablet by mouth 2 (two) times daily as needed.      diazePAM (VALIUM) 2 MG tablet  Take 1 tablet (2 mg total) by mouth daily as needed for Anxiety.  Qty: 30 tablet, Refills: 0      dicyclomine (BENTYL) 20 mg tablet Take 1 tablet (20 mg total) by mouth 3 (three) times daily as needed (abdominal pain).  Qty: 30 tablet, Refills: 1      fluticasone propionate (FLONASE) 50 mcg/actuation nasal spray 1 spray (50 mcg total) by Each Nostril route daily as needed for Allergies.  Qty: 16 g, Refills: 11      furosemide (LASIX) 40 MG tablet Take 1 tablet (40 mg total) by mouth once daily.  Qty: 30 tablet, Refills: 4      ibuprofen (ADVIL,MOTRIN) 800 MG tablet Take 1 tablet (800 mg total) by mouth 3 (three) times daily as needed for Pain.  Qty: 90 tablet, Refills: 0    Associated Diagnoses: Chronic left shoulder pain      losartan (COZAAR) 50 MG tablet Take 1 tablet (50 mg total) by mouth once daily.  Qty: 90 tablet, Refills: 3    Comments: .      OXYGEN-AIR DELIVERY SYSTEMS MISC 2 L by Nasal route continuous.      potassium chloride (MICRO-K) 10 MEQ CpSR Take 10 mEq by mouth once daily.      roflumilast (DALIRESP) 500 mcg Tab Take 500 mcg by mouth once daily.      traZODone (DESYREL) 50 MG tablet Take 1 tablet (50 mg total) by mouth nightly as needed for Insomnia.  Qty: 30 tablet, Refills: 6      calcium carb-mag hydrox-simeth 1,200 mg-270 mg -80 mg/10 mL Susp Take 1,200 mg by mouth once daily.               I have seen and examined this patient within the last 30 days. My clinical findings that support the need for the home health skilled services and home bound status are the following:no   Weakness/numbness causing balance and gait disturbance due to COPD Exacerbation and Weakness/Debility making it taxing to leave home.     Diet:   regular diet    Labs:  Report Lab results to PCP.    Referrals/ Consults  Physical Therapy to evaluate and treat. Evaluate for home safety and equipment needs; Establish/upgrade home exercise program. Perform / instruct on therapeutic exercises, gait training, transfer training,  and Range of Motion.  Occupational Therapy to evaluate and treat. Evaluate home environment for safety and equipment needs. Perform/Instruct on transfers, ADL training, ROM, and therapeutic exercises.    Activities:   activity as tolerated        When multiple disciplines ordered:    Start of Care occurs on Sunday - Wednesday schedule remaining discipline evaluations as ordered on separate consecutive days following the start of care.    Thursday SOC -schedule subsequent evaluations Friday and Monday the following week.     Friday - Saturday SOC - schedule subsequent discipline evaluations on consecutive days starting Monday of the following week.    For all post-discharge communication and subsequent orders please contact patient's primary care physician.      Home Health Aide:  Physical Therapy  and Occupational Therapy       I certify that this patient is confined to her home and needs physical therapy and occupational therapy.

## 2024-05-20 NOTE — TELEPHONE ENCOUNTER
I spoke with patient to let her know appointment rescheduled with Dr Cunningham from 5/21/24 to 5/23/24 at 2:30pm. Patient confirmed and verbalized understanding.

## 2024-05-20 NOTE — PLAN OF CARE
SW received call from [facility] expressing that they will accept pt and admit on Wednesday. SW sent HH order to [facility] via [employer]. Pt will be contacted by  agency to schedule first appointment time/date. Pts daughter will provdie transport home. Pts daughter aware that portable O2 tank must be brought to hospital upon dc. SW will continue to follow pt throughout her transitions of care and assist with any dc needs.     Cleared from  . Bedside Nurse and VN notified.    [email or web address] Alert: YES response from [facility] - [place]alini re: Referral [id number] for patient in Unit: [facility] Bed: [id number] A Facility[facility] [place]  Yes, willing to accept patient soc 5/22 for sn, pt, ot      Future Appointments   Date Time Provider Department Center   5/23/2024  2:30 PM Alex Cunningham MD [facility]   5/28/2024 10:00 AM Jaimie Reynoso MD [facility] 65[facility] 65+ [place]   8/6/2024 10:20 AM Jaimie Reynoso MD [facility] 65[facility] 65+ [place]        05/20/24 1153   Final Note   Assessment Type Final Discharge Note   Anticipated Discharge Disposition Home-[facility]   Hospital Resources/Appts/Education Provided Appointments scheduled and added to AVS   Post-Acute Status   Discharge Delays None known at this time

## 2024-05-20 NOTE — PLAN OF CARE
AOX4. VS stable. Safety maintained. Meds given per MAR. Denies pain or N/V at this time. Cardiac monitoring in place. Cardiac diet maintained. Cardiac monitoring in place. O2@2L/min via NC. Resting quietly. SR up X 2. Call light in reach. Bed locked in lowest position. Pt denies any further needs at this time. Plan of care ongoing.       Problem: Adult Inpatient Plan of Care  Goal: Plan of Care Review  Outcome: Progressing  Goal: Patient-Specific Goal (Individualized)  Outcome: Progressing     Problem: COPD (Chronic Obstructive Pulmonary Disease)  Goal: Effective Oxygenation and Ventilation  Outcome: Progressing     Problem: Heart Failure  Goal: Stable Heart Rate and Rhythm  Outcome: Progressing     Problem: Comorbidity Management  Goal: Blood Pressure in Desired Range  Outcome: Progressing

## 2024-05-22 ENCOUNTER — PATIENT MESSAGE (OUTPATIENT)
Dept: PRIMARY CARE CLINIC | Facility: CLINIC | Age: 69
End: 2024-05-22

## 2024-05-22 ENCOUNTER — OFFICE VISIT (OUTPATIENT)
Dept: PRIMARY CARE CLINIC | Facility: CLINIC | Age: 69
End: 2024-05-22
Payer: MEDICARE

## 2024-05-22 VITALS
OXYGEN SATURATION: 93 % | WEIGHT: 187.06 LBS | DIASTOLIC BLOOD PRESSURE: 64 MMHG | BODY MASS INDEX: 30.19 KG/M2 | SYSTOLIC BLOOD PRESSURE: 128 MMHG | HEART RATE: 95 BPM

## 2024-05-22 DIAGNOSIS — J44.1 ACUTE EXACERBATION OF CHRONIC OBSTRUCTIVE PULMONARY DISEASE (COPD): Primary | ICD-10-CM

## 2024-05-22 PROCEDURE — 1111F DSCHRG MED/CURRENT MED MERGE: CPT | Mod: HCNC,CPTII,S$GLB, | Performed by: INTERNAL MEDICINE

## 2024-05-22 PROCEDURE — 3008F BODY MASS INDEX DOCD: CPT | Mod: HCNC,CPTII,S$GLB, | Performed by: INTERNAL MEDICINE

## 2024-05-22 PROCEDURE — 99999 PR PBB SHADOW E&M-EST. PATIENT-LVL IV: CPT | Mod: PBBFAC,HCNC,, | Performed by: INTERNAL MEDICINE

## 2024-05-22 PROCEDURE — G0180 MD CERTIFICATION HHA PATIENT: HCPCS | Mod: ,,, | Performed by: INTERNAL MEDICINE

## 2024-05-22 PROCEDURE — 3288F FALL RISK ASSESSMENT DOCD: CPT | Mod: HCNC,CPTII,S$GLB, | Performed by: INTERNAL MEDICINE

## 2024-05-22 PROCEDURE — 1160F RVW MEDS BY RX/DR IN RCRD: CPT | Mod: HCNC,CPTII,S$GLB, | Performed by: INTERNAL MEDICINE

## 2024-05-22 PROCEDURE — 99215 OFFICE O/P EST HI 40 MIN: CPT | Mod: HCNC,S$GLB,, | Performed by: INTERNAL MEDICINE

## 2024-05-22 PROCEDURE — 3074F SYST BP LT 130 MM HG: CPT | Mod: HCNC,CPTII,S$GLB, | Performed by: INTERNAL MEDICINE

## 2024-05-22 PROCEDURE — 1159F MED LIST DOCD IN RCRD: CPT | Mod: HCNC,CPTII,S$GLB, | Performed by: INTERNAL MEDICINE

## 2024-05-22 PROCEDURE — 1101F PT FALLS ASSESS-DOCD LE1/YR: CPT | Mod: HCNC,CPTII,S$GLB, | Performed by: INTERNAL MEDICINE

## 2024-05-22 PROCEDURE — 1157F ADVNC CARE PLAN IN RCRD: CPT | Mod: HCNC,CPTII,S$GLB, | Performed by: INTERNAL MEDICINE

## 2024-05-22 PROCEDURE — 4010F ACE/ARB THERAPY RXD/TAKEN: CPT | Mod: HCNC,CPTII,S$GLB, | Performed by: INTERNAL MEDICINE

## 2024-05-22 PROCEDURE — 3078F DIAST BP <80 MM HG: CPT | Mod: HCNC,CPTII,S$GLB, | Performed by: INTERNAL MEDICINE

## 2024-05-22 PROCEDURE — 1126F AMNT PAIN NOTED NONE PRSNT: CPT | Mod: HCNC,CPTII,S$GLB, | Performed by: INTERNAL MEDICINE

## 2024-05-22 RX ORDER — PREDNISONE 20 MG/1
20 TABLET ORAL 2 TIMES DAILY
Qty: 10 TABLET | Refills: 5 | Status: SHIPPED | OUTPATIENT
Start: 2024-05-22

## 2024-05-22 NOTE — PROGRESS NOTES
Clinic Note  5/22/2024      Subjective:       Patient ID:  Terri is a 68 y.o. female being seen for an established visit.    Chief Complaint: Hospital Follow Up and COPD (Exacerbation after recent trip to [facility])    68-year-old with severe COPD he is here for hospital discharge follow-up.    She frequently travels to [place] for family gatherings.  She stays in a hotel every time she goes.  Her daughter drives her van and accompanies her during her travel.  She mentioned that for the past 1 year, every time she traveled to [place] and came back she ended up having an exacerbation.  She also does mentioned that she can not stopped traveling and we will continue to do so but will take more precautions next time.    She tried taking prednisone for 5 days before going to the emergency room for her COPD exacerbation.  She ran out of prednisone and needs more refills.  She has refills of other inhalers.  She currently feels like her breathing is back to baseline and she drove herself to the visit today.  She says her next travel might be early December on a 4-5 day cruise.  She has follow up with her pulmonologist on 23rd May, tomorrow.    COPD  Pertinent negatives include no chest pain, chills, coughing, fever, myalgias, nausea or rash.       Review of Systems   Constitutional:  Negative for chills and fever.   Eyes:  Negative for blurred vision.   Respiratory:  Positive for shortness of breath. Negative for cough.    Cardiovascular:  Negative for chest pain and palpitations.   Gastrointestinal:  Negative for heartburn and nausea.   Musculoskeletal:  Negative for myalgias.   Skin:  Negative for rash.   Neurological:  Negative for dizziness.   Psychiatric/Behavioral:  The patient is nervous/anxious.        Medication List with Changes/Refills   New Medications    PREDNISONE (DELTASONE) 20 MG TABLET    Take 1 tablet (20 mg total) by mouth 2 (two) times daily.   Current Medications    ACETAMINOPHEN (TYLENOL) 500 MG  TABLET    Take 500 mg by mouth every 6 (six) hours as needed for Pain.    ALBUTEROL (PROVENTIL/VENTOLIN HFA) 90 MCG/ACTUATION INHALER    Inhale 2 puffs into the lungs every 6 (six) hours as needed for Wheezing or Shortness of Breath.    ALENDRONATE (FOSAMAX) 35 MG TABLET    Take 35 mg by mouth every Tuesday.    ASPIRIN 81 MG CHEW    Take 1 tablet by mouth once daily.    AZITHROMYCIN (Z-OLY) 250 MG TABLET    Take 1 tablet (250 mg total) by mouth every Mon, Wed, Fri.    BENZONATATE (TESSALON) 200 MG CAPSULE    Take 1 capsule (200 mg total) by mouth 3 (three) times daily as needed for Cough.    BUDESONIDE-GLYCOPYR-FORMOTEROL (BREZTRI AEROSPHERE) 160-9-4.8 MCG/ACTUATION HFAA    Inhale 2 puffs into the lungs 2 (two) times daily.    CALCIUM CARB-MAG HYDROX-SIMETH 1,200 MG-270 MG -80 MG/10 ML SUSP    Take 1,200 mg by mouth once daily.    CEFUROXIME (CEFTIN) 500 MG TABLET    Take 1 tablet (500 mg total) by mouth every 12 (twelve) hours. for 2 days    CHOLECALCIFEROL, VITAMIN D3, (VITAMIN D3) 50 MCG (2,000 UNIT) TAB    Take 2,000 Units by mouth once daily.    DEXTROMETHORPHAN-GUAIFENESIN  MG (MUCINEX DM)  MG PER 12 HR TABLET    Take 1 tablet by mouth 2 (two) times daily as needed.    DIAZEPAM (VALIUM) 2 MG TABLET    Take 1 tablet (2 mg total) by mouth daily as needed for Anxiety.    DICYCLOMINE (BENTYL) 20 MG TABLET    Take 1 tablet (20 mg total) by mouth 3 (three) times daily as needed (abdominal pain).    FLUTICASONE PROPIONATE (FLONASE) 50 MCG/ACTUATION NASAL SPRAY    1 spray (50 mcg total) by Each Nostril route daily as needed for Allergies.    FUROSEMIDE (LASIX) 40 MG TABLET    Take 1 tablet (40 mg total) by mouth once daily.    HYDROXYZINE HCL (ATARAX) 25 MG TABLET    Take 1 tablet (25 mg total) by mouth 3 (three) times daily as needed for Anxiety or Itching.    IBUPROFEN (ADVIL,MOTRIN) 800 MG TABLET    Take 1 tablet (800 mg total) by mouth 3 (three) times daily as needed for Pain.    LOSARTAN (COZAAR) 50  "MG TABLET    Take 1 tablet (50 mg total) by mouth once daily.    METHOCARBAMOL (ROBAXIN) 500 MG TAB    Take 1 tablet (500 mg total) by mouth every 8 (eight) hours as needed (muscle spasms).    OXYGEN-AIR DELIVERY SYSTEMS MISC    2 L by Nasal route continuous.    POTASSIUM CHLORIDE (MICRO-K) 10 MEQ CPSR    Take 10 mEq by mouth once daily.    ROFLUMILAST (DALIRESP) 500 MCG TAB    Take 500 mcg by mouth once daily.    TRAZODONE (DESYREL) 50 MG TABLET    Take 1 tablet (50 mg total) by mouth nightly as needed for Insomnia.           Objective:      /64 (BP Location: Right arm, Patient Position: Sitting, BP Method: Medium (Manual))   Pulse 95   Wt 84.8 kg (187 lb 1 oz)   SpO2 (!) 93%   BMI 30.19 kg/m²   Estimated body mass index is 30.19 kg/m² as calculated from the following:    Height as of 5/15/24: 5' 6" (1.676 m).    Weight as of this encounter: 84.8 kg (187 lb 1 oz).  Physical Exam  Constitutional:       Appearance: Normal appearance. She is obese.      Comments: Patient has nasal cannula and her oxygen tank   HENT:      Head: Normocephalic and atraumatic.      Nose: Nose normal.      Mouth/Throat:      Mouth: Mucous membranes are moist.   Eyes:      Pupils: Pupils are equal, round, and reactive to light.   Cardiovascular:      Rate and Rhythm: Normal rate and regular rhythm.      Pulses: Normal pulses.      Heart sounds: Normal heart sounds. No murmur heard.  Pulmonary:      Effort: Pulmonary effort is normal.      Breath sounds: Normal breath sounds.   Skin:     General: Skin is warm.   Neurological:      General: No focal deficit present.      Mental Status: She is alert and oriented to person, place, and time.      Cranial Nerves: No cranial nerve deficit.   Psychiatric:         Mood and Affect: Mood normal.           Assessment and Plan:         Problem List Items Addressed This Visit          Pulmonary    Acute exacerbation of chronic obstructive pulmonary disease (COPD) - Primary    Current Assessment " & Plan     Patient's breathing is back to her baseline   She has all her inhalers, no refills needed   Sent prednisone refills to prevent further exacerbations   Counseled on avoiding traveling, exhaustion, exposure to pollen are chemicals to prevent further exacerbations  She has follow up with her pulmonologist tomorrow            Follow Up:   No follow-ups on file.    Other Orders Placed This Visit:  No orders of the defined types were placed in this encounter.        Jaimie Reynoso

## 2024-05-22 NOTE — ASSESSMENT & PLAN NOTE
Patient's breathing is back to her baseline   She has all her inhalers, no refills needed   Sent prednisone refills to prevent further exacerbations   Counseled on avoiding traveling, exhaustion, exposure to pollen are chemicals to prevent further exacerbations  She has follow up with her pulmonologist tomorrow

## 2024-05-23 ENCOUNTER — OFFICE VISIT (OUTPATIENT)
Dept: PULMONOLOGY | Facility: CLINIC | Age: 69
End: 2024-05-23
Payer: MEDICARE

## 2024-05-23 VITALS
HEART RATE: 77 BPM | DIASTOLIC BLOOD PRESSURE: 62 MMHG | WEIGHT: 184 LBS | OXYGEN SATURATION: 98 % | HEIGHT: 66 IN | SYSTOLIC BLOOD PRESSURE: 130 MMHG | BODY MASS INDEX: 29.57 KG/M2

## 2024-05-23 DIAGNOSIS — F41.9 ANXIETY: Chronic | ICD-10-CM

## 2024-05-23 DIAGNOSIS — J43.2 CENTRILOBULAR EMPHYSEMA: ICD-10-CM

## 2024-05-23 DIAGNOSIS — J44.1 ACUTE EXACERBATION OF CHRONIC OBSTRUCTIVE PULMONARY DISEASE (COPD): ICD-10-CM

## 2024-05-23 DIAGNOSIS — J96.11 CHRONIC RESPIRATORY FAILURE WITH HYPOXIA AND HYPERCAPNIA: Primary | ICD-10-CM

## 2024-05-23 DIAGNOSIS — J96.12 CHRONIC RESPIRATORY FAILURE WITH HYPOXIA AND HYPERCAPNIA: Primary | ICD-10-CM

## 2024-05-23 PROCEDURE — 3008F BODY MASS INDEX DOCD: CPT | Mod: HCNC,CPTII,S$GLB, | Performed by: INTERNAL MEDICINE

## 2024-05-23 PROCEDURE — 1159F MED LIST DOCD IN RCRD: CPT | Mod: HCNC,CPTII,S$GLB, | Performed by: INTERNAL MEDICINE

## 2024-05-23 PROCEDURE — 4010F ACE/ARB THERAPY RXD/TAKEN: CPT | Mod: HCNC,CPTII,S$GLB, | Performed by: INTERNAL MEDICINE

## 2024-05-23 PROCEDURE — 99999 PR PBB SHADOW E&M-EST. PATIENT-LVL IV: CPT | Mod: PBBFAC,HCNC,, | Performed by: INTERNAL MEDICINE

## 2024-05-23 PROCEDURE — 99214 OFFICE O/P EST MOD 30 MIN: CPT | Mod: HCNC,S$GLB,, | Performed by: INTERNAL MEDICINE

## 2024-05-23 PROCEDURE — 1111F DSCHRG MED/CURRENT MED MERGE: CPT | Mod: HCNC,CPTII,S$GLB, | Performed by: INTERNAL MEDICINE

## 2024-05-23 PROCEDURE — 1126F AMNT PAIN NOTED NONE PRSNT: CPT | Mod: HCNC,CPTII,S$GLB, | Performed by: INTERNAL MEDICINE

## 2024-05-23 PROCEDURE — 3075F SYST BP GE 130 - 139MM HG: CPT | Mod: HCNC,CPTII,S$GLB, | Performed by: INTERNAL MEDICINE

## 2024-05-23 PROCEDURE — 1157F ADVNC CARE PLAN IN RCRD: CPT | Mod: HCNC,CPTII,S$GLB, | Performed by: INTERNAL MEDICINE

## 2024-05-23 PROCEDURE — 3288F FALL RISK ASSESSMENT DOCD: CPT | Mod: HCNC,CPTII,S$GLB, | Performed by: INTERNAL MEDICINE

## 2024-05-23 PROCEDURE — 1101F PT FALLS ASSESS-DOCD LE1/YR: CPT | Mod: HCNC,CPTII,S$GLB, | Performed by: INTERNAL MEDICINE

## 2024-05-23 PROCEDURE — 3078F DIAST BP <80 MM HG: CPT | Mod: HCNC,CPTII,S$GLB, | Performed by: INTERNAL MEDICINE

## 2024-05-23 NOTE — PROGRESS NOTES
"Subjective:      Patient ID: Terri Phelan is a 68 y.o. female.    Chief Complaint: No chief complaint on file.    HPI  I have seen/evaluated the patient in person at this visit on 5/23/2024.    Ms. Phelan returns to [facility] as an Established Patient for ongoing evaluation of advanced Emphysema/COPD with chronic hypercapnic/hypoxemic respiratory failure.    Since I saw her last in Pulmonary Clinic on 3/5/2024, Ms. Phelan has been hospitalized at [facility] twice => March 29-31 and May 14-20.  During both stays, she was "stable enough" to avoid the ICU and showed improvement in CO2 retention with continuous NIPPV for the first day or so.  At her May stay, her PaCO2 was not significantly higher than baseline (80s).  Her most recent stay was somewhat prolonged due to difficulties arranging for Home Health.      Since being home for the last few days, she reports being back at baseline.  She has been compliant with her medication and DME regimen, including nebulizer and BiPAP use.  We reviewed the benefits of using the nebulizer on a regular basis (2-3x daily) rather than waiting to get into trouble.  She remains hopeful of being able to travel with her daughter later this year -- they are planning an RV trip to [place].    She is now being seen at [facility].      From her last Pulmonary Clinic note on March 5th =>    Since her last Pulmonary Clinic visit with me on 10/10/2023, Ms. Phelan required extended inpatient care for acute/chronic hypercapnic respiratory failure presumably due to COPD exacerbation.  This was her first hospital care since August.  [facility] 12/18 to 12/20 and 12/27 to 12/29  [facility] 12/31 to 1/5  [facility] 1/5 to 1/12  Since leaving the hospital last Friday, she is getting close to her baseline.  She remains compliant with nocturnal BiPAP and naps.  Her daughter reports that Ms. Phelan has been improving and is not really napping after her " hospital stay(s).  She remains compliant with BiPAP and medications.  She reports some medication changes from her [facility] discharge, including a new prescription for ARB.  She denies any long-term history of hypertension, although I did see an ARB on past Medications imported from outside [facility].      Review of Systems   Constitutional:  Positive for fatigue. Negative for fever, chills, weight loss, activity change and night sweats.   Respiratory:  Positive for shortness of breath, previous hospitalization due to pulmonary problems, dyspnea on extertion and use of rescue inhaler. Negative for cough, hemoptysis, sputum production, chest tightness, wheezing and pleurisy.    Cardiovascular:  Negative for chest pain.   Psychiatric/Behavioral:  The patient is nervous/anxious.      Objective:     Physical Exam   Constitutional: She is oriented to person, place, and time. No distress.   In a wheelchair at this visit.   Cardiovascular: Normal rate, regular rhythm and normal heart sounds. Exam reveals no gallop.   No murmur heard.  Pulmonary/Chest: Normal expansion, symmetric chest wall expansion, effort normal and breath sounds normal. No stridor. No respiratory distress. She has no decreased breath sounds. She has no wheezes. She has no rhonchi. She has no rales.   Neurological: She is alert and oriented to person, place, and time.   Skin: No cyanosis. Nails show no clubbing.   Psychiatric: She has a normal mood and affect. Judgment normal.   Nursing note and vitals reviewed.    Personal Diagnostic Review     07/02/23 08:30 08/07/23 21:18 08/08/23 07:22 12/19/23 05:15 12/27/23 11:07 12/28/23 08:33 03/29/24 23:18 03/30/24 06:10 05/14/24 20:03 05/15/24 05:28   POC PH 7.376 7.237 (LL) 7.263 (LL) 7.277 (LL) 7.204 (LL) 7.355 7.277 (LL) 7.260 (LL) 7.331 (L) 7.385   POC PCO2 72.5 (HH) 106.6 (HH) 103.0 (HH) 109 (HH) >110 ! (>) 80.5 (HH) 103 (H) 92.0 (H) 85.6 (H) 82.5 (H)   POC PO2 51 (LL) 81 63 (L) 49.8 (LL) 74.4 (L) 54 (LL) 24.8  "(LL) 63.3 (HH) 45.2 41.5   POC HCO3 42.5 (H) 45.4 (H) 46.5 (H) 50.7 (H)  45.0 (H) 47.8 (H) 41.2 (H) 45.2 (H) 49.3 (H)   POC SATURATED O2 82 (L) 92 (L) 85 (L) 81.2 (L) 92.6 (L) 84 40.9 (LL) 90.5 (L) 82.7 (LL) 78.6 (LL)   Specimen source Arterial Arterial Arterial Arterial Arterial Arterial Venous Venous Venous Venous   POC TCO2 45 (H) 49 (H) 50 (H)   47 (H)       Base Deficit    18.2 (H)   15.7 (H) 10.4 (H) 15.2 (H) 19.8 (H)   POC BE 17 18 19   19 (H)       FiO2 30  30 28 36  28 35 21 28             5/23/2024     2:50 PM 5/22/2024     1:00 PM 5/20/2024    12:04 PM 5/20/2024    11:45 AM 5/20/2024     8:44 AM 5/20/2024     7:15 AM 5/20/2024     4:52 AM   Pulmonary Function Tests   SpO2 98 % 93 % 92 % 91 % 93 % 96 % 95 %   Height 5' 6" (1.676 m)         Weight 83.5 kg (184 lb) 84.8 kg (187 lb 1 oz)        BMI (Calculated) 29.7              Assessment:     1. Chronic respiratory failure with hypoxia and hypercapnia    2. Acute exacerbation of chronic obstructive pulmonary disease (COPD)    3. Centrilobular emphysema    4. Anxiety         Outpatient Encounter Medications as of 5/23/2024   Medication Sig Dispense Refill    acetaminophen (TYLENOL) 500 MG tablet Take 500 mg by mouth every 6 (six) hours as needed for Pain.      albuterol (PROVENTIL/VENTOLIN HFA) 90 mcg/actuation inhaler Inhale 2 puffs into the lungs every 6 (six) hours as needed for Wheezing or Shortness of Breath. 18 g 6    alendronate (FOSAMAX) 35 MG tablet Take 35 mg by mouth every Tuesday.      aspirin 81 MG Chew Take 1 tablet by mouth once daily.      azithromycin (Z-OLY) 250 MG tablet Take 1 tablet (250 mg total) by mouth every Mon, Wed, Fri. 12 tablet 11    benzonatate (TESSALON) 200 MG capsule Take 1 capsule (200 mg total) by mouth 3 (three) times daily as needed for Cough. 30 capsule 0    budesonide-glycopyr-formoterol (BREZTRI AEROSPHERE) 160-9-4.8 mcg/actuation HFAA Inhale 2 puffs into the lungs 2 (two) times daily.      calcium carb-mag " hydrox-simeth 1,200 mg-270 mg -80 mg/10 mL Susp Take 1,200 mg by mouth once daily.      cholecalciferol, vitamin D3, (VITAMIN D3) 50 mcg (2,000 unit) Tab Take 2,000 Units by mouth once daily.      dextromethorphan-guaiFENesin  mg (MUCINEX DM)  mg per 12 hr tablet Take 1 tablet by mouth 2 (two) times daily as needed.      diazePAM (VALIUM) 2 MG tablet Take 1 tablet (2 mg total) by mouth daily as needed for Anxiety. 30 tablet 0    dicyclomine (BENTYL) 20 mg tablet Take 1 tablet (20 mg total) by mouth 3 (three) times daily as needed (abdominal pain). 30 tablet 1    fluticasone propionate (FLONASE) 50 mcg/actuation nasal spray 1 spray (50 mcg total) by Each Nostril route daily as needed for Allergies. 16 g 11    furosemide (LASIX) 40 MG tablet Take 1 tablet (40 mg total) by mouth once daily. 30 tablet 4    hydrOXYzine HCL (ATARAX) 25 MG tablet Take 1 tablet (25 mg total) by mouth 3 (three) times daily as needed for Anxiety or Itching. 30 tablet 0    ibuprofen (ADVIL,MOTRIN) 800 MG tablet Take 1 tablet (800 mg total) by mouth 3 (three) times daily as needed for Pain. 90 tablet 0    losartan (COZAAR) 50 MG tablet Take 1 tablet (50 mg total) by mouth once daily. 90 tablet 3    methocarbamoL (ROBAXIN) 500 MG Tab Take 1 tablet (500 mg total) by mouth every 8 (eight) hours as needed (muscle spasms). 30 tablet 0    OXYGEN-AIR DELIVERY SYSTEMS MISC 2 L by Nasal route continuous.      potassium chloride (MICRO-K) 10 MEQ CpSR Take 10 mEq by mouth once daily.      predniSONE (DELTASONE) 20 MG tablet Take 1 tablet (20 mg total) by mouth 2 (two) times daily. 10 tablet 5    roflumilast (DALIRESP) 500 mcg Tab Take 500 mcg by mouth once daily.      [] cefUROXime (CEFTIN) 500 MG tablet Take 1 tablet (500 mg total) by mouth every 12 (twelve) hours. for 2 days 4 tablet 0    traZODone (DESYREL) 50 MG tablet Take 1 tablet (50 mg total) by mouth nightly as needed for Insomnia. 30 tablet 6    [] predniSONE tablet 40  mg       [DISCONTINUED] albuterol inhaler 2 puff       [DISCONTINUED] albuterol-ipratropium 2.5 mg-0.5 mg/3 mL nebulizer solution 3 mL       [DISCONTINUED] aspirin chewable tablet 81 mg       [DISCONTINUED] azithromycin tablet 250 mg       [DISCONTINUED] benzonatate capsule 200 mg       [DISCONTINUED] cefTRIAXone (Rocephin) 1 g in dextrose 5 % in water (D5W) 100 mL IVPB (MB+)       [DISCONTINUED] cefUROXime tablet 500 mg       [DISCONTINUED] dextrose 10% bolus 125 mL 125 mL       [DISCONTINUED] dextrose 10% bolus 250 mL 250 mL       [DISCONTINUED] enoxaparin injection 40 mg       [DISCONTINUED] fluticasone furoate-vilanteroL 200-25 mcg/dose diskus inhaler 1 puff       [DISCONTINUED] fluticasone propionate 50 mcg/actuation nasal spray 100 mcg       [DISCONTINUED] furosemide tablet 40 mg       [DISCONTINUED] glucagon (human recombinant) injection 1 mg       [DISCONTINUED] glucose chewable tablet 16 g       [DISCONTINUED] glucose chewable tablet 24 g       [DISCONTINUED] guaiFENesin 100 mg/5 ml syrup 200 mg       [DISCONTINUED] guaiFENesin 12 hr tablet 600 mg       [DISCONTINUED] hydrOXYzine HCL tablet 25 mg       [DISCONTINUED] losartan tablet 50 mg       [DISCONTINUED] methocarbamoL tablet 500 mg       [DISCONTINUED] naloxone 0.4 mg/mL injection 0.02 mg       [DISCONTINUED] potassium chloride SA CR tablet 10 mEq       [DISCONTINUED] roflumilast (DALIRESP) tablet 500 mcg       [DISCONTINUED] simethicone chewable tablet 125 mg       [DISCONTINUED] sodium chloride 0.9% flush 10 mL       [DISCONTINUED] traZODone tablet 50 mg        No facility-administered encounter medications on file as of 5/23/2024.     No orders of the defined types were placed in this encounter.      Plan:     Ms. Phelan reports being back to baseline following recent hospitalization at [facility].    Problem List Items Addressed This Visit       Anxiety (Chronic)    Overview     Anxiety associated with feelings of SOB, diagnosis, and hope for new  "medications that can help. Exacerbated by chronic need to check oxygen numbers and frequent hospitalizations. Following with palliative.          Current Assessment & Plan     Reports good relief of anxiety with hydroxyzine during May 2024 hospitalization.  We discussed "vicious cycle" of anxiety and dyspnea in the setting of advance COPD/emphysema.         Acute exacerbation of chronic obstructive pulmonary disease (COPD)    Overview     Recently hospitalized at [facility] from 5/14 to 5/20/2024 for exacerbation.  Did not require ICU care or significant escalation of intervention(s).         Current Assessment & Plan     Has returned to baseline respiratory status and treatment regimen.         Centrilobular emphysema    Overview     Severe obstructive disease, on home O2 and NIPPV at night and prn.   Chest CTA from July 2023 with apical centrilobular emphysema present.    PFTs 4/18/23 10/10/23   FVC  (pre-BD) 1.01 1.13   FVC%  40 45   FEV1 0.44 0.52   FEV1%  22 27   FEV1/FVC  44 47            Current Assessment & Plan     Continue current regimen following May 2024 hospitalization for exacerbation.         Chronic respiratory failure with hypoxia and hypercapnia - Primary    Overview     Secondary to advanced/end-stage COPD/emphysema.  On home oxygen and NIPPV with baseline PaCO2 in the 80s.  Occasionally reports headaches but relatively asymptomatic with severe CO2 retention.  Evaluated by Dr. Painter (Sleep) who confirmed reviewed compliance and confirmed current settings.         Current Assessment & Plan     Patient with Hypercapnic and Hypoxic Respiratory failure which is Chronic.  she is on home oxygen at 2-4 LPM. Supplemental oxygen was provided and noted- Signs/symptoms of respiratory failure include- increased work of breathing. Contributing diagnoses includes - COPD Labs and images were reviewed. Patient Has recent ABG, which has been reviewed. Will treat underlying causes and adjust management of " respiratory failure as follows-     Continue current DME and medication regimen.  Pulmonary Rehabilitation when able to meet attendance requirements.            Note copied to Dr. Reynoso (Primary Care)    Total Time = 30 minutes    Alex Cunningham MD  Pulmonary Disease  [facility] [address]

## 2024-05-27 NOTE — ASSESSMENT & PLAN NOTE
Patient with Hypercapnic and Hypoxic Respiratory failure which is Chronic.  she is on home oxygen at 2-4 LPM. Supplemental oxygen was provided and noted- Signs/symptoms of respiratory failure include- increased work of breathing. Contributing diagnoses includes - COPD Labs and images were reviewed. Patient Has recent ABG, which has been reviewed. Will treat underlying causes and adjust management of respiratory failure as follows-     Continue current DME and medication regimen.  Pulmonary Rehabilitation when able to meet attendance requirements.

## 2024-06-03 ENCOUNTER — CLINICAL SUPPORT (OUTPATIENT)
Dept: PRIMARY CARE CLINIC | Facility: CLINIC | Age: 69
End: 2024-06-03
Payer: MEDICARE

## 2024-06-03 DIAGNOSIS — J44.1 COPD EXACERBATION: ICD-10-CM

## 2024-06-03 DIAGNOSIS — F43.21 GRIEF: ICD-10-CM

## 2024-06-03 DIAGNOSIS — J96.12 CHRONIC RESPIRATORY FAILURE WITH HYPOXIA AND HYPERCAPNIA: Primary | ICD-10-CM

## 2024-06-03 DIAGNOSIS — J96.11 CHRONIC RESPIRATORY FAILURE WITH HYPOXIA AND HYPERCAPNIA: Primary | ICD-10-CM

## 2024-06-03 PROCEDURE — 99499 UNLISTED E&M SERVICE: CPT | Mod: HCNC,95,,

## 2024-06-03 NOTE — PROGRESS NOTES
"Individual Psychotherapy (LCSW)  Terri Phelan,  6/3/2024  DATE:  6/3/2024  TYPE OF VISIT:  Video Session  LENGTH OF SESSION: 60    Therapeutic Intervention: Met with patient for individual psychotherapy.    Chief complaint/reason for encounter: depression, anxiety, and sleep secondary to COPD diagnosis       Session Content/Presenting Problem:  INTAKE 4/17/24:  Pt arrived to session with O2 tank with Nasal cannula.  She is having difficulty "living with COPD."  She was dx in 2016 and her sister passed with COPD in 2019.  She had another sister pass in 01/2023 with Diabetes complications.  She wants to find a way "to live my best life." She enjoys socializing and networking but has been more fatigued as of late due to COPD exacerbation.  She has been staying with her dtr in [facility] for appx 1 year since her disease progressed and she needed some assistance.  They tried hired caregivers at patient's home in [place] but did not have success with "quality help."  She calls a friend or family member to help her feel calm.  She drives and is independent with ADL's. She finds it harder to do things with "lugging O2 tank with her" and ensuring she has enough oxygen when she goes somewhere.  The majority of this session was focused on rapport-building and assessing patient's areas of clinical concern. Patient was receptive to therapist, cooperative, and openly shared issues of present concern.  Educated the patient on how behavior and mood are related: unpleasant events (or an absence of pleasant events) are associated with low mood and pleasant events are associated with better mood.     Today 6/3: Patient was initially scheduled to return for follow up 4/25 and cancelled.  She then had a no show 4/26.  Patient was IP for 5/15 appt.  Patient verbalized that she is "in a better place medically and emotionally."  She had been hospitalized due to COPD exacerbation and reports she was thankful to not require ICU care this " "time.  She is receiving  care and has hired a caregiver that comes 1-2x/week.  Patient verbalized feeling at peace with her current situation having visitors in/out of the apt and has her dtr for support.  She is scheduled to start Pulm Rehab 3x/week on 6/25 and likes the staff there as she has participated in the past.  Patient identifies praying and trusting in God when she is having difficult times and this provides her a sense of calm.  Patient is hopeful that as she recovers and builds strength she will be able to socialize in activities nearby either here at [facility] or [place] with [facility].  Patient wants to do volunteer work but "knows I'm limited" and LCSW discussed volunteer phone outreach with [facility] since patient enjoys socializing and networking.  LCSW also provided contact number.  Her goal remains reducing time spent worrying and increasing interest in usual activities.     Current symptoms:  Depression: tearfulness  Anxiety: excessive worrying.  Insomnia: difficulty falling asleep.  Fiona:  denies.  Psychosis: denies     Risk parameters:  Patient reports no suicidal ideation  Patient reports no homicidal ideation  Patient reports no self-injurious behavior  Patient reports no violent behavior    MENTAL HEALTH STATUS EXAM  General Appearance:  unremarkable, age appropriate, Oxygen via NC   Speech: normal tone, normal rate, normal pitch, normal volume      Level of Cooperation: cooperative      Thought Processes: normal and logical   Mood: steady      Thought Content: normal, no suicidality, no homicidality, delusions, or paranoia   Affect: congruent and appropriate   Orientation: Oriented x3   Attention Span & Concentration: intact   Fund of General Knowledge: intact and appropriate to age and level of education   Judgment & Insight: good     Language  intact       STRENGTHS AND LIABILITIES: Strength: Patient accepts guidance/feedback, Strength: Patient is expressive/articulate., " Strength: Patient is intelligent., Strength: Patient has positive support network., Liability: Patient lacks coping skills.    IMPRESSION:   My diagnostic impression is Grief and Adjustment disorders; with mixed emotional features [F43.23], as evidenced by ARNOL-7 and self-report and GDS    TREATMENT GOALS (per patient):    Treatment plan:  Target symptoms: depression, anxiety , grief  Anxiety: reducing time spent worrying (<30 minutes/day)  Depression: increasing interest in usual activities  Why chosen therapy is appropriate versus another modality: relevant to diagnosis, evidence based practice  Outcome monitoring methods: self-report, observation, checklist/rating scale  Therapeutic intervention type: insight oriented psychotherapy, supportive psychotherapy    Patient's response to intervention:  The patient's response to intervention is accepting.    Progress toward goals and other mental status changes:  The patient's progress toward goals is fair .    Plan: Pt plans to continue individual psychotherapy CBT and Interpersonal Processing Therapy  will be utilized in future individual therapy sessions to increase insight and support.     Return to clinic: as scheduled, 6/18/24

## 2024-06-12 ENCOUNTER — EXTERNAL HOME HEALTH (OUTPATIENT)
Dept: HOME HEALTH SERVICES | Facility: HOSPITAL | Age: 69
End: 2024-06-12
Payer: MEDICARE

## 2024-06-18 ENCOUNTER — CLINICAL SUPPORT (OUTPATIENT)
Dept: PRIMARY CARE CLINIC | Facility: CLINIC | Age: 69
End: 2024-06-18
Payer: MEDICARE

## 2024-06-18 DIAGNOSIS — J44.1 ACUTE EXACERBATION OF CHRONIC OBSTRUCTIVE PULMONARY DISEASE (COPD): ICD-10-CM

## 2024-06-18 PROCEDURE — 99499 UNLISTED E&M SERVICE: CPT | Mod: HCNC,95,,

## 2024-06-18 NOTE — PROGRESS NOTES
"Individual Psychotherapy (LCSW)  Terri Phelan,  2024    TYPE OF VISIT:  Video Session  LENGTH OF SESSION: 50    Therapeutic Intervention: Met with patient for individual psychotherapy.    Chief complaint/reason for encounter: depression, anxiety, and sleep secondary to COPD diagnosis     Session Content/Presenting Problem:  Patient describes having a rough week last week but is starting to feel better.  She has been practicing her mindful breathing exercises to assist with anxiety.  She has also been more diligent in use of respiratory treatments and oxygen mask as needed.  She has a hired CG coming 5 days/week currently but will reduce this to 3-4 days/week soon.  Having her around has boosted patient's mood with having companionship and not feeling isolated at home.  She also helps wit household tasks and can drive her to appts.  Her home health services are ending.  Patient will start OP Pulm rehab next week and is looking forward to the program to improve strength and endurance and be part of a group activity.  She shared her worry and frustration about her relationship with her sister since May.  She feels like she struggles with communicating with her and they used to talk several times/week.  She feels like her sister has thrown her away.  They have a large family and she is one of 8 children.  She is the only one who is college educated and left "home" to pursue opportunities.  She lived in [place] for 35 years with  and dtr and she would visit home at least 1-2x/year.  None of her siblings came to her 's  in [place] and she felt hurt and angry.  Her siblings relied on her financially for many years by her often paying bills for them with no expectation of repayment.  She feels resented for making something of herself and moving away.  She is now struggling with the sense of loss of family and how important family was.  She took a break from her sister because she felt that her anxiety " was increased when talking with her and that their conversations were always negative.  Patient called her and left message for the 1st time since May.  She continues to value family and is hopeful they can heal.  LCSW provided empathy and support for patient's expression of feelings during the session.  LCSW continues to explore ways in which she can focus on her own health and happiness.     Current symptoms:  Depression: tearfulness  Anxiety: excessive worrying.  Insomnia: difficulty falling asleep.  Fiona:  denies.  Psychosis: denies     Risk parameters:  Patient reports no suicidal ideation  Patient reports no homicidal ideation  Patient reports no self-injurious behavior  Patient reports no violent behavior    MENTAL HEALTH STATUS EXAM  General Appearance:  unremarkable, age appropriate, Oxygen via NC   Speech: normal tone, normal rate, normal pitch, normal volume      Level of Cooperation: cooperative      Thought Processes: normal and logical   Mood: steady      Thought Content: normal, no suicidality, no homicidality, delusions, or paranoia   Affect: congruent and appropriate   Orientation: Oriented x3   Attention Span & Concentration: intact   Fund of General Knowledge: intact and appropriate to age and level of education   Judgment & Insight: good     Language  intact       STRENGTHS AND LIABILITIES: Strength: Patient accepts guidance/feedback, Strength: Patient is expressive/articulate., Strength: Patient is intelligent., Strength: Patient has positive support network., Liability: Patient lacks coping skills.    IMPRESSION:   My diagnostic impression is Grief and Adjustment disorders; with mixed emotional features [F43.23], as evidenced by ARNOL-7 and self-report and GDS    TREATMENT GOALS (per patient):    Treatment plan:  Target symptoms: depression, anxiety , grief  Anxiety: reducing time spent worrying (<30 minutes/day)  Depression: increasing interest in usual activities  Why chosen therapy is  appropriate versus another modality: relevant to diagnosis, evidence based practice  Outcome monitoring methods: self-report, observation, checklist/rating scale  Therapeutic intervention type: insight oriented psychotherapy, supportive psychotherapy    Patient's response to intervention:  The patient's response to intervention is accepting, motivated.    Progress toward goals and other mental status changes:  The patient's progress toward goals is good.    Plan: Pt plans to continue individual psychotherapy CBT and Interpersonal Processing Therapy  will be utilized in future individual therapy sessions to increase insight and support.     Return to clinic: as scheduled, 7/1/24

## 2024-06-19 NOTE — DISCHARGE SUMMARY
[facility]  Discharge Summary      Patient Name: Terri Phelan  MRN: [id number]  SUNDEEP: [id number]  Patient Class: IP- Inpatient  Admission Date: 5/14/2024  Hospital Length of Stay: 6 days  Discharge Date and Time: 5/20/2024  1:52 PM  Attending Physician: No att. providers found   Discharging Provider: Kev Gabriel MD  Primary Care Provider: Jaimie Reynoso MD    Primary Care Team: Networked reference to record PCT     HPI:   68 years old woman with past medical history of COPD stage gold D on home oxygen and BiPAP at night, heart failure with a preserved ejection fraction, CAD, hypertension who presented with worsening shortness of breath along with worsening chronic cough and change in sputum color.  She also stated that her lower extremity edema is worse than usual.  She endorsed compliance with all her medications, and she denies any history of current smoking.  BNP is slightly elevated, procalcitonin  is negative.     * No surgery found *      Hospital Course:   No notes on file     Goals of Care Treatment Preferences:  Code Status: Full Code    Health care agent: Mulugeta Phelan  [facility] agent number: [phone]    Living Will: Yes              Consults:     Pulmonary  * COPD exacerbation  Patient is end-stage COPD   Anxiety is likely contributing factor to her symptoms.    Symptoms have resolved with steroids, antibiotics and scheduled DuoNebs  Currently on baseline home oxygen    At discharge patient will continue prednisone 40 mg for a total 5 day course.  Patient was on ceftriaxone and was de-escalated to Ceftin at discharge.  Recommending continuing scheduled DuoNebs q.4 hours for the next 1-2 days, then p.r.n. afterwards.  Continue home Breo inhaler.  Recommend pulmonary rehab at outpatient and to follow-up with pulmonology as outpatient.         Cardiac/Vascular  History of myocardial infarction  Continue home aspirin  Continue home statin      HTN  (hypertension)  Continue home losartan      Final Active Diagnoses:    Diagnosis Date Noted POA    PRINCIPAL PROBLEM:  COPD exacerbation [J44.1] 12/17/2014 Yes    (HFpEF) heart failure with preserved ejection fraction [I50.30] 05/14/2024 Yes    Chronic respiratory failure with hypoxia and hypercapnia [J96.11, J96.12] 08/09/2023 Yes    History of myocardial infarction [I25.2] 09/09/2022 Not Applicable    HTN (hypertension) [I10] 09/08/2015 Yes      Problems Resolved During this Admission:       Discharged Condition: good    Disposition: Home-Health Care [facility]    Follow Up:   Follow-up Information       Home Health Agency Follow up.    Why: Home Health Agency will contact patient to [facility] first appointment time/date.                         Patient Instructions:   No discharge procedures on file.    Significant Diagnostic Studies: Labs: All labs within the past 24 hours have been reviewed    Pending Diagnostic Studies:       None           Medications:  Reconciled Home Medications:      Medication List        START taking these medications      hydrOXYzine HCL 25 MG tablet  Commonly known as: ATARAX  Take 1 tablet (25 mg total) by mouth 3 (three) times daily as needed for Anxiety or Itching.            CONTINUE taking these medications      acetaminophen 500 MG tablet  Commonly known as: TYLENOL  Take 500 mg by mouth every 6 (six) hours as needed for Pain.     albuterol 90 mcg/actuation inhaler  Commonly known as: PROVENTIL/VENTOLIN HFA  Inhale 2 puffs into the lungs every 6 (six) hours as needed for Wheezing or Shortness of Breath.     alendronate 35 MG tablet  Commonly known as: FOSAMAX  Take 35 mg by mouth every Tuesday.     aspirin 81 MG Chew  Take 1 tablet by mouth once daily.     azithromycin 250 MG tablet  Commonly known as: Z-OLY  Take 1 tablet (250 mg total) by mouth every Mon, Wed, Fri.     BREZTRI AEROSPHERE 160-9-4.8 mcg/actuation Hfaa  Generic drug: budesonide-glycopyr-formoterol  Inhale 2 puffs into the  lungs 2 (two) times daily.     calcium carb-mag hydrox-simeth 1,200 mg-270 mg -80 mg/10 mL Susp  Take 1,200 mg by mouth once daily.     cholecalciferol (vitamin D3) 50 mcg (2,000 unit) Tab  Commonly known as: VITAMIN D3  Take 2,000 Units by mouth once daily.     dextromethorphan-guaiFENesin  mg  mg per 12 hr tablet  Commonly known as: MUCINEX DM  Take 1 tablet by mouth 2 (two) times daily as needed.     diazePAM 2 MG tablet  Commonly known as: VALIUM  Take 1 tablet (2 mg total) by mouth daily as needed for Anxiety.     dicyclomine 20 mg tablet  Commonly known as: BENTYL  Take 1 tablet (20 mg total) by mouth 3 (three) times daily as needed (abdominal pain).     fluticasone propionate 50 mcg/actuation nasal spray  Commonly known as: FLONASE  1 spray (50 mcg total) by Each Nostril route daily as needed for Allergies.     furosemide 40 MG tablet  Commonly known as: LASIX  Take 1 tablet (40 mg total) by mouth once daily.     ibuprofen 800 MG tablet  Commonly known as: ADVIL,MOTRIN  Take 1 tablet (800 mg total) by mouth 3 (three) times daily as needed for Pain.     losartan 50 MG tablet  Commonly known as: COZAAR  Take 1 tablet (50 mg total) by mouth once daily.     OXYGEN-AIR DELIVERY SYSTEMS MISC  2 L by Nasal route continuous.     potassium chloride 10 MEQ Cpsr  Commonly known as: MICRO-K  Take 10 mEq by mouth once daily.     roflumilast 500 mcg Tab  Commonly known as: DALIRESP  Take 500 mcg by mouth once daily.     traZODone 50 MG tablet  Commonly known as: DESYREL  Take 1 tablet (50 mg total) by mouth nightly as needed for Insomnia.            ASK your doctor about these medications      benzonatate 200 MG capsule  Commonly known as: TESSALON  Take 1 capsule (200 mg total) by mouth 3 (three) times daily as needed for Cough.  Ask about: Should I take this medication?     cefUROXime 500 MG tablet  Commonly known as: CEFTIN  Take 1 tablet (500 mg total) by mouth every 12 (twelve) hours. for 2 days  Ask  about: Should I take this medication?     methocarbamoL 500 MG Tab  Commonly known as: ROBAXIN  Take 1 tablet (500 mg total) by mouth every 8 (eight) hours as needed (muscle spasms).  Ask about: Should I take this medication?              Indwelling Lines/Drains at time of discharge:   Lines/Drains/Airways       None                   Time spent on the discharge of patient: 30 minutes         Kev Gabriel MD  Department of Hospital Medicine  [facility]

## 2024-06-19 NOTE — ASSESSMENT & PLAN NOTE
Patient is end-stage COPD   Anxiety is likely contributing factor to her symptoms.    Symptoms have resolved with steroids, antibiotics and scheduled DuoNebs  Currently on baseline home oxygen    At discharge patient will continue prednisone 40 mg for a total 5 day course.  Patient was on ceftriaxone and was de-escalated to Ceftin at discharge.  Recommending continuing scheduled DuoNebs q.4 hours for the next 1-2 days, then p.r.n. afterwards.  Continue home Breo inhaler.  Recommend pulmonary rehab at outpatient and to follow-up with pulmonology as outpatient.

## 2024-06-27 ENCOUNTER — TELEPHONE (OUTPATIENT)
Dept: PRIMARY CARE CLINIC | Facility: CLINIC | Age: 69
End: 2024-06-27
Payer: MEDICARE

## 2024-06-29 ENCOUNTER — PATIENT MESSAGE (OUTPATIENT)
Dept: PULMONOLOGY | Facility: CLINIC | Age: 69
End: 2024-06-29
Payer: MEDICARE

## 2024-07-01 ENCOUNTER — TELEPHONE (OUTPATIENT)
Dept: PRIMARY CARE CLINIC | Facility: CLINIC | Age: 69
End: 2024-07-01
Payer: MEDICARE

## 2024-07-01 ENCOUNTER — CLINICAL SUPPORT (OUTPATIENT)
Dept: PRIMARY CARE CLINIC | Facility: CLINIC | Age: 69
End: 2024-07-01
Payer: MEDICARE

## 2024-07-01 DIAGNOSIS — J96.11 CHRONIC RESPIRATORY FAILURE WITH HYPOXIA AND HYPERCAPNIA: Primary | ICD-10-CM

## 2024-07-01 DIAGNOSIS — J96.12 CHRONIC RESPIRATORY FAILURE WITH HYPOXIA AND HYPERCAPNIA: Primary | ICD-10-CM

## 2024-07-01 DIAGNOSIS — F41.9 ANXIETY: ICD-10-CM

## 2024-07-01 DIAGNOSIS — F43.21 GRIEF: ICD-10-CM

## 2024-07-01 PROBLEM — J96.22 ACUTE ON CHRONIC RESPIRATORY FAILURE WITH HYPOXIA AND HYPERCAPNIA: Status: RESOLVED | Noted: 2023-12-19 | Resolved: 2024-07-01

## 2024-07-01 PROBLEM — J96.21 ACUTE ON CHRONIC RESPIRATORY FAILURE WITH HYPOXIA AND HYPERCAPNIA: Status: RESOLVED | Noted: 2023-12-19 | Resolved: 2024-07-01

## 2024-07-01 PROCEDURE — 99499 UNLISTED E&M SERVICE: CPT | Mod: HCNC,95,,

## 2024-07-01 RX ORDER — ALBUTEROL SULFATE 90 UG/1
2 AEROSOL, METERED RESPIRATORY (INHALATION) EVERY 6 HOURS PRN
Qty: 18 G | Refills: 6 | Status: SHIPPED | OUTPATIENT
Start: 2024-07-01

## 2024-07-01 RX ORDER — PREDNISONE 20 MG/1
20 TABLET ORAL 2 TIMES DAILY
Qty: 10 TABLET | Refills: 5 | Status: SHIPPED | OUTPATIENT
Start: 2024-07-01

## 2024-07-01 NOTE — PROGRESS NOTES
"Individual Psychotherapy (LCSW)  Terri Phelan,  7/1/2024    TYPE OF VISIT:  Video Session  LENGTH OF SESSION: 50    Therapeutic Intervention: Met with patient for individual psychotherapy.    Chief complaint/reason for encounter: depression, anxiety, and sleep secondary to COPD diagnosis     Session Content/Presenting Problem:  Patient had to fire her CG.  She came in last Tuesday and sat down focused on her phone which is not typical.  When patient confronted her about this, CG yelled and made her uncomfortable.  Pt's dtr was asleep in pt's room and woke to the confrontation.  Her dtr asked the CG to leave.  The CG later sent apology texts to pt & dtr and they decided to meet CG at [facility] over the weekend to discuss the incident and decide if they could resolve the conflict.  The CG was confrontational and the dtr and pt left.  Patient identified feeling shocked and upset by her behavior because she enjoyed having the CG around with no previous issues.  Her dtr is working from home more and they have decided to hold off on hiring anyone else for now.  They have a family friend that can help if needed.  Patient reports her anxiety today as a 4-5 on a scale of 1-10 (highest). She feels that her self-care has helped with this by using mindful breathing, positive self-talk and distraction as needed.  She reports using Diazepam prn maybe every few days and it is prescribed 2x/day prn.  Her last prescription has lasted longer since she hasn't taken daily.  LCSW inquired about any previous anxiety medications.  Patient remembers trying Buspirone after being told she should no longer take Hydroxizine (50mg) as prescribed by [place] PCP when she was living there. She had side effects from Buspirone but does not recall what.  She doesn't ever remember being on a daily medication other than prn meds.  She uses the Diazepam when her anxiety gets to "around an 8" and is gasping for air.  She feels muscle tension and " pox dips to 60-70%.  She uses her rescue inhaler, puts on O2 mask with 3L instead of her usual 2L via NC.  Her dtr will give her the medication and rub her back until she feels calmer.  LCSW provided active listening and empathy while patient described her anxiety symptoms.  Patient is scheduled with new PCP this month and LCSW encouraged patient to discuss anxiety with her and possible medication options.  LCSW provided reinforcement for coping tools used when feeling anxious.     Current symptoms:  Depression: tearfulness  Anxiety: excessive worrying.  Insomnia: difficulty falling asleep.  Fiona:  denies.  Psychosis: denies     Risk parameters:  Patient reports no suicidal ideation  Patient reports no homicidal ideation  Patient reports no self-injurious behavior  Patient reports no violent behavior    MENTAL HEALTH STATUS EXAM  General Appearance:  unremarkable, age appropriate, Oxygen via NC   Speech: normal tone, normal rate, normal pitch, normal volume      Level of Cooperation: cooperative      Thought Processes: normal and logical   Mood: steady      Thought Content: normal, no suicidality, no homicidality, delusions, or paranoia   Affect: congruent and appropriate   Orientation: Oriented x3   Attention Span & Concentration: intact   Fund of General Knowledge: intact and appropriate to age and level of education   Judgment & Insight: good     Language  intact       STRENGTHS AND LIABILITIES: Strength: Patient accepts guidance/feedback, Strength: Patient is expressive/articulate., Strength: Patient is intelligent., Strength: Patient has positive support network., Liability: Patient lacks coping skills.    IMPRESSION:   My diagnostic impression is Grief and Adjustment disorders; with mixed emotional features [F43.23], as evidenced by ARNOL-7 and self-report and GDS    TREATMENT GOALS (per patient):    Treatment plan:  Target symptoms: depression, anxiety , grief  Anxiety: reducing time spent worrying (<30  minutes/day)  Depression: increasing interest in usual activities  Why chosen therapy is appropriate versus another modality: relevant to diagnosis, evidence based practice  Outcome monitoring methods: self-report, observation, checklist/rating scale  Therapeutic intervention type: insight oriented psychotherapy, supportive psychotherapy    Patient's response to intervention:  The patient's response to intervention is accepting, motivated.    Progress toward goals and other mental status changes:  The patient's progress toward goals is good.    Plan: Pt plans to continue individual psychotherapy CBT and Interpersonal Processing Therapy  will be utilized in future individual therapy sessions to increase insight and support.     Return to clinic: as scheduled,7/18/24

## 2024-07-08 ENCOUNTER — LAB VISIT (OUTPATIENT)
Dept: LAB | Facility: HOSPITAL | Age: 69
End: 2024-07-08
Attending: PHYSICIAN ASSISTANT
Payer: MEDICARE

## 2024-07-08 ENCOUNTER — OFFICE VISIT (OUTPATIENT)
Dept: PRIMARY CARE CLINIC | Facility: CLINIC | Age: 69
End: 2024-07-08
Payer: MEDICARE

## 2024-07-08 ENCOUNTER — PATIENT MESSAGE (OUTPATIENT)
Dept: PRIMARY CARE CLINIC | Facility: CLINIC | Age: 69
End: 2024-07-08

## 2024-07-08 VITALS
HEIGHT: 66 IN | SYSTOLIC BLOOD PRESSURE: 134 MMHG | HEART RATE: 91 BPM | WEIGHT: 181.44 LBS | BODY MASS INDEX: 29.16 KG/M2 | DIASTOLIC BLOOD PRESSURE: 72 MMHG | OXYGEN SATURATION: 93 %

## 2024-07-08 DIAGNOSIS — Z12.11 ENCOUNTER FOR COLORECTAL CANCER SCREENING: ICD-10-CM

## 2024-07-08 DIAGNOSIS — F33.0 MILD EPISODE OF RECURRENT MAJOR DEPRESSIVE DISORDER: ICD-10-CM

## 2024-07-08 DIAGNOSIS — Z00.00 ENCOUNTER FOR PREVENTIVE HEALTH EXAMINATION: Primary | ICD-10-CM

## 2024-07-08 DIAGNOSIS — J96.11 CHRONIC RESPIRATORY FAILURE WITH HYPOXIA AND HYPERCAPNIA: ICD-10-CM

## 2024-07-08 DIAGNOSIS — Z99.81 OXYGEN DEPENDENT: ICD-10-CM

## 2024-07-08 DIAGNOSIS — I70.0 AORTIC ATHEROSCLEROSIS: ICD-10-CM

## 2024-07-08 DIAGNOSIS — I10 PRIMARY HYPERTENSION: ICD-10-CM

## 2024-07-08 DIAGNOSIS — F41.9 ANXIETY: Chronic | ICD-10-CM

## 2024-07-08 DIAGNOSIS — R26.9 ABNORMALITY OF GAIT AND MOBILITY: ICD-10-CM

## 2024-07-08 DIAGNOSIS — Z12.12 ENCOUNTER FOR COLORECTAL CANCER SCREENING: ICD-10-CM

## 2024-07-08 DIAGNOSIS — Z74.09 OTHER REDUCED MOBILITY: ICD-10-CM

## 2024-07-08 DIAGNOSIS — R92.8 ABNORMAL SCREENING MAMMOGRAM: ICD-10-CM

## 2024-07-08 DIAGNOSIS — K64.8 PROLAPSED INTERNAL HEMORRHOIDS: ICD-10-CM

## 2024-07-08 DIAGNOSIS — I25.10 CORONARY ARTERY DISEASE, UNSPECIFIED VESSEL OR LESION TYPE, UNSPECIFIED WHETHER ANGINA PRESENT, UNSPECIFIED WHETHER NATIVE OR TRANSPLANTED HEART: ICD-10-CM

## 2024-07-08 DIAGNOSIS — G47.33 OBSTRUCTIVE SLEEP APNEA SYNDROME: ICD-10-CM

## 2024-07-08 DIAGNOSIS — I25.2 HISTORY OF MYOCARDIAL INFARCTION: ICD-10-CM

## 2024-07-08 DIAGNOSIS — J96.12 CHRONIC RESPIRATORY FAILURE WITH HYPOXIA AND HYPERCAPNIA: ICD-10-CM

## 2024-07-08 DIAGNOSIS — J43.2 CENTRILOBULAR EMPHYSEMA: ICD-10-CM

## 2024-07-08 PROBLEM — D12.2 ADENOMA OF ASCENDING COLON: Status: RESOLVED | Noted: 2022-09-09 | Resolved: 2024-07-08

## 2024-07-08 PROBLEM — J44.1 ACUTE EXACERBATION OF CHRONIC OBSTRUCTIVE PULMONARY DISEASE (COPD): Status: RESOLVED | Noted: 2023-12-19 | Resolved: 2024-07-08

## 2024-07-08 PROBLEM — F32.9 MAJOR DEPRESSION: Status: RESOLVED | Noted: 2023-07-01 | Resolved: 2024-07-08

## 2024-07-08 LAB
CHOLEST SERPL-MCNC: 181 MG/DL (ref 120–199)
CHOLEST/HDLC SERPL: 2.4 {RATIO} (ref 2–5)
HDLC SERPL-MCNC: 75 MG/DL (ref 40–75)
HDLC SERPL: 41.4 % (ref 20–50)
LDLC SERPL CALC-MCNC: 95.8 MG/DL (ref 63–159)
NONHDLC SERPL-MCNC: 106 MG/DL
TRIGL SERPL-MCNC: 51 MG/DL (ref 30–150)

## 2024-07-08 PROCEDURE — 36415 COLL VENOUS BLD VENIPUNCTURE: CPT | Performed by: PHYSICIAN ASSISTANT

## 2024-07-08 PROCEDURE — 3075F SYST BP GE 130 - 139MM HG: CPT | Mod: HCNC,CPTII,S$GLB, | Performed by: PHYSICIAN ASSISTANT

## 2024-07-08 PROCEDURE — 3288F FALL RISK ASSESSMENT DOCD: CPT | Mod: HCNC,CPTII,S$GLB, | Performed by: PHYSICIAN ASSISTANT

## 2024-07-08 PROCEDURE — 1101F PT FALLS ASSESS-DOCD LE1/YR: CPT | Mod: HCNC,CPTII,S$GLB, | Performed by: PHYSICIAN ASSISTANT

## 2024-07-08 PROCEDURE — 1123F ACP DISCUSS/DSCN MKR DOCD: CPT | Mod: HCNC,CPTII,S$GLB, | Performed by: PHYSICIAN ASSISTANT

## 2024-07-08 PROCEDURE — 1160F RVW MEDS BY RX/DR IN RCRD: CPT | Mod: HCNC,CPTII,S$GLB, | Performed by: PHYSICIAN ASSISTANT

## 2024-07-08 PROCEDURE — 99999 PR PBB SHADOW E&M-EST. PATIENT-LVL V: CPT | Mod: PBBFAC,HCNC,, | Performed by: PHYSICIAN ASSISTANT

## 2024-07-08 PROCEDURE — 1159F MED LIST DOCD IN RCRD: CPT | Mod: HCNC,CPTII,S$GLB, | Performed by: PHYSICIAN ASSISTANT

## 2024-07-08 PROCEDURE — 4010F ACE/ARB THERAPY RXD/TAKEN: CPT | Mod: HCNC,CPTII,S$GLB, | Performed by: PHYSICIAN ASSISTANT

## 2024-07-08 PROCEDURE — 3078F DIAST BP <80 MM HG: CPT | Mod: HCNC,CPTII,S$GLB, | Performed by: PHYSICIAN ASSISTANT

## 2024-07-08 PROCEDURE — 1170F FXNL STATUS ASSESSED: CPT | Mod: HCNC,CPTII,S$GLB, | Performed by: PHYSICIAN ASSISTANT

## 2024-07-08 PROCEDURE — 80061 LIPID PANEL: CPT | Mod: HCNC | Performed by: PHYSICIAN ASSISTANT

## 2024-07-08 PROCEDURE — G0439 PPPS, SUBSEQ VISIT: HCPCS | Mod: HCNC,S$GLB,, | Performed by: PHYSICIAN ASSISTANT

## 2024-07-08 RX ORDER — ESCITALOPRAM OXALATE 10 MG/1
10 TABLET ORAL DAILY
Qty: 30 TABLET | Refills: 0 | Status: SHIPPED | OUTPATIENT
Start: 2024-07-08 | End: 2025-07-08

## 2024-07-08 NOTE — PATIENT INSTRUCTIONS
Counseling and Referral of Other Preventative  (Italic type indicates deductible and co-insurance are waived)    Patient Name: Terri Phelan  Today's Date: 7/8/2024    Health Maintenance       Date Due Completion Date    Colorectal Cancer Screening Never done ---    COVID-19 Vaccine (6 - 2023-24 season) 02/17/2024 10/17/2023    LDCT Lung Screen 07/09/2024 7/9/2023    Influenza Vaccine (1) 09/01/2024 11/21/2023    Mammogram 11/28/2024 11/28/2023    Hemoglobin A1c (Diabetic Prevention Screening) 06/16/2026 6/16/2023    DEXA Scan 11/28/2026 11/28/2023    Lipid Panel 07/02/2028 7/2/2023    TETANUS VACCINE 10/24/2032 10/24/2022        Orders Placed This Encounter   Procedures    Cologuard Screening (Multitarget Stool DNA)    Lipid Panel     The following information is provided to all patients.  This information is to help you find resources for any of the problems found today that may be affecting your health:                  Living healthy guide: www.[facility].[place].gov      Understanding Diabetes: www.diabetes.org      Eating healthy: www.cdc.gov/healthyweight      CDC home safety checklist: www.cdc.gov/steadi/patient.html      Agency on Aging: www.goea.[place].gov      Alcoholics anonymous (AA): www.aa.org      Physical Activity: www.lori.nih.gov/qn1wtqn      Tobacco use: www.quitwithusla.org

## 2024-07-08 NOTE — ASSESSMENT & PLAN NOTE
On ASA. Unable to tolerate lipitor. Lipids not at goal last year, needs updated lipid, will get today. Discussed other options like another statin, zetia and repatha

## 2024-07-08 NOTE — PROGRESS NOTES
"  Terri Phelan presented for a follow-up Medicare AWV today. The following components were reviewed and updated:    Medical history  Family History  Social history  Allergies and Current Medications  Health Risk Assessment  Health Maintenance  Care Team    **See Completed Assessments for Annual Wellness visit with in the encounter summary    The following assessments were completed:  Depression Screening  Cognitive function Screening  Timed Get Up Test  Whisper Test      Opioid documentation:      Patient does not have a current opioid prescription.          Vitals:    07/08/24 1056   BP: 134/72   BP Location: Right arm   Patient Position: Sitting   BP Method: Medium (Manual)   Pulse: 91   SpO2: (!) 93%   Weight: 82.3 kg (181 lb 7 oz)   Height: 5' 6" (1.676 m)     Body mass index is 29.28 kg/m².       Physical Exam  Constitutional:       Appearance: Normal appearance.   HENT:      Head: Normocephalic and atraumatic.   Cardiovascular:      Rate and Rhythm: Normal rate and regular rhythm.   Pulmonary:      Effort: Pulmonary effort is normal. No respiratory distress.      Breath sounds: Examination of the right-upper field reveals decreased breath sounds. Examination of the left-upper field reveals decreased breath sounds. Examination of the right-middle field reveals decreased breath sounds. Examination of the left-middle field reveals decreased breath sounds. Examination of the right-lower field reveals decreased breath sounds. Examination of the left-lower field reveals decreased breath sounds. Decreased breath sounds present.   Neurological:      Mental Status: She is alert.           Diagnoses and health risks identified today and associated recommendations/orders:  1. Encounter for preventive health examination  Provided Terri with a 5-10 year written screening schedule and personal prevention plan. Recommendations were developed using the USPSTF age appropriate recommendations. Education, counseling, and " referrals were provided as needed.  After Visit Summary printed and given to patient which includes a list of additional screenings\tests needed.    2. Centrilobular emphysema  Overview:  Severe obstructive disease, on home O2 and NIPPV at night and prn.   Chest CTA from July 2023 with apical centrilobular emphysema present.    PFTs 4/18/23 10/10/23   FVC  (pre-BD) 1.01 1.13   FVC%  40 45   FEV1 0.44 0.52   FEV1%  22 27   FEV1/FVC  44 47       Assessment & Plan:  Followed by pulm. On home 02. Continue breztri, albuterol prn. Will start pulm rehab this week.       3. Obstructive sleep apnea syndrome  Overview:  Changed settings to 18/8 on 8/9/23    Assessment & Plan:  Cont resp care  CPAP/BIPAP continuous        4. Oxygen dependent  Overview:  Requires 2-3L NC.    Assessment & Plan:  Continue supplemental home oxygen, followed by pulm      5. Chronic respiratory failure with hypoxia and hypercapnia  Overview:  Secondary to advanced/end-stage COPD/emphysema.  On home oxygen and NIPPV with baseline PaCO2 in the 80s.  Occasionally reports headaches but relatively asymptomatic with severe CO2 retention.  Evaluated by Dr. Painter (Sleep) who confirmed reviewed compliance and confirmed current settings.    Assessment & Plan:  Followed by pulm. Continue home oxygen. Will start pulm rehab this month      6. Aortic atherosclerosis  Assessment & Plan:  On ASA. Unable to tolerate lipitor. Lipids not at goal last year, needs updated lipid, will get today. Discussed other options like another statin, zetia and repatha    Orders:  -     Lipid Panel; Future; Expected date: 07/08/2024    7. Primary hypertension  Assessment & Plan:  Controlled on losartan      8. Coronary artery disease, unspecified vessel or lesion type, unspecified whether angina present, unspecified whether native or transplanted heart  Assessment & Plan:  She is on aspirin. Unable to tolerate lipitor in past. We discussed other options like trying another statin,  zetia, and repatha. Will obtain an new lipid panel first.      9. History of myocardial infarction  Assessment & Plan:  Patient states she was admitted to the hospital and told she had an MI but when they did the angio she had no blockage.  She is on asa, not on statin. Will check lipids and try alternate therapy      10. Mild episode of recurrent major depressive disorder  Assessment & Plan:  Start lexapro. Continue visits with LCSW. F/u with pcp      11. Anxiety  Overview:  Anxiety associated with feelings of SOB, diagnosis, and hope for new medications that can help. Exacerbated by chronic need to check oxygen numbers and frequent hospitalizations. Following with palliative.     Assessment & Plan:  Currently on diazepam PRN. Concerns about it causing dementia. Has been on lexapro in the past, she is unsure why it was stopped. Will do trial again, 10 mg. Can max dose over time if needed. Will f/u with new pcp next week. She will be starting pulm rehab this month and is excited about it. I think this will also help with her anxiety. She is also seeing LCSW    Orders:  -     EScitalopram oxalate (LEXAPRO) 10 MG tablet; Take 1 tablet (10 mg total) by mouth once daily.  Dispense: 30 tablet; Refill: 0    12. Prolapsed internal hemorrhoids  Assessment & Plan:  No present issues, due for colonoscopy. States never had polys no FH, pt concerned about undergoing another due to her COPD and oxygen dependence. Will do cologuard      13. Abnormality of gait and mobility  No recent falls. Will be starting pulm rehab soon    14. Other reduced mobility  No recent falls. Will be starting pulm rehab soon    15. Abnormal screening mammogram  Assessment & Plan:  Abnormality in left breast from mammo 11/28/23. Pt states she had prior mammo from another hospital which showed similar. She will give them this information so they can compare. If unable to obtain, I recommend having the dx mammo.       16. Encounter for colorectal cancer  screening  -     Cologuard Screening (Multitarget Stool DNA); Future; Expected date: 07/08/2024          Amy Lado, PA-C [facility] [place]

## 2024-07-08 NOTE — ASSESSMENT & PLAN NOTE
She is on aspirin. Unable to tolerate lipitor in past. We discussed other options like trying another statin, zetia, and repatha. Will obtain an new lipid panel first.

## 2024-07-08 NOTE — ASSESSMENT & PLAN NOTE
Currently on diazepam PRN. Concerns about it causing dementia. Has been on lexapro in the past, she is unsure why it was stopped. Will do trial again, 10 mg. Can max dose over time if needed. Will f/u with new pcp next week. She will be starting pulm rehab this month and is excited about it. I think this will also help with her anxiety. She is also seeing LCSW

## 2024-07-08 NOTE — ASSESSMENT & PLAN NOTE
No present issues, due for colonoscopy. States never had polys no FH, pt concerned about undergoing another due to her COPD and oxygen dependence. Will do cologuard

## 2024-07-09 DIAGNOSIS — I70.0 AORTIC ATHEROSCLEROSIS: Primary | ICD-10-CM

## 2024-07-09 DIAGNOSIS — E78.2 MIXED HYPERLIPIDEMIA: ICD-10-CM

## 2024-07-09 PROBLEM — R92.8 ABNORMAL SCREENING MAMMOGRAM: Status: ACTIVE | Noted: 2022-07-02

## 2024-07-09 RX ORDER — ROFLUMILAST 500 UG/1
500 TABLET ORAL DAILY
Qty: 30 TABLET | Refills: 0 | Status: SHIPPED | OUTPATIENT
Start: 2024-07-09

## 2024-07-09 RX ORDER — EZETIMIBE 10 MG/1
10 TABLET ORAL DAILY
Qty: 90 TABLET | Refills: 0 | Status: SHIPPED | OUTPATIENT
Start: 2024-07-09 | End: 2025-07-09

## 2024-07-09 NOTE — ASSESSMENT & PLAN NOTE
Abnormality in left breast from mammo 11/28/23. Pt states she had prior mammo from another hospital which showed similar. She will give them this information so they can compare. If unable to obtain, I recommend having the dx mammo.

## 2024-07-09 NOTE — TELEPHONE ENCOUNTER
I spoke with patient. She stated she needed a refill on her roflumilast (DALIRESP) 500 mcg Tab which she previously received from ED and is [facility]. I let her know I would send a message for review and to advise. Patient is scheduled for follow with Dr Cunningham on 8/6/24 at 1:30pm. Patient verbalized understanding.

## 2024-07-09 NOTE — ASSESSMENT & PLAN NOTE
Patient states she was admitted to the hospital and told she had an MI but when they did the angio she had no blockage.  She is on asa, not on statin. Will check lipids and try alternate therapy

## 2024-07-09 NOTE — TELEPHONE ENCOUNTER
----- Message from Martita Appiah sent at 7/9/2024 10:05 AM CDT -----  Consult/Advisory    Name Of Caller:Terri       Contact Preference:[phone]    Nature of call: Ptn called stating she needs a refill on her meds the medication she mention I'm not seeing it she also stated she needs to update her medication for Dr Cunningham due to everything being under her old Pulm dr name. Please call ptn to assist

## 2024-07-17 ENCOUNTER — OFFICE VISIT (OUTPATIENT)
Dept: PRIMARY CARE CLINIC | Facility: CLINIC | Age: 69
End: 2024-07-17
Payer: MEDICARE

## 2024-07-17 VITALS
HEART RATE: 71 BPM | BODY MASS INDEX: 30.02 KG/M2 | DIASTOLIC BLOOD PRESSURE: 64 MMHG | OXYGEN SATURATION: 94 % | TEMPERATURE: 98 F | WEIGHT: 186.81 LBS | HEIGHT: 66 IN | SYSTOLIC BLOOD PRESSURE: 126 MMHG

## 2024-07-17 DIAGNOSIS — G47.33 OBSTRUCTIVE SLEEP APNEA SYNDROME: ICD-10-CM

## 2024-07-17 DIAGNOSIS — J96.11 CHRONIC RESPIRATORY FAILURE WITH HYPOXIA AND HYPERCAPNIA: ICD-10-CM

## 2024-07-17 DIAGNOSIS — Z76.89 ENCOUNTER TO ESTABLISH CARE WITH NEW DOCTOR: Primary | ICD-10-CM

## 2024-07-17 DIAGNOSIS — Z76.89 ENCOUNTER FOR NAIL CARE: ICD-10-CM

## 2024-07-17 DIAGNOSIS — J43.2 CENTRILOBULAR EMPHYSEMA: ICD-10-CM

## 2024-07-17 DIAGNOSIS — I10 PRIMARY HYPERTENSION: ICD-10-CM

## 2024-07-17 DIAGNOSIS — J96.12 CHRONIC RESPIRATORY FAILURE WITH HYPOXIA AND HYPERCAPNIA: ICD-10-CM

## 2024-07-17 PROCEDURE — 1123F ACP DISCUSS/DSCN MKR DOCD: CPT | Mod: HCNC,CPTII,S$GLB, | Performed by: STUDENT IN AN ORGANIZED HEALTH CARE EDUCATION/TRAINING PROGRAM

## 2024-07-17 PROCEDURE — 1101F PT FALLS ASSESS-DOCD LE1/YR: CPT | Mod: HCNC,CPTII,S$GLB, | Performed by: STUDENT IN AN ORGANIZED HEALTH CARE EDUCATION/TRAINING PROGRAM

## 2024-07-17 PROCEDURE — 1126F AMNT PAIN NOTED NONE PRSNT: CPT | Mod: HCNC,CPTII,S$GLB, | Performed by: STUDENT IN AN ORGANIZED HEALTH CARE EDUCATION/TRAINING PROGRAM

## 2024-07-17 PROCEDURE — 1160F RVW MEDS BY RX/DR IN RCRD: CPT | Mod: HCNC,CPTII,S$GLB, | Performed by: STUDENT IN AN ORGANIZED HEALTH CARE EDUCATION/TRAINING PROGRAM

## 2024-07-17 PROCEDURE — 3008F BODY MASS INDEX DOCD: CPT | Mod: HCNC,CPTII,S$GLB, | Performed by: STUDENT IN AN ORGANIZED HEALTH CARE EDUCATION/TRAINING PROGRAM

## 2024-07-17 PROCEDURE — 4010F ACE/ARB THERAPY RXD/TAKEN: CPT | Mod: HCNC,CPTII,S$GLB, | Performed by: STUDENT IN AN ORGANIZED HEALTH CARE EDUCATION/TRAINING PROGRAM

## 2024-07-17 PROCEDURE — 3074F SYST BP LT 130 MM HG: CPT | Mod: HCNC,CPTII,S$GLB, | Performed by: STUDENT IN AN ORGANIZED HEALTH CARE EDUCATION/TRAINING PROGRAM

## 2024-07-17 PROCEDURE — 3078F DIAST BP <80 MM HG: CPT | Mod: HCNC,CPTII,S$GLB, | Performed by: STUDENT IN AN ORGANIZED HEALTH CARE EDUCATION/TRAINING PROGRAM

## 2024-07-17 PROCEDURE — 3288F FALL RISK ASSESSMENT DOCD: CPT | Mod: HCNC,CPTII,S$GLB, | Performed by: STUDENT IN AN ORGANIZED HEALTH CARE EDUCATION/TRAINING PROGRAM

## 2024-07-17 PROCEDURE — 99214 OFFICE O/P EST MOD 30 MIN: CPT | Mod: HCNC,S$GLB,, | Performed by: STUDENT IN AN ORGANIZED HEALTH CARE EDUCATION/TRAINING PROGRAM

## 2024-07-17 PROCEDURE — 1159F MED LIST DOCD IN RCRD: CPT | Mod: HCNC,CPTII,S$GLB, | Performed by: STUDENT IN AN ORGANIZED HEALTH CARE EDUCATION/TRAINING PROGRAM

## 2024-07-17 PROCEDURE — 99999 PR PBB SHADOW E&M-EST. PATIENT-LVL V: CPT | Mod: PBBFAC,HCNC,, | Performed by: STUDENT IN AN ORGANIZED HEALTH CARE EDUCATION/TRAINING PROGRAM

## 2024-07-17 NOTE — ASSESSMENT & PLAN NOTE
Follows with enrique, dr. Alex betancur. Going to [facility] rehab now, 3 times a week. Continue breztri.

## 2024-07-17 NOTE — ASSESSMENT & PLAN NOTE
Goal is <130. Patient says her systolic BP is <130 at home. Continue losartan 50 mg daily, continue.

## 2024-07-17 NOTE — PROGRESS NOTES
INTERNAL MEDICINE INITIAL VISIT NOTE      CHIEF COMPLAINT     Chief Complaint   Patient presents with    Establish Shawn WHITE     Terri Weller is a 68 y.o. AA female who presents with COPD on 2L of O2 via NC, diastolic HF, HTN, HLD, anxiety, depression, JUAN (uses bipap every night) is here to establish care.   -going to the dentist tomorrow  -has frequent hospitalizations for copd. Follows with pulm, dr. Alex betancur. Going to pulm rehab now, 3 times a week.   -seeing amanda for anxiety/depression right now.   -referred to outpatient case management.   -patient refuses LDCT. She does not want to do it.    Advance Care Planning     Date: 07/17/2024    Power of   I initiated the process of voluntary advance care planning today and explained the importance of this process to the patient.  I introduced the concept of advance directives to the patient, as well. Then the patient received detailed information about the importance of designating a Health Care Power of  (HCPOA). She was also instructed to communicate with this person about their wishes for future healthcare, should she become sick and lose decision-making capacity. The patient has previously appointed a HCPOA. After our discussion, the patient has decided to complete a HCPOA and has appointed her daughter, health care agent: otf weller & health care agent number: [phone]. I encouraged her to communicate with this person about their wishes for future healthcare, should she become sick and lose decision-making capacity.      A total of 5 min was spent on advance care planning, goals of care discussion, emotional support, formulating and communicating prognosis and exploring burden/benefit of various approaches of treatment. This discussion occurred on a fully voluntary basis with the verbal consent of the patient and/or family.        Past Medical History:  Past Medical History:   Diagnosis Date    Acute exacerbation of chronic  obstructive pulmonary disease (COPD) 2023    Recently hospitalized at [facility] from  to 2024 for exacerbation.  Did not require ICU care or significant escalation of intervention(s).      Adenoma of ascending colon 2022    Anxiety     COPD (chronic obstructive pulmonary disease)     COPD exacerbation 2014    Heart failure     Hypertension     Major depression 2023       Past Surgical History:  Past Surgical History:   Procedure Laterality Date    BREAST BIOPSY N/A     pt unsure which breast but it was benign-(calcium)     SECTION      HERNIA REPAIR      HYSTERECTOMY         Allergies:  Review of patient's allergies indicates:   Allergen Reactions    Codeine Nausea And Vomiting    Lipitor [atorvastatin] Other (See Comments)     Muscle fatigue      Morphine Hallucinations       Home Medications:  Prior to Admission medications    Medication Sig Start Date End Date Taking? Authorizing Provider   acetaminophen (TYLENOL) 500 MG tablet Take 500 mg by mouth every 6 (six) hours as needed for Pain.   Yes Provider, Historical   albuterol (PROVENTIL/VENTOLIN HFA) 90 mcg/actuation inhaler Inhale 2 puffs into the lungs every 6 (six) hours as needed for Wheezing or Shortness of Breath. 24  Yes Alex Cunningham MD   alendronate (FOSAMAX) 35 MG tablet Take 35 mg by mouth every Tuesday. 7/15/22  Yes Provider, Historical   aspirin 81 MG Chew Take 1 tablet by mouth once daily.   Yes Provider, Historical   azithromycin (Z-OLY) 250 MG tablet Take 1 tablet (250 mg total) by mouth every Mon, Wed, Fri. 23 Yes Alex Cunningham MD   budesonide-glycopyr-formoterol (BREZTRI AEROSPHERE) 160-9-4.8 mcg/actuation HFAA Inhale 2 puffs into the lungs 2 (two) times daily. 22  Yes Provider, Historical   calcium carb-mag hydrox-simeth 1,200 mg-270 mg -80 mg/10 mL Susp Take 1,200 mg by mouth once daily.   Yes Provider, Historical   cholecalciferol, vitamin D3, (VITAMIN D3) 50 mcg (2,000  unit) Tab Take 2,000 Units by mouth once daily.   Yes Provider, Historical   dextromethorphan-guaiFENesin  mg (MUCINEX DM)  mg per 12 hr tablet Take 1 tablet by mouth 2 (two) times daily as needed.   Yes Provider, Historical   diazePAM (VALIUM) 2 MG tablet Take 1 tablet (2 mg total) by mouth daily as needed for Anxiety. 5/6/24  Yes Jaimie Reynoso MD   dicyclomine (BENTYL) 20 mg tablet Take 1 tablet (20 mg total) by mouth 3 (three) times daily as needed (abdominal pain). 5/15/23  Yes Bethany Landry MD   EScitalopram oxalate (LEXAPRO) 10 MG tablet Take 1 tablet (10 mg total) by mouth once daily. 7/8/24 7/8/25 Yes Katie Araujo PA-C   ezetimibe (ZETIA) 10 mg tablet Take 1 tablet (10 mg total) by mouth once daily. 7/9/24 7/9/25 Yes Katie Araujo PA-C   fluticasone propionate (FLONASE) 50 mcg/actuation nasal spray 1 spray (50 mcg total) by Each Nostril route daily as needed for Allergies. 8/23/23  Yes Kev Cox MD   furosemide (LASIX) 40 MG tablet Take 1 tablet (40 mg total) by mouth once daily. 12/29/23 12/28/24 Yes Josephine Xavier MD   hydrOXYzine HCL (ATARAX) 25 MG tablet Take 1 tablet (25 mg total) by mouth 3 (three) times daily as needed for Anxiety or Itching. 5/20/24  Yes Kev Gabriel MD   ibuprofen (ADVIL,MOTRIN) 800 MG tablet Take 1 tablet (800 mg total) by mouth 3 (three) times daily as needed for Pain. 2/2/24  Yes Lai Arora MD   losartan (COZAAR) 50 MG tablet Take 1 tablet (50 mg total) by mouth once daily. 1/22/24 1/21/25 Yes Kev Cox MD   OXYGEN-AIR DELIVERY SYSTEMS MISC 2 L by Nasal route continuous.   Yes Provider, Historical   potassium chloride (MICRO-K) 10 MEQ CpSR Take 10 mEq by mouth once daily. 12/11/23  Yes Provider, Historical   predniSONE (DELTASONE) 20 MG tablet Take 1 tablet (20 mg total) by mouth 2 (two) times daily. 7/1/24  Yes Jaimie Reynoso MD   roflumilast (DALIRESP) 500 mcg Tab Take 1 tablet (500 mcg total) by mouth once  "daily. 7/9/24  Yes Alex Cunningham MD   traZODone (DESYREL) 50 MG tablet Take 1 tablet (50 mg total) by mouth nightly as needed for Insomnia. 5/22/23 5/21/24  Bethany Landry MD       Family History:  No family history on file.    Social History:  Social History     Tobacco Use    Smoking status: Former     Current packs/day: 0.00     Average packs/day: 1 pack/day for 20.0 years (20.0 ttl pk-yrs)     Types: Cigarettes     Quit date: 1/1/2021     Years since quitting: 3.5     Passive exposure: Past    Smokeless tobacco: Never   Substance Use Topics    Alcohol use: Not Currently     Alcohol/week: 1.0 standard drink of alcohol     Types: 1 Glasses of wine per week    Drug use: No       Review of Systems:  Review of Systems   Constitutional:  Negative for chills and fever.   HENT:  Negative for rhinorrhea and sore throat.    Respiratory:  Negative for cough, chest tightness and shortness of breath.    Cardiovascular:  Negative for chest pain.   Gastrointestinal:  Negative for abdominal pain, blood in stool, constipation and diarrhea.   Genitourinary:  Negative for dysuria and hematuria.   Neurological:  Negative for dizziness and headaches.       Health Maintainence:   Tdap 10/2022  Flu not yet  Prevnar 20 - 4/2023  Pneumovax 11/2014  Zoster done  MMG 11/2023  C-SCOPE - has cologuard at home, she will do it this week  DEXA 11/2023, on alendronate  Hep C screening 2/2023  Low Dose CT scan in pts if 55-81 y/o with 30 pack yr smoking hx and currently smoke or have quit within past 15 yrs. Not interested.     PHYSICAL EXAM     /64   Pulse 71   Temp 98 °F (36.7 °C) (Oral)   Ht 5' 6" (1.676 m)   Wt 84.8 kg (186 lb 13.4 oz)   SpO2 (!) 94%   BMI 30.16 kg/m²     GEN - A+OX4, NAD, on 2L O2 via NC  HEENT - PERRL  Neck - No cervical LAD.   CV - RRR, no m/r   Chest - CTAB, no wheezing or rhonchi  Abd - +BS.   Ext - 2+ radial pulses. No LE edema.   MSK - Normal gait.   Skin - No rash.    LABS     Previous labs " reviewed from 5/2024. Wbc, hb, renal function wnl.     ASSESSMENT/PLAN     1. Encounter to establish care with new doctor  Assessment & Plan:  Reviewed PMHx, labs, medications, vaccinations and screenings.       2. Chronic respiratory failure with hypoxia and hypercapnia  Overview:  Secondary to advanced/end-stage COPD/emphysema.  On home oxygen and NIPPV with baseline PaCO2 in the 80s.  Occasionally reports headaches but relatively asymptomatic with severe CO2 retention.  Evaluated by Dr. Painter (Sleep) who confirmed reviewed compliance and confirmed current settings.    Assessment & Plan:  On 2 L oxygen via NC. Follows with enrique, dr. Alex betancur. Going to pulm rehab now, 3 times a week. On breztri daily. Continue. Ordered outpatient case management.     Orders:  -     Ambulatory referral/consult to Outpatient Case Management    3. Centrilobular emphysema  Overview:  Severe obstructive disease, on home O2 and NIPPV at night and prn.   Chest CTA from July 2023 with apical centrilobular emphysema present.    PFTs 4/18/23 10/10/23   FVC  (pre-BD) 1.01 1.13   FVC%  40 45   FEV1 0.44 0.52   FEV1%  22 27   FEV1/FVC  44 47       Assessment & Plan:  Follows with puldr. Alex olson. Going to pulm rehab now, 3 times a week. Continue breztri.     Orders:  -     Ambulatory referral/consult to Outpatient Case Management    4. Primary hypertension  Assessment & Plan:  Goal is <130. Patient says her systolic BP is <130 at home. Continue losartan 50 mg daily, continue.       5. Obstructive sleep apnea syndrome  Overview:  Changed settings to 18/8 on 8/9/23    Assessment & Plan:  Compliant, uses bipap every night, continue      6. Encounter for nail care  -     Ambulatory referral/consult to Podiatry; Future; Expected date: 07/24/2024       RTC in 6 weeks, sooner if needed     Sussy Mason MD  Department of Internal Medicine   9:11 AM     I spent a total of 30 minutes on the day of the visit.  This includes face to face time  and non-face to face time preparing to see the patient (eg, review of tests), obtaining and/or reviewing separately obtained history, documenting clinical information in the electronic or other health record, independently interpreting results and communicating results to the patient/family/caregiver, or care coordinator.

## 2024-07-17 NOTE — ASSESSMENT & PLAN NOTE
On 2 L oxygen via NC. Follows with pulm, dr. Alex betancur. Going to pulm rehab now, 3 times a week. On breztri daily. Continue. Ordered outpatient case management.

## 2024-07-19 ENCOUNTER — OUTPATIENT CASE MANAGEMENT (OUTPATIENT)
Dept: ADMINISTRATIVE | Facility: OTHER | Age: 69
End: 2024-07-19
Payer: MEDICARE

## 2024-07-19 NOTE — LETTER
Terri Phelan  [address]  [facility] [address]  [place] [address]    Dear Terri Phelan,     Welcome to [facility] Outpatient Care Management Program. We are here to assist patients with multiple long-term (chronic) conditions who often need more personalized healthcare.    It was a pleasure talking with you today. My name is Cassy Vigil RN. I look forward to working with you as your Care Manager. I will be contacting you by telephone routinely to help coordinate care and resolve issues.    My goal is to help you function at the healthiest and highest level possible. You can contact me directly at [phone].    As an [facility] patient with [employer], some of the services we provide, at no cost to you, include:     Development of an individualized care plan with a Registered Nurse   Connection with a   Assistance from a Community Health Worker  Connection with available resources and services    Coordinate communication among your care team members   Provide coaching and education  Help you understand your doctor's treatment plan  Help you obtain information about your insurance coverage.    All services provided by [facility]s Outpatient Care Managers and other care team members are coordinated with and communicated to your primary care team.      As part of your enrollment, you will be receiving education materials and more information about these services in your [facility] account, by phone, or through the mail. If you do not wish to participate or receive information, you can Opt Out by contacting our office at [phone].      Sincerely,        Cassy Vigil RN  [facility]   Outpatient Care Management

## 2024-07-20 ENCOUNTER — HOSPITAL ENCOUNTER (INPATIENT)
Facility: HOSPITAL | Age: 69
LOS: 3 days | Discharge: HOME HEALTH | DRG: 190 | End: 2024-07-23
Attending: EMERGENCY MEDICINE | Admitting: FAMILY MEDICINE
Payer: MEDICARE

## 2024-07-20 DIAGNOSIS — R06.02 SOB (SHORTNESS OF BREATH): ICD-10-CM

## 2024-07-20 DIAGNOSIS — J96.92 HYPERCAPNIC RESPIRATORY FAILURE: ICD-10-CM

## 2024-07-20 DIAGNOSIS — J44.1 COPD EXACERBATION: Primary | ICD-10-CM

## 2024-07-20 PROBLEM — F32.A DEPRESSION: Status: ACTIVE | Noted: 2023-07-01

## 2024-07-20 LAB
ALBUMIN SERPL BCP-MCNC: 3.6 G/DL (ref 3.5–5.2)
ALLENS TEST: ABNORMAL
ALLENS TEST: YES
ALP SERPL-CCNC: 53 U/L (ref 55–135)
ALT SERPL W/O P-5'-P-CCNC: 15 U/L (ref 10–44)
ANION GAP SERPL CALC-SCNC: 9 MMOL/L (ref 8–16)
AST SERPL-CCNC: 28 U/L (ref 10–40)
BASOPHILS # BLD AUTO: 0.03 K/UL (ref 0–0.2)
BASOPHILS NFR BLD: 0.4 % (ref 0–1.9)
BILIRUB SERPL-MCNC: 0.3 MG/DL (ref 0.1–1)
BNP SERPL-MCNC: 219 PG/ML (ref 0–99)
BUN SERPL-MCNC: 12 MG/DL (ref 8–23)
CALCIUM SERPL-MCNC: 9.6 MG/DL (ref 8.7–10.5)
CHLORIDE SERPL-SCNC: 96 MMOL/L (ref 95–110)
CO2 SERPL-SCNC: 42 MMOL/L (ref 23–29)
CREAT SERPL-MCNC: 0.6 MG/DL (ref 0.5–1.4)
CTP QC/QA: YES
CTP QC/QA: YES
DIFFERENTIAL METHOD BLD: ABNORMAL
EOSINOPHIL # BLD AUTO: 0 K/UL (ref 0–0.5)
EOSINOPHIL NFR BLD: 0.1 % (ref 0–8)
ERYTHROCYTE [DISTWIDTH] IN BLOOD BY AUTOMATED COUNT: 12.8 % (ref 11.5–14.5)
EST. GFR  (NO RACE VARIABLE): >60 ML/MIN/1.73 M^2
FIO2: 28 %
FIO2: 36 %
GLUCOSE SERPL-MCNC: 129 MG/DL (ref 70–110)
HCT VFR BLD AUTO: 43.5 % (ref 37–48.5)
HGB BLD-MCNC: 12.7 G/DL (ref 12–16)
IMM GRANULOCYTES # BLD AUTO: 0.04 K/UL (ref 0–0.04)
IMM GRANULOCYTES NFR BLD AUTO: 0.6 % (ref 0–0.5)
LPM: 4
LYMPHOCYTES # BLD AUTO: 1.1 K/UL (ref 1–4.8)
LYMPHOCYTES NFR BLD: 15.4 % (ref 18–48)
MAGNESIUM SERPL-MCNC: 1.9 MG/DL (ref 1.6–2.6)
MCH RBC QN AUTO: 28 PG (ref 27–31)
MCHC RBC AUTO-ENTMCNC: 29.2 G/DL (ref 32–36)
MCV RBC AUTO: 96 FL (ref 82–98)
MONOCYTES # BLD AUTO: 0.6 K/UL (ref 0.3–1)
MONOCYTES NFR BLD: 8.5 % (ref 4–15)
NEUTROPHILS # BLD AUTO: 5.4 K/UL (ref 1.8–7.7)
NEUTROPHILS NFR BLD: 75 % (ref 38–73)
NRBC BLD-RTO: 0 /100 WBC
PCO2 BLDA: 108 MMHG (ref 35–45)
PCO2 BLDA: 87.5 MMHG (ref 35–45)
PH SMN: 7.25 [PH] (ref 7.35–7.45)
PH SMN: 7.33 [PH] (ref 7.35–7.45)
PLATELET # BLD AUTO: 171 K/UL (ref 150–450)
PMV BLD AUTO: 11.6 FL (ref 9.2–12.9)
PO2 BLDA: 52.5 MMHG (ref 80–100)
PO2 BLDA: 92.2 MMHG (ref 80–100)
POC BASE DEFICIT: 15.4 MMOL/L (ref -2–2)
POC BASE DEFICIT: 16 MMOL/L (ref -2–2)
POC HCO3: 46.3 MMOL/L (ref 24–28)
POC HCO3: 47.6 MMOL/L (ref 24–28)
POC MOLECULAR INFLUENZA A AGN: NEGATIVE
POC MOLECULAR INFLUENZA B AGN: NEGATIVE
POC PERFORMED BY: ABNORMAL
POC PERFORMED BY: ABNORMAL
POC SATURATED O2: 87.8 % (ref 95–100)
POC SATURATED O2: 96.6 % (ref 95–100)
POCT GLUCOSE: 139 MG/DL (ref 70–110)
POTASSIUM SERPL-SCNC: 4 MMOL/L (ref 3.5–5.1)
PROT SERPL-MCNC: 7.3 G/DL (ref 6–8.4)
RBC # BLD AUTO: 4.54 M/UL (ref 4–5.4)
SARS-COV-2 RDRP RESP QL NAA+PROBE: NEGATIVE
SODIUM SERPL-SCNC: 147 MMOL/L (ref 136–145)
SPECIMEN SOURCE: ABNORMAL
SPECIMEN SOURCE: ABNORMAL
TROPONIN I SERPL DL<=0.01 NG/ML-MCNC: <0.006 NG/ML (ref 0–0.03)
WBC # BLD AUTO: 7.19 K/UL (ref 3.9–12.7)

## 2024-07-20 PROCEDURE — 83880 ASSAY OF NATRIURETIC PEPTIDE: CPT | Mod: HCNC | Performed by: EMERGENCY MEDICINE

## 2024-07-20 PROCEDURE — 84484 ASSAY OF TROPONIN QUANT: CPT | Mod: HCNC | Performed by: EMERGENCY MEDICINE

## 2024-07-20 PROCEDURE — 87502 INFLUENZA DNA AMP PROBE: CPT | Mod: HCNC

## 2024-07-20 PROCEDURE — 99291 CRITICAL CARE FIRST HOUR: CPT | Mod: HCNC

## 2024-07-20 PROCEDURE — 83735 ASSAY OF MAGNESIUM: CPT | Mod: HCNC | Performed by: EMERGENCY MEDICINE

## 2024-07-20 PROCEDURE — 94761 N-INVAS EAR/PLS OXIMETRY MLT: CPT | Mod: HCNC,XB

## 2024-07-20 PROCEDURE — 63600175 PHARM REV CODE 636 W HCPCS: Mod: HCNC | Performed by: FAMILY MEDICINE

## 2024-07-20 PROCEDURE — 80053 COMPREHEN METABOLIC PANEL: CPT | Mod: HCNC | Performed by: EMERGENCY MEDICINE

## 2024-07-20 PROCEDURE — 82803 BLOOD GASES ANY COMBINATION: CPT | Mod: HCNC

## 2024-07-20 PROCEDURE — 94640 AIRWAY INHALATION TREATMENT: CPT | Mod: HCNC

## 2024-07-20 PROCEDURE — 93010 ELECTROCARDIOGRAM REPORT: CPT | Mod: HCNC,,, | Performed by: INTERNAL MEDICINE

## 2024-07-20 PROCEDURE — 11000001 HC ACUTE MED/SURG PRIVATE ROOM: Mod: HCNC

## 2024-07-20 PROCEDURE — 99900035 HC TECH TIME PER 15 MIN (STAT): Mod: HCNC

## 2024-07-20 PROCEDURE — 25000003 PHARM REV CODE 250: Mod: HCNC | Performed by: FAMILY MEDICINE

## 2024-07-20 PROCEDURE — 27000190 HC CPAP FULL FACE MASK W/VALVE: Mod: HCNC

## 2024-07-20 PROCEDURE — 25000242 PHARM REV CODE 250 ALT 637 W/ HCPCS: Mod: HCNC | Performed by: EMERGENCY MEDICINE

## 2024-07-20 PROCEDURE — 94660 CPAP INITIATION&MGMT: CPT | Mod: HCNC

## 2024-07-20 PROCEDURE — 25000242 PHARM REV CODE 250 ALT 637 W/ HCPCS: Mod: HCNC | Performed by: FAMILY MEDICINE

## 2024-07-20 PROCEDURE — 36600 WITHDRAWAL OF ARTERIAL BLOOD: CPT | Mod: HCNC

## 2024-07-20 PROCEDURE — 27000221 HC OXYGEN, UP TO 24 HOURS: Mod: HCNC

## 2024-07-20 PROCEDURE — 5A09457 ASSISTANCE WITH RESPIRATORY VENTILATION, 24-96 CONSECUTIVE HOURS, CONTINUOUS POSITIVE AIRWAY PRESSURE: ICD-10-PCS | Performed by: EMERGENCY MEDICINE

## 2024-07-20 PROCEDURE — 85025 COMPLETE CBC W/AUTO DIFF WBC: CPT | Mod: HCNC | Performed by: EMERGENCY MEDICINE

## 2024-07-20 PROCEDURE — 87635 SARS-COV-2 COVID-19 AMP PRB: CPT | Mod: HCNC | Performed by: EMERGENCY MEDICINE

## 2024-07-20 PROCEDURE — 93005 ELECTROCARDIOGRAM TRACING: CPT | Mod: HCNC

## 2024-07-20 PROCEDURE — 82962 GLUCOSE BLOOD TEST: CPT | Mod: HCNC

## 2024-07-20 RX ORDER — ENOXAPARIN SODIUM 100 MG/ML
40 INJECTION SUBCUTANEOUS EVERY 24 HOURS
Status: DISCONTINUED | OUTPATIENT
Start: 2024-07-20 | End: 2024-07-23 | Stop reason: HOSPADM

## 2024-07-20 RX ORDER — ONDANSETRON 8 MG/1
8 TABLET, ORALLY DISINTEGRATING ORAL EVERY 8 HOURS PRN
Status: DISCONTINUED | OUTPATIENT
Start: 2024-07-20 | End: 2024-07-23 | Stop reason: HOSPADM

## 2024-07-20 RX ORDER — NAPROXEN SODIUM 220 MG/1
81 TABLET, FILM COATED ORAL DAILY
Status: DISCONTINUED | OUTPATIENT
Start: 2024-07-21 | End: 2024-07-23 | Stop reason: HOSPADM

## 2024-07-20 RX ORDER — EZETIMIBE 10 MG/1
10 TABLET ORAL DAILY
Status: DISCONTINUED | OUTPATIENT
Start: 2024-07-20 | End: 2024-07-23 | Stop reason: HOSPADM

## 2024-07-20 RX ORDER — ACETAMINOPHEN 325 MG/1
650 TABLET ORAL EVERY 6 HOURS PRN
Status: DISCONTINUED | OUTPATIENT
Start: 2024-07-20 | End: 2024-07-23 | Stop reason: HOSPADM

## 2024-07-20 RX ORDER — ESCITALOPRAM OXALATE 10 MG/1
10 TABLET ORAL DAILY
Status: DISCONTINUED | OUTPATIENT
Start: 2024-07-20 | End: 2024-07-23 | Stop reason: HOSPADM

## 2024-07-20 RX ORDER — FUROSEMIDE 10 MG/ML
40 INJECTION INTRAMUSCULAR; INTRAVENOUS EVERY 12 HOURS
Status: DISCONTINUED | OUTPATIENT
Start: 2024-07-20 | End: 2024-07-22

## 2024-07-20 RX ORDER — SODIUM CHLORIDE 0.9 % (FLUSH) 0.9 %
3 SYRINGE (ML) INJECTION
Status: DISCONTINUED | OUTPATIENT
Start: 2024-07-20 | End: 2024-07-23 | Stop reason: HOSPADM

## 2024-07-20 RX ORDER — LOSARTAN POTASSIUM 50 MG/1
50 TABLET ORAL DAILY
Status: DISCONTINUED | OUTPATIENT
Start: 2024-07-20 | End: 2024-07-23 | Stop reason: HOSPADM

## 2024-07-20 RX ORDER — ROFLUMILAST 500 UG/1
500 TABLET ORAL DAILY
Status: DISCONTINUED | OUTPATIENT
Start: 2024-07-20 | End: 2024-07-23 | Stop reason: HOSPADM

## 2024-07-20 RX ORDER — IPRATROPIUM BROMIDE AND ALBUTEROL SULFATE 2.5; .5 MG/3ML; MG/3ML
3 SOLUTION RESPIRATORY (INHALATION)
Status: DISCONTINUED | OUTPATIENT
Start: 2024-07-20 | End: 2024-07-22

## 2024-07-20 RX ORDER — IPRATROPIUM BROMIDE AND ALBUTEROL SULFATE 2.5; .5 MG/3ML; MG/3ML
3 SOLUTION RESPIRATORY (INHALATION)
Status: COMPLETED | OUTPATIENT
Start: 2024-07-20 | End: 2024-07-20

## 2024-07-20 RX ADMIN — METHYLPREDNISOLONE SODIUM SUCCINATE 40 MG: 40 INJECTION, POWDER, FOR SOLUTION INTRAMUSCULAR; INTRAVENOUS at 10:07

## 2024-07-20 RX ADMIN — EZETIMIBE 10 MG: 10 TABLET ORAL at 02:07

## 2024-07-20 RX ADMIN — ENOXAPARIN SODIUM 40 MG: 40 INJECTION SUBCUTANEOUS at 05:07

## 2024-07-20 RX ADMIN — FUROSEMIDE 40 MG: 10 INJECTION, SOLUTION INTRAVENOUS at 10:07

## 2024-07-20 RX ADMIN — ROFLUMILAST 500 MCG: 500 TABLET ORAL at 02:07

## 2024-07-20 RX ADMIN — IPRATROPIUM BROMIDE AND ALBUTEROL SULFATE 3 ML: 2.5; .5 SOLUTION RESPIRATORY (INHALATION) at 07:07

## 2024-07-20 RX ADMIN — ACETAMINOPHEN 650 MG: 325 TABLET ORAL at 07:07

## 2024-07-20 RX ADMIN — ESCITALOPRAM OXALATE 10 MG: 10 TABLET ORAL at 02:07

## 2024-07-20 RX ADMIN — LOSARTAN POTASSIUM 50 MG: 50 TABLET, FILM COATED ORAL at 02:07

## 2024-07-20 RX ADMIN — IPRATROPIUM BROMIDE AND ALBUTEROL SULFATE 3 ML: 2.5; .5 SOLUTION RESPIRATORY (INHALATION) at 01:07

## 2024-07-20 RX ADMIN — METHYLPREDNISOLONE SODIUM SUCCINATE 60 MG: 40 INJECTION, POWDER, FOR SOLUTION INTRAMUSCULAR; INTRAVENOUS at 07:07

## 2024-07-20 RX ADMIN — METHYLPREDNISOLONE SODIUM SUCCINATE 40 MG: 40 INJECTION, POWDER, FOR SOLUTION INTRAMUSCULAR; INTRAVENOUS at 02:07

## 2024-07-20 NOTE — ED NOTES
Pt seen in room with daughter at the bedside. Daughter reports dental procedure earlier this week so PT has not worn Bi-pap at home for several days. Pt responds to voice and follows commands, States they removed 2 teeth and also did some fillings.

## 2024-07-20 NOTE — ASSESSMENT & PLAN NOTE
Chronic, uncontrolled. Latest blood pressure and vitals reviewed-     Temp:  [99.5 °F (37.5 °C)]   Pulse:  [60-85]   Resp:  [21-37]   BP: (135-194)/(59-87)   SpO2:  [91 %-100 %] .   Home meds for hypertension were reviewed and noted below.   Hypertension Medications               furosemide (LASIX) 40 MG tablet Take 1 tablet (40 mg total) by mouth once daily.    losartan (COZAAR) 50 MG tablet Take 1 tablet (50 mg total) by mouth once daily.            While in the hospital, will manage blood pressure as follows; Continue home antihypertensive regimen    Will utilize p.r.n. blood pressure medication only if patient's blood pressure greater than 140/90 and she develops symptoms such as worsening chest pain or shortness of breath.

## 2024-07-20 NOTE — H&P
[facility]  History & Physical    Patient Name: Terri Phelan  MRN: [id number]  Patient Class: IP- Inpatient  Admission Date: 7/20/2024  Attending Physician: Josephine Xavier MD  Primary Care Provider: Sussy Mason MD         Patient information was obtained from patient and ER records.     Subjective:     Principal Problem:Acute on chronic respiratory failure with hypoxia and hypercapnia    Chief Complaint:   Chief Complaint   Patient presents with    Shortness of Breath     Dental procedure done on Thursday (2 teeth pulled and fillings). Given aprazolam Wednesday night and Thursday post procedure. Uses bipap at home - unable to use r/t  facial swelling post procedure. States feeling woozy from procedure; intermittent confusion per daughter. Patient not back to baseline LOC since procedure per daughter. Uses 3L O2 at home continuous.        HPI: Terri Phelan is a 67 y/o F with PMH of COPD on home oxygen, anxiety, heart failure, hypertension, depression, presents with 2 days' history of progressive worsening shortness of breath.  Started soon after she had a dental procedure with removal of her upper right tooth.  There is associated confusion, incoherent, and weakness.  Denies fever, chest pain, chills, nausea, vomiting, dysuria.  Patient has not been able to tolerate her home CPAP for 4 days since the procedure due to persistent swelling on her face.  Sodium 147, potassium 4, CO2 42, glucose 129, magnesium 1.9, WBC 7.19, H/H 12.7/43.5, platelets 171, BUN/creatinine 12/0.6, BNP 2 1 9, troponin < 0.006 CXR concerning for slight left pleural effusion and pulmonary edema not excluded.  Patient is started on IV steroid, continuous BiPAP.  Admit to ICU    Past Medical History:   Diagnosis Date    Acute exacerbation of chronic obstructive pulmonary disease (COPD) 12/19/2023    Recently hospitalized at [facility] from 5/14 to 5/20/2024 for exacerbation.  Did not  require ICU care or significant escalation of intervention(s).      Adenoma of ascending colon 2022    Anxiety     COPD (chronic obstructive pulmonary disease)     COPD exacerbation 2014    Heart failure     Hypertension     Major depression 2023       Past Surgical History:   Procedure Laterality Date    BREAST BIOPSY N/A     pt unsure which breast but it was benign-(calcium)     SECTION      HERNIA REPAIR      HYSTERECTOMY         Review of patient's allergies indicates:   Allergen Reactions    Codeine Nausea And Vomiting    Lipitor [atorvastatin] Other (See Comments)     Muscle fatigue      Morphine Hallucinations       No current facility-administered medications on file prior to encounter.     Current Outpatient Medications on File Prior to Encounter   Medication Sig    acetaminophen (TYLENOL) 500 MG tablet Take 500 mg by mouth every 6 (six) hours as needed for Pain.    albuterol (PROVENTIL/VENTOLIN HFA) 90 mcg/actuation inhaler Inhale 2 puffs into the lungs every 6 (six) hours as needed for Wheezing or Shortness of Breath.    alendronate (FOSAMAX) 35 MG tablet Take 35 mg by mouth every Tuesday.    aspirin 81 MG Chew Take 1 tablet by mouth once daily.    azithromycin (Z-OLY) 250 MG tablet Take 1 tablet (250 mg total) by mouth every Mon, Wed, Fri.    budesonide-glycopyr-formoterol (BREZTRI AEROSPHERE) 160-9-4.8 mcg/actuation HFAA Inhale 2 puffs into the lungs 2 (two) times daily.    calcium carb-mag hydrox-simeth 1,200 mg-270 mg -80 mg/10 mL Susp Take 1,200 mg by mouth once daily.    cholecalciferol, vitamin D3, (VITAMIN D3) 50 mcg (2,000 unit) Tab Take 2,000 Units by mouth once daily.    dextromethorphan-guaiFENesin  mg (MUCINEX DM)  mg per 12 hr tablet Take 1 tablet by mouth 2 (two) times daily as needed.    diazePAM (VALIUM) 2 MG tablet Take 1 tablet (2 mg total) by mouth daily as needed for Anxiety.    dicyclomine (BENTYL) 20 mg tablet Take 1 tablet (20 mg total) by  mouth 3 (three) times daily as needed (abdominal pain).    EScitalopram oxalate (LEXAPRO) 10 MG tablet Take 1 tablet (10 mg total) by mouth once daily.    ezetimibe (ZETIA) 10 mg tablet Take 1 tablet (10 mg total) by mouth once daily.    fluticasone propionate (FLONASE) 50 mcg/actuation nasal spray 1 spray (50 mcg total) by Each Nostril route daily as needed for Allergies.    furosemide (LASIX) 40 MG tablet Take 1 tablet (40 mg total) by mouth once daily.    hydrOXYzine HCL (ATARAX) 25 MG tablet Take 1 tablet (25 mg total) by mouth 3 (three) times daily as needed for Anxiety or Itching.    ibuprofen (ADVIL,MOTRIN) 800 MG tablet Take 1 tablet (800 mg total) by mouth 3 (three) times daily as needed for Pain.    losartan (COZAAR) 50 MG tablet Take 1 tablet (50 mg total) by mouth once daily.    OXYGEN-AIR DELIVERY SYSTEMS MISC 2 L by Nasal route continuous.    potassium chloride (MICRO-K) 10 MEQ CpSR Take 10 mEq by mouth once daily.    predniSONE (DELTASONE) 20 MG tablet Take 1 tablet (20 mg total) by mouth 2 (two) times daily.    roflumilast (DALIRESP) 500 mcg Tab Take 1 tablet (500 mcg total) by mouth once daily.     Family History    None       Tobacco Use    Smoking status: Former     Current packs/day: 0.00     Average packs/day: 1 pack/day for 20.0 years (20.0 ttl pk-yrs)     Types: Cigarettes     Quit date: 1/1/2021     Years since quitting: 3.5     Passive exposure: Past    Smokeless tobacco: Never   Substance and Sexual Activity    Alcohol use: Not Currently     Alcohol/week: 1.0 standard drink of alcohol     Types: 1 Glasses of wine per week    Drug use: No    Sexual activity: Not Currently     Partners: Male     Review of Systems   Constitutional:  Positive for activity change.   Respiratory:  Positive for shortness of breath.    Psychiatric/Behavioral:  Positive for confusion.      Objective:     Vital Signs (Most Recent):  Temp: 99.5 °F (37.5 °C) (07/20/24 0607)  Pulse: 63 (07/20/24 0633)  Resp: (!) 24  "(07/20/24 0607)  BP: (!) 177/74 (07/20/24 0633)  SpO2: 100 % (07/20/24 0633) Vital Signs (24h Range):  Temp:  [99.5 °F (37.5 °C)] 99.5 °F (37.5 °C)  Pulse:  [63-65] 63  Resp:  [24] 24  SpO2:  [96 %-100 %] 100 %  BP: (177-194)/(74-87) 177/74     Weight: 83.5 kg (184 lb)  Body mass index is 29.7 kg/m².     Physical Exam  HENT:      Head: Normocephalic and atraumatic.      Right Ear: External ear normal.      Left Ear: External ear normal.   Eyes:      Extraocular Movements: Extraocular movements intact.      Pupils: Pupils are equal, round, and reactive to light.   Cardiovascular:      Rate and Rhythm: Normal rate.   Pulmonary:      Effort: Pulmonary effort is normal.      Breath sounds: No wheezing or rales.      Comments: On continuous BiPAP wean to NC  Abdominal:      General: Bowel sounds are normal.      Palpations: Abdomen is soft.      Tenderness: There is no abdominal tenderness.   Musculoskeletal:         General: Normal range of motion.      Cervical back: Normal range of motion.      Right lower leg: No edema.      Left lower leg: No edema.   Skin:     General: Skin is warm.      Capillary Refill: Capillary refill takes less than 2 seconds.   Neurological:      Mental Status: She is alert and oriented to person, place, and time.   Psychiatric:         Mood and Affect: Mood normal.         Behavior: Behavior normal.              CRANIAL NERVES     CN III, IV, VI   Pupils are equal, round, and reactive to light.       Significant Labs: A1C: No results for input(s): "HGBA1C" in the last 4320 hours.  ABGs:   Recent Labs   Lab 07/20/24  0700   PH 7.253*   PCO2 108*   HCO3 47.6*   POCSATURATED 96.6   PO2 92.2     Blood Culture: No results for input(s): "LABBLOO" in the last 48 hours.  CBC:   Recent Labs   Lab 07/20/24  0622   WBC 7.19   HGB 12.7   HCT 43.5        CMP:   Recent Labs   Lab 07/20/24 0622   *   K 4.0   CL 96   CO2 42*   *   BUN 12   CREATININE 0.6   CALCIUM 9.6   PROT 7.3 " "  ALBUMIN 3.6   BILITOT 0.3   ALKPHOS 53*   AST 28   ALT 15   ANIONGAP 9     Lipase: No results for input(s): "LIPASE" in the last 48 hours.  Lipid Panel: No results for input(s): "CHOL", "HDL", "LDLCALC", "TRIG", "CHOLHDL" in the last 48 hours.  Magnesium:   Recent Labs   Lab 07/20/24  0622   MG 1.9     Troponin:   Recent Labs   Lab 07/20/24  0622   TROPONINI <0.006     TSH: No results for input(s): "TSH" in the last 4320 hours.  Urine Culture: No results for input(s): "LABURIN" in the last 48 hours.  Urine Studies: No results for input(s): "COLORU", "APPEARANCEUA", "PHUR", "SPECGRAV", "PROTEINUA", "GLUCUA", "KETONESU", "BILIRUBINUA", "OCCULTUA", "NITRITE", "UROBILINOGEN", "LEUKOCYTESUR", "RBCUA", "WBCUA", "BACTERIA", "SQUAMEPITHEL", "HYALINECASTS" in the last 48 hours.    Invalid input(s): "WRIGHTSUR"    Significant Imaging: I have reviewed all pertinent imaging results/findings within the past 24 hours.  Assessment/Plan:     * Acute on chronic respiratory failure with hypoxia and hypercapnia  Chronic respiratory failure with hypoxia and hypercapnia  COPD exacerbation  SOB (shortness of breath)      Patient with Hypercapnic and Hypoxic Respiratory failure which is Acute on chronic.  she is on home oxygen at 2 LPM. Supplemental oxygen was provided and noted- Oxygen Concentration (%):  [40] 40    .   Signs/symptoms of respiratory failure include- tachypnea, increased work of breathing, respiratory distress, use of accessory muscles, and lethargy. Contributing diagnoses includes - COPD and Pleural effusion Labs and images were reviewed. Patient Has recent ABG, which has been reviewed. Will treat underlying causes and adjust management of respiratory failure as follows-     Steroids-continue   Continuous BiPAP-wean to nasal cannula during the day and BiPAP q.h.s.  IV Lasix          Anxiety  Depression   Resume home Lexapro      Debility  PT/OT    Obstructive sleep apnea syndrome  CPAP at home-not use recently due to " swelling on the face  Resume per home use and prn nap      CAD (coronary artery disease)  Patient with known CAD     HTN (hypertension)  Chronic, uncontrolled. Latest blood pressure and vitals reviewed-     Temp:  [99.5 °F (37.5 °C)]   Pulse:  [60-85]   Resp:  [21-37]   BP: (135-194)/(59-87)   SpO2:  [91 %-100 %] .   Home meds for hypertension were reviewed and noted below.   Hypertension Medications               furosemide (LASIX) 40 MG tablet Take 1 tablet (40 mg total) by mouth once daily.    losartan (COZAAR) 50 MG tablet Take 1 tablet (50 mg total) by mouth once daily.            While in the hospital, will manage blood pressure as follows; Continue home antihypertensive regimen    Will utilize p.r.n. blood pressure medication only if patient's blood pressure greater than 140/90 and she develops symptoms such as worsening chest pain or shortness of breath.      VTE Risk Mitigation (From admission, onward)           Ordered     enoxaparin injection 40 mg  Daily         07/20/24 0719     IP VTE HIGH RISK PATIENT  Once         07/20/24 0719     Place sequential compression device  Until discontinued         07/20/24 0719                  Critical care time spent on the evaluation and treatment of severe organ dysfunction, review of pertinent labs and imaging studies, discussions with consulting providers and discussions with patient/family: 40 minutes.                  Josephine Xavier MD  Department of Hospital Medicine  [place] - Intensive Care

## 2024-07-20 NOTE — PLAN OF CARE
The proper method of use, as well as anticipated side effects, of this aerosol treatment are discussed and demonstrated to the patient. Patient on continuous Bipap with documented settings.  Will wean as tolerated.  Will continue to monitor.

## 2024-07-20 NOTE — ED PROVIDER NOTES
"Encounter Date: 7/20/2024       History     Chief Complaint   Patient presents with    Shortness of Breath     Dental procedure done on Thursday (2 teeth pulled and fillings). Given aprazolam Wednesday night and Thursday post procedure. Uses bipap at home - unable to use r/t  facial swelling post procedure. States feeling woozy from procedure; intermittent confusion per daughter. Patient not back to baseline LOC since procedure per daughter. Uses 3L O2 at home continuous.     68-year-old female brought to the emergency department by EMS from home for shortness of breath, transient altered mental status.  Patient had dental procedure done with 2 teeth removed in the upper right side 2 days ago.  Daughter states she was given a Xanax the night before and an hour before the procedure.  States she feels like "my mom never really came out of it".  Daughter states patient has had moments of confusion/"incoherence".  States she has been unable to tolerate her CPAP mask at home overnight because of persistent swelling in her face.  Is currently on antibiotics postprocedure.  No fever reported.  Denies any chest pain.  Does note increased work of breathing that worsened this morning prompting daughter called 911 and have her brought to the emergency department.  Does not believe she received steroids EN route.      Review of patient's allergies indicates:   Allergen Reactions    Codeine Nausea And Vomiting    Lipitor [atorvastatin] Other (See Comments)     Muscle fatigue      Morphine Hallucinations     Past Medical History:   Diagnosis Date    Acute exacerbation of chronic obstructive pulmonary disease (COPD) 12/19/2023    Recently hospitalized at [facility] from 5/14 to 5/20/2024 for exacerbation.  Did not require ICU care or significant escalation of intervention(s).      Adenoma of ascending colon 09/09/2022    Anxiety     COPD (chronic obstructive pulmonary disease)     COPD exacerbation 12/17/2014    Heart failure     " Hypertension     Major depression 2023     Past Surgical History:   Procedure Laterality Date    BREAST BIOPSY N/A     pt unsure which breast but it was benign-(calcium)     SECTION      HERNIA REPAIR      HYSTERECTOMY       No family history on file.  Social History     Tobacco Use    Smoking status: Former     Current packs/day: 0.00     Average packs/day: 1 pack/day for 20.0 years (20.0 ttl pk-yrs)     Types: Cigarettes     Quit date: 2021     Years since quitting: 3.5     Passive exposure: Past    Smokeless tobacco: Never   Substance Use Topics    Alcohol use: Not Currently     Alcohol/week: 1.0 standard drink of alcohol     Types: 1 Glasses of wine per week    Drug use: No     Review of Systems   Constitutional:  Negative for chills and fever.   HENT:  Negative for congestion.    Respiratory:  Positive for shortness of breath. Negative for cough.    Cardiovascular:  Negative for chest pain.   Gastrointestinal:  Negative for abdominal pain.   Musculoskeletal:  Negative for back pain.   Neurological:  Negative for headaches.       Physical Exam     Initial Vitals [24 0607]   BP Pulse Resp Temp SpO2   (!) 194/87 65 (!) 24 99.5 °F (37.5 °C) 96 %      MAP       --         Physical Exam    Nursing note and vitals reviewed.  Constitutional: She appears well-developed and well-nourished. No distress.   HENT:   Head: Normocephalic and atraumatic.   Eyes: Conjunctivae and EOM are normal. Pupils are equal, round, and reactive to light.   Neck: Neck supple. No tracheal deviation present.   Normal range of motion.  Cardiovascular:  Normal rate and intact distal pulses.           Pulmonary/Chest:   Mild increased work of breathing   Abdominal: Abdomen is soft. She exhibits no distension. There is no abdominal tenderness.   Musculoskeletal:         General: No tenderness or edema. Normal range of motion.      Cervical back: Normal range of motion and neck supple.     Neurological: She is alert. She has  normal strength. No cranial nerve deficit. GCS eye subscore is 4. GCS verbal subscore is 5. GCS motor subscore is 6.   Skin: Skin is warm and dry.         ED Course   Critical Care    Date/Time: 7/20/2024 7:09 AM    Performed by: Ashkan Forbes MD  Authorized by: Ashkan Forbes MD  Direct patient critical care time: 15 minutes  Additional history critical care time: 15 minutes  Ordering / reviewing critical care time: 15 minutes  Documentation critical care time: 15 minutes  Consulting other physicians critical care time: 15 minutes  Consult with family critical care time: 10 minutes  Total critical care time (exclusive of procedural time) : 85 minutes  Critical care time was exclusive of separately billable procedures and treating other patients.  Critical care was necessary to treat or prevent imminent or life-threatening deterioration of the following conditions: respiratory failure.  Critical care was time spent personally by me on the following activities: development of treatment plan with patient or surrogate, interpretation of cardiac output measurements, evaluation of patient's response to treatment, ordering and review of laboratory studies, ordering and review of radiographic studies, ordering and performing treatments and interventions, obtaining history from patient or surrogate, examination of patient, review of old charts, re-evaluation of patient's condition and pulse oximetry.        Labs Reviewed   CBC W/ AUTO DIFFERENTIAL - Abnormal       Result Value    WBC 7.19      RBC 4.54      Hemoglobin 12.7      Hematocrit 43.5      MCV 96      MCH 28.0      MCHC 29.2 (*)     RDW 12.8      Platelets 171      MPV 11.6      Immature Granulocytes 0.6 (*)     Gran # (ANC) 5.4      Immature Grans (Abs) 0.04      Lymph # 1.1      Mono # 0.6      Eos # 0.0      Baso # 0.03      nRBC 0      Gran % 75.0 (*)     Lymph % 15.4 (*)     Mono % 8.5      Eosinophil % 0.1      Basophil % 0.4      Differential  Method Automated     COMPREHENSIVE METABOLIC PANEL - Abnormal    Sodium 147 (*)     Potassium 4.0      Chloride 96      CO2 42 (*)     Glucose 129 (*)     BUN 12      Creatinine 0.6      Calcium 9.6      Total Protein 7.3      Albumin 3.6      Total Bilirubin 0.3      Alkaline Phosphatase 53 (*)     AST 28      ALT 15      eGFR >60      Anion Gap 9      Narrative:      CO2  critical result(s) called and verbal readback obtained from   Lili Luke RN by CHIRAG 07/20/2024 07:01   B-TYPE NATRIURETIC PEPTIDE - Abnormal     (*)    TROPONIN I    Troponin I <0.006     MAGNESIUM    Magnesium 1.9     POCT INFLUENZA A/B MOLECULAR    POC Molecular Influenza A Ag Negative      POC Molecular Influenza B Ag Negative       Acceptable Yes     SARS-COV-2 RDRP GENE    POC Rapid COVID Negative       Acceptable Yes       EKG Readings: (Independently Interpreted)   Initial Reading: No STEMI. Previous EKG: Compared with most recent EKG Previous EKG Date: 5/14/2024 (Minimal change). Rhythm: Normal Sinus Rhythm. Heart Rate: 65. Ectopy: PVCs. ST Segments: Normal ST Segments. Axis: Normal.   EKG independently interpreted by me pending Cardiology review:           X-Rays:   Independently Interpreted Readings:   Other Readings:  Chest x-ray independently interpreted by me pending radiology review: No large infiltrate, no pneumothorax    Imaging Results              X-Ray Chest AP Portable (Final result)  Result time 07/20/24 06:56:21      Final result by Reji Barboza MD (07/20/24 06:56:21)                   Impression:      1. Chronic appearing interstitial findings, superimposed edema not excluded.  Obscuration of the left costophrenic angle suggests effusion.  There is left basilar subsegmental atelectasis, developing consolidation felt less likely though correlation recommended.      Electronically signed by: Reji Barboza MD  Date:    07/20/2024  Time:    06:56               Narrative:     EXAMINATION:  XR CHEST AP PORTABLE    CLINICAL HISTORY:  SOB;    TECHNIQUE:  Single frontal view of the chest was performed.    COMPARISON:  05/14/2024    FINDINGS:  The cardiomediastinal silhouette is not enlarged, magnified by technique noting calcification of the aorta..  There is obscuration of the left costophrenic angle suggesting effusion.  The trachea is midline.  The lungs are symmetrically expanded bilaterally with coarse interstitial attenuation bilaterally.  There is left basilar subsegmental atelectasis..  There is no pneumothorax.  The osseous structures are remarkable for degenerative change..                                      Medications   methylPREDNISolone sodium succinate injection 60 mg (has no administration in time range)   albuterol-ipratropium 2.5 mg-0.5 mg/3 mL nebulizer solution 3 mL (has no administration in time range)     Medical Decision Making  68-year-old female presents to the emergency department complaining of shortness of breath, transient confusion      Differential: Hypoxemia, hypercapnia, encephalopathy, dehydration, LEATHA, uremia, anemia, pneumonia, COVID, influenza, UTI      Patient's ABG concerning for hypercapnia.  Informed patient and family of results as well as plan to admit with BiPAP to the ICU.  Discussed with internal medicine who will see and admit the patient.  Patient and family comfortable with admission at this time.    Problems Addressed:  Hypercapnic respiratory failure: acute illness or injury  SOB (shortness of breath): acute illness or injury    Amount and/or Complexity of Data Reviewed  Independent Historian:      Details: Family provides much of the history due to patient's slight confusion  External Data Reviewed: ECG and notes.     Details: Reviewed most recent EKG for comparison     Reviewed most recent PCP note documenting baseline medications and past medical history  Labs: ordered.     Details: CBC without leukocytosis, normal H&H; CMP with normal  renal and liver function tests, mild hypernatremia; troponin negative; ABG concerning for acidosis and hypercapnia  Radiology: ordered and independent interpretation performed. Decision-making details documented in ED Course.  ECG/medicine tests: ordered and independent interpretation performed. Decision-making details documented in ED Course.  Discussion of management or test interpretation with external provider(s): Discussed case with the hospital medicine team, reviewed patient's past medical history, recent procedure, presentation, labs, imaging, interventions, plan to admit    Risk  OTC drugs.  Prescription drug management.  Decision regarding hospitalization.    Critical Care  Total time providing critical care: 85 minutes                                      Clinical Impression:  Final diagnoses:  [R06.02] SOB (shortness of breath)  [J96.92] Hypercapnic respiratory failure          ED Disposition Condition    Admit Stable                Ashkan Forbes MD  07/20/24 0711

## 2024-07-20 NOTE — ED NOTES
Notified provider of possible sepsis r/t screening. Per provider, not meet SIRS criteria r/t no fever or elevated WBC.

## 2024-07-20 NOTE — HPI
Terri Phelan is a 67 y/o F with PMH of COPD on home oxygen, anxiety, heart failure, hypertension, depression, presents with 2 days' history of progressive worsening shortness of breath.  Started soon after she had a dental procedure with removal of her upper right tooth.  There is associated confusion, incoherent, and weakness.  Denies fever, chest pain, chills, nausea, vomiting, dysuria.  Patient has not been able to tolerate her home CPAP for 4 days since the procedure due to persistent swelling on her face.  Sodium 147, potassium 4, CO2 42, glucose 129, magnesium 1.9, WBC 7.19, H/H 12.7/43.5, platelets 171, BUN/creatinine 12/0.6, BNP 2 1 9, troponin < 0.006 CXR concerning for slight left pleural effusion and pulmonary edema not excluded.  Patient is started on IV steroid, continuous BiPAP.  Admit to ICU

## 2024-07-20 NOTE — SUBJECTIVE & OBJECTIVE
Past Medical History:   Diagnosis Date    Acute exacerbation of chronic obstructive pulmonary disease (COPD) 2023    Recently hospitalized at [facility] from  to 2024 for exacerbation.  Did not require ICU care or significant escalation of intervention(s).      Adenoma of ascending colon 2022    Anxiety     COPD (chronic obstructive pulmonary disease)     COPD exacerbation 2014    Heart failure     Hypertension     Major depression 2023       Past Surgical History:   Procedure Laterality Date    BREAST BIOPSY N/A     pt unsure which breast but it was benign-(calcium)     SECTION      HERNIA REPAIR      HYSTERECTOMY         Review of patient's allergies indicates:   Allergen Reactions    Codeine Nausea And Vomiting    Lipitor [atorvastatin] Other (See Comments)     Muscle fatigue      Morphine Hallucinations       No current facility-administered medications on file prior to encounter.     Current Outpatient Medications on File Prior to Encounter   Medication Sig    acetaminophen (TYLENOL) 500 MG tablet Take 500 mg by mouth every 6 (six) hours as needed for Pain.    albuterol (PROVENTIL/VENTOLIN HFA) 90 mcg/actuation inhaler Inhale 2 puffs into the lungs every 6 (six) hours as needed for Wheezing or Shortness of Breath.    alendronate (FOSAMAX) 35 MG tablet Take 35 mg by mouth every Tuesday.    aspirin 81 MG Chew Take 1 tablet by mouth once daily.    azithromycin (Z-OLY) 250 MG tablet Take 1 tablet (250 mg total) by mouth every Mon, Wed, Fri.    budesonide-glycopyr-formoterol (BREZTRI AEROSPHERE) 160-9-4.8 mcg/actuation HFAA Inhale 2 puffs into the lungs 2 (two) times daily.    calcium carb-mag hydrox-simeth 1,200 mg-270 mg -80 mg/10 mL Susp Take 1,200 mg by mouth once daily.    cholecalciferol, vitamin D3, (VITAMIN D3) 50 mcg (2,000 unit) Tab Take 2,000 Units by mouth once daily.    dextromethorphan-guaiFENesin  mg (MUCINEX DM)  mg per 12 hr tablet Take 1 tablet  by mouth 2 (two) times daily as needed.    diazePAM (VALIUM) 2 MG tablet Take 1 tablet (2 mg total) by mouth daily as needed for Anxiety.    dicyclomine (BENTYL) 20 mg tablet Take 1 tablet (20 mg total) by mouth 3 (three) times daily as needed (abdominal pain).    EScitalopram oxalate (LEXAPRO) 10 MG tablet Take 1 tablet (10 mg total) by mouth once daily.    ezetimibe (ZETIA) 10 mg tablet Take 1 tablet (10 mg total) by mouth once daily.    fluticasone propionate (FLONASE) 50 mcg/actuation nasal spray 1 spray (50 mcg total) by Each Nostril route daily as needed for Allergies.    furosemide (LASIX) 40 MG tablet Take 1 tablet (40 mg total) by mouth once daily.    hydrOXYzine HCL (ATARAX) 25 MG tablet Take 1 tablet (25 mg total) by mouth 3 (three) times daily as needed for Anxiety or Itching.    ibuprofen (ADVIL,MOTRIN) 800 MG tablet Take 1 tablet (800 mg total) by mouth 3 (three) times daily as needed for Pain.    losartan (COZAAR) 50 MG tablet Take 1 tablet (50 mg total) by mouth once daily.    OXYGEN-AIR DELIVERY SYSTEMS MISC 2 L by Nasal route continuous.    potassium chloride (MICRO-K) 10 MEQ CpSR Take 10 mEq by mouth once daily.    predniSONE (DELTASONE) 20 MG tablet Take 1 tablet (20 mg total) by mouth 2 (two) times daily.    roflumilast (DALIRESP) 500 mcg Tab Take 1 tablet (500 mcg total) by mouth once daily.     Family History    None       Tobacco Use    Smoking status: Former     Current packs/day: 0.00     Average packs/day: 1 pack/day for 20.0 years (20.0 ttl pk-yrs)     Types: Cigarettes     Quit date: 1/1/2021     Years since quitting: 3.5     Passive exposure: Past    Smokeless tobacco: Never   Substance and Sexual Activity    Alcohol use: Not Currently     Alcohol/week: 1.0 standard drink of alcohol     Types: 1 Glasses of wine per week    Drug use: No    Sexual activity: Not Currently     Partners: Male     Review of Systems   Constitutional:  Positive for activity change.   Respiratory:  Positive for  "shortness of breath.    Psychiatric/Behavioral:  Positive for confusion.      Objective:     Vital Signs (Most Recent):  Temp: 99.5 °F (37.5 °C) (07/20/24 0607)  Pulse: 63 (07/20/24 0633)  Resp: (!) 24 (07/20/24 0607)  BP: (!) 177/74 (07/20/24 0633)  SpO2: 100 % (07/20/24 0633) Vital Signs (24h Range):  Temp:  [99.5 °F (37.5 °C)] 99.5 °F (37.5 °C)  Pulse:  [63-65] 63  Resp:  [24] 24  SpO2:  [96 %-100 %] 100 %  BP: (177-194)/(74-87) 177/74     Weight: 83.5 kg (184 lb)  Body mass index is 29.7 kg/m².     Physical Exam  HENT:      Head: Normocephalic and atraumatic.      Right Ear: External ear normal.      Left Ear: External ear normal.   Eyes:      Extraocular Movements: Extraocular movements intact.      Pupils: Pupils are equal, round, and reactive to light.   Cardiovascular:      Rate and Rhythm: Normal rate.   Pulmonary:      Effort: Pulmonary effort is normal.      Breath sounds: No wheezing or rales.      Comments: On continuous BiPAP wean to NC  Abdominal:      General: Bowel sounds are normal.      Palpations: Abdomen is soft.      Tenderness: There is no abdominal tenderness.   Musculoskeletal:         General: Normal range of motion.      Cervical back: Normal range of motion.      Right lower leg: No edema.      Left lower leg: No edema.   Skin:     General: Skin is warm.      Capillary Refill: Capillary refill takes less than 2 seconds.   Neurological:      Mental Status: She is alert and oriented to person, place, and time.   Psychiatric:         Mood and Affect: Mood normal.         Behavior: Behavior normal.              CRANIAL NERVES     CN III, IV, VI   Pupils are equal, round, and reactive to light.       Significant Labs: A1C: No results for input(s): "HGBA1C" in the last 4320 hours.  ABGs:   Recent Labs   Lab 07/20/24  0700   PH 7.253*   PCO2 108*   HCO3 47.6*   POCSATURATED 96.6   PO2 92.2     Blood Culture: No results for input(s): "LABBLOO" in the last 48 hours.  CBC:   Recent Labs   Lab " "07/20/24 0622   WBC 7.19   HGB 12.7   HCT 43.5        CMP:   Recent Labs   Lab 07/20/24 0622   *   K 4.0   CL 96   CO2 42*   *   BUN 12   CREATININE 0.6   CALCIUM 9.6   PROT 7.3   ALBUMIN 3.6   BILITOT 0.3   ALKPHOS 53*   AST 28   ALT 15   ANIONGAP 9     Lipase: No results for input(s): "LIPASE" in the last 48 hours.  Lipid Panel: No results for input(s): "CHOL", "HDL", "LDLCALC", "TRIG", "CHOLHDL" in the last 48 hours.  Magnesium:   Recent Labs   Lab 07/20/24 0622   MG 1.9     Troponin:   Recent Labs   Lab 07/20/24 0622   TROPONINI <0.006     TSH: No results for input(s): "TSH" in the last 4320 hours.  Urine Culture: No results for input(s): "LABURIN" in the last 48 hours.  Urine Studies: No results for input(s): "COLORU", "APPEARANCEUA", "PHUR", "SPECGRAV", "PROTEINUA", "GLUCUA", "KETONESU", "BILIRUBINUA", "OCCULTUA", "NITRITE", "UROBILINOGEN", "LEUKOCYTESUR", "RBCUA", "WBCUA", "BACTERIA", "SQUAMEPITHEL", "HYALINECASTS" in the last 48 hours.    Invalid input(s): "WRIGHTSUR"    Significant Imaging: I have reviewed all pertinent imaging results/findings within the past 24 hours.  "

## 2024-07-20 NOTE — Clinical Note
Diagnosis: Hypercapnic respiratory failure [[id number]]   Future Attending Provider: ARMANDO MARTINEZ [[id number]]   Reason for IP Medical Treatment  (Clinical interventions that can only be accomplished in the IP setting? ) :: Bipap   I certify that Inpatient services for greater than or equal to 2 midnights are medically necessary:: Yes   Plans for Post-Acute care--if anticipated (pick the single best option):: A. No post acute care anticipated at this time   Special Needs:: No Special Needs [1]

## 2024-07-20 NOTE — CONSULTS
Pulmonary & Critical Care Medicine Consult Note    Primary Attending Physician: Josephine Xavier   Consultant Attending: Rodo Sylvester   Consultant Fellow: Sukhdev Benjamin     Reason for Consult:     Acute Hypercapenic respiratory failure    Subjective:      History of Present Illness:  Terri Phelan is a 68 y.o.  female who  has a past medical history of Acute exacerbation of chronic obstructive pulmonary disease (COPD) (12/19/2023), Adenoma of ascending colon (09/09/2022), Anxiety, COPD (chronic obstructive pulmonary disease), COPD exacerbation (12/17/2014), Heart failure, Hypertension, and Major depression (07/01/2023).. The patient presented to the [facility] on 7/20/2024 with a primary complaint of Shortness of Breath (Dental procedure done on Thursday (2 teeth pulled and fillings). Given aprazolam Wednesday night and Thursday post procedure. Uses bipap at home - unable to use r/t  facial swelling post procedure. States feeling woozy from procedure; intermittent confusion per daughter. Patient not back to baseline LOC since procedure per daughter. Uses 3L O2 at home continuous.)      Patient seen and examined at bedside on 7/20/24. Patient resting in bed comfortably in no acute distress. Patient drowsy. Spoke with daughter Mulugeta via telephone. Daughter states patient was in her usual state of health until 2 days ago when she went in for a dental extraction. She states patient was given alprazolam before and after the procedure which made the patient drowsy. She states patient has also not been able to use her home BIPAP she wears at night due to facial swelling. ABG done on admission showing severe hypercapnia with Ph of 7.25, PCO2 of 108, and HCO3 of 47. Patient placed on BIPAP for acute hypercapnic respiratory failure. Patient transferred to ICU for continuous BIPAP.    Past Medical History:  Past Medical History:   Diagnosis Date    Acute exacerbation of chronic obstructive pulmonary  disease (COPD) 2023    Recently hospitalized at [facility] from  to 2024 for exacerbation.  Did not require ICU care or significant escalation of intervention(s).      Adenoma of ascending colon 2022    Anxiety     COPD (chronic obstructive pulmonary disease)     COPD exacerbation 2014    Heart failure     Hypertension     Major depression 2023       Past Surgical History:  Past Surgical History:   Procedure Laterality Date    BREAST BIOPSY N/A     pt unsure which breast but it was benign-(calcium)     SECTION      HERNIA REPAIR      HYSTERECTOMY         Allergies:  Review of patient's allergies indicates:   Allergen Reactions    Codeine Nausea And Vomiting    Lipitor [atorvastatin] Other (See Comments)     Muscle fatigue      Morphine Hallucinations       Medications:   In-Hospital Scheduled Medications:   albuterol-ipratropium  3 mL Nebulization Q6H WAKE    enoxparin  40 mg Subcutaneous Daily    methylPREDNISolone injection (PEDS and ADULTS)  40 mg Intravenous Q8H      In-Hospital PRN Medications:    Current Facility-Administered Medications:     ondansetron, 8 mg, Oral, Q8H PRN    sodium chloride 0.9%, 3 mL, Intravenous, PRN   In-Hospital IV Infusion Medications:     Home Medications:  Prior to Admission medications    Medication Sig Start Date End Date Taking? Authorizing Provider   acetaminophen (TYLENOL) 500 MG tablet Take 500 mg by mouth every 6 (six) hours as needed for Pain.    Provider, Historical   albuterol (PROVENTIL/VENTOLIN HFA) 90 mcg/actuation inhaler Inhale 2 puffs into the lungs every 6 (six) hours as needed for Wheezing or Shortness of Breath. 24   Alex Cunningham MD   alendronate (FOSAMAX) 35 MG tablet Take 35 mg by mouth every Tuesday. 7/15/22   Provider, Historical   aspirin 81 MG Chew Take 1 tablet by mouth once daily.    Provider, Historical   azithromycin (Z-OLY) 250 MG tablet Take 1 tablet (250 mg total) by mouth every Mon, Wed, Fri. 23  11/21/24  Alex Cunningham MD   budesonide-glycopyr-formoterol (BREZTRI AEROSPHERE) 160-9-4.8 mcg/actuation HFAA Inhale 2 puffs into the lungs 2 (two) times daily. 4/20/22   Provider, Historical   calcium carb-mag hydrox-simeth 1,200 mg-270 mg -80 mg/10 mL Susp Take 1,200 mg by mouth once daily.    Provider, Historical   cholecalciferol, vitamin D3, (VITAMIN D3) 50 mcg (2,000 unit) Tab Take 2,000 Units by mouth once daily.    Provider, Historical   dextromethorphan-guaiFENesin  mg (MUCINEX DM)  mg per 12 hr tablet Take 1 tablet by mouth 2 (two) times daily as needed.    Provider, Historical   diazePAM (VALIUM) 2 MG tablet Take 1 tablet (2 mg total) by mouth daily as needed for Anxiety. 5/6/24   Jaimie Reynoso MD   dicyclomine (BENTYL) 20 mg tablet Take 1 tablet (20 mg total) by mouth 3 (three) times daily as needed (abdominal pain). 5/15/23   Bethany Landry MD   EScitalopram oxalate (LEXAPRO) 10 MG tablet Take 1 tablet (10 mg total) by mouth once daily. 7/8/24 7/8/25  Katie Araujo PA-C   ezetimibe (ZETIA) 10 mg tablet Take 1 tablet (10 mg total) by mouth once daily. 7/9/24 7/9/25  Katie Araujo PA-C   fluticasone propionate (FLONASE) 50 mcg/actuation nasal spray 1 spray (50 mcg total) by Each Nostril route daily as needed for Allergies. 8/23/23   Kev Cox MD   furosemide (LASIX) 40 MG tablet Take 1 tablet (40 mg total) by mouth once daily. 12/29/23 12/28/24  Josephine Xavier MD   hydrOXYzine HCL (ATARAX) 25 MG tablet Take 1 tablet (25 mg total) by mouth 3 (three) times daily as needed for Anxiety or Itching. 5/20/24   Kev Gabriel MD   ibuprofen (ADVIL,MOTRIN) 800 MG tablet Take 1 tablet (800 mg total) by mouth 3 (three) times daily as needed for Pain. 2/2/24   Lai Arora MD   losartan (COZAAR) 50 MG tablet Take 1 tablet (50 mg total) by mouth once daily. 1/22/24 1/21/25  Kev Cox MD   OXYGEN-AIR DELIVERY SYSTEMS MISC 2 L by Nasal route continuous.     "Provider, Historical   potassium chloride (MICRO-K) 10 MEQ CpSR Take 10 mEq by mouth once daily. 23   Provider, Historical   predniSONE (DELTASONE) 20 MG tablet Take 1 tablet (20 mg total) by mouth 2 (two) times daily. 24   Jaimie Reynoso MD   roflumilast (DALIRESP) 500 mcg Tab Take 1 tablet (500 mcg total) by mouth once daily. 24   Alex Cunningham MD       Family History:  No family history on file.    Social History:  Social History     Tobacco Use    Smoking status: Former     Current packs/day: 0.00     Average packs/day: 1 pack/day for 20.0 years (20.0 ttl pk-yrs)     Types: Cigarettes     Quit date: 2021     Years since quitting: 3.5     Passive exposure: Past    Smokeless tobacco: Never   Substance Use Topics    Alcohol use: Not Currently     Alcohol/week: 1.0 standard drink of alcohol     Types: 1 Glasses of wine per week    Drug use: No       Review of Systems:  Review of systems not obtained due to patient factors patient ttoo drowsy to participate.   Objective:   Last 24 Hour Vital Signs:  BP  Min: 135/59  Max: 194/87  Temp  Av.5 °F (37.5 °C)  Min: 99.5 °F (37.5 °C)  Max: 99.5 °F (37.5 °C)  Pulse  Av  Min: 60  Max: 85  Resp  Av.8  Min: 21  Max: 37  SpO2  Av.4 %  Min: 91 %  Max: 100 %  Height  Av' 6" (167.6 cm)  Min: 5' 6" (167.6 cm)  Max: 5' 6" (167.6 cm)  Weight  Av.5 kg (184 lb)  Min: 83.5 kg (184 lb)  Max: 83.5 kg (184 lb)  No intake/output data recorded.    Physical Examination:    BP (!) 147/67   Pulse 76   Temp 99.5 °F (37.5 °C) (Oral)   Resp (!) 22   Ht 5' 6" (1.676 m)   Wt 83.5 kg (184 lb)   SpO2 96%   BMI 29.70 kg/m²     GEN: Patient drowsy, well-developed, NAD.  HEENT:  -Head: NC/AT;  -Eyes: No discharge or redness;  -Ears: External ears are normal.  -Nose: Normal nares.  -Mouth and throat: MMM. Swelling due to recent surgery..  CV: RRR, no m/r/g.  LUNGS:Crackles at bilateral bases, no wheezing  ABD: Soft, NT/ND, NBS, no masses or " "organomegaly.  SKIN: Warm, well perfused. No skin rashes or abnormal lesions.  MSK: Normal gait. No deformities.  EXT: No clubbing, cyanosis, or edema.  NEURO: Altered mental status. No focal deficits.     Laboratory:  Trended Lab Data:  Recent Labs     07/20/24  0622   WBC 7.19   HGB 12.7   HCT 43.5      *   K 4.0   CL 96   CO2 42*   BUN 12   CREATININE 0.6   *   BILITOT 0.3   AST 28   ALT 15   ALKPHOS 53*   CALCIUM 9.6   ALBUMIN 3.6   PROT 7.3   MG 1.9       Cardiac:   Recent Labs   Lab 07/20/24  0622   TROPONINI <0.006   *       Urinalysis: No results found for: "LABURIN", "COLORU", "CLARITYU", "SPECGRAV", "LABSPEC", "NITRITE", "PROTEINUR", "GLUCOSEU", "KETONESU", "UROBILINOGEN", "BILIRUBINUR", "BLOODU"    Microbiology:  Microbiology Results (last 7 days)       ** No results found for the last 168 hours. **            Radiology:  EXAMINATION:  XR CHEST AP PORTABLE     CLINICAL HISTORY:  SOB;     TECHNIQUE:  Single frontal view of the chest was performed.     COMPARISON:  05/14/2024     FINDINGS:  The cardiomediastinal silhouette is not enlarged, magnified by technique noting calcification of the aorta..  There is obscuration of the left costophrenic angle suggesting effusion.  The trachea is midline.  The lungs are symmetrically expanded bilaterally with coarse interstitial attenuation bilaterally.  There is left basilar subsegmental atelectasis..  There is no pneumothorax.  The osseous structures are remarkable for degenerative change..     Impression:     1. Chronic appearing interstitial findings, superimposed edema not excluded.  Obscuration of the left costophrenic angle suggests effusion.  There is left basilar subsegmental atelectasis, developing consolidation felt less likely though correlation recommended.       I have personally reviewed the above labs and imaging.    Current Medications:     Infusions:       Scheduled:   albuterol-ipratropium  3 mL Nebulization Q6H WAKE    " enoxparin  40 mg Subcutaneous Daily    methylPREDNISolone injection (PEDS and ADULTS)  40 mg Intravenous Q8H        PRN:    Current Facility-Administered Medications:     ondansetron, 8 mg, Oral, Q8H PRN    sodium chloride 0.9%, 3 mL, Intravenous, PRN     Assessment:     Terri Phelan is a 68 y.o. female with:  Patient Active Problem List    Diagnosis Date Noted    Encounter to establish care with new doctor 07/17/2024    Mild episode of recurrent major depressive disorder 01/22/2024    CO2 narcosis 12/27/2023    Current chronic use of systemic steroids 08/30/2023    Anxiety 08/30/2023    Aortic atherosclerosis 08/23/2023    Chronic respiratory failure with hypoxia and hypercapnia 08/09/2023    Dyspnea 07/25/2023    History of falling 07/17/2023    Long term (current) use of aspirin 07/17/2023    Athscl heart disease of native coronary artery w/o ang pctrs 07/06/2023    Debility 07/03/2023    Palliative care encounter 04/25/2023    CAD (coronary artery disease) 09/09/2022    Diverticular disease of colon 09/09/2022    Hemorrhoids 09/09/2022    History of myocardial infarction 09/09/2022    Mucous in stools 09/09/2022    Oxygen dependent 09/09/2022    Prolapsed internal hemorrhoids 09/09/2022    Abnormal screening mammogram 07/02/2022    Centrilobular emphysema 06/16/2022    Pulmonary nodule 06/16/2022    History of tobacco abuse 06/16/2022    HTN (hypertension) 09/08/2015    Former smoker 09/08/2015    Obstructive sleep apnea syndrome 03/31/2015        Plan:     Neuro:  Patient drowsy but awakens to verbal cues  No focal neurologic deficits    Cardiovascular:   BP elevated  Recommend resume home medications    Pulmonary  Acute hypercapnic respiratory failure secondary to noncompliance with her home BIPAP and oversedation   ABG showing   Recent Labs     07/20/24  0700   PH 7.253*   PCO2 108*   PO2 92.2   HCO3 47.6*   POCSATURATED 96.6      Patient started on BIPAP 12/6   Will repeat ABG in 4 hours  Adjust BIPAP  based on response  Likely can wean off BIPAP later today   Patient will need BIPAP q HS     GI  Recommend daily Miralax for constipation  LFT/Unremarkable    Renal  Slightly elevated sodium likely due to volume depletion  Encourage PO fluid intake  Normal renal function    ID  Normal WBC  Afebrile  No signs/symptoms of acute infection    Heme  Normal WBC, Hb, and platelets  No signs/symptoms of acute bleed    Pain   Currently not in any pain     PPX  Recommend DVT ppx with Lovenox    Lines   2 PIVs in bilateral arms    Case discussed with Dr Rodo Sylvester who is in agreement with treatment and plan.     Thank you for allowing us to participate in the care of this patient. Please contact me if you have any questions regarding this consult.    Sukhdev Benjamin DO  [facility] Fellow

## 2024-07-20 NOTE — ED NOTES
Per daughter, patient vomited twice earlier today, has been altered since Thursday night. Daughter at bedside.

## 2024-07-21 LAB
ANION GAP SERPL CALC-SCNC: 7 MMOL/L (ref 8–16)
BASOPHILS # BLD AUTO: 0.01 K/UL (ref 0–0.2)
BASOPHILS NFR BLD: 0.1 % (ref 0–1.9)
BUN SERPL-MCNC: 18 MG/DL (ref 8–23)
CALCIUM SERPL-MCNC: 9.3 MG/DL (ref 8.7–10.5)
CHLORIDE SERPL-SCNC: 94 MMOL/L (ref 95–110)
CO2 SERPL-SCNC: 44 MMOL/L (ref 23–29)
CREAT SERPL-MCNC: 0.7 MG/DL (ref 0.5–1.4)
DIFFERENTIAL METHOD BLD: ABNORMAL
EOSINOPHIL # BLD AUTO: 0 K/UL (ref 0–0.5)
EOSINOPHIL NFR BLD: 0 % (ref 0–8)
ERYTHROCYTE [DISTWIDTH] IN BLOOD BY AUTOMATED COUNT: 12.9 % (ref 11.5–14.5)
EST. GFR  (NO RACE VARIABLE): >60 ML/MIN/1.73 M^2
GLUCOSE SERPL-MCNC: 118 MG/DL (ref 70–110)
HCT VFR BLD AUTO: 42.1 % (ref 37–48.5)
HGB BLD-MCNC: 12.6 G/DL (ref 12–16)
IMM GRANULOCYTES # BLD AUTO: 0.03 K/UL (ref 0–0.04)
IMM GRANULOCYTES NFR BLD AUTO: 0.4 % (ref 0–0.5)
LYMPHOCYTES # BLD AUTO: 1 K/UL (ref 1–4.8)
LYMPHOCYTES NFR BLD: 12.3 % (ref 18–48)
MAGNESIUM SERPL-MCNC: 2 MG/DL (ref 1.6–2.6)
MCH RBC QN AUTO: 27.5 PG (ref 27–31)
MCHC RBC AUTO-ENTMCNC: 29.9 G/DL (ref 32–36)
MCV RBC AUTO: 92 FL (ref 82–98)
MONOCYTES # BLD AUTO: 0.6 K/UL (ref 0.3–1)
MONOCYTES NFR BLD: 7.9 % (ref 4–15)
NEUTROPHILS # BLD AUTO: 6.3 K/UL (ref 1.8–7.7)
NEUTROPHILS NFR BLD: 79.3 % (ref 38–73)
NRBC BLD-RTO: 0 /100 WBC
PHOSPHATE SERPL-MCNC: 3.2 MG/DL (ref 2.7–4.5)
PLATELET # BLD AUTO: 172 K/UL (ref 150–450)
PMV BLD AUTO: 11.3 FL (ref 9.2–12.9)
POTASSIUM SERPL-SCNC: 4 MMOL/L (ref 3.5–5.1)
RBC # BLD AUTO: 4.59 M/UL (ref 4–5.4)
SODIUM SERPL-SCNC: 145 MMOL/L (ref 136–145)
WBC # BLD AUTO: 7.98 K/UL (ref 3.9–12.7)

## 2024-07-21 PROCEDURE — 94640 AIRWAY INHALATION TREATMENT: CPT | Mod: HCNC

## 2024-07-21 PROCEDURE — 94660 CPAP INITIATION&MGMT: CPT | Mod: HCNC

## 2024-07-21 PROCEDURE — 83735 ASSAY OF MAGNESIUM: CPT | Mod: HCNC | Performed by: FAMILY MEDICINE

## 2024-07-21 PROCEDURE — 80048 BASIC METABOLIC PNL TOTAL CA: CPT | Mod: HCNC | Performed by: FAMILY MEDICINE

## 2024-07-21 PROCEDURE — 94761 N-INVAS EAR/PLS OXIMETRY MLT: CPT | Mod: HCNC

## 2024-07-21 PROCEDURE — 25000003 PHARM REV CODE 250: Mod: HCNC | Performed by: FAMILY MEDICINE

## 2024-07-21 PROCEDURE — 11000001 HC ACUTE MED/SURG PRIVATE ROOM: Mod: HCNC

## 2024-07-21 PROCEDURE — 27000221 HC OXYGEN, UP TO 24 HOURS: Mod: HCNC

## 2024-07-21 PROCEDURE — 85025 COMPLETE CBC W/AUTO DIFF WBC: CPT | Mod: HCNC | Performed by: FAMILY MEDICINE

## 2024-07-21 PROCEDURE — 25000242 PHARM REV CODE 250 ALT 637 W/ HCPCS: Mod: HCNC | Performed by: FAMILY MEDICINE

## 2024-07-21 PROCEDURE — 36415 COLL VENOUS BLD VENIPUNCTURE: CPT | Mod: HCNC | Performed by: FAMILY MEDICINE

## 2024-07-21 PROCEDURE — 99900035 HC TECH TIME PER 15 MIN (STAT): Mod: HCNC

## 2024-07-21 PROCEDURE — 84100 ASSAY OF PHOSPHORUS: CPT | Mod: HCNC | Performed by: FAMILY MEDICINE

## 2024-07-21 PROCEDURE — 63600175 PHARM REV CODE 636 W HCPCS: Mod: HCNC | Performed by: FAMILY MEDICINE

## 2024-07-21 RX ADMIN — ENOXAPARIN SODIUM 40 MG: 40 INJECTION SUBCUTANEOUS at 04:07

## 2024-07-21 RX ADMIN — FUROSEMIDE 40 MG: 10 INJECTION, SOLUTION INTRAVENOUS at 08:07

## 2024-07-21 RX ADMIN — LOSARTAN POTASSIUM 50 MG: 50 TABLET, FILM COATED ORAL at 08:07

## 2024-07-21 RX ADMIN — EZETIMIBE 10 MG: 10 TABLET ORAL at 08:07

## 2024-07-21 RX ADMIN — BUDESONIDE, GLYCOPYRROLATE, AND FORMOTEROL FUMARATE 2 PUFF: 160; 9; 4.8 AEROSOL, METERED RESPIRATORY (INHALATION) at 08:07

## 2024-07-21 RX ADMIN — IPRATROPIUM BROMIDE AND ALBUTEROL SULFATE 3 ML: 2.5; .5 SOLUTION RESPIRATORY (INHALATION) at 01:07

## 2024-07-21 RX ADMIN — ESCITALOPRAM OXALATE 10 MG: 10 TABLET ORAL at 08:07

## 2024-07-21 RX ADMIN — ASPIRIN 81 MG CHEWABLE TABLET 81 MG: 81 TABLET CHEWABLE at 08:07

## 2024-07-21 RX ADMIN — Medication 1 TABLET: at 08:07

## 2024-07-21 RX ADMIN — METHYLPREDNISOLONE SODIUM SUCCINATE 40 MG: 40 INJECTION, POWDER, FOR SOLUTION INTRAMUSCULAR; INTRAVENOUS at 06:07

## 2024-07-21 RX ADMIN — Medication 1 TABLET: at 10:07

## 2024-07-21 RX ADMIN — ROFLUMILAST 500 MCG: 500 TABLET ORAL at 08:07

## 2024-07-21 RX ADMIN — FUROSEMIDE 40 MG: 10 INJECTION, SOLUTION INTRAVENOUS at 10:07

## 2024-07-21 RX ADMIN — IPRATROPIUM BROMIDE AND ALBUTEROL SULFATE 3 ML: 2.5; .5 SOLUTION RESPIRATORY (INHALATION) at 07:07

## 2024-07-21 NOTE — SUBJECTIVE & OBJECTIVE
"Interval History: awake and alert, reports cough and requesting for cough meds.   Continue BiPAP qhs and prn     Review of Systems   Constitutional:  Positive for activity change.   Respiratory:  Negative for shortness of breath.    Genitourinary:  Negative for dysuria.   Neurological:  Negative for headaches.   Psychiatric/Behavioral:  Negative for confusion.      Objective:     Vital Signs (Most Recent):  Temp: 98.2 °F (36.8 °C) (07/21/24 0726)  Pulse: (!) 59 (07/21/24 0726)  Resp: 18 (07/21/24 0726)  BP: 135/63 (07/21/24 0726)  SpO2: (!) 92 % (07/21/24 0726) Vital Signs (24h Range):  Temp:  [98.1 °F (36.7 °C)-98.8 °F (37.1 °C)] 98.2 °F (36.8 °C)  Pulse:  [59-88] 59  Resp:  [18-37] 18  SpO2:  [87 %-97 %] 92 %  BP: (122-197)/() 135/63     Weight: 83.5 kg (184 lb)  Body mass index is 29.7 kg/m².    Intake/Output Summary (Last 24 hours) at 7/21/2024 0740  Last data filed at 7/21/2024 0504  Gross per 24 hour   Intake --   Output 1000 ml   Net -1000 ml         Physical Exam  HENT:      Head: Normocephalic and atraumatic.      Right Ear: External ear normal.      Left Ear: External ear normal.   Cardiovascular:      Rate and Rhythm: Normal rate.   Pulmonary:      Effort: Pulmonary effort is normal.      Breath sounds: No wheezing or rales.      Comments: On NC  Abdominal:      General: Bowel sounds are normal.      Palpations: Abdomen is soft.      Tenderness: There is no abdominal tenderness.   Musculoskeletal:         General: Normal range of motion.      Cervical back: Normal range of motion.      Right lower leg: No edema.      Left lower leg: No edema.   Skin:     General: Skin is warm.      Capillary Refill: Capillary refill takes less than 2 seconds.   Neurological:      Mental Status: She is alert and oriented to person, place, and time.   Psychiatric:         Mood and Affect: Mood normal.         Behavior: Behavior normal.             Significant Labs: A1C: No results for input(s): "HGBA1C" in the last " "4320 hours.  Bilirubin:   Recent Labs   Lab 07/20/24  0622   BILITOT 0.3     Blood Culture: No results for input(s): "LABBLOO" in the last 48 hours.  CBC:   Recent Labs   Lab 07/20/24  0622 07/21/24  0702   WBC 7.19 7.98   HGB 12.7 12.6   HCT 43.5 42.1    172     CMP:   Recent Labs   Lab 07/20/24  0622 07/21/24  0703   * 145   K 4.0 4.0   CL 96 94*   CO2 42* 44*   * 118*   BUN 12 18   CREATININE 0.6 0.7   CALCIUM 9.6 9.3   PROT 7.3  --    ALBUMIN 3.6  --    BILITOT 0.3  --    ALKPHOS 53*  --    AST 28  --    ALT 15  --    ANIONGAP 9 7*     Lactic Acid: No results for input(s): "LACTATE" in the last 48 hours.  Lipase: No results for input(s): "LIPASE" in the last 48 hours.  Lipid Panel: No results for input(s): "CHOL", "HDL", "LDLCALC", "TRIG", "CHOLHDL" in the last 48 hours.  Magnesium:   Recent Labs   Lab 07/20/24 0622   MG 1.9     Troponin:   Recent Labs   Lab 07/20/24 0622   TROPONINI <0.006     TSH: No results for input(s): "TSH" in the last 4320 hours.  Urine Culture: No results for input(s): "LABURIN" in the last 48 hours.  Urine Studies: No results for input(s): "COLORU", "APPEARANCEUA", "PHUR", "SPECGRAV", "PROTEINUA", "GLUCUA", "KETONESU", "BILIRUBINUA", "OCCULTUA", "NITRITE", "UROBILINOGEN", "LEUKOCYTESUR", "RBCUA", "WBCUA", "BACTERIA", "SQUAMEPITHEL", "HYALINECASTS" in the last 48 hours.    Invalid input(s): "WRIGHTSUR"    Significant Imaging: I have reviewed all pertinent imaging results/findings within the past 24 hours.  "

## 2024-07-21 NOTE — PLAN OF CARE
Pt on documented O2. Decreased to 2lpm (home O2 setting). No apparent respiratory distress noted. Will continue to monitor.

## 2024-07-21 NOTE — PLAN OF CARE
Problem: Adult Inpatient Plan of Care  Goal: Plan of Care Review  Outcome: Progressing      Principal Discharge DX:	Acute intractable headache, unspecified headache type  Goal:	Continue pain management.  Instructions for follow-up, activity and diet:	Please continue to take your steroid medication as prescribed, and percocet for pain as needed. Please follow up with your primary care physician and neurosurgery team for further management of your headaches.  Secondary Diagnosis:	Metastatic lung carcinoma  Instructions for follow-up, activity and diet:	Please continue to follow up with your outpatient oncologist regarding further management of your lung cancer.  Secondary Diagnosis:	COPD (chronic obstructive pulmonary disease)  Instructions for follow-up, activity and diet:	Please continue to take your inhalers as prescribed.

## 2024-07-21 NOTE — PROGRESS NOTES
[facility] Medicine  Progress Note    Patient Name: Terri Phelan  MRN: [id number]  Patient Class: IP- Inpatient   Admission Date: 7/20/2024  Length of Stay: 1 days  Attending Physician: Josephine Xavier*  Primary Care Provider: Sussy Mason MD        Subjective:     Principal Problem:Acute on chronic respiratory failure with hypoxia and hypercapnia        HPI:  Terri Phelan is a 67 y/o F with PMH of COPD on home oxygen, anxiety, heart failure, hypertension, depression, presents with 2 days' history of progressive worsening shortness of breath.  Started soon after she had a dental procedure with removal of her upper right tooth.  There is associated confusion, incoherent, and weakness.  Denies fever, chest pain, chills, nausea, vomiting, dysuria.  Patient has not been able to tolerate her home CPAP for 4 days since the procedure due to persistent swelling on her face.  Sodium 147, potassium 4, CO2 42, glucose 129, magnesium 1.9, WBC 7.19, H/H 12.7/43.5, platelets 171, BUN/creatinine 12/0.6, BNP 2 1 9, troponin < 0.006 CXR concerning for slight left pleural effusion and pulmonary edema not excluded.  Patient is started on IV steroid, continuous BiPAP.  Admit to ICU    Overview/Hospital Course:  No notes on file    Interval History: awake and alert, reports cough and requesting for cough meds.   Continue BiPAP qhs and prn     Review of Systems   Constitutional:  Positive for activity change.   Respiratory:  Negative for shortness of breath.    Genitourinary:  Negative for dysuria.   Neurological:  Negative for headaches.   Psychiatric/Behavioral:  Negative for confusion.      Objective:     Vital Signs (Most Recent):  Temp: 98.2 °F (36.8 °C) (07/21/24 0726)  Pulse: (!) 59 (07/21/24 0726)  Resp: 18 (07/21/24 0726)  BP: 135/63 (07/21/24 0726)  SpO2: (!) 92 % (07/21/24 0726) Vital Signs (24h Range):  Temp:  [98.1 °F (36.7 °C)-98.8 °F (37.1 °C)] 98.2 °F (36.8 °C)  Pulse:  [59-88]  "59  Resp:  [18-37] 18  SpO2:  [87 %-97 %] 92 %  BP: (122-197)/() 135/63     Weight: 83.5 kg (184 lb)  Body mass index is 29.7 kg/m².    Intake/Output Summary (Last 24 hours) at 7/21/2024 0740  Last data filed at 7/21/2024 0504  Gross per 24 hour   Intake --   Output 1000 ml   Net -1000 ml         Physical Exam  HENT:      Head: Normocephalic and atraumatic.      Right Ear: External ear normal.      Left Ear: External ear normal.   Cardiovascular:      Rate and Rhythm: Normal rate.   Pulmonary:      Effort: Pulmonary effort is normal.      Breath sounds: No wheezing or rales.      Comments: On NC  Abdominal:      General: Bowel sounds are normal.      Palpations: Abdomen is soft.      Tenderness: There is no abdominal tenderness.   Musculoskeletal:         General: Normal range of motion.      Cervical back: Normal range of motion.      Right lower leg: No edema.      Left lower leg: No edema.   Skin:     General: Skin is warm.      Capillary Refill: Capillary refill takes less than 2 seconds.   Neurological:      Mental Status: She is alert and oriented to person, place, and time.   Psychiatric:         Mood and Affect: Mood normal.         Behavior: Behavior normal.             Significant Labs: A1C: No results for input(s): "HGBA1C" in the last 4320 hours.  Bilirubin:   Recent Labs   Lab 07/20/24  0622   BILITOT 0.3     Blood Culture: No results for input(s): "LABBLOO" in the last 48 hours.  CBC:   Recent Labs   Lab 07/20/24  0622 07/21/24  0702   WBC 7.19 7.98   HGB 12.7 12.6   HCT 43.5 42.1    172     CMP:   Recent Labs   Lab 07/20/24  0622 07/21/24  0703   * 145   K 4.0 4.0   CL 96 94*   CO2 42* 44*   * 118*   BUN 12 18   CREATININE 0.6 0.7   CALCIUM 9.6 9.3   PROT 7.3  --    ALBUMIN 3.6  --    BILITOT 0.3  --    ALKPHOS 53*  --    AST 28  --    ALT 15  --    ANIONGAP 9 7*     Lactic Acid: No results for input(s): "LACTATE" in the last 48 hours.  Lipase: No results for input(s): " ""LIPASE" in the last 48 hours.  Lipid Panel: No results for input(s): "CHOL", "HDL", "LDLCALC", "TRIG", "CHOLHDL" in the last 48 hours.  Magnesium:   Recent Labs   Lab 07/20/24  0622   MG 1.9     Troponin:   Recent Labs   Lab 07/20/24  0622   TROPONINI <0.006     TSH: No results for input(s): "TSH" in the last 4320 hours.  Urine Culture: No results for input(s): "LABURIN" in the last 48 hours.  Urine Studies: No results for input(s): "COLORU", "APPEARANCEUA", "PHUR", "SPECGRAV", "PROTEINUA", "GLUCUA", "KETONESU", "BILIRUBINUA", "OCCULTUA", "NITRITE", "UROBILINOGEN", "LEUKOCYTESUR", "RBCUA", "WBCUA", "BACTERIA", "SQUAMEPITHEL", "HYALINECASTS" in the last 48 hours.    Invalid input(s): "WRIGHTSUR"    Significant Imaging: I have reviewed all pertinent imaging results/findings within the past 24 hours.    Assessment/Plan:      * Acute on chronic respiratory failure with hypoxia and hypercapnia  Chronic respiratory failure with hypoxia and hypercapnia  COPD exacerbation  SOB (shortness of breath)      Patient with Hypercapnic and Hypoxic Respiratory failure which is Acute on chronic.  she is on home oxygen at 2 LPM. Supplemental oxygen was provided and noted- Oxygen Concentration (%):  [40] 40    .   Signs/symptoms of respiratory failure include- tachypnea, increased work of breathing, respiratory distress, use of accessory muscles, and lethargy. Contributing diagnoses includes - COPD and Pleural effusion Labs and images were reviewed. Patient Has recent ABG, which has been reviewed. Will treat underlying causes and adjust management of respiratory failure as follows-     Steroids-continue   Continuous BiPAP-wean to nasal cannula during the day and BiPAP q.h.s.  IV Lasix          Anxiety  Depression   Resume home Lexapro      Debility  PT/OT    Obstructive sleep apnea syndrome  CPAP at home-not use recently due to swelling on the face  Resume per home use and prn nap      CAD (coronary artery disease)  Patient with known " CAD     HTN (hypertension)  Chronic, uncontrolled. Latest blood pressure and vitals reviewed-     Temp:  [99.5 °F (37.5 °C)]   Pulse:  [60-85]   Resp:  [21-37]   BP: (135-194)/(59-87)   SpO2:  [91 %-100 %] .   Home meds for hypertension were reviewed and noted below.   Hypertension Medications               furosemide (LASIX) 40 MG tablet Take 1 tablet (40 mg total) by mouth once daily.    losartan (COZAAR) 50 MG tablet Take 1 tablet (50 mg total) by mouth once daily.            While in the hospital, will manage blood pressure as follows; Continue home antihypertensive regimen    Will utilize p.r.n. blood pressure medication only if patient's blood pressure greater than 140/90 and she develops symptoms such as worsening chest pain or shortness of breath.      VTE Risk Mitigation (From admission, onward)           Ordered     enoxaparin injection 40 mg  Daily         07/20/24 0719     IP VTE HIGH RISK PATIENT  Once         07/20/24 0719     Place sequential compression device  Until discontinued         07/20/24 0719                    Discharge Planning   RADHA:      Code Status: Full Code   Is the patient medically ready for discharge?:     Reason for patient still in hospital (select all that apply): Patient trending condition                     Josephine Xavier MD  Department of Hospital Medicine   [place] - Telemetry

## 2024-07-21 NOTE — PLAN OF CARE
07/21/24 0645   Admission   Initial VN Admission Questions Complete   Communication Issues? None   Shift   Virtual Nurse - Rounding Complete   Pain Management Interventions quiet environment facilitated;relaxation techniques promoted   Virtual Nurse - Patient Verbalized Approval Of VN Rounding;Camera Use   Type of Frequent Check   Type Patient Rounds;Telemetry Monitoring   Safety/Activity   Patient Rounds bed in low position;bed wheels locked;call light in patient/parent reach;clutter free environment maintained;ID band on;visualized patient;placement of personal items at bedside   Safety Promotion/Fall Prevention assistive device/personal item within reach;bed alarm set;side rails raised x 2;nonskid shoes/socks when out of bed;room near unit station;instructed to call staff for mobility   Safety Precautions emergency equipment at bedside   Safety Bands on Patient Fall Risk Band   Activity Management Ambulated -L4   Activity Assistance Provided assistance, stand-by        VIRTUAL NURSE: Pt arrived to unit. Permission received per patient to turn camera to view patient. VIP model explained; patient informed this VN will be working with bedside nurse and the rest of the care team. Plan of care reviewed with patient.  Educated patient on VTE, fall risk and fall risk precautions in place. Call light within reach, side rails up x2. Admission questions completed. Patient instucted to ask staff for assistance. Patient verbalized complete understanding. Patient denies complaints or any needs at this time. Will continue to be available and intervene as needed.

## 2024-07-22 ENCOUNTER — PATIENT MESSAGE (OUTPATIENT)
Dept: PODIATRY | Facility: CLINIC | Age: 69
End: 2024-07-22
Payer: MEDICARE

## 2024-07-22 PROBLEM — Z71.89 ACP (ADVANCE CARE PLANNING): Status: ACTIVE | Noted: 2024-07-22

## 2024-07-22 LAB
ANION GAP SERPL CALC-SCNC: 11 MMOL/L (ref 8–16)
BASOPHILS # BLD AUTO: 0.03 K/UL (ref 0–0.2)
BASOPHILS NFR BLD: 0.3 % (ref 0–1.9)
BUN SERPL-MCNC: 28 MG/DL (ref 8–23)
CALCIUM SERPL-MCNC: 9.3 MG/DL (ref 8.7–10.5)
CHLORIDE SERPL-SCNC: 88 MMOL/L (ref 95–110)
CO2 SERPL-SCNC: 45 MMOL/L (ref 23–29)
CREAT SERPL-MCNC: 0.8 MG/DL (ref 0.5–1.4)
DIFFERENTIAL METHOD BLD: ABNORMAL
EOSINOPHIL # BLD AUTO: 0 K/UL (ref 0–0.5)
EOSINOPHIL NFR BLD: 0.2 % (ref 0–8)
ERYTHROCYTE [DISTWIDTH] IN BLOOD BY AUTOMATED COUNT: 13.3 % (ref 11.5–14.5)
EST. GFR  (NO RACE VARIABLE): >60 ML/MIN/1.73 M^2
GLUCOSE SERPL-MCNC: 88 MG/DL (ref 70–110)
HCT VFR BLD AUTO: 41.8 % (ref 37–48.5)
HGB BLD-MCNC: 12.5 G/DL (ref 12–16)
IMM GRANULOCYTES # BLD AUTO: 0.02 K/UL (ref 0–0.04)
IMM GRANULOCYTES NFR BLD AUTO: 0.2 % (ref 0–0.5)
LYMPHOCYTES # BLD AUTO: 2.9 K/UL (ref 1–4.8)
LYMPHOCYTES NFR BLD: 27.2 % (ref 18–48)
MAGNESIUM SERPL-MCNC: 2 MG/DL (ref 1.6–2.6)
MCH RBC QN AUTO: 27.1 PG (ref 27–31)
MCHC RBC AUTO-ENTMCNC: 29.9 G/DL (ref 32–36)
MCV RBC AUTO: 91 FL (ref 82–98)
MONOCYTES # BLD AUTO: 1.6 K/UL (ref 0.3–1)
MONOCYTES NFR BLD: 14.9 % (ref 4–15)
NEUTROPHILS # BLD AUTO: 6.1 K/UL (ref 1.8–7.7)
NEUTROPHILS NFR BLD: 57.2 % (ref 38–73)
NRBC BLD-RTO: 0 /100 WBC
PHOSPHATE SERPL-MCNC: 2.9 MG/DL (ref 2.7–4.5)
PLATELET # BLD AUTO: 174 K/UL (ref 150–450)
PMV BLD AUTO: 11.3 FL (ref 9.2–12.9)
POTASSIUM SERPL-SCNC: 3 MMOL/L (ref 3.5–5.1)
RBC # BLD AUTO: 4.61 M/UL (ref 4–5.4)
SODIUM SERPL-SCNC: 144 MMOL/L (ref 136–145)
WBC # BLD AUTO: 10.64 K/UL (ref 3.9–12.7)

## 2024-07-22 PROCEDURE — 83735 ASSAY OF MAGNESIUM: CPT | Mod: HCNC | Performed by: FAMILY MEDICINE

## 2024-07-22 PROCEDURE — 36415 COLL VENOUS BLD VENIPUNCTURE: CPT | Mod: HCNC | Performed by: FAMILY MEDICINE

## 2024-07-22 PROCEDURE — 27000221 HC OXYGEN, UP TO 24 HOURS: Mod: HCNC

## 2024-07-22 PROCEDURE — 97165 OT EVAL LOW COMPLEX 30 MIN: CPT | Mod: HCNC

## 2024-07-22 PROCEDURE — 94660 CPAP INITIATION&MGMT: CPT | Mod: HCNC

## 2024-07-22 PROCEDURE — 99900035 HC TECH TIME PER 15 MIN (STAT): Mod: HCNC

## 2024-07-22 PROCEDURE — 63600175 PHARM REV CODE 636 W HCPCS: Mod: HCNC | Performed by: FAMILY MEDICINE

## 2024-07-22 PROCEDURE — 84100 ASSAY OF PHOSPHORUS: CPT | Mod: HCNC | Performed by: FAMILY MEDICINE

## 2024-07-22 PROCEDURE — 25000003 PHARM REV CODE 250: Mod: HCNC | Performed by: FAMILY MEDICINE

## 2024-07-22 PROCEDURE — 94761 N-INVAS EAR/PLS OXIMETRY MLT: CPT | Mod: HCNC

## 2024-07-22 PROCEDURE — 25000242 PHARM REV CODE 250 ALT 637 W/ HCPCS: Mod: HCNC | Performed by: FAMILY MEDICINE

## 2024-07-22 PROCEDURE — 97530 THERAPEUTIC ACTIVITIES: CPT | Mod: HCNC

## 2024-07-22 PROCEDURE — 97535 SELF CARE MNGMENT TRAINING: CPT | Mod: HCNC

## 2024-07-22 PROCEDURE — 94640 AIRWAY INHALATION TREATMENT: CPT | Mod: HCNC

## 2024-07-22 PROCEDURE — 85025 COMPLETE CBC W/AUTO DIFF WBC: CPT | Mod: HCNC | Performed by: FAMILY MEDICINE

## 2024-07-22 PROCEDURE — 97161 PT EVAL LOW COMPLEX 20 MIN: CPT | Mod: HCNC

## 2024-07-22 PROCEDURE — 25000003 PHARM REV CODE 250: Mod: HCNC | Performed by: STUDENT IN AN ORGANIZED HEALTH CARE EDUCATION/TRAINING PROGRAM

## 2024-07-22 PROCEDURE — 11000001 HC ACUTE MED/SURG PRIVATE ROOM: Mod: HCNC

## 2024-07-22 PROCEDURE — 97116 GAIT TRAINING THERAPY: CPT | Mod: HCNC

## 2024-07-22 PROCEDURE — 27100171 HC OXYGEN HIGH FLOW UP TO 24 HOURS: Mod: HCNC

## 2024-07-22 PROCEDURE — 80048 BASIC METABOLIC PNL TOTAL CA: CPT | Mod: HCNC | Performed by: FAMILY MEDICINE

## 2024-07-22 RX ORDER — HYDROXYZINE HYDROCHLORIDE 25 MG/1
25 TABLET, FILM COATED ORAL 3 TIMES DAILY PRN
Status: DISCONTINUED | OUTPATIENT
Start: 2024-07-22 | End: 2024-07-23 | Stop reason: HOSPADM

## 2024-07-22 RX ORDER — IPRATROPIUM BROMIDE AND ALBUTEROL SULFATE 2.5; .5 MG/3ML; MG/3ML
3 SOLUTION RESPIRATORY (INHALATION) EVERY 6 HOURS PRN
Status: DISCONTINUED | OUTPATIENT
Start: 2024-07-22 | End: 2024-07-23 | Stop reason: HOSPADM

## 2024-07-22 RX ORDER — POTASSIUM CHLORIDE 20 MEQ/1
40 TABLET, EXTENDED RELEASE ORAL EVERY 4 HOURS
Status: COMPLETED | OUTPATIENT
Start: 2024-07-22 | End: 2024-07-22

## 2024-07-22 RX ORDER — FUROSEMIDE 40 MG/1
40 TABLET ORAL DAILY
Status: DISCONTINUED | OUTPATIENT
Start: 2024-07-22 | End: 2024-07-23 | Stop reason: HOSPADM

## 2024-07-22 RX ORDER — PREDNISONE 20 MG/1
40 TABLET ORAL DAILY
Status: DISCONTINUED | OUTPATIENT
Start: 2024-07-22 | End: 2024-07-23 | Stop reason: HOSPADM

## 2024-07-22 RX ORDER — PREDNISONE 20 MG/1
40 TABLET ORAL DAILY
Qty: 10 TABLET | Refills: 0 | Status: SHIPPED | OUTPATIENT
Start: 2024-07-22 | End: 2024-07-27

## 2024-07-22 RX ORDER — DIAZEPAM 2 MG/1
2 TABLET ORAL DAILY PRN
Status: DISCONTINUED | OUTPATIENT
Start: 2024-07-22 | End: 2024-07-22

## 2024-07-22 RX ADMIN — Medication 1 TABLET: at 09:07

## 2024-07-22 RX ADMIN — FUROSEMIDE 40 MG: 40 TABLET ORAL at 12:07

## 2024-07-22 RX ADMIN — ENOXAPARIN SODIUM 40 MG: 40 INJECTION SUBCUTANEOUS at 04:07

## 2024-07-22 RX ADMIN — ESCITALOPRAM OXALATE 10 MG: 10 TABLET ORAL at 08:07

## 2024-07-22 RX ADMIN — BUDESONIDE, GLYCOPYRROLATE, AND FORMOTEROL FUMARATE 2 PUFF: 160; 9; 4.8 AEROSOL, METERED RESPIRATORY (INHALATION) at 08:07

## 2024-07-22 RX ADMIN — HYDROXYZINE HYDROCHLORIDE 25 MG: 25 TABLET ORAL at 09:07

## 2024-07-22 RX ADMIN — ROFLUMILAST 500 MCG: 500 TABLET ORAL at 08:07

## 2024-07-22 RX ADMIN — POTASSIUM CHLORIDE 40 MEQ: 1500 TABLET, EXTENDED RELEASE ORAL at 08:07

## 2024-07-22 RX ADMIN — ASPIRIN 81 MG CHEWABLE TABLET 81 MG: 81 TABLET CHEWABLE at 08:07

## 2024-07-22 RX ADMIN — PREDNISONE 40 MG: 20 TABLET ORAL at 10:07

## 2024-07-22 RX ADMIN — BUDESONIDE, GLYCOPYRROLATE, AND FORMOTEROL FUMARATE 2 PUFF: 160; 9; 4.8 AEROSOL, METERED RESPIRATORY (INHALATION) at 07:07

## 2024-07-22 RX ADMIN — Medication 1 TABLET: at 08:07

## 2024-07-22 RX ADMIN — LOSARTAN POTASSIUM 50 MG: 50 TABLET, FILM COATED ORAL at 08:07

## 2024-07-22 RX ADMIN — POTASSIUM CHLORIDE 40 MEQ: 1500 TABLET, EXTENDED RELEASE ORAL at 06:07

## 2024-07-22 RX ADMIN — IPRATROPIUM BROMIDE AND ALBUTEROL SULFATE 3 ML: 2.5; .5 SOLUTION RESPIRATORY (INHALATION) at 07:07

## 2024-07-22 RX ADMIN — EZETIMIBE 10 MG: 10 TABLET ORAL at 08:07

## 2024-07-22 NOTE — PLAN OF CARE
07/22/24 1153   Post-Acute Status   Post-Acute Authorization Placement   Post-Acute Placement Status Referrals Sent     GRE[facility] recs per therapy. Referrals sent via pt preferences. Pending accepting facility. Pending ins auth.    Discharge Order  7/22/2024  Expected Discharge Comment:  Signed by Josephine Xavier MD  Medical Readiness for Discharge  Medically ready as of 7/22/2024 at 0816.  Updated by Josephine Xavier MD on 7/22/2024 at 0816.  Expected Discharge: 7/23/2024Afternoon  Expected Discharge Comment:Pending placement now.    Future Appointments   Date Time Provider Department Center   7/26/2024 12:15 PM Tanya Cochran DPM [facility] POD [facility]   8/6/2024  1:30 PM Alex Cunningham MD [facility] PUL[facility]   8/29/2024 11:20 AM Katie Araujo, A[facility] [place]   9/20/2024  3:20 PM Sussy Mason MD [facility] [place]       No orders of the defined types were placed in this encounter.

## 2024-07-22 NOTE — PT/OT/SLP EVAL
Occupational Therapy   Evaluation & Treatment    Name: Terri Phelan  MRN: [id number]  Admitting Diagnosis: Acute on chronic respiratory failure with hypoxia and hypercapnia  Recent Surgery: * No surgery found *      Recommendations:     Discharge Recommendations: Moderate Intensity Therapy  Discharge Equipment Recommendations:  none  Barriers to discharge:   (decreased endurance, impaired self care skills, decrease self efficacy)    Assessment:     Terri Phelan is a 68 y.o. female with a medical diagnosis of Acute on chronic respiratory failure with hypoxia and hypercapnia.  She presents with ..Diagnoses of SOB (shortness of breath) and Hypercapnic respiratory failure were pertinent to this visit. . Performance deficits affecting function: impaired cardiopulmonary response to activity, gait instability, impaired endurance, weakness, impaired self care skills.      OT evaluation completed with daughter at bedside. Patient with PLOF of modified independence with use of rollator. On evaluation this day, patient able to perform bed mobility with SBA, toileting in bathroom commode with RW and CGA, and ADLs with Min Assist. Patient is limited by decreased endurance, increased shakiness/jitteriness, and states she does not feel safe to return home 2/2 stated medical concerns. Patient will benefit from skilled acute OT. Recommend Moderate Intensity Therapy. DME tbd with pending progression.   Rehab Prognosis: Good; patient would benefit from acute skilled OT services to address these deficits and reach maximum level of function.       Plan:     Patient to be seen 4 x/week to address the above listed problems via self-care/home management, therapeutic activities, therapeutic exercises  Plan of Care Expires: 08/19/24  Plan of Care Reviewed with: patient, daughter    Subjective     Chief Complaint: worried about going home as she doesn't feel safe  Patient/Family Comments/goals: getting answers to medical  concerns    Occupational Profile:  Living Environment: Lives alone in a first floor apartment with no steps to enter; bathroom has a T/S combo; daughter lives near by in the same complex.   Previous level of function: Mod I with most ADLs except for bathing; patient requires assistance with that. No hx of falls; uses rollator when out in the community. Does minimal meal preparations, grocery delivery, and orders take out.   Roles and Routines: drives a little; daughter drives her around  Equipment Used at Home: bedside commode, grab bar, rollator, oxygen (ttb)  Assistance upon Discharge: daughter    Pain/Comfort:  Pain Rating 1: 0/10      Objective:     Communicated with: nursing prior to session.  Patient found HOB elevated with bed alarm, telemetry, PureWick, oxygen upon OT entry to room.    General Precautions: Standard, fall  Orthopedic Precautions: N/A  Braces: N/A  Respiratory Status: Nasal cannula, flow 2.5 L/min    Occupational Performance:    Bed Mobility:    Patient completed Supine to Sit with stand by assistance  Patient completed Sit to Supine with stand by assistance    Functional Mobility/Transfers:  Patient completed Sit <> Stand Transfer with contact guard assistance  with  rolling walker   Patient completed Toilet Transfer Step Transfer technique with contact guard assistance with  rolling walker; verbal cues for patient to reach for R grab bar and slowly lower herself.   Functional Mobility: Patient ambulated short distance from bed to bathroom and back with CGA and RW.     Activities of Daily Living:  Upper Body Dressing: minimum assistance for donning [facility] gown 2/2 line management.   Lower Body Dressing: minimum assistance for donning underwear and new [place]; patient initally stated unable to don socks 2/2 getting dizzy when bending forward. Therapist educated patient on crossing leg in figure four when performing LB dressing. Patient able to cross leg and don socks/underwear.   Toileting:  CGA 2/2 assistance with standing/balance with clothing management.     Cognitive/Visual Perceptual:  Cognitive/Psychosocial Skills:     -       Oriented to: Person, Place, Time, and Situation   -       Follows Commands/attention:Follows two-step but gets distracted by other topics of discussion; ie. Being concerned with where she is going after d/c while toileting on commode rather than focusing on task. Needed redirection.   -       Communication: clear/fluent  -       Memory: No Deficits noted  -       Safety awareness/insight to disability: intact; concerned about not feeling herself back to Modified [place].   -       Mood/Affect/Coping skills/emotional control: Anxious    Physical Exam:  Balance:    -       varied performance throughout session; CGA-close SBA with UE support on walker.   Sensation:    -       Intact; patient states no feelings of numbness or tingling  Dominant hand:    -       Right  Upper Extremity Range of Motion:  has functional range in B UE.   Upper Extremity Strength: B UE strength grossly 3+/5   Strength:    -       Right Upper Extremity: WFL  -       Left Upper Extremity: WFL    AMPAC 6 Click ADL:  AMPAC Total Score: 21    Treatment & Education:  Patient educated on OT/POC with daughter at bedside.   Patient expressed medical concerns to therapist and therapist reassured patient that we hear her and will relay the information to medical staff but also to use her voice and state her concerns to MD.   Patient jittery/shaky upon sitting EOB.   Bed mobility/Functional mobility/ADLs as mentioned above.   Patient's SpO2 levels throughout session stayed between 92-94%.       Patient left HOB elevated with all lines intact, call button in reach, bed alarm on, and nursing notified    GOALS:   Multidisciplinary Problems       Occupational Therapy Goals          Problem: Occupational Therapy    Goal Priority Disciplines Outcome Interventions   Occupational Therapy Goal     OT, PT/OT  "Progressing    Description: Goals to be met by: 2024     Patient will increase functional independence with ADLs by performing:    Patient will utilize the "crossing leg" leg position to independently perform LB Dressing.  Patient will perform grooming at sink for at least 3 minutes with modified independence using least restrictive device without adverse s/s.   Toileting from toilet with [place] for hygiene and clothing management.   Toilet transfer to toilet with Modified [place] using rolling walker.   Patient will perform pursed lip breathing strategy when feeling SOB while performing ADLs/Functional mobility.                          History:     Past Medical History:   Diagnosis Date    Acute exacerbation of chronic obstructive pulmonary disease (COPD) 2023    Recently hospitalized at [facility] from  to 2024 for exacerbation.  Did not require ICU care or significant escalation of intervention(s).      Adenoma of ascending colon 2022    Anxiety     COPD (chronic obstructive pulmonary disease)     COPD exacerbation 2014    Heart failure     Hypertension     Major depression 2023         Past Surgical History:   Procedure Laterality Date    BREAST BIOPSY N/A     pt unsure which breast but it was benign-(calcium)     SECTION      HERNIA REPAIR      HYSTERECTOMY         Time Tracking:     OT Date of Treatment: 24  OT Start Time: 1040  OT Stop Time: 1120  OT Total Time (min): 40 min    Billable Minutes:Evaluation 10 min  Self Care/Home Management 15 min  Therapeutic Activity 15 min    2024  "

## 2024-07-22 NOTE — PROGRESS NOTES
[facility] Medicine  Progress Note    Patient Name: Terri Phelan  MRN: [id number]  Patient Class: IP- Inpatient   Admission Date: 7/20/2024  Length of Stay: 2 days  Attending Physician: Josephine Xavier*  Primary Care Provider: Sussy Mason MD        Subjective:     Principal Problem:Acute on chronic respiratory failure with hypoxia and hypercapnia        HPI:  Terri Phelan is a 69 y/o F with PMH of COPD on home oxygen, anxiety, heart failure, hypertension, depression, presents with 2 days' history of progressive worsening shortness of breath.  Started soon after she had a dental procedure with removal of her upper right tooth.  There is associated confusion, incoherent, and weakness.  Denies fever, chest pain, chills, nausea, vomiting, dysuria.  Patient has not been able to tolerate her home CPAP for 4 days since the procedure due to persistent swelling on her face.  Sodium 147, potassium 4, CO2 42, glucose 129, magnesium 1.9, WBC 7.19, H/H 12.7/43.5, platelets 171, BUN/creatinine 12/0.6, BNP 2 1 9, troponin < 0.006 CXR concerning for slight left pleural effusion and pulmonary edema not excluded.  Patient is started on IV steroid, continuous BiPAP.  Admit to ICU    Overview/Hospital Course:  No notes on file    Interval History: awake and alert, report increased shaking-likely due to nebulizer and steroid.       replace K   Continue supplemental oxygen  Continue BiPAP qhs and prn   Possible discharge today. Patient requesting to stay 1 more day prior to discharge-describing weakness-add PT/OT    Review of Systems   Constitutional:  Positive for activity change.   Respiratory:  Negative for shortness of breath.    Genitourinary:  Negative for dysuria.   Neurological:  Negative for headaches.   Psychiatric/Behavioral:  Negative for confusion.      Objective:     Vital Signs (Most Recent):  Temp: 98.1 °F (36.7 °C) (07/22/24 0801)  Pulse: 71 (07/22/24 0801)  Resp: 18 (07/22/24  "0801)  BP: (!) 108/53 (07/22/24 0801)  SpO2: (!) 93 % (07/22/24 0801) Vital Signs (24h Range):  Temp:  [98.1 °F (36.7 °C)-98.9 °F (37.2 °C)] 98.1 °F (36.7 °C)  Pulse:  [58-74] 71  Resp:  [18-20] 18  SpO2:  [88 %-97 %] 93 %  BP: (108-141)/(53-65) 108/53     Weight: 83.5 kg (184 lb)  Body mass index is 29.7 kg/m².    Intake/Output Summary (Last 24 hours) at 7/22/2024 0807  Last data filed at 7/22/2024 0127  Gross per 24 hour   Intake --   Output 1650 ml   Net -1650 ml         Physical Exam  HENT:      Head: Normocephalic and atraumatic.      Right Ear: External ear normal.      Left Ear: External ear normal.   Cardiovascular:      Rate and Rhythm: Normal rate.   Pulmonary:      Effort: Pulmonary effort is normal.      Breath sounds: No wheezing or rales.      Comments: On NC  Abdominal:      General: Bowel sounds are normal.      Palpations: Abdomen is soft.      Tenderness: There is no abdominal tenderness.   Musculoskeletal:         General: Normal range of motion.      Cervical back: Normal range of motion.      Right lower leg: No edema.      Left lower leg: No edema.   Skin:     General: Skin is warm.      Capillary Refill: Capillary refill takes less than 2 seconds.   Neurological:      Mental Status: She is alert and oriented to person, place, and time.   Psychiatric:         Mood and Affect: Mood normal.         Behavior: Behavior normal.             Significant Labs: A1C: No results for input(s): "HGBA1C" in the last 4320 hours.  Bilirubin:   Recent Labs   Lab 07/20/24  0622   BILITOT 0.3     Blood Culture: No results for input(s): "LABBLOO" in the last 48 hours.  CBC:   Recent Labs   Lab 07/21/24  0702 07/22/24  0249   WBC 7.98 10.64   HGB 12.6 12.5   HCT 42.1 41.8    174     CMP:   Recent Labs   Lab 07/21/24  0703 07/22/24  0249    144   K 4.0 3.0*   CL 94* 88*   CO2 44* 45*   * 88   BUN 18 28*   CREATININE 0.7 0.8   CALCIUM 9.3 9.3   ANIONGAP 7* 11     Lactic Acid: No results for " "input(s): "LACTATE" in the last 48 hours.  Lipase: No results for input(s): "LIPASE" in the last 48 hours.  Lipid Panel: No results for input(s): "CHOL", "HDL", "LDLCALC", "TRIG", "CHOLHDL" in the last 48 hours.  Magnesium:   Recent Labs   Lab 07/21/24  0703 07/22/24  0249   MG 2.0 2.0     Troponin:   No results for input(s): "TROPONINI", "TROPONINIHS" in the last 48 hours.    TSH: No results for input(s): "TSH" in the last 4320 hours.  Urine Culture: No results for input(s): "LABURIN" in the last 48 hours.  Urine Studies: No results for input(s): "COLORU", "APPEARANCEUA", "PHUR", "SPECGRAV", "PROTEINUA", "GLUCUA", "KETONESU", "BILIRUBINUA", "OCCULTUA", "NITRITE", "UROBILINOGEN", "LEUKOCYTESUR", "RBCUA", "WBCUA", "BACTERIA", "SQUAMEPITHEL", "HYALINECASTS" in the last 48 hours.    Invalid input(s): "WRIGHTSUR"    Significant Imaging: I have reviewed all pertinent imaging results/findings within the past 24 hours.    Assessment/Plan:      * Acute on chronic respiratory failure with hypoxia and hypercapnia  Chronic respiratory failure with hypoxia and hypercapnia  COPD exacerbation  SOB (shortness of breath)      Patient with Hypercapnic and Hypoxic Respiratory failure which is Acute on chronic.  she is on home oxygen at 2 LPM. Supplemental oxygen was provided and noted- Oxygen Concentration (%):  [40] 40    .   Signs/symptoms of respiratory failure include- tachypnea, increased work of breathing, respiratory distress, use of accessory muscles, and lethargy. Contributing diagnoses includes - COPD and Pleural effusion Labs and images were reviewed. Patient Has recent ABG, which has been reviewed. Will treat underlying causes and adjust management of respiratory failure as follows-     Steroids-continue   Continuous BiPAP-wean to nasal cannula during the day and BiPAP q.h.s.  IV Lasix          ACP (advance care planning)  Patient noted ongoing discussion about code status with daughter.  She is leaning towards DNR, but " want to stay as full code during this hospitalization      Anxiety  Depression   Resume home Lexapro      Debility  PT/OT    Obstructive sleep apnea syndrome  CPAP at home-not use recently due to swelling on the face  Resume per home use and prn nap      CAD (coronary artery disease)  Patient with known CAD     HTN (hypertension)  Chronic, uncontrolled. Latest blood pressure and vitals reviewed-     Temp:  [99.5 °F (37.5 °C)]   Pulse:  [60-85]   Resp:  [21-37]   BP: (135-194)/(59-87)   SpO2:  [91 %-100 %] .   Home meds for hypertension were reviewed and noted below.   Hypertension Medications               furosemide (LASIX) 40 MG tablet Take 1 tablet (40 mg total) by mouth once daily.    losartan (COZAAR) 50 MG tablet Take 1 tablet (50 mg total) by mouth once daily.            While in the hospital, will manage blood pressure as follows; Continue home antihypertensive regimen    Will utilize p.r.n. blood pressure medication only if patient's blood pressure greater than 140/90 and she develops symptoms such as worsening chest pain or shortness of breath.      VTE Risk Mitigation (From admission, onward)           Ordered     enoxaparin injection 40 mg  Daily         07/20/24 0719     IP VTE HIGH RISK PATIENT  Once         07/20/24 0719     Place sequential compression device  Until discontinued         07/20/24 0719                    Discharge Planning   RADHA: 7/22/2024     Code Status: Full Code   Is the patient medically ready for discharge?:     Reason for patient still in hospital (select all that apply): Patient trending condition and Pending disposition                     Josephine Xavier MD  Department of Hospital Medicine   [place] [facility]

## 2024-07-22 NOTE — ASSESSMENT & PLAN NOTE
Patient noted ongoing discussion about code status with daughter.  She is leaning towards DNR, but want to stay as full code during this hospitalization

## 2024-07-22 NOTE — PLAN OF CARE
I spoke with IZZY Saxena at pt's bedside.  They are recommending SNF at this time.  Discharge plans discussed with pt and her daughter Mulugeta at pt's bedside.  At this time they feel like pt would benefit from SNF at time of discharge.  Their preference is [facility].  I discussed with pt and her daughter that we usually take at least 3 choices in case a facility does not have an available bed at time of discharge.  They agreed that I send referrals to SNF in [facility] license of [facility].  Referrals sent to [facility], [facility].      CM will continue to follow.       07/22/24 1156   Post-Acute Status   Post-Acute Authorization Placement   Post-Acute Placement Status Referrals Sent   Discharge Plan   Discharge Plan A Skilled Nursing Facility   Discharge Plan B Home with family;Home Health       Ho Rossi, ELLIE   Supervisor Case Management-[place]  [phone]

## 2024-07-22 NOTE — PLAN OF CARE
"OT evaluation completed with daughter at bedside. Patient with PLOF of modified independence with use of rollator. On evaluation this day, patient able to perform bed mobility with SBA, toileting in bathroom commode with RW and CGA, and ADLs with Min Assist. Patient is limited by decreased endurance, increased shakiness/jitteriness, and states she does not feel safe to return home 2/2 stated medical concerns. Patient will benefit from skilled acute OT. Recommend Moderate Intensity Therapy. DME tbd with pending progression.     Problem: Occupational Therapy  Goal: Occupational Therapy Goal  Description: Goals to be met by: 7/22/2024     Patient will increase functional independence with ADLs by performing:    Patient will utilize the "crossing leg" leg position to independently perform LB Dressing.  Patient will perform grooming at sink for at least 3 minutes with modified independence using least restrictive device without adverse s/s.   Toileting from toilet with [place] for hygiene and clothing management.   Toilet transfer to toilet with Modified [place] using rolling walker.   Patient will perform pursed lip breathing strategy when feeling SOB while performing ADLs/Functional mobility.     Outcome: Progressing     "

## 2024-07-22 NOTE — PLAN OF CARE
Problem: Physical Therapy  Goal: Physical Therapy Goal  Description: Goals to be met by: 24     Patient will increase functional independence with mobility by performin. Sit to stand transfer with Modified [place]  2. Bed to chair transfer with Modified [place] using rollator  3. Gait  x 100 feet with Modified Sac using rollator and maintaining SpO2 > 88%.     Outcome: Progressing     PT Eval completed, note to follow. Pt ambulated ~15 ft x 3 with RW and CGA-SBA. Pt tremulous and reporting she feels this way when her CO2 is elevated. SpO2 decreased to 85% with ambulation on 3 L. Pt improved to 91% within ~15-20 secs with standing rest break and PLB. Recommending moderate intensity therapy to improve endurance and functional mobility.

## 2024-07-22 NOTE — SUBJECTIVE & OBJECTIVE
Interval History: awake and alert, report increased shaking-likely due to nebulizer and steroid.       replace K   Continue supplemental oxygen  Continue BiPAP qhs and prn   Possible discharge today. Patient requesting to stay 1 more day prior to discharge-describing weakness-add PT/OT    Review of Systems   Constitutional:  Positive for activity change.   Respiratory:  Negative for shortness of breath.    Genitourinary:  Negative for dysuria.   Neurological:  Negative for headaches.   Psychiatric/Behavioral:  Negative for confusion.      Objective:     Vital Signs (Most Recent):  Temp: 98.1 °F (36.7 °C) (07/22/24 0801)  Pulse: 71 (07/22/24 0801)  Resp: 18 (07/22/24 0801)  BP: (!) 108/53 (07/22/24 0801)  SpO2: (!) 93 % (07/22/24 0801) Vital Signs (24h Range):  Temp:  [98.1 °F (36.7 °C)-98.9 °F (37.2 °C)] 98.1 °F (36.7 °C)  Pulse:  [58-74] 71  Resp:  [18-20] 18  SpO2:  [88 %-97 %] 93 %  BP: (108-141)/(53-65) 108/53     Weight: 83.5 kg (184 lb)  Body mass index is 29.7 kg/m².    Intake/Output Summary (Last 24 hours) at 7/22/2024 0807  Last data filed at 7/22/2024 0127  Gross per 24 hour   Intake --   Output 1650 ml   Net -1650 ml         Physical Exam  HENT:      Head: Normocephalic and atraumatic.      Right Ear: External ear normal.      Left Ear: External ear normal.   Cardiovascular:      Rate and Rhythm: Normal rate.   Pulmonary:      Effort: Pulmonary effort is normal.      Breath sounds: No wheezing or rales.      Comments: On NC  Abdominal:      General: Bowel sounds are normal.      Palpations: Abdomen is soft.      Tenderness: There is no abdominal tenderness.   Musculoskeletal:         General: Normal range of motion.      Cervical back: Normal range of motion.      Right lower leg: No edema.      Left lower leg: No edema.   Skin:     General: Skin is warm.      Capillary Refill: Capillary refill takes less than 2 seconds.   Neurological:      Mental Status: She is alert and oriented to person, place, and  "time.   Psychiatric:         Mood and Affect: Mood normal.         Behavior: Behavior normal.             Significant Labs: A1C: No results for input(s): "HGBA1C" in the last 4320 hours.  Bilirubin:   Recent Labs   Lab 07/20/24  0622   BILITOT 0.3     Blood Culture: No results for input(s): "LABBLOO" in the last 48 hours.  CBC:   Recent Labs   Lab 07/21/24  0702 07/22/24  0249   WBC 7.98 10.64   HGB 12.6 12.5   HCT 42.1 41.8    174     CMP:   Recent Labs   Lab 07/21/24  0703 07/22/24  0249    144   K 4.0 3.0*   CL 94* 88*   CO2 44* 45*   * 88   BUN 18 28*   CREATININE 0.7 0.8   CALCIUM 9.3 9.3   ANIONGAP 7* 11     Lactic Acid: No results for input(s): "LACTATE" in the last 48 hours.  Lipase: No results for input(s): "LIPASE" in the last 48 hours.  Lipid Panel: No results for input(s): "CHOL", "HDL", "LDLCALC", "TRIG", "CHOLHDL" in the last 48 hours.  Magnesium:   Recent Labs   Lab 07/21/24  0703 07/22/24  0249   MG 2.0 2.0     Troponin:   No results for input(s): "TROPONINI", "TROPONINIHS" in the last 48 hours.    TSH: No results for input(s): "TSH" in the last 4320 hours.  Urine Culture: No results for input(s): "LABURIN" in the last 48 hours.  Urine Studies: No results for input(s): "COLORU", "APPEARANCEUA", "PHUR", "SPECGRAV", "PROTEINUA", "GLUCUA", "KETONESU", "BILIRUBINUA", "OCCULTUA", "NITRITE", "UROBILINOGEN", "LEUKOCYTESUR", "RBCUA", "WBCUA", "BACTERIA", "SQUAMEPITHEL", "HYALINECASTS" in the last 48 hours.    Invalid input(s): "WRIGHTSUR"    Significant Imaging: I have reviewed all pertinent imaging results/findings within the past 24 hours.  "

## 2024-07-22 NOTE — PT/OT/SLP EVAL
Physical Therapy Evaluation and Treatment    Patient Name:  Terri Phelan   MRN:  [id number]    Recommendations:     Discharge Recommendations: Moderate Intensity Therapy   Discharge Equipment Recommendations: none   Barriers to discharge:  impaired cardiopulmonary response to activity    Assessment:     Terri Phelan is a 68 y.o. female admitted with a medical diagnosis of Acute on chronic respiratory failure with hypoxia and hypercapnia.  She presents with the following impairments/functional limitations: weakness, gait instability, impaired endurance, impaired balance, impaired self care skills, impaired functional mobility, decreased lower extremity function, impaired cardiopulmonary response to activity .Pt ambulated ~15 ft x 3 with RW and CGA-SBA. Pt tremulous and reporting she feels this way when her CO2 is elevated. SpO2 decreased to 85% with ambulation on 3 L. Pt improved to 91% within ~15-20 secs with standing rest break and PLB. Recommending moderate intensity therapy to improve endurance and functional mobility.     Rehab Prognosis: Good; patient would benefit from acute skilled PT services to address these deficits and reach maximum level of function.    Recent Surgery: * No surgery found *      Plan:     During this hospitalization, patient to be seen 5 x/week to address the identified rehab impairments via gait training, therapeutic activities, therapeutic exercises, neuromuscular re-education and progress toward the following goals:    Plan of Care Expires:  08/22/24    Subjective     Chief Complaint: dizziness, SOB  Patient/Family Comments/goals: pt reports she does not feel well enough to go home, she reports her CO2 is elevated and she feels tremulous   Pain/Comfort:  Pain Rating 1: 0/10  Pain Rating Post-Intervention 1: 0/10    Patients cultural, spiritual, [employer] conflicts given the current situation: no    Living Environment:  Pt lives alone in a 1st floor apt with 0 FLORI, T/S  Prior  to admission, patients level of function was Mod I with most ADL's except for bathing - pt requires assist; pt denies falls and does not use AD in home but uses rollator in community; pt is on 2-3 L home O2.  Equipment used at home: blood pressure machine, BIPAP, bath bench, grab bar, nebulizer, rollator, oxygen, bedside commode, walker, rolling (pulse oximeter).  Upon discharge, patient will have assistance from daughter.    Objective:     Communicated with nsg prior to session.  Patient found HOB elevated with telemetry, oxygen  upon PT entry to room.    General Precautions: Standard, fall, respiratory  Orthopedic Precautions:N/A   Braces: N/A  Respiratory Status: Nasal cannula, flow 2.5 L/min    Exams:  Cognitive Exam:  Patient is oriented to Person, Place, Time, and Situation  Postural Exam:  Patient presented with the following abnormalities:    -       Rounded shoulders  -       Forward head  Sensation:denies any numbness/tingling  Skin Integrity/Edema:      -       Skin integrity: Visible skin intact  -       Edema: None noted BLE  RLE ROM: WFL  RLE Strength: grossly 4-/5  LLE ROM: WFL  LLE Strength: grossly 4-/5    Functional Mobility:  Bed Mobility:     Scooting: stand by assistance  Supine to Sit: stand by assistance  Transfers:     Sit to Stand:  stand by assistance with rolling walker  Gait: Pt ambulated ~15 ft x 3 with RW and CGA-SBA. Decreased speed/rocky noted. Pt required 3 standing rest breaks due to SOB and reports of dizziness      AM-PAC 6 CLICK MOBILITY  Total Score:18       Treatment & Education:  Educated pt on role of PT/POC and pt agreeable to participate  Pt on 2.5 L nasal cannula and 92-93% at rest  Pt reports she uses 3 L for activity - SpO2 increased to 3 L  Pt ambulated X 3 gait trials and SpO2 decreased to 85% during 2nd trial and pt reporting dizziness  Standing rest breaks taken  Pt improved to 91% within ~15-20 secs with standing rest break and PLB.  Pt sat up in chair and SpO2  90-92% on 2.5 L at end of session  Pt educated on importance of PLB and slow exhalation   Discussed d/c recs   Nurse notified of no chair alarm cords present, chair alarm pad in place, daughter in room    Patient left up in chair with all lines intact, call button in reach, nsg notified, and daughter present.    GOALS:   Multidisciplinary Problems       Physical Therapy Goals          Problem: Physical Therapy    Goal Priority Disciplines Outcome Goal Variances Interventions   Physical Therapy Goal     PT, PT/OT Progressing     Description: Goals to be met by: 24     Patient will increase functional independence with mobility by performin. Sit to stand transfer with Modified [place]  2. Bed to chair transfer with Modified [place] using rollator  3. Gait  x 100 feet with Modified [place] using rollator and maintaining SpO2 > 88%.                          History:     Past Medical History:   Diagnosis Date    Acute exacerbation of chronic obstructive pulmonary disease (COPD) 2023    Recently hospitalized at [facility] from  to 2024 for exacerbation.  Did not require ICU care or significant escalation of intervention(s).      Adenoma of ascending colon 2022    Anxiety     COPD (chronic obstructive pulmonary disease)     COPD exacerbation 2014    Heart failure     Hypertension     Major depression 2023       Past Surgical History:   Procedure Laterality Date    BREAST BIOPSY N/A     pt unsure which breast but it was benign-(calcium)     SECTION      HERNIA REPAIR      HYSTERECTOMY         Time Tracking:     PT Received On: 24  PT Start Time: 1130     PT Stop Time: 1201  PT Total Time (min): 31 min     Billable Minutes: Evaluation 8, Gait Training 13, and Therapeutic Activity 10      2024

## 2024-07-23 VITALS
DIASTOLIC BLOOD PRESSURE: 69 MMHG | OXYGEN SATURATION: 93 % | RESPIRATION RATE: 18 BRPM | WEIGHT: 184 LBS | BODY MASS INDEX: 29.57 KG/M2 | SYSTOLIC BLOOD PRESSURE: 140 MMHG | HEART RATE: 70 BPM | HEIGHT: 66 IN | TEMPERATURE: 98 F

## 2024-07-23 LAB
ANION GAP SERPL CALC-SCNC: 9 MMOL/L (ref 8–16)
BASOPHILS # BLD AUTO: 0.02 K/UL (ref 0–0.2)
BASOPHILS NFR BLD: 0.2 % (ref 0–1.9)
BUN SERPL-MCNC: 32 MG/DL (ref 8–23)
CALCIUM SERPL-MCNC: 9.6 MG/DL (ref 8.7–10.5)
CHLORIDE SERPL-SCNC: 94 MMOL/L (ref 95–110)
CO2 SERPL-SCNC: 40 MMOL/L (ref 23–29)
CREAT SERPL-MCNC: 0.7 MG/DL (ref 0.5–1.4)
DIFFERENTIAL METHOD BLD: ABNORMAL
EOSINOPHIL # BLD AUTO: 0 K/UL (ref 0–0.5)
EOSINOPHIL NFR BLD: 0.1 % (ref 0–8)
ERYTHROCYTE [DISTWIDTH] IN BLOOD BY AUTOMATED COUNT: 13.4 % (ref 11.5–14.5)
EST. GFR  (NO RACE VARIABLE): >60 ML/MIN/1.73 M^2
GLUCOSE SERPL-MCNC: 86 MG/DL (ref 70–110)
HCT VFR BLD AUTO: 41.3 % (ref 37–48.5)
HGB BLD-MCNC: 12.6 G/DL (ref 12–16)
IMM GRANULOCYTES # BLD AUTO: 0.02 K/UL (ref 0–0.04)
IMM GRANULOCYTES NFR BLD AUTO: 0.2 % (ref 0–0.5)
LYMPHOCYTES # BLD AUTO: 2 K/UL (ref 1–4.8)
LYMPHOCYTES NFR BLD: 21 % (ref 18–48)
MAGNESIUM SERPL-MCNC: 2.2 MG/DL (ref 1.6–2.6)
MCH RBC QN AUTO: 27.5 PG (ref 27–31)
MCHC RBC AUTO-ENTMCNC: 30.5 G/DL (ref 32–36)
MCV RBC AUTO: 90 FL (ref 82–98)
MONOCYTES # BLD AUTO: 1.3 K/UL (ref 0.3–1)
MONOCYTES NFR BLD: 13.2 % (ref 4–15)
NEUTROPHILS # BLD AUTO: 6.2 K/UL (ref 1.8–7.7)
NEUTROPHILS NFR BLD: 65.3 % (ref 38–73)
NRBC BLD-RTO: 0 /100 WBC
OHS QRS DURATION: 74 MS
OHS QTC CALCULATION: 416 MS
PHOSPHATE SERPL-MCNC: 2.8 MG/DL (ref 2.7–4.5)
PLATELET # BLD AUTO: 162 K/UL (ref 150–450)
PMV BLD AUTO: 11.3 FL (ref 9.2–12.9)
POTASSIUM SERPL-SCNC: 3.8 MMOL/L (ref 3.5–5.1)
RBC # BLD AUTO: 4.58 M/UL (ref 4–5.4)
SODIUM SERPL-SCNC: 143 MMOL/L (ref 136–145)
WBC # BLD AUTO: 9.45 K/UL (ref 3.9–12.7)

## 2024-07-23 PROCEDURE — 80048 BASIC METABOLIC PNL TOTAL CA: CPT | Mod: HCNC | Performed by: FAMILY MEDICINE

## 2024-07-23 PROCEDURE — 27100171 HC OXYGEN HIGH FLOW UP TO 24 HOURS: Mod: HCNC

## 2024-07-23 PROCEDURE — 84100 ASSAY OF PHOSPHORUS: CPT | Mod: HCNC | Performed by: FAMILY MEDICINE

## 2024-07-23 PROCEDURE — 36415 COLL VENOUS BLD VENIPUNCTURE: CPT | Mod: HCNC | Performed by: FAMILY MEDICINE

## 2024-07-23 PROCEDURE — 94640 AIRWAY INHALATION TREATMENT: CPT | Mod: HCNC

## 2024-07-23 PROCEDURE — 97530 THERAPEUTIC ACTIVITIES: CPT | Mod: HCNC

## 2024-07-23 PROCEDURE — 94761 N-INVAS EAR/PLS OXIMETRY MLT: CPT | Mod: HCNC

## 2024-07-23 PROCEDURE — 97116 GAIT TRAINING THERAPY: CPT | Mod: HCNC

## 2024-07-23 PROCEDURE — 83735 ASSAY OF MAGNESIUM: CPT | Mod: HCNC | Performed by: FAMILY MEDICINE

## 2024-07-23 PROCEDURE — 27100098 HC SPACER: Mod: HCNC

## 2024-07-23 PROCEDURE — 25000003 PHARM REV CODE 250: Mod: HCNC | Performed by: FAMILY MEDICINE

## 2024-07-23 PROCEDURE — 94660 CPAP INITIATION&MGMT: CPT | Mod: HCNC

## 2024-07-23 PROCEDURE — 63600175 PHARM REV CODE 636 W HCPCS: Mod: HCNC | Performed by: FAMILY MEDICINE

## 2024-07-23 PROCEDURE — 97535 SELF CARE MNGMENT TRAINING: CPT | Mod: HCNC

## 2024-07-23 PROCEDURE — 85025 COMPLETE CBC W/AUTO DIFF WBC: CPT | Mod: HCNC | Performed by: FAMILY MEDICINE

## 2024-07-23 PROCEDURE — 25000242 PHARM REV CODE 250 ALT 637 W/ HCPCS: Mod: HCNC | Performed by: FAMILY MEDICINE

## 2024-07-23 PROCEDURE — 99900035 HC TECH TIME PER 15 MIN (STAT): Mod: HCNC

## 2024-07-23 RX ADMIN — ASPIRIN 81 MG CHEWABLE TABLET 81 MG: 81 TABLET CHEWABLE at 08:07

## 2024-07-23 RX ADMIN — ESCITALOPRAM OXALATE 10 MG: 10 TABLET ORAL at 08:07

## 2024-07-23 RX ADMIN — EZETIMIBE 10 MG: 10 TABLET ORAL at 08:07

## 2024-07-23 RX ADMIN — FUROSEMIDE 40 MG: 40 TABLET ORAL at 08:07

## 2024-07-23 RX ADMIN — ROFLUMILAST 500 MCG: 500 TABLET ORAL at 08:07

## 2024-07-23 RX ADMIN — Medication 1 TABLET: at 08:07

## 2024-07-23 RX ADMIN — LOSARTAN POTASSIUM 50 MG: 50 TABLET, FILM COATED ORAL at 08:07

## 2024-07-23 RX ADMIN — PREDNISONE 40 MG: 20 TABLET ORAL at 08:07

## 2024-07-23 RX ADMIN — BUDESONIDE, GLYCOPYRROLATE, AND FORMOTEROL FUMARATE 2 PUFF: 160; 9; 4.8 AEROSOL, METERED RESPIRATORY (INHALATION) at 08:07

## 2024-07-23 NOTE — ASSESSMENT & PLAN NOTE
Chronic, uncontrolled. Latest blood pressure and vitals reviewed-     Temp:  [98 °F (36.7 °C)-98.7 °F (37.1 °C)]   Pulse:  [53-89]   Resp:  [17-19]   BP: (109-146)/(53-69)   SpO2:  [92 %-96 %] .   Home meds for hypertension were reviewed and noted below.   Hypertension Medications               furosemide (LASIX) 40 MG tablet Take 1 tablet (40 mg total) by mouth once daily.    losartan (COZAAR) 50 MG tablet Take 1 tablet (50 mg total) by mouth once daily.            While in the hospital, will manage blood pressure as follows; Continue home antihypertensive regimen    Will utilize p.r.n. blood pressure medication only if patient's blood pressure greater than 140/90 and she develops symptoms such as worsening chest pain or shortness of breath.

## 2024-07-23 NOTE — PLAN OF CARE
07/23/24 1013   Post-Acute Status   Post-Acute Authorization Placement;Other   Post-Acute Placement Status Patient declined/refused  (Met with pt. Refusing placement now. Plans to DC to home to resume outpt pulm rehab at  she had just started last week.)   Discharge Delays Orders Needed   Discharge Plan   Discharge Plan A Home     Pt requests sooner F/U with Anisha Orona. Appt requested.    Future Appointments   Date Time Provider Department Center   7/26/2024 12:15 PM Tanya Cochran DPM [facility] POD [facility]   8/6/2024  1:30 PM Alex Cunningham MD [facility] PUL[facility]   8/29/2024 11:20 AM Katie Araujo, PADianeC [facility] [place]   9/20/2024  3:20 PM Sussy Mason MD [facility] 65[facility] [place]       No orders of the defined types were placed in this encounter.

## 2024-07-23 NOTE — DISCHARGE SUMMARY
[facility] Medicine  Discharge Summary      Patient Name: Terri Phelan  MRN: [id number]  SUNDEEP: [id number]  Patient Class: IP- Inpatient  Admission Date: 7/20/2024  Hospital Length of Stay: 3 days  Discharge Date and Time: 7/23/2024  2:55 PM  Attending Physician: Josephine Xavier*   Discharging Provider: Josephine Xavier MD  Primary Care Provider: Sussy Mason MD    Primary Care Team: Networked reference to record PCT     HPI:   Terri Phelan is a 69 y/o F with PMH of COPD on home oxygen, anxiety, heart failure, hypertension, depression, presents with 2 days' history of progressive worsening shortness of breath.  Started soon after she had a dental procedure with removal of her upper right tooth.  There is associated confusion, incoherent, and weakness.  Denies fever, chest pain, chills, nausea, vomiting, dysuria.  Patient has not been able to tolerate her home CPAP for 4 days since the procedure due to persistent swelling on her face.  Sodium 147, potassium 4, CO2 42, glucose 129, magnesium 1.9, WBC 7.19, H/H 12.7/43.5, platelets 171, BUN/creatinine 12/0.6, BNP 2 1 9, troponin < 0.006 CXR concerning for slight left pleural effusion and pulmonary edema not excluded.  Patient is started on IV steroid, continuous BiPAP.  Admit to ICU    * No surgery found *      Hospital Course:   No notes on file     Goals of Care Treatment Preferences:  Code Status: Full Code    Health care agent: Mulugeta Phelan  [facility] care agent number: [phone]    Living Will: Yes              Consults:     Pulmonary  * Acute on chronic respiratory failure with hypoxia and hypercapnia  Chronic respiratory failure with hypoxia and hypercapnia  COPD exacerbation  SOB (shortness of breath)      Patient with Hypercapnic and Hypoxic Respiratory failure which is Acute on chronic.  she is on home oxygen at 2 LPM. Supplemental oxygen was provided and noted- Oxygen Concentration (%):  [30] 30    .    Signs/symptoms of respiratory failure include- tachypnea, increased work of breathing, respiratory distress, use of accessory muscles, and lethargy. Contributing diagnoses includes - COPD and Pleural effusion Labs and images were reviewed. Patient Has recent ABG, which has been reviewed. Will treat underlying causes and adjust management of respiratory failure as follows-     Steroids-continue   Continuous BiPAP-wean to nasal cannula during the day and BiPAP q.h.s.  IV Lasix          Cardiac/Vascular  CAD (coronary artery disease)  Patient with known CAD     HTN (hypertension)  Chronic, uncontrolled. Latest blood pressure and vitals reviewed-     Temp:  [98 °F (36.7 °C)-98.7 °F (37.1 °C)]   Pulse:  [53-89]   Resp:  [17-19]   BP: (109-146)/(53-69)   SpO2:  [92 %-96 %] .   Home meds for hypertension were reviewed and noted below.   Hypertension Medications               furosemide (LASIX) 40 MG tablet Take 1 tablet (40 mg total) by mouth once daily.    losartan (COZAAR) 50 MG tablet Take 1 tablet (50 mg total) by mouth once daily.            While in the hospital, will manage blood pressure as follows; Continue home antihypertensive regimen    Will utilize p.r.n. blood pressure medication only if patient's blood pressure greater than 140/90 and she develops symptoms such as worsening chest pain or shortness of breath.    Other  ACP (advance care planning)  Patient noted ongoing discussion about code status with daughter.  She is leaning towards DNR, but want to stay as full code during this hospitalization      Anxiety  Depression   Resume home Lexapro      Debility  PT/OT    Obstructive sleep apnea syndrome  CPAP at home-not use recently due to swelling on the face  Resume per home use and prn nap        Final Active Diagnoses:    Diagnosis Date Noted POA    PRINCIPAL PROBLEM:  Acute on chronic respiratory failure with hypoxia and hypercapnia [J96.21, J96.22] 05/01/2017 Yes    ACP (advance care planning) [Z71.89]  07/22/2024 Not Applicable    Hypercapnic respiratory failure [J96.92] 07/20/2024 Yes    Anxiety [F41.9] 08/30/2023 Yes     Chronic    Chronic respiratory failure with hypoxia and hypercapnia [J96.11, J96.12] 08/09/2023 Yes    SOB (shortness of breath) [R06.02] 07/25/2023 Yes    Debility [R53.81] 07/03/2023 Yes    Depression [F32.A] 07/01/2023 Yes    CAD (coronary artery disease) [I25.10] 09/09/2022 Yes    Oxygen dependent [Z99.81] 09/09/2022 Not Applicable    Former smoker [Z87.891] 09/08/2015 Not Applicable    HTN (hypertension) [I10] 09/08/2015 Yes    Obstructive sleep apnea syndrome [G47.33] 03/31/2015 Yes    COPD exacerbation [J44.1] 12/17/2014 Yes      Problems Resolved During this Admission:       Discharged Condition: stable    Disposition: Home-Health Care [facility]    Follow Up:   Follow-up Information       Therapy, [facility] - Physical. Go to.    Specialty: Physical Therapy  Why: Pulmonary Rehab  Contact information:  [address]  [address]  [place] [address]  [phone]               [facility]. Call.    Specialties: Home Health Services, Home Therapy Services, Home Living Aide Services  Why: As needed, HOME HEALTH, OFFICE TO CALL PATIENT/FAMILY TO SET UP APPT TIME  Contact information:  [address]  [facility] [address]  [phone]                         Patient Instructions:      Ambulatory referral/consult to Outpatient Case Management   Referral Priority: Routine Referral Type: Consultation   Referral Reason: Specialty Services Required   Number of Visits Requested: 1     Diet Cardiac     Activity as tolerated       Significant Diagnostic Studies: N/A    Pending Diagnostic Studies:       None           Medications:  Reconciled Home Medications:      Medication List        CHANGE how you take these medications      * predniSONE 20 MG tablet  Commonly known as: DELTASONE  Take 1 tablet (20 mg total) by mouth 2 (two) times daily.  What  changed: Another medication with the same name was added. Make sure you understand how and when to take each.     * predniSONE 20 MG tablet  Commonly known as: DELTASONE  Take 2 tablets (40 mg total) by mouth once daily. for 5 days  What changed: You were already taking a medication with the same name, and this prescription was added. Make sure you understand how and when to take each.           * This list has 2 medication(s) that are the same as other medications prescribed for you. Read the directions carefully, and ask your doctor or other care provider to review them with you.                CONTINUE taking these medications      acetaminophen 500 MG tablet  Commonly known as: TYLENOL  Take 500 mg by mouth every 6 (six) hours as needed for Pain.     albuterol 90 mcg/actuation inhaler  Commonly known as: PROVENTIL/VENTOLIN HFA  Inhale 2 puffs into the lungs every 6 (six) hours as needed for Wheezing or Shortness of Breath.     alendronate 35 MG tablet  Commonly known as: FOSAMAX  Take 35 mg by mouth every Tuesday.     aspirin 81 MG Chew  Take 1 tablet by mouth once daily.     azithromycin 250 MG tablet  Commonly known as: Z-OLY  Take 1 tablet (250 mg total) by mouth every Mon, Wed, Fri.     BREZTRI AEROSPHERE 160-9-4.8 mcg/actuation Hfaa  Generic drug: budesonide-glycopyr-formoterol  Inhale 2 puffs into the lungs 2 (two) times daily.     calcium carb-mag hydrox-simeth 1,200 mg-270 mg -80 mg/10 mL Susp  Take 1,200 mg by mouth once daily.     cholecalciferol (vitamin D3) 50 mcg (2,000 unit) Tab  Commonly known as: VITAMIN D3  Take 2,000 Units by mouth once daily.     dextromethorphan-guaiFENesin  mg  mg per 12 hr tablet  Commonly known as: MUCINEX DM  Take 1 tablet by mouth 2 (two) times daily as needed.     diazePAM 2 MG tablet  Commonly known as: VALIUM  Take 1 tablet (2 mg total) by mouth daily as needed for Anxiety.     dicyclomine 20 mg tablet  Commonly known as: BENTYL  Take 1 tablet (20 mg  total) by mouth 3 (three) times daily as needed (abdominal pain).     EScitalopram oxalate 10 MG tablet  Commonly known as: LEXAPRO  Take 1 tablet (10 mg total) by mouth once daily.     ezetimibe 10 mg tablet  Commonly known as: ZETIA  Take 1 tablet (10 mg total) by mouth once daily.     fluticasone propionate 50 mcg/actuation nasal spray  Commonly known as: FLONASE  1 spray (50 mcg total) by Each Nostril route daily as needed for Allergies.     furosemide 40 MG tablet  Commonly known as: LASIX  Take 1 tablet (40 mg total) by mouth once daily.     hydrOXYzine HCL 25 MG tablet  Commonly known as: ATARAX  Take 1 tablet (25 mg total) by mouth 3 (three) times daily as needed for Anxiety or Itching.     ibuprofen 800 MG tablet  Commonly known as: ADVIL,MOTRIN  Take 1 tablet (800 mg total) by mouth 3 (three) times daily as needed for Pain.     losartan 50 MG tablet  Commonly known as: COZAAR  Take 1 tablet (50 mg total) by mouth once daily.     OXYGEN-AIR DELIVERY SYSTEMS MISC  2 L by Nasal route continuous.     potassium chloride 10 MEQ Cpsr  Commonly known as: MICRO-K  Take 10 mEq by mouth once daily.     roflumilast 500 mcg Tab  Commonly known as: DALIRESP  Take 1 tablet (500 mcg total) by mouth once daily.              Indwelling Lines/Drains at time of discharge:   Lines/Drains/Airways       Drain  Duration             Female External Urinary Catheter w/ Suction 07/20/24 2200 2 days                    Time spent on the discharge of patient: 35 minutes         Josephine Xavier MD  Department of Hospital Medicine  [place] [facility]

## 2024-07-23 NOTE — PROGRESS NOTES
Virtual Nurse:Discharge orders noted; additional clinical references attached.  and pharmacy tech notified.  Patient's discharge instruction packet given by bedside RN.    Cued into patient's room.  Permission received per patient to turn camera to view patient.  Introduced as VN that will be instructing on discharge instructions.  Family member at bedside.  Educated patient and her daughter Mulugeta, on reason for admission; medications to hold, continue, and start, appointment to follow-up with doctor, and when to return to ED. Teach back method used. Verbalized understanding  Number given for 24/7 Nurse Line. Opportunity given for questions and questions answered.  Bedside nurse updated. Transport to [facility] requested.        07/23/24 1443   Shift   Virtual Nurse - Patient Verbalized Approval Of Camera Use;VN Rounding   Type of Frequent Check   Type Patient Rounds   Safety/Activity   Patient Rounds visualized patient   Safety Promotion/Fall Prevention Fall Risk reviewed with patient/family   Activity Management Sitting at edge of bed - L2

## 2024-07-23 NOTE — PLAN OF CARE
[facility] - Telemetry  Discharge Final Note    Primary Care Provider: Sussy Mason MD    Expected Discharge Date: 7/23/2024    Pharmacist will go over home medications and reasons for medications. VN and bedside nurse to reiterate final discharge instructions.     Cleared from CM . Bedside Nurse and VN notified.    DC plan as listed below in CM flowsheet.    Daughter to . Aware to bring O2 tank for transport. DC to home with HH.    Final Discharge Note (most recent)       Final Note - 07/23/24 1131          Final Note    Assessment Type Final Discharge Note (P)      Anticipated Discharge Disposition Home-Health Care Svc (P)      Hospital Resources/Appts/Education Provided Appointments scheduled and added to AVS;Post-Acute resouces added to AVS (P)         Post-Acute Status    Post-Acute Authorization Placement;Home Health (P)      Post-Acute Placement Status Discharge Plan Changed (P)      Home Health Status Pending Clinical Review (P)    Discussed with [facility]. Pt can have outpt pulm rehab and HH as they HH does not provide that therapy. Pt agreeable. Referral and orders sent.    Discharge Delays None known at this time (P)                    Future Appointments   Date Time Provider Department Center   7/26/2024 12:15 PM Tanya Cochran DPM [facility] POD [facility] [place]   8/6/2024  1:30 PM Alex Cunningham MD [facility] PULMSVC [facility]   8/29/2024 11:20 AM Katie Araujo, PADianeC [facility] 65[facility]US 65+ [place]   9/20/2024  3:20 PM Sussy Mason MD [facility] 65[facility] 65+ [place]      Follow-up Information       Therapy, [facility] - Physical. Go to.    Specialty: Physical Therapy  Why: Pulmonary Rehab  Contact information:  [address]  [address]  [place] [id number] [phone]               [facility]. Call.    Specialties: Home Health Services, Home Therapy Services, Home Living Aide Services  Why: As needed, HOME HEALTH, OFFICE TO CALL PATIENT/FAMILY TO SET UP APPT  TIME  Contact information:  [address]  [facility] [address]  [phone]                             Orders Placed This Encounter   Procedures    Ambulatory referral/consult to Outpatient Case Management     Referral Priority:   Routine     Referral Type:   Consultation     Referral Reason:   Specialty Services Required     Number of Visits Requested:   1

## 2024-07-23 NOTE — ASSESSMENT & PLAN NOTE
Chronic respiratory failure with hypoxia and hypercapnia  COPD exacerbation  SOB (shortness of breath)      Patient with Hypercapnic and Hypoxic Respiratory failure which is Acute on chronic.  she is on home oxygen at 2 LPM. Supplemental oxygen was provided and noted- Oxygen Concentration (%):  [30] 30    .   Signs/symptoms of respiratory failure include- tachypnea, increased work of breathing, respiratory distress, use of accessory muscles, and lethargy. Contributing diagnoses includes - COPD and Pleural effusion Labs and images were reviewed. Patient Has recent ABG, which has been reviewed. Will treat underlying causes and adjust management of respiratory failure as follows-     Steroids-continue   Continuous BiPAP-wean to nasal cannula during the day and BiPAP q.h.s.  IV Lasix

## 2024-07-23 NOTE — PROGRESS NOTES
[facility] Medicine  Progress Note    Patient Name: Terri Phelan  MRN: [id number]  Patient Class: IP- Inpatient   Admission Date: 7/20/2024  Length of Stay: 3 days  Attending Physician: Josephine Xavier*  Primary Care Provider: Sussy Mason MD        Subjective:     Principal Problem:Acute on chronic respiratory failure with hypoxia and hypercapnia        HPI:  Terri Phelan is a 67 y/o F with PMH of COPD on home oxygen, anxiety, heart failure, hypertension, depression, presents with 2 days' history of progressive worsening shortness of breath.  Started soon after she had a dental procedure with removal of her upper right tooth.  There is associated confusion, incoherent, and weakness.  Denies fever, chest pain, chills, nausea, vomiting, dysuria.  Patient has not been able to tolerate her home CPAP for 4 days since the procedure due to persistent swelling on her face.  Sodium 147, potassium 4, CO2 42, glucose 129, magnesium 1.9, WBC 7.19, H/H 12.7/43.5, platelets 171, BUN/creatinine 12/0.6, BNP 2 1 9, troponin < 0.006 CXR concerning for slight left pleural effusion and pulmonary edema not excluded.  Patient is started on IV steroid, continuous BiPAP.  Admit to ICU    Overview/Hospital Course:  No notes on file    Interval History: awake and alert, report shakiness improved.     Continue supplemental oxygen  Continue BiPAP qhs and prn   Possible discharge today.     Review of Systems   Constitutional:  Positive for activity change.   Respiratory:  Negative for shortness of breath.    Genitourinary:  Negative for dysuria.   Neurological:  Negative for headaches.   Psychiatric/Behavioral:  Negative for confusion.      Objective:     Vital Signs (Most Recent):  Temp: 98.2 °F (36.8 °C) (07/23/24 0740)  Pulse: 63 (07/23/24 0837)  Resp: 17 (07/23/24 0837)  BP: (!) 146/66 (07/23/24 0845)  SpO2: 95 % (07/23/24 0837) Vital Signs (24h Range):  Temp:  [98 °F (36.7 °C)-98.7 °F (37.1 °C)]  "98.2 °F (36.8 °C)  Pulse:  [53-77] 63  Resp:  [17-19] 17  SpO2:  [93 %-96 %] 95 %  BP: (109-146)/(53-68) 146/66     Weight: 83.5 kg (184 lb)  Body mass index is 29.7 kg/m².    Intake/Output Summary (Last 24 hours) at 7/23/2024 1033  Last data filed at 7/22/2024 2000  Gross per 24 hour   Intake 480 ml   Output 400 ml   Net 80 ml         Physical Exam  HENT:      Head: Normocephalic and atraumatic.      Right Ear: External ear normal.      Left Ear: External ear normal.   Cardiovascular:      Rate and Rhythm: Normal rate.   Pulmonary:      Effort: Pulmonary effort is normal.      Breath sounds: No wheezing or rales.      Comments: On NC  Abdominal:      General: Bowel sounds are normal.      Palpations: Abdomen is soft.      Tenderness: There is no abdominal tenderness.   Musculoskeletal:         General: Normal range of motion.      Cervical back: Normal range of motion.      Right lower leg: No edema.      Left lower leg: No edema.   Skin:     General: Skin is warm.      Capillary Refill: Capillary refill takes less than 2 seconds.   Neurological:      Mental Status: She is alert and oriented to person, place, and time.   Psychiatric:         Mood and Affect: Mood normal.         Behavior: Behavior normal.             Significant Labs: A1C: No results for input(s): "HGBA1C" in the last 4320 hours.  Bilirubin:   Recent Labs   Lab 07/20/24  0622   BILITOT 0.3     Blood Culture: No results for input(s): "LABBLOO" in the last 48 hours.  CBC:   Recent Labs   Lab 07/22/24  0249 07/23/24 0224   WBC 10.64 9.45   HGB 12.5 12.6   HCT 41.8 41.3    162     CMP:   Recent Labs   Lab 07/22/24  0249 07/23/24 0224    143   K 3.0* 3.8   CL 88* 94*   CO2 45* 40*   GLU 88 86   BUN 28* 32*   CREATININE 0.8 0.7   CALCIUM 9.3 9.6   ANIONGAP 11 9     Lactic Acid: No results for input(s): "LACTATE" in the last 48 hours.  Lipase: No results for input(s): "LIPASE" in the last 48 hours.  Lipid Panel: No results for input(s): " ""CHOL", "HDL", "LDLCALC", "TRIG", "CHOLHDL" in the last 48 hours.  Magnesium:   Recent Labs   Lab 07/22/24  0249 07/23/24  0224   MG 2.0 2.2     Troponin:   No results for input(s): "TROPONINI", "TROPONINIHS" in the last 48 hours.    TSH: No results for input(s): "TSH" in the last 4320 hours.  Urine Culture: No results for input(s): "LABURIN" in the last 48 hours.  Urine Studies: No results for input(s): "COLORU", "APPEARANCEUA", "PHUR", "SPECGRAV", "PROTEINUA", "GLUCUA", "KETONESU", "BILIRUBINUA", "OCCULTUA", "NITRITE", "UROBILINOGEN", "LEUKOCYTESUR", "RBCUA", "WBCUA", "BACTERIA", "SQUAMEPITHEL", "HYALINECASTS" in the last 48 hours.    Invalid input(s): "WRIGHTSUR"    Significant Imaging: I have reviewed all pertinent imaging results/findings within the past 24 hours.    Assessment/Plan:      * Acute on chronic respiratory failure with hypoxia and hypercapnia  Chronic respiratory failure with hypoxia and hypercapnia  COPD exacerbation  SOB (shortness of breath)      Patient with Hypercapnic and Hypoxic Respiratory failure which is Acute on chronic.  she is on home oxygen at 2 LPM. Supplemental oxygen was provided and noted- Oxygen Concentration (%):  [30] 30    .   Signs/symptoms of respiratory failure include- tachypnea, increased work of breathing, respiratory distress, use of accessory muscles, and lethargy. Contributing diagnoses includes - COPD and Pleural effusion Labs and images were reviewed. Patient Has recent ABG, which has been reviewed. Will treat underlying causes and adjust management of respiratory failure as follows-     Steroids-continue   Continuous BiPAP-wean to nasal cannula during the day and BiPAP q.h.s.  IV Lasix          ACP (advance care planning)  Patient noted ongoing discussion about code status with daughter.  She is leaning towards DNR, but want to stay as full code during this hospitalization      Anxiety  Depression   Resume home Lexapro      Debility  PT/OT    Obstructive sleep " apnea syndrome  CPAP at home-not use recently due to swelling on the face  Resume per home use and prn nap      CAD (coronary artery disease)  Patient with known CAD     HTN (hypertension)  Chronic, uncontrolled. Latest blood pressure and vitals reviewed-     Temp:  [98 °F (36.7 °C)-98.7 °F (37.1 °C)]   Pulse:  [53-89]   Resp:  [17-19]   BP: (109-146)/(53-69)   SpO2:  [92 %-96 %] .   Home meds for hypertension were reviewed and noted below.   Hypertension Medications               furosemide (LASIX) 40 MG tablet Take 1 tablet (40 mg total) by mouth once daily.    losartan (COZAAR) 50 MG tablet Take 1 tablet (50 mg total) by mouth once daily.            While in the hospital, will manage blood pressure as follows; Continue home antihypertensive regimen    Will utilize p.r.n. blood pressure medication only if patient's blood pressure greater than 140/90 and she develops symptoms such as worsening chest pain or shortness of breath.      VTE Risk Mitigation (From admission, onward)           Ordered     enoxaparin injection 40 mg  Daily         07/20/24 0719     IP VTE HIGH RISK PATIENT  Once         07/20/24 0719     Place sequential compression device  Until discontinued         07/20/24 0719                    Discharge Planning   RADHA: 7/23/2024     Code Status: Full Code   Is the patient medically ready for discharge?:     Reason for patient still in hospital (select all that apply): Pending disposition  Discharge Plan A: Home   Discharge Delays: None known at this time              Josephine Xavier MD  Department of Hospital Medicine   [place] [facility]

## 2024-07-23 NOTE — PLAN OF CARE
[facility] - Telemetry      HOME HEALTH ORDERS  FACE TO FACE ENCOUNTER    Patient Name: Terri Phelan  YOB: 1955    PCP: Sussy Mason MD   PCP Address: [address] [facility] / [facility]  PCP Phone Number: [phone]  PCP Fax: [phone]    Encounter Date: 7/20/24    Admit to Home Health    Diagnoses:  Active Hospital Problems    Diagnosis  POA    *Acute on chronic respiratory failure with hypoxia and hypercapnia [J96.21, J96.22]  Yes    ACP (advance care planning) [Z71.89]  Not Applicable    Hypercapnic respiratory failure [J96.92]  Yes    Anxiety [F41.9]  Yes     Chronic     Anxiety associated with feelings of SOB, diagnosis, and hope for new medications that can help. Exacerbated by chronic need to check oxygen numbers and frequent hospitalizations. Following with palliative.       Chronic respiratory failure with hypoxia and hypercapnia [J96.11, J96.12]  Yes     Secondary to advanced/end-stage COPD/emphysema.  On home oxygen and NIPPV with baseline PaCO2 in the 80s.  Occasionally reports headaches but relatively asymptomatic with severe CO2 retention.  Evaluated by Dr. Painter (Sleep) who confirmed reviewed compliance and confirmed current settings.      SOB (shortness of breath) [R06.02]  Yes     Severe dyspnea contributing to anxiety.   Continue to see palliative, benzos prn.       Debility [R53.81]  Yes    Depression [F32.A]  Yes    CAD (coronary artery disease) [I25.10]  Yes    Oxygen dependent [Z99.81]  Not Applicable     Requires 2-3L NC.      Former smoker [Z87.891]  Not Applicable    HTN (hypertension) [I10]  Yes    Obstructive sleep apnea syndrome [G47.33]  Yes     Changed settings to 18/8 on 8/9/23      COPD exacerbation [J44.1]  Yes      Resolved Hospital Problems   No resolved problems to display.       Follow Up Appointments:  Future Appointments   Date Time Provider Department Center   7/26/2024 12:15 PM Tanya Cochran, TRAVIS [facility] [address] [place]   8/6/2024   1:30 PM Alex Cunningham MD [facility] [address]   8/29/2024 11:20 AM Katie Araujo, PA-C [facility] [place]   9/20/2024  3:20 PM Sussy Mason MD [facility]       Allergies:  Review of patient's allergies indicates:   Allergen Reactions    Codeine Nausea And Vomiting    Lipitor [atorvastatin] Other (See Comments)     Muscle fatigue      Morphine Hallucinations       Medications: Review discharge medications with patient and family and provide education.    Current Facility-Administered Medications   Medication Dose Route Frequency Provider Last Rate Last Admin    acetaminophen tablet 650 mg  650 mg Oral Q6H PRN Jsoephine Xavier MD   650 mg at 07/20/24 1951    albuterol-ipratropium 2.5 mg-0.5 mg/3 mL nebulizer solution 3 mL  3 mL Nebulization Q6H PRN Josephine Xavier MD        aspirin chewable tablet 81 mg  81 mg Oral Daily Josephine Xavier MD   81 mg at 07/23/24 0845    budesonide-glycopyr-formoterol 160-9-4.8 mcg/actuation HFAA 2 puff  2 puff Inhalation BID Josephine Xavier MD   2 puff at 07/23/24 0837    dextromethorphan-guaiFENesin  mg per 12 hr tablet 1 tablet  1 tablet Oral BID Josephine Xavier MD   1 tablet at 07/23/24 0845    enoxaparin injection 40 mg  40 mg Subcutaneous Daily Josephine Xavier MD   40 mg at 07/22/24 1611    EScitalopram oxalate tablet 10 mg  10 mg Oral Daily Josephine Xavier MD   10 mg at 07/23/24 0845    ezetimibe tablet 10 mg  10 mg Oral Daily Josephine Xavier MD   10 mg at 07/23/24 0845    furosemide tablet 40 mg  40 mg Oral Daily Josephine Xavier MD   40 mg at 07/23/24 0845    hydrOXYzine HCL tablet 25 mg  25 mg Oral TID PRN Josephine Xavier MD   25 mg at 07/22/24 2109    losartan tablet 50 mg  50 mg Oral Daily Josephine Xavier MD   50 mg at 07/23/24 0845    ondansetron disintegrating tablet 8 mg  8 mg Oral Q8H PRN Josephine Xavier MD         predniSONE tablet 40 mg  40 mg Oral Daily Josephine Xavier MD   40 mg at 07/23/24 0845    roflumilast (DALIRESP) tablet 500 mcg  500 mcg Oral Daily Josephine Xavier MD   500 mcg at 07/23/24 0846    sodium chloride 0.9% flush 3 mL  3 mL Intravenous PRN Josephine Xavier MD         Current Discharge Medication List        START taking these medications    Details   !! predniSONE (DELTASONE) 20 MG tablet Take 2 tablets (40 mg total) by mouth once daily. for 5 days  Qty: 10 tablet, Refills: 0       !! - Potential duplicate medications found. Please discuss with provider.        CONTINUE these medications which have NOT CHANGED    Details   acetaminophen (TYLENOL) 500 MG tablet Take 500 mg by mouth every 6 (six) hours as needed for Pain.      albuterol (PROVENTIL/VENTOLIN HFA) 90 mcg/actuation inhaler Inhale 2 puffs into the lungs every 6 (six) hours as needed for Wheezing or Shortness of Breath.  Qty: 18 g, Refills: 6      alendronate (FOSAMAX) 35 MG tablet Take 35 mg by mouth every Tuesday.      aspirin 81 MG Chew Take 1 tablet by mouth once daily.      azithromycin (Z-OLY) 250 MG tablet Take 1 tablet (250 mg total) by mouth every Mon, Wed, Fri.  Qty: 12 tablet, Refills: 11      budesonide-glycopyr-formoterol (BREZTRI AEROSPHERE) 160-9-4.8 mcg/actuation HFAA Inhale 2 puffs into the lungs 2 (two) times daily.      calcium carb-mag hydrox-simeth 1,200 mg-270 mg -80 mg/10 mL Susp Take 1,200 mg by mouth once daily.      cholecalciferol, vitamin D3, (VITAMIN D3) 50 mcg (2,000 unit) Tab Take 2,000 Units by mouth once daily.      dextromethorphan-guaiFENesin  mg (MUCINEX DM)  mg per 12 hr tablet Take 1 tablet by mouth 2 (two) times daily as needed.      diazePAM (VALIUM) 2 MG tablet Take 1 tablet (2 mg total) by mouth daily as needed for Anxiety.  Qty: 30 tablet, Refills: 0      dicyclomine (BENTYL) 20 mg tablet Take 1 tablet (20 mg total) by mouth 3 (three) times daily as needed  (abdominal pain).  Qty: 30 tablet, Refills: 1      EScitalopram oxalate (LEXAPRO) 10 MG tablet Take 1 tablet (10 mg total) by mouth once daily.  Qty: 30 tablet, Refills: 0    Associated Diagnoses: Anxiety      ezetimibe (ZETIA) 10 mg tablet Take 1 tablet (10 mg total) by mouth once daily.  Qty: 90 tablet, Refills: 0    Associated Diagnoses: Aortic atherosclerosis; Mixed hyperlipidemia      fluticasone propionate (FLONASE) 50 mcg/actuation nasal spray 1 spray (50 mcg total) by Each Nostril route daily as needed for Allergies.  Qty: 16 g, Refills: 11      furosemide (LASIX) 40 MG tablet Take 1 tablet (40 mg total) by mouth once daily.  Qty: 30 tablet, Refills: 4      hydrOXYzine HCL (ATARAX) 25 MG tablet Take 1 tablet (25 mg total) by mouth 3 (three) times daily as needed for Anxiety or Itching.  Qty: 30 tablet, Refills: 0      ibuprofen (ADVIL,MOTRIN) 800 MG tablet Take 1 tablet (800 mg total) by mouth 3 (three) times daily as needed for Pain.  Qty: 90 tablet, Refills: 0    Associated Diagnoses: Chronic left shoulder pain      losartan (COZAAR) 50 MG tablet Take 1 tablet (50 mg total) by mouth once daily.  Qty: 90 tablet, Refills: 3    Comments: .      OXYGEN-AIR DELIVERY SYSTEMS MISC 2 L by Nasal route continuous.      potassium chloride (MICRO-K) 10 MEQ CpSR Take 10 mEq by mouth once daily.      !! predniSONE (DELTASONE) 20 MG tablet Take 1 tablet (20 mg total) by mouth 2 (two) times daily.  Qty: 10 tablet, Refills: 5      roflumilast (DALIRESP) 500 mcg Tab Take 1 tablet (500 mcg total) by mouth once daily.  Qty: 30 tablet, Refills: 0       !! - Potential duplicate medications found. Please discuss with provider.            I have seen and examined this patient within the last 30 days. My clinical findings that support the need for the home health skilled services and home bound status are the following:no   Weakness/numbness causing balance and gait disturbance due to COPD Exacerbation and Weakness/Debility making  it taxing to leave home.  Requiring assistive device to leave home due to unsteady gait caused by  COPD Exacerbation and Weakness/Debility.     Diet:   cardiac diet        Referrals/ Consults  Physical Therapy to evaluate and treat. Evaluate for home safety and equipment needs; Establish/upgrade home exercise program. Perform / instruct on therapeutic exercises, gait training, transfer training, and Range of Motion.  Occupational Therapy to evaluate and treat. Evaluate home environment for safety and equipment needs. Perform/Instruct on transfers, ADL training, ROM, and therapeutic exercises.    Activities:   activity as tolerated    Nursing:   Agency to admit patient within 24 hours of hospital discharge unless specified on physician order or at patient request    SN to complete comprehensive assessment including routine vital signs. Instruct on disease process and s/s of complications to report to MD. Review/verify medication list sent home with the patient at time of discharge  and instruct patient/caregiver as needed. Frequency may be adjusted depending on start of care date.     Skilled nurse to perform up to 3 visits PRN for symptoms related to diagnosis    Notify MD if SBP > 160 or < 90; DBP > 90 or < 50; HR > 120 or < 50; Temp > 101; O2 < 88%; Other:       Ok to schedule additional visits based on staff availability and patient request on consecutive days within the home health episode.    When multiple disciplines ordered:    Start of Care occurs on Sunday - Wednesday schedule remaining discipline evaluations as ordered on separate consecutive days following the start of care.    Thursday SOC -schedule subsequent evaluations Friday and Monday the following week.     Friday - Saturday SOC - schedule subsequent discipline evaluations on consecutive days starting Monday of the following week.    For all post-discharge communication and subsequent orders please contact patient's primary care physician. If  unable to reach primary care physician or do not receive response within 30 minutes, please contact  for clinical staff order clarification    Miscellaneous   Home Oxygen:  No change    Home Health Aide:  Nursing Three times weekly, Physical Therapy Three times weekly, and Occupational Therapy Three times weekly      I certify that this patient is confined to her home and needs intermittent skilled nursing care, physical therapy, and occupational therapy.

## 2024-07-23 NOTE — SUBJECTIVE & OBJECTIVE
Interval History: awake and alert, report shakiness improved.     Continue supplemental oxygen  Continue BiPAP qhs and prn   Possible discharge today.     Review of Systems   Constitutional:  Positive for activity change.   Respiratory:  Negative for shortness of breath.    Genitourinary:  Negative for dysuria.   Neurological:  Negative for headaches.   Psychiatric/Behavioral:  Negative for confusion.      Objective:     Vital Signs (Most Recent):  Temp: 98.2 °F (36.8 °C) (07/23/24 0740)  Pulse: 63 (07/23/24 0837)  Resp: 17 (07/23/24 0837)  BP: (!) 146/66 (07/23/24 0845)  SpO2: 95 % (07/23/24 0837) Vital Signs (24h Range):  Temp:  [98 °F (36.7 °C)-98.7 °F (37.1 °C)] 98.2 °F (36.8 °C)  Pulse:  [53-77] 63  Resp:  [17-19] 17  SpO2:  [93 %-96 %] 95 %  BP: (109-146)/(53-68) 146/66     Weight: 83.5 kg (184 lb)  Body mass index is 29.7 kg/m².    Intake/Output Summary (Last 24 hours) at 7/23/2024 1033  Last data filed at 7/22/2024 2000  Gross per 24 hour   Intake 480 ml   Output 400 ml   Net 80 ml         Physical Exam  HENT:      Head: Normocephalic and atraumatic.      Right Ear: External ear normal.      Left Ear: External ear normal.   Cardiovascular:      Rate and Rhythm: Normal rate.   Pulmonary:      Effort: Pulmonary effort is normal.      Breath sounds: No wheezing or rales.      Comments: On NC  Abdominal:      General: Bowel sounds are normal.      Palpations: Abdomen is soft.      Tenderness: There is no abdominal tenderness.   Musculoskeletal:         General: Normal range of motion.      Cervical back: Normal range of motion.      Right lower leg: No edema.      Left lower leg: No edema.   Skin:     General: Skin is warm.      Capillary Refill: Capillary refill takes less than 2 seconds.   Neurological:      Mental Status: She is alert and oriented to person, place, and time.   Psychiatric:         Mood and Affect: Mood normal.         Behavior: Behavior normal.             Significant Labs: A1C: No results  "for input(s): "HGBA1C" in the last 4320 hours.  Bilirubin:   Recent Labs   Lab 07/20/24  0622   BILITOT 0.3     Blood Culture: No results for input(s): "LABBLOO" in the last 48 hours.  CBC:   Recent Labs   Lab 07/22/24 0249 07/23/24  0224   WBC 10.64 9.45   HGB 12.5 12.6   HCT 41.8 41.3    162     CMP:   Recent Labs   Lab 07/22/24  0249 07/23/24  0224    143   K 3.0* 3.8   CL 88* 94*   CO2 45* 40*   GLU 88 86   BUN 28* 32*   CREATININE 0.8 0.7   CALCIUM 9.3 9.6   ANIONGAP 11 9     Lactic Acid: No results for input(s): "LACTATE" in the last 48 hours.  Lipase: No results for input(s): "LIPASE" in the last 48 hours.  Lipid Panel: No results for input(s): "CHOL", "HDL", "LDLCALC", "TRIG", "CHOLHDL" in the last 48 hours.  Magnesium:   Recent Labs   Lab 07/22/24 0249 07/23/24 0224   MG 2.0 2.2     Troponin:   No results for input(s): "TROPONINI", "TROPONINIHS" in the last 48 hours.    TSH: No results for input(s): "TSH" in the last 4320 hours.  Urine Culture: No results for input(s): "LABURIN" in the last 48 hours.  Urine Studies: No results for input(s): "COLORU", "APPEARANCEUA", "PHUR", "SPECGRAV", "PROTEINUA", "GLUCUA", "KETONESU", "BILIRUBINUA", "OCCULTUA", "NITRITE", "UROBILINOGEN", "LEUKOCYTESUR", "RBCUA", "WBCUA", "BACTERIA", "SQUAMEPITHEL", "HYALINECASTS" in the last 48 hours.    Invalid input(s): "WRIGHTSUR"    Significant Imaging: I have reviewed all pertinent imaging results/findings within the past 24 hours.  "

## 2024-07-23 NOTE — PT/OT/SLP PROGRESS
Occupational Therapy   Treatment    Name: Terri Phelan  MRN: [id number]  Admitting Diagnosis:  Acute on chronic respiratory failure with hypoxia and hypercapnia       Recommendations:     Discharge Recommendations: Low Intensity Therapy  Discharge Equipment Recommendations:  none  Barriers to discharge:   (impaired self care skills;)    Assessment:     Terri Phelan is a 68 y.o. female with a medical diagnosis of Acute on chronic respiratory failure with hypoxia and hypercapnia.  She presents with ..The primary encounter diagnosis was COPD exacerbation. Diagnoses of SOB (shortness of breath) and Hypercapnic respiratory failure were also pertinent to this visit. . Performance deficits affecting function are weakness, impaired endurance, impaired cardiopulmonary response to activity, impaired self care skills.     Rehab Prognosis:  Good; patient would benefit from acute skilled OT services to address these deficits and reach maximum level of function.       Plan:     Patient to be seen 4 x/week to address the above listed problems via self-care/home management, therapeutic activities, therapeutic exercises  Plan of Care Expires: 08/19/24  Plan of Care Reviewed with: patient    Subjective     Chief Complaint: none this date  Patient/Family Comments/goals: getting home  Pain/Comfort:  Pain Rating 1: 0/10    Objective:     Communicated with: nursing prior to session.  Patient found up in chair with oxygen upon OT entry to room.    General Precautions: Standard, fall    Orthopedic Precautions:N/A  Braces: N/A  Respiratory Status: Nasal cannula, flow 2.5 L/min     Occupational Performance:       Functional Mobility/Transfers:  Patient completed Sit <> Stand Transfer with supervision and stand by assistance  with  rolling walker   Patient completed Toilet Transfer Step Transfer technique with supervision and stand by assistance with  rolling walker  Functional Mobility: patient ambulated to/from bathroom with RW and  "SBA; patient able to manage O2 line while ambulating to bathroom with RW    Activities of Daily Living:  Toileting: close supervision; patient simulated toileting in bathroom commode with RW.   LB dressing: grossly set up assist; patient able to cross leg in figure 4 to don/doff socks.         [facility] 6 Click ADL: 23    Treatment & Education:  Patient A&Ox4. Patient educated on role of OT/POC. Patient in better spirits today and progressed during therapy session.   Patient states to therapist that her medical concerns from previous session have been resolved and numbers have returned to PLOF.  SpO2 at beginning of session at 91%, dropped to 86% after ambulating to/from bathroom, and returned to 88% after sitting in chair with encouragement to perform pursed lip breathing.   Patient educated on pacing and energy conservation strategies when returning home, as well as taking rest breaks/having places to rest around the house to decrease exertion while performing ADLs/functional mobilty.   Therapist reiterated need for continuous SpO2 checks at home and patient stated "yes, absolutely".   Therapist discussed patient needing daughter in home when bathing for safety or if any assistance is needed; affirmed wearing oxygen while in shower.   Patient and therapist discussed home health and pulmonary rehab at d/c.       Patient left up in chair with all lines intact, call button in reach, and nursing notified    GOALS:   Multidisciplinary Problems       Occupational Therapy Goals          Problem: Occupational Therapy    Goal Priority Disciplines Outcome Interventions   Occupational Therapy Goal     OT, PT/OT Progressing    Description: Goals to be met by: 7/22/2024     Patient will increase functional independence with ADLs by performing:    Patient will utilize the "crossing leg" leg position to independently perform LB Dressing (MET 7/23).   Patient will perform grooming at sink for at least 3 minutes with modified " independence using least restrictive device without adverse s/s.   Toileting from toilet with [place] for hygiene and clothing management.   Toilet transfer to toilet with Modified [place] using rolling walker.   Patient will perform pursed lip breathing strategy when feeling SOB while performing ADLs/Functional mobility (MET 7/23).                          Time Tracking:     OT Date of Treatment: 07/23/24  OT Start Time: 1151  OT Stop Time: 1208  OT Total Time (min): 17 min    Billable Minutes:Self Care/Home Management 8 min  Therapeutic Activity 9 min    OT/HUNTER: OT          7/23/2024

## 2024-07-23 NOTE — PLAN OF CARE
Problem: Fall Injury Risk  Goal: Absence of Fall and Fall-Related Injury  Outcome: Progressing     Problem: COPD (Chronic Obstructive Pulmonary Disease)  Goal: Optimal Chronic Illness Coping  Outcome: Progressing     Problem: Adult Inpatient Plan of Care  Goal: Plan of Care Review  Outcome: Progressing     Problem: Skin Injury Risk Increased  Goal: Skin Health and Integrity  Outcome: Progressing

## 2024-07-23 NOTE — PT/OT/SLP PROGRESS
"Physical Therapy Treatment    Patient Name:  Terri Phelan   MRN:  [id number]    Recommendations:     Discharge Recommendations: Low Intensity Therapy  Discharge Equipment Recommendations: none  Barriers to discharge:  requires rests, dyspneic on exertion    Assessment:     Terri Phelan is a 68 y.o. female admitted with a medical diagnosis of Acute on chronic respiratory failure with hypoxia and hypercapnia.  She presents with the following impairments/functional limitations: weakness, impaired endurance, impaired functional mobility, gait instability, impaired balance, impaired cognition, decreased lower extremity function, impaired cardiopulmonary response to activity     Patient seen for physical therapy session on this date.  Patient states she is being discharged today, but willing to participate.  Recommending patient to discharge with low intensity therapy.  No DME    Rehab Prognosis: Good; patient would benefit from acute skilled PT services to address these deficits and reach maximum level of function.    Recent Surgery: * No surgery found *      Plan:     During this hospitalization, patient to be seen 5 x/week to address the identified rehab impairments via gait training, therapeutic activities, therapeutic exercises, neuromuscular re-education and progress toward the following goals:    Plan of Care Expires:  08/22/24    Subjective     Chief Complaint: "I know how to pace myself"  Patient/Family Comments/goals:  to return home   Pain/Comfort:  Pain Rating 1: 0/10  Pain Rating Post-Intervention 1: 0/10      Objective:     Communicated with nurse Wilson prior to session.  Patient found supine with oxygen, bed alarm upon PT entry to room.     General Precautions: Standard, fall  Orthopedic Precautions: N/A  Braces: N/A  Respiratory Status: Nasal cannula, flow 3 L/min     Functional Mobility:  Bed Mobility:     Rolling Right: modified independence  Scooting: modified independence  Supine to Sit: " modified independence  Sit to Supine: modified independence  Transfers:     Sit to Stand:  supervision with rolling walker  Gait: with RW x 100' with RW and SBA, slow pace, O2 sats at 89-90% on 3L Oxygen.  Standing rest required at 50', 2 minutes of standing and pursed lip breathing   Balance: Seated:  Modified Independent at EOB   Standing:  requires RW, slightly unsteady, SBA      AM-PAC 6 CLICK MOBILITY  Turning over in bed (including adjusting bedclothes, sheets and blankets)?: 4  Sitting down on and standing up from a chair with arms (e.g., wheelchair, bedside commode, etc.): 4  Moving from lying on back to sitting on the side of the bed?: 4  Moving to and from a bed to a chair (including a wheelchair)?: 4  Need to walk in hospital room?: 4  Climbing 3-5 steps with a railing?: 1  Basic Mobility Total Score: 21       Treatment & Education:  Patient educated on pacing and energy conservation.  Discussed scenarios for home in which rest breaks would need to be implemented.  Discussed checking SpO2 daily and when SOB.  Patient at 89 to low 90s during entire session, standing rests required during gait.  Patient with return demonstration of pursed lip breathing, emphasis on blowing out slowly.      Patient left sitting edge of bed with all lines intact, call button in reach, bed alarm on, and nurse notified..    GOALS:   Multidisciplinary Problems       Physical Therapy Goals       Not on file              Multidisciplinary Problems (Resolved)          Problem: Physical Therapy    Goal Priority Disciplines Outcome Goal Variances Interventions   Physical Therapy Goal   (Resolved)     PT, PT/OT Met     Description: Goals to be met by: 24     Patient will increase functional independence with mobility by performin. Sit to stand transfer with Modified [place]  2. Bed to chair transfer with Modified [place] using rollator  3. Gait  x 100 feet with Modified [place] using rollator and maintaining  SpO2 > 88%.                          Time Tracking:     PT Received On: 07/23/24  PT Start Time: 1334     PT Stop Time: 1355  PT Total Time (min): 21 min     Billable Minutes: Gait Training 10 and Therapeutic Activity 11    Treatment Type: Treatment  PT/PTA: PT     Number of PTA visits since last PT visit: 0     07/23/2024

## 2024-07-24 ENCOUNTER — PATIENT OUTREACH (OUTPATIENT)
Dept: ADMINISTRATIVE | Facility: OTHER | Age: 69
End: 2024-07-24
Payer: MEDICARE

## 2024-07-24 NOTE — PROGRESS NOTES
Outpatient Care Management  Initial Patient Assessment    Patient: Terri Phelan  MRN: [id number]  Date of Service: 07/19/2024  Completed by: Cassy Vigil RN  Referral Date: 07/23/2024  Date of Eligibility: 7/18/2024  Program:   High Risk  Status: Ongoing  Effective Dates: 7/19/2024 - present  Responsible Staff: Cassy Vigil RN        Reason for Visit   Patient presents with    OPCM Enrollment Call     7/19/24    Nursing Assessment     7/19/24       Brief Summary:  Terri Phelan was referred by Dr Mason for Chronic respiratory failure with hypoxia and hypercapnia, and Centrilobular emphysema. Patient qualifies for program based on identified as high risk.   Active problem list, medical, surgical and social history reviewed. Active comorbidities include Chronic respiratory failure with hypoxia and hypercapnia, and Centrilobular emphysema.. Areas of need identified by patient include management of Chronic respiratory failure with hypoxia and hypercapnia, and Centrilobular emphysema..   Next steps: Discuss management of Chronic respiratory failure with hypoxia and hypercapnia, and Centrilobular emphysema further on next f/u call.     Disability Status  Is the patient alert and oriented (person, place, time, and situation)?: Alert and oriented x 4  Hearing Difficulty or Deaf: no  Visual Difficulty or Blind: yes  Visual and Hearing Needs Conclusion: Wears prescription glasses  Vision Management: Other (glasses at bedside)  Difficulty Concentrating, Remembering or Making Decisions: no  Communication Difficulty: no  Eating/Swallowing Difficulty: no  Walking or Climbing Stairs Difficulty: yes  Walking or Climbing Stairs: ambulation difficulty, requires equipment  Mobility Management: rollator outside  Dressing/Bathing Difficulty: yes  Dressing/Bathing: bathing difficulty, requires equipment  Toileting : Independent  Continence : Continence - Not a problem  Difficulty Managing Errands Independently:  no  Equipment Currently Used at Home: oxygen; bedside commode; rollator; grab bar; blood pressure machine; bath bench  ADL Conclusion Statement: Independent with most ADLs  Change in Functional Status Since Onset of Current Illness/Injury: yes        Spiritual Beliefs  Spiritual, Cultural Beliefs, Presybeterian Practices, Values that Affect Care: no      Social History     Socioeconomic History    Marital status:    Tobacco Use    Smoking status: Former     Current packs/day: 0.00     Average packs/day: 1 pack/day for 20.0 years (20.0 ttl pk-yrs)     Types: Cigarettes     Quit date: 1/1/2021     Years since quitting: 3.5     Passive exposure: Past    Smokeless tobacco: Never   Substance and Sexual Activity    Alcohol use: Not Currently     Alcohol/week: 1.0 standard drink of alcohol     Types: 1 Glasses of wine per week    Drug use: No    Sexual activity: Not Currently     Partners: Male     Social Determinants of Health     Financial Resource Strain: Low Risk  (7/8/2024)    Overall Financial Resource Strain (CARDIA)     Difficulty of Paying Living Expenses: Not very hard   Food Insecurity: No Food Insecurity (7/8/2024)    Hunger Vital Sign     Worried About Running Out of Food in the Last Year: Never true     Ran Out of Food in the Last Year: Never true   Transportation Needs: No Transportation Needs (7/8/2024)    PRAPARE - Transportation     Lack of Transportation (Medical): No     Lack of Transportation (Non-Medical): No   Physical Activity: Inactive (7/8/2024)    Exercise Vital Sign     Days of Exercise per Week: 0 days     Minutes of Exercise per Session: 0 min   Stress: Stress Concern Present (7/8/2024)    [employer] - Occupational Stress Questionnaire     Feeling of Stress : To some extent   Housing Stability: Low Risk  (7/8/2024)    Housing Stability Vital Sign     Unable to Pay for Housing in the Last Year: No     Homeless in the Last Year: No       Roles and  Relationships  Primary Source of Support/Comfort: child(mary)  Name of Support/Comfort Primary Source: Mulugeta Phelan (daughter)  Primary Roles/Responsibilities: retired      Advance Directives (For Healthcare)  Advance Directive  (If Adv Dir status is received, view document under Adv Dir in header or Chart Review Media tab): Advance Directive currently in Epic.        Patient Reported Insurance  Verified current insurance plan:: Medicare; Other (see comment)            7/19/2024     4:41 PM 7/8/2024    10:55 AM 5/23/2024     2:52 PM 5/22/2024     1:00 PM 5/6/2024    11:18 AM 3/12/2024     9:17 AM 3/5/2024     1:19 PM   Depression Patient Health Questionnaire   Over the last two weeks how often have you been bothered by little interest or pleasure in doing things Not at all Several days Not at all Not at all Not at all Several days Not at all   Over the last two weeks how often have you been bothered by feeling down, depressed or hopeless Not at all Not at all Not at all Not at all Not at all Several days Not at all   PHQ-2 Total Score 0 1 0 0 0 2 0       Learning Assessment       07/21/2024 0640 [place] - Telemetry (7/20/2024 - 7/23/2024)   Created by Lisa Cool, RN - RN (Nurse) Status: Complete                 PRIMARY LEARNER     Primary Learner Name:  Terri Phelan  - 07/21/2024 0640    Relationship:  Patient  - 07/21/2024 0640    Does the primary learner have any barriers to learning?:  No Barriers  - 07/21/2024 0640    What is the preferred language of the primary learner?:  English  - 07/21/2024 0640    Is an  required?:  No  - 07/21/2024 0640    How does the primary learner prefer to learn new concepts?:  Listening, Reading, Demonstration, Pictures/Video  - 07/21/2024 0640    How often do you need to have someone help you read instructions, pamphlets, or written material from your doctor or pharmacy?:  Sometimes  - 07/21/2024 0640        CO-LEARNER #1     No question answered         CO-LEARNER #2     No question answered        SPECIAL TOPICS     No question answered        ANSWERED BY:     No question answered        Comments         Edit History       Lisa Cool, RN - RN (Nurse)   07/21/2024 2241

## 2024-07-24 NOTE — PROGRESS NOTES
Community Health Worker attempted to contact patient via telephone to assist with SDOH. Left a voicemail message on patient's telephone number [phone].  Will attempt again at a later date.

## 2024-07-25 ENCOUNTER — PATIENT OUTREACH (OUTPATIENT)
Dept: ADMINISTRATIVE | Facility: OTHER | Age: 69
End: 2024-07-25
Payer: MEDICARE

## 2024-07-25 NOTE — PROGRESS NOTES
This Community Health Worker (CHW) completed Social Determinant of Health (SDOH)  Questionnaire with patient/caregiver via telephone today.  Notified OPCM CM Cassy Vigil RN of completion.      Patient denied any SDOH needs at this time but is in need of a personal home care aid. She has received a call from the [facility] referral and is awaiting for assistance to be set up.

## 2024-07-26 ENCOUNTER — PATIENT OUTREACH (OUTPATIENT)
Dept: ADMINISTRATIVE | Facility: OTHER | Age: 69
End: 2024-07-26
Payer: MEDICARE

## 2024-07-26 ENCOUNTER — OUTPATIENT CASE MANAGEMENT (OUTPATIENT)
Dept: ADMINISTRATIVE | Facility: OTHER | Age: 69
End: 2024-07-26
Payer: MEDICARE

## 2024-07-26 NOTE — PROGRESS NOTES
CHW - Follow Up    This Community Health Worker completed a follow up visit with patient via telephone today.  Pt/Caregiver reported: Ms. Phelan stated she received a call from someone name Tata who informed her that her referral for home health will probably not be accepted because she would nevnicho light. Tata told her that no company in [place] will not accept her referral due to just having Medicare but her insurances approve for home jeny. She believed her referral was pl;aced from the hospital or maybe by cassy. She asked if cassy could give her a called and admitted that she is confused on what's going on.   Community Health Worker provided: I have reached out to Cassy and let her know that Ms. Phelan has requested for her to give her a call, as well as what she has informed me of. Cassy has verified patients home jeny and that they are scheduled   Follow up required:  to see her on Monday. Cassy will contact Ms. Phelan, no additional needed at this time, case closed.

## 2024-07-29 ENCOUNTER — TELEPHONE (OUTPATIENT)
Dept: PRIMARY CARE CLINIC | Facility: CLINIC | Age: 69
End: 2024-07-29
Payer: MEDICARE

## 2024-07-29 NOTE — TELEPHONE ENCOUNTER
----- Message from Olivia Barone sent at 7/29/2024  3:44 PM CDT -----  Contact: Pt [phone]  Pt called and stated that she has been exposed to covid last night and today and wanted to know what she should do. Pt is not experiencing any symptoms at this time. She stated that she is a severe COPD pt and she was just released from the hospital on 7/23/24.      Please call and advise

## 2024-07-30 NOTE — TELEPHONE ENCOUNTER
Spoke to patient no symptoms like sob, additional oxygen requirement, nasal congestion, or fever. Patient would like to come get tested anyway. Pt scheduled for an appt with Dr Mason on Thursday Aug 1st.

## 2024-08-01 ENCOUNTER — OFFICE VISIT (OUTPATIENT)
Dept: PRIMARY CARE CLINIC | Facility: CLINIC | Age: 69
End: 2024-08-01
Payer: MEDICARE

## 2024-08-01 VITALS
BODY MASS INDEX: 29.55 KG/M2 | RESPIRATION RATE: 16 BRPM | HEIGHT: 66 IN | SYSTOLIC BLOOD PRESSURE: 120 MMHG | DIASTOLIC BLOOD PRESSURE: 64 MMHG | HEART RATE: 70 BPM | OXYGEN SATURATION: 95 % | WEIGHT: 183.88 LBS

## 2024-08-01 DIAGNOSIS — J96.12 CHRONIC RESPIRATORY FAILURE WITH HYPOXIA AND HYPERCAPNIA: Primary | ICD-10-CM

## 2024-08-01 DIAGNOSIS — R06.02 SOB (SHORTNESS OF BREATH): ICD-10-CM

## 2024-08-01 DIAGNOSIS — I10 PRIMARY HYPERTENSION: ICD-10-CM

## 2024-08-01 DIAGNOSIS — J96.11 CHRONIC RESPIRATORY FAILURE WITH HYPOXIA AND HYPERCAPNIA: Primary | ICD-10-CM

## 2024-08-01 DIAGNOSIS — F17.211 CIGARETTE NICOTINE DEPENDENCE IN REMISSION: ICD-10-CM

## 2024-08-01 DIAGNOSIS — F41.9 ANXIETY: Chronic | ICD-10-CM

## 2024-08-01 PROBLEM — Z76.89 ENCOUNTER TO ESTABLISH CARE WITH NEW DOCTOR: Status: RESOLVED | Noted: 2024-07-17 | Resolved: 2024-08-01

## 2024-08-01 LAB
CTP QC/QA: YES
SARS-COV-2 RDRP RESP QL NAA+PROBE: NEGATIVE

## 2024-08-01 PROCEDURE — 99999 PR PBB SHADOW E&M-EST. PATIENT-LVL IV: CPT | Mod: PBBFAC,HCNC,, | Performed by: STUDENT IN AN ORGANIZED HEALTH CARE EDUCATION/TRAINING PROGRAM

## 2024-08-01 RX ORDER — PREDNISONE 20 MG/1
40 TABLET ORAL DAILY
Qty: 10 TABLET | Refills: 0 | Status: SHIPPED | OUTPATIENT
Start: 2024-08-01 | End: 2024-08-06

## 2024-08-01 RX ORDER — HYDROXYZINE HYDROCHLORIDE 25 MG/1
25 TABLET, FILM COATED ORAL 3 TIMES DAILY PRN
Qty: 30 TABLET | Refills: 3 | Status: SHIPPED | OUTPATIENT
Start: 2024-08-01

## 2024-08-01 NOTE — ASSESSMENT & PLAN NOTE
On lexapro 10 mg daily, continue. She was recently given hydroxyzine which helps her with anxiety. Will refill hydroxyzine today

## 2024-08-01 NOTE — PROGRESS NOTES
Clinic Note  8/1/2024      Subjective:       Patient ID:  Terri is a 69 y.o. female being seen for an established visit.    Chief Complaint: COVID-19 Concerns    HPI  Terri Phelan is a 69 y.o. AA female who presents with COPD on 2L of O2 via NC (uses 3L with exertion), diastolic HF, HTN, HLD, anxiety, depression, JUAN (uses bipap every night) is here for a f/u visit  -was in the hospital recently for hypercapnic and hypoxic respiratory failure due to copd exacerbation and pleural effusions after a dental procedure. Looks like the benzos and the anesthesia did not sit well with her. She was treated with steroids, bipap, breathing treatments and IV lasix. Per patient, she Is back to her baseline. She is on 2 L O2 via NC, uses 3 L when exerting.   -her daughter has covid so she wanted to be tested today. Denies cough, fever or chills but has chronic sob.  -will order LDCT. Pt is agreeable. She will schedule it when she has the time to  -no falls    Review of Systems   Constitutional:  Negative for chills and fever.   HENT:  Negative for congestion.    Respiratory:  Negative for cough and shortness of breath.    Cardiovascular:  Negative for chest pain.   Gastrointestinal:  Negative for abdominal pain, blood in stool, constipation and diarrhea.   Genitourinary:  Negative for dysuria and hematuria.   Neurological:  Negative for dizziness and headaches.       Medication List with Changes/Refills   New Medications    PREDNISONE (DELTASONE) 20 MG TABLET    Take 2 tablets (40 mg total) by mouth once daily. for 5 days   Current Medications    ACETAMINOPHEN (TYLENOL) 500 MG TABLET    Take 500 mg by mouth every 6 (six) hours as needed for Pain.    ALBUTEROL (PROVENTIL/VENTOLIN HFA) 90 MCG/ACTUATION INHALER    Inhale 2 puffs into the lungs every 6 (six) hours as needed for Wheezing or Shortness of Breath.    ALENDRONATE (FOSAMAX) 35 MG TABLET    Take 35 mg by mouth every Tuesday.    ASPIRIN 81 MG CHEW    Take 1 tablet by  mouth once daily.    AZITHROMYCIN (Z-OLY) 250 MG TABLET    Take 1 tablet (250 mg total) by mouth every Mon, Wed, Fri.    BUDESONIDE-GLYCOPYR-FORMOTEROL (BREZTRI AEROSPHERE) 160-9-4.8 MCG/ACTUATION HFAA    Inhale 2 puffs into the lungs 2 (two) times daily.    CALCIUM CARB-MAG HYDROX-SIMETH 1,200 MG-270 MG -80 MG/10 ML SUSP    Take 1,200 mg by mouth once daily.    CHOLECALCIFEROL, VITAMIN D3, (VITAMIN D3) 50 MCG (2,000 UNIT) TAB    Take 2,000 Units by mouth once daily.    DEXTROMETHORPHAN-GUAIFENESIN  MG (MUCINEX DM)  MG PER 12 HR TABLET    Take 1 tablet by mouth 2 (two) times daily as needed.    DIAZEPAM (VALIUM) 2 MG TABLET    Take 1 tablet (2 mg total) by mouth daily as needed for Anxiety.    DICYCLOMINE (BENTYL) 20 MG TABLET    Take 1 tablet (20 mg total) by mouth 3 (three) times daily as needed (abdominal pain).    ESCITALOPRAM OXALATE (LEXAPRO) 10 MG TABLET    Take 1 tablet (10 mg total) by mouth once daily.    EZETIMIBE (ZETIA) 10 MG TABLET    Take 1 tablet (10 mg total) by mouth once daily.    FLUTICASONE PROPIONATE (FLONASE) 50 MCG/ACTUATION NASAL SPRAY    1 spray (50 mcg total) by Each Nostril route daily as needed for Allergies.    FUROSEMIDE (LASIX) 40 MG TABLET    Take 1 tablet (40 mg total) by mouth once daily.    IBUPROFEN (ADVIL,MOTRIN) 800 MG TABLET    Take 1 tablet (800 mg total) by mouth 3 (three) times daily as needed for Pain.    LOSARTAN (COZAAR) 50 MG TABLET    Take 1 tablet (50 mg total) by mouth once daily.    OXYGEN-AIR DELIVERY SYSTEMS MISC    2 L by Nasal route continuous.    POTASSIUM CHLORIDE (MICRO-K) 10 MEQ CPSR    Take 10 mEq by mouth once daily.    ROFLUMILAST (DALIRESP) 500 MCG TAB    Take 1 tablet (500 mcg total) by mouth once daily.   Changed and/or Refilled Medications    Modified Medication Previous Medication    HYDROXYZINE HCL (ATARAX) 25 MG TABLET hydrOXYzine HCL (ATARAX) 25 MG tablet       Take 1 tablet (25 mg total) by mouth 3 (three) times daily as needed for  "Anxiety or Itching.    Take 1 tablet (25 mg total) by mouth 3 (three) times daily as needed for Anxiety or Itching.   Discontinued Medications    PREDNISONE (DELTASONE) 20 MG TABLET    Take 1 tablet (20 mg total) by mouth 2 (two) times daily.           Objective:      /64 (BP Location: Left arm, Patient Position: Sitting, BP Method: Medium (Manual))   Pulse 70   Resp 16   Ht 5' 6" (1.676 m)   Wt 83.4 kg (183 lb 13.8 oz)   SpO2 95%   BMI 29.68 kg/m²   Estimated body mass index is 29.68 kg/m² as calculated from the following:    Height as of this encounter: 5' 6" (1.676 m).    Weight as of this encounter: 83.4 kg (183 lb 13.8 oz).  Physical Exam  Constitutional:       Appearance: Normal appearance.   Eyes:      Conjunctiva/sclera: Conjunctivae normal.   Cardiovascular:      Rate and Rhythm: Normal rate and regular rhythm.      Heart sounds: Normal heart sounds.   Pulmonary:      Effort: Pulmonary effort is normal. No respiratory distress.      Breath sounds: No wheezing.      Comments: Diminished breath sounds BL  Musculoskeletal:      Right lower leg: No edema.      Left lower leg: No edema.   Skin:     General: Skin is warm.   Neurological:      Mental Status: She is alert and oriented to person, place, and time.   Psychiatric:         Mood and Affect: Mood normal.         Behavior: Behavior normal.           Assessment and Plan:     1. Chronic respiratory failure with hypoxia and hypercapnia  Overview:  Secondary to advanced/end-stage COPD/emphysema.  On home oxygen and NIPPV with baseline PaCO2 in the 80s.  Occasionally reports headaches but relatively asymptomatic with severe CO2 retention.  Evaluated by Dr. Painter (Sleep) who confirmed reviewed compliance and confirmed current settings.    Assessment & Plan:  Recent exacerbation. She is on breztri. Has rescue inhaler on her Follows with pulm, continue. Asked pt to let me know right away if she ever experiences increased sob or wheezing. I will give her " prednisone to have on her so that there is no delay.       2. Cigarette nicotine dependence in remission  -     CT Chest Lung Screening Low Dose; Future; Expected date: 08/01/2024    3. Primary hypertension  Assessment & Plan:  Bp is at goal, continue losartan 50 mg daily. Will monitor       4. Anxiety  Overview:  Anxiety associated with feelings of SOB, diagnosis, and hope for new medications that can help. Exacerbated by chronic need to check oxygen numbers and frequent hospitalizations. Following with palliative.     Assessment & Plan:  On lexapro 10 mg daily, continue. She was recently given hydroxyzine which helps her with anxiety. Will refill hydroxyzine today      5. SOB (shortness of breath)  Overview:  Severe dyspnea contributing to anxiety.   Continue to see palliative, benzos prn.     Orders:  -     POCT COVID-19 Rapid Screening    Other orders  -     predniSONE (DELTASONE) 20 MG tablet; Take 2 tablets (40 mg total) by mouth once daily. for 5 days  Dispense: 10 tablet; Refill: 0  -     hydrOXYzine HCL (ATARAX) 25 MG tablet; Take 1 tablet (25 mg total) by mouth 3 (three) times daily as needed for Anxiety or Itching.  Dispense: 30 tablet; Refill: 3         Follow Up:   Follow up in about 4 weeks (around 8/29/2024), or with arvin or me.    Other Orders Placed This Visit:  Orders Placed This Encounter   Procedures    CT Chest Lung Screening Low Dose    POCT COVID-19 Rapid Screening         Sussy Mason

## 2024-08-01 NOTE — ASSESSMENT & PLAN NOTE
Recent exacerbation. She is on breztri. Has rescue inhaler on her Follows with pulm, continue. Asked pt to let me know right away if she ever experiences increased sob or wheezing. I will give her prednisone to have on her so that there is no delay.

## 2024-08-02 ENCOUNTER — OUTPATIENT CASE MANAGEMENT (OUTPATIENT)
Dept: ADMINISTRATIVE | Facility: OTHER | Age: 69
End: 2024-08-02
Payer: MEDICARE

## 2024-08-05 DIAGNOSIS — F41.9 ANXIETY: Chronic | ICD-10-CM

## 2024-08-05 RX ORDER — ESCITALOPRAM OXALATE 10 MG/1
10 TABLET ORAL
Qty: 30 TABLET | Refills: 0 | Status: SHIPPED | OUTPATIENT
Start: 2024-08-05

## 2024-08-06 ENCOUNTER — OFFICE VISIT (OUTPATIENT)
Dept: PULMONOLOGY | Facility: CLINIC | Age: 69
End: 2024-08-06
Payer: MEDICARE

## 2024-08-06 VITALS
WEIGHT: 183.88 LBS | BODY MASS INDEX: 29.55 KG/M2 | SYSTOLIC BLOOD PRESSURE: 120 MMHG | OXYGEN SATURATION: 93 % | HEIGHT: 66 IN | HEART RATE: 66 BPM | DIASTOLIC BLOOD PRESSURE: 52 MMHG

## 2024-08-06 DIAGNOSIS — J96.11 CHRONIC RESPIRATORY FAILURE WITH HYPOXIA AND HYPERCAPNIA: Primary | ICD-10-CM

## 2024-08-06 DIAGNOSIS — J96.12 CHRONIC RESPIRATORY FAILURE WITH HYPOXIA AND HYPERCAPNIA: Primary | ICD-10-CM

## 2024-08-06 PROCEDURE — 99999 PR PBB SHADOW E&M-EST. PATIENT-LVL III: CPT | Mod: PBBFAC,HCNC,, | Performed by: INTERNAL MEDICINE

## 2024-08-06 PROCEDURE — 3008F BODY MASS INDEX DOCD: CPT | Mod: HCNC,CPTII,S$GLB, | Performed by: INTERNAL MEDICINE

## 2024-08-06 PROCEDURE — 3288F FALL RISK ASSESSMENT DOCD: CPT | Mod: HCNC,CPTII,S$GLB, | Performed by: INTERNAL MEDICINE

## 2024-08-06 PROCEDURE — 4010F ACE/ARB THERAPY RXD/TAKEN: CPT | Mod: HCNC,CPTII,S$GLB, | Performed by: INTERNAL MEDICINE

## 2024-08-06 PROCEDURE — 1157F ADVNC CARE PLAN IN RCRD: CPT | Mod: HCNC,CPTII,S$GLB, | Performed by: INTERNAL MEDICINE

## 2024-08-06 PROCEDURE — 3074F SYST BP LT 130 MM HG: CPT | Mod: HCNC,CPTII,S$GLB, | Performed by: INTERNAL MEDICINE

## 2024-08-06 PROCEDURE — 3078F DIAST BP <80 MM HG: CPT | Mod: HCNC,CPTII,S$GLB, | Performed by: INTERNAL MEDICINE

## 2024-08-06 PROCEDURE — 1101F PT FALLS ASSESS-DOCD LE1/YR: CPT | Mod: HCNC,CPTII,S$GLB, | Performed by: INTERNAL MEDICINE

## 2024-08-06 PROCEDURE — 99213 OFFICE O/P EST LOW 20 MIN: CPT | Mod: HCNC,GC,S$GLB, | Performed by: INTERNAL MEDICINE

## 2024-08-06 PROCEDURE — 1159F MED LIST DOCD IN RCRD: CPT | Mod: HCNC,CPTII,S$GLB, | Performed by: INTERNAL MEDICINE

## 2024-08-06 PROCEDURE — 1111F DSCHRG MED/CURRENT MED MERGE: CPT | Mod: HCNC,CPTII,S$GLB, | Performed by: INTERNAL MEDICINE

## 2024-08-08 ENCOUNTER — TELEPHONE (OUTPATIENT)
Dept: PULMONOLOGY | Facility: CLINIC | Age: 69
End: 2024-08-08
Payer: MEDICARE

## 2024-08-08 ENCOUNTER — PATIENT MESSAGE (OUTPATIENT)
Dept: ADMINISTRATIVE | Facility: OTHER | Age: 69
End: 2024-08-08
Payer: MEDICARE

## 2024-08-09 ENCOUNTER — OUTPATIENT CASE MANAGEMENT (OUTPATIENT)
Dept: ADMINISTRATIVE | Facility: OTHER | Age: 69
End: 2024-08-09
Payer: MEDICARE

## 2024-08-12 ENCOUNTER — PATIENT OUTREACH (OUTPATIENT)
Dept: ADMINISTRATIVE | Facility: OTHER | Age: 69
End: 2024-08-12
Payer: MEDICARE

## 2024-08-12 NOTE — PROGRESS NOTES
Community Health Worker's assistance requested from Dixie Childs LMSW to assist patient with senior commodity assistance.   Agency Representative:  Outcome of Call: Ms. Phelan stated she has received a senior commodity box when she lived in [facility]. She is now in [facility] living with her daughter for the next year since she is sick. I have informed Ms. Murray of [employer] [place] number for the commodity supplemental food program and [facility] number to call to request a move of her food box. [employer] was currently closed and Ms. Murray has agreed to contact them tomorrow morning. I will follow with ms Lauglhin tomorrow.   Follow up needed: yes  Next Scheduled follow up: n/a

## 2024-08-13 RX ORDER — ROFLUMILAST 500 UG/1
500 TABLET ORAL DAILY
Qty: 30 TABLET | Refills: 0 | Status: SHIPPED | OUTPATIENT
Start: 2024-08-13

## 2024-08-14 ENCOUNTER — OUTPATIENT CASE MANAGEMENT (OUTPATIENT)
Dept: ADMINISTRATIVE | Facility: OTHER | Age: 69
End: 2024-08-14
Payer: MEDICARE

## 2024-08-15 ENCOUNTER — PATIENT OUTREACH (OUTPATIENT)
Dept: ADMINISTRATIVE | Facility: OTHER | Age: 69
End: 2024-08-15
Payer: MEDICARE

## 2024-08-15 NOTE — PROGRESS NOTES
Community Health Worker attempted to contact patient via telephone . Left a voicemail message on patient's telephone number [phone].  Will attempt again at a later date.

## 2024-08-15 NOTE — PROGRESS NOTES
CHW - Follow Up    This Community Health Worker completed a follow up visit with patient via telephone today.  Pt/Caregiver reported: Ms. Murray stated she hasn't spoke with anyone from [employer] for commodity boc.   Community Health Worker provided: I with Ms. Murray on there way called [employer], an senior commodity food . Ms. Joyhyacinth stated she has not found Ms. Murray in her system for a food box.  She informed that she can still obtain a food box but she must go into the [facility] and complete the application. Ms. Murray agreed to go into [facility]  in [facility] on Aug. 21st . Anew application must be completed and submitted either by email or in person. I will mail an application to Ms. Murray to complete and bring when she goes in on the 21st. No additional needs at this time, I will follow for pickup of food box.   Follow up required: yes

## 2024-08-16 ENCOUNTER — TELEPHONE (OUTPATIENT)
Dept: PRIMARY CARE CLINIC | Facility: CLINIC | Age: 69
End: 2024-08-16
Payer: MEDICARE

## 2024-08-16 NOTE — TELEPHONE ENCOUNTER
LCSW attempted to reach patient to reschedule missed appointment 7/18/24.  LCSW left voice mail requesting a return call.

## 2024-08-21 NOTE — PROGRESS NOTES
2nd Attempt to complete SW follow-up for Outpatient Care Management; left message requesting a return call and collaborated with OPCM RN to assist with successful patient outreach

## 2024-08-22 ENCOUNTER — PATIENT OUTREACH (OUTPATIENT)
Dept: ADMINISTRATIVE | Facility: OTHER | Age: 69
End: 2024-08-22
Payer: MEDICARE

## 2024-08-22 RX ORDER — POTASSIUM CHLORIDE 750 MG/1
10 CAPSULE, EXTENDED RELEASE ORAL
Qty: 30 CAPSULE | Refills: 0 | Status: SHIPPED | OUTPATIENT
Start: 2024-08-22 | End: 2024-08-22

## 2024-08-22 RX ORDER — POTASSIUM CHLORIDE 750 MG/1
10 CAPSULE, EXTENDED RELEASE ORAL
Qty: 90 CAPSULE | Refills: 0 | Status: SHIPPED | OUTPATIENT
Start: 2024-08-22

## 2024-08-22 NOTE — PROGRESS NOTES
CHW - Outreach Attempt    Community Health Worker left a voicemail message for 2nd attempt to contact patient regarding: commodity status/  Community Health Worker to attempt to contact patient on: [phone]

## 2024-08-29 ENCOUNTER — PATIENT OUTREACH (OUTPATIENT)
Dept: ADMINISTRATIVE | Facility: OTHER | Age: 69
End: 2024-08-29
Payer: MEDICARE

## 2024-08-29 DIAGNOSIS — F41.9 ANXIETY: Chronic | ICD-10-CM

## 2024-08-29 RX ORDER — ESCITALOPRAM OXALATE 10 MG/1
10 TABLET ORAL
Qty: 90 TABLET | Refills: 3 | Status: SHIPPED | OUTPATIENT
Start: 2024-08-29

## 2024-08-29 NOTE — PROGRESS NOTES
Outpatient Care Management   - Care Plan Follow Up    Patient: Terri Phelan  MRN:  [id number]  Date of Service:  8/29/2024  Completed by:  Dixie Childs LMSW  Referral Date: 07/23/2024    Reason for Visit   Patient presents with    Other     8/14/2024  1st attempt to complete Follow-Up  for Outpatient Care Management, left message.  Will mail unable to assess letter.      8/21/2024  2nd attempt to complete Follow-Up  for Outpatient Care Management, left message.          OPCM SW Follow Up Call     8/28/2024       Brief Summary: SW followed up with pt via telephone. She reported she received the portal resources from  and had no questions. Pt also communicated with CHW and pt needs to go to the [facility] to fill out another commodity box application. Pt reported she has not done this yet but she is planning on it. Pt also reported her new Bipap is working very well. No other issues at this time. Pt requested  check back in a few weeks. Possible closure at that time.     Complex Care Plan     Care plan was discussed and completed today with input from patient and/or caregiver.    Patient Instructions     No follow-ups on file.

## 2024-08-29 NOTE — PROGRESS NOTES
Community Health Worker's third attempt to contact this patient regarding picking up her Commodities.  Unable to contact patient at this time.  Left a message on patient's voicemail and will close case.

## 2024-08-30 ENCOUNTER — TELEPHONE (OUTPATIENT)
Dept: PRIMARY CARE CLINIC | Facility: CLINIC | Age: 69
End: 2024-08-30
Payer: MEDICARE

## 2024-09-03 ENCOUNTER — EXTERNAL HOME HEALTH (OUTPATIENT)
Dept: HOME HEALTH SERVICES | Facility: HOSPITAL | Age: 69
End: 2024-09-03
Payer: MEDICARE

## 2024-09-09 ENCOUNTER — OUTPATIENT CASE MANAGEMENT (OUTPATIENT)
Dept: ADMINISTRATIVE | Facility: OTHER | Age: 69
End: 2024-09-09
Payer: MEDICARE

## 2024-09-17 RX ORDER — HYDROXYZINE HYDROCHLORIDE 25 MG/1
TABLET, FILM COATED ORAL
Qty: 30 TABLET | Refills: 3 | Status: SHIPPED | OUTPATIENT
Start: 2024-09-17

## 2024-09-18 ENCOUNTER — OUTPATIENT CASE MANAGEMENT (OUTPATIENT)
Dept: ADMINISTRATIVE | Facility: OTHER | Age: 69
End: 2024-09-18
Payer: MEDICARE

## 2024-09-18 NOTE — PROGRESS NOTES
Outpatient Care Management   - Care Plan Follow Up    Patient: Terri Phelan  MRN:  [id number]  Date of Service:  9/18/2024  Completed by:  Dixie Childs LMSW  Referral Date: 07/23/2024    Reason for Visit   Patient presents with    Case Closure     9/18/2024       Brief Summary: Sw followed up with pt via telephone. Pt reported she does have food and SW encouraged her again to go to [employer] to get set up with the commodity box. Pt reported her Co2 is up and she has been doing treatments and taking prednisone for 2 days, managing it at home. SW notified OPCM regarding this. No other SDOH needs. Case closed.     Complex Care Plan     Care plan was discussed and completed today with input from patient and/or caregiver.    Patient Instructions     No follow-ups on file.

## 2024-09-20 ENCOUNTER — OFFICE VISIT (OUTPATIENT)
Dept: PRIMARY CARE CLINIC | Facility: CLINIC | Age: 69
End: 2024-09-20
Payer: MEDICARE

## 2024-09-20 VITALS
SYSTOLIC BLOOD PRESSURE: 118 MMHG | DIASTOLIC BLOOD PRESSURE: 60 MMHG | WEIGHT: 180.56 LBS | HEIGHT: 66 IN | BODY MASS INDEX: 29.02 KG/M2 | OXYGEN SATURATION: 90 % | HEART RATE: 88 BPM | RESPIRATION RATE: 16 BRPM

## 2024-09-20 DIAGNOSIS — J96.11 CHRONIC RESPIRATORY FAILURE WITH HYPOXIA AND HYPERCAPNIA: Primary | ICD-10-CM

## 2024-09-20 DIAGNOSIS — J96.12 CHRONIC RESPIRATORY FAILURE WITH HYPOXIA AND HYPERCAPNIA: Primary | ICD-10-CM

## 2024-09-20 DIAGNOSIS — L30.9 ECZEMA, UNSPECIFIED TYPE: ICD-10-CM

## 2024-09-20 DIAGNOSIS — Z23 NEED FOR INFLUENZA VACCINATION: ICD-10-CM

## 2024-09-20 DIAGNOSIS — I10 PRIMARY HYPERTENSION: ICD-10-CM

## 2024-09-20 PROCEDURE — 99999 PR PBB SHADOW E&M-EST. PATIENT-LVL IV: CPT | Mod: PBBFAC,HCNC,, | Performed by: STUDENT IN AN ORGANIZED HEALTH CARE EDUCATION/TRAINING PROGRAM

## 2024-09-20 RX ORDER — TRIAMCINOLONE ACETONIDE 1 MG/G
CREAM TOPICAL 2 TIMES DAILY
Qty: 80 G | Refills: 0 | Status: SHIPPED | OUTPATIENT
Start: 2024-09-20 | End: 2024-10-04

## 2024-09-20 RX ORDER — LOSARTAN POTASSIUM 50 MG/1
25 TABLET ORAL DAILY
Start: 2024-09-20 | End: 2025-09-20

## 2024-09-20 NOTE — PROGRESS NOTES
Clinic Note  9/20/2024      Subjective:       Patient ID:  Terri is a 69 y.o. female being seen for an established visit.    Chief Complaint: Follow-up    HPI  Terri Phelan is a 69 y.o. AA female who presents with COPD on 2L of O2 via NC (uses 3L with exertion), diastolic HF, HTN, HLD, anxiety, depression, JUAN (uses bipap every night) is here for a f/u visit. Did acp 2024.   -Going to pulmonary rehab. Her BP was low last time she was there. Its good here. Can cut losartan to 25 mg from 50  -no falls  -no ER visits since July.   -has a rash on on her left knee. Denies kneeling or hx of eczema, looks like eczema. Will prescribe steroid cream  -not sleeping that well. She has hydroxyzine but does not take it at night. Asked her to try it at bedtime, can also try 50 mg    Review of Systems   Constitutional:  Negative for chills and fever.   HENT:  Negative for congestion.    Respiratory:  Negative for cough and shortness of breath.    Cardiovascular:  Negative for chest pain.   Gastrointestinal:  Negative for abdominal pain, blood in stool, constipation and diarrhea.   Genitourinary:  Negative for dysuria and hematuria.   Neurological:  Negative for dizziness and headaches.       Medication List with Changes/Refills   New Medications    TRIAMCINOLONE ACETONIDE 0.1% (KENALOG) 0.1 % CREAM    Apply topically 2 (two) times daily. for 14 days   Current Medications    ACETAMINOPHEN (TYLENOL) 500 MG TABLET    Take 500 mg by mouth every 6 (six) hours as needed for Pain.    ALBUTEROL (PROVENTIL/VENTOLIN HFA) 90 MCG/ACTUATION INHALER    Inhale 2 puffs into the lungs every 6 (six) hours as needed for Wheezing or Shortness of Breath.    ALENDRONATE (FOSAMAX) 35 MG TABLET    Take 35 mg by mouth every Tuesday.    ASPIRIN 81 MG CHEW    Take 1 tablet by mouth once daily.    AZITHROMYCIN (Z-OLY) 250 MG TABLET    Take 1 tablet (250 mg total) by mouth every Mon, Wed, Fri.    BUDESONIDE-GLYCOPYR-FORMOTEROL (BREZTRI AEROSPHERE)  160-9-4.8 MCG/ACTUATION HFAA    Inhale 2 puffs into the lungs 2 (two) times daily.    CALCIUM CARB-MAG HYDROX-SIMETH 1,200 MG-270 MG -80 MG/10 ML SUSP    Take 1,200 mg by mouth once daily.    CHOLECALCIFEROL, VITAMIN D3, (VITAMIN D3) 50 MCG (2,000 UNIT) TAB    Take 2,000 Units by mouth once daily.    DEXTROMETHORPHAN-GUAIFENESIN  MG (MUCINEX DM)  MG PER 12 HR TABLET    Take 1 tablet by mouth 2 (two) times daily as needed.    DICYCLOMINE (BENTYL) 20 MG TABLET    Take 1 tablet (20 mg total) by mouth 3 (three) times daily as needed (abdominal pain).    ESCITALOPRAM OXALATE (LEXAPRO) 10 MG TABLET    TAKE 1 TABLET(10 MG) BY MOUTH DAILY    EZETIMIBE (ZETIA) 10 MG TABLET    Take 1 tablet (10 mg total) by mouth once daily.    FLUTICASONE PROPIONATE (FLONASE) 50 MCG/ACTUATION NASAL SPRAY    1 spray (50 mcg total) by Each Nostril route daily as needed for Allergies.    FUROSEMIDE (LASIX) 40 MG TABLET    Take 1 tablet (40 mg total) by mouth once daily.    HYDROXYZINE HCL (ATARAX) 25 MG TABLET    TAKE 1 TABLET(25 MG) BY MOUTH THREE TIMES DAILY AS NEEDED FOR ANXIETY OR ITCHING    IBUPROFEN (ADVIL,MOTRIN) 800 MG TABLET    Take 1 tablet (800 mg total) by mouth 3 (three) times daily as needed for Pain.    OXYGEN-AIR DELIVERY SYSTEMS MISC    2 L by Nasal route continuous.    POTASSIUM CHLORIDE (MICRO-K) 10 MEQ CPSR    TAKE 1 CAPSULE BY MOUTH EVERY DAY    ROFLUMILAST (DALIRESP) 500 MCG TAB    Take 1 tablet (500 mcg total) by mouth once daily.   Changed and/or Refilled Medications    Modified Medication Previous Medication    LOSARTAN (COZAAR) 50 MG TABLET losartan (COZAAR) 50 MG tablet       Take 0.5 tablets (25 mg total) by mouth once daily.    Take 1 tablet (50 mg total) by mouth once daily.   Discontinued Medications    DIAZEPAM (VALIUM) 2 MG TABLET    Take 1 tablet (2 mg total) by mouth daily as needed for Anxiety.           Objective:      /60 (BP Location: Left arm, Patient Position: Sitting, BP Method: Medium  "(Manual))   Pulse 88   Resp 16   Ht 5' 6" (1.676 m)   Wt 81.9 kg (180 lb 8.9 oz)   SpO2 (!) 90%   BMI 29.14 kg/m²   Estimated body mass index is 29.14 kg/m² as calculated from the following:    Height as of this encounter: 5' 6" (1.676 m).    Weight as of this encounter: 81.9 kg (180 lb 8.9 oz).  Physical Exam  Constitutional:       Appearance: Normal appearance.   Eyes:      Conjunctiva/sclera: Conjunctivae normal.   Cardiovascular:      Rate and Rhythm: Normal rate and regular rhythm.      Heart sounds: Normal heart sounds.   Pulmonary:      Effort: Pulmonary effort is normal. No respiratory distress.      Breath sounds: No wheezing.      Comments: Diminished breath sounds BL  Musculoskeletal:      Right lower leg: No edema.      Left lower leg: No edema.   Skin:     General: Skin is warm.      Comments: Round raised hyperpigmented rash noted on left knee   Neurological:      Mental Status: She is alert and oriented to person, place, and time.   Psychiatric:         Mood and Affect: Mood normal.         Behavior: Behavior normal.           Assessment and Plan:     1. Chronic respiratory failure with hypoxia and hypercapnia  Overview:  Secondary to advanced/end-stage COPD/emphysema.  On home oxygen and NIPPV with baseline PaCO2 in the 80s.  Occasionally reports headaches but relatively asymptomatic with severe CO2 retention.  Evaluated by Dr. Painter (Sleep) who confirmed reviewed compliance and confirmed current settings.    Assessment & Plan:  No exacerbations since July. I gave her prednisone last visit to have on her so that there is no delay if she needs it.       2. Primary hypertension  Assessment & Plan:  Her BP was low at pulmonary rehab last time she was there. Its good here. Can cut losartan to 25 mg from 50. Asked pt to monitor her BP at home and let me know if its >130. Will monitor       3. Eczema, unspecified type  Assessment & Plan:  Looks like eczema but no history of it. Will try triamcinolone " cream       4. Need for influenza vaccination  -     influenza (adjuvanted) (Fluad) 45 mcg/0.5 mL IM vaccine (> or = 66 yo) 0.5 mL    Other orders  -     triamcinolone acetonide 0.1% (KENALOG) 0.1 % cream; Apply topically 2 (two) times daily. for 14 days  Dispense: 80 g; Refill: 0  -     losartan (COZAAR) 50 MG tablet; Take 0.5 tablets (25 mg total) by mouth once daily.            Follow Up:   Follow up in about 6 weeks (around 11/1/2024) for Virtual Visit.        Sussy Mason

## 2024-09-20 NOTE — ASSESSMENT & PLAN NOTE
Her BP was low at pulmonary rehab last time she was there. Its good here. Can cut losartan to 25 mg from 50. Asked pt to monitor her BP at home and let me know if its >130. Will monitor

## 2024-09-20 NOTE — ASSESSMENT & PLAN NOTE
No exacerbations since July. I gave her prednisone last visit to have on her so that there is no delay if she needs it.

## 2024-09-23 ENCOUNTER — OUTPATIENT CASE MANAGEMENT (OUTPATIENT)
Dept: ADMINISTRATIVE | Facility: OTHER | Age: 69
End: 2024-09-23

## 2024-09-28 ENCOUNTER — HOSPITAL ENCOUNTER (INPATIENT)
Facility: HOSPITAL | Age: 69
LOS: 2 days | Discharge: HOME HEALTH | DRG: 189 | End: 2024-10-01
Attending: STUDENT IN AN ORGANIZED HEALTH CARE EDUCATION/TRAINING PROGRAM | Admitting: HOSPITALIST
Payer: MEDICARE

## 2024-09-28 DIAGNOSIS — I10 PRIMARY HYPERTENSION: ICD-10-CM

## 2024-09-28 DIAGNOSIS — J44.1 COPD EXACERBATION: Primary | ICD-10-CM

## 2024-09-28 DIAGNOSIS — F41.9 ANXIETY: Chronic | ICD-10-CM

## 2024-09-28 DIAGNOSIS — J15.9 COMMUNITY ACQUIRED BACTERIAL PNEUMONIA: ICD-10-CM

## 2024-09-28 DIAGNOSIS — J96.22 ACUTE ON CHRONIC RESPIRATORY FAILURE WITH HYPOXIA AND HYPERCAPNIA: ICD-10-CM

## 2024-09-28 DIAGNOSIS — J96.21 ACUTE ON CHRONIC RESPIRATORY FAILURE WITH HYPOXIA AND HYPERCAPNIA: ICD-10-CM

## 2024-09-28 DIAGNOSIS — I25.10 CORONARY ARTERY DISEASE, UNSPECIFIED VESSEL OR LESION TYPE, UNSPECIFIED WHETHER ANGINA PRESENT, UNSPECIFIED WHETHER NATIVE OR TRANSPLANTED HEART: ICD-10-CM

## 2024-09-28 DIAGNOSIS — R06.02 SHORTNESS OF BREATH: ICD-10-CM

## 2024-09-28 LAB
ALBUMIN SERPL BCP-MCNC: 3.6 G/DL (ref 3.5–5.2)
ALP SERPL-CCNC: 57 U/L (ref 55–135)
ALT SERPL W/O P-5'-P-CCNC: 19 U/L (ref 10–44)
ANION GAP SERPL CALC-SCNC: 8 MMOL/L (ref 8–16)
AST SERPL-CCNC: 21 U/L (ref 10–40)
BASOPHILS # BLD AUTO: 0.05 K/UL (ref 0–0.2)
BASOPHILS NFR BLD: 0.8 % (ref 0–1.9)
BILIRUB SERPL-MCNC: 0.2 MG/DL (ref 0.1–1)
BNP SERPL-MCNC: 27 PG/ML (ref 0–99)
BUN SERPL-MCNC: 12 MG/DL (ref 8–23)
CALCIUM SERPL-MCNC: 9.5 MG/DL (ref 8.7–10.5)
CHLORIDE SERPL-SCNC: 100 MMOL/L (ref 95–110)
CO2 SERPL-SCNC: 38 MMOL/L (ref 23–29)
CREAT SERPL-MCNC: 0.7 MG/DL (ref 0.5–1.4)
DIFFERENTIAL METHOD BLD: ABNORMAL
EOSINOPHIL # BLD AUTO: 0.2 K/UL (ref 0–0.5)
EOSINOPHIL NFR BLD: 3.5 % (ref 0–8)
ERYTHROCYTE [DISTWIDTH] IN BLOOD BY AUTOMATED COUNT: 13.5 % (ref 11.5–14.5)
EST. GFR  (NO RACE VARIABLE): >60 ML/MIN/1.73 M^2
GLUCOSE SERPL-MCNC: 117 MG/DL (ref 70–110)
HCT VFR BLD AUTO: 43.7 % (ref 37–48.5)
HGB BLD-MCNC: 12.8 G/DL (ref 12–16)
IMM GRANULOCYTES # BLD AUTO: 0.02 K/UL (ref 0–0.04)
IMM GRANULOCYTES NFR BLD AUTO: 0.3 % (ref 0–0.5)
INFLUENZA A, MOLECULAR: NEGATIVE
INFLUENZA B, MOLECULAR: NEGATIVE
LYMPHOCYTES # BLD AUTO: 2.4 K/UL (ref 1–4.8)
LYMPHOCYTES NFR BLD: 38.7 % (ref 18–48)
MCH RBC QN AUTO: 27.2 PG (ref 27–31)
MCHC RBC AUTO-ENTMCNC: 29.3 G/DL (ref 32–36)
MCV RBC AUTO: 93 FL (ref 82–98)
MONOCYTES # BLD AUTO: 1.1 K/UL (ref 0.3–1)
MONOCYTES NFR BLD: 16.7 % (ref 4–15)
NEUTROPHILS # BLD AUTO: 2.5 K/UL (ref 1.8–7.7)
NEUTROPHILS NFR BLD: 40 % (ref 38–73)
NRBC BLD-RTO: 0 /100 WBC
PLATELET # BLD AUTO: 170 K/UL (ref 150–450)
PMV BLD AUTO: 10.8 FL (ref 9.2–12.9)
POTASSIUM SERPL-SCNC: 4.2 MMOL/L (ref 3.5–5.1)
PROT SERPL-MCNC: 7.4 G/DL (ref 6–8.4)
RBC # BLD AUTO: 4.7 M/UL (ref 4–5.4)
SARS-COV-2 RDRP RESP QL NAA+PROBE: NEGATIVE
SODIUM SERPL-SCNC: 146 MMOL/L (ref 136–145)
SPECIMEN SOURCE: NORMAL
TROPONIN I SERPL DL<=0.01 NG/ML-MCNC: <0.006 NG/ML (ref 0–0.03)
WBC # BLD AUTO: 6.28 K/UL (ref 3.9–12.7)

## 2024-09-28 PROCEDURE — 96365 THER/PROPH/DIAG IV INF INIT: CPT | Mod: HCNC

## 2024-09-28 PROCEDURE — G0378 HOSPITAL OBSERVATION PER HR: HCPCS | Mod: HCNC

## 2024-09-28 PROCEDURE — 5A09357 ASSISTANCE WITH RESPIRATORY VENTILATION, LESS THAN 24 CONSECUTIVE HOURS, CONTINUOUS POSITIVE AIRWAY PRESSURE: ICD-10-PCS | Performed by: STUDENT IN AN ORGANIZED HEALTH CARE EDUCATION/TRAINING PROGRAM

## 2024-09-28 PROCEDURE — 87502 INFLUENZA DNA AMP PROBE: CPT | Mod: HCNC | Performed by: STUDENT IN AN ORGANIZED HEALTH CARE EDUCATION/TRAINING PROGRAM

## 2024-09-28 PROCEDURE — 25000242 PHARM REV CODE 250 ALT 637 W/ HCPCS: Mod: HCNC | Performed by: EMERGENCY MEDICINE

## 2024-09-28 PROCEDURE — 82803 BLOOD GASES ANY COMBINATION: CPT | Mod: HCNC

## 2024-09-28 PROCEDURE — 99285 EMERGENCY DEPT VISIT HI MDM: CPT | Mod: 25,HCNC

## 2024-09-28 PROCEDURE — 84484 ASSAY OF TROPONIN QUANT: CPT | Mod: HCNC | Performed by: STUDENT IN AN ORGANIZED HEALTH CARE EDUCATION/TRAINING PROGRAM

## 2024-09-28 PROCEDURE — 94640 AIRWAY INHALATION TREATMENT: CPT | Mod: HCNC,XB

## 2024-09-28 PROCEDURE — 25000003 PHARM REV CODE 250: Mod: HCNC

## 2024-09-28 PROCEDURE — 93005 ELECTROCARDIOGRAM TRACING: CPT | Mod: HCNC

## 2024-09-28 PROCEDURE — 83880 ASSAY OF NATRIURETIC PEPTIDE: CPT | Mod: HCNC | Performed by: STUDENT IN AN ORGANIZED HEALTH CARE EDUCATION/TRAINING PROGRAM

## 2024-09-28 PROCEDURE — 25000242 PHARM REV CODE 250 ALT 637 W/ HCPCS: Mod: HCNC | Performed by: STUDENT IN AN ORGANIZED HEALTH CARE EDUCATION/TRAINING PROGRAM

## 2024-09-28 PROCEDURE — 27000190 HC CPAP FULL FACE MASK W/VALVE: Mod: HCNC

## 2024-09-28 PROCEDURE — 96372 THER/PROPH/DIAG INJ SC/IM: CPT

## 2024-09-28 PROCEDURE — 85025 COMPLETE CBC W/AUTO DIFF WBC: CPT | Mod: HCNC | Performed by: STUDENT IN AN ORGANIZED HEALTH CARE EDUCATION/TRAINING PROGRAM

## 2024-09-28 PROCEDURE — 80053 COMPREHEN METABOLIC PANEL: CPT | Mod: HCNC | Performed by: STUDENT IN AN ORGANIZED HEALTH CARE EDUCATION/TRAINING PROGRAM

## 2024-09-28 PROCEDURE — U0002 COVID-19 LAB TEST NON-CDC: HCPCS | Mod: HCNC | Performed by: STUDENT IN AN ORGANIZED HEALTH CARE EDUCATION/TRAINING PROGRAM

## 2024-09-28 PROCEDURE — 93010 ELECTROCARDIOGRAM REPORT: CPT | Mod: HCNC,,, | Performed by: INTERNAL MEDICINE

## 2024-09-28 PROCEDURE — 63600175 PHARM REV CODE 636 W HCPCS: Mod: HCNC

## 2024-09-28 PROCEDURE — 99900035 HC TECH TIME PER 15 MIN (STAT): Mod: HCNC

## 2024-09-28 PROCEDURE — 94660 CPAP INITIATION&MGMT: CPT | Mod: HCNC

## 2024-09-28 PROCEDURE — 96375 TX/PRO/DX INJ NEW DRUG ADDON: CPT | Mod: HCNC

## 2024-09-28 PROCEDURE — 25000003 PHARM REV CODE 250: Mod: HCNC | Performed by: STUDENT IN AN ORGANIZED HEALTH CARE EDUCATION/TRAINING PROGRAM

## 2024-09-28 RX ORDER — HYDROXYZINE HYDROCHLORIDE 25 MG/1
25 TABLET, FILM COATED ORAL 3 TIMES DAILY PRN
Status: DISCONTINUED | OUTPATIENT
Start: 2024-09-28 | End: 2024-10-01 | Stop reason: HOSPADM

## 2024-09-28 RX ORDER — HYDROXYZINE PAMOATE 25 MG/1
25 CAPSULE ORAL
Status: COMPLETED | OUTPATIENT
Start: 2024-09-28 | End: 2024-09-28

## 2024-09-28 RX ORDER — DOXYCYCLINE HYCLATE 100 MG
100 TABLET ORAL EVERY 12 HOURS
Status: DISCONTINUED | OUTPATIENT
Start: 2024-09-29 | End: 2024-10-01 | Stop reason: HOSPADM

## 2024-09-28 RX ORDER — POLYETHYLENE GLYCOL 3350 17 G/17G
17 POWDER, FOR SOLUTION ORAL DAILY PRN
Status: DISCONTINUED | OUTPATIENT
Start: 2024-09-28 | End: 2024-10-01 | Stop reason: HOSPADM

## 2024-09-28 RX ORDER — DOXYCYCLINE HYCLATE 100 MG
100 TABLET ORAL EVERY 12 HOURS
Status: DISCONTINUED | OUTPATIENT
Start: 2024-09-29 | End: 2024-09-28

## 2024-09-28 RX ORDER — HYDROXYZINE HYDROCHLORIDE 10 MG/1
10 TABLET, FILM COATED ORAL 3 TIMES DAILY PRN
Status: DISCONTINUED | OUTPATIENT
Start: 2024-09-28 | End: 2024-09-28

## 2024-09-28 RX ORDER — FUROSEMIDE 40 MG/1
40 TABLET ORAL DAILY
Status: DISCONTINUED | OUTPATIENT
Start: 2024-09-29 | End: 2024-09-29

## 2024-09-28 RX ORDER — ENOXAPARIN SODIUM 100 MG/ML
40 INJECTION SUBCUTANEOUS EVERY 24 HOURS
Status: DISCONTINUED | OUTPATIENT
Start: 2024-09-28 | End: 2024-10-01 | Stop reason: HOSPADM

## 2024-09-28 RX ORDER — LORAZEPAM 2 MG/ML
1 INJECTION INTRAMUSCULAR
Status: ACTIVE | OUTPATIENT
Start: 2024-09-28 | End: 2024-09-29

## 2024-09-28 RX ORDER — IPRATROPIUM BROMIDE AND ALBUTEROL SULFATE 2.5; .5 MG/3ML; MG/3ML
3 SOLUTION RESPIRATORY (INHALATION)
Status: COMPLETED | OUTPATIENT
Start: 2024-09-28 | End: 2024-09-28

## 2024-09-28 RX ORDER — IPRATROPIUM BROMIDE AND ALBUTEROL SULFATE 2.5; .5 MG/3ML; MG/3ML
3 SOLUTION RESPIRATORY (INHALATION) EVERY 4 HOURS
Status: DISCONTINUED | OUTPATIENT
Start: 2024-09-29 | End: 2024-10-01 | Stop reason: HOSPADM

## 2024-09-28 RX ORDER — SODIUM CHLORIDE 0.9 % (FLUSH) 0.9 %
5 SYRINGE (ML) INJECTION
Status: DISCONTINUED | OUTPATIENT
Start: 2024-09-28 | End: 2024-10-01 | Stop reason: HOSPADM

## 2024-09-28 RX ORDER — TALC
6 POWDER (GRAM) TOPICAL NIGHTLY PRN
Status: DISCONTINUED | OUTPATIENT
Start: 2024-09-28 | End: 2024-09-30

## 2024-09-28 RX ORDER — ACETAMINOPHEN 325 MG/1
650 TABLET ORAL EVERY 4 HOURS PRN
Status: DISCONTINUED | OUTPATIENT
Start: 2024-09-28 | End: 2024-10-01 | Stop reason: HOSPADM

## 2024-09-28 RX ORDER — GLUCAGON 1 MG
1 KIT INJECTION
Status: DISCONTINUED | OUTPATIENT
Start: 2024-09-28 | End: 2024-10-01 | Stop reason: HOSPADM

## 2024-09-28 RX ORDER — LOSARTAN POTASSIUM 25 MG/1
25 TABLET ORAL DAILY
Status: DISCONTINUED | OUTPATIENT
Start: 2024-09-29 | End: 2024-10-01 | Stop reason: HOSPADM

## 2024-09-28 RX ORDER — DOXYCYCLINE HYCLATE 100 MG
100 TABLET ORAL
Status: COMPLETED | OUTPATIENT
Start: 2024-09-28 | End: 2024-09-28

## 2024-09-28 RX ORDER — ROFLUMILAST 500 UG/1
500 TABLET ORAL DAILY
Status: DISCONTINUED | OUTPATIENT
Start: 2024-09-29 | End: 2024-10-01 | Stop reason: HOSPADM

## 2024-09-28 RX ORDER — PREDNISONE 20 MG/1
40 TABLET ORAL DAILY
Status: DISCONTINUED | OUTPATIENT
Start: 2024-09-28 | End: 2024-09-29

## 2024-09-28 RX ORDER — NALOXONE HCL 0.4 MG/ML
0.02 VIAL (ML) INJECTION
Status: DISCONTINUED | OUTPATIENT
Start: 2024-09-28 | End: 2024-10-01 | Stop reason: HOSPADM

## 2024-09-28 RX ORDER — IBUPROFEN 200 MG
16 TABLET ORAL
Status: DISCONTINUED | OUTPATIENT
Start: 2024-09-28 | End: 2024-10-01 | Stop reason: HOSPADM

## 2024-09-28 RX ORDER — METHOCARBAMOL 500 MG/1
500 TABLET, FILM COATED ORAL 3 TIMES DAILY PRN
Status: DISCONTINUED | OUTPATIENT
Start: 2024-09-29 | End: 2024-10-01 | Stop reason: HOSPADM

## 2024-09-28 RX ORDER — NAPROXEN SODIUM 220 MG/1
81 TABLET, FILM COATED ORAL DAILY
Status: DISCONTINUED | OUTPATIENT
Start: 2024-09-29 | End: 2024-10-01 | Stop reason: HOSPADM

## 2024-09-28 RX ORDER — ESCITALOPRAM OXALATE 10 MG/1
10 TABLET ORAL NIGHTLY
Status: DISCONTINUED | OUTPATIENT
Start: 2024-09-28 | End: 2024-10-01 | Stop reason: HOSPADM

## 2024-09-28 RX ORDER — EZETIMIBE 10 MG/1
10 TABLET ORAL DAILY
Status: DISCONTINUED | OUTPATIENT
Start: 2024-09-29 | End: 2024-10-01 | Stop reason: HOSPADM

## 2024-09-28 RX ORDER — IBUPROFEN 200 MG
24 TABLET ORAL
Status: DISCONTINUED | OUTPATIENT
Start: 2024-09-28 | End: 2024-10-01 | Stop reason: HOSPADM

## 2024-09-28 RX ORDER — ONDANSETRON HYDROCHLORIDE 2 MG/ML
4 INJECTION, SOLUTION INTRAVENOUS EVERY 8 HOURS PRN
Status: DISCONTINUED | OUTPATIENT
Start: 2024-09-28 | End: 2024-10-01 | Stop reason: HOSPADM

## 2024-09-28 RX ORDER — PREDNISONE 20 MG/1
40 TABLET ORAL DAILY
Status: DISCONTINUED | OUTPATIENT
Start: 2024-09-28 | End: 2024-09-28

## 2024-09-28 RX ORDER — AMOXICILLIN 250 MG
1 CAPSULE ORAL DAILY PRN
Status: DISCONTINUED | OUTPATIENT
Start: 2024-09-28 | End: 2024-10-01 | Stop reason: HOSPADM

## 2024-09-28 RX ADMIN — IPRATROPIUM BROMIDE AND ALBUTEROL SULFATE 3 ML: .5; 3 SOLUTION RESPIRATORY (INHALATION) at 07:09

## 2024-09-28 RX ADMIN — DOXYCYCLINE HYCLATE 100 MG: 100 TABLET, COATED ORAL at 08:09

## 2024-09-28 RX ADMIN — IPRATROPIUM BROMIDE AND ALBUTEROL SULFATE 3 ML: .5; 3 SOLUTION RESPIRATORY (INHALATION) at 09:09

## 2024-09-28 RX ADMIN — PREDNISONE 40 MG: 20 TABLET ORAL at 11:09

## 2024-09-28 RX ADMIN — ENOXAPARIN SODIUM 40 MG: 40 INJECTION SUBCUTANEOUS at 10:09

## 2024-09-28 RX ADMIN — ESCITALOPRAM OXALATE 10 MG: 10 TABLET ORAL at 10:09

## 2024-09-28 RX ADMIN — CEFTRIAXONE SODIUM 1 G: 1 INJECTION, POWDER, FOR SOLUTION INTRAMUSCULAR; INTRAVENOUS at 11:09

## 2024-09-28 RX ADMIN — HYDROXYZINE PAMOATE 25 MG: 25 CAPSULE ORAL at 08:09

## 2024-09-28 RX ADMIN — IPRATROPIUM BROMIDE AND ALBUTEROL SULFATE 3 ML: 2.5; .5 SOLUTION RESPIRATORY (INHALATION) at 11:09

## 2024-09-29 PROBLEM — J15.9 COMMUNITY ACQUIRED BACTERIAL PNEUMONIA: Status: ACTIVE | Noted: 2024-09-29

## 2024-09-29 LAB
ALBUMIN SERPL BCP-MCNC: 3.5 G/DL (ref 3.5–5.2)
ALP SERPL-CCNC: 51 U/L (ref 55–135)
ALT SERPL W/O P-5'-P-CCNC: 17 U/L (ref 10–44)
ANION GAP SERPL CALC-SCNC: 10 MMOL/L (ref 8–16)
AST SERPL-CCNC: 17 U/L (ref 10–40)
BASOPHILS # BLD AUTO: 0.01 K/UL (ref 0–0.2)
BASOPHILS NFR BLD: 0.2 % (ref 0–1.9)
BILIRUB SERPL-MCNC: 0.2 MG/DL (ref 0.1–1)
BUN SERPL-MCNC: 14 MG/DL (ref 8–23)
CALCIUM SERPL-MCNC: 9.4 MG/DL (ref 8.7–10.5)
CHLORIDE SERPL-SCNC: 99 MMOL/L (ref 95–110)
CO2 SERPL-SCNC: 35 MMOL/L (ref 23–29)
CREAT SERPL-MCNC: 0.7 MG/DL (ref 0.5–1.4)
DIFFERENTIAL METHOD BLD: ABNORMAL
EOSINOPHIL # BLD AUTO: 0 K/UL (ref 0–0.5)
EOSINOPHIL NFR BLD: 0 % (ref 0–8)
ERYTHROCYTE [DISTWIDTH] IN BLOOD BY AUTOMATED COUNT: 13.5 % (ref 11.5–14.5)
EST. GFR  (NO RACE VARIABLE): >60 ML/MIN/1.73 M^2
FIO2: 35 %
GLUCOSE SERPL-MCNC: 160 MG/DL (ref 70–110)
HCT VFR BLD AUTO: 43.3 % (ref 37–48.5)
HGB BLD-MCNC: 12.5 G/DL (ref 12–16)
IMM GRANULOCYTES # BLD AUTO: 0.03 K/UL (ref 0–0.04)
IMM GRANULOCYTES NFR BLD AUTO: 0.5 % (ref 0–0.5)
LYMPHOCYTES # BLD AUTO: 0.5 K/UL (ref 1–4.8)
LYMPHOCYTES NFR BLD: 8 % (ref 18–48)
MAGNESIUM SERPL-MCNC: 1.9 MG/DL (ref 1.6–2.6)
MCH RBC QN AUTO: 26.8 PG (ref 27–31)
MCHC RBC AUTO-ENTMCNC: 28.9 G/DL (ref 32–36)
MCV RBC AUTO: 93 FL (ref 82–98)
MONOCYTES # BLD AUTO: 0 K/UL (ref 0.3–1)
MONOCYTES NFR BLD: 0.7 % (ref 4–15)
NEUTROPHILS # BLD AUTO: 5.6 K/UL (ref 1.8–7.7)
NEUTROPHILS NFR BLD: 90.6 % (ref 38–73)
NRBC BLD-RTO: 0 /100 WBC
PCO2 BLDA: 99.7 MMHG (ref 35–45)
PEEP: 8
PH SMN: 7.21 [PH] (ref 7.35–7.45)
PHOSPHATE SERPL-MCNC: 3.2 MG/DL (ref 2.7–4.5)
PLATELET # BLD AUTO: 153 K/UL (ref 150–450)
PMV BLD AUTO: 11.4 FL (ref 9.2–12.9)
PO2 BLDA: 25.2 MMHG (ref 40–60)
POC BASE DEFICIT: 8.3 MMOL/L (ref -2–2)
POC HCO3: 40 MMOL/L (ref 24–28)
POC PERFORMED BY: ABNORMAL
POC SATURATED O2: 38.4 % (ref 95–100)
POC SET RR: 10
POCT GLUCOSE: 116 MG/DL (ref 70–110)
POCT GLUCOSE: 121 MG/DL (ref 70–110)
POCT GLUCOSE: 142 MG/DL (ref 70–110)
POTASSIUM SERPL-SCNC: 4.5 MMOL/L (ref 3.5–5.1)
PROT SERPL-MCNC: 7.3 G/DL (ref 6–8.4)
RBC # BLD AUTO: 4.67 M/UL (ref 4–5.4)
SODIUM SERPL-SCNC: 144 MMOL/L (ref 136–145)
SPECIMEN SOURCE: ABNORMAL
WBC # BLD AUTO: 6.15 K/UL (ref 3.9–12.7)

## 2024-09-29 PROCEDURE — 82803 BLOOD GASES ANY COMBINATION: CPT | Mod: HCNC

## 2024-09-29 PROCEDURE — 94660 CPAP INITIATION&MGMT: CPT | Mod: HCNC

## 2024-09-29 PROCEDURE — 83735 ASSAY OF MAGNESIUM: CPT | Mod: HCNC

## 2024-09-29 PROCEDURE — 20000000 HC ICU ROOM: Mod: HCNC

## 2024-09-29 PROCEDURE — 94640 AIRWAY INHALATION TREATMENT: CPT | Mod: HCNC

## 2024-09-29 PROCEDURE — 85025 COMPLETE CBC W/AUTO DIFF WBC: CPT | Mod: HCNC

## 2024-09-29 PROCEDURE — 80053 COMPREHEN METABOLIC PANEL: CPT | Mod: HCNC

## 2024-09-29 PROCEDURE — 94761 N-INVAS EAR/PLS OXIMETRY MLT: CPT | Mod: HCNC,XB

## 2024-09-29 PROCEDURE — 27100171 HC OXYGEN HIGH FLOW UP TO 24 HOURS: Mod: HCNC

## 2024-09-29 PROCEDURE — 25000003 PHARM REV CODE 250: Mod: HCNC

## 2024-09-29 PROCEDURE — 27000221 HC OXYGEN, UP TO 24 HOURS: Mod: HCNC

## 2024-09-29 PROCEDURE — 25000242 PHARM REV CODE 250 ALT 637 W/ HCPCS: Mod: HCNC

## 2024-09-29 PROCEDURE — 84100 ASSAY OF PHOSPHORUS: CPT | Mod: HCNC

## 2024-09-29 PROCEDURE — 99900035 HC TECH TIME PER 15 MIN (STAT): Mod: HCNC

## 2024-09-29 PROCEDURE — 63600175 PHARM REV CODE 636 W HCPCS: Mod: HCNC

## 2024-09-29 RX ORDER — PREDNISONE 20 MG/1
40 TABLET ORAL DAILY
Status: DISCONTINUED | OUTPATIENT
Start: 2024-09-29 | End: 2024-10-01 | Stop reason: HOSPADM

## 2024-09-29 RX ORDER — FUROSEMIDE 10 MG/ML
40 INJECTION INTRAMUSCULAR; INTRAVENOUS ONCE
Status: DISCONTINUED | OUTPATIENT
Start: 2024-09-29 | End: 2024-09-29

## 2024-09-29 RX ORDER — FUROSEMIDE 40 MG/1
40 TABLET ORAL DAILY
Status: DISCONTINUED | OUTPATIENT
Start: 2024-09-29 | End: 2024-10-01 | Stop reason: HOSPADM

## 2024-09-29 RX ORDER — GUAIFENESIN 600 MG/1
600 TABLET, EXTENDED RELEASE ORAL 2 TIMES DAILY PRN
Status: DISCONTINUED | OUTPATIENT
Start: 2024-09-29 | End: 2024-10-01 | Stop reason: HOSPADM

## 2024-09-29 RX ORDER — INSULIN ASPART 100 [IU]/ML
0-5 INJECTION, SOLUTION INTRAVENOUS; SUBCUTANEOUS
Status: DISCONTINUED | OUTPATIENT
Start: 2024-09-29 | End: 2024-10-01 | Stop reason: HOSPADM

## 2024-09-29 RX ADMIN — HYDROXYZINE HYDROCHLORIDE 25 MG: 25 TABLET, FILM COATED ORAL at 03:09

## 2024-09-29 RX ADMIN — ROFLUMILAST 500 MCG: 500 TABLET ORAL at 08:09

## 2024-09-29 RX ADMIN — FUROSEMIDE 40 MG: 40 TABLET ORAL at 08:09

## 2024-09-29 RX ADMIN — ESCITALOPRAM OXALATE 10 MG: 10 TABLET ORAL at 08:09

## 2024-09-29 RX ADMIN — ACETAMINOPHEN 650 MG: 325 TABLET ORAL at 11:09

## 2024-09-29 RX ADMIN — LACTOBACILLUS TAB 4 TABLET: TAB at 11:09

## 2024-09-29 RX ADMIN — IPRATROPIUM BROMIDE AND ALBUTEROL SULFATE 3 ML: 2.5; .5 SOLUTION RESPIRATORY (INHALATION) at 03:09

## 2024-09-29 RX ADMIN — LACTOBACILLUS TAB 4 TABLET: TAB at 08:09

## 2024-09-29 RX ADMIN — EZETIMIBE 10 MG: 10 TABLET ORAL at 08:09

## 2024-09-29 RX ADMIN — IPRATROPIUM BROMIDE AND ALBUTEROL SULFATE 3 ML: 2.5; .5 SOLUTION RESPIRATORY (INHALATION) at 07:09

## 2024-09-29 RX ADMIN — PREDNISONE 40 MG: 20 TABLET ORAL at 08:09

## 2024-09-29 RX ADMIN — METHOCARBAMOL 500 MG: 500 TABLET ORAL at 12:09

## 2024-09-29 RX ADMIN — GUAIFENESIN 600 MG: 600 TABLET, EXTENDED RELEASE ORAL at 04:09

## 2024-09-29 RX ADMIN — IPRATROPIUM BROMIDE AND ALBUTEROL SULFATE 3 ML: 2.5; .5 SOLUTION RESPIRATORY (INHALATION) at 12:09

## 2024-09-29 RX ADMIN — DOXYCYCLINE HYCLATE 100 MG: 100 TABLET, COATED ORAL at 08:09

## 2024-09-29 RX ADMIN — LOSARTAN POTASSIUM 25 MG: 25 TABLET, FILM COATED ORAL at 08:09

## 2024-09-29 RX ADMIN — IPRATROPIUM BROMIDE AND ALBUTEROL SULFATE 3 ML: 2.5; .5 SOLUTION RESPIRATORY (INHALATION) at 09:09

## 2024-09-29 RX ADMIN — ASPIRIN 81 MG CHEWABLE TABLET 81 MG: 81 TABLET CHEWABLE at 08:09

## 2024-09-29 RX ADMIN — IPRATROPIUM BROMIDE AND ALBUTEROL SULFATE 3 ML: 2.5; .5 SOLUTION RESPIRATORY (INHALATION) at 11:09

## 2024-09-29 RX ADMIN — ENOXAPARIN SODIUM 40 MG: 40 INJECTION SUBCUTANEOUS at 04:09

## 2024-09-29 RX ADMIN — LACTOBACILLUS TAB 4 TABLET: TAB at 04:09

## 2024-09-29 RX ADMIN — CEFTRIAXONE SODIUM 1 G: 1 INJECTION, POWDER, FOR SOLUTION INTRAMUSCULAR; INTRAVENOUS at 11:09

## 2024-09-29 NOTE — ASSESSMENT & PLAN NOTE
Patient with Hypercapnic and Hypoxic Respiratory failure which is Acute on chronic due to COPD exacerbation.  she is on home oxygen at 3 LPM. Pt uses BIPAP nightly and with naps at home.    Signs/symptoms of respiratory failure include- increased work of breathing and wheezing. Contributing diagnoses includes - COPD Labs and images were reviewed. VBG in ED: pH 7.26, pCO2 101, bicarb 45. Required 3L in ED however was placed on non rebreather for hypoxia and night BIPAP. Diminished breath sounds, wheezing on exam. Pt is COPD Gold D, following with pulmonology outpatient.    Plan:  COPD Pathway  - Continue home roflumilast  - Duonebs q4hrs  - Ceftriaxone QD and Doxycline BID (9/28), can try lactobacillus for diarrhea  - Prednisone 40mg daily for 5 days  - BIPAP nightly and with naps  - Sputum culture  - SW to obtain Trilogy ventilator for patient on D/c  - Continue Breo in hospital as home inhaler not on formulary

## 2024-09-29 NOTE — SUBJECTIVE & OBJECTIVE
Past Medical History:   Diagnosis Date    Acute exacerbation of chronic obstructive pulmonary disease (COPD) 2023    Recently hospitalized at [facility] from  to 2024 for exacerbation.  Did not require ICU care or significant escalation of intervention(s).      Adenoma of ascending colon 2022    Anxiety     COPD (chronic obstructive pulmonary disease)     COPD exacerbation 2014    Heart failure     Hypertension     Major depression 2023       Past Surgical History:   Procedure Laterality Date    BREAST BIOPSY N/A     pt unsure which breast but it was benign-(calcium)     SECTION      HERNIA REPAIR      HYSTERECTOMY         Review of patient's allergies indicates:   Allergen Reactions    Codeine Nausea And Vomiting    Lipitor [atorvastatin] Other (See Comments)     Muscle fatigue      Morphine Hallucinations       Family History    None       Tobacco Use    Smoking status: Former     Current packs/day: 0.00     Average packs/day: 1 pack/day for 20.0 years (20.0 ttl pk-yrs)     Types: Cigarettes     Quit date: 2021     Years since quitting: 3.7     Passive exposure: Past    Smokeless tobacco: Never   Substance and Sexual Activity    Alcohol use: Not Currently     Alcohol/week: 1.0 standard drink of alcohol     Types: 1 Glasses of wine per week    Drug use: No    Sexual activity: Not Currently     Partners: Male         Review of Systems   Constitutional:  Negative for chills and fatigue.   Respiratory:  Positive for cough, shortness of breath and wheezing.    Cardiovascular:  Negative for chest pain and leg swelling.   Gastrointestinal:  Negative for nausea and vomiting.   Genitourinary:  Negative for dysuria.   Neurological:  Negative for headaches.   Psychiatric/Behavioral:  Negative for agitation and confusion.      Objective:     Vital Signs (Most Recent):  Temp: 96.9 °F (36.1 °C) (24 0750)  Pulse: 73 (24 0800)  Resp: 19 (24 0800)  BP: (!) 151/71  (09/29/24 0847)  SpO2: (!) 94 % (09/29/24 0800) Vital Signs (24h Range):  Temp:  [96.9 °F (36.1 °C)-99.1 °F (37.3 °C)] 96.9 °F (36.1 °C)  Pulse:  [] 73  Resp:  [18-48] 19  SpO2:  [63 %-99 %] 94 %  BP: (144-185)/(63-89) 151/71     Weight: 80 kg (176 lb 5.9 oz)  Body mass index is 28.47 kg/m².      Intake/Output Summary (Last 24 hours) at 9/29/2024 0939  Last data filed at 9/29/2024 0016  Gross per 24 hour   Intake 100 ml   Output --   Net 100 ml        Physical Exam  Vitals reviewed.   Constitutional:       General: She is not in acute distress.     Appearance: Normal appearance. She is not ill-appearing.   HENT:      Head: Normocephalic and atraumatic.   Eyes:      Conjunctiva/sclera: Conjunctivae normal.   Cardiovascular:      Rate and Rhythm: Normal rate and regular rhythm.      Pulses: Normal pulses.      Heart sounds: No murmur heard.  Pulmonary:      Effort: Pulmonary effort is normal. No respiratory distress.      Breath sounds: Wheezing present. No rhonchi.   Abdominal:      General: Bowel sounds are normal. There is no distension.   Musculoskeletal:      Right lower leg: No edema.      Left lower leg: No edema.   Neurological:      General: No focal deficit present.      Mental Status: She is alert and oriented to person, place, and time.   Psychiatric:         Mood and Affect: Mood normal.         Behavior: Behavior normal.          Vents:  Oxygen Concentration (%): 35 (09/29/24 0750)    Lines/Drains/Airways       Drain  Duration             Female External Urinary Catheter w/ Suction 09/28/24 2028 <1 day              Peripheral Intravenous Line  Duration                  Peripheral IV - Single Lumen 09/28/24 1936 20 G Right Upper Arm <1 day                    Significant Labs:    CBC/Anemia Profile:  Recent Labs   Lab 09/28/24 1931 09/29/24  0707   WBC 6.28 6.15   HGB 12.8 12.5   HCT 43.7 43.3    153   MCV 93 93   RDW 13.5 13.5        Chemistries:  Recent Labs   Lab 09/28/24 1931  09/29/24  0707   * 144   K 4.2 4.5    99   CO2 38* 35*   BUN 12 14   CREATININE 0.7 0.7   CALCIUM 9.5 9.4   ALBUMIN 3.6 3.5   PROT 7.4 7.3   BILITOT 0.2 0.2   ALKPHOS 57 51*   ALT 19 17   AST 21 17   MG  --  1.9   PHOS  --  3.2       All pertinent labs within the past 24 hours have been reviewed.    Significant Imaging:   I have reviewed all pertinent imaging results/findings within the past 24 hours.    Narrative & Impression  EXAMINATION:  XR CHEST 1 VIEW     CLINICAL HISTORY:  shortness of breath;     TECHNIQUE:  Single frontal view of the chest was performed.     COMPARISON:  07/20/2024 chest X     FINDINGS:  Cardiac silhouette is stable and nonenlarged.  There is left basilar opacification and mild costophrenic angle blunting again noted.  Lungs otherwise appear clear.  Degenerative changes of the spine and shoulders noted.     Impression:     Left basilar opacities and costophrenic angle blunting is unchanged.     No acute abnormality.        Electronically signed by:Wilma Garcia  Date:                                            09/28/2024  Time:                                           19:53        Exam Ended: 09/28/24 19:39 CDT Last Resulted: 09/28/24 19:53 CDT

## 2024-09-29 NOTE — ASSESSMENT & PLAN NOTE
Chronic, controlled. Latest blood pressure and vitals reviewed-     Temp:  [99.1 °F (37.3 °C)]   Pulse:  []   Resp:  [18-48]   BP: (144-185)/(63-84)   SpO2:  [63 %-99 %] .   Home meds for hypertension were reviewed and noted below.   Hypertension Medications               furosemide (LASIX) 40 MG tablet Take 1 tablet (40 mg total) by mouth once daily.    losartan (COZAAR) 50 MG tablet Take 0.5 tablets (25 mg total) by mouth once daily.            While in the hospital, will manage blood pressure as follows; Continue home antihypertensive regimen    Will utilize p.r.n. blood pressure medication only if patient's blood pressure greater than 180/110 and she develops symptoms such as worsening chest pain or shortness of breath.

## 2024-09-29 NOTE — SUBJECTIVE & OBJECTIVE
Past Medical History:   Diagnosis Date    Acute exacerbation of chronic obstructive pulmonary disease (COPD) 2023    Recently hospitalized at [facility] from  to 2024 for exacerbation.  Did not require ICU care or significant escalation of intervention(s).      Adenoma of ascending colon 2022    Anxiety     COPD (chronic obstructive pulmonary disease)     COPD exacerbation 2014    Heart failure     Hypertension     Major depression 2023       Past Surgical History:   Procedure Laterality Date    BREAST BIOPSY N/A     pt unsure which breast but it was benign-(calcium)     SECTION      HERNIA REPAIR      HYSTERECTOMY         Review of patient's allergies indicates:   Allergen Reactions    Codeine Nausea And Vomiting    Lipitor [atorvastatin] Other (See Comments)     Muscle fatigue      Morphine Hallucinations       No current facility-administered medications on file prior to encounter.     Current Outpatient Medications on File Prior to Encounter   Medication Sig    acetaminophen (TYLENOL) 500 MG tablet Take 500 mg by mouth every 6 (six) hours as needed for Pain.    albuterol (PROVENTIL/VENTOLIN HFA) 90 mcg/actuation inhaler Inhale 2 puffs into the lungs every 6 (six) hours as needed for Wheezing or Shortness of Breath.    alendronate (FOSAMAX) 35 MG tablet Take 35 mg by mouth every Tuesday.    aspirin 81 MG Chew Take 1 tablet by mouth once daily.    azithromycin (Z-OLY) 250 MG tablet Take 1 tablet (250 mg total) by mouth every Mon, Wed, Fri.    budesonide-glycopyr-formoterol (BREZTRI AEROSPHERE) 160-9-4.8 mcg/actuation HFAA Inhale 2 puffs into the lungs 2 (two) times daily.    calcium carb-mag hydrox-simeth 1,200 mg-270 mg -80 mg/10 mL Susp Take 1,200 mg by mouth once daily.    cholecalciferol, vitamin D3, (VITAMIN D3) 50 mcg (2,000 unit) Tab Take 2,000 Units by mouth once daily.    dextromethorphan-guaiFENesin  mg (MUCINEX DM)  mg per 12 hr tablet Take 1 tablet  by mouth 2 (two) times daily as needed.    dicyclomine (BENTYL) 20 mg tablet Take 1 tablet (20 mg total) by mouth 3 (three) times daily as needed (abdominal pain).    EScitalopram oxalate (LEXAPRO) 10 MG tablet TAKE 1 TABLET(10 MG) BY MOUTH DAILY    ezetimibe (ZETIA) 10 mg tablet Take 1 tablet (10 mg total) by mouth once daily.    fluticasone propionate (FLONASE) 50 mcg/actuation nasal spray 1 spray (50 mcg total) by Each Nostril route daily as needed for Allergies.    furosemide (LASIX) 40 MG tablet Take 1 tablet (40 mg total) by mouth once daily.    hydrOXYzine HCL (ATARAX) 25 MG tablet TAKE 1 TABLET(25 MG) BY MOUTH THREE TIMES DAILY AS NEEDED FOR ANXIETY OR ITCHING    ibuprofen (ADVIL,MOTRIN) 800 MG tablet Take 1 tablet (800 mg total) by mouth 3 (three) times daily as needed for Pain.    losartan (COZAAR) 50 MG tablet Take 0.5 tablets (25 mg total) by mouth once daily.    OXYGEN-AIR DELIVERY SYSTEMS MISC 2 L by Nasal route continuous.    potassium chloride (MICRO-K) 10 MEQ CpSR TAKE 1 CAPSULE BY MOUTH EVERY DAY    roflumilast (DALIRESP) 500 mcg Tab Take 1 tablet (500 mcg total) by mouth once daily.    triamcinolone acetonide 0.1% (KENALOG) 0.1 % cream Apply topically 2 (two) times daily. for 14 days     Family History    None       Tobacco Use    Smoking status: Former     Current packs/day: 0.00     Average packs/day: 1 pack/day for 20.0 years (20.0 ttl pk-yrs)     Types: Cigarettes     Quit date: 1/1/2021     Years since quitting: 3.7     Passive exposure: Past    Smokeless tobacco: Never   Substance and Sexual Activity    Alcohol use: Not Currently     Alcohol/week: 1.0 standard drink of alcohol     Types: 1 Glasses of wine per week    Drug use: No    Sexual activity: Not Currently     Partners: Male     Review of Systems   Constitutional:  Negative for chills and fever.   Respiratory:  Positive for cough, shortness of breath and wheezing. Negative for chest tightness.    Cardiovascular:  Negative for chest  pain.   Gastrointestinal:  Negative for abdominal pain, constipation, diarrhea and nausea.   Genitourinary:  Negative for difficulty urinating.   Skin:  Negative for rash.     Objective:     Vital Signs (Most Recent):  Temp: 99.1 °F (37.3 °C) (09/28/24 1855)  Pulse: 100 (09/28/24 2139)  Resp: (!) 28 (09/28/24 2139)  BP: (!) 147/65 (09/28/24 2034)  SpO2: 95 % (09/28/24 2139) Vital Signs (24h Range):  Temp:  [99.1 °F (37.3 °C)] 99.1 °F (37.3 °C)  Pulse:  [] 100  Resp:  [18-48] 28  SpO2:  [63 %-99 %] 95 %  BP: (144-185)/(63-84) 147/65     Weight: 81.9 kg (180 lb 8.9 oz)  Body mass index is 29.14 kg/m².     Physical Exam  Vitals reviewed.   Constitutional:       General: She is not in acute distress.     Appearance: Normal appearance. She is not ill-appearing or toxic-appearing.   HENT:      Head: Normocephalic.      Nose: Nose normal.   Eyes:      Conjunctiva/sclera: Conjunctivae normal.   Cardiovascular:      Rate and Rhythm: Normal rate and regular rhythm.      Heart sounds: Normal heart sounds. No murmur heard.     No friction rub. No gallop.   Pulmonary:      Effort: No respiratory distress.      Breath sounds: No stridor. Wheezing and rhonchi (lung bases) present.      Comments: On BIPAP  Abdominal:      General: Abdomen is flat. There is no distension.      Palpations: Abdomen is soft.      Tenderness: There is no abdominal tenderness.   Musculoskeletal:         General: No swelling, deformity or signs of injury. Normal range of motion.      Right lower leg: No edema.      Left lower leg: No edema.   Skin:     General: Skin is warm and dry.   Neurological:      General: No focal deficit present.      Mental Status: She is alert. Mental status is at baseline.                Significant Labs: All pertinent labs within the past 24 hours have been reviewed.  CBC:   Recent Labs   Lab 09/28/24 1931   WBC 6.28   HGB 12.8   HCT 43.7        CMP:   Recent Labs   Lab 09/28/24 1931   *   K 4.2       CO2 38*   *   BUN 12   CREATININE 0.7   CALCIUM 9.5   PROT 7.4   ALBUMIN 3.6   BILITOT 0.2   ALKPHOS 57   AST 21   ALT 19   ANIONGAP 8       Significant Imaging: I have reviewed all pertinent imaging results/findings within the past 24 hours.

## 2024-09-29 NOTE — ASSESSMENT & PLAN NOTE
Patient with Hypercapnic and Hypoxic Respiratory failure which is Acute on chronic due to COPD exacerbation.  she is on home oxygen at 3 LPM. Pt uses BIPAP nightly and with naps at home.    Signs/symptoms of respiratory failure include- increased work of breathing and wheezing. Contributing diagnoses includes - COPD Labs and images were reviewed. VBG in ED: pH 7.26, pCO2 101, bicarb 45. Required 3L in ED however was placed on non rebreather for hypoxia and night BIPAP. Diminished breath sounds, wheezing on exam. Pt is COPD Gold D, following with pulmonology outpatient.    Plan:  COPD Pathway  - Continue home roflumilast  - Duonebs q4hrs  - Ceftriaxone QD and Doxyxycline BID (9/28), can try lactobacillus for diarrhea  - Prednisone 40mg daily for 5 days  - BIPAP nightly and with naps

## 2024-09-29 NOTE — ED NOTES
Pt laying in bed resting with eyes open. Asking for juice. Given 8oz of Orange juice. Tolerated well denies any other needs at this time.

## 2024-09-29 NOTE — PLAN OF CARE
"  Problem: Adult Inpatient Plan of Care  Goal: Plan of Care Review  Outcome: Progressing  -POC discussed with pt and daughter at bedside.   -Glucose monitored qac/hs.  No SSI coverage administered.      Problem: COPD (Chronic Obstructive Pulmonary Disease)  Goal: Optimal Chronic Illness Coping  Outcome: Progressing  -Pt transitioned from BIPAP to 3LNC (home O2 settings).   -SpO2 88-92%, WNL.   -Lasix 40 mg given, see MAR.   -Prednisone 40 mg given, see MAR.   -Mucinex 600 mg given, see MAR.     Problem: Coping Ineffective  Goal: Effective Coping  Outcome: Progressing  -C/o of "feeling anxious".  PRN hydroxyzine given, see MAR.      Problem: Fall Injury Risk  Goal: Absence of Fall and Fall-Related Injury  Outcome: Progressing  -Fall precautions maintained.      Problem: Skin Injury Risk Increased  Goal: Skin Health and Integrity  -Pt turned/repositioned q 2 h and prn.        "

## 2024-09-29 NOTE — H&P
[facility]  [facility] Medicine  History & Physical    Patient Name: Terri Phelan  MRN: [id number]  Patient Class: OP- Observation  Admission Date: 9/28/2024  Attending Physician: Dr. Montemayor  Primary Care Provider: Sussy Mason MD         Patient information was obtained from patient, relative(s), past medical records, and ER records.     Subjective:     Principal Problem:Acute on chronic respiratory failure with hypoxia and hypercapnia    Chief Complaint:   Chief Complaint   Patient presents with    Shortness of Breath     Called 911 for SOB. 88% on home 3 L NC upon EMS arrival. Given duoneb and IM steroids. Wears 3 L NC baseline. Improvement with breathing treatment.         HPI: Patient is a 68 yo Female w/ PMHx of COPD on 3L, HTN, Anxiety. Presenting for shortness of breath since last night. Endorses occasional cough with white sputum production. Pt feels that she has been feeling more tired this week since her flu shot on 9/20 however has not had increased oxygen requirements. Says that she uses BIPAP every night and with naps. Pt lives at home by herself and performs ADLs independently. Denies fevers, nausea, vomiting, diarrhea, constipation, chest pain.    In the ED, initial vital signs /84, HR 98, Temp 99.1, SpO2 99% on 3L. Labs include CBC with stable H/H 12.8/43.7 and WBC within normal limits 6.28. BUN/Cr 12/0.7. CXR lefty basilar opacities and costophrenic angle blunting unchanged from previous. BNP 27, Trop <0.0006. VBG pH 7.26, pCO2 101, bicarb 45 Covid and Flu negative. Initiated on duoneb, doxycycline (pt declined CTX), oxygen saturation had dipped to 63% and patient initiated on non rebreather and transitioned to BIPAP in ED. Patient has been stable on BIPAP. [facility] consulted for evaluation for admission for COPD exacerbation.      Past Medical History:   Diagnosis Date    Acute exacerbation of chronic obstructive pulmonary disease (COPD) 12/19/2023     Recently hospitalized at [facility] from  to 2024 for exacerbation.  Did not require ICU care or significant escalation of intervention(s).      Adenoma of ascending colon 2022    Anxiety     COPD (chronic obstructive pulmonary disease)     COPD exacerbation 2014    Heart failure     Hypertension     Major depression 2023       Past Surgical History:   Procedure Laterality Date    BREAST BIOPSY N/A     pt unsure which breast but it was benign-(calcium)     SECTION      HERNIA REPAIR      HYSTERECTOMY         Review of patient's allergies indicates:   Allergen Reactions    Codeine Nausea And Vomiting    Lipitor [atorvastatin] Other (See Comments)     Muscle fatigue      Morphine Hallucinations       No current facility-administered medications on file prior to encounter.     Current Outpatient Medications on File Prior to Encounter   Medication Sig    acetaminophen (TYLENOL) 500 MG tablet Take 500 mg by mouth every 6 (six) hours as needed for Pain.    albuterol (PROVENTIL/VENTOLIN HFA) 90 mcg/actuation inhaler Inhale 2 puffs into the lungs every 6 (six) hours as needed for Wheezing or Shortness of Breath.    alendronate (FOSAMAX) 35 MG tablet Take 35 mg by mouth every Tuesday.    aspirin 81 MG Chew Take 1 tablet by mouth once daily.    azithromycin (Z-OLY) 250 MG tablet Take 1 tablet (250 mg total) by mouth every Mon, Wed, Fri.    budesonide-glycopyr-formoterol (BREZTRI AEROSPHERE) 160-9-4.8 mcg/actuation HFAA Inhale 2 puffs into the lungs 2 (two) times daily.    calcium carb-mag hydrox-simeth 1,200 mg-270 mg -80 mg/10 mL Susp Take 1,200 mg by mouth once daily.    cholecalciferol, vitamin D3, (VITAMIN D3) 50 mcg (2,000 unit) Tab Take 2,000 Units by mouth once daily.    dextromethorphan-guaiFENesin  mg (MUCINEX DM)  mg per 12 hr tablet Take 1 tablet by mouth 2 (two) times daily as needed.    dicyclomine (BENTYL) 20 mg tablet Take 1 tablet (20 mg total) by mouth 3 (three)  times daily as needed (abdominal pain).    EScitalopram oxalate (LEXAPRO) 10 MG tablet TAKE 1 TABLET(10 MG) BY MOUTH DAILY    ezetimibe (ZETIA) 10 mg tablet Take 1 tablet (10 mg total) by mouth once daily.    fluticasone propionate (FLONASE) 50 mcg/actuation nasal spray 1 spray (50 mcg total) by Each Nostril route daily as needed for Allergies.    furosemide (LASIX) 40 MG tablet Take 1 tablet (40 mg total) by mouth once daily.    hydrOXYzine HCL (ATARAX) 25 MG tablet TAKE 1 TABLET(25 MG) BY MOUTH THREE TIMES DAILY AS NEEDED FOR ANXIETY OR ITCHING    ibuprofen (ADVIL,MOTRIN) 800 MG tablet Take 1 tablet (800 mg total) by mouth 3 (three) times daily as needed for Pain.    losartan (COZAAR) 50 MG tablet Take 0.5 tablets (25 mg total) by mouth once daily.    OXYGEN-AIR DELIVERY SYSTEMS MISC 2 L by Nasal route continuous.    potassium chloride (MICRO-K) 10 MEQ CpSR TAKE 1 CAPSULE BY MOUTH EVERY DAY    roflumilast (DALIRESP) 500 mcg Tab Take 1 tablet (500 mcg total) by mouth once daily.    triamcinolone acetonide 0.1% (KENALOG) 0.1 % cream Apply topically 2 (two) times daily. for 14 days     Family History    None       Tobacco Use    Smoking status: Former     Current packs/day: 0.00     Average packs/day: 1 pack/day for 20.0 years (20.0 ttl pk-yrs)     Types: Cigarettes     Quit date: 1/1/2021     Years since quitting: 3.7     Passive exposure: Past    Smokeless tobacco: Never   Substance and Sexual Activity    Alcohol use: Not Currently     Alcohol/week: 1.0 standard drink of alcohol     Types: 1 Glasses of wine per week    Drug use: No    Sexual activity: Not Currently     Partners: Male     Review of Systems   Constitutional:  Negative for chills and fever.   Respiratory:  Positive for cough, shortness of breath and wheezing. Negative for chest tightness.    Cardiovascular:  Negative for chest pain.   Gastrointestinal:  Negative for abdominal pain, constipation, diarrhea and nausea.   Genitourinary:  Negative for  difficulty urinating.   Skin:  Negative for rash.     Objective:     Vital Signs (Most Recent):  Temp: 99.1 °F (37.3 °C) (09/28/24 1855)  Pulse: 100 (09/28/24 2139)  Resp: (!) 28 (09/28/24 2139)  BP: (!) 147/65 (09/28/24 2034)  SpO2: 95 % (09/28/24 2139) Vital Signs (24h Range):  Temp:  [99.1 °F (37.3 °C)] 99.1 °F (37.3 °C)  Pulse:  [] 100  Resp:  [18-48] 28  SpO2:  [63 %-99 %] 95 %  BP: (144-185)/(63-84) 147/65     Weight: 81.9 kg (180 lb 8.9 oz)  Body mass index is 29.14 kg/m².     Physical Exam  Vitals reviewed.   Constitutional:       General: She is not in acute distress.     Appearance: Normal appearance. She is not ill-appearing or toxic-appearing.   HENT:      Head: Normocephalic.      Nose: Nose normal.   Eyes:      Conjunctiva/sclera: Conjunctivae normal.   Cardiovascular:      Rate and Rhythm: Normal rate and regular rhythm.      Heart sounds: Normal heart sounds. No murmur heard.     No friction rub. No gallop.   Pulmonary:      Effort: No respiratory distress.      Breath sounds: No stridor. Wheezing and rhonchi (lung bases) present.      Comments: On BIPAP  Abdominal:      General: Abdomen is flat. There is no distension.      Palpations: Abdomen is soft.      Tenderness: There is no abdominal tenderness.   Musculoskeletal:         General: No swelling, deformity or signs of injury. Normal range of motion.      Right lower leg: No edema.      Left lower leg: No edema.   Skin:     General: Skin is warm and dry.   Neurological:      General: No focal deficit present.      Mental Status: She is alert. Mental status is at baseline.                Significant Labs: All pertinent labs within the past 24 hours have been reviewed.  CBC:   Recent Labs   Lab 09/28/24 1931   WBC 6.28   HGB 12.8   HCT 43.7        CMP:   Recent Labs   Lab 09/28/24 1931   *   K 4.2      CO2 38*   *   BUN 12   CREATININE 0.7   CALCIUM 9.5   PROT 7.4   ALBUMIN 3.6   BILITOT 0.2   ALKPHOS 57   AST 21  "  ALT 19   ANIONGAP 8       Significant Imaging: I have reviewed all pertinent imaging results/findings within the past 24 hours.  Assessment/Plan:     * Acute on chronic respiratory failure with hypoxia and hypercapnia  Patient with Hypercapnic and Hypoxic Respiratory failure which is Acute on chronic due to COPD exacerbation.  she is on home oxygen at 3 LPM. Pt uses BIPAP nightly and with naps at home.    Signs/symptoms of respiratory failure include- increased work of breathing and wheezing. Contributing diagnoses includes - COPD Labs and images were reviewed. VBG in ED: pH 7.26, pCO2 101, bicarb 45. Required 3L in ED however was placed on non rebreather for hypoxia and night BIPAP. Diminished breath sounds, wheezing on exam. COPD Gold Group E Grade 4, following with pulmonology outpatient.    Plan:  COPD Pathway  - Continue home roflumilast  - Duonebs q4hrs  - Ceftriaxone QD and Doxycline BID (9/28), can try lactobacillus for diarrhea  - Prednisone 40mg daily for 5 days  - BIPAP nightly and with naps        Anxiety  Continue home anxiety medications:  - Escitalopram nightly  - Hydroxyzine 25mg PRN      CAD (coronary artery disease)  Patient with known CAD, which is controlled Will continue ASA and ezetimibe monitor for S/Sx of angina/ACS. Continue to monitor on telemetry. Per chart review, pt unable to tolerate statin.    COPD exacerbation  Patient's COPD is with exacerbation noted by continued dyspnea and worsening of baseline hypoxia currently.  Patient is currently on COPD Pathway. Continue scheduled inhalers Steroids, Antibiotics, and Supplemental oxygen and monitor respiratory status closely.     See "Acute on chronic respiratory failure with hypoxia and hypercapnia"    HTN (hypertension)  Chronic, controlled. Latest blood pressure and vitals reviewed-     Temp:  [99.1 °F (37.3 °C)]   Pulse:  []   Resp:  [18-48]   BP: (144-185)/(63-84)   SpO2:  [63 %-99 %] .   Home meds for hypertension were reviewed " and noted below.   Hypertension Medications               furosemide (LASIX) 40 MG tablet Take 1 tablet (40 mg total) by mouth once daily.    losartan (COZAAR) 50 MG tablet Take 0.5 tablets (25 mg total) by mouth once daily.            While in the hospital, will manage blood pressure as follows; Continue home antihypertensive regimen    Will utilize p.r.n. blood pressure medication only if patient's blood pressure greater than 180/110 and she develops symptoms such as worsening chest pain or shortness of breath.      VTE Risk Mitigation (From admission, onward)           Ordered     enoxaparin injection 40 mg  Daily         09/28/24 2124     IP VTE HIGH RISK PATIENT  Once         09/28/24 2124     Place sequential compression device  Until discontinued         09/28/24 2124                       On 09/28/2024, patient should be placed in hospital observation services under my care in collaboration with Dr. Montemayor.         Lindsay Banegas MD  Department of Hospital Medicine  [place] - Emergency Dept

## 2024-09-29 NOTE — PROGRESS NOTES
[facility]  ICU Note       Admit Date: 9/28/2024   LOS: 0 days     Chief Complaint/Reason for Admission: Community Acquired Pneumonia on COPD    HPI: Patient is a 68 yo Female w/ PMHx of COPD on 3L, HTN, HF, Anxiety. Presenting for shortness of breath since last night. Endorses occasional cough with white sputum production. Pt feels that she has been feeling more tired this week since her flu shot on 9/20 however has not had increased oxygen requirements. Says that she uses BIPAP every night and with naps. Pt lives at home by herself and performs ADLs independently. Denies fevers, nausea, vomiting, diarrhea, constipation, chest pain.     In the ED, initial vital signs /84, HR 98, Temp 99.1, SpO2 99% on 3L. Labs include CBC with stable H/H 12.8/43.7 and WBC within normal limits 6.28. BUN/Cr 12/0.7. CXR lefty basilar opacities and costophrenic angle blunting unchanged from previous. BNP 27, Trop <0.0006. VBG pH 7.26, pCO2 101, bicarb 45 Covid and Flu negative. Initiated on duoneb, doxycycline (pt declined CTX), oxygen saturation had dipped to 63% and patient initiated on non rebreather and transitioned to BIPAP in ED. Patient has been stable on BIPAP. [facility] consulted for evaluation for admission for COPD exacerbation.    Interval History: Afebrile. On BIPAP overnight without improvement in VBG,mask off overnight. Stepping up to ICU for continuous BIPAP 18/10    Infusions:  None      PMHx  Past Medical History:   Diagnosis Date    Acute exacerbation of chronic obstructive pulmonary disease (COPD) 12/19/2023    Recently hospitalized at [facility] from 5/14 to 5/20/2024 for exacerbation.  Did not require ICU care or significant escalation of intervention(s).      Adenoma of ascending colon 09/09/2022    Anxiety     COPD (chronic obstructive pulmonary disease)     COPD exacerbation 12/17/2014    Heart failure     Hypertension     Major depression 07/01/2023        PSHx  Past Surgical  History:   Procedure Laterality Date    BREAST BIOPSY N/A     pt unsure which breast but it was benign-(calcium)     SECTION      HERNIA REPAIR      HYSTERECTOMY         Family History  No family history on file.    Social  Social History     Socioeconomic History    Marital status:    Tobacco Use    Smoking status: Former     Current packs/day: 0.00     Average packs/day: 1 pack/day for 20.0 years (20.0 ttl pk-yrs)     Types: Cigarettes     Quit date: 2021     Years since quitting: 3.7     Passive exposure: Past    Smokeless tobacco: Never   Substance and Sexual Activity    Alcohol use: Not Currently     Alcohol/week: 1.0 standard drink of alcohol     Types: 1 Glasses of wine per week    Drug use: No    Sexual activity: Not Currently     Partners: Male     Social Drivers of Health     Financial Resource Strain: Medium Risk (2024)    Overall Financial Resource Strain (CARDIA)     Difficulty of Paying Living Expenses: Somewhat hard   Food Insecurity: No Food Insecurity (2024)    Hunger Vital Sign     Worried About Running Out of Food in the Last Year: Never true     Ran Out of Food in the Last Year: Never true   Transportation Needs: No Transportation Needs (2024)    TRANSPORTATION NEEDS     Transportation : No   Physical Activity: Inactive (2024)    Exercise Vital Sign     Days of Exercise per Week: 0 days     Minutes of Exercise per Session: 0 min   Stress: Stress Concern Present (2024)    [employer] - Occupational Stress Questionnaire     Feeling of Stress : Very much   Housing Stability: Low Risk  (2024)    Housing Stability Vital Sign     Unable to Pay for Housing in the Last Year: No     Homeless in the Last Year: No       Allergies  Review of patient's allergies indicates:   Allergen Reactions    Codeine Nausea And Vomiting    Lipitor [atorvastatin] Other (See Comments)     Muscle fatigue      Morphine Hallucinations       ROS- As per  HPI, otherwise negative  Review of Systems   Constitutional:  Negative for chills, fever and weight loss.   HENT:  Negative for sore throat.    Eyes:  Negative for blurred vision.   Respiratory:  Positive for cough, sputum production and shortness of breath.    Cardiovascular:  Negative for chest pain and palpitations.   Gastrointestinal:  Negative for constipation, diarrhea, nausea and vomiting.   Genitourinary:  Negative for dysuria and flank pain.   Neurological:  Negative for dizziness, weakness and headaches.       Objective  Vitals:    09/28/24 2138 09/28/24 2139 09/28/24 2353 09/29/24 0359   BP:       Pulse: 96 100 94 76   Resp: (!) 28 (!) 28 (!) 30 18   Temp:       TempSrc:       SpO2: (!) 93% 95% 97% 96%   Weight:       Height:         Vitals(Most Recent)      Temp: 99.1 °F (37.3 °C) (09/28/24 1855)  Pulse: 76 (09/29/24 0359)  Resp: 18 (09/29/24 0359)  BP: (!) 147/65 (09/28/24 2034)  SpO2: 96 % (09/29/24 0359)           Vitals Range  Temp:  [99.1 °F (37.3 °C)]   Pulse:  []   Resp:  [18-48]   BP: (144-185)/(63-84)   SpO2:  [63 %-99 %]     Physical Exam  Vitals reviewed.   Constitutional:       General: She is not in acute distress.     Appearance: Normal appearance. She is not ill-appearing or toxic-appearing.   HENT:      Head: Normocephalic.      Nose: Nose normal.   Eyes:      Conjunctiva/sclera: Conjunctivae normal.   Cardiovascular:      Rate and Rhythm: Normal rate and regular rhythm.      Heart sounds: Normal heart sounds. No murmur heard.     No friction rub. No gallop.   Pulmonary:      Effort: No respiratory distress.      Breath sounds: No stridor. Wheezing and rhonchi (lung bases) present.      Comments: On BIPAP  Abdominal:      General: Abdomen is flat. There is no distension.      Palpations: Abdomen is soft.      Tenderness: There is no abdominal tenderness.   Musculoskeletal:         General: No swelling, deformity or signs of injury. Normal range of motion.      Right lower leg: No  edema.      Left lower leg: No edema.   Skin:     General: Skin is warm and dry.   Neurological:      General: No focal deficit present.      Mental Status: She is alert. Mental status is at baseline.     Labs  [unfilled]  Lab Results   Component Value Date    INR 1.0 05/03/2022     Recent Labs     09/28/24  1931   TROPONINI <0.006       Imaging  Imaging Results              X-Ray Chest 1 View (Final result)  Result time 09/28/24 19:53:44      Final result by Wilma Garcia MD (09/28/24 19:53:44)                   Impression:      Left basilar opacities and costophrenic angle blunting is unchanged.    No acute abnormality.      Electronically signed by: Wilma Garcia  Date:    09/28/2024  Time:    19:53               Narrative:    EXAMINATION:  XR CHEST 1 VIEW    CLINICAL HISTORY:  shortness of breath;    TECHNIQUE:  Single frontal view of the chest was performed.    COMPARISON:  07/20/2024 chest X    FINDINGS:  Cardiac silhouette is stable and nonenlarged.  There is left basilar opacification and mild costophrenic angle blunting again noted.  Lungs otherwise appear clear.  Degenerative changes of the spine and shoulders noted.                                        Assessment/Plan:  Terri Phelan is a 69 y.o. female patient w/ PMHx of COPD, HTN, CHF, CAD presenting for COPD exacerbation.      Neuro/Psych  No acute issues at this time    Anxiety  Continue home Escitalopram 10mg daily and Hydroxyzine 25mg PRN  Continue to monitor    CV  Most recent BP: 147/65  Continue home meds- Losartan 40mg    CAD  Continue home aspirin 81mg daily and Ezetimibe daily    Heart Failure:  Last echo July 2023 EF 65%  BNP 27  Exam with crackles in lower lung bases, no lower extremity edema  Continue home PO lasix 40mg daily  Daily Weights  Strict I/O  Fluid restriction to 1,500cc daily  Low-sodium cardiac diet  Reassessing volume status regularly  Oxygen PRN to keep O2 > 88%      Pulm  CXR 9/28: Left basilar opacities and  "costophrenic angle blunting is unchanged. No acute abnormality.    ABG:   Recent Labs   Lab 09/29/24  0606   PH 7.212*   PCO2 99.7*   PO2 25.2*   HCO3 40.0*   POCSATURATED 38.4*     Acute on chronic hypercapnic respiratory failure  COPD exacerbation in setting of Community Acquired Pneumonia   Diminished breath sounds, wheezing on exam. COPD Gold Group 4E, follows with pulmonology outpatient. At home takes azithromycin MWF.    Plan:  Wean BIPAP--can transition to nightly and with naps as tolerated  Wean FiO2 to keep O2 > 88%  - Continue home roflumilast  - Duonebs q4hrs  - Ceftriaxone QD and Doxycline BID (9/28), can try lactobacillus for diarrhea,   - prednisone 40mg daily  - Guaifenesin PRN for cough  Upon discharge:   Consider switch to volume ventilator upon discharge, such as Trilogy  Continue pulm rehab and following pulmonology    FEN/GI  Diet: Cardiac  Fluid restriction   Keep Mg 2, K 4  Zofran prn for n/v     Renal  BUN/Cr: 12/0.7,   I/O  No intake/output data recorded.    Intake/Output Summary (Last 24 hours) at 9/29/2024 0656  Last data filed at 9/29/2024 0016  Gross per 24 hour   Intake 100 ml   Output --   Net 100 ml     Plan:  Strict I&Os  Continue to monitor renal status and urine output    Heme  H/H 12.8/43.7; stable  WBC 6.28  DVT prophylaxis: Lovenox    Endo  No known issues at this time.  Maintain glucose 140-180  Will keep on SSI in setting of prednisone use.  HgbA1C:   Lab Results   Component Value Date    HGBA1C 5.4 06/16/2023   , Last Gluc: No results for input(s): "POCTGLUCOSE" in the last 168 hours.      ID  CAP coverage and COPD pathway. Pt takes azithromycin at home already.  Pending Sputum cx   Will defer respiratory panel at this time as they are currently limited and would likely not   Continue ABX Ceftriaxone 1g and Doxycycline 100mg BID. Started Night 9/28    Micro  Microbiology Results (last 7 days)       Procedure Component Value Units Date/Time    Culture, " Respiratory with Gram Stain [[id number]]     Order Status: No result Specimen: Respiratory     Influenza A & B by Molecular [[id number]] Collected: 09/28/24 1948    Order Status: Completed Specimen: Nasopharyngeal Swab Updated: 09/28/24 2033     Influenza A, Molecular Negative     Influenza B, Molecular Negative     Flu A & B Source Nasal swab               DVT PPx: Lovenox  Diet: Cardiac Diet, 1500 fluid restriction  Deconditioning: PT/OT  Code Status: Full    ICU Checklist  Feeding: Cardiac Diet  Analgesia: Robaxin PRN, tylenol PRN  Sedation: None  Thromboprophylaxis: Lovenox  Head up position: 30 deg  Ulcer prophylaxis: Not indicated  Glycemic control: 140-180  Spontaneous Breathing Trial: Not applicable  Bowel care: PRN Miralax and Senna/doc  Indwelling catheter removal: 1 PIV and purewick  De-escalation of antibiotics: Cefriaxone and Doxycycline indicated at this time      Dispo: Likely step down from ICU today, will likely need continued monitoring for next couple days in setting of pneumonia.      9/29/2024 Lindsay Banegas M.D., PGY-1  [facility]  [facility]

## 2024-09-29 NOTE — ED NOTES
Pt in bed resting comfortably. Pt denies any needs at this time. Bed locked and in the lowest position, side rails down x1, call bell in reach.

## 2024-09-29 NOTE — ASSESSMENT & PLAN NOTE
Patient with known CAD, which is controlled Will continue ASA and ezetimibe monitor for S/Sx of angina/ACS. Continue to monitor on telemetry. Per chart review, pt unable to tolerate statin.

## 2024-09-29 NOTE — NURSING TRANSFER
Nursing Transfer Note      9/29/2024   8:00 AM    Nurse giving handoff:ELLIE Pete   Nurse receiving handoff:ELLIE Teran    Reason patient is being transferred: Transfer to ICU     Transfer From: ED    Transfer via stretcher    Transfer with cardiac monitoring    Transported by ED RN and Tech    Transfer Vital Signs:  Blood Pressure:174/89  Heart Rate:76  O2:94% Bipap  Temperature:96.9  Respirations:33    Medicines sent: No    Patient belongings transferred with patient: Yes, cellphone and shoes    Chart send with patient: Yes    Notified: daughter    Upon arrival to floor: Receiving nurse at bedside, cardiac monitor applied, additional PIV placed, external cath connected to suctioning, patient oriented to room, call bell in reach, and bed armed in lowest position.

## 2024-09-29 NOTE — PROGRESS NOTES
This is an attestation of a separate note by the ICU resident/fellow. As the teaching physician, I saw the patient with the resident/fellow and agree with the resident/fellow's findings and plan. In addition to the resident/fellow's documentation, I add the following:     Ms. Phelan is a 69 year old female with PMH of COPD (on 3L NC at home), HTN, anxiety, who presented 9/28 for cough and worsening sputum production. In the ED, she was found to have acute on chronic hypercapnic respiratory failure, placed on BiPAP, and admitted to the ICU.     9/29: Awake and alert on BiPAP, is still wheezing. Suspect CAP triggering a COPD exacerbation. Would check RIP, continue rocephin + doxy for CAP given infiltrate in LLL. Agree with steroids for COPD exac. With evidence of chronic hypercapnia on blood gases despite adherence with nightly BiPAP (18/8) - would consider changing to a volume ventilator (trilogy) instead as an OP. Sputum cx ordered and pending. Could consider RIP, although would not necessarily . Follows with Dr. Cunningham as an OP and is on advanced therapies - chronic azithro, roflumilast, pulm rehab, NIV.     ICU Checklist:    Feeding: Cardiac diet   Analgesia: Tylenol   Sedation: None  Thromboprophylaxis:Lovenox  HOB: Elevated   Ulcer ppx: Not indicated  Glucose: Add accuchecks - at goal  SAT/SBT: Not indicated  Bowel: Monitor  Indwelling lines: PIV x 2  De-escalation of abx: Rocephin/doxy x 5 days  Fluid balance: +340   PT/OT: Consult   GOC/Family discussion: FC - discuss (has been DNR in the past)  Dispo: Can likely step down this afternoon     Lesvia Ortez MD  [facility] Attending  Cell: [phone]  09/29/2024 9:37 AM    Critical Care time was spent validating the history and physical exam, reviewing the lab and imaging results, and discussing the care of the patient with the bedside nurse and the patient and/or surrogates. This critical care time did not overlap with that of any other  provider or involve time for any procedures.  This patient has a high probability of sudden clinically significant deterioration which requires the highest level of physician preparedness to intervene urgently. I managed/supervised life or organ supporting interventions that required frequent physician assessments. I devoted my full attention in the ICU to the direct care of this patient for this period of time. Organ systems which are failing and require intensive, critical care support are: Pulm, cards  Critical Care time: 60 minutes

## 2024-09-29 NOTE — ED NOTES
Pt laying in bed resting with eyes closed. Arousable to voice. Bipap mask in place. Denies any needs at this time. Bed locked and in the lowest position, side rails up x2, call bell in reach.

## 2024-09-29 NOTE — ASSESSMENT & PLAN NOTE
"Patient's COPD is with exacerbation noted by continued dyspnea and worsening of baseline hypoxia currently.  Patient is currently on COPD Pathway. Continue scheduled inhalers Steroids, Antibiotics, and Supplemental oxygen and monitor respiratory status closely.     See "Acute on chronic respiratory failure with hypoxia and hypercapnia"  "

## 2024-09-29 NOTE — HPI
Patient is a 68 yo Female w/ PMHx of COPD on 3L, HTN, Anxiety. Presenting for shortness of breath since last night. Endorses occasional cough with white sputum production. Pt feels that she has been feeling more tired this week since her flu shot on 9/20 however has not had increased oxygen requirements. Says that she uses BIPAP every night and with naps. Pt lives at home by herself and performs ADLs independently. Denies fevers, nausea, vomiting, diarrhea, constipation, chest pain.    In the ED, initial vital signs /84, HR 98, Temp 99.1, SpO2 99% on 3L. Labs include CBC with stable H/H 12.8/43.7 and WBC within normal limits 6.28. BUN/Cr 12/0.7. CXR lefty basilar opacities and costophrenic angle blunting unchanged from previous. BNP 27, Trop <0.0006. VBG pH 7.26, pCO2 101, bicarb 45 Covid and Flu negative. Initiated on duoneb, doxycycline (pt declined CTX), oxygen saturation had dipped to 63% and patient initiated on non rebreather and transitioned to BIPAP in ED. Patient has been stable on BIPAP. [facility] consulted for evaluation for admission for COPD exacerbation.

## 2024-09-29 NOTE — HPI
Patient is a 70 yo Female w/ PMHx of COPD on 3L, HTN, Anxiety. Presenting for shortness of breath since last night. Endorses occasional cough with white sputum production. Pt feels that she has been feeling more tired this week since her flu shot on 9/20 however has not had increased oxygen requirements. Says that she uses BIPAP every night and with naps. Pt lives at home by herself and performs ADLs independently. Denies fevers, nausea, vomiting, diarrhea, constipation, chest pain.     In the ED, initial vital signs /84, HR 98, Temp 99.1, SpO2 99% on 3L. Labs include CBC with stable H/H 12.8/43.7 and WBC within normal limits 6.28. BUN/Cr 12/0.7. CXR lefty basilar opacities and costophrenic angle blunting unchanged from previous. BNP 27, Trop <0.0006. VBG pH 7.26, pCO2 101, bicarb 45 Covid and Flu negative. Initiated on duoneb, doxycycline (pt declined CTX), oxygen saturation had dipped to 63% and patient initiated on non rebreather and transitioned to BIPAP in ED. Patient has been stable on BIPAP. [facility] consulted for evaluation for admission for COPD exacerbation.    09/29: Admitted to ICU on BiPAP. Pulmonary consulted COPD exacerbation on BiPAP.

## 2024-09-29 NOTE — CONSULTS
[facility] - Intensive Care  Pulmonology  Consult Note    Patient Name: Terri Phelan  MRN: [id number]  Admission Date: 9/28/2024  Hospital Length of Stay: 0 days  Code Status: Full Code  Attending Physician: Tirso Valenzuela,*  Primary Care Provider: Sussy Mason MD   Principal Problem: Acute on chronic respiratory failure with hypoxia and hypercapnia    Inpatient consult to Pulmonology  Consult performed by: Kiran Morrow MD  Consult ordered by: Lindsay Banegas MD        Subjective:     HPI:  Patient is a 70 yo Female w/ PMHx of COPD on 3L, HTN, Anxiety. Presenting for shortness of breath since last night. Endorses occasional cough with white sputum production. Pt feels that she has been feeling more tired this week since her flu shot on 9/20 however has not had increased oxygen requirements. Says that she uses BIPAP every night and with naps. Pt lives at home by herself and performs ADLs independently. Denies fevers, nausea, vomiting, diarrhea, constipation, chest pain.     In the ED, initial vital signs /84, HR 98, Temp 99.1, SpO2 99% on 3L. Labs include CBC with stable H/H 12.8/43.7 and WBC within normal limits 6.28. BUN/Cr 12/0.7. CXR lefty basilar opacities and costophrenic angle blunting unchanged from previous. BNP 27, Trop <0.0006. VBG pH 7.26, pCO2 101, bicarb 45 Covid and Flu negative. Initiated on duoneb, doxycycline (pt declined CTX), oxygen saturation had dipped to 63% and patient initiated on non rebreather and transitioned to BIPAP in ED. Patient has been stable on BIPAP. [facility] consulted for evaluation for admission for COPD exacerbation.    09/29: Admitted to ICU on BiPAP. Pulmonary consulted COPD exacerbation on BiPAP.    Past Medical History:   Diagnosis Date    Acute exacerbation of chronic obstructive pulmonary disease (COPD) 12/19/2023    Recently hospitalized at [facility] from 5/14 to 5/20/2024 for exacerbation.  Did not require ICU care or significant  escalation of intervention(s).      Adenoma of ascending colon 2022    Anxiety     COPD (chronic obstructive pulmonary disease)     COPD exacerbation 2014    Heart failure     Hypertension     Major depression 2023       Past Surgical History:   Procedure Laterality Date    BREAST BIOPSY N/A     pt unsure which breast but it was benign-(calcium)     SECTION      HERNIA REPAIR      HYSTERECTOMY         Review of patient's allergies indicates:   Allergen Reactions    Codeine Nausea And Vomiting    Lipitor [atorvastatin] Other (See Comments)     Muscle fatigue      Morphine Hallucinations       Family History    None       Tobacco Use    Smoking status: Former     Current packs/day: 0.00     Average packs/day: 1 pack/day for 20.0 years (20.0 ttl pk-yrs)     Types: Cigarettes     Quit date: 2021     Years since quitting: 3.7     Passive exposure: Past    Smokeless tobacco: Never   Substance and Sexual Activity    Alcohol use: Not Currently     Alcohol/week: 1.0 standard drink of alcohol     Types: 1 Glasses of wine per week    Drug use: No    Sexual activity: Not Currently     Partners: Male         Review of Systems   Constitutional:  Negative for chills and fatigue.   Respiratory:  Positive for cough, shortness of breath and wheezing.    Cardiovascular:  Negative for chest pain and leg swelling.   Gastrointestinal:  Negative for nausea and vomiting.   Genitourinary:  Negative for dysuria.   Neurological:  Negative for headaches.   Psychiatric/Behavioral:  Negative for agitation and confusion.      Objective:     Vital Signs (Most Recent):  Temp: 96.9 °F (36.1 °C) (24 0750)  Pulse: 73 (24 0800)  Resp: 19 (24 0800)  BP: (!) 151/71 (24 0847)  SpO2: (!) 94 % (24 0800) Vital Signs (24h Range):  Temp:  [96.9 °F (36.1 °C)-99.1 °F (37.3 °C)] 96.9 °F (36.1 °C)  Pulse:  [] 73  Resp:  [18-48] 19  SpO2:  [63 %-99 %] 94 %  BP: (144-185)/(63-89) 151/71     Weight: 80  kg (176 lb 5.9 oz)  Body mass index is 28.47 kg/m².      Intake/Output Summary (Last 24 hours) at 9/29/2024 0939  Last data filed at 9/29/2024 0016  Gross per 24 hour   Intake 100 ml   Output --   Net 100 ml        Physical Exam  Vitals reviewed.   Constitutional:       General: She is not in acute distress.     Appearance: Normal appearance. She is not ill-appearing.   HENT:      Head: Normocephalic and atraumatic.   Eyes:      Conjunctiva/sclera: Conjunctivae normal.   Cardiovascular:      Rate and Rhythm: Normal rate and regular rhythm.      Pulses: Normal pulses.      Heart sounds: No murmur heard.  Pulmonary:      Effort: Pulmonary effort is normal. No respiratory distress.      Breath sounds: Wheezing present. No rhonchi.   Abdominal:      General: Bowel sounds are normal. There is no distension.   Musculoskeletal:      Right lower leg: No edema.      Left lower leg: No edema.   Neurological:      General: No focal deficit present.      Mental Status: She is alert and oriented to person, place, and time.   Psychiatric:         Mood and Affect: Mood normal.         Behavior: Behavior normal.          Vents:  Oxygen Concentration (%): 35 (09/29/24 0750)    Lines/Drains/Airways       Drain  Duration             Female External Urinary Catheter w/ Suction 09/28/24 2028 <1 day              Peripheral Intravenous Line  Duration                  Peripheral IV - Single Lumen 09/28/24 1936 20 G Right Upper Arm <1 day                    Significant Labs:    CBC/Anemia Profile:  Recent Labs   Lab 09/28/24 1931 09/29/24  0707   WBC 6.28 6.15   HGB 12.8 12.5   HCT 43.7 43.3    153   MCV 93 93   RDW 13.5 13.5        Chemistries:  Recent Labs   Lab 09/28/24 1931 09/29/24  0707   * 144   K 4.2 4.5    99   CO2 38* 35*   BUN 12 14   CREATININE 0.7 0.7   CALCIUM 9.5 9.4   ALBUMIN 3.6 3.5   PROT 7.4 7.3   BILITOT 0.2 0.2   ALKPHOS 57 51*   ALT 19 17   AST 21 17   MG  --  1.9   PHOS  --  3.2       All  pertinent labs within the past 24 hours have been reviewed.    Significant Imaging:   I have reviewed all pertinent imaging results/findings within the past 24 hours.    Narrative & Impression  EXAMINATION:  XR CHEST 1 VIEW     CLINICAL HISTORY:  shortness of breath;     TECHNIQUE:  Single frontal view of the chest was performed.     COMPARISON:  07/20/2024 chest X     FINDINGS:  Cardiac silhouette is stable and nonenlarged.  There is left basilar opacification and mild costophrenic angle blunting again noted.  Lungs otherwise appear clear.  Degenerative changes of the spine and shoulders noted.     Impression:     Left basilar opacities and costophrenic angle blunting is unchanged.     No acute abnormality.        Electronically signed by:Wilma Garcia  Date:                                            09/28/2024  Time:                                           19:53        Exam Ended: 09/28/24 19:39 CDT Last Resulted: 09/28/24 19:53 CDT     PFT of 10/10/2023:  Spirometry shows very severe obstruction. Lung volume determination shows moderate restriction is also present. Overall there is very severe ventilatory impairment. In addition, the increased RV/TLC ratio suggests air trapping. Airway mechanics are   abnormal showing increased airway resistance and decreased conductance. DLCO is severely decreased.     TTE of 07/02/2023:  The left ventricle is normal in size with normal systolic function.  The estimated ejection fraction is 65%.  Normal left ventricular diastolic function.  With normal right ventricular systolic function.  The estimated PA systolic pressure is 37 mmHg.  Intermediate central venous pressure (8 mmHg).    FINDINGS:  Cardiac silhouette is stable and nonenlarged.  There is left basilar opacification and mild costophrenic angle blunting again noted.  Lungs otherwise appear clear.  Degenerative changes of the spine and shoulders noted.     Impression:     Left basilar opacities and costophrenic  angle blunting is unchanged.     No acute abnormality.        Electronically signed by:Wilma Garcia  Date:                                            09/28/2024  Time:                                           19:53        Exam Ended: 09/28/24 19:39 CDT Last Resulted: 09/28/24 19:53 CDT      Assessment/Plan:   Acute on chronic hypoxemia and hypercapnic respiratory failure  Due to COPD exacerbation and pneumonia  Doubt pulmonary edema/heart failure (euvolemic on exam and negative BNP)  Continue systemic steroid, Duo Nebs and antibiotics for CAP coverage  Continue BiPAP at night and for increase work of breathing  Monitor respiratory status  Wean FiO2 as tolerated     COPD  With severe obstruction  Management as above  Continue home inhalers, roflumilast and azithromycin    .Feeding/fluids: Cardiac diet   Analgesia: N/A  Sedation: N/A  Thromboprophylaxis: Enoxaparin  Head up position: Yes  Ulcer prophylaxis: N/A  Glycemic control: At goal  Spontaneous breathing trial: N/A  Bowel care: Monitor  Indwelling catheter removal: Peripheral  Deescalation of antibiotics: N/A    Patient was seen with the attending physician Dr. Lesvia Ortez MD.       Kiran Morrow MD  Pulmonology  [facility] - Intensive Care

## 2024-09-29 NOTE — ED PROVIDER NOTES
Encounter Date: 2024       History     Chief Complaint   Patient presents with    Shortness of Breath     Called 911 for SOB. 88% on home 3 L NC upon EMS arrival. Given duoneb and IM steroids. Wears 3 L NC baseline. Improvement with breathing treatment.      69 y.o. female with COPD, anxiety, heart failure, HTN presents via EMS for shortness of breath.  Patient reports shortness of breath worsening since last night.  She is on 3 L nasal cannula at baseline.  Per EMS, SpO2 was 88% on 3 L on their arrival.  She received 125 mg Solu-Medrol and DuoNeb prior to arrival with improvement in her work of breathing.  She continues to have shortness of breath.  She denies any chest pain, fevers, abdominal pain, nausea/vomiting.  She does report some increased sputum production    The history is provided by the patient, the EMS personnel and medical records.     Review of patient's allergies indicates:   Allergen Reactions    Codeine Nausea And Vomiting    Lipitor [atorvastatin] Other (See Comments)     Muscle fatigue      Morphine Hallucinations     Past Medical History:   Diagnosis Date    Acute exacerbation of chronic obstructive pulmonary disease (COPD) 2023    Recently hospitalized at [facility] from  to 2024 for exacerbation.  Did not require ICU care or significant escalation of intervention(s).      Adenoma of ascending colon 2022    Anxiety     COPD (chronic obstructive pulmonary disease)     COPD exacerbation 2014    Heart failure     Hypertension     Major depression 2023     Past Surgical History:   Procedure Laterality Date    BREAST BIOPSY N/A     pt unsure which breast but it was benign-(calcium)     SECTION      HERNIA REPAIR      HYSTERECTOMY       No family history on file.  Social History     Tobacco Use    Smoking status: Former     Current packs/day: 0.00     Average packs/day: 1 pack/day for 20.0 years (20.0 ttl pk-yrs)     Types: Cigarettes     Quit date:  1/1/2021     Years since quitting: 3.7     Passive exposure: Past    Smokeless tobacco: Never   Substance Use Topics    Alcohol use: Not Currently     Alcohol/week: 1.0 standard drink of alcohol     Types: 1 Glasses of wine per week    Drug use: No     Review of Systems   Reason unable to perform ROS: See HPI for relevant ROS.       Physical Exam     Initial Vitals [09/28/24 1855]   BP Pulse Resp Temp SpO2   (!) 185/84 98 (!) 22 99.1 °F (37.3 °C) 99 %      MAP       --         Physical Exam    Nursing note and vitals reviewed.  Constitutional:   Alert, increased work of breathing, speaking full sentences   Eyes: Conjunctivae are normal. No scleral icterus.   Cardiovascular:  Normal rate, regular rhythm and intact distal pulses.           Pulmonary/Chest: No stridor.   Diminished breath sounds bilaterally, tachypneic, accessory muscle use   Musculoskeletal:         General: No edema.     Neurological: She is alert.   Skin: Skin is warm and dry.         ED Course   Procedures  Labs Reviewed   CBC W/ AUTO DIFFERENTIAL - Abnormal       Result Value    WBC 6.28      RBC 4.70      Hemoglobin 12.8      Hematocrit 43.7      MCV 93      MCH 27.2      MCHC 29.3 (*)     RDW 13.5      Platelets 170      MPV 10.8      Immature Granulocytes 0.3      Gran # (ANC) 2.5      Immature Grans (Abs) 0.02      Lymph # 2.4      Mono # 1.1 (*)     Eos # 0.2      Baso # 0.05      nRBC 0      Gran % 40.0      Lymph % 38.7      Mono % 16.7 (*)     Eosinophil % 3.5      Basophil % 0.8      Differential Method Automated     INFLUENZA A & B BY MOLECULAR   COMPREHENSIVE METABOLIC PANEL   TROPONIN I   B-TYPE NATRIURETIC PEPTIDE   SARS-COV-2 RNA AMPLIFICATION, QUAL          Imaging Results              X-Ray Chest 1 View (Final result)  Result time 09/28/24 19:53:44      Final result by Wilma Garcia MD (09/28/24 19:53:44)                   Impression:      Left basilar opacities and costophrenic angle blunting is unchanged.    No acute  abnormality.      Electronically signed by: Wilma Garcia  Date:    09/28/2024  Time:    19:53               Narrative:    EXAMINATION:  XR CHEST 1 VIEW    CLINICAL HISTORY:  shortness of breath;    TECHNIQUE:  Single frontal view of the chest was performed.    COMPARISON:  07/20/2024 chest X    FINDINGS:  Cardiac silhouette is stable and nonenlarged.  There is left basilar opacification and mild costophrenic angle blunting again noted.  Lungs otherwise appear clear.  Degenerative changes of the spine and shoulders noted.                                       Medications   cefTRIAXone (Rocephin) 1 g in D5W 100 mL IVPB (MB+) (has no administration in time range)   albuterol-ipratropium 2.5 mg-0.5 mg/3 mL nebulizer solution 3 mL (3 mLs Nebulization Given 9/28/24 1928)   doxycycline tablet 100 mg (100 mg Oral Given 9/28/24 2001)   hydrOXYzine pamoate capsule 25 mg (25 mg Oral Given 9/28/24 2001)     Medical Decision Making  69 y.o. female with COPD, anxiety, heart failure, HTN presents via EMS for shortness of breath  Differentials include COPD exacerbation, pneumonia, viral illness, metabolic derangement, CHF exacerbation, less likely PE  Patient presenting with diminished breath sounds, tachypnea, accessory muscle use, consistent with COPD exacerbation.  She is on 3 L nasal cannula at baseline.  SpO2 89% on 3 L at on arrival.  DuoNebs ordered, steroids given by EMS  Nontoxic appearing, afebrile, no focal rales  No chest pain, no tachycardia  EKG with no evidence of acute ischemia    Amount and/or Complexity of Data Reviewed  Labs: ordered.  Radiology: ordered.  ECG/medicine tests:  Decision-making details documented in ED Course.    Risk  Prescription drug management.               ED Course as of 09/28/24 2001   Sat Sep 28, 2024   1918 EKG 12-lead  Findings, per independent interpretation:  Sinus rhythm, regular, narrow complex, rate of 91, no STEMI, no significant ST deviations, PVCs no longer present, no other  significant change compared to prior [OK]   1935 VBG shows Partially compensated respiratory acidosis, pH 7.26, pCO2 101, bicarb 45 [OK]   1944 X-Ray Chest 1 View  Findings, per independent interpretation:  Mildly prominent  diffuse bilateral opacities, left lower lung with chronic appearing blunting of the costophrenic angle, no clear focal consolidation [OK]   1948 CBC Auto Differential(!)  No leukocytosis or anemia [OK]   1955 Patient care handed off to oncoming team pending labs and final dispo [OK]      ED Course User Index  [OK] Lukasz Garcia MD                             Clinical Impression:  Final diagnoses:  [R06.02] Shortness of breath  [J44.1] COPD exacerbation (Primary)                 Lukasz Garcia MD  09/28/24 2001

## 2024-09-30 LAB
ALBUMIN SERPL BCP-MCNC: 3.3 G/DL (ref 3.5–5.2)
ALP SERPL-CCNC: 47 U/L (ref 55–135)
ALT SERPL W/O P-5'-P-CCNC: 15 U/L (ref 10–44)
ANION GAP SERPL CALC-SCNC: 11 MMOL/L (ref 8–16)
AST SERPL-CCNC: 17 U/L (ref 10–40)
BASOPHILS # BLD AUTO: 0.02 K/UL (ref 0–0.2)
BASOPHILS NFR BLD: 0.2 % (ref 0–1.9)
BILIRUB SERPL-MCNC: 0.1 MG/DL (ref 0.1–1)
BUN SERPL-MCNC: 18 MG/DL (ref 8–23)
CALCIUM SERPL-MCNC: 9.2 MG/DL (ref 8.7–10.5)
CHLORIDE SERPL-SCNC: 98 MMOL/L (ref 95–110)
CO2 SERPL-SCNC: 37 MMOL/L (ref 23–29)
CREAT SERPL-MCNC: 0.7 MG/DL (ref 0.5–1.4)
DIFFERENTIAL METHOD BLD: ABNORMAL
EOSINOPHIL # BLD AUTO: 0 K/UL (ref 0–0.5)
EOSINOPHIL NFR BLD: 0.1 % (ref 0–8)
ERYTHROCYTE [DISTWIDTH] IN BLOOD BY AUTOMATED COUNT: 13.9 % (ref 11.5–14.5)
EST. GFR  (NO RACE VARIABLE): >60 ML/MIN/1.73 M^2
FIO2: 21 %
FIO2: 28 %
GLUCOSE SERPL-MCNC: 94 MG/DL (ref 70–110)
HCT VFR BLD AUTO: 40 % (ref 37–48.5)
HGB BLD-MCNC: 11.9 G/DL (ref 12–16)
IMM GRANULOCYTES # BLD AUTO: 0.04 K/UL (ref 0–0.04)
IMM GRANULOCYTES NFR BLD AUTO: 0.3 % (ref 0–0.5)
LPM: 2
LYMPHOCYTES # BLD AUTO: 2.4 K/UL (ref 1–4.8)
LYMPHOCYTES NFR BLD: 18.1 % (ref 18–48)
MAGNESIUM SERPL-MCNC: 2 MG/DL (ref 1.6–2.6)
MCH RBC QN AUTO: 27.4 PG (ref 27–31)
MCHC RBC AUTO-ENTMCNC: 29.8 G/DL (ref 32–36)
MCV RBC AUTO: 92 FL (ref 82–98)
MONOCYTES # BLD AUTO: 1.3 K/UL (ref 0.3–1)
MONOCYTES NFR BLD: 10 % (ref 4–15)
NEUTROPHILS # BLD AUTO: 9.5 K/UL (ref 1.8–7.7)
NEUTROPHILS NFR BLD: 71.3 % (ref 38–73)
NRBC BLD-RTO: 0 /100 WBC
OHS QRS DURATION: 68 MS
OHS QTC CALCULATION: 423 MS
PCO2 BLDA: 101 MMHG (ref 35–45)
PCO2 BLDA: 68.7 MMHG (ref 35–45)
PH SMN: 7.26 [PH] (ref 7.35–7.45)
PH SMN: 7.4 [PH] (ref 7.35–7.45)
PHOSPHATE SERPL-MCNC: 2 MG/DL (ref 2.7–4.5)
PLATELET # BLD AUTO: 154 K/UL (ref 150–450)
PMV BLD AUTO: 12.5 FL (ref 9.2–12.9)
PO2 BLDA: 25.8 MMHG (ref 40–60)
PO2 BLDA: 43 MMHG (ref 40–60)
POC BASE DEFICIT: 13.7 MMOL/L (ref -2–2)
POC BASE DEFICIT: 14.4 MMOL/L (ref -2–2)
POC HCO3: 42.5 MMOL/L (ref 24–28)
POC HCO3: 45.5 MMOL/L (ref 24–28)
POC PERFORMED BY: ABNORMAL
POC PERFORMED BY: ABNORMAL
POC SATURATED O2: 34.5 % (ref 95–100)
POC SATURATED O2: 80.6 % (ref 95–100)
POCT GLUCOSE: 124 MG/DL (ref 70–110)
POCT GLUCOSE: 127 MG/DL (ref 70–110)
POCT GLUCOSE: 163 MG/DL (ref 70–110)
POCT GLUCOSE: 89 MG/DL (ref 70–110)
POTASSIUM SERPL-SCNC: 3.8 MMOL/L (ref 3.5–5.1)
PROT SERPL-MCNC: 6.6 G/DL (ref 6–8.4)
RBC # BLD AUTO: 4.34 M/UL (ref 4–5.4)
SODIUM SERPL-SCNC: 146 MMOL/L (ref 136–145)
SPECIMEN SOURCE: ABNORMAL
SPECIMEN SOURCE: ABNORMAL
WBC # BLD AUTO: 13.27 K/UL (ref 3.9–12.7)

## 2024-09-30 PROCEDURE — 97165 OT EVAL LOW COMPLEX 30 MIN: CPT | Mod: HCNC

## 2024-09-30 PROCEDURE — 97535 SELF CARE MNGMENT TRAINING: CPT | Mod: HCNC

## 2024-09-30 PROCEDURE — 82803 BLOOD GASES ANY COMBINATION: CPT | Mod: HCNC

## 2024-09-30 PROCEDURE — 94640 AIRWAY INHALATION TREATMENT: CPT | Mod: HCNC

## 2024-09-30 PROCEDURE — 25000242 PHARM REV CODE 250 ALT 637 W/ HCPCS: Mod: HCNC

## 2024-09-30 PROCEDURE — 63600175 PHARM REV CODE 636 W HCPCS: Mod: HCNC

## 2024-09-30 PROCEDURE — 84100 ASSAY OF PHOSPHORUS: CPT | Mod: HCNC

## 2024-09-30 PROCEDURE — 27000221 HC OXYGEN, UP TO 24 HOURS: Mod: HCNC

## 2024-09-30 PROCEDURE — 25000003 PHARM REV CODE 250: Mod: HCNC

## 2024-09-30 PROCEDURE — 87205 SMEAR GRAM STAIN: CPT | Mod: HCNC

## 2024-09-30 PROCEDURE — 97530 THERAPEUTIC ACTIVITIES: CPT | Mod: HCNC

## 2024-09-30 PROCEDURE — 80053 COMPREHEN METABOLIC PANEL: CPT | Mod: HCNC

## 2024-09-30 PROCEDURE — 36415 COLL VENOUS BLD VENIPUNCTURE: CPT | Mod: HCNC

## 2024-09-30 PROCEDURE — 99900035 HC TECH TIME PER 15 MIN (STAT): Mod: HCNC

## 2024-09-30 PROCEDURE — 94660 CPAP INITIATION&MGMT: CPT | Mod: HCNC

## 2024-09-30 PROCEDURE — 11000001 HC ACUTE MED/SURG PRIVATE ROOM: Mod: HCNC

## 2024-09-30 PROCEDURE — 87070 CULTURE OTHR SPECIMN AEROBIC: CPT | Mod: HCNC

## 2024-09-30 PROCEDURE — 97161 PT EVAL LOW COMPLEX 20 MIN: CPT | Mod: HCNC

## 2024-09-30 PROCEDURE — 94761 N-INVAS EAR/PLS OXIMETRY MLT: CPT | Mod: HCNC,XB

## 2024-09-30 PROCEDURE — 83735 ASSAY OF MAGNESIUM: CPT | Mod: HCNC

## 2024-09-30 PROCEDURE — 85025 COMPLETE CBC W/AUTO DIFF WBC: CPT | Mod: HCNC

## 2024-09-30 RX ORDER — GLYCOPYRROLATE 1 MG/1
1 TABLET ORAL 3 TIMES DAILY
Status: DISCONTINUED | OUTPATIENT
Start: 2024-09-30 | End: 2024-09-30

## 2024-09-30 RX ORDER — TALC
6 POWDER (GRAM) TOPICAL NIGHTLY
Status: DISCONTINUED | OUTPATIENT
Start: 2024-09-30 | End: 2024-10-01 | Stop reason: HOSPADM

## 2024-09-30 RX ORDER — FLUTICASONE FUROATE AND VILANTEROL 100; 25 UG/1; UG/1
1 POWDER RESPIRATORY (INHALATION) DAILY
Status: DISCONTINUED | OUTPATIENT
Start: 2024-10-01 | End: 2024-10-01 | Stop reason: HOSPADM

## 2024-09-30 RX ORDER — SODIUM,POTASSIUM PHOSPHATES 280-250MG
1 POWDER IN PACKET (EA) ORAL EVERY 6 HOURS
Status: COMPLETED | OUTPATIENT
Start: 2024-09-30 | End: 2024-09-30

## 2024-09-30 RX ADMIN — ASPIRIN 81 MG CHEWABLE TABLET 81 MG: 81 TABLET CHEWABLE at 08:09

## 2024-09-30 RX ADMIN — LACTOBACILLUS TAB 4 TABLET: TAB at 05:09

## 2024-09-30 RX ADMIN — ENOXAPARIN SODIUM 40 MG: 40 INJECTION SUBCUTANEOUS at 05:09

## 2024-09-30 RX ADMIN — IPRATROPIUM BROMIDE AND ALBUTEROL SULFATE 3 ML: 2.5; .5 SOLUTION RESPIRATORY (INHALATION) at 08:09

## 2024-09-30 RX ADMIN — LOSARTAN POTASSIUM 25 MG: 25 TABLET, FILM COATED ORAL at 08:09

## 2024-09-30 RX ADMIN — IPRATROPIUM BROMIDE AND ALBUTEROL SULFATE 3 ML: 2.5; .5 SOLUTION RESPIRATORY (INHALATION) at 02:09

## 2024-09-30 RX ADMIN — GUAIFENESIN 600 MG: 600 TABLET, EXTENDED RELEASE ORAL at 11:09

## 2024-09-30 RX ADMIN — DOXYCYCLINE HYCLATE 100 MG: 100 TABLET, COATED ORAL at 08:09

## 2024-09-30 RX ADMIN — POTASSIUM & SODIUM PHOSPHATES POWDER PACK 280-160-250 MG 1 PACKET: 280-160-250 PACK at 05:09

## 2024-09-30 RX ADMIN — ACETAMINOPHEN 650 MG: 325 TABLET ORAL at 02:09

## 2024-09-30 RX ADMIN — HYDROXYZINE HYDROCHLORIDE 25 MG: 25 TABLET, FILM COATED ORAL at 04:09

## 2024-09-30 RX ADMIN — CEFTRIAXONE SODIUM 1 G: 1 INJECTION, POWDER, FOR SOLUTION INTRAMUSCULAR; INTRAVENOUS at 10:09

## 2024-09-30 RX ADMIN — IPRATROPIUM BROMIDE AND ALBUTEROL SULFATE 3 ML: 2.5; .5 SOLUTION RESPIRATORY (INHALATION) at 11:09

## 2024-09-30 RX ADMIN — ESCITALOPRAM OXALATE 10 MG: 10 TABLET ORAL at 08:09

## 2024-09-30 RX ADMIN — IPRATROPIUM BROMIDE AND ALBUTEROL SULFATE 3 ML: 2.5; .5 SOLUTION RESPIRATORY (INHALATION) at 06:09

## 2024-09-30 RX ADMIN — PREDNISONE 40 MG: 20 TABLET ORAL at 08:09

## 2024-09-30 RX ADMIN — HYDROXYZINE HYDROCHLORIDE 25 MG: 25 TABLET, FILM COATED ORAL at 02:09

## 2024-09-30 RX ADMIN — EZETIMIBE 10 MG: 10 TABLET ORAL at 08:09

## 2024-09-30 RX ADMIN — LACTOBACILLUS TAB 4 TABLET: TAB at 11:09

## 2024-09-30 RX ADMIN — FUROSEMIDE 40 MG: 40 TABLET ORAL at 08:09

## 2024-09-30 RX ADMIN — IPRATROPIUM BROMIDE AND ALBUTEROL SULFATE 3 ML: 2.5; .5 SOLUTION RESPIRATORY (INHALATION) at 04:09

## 2024-09-30 RX ADMIN — MELATONIN TAB 3 MG 6 MG: 3 TAB at 08:09

## 2024-09-30 RX ADMIN — POTASSIUM & SODIUM PHOSPHATES POWDER PACK 280-160-250 MG 1 PACKET: 280-160-250 PACK at 11:09

## 2024-09-30 RX ADMIN — ROFLUMILAST 500 MCG: 500 TABLET ORAL at 08:09

## 2024-09-30 NOTE — SUBJECTIVE & OBJECTIVE
Interval History: No adverse events overnight. Patient asymptomatic. Remains hemodynamically and clinically stable at time of the exam. Patient stating she feels much better this Am than on admit.      Review of Systems   Constitutional:  Negative for chills and fever.   Respiratory:  Positive for cough, shortness of breath and wheezing. Negative for chest tightness.    Cardiovascular:  Negative for chest pain.   Gastrointestinal:  Negative for abdominal pain, constipation, diarrhea and nausea.   Genitourinary:  Negative for difficulty urinating.   Skin:  Negative for rash.     Objective:     Vital Signs (Most Recent):  Temp: 98.5 °F (36.9 °C) (09/30/24 1604)  Pulse: 81 (09/30/24 1607)  Resp: (!) 34 (09/30/24 1607)  BP: (!) 150/65 (09/30/24 1604)  SpO2: 96 % (09/30/24 1607) Vital Signs (24h Range):  Temp:  [97.5 °F (36.4 °C)-98.5 °F (36.9 °C)] 98.5 °F (36.9 °C)  Pulse:  [58-99] 81  Resp:  [18-57] 34  SpO2:  [86 %-98 %] 96 %  BP: (112-163)/() 150/65     Weight: 80 kg (176 lb 5.9 oz)  Body mass index is 28.47 kg/m².    Intake/Output Summary (Last 24 hours) at 9/30/2024 1652  Last data filed at 9/30/2024 1200  Gross per 24 hour   Intake 211.17 ml   Output 1550 ml   Net -1338.83 ml         Physical Exam  Vitals reviewed.   Constitutional:       General: She is not in acute distress.     Appearance: Normal appearance. She is not ill-appearing.   HENT:      Head: Normocephalic and atraumatic.   Eyes:      Conjunctiva/sclera: Conjunctivae normal.   Cardiovascular:      Rate and Rhythm: Normal rate and regular rhythm.      Pulses: Normal pulses.      Heart sounds: No murmur heard.  Pulmonary:      Effort: Pulmonary effort is normal. No respiratory distress.      Breath sounds: Wheezing present. No rhonchi.   Abdominal:      General: Bowel sounds are normal. There is no distension.   Musculoskeletal:      Right lower leg: No edema.      Left lower leg: No edema.   Neurological:      General: No focal deficit present.       Mental Status: She is alert and oriented to person, place, and time.   Psychiatric:         Mood and Affect: Mood normal.         Behavior: Behavior normal.             Significant Labs: All pertinent labs within the past 24 hours have been reviewed.    Significant Imaging: I have reviewed all pertinent imaging results/findings within the past 24 hours.

## 2024-09-30 NOTE — ASSESSMENT & PLAN NOTE
Chronic, controlled. Latest blood pressure and vitals reviewed-     Temp:  [97.5 °F (36.4 °C)-98.5 °F (36.9 °C)]   Pulse:  [58-99]   Resp:  [18-57]   BP: (112-163)/()   SpO2:  [86 %-98 %] .   Home meds for hypertension were reviewed and noted below.   Hypertension Medications               furosemide (LASIX) 40 MG tablet Take 1 tablet (40 mg total) by mouth once daily.    losartan (COZAAR) 50 MG tablet Take 0.5 tablets (25 mg total) by mouth once daily.            While in the hospital, will manage blood pressure as follows; Continue home antihypertensive regimen    Will utilize p.r.n. blood pressure medication only if patient's blood pressure greater than 180/110 and she develops symptoms such as worsening chest pain or shortness of breath.

## 2024-09-30 NOTE — PROGRESS NOTES
CM informed by MD - pt will need at home Ventilator (Trilogy).  CM spoke with Karla with [facility] -- she will evaluate pt  c#  [phone].      per therapy recc - low intensity; no dme    Hx of  of COPD on 3L, HTN, Anxiety.   CC - SOB    Nightly Bipap per H&P      Equipment Used at Home: bath bench, grab bar, CPAP, oxygen, rollator, walker, rolling, bedside commode

## 2024-09-30 NOTE — PT/OT/SLP EVAL
Physical Therapy Evaluation    Patient Name:  Terri Phelan   MRN:  [id number]    Recommendations:     Discharge Recommendations: Low Intensity Therapy   Discharge Equipment Recommendations: none   Barriers to discharge:  Pt mod ind premorbidly but currently requiring increased assistance with mobility and medical care    Assessment:     Terri Phelan is a 69 y.o. female admitted with a medical diagnosis of Acute on chronic respiratory failure with hypoxia and hypercapnia.  She presents with the following impairments/functional limitations: weakness, impaired endurance, impaired self care skills, impaired functional mobility, gait instability, impaired balance, decreased upper extremity function, decreased lower extremity function, impaired coordination, impaired cardiopulmonary response to activity Pt cooperative and motivated with PT evaluation session. Pt demo deficits in endurance w SOB demo'd even conversationally and rest pauses req'd during activity performance. Rec continued participation with acute PT services and rec LIT upon discharge to home setting. .    Rehab Prognosis: Good; patient would benefit from acute skilled PT services to address these deficits and reach maximum level of function.    Recent Surgery: * No surgery found *      Plan:     During this hospitalization, patient to be seen 5 x/week to address the identified rehab impairments via gait training, therapeutic activities, neuromuscular re-education, therapeutic exercises and progress toward the following goals:    Plan of Care Expires:  10/30/24    Subjective     Chief Complaint: reports no pain, no dizziness, and no nausea;  pt later reported light HA after stance activity  Patient/Family Comments/goals: PLOF  -  return to her home and be independent  Pain/Comfort:  Pain Rating 1: 0/10  Pain Rating Post-Intervention 1: 0/10    Patients cultural, spiritual, Adventist conflicts given the current situation: no    Living  Environment:  Pt lives alone on first floor apartment with no FLORI in a complex that her daughter also lives in an apartment in.  Pt ambulates with rollator community level distances and ambulates with RW household levels with mod Ind with both.  Pt sometimes ambulates in house without AD as well. Pt has tub/shower combo with grab bars and TTB.  Pt has BSC over toilet as well.  Pt is mod ind with all ADL.  Pt does not drive.  Pt utilizes home oxygen at 3 L  24/7.     Equipment used at home: bath bench, grab bar, oxygen, CPAP, walker, rolling, rollator, bedside commode.    Upon discharge, patient will have assistance from her daughter (who works during the day).    Objective:     Communicated with nsg prior to session.  Patient found  sitting on BSC w nsg present  with blood pressure cuff, peripheral IV, pulse ox (continuous), oxygen, PureWick, telemetry  upon PT entry to room.    General Precautions: Standard, respiratory, fall  Orthopedic Precautions:N/A   Braces: N/A  Respiratory Status: Nasal cannula, flow 3 L/min    Exams:  Cog:  A & O x 4  ROM:  BLE  WFL  MMT:  BLE WFL   grossly 5/5 throughout  Static sit with G balance         static stance w F balance  Pt tremor noted throughout w pt/nsg reporting 2* albuterol treatment    Functional Mobility:  Bed Mobility:  SBA with rolling and txfs sup<>sit per nsg and pt report  Txfs sit<>stand from BSC with CGA;    txfs sit<>stand with low surface recliner chair CGA and VC technique  Pre gait/Gait:  pt amb ~4 steps with RW CGA BSC around to chair;  pt amb in place 2* line multi line management 30 seconds and required seated rest after and declined further stance 2* fatigue;        AM-PAC 6 CLICK MOBILITY  Total Score:17       Treatment & Education:  Eval perf with OT eval to ensure pt's safety, accommodate for pt activity tolerance and maximize functional potential.    Provided pt with ed for safety with all mobility technique with and without use of AD.  Initiated txfs  training, static stance balance and endurance activity, toileting with pt Set up and SBA with self care hygiene.  Pt chose to sit for cleaning teeth and face.  Pt req pauses during activities due to SOB.  Vitals stable throughout but Oxygen saturation lowered during conversation to 89% and lower during activity but returned 90s% with cues for rest and PLB.  Ed energy conservation initiated.  Initial BP seated: 134/62  After initial stance activities 112/59  After return sit 122/57    Patient left up in chair with all lines intact, call button in reach, and nsg notified.    GOALS:   Multidisciplinary Problems       Physical Therapy Goals          Problem: Physical Therapy    Goal Priority Disciplines Outcome Interventions   Physical Therapy Goal     PT, PT/OT Progressing    Description: Goals to be met by: discharge date     Patient will increase functional independence with mobility by performin. Supine to sit with [place]  2. Sit to supine with [place]  3. Sit to stand transfer with Supervision  4. Bed to chair transfer with Stand-by Assistance using Rolling Walker or without AD use.  5.  Tolerate seated OOB chair > 1 hr   6. Gait  x 150 feet with Stand-by Assistance using Rolling Walker without requiring pausing 2* SOB and without LOB on change in direction and straight path.                          History:     Past Medical History:   Diagnosis Date    Acute exacerbation of chronic obstructive pulmonary disease (COPD) 2023    Recently hospitalized at [facility] from  to 2024 for exacerbation.  Did not require ICU care or significant escalation of intervention(s).      Adenoma of ascending colon 2022    Anxiety     COPD (chronic obstructive pulmonary disease)     COPD exacerbation 2014    Heart failure     Hypertension     Major depression 2023       Past Surgical History:   Procedure Laterality Date    BREAST BIOPSY N/A     pt unsure which breast but it was  benign-(calcium)     SECTION      HERNIA REPAIR      HYSTERECTOMY         Time Tracking:     PT Received On: 24  PT Start Time: 853     PT Stop Time: 921  PT Total Time (min): 28 min     Billable Minutes: Evaluation 12 and Therapeutic Activity 16      2024

## 2024-09-30 NOTE — PT/OT/SLP EVAL
Occupational Therapy   Evaluation & tX    Name: Terri Phelan  MRN: [id number]  Admitting Diagnosis: Acute on chronic respiratory failure with hypoxia and hypercapnia  Recent Surgery: * No surgery found *      Recommendations:     Discharge Recommendations: Low Intensity Therapy  Discharge Equipment Recommendations:  none  Barriers to discharge:  None, Inaccessible home environment    Assessment:     Terri Phelan is a 69 y.o. female with a medical diagnosis of Acute on chronic respiratory failure with hypoxia and hypercapnia.  She presents with the following performance deficits affecting function: weakness, impaired endurance, impaired self care skills, impaired functional mobility, gait instability, visual deficits, decreased coordination, decreased safety awareness, decreased upper extremity function, decreased lower extremity function, decreased ROM, impaired coordination, impaired cardiopulmonary response to activity.      Pt was agreeable to and participated in OT/PT evaluation.  Pt lives alone and reports that she was Mod I with mobility and ADLS at [facility].  Pt completed her evaluation and noted to require SBA- CGA with func mobility and set up - CGA with ADLS.  Pt noted to be SOB and requiring breaks during eval.  Pt also noted with UE tremors that nurse states is associated with a medication recently provided and will subside.  These tremors required pt to have set up A with grooming tasks related to toothbrushing (to open toothpaste and hold brush to apply it).  Goals established to assist pt with returning to [facility] regarding ADLs and func mobility.  Pt will benefit from skilled OT services in order to increase her level of safety and independence with ADLs and mobility.        Rehab Prognosis: Good; patient would benefit from acute skilled OT services to address these deficits and reach maximum level of function.       Plan:     Patient to be seen 3 x/week to address the above listed problems via  self-care/home management, therapeutic activities, therapeutic exercises  Plan of Care Expires: 10/30/24  Plan of Care Reviewed with: patient    Subjective     Chief Complaint: SOB  Patient/Family Comments/goals: return home independently    Occupational Profile:  Living Environment: pt lives alone in a first floor apt with no FLORI - daughter lives in same complex - t/s combo for bathing  Previous level of function: mod I with mobility and ADLS - pt reports that she uses rollator in community and nothing at home - if device is needed at home, she uses her RW  Equipment Used at Home: bath bench, grab bar, CPAP, oxygen, rollator, walker, rolling, bedside commode  Assistance upon Discharge: daughter lives in same complex and able to assist when not working    Pain/Comfort:  Pain Rating 1: 0/10  Pain Rating Post-Intervention 1: 0/10    Patients cultural, spiritual, Church conflicts given the current situation: no    Objective:     Communicated with: nurse prior to session.  Patient found up in chair with telemetry, peripheral IV, pulse ox (continuous), PureWick, blood pressure cuff, oxygen upon OT entry to room.    General Precautions: Standard, fall, respiratory  Orthopedic Precautions: N/A  Braces: N/A  Respiratory Status: Nasal cannula, flow 3 L/min  (same setting as at home)    Occupational Performance:    Bed Mobility:    Patient completed Supine to Sit with stand by assistance per nurse report - not observed by therapists    Functional Mobility/Transfers:  Patient completed Sit <> Stand Transfer with contact guard assistance  with  rolling walker   Patient completed Toilet Transfer Step Transfer technique with contact guard assistance with  rolling walker and bedside commode  Functional Mobility: pt able to take a few steps and march in place using RW and CGA - distance limited due to active lines and pt SOB    Activities of Daily Living:  Feeding:  independence    Grooming: set up A to wash face and brush  teeth while seated  Upper Body Dressing: set up A to manage lines  Lower Body Dressing: set up A to [place]/doff socks  Toileting: contact guard assistance when standing for hygiene and clothing mgmt    Cognitive/Visual Perceptual:  Cognitive/Psychosocial Skills:     -       Oriented to: Person, Place, Time, and Situation   -       Follows Commands/attention:Follows two-step commands  -       Communication: clear/fluent  -       Memory: No Deficits noted  -       Safety awareness/insight to disability: impaired   -       Mood/Affect/Coping skills/emotional control: Appropriate to situation    Physical Exam:  Balance: -       sit = good;  stand = CGA with RW  Postural examination/scapula alignment:    -       Rounded shoulders  Skin integrity: Visible skin intact  Edema:  None noted  Sensation: -       Intact  Upper Extremity Range of Motion:   L shoulder limited in flexion to ~90*, but does not interfere with independence with ADLs  Upper Extremity Strength:  BUEs = WFL for ADLS   Strength:  BUEs = WFL    AMPAC 6 Click ADL:  AMPAC Total Score: 19    Treatment & Education:  Pt completed ADLs and func mobility activities for tx session as noted above  Pt educated on role of OT and POC      Patient left up in chair with all lines intact, call button in reach, and nurse notified    GOALS:   Multidisciplinary Problems       Occupational Therapy Goals          Problem: Occupational Therapy    Goal Priority Disciplines Outcome Interventions   Occupational Therapy Goal     OT, PT/OT Progressing    Description: Goals to be met by: 10/29/24     Patient will increase functional independence with ADLs by performing:    UE Dressing with Modified [place].  LE Dressing with Modified [place].  Grooming while standing at sink with Modified [place].  Toileting from toilet with Modified [place] for hygiene and clothing management.   Toilet transfer to toilet with Modified [place].  Upper extremity exercise  program 3 x10 reps per handout, with supervision.                         History:     Past Medical History:   Diagnosis Date    Acute exacerbation of chronic obstructive pulmonary disease (COPD) 2023    Recently hospitalized at [facility] from  to 2024 for exacerbation.  Did not require ICU care or significant escalation of intervention(s).      Adenoma of ascending colon 2022    Anxiety     COPD (chronic obstructive pulmonary disease)     COPD exacerbation 2014    Heart failure     Hypertension     Major depression 2023         Past Surgical History:   Procedure Laterality Date    BREAST BIOPSY N/A     pt unsure which breast but it was benign-(calcium)     SECTION      HERNIA REPAIR      HYSTERECTOMY         Time Tracking:     OT Date of Treatment: 24  OT Start Time: 852  OT Stop Time: 921  OT Total Time (min): 29 min - cotx with PT    Billable Minutes:Evaluation 10  Self Care/Home Management 10  Therapeutic Activity 9    2024

## 2024-09-30 NOTE — PLAN OF CARE
Problem: Occupational Therapy  Goal: Occupational Therapy Goal  Description: Goals to be met by: 10/29/24     Patient will increase functional independence with ADLs by performing:    UE Dressing with Modified [place].  LE Dressing with Modified [place].  Grooming while standing at sink with Modified [place].  Toileting from toilet with Modified [place] for hygiene and clothing management.   Toilet transfer to toilet with Modified [place].  Upper extremity exercise program 3 x10 reps per handout, with supervision.    Outcome: Progressing     Pt was agreeable to and participated in OT/PT evaluation.  Pt lives alone and reports that she was Mod I with mobility and ADLS at [facility].  Pt completed her evaluation and noted to require SBA- CGA with func mobility and set up - CGA with ADLS.  Pt noted to be SOB and requiring breaks during eval.  Goals established to assist pt with returning to [facility] regarding ADLs and func mobility.  Pt will benefit from skilled OT services in order to increase her level of safety and independence with ADLs and mobility.      Fiona Mora, OT  9/30/2024

## 2024-09-30 NOTE — PROGRESS NOTES
[facility] Medicine  Progress Note    Patient Name: Terri Phelan  MRN: [id number]  Patient Class: IP- Inpatient   Admission Date: 9/28/2024  Length of Stay: 1 days  Attending Physician: Tirso Valenzuela,*  Primary Care Provider: Sussy Mason MD        Subjective:     Principal Problem:Acute on chronic respiratory failure with hypoxia and hypercapnia        HPI:  Patient is a 68 yo Female w/ PMHx of COPD on 3L, HTN, Anxiety. Presenting for shortness of breath since last night. Endorses occasional cough with white sputum production. Pt feels that she has been feeling more tired this week since her flu shot on 9/20 however has not had increased oxygen requirements. Says that she uses BIPAP every night and with naps. Pt lives at home by herself and performs ADLs independently. Denies fevers, nausea, vomiting, diarrhea, constipation, chest pain.    In the ED, initial vital signs /84, HR 98, Temp 99.1, SpO2 99% on 3L. Labs include CBC with stable H/H 12.8/43.7 and WBC within normal limits 6.28. BUN/Cr 12/0.7. CXR lefty basilar opacities and costophrenic angle blunting unchanged from previous. BNP 27, Trop <0.0006. VBG pH 7.26, pCO2 101, bicarb 45 Covid and Flu negative. Initiated on duoneb, doxycycline (pt declined CTX), oxygen saturation had dipped to 63% and patient initiated on non rebreather and transitioned to BIPAP in ED. Patient has been stable on BIPAP. [facility] consulted for evaluation for admission for COPD exacerbation.      Overview/Hospital Course:  No notes on file    Interval History: No adverse events overnight. Patient asymptomatic. Remains hemodynamically and clinically stable at time of the exam. Patient stating she feels much better this Am than on admit.      Review of Systems   Constitutional:  Negative for chills and fever.   Respiratory:  Positive for cough, shortness of breath and wheezing. Negative for chest tightness.    Cardiovascular:   Negative for chest pain.   Gastrointestinal:  Negative for abdominal pain, constipation, diarrhea and nausea.   Genitourinary:  Negative for difficulty urinating.   Skin:  Negative for rash.     Objective:     Vital Signs (Most Recent):  Temp: 98.5 °F (36.9 °C) (09/30/24 1604)  Pulse: 81 (09/30/24 1607)  Resp: (!) 34 (09/30/24 1607)  BP: (!) 150/65 (09/30/24 1604)  SpO2: 96 % (09/30/24 1607) Vital Signs (24h Range):  Temp:  [97.5 °F (36.4 °C)-98.5 °F (36.9 °C)] 98.5 °F (36.9 °C)  Pulse:  [58-99] 81  Resp:  [18-57] 34  SpO2:  [86 %-98 %] 96 %  BP: (112-163)/() 150/65     Weight: 80 kg (176 lb 5.9 oz)  Body mass index is 28.47 kg/m².    Intake/Output Summary (Last 24 hours) at 9/30/2024 1652  Last data filed at 9/30/2024 1200  Gross per 24 hour   Intake 211.17 ml   Output 1550 ml   Net -1338.83 ml         Physical Exam  Vitals reviewed.   Constitutional:       General: She is not in acute distress.     Appearance: Normal appearance. She is not ill-appearing.   HENT:      Head: Normocephalic and atraumatic.   Eyes:      Conjunctiva/sclera: Conjunctivae normal.   Cardiovascular:      Rate and Rhythm: Normal rate and regular rhythm.      Pulses: Normal pulses.      Heart sounds: No murmur heard.  Pulmonary:      Effort: Pulmonary effort is normal. No respiratory distress.      Breath sounds: Wheezing present. No rhonchi.   Abdominal:      General: Bowel sounds are normal. There is no distension.   Musculoskeletal:      Right lower leg: No edema.      Left lower leg: No edema.   Neurological:      General: No focal deficit present.      Mental Status: She is alert and oriented to person, place, and time.   Psychiatric:         Mood and Affect: Mood normal.         Behavior: Behavior normal.             Significant Labs: All pertinent labs within the past 24 hours have been reviewed.    Significant Imaging: I have reviewed all pertinent imaging results/findings within the past 24 hours.    Assessment/Plan:      *  "Acute on chronic respiratory failure with hypoxia and hypercapnia  Patient with Hypercapnic and Hypoxic Respiratory failure which is Acute on chronic due to COPD exacerbation.  she is on home oxygen at 3 LPM. Pt uses BIPAP nightly and with naps at home.    Signs/symptoms of respiratory failure include- increased work of breathing and wheezing. Contributing diagnoses includes - COPD Labs and images were reviewed. VBG in ED: pH 7.26, pCO2 101, bicarb 45. Required 3L in ED however was placed on non rebreather for hypoxia and night BIPAP. Diminished breath sounds, wheezing on exam. Pt is COPD Gold D, following with pulmonology outpatient.    Plan:  COPD Pathway  - Continue home roflumilast  - Duonebs q4hrs  - Ceftriaxone QD and Doxycline BID (9/28), can try lactobacillus for diarrhea  - Prednisone 40mg daily for 5 days  - BIPAP nightly and with naps  - Sputum culture  - SW to obtain Trilogy ventilator for patient on D/c  - Continue Breo in hospital as home inhaler not on formulary      Community acquired bacterial pneumonia  See plan as in "Acute on chronic respiratory failure with hypoxia and hypercapnia"    Anxiety  Continue home anxiety medications:  - Escitalopram nightly  - Hydroxyzine 25mg PRN      Shortness of breath  See "Acute on Chronic respiratory failure with hypoxia and hypercapnia"      CAD (coronary artery disease)  Patient with known CAD, which is controlled Will continue ASA and ezetimibe monitor for S/Sx of angina/ACS. Continue to monitor on telemetry. Per chart review, pt unable to tolerate statin.    COPD exacerbation  Patient's COPD is with exacerbation noted by continued dyspnea and worsening of baseline hypoxia currently.  Patient is currently on COPD Pathway. Continue scheduled inhalers Steroids, Antibiotics, and Supplemental oxygen and monitor respiratory status closely.     See "Acute on chronic respiratory failure with hypoxia and hypercapnia"    HTN (hypertension)  Chronic, controlled. Latest " blood pressure and vitals reviewed-     Temp:  [97.5 °F (36.4 °C)-98.5 °F (36.9 °C)]   Pulse:  [58-99]   Resp:  [18-57]   BP: (112-163)/()   SpO2:  [86 %-98 %] .   Home meds for hypertension were reviewed and noted below.   Hypertension Medications               furosemide (LASIX) 40 MG tablet Take 1 tablet (40 mg total) by mouth once daily.    losartan (COZAAR) 50 MG tablet Take 0.5 tablets (25 mg total) by mouth once daily.            While in the hospital, will manage blood pressure as follows; Continue home antihypertensive regimen    Will utilize p.r.n. blood pressure medication only if patient's blood pressure greater than 180/110 and she develops symptoms such as worsening chest pain or shortness of breath.      VTE Risk Mitigation (From admission, onward)           Ordered     enoxaparin injection 40 mg  Daily         09/28/24 2124     IP VTE HIGH RISK PATIENT  Once         09/28/24 2124     Place sequential compression device  Until discontinued         09/28/24 2124                    Discharge Planning   RADHA:      Code Status: Full Code   Is the patient medically ready for discharge?:     Reason for patient still in hospital (select all that apply): Patient trending condition and Treatment               Jennifer Rodriguez MD  Department of Hospital Medicine   [place] - Intensive Care

## 2024-09-30 NOTE — PLAN OF CARE
Problem: Adult Inpatient Plan of Care  Goal: Plan of Care Review  Outcome: Progressing  Goal: Absence of Hospital-Acquired Illness or Injury  Outcome: Progressing  Goal: Optimal Comfort and Wellbeing  Outcome: Progressing  Goal: Readiness for Transition of Care  Outcome: Progressing     Problem: COPD (Chronic Obstructive Pulmonary Disease)  Goal: Optimal Chronic Illness Coping  Outcome: Progressing  Goal: Optimal Level of Functional [place]  Outcome: Progressing  Goal: Absence of Infection Signs and Symptoms  Outcome: Progressing  Goal: Improved Oral Intake  Outcome: Progressing     Problem: Fall Injury Risk  Goal: Absence of Fall and Fall-Related Injury  Outcome: Progressing     Problem: Skin Injury Risk Increased  Goal: Skin Health and Integrity  Outcome: Progressing     Problem: Coping Ineffective  Goal: Effective Coping  Outcome: Progressing

## 2024-09-30 NOTE — PLAN OF CARE
Problem: Adult Inpatient Plan of Care  Goal: Plan of Care Review  Outcome: Progressing  Goal: Patient-Specific Goal (Individualized)  Outcome: Progressing  Goal: Absence of Hospital-Acquired Illness or Injury  Outcome: Progressing  Goal: Optimal Comfort and Wellbeing  Outcome: Progressing  Goal: Readiness for Transition of Care  Outcome: Progressing     Problem: Skin Injury Risk Increased  Goal: Skin Health and Integrity  Outcome: Progressing     Problem: COPD (Chronic Obstructive Pulmonary Disease)  Goal: Optimal Chronic Illness Coping  Outcome: Progressing  Goal: Optimal Level of Functional [place]  Outcome: Progressing  Goal: Absence of Infection Signs and Symptoms  Outcome: Progressing  Goal: Improved Oral Intake  Outcome: Progressing  Goal: Effective Oxygenation and Ventilation  Outcome: Progressing     Problem: Fall Injury Risk  Goal: Absence of Fall and Fall-Related Injury  Outcome: Progressing     Problem: Coping Ineffective  Goal: Effective Coping  Outcome: Progressing     Problem: Gas Exchange Impaired  Goal: Optimal Gas Exchange  Outcome: Progressing

## 2024-09-30 NOTE — CARE UPDATE
Pt given specimen collection cup, instructed to produce sputum. Pt unable to produce sample at this time, collection cup at bedside

## 2024-09-30 NOTE — PLAN OF CARE
Problem: Physical Therapy  Goal: Physical Therapy Goal  Description: Goals to be met by: discharge date     Patient will increase functional independence with mobility by performin. Supine to sit with Mize  2. Sit to supine with Mize  3. Sit to stand transfer with Supervision  4. Bed to chair transfer with Stand-by Assistance using Rolling Walker or without AD use.  5.  Tolerate seated OOB chair > 1 hr   6. Gait  x 150 feet with Stand-by Assistance using Rolling Walker without requiring pausing 2* SOB and without LOB on change in direction and straight path.     Outcome: Progressing     Pt cooperative and motivated with PT evaluation session.  Pt demo deficits in endurance w SOB demo'd even conversationally and rest pauses req'd during activity performance.  Rec continued participation with acute PT services and rec LIT upon discharge to home setting.

## 2024-10-01 VITALS
RESPIRATION RATE: 17 BRPM | WEIGHT: 188.69 LBS | HEART RATE: 81 BPM | DIASTOLIC BLOOD PRESSURE: 64 MMHG | TEMPERATURE: 98 F | HEIGHT: 66 IN | OXYGEN SATURATION: 93 % | SYSTOLIC BLOOD PRESSURE: 147 MMHG | BODY MASS INDEX: 30.33 KG/M2

## 2024-10-01 LAB
ALBUMIN SERPL BCP-MCNC: 3.2 G/DL (ref 3.5–5.2)
ALP SERPL-CCNC: 44 U/L (ref 55–135)
ALT SERPL W/O P-5'-P-CCNC: 14 U/L (ref 10–44)
ANION GAP SERPL CALC-SCNC: 11 MMOL/L (ref 8–16)
AST SERPL-CCNC: 16 U/L (ref 10–40)
BASOPHILS # BLD AUTO: 0.03 K/UL (ref 0–0.2)
BASOPHILS NFR BLD: 0.3 % (ref 0–1.9)
BILIRUB SERPL-MCNC: 0.1 MG/DL (ref 0.1–1)
BUN SERPL-MCNC: 20 MG/DL (ref 8–23)
CALCIUM SERPL-MCNC: 9.2 MG/DL (ref 8.7–10.5)
CHLORIDE SERPL-SCNC: 98 MMOL/L (ref 95–110)
CO2 SERPL-SCNC: 36 MMOL/L (ref 23–29)
CREAT SERPL-MCNC: 0.7 MG/DL (ref 0.5–1.4)
DIFFERENTIAL METHOD BLD: ABNORMAL
EOSINOPHIL # BLD AUTO: 0 K/UL (ref 0–0.5)
EOSINOPHIL NFR BLD: 0.2 % (ref 0–8)
ERYTHROCYTE [DISTWIDTH] IN BLOOD BY AUTOMATED COUNT: 14.3 % (ref 11.5–14.5)
EST. GFR  (NO RACE VARIABLE): >60 ML/MIN/1.73 M^2
GLUCOSE SERPL-MCNC: 73 MG/DL (ref 70–110)
HCT VFR BLD AUTO: 38.7 % (ref 37–48.5)
HGB BLD-MCNC: 11.6 G/DL (ref 12–16)
IMM GRANULOCYTES # BLD AUTO: 0.04 K/UL (ref 0–0.04)
IMM GRANULOCYTES NFR BLD AUTO: 0.4 % (ref 0–0.5)
LYMPHOCYTES # BLD AUTO: 2.3 K/UL (ref 1–4.8)
LYMPHOCYTES NFR BLD: 21.2 % (ref 18–48)
MAGNESIUM SERPL-MCNC: 2.1 MG/DL (ref 1.6–2.6)
MCH RBC QN AUTO: 27 PG (ref 27–31)
MCHC RBC AUTO-ENTMCNC: 30 G/DL (ref 32–36)
MCV RBC AUTO: 90 FL (ref 82–98)
MONOCYTES # BLD AUTO: 1.1 K/UL (ref 0.3–1)
MONOCYTES NFR BLD: 10.6 % (ref 4–15)
NEUTROPHILS # BLD AUTO: 7.1 K/UL (ref 1.8–7.7)
NEUTROPHILS NFR BLD: 67.3 % (ref 38–73)
NRBC BLD-RTO: 0 /100 WBC
PHOSPHATE SERPL-MCNC: 2.5 MG/DL (ref 2.7–4.5)
PLATELET # BLD AUTO: 156 K/UL (ref 150–450)
PMV BLD AUTO: 11.8 FL (ref 9.2–12.9)
POCT GLUCOSE: 119 MG/DL (ref 70–110)
POCT GLUCOSE: 89 MG/DL (ref 70–110)
POTASSIUM SERPL-SCNC: 3.9 MMOL/L (ref 3.5–5.1)
PROT SERPL-MCNC: 6.3 G/DL (ref 6–8.4)
RBC # BLD AUTO: 4.29 M/UL (ref 4–5.4)
SODIUM SERPL-SCNC: 145 MMOL/L (ref 136–145)
WBC # BLD AUTO: 10.6 K/UL (ref 3.9–12.7)

## 2024-10-01 PROCEDURE — 63600175 PHARM REV CODE 636 W HCPCS: Mod: HCNC

## 2024-10-01 PROCEDURE — 84100 ASSAY OF PHOSPHORUS: CPT | Mod: HCNC

## 2024-10-01 PROCEDURE — 94660 CPAP INITIATION&MGMT: CPT | Mod: HCNC

## 2024-10-01 PROCEDURE — 36415 COLL VENOUS BLD VENIPUNCTURE: CPT | Mod: HCNC

## 2024-10-01 PROCEDURE — 94761 N-INVAS EAR/PLS OXIMETRY MLT: CPT | Mod: HCNC

## 2024-10-01 PROCEDURE — 83735 ASSAY OF MAGNESIUM: CPT | Mod: HCNC

## 2024-10-01 PROCEDURE — 25000003 PHARM REV CODE 250: Mod: HCNC

## 2024-10-01 PROCEDURE — 25000242 PHARM REV CODE 250 ALT 637 W/ HCPCS: Mod: HCNC

## 2024-10-01 PROCEDURE — 94640 AIRWAY INHALATION TREATMENT: CPT | Mod: HCNC

## 2024-10-01 PROCEDURE — 99900035 HC TECH TIME PER 15 MIN (STAT): Mod: HCNC

## 2024-10-01 PROCEDURE — 85025 COMPLETE CBC W/AUTO DIFF WBC: CPT | Mod: HCNC

## 2024-10-01 PROCEDURE — 27000221 HC OXYGEN, UP TO 24 HOURS: Mod: HCNC

## 2024-10-01 PROCEDURE — 80053 COMPREHEN METABOLIC PANEL: CPT | Mod: HCNC

## 2024-10-01 RX ORDER — AMOXICILLIN AND CLAVULANATE POTASSIUM 875; 125 MG/1; MG/1
1 TABLET, FILM COATED ORAL 2 TIMES DAILY
Qty: 4 TABLET | Refills: 0 | Status: SHIPPED | OUTPATIENT
Start: 2024-10-01 | End: 2024-10-03

## 2024-10-01 RX ORDER — PREDNISONE 20 MG/1
40 TABLET ORAL DAILY
Qty: 4 TABLET | Refills: 0 | Status: SHIPPED | OUTPATIENT
Start: 2024-10-01 | End: 2024-10-03

## 2024-10-01 RX ORDER — DOXYCYCLINE 100 MG/1
100 CAPSULE ORAL EVERY 12 HOURS
Qty: 4 CAPSULE | Refills: 0 | Status: SHIPPED | OUTPATIENT
Start: 2024-10-01 | End: 2024-10-03

## 2024-10-01 RX ORDER — SODIUM,POTASSIUM PHOSPHATES 280-250MG
1 POWDER IN PACKET (EA) ORAL ONCE
Status: COMPLETED | OUTPATIENT
Start: 2024-10-01 | End: 2024-10-01

## 2024-10-01 RX ADMIN — ASPIRIN 81 MG CHEWABLE TABLET 81 MG: 81 TABLET CHEWABLE at 08:10

## 2024-10-01 RX ADMIN — FUROSEMIDE 40 MG: 40 TABLET ORAL at 08:10

## 2024-10-01 RX ADMIN — ROFLUMILAST 500 MCG: 500 TABLET ORAL at 08:10

## 2024-10-01 RX ADMIN — IPRATROPIUM BROMIDE AND ALBUTEROL SULFATE 3 ML: 2.5; .5 SOLUTION RESPIRATORY (INHALATION) at 07:10

## 2024-10-01 RX ADMIN — LOSARTAN POTASSIUM 25 MG: 25 TABLET, FILM COATED ORAL at 08:10

## 2024-10-01 RX ADMIN — LACTOBACILLUS TAB 4 TABLET: TAB at 12:10

## 2024-10-01 RX ADMIN — DOXYCYCLINE HYCLATE 100 MG: 100 TABLET, COATED ORAL at 08:10

## 2024-10-01 RX ADMIN — EZETIMIBE 10 MG: 10 TABLET ORAL at 08:10

## 2024-10-01 RX ADMIN — FLUTICASONE FUROATE AND VILANTEROL TRIFENATATE 1 PUFF: 100; 25 POWDER RESPIRATORY (INHALATION) at 08:10

## 2024-10-01 RX ADMIN — LACTOBACILLUS TAB 4 TABLET: TAB at 08:10

## 2024-10-01 RX ADMIN — IPRATROPIUM BROMIDE AND ALBUTEROL SULFATE 3 ML: 2.5; .5 SOLUTION RESPIRATORY (INHALATION) at 11:10

## 2024-10-01 RX ADMIN — POTASSIUM & SODIUM PHOSPHATES POWDER PACK 280-160-250 MG 1 PACKET: 280-160-250 PACK at 10:10

## 2024-10-01 RX ADMIN — IPRATROPIUM BROMIDE AND ALBUTEROL SULFATE 3 ML: 2.5; .5 SOLUTION RESPIRATORY (INHALATION) at 03:10

## 2024-10-01 RX ADMIN — PREDNISONE 40 MG: 20 TABLET ORAL at 08:10

## 2024-10-01 NOTE — PLAN OF CARE
CM met with pt 9/30 in ICU   Pt is AAAx 3; confirmed demographics   Lives with daughter Mulugeta  [phone]     Pt has Home O2, Bipap, rollator, RW, bsc and TTB    O2 and Bipap per [employer]      Confirms her daughter will bring her portable O2 when she picks pt up at discharge.       9/30 -- CM confirmed with Karla with [employer] - her company will be able to supply a home ventilator even though pt has O2 and Bipap through [employer].      Dx:  COPD exacerbation     Future Appointments   Date Time Provider Department Center   11/1/2024  3:20 PM Sussy Mason MD [facility] 65[facility]US 65+ [place]   11/19/2024  1:00 PM Alex Cunningham MD [facility]        10/01/24 0906   Discharge Assessment   Assessment Type Discharge Planning Assessment   Confirmed/corrected address, phone number and insurance Yes   Confirmed Demographics Correct on Facesheet   Source of Information patient;health record   Does patient/caregiver understand observation status Yes   Communicated RADHA with patient/caregiver Yes   Reason For Admission COPD  exacerbation   People in Home child(mary), adult  (Daughter Mulugeta  [phone])   Do you expect to return to your current living situation? Yes   Do you have help at home or someone to help you manage your care at home? Yes   Who are your caregiver(s) and their phone number(s)? tj Dalal   Prior to hospitilization cognitive status: Alert/Oriented   Current cognitive status: Alert/Oriented   Walking or Climbing Stairs Difficulty no   Dressing/Bathing Difficulty no   Equipment Currently Used at Home oxygen;rollator;walker, rolling;bedside commode;bath bench  (Oxygen supplied per [employer])   Readmission within 30 days? No   Patient currently being followed by outpatient case management? No   Do you take prescription medications? Yes  ([facility] [address])   Do you have any problems affording any of your prescribed medications? No   How do you get to doctors appointments? family or friend will  provide   Are you on dialysis? No   Do you take coumadin? No   Discharge Plan A Home;Home with family;Home Health   Discharge Plan B Home;Home with family;Home Health   DME Needed Upon Discharge  ventilator   Discharge Plan discussed with: Patient   Transition of Care Barriers None

## 2024-10-01 NOTE — PROGRESS NOTES
CM informed at 9:03 am - per Karla with [employer] - they are not in network with pt's insurance - she will not be able to supply ventilator for this pt.     CM will contact [facility] to try to get a Ventilator for this pt- she gets her O2 through this vendor.     -------------------------------------------------------  CM called [facility] [phone] --   CM spoke with Resp Therapist Akosua at [facility] - she knows Ms. Phelan and states pt already has a Nupur Vent --   Akosua and pt spoke with each other per speaker phone -- [employer] to call pt and make apt for review of equipment.    ---------------------  Future Appointments   Date Time Provider Department Center   10/8/2024  9:00 AM Mina Chan MD [facility] 65+ [place]   11/1/2024  3:20 PM Sussy Mason MD [facility]US 65+ [facility]   11/19/2024  1:00 PM Alex Cunningham MD [facility] Brandan Mascorro

## 2024-10-01 NOTE — HOSPITAL COURSE
Ms. Phelan presented with acute on chronic hypercapnic/hypoxic respiratory failure due to community acquired pneumonia with acute COPD exacerbation. Initially placed on BiPAP with some improvement in respiratory mechanics and placed in the ICU but then was stepped down to the med/tele floor with 3 L NC and BiPAP at night. A sputum culture was obtained, which was negative. Patient was Flu/COVID negative. She was treated with IV Ceftriaxone and PO Doxycycline (because she is on chronic azithromycin use) and Prednisone 40 mg qd. She was also treated with Breo in hospital as her inhaler was not on formulary, duonebs, and continued on home roflumilast.  Pulmonology was consulted and recommended Trilogy home ventilation, however social work spoke with patient's home RT who stated she has a ventilator at home already and will come by patient's home to discuss settings. On the day of discharge patient is back to baseline and hemodynamically stable. She was sent home to resume meds as well as take these additional meds: Augmentin 875-125 BID x 2 days, Doxycycline 100 mg BID x 2 days and prednisone 40 mg qd x 2 days . Patient to have close follow up with PCP and Pulmonologist (Dr. Cunningham). All medication reviewed with patient. Return to hospital precautions discussed with patient. All questions addressed. Patient verbalized understanding.

## 2024-10-01 NOTE — PROGRESS NOTES
[facility]                    Pharmacy       Discharge Medication Education    Patient ACCEPTED medication education. Pharmacy has provided education on the name, indication, and possible side effects of the medication(s) prescribed, using teach-back method.     The following medications have also been discussed, during this admission.        Medication List        START taking these medications      amoxicillin-clavulanate 875-125mg 875-125 mg per tablet  Commonly known as: AUGMENTIN  Take 1 tablet by mouth 2 (two) times a day. for 2 days     doxycycline 100 MG Cap  Commonly known as: VIBRAMYCIN  Take 1 capsule (100 mg total) by mouth every 12 (twelve) hours. for 2 days     predniSONE 20 MG tablet  Commonly known as: DELTASONE  Take 2 tablets (40 mg total) by mouth once daily. for 2 days            CONTINUE taking these medications      acetaminophen 500 MG tablet  Commonly known as: TYLENOL     albuterol 90 mcg/actuation inhaler  Commonly known as: PROVENTIL/VENTOLIN HFA  Inhale 2 puffs into the lungs every 6 (six) hours as needed for Wheezing or Shortness of Breath.     alendronate 35 MG tablet  Commonly known as: FOSAMAX     aspirin 81 MG Chew     BREZTRI AEROSPHERE 160-9-4.8 mcg/actuation Hfaa  Generic drug: budesonide-glycopyr-formoterol     calcium carb-mag hydrox-simeth 1,200 mg-270 mg -80 mg/10 mL Susp     cholecalciferol (vitamin D3) 50 mcg (2,000 unit) Tab  Commonly known as: VITAMIN D3     dextromethorphan-guaiFENesin  mg  mg per 12 hr tablet  Commonly known as: MUCINEX DM     dicyclomine 20 mg tablet  Commonly known as: BENTYL  Take 1 tablet (20 mg total) by mouth 3 (three) times daily as needed (abdominal pain).     EScitalopram oxalate 10 MG tablet  Commonly known as: LEXAPRO  TAKE 1 TABLET(10 MG) BY MOUTH DAILY     ezetimibe 10 mg tablet  Commonly known as: ZETIA  Take 1 tablet (10 mg total) by mouth once daily.     fluticasone propionate 50 mcg/actuation nasal  spray  Commonly known as: FLONASE  1 spray (50 mcg total) by Each Nostril route daily as needed for Allergies.     furosemide 40 MG tablet  Commonly known as: LASIX  Take 1 tablet (40 mg total) by mouth once daily.     hydrOXYzine HCL 25 MG tablet  Commonly known as: ATARAX  TAKE 1 TABLET(25 MG) BY MOUTH THREE TIMES DAILY AS NEEDED FOR ANXIETY OR ITCHING     ibuprofen 800 MG tablet  Commonly known as: ADVIL,MOTRIN  Take 1 tablet (800 mg total) by mouth 3 (three) times daily as needed for Pain.     losartan 50 MG tablet  Commonly known as: COZAAR  Take 0.5 tablets (25 mg total) by mouth once daily.     OXYGEN-AIR DELIVERY SYSTEMS MISC     potassium chloride 10 MEQ Cpsr  Commonly known as: MICRO-K  TAKE 1 CAPSULE BY MOUTH EVERY DAY     roflumilast 500 mcg Tab  Commonly known as: DALIRESP  Take 1 tablet (500 mcg total) by mouth once daily.     triamcinolone acetonide 0.1% 0.1 % cream  Commonly known as: KENALOG  Apply topically 2 (two) times daily. for 14 days            STOP taking these medications      azithromycin 250 MG tablet  Commonly known as: Z-OLY               Where to Get Your Medications        These medications were sent to [facility]  [address], [place] [address]      Hours: Mon-Fri, 8a-5:30p Phone: [phone]   amoxicillin-clavulanate 875-125mg 875-125 mg per tablet  doxycycline 100 MG Cap  predniSONE 20 MG tablet          Thank you  Lydia Bryson, PharmD  [phone]

## 2024-10-01 NOTE — PT/OT/SLP PROGRESS
Physical Therapy      Patient Name:  Terri Phelan   MRN:  [id number]    Patient not seen today secondary to Other (Comment) (pt receiving discharge instructions from (3617)). Will follow-up n/a.

## 2024-10-01 NOTE — DISCHARGE SUMMARY
[facility] Medicine  Discharge Summary      Patient Name: Terri Phelan  MRN: [id number]  SUNDEEP: [id number]  Patient Class: IP- Inpatient  Admission Date: 9/28/2024  Hospital Length of Stay: 2 days  Discharge Date and Time:  10/01/2024 1:12 PM  Attending Physician: Mateo Carvajal III, MD   Discharging Provider: Jennifer Rodriguez MD  Primary Care Provider: Sussy Mason MD    Primary Care Team: [facility] FAMILY MEDICINE HOSPITALIST TEAM    HPI:   Patient is a 70 yo Female w/ PMHx of COPD on 3L, HTN, Anxiety. Presenting for shortness of breath since last night. Endorses occasional cough with white sputum production. Pt feels that she has been feeling more tired this week since her flu shot on 9/20 however has not had increased oxygen requirements. Says that she uses BIPAP every night and with naps. Pt lives at home by herself and performs ADLs independently. Denies fevers, nausea, vomiting, diarrhea, constipation, chest pain.    In the ED, initial vital signs /84, HR 98, Temp 99.1, SpO2 99% on 3L. Labs include CBC with stable H/H 12.8/43.7 and WBC within normal limits 6.28. BUN/Cr 12/0.7. CXR lefty basilar opacities and costophrenic angle blunting unchanged from previous. BNP 27, Trop <0.0006. VBG pH 7.26, pCO2 101, bicarb 45 Covid and Flu negative. Initiated on duoneb, doxycycline (pt declined CTX), oxygen saturation had dipped to 63% and patient initiated on non rebreather and transitioned to BIPAP in ED. Patient has been stable on BIPAP. [facility] consulted for evaluation for admission for COPD exacerbation.      * No surgery found *      Hospital Course:   Ms. Phelan presented with acute on chronic hypercapnic/hypoxic respiratory failure due to community acquired pneumonia with acute COPD exacerbation. Initially placed on BiPAP with some improvement in respiratory mechanics and placed in the ICU but then was stepped down to the med/tele floor with 3 L NC and BiPAP at night. A  sputum culture was obtained, which was negative. Patient was Flu/COVID negative. She was treated with IV Ceftriaxone and PO Doxycycline (because she is on chronic azithromycin use) and Prednisone 40 mg qd. She was also treated with Breo in hospital as her inhaler was not on formulary, duonebs, and continued on home roflumilast.  Pulmonology was consulted and recommended Trilogy home ventilation, however social work spoke with patient's home RT who stated she has a ventilator at home already and will come by patient's home to discuss settings. On the day of discharge patient is back to baseline and hemodynamically stable. She was sent home to resume meds as well as take these additional meds: Augmentin 875-125 BID x 2 days, Doxycycline 100 mg BID x 2 days and prednisone 40 mg qd x 2 days . Patient to have close follow up with PCP and Pulmonologist (Dr. Cunningham). All medication reviewed with patient. Return to hospital precautions discussed with patient. All questions addressed. Patient verbalized understanding.        Physical Exam  Vitals reviewed.   Constitutional:       General: She is not in acute distress.     Appearance: Normal appearance. She is not ill-appearing.   HENT:      Head: Normocephalic and atraumatic.   Eyes:      Conjunctiva/sclera: Conjunctivae normal.   Cardiovascular:      Rate and Rhythm: Normal rate and regular rhythm.      Pulses: Normal pulses.      Heart sounds: No murmur heard.  Pulmonary:      Effort: Pulmonary effort is normal. No respiratory distress.      Breath sounds: Wheezing present. No rhonchi.   Abdominal:      General: Bowel sounds are normal. There is no distension.   Musculoskeletal:      Right lower leg: No edema.      Left lower leg: No edema.   Neurological:      General: No focal deficit present.      Mental Status: She is alert and oriented to person, place, and time.   Psychiatric:         Mood and Affect: Mood normal.         Behavior: Behavior normal.     Goals of Care  "Treatment Preferences:  Code Status: Full Code    Health care agent: Mulugeta Phelan  [facility] care agent number: [phone]    Living Will: Yes              SDOH Screening:  The patient was screened for utility difficulties, food insecurity, transport difficulties, housing insecurity, and interpersonal safety and there were no concerns identified this admission.     Consults:   Consults (From admission, onward)          Status Ordering Provider     Inpatient consult to Pulmonology  Once        Provider:  Lesvia Mejía MD    Completed REYNA GUERRERO            Psychiatric  Anxiety  Continue home anxiety medications:  - Escitalopram nightly  - Hydroxyzine 25mg PRN      Pulmonary  * Acute on chronic respiratory failure with hypoxia and hypercapnia  Patient with Hypercapnic and Hypoxic Respiratory failure which is Acute on chronic due to COPD exacerbation.  she is on home oxygen at 3 LPM. Pt uses BIPAP nightly and with naps at home.    Signs/symptoms of respiratory failure include- increased work of breathing and wheezing. Contributing diagnoses includes - COPD Labs and images were reviewed. VBG in ED: pH 7.26, pCO2 101, bicarb 45. Required 3L in ED however was placed on non rebreather for hypoxia and night BIPAP. Diminished breath sounds, wheezing on exam. Pt is COPD Gold D, following with pulmonology outpatient.    Plan:  COPD Pathway  - Continue home roflumilast  - Duonebs q4hrs  - Ceftriaxone QD and Doxycline BID (9/28), can try lactobacillus for diarrhea  - Prednisone 40mg daily for 5 days  - BIPAP nightly and with naps  - Sputum culture  - SW to obtain Trilogy ventilator for patient on D/c  - Continue Breo in hospital as home inhaler not on formulary      Community acquired bacterial pneumonia  See plan as in "Acute on chronic respiratory failure with hypoxia and hypercapnia"    Shortness of breath  See "Acute on Chronic respiratory failure with hypoxia and hypercapnia"      COPD exacerbation  Patient's COPD is " "with exacerbation noted by continued dyspnea and worsening of baseline hypoxia currently.  Patient is currently on COPD Pathway. Continue scheduled inhalers Steroids, Antibiotics, and Supplemental oxygen and monitor respiratory status closely.     See "Acute on chronic respiratory failure with hypoxia and hypercapnia"    Cardiac/Vascular  CAD (coronary artery disease)  Patient with known CAD, which is controlled Will continue ASA and ezetimibe monitor for S/Sx of angina/ACS. Continue to monitor on telemetry. Per chart review, pt unable to tolerate statin.    HTN (hypertension)  Chronic, controlled. Latest blood pressure and vitals reviewed-     Temp:  [97.7 °F (36.5 °C)-98.5 °F (36.9 °C)]   Pulse:  [66-94]   Resp:  [15-37]   BP: (110-150)/(51-78)   SpO2:  [91 %-100 %] .   Home meds for hypertension were reviewed and noted below.   Hypertension Medications               furosemide (LASIX) 40 MG tablet Take 1 tablet (40 mg total) by mouth once daily.    losartan (COZAAR) 50 MG tablet Take 0.5 tablets (25 mg total) by mouth once daily.            While in the hospital, will manage blood pressure as follows; Continue home antihypertensive regimen    Will utilize p.r.n. blood pressure medication only if patient's blood pressure greater than 180/110 and she develops symptoms such as worsening chest pain or shortness of breath.      Final Active Diagnoses:    Diagnosis Date Noted POA    PRINCIPAL PROBLEM:  Acute on chronic respiratory failure with hypoxia and hypercapnia [J96.21, J96.22] 05/01/2017 Yes    Community acquired bacterial pneumonia [J15.9] 09/29/2024 Yes    Anxiety [F41.9] 08/30/2023 Yes     Chronic    Shortness of breath [R06.02] 07/25/2023 Yes    CAD (coronary artery disease) [I25.10] 09/09/2022 Yes    HTN (hypertension) [I10] 09/08/2015 Yes    COPD exacerbation [J44.1] 12/17/2014 Yes      Problems Resolved During this Admission:       Discharged Condition: stable    Disposition: Home or Self Care    Follow " Up:   Follow-up Information       [facility] Follow up.    Why: CALL RESPIRATORY THERAPIST EMMIE IF YOU HAVE QUESTIONS ABOUT YOUR VENT  Contact information:  [address]  [address]  [facility] [address]  [phone]             Sussy Mason MD Follow up on 10/8/2024.    Specialty: Internal Medicine  Why: 9:00 am   - Dr. Mina Chan  Contact information:  [address]  [place] [address]  [phone]               Alex Cunningham MD Follow up on 11/19/2024.    Specialty: Pulmonary Disease  Why: 1:00 pm  Contact information:  [address] [facility] [facility] [address]  [phone]                           Patient Instructions:      Diet Cardiac     SUBSEQUENT HOME HEALTH ORDERS   Order Comments: PT/OT - Low intensity therapy     Order Specific Question Answer Comments   What Home Health Agency is the patient currently using? [facility]      Notify your health care provider if you experience any of the following:  difficulty breathing or increased cough     Notify your health care provider if you experience any of the following:  persistent dizziness, light-headedness, or visual disturbances     Notify your health care provider if you experience any of the following:  increased confusion or weakness     Notify your health care provider if you experience any of the following:  temperature >100.4     Activity as tolerated       Significant Diagnostic Studies: N/A    Pending Diagnostic Studies:       None           Medications:  Reconciled Home Medications:      Medication List        START taking these medications      amoxicillin-clavulanate 875-125mg 875-125 mg per tablet  Commonly known as: AUGMENTIN  Take 1 tablet by mouth 2 (two) times a day. for 2 days     doxycycline 100 MG Cap  Commonly known as: VIBRAMYCIN  Take 1 capsule (100 mg total) by mouth every 12 (twelve) hours. for 2 days     predniSONE 20 MG tablet  Commonly known as: DELTASONE  Take 2 tablets (40  mg total) by mouth once daily. for 2 days            CONTINUE taking these medications      acetaminophen 500 MG tablet  Commonly known as: TYLENOL  Take 500 mg by mouth every 6 (six) hours as needed for Pain.     albuterol 90 mcg/actuation inhaler  Commonly known as: PROVENTIL/VENTOLIN HFA  Inhale 2 puffs into the lungs every 6 (six) hours as needed for Wheezing or Shortness of Breath.     alendronate 35 MG tablet  Commonly known as: FOSAMAX  Take 35 mg by mouth every Tuesday.     aspirin 81 MG Chew  Take 1 tablet by mouth once daily.     BREZTRI AEROSPHERE 160-9-4.8 mcg/actuation Hfaa  Generic drug: budesonide-glycopyr-formoterol  Inhale 2 puffs into the lungs 2 (two) times daily.     calcium carb-mag hydrox-simeth 1,200 mg-270 mg -80 mg/10 mL Susp  Take 1,200 mg by mouth once daily.     cholecalciferol (vitamin D3) 50 mcg (2,000 unit) Tab  Commonly known as: VITAMIN D3  Take 2,000 Units by mouth once daily.     dextromethorphan-guaiFENesin  mg  mg per 12 hr tablet  Commonly known as: MUCINEX DM  Take 1 tablet by mouth 2 (two) times daily as needed.     dicyclomine 20 mg tablet  Commonly known as: BENTYL  Take 1 tablet (20 mg total) by mouth 3 (three) times daily as needed (abdominal pain).     EScitalopram oxalate 10 MG tablet  Commonly known as: LEXAPRO  TAKE 1 TABLET(10 MG) BY MOUTH DAILY     ezetimibe 10 mg tablet  Commonly known as: ZETIA  Take 1 tablet (10 mg total) by mouth once daily.     fluticasone propionate 50 mcg/actuation nasal spray  Commonly known as: FLONASE  1 spray (50 mcg total) by Each Nostril route daily as needed for Allergies.     furosemide 40 MG tablet  Commonly known as: LASIX  Take 1 tablet (40 mg total) by mouth once daily.     hydrOXYzine HCL 25 MG tablet  Commonly known as: ATARAX  TAKE 1 TABLET(25 MG) BY MOUTH THREE TIMES DAILY AS NEEDED FOR ANXIETY OR ITCHING     ibuprofen 800 MG tablet  Commonly known as: ADVIL,MOTRIN  Take 1 tablet (800 mg total) by mouth 3 (three)  times daily as needed for Pain.     losartan 50 MG tablet  Commonly known as: COZAAR  Take 0.5 tablets (25 mg total) by mouth once daily.     OXYGEN-AIR DELIVERY SYSTEMS MISC  2 L by Nasal route continuous.     potassium chloride 10 MEQ Cpsr  Commonly known as: MICRO-K  TAKE 1 CAPSULE BY MOUTH EVERY DAY     roflumilast 500 mcg Tab  Commonly known as: DALIRESP  Take 1 tablet (500 mcg total) by mouth once daily.     triamcinolone acetonide 0.1% 0.1 % cream  Commonly known as: KENALOG  Apply topically 2 (two) times daily. for 14 days            STOP taking these medications      azithromycin 250 MG tablet  Commonly known as: Z-OLY              Indwelling Lines/Drains at time of discharge:   Lines/Drains/Airways       Drain  Duration             Female External Urinary Catheter w/ Suction 09/28/24 2028 2 days                    Time spent on the discharge of patient: 45 minutes    _________________________________________  Jennifer Rodriguez MD, PGY-1  [facility] [place]

## 2024-10-01 NOTE — ASSESSMENT & PLAN NOTE
Chronic, controlled. Latest blood pressure and vitals reviewed-     Temp:  [97.7 °F (36.5 °C)-98.5 °F (36.9 °C)]   Pulse:  [66-94]   Resp:  [15-37]   BP: (110-150)/(51-78)   SpO2:  [91 %-100 %] .   Home meds for hypertension were reviewed and noted below.   Hypertension Medications               furosemide (LASIX) 40 MG tablet Take 1 tablet (40 mg total) by mouth once daily.    losartan (COZAAR) 50 MG tablet Take 0.5 tablets (25 mg total) by mouth once daily.            While in the hospital, will manage blood pressure as follows; Continue home antihypertensive regimen    Will utilize p.r.n. blood pressure medication only if patient's blood pressure greater than 180/110 and she develops symptoms such as worsening chest pain or shortness of breath.

## 2024-10-01 NOTE — PLAN OF CARE
Problem: Adult Inpatient Plan of Care  Goal: Plan of Care Review  Outcome: Progressing  Goal: Patient-Specific Goal (Individualized)  Outcome: Progressing  Goal: Readiness for Transition of Care  Outcome: Progressing     Problem: COPD (Chronic Obstructive Pulmonary Disease)  Goal: Optimal Chronic Illness Coping  Outcome: Progressing  Goal: Absence of Infection Signs and Symptoms  Outcome: Progressing  Goal: Effective Oxygenation and Ventilation  Outcome: Progressing     Problem: Fall Injury Risk  Goal: Absence of Fall and Fall-Related Injury  Outcome: Progressing     Problem: Skin Injury Risk Increased  Goal: Skin Health and Integrity  Outcome: Progressing     Problem: Coping Ineffective  Goal: Effective Coping  Outcome: Progressing     Problem: Gas Exchange Impaired  Goal: Optimal Gas Exchange  Outcome: Progressing     Problem: Comorbidity Management  Goal: Blood Pressure in Desired Range  Outcome: Progressing     Problem: Pneumonia  Goal: Fluid Balance  Outcome: Progressing  Goal: Effective Oxygenation and Ventilation  Outcome: Progressing

## 2024-10-01 NOTE — PROGRESS NOTES
Discharge orders noted. Additional clinical references attached. Patient's discharge instructions given by bedside RN. Virtual nurse cued into room and reviewed discharge instructions. Education provided on new medication, diagnosis, and follow-up appointments. Teach back method used. Patient and her daughter verbalized understanding. All questions answered. Transport to [facility] requested. Bedside nurse updated on patient status and transportation request.      10/01/24 1406   AVS Confirmation   Discharge instructions and AVS provided to and reviewed with patient and/or significant other. Yes

## 2024-10-01 NOTE — PLAN OF CARE
Pt for d/c to home today   This pt has a home vent- clarified with Resp Therapist Makayla at [facility] - she was asked to call pt to arrange an educational session to review use.     Verified with Sonam - pt to be seen by [facility]    Note sent to Dr. Cunningham's office (Pulmonary) requesting a sooner f/u apt    No dme ordered   Pt's daughter to transport to home - she will bring pt's portable O2 canniseter.   Future Appointments   Date Time Provider Department Center   10/8/2024  9:00 AM Mina Chan MD [id number] [place]   10/18/2024  1:00 PM Tata Pyle MD [facility]   11/1/2024  3:20 PM Sussy Mason MD [facility] [place]        10/01/24 1705   Final Note   Assessment Type Final Discharge Note   Anticipated Discharge Disposition Home-Health  ([facility])   What phone number can be called within the next 1-3 days to see how you are doing after discharge? [phone]   Hospital Resources/Appts/Education Provided Appointments scheduled and added to AVS;Post-Acute resouces added to AVS   Post-Acute Status   Post-Acute Authorization Home Health   Home Health Status Set-up Complete/Auth obtained   Discharge Delays None known at this time

## 2024-10-01 NOTE — NURSING
Called report to nurse [place]. SBAR and plan of care discussed, orders reviewed.    2115 Pt transported to room 484 on tele monitor, bed locked and call light within reach. Receiving nurse notified of pt arrival.

## 2024-10-02 ENCOUNTER — TELEPHONE (OUTPATIENT)
Dept: PRIMARY CARE CLINIC | Facility: CLINIC | Age: 69
End: 2024-10-02
Payer: MEDICARE

## 2024-10-02 NOTE — TELEPHONE ENCOUNTER
Call to patient for follow-up with hospital/ER stay.   Discussed medications, and follow-up plan noted in discharge summary  Reviewed referrals and assessed appointment status  Assessed needs and concerns post hospital, including transportation  Scheduled HFU visit 10/08/24    Reviewed all new meds, continued medications, follow up appointments and signs and symptoms to report to PCP or seek emergency medical care. Pt verbalized understanding to all instructions and education. Pt denies any complaints or concerns at this time

## 2024-10-03 LAB
BACTERIA SPEC AEROBE CULT: NORMAL
BACTERIA SPEC AEROBE CULT: NORMAL
GRAM STN SPEC: NORMAL

## 2024-10-06 DIAGNOSIS — E78.2 MIXED HYPERLIPIDEMIA: ICD-10-CM

## 2024-10-06 DIAGNOSIS — I70.0 AORTIC ATHEROSCLEROSIS: ICD-10-CM

## 2024-10-07 RX ORDER — EZETIMIBE 10 MG/1
10 TABLET ORAL
Qty: 90 TABLET | Refills: 0 | Status: SHIPPED | OUTPATIENT
Start: 2024-10-07

## 2024-10-08 ENCOUNTER — OFFICE VISIT (OUTPATIENT)
Dept: PRIMARY CARE CLINIC | Facility: CLINIC | Age: 69
End: 2024-10-08
Payer: MEDICARE

## 2024-10-08 VITALS
DIASTOLIC BLOOD PRESSURE: 60 MMHG | HEART RATE: 73 BPM | HEIGHT: 66 IN | WEIGHT: 182.31 LBS | BODY MASS INDEX: 29.3 KG/M2 | OXYGEN SATURATION: 95 % | SYSTOLIC BLOOD PRESSURE: 110 MMHG

## 2024-10-08 DIAGNOSIS — J44.1 COPD EXACERBATION: Primary | ICD-10-CM

## 2024-10-08 DIAGNOSIS — I10 PRIMARY HYPERTENSION: ICD-10-CM

## 2024-10-08 DIAGNOSIS — J96.11 CHRONIC RESPIRATORY FAILURE WITH HYPOXIA AND HYPERCAPNIA: ICD-10-CM

## 2024-10-08 DIAGNOSIS — J43.2 CENTRILOBULAR EMPHYSEMA: ICD-10-CM

## 2024-10-08 DIAGNOSIS — J96.12 CHRONIC RESPIRATORY FAILURE WITH HYPOXIA AND HYPERCAPNIA: ICD-10-CM

## 2024-10-08 PROCEDURE — 99999 PR PBB SHADOW E&M-EST. PATIENT-LVL IV: CPT | Mod: PBBFAC,HCNC,,

## 2024-10-08 NOTE — ASSESSMENT & PLAN NOTE
PFTs 4/18/23 10/10/23   FVC  (pre-BD) 1.01 1.13   FVC%  40 45   FEV1 0.44 0.52   FEV1%  22 27   FEV1/FVC  44 47   - Continue home management  - F/U with pulmonology on 10/18/24

## 2024-10-08 NOTE — ASSESSMENT & PLAN NOTE
BP Readings from Last 5 Encounters:   10/08/24 110/60   10/01/24 (!) 147/64   09/20/24 118/60   08/06/24 (!) 120/52   08/01/24 120/64   - Continue current regimen  - Check/log BP daily and bring logs to follow up  - Continue cardiac diet and aerobic exercise as tolerated

## 2024-10-08 NOTE — ASSESSMENT & PLAN NOTE
- Completed course of antibiotic therapy and course of steroids  - Lung auscultation with mild left lower lobe crackles and right-sided wheezes  - Continue home management with home O2, BiPap, ICS/LABA, PRN LISA, and Pd4i  - F/U with Pulmonology

## 2024-10-08 NOTE — LETTER
October 8, 2024      [facility]  [address] [address]       Patient: Terri Phelan   YOB: 1955  Date of Visit: 10/08/2024    To Whom It May Concern:    Wanda Phelan  was at [facility] on 10/08/2024. Ms Phelan has recovered from her recent COPD exacerbation and has returned to baseline pulmonary function. She may continue with therapy with no additional restrictions. If you have any questions or concerns, or if I can be of further assistance, please do not hesitate to contact me.    Sincerely,    Mina Chan MD

## 2024-10-08 NOTE — PROGRESS NOTES
[facility]    Subjective     Patient Name: Terri Phelan  YOB: 1955  Patient Age: 69 y.o.  Patient Sex: female  Patient Phone: [phone]  PCP: Sussy Mason MD  Last PCP Appointment: 9/20/2024    History of Present Illness    CHIEF COMPLAINT:  Ms. Phelan presents today for a hospital follow-up visit for COPD exacerbation complicated by PNA.    RECENT HOSPITALIZATION AND RESPIRATORY STATUS:  She was recently hospitalized for pneumonia, experiencing worsening shortness of breath, coughing fits, and sputum production prior to admission. She required increased oxygen during hospitalization. She has completed prescribed courses of antibiotics (Augmentin and doxycycline) and prednisone. She reports feeling much better over the last couple of days, with improved breathing and overall outlook. She is now back to her baseline respiratory status. She denies recent increased use of her rescue inhaler. She uses BiPAP at night with settings of 18/8, which she tolerates well. She is currently using oxygen set at 3 L, though she normally uses 2 L. She walked into the clinic on 2 L and is proud of this improvement. She denies needing to increase oxygen use when walking around.    PULMONARY FOLLOW-UP:  She has a scheduled appointment with Dr. Cunningham on the 10/18/24 for pulmonary follow-up. There was a discussion about potentially switching from BiPAP to CPAP during her recent hospitalization. She expresses hesitation to change without consulting her regular pulmonologists, Dr. Cunningham and Dr. Painter. She states Dr. Painter previously advised her not to let anyone take away her current machine due to its specific pressure settings.    CARDIOVASCULAR HISTORY:  She reports a past cardiac event, which she now understands was a stress-induced heart attack without coronary artery blockage. She denies ever seeing a cardiologist. She reports normal heart function on a recent echocardiogram performed in  2023. She has had recent low blood pressure incidents during pulmonary rehabilitation, with diastolic pressure dropping below 60, once as low as 48. Dr. Mason recently decreased her Losartan dose from 50mg to 25mg due to these blood pressure concerns. She reports previously having normal blood pressure readings without medication, typically around 125/65.    RENAL FUNCTION:  She denies any known kidney issues and expresses confusion about the connection between her blood pressure medication and kidney health. Renal function labs were reviewed with Pt and all questions were answered.    PULMONARY REHABILITATION:  She reports recent attendance at pulmonary rehabilitation and requests clearance to return. She denies any current limitations or restrictions that would prevent her from participating in the program. Pt requesting letter to continue therapy.    ROS is negative unless otherwise indicated in HPI.         Health Maintenance and Care Gaps  TDap is UTD.   Influenza is UTD.  Pneumovax is UTD.   Zostavax is UTD.       Covid is not UTD.  RSV is UTD.  Colonoscopy is not UTD. Order placed  DEXA  is UTD.  Low Dose CT scan is not UTD but order is placed.     Screening:  Hepatitis C is UTD in pts born between 4239-4159.   HIV is UTD     Prior to Admission medications    Medication Sig Start Date End Date Taking? Authorizing Provider   acetaminophen (TYLENOL) 500 MG tablet Take 500 mg by mouth every 6 (six) hours as needed for Pain.   Yes Provider, Historical   albuterol (PROVENTIL/VENTOLIN HFA) 90 mcg/actuation inhaler Inhale 2 puffs into the lungs every 6 (six) hours as needed for Wheezing or Shortness of Breath. 7/1/24  Yes Alex Cunningham MD   aspirin 81 MG Chew Take 1 tablet by mouth once daily.   Yes Provider, Historical   budesonide-glycopyr-formoterol (BREZTRI AEROSPHERE) 160-9-4.8 mcg/actuation HFAA Inhale 2 puffs into the lungs 2 (two) times daily. 4/20/22  Yes Provider, Historical   calcium carb-mag  hydrox-simeth 1,200 mg-270 mg -80 mg/10 mL Susp Take 1,200 mg by mouth once daily.   Yes Provider, Historical   cholecalciferol, vitamin D3, (VITAMIN D3) 50 mcg (2,000 unit) Tab Take 2,000 Units by mouth once daily.   Yes Provider, Historical   dextromethorphan-guaiFENesin  mg (MUCINEX DM)  mg per 12 hr tablet Take 1 tablet by mouth 2 (two) times daily as needed.   Yes Provider, Historical   dicyclomine (BENTYL) 20 mg tablet Take 1 tablet (20 mg total) by mouth 3 (three) times daily as needed (abdominal pain). 5/15/23  Yes Bethany Landry MD   EScitalopram oxalate (LEXAPRO) 10 MG tablet TAKE 1 TABLET(10 MG) BY MOUTH DAILY 8/29/24  Yes Sussy Mason MD   ezetimibe (ZETIA) 10 mg tablet TAKE 1 TABLET(10 MG) BY MOUTH DAILY 10/7/24  Yes Sussy Mason MD   fluticasone propionate (FLONASE) 50 mcg/actuation nasal spray 1 spray (50 mcg total) by Each Nostril route daily as needed for Allergies. 8/23/23  Yes Kev Cox MD   furosemide (LASIX) 40 MG tablet Take 1 tablet (40 mg total) by mouth once daily. 12/29/23 12/28/24 Yes Josephine Xavier MD   hydrOXYzine HCL (ATARAX) 25 MG tablet TAKE 1 TABLET(25 MG) BY MOUTH THREE TIMES DAILY AS NEEDED FOR ANXIETY OR ITCHING 9/17/24  Yes Sussy Mason MD   ibuprofen (ADVIL,MOTRIN) 800 MG tablet Take 1 tablet (800 mg total) by mouth 3 (three) times daily as needed for Pain. 2/2/24  Yes Lai Arora MD   losartan (COZAAR) 50 MG tablet Take 0.5 tablets (25 mg total) by mouth once daily. 9/20/24 9/20/25 Yes Sussy Mason MD   OXYGEN-AIR DELIVERY SYSTEMS MISC 2 L by Nasal route continuous.   Yes Provider, Historical   potassium chloride (MICRO-K) 10 MEQ CpSR TAKE 1 CAPSULE BY MOUTH EVERY DAY 8/22/24  Yes Katie Araujo, PASARWAT   roflumilast (DALIRESP) 500 mcg Tab Take 1 tablet (500 mcg total) by mouth once daily. 8/13/24  Yes Alex Cunningham MD   alendronate (FOSAMAX) 35 MG tablet Take 35 mg by mouth every Tuesday.  Patient not  "taking: Reported on 10/8/2024 7/15/22   Provider, Historical   triamcinolone acetonide 0.1% (KENALOG) 0.1 % cream Apply topically 2 (two) times daily. for 14 days 9/20/24 10/4/24  Sussy Mason MD       OBJECTIVE:     Vitals:    10/08/24 0918   BP: 110/60   BP Location: Left arm   Patient Position: Sitting   Pulse: 73   SpO2: 95%   Weight: 82.7 kg (182 lb 5.1 oz)   Height: 5' 6" (1.676 m)       Body mass index is 29.43 kg/m².     PHYSICAL EXAM  GEN - A+OX4, NAD   HEENT - PERRL, EOMI, OP clear  Neck - No thyromegaly or cervical LAD. No thyroid masses felt.  CV - RRR, no m/r   Chest - mild left lower lobe crackles and right-sided wheezes. On 2L home O2  Abd - S/NT/ND/+BS.   Ext - 2+BDP and radial pulses. No C/C/E.  MSK - normal ROM, no tenderness  Neuro - 5/5 BUE and BLE strength.  LN - No axillary or inguinal LAD appreciated.  Skin - No rash.     LABS  Lab Results   Component Value Date    WBC 10.60 10/01/2024    HGB 11.6 (L) 10/01/2024    HCT 38.7 10/01/2024    MCV 90 10/01/2024     10/01/2024         CMP  Sodium   Date Value Ref Range Status   10/01/2024 145 136 - 145 mmol/L Final     Potassium   Date Value Ref Range Status   10/01/2024 3.9 3.5 - 5.1 mmol/L Final     Chloride   Date Value Ref Range Status   10/01/2024 98 95 - 110 mmol/L Final     CO2   Date Value Ref Range Status   10/01/2024 36 (H) 23 - 29 mmol/L Final     Glucose   Date Value Ref Range Status   10/01/2024 73 70 - 110 mg/dL Final     BUN   Date Value Ref Range Status   10/01/2024 20 8 - 23 mg/dL Final     Creatinine   Date Value Ref Range Status   10/01/2024 0.7 0.5 - 1.4 mg/dL Final     Calcium   Date Value Ref Range Status   10/01/2024 9.2 8.7 - 10.5 mg/dL Final     Total Protein   Date Value Ref Range Status   10/01/2024 6.3 6.0 - 8.4 g/dL Final     Albumin   Date Value Ref Range Status   10/01/2024 3.2 (L) 3.5 - 5.2 g/dL Final     Total Bilirubin   Date Value Ref Range Status   10/01/2024 0.1 0.1 - 1.0 mg/dL Final     Comment:     " For infants and newborns, interpretation of results should be based  on gestational age, weight and in agreement with clinical  observations.    Premature Infant recommended reference ranges:  Up to 24 hours.............<8.0 mg/dL  Up to 48 hours............<12.0 mg/dL  3-5 days..................<15.0 mg/dL  6-29 days.................<15.0 mg/dL       Alkaline Phosphatase   Date Value Ref Range Status   10/01/2024 44 (L) 55 - 135 U/L Final     AST   Date Value Ref Range Status   10/01/2024 16 10 - 40 U/L Final     ALT   Date Value Ref Range Status   10/01/2024 14 10 - 44 U/L Final     Anion Gap   Date Value Ref Range Status   10/01/2024 11 8 - 16 mmol/L Final     eGFR   Date Value Ref Range Status   10/01/2024 >60 >60 mL/min/1.73 m^2 Final       ASSESSMENT & PLAN:   Ms. Terri Phelan is a 69 y.o. female who was seen in clinic today for hospital follow up. Assessed patient's recovery from recent pneumonia hospitalization. Evaluated current respiratory status, noting return to baseline and continued BiPAP use at night. Reviewed pulmonology recommendations from hospital stay. Considered patient's report of low diastolic blood pressure at pulmonary rehab.  Evaluated recent change in antihypertensive medication by Dr. Mason. Encouraged BP logs and discussion with PCP on next follow up. Assessed kidney function via GFR and creatinine review, finding no acute changes. Follow up as scheduled.       1. COPD exacerbation  Overview:  - Pt presented to ED 9/28/24 with acute hypoxic respiratory failure and was admitted for COPD exacerbation complicated by PNA.  - Treated with antibiotics, supplemental oxygen, respiratory therapy, and steroids  - Discharged on 10/01/24 on Augmentin 875-125 BID x 2 days, Doxycycline 100 mg BID x 2 days and prednisone 40 mg qd x 2 days.  - Scheduled with Pulm follow up and PCP follow up     Assessment & Plan:  - Completed course of antibiotic therapy and course of steroids  - Lung auscultation  with mild left lower lobe crackles and right-sided wheezes  - Continue home management with home O2, BiPap, ICS/LABA, PRN LISA, and Pd4i  - F/U with Pulmonology      2. Chronic respiratory failure with hypoxia and hypercapnia  Overview:  - Secondary to advanced/end-stage COPD/emphysema.  - On home oxygen and NIPPV with baseline PaCO2 in the 80s.  - Occasionally reports headaches but relatively asymptomatic with severe CO2 retention.      Assessment & Plan:  - Pt at baseline respiratory function   - Continuing with home regimen  - No supplies needed      3. Centrilobular emphysema  Overview:  - Severe obstructive disease, on 2-3L home O2 and NIPPV (BiPap) at night and prn.   - Chest CTA from July 2023 with apical centrilobular emphysema present.  - Managed with home O2, ICS/LABA, PRN LISA, and PD4i    Assessment & Plan:  PFTs 4/18/23 10/10/23   FVC  (pre-BD) 1.01 1.13   FVC%  40 45   FEV1 0.44 0.52   FEV1%  22 27   FEV1/FVC  44 47   - Continue home management  - F/U with pulmonology on 10/18/24  - Letter provided for return to Pulm Rehab      4. Primary hypertension  Overview:  - Managed on losartan 25mg daily    Assessment & Plan:  BP Readings from Last 5 Encounters:   10/08/24 110/60   10/01/24 (!) 147/64   09/20/24 118/60   08/06/24 (!) 120/52   08/01/24 120/64   - Continue current regimen  - Check/log BP daily and bring logs to follow up  - Continue cardiac diet and aerobic exercise as tolerated      Follow up with PCP as scheduled    All questions answered. Pt to call/message the Primary Clinic for any additional concerns        Mina Chan MD  [facility]    This note was generated with the assistance of ambient listening technology. Verbal consent was obtained by the patient and accompanying visitor(s) for the recording of patient appointment to facilitate this note. I attest to having reviewed and edited the generated note for accuracy, though some syntax or spelling errors may  persist. Please contact the author of this note for any clarification.

## 2024-10-10 ENCOUNTER — TELEPHONE (OUTPATIENT)
Dept: PRIMARY CARE CLINIC | Facility: CLINIC | Age: 69
End: 2024-10-10
Payer: MEDICARE

## 2024-10-10 DIAGNOSIS — E78.2 MIXED HYPERLIPIDEMIA: Primary | ICD-10-CM

## 2024-10-10 NOTE — TELEPHONE ENCOUNTER
----- Message from Katie Araujo PA-C sent at 7/9/2024  2:45 PM CDT -----  Regarding: lab  Repeat lipid, ast/alt after zetia ( 3 months)

## 2024-10-14 ENCOUNTER — OUTPATIENT CASE MANAGEMENT (OUTPATIENT)
Dept: ADMINISTRATIVE | Facility: OTHER | Age: 69
End: 2024-10-14

## 2024-10-14 RX ORDER — FLUTICASONE PROPIONATE 50 MCG
SPRAY, SUSPENSION (ML) NASAL
Qty: 16 G | Refills: 11 | Status: SHIPPED | OUTPATIENT
Start: 2024-10-14

## 2024-10-16 NOTE — PROGRESS NOTES
"[facility]    SUBJECTIVE:     Chief Complaint: COPD management    History of Present Illness:  Terri Phelan is a 69 y.o. female with PMH of COPD on 2L supplemental O2 and previously nocturnal BiPAP who presents for COPD management    She was recently hospitalized at [facility] from 9/28-10/1 for COPD exacerbation. She was admitted to ICU for very short period for BiPAP the stepped down. She received antibiotics, steroids, and duonebs. On discharge, the plan was for her to switch from BiPAP to Trilogy.   Since her hospital stay she has felt "pretty good". She has had some coughing and runny nose in the past few days. She has had some sick contacts, family members have had a cold. During her hospital stay, social work was consulted for Trilogy, but RT said they could come to the home, but they have not done so.     She takes Breztri, albuterol prn, Daliresp. She was previously on azithromycin chronically.   Requires  2L NC at  baseline. Her PaCO2 is usually in the 70-80s at baseline, bicarb in the upper 30s to low 40s most recently 36.   She previously went to pulmonary rehab in July/August after a previous hospitalization.   She is up to date on pneumococcal and flu. She needs to get COVID vaccine.     Smoking: No  Other substances: No  Inhalers: Albuterol  Travel: No  Incarceration/homelessness: No  Occupational/environmental exposures: No  Pets/hobbies: No  Recent illness: No  B-Symptoms: No  Autoimmune symptoms/features: No  Intubation Hx: None        Review of patient's allergies indicates:   Allergen Reactions    Codeine Nausea And Vomiting    Lipitor [atorvastatin] Other (See Comments)     Muscle fatigue      Morphine Hallucinations       Past Medical History:   Diagnosis Date    Acute exacerbation of chronic obstructive pulmonary disease (COPD) 12/19/2023    Recently hospitalized at [facility] from 5/14 to 5/20/2024 for exacerbation.  Did not require ICU care or significant escalation " "of intervention(s).      Adenoma of ascending colon 2022    Anxiety     COPD (chronic obstructive pulmonary disease)     COPD exacerbation 2014    Heart failure     Hypertension     Major depression 2023     Past Surgical History:   Procedure Laterality Date    BREAST BIOPSY N/A     pt unsure which breast but it was benign-(calcium)     SECTION      HERNIA REPAIR      HYSTERECTOMY       No family history on file.      Review of Systems:  ROS  CV: no syncope  ENT: no sore throat  Resp: per hpi  Eyes: no eye pain  Gastrointestinal: no nausea or vomiting  Integument/Breast: no rash  Musculoskeletal: no arthralgias  Neurological: no headaches  Behavioral/Psych: no confusion or depression  Heme: no bleeding      OBJECTIVE:     Vital Signs  Vitals:    10/18/24 1316   BP: 112/68   BP Location: Right arm   Patient Position: Sitting   Pulse: 78   SpO2: 95%  Comment: 3.0   Weight: 81.6 kg (179 lb 14.3 oz)   Height: 5' 6" (1.676 m)       Body mass index is 29.04 kg/m².    Physical Exam:  Physical Exam  General: no distress  Eyes:  conjunctivae/corneas clear  Nose: no discharge  Neck: trachea midline with no masses appreciated  Lungs:  normal respiratory effort, no wheezes, no rales  Heart: regular rate and rhythm and no murmur  Abdomen: non-distended  Extremities: no cyanosis, no edema, no clubbing  Skin: No rashes or lesions. good skin turgor  Neurologic: alert, oriented, thought content appropriate    Laboratory:  CBC  Lab Results   Component Value Date    WBC 10.60 10/01/2024    HGB 11.6 (L) 10/01/2024    HCT 38.7 10/01/2024     10/01/2024    MCV 90 10/01/2024    RDW 14.3 10/01/2024     BMP  Lab Results   Component Value Date     10/01/2024    K 3.9 10/01/2024    CL 98 10/01/2024    CO2 36 (H) 10/01/2024    BUN 20 10/01/2024    CREATININE 0.7 10/01/2024    GLU 73 10/01/2024    CALCIUM 9.2 10/01/2024    MG 2.1 10/01/2024    PHOS 2.5 (L) 10/01/2024     LFTs  Lab Results   Component Value " Date    PROT 6.3 10/01/2024    ALBUMIN 3.2 (L) 10/01/2024    BILITOT 0.1 10/01/2024    AST 16 10/01/2024    ALKPHOS 44 (L) 10/01/2024    ALT 14 10/01/2024     Personal Diagnostic Review       07/02/23 08:30 08/07/23 21:18 08/08/23 07:22 12/19/23 05:15 12/27/23 11:07 12/28/23 08:33 03/29/24 23:18 03/30/24 06:10 05/14/24 20:03 05/15/24 05:28   POC PH 7.376 7.237 (LL) 7.263 (LL) 7.277 (LL) 7.204 (LL) 7.355 7.277 (LL) 7.260 (LL) 7.331 (L) 7.385   POC PCO2 72.5 (HH) 106.6 (HH) 103.0 (HH) 109 (HH) >110 ! (>) 80.5 (HH) 103 (H) 92.0 (H) 85.6 (H) 82.5 (H)   POC PO2 51 (LL) 81 63 (L) 49.8 (LL) 74.4 (L) 54 (LL) 24.8 (LL) 63.3 (HH) 45.2 41.5   POC HCO3 42.5 (H) 45.4 (H) 46.5 (H) 50.7 (H)   45.0 (H) 47.8 (H) 41.2 (H) 45.2 (H) 49.3 (H)   POC SATURATED O2 82 (L) 92 (L) 85 (L) 81.2 (L) 92.6 (L) 84 40.9 (LL) 90.5 (L) 82.7 (LL) 78.6 (LL)   Specimen source Arterial Arterial Arterial Arterial Arterial Arterial Venous Venous Venous Venous   POC TCO2 45 (H) 49 (H) 50 (H)     47 (H)           Base Deficit       18.2 (H)     15.7 (H) 10.4 (H) 15.2 (H) 19.8 (H)   POC BE 17 18 19     19 (H)           FiO2 30   30 28 36   28 35 21 28         PFTs 10/10/2023  Severe obstruction, moderate restriction, severe reduction in DLCO, increased RV/TLC indicating air trapping.   FEV1/FVC is increased from prior PFTs in 4/2023 when it was 45%.       6MWT - performed at [facility] in  11/2023    Echo 7/2023    Left Ventricle The left ventricle is normal in size with normal systolic function. The estimated ejection fraction is 65%. The wall thickness is normal. There is normal diastolic function. There is normal wall motion.   Right Ventricle Normal systolic function.   Left Atrium The left atrial volume index is normal.   Right Atrium The right atrium is normal in size.   Aortic Valve The aortic valve appears structurally normal. The valve is trileaflet. There is normal leaflet mobility.   Mitral Valve The mean pressure gradient across the mitral valve is 3  mmHg at a heart rate of. The mitral valve appears structurally normal. There is normal leaflet mobility.  The estimated mitral valve area by pressure half time is 2.89 cm2.   Tricuspid Valve The tricuspid valve appears structurally normal. There is normal leaflet mobility. The estimated PA systolic pressure is greater than 29 mmHg.   Pulmonic Valve Pulmonic valve is structurally normal. There is normal leaflet mobility.   IVC/SVC Intermediate central venous pressure (8 mm Hg).       ASSESSMENT/PLAN:     Problem List Items Addressed This Visit       COPD    Current Assessment & Plan     Recent hospitalization for COPD exacerbation. Treated with antibiotics and steroids. Has felt much better after getting out of the hospital. She is currently on Breztri. Azithromycin was stopped after her hospitalization, unclear why. This may have been a mistake.  - Continue Breztri  - Continue albuterol prn  - Restart azithromycin MWF         Acute on chronic respiratory failure with hypoxia and hypercapnia - Primary    Current Assessment & Plan     Requires  2L NC at  baseline. Her PaCO2 is usually in the 70-80s at baseline, bicarb in the upper 30s to low 40s most recently 36.   Recently hospitalized with hypoxic and hypercapnic respiratory failure. Plan was to switch her to Trilogy when discharged, but this never happened.  Patient reports seeing Dr. Painter in sleep clinic in the past for her BiPAP.  - Continue BiPAP   - Will refer to sleep clinic for potential switch to AVAPS mode                  Tata Pyle DO  Pulmonary & Critical Care Fellow

## 2024-10-18 ENCOUNTER — OFFICE VISIT (OUTPATIENT)
Dept: PULMONOLOGY | Facility: CLINIC | Age: 69
End: 2024-10-18
Payer: MEDICARE

## 2024-10-18 VITALS
HEIGHT: 66 IN | OXYGEN SATURATION: 95 % | BODY MASS INDEX: 28.91 KG/M2 | SYSTOLIC BLOOD PRESSURE: 112 MMHG | HEART RATE: 78 BPM | WEIGHT: 179.88 LBS | DIASTOLIC BLOOD PRESSURE: 68 MMHG

## 2024-10-18 DIAGNOSIS — J44.1 COPD EXACERBATION: ICD-10-CM

## 2024-10-18 DIAGNOSIS — J96.22 ACUTE ON CHRONIC RESPIRATORY FAILURE WITH HYPOXIA AND HYPERCAPNIA: Primary | ICD-10-CM

## 2024-10-18 DIAGNOSIS — J96.21 ACUTE ON CHRONIC RESPIRATORY FAILURE WITH HYPOXIA AND HYPERCAPNIA: Primary | ICD-10-CM

## 2024-10-18 PROCEDURE — 99999 PR PBB SHADOW E&M-EST. PATIENT-LVL III: CPT | Mod: PBBFAC,HCNC,GC, | Performed by: STUDENT IN AN ORGANIZED HEALTH CARE EDUCATION/TRAINING PROGRAM

## 2024-10-18 RX ORDER — AZITHROMYCIN 250 MG/1
250 TABLET, FILM COATED ORAL
Qty: 30 TABLET | Refills: 3 | Status: SHIPPED | OUTPATIENT
Start: 2024-10-18

## 2024-10-18 RX ORDER — BUDESONIDE, GLYCOPYRROLATE, AND FORMOTEROL FUMARATE 160; 9; 4.8 UG/1; UG/1; UG/1
2 AEROSOL, METERED RESPIRATORY (INHALATION) 2 TIMES DAILY
Qty: 5.9 G | Refills: 3 | Status: SHIPPED | OUTPATIENT
Start: 2024-10-18

## 2024-10-18 NOTE — ASSESSMENT & PLAN NOTE
Requires  2L NC at  baseline. Her PaCO2 is usually in the 70-80s at baseline, bicarb in the upper 30s to low 40s most recently 36.   Recently hospitalized with hypoxic and hypercapnic respiratory failure. Plan was to switch her to Trilogy when discharged, but this never happened.  Patient reports seeing Dr. Painter in sleep clinic in the past for her BiPAP.  - Continue BiPAP   - Will refer to sleep clinic for potential switch to AVAPS mode

## 2024-10-18 NOTE — ASSESSMENT & PLAN NOTE
Recent hospitalization for COPD exacerbation. Treated with antibiotics and steroids. Has felt much better after getting out of the hospital. She is currently on Breztri. Azithromycin was stopped after her hospitalization, unclear why. This may have been a mistake.  - Continue Breztri  - Continue albuterol prn  - Restart azithromycin MW

## 2024-10-21 PROBLEM — J96.92 HYPERCAPNIC RESPIRATORY FAILURE: Status: RESOLVED | Noted: 2024-07-20 | Resolved: 2024-10-21

## 2024-10-22 ENCOUNTER — TELEPHONE (OUTPATIENT)
Dept: PULMONOLOGY | Facility: CLINIC | Age: 69
End: 2024-10-22
Payer: MEDICARE

## 2024-10-22 NOTE — TELEPHONE ENCOUNTER
----- Message from Cheyenne sent at 10/22/2024 11:21 AM CDT -----  Regarding: confirm appt//returning  Contact: [phone]  Type:  Patient Returning Call    Who Called:Pt   Who Left Message for Patient: Angela  Does the patient know what this is regarding?: yes, scheduling an appt on 10/29/24  Would the patient rather a call back or a response via [facility]? call  Best Call Back Number: [phone]  Additional Information: Pt want to make sure she is scheduled for 10/29 with Dr. Painter

## 2024-10-22 NOTE — TELEPHONE ENCOUNTER
Spoke with pt, informed her that I have received her message. Pt states that she received a message from Dr Cunningham offering appointment on 10/29/24 for 4:00PM. I verbalized to pt that I understand and advised her that I do not see a encounter where Dr Cunningham reached but however, if she can send picture of email I can schedule appointment. Pt states that she will try to get her daughter to send email and call us back. I verbalized to pt that I understand.

## 2024-10-22 NOTE — TELEPHONE ENCOUNTER
I called Mrs Phelan and told her Dr Painter can see her at 4pm on Tuesday 10-29-24 9th floor and please bring her cpap machine.She accepted the appointment and I will mail to her home. Angela Finney LPN

## 2024-10-22 NOTE — TELEPHONE ENCOUNTER
----- Message from Martita sent at 10/22/2024 10:56 AM CDT -----  Consult/Advisory    Name Of Caller:Terri       Contact Preference:[phone]    Nature of call: Pt was calling back to confirm she will take the appt she stated a msg or a email was sent for the 29th please call to assist she is asking to get a call to make sure and confirm

## 2024-10-23 ENCOUNTER — EXTERNAL HOME HEALTH (OUTPATIENT)
Dept: HOME HEALTH SERVICES | Facility: HOSPITAL | Age: 69
End: 2024-10-23
Payer: MEDICARE

## 2024-10-24 ENCOUNTER — TELEPHONE (OUTPATIENT)
Dept: PRIMARY CARE CLINIC | Facility: CLINIC | Age: 69
End: 2024-10-24
Payer: MEDICARE

## 2024-10-24 NOTE — TELEPHONE ENCOUNTER
----- Message from Vicky sent at 10/24/2024 11:17 AM CDT -----  Contact: Patient   [phone]  Requesting an RX refill or new RX. Patient requests call from Dr Marlon Sorto Pneumonia  Patient   [phone]  Is this a refill or new RX: REFILL      RX name and strength (copy/paste from chart):  Amoxicillan 875mg    Is this a 30 day or 90 day RX:     Pharmacy name and phone # (copy/paste from chart):    [id number] - [place], [place] - [address] AT [id number]  [place] [address]  Phone: [phone] Fax: [phone]       The doctors have asked that we provide their patients with the following 2 reminders -- prescription refills can take up to 72 hours, and a friendly reminder that in the future you can use your [facility] account to request refills:     ______________    Requesting an RX refill or new RX.    Is this a refill or new RX: REFILL      RX name and strength (copy/paste from chart):  Doxyciclene 100 mg    Is this a 30 day or 90 day RX:   _____________________________    Requesting an RX refill or new RX.    Is this a refill or new RX: REFILL      RX name and strength (copy/paste from chart):  prednisone 40 mg    Is this a 30 day or 90 day RX:

## 2024-10-24 NOTE — TELEPHONE ENCOUNTER
Spoke to the patient over the phone. She was admitted for COPD exacerbation and CAP. Was treated with abx and prednisone. She is doing breathing treatment once a day. Asked her to increase it to 3 times a week. Continue inhaler and the bipap. Continue azithromycin MWF. She will also do another round of prednisone.

## 2024-10-24 NOTE — TELEPHONE ENCOUNTER
Call to patient to follow-up with request for po abx. Patient reporting feeling sluggish, tired, SOB with resting, coughing-has on and off sputum production.  Checked 02 this am and was in 70's.     Patient seen by pulmonology on 10/18 and started on Z-pack M-W-F. Patient reported she is taking medication.     Patient has not taken respiratory treatment as prescribed only taking 1x/day, taking mucinex daily. Patient reported she is on fluid restriction of 32 ounces/day.    Wearing 02 @ 3L. .Does not want to make a clinic visit. She feels like she needs another round of abx  for lingering PNA.     Please advise.

## 2024-10-29 ENCOUNTER — OFFICE VISIT (OUTPATIENT)
Dept: SLEEP MEDICINE | Facility: CLINIC | Age: 69
End: 2024-10-29
Payer: MEDICARE

## 2024-10-29 VITALS
SYSTOLIC BLOOD PRESSURE: 132 MMHG | BODY MASS INDEX: 28.91 KG/M2 | HEART RATE: 75 BPM | OXYGEN SATURATION: 90 % | HEIGHT: 66 IN | DIASTOLIC BLOOD PRESSURE: 72 MMHG | WEIGHT: 179.88 LBS

## 2024-10-29 DIAGNOSIS — G47.33 OBSTRUCTIVE SLEEP APNEA SYNDROME: Primary | ICD-10-CM

## 2024-10-29 DIAGNOSIS — J96.12 CHRONIC HYPERCAPNIC RESPIRATORY FAILURE: ICD-10-CM

## 2024-10-29 DIAGNOSIS — J44.1 COPD EXACERBATION: ICD-10-CM

## 2024-10-29 PROCEDURE — 1160F RVW MEDS BY RX/DR IN RCRD: CPT | Mod: HCNC,CPTII,S$GLB, | Performed by: INTERNAL MEDICINE

## 2024-10-29 PROCEDURE — 3008F BODY MASS INDEX DOCD: CPT | Mod: HCNC,CPTII,S$GLB, | Performed by: INTERNAL MEDICINE

## 2024-10-29 PROCEDURE — 1159F MED LIST DOCD IN RCRD: CPT | Mod: HCNC,CPTII,S$GLB, | Performed by: INTERNAL MEDICINE

## 2024-10-29 PROCEDURE — 3075F SYST BP GE 130 - 139MM HG: CPT | Mod: HCNC,CPTII,S$GLB, | Performed by: INTERNAL MEDICINE

## 2024-10-29 PROCEDURE — 1111F DSCHRG MED/CURRENT MED MERGE: CPT | Mod: HCNC,CPTII,S$GLB, | Performed by: INTERNAL MEDICINE

## 2024-10-29 PROCEDURE — 3288F FALL RISK ASSESSMENT DOCD: CPT | Mod: HCNC,CPTII,S$GLB, | Performed by: INTERNAL MEDICINE

## 2024-10-29 PROCEDURE — 1157F ADVNC CARE PLAN IN RCRD: CPT | Mod: HCNC,CPTII,S$GLB, | Performed by: INTERNAL MEDICINE

## 2024-10-29 PROCEDURE — 1101F PT FALLS ASSESS-DOCD LE1/YR: CPT | Mod: HCNC,CPTII,S$GLB, | Performed by: INTERNAL MEDICINE

## 2024-10-29 PROCEDURE — 99999 PR PBB SHADOW E&M-EST. PATIENT-LVL IV: CPT | Mod: PBBFAC,HCNC,, | Performed by: INTERNAL MEDICINE

## 2024-10-29 PROCEDURE — 1126F AMNT PAIN NOTED NONE PRSNT: CPT | Mod: HCNC,CPTII,S$GLB, | Performed by: INTERNAL MEDICINE

## 2024-10-29 PROCEDURE — 3078F DIAST BP <80 MM HG: CPT | Mod: HCNC,CPTII,S$GLB, | Performed by: INTERNAL MEDICINE

## 2024-10-29 PROCEDURE — 99215 OFFICE O/P EST HI 40 MIN: CPT | Mod: HCNC,S$GLB,, | Performed by: INTERNAL MEDICINE

## 2024-10-29 PROCEDURE — 4010F ACE/ARB THERAPY RXD/TAKEN: CPT | Mod: HCNC,CPTII,S$GLB, | Performed by: INTERNAL MEDICINE

## 2024-11-01 ENCOUNTER — OFFICE VISIT (OUTPATIENT)
Dept: PRIMARY CARE CLINIC | Facility: CLINIC | Age: 69
End: 2024-11-01
Payer: MEDICARE

## 2024-11-01 DIAGNOSIS — I10 PRIMARY HYPERTENSION: ICD-10-CM

## 2024-11-01 DIAGNOSIS — J44.1 COPD EXACERBATION: Primary | ICD-10-CM

## 2024-11-01 DIAGNOSIS — Z12.31 SCREENING MAMMOGRAM FOR BREAST CANCER: ICD-10-CM

## 2024-11-01 DIAGNOSIS — G47.33 OBSTRUCTIVE SLEEP APNEA SYNDROME: ICD-10-CM

## 2024-11-01 RX ORDER — PREDNISONE 20 MG/1
40 TABLET ORAL DAILY
Qty: 10 TABLET | Refills: 0 | Status: SHIPPED | OUTPATIENT
Start: 2024-11-01 | End: 2024-11-06

## 2024-11-04 ENCOUNTER — OUTPATIENT CASE MANAGEMENT (OUTPATIENT)
Dept: ADMINISTRATIVE | Facility: OTHER | Age: 69
End: 2024-11-04
Payer: MEDICARE

## 2024-11-05 ENCOUNTER — TELEPHONE (OUTPATIENT)
Dept: PRIMARY CARE CLINIC | Facility: CLINIC | Age: 69
End: 2024-11-05
Payer: MEDICARE

## 2024-11-06 ENCOUNTER — OFFICE VISIT (OUTPATIENT)
Dept: PRIMARY CARE CLINIC | Facility: CLINIC | Age: 69
End: 2024-11-06
Payer: MEDICARE

## 2024-11-06 ENCOUNTER — TELEPHONE (OUTPATIENT)
Dept: PRIMARY CARE CLINIC | Facility: CLINIC | Age: 69
End: 2024-11-06
Payer: MEDICARE

## 2024-11-06 DIAGNOSIS — M25.512 CHRONIC LEFT SHOULDER PAIN: Primary | ICD-10-CM

## 2024-11-06 DIAGNOSIS — G89.29 CHRONIC LEFT SHOULDER PAIN: Primary | ICD-10-CM

## 2024-11-06 PROCEDURE — 1157F ADVNC CARE PLAN IN RCRD: CPT | Mod: HCNC,CPTII,95, | Performed by: PHYSICIAN ASSISTANT

## 2024-11-06 PROCEDURE — 4010F ACE/ARB THERAPY RXD/TAKEN: CPT | Mod: HCNC,CPTII,95, | Performed by: PHYSICIAN ASSISTANT

## 2024-11-06 PROCEDURE — 99214 OFFICE O/P EST MOD 30 MIN: CPT | Mod: HCNC,95,, | Performed by: PHYSICIAN ASSISTANT

## 2024-11-06 RX ORDER — HYDROCODONE BITARTRATE AND ACETAMINOPHEN 5; 325 MG/1; MG/1
1 TABLET ORAL EVERY 12 HOURS PRN
Qty: 14 TABLET | Refills: 0 | Status: SHIPPED | OUTPATIENT
Start: 2024-11-06

## 2024-11-06 NOTE — TELEPHONE ENCOUNTER
Call to pulmonary rehab, no answer. LVM requesting to fax orders for PT/OT for should pain. Requested call back.

## 2024-11-06 NOTE — PROGRESS NOTES
Primary Care Provider Appointment   [facility]        Subjective:       Patient ID:  Terri is a 69 y.o. female being seen for Shoulder Pain      Chief Complaint: Shoulder Pain    The patient location is: [place]   The chief complaint leading to consultation is: shoulder pain    Visit type: audiovisual    Face to Face time with patient: 15 min  20 minutes of total time spent on the encounter, which includes face to face time and non-face to face time preparing to see the patient (eg, review of tests), Obtaining and/or reviewing separately obtained history, Documenting clinical information in the electronic or other health record, Independently interpreting results (not separately reported) and communicating results to the patient/family/caregiver, or Care coordination (not separately reported).         Each patient to whom he or she provides medical services by telemedicine is:  (1) informed of the relationship between the physician and patient and the respective role of any other health care provider with respect to management of the patient; and (2) notified that he or she may decline to receive medical services by telemedicine and may withdraw from such care at any time.    Notes:       HPI: 70 yo female presents for left shoulder pain. Pain goes from left neck down the shoulder. Chronic issue for many years. It will flare up every so often, last time was about 1.5 years ago. Pain is constant, worse to move it. Has been taking tylenol, tried IBU and using an ice pack. This helped her sleep. Pain today is about the same, severe. Denies numbness, tingling or weakness in the arm.  She was dx years ago with a rotator cuff  tear in [place]. She states she did PT at that time which helped. She hasn't done it again.     Past Medical History:   Diagnosis Date    Acute exacerbation of chronic obstructive pulmonary disease (COPD) 12/19/2023    Recently hospitalized at [facility]  "from 5/14 to 5/20/2024 for exacerbation.  Did not require ICU care or significant escalation of intervention(s).      Adenoma of ascending colon 09/09/2022    Anxiety     COPD (chronic obstructive pulmonary disease)     COPD exacerbation 12/17/2014    Heart failure     Hypertension     Major depression 07/01/2023       Review of Systems   Constitutional:  Negative for fever.   Cardiovascular:  Negative for chest pain.   Gastrointestinal:  Negative for abdominal pain.   Genitourinary:  Negative for bladder incontinence, dysuria, hematuria and pelvic pain.   Musculoskeletal:  Positive for back pain. Negative for leg pain.   Neurological:  Negative for weakness, numbness and headaches.             Health Maintenance         Date Due Completion Date    Colorectal Cancer Screening Never done ---    LDCT Lung Screen 07/09/2024 7/9/2023    COVID-19 Vaccine (6 - 2024-25 season) 09/01/2024 10/17/2023    Mammogram 11/28/2024 11/28/2023    Hemoglobin A1c (Diabetic Prevention Screening) 06/16/2026 6/16/2023    DEXA Scan 11/28/2026 11/28/2023    Lipid Panel 07/08/2029 7/8/2024    TETANUS VACCINE 10/24/2032 10/24/2022                     Objective:      There were no vitals filed for this visit.  Estimated body mass index is 29.04 kg/m² as calculated from the following:    Height as of 10/29/24: 5' 6" (1.676 m).    Weight as of 10/29/24: 81.6 kg (179 lb 14.3 oz).  Physical Exam  Constitutional:       Appearance: Normal appearance.   Neurological:      Mental Status: She is alert.   Psychiatric:         Mood and Affect: Mood normal.         Thought Content: Thought content normal.           Assessment and Plan:         1. Chronic left shoulder pain  -     Ambulatory referral/consult to Physical/Occupational Therapy; Future; Expected date: 11/13/2024  -     HYDROcodone-acetaminophen (NORCO) 5-325 mg per tablet; Take 1 tablet by mouth every 12 (twelve) hours as needed for Pain.  Dispense: 14 tablet; Refill: 0  -     short course of " pain meds sent by PCP. Recommend she continue with IBU at least once per day for anti-inflammatory. Use pain med only when pain is severe, take tylenol instead for milder pain. If no change will refer to ortho         Follow Up:   1 month with pcp        Amy Lado, PA-C [facility]

## 2024-11-06 NOTE — TELEPHONE ENCOUNTER
Call to [facility] pulmonary rehab to discuss orders for PT/OT with shoulder pain. LVM left requesting return call.

## 2024-11-07 ENCOUNTER — TELEPHONE (OUTPATIENT)
Dept: PRIMARY CARE CLINIC | Facility: CLINIC | Age: 69
End: 2024-11-07
Payer: MEDICARE

## 2024-11-07 NOTE — TELEPHONE ENCOUNTER
----- Message from Olivia sent at 11/7/2024  9:13 AM CST -----  Contact: [phone]  Patient is returning a phone call.     Who left a message for the patient: andi      Does patient know what this is regarding:  [facility] Rehab      Would you like a call back, or a response through your [facility] portal?:   call back      Comments:

## 2024-11-07 NOTE — TELEPHONE ENCOUNTER
----- Message from Olivia sent at 11/7/2024  9:13 AM CST -----  Contact: [phone]  Patient is returning a phone call.    Who left a message for the patient: andi     Does patient know what this is regarding:  [facility]     Would you like a call back, or a response through your [facility] portal?:   call back     Comments:

## 2024-11-14 DIAGNOSIS — M25.512 CHRONIC LEFT SHOULDER PAIN: ICD-10-CM

## 2024-11-14 DIAGNOSIS — G89.29 CHRONIC LEFT SHOULDER PAIN: ICD-10-CM

## 2024-11-14 RX ORDER — HYDROCODONE BITARTRATE AND ACETAMINOPHEN 5; 325 MG/1; MG/1
1 TABLET ORAL EVERY 12 HOURS PRN
Qty: 14 TABLET | Refills: 0 | OUTPATIENT
Start: 2024-11-14

## 2024-11-14 NOTE — TELEPHONE ENCOUNTER
----- Message from Clinton sent at 11/14/2024 10:55 AM CST -----  Contact: [phone]  Requesting an RX refill or new RX.    Is this a refill or new RX: refill    RX name and strength (copy/paste from chart):  HYDROcodone-acetaminophen (NORCO) 5-325 mg per tablet     Is this a 30 day or 90 day RX:     Pharmacy name and phone # (copy/paste from chart):    [id number] - [place] [address] [place] [address]  Phone: [phone] Fax: [phone]        The doctors have asked that we provide their patients with the following 2 reminders -- prescription refills can take up to 72 hours, and a friendly reminder that in the future you can use your [facility] account to request refills: yes

## 2024-11-15 RX ORDER — HYDROCODONE BITARTRATE AND ACETAMINOPHEN 5; 325 MG/1; MG/1
1 TABLET ORAL EVERY 12 HOURS PRN
Qty: 14 TABLET | Refills: 0 | Status: SHIPPED | OUTPATIENT
Start: 2024-11-15

## 2024-11-15 NOTE — TELEPHONE ENCOUNTER
Patient Comment: Continued pain. Trying to get onto rehab early. 1st available apt. Is 11/26.  Also tried cold packs and voltaren.

## 2024-11-15 NOTE — TELEPHONE ENCOUNTER
Medication  HYDROcodone-acetaminophen (NORCO) 5-325 mg per tablet [96393]  Outpatient Medication Detail     Disp Refills Start End MAGED   HYDROcodone-acetaminophen (NORCO) 5-325 mg per tablet 14 tablet 0 11/6/2024 -- No   Sig - Route: Take 1 tablet by mouth every 12 (twelve) hours as needed for Pain. - Oral   Sent to pharmacy as: HYDROcodone-acetaminophen (NORCO) 5-325 mg per tablet   Class: Normal   Earliest Fill Date: 11/6/2024   Notes to Pharmacy: Quantity prescribed more than 7 day supply? No   Order: [id number]   Date/Time Signed: 11/6/2024 11:37       E-Prescribing Status: Receipt confirmed by pharmacy (11/6/2024 11:37 AM CST)

## 2024-11-19 ENCOUNTER — TELEPHONE (OUTPATIENT)
Dept: PULMONOLOGY | Facility: CLINIC | Age: 69
End: 2024-11-19
Payer: MEDICARE

## 2024-11-19 NOTE — TELEPHONE ENCOUNTER
----- Message from Tech Jasimine sent at 11/19/2024  3:12 PM CST -----  Regarding: Re: Pulm lab  Contact: [phone]  Type:  Needs Medical Advice    Who Called: The pt is following upon on continuation of pulm rehab at . The pt states a referral to was sent to Dr. Cunningham on 11/8    Would the patient rather a call back or a response via [facility]? Call please  Best Call Back Number: [phone]

## 2024-11-19 NOTE — TELEPHONE ENCOUNTER
Spoke with pt, informed her that I have received her message. Pt states that she has finished her second round of Pulmonary Rehab and that she is requesting another order to be placed for Pulmonary Rehab and to send to [facility]. I verbalized to pt that I understand and advised pt that I will forward her message to Dr Cunningham to review/advise.

## 2024-11-21 ENCOUNTER — TELEPHONE (OUTPATIENT)
Dept: PRIMARY CARE CLINIC | Facility: CLINIC | Age: 69
End: 2024-11-21
Payer: MEDICARE

## 2024-11-21 NOTE — TELEPHONE ENCOUNTER
Patient reported she is still having L shoulder pain and its worse at night. It is bearable during day with pain meds.     She has Ibuprofen 800 mg prescribed and is currently not taking. Instructed patient that Dr Mason  reported she could take Ibuprofen 800mg  while she is taking Norco to supplement pain relief.     Encouraged to use cold/warmth to help with pain. Confirmed therapy evaluation next week for shoulder.     Patient to call clinic with any concerns or issues.

## 2024-11-21 NOTE — TELEPHONE ENCOUNTER
----- Message from Clinton sent at 11/21/2024 11:59 AM CST -----  Contact: [phone]  Patient states the HYDROcodone-acetaminophen (NORCO) 5-325 mg per tablet Is not helping with the rotator cuff pain and inflammation, worse at night.  She would like different medication or a higher dose. Please call and advise.    Thank you and have a great day.

## 2024-11-25 ENCOUNTER — PATIENT MESSAGE (OUTPATIENT)
Dept: PRIMARY CARE CLINIC | Facility: CLINIC | Age: 69
End: 2024-11-25
Payer: MEDICARE

## 2024-11-25 DIAGNOSIS — E78.2 MIXED HYPERLIPIDEMIA: ICD-10-CM

## 2024-11-25 DIAGNOSIS — I70.0 AORTIC ATHEROSCLEROSIS: ICD-10-CM

## 2024-11-26 ENCOUNTER — CLINICAL SUPPORT (OUTPATIENT)
Dept: REHABILITATION | Facility: HOSPITAL | Age: 69
End: 2024-11-26
Payer: MEDICARE

## 2024-11-26 DIAGNOSIS — M25.512 CHRONIC LEFT SHOULDER PAIN: ICD-10-CM

## 2024-11-26 DIAGNOSIS — M25.612 DECREASED ROM OF LEFT SHOULDER: Primary | ICD-10-CM

## 2024-11-26 DIAGNOSIS — G89.29 CHRONIC LEFT SHOULDER PAIN: ICD-10-CM

## 2024-11-26 PROCEDURE — 97110 THERAPEUTIC EXERCISES: CPT

## 2024-11-26 PROCEDURE — 97161 PT EVAL LOW COMPLEX 20 MIN: CPT

## 2024-11-26 RX ORDER — LOSARTAN POTASSIUM 25 MG/1
25 TABLET ORAL DAILY
Qty: 90 TABLET | Refills: 3 | Status: SHIPPED | OUTPATIENT
Start: 2024-11-26 | End: 2025-11-26

## 2024-11-26 RX ORDER — EZETIMIBE 10 MG/1
10 TABLET ORAL DAILY
Qty: 90 TABLET | Refills: 0 | Status: SHIPPED | OUTPATIENT
Start: 2024-11-26
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
## 2024-11-29 RX ORDER — POTASSIUM CHLORIDE 750 MG/1
10 CAPSULE, EXTENDED RELEASE ORAL
Qty: 90 CAPSULE | Refills: 0 | Status: SHIPPED | OUTPATIENT
Start: 2024-11-29

## 2024-12-02 ENCOUNTER — OUTPATIENT CASE MANAGEMENT (OUTPATIENT)
Dept: ADMINISTRATIVE | Facility: OTHER | Age: 69
End: 2024-12-02
Payer: MEDICARE

## 2024-12-04 ENCOUNTER — PATIENT MESSAGE (OUTPATIENT)
Dept: PRIMARY CARE CLINIC | Facility: CLINIC | Age: 69
End: 2024-12-04
Payer: MEDICARE

## 2024-12-04 RX ORDER — IPRATROPIUM BROMIDE AND ALBUTEROL SULFATE 2.5; .5 MG/3ML; MG/3ML
SOLUTION RESPIRATORY (INHALATION)
Qty: 360 ML | Refills: 0 | Status: SHIPPED | OUTPATIENT
Start: 2024-12-04

## 2024-12-05 ENCOUNTER — TELEPHONE (OUTPATIENT)
Dept: PRIMARY CARE CLINIC | Facility: CLINIC | Age: 69
End: 2024-12-05

## 2024-12-05 ENCOUNTER — OFFICE VISIT (OUTPATIENT)
Dept: PRIMARY CARE CLINIC | Facility: CLINIC | Age: 69
End: 2024-12-05
Payer: MEDICARE

## 2024-12-05 ENCOUNTER — TELEPHONE (OUTPATIENT)
Dept: PRIMARY CARE CLINIC | Facility: CLINIC | Age: 69
End: 2024-12-05
Payer: MEDICARE

## 2024-12-05 VITALS
HEART RATE: 90 BPM | BODY MASS INDEX: 30.03 KG/M2 | WEIGHT: 186.06 LBS | OXYGEN SATURATION: 92 % | DIASTOLIC BLOOD PRESSURE: 58 MMHG | SYSTOLIC BLOOD PRESSURE: 114 MMHG

## 2024-12-05 DIAGNOSIS — M25.512 CHRONIC LEFT SHOULDER PAIN: Primary | ICD-10-CM

## 2024-12-05 DIAGNOSIS — G89.29 CHRONIC LEFT SHOULDER PAIN: Primary | ICD-10-CM

## 2024-12-05 PROCEDURE — 99999 PR PBB SHADOW E&M-EST. PATIENT-LVL V: CPT | Mod: PBBFAC,HCNC,, | Performed by: PHYSICIAN ASSISTANT

## 2024-12-05 RX ORDER — KETOROLAC TROMETHAMINE 30 MG/ML
30 INJECTION, SOLUTION INTRAMUSCULAR; INTRAVENOUS ONCE
Status: COMPLETED | OUTPATIENT
Start: 2024-12-05 | End: 2024-12-05

## 2024-12-05 RX ORDER — METHYLPREDNISOLONE 4 MG/1
TABLET ORAL
Qty: 1 EACH | Refills: 0 | Status: SHIPPED | OUTPATIENT
Start: 2024-12-05

## 2024-12-05 RX ADMIN — KETOROLAC TROMETHAMINE 30 MG: 30 INJECTION, SOLUTION INTRAMUSCULAR; INTRAVENOUS at 03:12

## 2024-12-05 NOTE — PROGRESS NOTES
Primary Care Provider Appointment   [facility]        Subjective:       Patient ID:  Terri is a 69 y.o. female being seen for Shoulder Pain      Chief Complaint: Shoulder Pain    HPI 69 year old female with PMH of COPD, HTN, CHF, and rotator cuff tear presents for with a 9/10 shoulder pain. Chronic issue that has flared over the years. Pain is now radiating to her neck x and down her midarm x 1 week. Pt states that she went PT x1 but made her worse. Pt states that her shoulder pain is affecting her ADL. Pt states that she tried voltarin, tylenol arthritis alleive, ibupropen 800, and hydrocodone with no relief.  Pt denies paresthesia in her fingers. She also denies SOB, CP, fever, chills, nausea and vomiting.     Past Medical History:   Diagnosis Date    Acute exacerbation of chronic obstructive pulmonary disease (COPD) 12/19/2023    Recently hospitalized at [facility] from 5/14 to 5/20/2024 for exacerbation.  Did not require ICU care or significant escalation of intervention(s).      Adenoma of ascending colon 09/09/2022    Anxiety     COPD (chronic obstructive pulmonary disease)     COPD exacerbation 12/17/2014    Heart failure     Hypertension     Major depression 07/01/2023       Review of Systems   Constitutional:  Negative for chills and fever.   Respiratory:  Negative for shortness of breath.    Cardiovascular:  Negative for chest pain.   Gastrointestinal:  Negative for nausea and vomiting.   Musculoskeletal:  Positive for myalgias (Pain in left shoulder radiating down to mid arm) and neck pain.             Health Maintenance         Date Due Completion Date    Colorectal Cancer Screening Never done ---    LDCT Lung Screen 07/09/2024 7/9/2023    Mammogram 11/28/2024 11/28/2023    Hemoglobin A1c (Diabetic Prevention Screening) 06/16/2026 6/16/2023    DEXA Scan 11/28/2026 11/28/2023    Lipid Panel 07/08/2029 7/8/2024    TETANUS VACCINE 10/24/2032 10/24/2022                "      Objective:      Vitals:    12/05/24 1340   BP: (!) 114/58   BP Location: Right arm   Patient Position: Sitting   Pulse: 90   SpO2: (!) 92%   Weight: 84.4 kg (186 lb 1.1 oz)     Estimated body mass index is 30.03 kg/m² as calculated from the following:    Height as of 10/29/24: 5' 6" (1.676 m).    Weight as of this encounter: 84.4 kg (186 lb 1.1 oz).  Physical Exam  Constitutional:       General: She is not in acute distress.     Appearance: Normal appearance. She is obese. She is not ill-appearing.   Cardiovascular:      Rate and Rhythm: Normal rate and regular rhythm.   Pulmonary:      Effort: Pulmonary effort is normal. No respiratory distress.      Breath sounds: Normal breath sounds. No stridor. No wheezing.   Musculoskeletal:      Left shoulder: Tenderness present. No bony tenderness. Decreased range of motion. Decreased strength.   Neurological:      Mental Status: She is alert.   Psychiatric:         Mood and Affect: Mood normal.         Thought Content: Thought content normal.           Assessment and Plan:         1. Chronic left shoulder pain  -     Ambulatory referral/consult to Orthopedics; Future; Expected date: 12/12/2024  -     ketorolac injection 30 mg  -     methylPREDNISolone (MEDROL DOSEPACK) 4 mg tablet; use as directed  Dispense: 1 each; Refill: 0  -     toradol today, no ibu today. Start steroid pack tomorrow if needed. If no change has an appt with ortho to discuss if injection needed. Will send message to have her PT rescheduled in mei         Follow Up:   F/u in 3 weeks with pcp        Amy Lado, PA-C [facility]     "

## 2024-12-05 NOTE — TELEPHONE ENCOUNTER
Patient unhappy with the therapy she received at [facility]  Can we please call [facility] on [facility] and see if they have someone available to see her?

## 2024-12-05 NOTE — TELEPHONE ENCOUNTER
Patient was in clinic for an appt with PA and LCSW met with her & dtr to schedule next f/u appt.  Patient requested virtual visit.

## 2024-12-06 NOTE — TELEPHONE ENCOUNTER
Call to therapy facility to discuss POC and treatment modalities available at site. Discussed patient looking for pain relief + core strength. Inquired about TENS units, accupuncture and massage.     Jose Manuel PT to call back with suggestions.

## 2024-12-10 ENCOUNTER — PATIENT MESSAGE (OUTPATIENT)
Dept: PRIMARY CARE CLINIC | Facility: CLINIC | Age: 69
End: 2024-12-10
Payer: MEDICARE

## 2024-12-10 ENCOUNTER — TELEPHONE (OUTPATIENT)
Dept: PRIMARY CARE CLINIC | Facility: CLINIC | Age: 69
End: 2024-12-10
Payer: MEDICARE

## 2024-12-10 RX ORDER — DICYCLOMINE HYDROCHLORIDE 20 MG/1
20 TABLET ORAL 3 TIMES DAILY PRN
Qty: 30 TABLET | Refills: 1 | Status: SHIPPED | OUTPATIENT
Start: 2024-12-10

## 2024-12-10 NOTE — TELEPHONE ENCOUNTER
----- Message from Wade Ravi sent at 12/10/2024  8:52 AM CST -----  Contact: [phone]    ----- Message -----  From: Clinton Garza  Sent: 12/10/2024   8:49 AM CST  To: [facility] Staff    Patient is returning a phone call.    Who left a message for the patient: andi    Does patient know what this is regarding:      Would you like a call back, or a response through your [facility] portal?:   call    Comments: Call back to therapy- [phone] and request to talk to Jose Manuel PT regarding services at facility and options. Message left.      Pending call back.

## 2024-12-10 NOTE — TELEPHONE ENCOUNTER
Call to Jose Yu PT to discuss modalities for pain mgt. He reported he would look at POC and schedule patient for facility that has therapist for modalities TENS unit, dry needling,  and modalities that would give more immediate pain relief.    Call to patient who is still not satisfied with POC, resisting next PT visit initially in conversation reporting they were not helping her, and not listening to her. Discussed massage therapy not at the [facility] facilities, but in the community with separate services. Discussed conversation with therapist and his plan to transition to facility that can meet more of expectations of therapy. Patient agreed.     Patient reported pain is better since last clinic visit but still shoulder is hurting . Taking Tylenol for pain.

## 2024-12-10 NOTE — TELEPHONE ENCOUNTER
Patient is ok with going to [place], or being transition to facility that have the modalities she is looking for. Jose said he would help with that.

## 2024-12-12 ENCOUNTER — CLINICAL SUPPORT (OUTPATIENT)
Dept: PRIMARY CARE CLINIC | Facility: CLINIC | Age: 69
End: 2024-12-12
Payer: MEDICARE

## 2024-12-12 ENCOUNTER — PATIENT MESSAGE (OUTPATIENT)
Dept: ORTHOPEDICS | Facility: CLINIC | Age: 69
End: 2024-12-12
Payer: MEDICARE

## 2024-12-12 DIAGNOSIS — G89.29 CHRONIC LEFT SHOULDER PAIN: Primary | ICD-10-CM

## 2024-12-12 DIAGNOSIS — F41.9 ANXIETY: Chronic | ICD-10-CM

## 2024-12-12 DIAGNOSIS — F43.21 GRIEF: Primary | ICD-10-CM

## 2024-12-12 DIAGNOSIS — M25.512 CHRONIC LEFT SHOULDER PAIN: Primary | ICD-10-CM

## 2024-12-12 NOTE — PROGRESS NOTES
"Individual Psychotherapy (LCSW)  Terri Phelan,  12/12/2024    TYPE OF VISIT:  Video Session  LENGTH OF SESSION: 50    Therapeutic Intervention: Met with patient for individual psychotherapy.    Chief complaint/reason for encounter: depression, anxiety, and sleep secondary to COPD diagnosis     Session Content/Presenting Problem:  Patient last seen in July 2024.  She is feeling better today. Last week was seen for rotator cuff and was in significant pain.  She is taking Ibuprofen that is helpful.  She had an OP PT appt today but it was too cold outside to go.  Will see ortho surgeon next week to be evaluated.  Continues with [facility] Pulm Rehab 3x/week and is in Phase 3 which means "more independent" with program.  Feels much better physically and enjoys getting out of the house.  Invited patient to Grief lunch & learn tomorrow 11:30-1:30.  She is struggling with missing parents,  (2006 today) and sister (2022).  She baked apple pie today in memory of her .  She would ask for a taste of his food and he always gave it to her even if last bite.  He was a great cook.  She is thankful for her dtr every day that they created her together.  Dtr has his creativity and his sense of humor.  She & dtr decided this year decided to do random acts of kindness to others instead of [place] presents for each other.  She bought 15 children's coats and hats/gloves to donate to local kids.  Her & dtr like talking about the people they lost and be able to laugh/smile.      Self-eval: Score of 1-10 (10 highest/worst)  Depression - 3  Anxiety - 5     INTERVENTIONS:  Active listening, Assessed patient's level of insight toward the presenting problems, Assisted patient in identifying, clarifying and expressing feelings associated with loss. , Encouraged patient for her proactive steps, and Support disease management efforts through encouraging positive health behaviors     Risk parameters:  Patient reports no suicidal " ideation  Patient reports no homicidal ideation  Patient reports no self-injurious behavior  Patient reports no violent behavior    MENTAL HEALTH STATUS EXAM  General Appearance:  unremarkable, age appropriate, Oxygen via NC   Speech: normal tone, normal rate, normal pitch, normal volume      Level of Cooperation: cooperative      Thought Processes: normal and logical   Mood: happy      Thought Content: normal, no suicidality, no homicidality, delusions, or paranoia   Affect: congruent and appropriate, bright   Orientation: Oriented x3   Attention Span & Concentration: intact   Fund of General Knowledge: intact and appropriate to age and level of education   Judgment & Insight: good     Language  intact       STRENGTHS AND LIABILITIES: Strength: Patient accepts guidance/feedback, Strength: Patient is expressive/articulate., Strength: Patient is intelligent., Strength: Patient has positive support network., Liability: Patient lacks coping skills.    IMPRESSION:   My diagnostic impression is Grief and Adjustment disorders; with mixed emotional features [F43.23], as evidenced by ARNOL-7 and self-report and GDS    TREATMENT GOALS (per patient):    Treatment plan:  Target symptoms:   Significantly reduce the overall intensity of the anxiety symptoms so that daily functioning is improved.   Experience feelings of contentment and happiness more often and increase pleasant activities that elevate mood   Why chosen therapy is appropriate versus another modality: relevant to diagnosis, evidence based practice  Outcome monitoring methods: self-report, observation, checklist/rating scale  Therapeutic intervention type: insight oriented psychotherapy, supportive psychotherapy    Patient's response to intervention:  The patient's response to intervention is accepting, motivated.    Progress toward goals and other mental status changes:  The patient's progress toward goals is good.    Plan: Pt plans to continue individual  psychotherapy CBT and Interpersonal Processing Therapy  will be utilized in future individual therapy sessions to increase insight and support.     Return to clinic: 3 weeks, as scheduled

## 2024-12-16 ENCOUNTER — HOSPITAL ENCOUNTER (OUTPATIENT)
Dept: RADIOLOGY | Facility: HOSPITAL | Age: 69
Discharge: HOME | End: 2024-12-16
Attending: SURGERY
Payer: MEDICARE

## 2024-12-16 DIAGNOSIS — M25.512 CHRONIC LEFT SHOULDER PAIN: ICD-10-CM

## 2024-12-16 DIAGNOSIS — G89.29 CHRONIC LEFT SHOULDER PAIN: ICD-10-CM

## 2024-12-16 PROCEDURE — 73030 X-RAY EXAM OF SHOULDER: CPT | Mod: 26,LT,, | Performed by: RADIOLOGY

## 2024-12-16 PROCEDURE — 73030 X-RAY EXAM OF SHOULDER: CPT | Mod: TC,LT

## 2024-12-17 ENCOUNTER — OFFICE VISIT (OUTPATIENT)
Dept: ORTHOPEDICS | Facility: CLINIC | Age: 69
End: 2024-12-17
Payer: MEDICARE

## 2024-12-17 VITALS — BODY MASS INDEX: 29.9 KG/M2 | WEIGHT: 186.06 LBS | HEIGHT: 66 IN

## 2024-12-17 DIAGNOSIS — G89.29 CHRONIC LEFT SHOULDER PAIN: ICD-10-CM

## 2024-12-17 DIAGNOSIS — M25.512 CHRONIC LEFT SHOULDER PAIN: ICD-10-CM

## 2024-12-17 PROCEDURE — 1157F ADVNC CARE PLAN IN RCRD: CPT | Mod: HCNC,CPTII,S$GLB, | Performed by: SURGERY

## 2024-12-17 PROCEDURE — 1159F MED LIST DOCD IN RCRD: CPT | Mod: HCNC,CPTII,S$GLB, | Performed by: SURGERY

## 2024-12-17 PROCEDURE — 99204 OFFICE O/P NEW MOD 45 MIN: CPT | Mod: 25,HCNC,S$GLB, | Performed by: SURGERY

## 2024-12-17 PROCEDURE — 4010F ACE/ARB THERAPY RXD/TAKEN: CPT | Mod: HCNC,CPTII,S$GLB, | Performed by: SURGERY

## 2024-12-17 PROCEDURE — 1101F PT FALLS ASSESS-DOCD LE1/YR: CPT | Mod: HCNC,CPTII,S$GLB, | Performed by: SURGERY

## 2024-12-17 PROCEDURE — 3288F FALL RISK ASSESSMENT DOCD: CPT | Mod: HCNC,CPTII,S$GLB, | Performed by: SURGERY

## 2024-12-17 PROCEDURE — 3008F BODY MASS INDEX DOCD: CPT | Mod: HCNC,CPTII,S$GLB, | Performed by: SURGERY

## 2024-12-17 PROCEDURE — 99999 PR PBB SHADOW E&M-EST. PATIENT-LVL III: CPT | Mod: PBBFAC,HCNC,, | Performed by: SURGERY

## 2024-12-17 PROCEDURE — 20610 DRAIN/INJ JOINT/BURSA W/O US: CPT | Mod: HCNC,LT,S$GLB, | Performed by: SURGERY

## 2024-12-17 RX ORDER — TRIAMCINOLONE ACETONIDE 40 MG/ML
40 INJECTION, SUSPENSION INTRA-ARTICULAR; INTRAMUSCULAR
Status: DISCONTINUED | OUTPATIENT
Start: 2024-12-17 | End: 2024-12-17 | Stop reason: HOSPADM

## 2024-12-17 RX ADMIN — TRIAMCINOLONE ACETONIDE 40 MG: 40 INJECTION, SUSPENSION INTRA-ARTICULAR; INTRAMUSCULAR at 08:12

## 2024-12-17 NOTE — PROGRESS NOTES
Subjective:     Terri hPelan     Chief Complaint   Patient presents with    Left Shoulder - Pain       HPI    Terri is a 69 y.o. female coming in today for left shoulder pain.  Atraumatic.  Notes difficulty raising the arm.  Of note she is in physical therapy already.   Of note she is also in pulmonary therapy for which they do the bike very often for COPD.    ROS    PAST MEDICAL HISTORY:   Past Medical History:   Diagnosis Date    Acute exacerbation of chronic obstructive pulmonary disease (COPD) 2023    Recently hospitalized at [facility] from  to 2024 for exacerbation.  Did not require ICU care or significant escalation of intervention(s).      Adenoma of ascending colon 2022    Anxiety     COPD (chronic obstructive pulmonary disease)     COPD exacerbation 2014    Heart failure     Hypertension     Major depression 2023     PAST SURGICAL HISTORY:   Past Surgical History:   Procedure Laterality Date    BREAST BIOPSY N/A     pt unsure which breast but it was benign-(calcium)     SECTION      HERNIA REPAIR      HYSTERECTOMY       FAMILY HISTORY: No family history on file.  SOCIAL HISTORY:   Social History     Socioeconomic History    Marital status:    Tobacco Use    Smoking status: Former     Current packs/day: 0.00     Average packs/day: 1 pack/day for 20.0 years (20.0 ttl pk-yrs)     Types: Cigarettes     Quit date: 2021     Years since quitting: 3.9     Passive exposure: Past    Smokeless tobacco: Never   Substance and Sexual Activity    Alcohol use: Not Currently     Alcohol/week: 1.0 standard drink of alcohol     Types: 1 Glasses of wine per week    Drug use: No    Sexual activity: Not Currently     Partners: Male     Social Drivers of Health     Financial Resource Strain: Low Risk  (2024)    Overall Financial Resource Strain (CARDIA)     Difficulty of Paying Living Expenses: Not very hard   Recent Concern: Financial Resource Strain - Medium Risk  (8/7/2024)    Overall Financial Resource Strain (CARDIA)     Difficulty of Paying Living Expenses: Somewhat hard   Food Insecurity: No Food Insecurity (9/30/2024)    Hunger Vital Sign     Worried About Running Out of Food in the Last Year: Never true     Ran Out of Food in the Last Year: Never true   Transportation Needs: No Transportation Needs (9/30/2024)    TRANSPORTATION NEEDS     Transportation : No   Physical Activity: Inactive (8/7/2024)    Exercise Vital Sign     Days of Exercise per Week: 0 days     Minutes of Exercise per Session: 0 min   Stress: Stress Concern Present (9/30/2024)    [employer] [place] [employer] - Occupational Stress Questionnaire     Feeling of Stress : To some extent   Housing Stability: Low Risk  (9/30/2024)    Housing Stability Vital Sign     Unable to Pay for Housing in the Last Year: No     Homeless in the Last Year: No       MEDICATIONS:   Current Outpatient Medications:     acetaminophen (TYLENOL) 500 MG tablet, Take 500 mg by mouth every 6 (six) hours as needed for Pain., Disp: , Rfl:     albuterol (PROVENTIL/VENTOLIN HFA) 90 mcg/actuation inhaler, Inhale 2 puffs into the lungs every 6 (six) hours as needed for Wheezing or Shortness of Breath., Disp: 18 g, Rfl: 6    albuterol-ipratropium (DUO-NEB) 2.5 mg-0.5 mg/3 mL nebulizer solution, USE 3 ML VIA NEBULIZER EVERY 6 HOURS AS NEEDED FOR WHEEZING, Disp: 360 mL, Rfl: 0    alendronate (FOSAMAX) 35 MG tablet, Take 35 mg by mouth every Tuesday. (Patient not taking: Reported on 10/8/2024), Disp: , Rfl:     aspirin 81 MG Chew, Take 1 tablet by mouth once daily., Disp: , Rfl:     azithromycin (Z-OLY) 250 MG tablet, Take 1 tablet (250 mg total) by mouth 3 (three) times a week. Take three times weekly on Monday, Wednesday, and Friday., Disp: 30 tablet, Rfl: 3    budesonide-glycopyr-formoterol (BREZTRI AEROSPHERE) 160-9-4.8 mcg/actuation HFAA, Inhale 2 puffs into the lungs 2 (two) times daily., Disp: 5.9 g, Rfl: 3    calcium  carb-mag hydrox-simeth 1,200 mg-270 mg -80 mg/10 mL Susp, Take 1,200 mg by mouth once daily., Disp: , Rfl:     cholecalciferol, vitamin D3, (VITAMIN D3) 50 mcg (2,000 unit) Tab, Take 2,000 Units by mouth once daily., Disp: , Rfl:     dextromethorphan-guaiFENesin  mg (MUCINEX DM)  mg per 12 hr tablet, Take 1 tablet by mouth 2 (two) times daily as needed., Disp: , Rfl:     dicyclomine (BENTYL) 20 mg tablet, Take 1 tablet (20 mg total) by mouth 3 (three) times daily as needed (abdominal pain)., Disp: 30 tablet, Rfl: 1    EScitalopram oxalate (LEXAPRO) 10 MG tablet, TAKE 1 TABLET(10 MG) BY MOUTH DAILY, Disp: 90 tablet, Rfl: 3    ezetimibe (ZETIA) 10 mg tablet, Take 1 tablet (10 mg total) by mouth once daily., Disp: 90 tablet, Rfl: 0    fluticasone propionate (FLONASE) 50 mcg/actuation nasal spray, SHAKE LIQUID AND USE 1 SPRAY(50 MCG) IN EACH NOSTRIL DAILY AS NEEDED FOR ALLERGIES, Disp: 16 g, Rfl: 11    furosemide (LASIX) 40 MG tablet, Take 1 tablet (40 mg total) by mouth once daily., Disp: 30 tablet, Rfl: 4    HYDROcodone-acetaminophen (NORCO) 5-325 mg per tablet, Take 1 tablet by mouth every 12 (twelve) hours as needed for Pain. (Patient not taking: Reported on 12/5/2024), Disp: 14 tablet, Rfl: 0    hydrOXYzine HCL (ATARAX) 25 MG tablet, TAKE 1 TABLET(25 MG) BY MOUTH THREE TIMES DAILY AS NEEDED FOR ANXIETY OR ITCHING, Disp: 30 tablet, Rfl: 3    ibuprofen (ADVIL,MOTRIN) 800 MG tablet, Take 1 tablet (800 mg total) by mouth 3 (three) times daily as needed for Pain., Disp: 90 tablet, Rfl: 0    losartan (COZAAR) 25 MG tablet, Take 1 tablet (25 mg total) by mouth once daily., Disp: 90 tablet, Rfl: 3    methylPREDNISolone (MEDROL DOSEPACK) 4 mg tablet, use as directed, Disp: 1 each, Rfl: 0    OXYGEN-AIR DELIVERY SYSTEMS MISC, 2 L by Nasal route continuous., Disp: , Rfl:     potassium chloride (MICRO-K) 10 MEQ CpSR, TAKE 1 CAPSULE BY MOUTH EVERY DAY, Disp: 90 capsule, Rfl: 0    roflumilast (DALIRESP) 500 mcg Tab,  "Take 1 tablet (500 mcg total) by mouth once daily., Disp: 30 tablet, Rfl: 0    triamcinolone acetonide 0.1% (KENALOG) 0.1 % cream, Apply topically 2 (two) times daily. for 14 days, Disp: 80 g, Rfl: 0  ALLERGIES:   Review of patient's allergies indicates:   Allergen Reactions    Codeine Nausea And Vomiting    Lipitor [atorvastatin] Other (See Comments)     Muscle fatigue      Morphine Hallucinations       Objective:     VITAL SIGNS: Ht 5' 6" (1.676 m)   Wt 84.4 kg (186 lb 1.1 oz)   BMI 30.03 kg/m²    Ortho/SPM Exam    MUSCULOSKELETAL EXAM  Shoulder: left SHOULDER  The affected shoulder is compared to the contralateral shoulder.  Positive findings otherwise noted at the bottom of the exam.    Observation:      SHOULDER  No ecchymosis, edema, or erythema throughout the shoulder girdle.  No sternal, clavicular, or acromial deformities bilaterally.  No atrophy of the pectorals, deltoids, supraspinatus, infraspinatus, or biceps bilaterally.  No asymmetry of shoulders bilaterally.    ROM (* = with pain):  CERVICAL SPINE  Full AROM in flexion, extension, sidebending, and rotation.    SHOULDER  Active flexion to 180° on left and 180° on right.  Active abduction to 180° on left and 180° on right.  Active internal rotation to T7 on left and T7 on right.    Active external rotation to T4 on left and T4 on right.    No scapular dyskinesia or winging.    Tenderness:  No tenderness at the SC or AC joint  No tenderness over the clavicle   No tenderness over biceps tendon or bicipital groove  No tenderness over subacromial space    Strength Testing (* = with pain):  Deltoid - 5/5 on left and 5/5 on right  Biceps - 5/5 on left and 5/5 on right  Triceps - 5/5 on left and 5/5 on right  Wrist extension - 5/5 on left and 5/5 on right  Wrist flexion - 5/5 on left and 5/5 on right   - 5/5 on left and 5/5 on right  Finger extension - 5/5 on left and 5/5 on right  Finger abduction - 5/5 on left and 5/5 on right    Special Tests:  Empty " can test - negative  Full can test - negative  Bear hug test - negative  Belly press test - negative  Resisted internal rotation - negative  Resisted external rotation - negative    Neer's test - negative  Hawkin's-Lex test - negative  Cross-arm test- negative    OMiguels test - negative  Biceps load 1 test - negative  Biceps load 2 test - negative  López sheer test - negative    Sulcus sign - none  AP load and shift laxity - none  Anterior apprehension test - negative  Posterior apprehension test - negative    Speed's test - negative  Yergason's test - negative    Neurovascular Exam:  Spurlings test - negative bialterally  Lhermittes test - negative  2+ radial pulses bilaterally  Sensation intact to light touch in the distal median, radial, and ulnar nerve distributions bilaterally.  Negative Tinel's at carpal tunnel  Negative Tinel's at cubital tunnel  2+/4 reflexes at triceps, biceps, and brachioradialis  Capillary refill intact <2 seconds in all digits bilaterally    Full range of motion of the cervical/thoracic spine in all planes without pain    Positive findings:  Positive rotator cuff findings, positive AC joint tenderness, diminished range of motion.    IMAGING:    X- rays of the shoulder (AP, Scapular Y, Bernageau, and axillary views) taken today.  X-ray images were reviewed personally by me and then directly with patient.  Mildly decreased acromial humeral interval, AC joint arthritis    Assessment:      Encounter Diagnosis   Name Primary?    Chronic left shoulder pain           AC joint arthritis  Rotator cuff disease    Plan:   Discussed treatment options  Injection performed today  PT has already been ordered  Follow up in 3 months with a PA    Patient agreeable to today's plan and all questions were answered    This note is dictated using the M*Modal Fluency Direct word recognition program. There are word recognition mistakes that are occasionally missed on review.

## 2024-12-17 NOTE — PROCEDURES
Large Joint Aspiration/Injection: L glenohumeral    Date/Time: 12/17/2024 8:45 AM    Performed by: Craig Moran MD  Authorized by: Craig Moran MD    Consent Done?:  Yes (Verbal)  Indications:  Pain    Local anesthesia used?: Yes    Anesthesia:  Local infiltration  Local anesthetic:  Lidocaine 1% without epinephrine    Details:  Needle Size:  21 G  Approach:  Posterior  Location:  Shoulder  Site:  L glenohumeral  Medications:  40 mg triamcinolone acetonide 40 mg/mL  Patient tolerance:  Patient tolerated the procedure well with no immediate complications

## 2024-12-18 ENCOUNTER — TELEPHONE (OUTPATIENT)
Dept: PRIMARY CARE CLINIC | Facility: CLINIC | Age: 69
End: 2024-12-18
Payer: MEDICARE

## 2024-12-18 NOTE — TELEPHONE ENCOUNTER
Call back to patient who reported she had Kenalog injection given in L shoulder at ortho yesterday.     Has plans to go back to PT and Pul Rehab.   1/10 on pain scale. Light pain, states she is feeling much better.     Has FU with Ortho in 3 mo, and PCP next week.

## 2024-12-19 ENCOUNTER — CLINICAL SUPPORT (OUTPATIENT)
Dept: REHABILITATION | Facility: HOSPITAL | Age: 69
End: 2024-12-19
Payer: MEDICARE

## 2024-12-19 DIAGNOSIS — M25.612 DECREASED ROM OF LEFT SHOULDER: Primary | ICD-10-CM

## 2024-12-19 PROCEDURE — 97140 MANUAL THERAPY 1/> REGIONS: CPT

## 2024-12-19 PROCEDURE — 97112 NEUROMUSCULAR REEDUCATION: CPT

## 2024-12-19 NOTE — PROGRESS NOTES
[facility]   Physical Therapy Treatment Note      Name: Terri CLARK [facility] Number: [id number]    Therapy Diagnosis:   Encounter Diagnosis   Name Primary?    Decreased ROM of left shoulder Yes     Physician: Katie Araujo PA-C    Visit Date: 12/19/2024    Physician Orders: PT Eval and Treat  Medical Diagnosis from Referral: Chronic left shoulder pain [M25.512, G89.29]   Evaluation Date: 11/26/2024  Authorization Period Expiration: 11/6/2025  Plan of Care Expiration: 1/26/2025  Progress Note Due: 12/26/2024  Date of Surgery: N/A  Visit # / Visits authorized: 1/1, 1/10  FOTO: 1/ 3     Precautions: Standard and oxygen     Time In: 10:00 AM  Time Out: 10:50 AM  Total Billable Time: 50 minutes (25 minutes 1:1)    PTA Visit #: 0/5     Subjective     Patient reports: receiving a CSI from orthopedics on 12/17 with relief following. She has been doing pulmonary rehab 3 days a week with some limitations due to shoulder, wishes to discuss modifications that could be made.    She was compliant with home exercise program.  Response to previous treatment: ongoing  Functional change: ongoing    Pain: 1/10  Location: left shoulder     Objective      12/19/2024  Pulse ox at 95% at start of session seated with oxygen at 2 Lpm  Pulse ox at 98% once titrated to 3 Lpm that was held during exercise to maintain appropriate level    Treatment     Terri received the treatments listed below:      therapeutic exercises to develop strength, endurance, ROM, flexibility, posture, and core stabilization for 30 minutes including:    (Supine movements on inclined mat)  Supine No Money yellow thera band 3x10  Supine Horizontal Abduction with external rotation green thera band 3x10  Step backs at mat 3x10 5 sec hold  Supine shoulder flexion active assist range of motion with cane 30x 5 sec hold    manual therapy techniques: Joint mobilizations, Manual traction, and Myofacial release were applied to the left shoulder  for 10 minutes, including:    Left passive range of motion of shoulder    neuromuscular re-education activities to improve: Balance, Coordination, Kinesthetic, Sense, Proprioception, and Posture for 10 minutes. The following activities were included:    Supine D2 flexion yellow thera band 3x10  Supine shoulder flexion with external rotation isometric yellow thera band 3x6    Patient Education and Home Exercises       Education provided:   - home exercise program  - pulmonary rehab modifications to reduce shoulder loading    Written Home Exercises Provided: Yes. Exercises were reviewed and Terri was able to demonstrate them prior to the end of the session.  Terri demonstrated good  understanding of the education provided. See Electronic Medical Record under Patient Instructions for exercises provided during therapy sessions    Assessment     Patient presented for initial f/u with improved shoulder complaints following CSI. Patient improved irritability allowing greater tolerance for therapeutic progression. Patient required increased titration of oxygen due to decreased pulse ox with exercise. Patient able to tolerate all exercise maintaining 96% or greater oxygen saturation. Plan to continue to progress as tolerated.    Terri Is progressing well towards her goals.   Patient prognosis is Good.     Patient will continue to benefit from skilled outpatient physical therapy to address the deficits listed in the problem list box on initial evaluation, provide pt/family education and to maximize pt's level of independence in the home and community environment.     Patient's spiritual, cultural and educational needs considered and pt agreeable to plan of care and goals.     Anticipated barriers to physical therapy: none    Goals:   Short Term Goals: 4 weeks  1. Pt will be independent with HEP to supplement PT in improving functional use of R UE.  2. Pt will increase pain free L shoulder elevation PROM to >/= 155 deg to  improve functional mobility of UE     Long Term Goals: 8 weeks  1. Pt will improve FOTO score to </=36% limited to decrease perceived limitation with L upper extremity carrying, moving, and handling objects.  2. Pt will increase L shoulder PROM to WFL in all planes to improve functional use of LUE.  3. Pt will increase L shoulder AROM to WFL in all planes to improve functional use of LUE.  4. Pt will improve L shoulder MMTs to = 4+/5 to promote equal use of B UEs in performing functional tasks.  5. Pt will lift 5 lb objects without pain to promote functional QOL.  6. Pt to report pain </= 1/10 with ADLs and IADLs using L UE to improve functional QOL.     Plan     Continue per initial plan of care    Jose Manuel, PT

## 2024-12-23 RX ORDER — HYDROXYZINE HYDROCHLORIDE 25 MG/1
TABLET, FILM COATED ORAL
Qty: 30 TABLET | Refills: 3 | Status: SHIPPED | OUTPATIENT
Start: 2024-12-23

## 2024-12-24 ENCOUNTER — TELEPHONE (OUTPATIENT)
Dept: PRIMARY CARE CLINIC | Facility: CLINIC | Age: 69
End: 2024-12-24

## 2024-12-24 ENCOUNTER — OFFICE VISIT (OUTPATIENT)
Dept: PRIMARY CARE CLINIC | Facility: CLINIC | Age: 69
End: 2024-12-24
Payer: MEDICARE

## 2024-12-24 DIAGNOSIS — I10 PRIMARY HYPERTENSION: ICD-10-CM

## 2024-12-24 DIAGNOSIS — J44.1 COPD EXACERBATION: Primary | ICD-10-CM

## 2024-12-24 DIAGNOSIS — M25.612 DECREASED ROM OF LEFT SHOULDER: ICD-10-CM

## 2024-12-24 PROCEDURE — 99214 OFFICE O/P EST MOD 30 MIN: CPT | Mod: HCNC,95,, | Performed by: STUDENT IN AN ORGANIZED HEALTH CARE EDUCATION/TRAINING PROGRAM

## 2024-12-24 PROCEDURE — 1160F RVW MEDS BY RX/DR IN RCRD: CPT | Mod: HCNC,CPTII,95, | Performed by: STUDENT IN AN ORGANIZED HEALTH CARE EDUCATION/TRAINING PROGRAM

## 2024-12-24 PROCEDURE — 1159F MED LIST DOCD IN RCRD: CPT | Mod: HCNC,CPTII,95, | Performed by: STUDENT IN AN ORGANIZED HEALTH CARE EDUCATION/TRAINING PROGRAM

## 2024-12-24 PROCEDURE — 1157F ADVNC CARE PLAN IN RCRD: CPT | Mod: HCNC,CPTII,95, | Performed by: STUDENT IN AN ORGANIZED HEALTH CARE EDUCATION/TRAINING PROGRAM

## 2024-12-24 PROCEDURE — 4010F ACE/ARB THERAPY RXD/TAKEN: CPT | Mod: HCNC,CPTII,95, | Performed by: STUDENT IN AN ORGANIZED HEALTH CARE EDUCATION/TRAINING PROGRAM

## 2024-12-24 RX ORDER — MELOXICAM 15 MG/1
15 TABLET ORAL DAILY
Qty: 30 TABLET | Refills: 2 | Status: SHIPPED | OUTPATIENT
Start: 2024-12-24

## 2024-12-24 NOTE — ASSESSMENT & PLAN NOTE
no ER visits in the last 3 months. has prednisone on her so that she has it on her. Asked pt to take it as soon as she feels an exacerbation coming on and then call me. She is in pulmonary rehab right now. Continue breztri and azithromycin MWF. Nebulizer as needed

## 2024-12-24 NOTE — ASSESSMENT & PLAN NOTE
got steroid injection in her shoulder on 12/17, still having pain. She is wondering what she can do for the pain, taking ibuprofen for pain. She is doing PT but not doing stretches at home daily, asked her to start. Asked her to do ice, says it helped. Will do meloxicam to see if that helps. Told her to take it only after eating. Try to avoid ibuprofen when taking this.

## 2024-12-24 NOTE — PROGRESS NOTES
Clinic Note  12/24/2024      Subjective:       Patient ID:  Terri is a 69 y.o. female being seen for an established visit.    Chief Complaint: No chief complaint on file.    HPI  Terri Phelan is a 69 y.o. AA female who presents with COPD on 2L of O2 via NC (uses 3L with exertion), diastolic HF, HTN, HLD, anxiety, depression, JUAN (uses bipap every night) is here for a f/u visit. Did acp 2024.   -no ER visits in the last 3 months.   -got steroid injection in her shoulder on 12/17, still having pain. She is wondering what she can do for the pain, taking ibuprofen for pain. She is doing PT but not doing stretches at home daily, asked her to start.   -has prednisone on her so that she has it on her. Asked pt to take it as soon as she feels an exacerbation coming on and then call me. She is in pulmonary rehab right now.   -no falls  -does not want to do LDCT. Told her why we do it.     Review of Systems   Constitutional:  Negative for chills and fever.   HENT:  Negative for congestion and hearing loss.    Eyes:  Negative for discharge.   Respiratory:  Negative for cough, shortness of breath and wheezing.    Cardiovascular:  Negative for chest pain, palpitations and leg swelling.   Gastrointestinal:  Negative for abdominal pain, blood in stool, constipation, diarrhea and vomiting.   Genitourinary:  Negative for dysuria and hematuria.   Musculoskeletal:  Positive for neck pain.   Neurological:  Negative for dizziness, weakness and headaches.   Endo/Heme/Allergies:  Negative for polydipsia.       Medication List with Changes/Refills   New Medications    MELOXICAM (MOBIC) 15 MG TABLET    Take 1 tablet (15 mg total) by mouth once daily.   Current Medications    ACETAMINOPHEN (TYLENOL) 500 MG TABLET    Take 500 mg by mouth every 6 (six) hours as needed for Pain.    ALBUTEROL (PROVENTIL/VENTOLIN HFA) 90 MCG/ACTUATION INHALER    Inhale 2 puffs into the lungs every 6 (six) hours as needed for Wheezing or Shortness of Breath.     ALBUTEROL-IPRATROPIUM (DUO-NEB) 2.5 MG-0.5 MG/3 ML NEBULIZER SOLUTION    USE 3 ML VIA NEBULIZER EVERY 6 HOURS AS NEEDED FOR WHEEZING    ALENDRONATE (FOSAMAX) 35 MG TABLET    Take 35 mg by mouth every Tuesday.    ASPIRIN 81 MG CHEW    Take 1 tablet by mouth once daily.    AZITHROMYCIN (Z-OLY) 250 MG TABLET    Take 1 tablet (250 mg total) by mouth 3 (three) times a week. Take three times weekly on Monday, Wednesday, and Friday.    BUDESONIDE-GLYCOPYR-FORMOTEROL (BREZTRI AEROSPHERE) 160-9-4.8 MCG/ACTUATION HFAA    Inhale 2 puffs into the lungs 2 (two) times daily.    CALCIUM CARB-MAG HYDROX-SIMETH 1,200 MG-270 MG -80 MG/10 ML SUSP    Take 1,200 mg by mouth once daily.    CHOLECALCIFEROL, VITAMIN D3, (VITAMIN D3) 50 MCG (2,000 UNIT) TAB    Take 2,000 Units by mouth once daily.    DEXTROMETHORPHAN-GUAIFENESIN  MG (MUCINEX DM)  MG PER 12 HR TABLET    Take 1 tablet by mouth 2 (two) times daily as needed.    DICYCLOMINE (BENTYL) 20 MG TABLET    Take 1 tablet (20 mg total) by mouth 3 (three) times daily as needed (abdominal pain).    ESCITALOPRAM OXALATE (LEXAPRO) 10 MG TABLET    TAKE 1 TABLET(10 MG) BY MOUTH DAILY    EZETIMIBE (ZETIA) 10 MG TABLET    Take 1 tablet (10 mg total) by mouth once daily.    FLUTICASONE PROPIONATE (FLONASE) 50 MCG/ACTUATION NASAL SPRAY    SHAKE LIQUID AND USE 1 SPRAY(50 MCG) IN EACH NOSTRIL DAILY AS NEEDED FOR ALLERGIES    FUROSEMIDE (LASIX) 40 MG TABLET    Take 1 tablet (40 mg total) by mouth once daily.    HYDROXYZINE HCL (ATARAX) 25 MG TABLET    TAKE 1 TABLET(25 MG) BY MOUTH THREE TIMES DAILY AS NEEDED FOR ANXIETY OR ITCHING    IBUPROFEN (ADVIL,MOTRIN) 800 MG TABLET    Take 1 tablet (800 mg total) by mouth 3 (three) times daily as needed for Pain.    LOSARTAN (COZAAR) 25 MG TABLET    Take 1 tablet (25 mg total) by mouth once daily.    OXYGEN-AIR DELIVERY SYSTEMS MISC    2 L by Nasal route continuous.    POTASSIUM CHLORIDE (MICRO-K) 10 MEQ CPSR    TAKE 1 CAPSULE BY MOUTH EVERY DAY  "   ROFLUMILAST (DALIRESP) 500 MCG TAB    Take 1 tablet (500 mcg total) by mouth once daily.    TRIAMCINOLONE ACETONIDE 0.1% (KENALOG) 0.1 % CREAM    Apply topically 2 (two) times daily. for 14 days   Discontinued Medications    HYDROCODONE-ACETAMINOPHEN (NORCO) 5-325 MG PER TABLET    Take 1 tablet by mouth every 12 (twelve) hours as needed for Pain.    METHYLPREDNISOLONE (MEDROL DOSEPACK) 4 MG TABLET    use as directed           Objective:      There were no vitals taken for this visit.  Estimated body mass index is 30.03 kg/m² as calculated from the following:    Height as of 12/17/24: 5' 6" (1.676 m).    Weight as of 12/17/24: 84.4 kg (186 lb 1.1 oz).  Physical Exam  Constitutional:       Appearance: Normal appearance.   Eyes:      Conjunctiva/sclera: Conjunctivae normal.   Pulmonary:      Effort: Pulmonary effort is normal. No respiratory distress.   Neurological:      Mental Status: She is alert and oriented to person, place, and time.   Psychiatric:         Mood and Affect: Mood normal.         Behavior: Behavior normal.           Assessment and Plan:     1. COPD  Assessment & Plan:  no ER visits in the last 3 months. has prednisone on her so that she has it on her. Asked pt to take it as soon as she feels an exacerbation coming on and then call me. She is in pulmonary rehab right now. Continue breztri and azithromycin MWF. Nebulizer as needed       2. Primary hypertension  Overview:  - Managed on losartan 25mg daily    Assessment & Plan:  BP is controlled per patient. Continue losartan 50 mg daily. Will monitor       3. Decreased ROM of left shoulder  Assessment & Plan:  got steroid injection in her shoulder on 12/17, still having pain. She is wondering what she can do for the pain, taking ibuprofen for pain. She is doing PT but not doing stretches at home daily, asked her to start. Asked her to do ice, says it helped. Will do meloxicam to see if that helps. Told her to take it only after eating. Try to avoid " ibuprofen when taking this.       Other orders  -     meloxicam (MOBIC) 15 MG tablet; Take 1 tablet (15 mg total) by mouth once daily.  Dispense: 30 tablet; Refill: 2            Follow Up:   Follow up in about 4 weeks (around 1/21/2025) for Virtual Visit.        Sussy Mason    The patient location is: home  The chief complaint leading to consultation is: shoulder pain    Visit type: audiovisual    Face to Face time with patient: 15  17 minutes of total time spent on the encounter, which includes face to face time and non-face to face time preparing to see the patient (eg, review of tests), Obtaining and/or reviewing separately obtained history, Documenting clinical information in the electronic or other health record, Independently interpreting results (not separately reported) and communicating results to the patient/family/caregiver, or Care coordination (not separately reported).         Each patient to whom he or she provides medical services by telemedicine is:  (1) informed of the relationship between the physician and patient and the respective role of any other health care provider with respect to management of the patient; and (2) notified that he or she may decline to receive medical services by telemedicine and may withdraw from such care at any time.

## 2025-01-02 ENCOUNTER — CLINICAL SUPPORT (OUTPATIENT)
Dept: PRIMARY CARE CLINIC | Facility: CLINIC | Age: 70
End: 2025-01-02
Payer: MEDICARE

## 2025-01-02 DIAGNOSIS — F43.21 GRIEF: Primary | ICD-10-CM

## 2025-01-02 DIAGNOSIS — J44.1 COPD EXACERBATION: ICD-10-CM

## 2025-01-02 NOTE — PROGRESS NOTES
"  Individual Psychotherapy (LCSW)  Terri Phelan,  2025    TYPE OF VISIT:  Video Session  LENGTH OF SESSION: 30    Therapeutic Intervention: Met with patient for individual psychotherapy.    Chief complaint/reason for encounter: depression, anxiety, and sleep secondary to COPD diagnosis     Session Content/Presenting Problem:  Patient had 2 deaths this holiday season. One was [place] day - her childhood friend's mother who was like patient's mother passed.  She was 89 and doing great until 2 weeks ago.   will be this weekend and patient is uncertain if she can physically handle it secondary to COPD.  It will be very crowded and being exposed to large crowd.  She is OK with taking care of self.  Uncle's son (1st cousin) fell off porch and   afternoon.  He lives next door to sister-in-law.  They found him the next morning and his  is also Sat.  Spent time discussing importance of self care and mourning losses and celebrating their lives in her own way.  She and daughter did "random acts of kindness this holiday" and feel proud and happy to have done it.  She continues with [facility].  Patient is considering a drive to [place] with dtr to get a few things from her house and stay in a hotel since house has been closed up.  Patient continues to engage in therapy sessions, gain insight, and expresses a desire to continue working toward short and long-term goals.     INTERVENTIONS:  Active listening, Assessed patient's level of insight toward the presenting problems, Goal/Progress review, Educated on importance of prioritizing personal health and well-being through practicing self-care and engaging in hobbies or interests., Discussed importance of setting goals that reflect his/her values and avoiding comparisons to others., and Encouraged patient for her proactive steps     Risk parameters:  Patient reports no suicidal ideation  Patient reports no homicidal ideation  Patient " reports no self-injurious behavior  Patient reports no violent behavior    MENTAL HEALTH STATUS EXAM  General Appearance:  unremarkable, age appropriate, Oxygen via NC   Speech: normal tone, normal rate, normal pitch, normal volume      Level of Cooperation: cooperative      Thought Processes: normal and logical   Mood: steady      Thought Content: normal, no suicidality, no homicidality, delusions, or paranoia   Affect: congruent and appropriate   Orientation: Oriented x3   Attention Span & Concentration: intact   Fund of General Knowledge: intact and appropriate to age and level of education   Judgment & Insight: good     Language  intact       STRENGTHS AND LIABILITIES: Strength: Patient accepts guidance/feedback, Strength: Patient is expressive/articulate., Strength: Patient is intelligent., Strength: Patient has positive support network., Liability: Patient lacks coping skills.    IMPRESSION:   My diagnostic impression is Grief and Adjustment disorders; with mixed emotional features [F43.23], as evidenced by ARNOL-7 and self-report and GDS    TREATMENT GOALS (per patient):    Treatment plan:  Target symptoms:   Significantly reduce the overall intensity of the anxiety symptoms so that daily functioning is improved.   Experience feelings of contentment and happiness more often and increase pleasant activities that elevate mood   Why chosen therapy is appropriate versus another modality: relevant to diagnosis, evidence based practice  Outcome monitoring methods: self-report, observation, checklist/rating scale  Therapeutic intervention type: insight oriented psychotherapy, supportive psychotherapy    Patient's response to intervention:  The patient's response to intervention is accepting, motivated.    Progress toward goals and other mental status changes:  The patient's progress toward goals is good.      ICD-10-CM ICD-9-CM    1. Grief  F43.21 309.0       2. COPD  J44.1 491.21           Plan: Pt plans to continue  individual psychotherapy CBT and Interpersonal Processing Therapy  will be utilized in future individual therapy sessions to increase insight and support.     Return to clinic: 3 weeks, as scheduled

## 2025-01-03 ENCOUNTER — PATIENT MESSAGE (OUTPATIENT)
Dept: RESEARCH | Facility: HOSPITAL | Age: 70
End: 2025-01-03
Payer: MEDICARE

## 2025-01-07 DIAGNOSIS — J96.12 CHRONIC HYPERCAPNIC RESPIRATORY FAILURE: ICD-10-CM

## 2025-01-07 DIAGNOSIS — J43.2 CENTRILOBULAR EMPHYSEMA: Primary | ICD-10-CM

## 2025-01-14 ENCOUNTER — OFFICE VISIT (OUTPATIENT)
Dept: PULMONOLOGY | Facility: CLINIC | Age: 70
End: 2025-01-14
Payer: MEDICARE

## 2025-01-14 ENCOUNTER — LAB VISIT (OUTPATIENT)
Dept: LAB | Facility: HOSPITAL | Age: 70
End: 2025-01-14
Attending: INTERNAL MEDICINE
Payer: MEDICARE

## 2025-01-14 VITALS
WEIGHT: 186.06 LBS | BODY MASS INDEX: 29.9 KG/M2 | HEIGHT: 66 IN | HEART RATE: 83 BPM | SYSTOLIC BLOOD PRESSURE: 116 MMHG | OXYGEN SATURATION: 95 % | DIASTOLIC BLOOD PRESSURE: 66 MMHG

## 2025-01-14 DIAGNOSIS — J45.50 SEVERE PERSISTENT ASTHMA, UNCOMPLICATED: ICD-10-CM

## 2025-01-14 DIAGNOSIS — J96.11 CHRONIC RESPIRATORY FAILURE WITH HYPOXIA AND HYPERCAPNIA: ICD-10-CM

## 2025-01-14 DIAGNOSIS — J44.9 CHRONIC OBSTRUCTIVE PULMONARY DISEASE, UNSPECIFIED COPD TYPE: Primary | ICD-10-CM

## 2025-01-14 DIAGNOSIS — J96.12 CHRONIC RESPIRATORY FAILURE WITH HYPOXIA AND HYPERCAPNIA: ICD-10-CM

## 2025-01-14 DIAGNOSIS — R91.1 PULMONARY NODULE: ICD-10-CM

## 2025-01-14 DIAGNOSIS — J43.2 CENTRILOBULAR EMPHYSEMA: ICD-10-CM

## 2025-01-14 DIAGNOSIS — J44.9 CHRONIC OBSTRUCTIVE PULMONARY DISEASE, UNSPECIFIED COPD TYPE: ICD-10-CM

## 2025-01-14 LAB
ANION GAP SERPL CALC-SCNC: 6 MMOL/L (ref 8–16)
BUN SERPL-MCNC: 16 MG/DL (ref 8–23)
CALCIUM SERPL-MCNC: 9.1 MG/DL (ref 8.7–10.5)
CHLORIDE SERPL-SCNC: 102 MMOL/L (ref 95–110)
CO2 SERPL-SCNC: 35 MMOL/L (ref 23–29)
CREAT SERPL-MCNC: 0.6 MG/DL (ref 0.5–1.4)
EST. GFR  (NO RACE VARIABLE): >60 ML/MIN/1.73 M^2
GLUCOSE SERPL-MCNC: 77 MG/DL (ref 70–110)
IGE SERPL-ACNC: 23 IU/ML (ref 0–100)
POTASSIUM SERPL-SCNC: 4.3 MMOL/L (ref 3.5–5.1)
SODIUM SERPL-SCNC: 143 MMOL/L (ref 136–145)

## 2025-01-14 PROCEDURE — 99999 PR PBB SHADOW E&M-EST. PATIENT-LVL IV: CPT | Mod: PBBFAC,HCNC,, | Performed by: INTERNAL MEDICINE

## 2025-01-14 PROCEDURE — 1157F ADVNC CARE PLAN IN RCRD: CPT | Mod: HCNC,CPTII,S$GLB, | Performed by: INTERNAL MEDICINE

## 2025-01-14 PROCEDURE — 80048 BASIC METABOLIC PNL TOTAL CA: CPT | Mod: HCNC | Performed by: INTERNAL MEDICINE

## 2025-01-14 PROCEDURE — 3288F FALL RISK ASSESSMENT DOCD: CPT | Mod: HCNC,CPTII,S$GLB, | Performed by: INTERNAL MEDICINE

## 2025-01-14 PROCEDURE — 82785 ASSAY OF IGE: CPT | Mod: HCNC | Performed by: INTERNAL MEDICINE

## 2025-01-14 PROCEDURE — 99214 OFFICE O/P EST MOD 30 MIN: CPT | Mod: HCNC,S$GLB,, | Performed by: INTERNAL MEDICINE

## 2025-01-14 PROCEDURE — 36415 COLL VENOUS BLD VENIPUNCTURE: CPT | Mod: HCNC | Performed by: INTERNAL MEDICINE

## 2025-01-14 PROCEDURE — 1101F PT FALLS ASSESS-DOCD LE1/YR: CPT | Mod: HCNC,CPTII,S$GLB, | Performed by: INTERNAL MEDICINE

## 2025-01-14 PROCEDURE — 3078F DIAST BP <80 MM HG: CPT | Mod: HCNC,CPTII,S$GLB, | Performed by: INTERNAL MEDICINE

## 2025-01-14 PROCEDURE — 3074F SYST BP LT 130 MM HG: CPT | Mod: HCNC,CPTII,S$GLB, | Performed by: INTERNAL MEDICINE

## 2025-01-14 PROCEDURE — 3008F BODY MASS INDEX DOCD: CPT | Mod: HCNC,CPTII,S$GLB, | Performed by: INTERNAL MEDICINE

## 2025-01-14 RX ORDER — PREDNISONE 20 MG/1
20 TABLET ORAL 2 TIMES DAILY
COMMUNITY
Start: 2024-11-28 | End: 2025-01-21 | Stop reason: SDUPTHER

## 2025-01-15 NOTE — PROGRESS NOTES
Subjective:      Patient ID: Terri Phelan is a 69 y.o. female.    Chief Complaint: Follow-up and Hypoxia    HPI  The patient was seen/evaluated in person on 1/14/2025.    Ms. Phelan comes to [facility] as an Established Patient for follow up of end-stage COPD/emphysema with chronic hypoxemic/hypercapnic respiratory failure.      Since I saw her last in August 2024, she required one brief hospital admission at [facility] for COPD exacerbation (Sept 28 to Oct 1).  She has remained compliant with Pulmonary Rehab at [facility] 2-3x per week.  She was in [facility] on 10/18 for hospital follow up with subsequent evaluation by Dr. Painter on 10/29/2024.  At that visit, NIPPV settings were reviewed => BiPAP ST IPAP 18, EPAP 8, BUR of 14.  Tried targeted tidal volume, volume-assured pressure support and auto epap mode while in clinic with good tolerance.  Prescription sent to [facility] for change in settings as below (TTV-VAPS-AE):     Requested for changes to Ayde Cardoso as below:  TTV-VAPS-AE   EPAP Min = 8.0 cmH2O   EPAP Max = 10.0 cmH2O   PS = 10.0 cmH2O   Auto Rate = On   Ti Min = 0.70 sec   Ti Max = 2.00 sec   Ti Timed = auto   Insp. Sensitivity = auto   Exp. Sensitivity = 50%   Insp Lock time = 0.50 sec   Target Volume = 450 mL   PS max: 20 cmH2O       At her current visit (1/14/2025), she reports feeling better than she has in the last year.  She remains compliant with PAP therapy as ordered by Dr. Painter in October, as well as supplemental oxygen and nebulized therapy.  She has been off prednisone without difficulty.  Chart review from 2024 shows the following hospitalization dates:  12/31 to 1/12 => [facility] (Med-Surg and SNF)  3/29 to 3/31 => [facility]  5/14 to 5/20 => [facility]  7/20 to 7/23 => [facility]  9/28 to 10/1 => [facility]      From my prior Pulmonary Clinic note in August 2024 =>    Ms. Phelan returns to [facility] as an Established Patient.  I last saw her in May 2024 for ongoing care  of advanced COPD with hypoxemic/hypercapnic respiratory failure.  Past echocardiogram in July 2023 showed mild pulmonary hypertension with PA systolic 37 mmHg.  Since her May visit, she required hospitalization x 1 (July 20-23) at [facility] for acute/chronic hypercapnic respiratory failure that occurred in the setting of pre-medication use prior to dental work.     Since discharge, she feels back to baseline.  She remains compliant with her pulmonary regimen and is participating in Pulmonary Rehab at [facility] -- last few visits were 7/26, 7/29, 8/2, and 8/5 as per Care Everywhere.  She is consistently using home oxygen and home BiPAP (18/8) use at home.  She reports that her settings were lower (14/8) while in the hospital until being adjusted closer to discharge.      Review of Systems   Constitutional:  Negative for activity change and fatigue.   Respiratory:  Positive for dyspnea on extertion. Negative for cough, sputum production and wheezing.      Objective:     Physical Exam   Constitutional: She is oriented to person, place, and time. No distress.   Cardiovascular: Normal rate, regular rhythm and normal heart sounds. Exam reveals no gallop.   No murmur heard.  Pulmonary/Chest: Normal expansion, symmetric chest wall expansion, effort normal and breath sounds normal. No stridor. No respiratory distress. She has no decreased breath sounds. She has no wheezes. She has no rhonchi. She has no rales.   Musculoskeletal:         General: No edema.   Neurological: She is alert and oriented to person, place, and time. Gait normal.   Skin: No cyanosis. Nails show no clubbing.   Psychiatric: She has a normal mood and affect. Judgment normal.   Nursing note and vitals reviewed.    Personal Diagnostic Review    PFTs have shown very severe obstruction with air trapping and severe DLCO impairment.    PFTs 4/18/2023  10/10/2023   FVC  (pre-BD) 1.01 liters 1.13 liters   FVC%  40% 45%   FEV1 (pre-BD) 0.44 liters 0.52  "liters   FEV1%  22% 27%   FEV1/FVC  44 47   FVC (post-BD) - 60 mL    FVC% - 6%    FEV1 (post-BD) - 20 mL    FEV1% - 4%    SVC 1.07 liters 1.32 liters   SVC% 42% 52%   TLC  5.34 liters 2.74 liters   TLC%  108% 56%   RV  4.27 liters 1.42 liters   RV%  214% 71%   DLCO   (daniel)  3.91 mL/mmHg/min   DLCO%   18%   VA  1.64 liters   IVC  1.32 liters           1/18/2025     4:02 PM 1/18/2025     3:03 PM 1/18/2025     2:46 PM 1/18/2025     2:34 PM 1/18/2025     2:31 PM 1/14/2025     3:31 PM 12/17/2024     8:37 AM   Pulmonary Function Tests   SpO2 94 % 95 % 98 %  94 % 95 %    Height    5' 6" (1.676 m)  5' 6" (1.676 m) 5' 6" (1.676 m)   Weight    83 kg (183 lb)  84.4 kg (186 lb 1.1 oz) 84.4 kg (186 lb 1.1 oz)   BMI (Calculated)    29.6  30 30        Assessment:     1. Chronic obstructive pulmonary disease, unspecified COPD type    2. Severe persistent asthma, uncomplicated    3. Chronic respiratory failure with hypoxia and hypercapnia    4. Centrilobular emphysema    5. Pulmonary nodule         Outpatient Encounter Medications as of 1/14/2025   Medication Sig Dispense Refill    acetaminophen (TYLENOL) 500 MG tablet Take 500 mg by mouth every 6 (six) hours as needed for Pain.      albuterol (PROVENTIL/VENTOLIN HFA) 90 mcg/actuation inhaler Inhale 2 puffs into the lungs every 6 (six) hours as needed for Wheezing or Shortness of Breath. 18 g 6    albuterol-ipratropium (DUO-NEB) 2.5 mg-0.5 mg/3 mL nebulizer solution USE 3 ML VIA NEBULIZER EVERY 6 HOURS AS NEEDED FOR WHEEZING 360 mL 0    aspirin 81 MG Chew Take 1 tablet by mouth once daily.      azithromycin (Z-OLY) 250 MG tablet Take 1 tablet (250 mg total) by mouth 3 (three) times a week. Take three times weekly on Monday, Wednesday, and Friday. 30 tablet 3    budesonide-glycopyr-formoterol (BREZTRI AEROSPHERE) 160-9-4.8 mcg/actuation HFAA Inhale 2 puffs into the lungs 2 (two) times daily. 5.9 g 3    calcium carb-mag hydrox-simeth 1,200 mg-270 mg -80 mg/10 mL Susp Take 1,200 mg by " mouth once daily.      cholecalciferol, vitamin D3, (VITAMIN D3) 50 mcg (2,000 unit) Tab Take 2,000 Units by mouth once daily.      dextromethorphan-guaiFENesin  mg (MUCINEX DM)  mg per 12 hr tablet Take 1 tablet by mouth 2 (two) times daily as needed.      dicyclomine (BENTYL) 20 mg tablet Take 1 tablet (20 mg total) by mouth 3 (three) times daily as needed (abdominal pain). 30 tablet 1    ezetimibe (ZETIA) 10 mg tablet Take 1 tablet (10 mg total) by mouth once daily. 90 tablet 0    fluticasone propionate (FLONASE) 50 mcg/actuation nasal spray SHAKE LIQUID AND USE 1 SPRAY(50 MCG) IN EACH NOSTRIL DAILY AS NEEDED FOR ALLERGIES 16 g 11    hydrOXYzine HCL (ATARAX) 25 MG tablet TAKE 1 TABLET(25 MG) BY MOUTH THREE TIMES DAILY AS NEEDED FOR ANXIETY OR ITCHING 30 tablet 3    ibuprofen (ADVIL,MOTRIN) 800 MG tablet Take 1 tablet (800 mg total) by mouth 3 (three) times daily as needed for Pain. 90 tablet 0    losartan (COZAAR) 25 MG tablet Take 1 tablet (25 mg total) by mouth once daily. 90 tablet 3    meloxicam (MOBIC) 15 MG tablet Take 1 tablet (15 mg total) by mouth once daily. 30 tablet 2    OXYGEN-AIR DELIVERY SYSTEMS MISC 2 L by Nasal route continuous.      potassium chloride (MICRO-K) 10 MEQ CpSR TAKE 1 CAPSULE BY MOUTH EVERY DAY 90 capsule 0    roflumilast (DALIRESP) 500 mcg Tab Take 1 tablet (500 mcg total) by mouth once daily. 30 tablet 0    [DISCONTINUED] EScitalopram oxalate (LEXAPRO) 10 MG tablet TAKE 1 TABLET(10 MG) BY MOUTH DAILY 90 tablet 3    alendronate (FOSAMAX) 35 MG tablet Take 35 mg by mouth every Tuesday. (Patient not taking: Reported on 1/14/2025)      furosemide (LASIX) 40 MG tablet Take 1 tablet (40 mg total) by mouth once daily. 30 tablet 4    triamcinolone acetonide 0.1% (KENALOG) 0.1 % cream Apply topically 2 (two) times daily. for 14 days 80 g 0    [DISCONTINUED] HYDROcodone-acetaminophen (NORCO) 5-325 mg per tablet Take 1 tablet by mouth every 12 (twelve) hours as needed for Pain.  (Patient not taking: Reported on 12/5/2024) 14 tablet 0    [DISCONTINUED] hydrOXYzine HCL (ATARAX) 25 MG tablet TAKE 1 TABLET(25 MG) BY MOUTH THREE TIMES DAILY AS NEEDED FOR ANXIETY OR ITCHING 30 tablet 3    [DISCONTINUED] methylPREDNISolone (MEDROL DOSEPACK) 4 mg tablet use as directed 1 each 0    [DISCONTINUED] predniSONE (DELTASONE) 20 MG tablet Take 20 mg by mouth 2 (two) times daily. (Patient not taking: Reported on 1/14/2025)       No facility-administered encounter medications on file as of 1/14/2025.     Orders Placed This Encounter   Procedures    IgE     Standing Status:   Future     Number of Occurrences:   1     Standing Expiration Date:   3/14/2026    Basic Metabolic Panel     Standing Status:   Future     Number of Occurrences:   1     Standing Expiration Date:   4/14/2026     Order Specific Question:   Send normal result to authorizing provider's In Basket if patient is active on MyChart:     Answer:   Yes       Plan:     Ms. Phelan is stable from pulmonary standpoint.  She is doing well on current regimen and with recent NIPPV adjustments by Dr. Painter.  We will check IgE (off steroids) to determine whether there is a role for immunotherapy.  We will also check metabolic panel to assess degree of CO2 retention when stable.  She should continue with ongoing regular Pulmonary Rehab and return to Pulmonary Clinic in 3 months.      Problem List Items Addressed This Visit       Centrilobular emphysema    Overview     - Severe obstructive disease, on 2-3L home O2 and NIPPV (BiPap) at night and prn.   - Chest CTA from July 2023 with apical centrilobular emphysema present.  - Managed with home O2, ICS/LABA, PRN LISA, and PD4i             Current Assessment & Plan     PFTs 4/18/23 10/10/23   FVC  (pre-BD) 1.01 1.13   FVC%  40 45   FEV1 0.44 0.52   FEV1%  22 27   FEV1/FVC  44 47     Continue current regimen  Return to Pulmonary Clinic in 3 months         Chronic respiratory failure with hypoxia and hypercapnia     Overview     - Secondary to advanced/end-stage COPD/emphysema.  - On home oxygen and NIPPV with baseline PaCO2 in the 80s.  - Occasionally reports headaches but relatively asymptomatic with severe CO2 retention.           Current Assessment & Plan     Continue current regimen of PAP, oxygen, medication(s), and outpatient pulmonary rehab.  Prior IgE in November 2023 was < 35 on steroids at that time => will recheck at this time when she has been off steroids for extended time.  Check metabolic panel to assess serum HCO3 at baseline => might consider arterial blood gas to assess baseline depending upon venous results.           Pulmonary nodule    Overview     - Micronodular disease on past chest CT in 2022 => no evidence of progression on chest CTA in July 2023.  - CXR from 9/2024 without obvious significant parenchymal disease         Current Assessment & Plan     Patient is not likely to be a candidate for aggressive intervention due to advanced COPD with very severe CO2 retention.            Other Visit Diagnoses       Chronic obstructive pulmonary disease, unspecified COPD type    -  Primary    Relevant Orders    IgE (Completed)    Basic Metabolic Panel (Completed)    Severe persistent asthma, uncomplicated        Relevant Orders    IgE (Completed)            Note copied to Dr. Mason (Primary Care)    Total Time = 30 minutes    Alex Cunningham MD  Pulmonary Disease  [facility] - Pulmonary [facility] 9th Fl      Post-Clinic Lab Results:    Metabolic panel shows stable degree of CO2 retention.  IgE is normal.     01/14/25 16:36   Sodium 143   Potassium 4.3   Chloride 102   CO2 35 (H)   Anion Gap 6 (L)   BUN 16   Creatinine 0.6   eGFR >60.0   Glucose 77   Calcium 9.1   Total IgE 23     Alex Cunningham MD  Pulmonary Disease  [facility] - Pulmonary [facility] 9th Fl

## 2025-01-15 NOTE — PROGRESS NOTES
Please call Ms. Phelan to let her know that the IgE level is normal (23), so there is no benefit to trying Dupixent.  Also her carbon dioxide level is stable and consistent with her baseline level.  Thanks, DET

## 2025-01-16 ENCOUNTER — TELEPHONE (OUTPATIENT)
Dept: PULMONOLOGY | Facility: CLINIC | Age: 70
End: 2025-01-16
Payer: MEDICARE

## 2025-01-16 ENCOUNTER — PATIENT MESSAGE (OUTPATIENT)
Dept: PULMONOLOGY | Facility: CLINIC | Age: 70
End: 2025-01-16
Payer: MEDICARE

## 2025-01-16 NOTE — TELEPHONE ENCOUNTER
----- Message from Yipit sent at 1/16/2025  8:43 AM CST -----  Regarding: Patient Advice  Contact: Pt  [phone]  Pt is calling stating she received a call from provider office no details please call

## 2025-01-16 NOTE — TELEPHONE ENCOUNTER
----- Message from Lylette sent at 1/16/2025  9:50 AM CST -----  Regarding: Pt Advice  Contact: [phone]  Missed Callback          Pt returning callback from missed call. Requesting to speak with somebody in Dr. Cunningham. Please call. [phone]

## 2025-01-16 NOTE — TELEPHONE ENCOUNTER
----- Message from OnDeck sent at 1/16/2025  8:43 AM CST -----  Regarding: Patient Advice  Contact: Pt  [phone]  Pt is calling stating she received a call from provider office no details please call

## 2025-01-17 ENCOUNTER — TELEPHONE (OUTPATIENT)
Dept: PRIMARY CARE CLINIC | Facility: CLINIC | Age: 70
End: 2025-01-17
Payer: MEDICARE

## 2025-01-17 NOTE — TELEPHONE ENCOUNTER
----- Message from Wade Ravi sent at 1/17/2025 10:11 AM CST -----  Regarding: #PLEASE ADVISE.  Contact: [phone]    ----- Message -----  From: Olivia Barone  Sent: 1/17/2025  10:00 AM CST  To: [facility] Staff    Pt would like a call back regarding a problem she is having.       Please call

## 2025-01-17 NOTE — TELEPHONE ENCOUNTER
Call to patient who is concerned that 02 level has consistently went up from 95-97%.   HR has also went up from mid . No hx of A-fib or flutter. Denies SOB, dizziness, any CP, HA.   Has pulmonary rehab today, patient missed appt  and reported she will go this afternoon.   Discussed not wearing pulse oximeter and checking 02 level and pulse all the time, which could cause anxiety. Reviewed medications she has for anxiety. Encouraged to practice relaxation techniques.     During conversation patient changed topic and was tearful reporting she feels like no one listens to her and her desire to get off of BP meds. She feels like no one wants to listen to her in the whole [facility] system. Discussed  emotion from fact. Discussed history of COPD exac, some weight gain, episodes of higher BP and need for medication. Encouraged to discuss possible plan to reduce medication, exercise more, weight loss and self soothing strategies/distractions to decrease anxiety with PCP.   reviewed appointments.    Instructed if HR elevates and remains elevated >100 to notify PCP. Reported conversation to Dr Mason and patient desire to reduce BP meds. Visit scheduled next week. .

## 2025-01-18 ENCOUNTER — HOSPITAL ENCOUNTER (EMERGENCY)
Facility: HOSPITAL | Age: 70
Discharge: HOME | End: 2025-01-18
Attending: EMERGENCY MEDICINE
Payer: MEDICARE

## 2025-01-18 VITALS
RESPIRATION RATE: 20 BRPM | BODY MASS INDEX: 29.41 KG/M2 | WEIGHT: 183 LBS | DIASTOLIC BLOOD PRESSURE: 71 MMHG | TEMPERATURE: 99 F | HEART RATE: 72 BPM | HEIGHT: 66 IN | SYSTOLIC BLOOD PRESSURE: 155 MMHG | OXYGEN SATURATION: 94 %

## 2025-01-18 DIAGNOSIS — R06.02 SHORTNESS OF BREATH: ICD-10-CM

## 2025-01-18 LAB
ALBUMIN SERPL BCP-MCNC: 3.6 G/DL (ref 3.5–5.2)
ALP SERPL-CCNC: 51 U/L (ref 40–150)
ALT SERPL W/O P-5'-P-CCNC: 19 U/L (ref 10–44)
ANION GAP SERPL CALC-SCNC: 13 MMOL/L (ref 8–16)
AST SERPL-CCNC: 27 U/L (ref 10–40)
BASOPHILS # BLD AUTO: 0.03 K/UL (ref 0–0.2)
BASOPHILS NFR BLD: 0.5 % (ref 0–1.9)
BILIRUB SERPL-MCNC: 0.3 MG/DL (ref 0.1–1)
BNP SERPL-MCNC: 29 PG/ML (ref 0–99)
BUN SERPL-MCNC: 15 MG/DL (ref 8–23)
CALCIUM SERPL-MCNC: 9.3 MG/DL (ref 8.7–10.5)
CHLORIDE SERPL-SCNC: 103 MMOL/L (ref 95–110)
CO2 SERPL-SCNC: 28 MMOL/L (ref 23–29)
CREAT SERPL-MCNC: 0.7 MG/DL (ref 0.5–1.4)
CTP QC/QA: YES
DIFFERENTIAL METHOD BLD: ABNORMAL
EOSINOPHIL # BLD AUTO: 0 K/UL (ref 0–0.5)
EOSINOPHIL NFR BLD: 0.2 % (ref 0–8)
ERYTHROCYTE [DISTWIDTH] IN BLOOD BY AUTOMATED COUNT: 14.5 % (ref 11.5–14.5)
EST. GFR  (NO RACE VARIABLE): >60 ML/MIN/1.73 M^2
GLUCOSE SERPL-MCNC: 80 MG/DL (ref 70–110)
HCT VFR BLD AUTO: 38.9 % (ref 37–48.5)
HGB BLD-MCNC: 12 G/DL (ref 12–16)
IMM GRANULOCYTES # BLD AUTO: 0.02 K/UL (ref 0–0.04)
IMM GRANULOCYTES NFR BLD AUTO: 0.3 % (ref 0–0.5)
LYMPHOCYTES # BLD AUTO: 1.6 K/UL (ref 1–4.8)
LYMPHOCYTES NFR BLD: 23.9 % (ref 18–48)
MCH RBC QN AUTO: 27.5 PG (ref 27–31)
MCHC RBC AUTO-ENTMCNC: 30.8 G/DL (ref 32–36)
MCV RBC AUTO: 89 FL (ref 82–98)
MONOCYTES # BLD AUTO: 0.6 K/UL (ref 0.3–1)
MONOCYTES NFR BLD: 8.4 % (ref 4–15)
NEUTROPHILS # BLD AUTO: 4.5 K/UL (ref 1.8–7.7)
NEUTROPHILS NFR BLD: 66.7 % (ref 38–73)
NRBC BLD-RTO: 0 /100 WBC
PLATELET # BLD AUTO: 158 K/UL (ref 150–450)
PLATELET BLD QL SMEAR: ABNORMAL
PMV BLD AUTO: 11.8 FL (ref 9.2–12.9)
POC MOLECULAR INFLUENZA A AGN: NEGATIVE
POC MOLECULAR INFLUENZA B AGN: NEGATIVE
POTASSIUM SERPL-SCNC: 4.8 MMOL/L (ref 3.5–5.1)
PROT SERPL-MCNC: 7.6 G/DL (ref 6–8.4)
RBC # BLD AUTO: 4.36 M/UL (ref 4–5.4)
SODIUM SERPL-SCNC: 144 MMOL/L (ref 136–145)
TROPONIN I SERPL DL<=0.01 NG/ML-MCNC: <0.006 NG/ML (ref 0–0.03)
WBC # BLD AUTO: 6.66 K/UL (ref 3.9–12.7)

## 2025-01-18 PROCEDURE — 84484 ASSAY OF TROPONIN QUANT: CPT | Mod: HCNC

## 2025-01-18 PROCEDURE — 83880 ASSAY OF NATRIURETIC PEPTIDE: CPT | Mod: HCNC

## 2025-01-18 PROCEDURE — 87502 INFLUENZA DNA AMP PROBE: CPT | Mod: HCNC

## 2025-01-18 PROCEDURE — 80053 COMPREHEN METABOLIC PANEL: CPT | Mod: HCNC

## 2025-01-18 PROCEDURE — 25000003 PHARM REV CODE 250: Mod: HCNC

## 2025-01-18 PROCEDURE — 85025 COMPLETE CBC W/AUTO DIFF WBC: CPT | Mod: HCNC

## 2025-01-18 PROCEDURE — 99285 EMERGENCY DEPT VISIT HI MDM: CPT | Mod: 25,HCNC

## 2025-01-18 RX ORDER — ACETAMINOPHEN 500 MG
1000 TABLET ORAL
Status: COMPLETED | OUTPATIENT
Start: 2025-01-18 | End: 2025-01-18

## 2025-01-18 RX ADMIN — ACETAMINOPHEN 1000 MG: 500 TABLET ORAL at 04:01

## 2025-01-18 NOTE — DISCHARGE INSTRUCTIONS
It was nice meeting you, and I hope you feel better soon. You may return to the ER at any time for any new or concerning symptoms, worsening condition, or failure to improve.    Our goal in the ER is to always give you outstanding care and exceptional service. You may receive a survey by mail or email in the next week about your experience in our ED. We would greatly appreciate you completing and returning the survey. Your feedback provides us with a way to recognize our staff who give very good care and it helps us learn how to improve when your experience was below our aspiration of excellence.     Sincerely,     Rosenda Gardner PA-C  Emergency Room Physician Assistant  [facility]

## 2025-01-18 NOTE — ED NOTES
Patient reports worsening shortness of breath upon exertion x 2 days. She denies any chest pain, nausea, vomiting, or diarrhea. Patient reports 2L nasal canula at home with O2 88-92%.

## 2025-01-19 NOTE — ED PROVIDER NOTES
Encounter Date: 1/18/2025       History     Chief Complaint   Patient presents with    Shortness of Breath     Pt presents from  EMS c/o SOB with exertion, hx of COPD, noticed worsening of symptoms over the last few days, denies CP/cough. Pt wears 2 L O2 at baseline     Terri Phelan is a 69 y.o. female who has a past medical history of Acute exacerbation of chronic obstructive pulmonary disease (COPD), Adenoma of ascending colon, Anxiety, COPD (chronic obstructive pulmonary disease), COPD exacerbation, Heart failure, Hypertension, and Major depression. presenting to the Emergency Department for shortness of breath.  Patient has end-stage COPD and is on 2 L home oxygen.  She reports she has been having some intermittent shortness of breath at home and notes that her heart rate will go up to 112 beats per minute.  She reports this is abnormal for her as her resting heart rate is typically 60 to 70s.  Her O2 sat will drop as low as 80%, but she is able to remedy this by taking slow deep breaths. She did utilize her rescue inhaler once this morning with improvement of symptoms. She is additionally concerned because her O2 sats have been as high as 95-96% when her baseline is 88-92%. She is afraid to overuse supplemental oxygen and has been decreasing her O2 to 1.5 L when oxygen sats are higher than baseline. She also reports her penmanship is worse than usual and this concerns her. She denies any CP. She has not been wheezing or having any URI symptoms. No fevers. No GI symptoms. No peripheral edema, lightheadedness, PND. Reports her breathing is at baseline at this time. Followed by Pulmonology Dr. Cunningham.    The history is provided by the patient and a relative.     Review of patient's allergies indicates:   Allergen Reactions    Codeine Nausea And Vomiting    Lipitor [atorvastatin] Other (See Comments)     Muscle fatigue      Morphine Hallucinations     Past Medical History:   Diagnosis Date    Acute  exacerbation of chronic obstructive pulmonary disease (COPD) 2023    Recently hospitalized at [facility] from  to 2024 for exacerbation.  Did not require ICU care or significant escalation of intervention(s).      Adenoma of ascending colon 2022    Anxiety     COPD (chronic obstructive pulmonary disease)     COPD exacerbation 2014    Heart failure     Hypertension     Major depression 2023     Past Surgical History:   Procedure Laterality Date    BREAST BIOPSY N/A     pt unsure which breast but it was benign-(calcium)     SECTION      HERNIA REPAIR      HYSTERECTOMY       No family history on file.  Social History     Tobacco Use    Smoking status: Former     Current packs/day: 0.00     Average packs/day: 1 pack/day for 20.0 years (20.0 ttl pk-yrs)     Types: Cigarettes     Quit date: 2021     Years since quittin.0     Passive exposure: Past    Smokeless tobacco: Never   Substance Use Topics    Alcohol use: Not Currently     Alcohol/week: 1.0 standard drink of alcohol     Types: 1 Glasses of wine per week    Drug use: No     Review of Systems   Constitutional:  Negative for chills and fever.   HENT:  Negative for congestion, sinus pain and sore throat.    Respiratory:  Positive for shortness of breath. Negative for cough.    Cardiovascular:  Negative for chest pain, palpitations and leg swelling.   Gastrointestinal:  Negative for abdominal pain, nausea and vomiting.   Skin:  Negative for color change.   Neurological:  Negative for headaches.   Psychiatric/Behavioral:  Negative for agitation and confusion.        Physical Exam     Initial Vitals [25 1431]   BP Pulse Resp Temp SpO2   (!) 167/90 66 18 98.9 °F (37.2 °C) (!) 94 %      MAP       --         Physical Exam    Nursing note and vitals reviewed.  Constitutional: She appears well-developed and well-nourished. She is not diaphoretic.  Non-toxic appearance. No distress.   HENT:   Head: Normocephalic and  atraumatic.   Right Ear: Hearing and external ear normal.   Left Ear: Hearing and external ear normal.   Eyes: EOM are normal.   Neck: Neck supple.   Normal range of motion.  Cardiovascular:  Normal rate and regular rhythm.           Pulmonary/Chest: No respiratory distress. She has no wheezes. She has no rhonchi. She has no rales.   Mild decreased throughout, but clear. No adventitious sounds. Normal effort. No distress or accessory muscle use   Abdominal: She exhibits no distension.   Musculoskeletal:         General: Normal range of motion.      Cervical back: Normal range of motion and neck supple. Normal range of motion.     Neurological: She is alert and oriented to person, place, and time. GCS score is 15. GCS eye subscore is 4. GCS verbal subscore is 5. GCS motor subscore is 6.   Skin: Skin is warm and dry.   Psychiatric: She has a normal mood and affect. Her behavior is normal. Judgment and thought content normal.         ED Course   Procedures  Labs Reviewed   CBC W/ AUTO DIFFERENTIAL - Abnormal       Result Value    WBC 6.66      RBC 4.36      Hemoglobin 12.0      Hematocrit 38.9      MCV 89      MCH 27.5      MCHC 30.8 (*)     RDW 14.5      Platelets 158      MPV 11.8      Immature Granulocytes 0.3      Gran # (ANC) 4.5      Immature Grans (Abs) 0.02      Lymph # 1.6      Mono # 0.6      Eos # 0.0      Baso # 0.03      nRBC 0      Gran % 66.7      Lymph % 23.9      Mono % 8.4      Eosinophil % 0.2      Basophil % 0.5      Platelet Estimate Appears normal      Differential Method Automated     COMPREHENSIVE METABOLIC PANEL    Sodium 144      Potassium 4.8      Chloride 103      CO2 28      Glucose 80      BUN 15      Creatinine 0.7      Calcium 9.3      Total Protein 7.6      Albumin 3.6      Total Bilirubin 0.3      Alkaline Phosphatase 51      AST 27      ALT 19      eGFR >60      Anion Gap 13     B-TYPE NATRIURETIC PEPTIDE    BNP 29     TROPONIN I    Troponin I <0.006     POCT INFLUENZA A/B MOLECULAR     POC Molecular Influenza A Ag Negative      POC Molecular Influenza B Ag Negative       Acceptable Yes       EKG Readings: (Independently Interpreted)   Initial Reading: No STEMI. Rhythm: Normal Sinus Rhythm.       Imaging Results              X-Ray Chest 1 View (Final result)  Result time 01/18/25 16:25:19      Final result by Cristobal Peters MD (01/18/25 16:25:19)                   Impression:      As above.      Electronically signed by: Cristobal Peters  Date:    01/18/2025  Time:    16:25               Narrative:    EXAMINATION:  XR CHEST 1 VIEW    CLINICAL HISTORY:  shortness of breath;    TECHNIQUE:  Single frontal view of the chest was performed.    COMPARISON:  Chest radiograph 09/28/2024    FINDINGS:  Lines and tubes: Monitoring leads.  Jewelry projects over the mediastinum, presumably external to the patient.  Correlate with physical exam findings.    Heart and mediastinum: Normal in size.  Calcifications of the thoracic aorta.    Pleura: Chronic blunting of the left costophrenic angle.  No right pleural effusion.  No pneumothorax.    Lungs: Lungs are well inflated. No focal consolidations or evidence of pulmonary edema.  Stable bandlike opacity in the left lung base, likely atelectasis or scarring.    Soft tissue/bone: Osteopenia and degenerative changes.                                       Medications   acetaminophen tablet 1,000 mg (1,000 mg Oral Given 1/18/25 1615)     Medical Decision Making  69-year-old female with COPD presents with intermittent dyspnea on exertion.  See full HPI.  She presents nontoxic and in no distress.  Saturating up to 96% on my interview on 1.5 L which appears to cause her to become anxious. She is in no distress. Heart and lungs clear. EKG NSR. Will obtain cardiac workup. Not consistent with exacerbation, no steroids/nebs indicated at this time.     Differential Diagnosis includes, but is not limited to:  PE, MI/ACS, pneumothorax, pericardial  effusion/tamonade, pneumonia, lung abscess, pericarditis/myocarditis, pleural effusion, lung mass, CHF exacerbation, asthma exacerbation, COPD exacerbation, aspirated/ingested foreign body, airway obstruction, CO poisoning, anemia, metabolic derangement, allergy/atopy, influenza, viral URI, viral syndrome.    CBC at baseline, nonspecific findings.  CMP WNL. Trop WNL. BNP WNL. CXR stable chronic findings, no acute consolidation/effusion/etc. EKG NSR. Normal rate on cardiac monitoring throughout stay. Patient saturating 92-96% on 1.5 L NC. Discussed findings extensively with patient and provided reassurance.  She is excessively monitoring her pulse ox at home which may be causing her some anxiety.  She does not require steroids or DuoNebs at this time.  No indication for admission. There is no AMS, silent respirations, sternal retractions, hypoxia or other signs of impending respiratory failure. Doubt pneumonia, pneumothorax, effusion, airway obstruction, ACS/PE. Patient is stable for discharge at this time and feels reassured. Recommend close follow up with PCP/pulm. ED return precautions discussed for worsening s/s, failure to improve, new concerning s/s. Management discussed with my attending Dr. Dent who agrees with plan.      Problems Addressed:  Shortness of breath: chronic illness or injury    Amount and/or Complexity of Data Reviewed  External Data Reviewed: notes.     Details: Reviewed pulmonology notes  Labs: ordered. Decision-making details documented in ED Course.  Radiology: ordered. Decision-making details documented in ED Course.    Risk  OTC drugs.               ED Course as of 01/20/25 1753   Sat Jan 18, 2025   1555 CBC Auto Differential(!) [CS]   1555 Comprehensive Metabolic Panel [CS]   1613 Troponin I: <0.006 [CS]   1613 BNP: 29 [CS]   1613 POC Molecular Influenza B Ag: Negative [CS]   1613 POC Molecular Influenza A Ag: Negative [CS]   1631 X-Ray Chest 1 View  Lungs: Lungs are well inflated.  No focal consolidations or evidence of pulmonary edema.  Stable bandlike opacity in the left lung base, likely atelectasis or scarring. [CS]      ED Course User Index  [CS] Rosenda Gardner PA-C                           Clinical Impression:  Final diagnoses:  [R06.02] Shortness of breath          ED Disposition Condition    Discharge Stable          ED Prescriptions    None       Follow-up Information       Follow up With Specialties Details Why Contact Info    Sussy Mason MD Internal Medicine Schedule an appointment as soon as possible for a visit   [address] [facility] [address]  [phone]               Rosenda Gardner PA-C  01/20/25 [id number]

## 2025-01-20 ENCOUNTER — PATIENT MESSAGE (OUTPATIENT)
Dept: PRIMARY CARE CLINIC | Facility: CLINIC | Age: 70
End: 2025-01-20
Payer: MEDICARE

## 2025-01-21 ENCOUNTER — TELEPHONE (OUTPATIENT)
Dept: PRIMARY CARE CLINIC | Facility: CLINIC | Age: 70
End: 2025-01-21

## 2025-01-21 ENCOUNTER — OFFICE VISIT (OUTPATIENT)
Dept: PRIMARY CARE CLINIC | Facility: CLINIC | Age: 70
End: 2025-01-21
Payer: MEDICARE

## 2025-01-21 DIAGNOSIS — I27.20 PULMONARY HYPERTENSION: ICD-10-CM

## 2025-01-21 DIAGNOSIS — J96.11 CHRONIC RESPIRATORY FAILURE WITH HYPOXIA AND HYPERCAPNIA: ICD-10-CM

## 2025-01-21 DIAGNOSIS — F41.9 ANXIETY: Primary | Chronic | ICD-10-CM

## 2025-01-21 DIAGNOSIS — J96.12 CHRONIC RESPIRATORY FAILURE WITH HYPOXIA AND HYPERCAPNIA: ICD-10-CM

## 2025-01-21 RX ORDER — ESCITALOPRAM OXALATE 20 MG/1
20 TABLET ORAL DAILY
Qty: 30 TABLET | Refills: 11 | Status: SHIPPED | OUTPATIENT
Start: 2025-01-21

## 2025-01-21 RX ORDER — CYCLOBENZAPRINE HCL 5 MG
5 TABLET ORAL DAILY PRN
Qty: 30 TABLET | Refills: 0 | Status: SHIPPED | OUTPATIENT
Start: 2025-01-21

## 2025-01-21 RX ORDER — PREDNISONE 20 MG/1
40 TABLET ORAL DAILY
Qty: 10 TABLET | Refills: 0 | Status: SHIPPED | OUTPATIENT
Start: 2025-01-21 | End: 2025-01-26

## 2025-01-21 NOTE — ASSESSMENT & PLAN NOTE
Her ARNOL score is 17. I think her recent ER visit is more likely due to anxiety. She is on lexapro 10 mg. Pt is wiling to increase dose. Will increase to 20 mg today and do another virtual visit in 4 weeks.

## 2025-01-21 NOTE — PROGRESS NOTES
Clinic Note  1/21/2025      Subjective:       Patient ID:  Terri is a 69 y.o. female being seen for an established visit.    Chief Complaint: No chief complaint on file.    HPI  Terri Phelan is a 69 y.o. AA female who presents with COPD on 2L of O2 via NC (uses 3L with exertion), diastolic HF, HTN, HLD, anxiety, depression, JUAN (uses bipap every night) is here for a f/u visit. Did acp 2024.   -went to ER on jan 18th for sob and palpitations. No issues were found. They discharged her without any medications. They thought her symptoms might have been related to anxiety. She ran out of the prednisone that I give her for in case of exacerbations. Will refill today.   -she is on lexapro for anxiety. Will increase dose. If this does not help, can switch to cymbalta or add buspirone.   -no falls  -does not want to do LDCT. Told her why we do it.     Review of Systems   Constitutional:  Negative for chills and fever.   HENT:  Negative for congestion and hearing loss.    Respiratory:  Negative for cough, shortness of breath and wheezing.    Cardiovascular:  Negative for chest pain and palpitations.   Gastrointestinal:  Negative for abdominal pain, blood in stool, constipation and diarrhea.   Genitourinary:  Negative for dysuria and hematuria.   Neurological:  Negative for dizziness, weakness and headaches.       Medication List with Changes/Refills   Current Medications    ACETAMINOPHEN (TYLENOL) 500 MG TABLET    Take 500 mg by mouth every 6 (six) hours as needed for Pain.    ALBUTEROL (PROVENTIL/VENTOLIN HFA) 90 MCG/ACTUATION INHALER    Inhale 2 puffs into the lungs every 6 (six) hours as needed for Wheezing or Shortness of Breath.    ALBUTEROL-IPRATROPIUM (DUO-NEB) 2.5 MG-0.5 MG/3 ML NEBULIZER SOLUTION    USE 3 ML VIA NEBULIZER EVERY 6 HOURS AS NEEDED FOR WHEEZING    ALENDRONATE (FOSAMAX) 35 MG TABLET    Take 35 mg by mouth every Tuesday.    ASPIRIN 81 MG CHEW    Take 1 tablet by mouth once daily.    AZITHROMYCIN  (Z-OLY) 250 MG TABLET    Take 1 tablet (250 mg total) by mouth 3 (three) times a week. Take three times weekly on Monday, Wednesday, and Friday.    BUDESONIDE-GLYCOPYR-FORMOTEROL (BREZTRI AEROSPHERE) 160-9-4.8 MCG/ACTUATION HFAA    Inhale 2 puffs into the lungs 2 (two) times daily.    CALCIUM CARB-MAG HYDROX-SIMETH 1,200 MG-270 MG -80 MG/10 ML SUSP    Take 1,200 mg by mouth once daily.    CHOLECALCIFEROL, VITAMIN D3, (VITAMIN D3) 50 MCG (2,000 UNIT) TAB    Take 2,000 Units by mouth once daily.    DEXTROMETHORPHAN-GUAIFENESIN  MG (MUCINEX DM)  MG PER 12 HR TABLET    Take 1 tablet by mouth 2 (two) times daily as needed.    DICYCLOMINE (BENTYL) 20 MG TABLET    Take 1 tablet (20 mg total) by mouth 3 (three) times daily as needed (abdominal pain).    EZETIMIBE (ZETIA) 10 MG TABLET    Take 1 tablet (10 mg total) by mouth once daily.    FLUTICASONE PROPIONATE (FLONASE) 50 MCG/ACTUATION NASAL SPRAY    SHAKE LIQUID AND USE 1 SPRAY(50 MCG) IN EACH NOSTRIL DAILY AS NEEDED FOR ALLERGIES    FUROSEMIDE (LASIX) 40 MG TABLET    Take 1 tablet (40 mg total) by mouth once daily.    HYDROXYZINE HCL (ATARAX) 25 MG TABLET    TAKE 1 TABLET(25 MG) BY MOUTH THREE TIMES DAILY AS NEEDED FOR ANXIETY OR ITCHING    IBUPROFEN (ADVIL,MOTRIN) 800 MG TABLET    Take 1 tablet (800 mg total) by mouth 3 (three) times daily as needed for Pain.    LOSARTAN (COZAAR) 25 MG TABLET    Take 1 tablet (25 mg total) by mouth once daily.    MELOXICAM (MOBIC) 15 MG TABLET    Take 1 tablet (15 mg total) by mouth once daily.    OXYGEN-AIR DELIVERY SYSTEMS MISC    2 L by Nasal route continuous.    POTASSIUM CHLORIDE (MICRO-K) 10 MEQ CPSR    TAKE 1 CAPSULE BY MOUTH EVERY DAY    ROFLUMILAST (DALIRESP) 500 MCG TAB    Take 1 tablet (500 mcg total) by mouth once daily.    TRIAMCINOLONE ACETONIDE 0.1% (KENALOG) 0.1 % CREAM    Apply topically 2 (two) times daily. for 14 days   Changed and/or Refilled Medications    Modified Medication Previous Medication     "ESCITALOPRAM OXALATE (LEXAPRO) 20 MG TABLET EScitalopram oxalate (LEXAPRO) 10 MG tablet       Take 1 tablet (20 mg total) by mouth once daily.    TAKE 1 TABLET(10 MG) BY MOUTH DAILY    PREDNISONE (DELTASONE) 20 MG TABLET predniSONE (DELTASONE) 20 MG tablet       Take 2 tablets (40 mg total) by mouth once daily. for 5 days    Take 20 mg by mouth 2 (two) times daily.           Objective:      There were no vitals taken for this visit.  Estimated body mass index is 29.54 kg/m² as calculated from the following:    Height as of 1/18/25: 5' 6" (1.676 m).    Weight as of 1/18/25: 83 kg (183 lb).  Physical Exam  Constitutional:       Appearance: Normal appearance.   Eyes:      Conjunctiva/sclera: Conjunctivae normal.   Pulmonary:      Effort: Pulmonary effort is normal. No respiratory distress.   Neurological:      Mental Status: She is alert and oriented to person, place, and time.   Psychiatric:         Mood and Affect: Mood normal.         Behavior: Behavior normal.           Assessment and Plan:     1. Anxiety  Overview:  Anxiety associated with feelings of SOB, diagnosis, and hope for new medications that can help. Exacerbated by chronic need to check oxygen numbers and frequent hospitalizations. Following with palliative.     Assessment & Plan:  Her ARNOL score is 17. I think her recent ER visit is more likely due to anxiety. She is on lexapro 10 mg. Pt is wiling to increase dose. Will increase to 20 mg today and do another virtual visit in 4 weeks.     Orders:  -     EScitalopram oxalate (LEXAPRO) 20 MG tablet; Take 1 tablet (20 mg total) by mouth once daily.  Dispense: 30 tablet; Refill: 11    2. Chronic respiratory failure with hypoxia and hypercapnia  Overview:  - Secondary to advanced/end-stage COPD/emphysema.  - On home oxygen and NIPPV with baseline PaCO2 in the 80s.  - Occasionally reports headaches but relatively asymptomatic with severe CO2 retention.      Assessment & Plan:  S/t advanced copd. On home oxygen " via NC 2 liters. Continue.       3. Pulmonary hypertension  Assessment & Plan:  PA pressure of 37 on last echo. Pt with advanced copd. She Is on 2 L O2 via NC. Continue. Will monitor symptoms. Continue to f/u with pulm      Other orders  -     predniSONE (DELTASONE) 20 MG tablet; Take 2 tablets (40 mg total) by mouth once daily. for 5 days  Dispense: 10 tablet; Refill: 0               Follow Up:   Follow up in about 4 weeks (around 2/18/2025) for Virtual Visit.        Sussy Mason    The patient location is: home  The chief complaint leading to consultation is: anxiety and copd    Visit type: audiovisual    Face to Face time with patient: 20  20 minutes of total time spent on the encounter, which includes face to face time and non-face to face time preparing to see the patient (eg, review of tests), Obtaining and/or reviewing separately obtained history, Documenting clinical information in the electronic or other health record, Independently interpreting results (not separately reported) and communicating results to the patient/family/caregiver, or Care coordination (not separately reported).         Each patient to whom he or she provides medical services by telemedicine is:  (1) informed of the relationship between the physician and patient and the respective role of any other health care provider with respect to management of the patient; and (2) notified that he or she may decline to receive medical services by telemedicine and may withdraw from such care at any time.

## 2025-01-21 NOTE — ASSESSMENT & PLAN NOTE
PA pressure of 37 on last echo. Pt with advanced copd. She Is on 2 L O2 via NC. Continue. Will monitor symptoms. Continue to f/u with pulm

## 2025-01-24 ENCOUNTER — CLINICAL SUPPORT (OUTPATIENT)
Dept: PRIMARY CARE CLINIC | Facility: CLINIC | Age: 70
End: 2025-01-24
Payer: MEDICARE

## 2025-01-24 DIAGNOSIS — F43.21 GRIEF: ICD-10-CM

## 2025-01-24 DIAGNOSIS — F41.9 ANXIETY: Primary | ICD-10-CM

## 2025-01-24 DIAGNOSIS — J44.1 COPD EXACERBATION: ICD-10-CM

## 2025-01-24 PROCEDURE — 99499 UNLISTED E&M SERVICE: CPT | Mod: HCNC,95,,

## 2025-01-24 NOTE — PROGRESS NOTES
"Individual Psychotherapy (LCSW)  Terri Phelan,  2025    TYPE OF VISIT:  Video Session  LENGTH OF SESSION: 30    Therapeutic Intervention: Met with patient for individual psychotherapy.    Chief complaint/reason for encounter: depression, anxiety, and sleep secondary to COPD diagnosis     Session Content/Presenting Problem:  Patient went to ED on Saturday after episode of SOB.  Her HR was 112-118 and Pox was 80-81% Had to take deep breaths and try to relax.  Had to use rescue inhaler which helped immediately. She feels it then cycled and needed validation to be seen medically.  She felt much safer in the ED.  She has been feeling well since back home and saw PCP.  When asked if would do anything differently, she said she wouldn't "let it linger" and would call clinic "sooner."  She is on lexapro for anxiety. Dr. Mason increased dose to 20mg. Patient in agreement and started on Tuesday.    Patient had a steak dinner with daughter and friend from [place] on .  She "felt great." Patient decided not to go to the  in [place] and was happy that her best friend came to [facility] to have dinner.  It was unplanned and she felt good supporting her on a smaller level. Her & dtr spend time together over the snow days.  Tools for increased anxiety: mindful breathing, focus on [place] with water coming in/out, being at park and looking at underbelly of planes at they go back.  Will try having fave songs added to phone by dtr that can distract and evoke happy or calm feelings.      INTERVENTIONS:  Active listening, Assisted patient with developing own "coping card" in order to list own helpful coping strategies, Educated patient on problem Solving Skills, Goal/Progress review, Encouraged patient for her proactive steps, and Support disease management efforts through encouraging positive health behaviors     Risk parameters:  Patient reports no suicidal ideation  Patient reports no homicidal " ideation  Patient reports no self-injurious behavior  Patient reports no violent behavior    MENTAL HEALTH STATUS EXAM  General Appearance:  unremarkable, age appropriate, Oxygen via NC   Speech: normal tone, normal rate, normal pitch, normal volume      Level of Cooperation: cooperative      Thought Processes: normal and logical   Mood: steady      Thought Content: normal, no suicidality, no homicidality, delusions, or paranoia   Affect: congruent and appropriate   Orientation: Oriented x3   Attention Span & Concentration: intact   Fund of General Knowledge: intact and appropriate to age and level of education   Judgment & Insight: good     Language  intact       STRENGTHS AND LIABILITIES: Strength: Patient accepts guidance/feedback, Strength: Patient is expressive/articulate., Strength: Patient is intelligent., Strength: Patient has positive support network., Liability: Patient lacks coping skills.    IMPRESSION:   My diagnostic impression is Grief and Adjustment disorders; with mixed emotional features [F43.23], as evidenced by ARNOL-7 and self-report and GDS    TREATMENT GOALS (per patient):    Treatment plan:  Target symptoms:   Significantly reduce the overall intensity of the anxiety symptoms so that daily functioning is improved.   Experience feelings of contentment and happiness more often and increase pleasant activities that elevate mood   Why chosen therapy is appropriate versus another modality: relevant to diagnosis, evidence based practice  Outcome monitoring methods: self-report, observation, checklist/rating scale  Therapeutic intervention type: insight oriented psychotherapy, supportive psychotherapy    Patient's response to intervention:  The patient's response to intervention is accepting, motivated.    Progress toward goals and other mental status changes:  The patient's progress toward goals is good.      ICD-10-CM ICD-9-CM    1. Anxiety  F41.9 300.00       2. COPD  J44.1 491.21       3. Grief   F43.21 309.0         Plan: Pt plans to continue individual psychotherapy CBT and Interpersonal Processing Therapy  will be utilized in future individual therapy sessions to increase insight and support.     Return to clinic: 2 weeks, as scheduled

## 2025-01-28 ENCOUNTER — OFFICE VISIT (OUTPATIENT)
Dept: SLEEP MEDICINE | Facility: CLINIC | Age: 70
End: 2025-01-28
Payer: MEDICARE

## 2025-01-28 VITALS
HEIGHT: 66 IN | OXYGEN SATURATION: 91 % | SYSTOLIC BLOOD PRESSURE: 123 MMHG | DIASTOLIC BLOOD PRESSURE: 58 MMHG | BODY MASS INDEX: 29.54 KG/M2 | HEART RATE: 69 BPM

## 2025-01-28 DIAGNOSIS — G47.33 OBSTRUCTIVE SLEEP APNEA SYNDROME: ICD-10-CM

## 2025-01-28 DIAGNOSIS — J96.12 CHRONIC HYPERCAPNIC RESPIRATORY FAILURE: ICD-10-CM

## 2025-01-28 DIAGNOSIS — J44.1 COPD EXACERBATION: Primary | ICD-10-CM

## 2025-01-28 PROCEDURE — 3078F DIAST BP <80 MM HG: CPT | Mod: HCNC,CPTII,S$GLB, | Performed by: INTERNAL MEDICINE

## 2025-01-28 PROCEDURE — 1101F PT FALLS ASSESS-DOCD LE1/YR: CPT | Mod: HCNC,CPTII,S$GLB, | Performed by: INTERNAL MEDICINE

## 2025-01-28 PROCEDURE — 99214 OFFICE O/P EST MOD 30 MIN: CPT | Mod: HCNC,S$GLB,, | Performed by: INTERNAL MEDICINE

## 2025-01-28 PROCEDURE — 3288F FALL RISK ASSESSMENT DOCD: CPT | Mod: HCNC,CPTII,S$GLB, | Performed by: INTERNAL MEDICINE

## 2025-01-28 PROCEDURE — 1159F MED LIST DOCD IN RCRD: CPT | Mod: HCNC,CPTII,S$GLB, | Performed by: INTERNAL MEDICINE

## 2025-01-28 PROCEDURE — 3074F SYST BP LT 130 MM HG: CPT | Mod: HCNC,CPTII,S$GLB, | Performed by: INTERNAL MEDICINE

## 2025-01-28 PROCEDURE — 1160F RVW MEDS BY RX/DR IN RCRD: CPT | Mod: HCNC,CPTII,S$GLB, | Performed by: INTERNAL MEDICINE

## 2025-01-28 PROCEDURE — 3008F BODY MASS INDEX DOCD: CPT | Mod: HCNC,CPTII,S$GLB, | Performed by: INTERNAL MEDICINE

## 2025-01-28 PROCEDURE — 1157F ADVNC CARE PLAN IN RCRD: CPT | Mod: HCNC,CPTII,S$GLB, | Performed by: INTERNAL MEDICINE

## 2025-01-28 PROCEDURE — 99999 PR PBB SHADOW E&M-EST. PATIENT-LVL II: CPT | Mod: PBBFAC,HCNC,, | Performed by: INTERNAL MEDICINE

## 2025-01-28 NOTE — ASSESSMENT & PLAN NOTE
Continue current regimen of PAP, oxygen, medication(s), and outpatient pulmonary rehab.  Prior IgE in November 2023 was < 35 on steroids at that time => will recheck at this time when she has been off steroids for extended time.  Check metabolic panel to assess serum HCO3 at baseline => might consider arterial blood gas to assess baseline depending upon venous results.

## 2025-01-28 NOTE — ASSESSMENT & PLAN NOTE
PFTs 4/18/23 10/10/23   FVC  (pre-BD) 1.01 1.13   FVC%  40 45   FEV1 0.44 0.52   FEV1%  22 27   FEV1/FVC  44 47     Continue current regimen  Return to Pulmonary Clinic in 3 months

## 2025-01-28 NOTE — ASSESSMENT & PLAN NOTE
Patient is not likely to be a candidate for aggressive intervention due to advanced COPD with very severe CO2 retention.

## 2025-01-28 NOTE — PROGRESS NOTES
Subjective:   Patient ID: Terri Phelan is a 69 y.o. female    Chief Complaint:   Chief Complaint   Patient presents with    Follow-up     HPI  Terri Phelan is a 69 y.o. female with history of COPD and chronic hypercapnic respiratory failure on home oxygen and NIV with Ayde Cardoso. She follows with me for chronic hypercapnic respiratory failure. She [place] [facility] [place]. Last seen by him on 1/14/25.    She saw me in October 2024 after hosptialation for COPDe in September 2024. During that October visit, changes were made to the ventilator from BiPAP ST mode to VAPS-AE mode.    Today, she presents with her daughter Mulugeta. Since the last visit, she is doing well. She is compliant with PAP therapy nightly. She has also been using it during day as needed. She connects Oxygen to it. She sleeps more soundly as well. She is on Breztri. She is not requiring any rescue inhaler. She is off systemic steroids.    Labs reviewed - improvement in bicarbonate level   Latest Reference Range & Units 07/22/24 02:49 07/23/24 02:24 09/28/24 19:31 09/29/24 07:07 09/30/24 03:41 10/01/24 02:16 01/14/25 16:36 01/18/25 15:06   CO2 23 - 29 mmol/L 45 (HH) 40 (H) 38 (H) 35 (H) 37 (H) 36 (H) 35 (H) 28       Prior October visit:  She was hospitalized at [facility] 9/28-10/1/24 with COPDe requiring icu admission and bipap rescue. She takes Breztri and Daliresp. Her baseline pCO2 is around 70 - 80's. Had participated in pulmonary rehab previously.     Currently, she uses the same device - Ayde Cardoso: BiPAP ST mode with BUR 14 and IPAP 18 EPAP 8. She uses Airtouch F 20 mask. She attaches O2 at  2 - 3 Lpm to the device.    PFTs 10/10/2023  Severe obstruction, moderate restriction, severe reduction in DLCO, increased RV/TLC indicating air trapping.   FEV1/FVC is increased from prior PFTs in 4/2023 when it was 45%.       Echo 7/2023:  EF 65%, normal diastolic function, PASP 29    Sleep history:  prior sleep study at [place]. She does not remember result. She was never issued a cpap.  No prior sleep study available. She does snore but no apnea. Does have poor sleep maintenance and daytime fatigue with periodic naps.     BT of 10/11 pm with SOL of < 30 minutes, one major awakening around 2/3 AM and back to sleep from 4 - 7 am. Occasional naps during the day.    Most Recent Vital Signs:    The patient's body mass index is unknown because there is no height or weight on file.    Wt Readings from Last 5 Encounters:   01/18/25 83 kg (183 lb)   01/14/25 84.4 kg (186 lb 1.1 oz)   12/17/24 84.4 kg (186 lb 1.1 oz)   12/05/24 84.4 kg (186 lb 1.1 oz)   10/29/24 81.6 kg (179 lb 14.3 oz)     BMI Readings from Last 5 Encounters:   01/18/25 29.54 kg/m²   01/14/25 30.03 kg/m²   12/17/24 30.03 kg/m²   12/05/24 30.03 kg/m²   10/29/24 29.04 kg/m²     Pulse Readings from Last 3 Encounters:   01/18/25 72   01/14/25 83   12/05/24 90         Current Outpatient Medications:     acetaminophen (TYLENOL) 500 MG tablet, Take 500 mg by mouth every 6 (six) hours as needed for Pain., Disp: , Rfl:     albuterol (PROVENTIL/VENTOLIN HFA) 90 mcg/actuation inhaler, Inhale 2 puffs into the lungs every 6 (six) hours as needed for Wheezing or Shortness of Breath., Disp: 18 g, Rfl: 6    albuterol-ipratropium (DUO-NEB) 2.5 mg-0.5 mg/3 mL nebulizer solution, USE 3 ML VIA NEBULIZER EVERY 6 HOURS AS NEEDED FOR WHEEZING, Disp: 360 mL, Rfl: 0    alendronate (FOSAMAX) 35 MG tablet, Take 35 mg by mouth every Tuesday. (Patient not taking: Reported on 1/14/2025), Disp: , Rfl:     aspirin 81 MG Chew, Take 1 tablet by mouth once daily., Disp: , Rfl:     azithromycin (Z-OLY) 250 MG tablet, Take 1 tablet (250 mg total) by mouth 3 (three) times a week. Take three times weekly on Monday, Wednesday, and Friday., Disp: 30 tablet, Rfl: 3    budesonide-glycopyr-formoterol (BREZTRI AEROSPHERE) 160-9-4.8 mcg/actuation HFAA, Inhale 2 puffs into the lungs 2 (two)  times daily., Disp: 5.9 g, Rfl: 3    calcium carb-mag hydrox-simeth 1,200 mg-270 mg -80 mg/10 mL Susp, Take 1,200 mg by mouth once daily., Disp: , Rfl:     cholecalciferol, vitamin D3, (VITAMIN D3) 50 mcg (2,000 unit) Tab, Take 2,000 Units by mouth once daily., Disp: , Rfl:     cyclobenzaprine (FLEXERIL) 5 MG tablet, Take 1 tablet (5 mg total) by mouth daily as needed for Muscle spasms., Disp: 30 tablet, Rfl: 0    dextromethorphan-guaiFENesin  mg (MUCINEX DM)  mg per 12 hr tablet, Take 1 tablet by mouth 2 (two) times daily as needed., Disp: , Rfl:     dicyclomine (BENTYL) 20 mg tablet, Take 1 tablet (20 mg total) by mouth 3 (three) times daily as needed (abdominal pain)., Disp: 30 tablet, Rfl: 1    EScitalopram oxalate (LEXAPRO) 20 MG tablet, Take 1 tablet (20 mg total) by mouth once daily., Disp: 30 tablet, Rfl: 11    ezetimibe (ZETIA) 10 mg tablet, Take 1 tablet (10 mg total) by mouth once daily., Disp: 90 tablet, Rfl: 0    fluticasone propionate (FLONASE) 50 mcg/actuation nasal spray, SHAKE LIQUID AND USE 1 SPRAY(50 MCG) IN EACH NOSTRIL DAILY AS NEEDED FOR ALLERGIES, Disp: 16 g, Rfl: 11    furosemide (LASIX) 40 MG tablet, Take 1 tablet (40 mg total) by mouth once daily., Disp: 30 tablet, Rfl: 4    hydrOXYzine HCL (ATARAX) 25 MG tablet, TAKE 1 TABLET(25 MG) BY MOUTH THREE TIMES DAILY AS NEEDED FOR ANXIETY OR ITCHING, Disp: 30 tablet, Rfl: 3    ibuprofen (ADVIL,MOTRIN) 800 MG tablet, Take 1 tablet (800 mg total) by mouth 3 (three) times daily as needed for Pain., Disp: 90 tablet, Rfl: 0    losartan (COZAAR) 25 MG tablet, Take 1 tablet (25 mg total) by mouth once daily., Disp: 90 tablet, Rfl: 3    meloxicam (MOBIC) 15 MG tablet, Take 1 tablet (15 mg total) by mouth once daily., Disp: 30 tablet, Rfl: 2    OXYGEN-AIR DELIVERY SYSTEMS MISC, 2 L by Nasal route continuous., Disp: , Rfl:     potassium chloride (MICRO-K) 10 MEQ CpSR, TAKE 1 CAPSULE BY MOUTH EVERY DAY, Disp: 90 capsule, Rfl: 0    roflumilast  (DALIRESP) 500 mcg Tab, Take 1 tablet (500 mcg total) by mouth once daily., Disp: 30 tablet, Rfl: 0    triamcinolone acetonide 0.1% (KENALOG) 0.1 % cream, Apply topically 2 (two) times daily. for 14 days, Disp: 80 g, Rfl: 0         Objective:   Vitals reviewed   Physical Exam  Constitutional:       Appearance: Normal appearance.   HENT:      Head: Normocephalic.      Mouth/Throat:      Comments: Mallampati II  Cardiovascular:      Rate and Rhythm: Normal rate and regular rhythm.   Pulmonary:      Effort: Pulmonary effort is normal. No respiratory distress.      Breath sounds: No wheezing or rales.      Comments: Decreased breath sounds b/l  Musculoskeletal:      Cervical back: Normal range of motion and neck supple.   Neurological:      General: No focal deficit present.      Mental Status: She is alert and oriented to person, place, and time.   Psychiatric:         Mood and Affect: Mood normal.         Behavior: Behavior normal.         Assessment:     Problem List Items Addressed This Visit          Pulmonary    COPD - Primary       Other    Obstructive sleep apnea syndrome     Other Visit Diagnoses       Chronic hypercapnic respiratory failure                      Plan:       - Severe airflow obstruction  - chronic hypercapnia  - on VAPS-AE targeted TV of 450 mL  - reviewed data on device today. Patient is getting appropriate tidal volume and minute ventilation  - serum bicarbonate level has improved from 40's to 28 on most recent lab  - on optimal COPD therapy at this time  - no changes needed at this time  - discussed importance of continuing NIV    Settings:  TTV-VAPS-AE   EPAP Min = 8.0 cmH2O   EPAP Max = 10.0 cmH2O   PS = 10.0 cmH2O   Auto Rate = On   Ti Min = 0.70 sec   Ti Max = 2.00 sec   Ti Timed = auto   Insp. Sensitivity = auto   Exp. Sensitivity = 50%   Insp Lock time = 0.50 sec   Target Volume = 450 mL   PS max: 20 cmH2O     Follow up schedule per Dr. Cunningham for pulmonary

## 2025-01-30 ENCOUNTER — TELEPHONE (OUTPATIENT)
Dept: RESEARCH | Facility: HOSPITAL | Age: 70
End: 2025-01-30
Payer: MEDICARE

## 2025-01-30 NOTE — TELEPHONE ENCOUNTER
Study title: ANGI COLVIN  IRB #: [id number]      Spoke with Pt about the study. Pt is not interested. No feedback was given. Pt will no longer be contacted about the study.

## 2025-02-07 ENCOUNTER — CLINICAL SUPPORT (OUTPATIENT)
Dept: PRIMARY CARE CLINIC | Facility: CLINIC | Age: 70
End: 2025-02-07
Payer: MEDICARE

## 2025-02-07 DIAGNOSIS — F43.21 GRIEF: Primary | ICD-10-CM

## 2025-02-07 DIAGNOSIS — J44.1 COPD EXACERBATION: ICD-10-CM

## 2025-02-07 DIAGNOSIS — F41.9 ANXIETY: ICD-10-CM

## 2025-02-07 PROCEDURE — 99499 UNLISTED E&M SERVICE: CPT | Mod: HCNC,95,,

## 2025-02-07 NOTE — PROGRESS NOTES
"Individual Psychotherapy (LCSW)  Terri Phelan,  2/7/2025    TYPE OF VISIT:  Video Session  LENGTH OF SESSION: 30    Therapeutic Intervention: Met with patient for individual psychotherapy.    Chief complaint/reason for encounter: depression, anxiety, and sleep secondary to COPD diagnosis     Session Content/Presenting Problem:  Patient feels "well."  She is sleeping better because she feels more at peace.  She is back at [facility] rehab and likes this.  She "doesn't feel out of sorts when things happen" like when the lights went out.  It has happened 2x in a week.  This worries her due to concentrator and CPAP but uses portable oxygen.  She didn't get anxious.  "That was cool and really good for me."  She has been using music as discussed and it has been really helpful/  She also uses TV for prayer channels and songs to stay "even."  She is practicing her mindful breathing more. She is using rescue inhaler maybe every 2-3 days which is a big improvement.  Her dtr went out of town for the weekend and dtr had friends call to check in.  Patient did fine and had no issues.  She is driving again and drove herself to rehab.  It is building her confidence.  Discussed wanting to do more social activities. She is friendly with a person in her class and they talk for 30 mins after each rehab class.  She enjoys the socialization.  Dtr is throwing a super bowl party and patient is cooking gumbo.  Dtr & patient going to [place] for weekend to "hang out."      INTERVENTIONS:  Active listening, Assisted patient with developing own "coping card" in order to list own helpful coping strategies, Educated and encouraged "behavioral activation" by scheduling activities that have a high likelihood for pleasure and mastery, Educated patient on importance of setting achievable goals., Goal/Progress review, Reinforced positive self-talk, Referred to [employer], Encouraged patient for her proactive steps, and Support disease " management efforts through encouraging positive health behaviors     Risk parameters:  Patient reports no suicidal ideation  Patient reports no homicidal ideation  Patient reports no self-injurious behavior  Patient reports no violent behavior    MENTAL HEALTH STATUS EXAM  General Appearance:  unremarkable, age appropriate, Oxygen via NC   Speech: normal tone, normal rate, normal pitch, normal volume      Level of Cooperation: cooperative      Thought Processes: normal and logical   Mood: happy      Thought Content: normal, no suicidality, no homicidality, delusions, or paranoia   Affect: bright   Orientation: Oriented x3   Attention Span & Concentration: intact   Fund of General Knowledge: intact and appropriate to age and level of education   Judgment & Insight: good     Language  intact       STRENGTHS AND LIABILITIES: Strength: Patient accepts guidance/feedback, Strength: Patient is expressive/articulate., Strength: Patient is intelligent., Strength: Patient has positive support network., Liability: Patient lacks coping skills.    IMPRESSION:   My diagnostic impression is Grief and Adjustment disorders; with mixed emotional features [F43.23], as evidenced by ARNOL-7 and self-report and GDS    TREATMENT GOALS (per patient):    Treatment plan:  Target symptoms:   Goal: 1x/month plan a social outing or attend a [employer] event  Why chosen therapy is appropriate versus another modality: relevant to diagnosis, evidence based practice  Outcome monitoring methods: self-report, observation, checklist/rating scale  Therapeutic intervention type: insight oriented psychotherapy, supportive psychotherapy    Patient's response to intervention:  The patient's response to intervention is accepting, motivated.    Progress toward goals and other mental status changes:  The patient's progress toward goals is good.      ICD-10-CM ICD-9-CM    1. Grief  F43.21 309.0       2. Anxiety  F41.9 300.00       3. COPD  J44.1 491.21            Plan: Pt plans to continue individual psychotherapy CBT and Interpersonal Processing Therapy  will be utilized in future individual therapy sessions to increase insight and support.     Return to clinic: 2 weeks, as scheduled

## 2025-02-09 RX ORDER — FUROSEMIDE 40 MG/1
40 TABLET ORAL DAILY
Qty: 30 TABLET | Refills: 4 | OUTPATIENT
Start: 2025-02-09 | End: 2026-02-09

## 2025-02-18 NOTE — TELEPHONE ENCOUNTER
----- Message from Wade Ravi sent at 11/5/2024  4:26 PM CST -----  Regarding: #PLEASE ADVISE.  Contact: [phone]  Patient is calling for Medical Advice regarding: left shoulder pain  ( rotator cuff tear)    800 MG Ibuprofen is not working.      #PLEASE ADVISE.  ----- Message -----  From: Clinton Garza  Sent: 11/5/2024   4:22 PM CST  To: [facility] Staff    .[email or web address]     Patient is calling for Medical Advice regarding: left shoulder pain  ( rotator cuff tear)    How long has patient had these symptoms:  2 days    Pharmacy name and phone#:   [id number] - [place] - [address] AT [address]  [place] [id number]  Phone: [phone] Fax: [phone]        Patient wants a call back or thru [facility]: call    Comments:   800 MG Ibuprofen is not working    Please advise patient replies from provider may take up to 48 hours.  
Call to patient who reported she has chronic torn rotator cuff, and has pain and flare up at times. Chronic condition, last imaging reported about 3 or so years ago.    She is now having flare up and took 800 mg Ibuprofen 2 times a day (today) and and reports it does not work. Pain in L shoulder 8/10 scale, throbbing and dull aches at times. She denies any trauma or unusual event that happened to area recently. She still has functional status with activity.    Instructed patient she has Tylenol 500 mg every 6 hours on med list and could try that. Cold pack to area for pain relief.    Patient wanting something done tonight.    Gave options for Urgent Care through [facility], or call on-call nurse and ask for virtual MD visit.    Given locations for  closest Urgent Care on [address], as  ER might not be indicated.    Patient reported she would take Tylenol as directed, use cold packs and made virtual visit with provider at clinic for early AM.    
(E4) spontaneous

## 2025-02-21 ENCOUNTER — TELEPHONE (OUTPATIENT)
Dept: PRIMARY CARE CLINIC | Facility: CLINIC | Age: 70
End: 2025-02-21
Payer: MEDICARE

## 2025-02-21 NOTE — TELEPHONE ENCOUNTER
Spoke to pt and informed her Dayna Alcazar is out of office. Dayna will reach her out on another day to re schedule her appt. Pt agreed.

## 2025-02-24 DIAGNOSIS — Z00.00 ENCOUNTER FOR MEDICARE ANNUAL WELLNESS EXAM: ICD-10-CM

## 2025-02-25 ENCOUNTER — TELEPHONE (OUTPATIENT)
Dept: PRIMARY CARE CLINIC | Facility: CLINIC | Age: 70
End: 2025-02-25

## 2025-02-25 ENCOUNTER — OFFICE VISIT (OUTPATIENT)
Dept: PRIMARY CARE CLINIC | Facility: CLINIC | Age: 70
End: 2025-02-25
Payer: MEDICARE

## 2025-02-25 DIAGNOSIS — I10 PRIMARY HYPERTENSION: ICD-10-CM

## 2025-02-25 DIAGNOSIS — F41.9 ANXIETY: Chronic | ICD-10-CM

## 2025-02-25 DIAGNOSIS — J44.1 COPD EXACERBATION: Primary | ICD-10-CM

## 2025-02-25 RX ORDER — PREDNISONE 20 MG/1
40 TABLET ORAL DAILY
Qty: 10 TABLET | Refills: 0 | Status: SHIPPED | OUTPATIENT
Start: 2025-02-25 | End: 2025-03-02

## 2025-02-25 NOTE — ASSESSMENT & PLAN NOTE
no ER visit since jan 18th. I gave her prednisone at last visit to have on her to prevent exacerbation related ER visits. She just finished it. She felt like she was about to have an exacerbation, wheezing, sob and coughing so she started the prednisone. She is feeling back at baseline now. Can refill prednisone again. Asked her to let me know if she feels like she Is having an exacerbation so that I can at least do a virtual visit. Continue azithromycin three times a week. Continue pulm rehab.

## 2025-02-25 NOTE — ASSESSMENT & PLAN NOTE
she is on lexapro for anxiety. I increased it last visit. Says its helping but still has break thru anxiety. She has hydroxyzine to take as needed. She wishes there was a non-narcotic medication that could help her quicker. Says she tried buspirone in the past but did not tolerate it.

## 2025-02-25 NOTE — TELEPHONE ENCOUNTER
Patient seen virtually by PCP today. Patient reported she is nervous with respiratory condition and her daughter is leaving town which makes her anxiety go up.    This CM to make FU call next week to assess resp status and over all well being.

## 2025-02-25 NOTE — PROGRESS NOTES
Clinic Note  2/25/2025      Subjective:       Patient ID:  Terri is a 69 y.o. female being seen for an established visit.    Chief Complaint: No chief complaint on file.    HPI  Terri Phelan is a 69 y.o. AA female who presents with COPD on 2L of O2 via NC (uses 3L with exertion), diastolic HF, HTN, HLD, anxiety, depression, JUAN (uses bipap every night) is here for a virtual f/u visit. Did acp 2025.   -no ER visit since jan 18th. I gave her prednisone at last visit to have on her to prevent exacerbation related ER visits. She just finished it. She felt like she was about to have an exacerbation, wheezing, sob and coughing so she started the prednisone. She is feeling back at baseline now. Can refill prednisone again. Asked her to let me know if she feels like she Is having an exacerbation so that I can at least do a virtual visit.   -she is on lexapro for anxiety. I increased it last visit. Says its helping but still has break thru anxiety. She has hydroxyzine to take as needed. She wishes there was a non-narcotic medication that could help her quicker.  -no falls  -does not want to do LDCT. Told her why we do it.     Advance Care Planning     Date: 02/25/2025    Power of   I initiated the process of voluntary advance care planning today and explained the importance of this process to the patient.  I introduced the concept of advance directives to the patient, as well. Then the patient received detailed information about the importance of designating a Health Care Power of  (HCPOA). She was also instructed to communicate with this person about their wishes for future healthcare, should she become sick and lose decision-making capacity. The patient has previously appointed a HCPOA. After our discussion, the patient has decided to complete a HCPOA and has appointed her daughter, health care agent: otf phelan & health care agent number: [phone]. I encouraged her to communicate with this  person about their wishes for future healthcare, should she become sick and lose decision-making capacity.      A total of 5 min was spent on advance care planning, goals of care discussion, emotional support, formulating and communicating prognosis and exploring burden/benefit of various approaches of treatment. This discussion occurred on a fully voluntary basis with the verbal consent of the patient and/or family.        Review of Systems   Constitutional:  Negative for chills and fever.   HENT:  Negative for congestion and hearing loss.    Respiratory:  Negative for cough, shortness of breath and wheezing.    Cardiovascular:  Negative for chest pain.   Gastrointestinal:  Negative for abdominal pain, blood in stool, constipation and diarrhea.   Genitourinary:  Negative for dysuria and hematuria.   Neurological:  Negative for dizziness and headaches.       Medication List with Changes/Refills   New Medications    PREDNISONE (DELTASONE) 20 MG TABLET    Take 2 tablets (40 mg total) by mouth once daily. for 5 days   Current Medications    ACETAMINOPHEN (TYLENOL) 500 MG TABLET    Take 500 mg by mouth every 6 (six) hours as needed for Pain.    ALBUTEROL (PROVENTIL/VENTOLIN HFA) 90 MCG/ACTUATION INHALER    Inhale 2 puffs into the lungs every 6 (six) hours as needed for Wheezing or Shortness of Breath.    ALBUTEROL-IPRATROPIUM (DUO-NEB) 2.5 MG-0.5 MG/3 ML NEBULIZER SOLUTION    USE 3 ML VIA NEBULIZER EVERY 6 HOURS AS NEEDED FOR WHEEZING    ALENDRONATE (FOSAMAX) 35 MG TABLET    Take 35 mg by mouth every Tuesday.    ASPIRIN 81 MG CHEW    Take 1 tablet by mouth once daily.    AZITHROMYCIN (Z-OLY) 250 MG TABLET    Take 1 tablet (250 mg total) by mouth 3 (three) times a week. Take three times weekly on Monday, Wednesday, and Friday.    BUDESONIDE-GLYCOPYR-FORMOTEROL (BREZTRI AEROSPHERE) 160-9-4.8 MCG/ACTUATION HFAA    Inhale 2 puffs into the lungs 2 (two) times daily.    CALCIUM CARB-MAG HYDROX-SIMETH 1,200 MG-270 MG -80 MG/10  ML SUSP    Take 1,200 mg by mouth once daily.    CHOLECALCIFEROL, VITAMIN D3, (VITAMIN D3) 50 MCG (2,000 UNIT) TAB    Take 2,000 Units by mouth once daily.    CYCLOBENZAPRINE (FLEXERIL) 5 MG TABLET    Take 1 tablet (5 mg total) by mouth daily as needed for Muscle spasms.    DICYCLOMINE (BENTYL) 20 MG TABLET    Take 1 tablet (20 mg total) by mouth 3 (three) times daily as needed (abdominal pain).    ESCITALOPRAM OXALATE (LEXAPRO) 20 MG TABLET    Take 1 tablet (20 mg total) by mouth once daily.    EZETIMIBE (ZETIA) 10 MG TABLET    Take 1 tablet (10 mg total) by mouth once daily.    FLUTICASONE PROPIONATE (FLONASE) 50 MCG/ACTUATION NASAL SPRAY    SHAKE LIQUID AND USE 1 SPRAY(50 MCG) IN EACH NOSTRIL DAILY AS NEEDED FOR ALLERGIES    FUROSEMIDE (LASIX) 40 MG TABLET    Take 1 tablet (40 mg total) by mouth once daily.    HYDROXYZINE HCL (ATARAX) 25 MG TABLET    TAKE 1 TABLET(25 MG) BY MOUTH THREE TIMES DAILY AS NEEDED FOR ANXIETY OR ITCHING    LOSARTAN (COZAAR) 25 MG TABLET    Take 1 tablet (25 mg total) by mouth once daily.    MELOXICAM (MOBIC) 15 MG TABLET    Take 1 tablet (15 mg total) by mouth once daily.    OXYGEN-AIR DELIVERY SYSTEMS MISC    2 L by Nasal route continuous.    POTASSIUM CHLORIDE (MICRO-K) 10 MEQ CPSR    TAKE 1 CAPSULE BY MOUTH EVERY DAY    ROFLUMILAST (DALIRESP) 500 MCG TAB    Take 1 tablet (500 mcg total) by mouth once daily.    TRIAMCINOLONE ACETONIDE 0.1% (KENALOG) 0.1 % CREAM    Apply topically 2 (two) times daily. for 14 days   Changed and/or Refilled Medications    Modified Medication Previous Medication    DEXTROMETHORPHAN-GUAIFENESIN  MG (MUCINEX DM)  MG PER 12 HR TABLET dextromethorphan-guaiFENesin  mg (MUCINEX DM)  mg per 12 hr tablet       Take 1 tablet by mouth 2 (two) times daily as needed (cough).    Take 1 tablet by mouth 2 (two) times daily as needed.   Discontinued Medications    IBUPROFEN (ADVIL,MOTRIN) 800 MG TABLET    Take 1 tablet (800 mg total) by mouth 3  "(three) times daily as needed for Pain.           Objective:      There were no vitals taken for this visit.  Estimated body mass index is 29.54 kg/m² as calculated from the following:    Height as of 1/28/25: 5' 6" (1.676 m).    Weight as of 1/18/25: 83 kg (183 lb).  Physical Exam  Constitutional:       Appearance: Normal appearance.   Eyes:      Conjunctiva/sclera: Conjunctivae normal.   Pulmonary:      Effort: Pulmonary effort is normal. No respiratory distress.   Neurological:      Mental Status: She is alert and oriented to person, place, and time.   Psychiatric:         Mood and Affect: Mood normal.         Behavior: Behavior normal.           Assessment and Plan:     1. COPD  Assessment & Plan:  no ER visit since jan 18th. I gave her prednisone at last visit to have on her to prevent exacerbation related ER visits. She just finished it. She felt like she was about to have an exacerbation, wheezing, sob and coughing so she started the prednisone. She is feeling back at baseline now. Can refill prednisone again. Asked her to let me know if she feels like she Is having an exacerbation so that I can at least do a virtual visit. Continue azithromycin three times a week. Continue pulm rehab.       2. Anxiety  Overview:  Anxiety associated with feelings of SOB, diagnosis, and hope for new medications that can help. Exacerbated by chronic need to check oxygen numbers and frequent hospitalizations. Following with palliative.     Assessment & Plan:  she is on lexapro for anxiety. I increased it last visit. Says its helping but still has break thru anxiety. She has hydroxyzine to take as needed. She wishes there was a non-narcotic medication that could help her quicker. Says she tried buspirone in the past but did not tolerate it.       3. Primary hypertension  Overview:  - Managed on losartan 25mg daily    Assessment & Plan:  BP is controlled per patient. Continue losartan 25 mg daily. Will monitor       Other " orders  -     predniSONE (DELTASONE) 20 MG tablet; Take 2 tablets (40 mg total) by mouth once daily. for 5 days  Dispense: 10 tablet; Refill: 0  -     dextromethorphan-guaiFENesin  mg (MUCINEX DM)  mg per 12 hr tablet; Take 1 tablet by mouth 2 (two) times daily as needed (cough).  Dispense: 20 tablet; Refill: 1                Follow Up:   Follow up in about 4 weeks (around 3/25/2025) for Virtual Visit.        Sussy Mason    The patient location is: home  The chief complaint leading to consultation is: anxiety and copd    Visit type: audiovisual    Face to Face time with patient: 15  15 minutes of total time spent on the encounter, which includes face to face time and non-face to face time preparing to see the patient (eg, review of tests), Obtaining and/or reviewing separately obtained history, Documenting clinical information in the electronic or other health record, Independently interpreting results (not separately reported) and communicating results to the patient/family/caregiver, or Care coordination (not separately reported).         Each patient to whom he or she provides medical services by telemedicine is:  (1) informed of the relationship between the physician and patient and the respective role of any other health care provider with respect to management of the patient; and (2) notified that he or she may decline to receive medical services by telemedicine and may withdraw from such care at any time.

## 2025-02-27 ENCOUNTER — TELEPHONE (OUTPATIENT)
Dept: PRIMARY CARE CLINIC | Facility: CLINIC | Age: 70
End: 2025-02-27
Payer: MEDICARE

## 2025-02-27 NOTE — TELEPHONE ENCOUNTER
----- Message from Wade Ravi sent at 2/27/2025  1:49 PM CST -----  Contact: [phone]    ----- Message -----  From: Clinton Garza  Sent: 2/27/2025   1:31 PM CST  To: Marlon Hi Staff    Patient is returning a phone call.Who left a message for the patient: Nurse Does patient know what this is regarding:  Would you like a call back, or a response through your [facility] portal?:   callComments: Patient states she is having some issues, Patient states it is urgent.

## 2025-02-27 NOTE — TELEPHONE ENCOUNTER
Call to patient who reported she thinks she has an UTI. She is only having small a amounts of urine sometimes, then larger amounts at other times.  Denies fever, odor, pain, burning, no distention or pressure, urine is clear. Patient reported she drank cranberry juice and urinating more.     Discussed urine is made every 4-6 hours to be urinated, and she may urinate more at times than others as r/t intake. Encouraged to keep drinking cranberry and water. If having any of the s/s fever,odor, pain, burning, distention or pressure to let clinic know.     She agreed.

## 2025-02-28 RX ORDER — POTASSIUM CHLORIDE 750 MG/1
10 CAPSULE, EXTENDED RELEASE ORAL
Qty: 90 CAPSULE | Refills: 0 | Status: SHIPPED | OUTPATIENT
Start: 2025-02-28

## 2025-03-05 ENCOUNTER — CLINICAL SUPPORT (OUTPATIENT)
Dept: PRIMARY CARE CLINIC | Facility: CLINIC | Age: 70
End: 2025-03-05
Payer: MEDICARE

## 2025-03-05 ENCOUNTER — TELEPHONE (OUTPATIENT)
Dept: PRIMARY CARE CLINIC | Facility: CLINIC | Age: 70
End: 2025-03-05

## 2025-03-05 ENCOUNTER — TELEPHONE (OUTPATIENT)
Dept: PRIMARY CARE CLINIC | Facility: CLINIC | Age: 70
End: 2025-03-05
Payer: MEDICARE

## 2025-03-05 DIAGNOSIS — F43.21 GRIEF: Primary | ICD-10-CM

## 2025-03-05 DIAGNOSIS — F41.9 ANXIETY: ICD-10-CM

## 2025-03-05 DIAGNOSIS — J44.1 COPD EXACERBATION: ICD-10-CM

## 2025-03-05 RX ORDER — NITROFURANTOIN 25; 75 MG/1; MG/1
100 CAPSULE ORAL 2 TIMES DAILY
Qty: 10 CAPSULE | Refills: 0 | Status: SHIPPED | OUTPATIENT
Start: 2025-03-05 | End: 2025-03-10

## 2025-03-05 NOTE — TELEPHONE ENCOUNTER
Spoke to patient. Having urinary frequency, this is new. Could be a UTI. Will call in macrobid to her [facility].

## 2025-03-05 NOTE — TELEPHONE ENCOUNTER
Dr. Mason Team:  Patient reports during telehealth session that she has continued urinary issues.  She feels like she needs to go urgently and then has a trickle, even in the morning when expects a longer stream. She spoke with RN last week.  Sh has been drinking cranberry juice as recommended.  Daughter is out of town until Monday.

## 2025-03-05 NOTE — PROGRESS NOTES
"Individual Psychotherapy (LCSW)  Terri Phelan,  3/5/2025    TYPE OF VISIT:  Video Session  LENGTH OF SESSION: 30    Therapeutic Intervention: Met with patient for individual psychotherapy.    Chief complaint/reason for encounter: depression, anxiety, and sleep secondary to COPD diagnosis     Session Content/Presenting Problem:  Patient is stressed about urinary symptoms.  See TE sent to PCP team.  Her dtr left to go out of the country yesterday and will be back on Monday.  She has been feeling anxious but is using mindful breathing.  She has not had to use rescue inhaler and feels her breathing has been good.  Reviewed calming techniques/tool box: music, mindful breathing, watching TV.  She spoke to close friend last night which made her feel better.  She has a friend visiting from [place] tomorrow for a few hours.  She did not sleep well last night and verbalizes being tired.  She also appears tired with eyelids looking heavy.  She plans to go nap.  She has meals at home and dtr's friends are available if needed.       INTERVENTIONS:  Active listening, Assisted patient with developing own "coping card" in order to list own helpful coping strategies, Taught & practiced Mindful breathing, Encouraged patient for her proactive steps, and Communicate with PCP to share information so that they are aware of health concerns that may be impacting negatively on the patient's mental health.     Risk parameters:  Patient reports no suicidal ideation  Patient reports no homicidal ideation  Patient reports no self-injurious behavior  Patient reports no violent behavior    MENTAL HEALTH STATUS EXAM  General Appearance:  unremarkable, age appropriate, Oxygen via NC   Speech: normal tone, normal rate, normal pitch, normal volume      Level of Cooperation: cooperative      Thought Processes: normal and logical   Mood: tired      Thought Content: normal, no suicidality, no homicidality, delusions, or paranoia   Affect: congruent " and appropriate   Orientation: Oriented x3   Attention Span & Concentration: intact   Fund of General Knowledge: intact and appropriate to age and level of education   Judgment & Insight: good     Language  intact       STRENGTHS AND LIABILITIES: Strength: Patient accepts guidance/feedback, Strength: Patient is expressive/articulate., Strength: Patient is intelligent., Strength: Patient has positive support network., Liability: Patient lacks coping skills.    IMPRESSION:   My diagnostic impression is Grief and Adjustment disorders; with mixed emotional features [F43.23], as evidenced by ARNOL-7 and self-report and GDS    TREATMENT GOALS (per patient):    Treatment plan:  Target symptoms:   Goal: 1x/month plan a social outing or attend a [place] event  Why chosen therapy is appropriate versus another modality: relevant to diagnosis, evidence based practice  Outcome monitoring methods: self-report, observation, checklist/rating scale  Therapeutic intervention type: insight oriented psychotherapy, supportive psychotherapy    Patient's response to intervention:  The patient's response to intervention is accepting.    Progress toward goals and other mental status changes:  The patient's progress toward goals is good.      ICD-10-CM ICD-9-CM    1. Grief  F43.21 309.0       2. COPD  J44.1 491.21       3. Anxiety  F41.9 300.00             Plan/Return to clinic:Pt plans to continue individual psychotherapy CBT and Interpersonal Processing Therapy  will be utilized in future individual therapy sessions to increase insight and support. Patient aware that this LCSW will be out on medical leave beginning 3/18/25 for approximately 6 weeks.   LCSW will reach out to patient to schedule when return date is known.

## 2025-03-06 NOTE — TELEPHONE ENCOUNTER
Dr Mason sent po abx to pharmacy. Call to patient for follow-up. No answer, LVM requesting call back.

## 2025-03-25 ENCOUNTER — OFFICE VISIT (OUTPATIENT)
Dept: PRIMARY CARE CLINIC | Facility: CLINIC | Age: 70
End: 2025-03-25
Payer: MEDICARE

## 2025-03-25 ENCOUNTER — PATIENT MESSAGE (OUTPATIENT)
Dept: PRIMARY CARE CLINIC | Facility: CLINIC | Age: 70
End: 2025-03-25

## 2025-03-25 DIAGNOSIS — G47.33 OBSTRUCTIVE SLEEP APNEA SYNDROME: ICD-10-CM

## 2025-03-25 DIAGNOSIS — F41.9 ANXIETY: Primary | Chronic | ICD-10-CM

## 2025-03-25 DIAGNOSIS — J44.1 COPD EXACERBATION: ICD-10-CM

## 2025-03-25 DIAGNOSIS — I10 PRIMARY HYPERTENSION: ICD-10-CM

## 2025-03-25 RX ORDER — PREDNISONE 20 MG/1
40 TABLET ORAL DAILY
Qty: 10 TABLET | Refills: 1 | Status: SHIPPED | OUTPATIENT
Start: 2025-03-25 | End: 2025-04-04

## 2025-03-25 NOTE — ASSESSMENT & PLAN NOTE
no ER visit since jan 18th. I gave her prednisone at last visit to have on her to prevent exacerbation related ER visits. She took another dose of prednisone since she last saw me. Pt was having an exacerbation. I resent prednisone. Continue azithromycin three times a week. Has appt with pulm on April 15th. Continue breztri bid.

## 2025-03-25 NOTE — PROGRESS NOTES
Clinic Note  3/25/2025      Subjective:       Patient ID:  Terri is a 69 y.o. female being seen for an established visit.    Chief Complaint: No chief complaint on file.    HPI  Terri Phelan is a 69 y.o. AA female who presents with COPD on 2L of O2 via NC (uses 3L with exertion), diastolic HF, HTN, HLD, anxiety, depression, JUAN (uses bipap every night) is here for a virtual f/u visit. Did acp 2025.   -no ER visit since jan 18th. I gave her prednisone at last visit to have on her to prevent exacerbation related ER visits. She took another dose of prednisone since she last saw me. Pt was having an exacerbation. I resent prednisone.   -no falls  -does not want to do LDCT. Told her why we do it.         Review of Systems   Constitutional:  Negative for chills and fever.   HENT:  Negative for congestion.    Respiratory:  Negative for cough and shortness of breath.    Cardiovascular:  Negative for chest pain.   Gastrointestinal:  Negative for abdominal pain, blood in stool, constipation and diarrhea.   Genitourinary:  Negative for dysuria and hematuria.   Neurological:  Negative for dizziness and headaches.       Medication List with Changes/Refills   Current Medications    ACETAMINOPHEN (TYLENOL) 500 MG TABLET    Take 500 mg by mouth every 6 (six) hours as needed for Pain.    ALBUTEROL (PROVENTIL/VENTOLIN HFA) 90 MCG/ACTUATION INHALER    Inhale 2 puffs into the lungs every 6 (six) hours as needed for Wheezing or Shortness of Breath.    ALBUTEROL-IPRATROPIUM (DUO-NEB) 2.5 MG-0.5 MG/3 ML NEBULIZER SOLUTION    USE 3 ML VIA NEBULIZER EVERY 6 HOURS AS NEEDED FOR WHEEZING    ALENDRONATE (FOSAMAX) 35 MG TABLET    Take 35 mg by mouth every Tuesday.    ASPIRIN 81 MG CHEW    Take 1 tablet by mouth once daily.    AZITHROMYCIN (Z-OLY) 250 MG TABLET    Take 1 tablet (250 mg total) by mouth 3 (three) times a week. Take three times weekly on Monday, Wednesday, and Friday.    BUDESONIDE-GLYCOPYR-FORMOTEROL (BREZTRI AEROSPHERE)  160-9-4.8 MCG/ACTUATION HFAA    Inhale 2 puffs into the lungs 2 (two) times daily.    CALCIUM CARB-MAG HYDROX-SIMETH 1,200 MG-270 MG -80 MG/10 ML SUSP    Take 1,200 mg by mouth once daily.    CHOLECALCIFEROL, VITAMIN D3, (VITAMIN D3) 50 MCG (2,000 UNIT) TAB    Take 2,000 Units by mouth once daily.    CYCLOBENZAPRINE (FLEXERIL) 5 MG TABLET    Take 1 tablet (5 mg total) by mouth daily as needed for Muscle spasms.    DEXTROMETHORPHAN-GUAIFENESIN  MG (MUCINEX DM)  MG PER 12 HR TABLET    Take 1 tablet by mouth 2 (two) times daily as needed (cough).    DICYCLOMINE (BENTYL) 20 MG TABLET    Take 1 tablet (20 mg total) by mouth 3 (three) times daily as needed (abdominal pain).    ESCITALOPRAM OXALATE (LEXAPRO) 20 MG TABLET    Take 1 tablet (20 mg total) by mouth once daily.    EZETIMIBE (ZETIA) 10 MG TABLET    Take 1 tablet (10 mg total) by mouth once daily.    FLUTICASONE PROPIONATE (FLONASE) 50 MCG/ACTUATION NASAL SPRAY    SHAKE LIQUID AND USE 1 SPRAY(50 MCG) IN EACH NOSTRIL DAILY AS NEEDED FOR ALLERGIES    FUROSEMIDE (LASIX) 40 MG TABLET    Take 1 tablet (40 mg total) by mouth once daily.    HYDROXYZINE HCL (ATARAX) 25 MG TABLET    TAKE 1 TABLET(25 MG) BY MOUTH THREE TIMES DAILY AS NEEDED FOR ANXIETY OR ITCHING    LOSARTAN (COZAAR) 25 MG TABLET    Take 1 tablet (25 mg total) by mouth once daily.    MELOXICAM (MOBIC) 15 MG TABLET    Take 1 tablet (15 mg total) by mouth once daily.    OXYGEN-AIR DELIVERY SYSTEMS MISC    2 L by Nasal route continuous.    POTASSIUM CHLORIDE (MICRO-K) 10 MEQ CPSR    TAKE 1 CAPSULE BY MOUTH EVERY DAY    ROFLUMILAST (DALIRESP) 500 MCG TAB    Take 1 tablet (500 mcg total) by mouth once daily.    TRIAMCINOLONE ACETONIDE 0.1% (KENALOG) 0.1 % CREAM    Apply topically 2 (two) times daily. for 14 days   Changed and/or Refilled Medications    Modified Medication Previous Medication    PREDNISONE (DELTASONE) 20 MG TABLET predniSONE (DELTASONE) 20 MG tablet       Take 2 tablets (40 mg total)  "by mouth once daily. for 10 days    Take 2 tablets (40 mg total) by mouth once daily. for 5 days   Discontinued Medications    NITROFURANTOIN, MACROCRYSTAL-MONOHYDRATE, (MACROBID) 100 MG CAPSULE    Take 1 capsule (100 mg total) by mouth 2 (two) times daily. for 5 days           Objective:      There were no vitals taken for this visit.  Estimated body mass index is 29.54 kg/m² as calculated from the following:    Height as of 1/28/25: 5' 6" (1.676 m).    Weight as of 1/18/25: 83 kg (183 lb).  Physical Exam  Constitutional:       Appearance: Normal appearance.   Eyes:      Conjunctiva/sclera: Conjunctivae normal.   Pulmonary:      Effort: Pulmonary effort is normal. No respiratory distress.   Neurological:      Mental Status: She is alert and oriented to person, place, and time.   Psychiatric:         Mood and Affect: Mood normal.         Behavior: Behavior normal.           Assessment and Plan:     1. Anxiety  Overview:  Anxiety associated with feelings of SOB, diagnosis, and hope for new medications that can help. Exacerbated by chronic need to check oxygen numbers and frequent hospitalizations. Following with palliative.     Assessment & Plan:  Doing fine on the lexapro. Also takes hydroxyzine as needed. Continue.       2. COPD  Assessment & Plan:  no ER visit since jan 18th. I gave her prednisone at last visit to have on her to prevent exacerbation related ER visits. She took another dose of prednisone since she last saw me. Pt was having an exacerbation. I resent prednisone. Continue azithromycin three times a week. Has appt with pulm on April 15th. Continue breztri bid.       3. Obstructive sleep apnea syndrome  Assessment & Plan:  On cpap every night. Using is consistently. Continue.       4. Primary hypertension  Overview:  - Managed on losartan 25mg daily    Assessment & Plan:  BP is controlled per patient. Continue losartan 25 mg daily. Will monitor       Other orders  -     predniSONE (DELTASONE) 20 MG " tablet; Take 2 tablets (40 mg total) by mouth once daily. for 10 days  Dispense: 10 tablet; Refill: 1               Follow Up:   Follow up in about 6 weeks (around 5/6/2025) for Virtual Visit.        Sussy Mason    The patient location is: home  The chief complaint leading to consultation is: anxiety and copd    Visit type: audiovisual    Face to Face time with patient: 7  20 minutes of total time spent on the encounter, which includes face to face time and non-face to face time preparing to see the patient (eg, review of tests), Obtaining and/or reviewing separately obtained history, Documenting clinical information in the electronic or other health record, Independently interpreting results (not separately reported) and communicating results to the patient/family/caregiver, or Care coordination (not separately reported).         Each patient to whom he or she provides medical services by telemedicine is:  (1) informed of the relationship between the physician and patient and the respective role of any other health care provider with respect to management of the patient; and (2) notified that he or she may decline to receive medical services by telemedicine and may withdraw from such care at any time.

## 2025-04-09 ENCOUNTER — TELEPHONE (OUTPATIENT)
Dept: PHARMACY | Facility: CLINIC | Age: 70
End: 2025-04-09
Payer: MEDICARE

## 2025-04-09 NOTE — TELEPHONE ENCOUNTER
Donald,     While performing chart reviews for patients identified as potentially being at increased for falls, this patient has been identified as being on 2+ anticholinergic medications. The use of multiple medicines with anticholinergic effects can increase the risk of dementia and cognitive decline, falls, and mortality particularly in older populations. This patient may be a candidate for a deprescribing plan for their anticholinergic medications. Below are some factors to consider when assessing if deprescribing is right for the patient. If you identify that this patient may be right for deprescribing, send an E-consult to Pharmacy for deprescribing support and a patient-specific deprescribing plan.   General deprescribing triggers:   Inappropriate indication, no current indication, presence or risk of adverse events, drug interactions, drug-disease interactions, poor adherence, patient preference   Is there a documented indication or symptoms supporting continued use?   Potential inappropriate indications for continued use   Long term therapy >6 months   Allergic conditions without prior trial of non-sedating antihistamines   Non-histamine mediated condition (e.g., neuropathic itch)   Chronic itch in older patients (skin dryness, medical conditions, polypharmacy)   Hypnotic or sedative   Did the patient fail trials of acetaminophen, NSAIDs and/or topical analgesics?   Did the patient fail trials of mirabegron (Myrbetriq) and/or vibegron (Gemtesa)?   If beta 3 agonists listed above fail to control symptoms and benefits of treatment outweigh the risk, darifenacin and trospium may have less cognitive impairment adverse effects than other antimuscarinic drugs as they are purported not to cross the blood brain barrier.   Is the patient on another medication that is causing a decrease in bladder function?   Are there adverse effects? (Present or the risk of adverse effects)   Falls, dizziness, confusion,  drowsiness, headache, constipation, dry eyes, blurry vision, dry mouth, urinary retention   Does the patient or caregiver agree with the recommendation to deprescribe?   Engage the patient or caregiver and inform them of the rationale for deprescribing    Increase fall and fracture risk, cognitive impairment   Research and interviews with patients in a deprescribing study found that ~90% of people would be willing to stop a medication if it was recommended by their physician.

## 2025-04-11 ENCOUNTER — TELEPHONE (OUTPATIENT)
Dept: PRIMARY CARE CLINIC | Facility: CLINIC | Age: 70
End: 2025-04-11
Payer: MEDICARE

## 2025-04-11 DIAGNOSIS — E78.2 MIXED HYPERLIPIDEMIA: ICD-10-CM

## 2025-04-11 DIAGNOSIS — I70.0 AORTIC ATHEROSCLEROSIS: ICD-10-CM

## 2025-04-11 RX ORDER — EZETIMIBE 10 MG/1
10 TABLET ORAL DAILY
Qty: 90 TABLET | Refills: 3 | Status: SHIPPED | OUTPATIENT
Start: 2025-04-11

## 2025-04-14 RX ORDER — HYDROXYZINE HYDROCHLORIDE 25 MG/1
25 TABLET, FILM COATED ORAL 3 TIMES DAILY PRN
Qty: 30 TABLET | Refills: 3 | Status: SHIPPED | OUTPATIENT
Start: 2025-04-14 | End: 2025-04-15

## 2025-04-14 RX ORDER — ALBUTEROL SULFATE 90 UG/1
2 INHALANT RESPIRATORY (INHALATION) EVERY 6 HOURS PRN
Qty: 18 G | Refills: 6 | Status: SHIPPED | OUTPATIENT
Start: 2025-04-14

## 2025-04-15 ENCOUNTER — HOSPITAL ENCOUNTER (INPATIENT)
Facility: HOSPITAL | Age: 70
LOS: 3 days | Discharge: HOME HEALTH | DRG: 193 | End: 2025-04-18
Attending: EMERGENCY MEDICINE | Admitting: FAMILY MEDICINE
Payer: MEDICARE

## 2025-04-15 DIAGNOSIS — R06.02 SHORTNESS OF BREATH: ICD-10-CM

## 2025-04-15 DIAGNOSIS — J44.1 COPD EXACERBATION: Primary | ICD-10-CM

## 2025-04-15 DIAGNOSIS — I70.0 AORTIC ATHEROSCLEROSIS: ICD-10-CM

## 2025-04-15 DIAGNOSIS — J18.9 PNEUMONIA: ICD-10-CM

## 2025-04-15 DIAGNOSIS — E78.2 MIXED HYPERLIPIDEMIA: ICD-10-CM

## 2025-04-15 LAB
ABSOLUTE EOSINOPHIL (OHS): 0.24 K/UL
ABSOLUTE MONOCYTE (OHS): 0.96 K/UL (ref 0.3–1)
ABSOLUTE NEUTROPHIL COUNT (OHS): 4.56 K/UL (ref 1.8–7.7)
ALBUMIN SERPL BCP-MCNC: 3.6 G/DL (ref 3.5–5.2)
ALLENS TEST: ABNORMAL
ALP SERPL-CCNC: 51 UNIT/L (ref 40–150)
ALT SERPL W/O P-5'-P-CCNC: 18 UNIT/L (ref 10–44)
ANION GAP (OHS): 10 MMOL/L (ref 8–16)
AST SERPL-CCNC: 22 UNIT/L (ref 11–45)
BASOPHILS # BLD AUTO: 0.03 K/UL
BASOPHILS NFR BLD AUTO: 0.4 %
BILIRUB SERPL-MCNC: 0.2 MG/DL (ref 0.1–1)
BNP SERPL-MCNC: 21 PG/ML (ref 0–99)
BUN SERPL-MCNC: 9 MG/DL (ref 8–23)
CALCIUM SERPL-MCNC: 9 MG/DL (ref 8.7–10.5)
CHLORIDE SERPL-SCNC: 103 MMOL/L (ref 95–110)
CO2 SERPL-SCNC: 33 MMOL/L (ref 23–29)
CREAT SERPL-MCNC: 0.7 MG/DL (ref 0.5–1.4)
CTP QC/QA: YES
CTP QC/QA: YES
ERYTHROCYTE [DISTWIDTH] IN BLOOD BY AUTOMATED COUNT: 13.6 % (ref 11.5–14.5)
FIO2: 21 %
GFR SERPLBLD CREATININE-BSD FMLA CKD-EPI: >60 ML/MIN/1.73/M2
GLUCOSE SERPL-MCNC: 108 MG/DL (ref 70–110)
HCT VFR BLD AUTO: 41.4 % (ref 37–48.5)
HCT VFR BLD CALC: 39.2 % (ref 36–54)
HGB BLD-MCNC: 12.2 GM/DL (ref 12–16)
HGB BLD-MCNC: 12.8 G/DL (ref 9–18)
IMM GRANULOCYTES # BLD AUTO: 0.02 K/UL (ref 0–0.04)
IMM GRANULOCYTES NFR BLD AUTO: 0.3 % (ref 0–0.5)
LACTATE SERPL-SCNC: 1.1 MMOL/L (ref 0.5–2.2)
LDH SERPL L TO P-CCNC: 0.5 MMOL/L (ref 0.4–1.3)
LPM: 4
LYMPHOCYTES # BLD AUTO: 1.67 K/UL (ref 1–4.8)
MCH RBC QN AUTO: 27.6 PG (ref 27–31)
MCHC RBC AUTO-ENTMCNC: 29.5 G/DL (ref 32–36)
MCV RBC AUTO: 94 FL (ref 82–98)
NUCLEATED RBC (/100WBC) (OHS): 0 /100 WBC
PCO2 BLDA: 83 MMHG (ref 35–45)
PH SMN: 7.29 [PH] (ref 7.35–7.45)
PLATELET # BLD AUTO: 189 K/UL (ref 150–450)
PMV BLD AUTO: 10.2 FL (ref 9.2–12.9)
PO2 BLDA: 56.2 MMHG (ref 80–100)
POC BASE DEFICIT: 9.9 MMOL/L (ref -2–2)
POC HCO3: 39.7 MMOL/L (ref 24–28)
POC IONIZED CALCIUM: 1.19 MMOL/L (ref 1.06–1.42)
POC MOLECULAR INFLUENZA A AGN: NEGATIVE
POC MOLECULAR INFLUENZA B AGN: NEGATIVE
POC PERFORMED BY: ABNORMAL
POC SATURATED O2: 89.2 % (ref 95–100)
POTASSIUM BLD-SCNC: 3.9 MMOL/L (ref 3.5–5.1)
POTASSIUM SERPL-SCNC: 4.4 MMOL/L (ref 3.5–5.1)
PROT SERPL-MCNC: 7.3 GM/DL (ref 6–8.4)
RBC # BLD AUTO: 4.42 M/UL (ref 4–5.4)
RELATIVE EOSINOPHIL (OHS): 3.2 %
RELATIVE LYMPHOCYTE (OHS): 22.3 % (ref 18–48)
RELATIVE MONOCYTE (OHS): 12.8 % (ref 4–15)
RELATIVE NEUTROPHIL (OHS): 61 % (ref 38–73)
SARS-COV-2 RDRP RESP QL NAA+PROBE: NEGATIVE
SODIUM BLD-SCNC: 147 MMOL/L (ref 136–145)
SODIUM SERPL-SCNC: 146 MMOL/L (ref 136–145)
SPECIMEN SOURCE: ABNORMAL
TROPONIN I SERPL DL<=0.01 NG/ML-MCNC: <0.006 NG/ML
WBC # BLD AUTO: 7.48 K/UL (ref 3.9–12.7)

## 2025-04-15 PROCEDURE — 83605 ASSAY OF LACTIC ACID: CPT | Mod: HCNC | Performed by: STUDENT IN AN ORGANIZED HEALTH CARE EDUCATION/TRAINING PROGRAM

## 2025-04-15 PROCEDURE — 94640 AIRWAY INHALATION TREATMENT: CPT | Mod: HCNC

## 2025-04-15 PROCEDURE — 84484 ASSAY OF TROPONIN QUANT: CPT | Mod: HCNC | Performed by: EMERGENCY MEDICINE

## 2025-04-15 PROCEDURE — 87040 BLOOD CULTURE FOR BACTERIA: CPT | Mod: HCNC | Performed by: STUDENT IN AN ORGANIZED HEALTH CARE EDUCATION/TRAINING PROGRAM

## 2025-04-15 PROCEDURE — 93005 ELECTROCARDIOGRAM TRACING: CPT | Mod: HCNC

## 2025-04-15 PROCEDURE — 87635 SARS-COV-2 COVID-19 AMP PRB: CPT | Mod: HCNC | Performed by: EMERGENCY MEDICINE

## 2025-04-15 PROCEDURE — 94761 N-INVAS EAR/PLS OXIMETRY MLT: CPT | Mod: HCNC

## 2025-04-15 PROCEDURE — 99900035 HC TECH TIME PER 15 MIN (STAT): Mod: HCNC

## 2025-04-15 PROCEDURE — 63600175 PHARM REV CODE 636 W HCPCS: Mod: HCNC | Performed by: STUDENT IN AN ORGANIZED HEALTH CARE EDUCATION/TRAINING PROGRAM

## 2025-04-15 PROCEDURE — 83880 ASSAY OF NATRIURETIC PEPTIDE: CPT | Mod: HCNC | Performed by: EMERGENCY MEDICINE

## 2025-04-15 PROCEDURE — 5A09357 ASSISTANCE WITH RESPIRATORY VENTILATION, LESS THAN 24 CONSECUTIVE HOURS, CONTINUOUS POSITIVE AIRWAY PRESSURE: ICD-10-PCS | Performed by: FAMILY MEDICINE

## 2025-04-15 PROCEDURE — 85025 COMPLETE CBC W/AUTO DIFF WBC: CPT | Mod: HCNC | Performed by: EMERGENCY MEDICINE

## 2025-04-15 PROCEDURE — 11000001 HC ACUTE MED/SURG PRIVATE ROOM: Mod: HCNC

## 2025-04-15 PROCEDURE — 25000242 PHARM REV CODE 250 ALT 637 W/ HCPCS: Mod: HCNC | Performed by: NURSE PRACTITIONER

## 2025-04-15 PROCEDURE — 25000242 PHARM REV CODE 250 ALT 637 W/ HCPCS: Mod: HCNC | Performed by: EMERGENCY MEDICINE

## 2025-04-15 PROCEDURE — 25000003 PHARM REV CODE 250: Mod: HCNC | Performed by: STUDENT IN AN ORGANIZED HEALTH CARE EDUCATION/TRAINING PROGRAM

## 2025-04-15 PROCEDURE — 63600175 PHARM REV CODE 636 W HCPCS: Mod: JZ,TB,HCNC | Performed by: EMERGENCY MEDICINE

## 2025-04-15 PROCEDURE — 27000221 HC OXYGEN, UP TO 24 HOURS: Mod: HCNC

## 2025-04-15 PROCEDURE — 27000190 HC CPAP FULL FACE MASK W/VALVE: Mod: HCNC

## 2025-04-15 PROCEDURE — 94644 CONT INHLJ TX 1ST HOUR: CPT | Mod: HCNC

## 2025-04-15 PROCEDURE — 82040 ASSAY OF SERUM ALBUMIN: CPT | Mod: HCNC | Performed by: EMERGENCY MEDICINE

## 2025-04-15 PROCEDURE — 94660 CPAP INITIATION&MGMT: CPT | Mod: HCNC

## 2025-04-15 PROCEDURE — 87502 INFLUENZA DNA AMP PROBE: CPT | Mod: HCNC

## 2025-04-15 PROCEDURE — 99285 EMERGENCY DEPT VISIT HI MDM: CPT | Mod: 25,HCNC

## 2025-04-15 PROCEDURE — 93010 ELECTROCARDIOGRAM REPORT: CPT | Mod: HCNC,,, | Performed by: INTERNAL MEDICINE

## 2025-04-15 RX ORDER — ESCITALOPRAM OXALATE 10 MG/1
20 TABLET ORAL DAILY
Status: DISCONTINUED | OUTPATIENT
Start: 2025-04-16 | End: 2025-04-18 | Stop reason: HOSPADM

## 2025-04-15 RX ORDER — HYDROXYZINE HYDROCHLORIDE 25 MG/1
TABLET, FILM COATED ORAL
Qty: 30 TABLET | Refills: 3 | Status: SHIPPED | OUTPATIENT
Start: 2025-04-15

## 2025-04-15 RX ORDER — CEFTRIAXONE 2 G/1
2 INJECTION, POWDER, FOR SOLUTION INTRAMUSCULAR; INTRAVENOUS ONCE
Status: COMPLETED | OUTPATIENT
Start: 2025-04-15 | End: 2025-04-15

## 2025-04-15 RX ORDER — FUROSEMIDE 40 MG/1
40 TABLET ORAL DAILY
COMMUNITY

## 2025-04-15 RX ORDER — LOSARTAN POTASSIUM 25 MG/1
25 TABLET ORAL DAILY
Status: DISCONTINUED | OUTPATIENT
Start: 2025-04-16 | End: 2025-04-18 | Stop reason: HOSPADM

## 2025-04-15 RX ORDER — IPRATROPIUM BROMIDE AND ALBUTEROL SULFATE 2.5; .5 MG/3ML; MG/3ML
3 SOLUTION RESPIRATORY (INHALATION)
Status: DISCONTINUED | OUTPATIENT
Start: 2025-04-15 | End: 2025-04-16

## 2025-04-15 RX ORDER — HYDROXYZINE PAMOATE 25 MG/1
25 CAPSULE ORAL
Status: COMPLETED | OUTPATIENT
Start: 2025-04-15 | End: 2025-04-15

## 2025-04-15 RX ORDER — PREDNISONE 20 MG/1
40 TABLET ORAL DAILY
Status: DISCONTINUED | OUTPATIENT
Start: 2025-04-16 | End: 2025-04-16

## 2025-04-15 RX ORDER — NAPROXEN SODIUM 220 MG/1
81 TABLET, FILM COATED ORAL DAILY
Status: DISCONTINUED | OUTPATIENT
Start: 2025-04-16 | End: 2025-04-18 | Stop reason: HOSPADM

## 2025-04-15 RX ORDER — CEFTRIAXONE 1 G/1
2 INJECTION, POWDER, FOR SOLUTION INTRAMUSCULAR; INTRAVENOUS
Status: DISCONTINUED | OUTPATIENT
Start: 2025-04-15 | End: 2025-04-16

## 2025-04-15 RX ORDER — EZETIMIBE 10 MG/1
10 TABLET ORAL
Qty: 90 TABLET | Refills: 3 | Status: SHIPPED | OUTPATIENT
Start: 2025-04-15

## 2025-04-15 RX ORDER — ROFLUMILAST 500 UG/1
500 TABLET ORAL DAILY
Status: DISCONTINUED | OUTPATIENT
Start: 2025-04-16 | End: 2025-04-18 | Stop reason: HOSPADM

## 2025-04-15 RX ORDER — ALBUTEROL SULFATE 2.5 MG/.5ML
15 SOLUTION RESPIRATORY (INHALATION)
Status: COMPLETED | OUTPATIENT
Start: 2025-04-15 | End: 2025-04-15

## 2025-04-15 RX ORDER — SODIUM CHLORIDE 0.9 % (FLUSH) 0.9 %
3 SYRINGE (ML) INJECTION
Status: DISCONTINUED | OUTPATIENT
Start: 2025-04-15 | End: 2025-04-18 | Stop reason: HOSPADM

## 2025-04-15 RX ORDER — METHYLPREDNISOLONE SOD SUCC 125 MG
125 VIAL (EA) INJECTION
Status: COMPLETED | OUTPATIENT
Start: 2025-04-15 | End: 2025-04-15

## 2025-04-15 RX ORDER — EZETIMIBE 10 MG/1
10 TABLET ORAL DAILY
Status: DISCONTINUED | OUTPATIENT
Start: 2025-04-16 | End: 2025-04-18 | Stop reason: HOSPADM

## 2025-04-15 RX ADMIN — CEFTRIAXONE SODIUM 2 G: 2 INJECTION, POWDER, FOR SOLUTION INTRAMUSCULAR; INTRAVENOUS at 04:04

## 2025-04-15 RX ADMIN — METHYLPREDNISOLONE SODIUM SUCCINATE 125 MG: 125 INJECTION, POWDER, FOR SOLUTION INTRAMUSCULAR; INTRAVENOUS at 02:04

## 2025-04-15 RX ADMIN — HYDROXYZINE PAMOATE 25 MG: 25 CAPSULE ORAL at 03:04

## 2025-04-15 RX ADMIN — AZITHROMYCIN MONOHYDRATE 500 MG: 500 INJECTION, POWDER, LYOPHILIZED, FOR SOLUTION INTRAVENOUS at 04:04

## 2025-04-15 RX ADMIN — ALBUTEROL SULFATE 15 MG: 2.5 SOLUTION RESPIRATORY (INHALATION) at 02:04

## 2025-04-15 RX ADMIN — IPRATROPIUM BROMIDE AND ALBUTEROL SULFATE 3 ML: 2.5; .5 SOLUTION RESPIRATORY (INHALATION) at 07:04

## 2025-04-15 NOTE — SUBJECTIVE & OBJECTIVE
Past Medical History:   Diagnosis Date    Acute exacerbation of chronic obstructive pulmonary disease (COPD) 2023    Recently hospitalized at [facility] from  to 2024 for exacerbation.  Did not require ICU care or significant escalation of intervention(s).      Adenoma of ascending colon 2022    Anxiety     COPD (chronic obstructive pulmonary disease)     COPD exacerbation 2014    Heart failure     Hypertension     Major depression 2023       Past Surgical History:   Procedure Laterality Date    BREAST BIOPSY N/A     pt unsure which breast but it was benign-(calcium)     SECTION      HERNIA REPAIR      HYSTERECTOMY         Review of patient's allergies indicates:   Allergen Reactions    Codeine Nausea And Vomiting    Lipitor [atorvastatin] Other (See Comments)     Muscle fatigue      Morphine Hallucinations       No current facility-administered medications on file prior to encounter.     Current Outpatient Medications on File Prior to Encounter   Medication Sig    albuterol (PROVENTIL/VENTOLIN HFA) 90 mcg/actuation inhaler Inhale 2 puffs into the lungs every 6 (six) hours as needed for Wheezing or Shortness of Breath.    albuterol-ipratropium (DUO-NEB) 2.5 mg-0.5 mg/3 mL nebulizer solution USE 3 ML VIA NEBULIZER EVERY 6 HOURS AS NEEDED FOR WHEEZING (Patient taking differently: Take 3 mLs by nebulization every 6 (six) hours as needed for Shortness of Breath or Wheezing.)    alendronate (FOSAMAX) 35 MG tablet Take 35 mg by mouth every Tuesday.    aspirin 81 MG Chew Take 81 mg by mouth once daily.    azithromycin (Z-OLY) 250 MG tablet Take 1 tablet (250 mg total) by mouth 3 (three) times a week. Take three times weekly on Monday, Wednesday, and Friday.    budesonide-glycopyr-formoterol (BREZTRI AEROSPHERE) 160-9-4.8 mcg/actuation HFAA Inhale 2 puffs into the lungs 2 (two) times daily.    cholecalciferol, vitamin D3, (VITAMIN D3) 50 mcg (2,000 unit) Tab Take 2,000 Units by mouth  once daily.    cyclobenzaprine (FLEXERIL) 5 MG tablet Take 1 tablet (5 mg total) by mouth daily as needed for Muscle spasms.    dextromethorphan-guaiFENesin  mg (MUCINEX DM)  mg per 12 hr tablet Take 1 tablet by mouth 2 (two) times daily as needed (cough).    EScitalopram oxalate (LEXAPRO) 20 MG tablet Take 1 tablet (20 mg total) by mouth once daily.    ezetimibe (ZETIA) 10 mg tablet TAKE 1 TABLET(10 MG) BY MOUTH DAILY (Patient taking differently: Take 10 mg by mouth once daily.)    fluticasone propionate (FLONASE) 50 mcg/actuation nasal spray SHAKE LIQUID AND USE 1 SPRAY(50 MCG) IN EACH NOSTRIL DAILY AS NEEDED FOR ALLERGIES (Patient taking differently: 1 spray by Each Nostril route daily as needed for Allergies or Rhinitis.)    furosemide (LASIX) 40 MG tablet Take 40 mg by mouth once daily.    hydrOXYzine HCL (ATARAX) 25 MG tablet TAKE 1 TABLET(25 MG) BY MOUTH THREE TIMES DAILY AS NEEDED FOR ANXIETY OR ITCHING (Patient taking differently: Take 25 mg by mouth 3 (three) times daily as needed for Anxiety.)    losartan (COZAAR) 25 MG tablet Take 1 tablet (25 mg total) by mouth once daily.    meloxicam (MOBIC) 15 MG tablet Take 1 tablet (15 mg total) by mouth once daily.    potassium chloride (MICRO-K) 10 MEQ CpSR TAKE 1 CAPSULE BY MOUTH EVERY DAY (Patient taking differently: Take 10 mEq by mouth daily as needed.)    roflumilast (DALIRESP) 500 mcg Tab Take 1 tablet (500 mcg total) by mouth once daily.    acetaminophen (TYLENOL) 500 MG tablet Take 500 mg by mouth every 6 (six) hours as needed for Pain.    calcium carb-mag hydrox-simeth 1,200 mg-270 mg -80 mg/10 mL Susp Take 1,200 mg by mouth once daily.    OXYGEN-AIR DELIVERY SYSTEMS MISC 2 L by Nasal route continuous.    [DISCONTINUED] ezetimibe (ZETIA) 10 mg tablet Take 1 tablet (10 mg total) by mouth once daily.    [DISCONTINUED] furosemide (LASIX) 40 MG tablet Take 1 tablet (40 mg total) by mouth once daily.    [DISCONTINUED] hydrOXYzine HCL (ATARAX) 25  MG tablet Take 1 tablet (25 mg total) by mouth 3 (three) times daily as needed for Itching or Anxiety.    [DISCONTINUED] triamcinolone acetonide 0.1% (KENALOG) 0.1 % cream Apply topically 2 (two) times daily. for 14 days     Family History    None       Tobacco Use    Smoking status: Former     Current packs/day: 0.00     Average packs/day: 1 pack/day for 20.0 years (20.0 ttl pk-yrs)     Types: Cigarettes     Quit date: 2021     Years since quittin.2     Passive exposure: Past    Smokeless tobacco: Never   Substance and Sexual Activity    Alcohol use: Not Currently     Alcohol/week: 1.0 standard drink of alcohol     Types: 1 Glasses of wine per week    Drug use: No    Sexual activity: Not Currently     Partners: Male     Review of Systems   Constitutional:  Negative for chills and fever.   Respiratory:  Positive for cough, shortness of breath and wheezing.    Neurological:  Positive for weakness.     Objective:     Vital Signs (Most Recent):  Temp: 98.6 °F (37 °C) (04/15/25 1803)  Pulse: 105 (04/15/25 1803)  Resp: (!) 21 (04/15/25 1803)  BP: (!) 127/59 (04/15/25 1803)  SpO2: 95 % (04/15/25 1803) Vital Signs (24h Range):  Temp:  [98.6 °F (37 °C)-98.8 °F (37.1 °C)] 98.6 °F (37 °C)  Pulse:  [103-119] 105  Resp:  [20-37] 21  SpO2:  [90 %-100 %] 95 %  BP: (127-150)/(59-73) 127/59        There is no height or weight on file to calculate BMI.     Physical Exam  Vitals and nursing note reviewed.   Constitutional:       Appearance: Normal appearance.      Interventions: Nasal cannula in place.   HENT:      Head: Normocephalic and atraumatic.   Cardiovascular:      Rate and Rhythm: Normal rate.      Heart sounds: No murmur heard.     No friction rub. No gallop.   Pulmonary:      Effort: No respiratory distress.      Breath sounds: No stridor. Examination of the right-upper field reveals wheezing. Examination of the left-upper field reveals wheezing. Examination of the right-middle field reveals wheezing. Examination  "of the left-middle field reveals wheezing. Examination of the right-lower field reveals wheezing. Examination of the left-lower field reveals wheezing. Wheezing (mild) present. No rhonchi or rales.   Chest:      Chest wall: No tenderness.   Abdominal:      General: There is no distension.      Palpations: There is no mass.      Tenderness: There is no abdominal tenderness. There is no right CVA tenderness, left CVA tenderness, guarding or rebound.      Hernia: No hernia is present.   Skin:     Coloration: Skin is not jaundiced or pale.      Findings: No bruising, erythema, lesion or rash.   Neurological:      Mental Status: She is alert.      Cranial Nerves: No cranial nerve deficit.      Sensory: No sensory deficit.      Motor: No weakness.      Coordination: Coordination normal.      Gait: Gait normal.      Deep Tendon Reflexes: Reflexes normal.                Significant Labs: All pertinent labs within the past 24 hours have been reviewed.  ABGs:   Recent Labs   Lab 04/15/25  1430   PH 7.288*   PCO2 83.0*   HCO3 39.7*   POCSATURATED 89.2*   PO2 56.2*     Blood Culture: No results for input(s): "LABBLOO" in the last 48 hours.  CBC:   Recent Labs   Lab 04/15/25  1430 04/15/25  1435   WBC  --  7.48   HGB  --  12.2   HCT 39.2 41.4   PLT  --  189     CMP:   Recent Labs   Lab 04/15/25  1435   *   K 4.4      CO2 33*   BUN 9   CREATININE 0.7   CALCIUM 9.0   ALBUMIN 3.6   BILITOT 0.2   ALKPHOS 51   AST 22   ALT 18   ANIONGAP 10     Lactic Acid:   Recent Labs   Lab 04/15/25  1632   LACTATE 1.1     Troponin:   Recent Labs   Lab 04/15/25  1435   TROPONINI <0.006       Significant Imaging:     Imaging Results              X-Ray Chest 1 View (Final result)  Result time 04/15/25 15:42:14      Final result by Elzbieta Hanna MD (04/15/25 15:42:14)                   Impression:      Patchy opacity projects over the right lower lung zone possibly edema with a developing consolidation also possible.    Blunting of the " left costophrenic angle similar to the prior exam suggesting left pleural fluid along with volume loss and consolidation of the left lower lung zone similar to the prior exams.      Electronically signed by: Elzbieta Hanna MD  Date:    04/15/2025  Time:    15:42               Narrative:    EXAMINATION:  XR CHEST 1 VIEW    CLINICAL HISTORY:  Shortness of breath;    TECHNIQUE:  A single frontal chest radiograph was obtained with dual imaging.  (2 images)    COMPARISON:  Chest radiographs from 01/18/2025 and 09/28/2024.    FINDINGS:  Cardiac monitoring leads along with other tubes and lines overlie the chest and upper abdomen.  The cardiomediastinal silhouette appears unchanged.    Patchy opacity projects over the right lower lung zone possibly edema and with a developing consolidation also possible.  The right costophrenic angle is incompletely included on this exam.  Blunting of the left costophrenic angle similar to prior exam suggesting left pleural fluid along with volume loss and consolidation of the left lower lung zone.    The osseous structures show degenerative changes.  Vascular calcifications are noted.

## 2025-04-15 NOTE — Clinical Note
Diagnosis: Pneumonia [263919]   Reason for IP Medical Treatment  (Clinical interventions that can only be accomplished in the IP setting? ) :: COPD exacerbation

## 2025-04-15 NOTE — ED NOTES
Pt bib EMS from home with c/o SOB and productive cough that started 2 days ago. Hx of COPD, typically uses 2L NC at home. Pt reports that she increased O2 to 4L. Pt changed into gown, placed cardiac monitoring, pulse ox and automatic BP.

## 2025-04-15 NOTE — PHARMACY MED REC
"  [facility]           Pharmacy  Admission Medication History     The home medication history was taken by Nkechi Aguayo.      Medication history obtained from Medications listed below were obtained from: Patient/family.    Based on information gathered for medication list, you may go to "Admission" then "Reconcile Home Medications" tabs to review and/or act upon those items.     The home medication list has been updated by the Pharmacy department.   Please read ALL comments highlighted in yellow.   Please address this information as you see fit.    Feel free to contact us if you have any questions or require assistance.        No current facility-administered medications on file prior to encounter.     Current Outpatient Medications on File Prior to Encounter   Medication Sig Dispense Refill    albuterol (PROVENTIL/VENTOLIN HFA) 90 mcg/actuation inhaler Inhale 2 puffs into the lungs every 6 (six) hours as needed for Wheezing or Shortness of Breath. 18 g 6    albuterol-ipratropium (DUO-NEB) 2.5 mg-0.5 mg/3 mL nebulizer solution USE 3 ML VIA NEBULIZER EVERY 6 HOURS AS NEEDED FOR WHEEZING (Patient taking differently: Take 3 mLs by nebulization every 6 (six) hours as needed for Shortness of Breath or Wheezing.) 360 mL 0    alendronate (FOSAMAX) 35 MG tablet Take 35 mg by mouth every Tuesday.      aspirin 81 MG Chew Take 81 mg by mouth once daily.      azithromycin (Z-OLY) 250 MG tablet Take 1 tablet (250 mg total) by mouth 3 (three) times a week. Take three times weekly on Monday, Wednesday, and Friday. 30 tablet 3    budesonide-glycopyr-formoterol (BREZTRI AEROSPHERE) 160-9-4.8 mcg/actuation HFAA Inhale 2 puffs into the lungs 2 (two) times daily. 5.9 g 3    cholecalciferol, vitamin D3, (VITAMIN D3) 50 mcg (2,000 unit) Tab Take 2,000 Units by mouth once daily.      cyclobenzaprine (FLEXERIL) 5 MG tablet Take 1 tablet (5 mg total) by mouth daily as needed for Muscle spasms. 30 tablet 0    " dextromethorphan-guaiFENesin  mg (MUCINEX DM)  mg per 12 hr tablet Take 1 tablet by mouth 2 (two) times daily as needed (cough). 20 tablet 1    EScitalopram oxalate (LEXAPRO) 20 MG tablet Take 1 tablet (20 mg total) by mouth once daily. 30 tablet 11    ezetimibe (ZETIA) 10 mg tablet TAKE 1 TABLET(10 MG) BY MOUTH DAILY (Patient taking differently: Take 10 mg by mouth once daily.) 90 tablet 3    fluticasone propionate (FLONASE) 50 mcg/actuation nasal spray SHAKE LIQUID AND USE 1 SPRAY(50 MCG) IN EACH NOSTRIL DAILY AS NEEDED FOR ALLERGIES (Patient taking differently: 1 spray by Each Nostril route daily as needed for Allergies or Rhinitis.) 16 g 11    furosemide (LASIX) 40 MG tablet Take 40 mg by mouth once daily.      hydrOXYzine HCL (ATARAX) 25 MG tablet TAKE 1 TABLET(25 MG) BY MOUTH THREE TIMES DAILY AS NEEDED FOR ANXIETY OR ITCHING (Patient taking differently: Take 25 mg by mouth 3 (three) times daily as needed for Anxiety.) 30 tablet 3    losartan (COZAAR) 25 MG tablet Take 1 tablet (25 mg total) by mouth once daily. 90 tablet 3    meloxicam (MOBIC) 15 MG tablet Take 1 tablet (15 mg total) by mouth once daily. 30 tablet 2    potassium chloride (MICRO-K) 10 MEQ CpSR TAKE 1 CAPSULE BY MOUTH EVERY DAY (Patient taking differently: Take 10 mEq by mouth daily as needed.) 90 capsule 0    roflumilast (DALIRESP) 500 mcg Tab Take 1 tablet (500 mcg total) by mouth once daily. 30 tablet 0    acetaminophen (TYLENOL) 500 MG tablet Take 500 mg by mouth every 6 (six) hours as needed for Pain.      calcium carb-mag hydrox-simeth 1,200 mg-270 mg -80 mg/10 mL Susp Take 1,200 mg by mouth once daily.      OXYGEN-AIR DELIVERY SYSTEMS MISC 2 L by Nasal route continuous.         Please address this information as you see fit.  Feel free to contact us if you have any questions or require assistance.    Nkechi Aguayo  [phone]                .

## 2025-04-15 NOTE — ED PROVIDER NOTES
Encounter Date: 4/15/2025       History     Chief Complaint   Patient presents with    Shortness of Breath     Arrivers via ems with worsening sob x2 days. Ems reports initial sao2-78%. Pt. Was given duoneb. En route with increase to 90's. Pt. Is on 2l/nc continuous, increased to 4l/min. Nc with onset of symptoms. Accucheck-119 per ems. Treatment continues on arrival, moderate visible dyspnea, sao2-100%.      Patient is a 69-year-old female with a history of COPD who presents with shortness of breath.  This began a couple of weeks ago but is greatly worsened in the past couple of days.  She has had a cough productive for purulent sputum.  No fever or chills.  Patient is on oxygen at home which he uses continually.  She has also used her nebulizer without relief.      Review of patient's allergies indicates:   Allergen Reactions    Codeine Nausea And Vomiting    Lipitor [atorvastatin] Other (See Comments)     Muscle fatigue      Morphine Hallucinations     Past Medical History:   Diagnosis Date    Acute exacerbation of chronic obstructive pulmonary disease (COPD) 2023    Recently hospitalized at [facility] from  to 2024 for exacerbation.  Did not require ICU care or significant escalation of intervention(s).      Adenoma of ascending colon 2022    Anxiety     COPD (chronic obstructive pulmonary disease)     COPD exacerbation 2014    Heart failure     Hypertension     Major depression 2023     Past Surgical History:   Procedure Laterality Date    BREAST BIOPSY N/A     pt unsure which breast but it was benign-(calcium)     SECTION      HERNIA REPAIR      HYSTERECTOMY       No family history on file.  Social History[1]  Review of Systems   Constitutional:  Negative for fever.   Respiratory:  Positive for cough and shortness of breath.    Cardiovascular:  Negative for chest pain.   Gastrointestinal:  Negative for vomiting.   Neurological:  Negative for syncope.   All other systems  reviewed and are negative.      Physical Exam     Initial Vitals [04/15/25 1349]   BP Pulse Resp Temp SpO2   (!) 150/73 (!) 113 20 98.8 °F (37.1 °C) 100 %      MAP       --         Physical Exam    Nursing note and vitals reviewed.  Eyes: Conjunctivae are normal.   Neck: Neck supple.   Cardiovascular:            Tachycardic.   Pulmonary/Chest:   Greatly diminished breath sounds bilaterally.   Abdominal: Abdomen is soft. There is no abdominal tenderness.   Musculoskeletal:         General: No edema.      Cervical back: Neck supple.     Neurological: She is alert and oriented to person, place, and time.   Skin: Skin is warm and dry.   Psychiatric: Thought content normal.         ED Course   Procedures  Labs Reviewed   COMPREHENSIVE METABOLIC PANEL - Abnormal       Result Value    Sodium 146 (*)     Potassium 4.4      Chloride 103      CO2 33 (*)     Glucose 108      BUN 9      Creatinine 0.7      Calcium 9.0      Protein Total 7.3      Albumin 3.6      Bilirubin Total 0.2      ALP 51      AST 22      ALT 18      Anion Gap 10      eGFR >60      Narrative:     Specimen slightly hemolyzed.   CBC WITH DIFFERENTIAL - Abnormal    WBC 7.48      RBC 4.42      HGB 12.2      HCT 41.4      MCV 94      MCH 27.6      MCHC 29.5 (*)     RDW 13.6      Platelet Count 189      MPV 10.2      Nucleated RBC 0      Neut % 61.0      Lymph % 22.3      Mono % 12.8      Eos % 3.2      Basophil % 0.4      Imm Grans % 0.3      Neut # 4.56      Lymph # 1.67      Mono # 0.96      Eos # 0.24      Baso # 0.03      Imm Grans # 0.02     TROPONIN I - Normal    Troponin-I <0.006     B-TYPE NATRIURETIC PEPTIDE - Normal    BNP 21     LACTIC ACID, PLASMA - Normal    Lactic Acid Level 1.1      Narrative:     Falsely low lactic acid results can be found in samples containing >=13.0 mg/dL total bilirubin and/or >=3.5 mg/dL direct bilirubin.    CBC W/ AUTO DIFFERENTIAL    Narrative:     The following orders were created for panel order CBC auto  differential.  Procedure                               Abnormality         Status                     ---------                               -----------         ------                     CBC with Differential[[id number]]       Abnormal            Final result                 Please view results for these tests on the individual orders.   SARS-COV-2 RDRP GENE    POC Rapid COVID Negative       Acceptable Yes     POCT INFLUENZA A/B MOLECULAR    POC Molecular Influenza A Ag Negative      POC Molecular Influenza B Ag Negative       Acceptable Yes          ECG Results              EKG 12-lead (In process)        Collection Time Result Time QRS Duration OHS QTC Calculation    04/15/25 14:10:12 04/15/25 14:29:54 68 425                     In process by Interface, Lab In [facility] (04/15/25 14:29:59)                   Narrative:    Test Reason : R06.02,    Vent. Rate : 112 BPM     Atrial Rate : 112 BPM     P-R Int : 158 ms          QRS Dur :  68 ms      QT Int : 312 ms       P-R-T Axes :  79  82  78 degrees    QTcB Int : 425 ms    Sinus tachycardia  Otherwise normal ECG  When compared with ECG of 28-Sep-2024 19:08,  No significant change was found    Referred By: AAAREFERRAL SELF           Confirmed By:                                   Imaging Results              X-Ray Chest 1 View (Final result)  Result time 04/15/25 15:42:14      Final result by Elzbieta Hanna MD (04/15/25 15:42:14)                   Impression:      Patchy opacity projects over the right lower lung zone possibly edema with a developing consolidation also possible.    Blunting of the left costophrenic angle similar to the prior exam suggesting left pleural fluid along with volume loss and consolidation of the left lower lung zone similar to the prior exams.      Electronically signed by: Elzbieta Hanna MD  Date:    04/15/2025  Time:    15:42               Narrative:    EXAMINATION:  XR CHEST 1 VIEW    CLINICAL  HISTORY:  Shortness of breath;    TECHNIQUE:  A single frontal chest radiograph was obtained with dual imaging.  (2 images)    COMPARISON:  Chest radiographs from 01/18/2025 and 09/28/2024.    FINDINGS:  Cardiac monitoring leads along with other tubes and lines overlie the chest and upper abdomen.  The cardiomediastinal silhouette appears unchanged.    Patchy opacity projects over the right lower lung zone possibly edema and with a developing consolidation also possible.  The right costophrenic angle is incompletely included on this exam.  Blunting of the left costophrenic angle similar to prior exam suggesting left pleural fluid along with volume loss and consolidation of the left lower lung zone.    The osseous structures show degenerative changes.  Vascular calcifications are noted.                                       Medications   roflumilast (DALIRESP) tablet 500 mcg (has no administration in time range)   aspirin chewable tablet 81 mg (has no administration in time range)   EScitalopram oxalate tablet 20 mg (has no administration in time range)   ezetimibe tablet 10 mg (has no administration in time range)   losartan tablet 25 mg (has no administration in time range)   sodium chloride 0.9% flush 3 mL (has no administration in time range)   predniSONE tablet 40 mg (has no administration in time range)   albuterol-ipratropium 2.5 mg-0.5 mg/3 mL nebulizer solution 3 mL (3 mLs Nebulization Given 4/15/25 1948)   azithromycin (ZITHROMAX) 500 mg in 0.9% NaCl 250 mL IVPB (admixture device) (has no administration in time range)   cefTRIAXone injection 2 g (has no administration in time range)   albuterol sulfate nebulizer solution 15 mg (15 mg Nebulization Given 4/15/25 1433)   methylPREDNISolone sodium succinate injection 125 mg (125 mg Intravenous Given 4/15/25 1455)   hydrOXYzine pamoate capsule 25 mg (25 mg Oral Given 4/15/25 1548)   cefTRIAXone injection 2 g (2 g Intravenous Given 4/15/25 1635)   azithromycin  (ZITHROMAX) 500 mg in 0.9% NaCl 250 mL IVPB (admixture device) (500 mg Intravenous New Bag 4/15/25 1650)     Medical Decision Making  Emergency evaluation of a 69-year-old female with a history of COPD presenting with shortness of breath.  Breath sounds diminished bilaterally.  Workup is pending at the time of shift change and further care of the patient will be turned over to Dr. Patricia pending results, reassessment and final disposition.    Amount and/or Complexity of Data Reviewed  Labs: ordered. Decision-making details documented in ED Course.  Radiology: ordered. Decision-making details documented in ED Course.    Risk  Prescription drug management.  Decision regarding hospitalization.               ED Course as of 04/16/25 0627   Tue Apr 15, 2025   1457 Patient received in sign-out pending lab work, x-ray reassessment. [HL]   1457 CBC auto differential(!)  within normal limits  [HL]   1458 POCT ARTERIAL BLOOD GAS(!!)  Respiratory acidosis [HL]   1548 Troponin I: <0.006  within normal limits  [HL]   1548 BNP: 21  within normal limits  [HL]   1548 X-Ray Chest 1 View [HL]   1605 On reassessment, patient is sleeping comfortably. Arouses to verbal stimuli. 94% on nasal cannula.  No secondary signs of respiratory distress.  Awakes and answers all questions appropriately.  Does sound tight throughout.  Given possible pneumonia treated with empiric antibiotics with plan for hospitalization. [HL]   1611 D/w [facility], accepts pt for hospitalization for continued pulmonary hygiene and ongoing medical management.  [HL]      ED Course User Index  [HL] Cheyenne Lima DO                           Clinical Impression:  Final diagnoses:  [R06.02] Shortness of breath  [J44.1] COPD exacerbation (Primary)  [J18.9] Pneumonia          ED Disposition Condition    Admit Stable                  [1]   Social History  Tobacco Use    Smoking status: Former     Current packs/day: 0.00     Average packs/day: 1 pack/day for 20.0 years (20.0 ttl  pk-yrs)     Types: Cigarettes     Quit date: 2021     Years since quittin.2     Passive exposure: Past    Smokeless tobacco: Never   Substance Use Topics    Alcohol use: Not Currently     Alcohol/week: 1.0 standard drink of alcohol     Types: 1 Glasses of wine per week    Drug use: Alirio Manjarrez MD  25 0629

## 2025-04-15 NOTE — HPI
Ms Phelan is a 69 year old female with PMHx of COPD on home O2, HTN, GERD and asthma. She presents to [facility] for evaluation of shortness of breath. Symptoms started few weeks and have continued to worsen. Patient has to increase home oxygen to 4 L today. She denies associated symptoms of fever, chills, chest pain or palpitations. Chest X ray reports patchy opacity projects over the right lower lung zone possibly edema with a developing consolidation also possible. pH 7.28, PCO 83, P02 56 on ABG's. She received nebs, IV antibiotics, steroids in the Emergency Room. Patient is being admitted under the care of Hospital Medicine, she is a full code.

## 2025-04-15 NOTE — ASSESSMENT & PLAN NOTE
Patient with Hypercapnic and Hypoxic Respiratory failure which is Acute on chronic.  she is on home oxygen at 2 LPM. Supplemental oxygen was provided and noted-      .   Signs/symptoms of respiratory failure include- increased work of breathing and wheezing. Contributing diagnoses includes - COPD and Pneumonia Labs and images were reviewed. Patient Has recent ABG, which has been reviewed. Will treat underlying causes and adjust management of respiratory failure as follows- nebs, steroids and IV antidotic, Daliresp

## 2025-04-15 NOTE — ED NOTES
PIV dressing cleaned and changed. Draws back ans flushes well. Pt Aaox4, VSS, receiving breathing tx,. Denies pain and needs at this time. Call light within reach.

## 2025-04-15 NOTE — CLINICAL REVIEW
IP Sepsis Screen (most recent)       Sepsis Screen (IP) - 04/15/25 4375       Is the patient's history or complaint suggestive of a possible infection? Yes  -    Are there at least two of the following signs and symptoms present? Yes  -JH    Sepsis signs/symptoms - Tachycardia Tachycardia     >90  -JH    Sepsis signs/symptoms - Tachypnea Tachypnea     >20  -JH    Are any of the following organ dysfunction criteria present and not considered to be due to a chronic condition? Yes  -    Organ Dysfunction Criteria - O2 O2 Saturation < 95% on room air  -    Initiate Sepsis Protocol No  -JH    Reason sepsis not considered Pt. receiving appropriate management  -              User Key  (r) = Recorded By, (t) = Taken By, (c) = Cosigned By      Initials Name    Annie Ortega RN

## 2025-04-16 ENCOUNTER — PATIENT MESSAGE (OUTPATIENT)
Dept: PRIMARY CARE CLINIC | Facility: CLINIC | Age: 70
End: 2025-04-16
Payer: MEDICARE

## 2025-04-16 LAB
ABSOLUTE EOSINOPHIL (OHS): 0 K/UL
ABSOLUTE MONOCYTE (OHS): 0.26 K/UL (ref 0.3–1)
ABSOLUTE NEUTROPHIL COUNT (OHS): 5.82 K/UL (ref 1.8–7.7)
ALLENS TEST: ABNORMAL
ANION GAP (OHS): 6 MMOL/L (ref 8–16)
BASOPHILS # BLD AUTO: 0.01 K/UL
BASOPHILS NFR BLD AUTO: 0.1 %
BUN SERPL-MCNC: 12 MG/DL (ref 8–23)
CALCIUM SERPL-MCNC: 9.1 MG/DL (ref 8.7–10.5)
CHLORIDE SERPL-SCNC: 103 MMOL/L (ref 95–110)
CO2 SERPL-SCNC: 34 MMOL/L (ref 23–29)
CREAT SERPL-MCNC: 0.6 MG/DL (ref 0.5–1.4)
ERYTHROCYTE [DISTWIDTH] IN BLOOD BY AUTOMATED COUNT: 13.3 % (ref 11.5–14.5)
FIO2: 32 %
GFR SERPLBLD CREATININE-BSD FMLA CKD-EPI: >60 ML/MIN/1.73/M2
GLUCOSE SERPL-MCNC: 101 MG/DL (ref 70–110)
HCT VFR BLD AUTO: 42.6 % (ref 37–48.5)
HGB BLD-MCNC: 12.3 GM/DL (ref 12–16)
IMM GRANULOCYTES # BLD AUTO: 0.02 K/UL (ref 0–0.04)
IMM GRANULOCYTES NFR BLD AUTO: 0.3 % (ref 0–0.5)
LPM: 3
LYMPHOCYTES # BLD AUTO: 0.65 K/UL (ref 1–4.8)
MAGNESIUM SERPL-MCNC: 2.1 MG/DL (ref 1.6–2.6)
MCH RBC QN AUTO: 27.2 PG (ref 27–31)
MCHC RBC AUTO-ENTMCNC: 28.9 G/DL (ref 32–36)
MCV RBC AUTO: 94 FL (ref 82–98)
NUCLEATED RBC (/100WBC) (OHS): 0 /100 WBC
OHS QRS DURATION: 68 MS
OHS QTC CALCULATION: 425 MS
PCO2 BLDA: 70.4 MMHG (ref 35–45)
PH SMN: 7.34 [PH] (ref 7.35–7.45)
PHOSPHATE SERPL-MCNC: 2.8 MG/DL (ref 2.7–4.5)
PLATELET # BLD AUTO: 191 K/UL (ref 150–450)
PMV BLD AUTO: 10.5 FL (ref 9.2–12.9)
PO2 BLDA: 52.8 MMHG (ref 80–100)
POC BASE DEFICIT: 9.7 MMOL/L (ref -2–2)
POC HCO3: 38.1 MMOL/L (ref 24–28)
POC PERFORMED BY: ABNORMAL
POC SATURATED O2: 89 % (ref 95–100)
POTASSIUM SERPL-SCNC: 5.2 MMOL/L (ref 3.5–5.1)
RBC # BLD AUTO: 4.53 M/UL (ref 4–5.4)
RELATIVE EOSINOPHIL (OHS): 0 %
RELATIVE LYMPHOCYTE (OHS): 9.6 % (ref 18–48)
RELATIVE MONOCYTE (OHS): 3.8 % (ref 4–15)
RELATIVE NEUTROPHIL (OHS): 86.2 % (ref 38–73)
SODIUM SERPL-SCNC: 143 MMOL/L (ref 136–145)
SPECIMEN SOURCE: ABNORMAL
WBC # BLD AUTO: 6.76 K/UL (ref 3.9–12.7)

## 2025-04-16 PROCEDURE — 97535 SELF CARE MNGMENT TRAINING: CPT | Mod: HCNC

## 2025-04-16 PROCEDURE — 97165 OT EVAL LOW COMPLEX 30 MIN: CPT | Mod: HCNC

## 2025-04-16 PROCEDURE — 25000003 PHARM REV CODE 250: Mod: HCNC | Performed by: NURSE PRACTITIONER

## 2025-04-16 PROCEDURE — 84100 ASSAY OF PHOSPHORUS: CPT | Mod: HCNC | Performed by: NURSE PRACTITIONER

## 2025-04-16 PROCEDURE — 97161 PT EVAL LOW COMPLEX 20 MIN: CPT | Mod: HCNC

## 2025-04-16 PROCEDURE — 25000242 PHARM REV CODE 250 ALT 637 W/ HCPCS: Mod: HCNC | Performed by: NURSE PRACTITIONER

## 2025-04-16 PROCEDURE — 36600 WITHDRAWAL OF ARTERIAL BLOOD: CPT | Mod: HCNC

## 2025-04-16 PROCEDURE — 83735 ASSAY OF MAGNESIUM: CPT | Mod: HCNC | Performed by: NURSE PRACTITIONER

## 2025-04-16 PROCEDURE — 11000001 HC ACUTE MED/SURG PRIVATE ROOM: Mod: HCNC

## 2025-04-16 PROCEDURE — 80048 BASIC METABOLIC PNL TOTAL CA: CPT | Mod: HCNC | Performed by: NURSE PRACTITIONER

## 2025-04-16 PROCEDURE — 99900035 HC TECH TIME PER 15 MIN (STAT): Mod: HCNC

## 2025-04-16 PROCEDURE — 25000242 PHARM REV CODE 250 ALT 637 W/ HCPCS: Mod: HCNC | Performed by: STUDENT IN AN ORGANIZED HEALTH CARE EDUCATION/TRAINING PROGRAM

## 2025-04-16 PROCEDURE — 85025 COMPLETE CBC W/AUTO DIFF WBC: CPT | Mod: HCNC | Performed by: NURSE PRACTITIONER

## 2025-04-16 PROCEDURE — 94761 N-INVAS EAR/PLS OXIMETRY MLT: CPT | Mod: HCNC

## 2025-04-16 PROCEDURE — 97116 GAIT TRAINING THERAPY: CPT | Mod: HCNC

## 2025-04-16 PROCEDURE — 97530 THERAPEUTIC ACTIVITIES: CPT | Mod: HCNC

## 2025-04-16 PROCEDURE — 94640 AIRWAY INHALATION TREATMENT: CPT | Mod: HCNC

## 2025-04-16 PROCEDURE — 63700000 PHARM REV CODE 250 ALT 637 W/O HCPCS: Mod: HCNC | Performed by: STUDENT IN AN ORGANIZED HEALTH CARE EDUCATION/TRAINING PROGRAM

## 2025-04-16 PROCEDURE — 25000003 PHARM REV CODE 250: Mod: HCNC | Performed by: STUDENT IN AN ORGANIZED HEALTH CARE EDUCATION/TRAINING PROGRAM

## 2025-04-16 PROCEDURE — 63600175 PHARM REV CODE 636 W HCPCS: Mod: HCNC | Performed by: STUDENT IN AN ORGANIZED HEALTH CARE EDUCATION/TRAINING PROGRAM

## 2025-04-16 PROCEDURE — 82803 BLOOD GASES ANY COMBINATION: CPT | Mod: HCNC

## 2025-04-16 PROCEDURE — 27000221 HC OXYGEN, UP TO 24 HOURS: Mod: HCNC

## 2025-04-16 PROCEDURE — 36415 COLL VENOUS BLD VENIPUNCTURE: CPT | Mod: HCNC | Performed by: NURSE PRACTITIONER

## 2025-04-16 RX ORDER — CEFTRIAXONE 1 G/1
1 INJECTION, POWDER, FOR SOLUTION INTRAMUSCULAR; INTRAVENOUS
Status: DISCONTINUED | OUTPATIENT
Start: 2025-04-16 | End: 2025-04-18 | Stop reason: HOSPADM

## 2025-04-16 RX ORDER — ACETAMINOPHEN 325 MG/1
650 TABLET ORAL EVERY 6 HOURS PRN
Status: DISCONTINUED | OUTPATIENT
Start: 2025-04-16 | End: 2025-04-18 | Stop reason: HOSPADM

## 2025-04-16 RX ORDER — HYDROXYZINE HYDROCHLORIDE 25 MG/1
25 TABLET, FILM COATED ORAL 3 TIMES DAILY PRN
Status: DISCONTINUED | OUTPATIENT
Start: 2025-04-16 | End: 2025-04-18 | Stop reason: HOSPADM

## 2025-04-16 RX ORDER — ENOXAPARIN SODIUM 100 MG/ML
40 INJECTION SUBCUTANEOUS EVERY 24 HOURS
Status: DISCONTINUED | OUTPATIENT
Start: 2025-04-16 | End: 2025-04-18 | Stop reason: HOSPADM

## 2025-04-16 RX ORDER — AZITHROMYCIN 250 MG/1
500 TABLET, FILM COATED ORAL DAILY
Status: COMPLETED | OUTPATIENT
Start: 2025-04-16 | End: 2025-04-17

## 2025-04-16 RX ORDER — PREDNISONE 20 MG/1
60 TABLET ORAL DAILY
Status: DISCONTINUED | OUTPATIENT
Start: 2025-04-16 | End: 2025-04-18 | Stop reason: HOSPADM

## 2025-04-16 RX ADMIN — ENOXAPARIN SODIUM 40 MG: 40 INJECTION SUBCUTANEOUS at 04:04

## 2025-04-16 RX ADMIN — HYDROXYZINE HYDROCHLORIDE 25 MG: 25 TABLET, FILM COATED ORAL at 06:04

## 2025-04-16 RX ADMIN — ESCITALOPRAM OXALATE 20 MG: 10 TABLET ORAL at 08:04

## 2025-04-16 RX ADMIN — IPRATROPIUM BROMIDE AND ALBUTEROL SULFATE 3 ML: 2.5; .5 SOLUTION RESPIRATORY (INHALATION) at 07:04

## 2025-04-16 RX ADMIN — EZETIMIBE 10 MG: 10 TABLET ORAL at 08:04

## 2025-04-16 RX ADMIN — ROFLUMILAST 500 MCG: 500 TABLET ORAL at 08:04

## 2025-04-16 RX ADMIN — BUDESONIDE, GLYCOPYRROLATE, AND FORMOTEROL FUMARATE 2 PUFF: 160; 9; 4.8 AEROSOL, METERED RESPIRATORY (INHALATION) at 08:04

## 2025-04-16 RX ADMIN — CEFTRIAXONE SODIUM 1 G: 1 INJECTION, POWDER, FOR SOLUTION INTRAMUSCULAR; INTRAVENOUS at 04:04

## 2025-04-16 RX ADMIN — LOSARTAN POTASSIUM 25 MG: 25 TABLET, FILM COATED ORAL at 08:04

## 2025-04-16 RX ADMIN — PREDNISONE 60 MG: 20 TABLET ORAL at 08:04

## 2025-04-16 RX ADMIN — AZITHROMYCIN DIHYDRATE 500 MG: 250 TABLET ORAL at 04:04

## 2025-04-16 RX ADMIN — ACETAMINOPHEN 650 MG: 325 TABLET ORAL at 08:04

## 2025-04-16 RX ADMIN — ASPIRIN 81 MG CHEWABLE TABLET 81 MG: 81 TABLET CHEWABLE at 08:04

## 2025-04-16 NOTE — PROGRESS NOTES
[facility] Medicine  Progress Note    Patient Name: Terri Phelan  MRN: [id number]  Patient Class: IP- Inpatient   Admission Date: 4/15/2025  Length of Stay: 1 days  Attending Physician: Jason Schulz MD  Primary Care Provider: Sussy Mason MD        Subjective     Principal Problem:Acute on chronic respiratory failure with hypoxia and hypercapnia        HPI:    Ms Phelan is a 69 year old female with PMHx of COPD on home O2, HTN, GERD and asthma. She presents to [facility] for evaluation of shortness of breath. Symptoms started few weeks and have continued to worsen. Patient has to increase home oxygen to 4 L today. She denies associated symptoms of fever, chills, chest pain or palpitations. Chest X ray reports patchy opacity projects over the right lower lung zone possibly edema with a developing consolidation also possible. pH 7.28, PCO 83, P02 56 on ABG's. She received nebs, IV antibiotics, steroids in the Emergency Room. Patient is being admitted under the care of Hospital Medicine, she is a full code.          Overview/Hospital Course:  No notes on file    Interval History:     I have seen the patient  She feels slightly better, she asked to use the CPAP for a nap  Repeat ABG this morning is showing improvement       Review of Systems   Constitutional: Negative.    HENT: Negative.     Respiratory:  Positive for shortness of breath.    Cardiovascular: Negative.    Gastrointestinal: Negative.    Genitourinary: Negative.    Musculoskeletal: Negative.    Skin: Negative.    Neurological: Negative.    Psychiatric/Behavioral: Negative.       Objective:     Vital Signs (Most Recent):  Temp: 98.1 °F (36.7 °C) (04/16/25 0745)  Pulse: 81 (04/16/25 1005)  Resp: 18 (04/16/25 0718)  BP: (!) 168/50 (04/16/25 0745)  SpO2: (!) 93 % (04/16/25 0745) Vital Signs (24h Range):  Temp:  [97.4 °F (36.3 °C)-98.8 °F (37.1 °C)] 98.1 °F (36.7 °C)  Pulse:  [] 81  Resp:  [17-37] 18  SpO2:   [90 %-100 %] 93 %  BP: (127-168)/(50-73) 168/50     Weight: 86.3 kg (190 lb 4.1 oz)  Body mass index is 30.71 kg/m².    Intake/Output Summary (Last 24 hours) at 4/16/2025 1135  Last data filed at 4/16/2025 0958  Gross per 24 hour   Intake 240 ml   Output 650 ml   Net -410 ml         Physical Exam  Constitutional:       Appearance: Normal appearance.   Cardiovascular:      Rate and Rhythm: Normal rate.   Pulmonary:      Effort: Pulmonary effort is normal. No respiratory distress.      Breath sounds: Normal breath sounds.   Abdominal:      Palpations: Abdomen is soft.   Skin:     General: Skin is warm and dry.   Neurological:      General: No focal deficit present.      Mental Status: She is alert and oriented to person, place, and time. Mental status is at baseline.   Psychiatric:         Behavior: Behavior normal.               Significant Labs: All pertinent labs within the past 24 hours have been reviewed.  CBC:   Recent Labs   Lab 04/15/25  1430 04/15/25  1435 04/16/25  0604   WBC  --  7.48 6.76   HGB  --  12.2 12.3   HCT 39.2 41.4 42.6   PLT  --  189 191     CMP:   Recent Labs   Lab 04/15/25  1435 04/16/25  0604   * 143   K 4.4 5.2*    103   CO2 33* 34*   BUN 9 12   CREATININE 0.7 0.6   CALCIUM 9.0 9.1   ALBUMIN 3.6  --    BILITOT 0.2  --    ALKPHOS 51  --    AST 22  --    ALT 18  --    ANIONGAP 10 6*       Significant Imaging: I have reviewed all pertinent imaging results/findings within the past 24 hours.      Assessment & Plan  Acute on chronic respiratory failure with hypoxia and hypercapnia  Patient with Hypercapnic and Hypoxic Respiratory failure which is Acute on chronic.  she is on home oxygen at 2 LPM. Supplemental oxygen was provided and noted- Oxygen Concentration (%):  [30] 30    .   Signs/symptoms of respiratory failure include- increased work of breathing and wheezing. Contributing diagnoses includes - COPD and Pneumonia Labs and images were reviewed. Patient Has recent ABG, which has  been reviewed. Will treat underlying causes and adjust management of respiratory failure as follows- nebs, steroids and IV antidotic, Daliresp    Cont CTX and Azithro  Cont prednisone 60 mg for 5 days  Cont inhalers   Cont Daliresp  CAD (coronary artery disease)  Patient with known CAD which is controlled Will continue ASA and Statin and monitor for S/Sx of angina/ACS. Continue to monitor on telemetry.   Obstructive sleep apnea syndrome  CPAP QHS and naptime  Oxygen dependent  Wean O2 to level as tolerated     Debility  Patient with Acute on chronic debility due to age-related physical debility. The patient's latest AMPAC (Activity Measure for Post Acute Care) Score is listed below.    AM-PAC Score - How much help does the patient need for each activity listed  Basic Mobility Total Score: 21  Turning over in bed (including adjusting bedclothes, sheets and blankets)?: None  Sitting down on and standing up from a chair with arms (e.g., wheelchair, bedside commode, etc.): None  Moving from lying on back to sitting on the side of the bed?: None  Moving to and from a bed to a chair (including a wheelchair)?: A little  Need to walk in hospital room?: A little  Climbing 3-5 steps with a railing?: A little    Plan  - PT/OT consulted  -         Anxiety  Continue escitalopram    Community acquired bacterial pneumonia  Patient has a diagnosis of pneumonia. The cause of the pneumonia is unknown at this time. The pneumonia is stable. The patient has the following signs/symptoms of pneumonia: persistent hypoxia  and cough. The patient does have a current oxygen requirement and the patient does have a home oxygen requirement. I have reviewed the pertinent imaging. The following cultures have been collected: Blood cultures The culture results are listed below.     Current antimicrobial regimen consists of the antibiotics listed below. Will monitor patient closely and continue current treatment plan unchanged.    Antibiotics (From  admission, onward)      Start     Stop Route Frequency Ordered    04/16/25 1730  azithromycin (ZITHROMAX) 500 mg in 0.9% NaCl 250 mL IVPB (admixture device)         04/20/25 1729 IV Every 24 hours (non-standard times) 04/15/25 1828    04/16/25 1630  cefTRIAXone injection 1 g         -- IV Every 24 hours (non-standard times) 04/16/25 0757            Microbiology Results (last 7 days)       Procedure Component Value Units Date/Time    Blood culture [[id number]]  (Normal) Collected: 04/15/25 1632    Order Status: Completed Specimen: Blood from Wrist, Left Updated: 04/16/25 0503     Blood Culture No Growth After 6 Hours    Blood culture [[id number]]  (Normal) Collected: 04/15/25 1632    Order Status: Completed Specimen: Blood from Peripheral, Antecubital, Right Updated: 04/16/25 0503     Blood Culture No Growth After 6 Hours          VTE Risk Mitigation (From admission, onward)           Ordered     IP VTE HIGH RISK PATIENT  Once         04/15/25 1819     Place sequential compression device  Until discontinued         04/15/25 1819                    Discharge Planning   RADHA:      Code Status: Full Code   Medical Readiness for Discharge Date:                    Please place Justification for DME        Jason Schulz MD  Department of Hospital Medicine   [facility]

## 2025-04-16 NOTE — PLAN OF CARE
Problem: Adult Inpatient Plan of Care  Goal: Plan of Care Review  Outcome: Progressing  Goal: Patient-Specific Goal (Individualized)  Outcome: Progressing  Goal: Absence of Hospital-Acquired Illness or Injury  Outcome: Progressing  Goal: Optimal Comfort and Wellbeing  Outcome: Progressing  Goal: Readiness for Transition of Care  Outcome: Progressing     Problem: COPD (Chronic Obstructive Pulmonary Disease)  Goal: Optimal Chronic Illness Coping  Outcome: Progressing  Goal: Optimal Level of Functional [place]  Outcome: Progressing  Goal: Absence of Infection Signs and Symptoms  Outcome: Progressing  Goal: Improved Oral Intake  Outcome: Progressing  Goal: Effective Oxygenation and Ventilation  Outcome: Progressing     Problem: Pneumonia  Goal: Fluid Balance  Outcome: Progressing  Goal: Resolution of Infection Signs and Symptoms  Outcome: Progressing  Goal: Effective Oxygenation and Ventilation  Outcome: Progressing     Problem: Fall Injury Risk  Goal: Absence of Fall and Fall-Related Injury  Outcome: Progressing     Problem: Comorbidity Management  Goal: Maintenance of Asthma Control  Outcome: Progressing  Goal: Maintenance of COPD Symptom Control  Outcome: Progressing  Goal: Blood Pressure in Desired Range  Outcome: Progressing

## 2025-04-16 NOTE — PROGRESS NOTES
04/15/25 2020   Admission   Initial VN Admission Questions Complete   Communication Issues? None   Shift   Virtual Nurse - Patient Verbalized Approval Of Camera Use   Type of Frequent Check   Type Other (see comments)   Safety/Activity   Patient Rounds bed in low position;visualized patient   Safety Promotion/Fall Prevention side rails raised x 2   Pain/Comfort/Sleep   Sleep/Rest/Relaxation awake        VIRTUAL NURSE: Pt arrived to unit. Permission received per patient to turn camera to view patient. VIP model explained; patient informed this VN will be working with bedside nurse and the rest of the care team. Plan of care reviewed with patient.  Educated patient on VTE, fall risk and fall risk precautions in place. Call light within reach, side rails up x2. Admission questions completed. Patient instucted to ask staff for assistance. Patient verbalized complete understanding. Patient denies complaints or any needs at this time. Initiated care plan.

## 2025-04-16 NOTE — PLAN OF CARE
OT evaluation completed, coeval overlap with PT with anticipated low activity tolerance. Patient with PLOF of being modified independent in ADL / light IADLs / mobility with PRN use of rollator in the home vs furniture surfing, rollator in community, and use of home O2 at 2L; chronic endurance limitations 2/2 hx of COPD and self reports baseline tremulousness related to anxiety levels. On evaluation this date patient initially fearful of falling and highly anxious (self reports and physically observed with excess tension in shoulders/ tremulous) with BP very elevated at 181/76 sitting; however, with relaxation and as session progresses, patient able to self calm, BP lowers to 152/68 sitting. Patient able to perform in room functional mobility with CGA/SBA with rolling walker with SpO2 dropping from 93% to 87% on 3L with recovery > 88% with pursed lip breathing. Grossly completing ADLs at minimal to setup level. Will continue to progress as able. Recommend low intensity therapy/ home health to follow.    Problem: Occupational Therapy  Goal: Occupational Therapy Goal  Description: Goals to be met by: 5/14/2025     Patient will increase functional independence with ADLs by performing:    LE Dressing with Modified [place] including item retrieval and transport, with least restrictive device with O2 tubing self management   Toileting from toilet with Modified [place] for hygiene and clothing management.   Toilet transfer to bedside commode with Modified [place].  Functional mobility to / from bathroom with modified independence, use of least restrictive device with O2 tubing self management, Modified [place] < 3/10  100% reciprocation of at least 2 activity pacing techniques and 2 calming techniques to utilize during daily regimen to support highest level of function    Outcome: Progressing

## 2025-04-16 NOTE — ASSESSMENT & PLAN NOTE
Patient with Hypercapnic and Hypoxic Respiratory failure which is Acute on chronic.  she is on home oxygen at 2 LPM. Supplemental oxygen was provided and noted- Oxygen Concentration (%):  [30] 30    .   Signs/symptoms of respiratory failure include- increased work of breathing and wheezing. Contributing diagnoses includes - COPD and Pneumonia Labs and images were reviewed. Patient Has recent ABG, which has been reviewed. Will treat underlying causes and adjust management of respiratory failure as follows- nebs, steroids and IV antidotic, Daliresp    Cont CTX and Azithro  Cont prednisone 60 mg for 5 days  Cont inhalers   Cont Daliresp

## 2025-04-16 NOTE — PLAN OF CARE
Problem: Adult Inpatient Plan of Care  Goal: Plan of Care Review  Outcome: Progressing      VIRTUAL NURSE: Pt arrived to unit. Permission received per patient to turn camera to view patient. VIP model explained; patient informed this VN will be working with bedside nurse and the rest of the care team. Plan of care reviewed with patient.  Educated patient on VTE, fall risk and fall risk precautions in place. Call light within reach, side rails up x2. Admission questions completed. Patient instucted to ask staff for assistance. Patient verbalized complete understanding. Patient denies complaints or any needs at this time.  Initiated care plan.

## 2025-04-16 NOTE — PT/OT/SLP EVAL
Occupational Therapy   Evaluation and treatment    Name: Terri Phelan  MRN: [id number]  Admitting Diagnosis: Acute on chronic respiratory failure with hypoxia and hypercapnia  Recent Surgery: * No surgery found *      Recommendations:     Discharge Recommendations: Low Intensity Therapy (home health OT PT)  Discharge Equipment Recommendations:  none  Barriers to discharge:  Other (Comment) (decreased endurance/ anxiety impeding function)    Assessment:     Terri Phelan is a 69 y.o. female with a medical diagnosis of Acute on chronic respiratory failure with hypoxia and hypercapnia.  She presents with .The primary encounter diagnosis was COPD exacerbation. Diagnoses of Shortness of breath and Pneumonia were also pertinent to this visit.  . Performance deficits affecting function: weakness, impaired cardiopulmonary response to activity, impaired endurance, impaired self care skills, impaired functional mobility, decreased coordination.        OT evaluation completed, coeval overlap with PT with anticipated low activity tolerance.  On evaluation this date patient initially fearful of falling and highly anxious (self reports and physically observed with excess tension in shoulders/ tremulous) with BP very elevated at 181/76 sitting; however, with relaxation and as session progresses, patient able to self calm, BP lowers to 152/68 sitting. Patient able to perform in room functional mobility with CGA/SBA with rolling walker with SpO2 dropping from 93% to 87% on 3L with recovery > 88% with pursed lip breathing. Grossly completing ADLs at minimal to setup level. Will continue to progress as able. Recommend low intensity therapy/ home health to follow.      Rehab Prognosis: Good; patient would benefit from acute skilled OT services to address these deficits and reach maximum level of function.       Plan:     Patient to be seen 3 x/week to address the above listed problems via self-care/home management, therapeutic  exercises, therapeutic activities  Plan of Care Expires: 05/14/25  Plan of Care Reviewed with: patient    Subjective     Chief Complaint: reports anxiety - situational and about the current state of the world  Patient/Family Comments/goals: indicates that her place of comfort is family/ her daughter and that she finds when she calls her daughter she is able to relax more; agreeable to session; expresses fearfulness initially regarding inquiry to attempt walking - but feels more assured when OT stands in front of patient and PT stands behind patient (for confidence building)    Occupational Profile:  Living Environment: resides alone in 1st  floor apartment, no steps to enter, mild incline; daughter resides in same apartment complex next door  Previous level of function: Patient with PLOF of being modified independent in ADL / light IADLs / mobility with PRN use of rollator in the home vs furniture surfing, rollator in community, and use of home O2 at 2L; chronic endurance limitations 2/2 hx of COPD and self reports baseline tremulousness related to anxiety levels. Expresses her O2 tubing reaches her whole apartment and that she self monitors levels with her pulse ox. Indicates that her O2 tank fits on rollator when in community. Reports she enjoys opportunities to sit on her porch.  Equipment Used at Home: blood pressure machine, other (see comments), bedside commode, bath bench, walker, rolling, rollator, CPAP (pulse oximeter)  Assistance upon Discharge: daughter    Pain/Comfort:  Pain Rating 1: 0/10  Pain Addressed 1: Reposition  Pain Rating Post-Intervention 1: 0/10      Objective:     Communicated with: nursing prior to session.  Patient found HOB elevated with bed alarm, telemetry, oxygen upon OT entry to room.    General Precautions: Standard, fall  Orthopedic Precautions: N/A  Braces: N/A  Respiratory Status: Nasal cannula, flow 3 L/min; variance of 91% at rest initially, improves to 93% with cues for pursed  lip breathing; drops briefly to 87% range after ambulation, improves back to 92-93% range quickly    Occupational Performance:    Bed Mobility:    Patient completed Supine to Sit with SBA, increased time    Functional Mobility/Transfers:  Patient completed Sit <> Stand Transfer with SBA  with  rolling walker   Patient completed Bed <> Chair Transfer using Step Transfer technique with SBA with minimal verbal affirmation cues for slow movements/ feeling back of LE against chair prior to descent  Functional Mobility: in room functional mobility inclusive of turning/ quarter turns with rolling walker and CGA to SBA with 2nd person present/hands off for confidence building; patient with cervically flexed posture, minimal verbal cues for correction    Activities of Daily Living:  Feeding:  independence    Grooming: setup ; seated  Upper Body Dressing: set up  Lower Body Dressing: minimal assist ; item retrieval assist ; affirm figure four technique    Cognitive/Visual Perceptual:  Cognitive/Psychosocial Skills:     -       Oriented to: Person, Place, Time, and Situation   -       Follows Commands/attention:Follows multistep  commands  -       Safety awareness/insight to disability: intact   -       Mood/Affect/Coping skills/emotional control: initially fearful of falling and highly anxious (self reports and physically observed with excess tension in shoulders/ tremulous) with BP very elevated at 181/76 sitting; however, with relaxation and as session progresses, patient able to self calm, BP lowers to 152/68 sitting; emotional state improves to calm demeanor / smiling with therapists  Visual/Perceptual:  glasses    Physical Exam:  Sensation:    -       Intact  light/touch BUE  Upper Extremity Range of Motion:  -       Right Upper Extremity: WFL  -       Left Upper Extremity: WFL  Upper Extremity Strength:    -       Right Upper Extremity: WFL  -       Left Upper Extremity: WFL    AMPAC 6 Click ADL:  AMPAC Total Score:  21    Treatment & Education:  Patient educated on OT role.   Occupational profile developed.  Assessment as above.  Patient with episode of increased anxiety, able to self calm / increase relaxation in response to speaking with daughter on phone, following directions by therapist to increase awareness of body (ie. Release UB tension), deep breathing.   ADL / functional mobility training as above.  Monitored vitals as above.  End of session upright in chair.  Encouraged out of bed for cardiopulmonary function and stressed importance of oral care (charo patient with hx of pneumonia).  Patient reciprocates understanding with no further questions.    Patient left up in chair with all lines intact, call button in reach    GOALS:   Multidisciplinary Problems       Occupational Therapy Goals          Problem: Occupational Therapy    Goal Priority Disciplines Outcome Interventions   Occupational Therapy Goal     OT, PT/OT Progressing    Description: Goals to be met by: 5/14/2025     Patient will increase functional independence with ADLs by performing:    LE Dressing with Modified [place] including item retrieval and transport, with least restrictive device with O2 tubing self management   Toileting from toilet with Modified [place] for hygiene and clothing management.   Toilet transfer to bedside commode with Modified [place].  Functional mobility to / from bathroom with modified independence, use of least restrictive device with O2 tubing self management, Modified [place] < 3/10  100% reciprocation of at least 2 activity pacing techniques and 2 calming techniques to utilize during daily regimen to support highest level of function                           History:     Past Medical History:   Diagnosis Date    Acute exacerbation of chronic obstructive pulmonary disease (COPD) 12/19/2023    Recently hospitalized at [facility] from 5/14 to 5/20/2024 for exacerbation.  Did not require ICU care or significant  escalation of intervention(s).      Adenoma of ascending colon 2022    Anxiety     COPD (chronic obstructive pulmonary disease)     COPD exacerbation 2014    Heart failure     Hypertension     Major depression 2023         Past Surgical History:   Procedure Laterality Date    BREAST BIOPSY N/A     pt unsure which breast but it was benign-(calcium)     SECTION      HERNIA REPAIR      HYSTERECTOMY         Time Tracking:     OT Date of Treatment: 25  OT Start Time: 1456  OT Stop Time: 1540  OT Total Time (min): 44 min total time    Billable Minutes:Evaluation 10 min  Self Care/Home Management 15 min  Therapeutic Activity 10 min    2025

## 2025-04-16 NOTE — ASSESSMENT & PLAN NOTE
Patient has a diagnosis of pneumonia. The cause of the pneumonia is unknown at this time. The pneumonia is stable. The patient has the following signs/symptoms of pneumonia: persistent hypoxia  and cough. The patient does have a current oxygen requirement and the patient does have a home oxygen requirement. I have reviewed the pertinent imaging. The following cultures have been collected: Blood cultures The culture results are listed below.     Current antimicrobial regimen consists of the antibiotics listed below. Will monitor patient closely and continue current treatment plan unchanged.    Antibiotics (From admission, onward)      Start     Stop Route Frequency Ordered    04/16/25 1730  azithromycin (ZITHROMAX) 500 mg in 0.9% NaCl 250 mL IVPB (admixture device)         04/20/25 1729 IV Every 24 hours (non-standard times) 04/15/25 1828    04/16/25 1630  cefTRIAXone injection 1 g         -- IV Every 24 hours (non-standard times) 04/16/25 0757            Microbiology Results (last 7 days)       Procedure Component Value Units Date/Time    Blood culture [[id number]]  (Normal) Collected: 04/15/25 1632    Order Status: Completed Specimen: Blood from Wrist, Left Updated: 04/16/25 0503     Blood Culture No Growth After 6 Hours    Blood culture [[id number]]  (Normal) Collected: 04/15/25 1632    Order Status: Completed Specimen: Blood from Peripheral, Antecubital, Right Updated: 04/16/25 0503     Blood Culture No Growth After 6 Hours

## 2025-04-16 NOTE — PLAN OF CARE
CM met with pt at bedside to discuss discharge planning. She lives alone. Pt is independent with ADL's. Pt has multiple DME( see list below) No HH services, pt is requesting HH services when ready to discharge. Referrals sent. Pt also requesting information on [facility] for home use. Information printed and given to pt. Upon discharge, pts family member Mulugeta Phelan (daughter) [phone]  will provide transport home. Pt made aware to contact CM with any questions or concerns. CM will continue to follow and assist with any discharge needs.      Future Appointments   Date Time Provider Department Center   5/13/2025  4:40 PM Sussy Mason MD [id number] - Telemetry  Initial Discharge Assessment       Primary Care Provider: Sussy Mason MD    Admission Diagnosis: Shortness of breath [R06.02]  Pneumonia [J18.9]  COPD exacerbation [J44.1]    Admission Date: 4/15/2025  Expected Discharge Date:     Transition of Care Barriers: (P) None    Payor: [employer] MEDICARE / Plan: [employer] H[employer] PPO SPECIAL NEEDS / Product Type: Medicare Advantage /     Extended Emergency Contact Information  Primary Emergency Contact: Mulugeta Phelan   [place]  Mobile Phone: [phone]  Relation: Daughter    Discharge Plan A: (P) Home, Home with family, Home Health         [id number] [address] AT [facility] & [address]  [place] [id number]  Phone: [phone] Fax: [phone]      Initial Assessment (most recent)       Adult Discharge Assessment - 04/16/25 1249          Discharge Assessment    Assessment Type Discharge Planning Assessment     Confirmed/corrected address, phone number and insurance Yes     Confirmed Demographics Correct on Facesheet     Source of Information patient     When was your last doctors appointment? 03/25/25     Communicated RADHA with  patient/caregiver Date not available/Unable to determine     Reason For Admission Acute on chronic respiratory failure with hypoxia and hypercapnia (P)      People in Home alone (P)      Do you expect to return to your current living situation? Yes (P)      Do you have help at home or someone to help you manage your care at home? Yes (P)      Who are your caregiver(s) and their phone number(s)? Mulugeta Phelan (daughter) [phone] (P)      Prior to hospitilization cognitive status: Alert/Oriented (P)      Current cognitive status: Alert/Oriented (P)      Walking or Climbing Stairs Difficulty yes (P)      Walking or Climbing Stairs ambulation difficulty, requires equipment (P)      Mobility Management RW (P)      Dressing/Bathing Difficulty no (P)      Dressing/Bathing bathing difficulty, requires equipment (P)      Dressing/Bathing Management Shower chair (P)      Home Accessibility wheelchair accessible (P)      Equipment Currently Used at Home CPAP;blood pressure machine;oxygen;shower chair;walker, rolling;rollator;bedside commode (P)    Pulse Ox    Readmission within 30 days? No (P)      Patient currently being followed by outpatient case management? No (P)      Do you currently have service(s) that help you manage your care at home? No (P)      Do you take prescription medications? Yes (P)      Do you have prescription coverage? Yes (P)      Coverage [employer] (P)      Do you have any problems affording any of your prescribed medications? No (P)      Is the patient taking medications as prescribed? yes (P)      Who is going to help you get home at discharge? Mulugeta Phelan (daughter) [phone] (P)      How do you get to doctors appointments? family or friend will provide (P)      Are you on dialysis? No (P)      Do you take coumadin? No (P)      Discharge Plan A Home;Home with family;Home Health (P)      DME Needed Upon Discharge  none (P)      Discharge Plan discussed with: Patient (P)      Transition of  Care Barriers None (P)         Physical Activity    On average, how many days per week do you engage in moderate to strenuous exercise (like a brisk walk)? 0 days (P)      On average, how many minutes do you engage in exercise at this level? 0 min (P)         Financial Resource Strain    How hard is it for you to pay for the very basics like food, housing, medical care, and heating? Not very hard (P)         Housing Stability    In the last 12 months, was there a time when you were not able to pay the mortgage or rent on time? No (P)      At any time in the past 12 months, were you homeless or living in a shelter (including now)? No (P)         Transportation Needs    In the past 12 months, has lack of transportation kept you from medical appointments or from getting medications? No (P)      In the past 12 months, has lack of transportation kept you from meetings, work, or from getting things needed for daily living? No (P)         Food Insecurity    Within the past 12 months, you worried that your food would run out before you got the money to buy more. Never true (P)      Within the past 12 months, the food you bought just didn't last and you didn't have money to get more. Never true (P)         Stress    Do you feel stress - tense, restless, nervous, or anxious, or unable to sleep at night because your mind is troubled all the time - these days? To some extent (P)         Social Isolation    How often do you feel lonely or isolated from those around you?  Never (P)         Alcohol Use    Q1: How often do you have a drink containing alcohol? Never (P)      Q2: How many drinks containing alcohol do you have on a typical day when you are drinking? Patient does not drink (P)      Q3: How often do you have six or more drinks on one occasion? Never (P)         Utilities    In the past 12 months has the electric, gas, oil, or water company threatened to shut off services in your home? No (P)         Health Literacy     How often do you need to have someone help you when you read instructions, pamphlets, or other written material from your doctor or pharmacy? Never (P)

## 2025-04-16 NOTE — ASSESSMENT & PLAN NOTE
Patient with Acute on chronic debility due to age-related physical debility. The patient's latest AMPAC (Activity Measure for Post Acute Care) Score is listed below.    AM-PAC Score - How much help does the patient need for each activity listed  Basic Mobility Total Score: 21  Turning over in bed (including adjusting bedclothes, sheets and blankets)?: None  Sitting down on and standing up from a chair with arms (e.g., wheelchair, bedside commode, etc.): None  Moving from lying on back to sitting on the side of the bed?: None  Moving to and from a bed to a chair (including a wheelchair)?: A little  Need to walk in hospital room?: A little  Climbing 3-5 steps with a railing?: A little    Plan  - PT/OT consulted  -

## 2025-04-16 NOTE — ASSESSMENT & PLAN NOTE
Patient with Acute on chronic debility due to age-related physical debility. The patient's latest AMPAC (Activity Measure for Post Acute Care) Score is listed below.    AM-PAC Score - How much help does the patient need for each activity listed       Plan  - PT/OT consulted  -

## 2025-04-16 NOTE — ASSESSMENT & PLAN NOTE
Patient has a diagnosis of pneumonia. The cause of the pneumonia is unknown at this time. The pneumonia is stable. The patient has the following signs/symptoms of pneumonia: persistent hypoxia  and cough. The patient does have a current oxygen requirement and the patient does have a home oxygen requirement. I have reviewed the pertinent imaging. The following cultures have been collected: Blood cultures The culture results are listed below.     Current antimicrobial regimen consists of the antibiotics listed below. Will monitor patient closely and continue current treatment plan unchanged.    Antibiotics (From admission, onward)      Start     Stop Route Frequency Ordered    04/16/25 1730  azithromycin (ZITHROMAX) 500 mg in 0.9% NaCl 250 mL IVPB (admixture device)         04/20/25 1729 IV Every 24 hours (non-standard times) 04/15/25 1828    04/15/25 1630  cefTRIAXone injection 2 g         -- IV Every 24 hours (non-standard times) 04/15/25 1828            Microbiology Results (last 7 days)       Procedure Component Value Units Date/Time    Blood culture [[id number]] Collected: 04/15/25 1632    Order Status: Sent Specimen: Blood from Wrist, Left Updated: 04/15/25 1642    Blood culture [[id number]] Collected: 04/15/25 1632    Order Status: Sent Specimen: Blood from Peripheral, Antecubital, Right Updated: 04/15/25 1642

## 2025-04-16 NOTE — PT/OT/SLP PROGRESS
Physical Therapy      Patient Name:  Terri Phelan   MRN:  [id number]    Patient not seen today secondary to Nurse/ GENA hold (Patient on CPAP at this time because CO2 was high). Will follow-up later.  4/16/2025

## 2025-04-16 NOTE — PLAN OF CARE
Patient on oxygen with documented flow. Patient given aerosol treatment with no adverse reactions noted. Patient instructed on proper use.   Will attempt to wean per O2 order protocol.

## 2025-04-16 NOTE — H&P
[facility] Medicine  History & Physical    Patient Name: Terri Phelan  MRN: [id number]  Patient Class: IP- Inpatient  Admission Date: 4/15/2025  Attending Physician: Josephine Xavier*   Primary Care Provider: Sussy Mason MD         Patient information was obtained from patient, relative(s), and ER records.     Subjective:     Principal Problem:Acute on chronic respiratory failure with hypoxia and hypercapnia    Chief Complaint:   Chief Complaint   Patient presents with    Shortness of Breath     Arrivers via ems with worsening sob x2 days. Ems reports initial sao2-78%. Pt. Was given duoneb. En route with increase to 90's. Pt. Is on 2l/nc continuous, increased to 4l/min. Nc with onset of symptoms. Accucheck-119 per ems. Treatment continues on arrival, moderate visible dyspnea, sao2-100%.         HPI:   Ms Phelan is a 69 year old female with PMHx of COPD on home O2, HTN, GERD and asthma. She presents to [facility] for evaluation of shortness of breath. Symptoms started few weeks and have continued to worsen. Patient has to increase home oxygen to 4 L today. She denies associated symptoms of fever, chills, chest pain or palpitations. Chest X ray reports patchy opacity projects over the right lower lung zone possibly edema with a developing consolidation also possible. pH 7.28, PCO 83, P02 56 on ABG's. She received nebs, IV antibiotics, steroids in the Emergency Room. Patient is being admitted under the care of Hospital Medicine, she is a full code.          Past Medical History:   Diagnosis Date    Acute exacerbation of chronic obstructive pulmonary disease (COPD) 12/19/2023    Recently hospitalized at [facility] from 5/14 to 5/20/2024 for exacerbation.  Did not require ICU care or significant escalation of intervention(s).      Adenoma of ascending colon 09/09/2022    Anxiety     COPD (chronic obstructive pulmonary disease)     COPD exacerbation 12/17/2014    Heart  failure     Hypertension     Major depression 2023       Past Surgical History:   Procedure Laterality Date    BREAST BIOPSY N/A     pt unsure which breast but it was benign-(calcium)     SECTION      HERNIA REPAIR      HYSTERECTOMY         Review of patient's allergies indicates:   Allergen Reactions    Codeine Nausea And Vomiting    Lipitor [atorvastatin] Other (See Comments)     Muscle fatigue      Morphine Hallucinations       No current facility-administered medications on file prior to encounter.     Current Outpatient Medications on File Prior to Encounter   Medication Sig    albuterol (PROVENTIL/VENTOLIN HFA) 90 mcg/actuation inhaler Inhale 2 puffs into the lungs every 6 (six) hours as needed for Wheezing or Shortness of Breath.    albuterol-ipratropium (DUO-NEB) 2.5 mg-0.5 mg/3 mL nebulizer solution USE 3 ML VIA NEBULIZER EVERY 6 HOURS AS NEEDED FOR WHEEZING (Patient taking differently: Take 3 mLs by nebulization every 6 (six) hours as needed for Shortness of Breath or Wheezing.)    alendronate (FOSAMAX) 35 MG tablet Take 35 mg by mouth every Tuesday.    aspirin 81 MG Chew Take 81 mg by mouth once daily.    azithromycin (Z-OLY) 250 MG tablet Take 1 tablet (250 mg total) by mouth 3 (three) times a week. Take three times weekly on Monday, Wednesday, and Friday.    budesonide-glycopyr-formoterol (BREZTRI AEROSPHERE) 160-9-4.8 mcg/actuation HFAA Inhale 2 puffs into the lungs 2 (two) times daily.    cholecalciferol, vitamin D3, (VITAMIN D3) 50 mcg (2,000 unit) Tab Take 2,000 Units by mouth once daily.    cyclobenzaprine (FLEXERIL) 5 MG tablet Take 1 tablet (5 mg total) by mouth daily as needed for Muscle spasms.    dextromethorphan-guaiFENesin  mg (MUCINEX DM)  mg per 12 hr tablet Take 1 tablet by mouth 2 (two) times daily as needed (cough).    EScitalopram oxalate (LEXAPRO) 20 MG tablet Take 1 tablet (20 mg total) by mouth once daily.    ezetimibe (ZETIA) 10 mg tablet TAKE 1 TABLET(10  MG) BY MOUTH DAILY (Patient taking differently: Take 10 mg by mouth once daily.)    fluticasone propionate (FLONASE) 50 mcg/actuation nasal spray SHAKE LIQUID AND USE 1 SPRAY(50 MCG) IN EACH NOSTRIL DAILY AS NEEDED FOR ALLERGIES (Patient taking differently: 1 spray by Each Nostril route daily as needed for Allergies or Rhinitis.)    furosemide (LASIX) 40 MG tablet Take 40 mg by mouth once daily.    hydrOXYzine HCL (ATARAX) 25 MG tablet TAKE 1 TABLET(25 MG) BY MOUTH THREE TIMES DAILY AS NEEDED FOR ANXIETY OR ITCHING (Patient taking differently: Take 25 mg by mouth 3 (three) times daily as needed for Anxiety.)    losartan (COZAAR) 25 MG tablet Take 1 tablet (25 mg total) by mouth once daily.    meloxicam (MOBIC) 15 MG tablet Take 1 tablet (15 mg total) by mouth once daily.    potassium chloride (MICRO-K) 10 MEQ CpSR TAKE 1 CAPSULE BY MOUTH EVERY DAY (Patient taking differently: Take 10 mEq by mouth daily as needed.)    roflumilast (DALIRESP) 500 mcg Tab Take 1 tablet (500 mcg total) by mouth once daily.    acetaminophen (TYLENOL) 500 MG tablet Take 500 mg by mouth every 6 (six) hours as needed for Pain.    calcium carb-mag hydrox-simeth 1,200 mg-270 mg -80 mg/10 mL Susp Take 1,200 mg by mouth once daily.    OXYGEN-AIR DELIVERY SYSTEMS MISC 2 L by Nasal route continuous.    [DISCONTINUED] ezetimibe (ZETIA) 10 mg tablet Take 1 tablet (10 mg total) by mouth once daily.    [DISCONTINUED] furosemide (LASIX) 40 MG tablet Take 1 tablet (40 mg total) by mouth once daily.    [DISCONTINUED] hydrOXYzine HCL (ATARAX) 25 MG tablet Take 1 tablet (25 mg total) by mouth 3 (three) times daily as needed for Itching or Anxiety.    [DISCONTINUED] triamcinolone acetonide 0.1% (KENALOG) 0.1 % cream Apply topically 2 (two) times daily. for 14 days     Family History    None       Tobacco Use    Smoking status: Former     Current packs/day: 0.00     Average packs/day: 1 pack/day for 20.0 years (20.0 ttl pk-yrs)     Types: Cigarettes     Quit  date: 2021     Years since quittin.2     Passive exposure: Past    Smokeless tobacco: Never   Substance and Sexual Activity    Alcohol use: Not Currently     Alcohol/week: 1.0 standard drink of alcohol     Types: 1 Glasses of wine per week    Drug use: No    Sexual activity: Not Currently     Partners: Male     Review of Systems   Constitutional:  Negative for chills and fever.   Respiratory:  Positive for cough, shortness of breath and wheezing.    Neurological:  Positive for weakness.     Objective:     Vital Signs (Most Recent):  Temp: 98.6 °F (37 °C) (04/15/25 1803)  Pulse: 105 (04/15/25 1803)  Resp: (!) 21 (04/15/25 1803)  BP: (!) 127/59 (04/15/25 1803)  SpO2: 95 % (04/15/25 1803) Vital Signs (24h Range):  Temp:  [98.6 °F (37 °C)-98.8 °F (37.1 °C)] 98.6 °F (37 °C)  Pulse:  [103-119] 105  Resp:  [20-37] 21  SpO2:  [90 %-100 %] 95 %  BP: (127-150)/(59-73) 127/59        There is no height or weight on file to calculate BMI.     Physical Exam  Vitals and nursing note reviewed.   Constitutional:       Appearance: Normal appearance.      Interventions: Nasal cannula in place.   HENT:      Head: Normocephalic and atraumatic.   Cardiovascular:      Rate and Rhythm: Normal rate.      Heart sounds: No murmur heard.     No friction rub. No gallop.   Pulmonary:      Effort: No respiratory distress.      Breath sounds: No stridor. Examination of the right-upper field reveals wheezing. Examination of the left-upper field reveals wheezing. Examination of the right-middle field reveals wheezing. Examination of the left-middle field reveals wheezing. Examination of the right-lower field reveals wheezing. Examination of the left-lower field reveals wheezing. Wheezing (mild) present. No rhonchi or rales.   Chest:      Chest wall: No tenderness.   Abdominal:      General: There is no distension.      Palpations: There is no mass.      Tenderness: There is no abdominal tenderness. There is no right CVA tenderness, left CVA  "tenderness, guarding or rebound.      Hernia: No hernia is present.   Skin:     Coloration: Skin is not jaundiced or pale.      Findings: No bruising, erythema, lesion or rash.   Neurological:      Mental Status: She is alert.      Cranial Nerves: No cranial nerve deficit.      Sensory: No sensory deficit.      Motor: No weakness.      Coordination: Coordination normal.      Gait: Gait normal.      Deep Tendon Reflexes: Reflexes normal.                Significant Labs: All pertinent labs within the past 24 hours have been reviewed.  ABGs:   Recent Labs   Lab 04/15/25  1430   PH 7.288*   PCO2 83.0*   HCO3 39.7*   POCSATURATED 89.2*   PO2 56.2*     Blood Culture: No results for input(s): "LABBLOO" in the last 48 hours.  CBC:   Recent Labs   Lab 04/15/25  1430 04/15/25  1435   WBC  --  7.48   HGB  --  12.2   HCT 39.2 41.4   PLT  --  189     CMP:   Recent Labs   Lab 04/15/25  1435   *   K 4.4      CO2 33*   BUN 9   CREATININE 0.7   CALCIUM 9.0   ALBUMIN 3.6   BILITOT 0.2   ALKPHOS 51   AST 22   ALT 18   ANIONGAP 10     Lactic Acid:   Recent Labs   Lab 04/15/25  1632   LACTATE 1.1     Troponin:   Recent Labs   Lab 04/15/25  1435   TROPONINI <0.006       Significant Imaging:     Imaging Results              X-Ray Chest 1 View (Final result)  Result time 04/15/25 15:42:14      Final result by Elzbieta Hanna MD (04/15/25 15:42:14)                   Impression:      Patchy opacity projects over the right lower lung zone possibly edema with a developing consolidation also possible.    Blunting of the left costophrenic angle similar to the prior exam suggesting left pleural fluid along with volume loss and consolidation of the left lower lung zone similar to the prior exams.      Electronically signed by: Elzbieta Hanna MD  Date:    04/15/2025  Time:    15:42               Narrative:    EXAMINATION:  XR CHEST 1 VIEW    CLINICAL HISTORY:  Shortness of breath;    TECHNIQUE:  A single frontal chest radiograph was " obtained with dual imaging.  (2 images)    COMPARISON:  Chest radiographs from 01/18/2025 and 09/28/2024.    FINDINGS:  Cardiac monitoring leads along with other tubes and lines overlie the chest and upper abdomen.  The cardiomediastinal silhouette appears unchanged.    Patchy opacity projects over the right lower lung zone possibly edema and with a developing consolidation also possible.  The right costophrenic angle is incompletely included on this exam.  Blunting of the left costophrenic angle similar to prior exam suggesting left pleural fluid along with volume loss and consolidation of the left lower lung zone.    The osseous structures show degenerative changes.  Vascular calcifications are noted.                                     Assessment/Plan:     Assessment & Plan  Acute on chronic respiratory failure with hypoxia and hypercapnia  Patient with Hypercapnic and Hypoxic Respiratory failure which is Acute on chronic.  she is on home oxygen at 2 LPM. Supplemental oxygen was provided and noted-      .   Signs/symptoms of respiratory failure include- increased work of breathing and wheezing. Contributing diagnoses includes - COPD and Pneumonia Labs and images were reviewed. Patient Has recent ABG, which has been reviewed. Will treat underlying causes and adjust management of respiratory failure as follows- nebs, steroids and IV antidotic, Daliresp  CAD (coronary artery disease)  Patient with known CAD which is controlled Will continue ASA and Statin and monitor for S/Sx of angina/ACS. Continue to monitor on telemetry.   Obstructive sleep apnea syndrome  CPAP QHS     Oxygen dependent  Wean O2 to level as tolerated     Debility  Patient with Acute on chronic debility due to age-related physical debility. The patient's latest AMPAC (Activity Measure for Post Acute Care) Score is listed below.    AM-PAC Score - How much help does the patient need for each activity listed       Plan  - PT/OT consulted  -          Anxiety  Continue escitalopram    Community acquired bacterial pneumonia  Patient has a diagnosis of pneumonia. The cause of the pneumonia is unknown at this time. The pneumonia is stable. The patient has the following signs/symptoms of pneumonia: persistent hypoxia  and cough. The patient does have a current oxygen requirement and the patient does have a home oxygen requirement. I have reviewed the pertinent imaging. The following cultures have been collected: Blood cultures The culture results are listed below.     Current antimicrobial regimen consists of the antibiotics listed below. Will monitor patient closely and continue current treatment plan unchanged.    Antibiotics (From admission, onward)      Start     Stop Route Frequency Ordered    04/16/25 1730  azithromycin (ZITHROMAX) 500 mg in 0.9% NaCl 250 mL IVPB (admixture device)         04/20/25 1729 IV Every 24 hours (non-standard times) 04/15/25 1828    04/15/25 1630  cefTRIAXone injection 2 g         -- IV Every 24 hours (non-standard times) 04/15/25 1828            Microbiology Results (last 7 days)       Procedure Component Value Units Date/Time    Blood culture [[id number]] Collected: 04/15/25 1632    Order Status: Sent Specimen: Blood from Wrist, Left Updated: 04/15/25 1642    Blood culture [[id number]] Collected: 04/15/25 1632    Order Status: Sent Specimen: Blood from Peripheral, Antecubital, Right Updated: 04/15/25 1642            VTE Risk Mitigation (From admission, onward)           Ordered     IP VTE HIGH RISK PATIENT  Once         04/15/25 1819     Place sequential compression device  Until discontinued         04/15/25 1819                                    Keron Inman NP  Department of [facility] Medicine  [facility]

## 2025-04-16 NOTE — SUBJECTIVE & OBJECTIVE
Interval History:     I have seen the patient  She feels slightly better, she asked to use the CPAP for a nap  Repeat ABG this morning is showing improvement       Review of Systems   Constitutional: Negative.    HENT: Negative.     Respiratory:  Positive for shortness of breath.    Cardiovascular: Negative.    Gastrointestinal: Negative.    Genitourinary: Negative.    Musculoskeletal: Negative.    Skin: Negative.    Neurological: Negative.    Psychiatric/Behavioral: Negative.       Objective:     Vital Signs (Most Recent):  Temp: 98.1 °F (36.7 °C) (04/16/25 0745)  Pulse: 81 (04/16/25 1005)  Resp: 18 (04/16/25 0718)  BP: (!) 168/50 (04/16/25 0745)  SpO2: (!) 93 % (04/16/25 0745) Vital Signs (24h Range):  Temp:  [97.4 °F (36.3 °C)-98.8 °F (37.1 °C)] 98.1 °F (36.7 °C)  Pulse:  [] 81  Resp:  [17-37] 18  SpO2:  [90 %-100 %] 93 %  BP: (127-168)/(50-73) 168/50     Weight: 86.3 kg (190 lb 4.1 oz)  Body mass index is 30.71 kg/m².    Intake/Output Summary (Last 24 hours) at 4/16/2025 1135  Last data filed at 4/16/2025 0958  Gross per 24 hour   Intake 240 ml   Output 650 ml   Net -410 ml         Physical Exam  Constitutional:       Appearance: Normal appearance.   Cardiovascular:      Rate and Rhythm: Normal rate.   Pulmonary:      Effort: Pulmonary effort is normal. No respiratory distress.      Breath sounds: Normal breath sounds.   Abdominal:      Palpations: Abdomen is soft.   Skin:     General: Skin is warm and dry.   Neurological:      General: No focal deficit present.      Mental Status: She is alert and oriented to person, place, and time. Mental status is at baseline.   Psychiatric:         Behavior: Behavior normal.               Significant Labs: All pertinent labs within the past 24 hours have been reviewed.  CBC:   Recent Labs   Lab 04/15/25  1430 04/15/25  1435 04/16/25  0604   WBC  --  7.48 6.76   HGB  --  12.2 12.3   HCT 39.2 41.4 42.6   PLT  --  189 191     CMP:   Recent Labs   Lab 04/15/25  1092  04/16/25  0604   * 143   K 4.4 5.2*    103   CO2 33* 34*   BUN 9 12   CREATININE 0.7 0.6   CALCIUM 9.0 9.1   ALBUMIN 3.6  --    BILITOT 0.2  --    ALKPHOS 51  --    AST 22  --    ALT 18  --    ANIONGAP 10 6*       Significant Imaging: I have reviewed all pertinent imaging results/findings within the past 24 hours.

## 2025-04-16 NOTE — PLAN OF CARE
Problem: Physical Therapy  Goal: Physical Therapy Goal  Description: Goals to be met by: 2025     Patient will increase functional independence with mobility by performin. Supine to sit with Modified [place]  2. Sit to supine with Modified [place]  3. Sit to stand transfer with Modified [place] using Rolling Walker  4. Bed to chair transfer with Modified [place] using Rolling Walker  5. Gait  x 2 x 50 feet with Modified [place] using Rolling Walker.     Outcome: Progressing   Co-eval performed by PT/OT in consideration of anticipated low activity tolerance; at baseline of function, pt is Bert for mobility and ADLs with PRN use of rollator in home vs furniture walking, rollator in community and use of 2L home O2; pt with COPD contributing to chronic endurance limitations; also pt also reports anxiety related tremulousness; during today's session, pt initially fearful of falling and increased anxiety (tremulous in extremities/increased tension in shoulders with elevated /76 in sitting; pt able to self calm with minimal guided relaxation and being able to speak with her during the session with BP lowering to 152/68 in sitting; pt amb using RW with CG/SBA with SpO2 dropping to 87% on 3L with recovery to 88% with pursed lip breathing; will cont to progress as tolerated; recommend low intensity therapy upon d/c from hospital.

## 2025-04-16 NOTE — PT/OT/SLP PROGRESS
Occupational Therapy      Patient Name:  Terri Phelan   MRN:  [id number]    Patient not seen today secondary to Nurse/ GENA hold (per RN patient with elevated CO2 levels and on CPAP). Will follow-up later in date.    4/16/2025

## 2025-04-16 NOTE — PLAN OF CARE
Problem: Adult Inpatient Plan of Care  Goal: Plan of Care Review  Outcome: Progressing  Goal: Patient-Specific Goal (Individualized)  Outcome: Progressing  Goal: Absence of Hospital-Acquired Illness or Injury  Outcome: Progressing  Goal: Optimal Comfort and Wellbeing  Outcome: Progressing  Goal: Readiness for Transition of Care  Outcome: Progressing     Problem: COPD (Chronic Obstructive Pulmonary Disease)  Goal: Optimal Chronic Illness Coping  Outcome: Progressing  Goal: Optimal Level of Functional [place]  Outcome: Progressing  Goal: Absence of Infection Signs and Symptoms  Outcome: Progressing  Goal: Improved Oral Intake  Outcome: Progressing  Goal: Effective Oxygenation and Ventilation  Outcome: Progressing     Problem: Fall Injury Risk  Goal: Absence of Fall and Fall-Related Injury  Outcome: Progressing

## 2025-04-17 ENCOUNTER — RESEARCH ENCOUNTER (OUTPATIENT)
Dept: RESEARCH | Facility: CLINIC | Age: 70
End: 2025-04-17
Payer: MEDICARE

## 2025-04-17 LAB
ABSOLUTE EOSINOPHIL (OHS): 0.01 K/UL
ABSOLUTE MONOCYTE (OHS): 1.32 K/UL (ref 0.3–1)
ABSOLUTE NEUTROPHIL COUNT (OHS): 8.27 K/UL (ref 1.8–7.7)
ANION GAP (OHS): 8 MMOL/L (ref 8–16)
BASOPHILS # BLD AUTO: 0.01 K/UL
BASOPHILS NFR BLD AUTO: 0.1 %
BUN SERPL-MCNC: 18 MG/DL (ref 8–23)
CALCIUM SERPL-MCNC: 9.3 MG/DL (ref 8.7–10.5)
CHLORIDE SERPL-SCNC: 100 MMOL/L (ref 95–110)
CO2 SERPL-SCNC: 35 MMOL/L (ref 23–29)
CREAT SERPL-MCNC: 0.7 MG/DL (ref 0.5–1.4)
ERYTHROCYTE [DISTWIDTH] IN BLOOD BY AUTOMATED COUNT: 13.5 % (ref 11.5–14.5)
GFR SERPLBLD CREATININE-BSD FMLA CKD-EPI: >60 ML/MIN/1.73/M2
GLUCOSE SERPL-MCNC: 90 MG/DL (ref 70–110)
HCT VFR BLD AUTO: 40.6 % (ref 37–48.5)
HGB BLD-MCNC: 12 GM/DL (ref 12–16)
IMM GRANULOCYTES # BLD AUTO: 0.04 K/UL (ref 0–0.04)
IMM GRANULOCYTES NFR BLD AUTO: 0.3 % (ref 0–0.5)
LYMPHOCYTES # BLD AUTO: 1.93 K/UL (ref 1–4.8)
MAGNESIUM SERPL-MCNC: 2.1 MG/DL (ref 1.6–2.6)
MCH RBC QN AUTO: 27.2 PG (ref 27–31)
MCHC RBC AUTO-ENTMCNC: 29.6 G/DL (ref 32–36)
MCV RBC AUTO: 92 FL (ref 82–98)
NUCLEATED RBC (/100WBC) (OHS): 0 /100 WBC
PHOSPHATE SERPL-MCNC: 2.4 MG/DL (ref 2.7–4.5)
PLATELET # BLD AUTO: 200 K/UL (ref 150–450)
PMV BLD AUTO: 11 FL (ref 9.2–12.9)
POTASSIUM SERPL-SCNC: 3.8 MMOL/L (ref 3.5–5.1)
RBC # BLD AUTO: 4.41 M/UL (ref 4–5.4)
RELATIVE EOSINOPHIL (OHS): 0.1 %
RELATIVE LYMPHOCYTE (OHS): 16.7 % (ref 18–48)
RELATIVE MONOCYTE (OHS): 11.4 % (ref 4–15)
RELATIVE NEUTROPHIL (OHS): 71.4 % (ref 38–73)
SODIUM SERPL-SCNC: 143 MMOL/L (ref 136–145)
WBC # BLD AUTO: 11.58 K/UL (ref 3.9–12.7)

## 2025-04-17 PROCEDURE — 25000003 PHARM REV CODE 250: Mod: HCNC | Performed by: STUDENT IN AN ORGANIZED HEALTH CARE EDUCATION/TRAINING PROGRAM

## 2025-04-17 PROCEDURE — 63600175 PHARM REV CODE 636 W HCPCS: Mod: HCNC | Performed by: STUDENT IN AN ORGANIZED HEALTH CARE EDUCATION/TRAINING PROGRAM

## 2025-04-17 PROCEDURE — 11000001 HC ACUTE MED/SURG PRIVATE ROOM: Mod: HCNC

## 2025-04-17 PROCEDURE — 97530 THERAPEUTIC ACTIVITIES: CPT | Mod: HCNC

## 2025-04-17 PROCEDURE — 94761 N-INVAS EAR/PLS OXIMETRY MLT: CPT | Mod: HCNC

## 2025-04-17 PROCEDURE — 99900035 HC TECH TIME PER 15 MIN (STAT): Mod: HCNC

## 2025-04-17 PROCEDURE — 85025 COMPLETE CBC W/AUTO DIFF WBC: CPT | Mod: HCNC | Performed by: NURSE PRACTITIONER

## 2025-04-17 PROCEDURE — 80048 BASIC METABOLIC PNL TOTAL CA: CPT | Mod: HCNC | Performed by: NURSE PRACTITIONER

## 2025-04-17 PROCEDURE — 83735 ASSAY OF MAGNESIUM: CPT | Mod: HCNC | Performed by: NURSE PRACTITIONER

## 2025-04-17 PROCEDURE — 27100171 HC OXYGEN HIGH FLOW UP TO 24 HOURS: Mod: HCNC

## 2025-04-17 PROCEDURE — 97530 THERAPEUTIC ACTIVITIES: CPT | Mod: HCNC,CQ

## 2025-04-17 PROCEDURE — 97116 GAIT TRAINING THERAPY: CPT | Mod: HCNC,CQ

## 2025-04-17 PROCEDURE — 94640 AIRWAY INHALATION TREATMENT: CPT | Mod: HCNC

## 2025-04-17 PROCEDURE — 63700000 PHARM REV CODE 250 ALT 637 W/O HCPCS: Mod: HCNC | Performed by: STUDENT IN AN ORGANIZED HEALTH CARE EDUCATION/TRAINING PROGRAM

## 2025-04-17 PROCEDURE — 97110 THERAPEUTIC EXERCISES: CPT | Mod: HCNC,CQ

## 2025-04-17 PROCEDURE — 84100 ASSAY OF PHOSPHORUS: CPT | Mod: HCNC | Performed by: NURSE PRACTITIONER

## 2025-04-17 PROCEDURE — 94660 CPAP INITIATION&MGMT: CPT | Mod: HCNC

## 2025-04-17 PROCEDURE — 25000003 PHARM REV CODE 250: Mod: HCNC | Performed by: NURSE PRACTITIONER

## 2025-04-17 PROCEDURE — 36415 COLL VENOUS BLD VENIPUNCTURE: CPT | Mod: HCNC | Performed by: NURSE PRACTITIONER

## 2025-04-17 PROCEDURE — 27000221 HC OXYGEN, UP TO 24 HOURS: Mod: HCNC

## 2025-04-17 RX ORDER — IPRATROPIUM BROMIDE AND ALBUTEROL SULFATE 2.5; .5 MG/3ML; MG/3ML
3 SOLUTION RESPIRATORY (INHALATION) EVERY 6 HOURS PRN
Status: DISCONTINUED | OUTPATIENT
Start: 2025-04-17 | End: 2025-04-18 | Stop reason: HOSPADM

## 2025-04-17 RX ORDER — GUAIFENESIN 600 MG/1
600 TABLET, EXTENDED RELEASE ORAL 2 TIMES DAILY
Status: DISCONTINUED | OUTPATIENT
Start: 2025-04-17 | End: 2025-04-18 | Stop reason: HOSPADM

## 2025-04-17 RX ADMIN — EZETIMIBE 10 MG: 10 TABLET ORAL at 09:04

## 2025-04-17 RX ADMIN — GUAIFENESIN 600 MG: 600 TABLET, EXTENDED RELEASE ORAL at 08:04

## 2025-04-17 RX ADMIN — BUDESONIDE, GLYCOPYRROLATE, AND FORMOTEROL FUMARATE 2 PUFF: 160; 9; 4.8 AEROSOL, METERED RESPIRATORY (INHALATION) at 08:04

## 2025-04-17 RX ADMIN — ASPIRIN 81 MG CHEWABLE TABLET 81 MG: 81 TABLET CHEWABLE at 09:04

## 2025-04-17 RX ADMIN — HYDROXYZINE HYDROCHLORIDE 25 MG: 25 TABLET, FILM COATED ORAL at 04:04

## 2025-04-17 RX ADMIN — GUAIFENESIN 600 MG: 600 TABLET, EXTENDED RELEASE ORAL at 10:04

## 2025-04-17 RX ADMIN — PREDNISONE 60 MG: 20 TABLET ORAL at 09:04

## 2025-04-17 RX ADMIN — ROFLUMILAST 500 MCG: 500 TABLET ORAL at 09:04

## 2025-04-17 RX ADMIN — AZITHROMYCIN DIHYDRATE 500 MG: 250 TABLET ORAL at 03:04

## 2025-04-17 RX ADMIN — CEFTRIAXONE SODIUM 1 G: 1 INJECTION, POWDER, FOR SOLUTION INTRAMUSCULAR; INTRAVENOUS at 03:04

## 2025-04-17 RX ADMIN — ENOXAPARIN SODIUM 40 MG: 40 INJECTION SUBCUTANEOUS at 04:04

## 2025-04-17 RX ADMIN — ESCITALOPRAM OXALATE 20 MG: 10 TABLET ORAL at 09:04

## 2025-04-17 RX ADMIN — LOSARTAN POTASSIUM 25 MG: 25 TABLET, FILM COATED ORAL at 09:04

## 2025-04-17 NOTE — PROGRESS NOTES
V1/Screening/Enrollment Visit  Sponsor: [employer]  Study: Vaccine Effectiveness of 20-Valent Pneumococcal Conjugate Vaccine Against Vaccine-Type CAP Using a Test-Negative Design (RECAP-20)  IRB/Protocol #: 2022.215/[id number]  Principle Investigator: Dr. Stuart Mcdonough   Site Number: Site Selection: [place] [address]     Subject Number: [id number]     Date of Visit: 17 APR 2025        She was consented on 17 APR 2025 to participate in the RECAP-20 study. Patient assessed and meets all of the inclusion and none of the exclusion criteria for enrollment in study. CXR performed on 15 APR 2025 assessed to have radiographic finding that is consistent with pneumonia

## 2025-04-17 NOTE — PLAN OF CARE
Problem: Adult Inpatient Plan of Care  Goal: Plan of Care Review  Outcome: Progressing  Goal: Patient-Specific Goal (Individualized)  Outcome: Progressing  Goal: Absence of Hospital-Acquired Illness or Injury  Outcome: Progressing  Goal: Optimal Comfort and Wellbeing  Outcome: Progressing  Goal: Readiness for Transition of Care  Outcome: Progressing     Problem: COPD (Chronic Obstructive Pulmonary Disease)  Goal: Optimal Chronic Illness Coping  Outcome: Progressing  Goal: Optimal Level of Functional [place]  Outcome: Progressing  Goal: Absence of Infection Signs and Symptoms  Outcome: Progressing  Goal: Improved Oral Intake  Outcome: Progressing  Goal: Effective Oxygenation and Ventilation  Outcome: Progressing     Problem: Pneumonia  Goal: Fluid Balance  Outcome: Progressing  Goal: Resolution of Infection Signs and Symptoms  Outcome: Progressing  Goal: Effective Oxygenation and Ventilation  Outcome: Progressing     Problem: Fall Injury Risk  Goal: Absence of Fall and Fall-Related Injury  Outcome: Progressing     Problem: Comorbidity Management  Goal: Maintenance of Asthma Control  Outcome: Progressing  Goal: Maintenance of COPD Symptom Control  Outcome: Progressing  Goal: Blood Pressure in Desired Range  Outcome: Progressing

## 2025-04-17 NOTE — PLAN OF CARE
Rounded at bedside. Per PT/OT evaluation, pt is LIT and HH with therapy recommended. Referrals sent, pt is accepted by [facility] HH agency.  CM will continue to follow pt and provide any dc needs.     Future Appointments   Date Time Provider Department Center   4/28/2025  1:30 PM Bethany Landry MD [facility]   5/13/2025  4:40 PM Sussy Mason MD [id number] [place]      04/17/25 1154   Rounds   Attendance Nurse    Discharge Plan A Home;Home with family;Home Health   Why the patient remains in the hospital Requires continued medical care   Transition of Care Barriers None

## 2025-04-17 NOTE — PT/OT/SLP EVAL
Physical Therapy Evaluation    Patient Name:  Terri Phelan   MRN:  [id number]    Recommendations:     Discharge Recommendations: Low Intensity Therapy (home health PT/OT)   Discharge Equipment Recommendations: none   Barriers to discharge:  decreased endurance, anxiety impeding function    Assessment:     Terri Phelan is a 69 y.o. female admitted with a medical diagnosis of Acute on chronic respiratory failure with hypoxia and hypercapnia.  She presents with the following impairments/functional limitations: weakness, impaired endurance, impaired self care skills, impaired functional mobility, decreased coordination, impaired balance, gait instability, decreased lower extremity function, decreased upper extremity function Co-eval performed by PT/OT in consideration of anticipated low activity tolerance; at baseline of function, pt is Bert for mobility and ADLs with PRN use of rollator in home vs furniture walking, rollator in community and use of 2L home O2; pt with COPD contributing to chronic endurance limitations; also pt also reports anxiety related tremulousness; during today's session, pt initially fearful of falling and increased anxiety (tremulous in extremities/increased tension in shoulders with elevated /76 in sitting; pt able to self calm with minimal guided relaxation and being able to speak with her during the session with BP lowering to 152/68 in sitting; pt amb using RW with CG/SBA with SpO2 dropping to 87% on 3L with recovery to 88% with pursed lip breathing; will cont to progress as tolerated; recommend low intensity therapy upon d/c from hospital. .    Rehab Prognosis: Good; patient would benefit from acute skilled PT services to address these deficits and reach maximum level of function.    Recent Surgery: * No surgery found *      Plan:     During this hospitalization, patient to be seen 4 x/week to address the identified rehab impairments via gait training, therapeutic activities,  therapeutic exercises, neuromuscular re-education and progress toward the following goals:    Plan of Care Expires:  05/16/25    Subjective     Chief Complaint: pt reported anxiety-situational and about the current state of the world                                        Patient/Family Comments/goals: pt reports being able to relax more when she calls her dtr; expressed fear when attempting to walk- felt more assured when OT stood in front and PT stood behind patient   Pain/Comfort:  Pain Rating 1: 0/10  Pain Addressed 1: Reposition  Pain Rating Post-Intervention 1: 0/10    Patients cultural, spiritual, Judaism conflicts given the current situation: no    Living Environment:  Pt lives alone in 1st floor apartment, no FLORI, mild incline; dtr lives in same apt complex  Prior to admission, patients level of function was Bert for mobility and ADLs with PRN use of rollator in home vs furniture walking, rollator in community and use of 2L home O2; O2 tubing reaches whole apt and she self monitors levels with her own pulse ox; pt's O2 tank fits on rollator when in the communicty; pt with COPD contributing to chronic endurance limitations; also pt also reports anxiety related tremulousness; Equipment used at home: blood pressure machine, bedside commode, bath bench, walker, rolling, rollator, CPAP (pulse oximeter).Upon discharge, patient will have assistance from daughter.    Objective:     Communicated with nurse prior to session.  Patient found HOB elevated with bed alarm, telemetry, oxygen  upon PT entry to room.    General Precautions: Standard, fall  Orthopedic Precautions:N/A   Braces: N/A  Respiratory Status: Nasal cannula, flow 3 L/min    Exams:  Cognitive Exam:  Patient is oriented to Person, Place, Time, Situation, and follows multistep commands  Gross Motor Coordination:  tremulous  Postural Exam:  Patient presented with the following abnormalities:    -       Rounded shoulders  -       Forward  head  Sensation:    -       Intact  RLE ROM: WFL  RLE Strength: WFL  LLE ROM: WFL  LLE Strength: WFL    Functional Mobility:  Bed Mobility:     Supine to Sit: stand by assistance and increased time to complete  Transfers:     Sit to Stand:  stand by assistance with rolling walker  Bed to Chair: stand by assistance and minimal verbal cues for slow movement/feeling the back of LEs against chair prior to descent with  rolling walker  using  Step Transfer  Gait: pt amb in room around bed and back ~25ft using RW with CGA to SBA including turns/quarter turns with 2nd person present/hands off for confidence building; patient with cervical flexion posture, min VCs to correct and scan environment  Balance: sit static~good, sit dynamic~fair+; stand static~fair+, stand dynamic~fair to fair+    AM-PAC 6 CLICK MOBILITY  Total Score:18     Treatment & Education:  -pt educated on purpose of PT  -pt had episode of increased anxiety, able to self calm after increased relaxation after speaking with dtr on phone; pt educated on decreasing tension throughout body and perform deep breathing; therapist mentioned use of calming/deep breathing applications on phone; pt reports that she has some apps on her phone and needs to return to using them  -performed skills/mobility as describe above  -vitals monitored as above  -pt left in chair and re-iterated importance of OOB activity; educated on performing AROM intermittently throughout the day  -pt demonstrated understanding of education topics with reciprocation    Patient left up in chair with all lines intact, call button in reach, and advised patient to call for assist to get up as needed .    GOALS:   Multidisciplinary Problems       Physical Therapy Goals          Problem: Physical Therapy    Goal Priority Disciplines Outcome Interventions   Physical Therapy Goal     PT, PT/OT Progressing    Description: Goals to be met by: 5/16/2025     Patient will increase functional independence  with mobility by performin. Supine to sit with Modified Burke  2. Sit to supine with Modified Burke  3. Sit to stand transfer with Modified Burke using Rolling Walker  4. Bed to chair transfer with Modified Burke using Rolling Walker  5. Gait  x 2 x 50 feet with Modified Burke using Rolling Walker.                            History:     Past Medical History:   Diagnosis Date    Acute exacerbation of chronic obstructive pulmonary disease (COPD) 2023    Recently hospitalized at [facility] from  to 2024 for exacerbation.  Did not require ICU care or significant escalation of intervention(s).      Adenoma of ascending colon 2022    Anxiety     COPD (chronic obstructive pulmonary disease)     COPD exacerbation 2014    Heart failure     Hypertension     Major depression 2023       Past Surgical History:   Procedure Laterality Date    BREAST BIOPSY N/A     pt unsure which breast but it was benign-(calcium)     SECTION      HERNIA REPAIR      HYSTERECTOMY         Time Tracking:     PT Received On: 25  PT Start Time: 1456     PT Stop Time: 1540  PT Total Time (min): 44 min     Billable Minutes: Evaluation 10 and Therapeutic Activity 15 Self Care 8      2025   Biopsy Method: Dermablade

## 2025-04-17 NOTE — PROGRESS NOTES
Consent  Sponsor: [employer]  Study: Vaccine Effectiveness of 20-Valent Pneumococcal Conjugate Vaccine Against Vaccine-Type CAP Using a Test-Negative Design (RECAP-20)  IRB/Protocol #: 2022.215/[id number]  Protocol Amendment V1  Principle Investigator: Dr. Stuart Mcdonough   Site Number: Site Selection: [place] [address]    Subject Number: [id number]    Date of Visit: 17 Apr 2025    Informed Consent:   Consenting was completed in private area using the most current IRB approved version of the ICF by myself. Patient was given ample time to review consent prior to execution of ICF.     Date of Consent: 17 Apr 2025    Who signed informed consent?  [x] Patient  [] Patient's Legally Authorized Representative    Revised Version Date of ICF: 19 Dec 2024    Optional Addendum Consent for Tokenization: No  (Please make sure you only janina YES, if the patient has signed the optional consent.)    Time of Consent: 10:14    Prior to the Informed Consent (IC) being signed, or any study protocol required data collection, testing, procedure, or intervention being performed, the following was done and/or discussed:?   Patient was given a copy of the IC for review??   Purpose of the study and qualifications to participate??   Study design, follow up schedule, and tests or procedures done at each visit?   Confidentiality and HIPAA Authorization for Release of Medical Records for the research trial/ subject's rights/research related injury?   Risk, Benefits, Alternative Treatments, Compensation and Costs?   Participation in the research trial is voluntary and patient may withdraw at anytime?   Contact information for study related questions?     Patient verbalizes understanding of the above: Yes?   Contact information for CRC and PI given to patient: Yes?   Patient able to adequately summarize: the purpose of the study, the risks associated with the study, and all procedures, testing, and follow-ups associated with the study: Yes?     Terri CLARK  [place] signed the IRB approved version of informed consent form for the RECAP-20 research study.? Each page of the consent was reviewed with the patient and patient's family and all questions answered satisfactorily. The patient signed the paper consent form and received a copy of same (copy of informed consent made and provided to patient). The original consent was scanned into electronic medical records (EPIC).

## 2025-04-17 NOTE — PROGRESS NOTES
[facility] Medicine  Progress Note    Patient Name: Terri Phelan  MRN: [id number]  Patient Class: IP- Inpatient   Admission Date: 4/15/2025  Length of Stay: 2 days  Attending Physician: Alida Garza MD  Primary Care Provider: Sussy Mason MD        Subjective     Principal Problem:Acute on chronic respiratory failure with hypoxia and hypercapnia        HPI:    Ms Phelan is a 69 year old female with PMHx of COPD on home O2, HTN, GERD and asthma. She presents to [facility] for evaluation of shortness of breath. Symptoms started few weeks and have continued to worsen. Patient has to increase home oxygen to 4 L today. She denies associated symptoms of fever, chills, chest pain or palpitations. Chest X ray reports patchy opacity projects over the right lower lung zone possibly edema with a developing consolidation also possible. pH 7.28, PCO 83, P02 56 on ABG's. She received nebs, IV antibiotics, steroids in the Emergency Room. Patient is being admitted under the care of Hospital Medicine, she is a full code.          Overview/Hospital Course:  No notes on file    Interval History:   Seen at the bedside.  No distress noted.  We will continue to monitor.  We will evaluate if she is ready for discharge on tomorrow.      Review of Systems   Constitutional:  Negative for chills, diaphoresis and fatigue.   HENT:  Negative for trouble swallowing.    Respiratory:  Positive for shortness of breath.    Cardiovascular:  Negative for chest pain.   Gastrointestinal:  Negative for diarrhea, nausea and vomiting.   Genitourinary:  Negative for difficulty urinating and hematuria.   Musculoskeletal:  Negative for myalgias.   Skin:  Negative for pallor and rash.   Neurological:  Positive for weakness. Negative for dizziness, light-headedness and headaches.   Psychiatric/Behavioral:  Negative for confusion and hallucinations.      Objective:     Vital Signs (Most Recent):  Temp: 97.6 °F (36.4  °C) (04/17/25 0749)  Pulse: 85 (04/17/25 1216)  Resp: 17 (04/17/25 1102)  BP: (!) 106/47 (04/17/25 1102)  SpO2: (!) 93 % (04/17/25 1102) Vital Signs (24h Range):  Temp:  [97.6 °F (36.4 °C)-99 °F (37.2 °C)] 97.6 °F (36.4 °C)  Pulse:  [51-85] 85  Resp:  [17] 17  SpO2:  [90 %-98 %] 93 %  BP: (106-166)/(47-77) 106/47     Weight: 86.3 kg (190 lb 4.1 oz)  Body mass index is 30.71 kg/m².    Intake/Output Summary (Last 24 hours) at 4/17/2025 1537  Last data filed at 4/17/2025 0456  Gross per 24 hour   Intake --   Output 500 ml   Net -500 ml         Physical Exam  Vitals and nursing note reviewed.   Constitutional:       Appearance: Normal appearance.   HENT:      Head: Normocephalic and atraumatic.      Mouth/Throat:      Mouth: Mucous membranes are moist.   Eyes:      Extraocular Movements: Extraocular movements intact.   Cardiovascular:      Rate and Rhythm: Normal rate and regular rhythm.   Pulmonary:      Effort: Pulmonary effort is normal. No respiratory distress.      Breath sounds: Normal breath sounds.   Abdominal:      Palpations: Abdomen is soft.   Skin:     General: Skin is warm and dry.   Neurological:      General: No focal deficit present.      Mental Status: She is alert and oriented to person, place, and time. Mental status is at baseline.   Psychiatric:         Behavior: Behavior normal.               Significant Labs: All pertinent labs within the past 24 hours have been reviewed.  CBC:   Recent Labs   Lab 04/16/25  0604 04/17/25  0505   WBC 6.76 11.58   HGB 12.3 12.0   HCT 42.6 40.6    200     CMP:   Recent Labs   Lab 04/16/25  0604 04/17/25  0505    143   K 5.2* 3.8    100   CO2 34* 35*   BUN 12 18   CREATININE 0.6 0.7   CALCIUM 9.1 9.3   ANIONGAP 6* 8       Significant Imaging: I have reviewed all pertinent imaging results/findings within the past 24 hours.      Assessment & Plan  Acute on chronic respiratory failure with hypoxia and hypercapnia  Patient with Hypercapnic and Hypoxic  Respiratory failure which is Acute on chronic.  she is on home oxygen at 2 LPM. Supplemental oxygen was provided and noted- Oxygen Concentration (%):  [30] 30    .   Signs/symptoms of respiratory failure include- increased work of breathing and wheezing. Contributing diagnoses includes - COPD and Pneumonia Labs and images were reviewed. Patient Has recent ABG, which has been reviewed. Will treat underlying causes and adjust management of respiratory failure as follows- nebs, steroids and IV antidotic, Daliresp    Cont CTX and Azithro  Cont prednisone 60 mg for 5 days  Cont inhalers   Cont Daliresp  CAD (coronary artery disease)  Patient with known CAD which is controlled Will continue ASA and Statin and monitor for S/Sx of angina/ACS. Continue to monitor on telemetry.   Obstructive sleep apnea syndrome  CPAP QHS and naptime  Oxygen dependent  Wean O2 to level as tolerated     Debility  Patient with Acute on chronic debility due to age-related physical debility. The patient's latest AMPAC (Activity Measure for Post Acute Care) Score is listed below.    AM-PAC Score - How much help does the patient need for each activity listed  Basic Mobility Total Score: 18  Turning over in bed (including adjusting bedclothes, sheets and blankets)?: A little  Sitting down on and standing up from a chair with arms (e.g., wheelchair, bedside commode, etc.): A little  Moving from lying on back to sitting on the side of the bed?: A little  Moving to and from a bed to a chair (including a wheelchair)?: A little  Need to walk in hospital room?: A little  Climbing 3-5 steps with a railing?: A little    Plan  - PT/OT consulted  -         Anxiety  Continue escitalopram    Community acquired bacterial pneumonia  Patient has a diagnosis of pneumonia. The cause of the pneumonia is unknown at this time. The pneumonia is stable. The patient has the following signs/symptoms of pneumonia: persistent hypoxia  and cough. The patient does have a  current oxygen requirement and the patient does have a home oxygen requirement. I have reviewed the pertinent imaging. The following cultures have been collected: Blood cultures The culture results are listed below.     Current antimicrobial regimen consists of the antibiotics listed below. Will monitor patient closely and continue current treatment plan unchanged.    Antibiotics (From admission, onward)      Start     Stop Route Frequency Ordered    04/16/25 1630  cefTRIAXone injection 1 g         -- IV Every 24 hours (non-standard times) 04/16/25 0757            Microbiology Results (last 7 days)       Procedure Component Value Units Date/Time    Blood culture [[id number]]  (Normal) Collected: 04/15/25 1632    Order Status: Completed Specimen: Blood from Wrist, Left Updated: 04/17/25 1100     Blood Culture No Growth After 36 Hours    Blood culture [[id number]]  (Normal) Collected: 04/15/25 1632    Order Status: Completed Specimen: Blood from Peripheral, Antecubital, Right Updated: 04/17/25 1100     Blood Culture No Growth After 36 Hours          VTE Risk Mitigation (From admission, onward)           Ordered     enoxaparin injection 40 mg  Every 24 hours         04/16/25 1322     IP VTE HIGH RISK PATIENT  Once         04/15/25 1819     Place sequential compression device  Until discontinued         04/15/25 1819                    Discharge Planning   RADHA: 4/18/2025     Code Status: Full Code   Medical Readiness for Discharge Date:   Discharge Plan A: Home, Home with family, Home Health                Please place Justification for DME        Shana Zapata NP  Department of Hospital Medicine   [facility]

## 2025-04-17 NOTE — PLAN OF CARE
4/16/25 at 1230 I messaged Dr Schulz about Ms Phelan ABG, she told me I could skip the Abg because her previous Abg was good this Morning..  RDragon RRT

## 2025-04-17 NOTE — PROGRESS NOTES
"[facility]           Pharmacy  Admission Medication History     The home medication history was taken by Reinaldo Christensen PharmD.      Medication history obtained from Medications listed below were obtained from: Patient/family    Based on information gathered for medication list, you may go to "Admission" then "Reconcile Home Medications" tabs to review and/or act upon those items.     The home medication list has been updated by the Pharmacy department.   Please read ALL comments highlighted in yellow.   Please address this information as you see fit.    Feel free to contact us if you have any questions or require assistance.    The medications listed below were removed from the home medication list.  Please reorder if appropriate:    Home medications have been reviewed to inpatient orders    Potential issues to be addressed PRIOR TO DISCHARGE      No current facility-administered medications on file prior to encounter.     Current Outpatient Medications on File Prior to Encounter   Medication Sig Dispense Refill    albuterol (PROVENTIL/VENTOLIN HFA) 90 mcg/actuation inhaler Inhale 2 puffs into the lungs every 6 (six) hours as needed for Wheezing or Shortness of Breath. 18 g 6    albuterol-ipratropium (DUO-NEB) 2.5 mg-0.5 mg/3 mL nebulizer solution USE 3 ML VIA NEBULIZER EVERY 6 HOURS AS NEEDED FOR WHEEZING (Patient taking differently: Take 3 mLs by nebulization every 6 (six) hours as needed for Shortness of Breath or Wheezing.) 360 mL 0    alendronate (FOSAMAX) 35 MG tablet Take 35 mg by mouth every Tuesday.      aspirin 81 MG Chew Take 81 mg by mouth once daily.      azithromycin (Z-OLY) 250 MG tablet Take 1 tablet (250 mg total) by mouth 3 (three) times a week. Take three times weekly on Monday, Wednesday, and Friday. 30 tablet 3    budesonide-glycopyr-formoterol (BREZTRI AEROSPHERE) 160-9-4.8 mcg/actuation HFAA Inhale 2 puffs into the lungs 2 (two) times daily. 5.9 g 3    cholecalciferol, " vitamin D3, (VITAMIN D3) 50 mcg (2,000 unit) Tab Take 2,000 Units by mouth once daily.      cyclobenzaprine (FLEXERIL) 5 MG tablet Take 1 tablet (5 mg total) by mouth daily as needed for Muscle spasms. 30 tablet 0    dextromethorphan-guaiFENesin  mg (MUCINEX DM)  mg per 12 hr tablet Take 1 tablet by mouth 2 (two) times daily as needed (cough). 20 tablet 1    EScitalopram oxalate (LEXAPRO) 20 MG tablet Take 1 tablet (20 mg total) by mouth once daily. 30 tablet 11    ezetimibe (ZETIA) 10 mg tablet TAKE 1 TABLET(10 MG) BY MOUTH DAILY (Patient taking differently: Take 10 mg by mouth once daily.) 90 tablet 3    fluticasone propionate (FLONASE) 50 mcg/actuation nasal spray SHAKE LIQUID AND USE 1 SPRAY(50 MCG) IN EACH NOSTRIL DAILY AS NEEDED FOR ALLERGIES (Patient taking differently: 1 spray by Each Nostril route daily as needed for Allergies or Rhinitis.) 16 g 11    furosemide (LASIX) 40 MG tablet Take 40 mg by mouth once daily.      hydrOXYzine HCL (ATARAX) 25 MG tablet TAKE 1 TABLET(25 MG) BY MOUTH THREE TIMES DAILY AS NEEDED FOR ANXIETY OR ITCHING (Patient taking differently: Take 25 mg by mouth 3 (three) times daily as needed for Anxiety.) 30 tablet 3    losartan (COZAAR) 25 MG tablet Take 1 tablet (25 mg total) by mouth once daily. 90 tablet 3    meloxicam (MOBIC) 15 MG tablet Take 1 tablet (15 mg total) by mouth once daily. 30 tablet 2    potassium chloride (MICRO-K) 10 MEQ CpSR TAKE 1 CAPSULE BY MOUTH EVERY DAY (Patient taking differently: Take 10 mEq by mouth daily as needed.) 90 capsule 0    roflumilast (DALIRESP) 500 mcg Tab Take 1 tablet (500 mcg total) by mouth once daily. 30 tablet 0    acetaminophen (TYLENOL) 500 MG tablet Take 500 mg by mouth every 6 (six) hours as needed for Pain.      calcium carb-mag hydrox-simeth 1,200 mg-270 mg -80 mg/10 mL Susp Take 1,200 mg by mouth once daily.      OXYGEN-AIR DELIVERY SYSTEMS MISC 2 L by Nasal route continuous.         Please address this information as  you see fit.  Feel free to contact us if you have any questions or require assistance.    Reinaldo Christensen, PharmD  [phone]

## 2025-04-17 NOTE — PT/OT/SLP PROGRESS
Occupational Therapy   Treatment    Name: Terri Phelan  MRN: [id number]  Admitting Diagnosis:  Acute on chronic respiratory failure with hypoxia and hypercapnia       Recommendations:     Discharge Recommendations: Low Intensity Therapy (home health OT PT)  Discharge Equipment Recommendations:  none  Barriers to discharge:  Other (Comment) (no OT barriers)    Assessment:     Terri Phelan is a 69 y.o. female with a medical diagnosis of Acute on chronic respiratory failure with hypoxia and hypercapnia.  She presents with .The primary encounter diagnosis was COPD exacerbation. Diagnoses of Shortness of breath and Pneumonia were also pertinent to this visit.  . Performance deficits affecting function are weakness, impaired endurance, impaired self care skills, impaired functional mobility, gait instability, impaired balance, impaired cardiopulmonary response to activity.     Improved efficacy and performance this date. Expresses increased confidence. Will continue to follow. Recommend low intensity therapy.    Rehab Prognosis:  Good; patient would benefit from acute skilled OT services to address these deficits and reach maximum level of function.       Plan:     Patient to be seen 3 x/week to address the above listed problems via self-care/home management, therapeutic activities, therapeutic exercises  Plan of Care Expires: 05/14/25  Plan of Care Reviewed with: patient    Subjective     Chief Complaint: none  Patient/Family Comments/goals: reports feeling overall better today except having decreased sleep on night prior  Pain/Comfort:  Pain Rating 1: 0/10  Pain Addressed 1: Reposition  Pain Rating Post-Intervention 1: 0/10    Objective:     Communicated with: nursing prior to session.  Patient found up in chair with telemetry, oxygen upon OT entry to room.    General Precautions: Standard, fall    Orthopedic Precautions:N/A  Braces: N/A  Respiratory Status: Nasal cannula, flow 2.5 L/min     Occupational  "Performance:     Functional Mobility/Transfers:  Patient completed Sit <> Stand Transfer with modified independence  with  no assistive device   Patient completed Bed <> Chair Transfer using Step Transfer technique with stand by assistance with no assistive device  Functional Mobility: in room functional mobility to / from bathroom with handheld assist to no assistive device and use of furniture surfing with CGA to SBA; education on use of rolling walker; however, patient with desire to attempt no device with therapist's support but accepting of education regarding use of devices can reduce energy expenditure    [facility] 6 Click ADL: 21    Treatment & Education:  Patient re-educated on role of OT.  Patient oriented x 4  Collaborative patient/ therapist discussion on techniques to mitigate shortness of breath panic which can be exacerbated by anxiety with COPD. Patient with min verbal cues able to recall x2 first strategies / techniques from prior date (1. Focus on / talk to daughter or other persons for distraction and self calming. 2. Increase awareness of upper body tension and relax shoulders). With collaborative conversation able to add 2 additional strategies (3. Pursed lip breathing with use of pulse oximeter for objective measurement. 4. Use of specific self affirmations - "I can take three calming breaths" "my body is capable of slowing down").  Praise to patient regarding out of bed multitimes this date with staff inclusive for morning ADL regimen. Discussed techniques for energy / pacing with agreement.  During functional mobility to simulate home routine navigation to bathroom, SpO2 at rest at 95% heartrate 65, desaturation briefly < 60 seconds to 86%, quick recovery to 90%, heartrate 72 on 2.5L O2.  100% reciprocation for call light.  Answered inquiries in scope.              Patient left up in chair with all lines intact and nursing notified    GOALS:   Multidisciplinary Problems       Occupational Therapy " Goals          Problem: Occupational Therapy    Goal Priority Disciplines Outcome Interventions   Occupational Therapy Goal     OT, PT/OT Progressing    Description: Goals to be met by: 5/14/2025     Patient will increase functional independence with ADLs by performing:    LE Dressing with Modified [place] including item retrieval and transport, with least restrictive device with O2 tubing self management   Toileting from toilet with Modified [place] for hygiene and clothing management.   Toilet transfer to bedside commode with Modified [place].  Functional mobility to / from bathroom with modified independence, use of least restrictive device with O2 tubing self management, Modified DENNIS < 3/10  100% reciprocation of at least 2 activity pacing techniques and 2 calming techniques to utilize during daily regimen to support highest level of function                           Time Tracking:     OT Date of Treatment: 04/17/25  OT Start Time: 1535  OT Stop Time: 1552  OT Total Time (min): 17 min    Billable Minutes:Therapeutic Activity 17 min    OT/HUNTER: OT          4/17/2025

## 2025-04-17 NOTE — ASSESSMENT & PLAN NOTE
Patient with Acute on chronic debility due to age-related physical debility. The patient's latest AMPAC (Activity Measure for Post Acute Care) Score is listed below.    AM-PAC Score - How much help does the patient need for each activity listed  Basic Mobility Total Score: 18  Turning over in bed (including adjusting bedclothes, sheets and blankets)?: A little  Sitting down on and standing up from a chair with arms (e.g., wheelchair, bedside commode, etc.): A little  Moving from lying on back to sitting on the side of the bed?: A little  Moving to and from a bed to a chair (including a wheelchair)?: A little  Need to walk in hospital room?: A little  Climbing 3-5 steps with a railing?: A little    Plan  - PT/OT consulted  -

## 2025-04-17 NOTE — PROGRESS NOTES
V1/Screening/Enrollment Visit  Sponsor: [employer]  Study: Vaccine Effectiveness of 20-Valent Pneumococcal Conjugate Vaccine Against Vaccine-Type CAP Using a Test-Negative Design (RECAP-20)  IRB/Protocol #: 2022.215/[id number]  Principle Investigator: Dr. Stuart Mcdonough   Site Number: {Site Selection:[id number])    Protocol Version: Amendment 1  PACL - [id number]    Subject Number: [id number]    Date of Visit: 17 Apr 2025    Master Protocol Inclusion/Exclusion Criteria at Screening:  Inclusion Criteria (all must be YES to qualify):    1. Male or female participants >=65 years of age  Yes  2. Hospitalized participant with physician clinical suspicion of CAP with the presence  of >=2 of the following 10 clinical signs or symptoms:  fever (oral temperature >38.0°C/100.4°F or tympanic temperature  >38.5°C/101.2°F),  hypothermia (<35.5°C/95.9°F measured by a healthcare provider)  chills or rigors,  pleuritic chest pain,  new or worsening cough,  sputum production,  dyspnea (shortness of breath),  tachypnea (respiratory rate >20/min),  malaise, or  abnormal auscultatory findings suggestive of pneumonia (rales or evidence of  pulmonary consolidation including dullness on percussion, bronchial breath  sounds, or egophony).  Yes  3. Has a radiographic finding that is consistent with pneumonia   Yes  4. Capable of giving signed informed consent as described in protocol, which includes compliance with the requirements and restrictions listed in protocol.   Yes    Exclusion Criteria at Screening (all must be NO to qualify):    1. Any participant who develops signs and symptoms of pneumonia after being hospitalized for >=48 hours (either at the study site, another transferring hospital or a combination of these).   No  2. Received any pneumococcal vaccine <=30 days prior to enrollment.   No  3. Unable to provide urine specimen (e.g. anuric)   No  4. Previous enrollment in the study within the past 30 days   No    Were all eligibility  criteria met?: Yes     Prior to approaching patient for consent and enrollment, eligibility was confirmed by Inna Lopez.  If no, enter information about main criteria not satisfied/met: N/A    Demographic:  YOB: 2024  Age: 69 y.o.  Sex: Female  Ethnicity: Not  or   Race: Black or     Has patient previously participated in this study? No  If yes, what was the previous site and subject identifier?    Has patient received an Investigational pneumococcal vaccine as part of a study in the last 5 years? No     Did the patient consent for the optional vaccine search? No   (Please make sure if you janina YES or NO here, verify that you have also marked YES or NO matching on the consent note.)    Is the participant being enrolled at a study site located in [facility]? No     Date of Assessment: 17 Apr 2025    Residential Setting:  What setting below best describes where patient currently resides?  [x] At home without home health care  [] At home with home health care provided by family or friends  [] At home with professional home health care  [] Skilled nursing facility/rehabilitation facility low level of care  [] Skilled nursing facility/rehabilitation facility high level of care  [] Other (Specify):    Weekly exposure to children <5 years of age: No    Parenteral antibiotic use in last <= 90 days: No    Oral antibiotic use in last <=14 days: No    Mental Status: [] Confused  [x] Not Confused    Current Alcohol Abuse? No     Hospitalization >=24 hours in <=90 days prior to enrollment: No  If yes- what was date was the patient discharged:     History of Smoking, alcohol use, illicit drug use:  Has the subject every used the following substances?    Cigarettes [] NEVER [] CURRENT [x] FORMER   E-Cigarettes [x] NEVER [] CURRENT [] FORMER   Tobacco [x] NEVER [] CURRENT [] FORMER   Alcohol [x] NEVER [] CURRENT [] FORMER   Recreational Marijuana [x] NEVER [] CURRENT [] FORMER    Illicit Drugs, not specified [x] NEVER [] CURRENT [] FORMER     NOTE: Definitions per study    Smoking status  Lifetime cigarette use  Current smoking status    Current  >=100 cigarettes in lifetime  smokes every day    Former  >=100 cigarettes in lifetime  has quit smoking    Never  never smoked or <100 cigarettes in lifetime  not currently smoking    Tobacco use summarizes all forms of use not captured by cigarettes/E-cigarettes that are associated with the combustion of tobacco, e.g. cigars, cigarillos, tobacco pipes, Vaporized tobacco or nicotine.    Definition of Alcohol Use:   Never: Never drank alcohol or consumed fewer than 12 alcoholic drinks in a lifetime   Former: Consumed more than 12 alcoholic drinks in a lifetime, but has not consumed any alcoholic drinks in the last year   Current: Consumed alcoholic drinks within the last year    Definitions of Recreational Marijuana and Illicit Drug Use:   Never: Never used   Former: Has not used in the last year   Current: Used within the last year    Concomitant Medications: Patient Self-Reported Vaccination History  NOTE: At this time only vaccinations to be recorded are patient reported. At Day 30 will report vaccination history from physician record, pharmacy records, LINKS, etc.  (Janina Yes, No, or Unknown. If patient only knows the year, put Unk/Unk/YYYY).     ANY pneumococcal vaccine in the past 5 years: yes but unknown    Date and Specific Type(s) of pneumococcal vaccination in past 5 years:   23-Valent Polysaccharide: unknown  13-Valent Pneumococcal Conjugate Vaccine: unknown  15-Valent Pneumococcal Conjugate Vaccine: unknown  20-Valent Pneumococcal Conjugate Vaccine: unknown  21-Valent Pneumococcal Conjugate Vaccine: unknown    Influenza Vaccine (in last 5 years): yes but unknown    COVID Vaccines: yes but unknown    RSV Vaccine: yes but unknown      Significant Medical History:  Does the patient have a history of any of the following, janina all that  apply?  NOTE: checking box means yes patient has history, blank box means no patient does not have  [x] COPD  [] Asthma  [x] Emphysema  [] Other Chronic lung disease  [] Congestive Heart Failure  [] Cardiomyopathies  [] Coronary Artery Disease  [] Other Chronic heart disease  [] Diabetes mellitus with complications  [] Diabetes mellitus without complications  [] Diabetes mellitus that is diet controlled  [] Diabetes mellitus treated with medication  [x] Other metabolic disease  [] Liver Cirrhosis  [] Chronic liver disease without hepatic failure  [] Chronic Liver Disease   [] Other chronic liver disease  [] Chronic renal failure  [] Nephrotic syndrome  [] Chronic kidney disease  [] Other Chronic kidney disease  [] Chronic neurologic disease  [] Cochlear implant  [] Cerebrospinal fluid leak  [] Sickle cell disease  [] Other hemoglobinopathy  [] Congenital asplenia  [] Acquired asplenia  [] Congenital Immunodeficiency  [] Acquired Unspecified Immunity Deficiency  [] HIV  [] Acquired Immunodeficiency Virus (AIDS)  [] Hematologic cancer or malignancy  [] Cancer or malignancy manifesting as solid tumor  [] Organ transplantation  [] Immunosuppressant drug therapy  [] Cerebrovascular Disease  [] Neurologic disease  [] Rheumatoid disease  [] Celiac disease  [] Previous pneumonia episode(s) in the past year    NOTE Definitions:   Diabetes treated with medication  Diabetes mellitus treated with oral antidiabetic agents or insulin.    Chronic neurologic disease  Definition includes epilepsy, cerebral palsies, or cerebro-vascular disease    Chronic Kidney Disease  Chronic Kidney Disease defined as glomerular filtration rate < 60 mL/min or End-Stage Renal Disease (ESRD) and nephrotic syndrome.    Immunosuppressant drug therapy  Immunosuppressive medication including systemic corticosteroid use (e.g. in the context of autoimmune disorders, organ transplantation). Individuals on or likely to be on systemic steroids for more  than a month at a dose equivalent to prednisolone at 20 mg or more per day. Participants receiving topical steroids are not considered immunosuppressive.      Signs and Symptoms of Pneumonia (can be signs and symptoms present at time of Enrollment and from assessment ER presentation and admission):  Note: checking box means yes patient has, blank box means no patient does not have    Date performed : 15 Apr 2025    [] fever (oral temperature >38.0°C/100.4°F or tympanic temperature  >38.5°C/101.2°F),  [] hypothermia (<35.5°C/95.9°F measured by a healthcare provider)  [] chills   [] rigors  [] pleuritic chest pain,  [x] new or worsening cough,  [x] sputum production,  [x] dyspnea (shortness of breath),  [] tachypnea (respiratory rate >20/min),  [] malaise  [] abnormal auscultatory findings suggestive of pneumonia (rales or evidence of  pulmonary consolidation including dullness on percussion, bronchial breath  sounds, or egophony).    Body Weight and Height: (Weight/height date can be different from vital sign date of collection if applicable)  Date of collection: 15 Apr 2025  Weight: 70.12 kg  Height: 167.64 cm     If recording weight in kg, put height in cm. If recording weight in lbs, put height in inches.    Vital Signs (from first taken at ED presentation/hospitalization):  Date of collection: 15 Apr 2025  Time of Collection: 13:49  Temperature: 98.8 F  Temperature Anatomical Location: oral  Systolic BP: 150 mmHg  Diastolic BP: 73 mmHg  Pulse: 113 beats/min  Respiratory Rate: 20 breaths/min  Oxygen Saturation: 100  Oxygen source: Room Air     Oxygen Saturation and Oxygen Rate: (Record closest time to Vitals)  Date of collection:15 Apr 2025  Time of collection: 13:49   Oxygen Saturation %:100  Room Air?:Yes  If on oxygen therapy, note the flow at this time:N/A  Arterial Blood Gas PaO2 (if available): not done  FiO2- value should be 0.21-1.00: 0.21  (if on room air enter 0.21, Flow rate of 1 L/min =0.24, Flow  rate of 2 L/min =0.28, Flow rate of 3 L/min = 0.32, etc)    Oxygen Administration: No  Start Date and time (24-hour clock): N/A    (Please note: If patient is normally on CPAP for pre-diagnosed JUAN, do not include as Oxygen therapy. Routine BiPap/CPAP home use should not be recorded in the [facility] as mechanical ventilation.)    Low Flow Oxygen Delivery: No   If yes, Flow Rate:N/A  (if yes, check the one that applies)  [] Simple Face Mask [] Nasal Prongs [] Partial Rebreather Mask [] Tracheostomy Mask [] Tracheostomy Home Connector [] Other (please specify): N/A    High Flow Oxygen Delivery: No   If yes, Flow Rate:N/A  (if yes, check the one that applies)  [] High Flow Nasal Cannula [] Transtracheal Catheters [] Venturi Mask [] Aerosol Mask [] Non Rebreather Face Mask [] Other (please specify):N/A      PSI Score :   (refer to Protocol Appendix 5 for algorithm to calculate PSI score)  Date of Collection:17 Apr 2025  Calculated PSI Score: 98 Class IV  Calculated by: Elzbieta Vogel NP    Chest Imaging   Note: Imaging obtained between 48 hours prior to hospital admission and 48 hours after hospital admission may be used. If multiple image types are available, document the image with the most conclusive evidence of pneumonia.    Date(s) of Assessment: 15 Apr 2025  Type of Imaging Exam:  [] CT Scan  [x] X-Ray  [] MRI  [] Other (specify):     Assessment:  [x] Abnormal  [] Normal  [] Not Evaluable    If abnormal:  [x] Pneumonia without Pleural Effusion  [] Pneumonia with Pleural Effusion  [] Other (specify):      Urine Specimen Collection:  (Collect as soon as possible after consent signed, preferred within 24 hours of study entry. Residual urine may be used per Protocol Amendment 1. Urine collected from a PureWick and Condom catheters are allowed per [employer]'s PACL. Samples may not be collected from diapers.)    Was urine specimen collected?  yes  Date of collection: 17 Apr 2025  Time: 10:30  Sample ID (6-digit barcode#; 8  expected for each subject) for each urine aliquot:     Send: [id number], [id number], [id number], [id number]  Retain: [id number], [id number], [id number], [id number]    Catheter Type at time of urine sample (if applicable): [x] Yes (if yes answer the following details below) [] No, Not Applicable to this patient (Levar Comment in EDC Not Applicable)  Details of Urine Collection (if applicable): (Please use this area to also note any unsuccessful attempts at collecting urine or reasons why urine was not collected)  Indwelling: inserted into the bladder such as Pacheco catheter. If it is an external catheter, it is not indwelling. Levar Other than specify if Purewick or Condom Catheter, etc    Treatment: [] Indwelling Catheter [x] Other (specify): purewick catheter  Treatment Given: Yes Answer yes if patient had catheter at time of urine sample collection.  Start Time (24-hour clock): 10:30  Start Date: 17 Apr 2025  Ongoing: No      Microbiologic Testing  As standard medical care was microbiologic testing performed on any blood or respiratory samples: Yes   If yes, please complete below questions for each specimen collected    Date(s) of Specimen Collection: 15 Apr 2025  Specimen Type:   [x] Blood  []Sputum (if sputum complete below)   Number of PNMs:   [] <10    [] 10-25   [] >25   [] UNKNOWN  Number of Squamous Epithelial Cells:  [] <10    [] 10-25   [] >25   [] UNKNOWN   Presence of Mucus:   [] NO    [] YES   [] UNKNOWN    [] Pleural Fluid  [] Bronchoalveolar Lavage  [] Tracheal Aspirate  [] Other (specify):    Where isolates identified: No    No isolates as of 17 Apr 2025. CRC will continue to monitor.    NOTE: Do not document any sputum microbiology results for local agnieszka (colonizing agnieszka) or contaminants. Colonizing agnieszka includes viridans Streptococci, Neisseria spp., Corynebacteria spp. und Staphylococcus epidermidis, other coagulase-negative Staphylococci, Enterococcus spp., or Candida spp.    If Yes Complete, ISOLATE  IDENTIFICATION  Isolate Name Other Isolate Specify Method of Test Sent to [facility] Accession Number          (only complete #4 and #5 if Isolate identified is STREPTOCOCCUS PNEUMONIAE)    If  STREPTOCOCCUS PNEUMONIAE identified complete Susceptibility Testing below    Isolate Name Other Isolate Specify Drug Name Susceptibility for Drug           (Other Respiratory) Standard of Care Influenza, COVID-19, and RSV infection Test Results:  As standard medical care, was testing performed for Influenza, COVID-19, and RSV: Yes for Covid-19 and Influenza    If yes, please complete below questions for each test result. If an order was placed but never collected from patient, do not include below. Respiratory samples taken 7 days prior to presentation to the hospital through 3 days post-hospital admission will be reported for the study participant. If one viral panel is done, please put collection date. If separate dates were done for different tests, please record each below for Influenza, RSV and COVID-19.    Time of Specimen Collection: 16:07 (24-hour clock)    Date of Specimen Collection: 15 Apr 2025    Specimen Type:   [x] Mucus  [] Saliva  [] Sputum  [] Pleural Fluid  [] Lavage Fluid  [] Other (specify):  [] Unknown (Janina Unknown as a comment in EDC if lab details did not specify)    Specimen Sample Location:   [] Nasal Cavity  [] Throat  [x] Upper Respiratory System  [] Lower Respiratory System    If Nasal, please provide directionality: [] Anterior [] Dorsal  [] Medial - (If not stated in lab details, janina Unknown in EDC as a comment and document Unknown here)    Method of Test:  [] Microbial Culture [x] Polymerase Chain Reaction (PCR)  [] Immunoassay    Results: Negative  Influenza A: [] Detected [x] Not Detected [] Indeterminate [] Not Done  Influenza B: [] Detected [x] Not Detected [] Indeterminate [] Not Done  Severe Acute Resp Syndrome Coronavirus 2: [] Detected [] Not Detected [] Indeterminate [x] Not  Done  Respiratory Syncytial Virus A: [] Detected [] Not Detected [] Indeterminate [x] Not Done  Respiratory Syncytial Virus B: [] Detected [] Not Detected [] Indeterminate [x] Not Done  Respiratory Syncytial Virus (general, not specified A or B): [] Detected [] Not Detected [] Indeterminate [x] Not Done    hardeep of Specimen Collection: 16:07 (24-hour clock)    Date of Specimen Collection: 15 Apr 2025    Specimen Type:   [x] Mucus  [] Saliva  [] Sputum  [] Pleural Fluid  [] Lavage Fluid  [] Other (specify):  [] Unknown (Janina Unknown as a comment in EDC if lab details did not specify)    Specimen Sample Location:   [] Nasal Cavity  [] Throat  [x] Upper Respiratory System  [] Lower Respiratory System    If Nasal, please provide directionality: [] Anterior [] Dorsal  [] Medial - (If not stated in lab details, janina Unknown in EDC as a comment and document Unknown here)    Method of Test:  [] Microbial Culture [x] Polymerase Chain Reaction (PCR)  [] Immunoassay    Results: Negative  Influenza A: [] Detected [] Not Detected [] Indeterminate [x] Not Done  Influenza B: [] Detected [] Not Detected [] Indeterminate [x] Not Done  Severe Acute Resp Syndrome Coronavirus 2: [] Detected [x] Not Detected [] Indeterminate [] Not Done  Respiratory Syncytial Virus A: [] Detected [] Not Detected [] Indeterminate [x] Not Done  Respiratory Syncytial Virus B: [] Detected [] Not Detected [] Indeterminate [x] Not Done  Respiratory Syncytial Virus (general, not specified A or B): [] Detected [] Not Detected [] Indeterminate [x] Not Done      AE and TI assessment  NOTE: Per study AEs and SAEs (active collection period) will begin when the first protocol-required procedure (urine sample) is performed at Visit 1 and will conclude 15 minutes after the procedure is performed.     Were any AE or TI assessed at this visit: No  Was patient asked about any possible AE or TI at this visit? Yes    If yes to AEs, please complete required AE/TI source  document.    No AE/TI were reported to [facility].    Per study protocol subject will be follow, if hospitalized, until hospital Day#3. Additional data will be collected at Day 30 regarding hospital discharge. No other intervention or data collection will be performed for this study.

## 2025-04-17 NOTE — ASSESSMENT & PLAN NOTE
Patient has a diagnosis of pneumonia. The cause of the pneumonia is unknown at this time. The pneumonia is stable. The patient has the following signs/symptoms of pneumonia: persistent hypoxia  and cough. The patient does have a current oxygen requirement and the patient does have a home oxygen requirement. I have reviewed the pertinent imaging. The following cultures have been collected: Blood cultures The culture results are listed below.     Current antimicrobial regimen consists of the antibiotics listed below. Will monitor patient closely and continue current treatment plan unchanged.    Antibiotics (From admission, onward)      Start     Stop Route Frequency Ordered    04/16/25 1630  cefTRIAXone injection 1 g         -- IV Every 24 hours (non-standard times) 04/16/25 0757            Microbiology Results (last 7 days)       Procedure Component Value Units Date/Time    Blood culture [[id number]]  (Normal) Collected: 04/15/25 1632    Order Status: Completed Specimen: Blood from Wrist, Left Updated: 04/17/25 1100     Blood Culture No Growth After 36 Hours    Blood culture [[id number]]  (Normal) Collected: 04/15/25 1632    Order Status: Completed Specimen: Blood from Peripheral, Antecubital, Right Updated: 04/17/25 1100     Blood Culture No Growth After 36 Hours

## 2025-04-17 NOTE — PT/OT/SLP PROGRESS
Physical Therapy Treatment    Patient Name:  Terri Phelan   MRN:  [id number]    Recommendations:     Discharge Recommendations: Low Intensity Therapy (home health PT/OT)  Discharge Equipment Recommendations: none  Barriers to discharge:  decreased strength/endurance    Assessment:     Terri Phelan is a 69 y.o. female admitted with a medical diagnosis of Acute on chronic respiratory failure with hypoxia and hypercapnia.  She presents with the following impairments/functional limitations: weakness, impaired endurance, impaired functional mobility, gait instability, impaired balance, impaired self care skills, decreased lower extremity function, impaired cardiopulmonary response to activity Pt would continue to benefit from P.T. To address impairments listed above.  .    Rehab Prognosis: Fair; patient would benefit from acute skilled PT services to address these deficits and reach maximum level of function.    Recent Surgery: * No surgery found *      Plan:     During this hospitalization, patient to be seen 4 x/week to address the identified rehab impairments via gait training, therapeutic activities, therapeutic exercises, neuromuscular re-education and progress toward the following goals:    Plan of Care Expires:  05/16/25    Subjective       Patient/Family Comments/goals: Pt agreed to tx  Pain/Comfort:  Pain Rating 1: 0/10  Pain Rating Post-Intervention 1: 0/10      Objective:     Communicated with RN (Marisa) prior to session.  Patient found left sidelying with telemetry, oxygen upon PT entry to room.     General Precautions: Standard, fall  Orthopedic Precautions: N/A  Braces: N/A  Respiratory Status:  CPAP     Functional Mobility:  Bed Mobility:     Rolling Right: stand by assistance  Scooting: stand by assistance  Supine to Sit: stand by assistance  Transfers:     Sit to Stand:  contact guard assistance with rolling walker  Toilet Transfer: stand by assistance and contact guard assistance with  rolling  walker and grab bars  using  Step Transfer  Gait: 6ft x 2 to and from bathroom with RW and CGA/SBA with assist for O2 line.  Pt then ambulated 25ft x 2 with RW, portable O2 and CGA/SBA @ 2lpm NC.  No LOB, decreased rocky and standing rest break secondary to decreased strength/endurance and mild SOB.    Balance: sitting good, standing fair/fair+, gait fair/fair+      AM-PAC 6 CLICK MOBILITY  Turning over in bed (including adjusting bedclothes, sheets and blankets)?: 3  Sitting down on and standing up from a chair with arms (e.g., wheelchair, bedside commode, etc.): 3  Moving from lying on back to sitting on the side of the bed?: 3  Moving to and from a bed to a chair (including a wheelchair)?: 3  Need to walk in hospital room?: 3  Climbing 3-5 steps with a railing?: 3  Basic Mobility Total Score: 18       Treatment & Education:  Pt found sleeping in left sidelying on CPAP.  Pt woke easily.  RN present and removed CPAP and placed pt on wall unit O2 @ 2lpm NC.  Bed mobility and gait as above.  Seated BLE therex: APs and LAQs x 15 reps.  Purewick disconnected secondary to pt requesting to use toilet.  Pt transferred onto and off of toilet with CGA/SBA with RW and GB.  Pt was able to perform self hygiene with I to urine.    Tremors in BUEs and trunk noted throughout tx.    Pt transferred to b/s chair with RW and CGA at end of tx. Purewick reconnected and O2 line returned to wall unit @ 2lpm NC.    Patient left up in chair with all lines intact, call button in reach, chair alarm on, and RN notified..    GOALS:   Multidisciplinary Problems       Physical Therapy Goals          Problem: Physical Therapy    Goal Priority Disciplines Outcome Interventions   Physical Therapy Goal     PT, PT/OT Progressing    Description: Goals to be met by: 2025     Patient will increase functional independence with mobility by performin. Supine to sit with Modified [place]  2. Sit to supine with Modified [place]  3.  Sit to stand transfer with Modified [place] using Rolling Walker  4. Bed to chair transfer with Modified [place] using Rolling Walker  5. Gait  x 2 x 50 feet with Modified [place] using Rolling Walker.                          Time Tracking:     PT Received On: 04/17/25  PT Start Time: 1442     PT Stop Time: 1507  PT Total Time (min): 25 min     Billable Minutes: Gait Training 9 and Therapeutic Activity 8 and Therapeutic exercise 8    Treatment Type: Treatment  PT/PTA: PTA     Number of PTA visits since last PT visit: 1 04/17/2025

## 2025-04-17 NOTE — SUBJECTIVE & OBJECTIVE
Interval History:   Seen at the bedside.  No distress noted.  We will continue to monitor.  We will evaluate if she is ready for discharge on tomorrow.      Review of Systems   Constitutional:  Negative for chills, diaphoresis and fatigue.   HENT:  Negative for trouble swallowing.    Respiratory:  Positive for shortness of breath.    Cardiovascular:  Negative for chest pain.   Gastrointestinal:  Negative for diarrhea, nausea and vomiting.   Genitourinary:  Negative for difficulty urinating and hematuria.   Musculoskeletal:  Negative for myalgias.   Skin:  Negative for pallor and rash.   Neurological:  Positive for weakness. Negative for dizziness, light-headedness and headaches.   Psychiatric/Behavioral:  Negative for confusion and hallucinations.      Objective:     Vital Signs (Most Recent):  Temp: 97.6 °F (36.4 °C) (04/17/25 0749)  Pulse: 85 (04/17/25 1216)  Resp: 17 (04/17/25 1102)  BP: (!) 106/47 (04/17/25 1102)  SpO2: (!) 93 % (04/17/25 1102) Vital Signs (24h Range):  Temp:  [97.6 °F (36.4 °C)-99 °F (37.2 °C)] 97.6 °F (36.4 °C)  Pulse:  [51-85] 85  Resp:  [17] 17  SpO2:  [90 %-98 %] 93 %  BP: (106-166)/(47-77) 106/47     Weight: 86.3 kg (190 lb 4.1 oz)  Body mass index is 30.71 kg/m².    Intake/Output Summary (Last 24 hours) at 4/17/2025 1537  Last data filed at 4/17/2025 0456  Gross per 24 hour   Intake --   Output 500 ml   Net -500 ml         Physical Exam  Vitals and nursing note reviewed.   Constitutional:       Appearance: Normal appearance.   HENT:      Head: Normocephalic and atraumatic.      Mouth/Throat:      Mouth: Mucous membranes are moist.   Eyes:      Extraocular Movements: Extraocular movements intact.   Cardiovascular:      Rate and Rhythm: Normal rate and regular rhythm.   Pulmonary:      Effort: Pulmonary effort is normal. No respiratory distress.      Breath sounds: Normal breath sounds.   Abdominal:      Palpations: Abdomen is soft.   Skin:     General: Skin is warm and dry.   Neurological:       General: No focal deficit present.      Mental Status: She is alert and oriented to person, place, and time. Mental status is at baseline.   Psychiatric:         Behavior: Behavior normal.               Significant Labs: All pertinent labs within the past 24 hours have been reviewed.  CBC:   Recent Labs   Lab 04/16/25  0604 04/17/25  0505   WBC 6.76 11.58   HGB 12.3 12.0   HCT 42.6 40.6    200     CMP:   Recent Labs   Lab 04/16/25  0604 04/17/25  0505    143   K 5.2* 3.8    100   CO2 34* 35*   BUN 12 18   CREATININE 0.6 0.7   CALCIUM 9.1 9.3   ANIONGAP 6* 8       Significant Imaging: I have reviewed all pertinent imaging results/findings within the past 24 hours.

## 2025-04-18 VITALS
HEIGHT: 66 IN | SYSTOLIC BLOOD PRESSURE: 123 MMHG | BODY MASS INDEX: 30.58 KG/M2 | OXYGEN SATURATION: 95 % | WEIGHT: 190.25 LBS | TEMPERATURE: 98 F | RESPIRATION RATE: 18 BRPM | HEART RATE: 50 BPM | DIASTOLIC BLOOD PRESSURE: 47 MMHG

## 2025-04-18 LAB
ABSOLUTE EOSINOPHIL (OHS): 0 K/UL
ABSOLUTE MONOCYTE (OHS): 0.91 K/UL (ref 0.3–1)
ABSOLUTE NEUTROPHIL COUNT (OHS): 6.26 K/UL (ref 1.8–7.7)
ANION GAP (OHS): 6 MMOL/L (ref 8–16)
BASOPHILS # BLD AUTO: 0.02 K/UL
BASOPHILS NFR BLD AUTO: 0.2 %
BUN SERPL-MCNC: 20 MG/DL (ref 8–23)
CALCIUM SERPL-MCNC: 9 MG/DL (ref 8.7–10.5)
CHLORIDE SERPL-SCNC: 102 MMOL/L (ref 95–110)
CO2 SERPL-SCNC: 35 MMOL/L (ref 23–29)
CREAT SERPL-MCNC: 0.6 MG/DL (ref 0.5–1.4)
ERYTHROCYTE [DISTWIDTH] IN BLOOD BY AUTOMATED COUNT: 13.4 % (ref 11.5–14.5)
GFR SERPLBLD CREATININE-BSD FMLA CKD-EPI: >60 ML/MIN/1.73/M2
GLUCOSE SERPL-MCNC: 90 MG/DL (ref 70–110)
HCT VFR BLD AUTO: 38.8 % (ref 37–48.5)
HGB BLD-MCNC: 11.6 GM/DL (ref 12–16)
IMM GRANULOCYTES # BLD AUTO: 0.03 K/UL (ref 0–0.04)
IMM GRANULOCYTES NFR BLD AUTO: 0.3 % (ref 0–0.5)
LYMPHOCYTES # BLD AUTO: 1.89 K/UL (ref 1–4.8)
MAGNESIUM SERPL-MCNC: 2.1 MG/DL (ref 1.6–2.6)
MCH RBC QN AUTO: 27.3 PG (ref 27–31)
MCHC RBC AUTO-ENTMCNC: 29.9 G/DL (ref 32–36)
MCV RBC AUTO: 91 FL (ref 82–98)
NUCLEATED RBC (/100WBC) (OHS): 0 /100 WBC
PHOSPHATE SERPL-MCNC: 2.4 MG/DL (ref 2.7–4.5)
PLATELET # BLD AUTO: 196 K/UL (ref 150–450)
PMV BLD AUTO: 11.5 FL (ref 9.2–12.9)
POTASSIUM SERPL-SCNC: 3.9 MMOL/L (ref 3.5–5.1)
RBC # BLD AUTO: 4.25 M/UL (ref 4–5.4)
RELATIVE EOSINOPHIL (OHS): 0 %
RELATIVE LYMPHOCYTE (OHS): 20.7 % (ref 18–48)
RELATIVE MONOCYTE (OHS): 10 % (ref 4–15)
RELATIVE NEUTROPHIL (OHS): 68.8 % (ref 38–73)
SODIUM SERPL-SCNC: 143 MMOL/L (ref 136–145)
WBC # BLD AUTO: 9.11 K/UL (ref 3.9–12.7)

## 2025-04-18 PROCEDURE — 85025 COMPLETE CBC W/AUTO DIFF WBC: CPT | Mod: HCNC | Performed by: NURSE PRACTITIONER

## 2025-04-18 PROCEDURE — 25000003 PHARM REV CODE 250: Mod: HCNC | Performed by: NURSE PRACTITIONER

## 2025-04-18 PROCEDURE — 94761 N-INVAS EAR/PLS OXIMETRY MLT: CPT | Mod: HCNC

## 2025-04-18 PROCEDURE — 84100 ASSAY OF PHOSPHORUS: CPT | Mod: HCNC | Performed by: NURSE PRACTITIONER

## 2025-04-18 PROCEDURE — 36415 COLL VENOUS BLD VENIPUNCTURE: CPT | Mod: HCNC | Performed by: NURSE PRACTITIONER

## 2025-04-18 PROCEDURE — 80048 BASIC METABOLIC PNL TOTAL CA: CPT | Mod: HCNC | Performed by: NURSE PRACTITIONER

## 2025-04-18 PROCEDURE — 99900035 HC TECH TIME PER 15 MIN (STAT): Mod: HCNC

## 2025-04-18 PROCEDURE — 94660 CPAP INITIATION&MGMT: CPT | Mod: HCNC

## 2025-04-18 PROCEDURE — 83735 ASSAY OF MAGNESIUM: CPT | Mod: HCNC | Performed by: NURSE PRACTITIONER

## 2025-04-18 PROCEDURE — 63600175 PHARM REV CODE 636 W HCPCS: Mod: HCNC | Performed by: STUDENT IN AN ORGANIZED HEALTH CARE EDUCATION/TRAINING PROGRAM

## 2025-04-18 PROCEDURE — 27100171 HC OXYGEN HIGH FLOW UP TO 24 HOURS: Mod: HCNC

## 2025-04-18 RX ORDER — SODIUM,POTASSIUM PHOSPHATES 280-250MG
1 POWDER IN PACKET (EA) ORAL ONCE
Status: DISCONTINUED | OUTPATIENT
Start: 2025-04-18 | End: 2025-04-18 | Stop reason: HOSPADM

## 2025-04-18 RX ORDER — GUAIFENESIN 600 MG/1
600 TABLET, EXTENDED RELEASE ORAL 2 TIMES DAILY
Qty: 10 TABLET | Refills: 0 | Status: SHIPPED | OUTPATIENT
Start: 2025-04-18

## 2025-04-18 RX ORDER — AZITHROMYCIN 250 MG/1
TABLET, FILM COATED ORAL
Qty: 6 TABLET | Refills: 0 | Status: SHIPPED | OUTPATIENT
Start: 2025-04-18 | End: 2025-04-23

## 2025-04-18 RX ORDER — AMOXICILLIN AND CLAVULANATE POTASSIUM 875; 125 MG/1; MG/1
1 TABLET, FILM COATED ORAL 2 TIMES DAILY
Qty: 14 TABLET | Refills: 0 | Status: SHIPPED | OUTPATIENT
Start: 2025-04-18 | End: 2025-04-25

## 2025-04-18 RX ORDER — PREDNISONE 20 MG/1
60 TABLET ORAL DAILY
Qty: 9 TABLET | Refills: 0 | Status: SHIPPED | OUTPATIENT
Start: 2025-04-19 | End: 2025-04-22

## 2025-04-18 RX ADMIN — GUAIFENESIN 600 MG: 600 TABLET, EXTENDED RELEASE ORAL at 08:04

## 2025-04-18 RX ADMIN — ASPIRIN 81 MG CHEWABLE TABLET 81 MG: 81 TABLET CHEWABLE at 08:04

## 2025-04-18 RX ADMIN — PREDNISONE 60 MG: 20 TABLET ORAL at 08:04

## 2025-04-18 RX ADMIN — ROFLUMILAST 500 MCG: 500 TABLET ORAL at 08:04

## 2025-04-18 RX ADMIN — EZETIMIBE 10 MG: 10 TABLET ORAL at 08:04

## 2025-04-18 RX ADMIN — ESCITALOPRAM OXALATE 20 MG: 10 TABLET ORAL at 08:04

## 2025-04-18 NOTE — ASSESSMENT & PLAN NOTE
Patient with Hypercapnic and Hypoxic Respiratory failure which is Acute on chronic.  she is on home oxygen at 2 LPM. Supplemental oxygen was provided and noted- Oxygen Concentration (%):  [30] 30    .   Signs/symptoms of respiratory failure include- increased work of breathing and wheezing. Contributing diagnoses includes - COPD and Pneumonia Labs and images were reviewed. Patient Has recent ABG, which has been reviewed. Will treat underlying causes and adjust management of respiratory failure as follows- nebs, steroids and IV antidotic, Daliresp    Cont CTX and Azithro  Cont prednisone 60 mg for 5 days  Cont inhalers   Cont Daliresp    4/18-we will continue antibiotics.  And is on O2.  We will continue current O2 prescription.  Home Health has been ordered.

## 2025-04-18 NOTE — PLAN OF CARE
[facility] - Telemetry      HOME HEALTH ORDERS  FACE TO FACE ENCOUNTER    Patient Name: Terri Phelan  YOB: 1955    PCP: Sussy Mason MD   PCP Address: [address] / [place] [address]  PCP Phone Number: [phone]  PCP Fax: [phone]    Encounter Date: 4/15/25    Admit to Home Health    Diagnoses:  Active Hospital Problems    Diagnosis  POA    *Acute on chronic respiratory failure with hypoxia and hypercapnia [J96.21, J96.22]  Yes    Community acquired bacterial pneumonia [J15.9]  Yes    Anxiety [F41.9]  Yes     Chronic     Anxiety associated with feelings of SOB, diagnosis, and hope for new medications that can help. Exacerbated by chronic need to check oxygen numbers and frequent hospitalizations. Following with palliative.       Debility [R53.81]  Yes    CAD (coronary artery disease) [I25.10]  Yes    Oxygen dependent [Z99.81]  Not Applicable     Requires 2-3L NC.      Obstructive sleep apnea syndrome [G47.33]  Yes      Resolved Hospital Problems   No resolved problems to display.       Follow Up Appointments:  Future Appointments   Date Time Provider Department Center   4/28/2025  1:30 PM Bethany Landry MD [facility]   5/13/2025  4:40 PM Sussy Mason MD [facility] 65PLUS 65+ [place]       Allergies:  Review of patient's allergies indicates:   Allergen Reactions    Codeine Nausea And Vomiting    Lipitor [atorvastatin] Other (See Comments)     Muscle fatigue      Morphine Hallucinations       Medications: Review discharge medications with patient and family and provide education.    Current Medications[1]     Medication List        START taking these medications      amoxicillin-clavulanate 875-125mg 875-125 mg per tablet  Commonly known as: AUGMENTIN  Take 1 tablet by mouth 2 (two) times daily. for 7 days     guaiFENesin 600 mg 12 hr tablet  Commonly known as: MUCINEX  Take 1 tablet (600 mg total) by mouth 2 (two) times daily.     predniSONE 20 MG  tablet  Commonly known as: DELTASONE  Take 3 tablets (60 mg total) by mouth once daily. for 3 days  Start taking on: April 19, 2025            CHANGE how you take these medications      albuterol-ipratropium 2.5 mg-0.5 mg/3 mL nebulizer solution  Commonly known as: DUO-NEB  USE 3 ML VIA NEBULIZER EVERY 6 HOURS AS NEEDED FOR WHEEZING  What changed: See the new instructions.     azithromycin 250 MG tablet  Commonly known as: Z-OLY  Take 2 tablets by mouth on day 1; Take 1 tablet by mouth on days 2-5  What changed:   how much to take  how to take this  when to take this  additional instructions     fluticasone propionate 50 mcg/actuation nasal spray  Commonly known as: FLONASE  SHAKE LIQUID AND USE 1 SPRAY(50 MCG) IN EACH NOSTRIL DAILY AS NEEDED FOR ALLERGIES  What changed: See the new instructions.     hydrOXYzine HCL 25 MG tablet  Commonly known as: ATARAX  TAKE 1 TABLET(25 MG) BY MOUTH THREE TIMES DAILY AS NEEDED FOR ANXIETY OR ITCHING  What changed: See the new instructions.            CONTINUE taking these medications      acetaminophen 500 MG tablet  Commonly known as: TYLENOL  Take 500 mg by mouth every 6 (six) hours as needed for Pain.     albuterol 90 mcg/actuation inhaler  Commonly known as: PROVENTIL/VENTOLIN HFA  Inhale 2 puffs into the lungs every 6 (six) hours as needed for Wheezing or Shortness of Breath.     alendronate 35 MG tablet  Commonly known as: FOSAMAX  Take 35 mg by mouth every Tuesday.     aspirin 81 MG Chew  Take 81 mg by mouth once daily.     BREZTRI AEROSPHERE 160-9-4.8 mcg/actuation Hfaa  Generic drug: budesonide-glycopyr-formoterol  Inhale 2 puffs into the lungs 2 (two) times daily.     calcium carb-mag hydrox-simeth 1,200 mg-270 mg -80 mg/10 mL Susp  Take 1,200 mg by mouth once daily.     cholecalciferol (vitamin D3) 50 mcg (2,000 unit) Tab  Commonly known as: VITAMIN D3  Take 2,000 Units by mouth once daily.     cyclobenzaprine 5 MG tablet  Commonly known as: FLEXERIL  Take 1 tablet (5  mg total) by mouth daily as needed for Muscle spasms.     dextromethorphan-guaiFENesin  mg  mg per 12 hr tablet  Commonly known as: MUCINEX DM  Take 1 tablet by mouth 2 (two) times daily as needed (cough).     EScitalopram oxalate 20 MG tablet  Commonly known as: LEXAPRO  Take 1 tablet (20 mg total) by mouth once daily.     ezetimibe 10 mg tablet  Commonly known as: ZETIA  TAKE 1 TABLET(10 MG) BY MOUTH DAILY     furosemide 40 MG tablet  Commonly known as: LASIX  Take 40 mg by mouth once daily.     losartan 25 MG tablet  Commonly known as: COZAAR  Take 1 tablet (25 mg total) by mouth once daily.     meloxicam 15 MG tablet  Commonly known as: MOBIC  Take 1 tablet (15 mg total) by mouth once daily.     OXYGEN-AIR DELIVERY SYSTEMS MISC  2 L by Nasal route continuous.     potassium chloride 10 MEQ Cpsr  Commonly known as: MICRO-K  TAKE 1 CAPSULE BY MOUTH EVERY DAY     roflumilast 500 mcg Tab  Commonly known as: DALIRESP  Take 1 tablet (500 mcg total) by mouth once daily.                I have seen and examined this patient within the last 30 days. My clinical findings that support the need for the home health skilled services and home bound status are the following:no   Weakness/numbness causing balance and gait disturbance due to COPD Exacerbation and Weakness/Debility making it taxing to leave home.     Diet:   cardiac diet      Referrals/ Consults  Physical Therapy to evaluate and treat. Evaluate for home safety and equipment needs; Establish/upgrade home exercise program. Perform / instruct on therapeutic exercises, gait training, transfer training, and Range of Motion.  Occupational Therapy to evaluate and treat. Evaluate home environment for safety and equipment needs. Perform/Instruct on transfers, ADL training, ROM, and therapeutic exercises.   to evaluate for community resources/long-range planning.  Aide to provide assistance with personal care, ADLs, and vital signs.    Activities:    activity as tolerated    Nursing:   Agency to admit patient within 24 hours of hospital discharge unless specified on physician order or at patient request    SN to complete comprehensive assessment including routine vital signs. Instruct on disease process and s/s of complications to report to MD. Review/verify medication list sent home with the patient at time of discharge  and instruct patient/caregiver as needed. Frequency may be adjusted depending on start of care date.     Skilled nurse to perform up to 3 visits PRN for symptoms related to diagnosis    Notify MD if SBP > 160 or < 90; DBP > 90 or < 50; HR > 120 or < 50; Temp > 101; O2 < 88%    Ok to schedule additional visits based on staff availability and patient request on consecutive days within the home health episode.    When multiple disciplines ordered:    Start of Care occurs on Sunday - Wednesday schedule remaining discipline evaluations as ordered on separate consecutive days following the start of care.    Thursday SOC -schedule subsequent evaluations Friday and Monday the following week.     Friday - Saturday SOC - schedule subsequent discipline evaluations on consecutive days starting Monday of the following week.    For all post-discharge communication and subsequent orders please contact patient's primary care physician. If unable to reach primary care physician or do not receive response within 30 minutes, please contact  for clinical staff order clarification    Miscellaneous   Home Oxygen:  No change, Assess oxygen saturation via pulse oximeter as needed for increase in SOB., Notify physician if oxygen saturation less than 88%, and Consult respiratory therapy for instruction on home oxygen use    Home Health Aide:  Nursing Three times weekly, Physical Therapy Three times weekly, Medical Social Work Weekly, Respiratory Therapy Daily, and Home Health Aide Daily      I certify that this patient is confined to her home and needs intermittent  skilled nursing care, physical therapy, and occupational therapy.               [1]   Current Facility-Administered Medications   Medication Dose Route Frequency Provider Last Rate Last Admin    acetaminophen tablet 650 mg  650 mg Oral Q6H PRN Jason Schulz MD   650 mg at 04/16/25 0832    albuterol-ipratropium 2.5 mg-0.5 mg/3 mL nebulizer solution 3 mL  3 mL Nebulization Q6H PRN Shana Zapata NP        aspirin chewable tablet 81 mg  81 mg Oral Daily Keron Inman NP   81 mg at 04/18/25 0837    budesonide-glycopyr-formoterol 160-9-4.8 mcg/actuation HFAA 2 puff  2 puff Inhalation BID Jason Schulz MD   2 puff at 04/17/25 2055    cefTRIAXone injection 1 g  1 g Intravenous Q24H Jason Schulz MD   1 g at 04/17/25 1533    enoxaparin injection 40 mg  40 mg Subcutaneous Q24H (prophylaxis, 1700) Jason Schulz MD   40 mg at 04/17/25 1633    EScitalopram oxalate tablet 20 mg  20 mg Oral Daily Keron Inman NP   20 mg at 04/18/25 0836    ezetimibe tablet 10 mg  10 mg Oral Daily Keron Inman NP   10 mg at 04/18/25 0836    guaiFENesin 12 hr tablet 600 mg  600 mg Oral BID Shana Zapata NP   600 mg at 04/18/25 0836    hydrOXYzine HCL tablet 25 mg  25 mg Oral TID PRN Jason Schulz MD   25 mg at 04/17/25 1640    losartan tablet 25 mg  25 mg Oral Daily Keron Inman NP   25 mg at 04/17/25 0956    predniSONE tablet 60 mg  60 mg Oral Daily Jason Schulz MD   60 mg at 04/18/25 0837    roflumilast (DALIRESP) tablet 500 mcg  500 mcg Oral Daily Keron Inman NP   500 mcg at 04/18/25 0837    sodium chloride 0.9% flush 3 mL  3 mL Intravenous PRN Keron Inman NP

## 2025-04-18 NOTE — PLAN OF CARE
Introduced as VN and will be reviewing discharge instructions.  Reviewed AVS with patient  and education done on activity, follow up care, to watch for signs and symptoms of infection getting worse. Educated patient on reason for admission, home medication list, and discharge instructions including when to return to ED and the following doctor appointments.  Education per flowsheet.  Opportunity given for questions and questions answered. Nurse notified of   completion of discharge education. Patient is waiting for ride to arrive.

## 2025-04-18 NOTE — PLAN OF CARE
Pt is set to dc home later today. Pt will have her Mulugeta Phelan (daughter) [phone] provide transport home. SW called and spoke with [facility] to inform them that pt will dc later today. SW uploaded HH orders via EPIC. Rounds completed on pt. All questions addressed. Bedside nurse to discuss d/c medications. Discussed importance to attend all f/u appts and take medications as prescribed. Verbalized understanding. Case Management to sign off.     Mina Arroyo, MSTEETEE  [phone]    Future Appointments   Date Time Provider Department Center   4/28/2025  1:30 PM Bethany Landry MD [facility]   5/13/2025  4:40 PM Sussy Mason MD [facility] 65PLUS 65+ [place]        04/18/25 0953   Final Note   Assessment Type Final Discharge Note   Anticipated Discharge Disposition Home-Health   Post-Acute Status   Coverage [employer]   Discharge Delays None known at this time

## 2025-04-18 NOTE — ASSESSMENT & PLAN NOTE
Patient has a diagnosis of pneumonia. The cause of the pneumonia is unknown at this time. The pneumonia is stable. The patient has the following signs/symptoms of pneumonia: persistent hypoxia  and cough. The patient does have a current oxygen requirement and the patient does have a home oxygen requirement. I have reviewed the pertinent imaging. The following cultures have been collected: Blood cultures The culture results are listed below.     Current antimicrobial regimen consists of the antibiotics listed below. Will monitor patient closely and continue current treatment plan unchanged.    Antibiotics (From admission, onward)      Start     Stop Route Frequency Ordered    04/16/25 1630  cefTRIAXone injection 1 g         -- IV Every 24 hours (non-standard times) 04/16/25 0757            Microbiology Results (last 7 days)       Procedure Component Value Units Date/Time    Blood culture [[id number]]  (Normal) Collected: 04/15/25 1632    Order Status: Completed Specimen: Blood from Wrist, Left Updated: 04/17/25 2300     Blood Culture No Growth After 48 Hours    Blood culture [[id number]]  (Normal) Collected: 04/15/25 1632    Order Status: Completed Specimen: Blood from Peripheral, Antecubital, Right Updated: 04/17/25 2300     Blood Culture No Growth After 48 Hours

## 2025-04-18 NOTE — PLAN OF CARE
Problem: Adult Inpatient Plan of Care  Goal: Plan of Care Review  Outcome: Progressing  Goal: Patient-Specific Goal (Individualized)  Outcome: Progressing  Goal: Absence of Hospital-Acquired Illness or Injury  Outcome: Progressing  Goal: Optimal Comfort and Wellbeing  Outcome: Progressing  Goal: Readiness for Transition of Care  Outcome: Progressing     Problem: COPD (Chronic Obstructive Pulmonary Disease)  Goal: Optimal Chronic Illness Coping  Outcome: Progressing  Goal: Optimal Level of Functional [place]  Outcome: Progressing  Goal: Absence of Infection Signs and Symptoms  Outcome: Progressing  Goal: Improved Oral Intake  Outcome: Progressing  Goal: Effective Oxygenation and Ventilation  Outcome: Progressing     Problem: Pneumonia  Goal: Fluid Balance  Outcome: Progressing  Goal: Resolution of Infection Signs and Symptoms  Outcome: Progressing  Goal: Effective Oxygenation and Ventilation  Outcome: Progressing     Problem: Fall Injury Risk  Goal: Absence of Fall and Fall-Related Injury  Outcome: Progressing     Problem: Comorbidity Management  Goal: Maintenance of Asthma Control  Outcome: Progressing  Goal: Maintenance of COPD Symptom Control  Outcome: Progressing  Goal: Blood Pressure in Desired Range  Outcome: Progressing

## 2025-04-18 NOTE — DISCHARGE SUMMARY
[facility]  Discharge Summary      Patient Name: Terri Phelan  MRN: [id number]  SUNDEEP: [id number]  Patient Class: IP- Inpatient  Admission Date: 4/15/2025  Hospital Length of Stay: 3 days  Discharge Date and Time: 04/18/2025 10:31 AM  Attending Physician: Alida Garza MD   Discharging Provider: Shana Zapata NP  Primary Care Provider: Sussy Mason MD    Primary Care Team: Networked reference to record PCT     HPI:     Ms Phelan is a 69 year old female with PMHx of COPD on home O2, HTN, GERD and asthma. She presents to [facility] for evaluation of shortness of breath. Symptoms started few weeks and have continued to worsen. Patient has to increase home oxygen to 4 L today. She denies associated symptoms of fever, chills, chest pain or palpitations. Chest X ray reports patchy opacity projects over the right lower lung zone possibly edema with a developing consolidation also possible. pH 7.28, PCO 83, P02 56 on ABG's. She received nebs, IV antibiotics, steroids in the Emergency Room. Patient is being admitted under the care of Hospital Medicine, she is a full code.          * No surgery found *      Hospital Course:   No notes on file     Goals of Care Treatment Preferences:  Code Status: Full Code    Health care agent: Mulugeta Phelan  [facility] care agent number: [phone]    Living Will: Yes              [facility] Screening:  The patient was screened for utility difficulties, food insecurity, transport difficulties, housing insecurity, and interpersonal safety and there were no concerns identified this admission.     Consults:     Assessment & Plan  Acute on chronic respiratory failure with hypoxia and hypercapnia  Patient with Hypercapnic and Hypoxic Respiratory failure which is Acute on chronic.  she is on home oxygen at 2 LPM. Supplemental oxygen was provided and noted- Oxygen Concentration (%):  [30] 30    .   Signs/symptoms of respiratory failure  include- increased work of breathing and wheezing. Contributing diagnoses includes - COPD and Pneumonia Labs and images were reviewed. Patient Has recent ABG, which has been reviewed. Will treat underlying causes and adjust management of respiratory failure as follows- nebs, steroids and IV antidotic, Daliresp    Cont CTX and Azithro  Cont prednisone 60 mg for 5 days  Cont inhalers   Cont Daliresp    4/18-we will continue antibiotics.  And is on O2.  We will continue current O2 prescription.  Home Health has been ordered.  CAD (coronary artery disease)  Patient with known CAD which is controlled Will continue ASA and Statin and monitor for S/Sx of angina/ACS. Continue to monitor on telemetry.   Obstructive sleep apnea syndrome  CPAP QHS and naptime  Oxygen dependent  Wean O2 to level as tolerated     Debility  Patient with Acute on chronic debility due to age-related physical debility. The patient's latest AMPAC (Activity Measure for Post Acute Care) Score is listed below.    AM-PAC Score - How much help does the patient need for each activity listed  Basic Mobility Total Score: 18  Turning over in bed (including adjusting bedclothes, sheets and blankets)?: A little  Sitting down on and standing up from a chair with arms (e.g., wheelchair, bedside commode, etc.): A little  Moving from lying on back to sitting on the side of the bed?: A little  Moving to and from a bed to a chair (including a wheelchair)?: A little  Need to walk in hospital room?: A little  Climbing 3-5 steps with a railing?: A little    Plan  - PT/OT consulted  -         Anxiety  Continue escitalopram    Community acquired bacterial pneumonia  Patient has a diagnosis of pneumonia. The cause of the pneumonia is unknown at this time. The pneumonia is stable. The patient has the following signs/symptoms of pneumonia: persistent hypoxia  and cough. The patient does have a current oxygen requirement and the patient does have a home oxygen requirement. I  have reviewed the pertinent imaging. The following cultures have been collected: Blood cultures The culture results are listed below.     Current antimicrobial regimen consists of the antibiotics listed below. Will monitor patient closely and continue current treatment plan unchanged.    Antibiotics (From admission, onward)      Start     Stop Route Frequency Ordered    04/16/25 1630  cefTRIAXone injection 1 g         -- IV Every 24 hours (non-standard times) 04/16/25 0757            Microbiology Results (last 7 days)       Procedure Component Value Units Date/Time    Blood culture [[id number]]  (Normal) Collected: 04/15/25 1632    Order Status: Completed Specimen: Blood from Wrist, Left Updated: 04/17/25 2300     Blood Culture No Growth After 48 Hours    Blood culture [[id number]]  (Normal) Collected: 04/15/25 1632    Order Status: Completed Specimen: Blood from Peripheral, Antecubital, Right Updated: 04/17/25 2300     Blood Culture No Growth After 48 Hours          Final Active Diagnoses:    Diagnosis Date Noted POA    PRINCIPAL PROBLEM:  Acute on chronic respiratory failure with hypoxia and hypercapnia [J96.21, J96.22] 05/01/2017 Yes    Community acquired bacterial pneumonia [J15.9] 09/29/2024 Yes    Anxiety [F41.9] 08/30/2023 Yes     Chronic    Debility [R53.81] 07/03/2023 Yes    CAD (coronary artery disease) [I25.10] 09/09/2022 Yes    Oxygen dependent [Z99.81] 09/09/2022 Not Applicable    Obstructive sleep apnea syndrome [G47.33] 03/31/2015 Yes      Problems Resolved During this Admission:       Discharged Condition: stable    Disposition: Home or Self Care    Follow Up:   Follow-up Information       Bethany Landry MD Follow up on 4/28/2025.    Specialty: Internal Medicine  Why: Hospital follow up Appointment scheduled for  1:30 PM  Contact information:  [address]  [address]  [place] [id number] [phone]               [facility] - Follow up.    Specialties: Home Health Services,  Physical Therapy, Occupational Therapy  Contact information:  [facility] [address]  [place] [address]  [phone]                           Patient Instructions:      Notify your health care provider if you experience any of the following:  temperature >100.4     Notify your health care provider if you experience any of the following:  persistent nausea and vomiting or diarrhea     Notify your health care provider if you experience any of the following:  redness, tenderness, or signs of infection (pain, swelling, redness, odor or green/yellow discharge around incision site)     Notify your health care provider if you experience any of the following:  difficulty breathing or increased cough     Notify your health care provider if you experience any of the following:  severe persistent headache     Notify your health care provider if you experience any of the following:  persistent dizziness, light-headedness, or visual disturbances     Notify your health care provider if you experience any of the following:  increased confusion or weakness     Activity as tolerated       Significant Diagnostic Studies: Labs: All labs within the past 24 hours have been reviewed    Pending Diagnostic Studies:       None           Medications:  Reconciled Home Medications:      Medication List        START taking these medications      amoxicillin-clavulanate 875-125mg 875-125 mg per tablet  Commonly known as: AUGMENTIN  Take 1 tablet by mouth 2 (two) times daily. for 7 days     guaiFENesin 600 mg 12 hr tablet  Commonly known as: MUCINEX  Take 1 tablet (600 mg total) by mouth 2 (two) times daily.     predniSONE 20 MG tablet  Commonly known as: DELTASONE  Take 3 tablets (60 mg total) by mouth once daily. for 3 days  Start taking on: April 19, 2025            CHANGE how you take these medications      albuterol-ipratropium 2.5 mg-0.5 mg/3 mL nebulizer solution  Commonly known as: DUO-NEB  USE 3 ML VIA NEBULIZER EVERY 6  HOURS AS NEEDED FOR WHEEZING  What changed: See the new instructions.     azithromycin 250 MG tablet  Commonly known as: Z-OLY  Take 2 tablets by mouth on day 1; Take 1 tablet by mouth on days 2-5  What changed:   how much to take  how to take this  when to take this  additional instructions     fluticasone propionate 50 mcg/actuation nasal spray  Commonly known as: FLONASE  SHAKE LIQUID AND USE 1 SPRAY(50 MCG) IN EACH NOSTRIL DAILY AS NEEDED FOR ALLERGIES  What changed: See the new instructions.     hydrOXYzine HCL 25 MG tablet  Commonly known as: ATARAX  TAKE 1 TABLET(25 MG) BY MOUTH THREE TIMES DAILY AS NEEDED FOR ANXIETY OR ITCHING  What changed: See the new instructions.            CONTINUE taking these medications      acetaminophen 500 MG tablet  Commonly known as: TYLENOL  Take 500 mg by mouth every 6 (six) hours as needed for Pain.     albuterol 90 mcg/actuation inhaler  Commonly known as: PROVENTIL/VENTOLIN HFA  Inhale 2 puffs into the lungs every 6 (six) hours as needed for Wheezing or Shortness of Breath.     alendronate 35 MG tablet  Commonly known as: FOSAMAX  Take 35 mg by mouth every Tuesday.     aspirin 81 MG Chew  Take 81 mg by mouth once daily.     BREZTRI AEROSPHERE 160-9-4.8 mcg/actuation Hfaa  Generic drug: budesonide-glycopyr-formoterol  Inhale 2 puffs into the lungs 2 (two) times daily.     calcium carb-mag hydrox-simeth 1,200 mg-270 mg -80 mg/10 mL Susp  Take 1,200 mg by mouth once daily.     cholecalciferol (vitamin D3) 50 mcg (2,000 unit) Tab  Commonly known as: VITAMIN D3  Take 2,000 Units by mouth once daily.     cyclobenzaprine 5 MG tablet  Commonly known as: FLEXERIL  Take 1 tablet (5 mg total) by mouth daily as needed for Muscle spasms.     dextromethorphan-guaiFENesin  mg  mg per 12 hr tablet  Commonly known as: MUCINEX DM  Take 1 tablet by mouth 2 (two) times daily as needed (cough).     EScitalopram oxalate 20 MG tablet  Commonly known as: LEXAPRO  Take 1 tablet (20 mg  total) by mouth once daily.     ezetimibe 10 mg tablet  Commonly known as: ZETIA  TAKE 1 TABLET(10 MG) BY MOUTH DAILY     furosemide 40 MG tablet  Commonly known as: LASIX  Take 40 mg by mouth once daily.     losartan 25 MG tablet  Commonly known as: COZAAR  Take 1 tablet (25 mg total) by mouth once daily.     meloxicam 15 MG tablet  Commonly known as: MOBIC  Take 1 tablet (15 mg total) by mouth once daily.     OXYGEN-AIR DELIVERY SYSTEMS MISC  2 L by Nasal route continuous.     potassium chloride 10 MEQ Cpsr  Commonly known as: MICRO-K  TAKE 1 CAPSULE BY MOUTH EVERY DAY     roflumilast 500 mcg Tab  Commonly known as: DALIRESP  Take 1 tablet (500 mcg total) by mouth once daily.              Indwelling Lines/Drains at time of discharge:   Lines/Drains/Airways       Drain  Duration             Female External Urinary Catheter w/ Suction 04/15/25 1546 2 days                    Time spent on the discharge of patient: 45 minutes         Shana Zapata NP  Department of Hospital Medicine  [facility]

## 2025-04-20 LAB
BACTERIA BLD CULT: NORMAL
BACTERIA BLD CULT: NORMAL

## 2025-04-21 ENCOUNTER — TELEPHONE (OUTPATIENT)
Dept: PULMONOLOGY | Facility: CLINIC | Age: 70
End: 2025-04-21
Payer: MEDICARE

## 2025-04-21 NOTE — TELEPHONE ENCOUNTER
Spoke with patient, informed her that I have received her message. I advised pt that Dr Cunningham does not have any available appointments at this time but however, I can schedule pt appointment with Dr Santamaria on 5/30/25 for 1:00 pm and that I can add her to wait list. Pt verbalized that she understand and accepted appointment.

## 2025-04-21 NOTE — TELEPHONE ENCOUNTER
----- Message from Martita sent at 4/21/2025  9:45 AM CDT -----  Consult/AdvisoryName Of Caller:Jessica Preference:[phone] of call: Pt needs a hospital fu appt nothing is showing avail please call to assist

## 2025-04-22 ENCOUNTER — TELEPHONE (OUTPATIENT)
Dept: PULMONOLOGY | Facility: CLINIC | Age: 70
End: 2025-04-22
Payer: MEDICARE

## 2025-04-22 ENCOUNTER — PATIENT OUTREACH (OUTPATIENT)
Dept: ADMINISTRATIVE | Facility: CLINIC | Age: 70
End: 2025-04-22
Payer: MEDICARE

## 2025-04-22 NOTE — PROGRESS NOTES
C3 nurse spoke with Terri Phelan for a TCC post hospital discharge follow up call. The patient has a scheduled HOSFU appointment with Bethany Landry on 04/28/25 @ 6237.

## 2025-04-22 NOTE — TELEPHONE ENCOUNTER
Spoke with patient, informed her that I have received her message. I also advised her that I have canceled her appointment with Dr Galeano on tomorrow because pt is just getting out of hospital and we like for hospital follow up patients to receive a visit within at least 14 days. I also advised pt that she still has her appointment with Dr Santamaria on 5/30/25. Pt verbalized that she understand and accept appointment.

## 2025-04-22 NOTE — TELEPHONE ENCOUNTER
Call to patient for follow-up with hospital/ER stay 2/2 COPD exac and PNA    Discussed medications, and follow-up plan noted in discharge summary    Reviewed referrals and assessed appointment status. Family HH ordered. Patient to see Bethany Landry for HFU, and then provider 2 wks after that.     Assessed needs and concerns post hospital, including transportation    Scheduled HFU visit with Bethany Landry, offered to change but patient wants to keep.     Reviewed all new meds, continued medications, follow up appointments and signs and symptoms to report to PCP or seek emergency medical care.     Pt verbalized understanding to all instructions and education. Pt denies any complaints or concerns at this time

## 2025-04-22 NOTE — TELEPHONE ENCOUNTER
Attempted to contact patient in regards to her appointment with Dr Galeano but no one answered pt telephone. Pt appointment with Dr Galeano has been canceled and patient has appointment with Dr Santamaria on 5/30/25.

## 2025-04-24 DIAGNOSIS — T36.95XA ANTIBIOTIC-INDUCED YEAST INFECTION: Primary | ICD-10-CM

## 2025-04-24 DIAGNOSIS — B37.9 ANTIBIOTIC-INDUCED YEAST INFECTION: Primary | ICD-10-CM

## 2025-04-24 DIAGNOSIS — T36.95XA ANTIBIOTIC-INDUCED YEAST INFECTION: ICD-10-CM

## 2025-04-24 DIAGNOSIS — B37.9 ANTIBIOTIC-INDUCED YEAST INFECTION: ICD-10-CM

## 2025-04-24 RX ORDER — FLUCONAZOLE 150 MG/1
150 TABLET ORAL DAILY
Qty: 2 TABLET | Refills: 0 | Status: SHIPPED | OUTPATIENT
Start: 2025-04-24 | End: 2025-04-25

## 2025-04-25 RX ORDER — FLUCONAZOLE 150 MG/1
150 TABLET ORAL DAILY
Qty: 2 TABLET | Refills: 0 | OUTPATIENT
Start: 2025-04-25 | End: 2025-04-26

## 2025-04-25 NOTE — TELEPHONE ENCOUNTER
----- Message from Clinton sent at 4/25/2025  1:31 PM CDT -----  Contact: [phone]  Requesting an RX refill or new RX.  [facility] states they did not receive RX, insurance states they have paid for medication and will not cover it again. Patient is without medication. Please call patient once RX is sent.Is this a refill or new RX: RX name and strength (copy/paste from chart):  fluconazole (DIFLUCAN) 150 MG Tab Is this a 30 day or 90 day RX: Pharmacy name and phone # (copy/paste from chart):  [id number] - [place], [place] - [address] [facility] [address] [address]De[place]a: [phone] Fax: [phone] doctors have asked that we provide their patients with the following 2 reminders -- prescription refills can take up to 72 hours, and a friendly reminder that in the future you can use your [facility] account to request refills: yes  
Call to Anita to clarify message regarding diflucan. Pharmacy reported they have a internal; error with medication and are workin evita it. Patient has been instructed to call back this pm for , and has choice to pay $10 if error not corrected.     Pharmacist to call patient and discuss internal error with refill and progress with processing prescription, again.   
pt left seated in recliner post Eval; chair alarm in place; R UE in sling; LE's elevated; all above lines/monitors intact; daughter / grandson present; callbell in reach; pt instructed not to get up alone; call nursing for assist; nati well; R UE pain persists; SANTIAGO Teixeira made aware pt OOB

## 2025-04-28 ENCOUNTER — OFFICE VISIT (OUTPATIENT)
Dept: PRIMARY CARE CLINIC | Facility: CLINIC | Age: 70
End: 2025-04-28
Payer: MEDICARE

## 2025-04-28 VITALS
TEMPERATURE: 99 F | HEART RATE: 80 BPM | HEIGHT: 66 IN | WEIGHT: 181 LBS | OXYGEN SATURATION: 92 % | SYSTOLIC BLOOD PRESSURE: 121 MMHG | DIASTOLIC BLOOD PRESSURE: 61 MMHG | BODY MASS INDEX: 29.09 KG/M2

## 2025-04-28 DIAGNOSIS — J15.9 COMMUNITY ACQUIRED BACTERIAL PNEUMONIA: Primary | ICD-10-CM

## 2025-04-28 DIAGNOSIS — J96.22 ACUTE ON CHRONIC RESPIRATORY FAILURE WITH HYPOXIA AND HYPERCAPNIA: ICD-10-CM

## 2025-04-28 DIAGNOSIS — J43.2 CENTRILOBULAR EMPHYSEMA: ICD-10-CM

## 2025-04-28 DIAGNOSIS — J96.21 ACUTE ON CHRONIC RESPIRATORY FAILURE WITH HYPOXIA AND HYPERCAPNIA: ICD-10-CM

## 2025-04-28 DIAGNOSIS — Z99.81 OXYGEN DEPENDENT: ICD-10-CM

## 2025-04-28 PROCEDURE — 99999 PR PBB SHADOW E&M-EST. PATIENT-LVL IV: CPT | Mod: PBBFAC,HCNC,, | Performed by: INTERNAL MEDICINE

## 2025-04-28 RX ORDER — AZITHROMYCIN 250 MG/1
250 TABLET, FILM COATED ORAL
Qty: 36 TABLET | Refills: 3 | Status: SHIPPED | OUTPATIENT
Start: 2025-04-28

## 2025-04-28 NOTE — PROGRESS NOTES
[facility]   New Visit Progress Note   Recent Hospital Discharge     PRESENTING HISTORY     Chief Complaint/Reason for Admission:  Follow up Hospital Discharge   PCP: Sussy Mason MD    History of Present Illness:  Ms. Terri Phelan is a 69 y.o. female who was recently admitted to the hospital.    [facility]  Discharge Summary        Patient Name: Terri Phelan  MRN: [id number]  SUNDEEP: [id number]  Patient Class: IP- Inpatient  Admission Date: 4/15/2025  Hospital Length of Stay: 3 days  Discharge Date and Time: 04/18/2025 10:31 AM  Attending Physician: Alida Garza MD   Discharging Provider: Shana Zapata NP  Primary Care Provider: Sussy Mason MD  ___________________________________________________________________    Today:  [facility] for [facility] follow up.  Recently hospitalized for management right lower lobe pneumonia.   Complicated by COPD and acute on chronic respiratory failure.  Admitted to [facility] Medicine service.  Steroids, Ceftriaxone, Azithromycin initiated.  Blood Cx NGTD.   Respiratory status improved to baseline 2 L NC.  Patient discharged to home to complete Augmentin and Prednisone course.   Patient already has home oxygen in place.   Home Health arranged with [facility].    Patient accompanied today by family.  In wheelchair but ambulatory without assistive devices in the home setting.   Brought medication bottles for review.   Wearing supplemental oxygen- has portable tank.   Uses Bipap ST mode nightly; attaches to 2-3 L oxygen.   She is enrolled in Pulmonary Rehab at [facility] next session on 4/30/25.     Review of Systems  General ROS: negative for chills, fever or weight loss  + fatigue, decreased exercise tolerance   Psychological ROS: negative for hallucination, depression or suicidal ideation  Ophthalmic ROS: negative for blurry vision, photophobia or eye pain  ENT ROS: negative for  "epistaxis, sore throat or rhinorrhea  Respiratory ROS: + productive cough, + chronic shortness of breath, no wheezing  Cardiovascular ROS: no chest pain + dyspnea on exertion  Gastrointestinal ROS: no abdominal pain, change in bowel habits, or black/ bloody stools  Genito-Urinary ROS: no dysuria, trouble voiding, or hematuria  Musculoskeletal ROS: negative for gait disturbance or muscular weakness  Neurological ROS: no syncope or seizures; no ataxia  Dermatological ROS: negative for pruritis, rash and jaundice      PAST HISTORY:     Past Medical History:   Diagnosis Date    Acute exacerbation of chronic obstructive pulmonary disease (COPD) 2023    Recently hospitalized at [facility] from  to 2024 for exacerbation.  Did not require ICU care or significant escalation of intervention(s).      Adenoma of ascending colon 2022    Anxiety     COPD (chronic obstructive pulmonary disease)     COPD exacerbation 2014    Heart failure     Hypertension     Major depression 2023       Past Surgical History:   Procedure Laterality Date    BREAST BIOPSY N/A     pt unsure which breast but it was benign-(calcium)     SECTION      HERNIA REPAIR      HYSTERECTOMY         No family history on file.      MEDICATIONS & ALLERGIES:     Medications Ordered Prior to Encounter[1]     Review of patient's allergies indicates:   Allergen Reactions    Codeine Nausea And Vomiting    Lipitor [atorvastatin] Other (See Comments)     Muscle fatigue      Morphine Hallucinations       OBJECTIVE:     Vital Signs:  /61 (BP Location: Right arm, Patient Position: Sitting)   Pulse 80   Temp 99 °F (37.2 °C) (Oral)   Ht 5' 6" (1.676 m)   Wt 82.1 kg (181 lb)   SpO2 (!) 92%   BMI 29.21 kg/m²   Wt Readings from Last 3 Encounters:   25 0415 86.3 kg (190 lb 4.1 oz)   25 1434 83 kg (183 lb)   25 1531 84.4 kg (186 lb 1.1 oz)     Body mass index is 29.21 kg/m².        Physical Exam:  /61 (BP " "Location: Right arm, Patient Position: Sitting)   Pulse 80   Temp 99 °F (37.2 °C) (Oral)   Ht 5' 6" (1.676 m)   Wt 82.1 kg (181 lb)   SpO2 (!) 92%   BMI 29.21 kg/m²   General appearance: alert, cooperative, no distress  Constitutional:Oriented to person, place, and time  + appears well-developed and well-nourished.   HEENT: Normocephalic, atraumatic, neck symmetrical, no nasal discharge   Eyes: conjunctivae/corneas clear, PERRL, EOM's intact  Lungs: clear to auscultation bilaterally, no dullness to percussion bilaterally  Heart: regular rate and rhythm without rub; no displacement of the PMI   Abdomen: soft, non-tender; bowel sounds normoactive; no organomegaly  Extremities: extremities symmetric; no clubbing, cyanosis, or edema  Integument: Skin color, texture, turgor normal; no rashes; hair distrubution normal  Neurologic: Alert and oriented X 3, normal strength, normal coordination   Psychiatric: no pressured speech; normal affect; no evidence of impaired cognition     Laboratory  Lab Results   Component Value Date    WBC 9.11 04/18/2025    HGB 11.6 (L) 04/18/2025    HCT 38.8 04/18/2025    MCV 91 04/18/2025     04/18/2025     BMP  Lab Results   Component Value Date     04/18/2025    K 3.9 04/18/2025     04/18/2025    CO2 35 (H) 04/18/2025    BUN 20 04/18/2025    CREATININE 0.6 04/18/2025    CALCIUM 9.0 04/18/2025    ANIONGAP 6 (L) 04/18/2025    EGFRNORACEVR >60 04/18/2025     Lab Results   Component Value Date    ALT 18 04/15/2025    AST 22 04/15/2025    ALKPHOS 51 04/15/2025    BILITOT 0.2 04/15/2025     Lab Results   Component Value Date    INR 1.0 05/03/2022     Lab Results   Component Value Date    HGBA1C 5.4 06/16/2023       Diagnostic Results:    Chest X Ray 4/15/25:  Patchy opacity projects over the right lower lung zone possibly edema with a developing consolidation also possible.  Blunting of the left costophrenic angle similar to the prior exam suggesting left pleural fluid " along with volume loss and consolidation of the left lower lung zone similar to the prior exams.    TRANSITION OF CARE:     [facility] Contact Note: 4/22/25     Family and/or Caretaker present at visit?  Yes.  Diagnostic tests reviewed/disposition: I have reviewed all completed as well as pending diagnostic tests at the time of discharge.  Disease/illness education: Yes  Home health/community services discussion/referrals: Patient has home health established at [facility] .   Establishment or re-establishment of referral orders for community resources: No other necessary community resources.   Discussion with other health care providers: No discussion with other health care providers necessary.     ASSESSMENT & PLAN:       Community acquired bacterial pneumonia  - recent hospitalization as above  - complete Augmentin as prescribed     Acute on chronic respiratory failure with hypoxia and hypercapnia  Centrilobular emphysema  Oxygen dependent  - completed Prednisone course as prescribed   - respiratory status at baseline  - continue current medication regimen  - resume Pulmonary rehab at [facility]  - see Pulmonary team 5/30/25     Other orders  - resume azithromycin 3 times a week once Augmentin course is complete   -     azithromycin (Z-OLY) 250 MG tablet; Take 1 tablet (250 mg total) by mouth 3 (three) times a week. Take three times weekly on Monday, Wednesday, and Friday.  Dispense: 36 tablet; Refill: 3    Patient will be released from hospital follow up clinic.  Follow up as below.     Instructions for the patient:      Scheduled Follow-up :  Future Appointments   Date Time Provider Department Center   5/13/2025  4:40 PM Sussy aMson MD [id number] 65+ [place]   5/30/2025  1:00 PM [facility]   8/14/2025  1:00 PM Kaitlin Donovan NP [facility] [place]       Post Visit Medication List:     Medication List            Accurate as of April 28, 2025  2:28 PM. If you  have any questions, ask your nurse or doctor.                START taking these medications      azithromycin 250 MG tablet  Commonly known as: Z-OLY  Take 1 tablet (250 mg total) by mouth 3 (three) times a week. Take three times weekly on Monday, Wednesday, and Friday.  Started by: Bethany Landry MD            CONTINUE taking these medications      acetaminophen 500 MG tablet  Commonly known as: TYLENOL     albuterol 90 mcg/actuation inhaler  Commonly known as: PROVENTIL/VENTOLIN HFA  Inhale 2 puffs into the lungs every 6 (six) hours as needed for Wheezing or Shortness of Breath.     albuterol-ipratropium 2.5 mg-0.5 mg/3 mL nebulizer solution  Commonly known as: DUO-NEB  USE 3 ML VIA NEBULIZER EVERY 6 HOURS AS NEEDED FOR WHEEZING     aspirin 81 MG Chew     BREZTRI AEROSPHERE 160-9-4.8 mcg/actuation Hfaa  Generic drug: budesonide-glycopyr-formoterol  Inhale 2 puffs into the lungs 2 (two) times daily.     calcium carb-mag hydrox-simeth 1,200 mg-270 mg -80 mg/10 mL Susp     cholecalciferol (vitamin D3) 50 mcg (2,000 unit) Tab  Commonly known as: VITAMIN D3     cyclobenzaprine 5 MG tablet  Commonly known as: FLEXERIL  Take 1 tablet (5 mg total) by mouth daily as needed for Muscle spasms.     dextromethorphan-guaiFENesin  mg  mg per 12 hr tablet  Commonly known as: MUCINEX DM  Take 1 tablet by mouth 2 (two) times daily as needed (cough).     EScitalopram oxalate 20 MG tablet  Commonly known as: LEXAPRO  Take 1 tablet (20 mg total) by mouth once daily.     ezetimibe 10 mg tablet  Commonly known as: ZETIA  TAKE 1 TABLET(10 MG) BY MOUTH DAILY     fluticasone propionate 50 mcg/actuation nasal spray  Commonly known as: FLONASE  SHAKE LIQUID AND USE 1 SPRAY(50 MCG) IN EACH NOSTRIL DAILY AS NEEDED FOR ALLERGIES     furosemide 40 MG tablet  Commonly known as: LASIX     guaiFENesin 600 mg 12 hr tablet  Commonly known as: MUCINEX  Take 1 tablet (600 mg total) by mouth 2 (two) times daily.     hydrOXYzine HCL 25 MG  tablet  Commonly known as: ATARAX  TAKE 1 TABLET(25 MG) BY MOUTH THREE TIMES DAILY AS NEEDED FOR ANXIETY OR ITCHING     losartan 25 MG tablet  Commonly known as: COZAAR  Take 1 tablet (25 mg total) by mouth once daily.     meloxicam 15 MG tablet  Commonly known as: MOBIC  Take 1 tablet (15 mg total) by mouth once daily.     OXYGEN-AIR DELIVERY SYSTEMS MISC     potassium chloride 10 MEQ Cpsr  Commonly known as: MICRO-K  TAKE 1 CAPSULE BY MOUTH EVERY DAY     roflumilast 500 mcg Tab  Commonly known as: DALIRESP  Take 1 tablet (500 mcg total) by mouth once daily.            STOP taking these medications      alendronate 35 MG tablet  Commonly known as: FOSAMAX  Stopped by: Bethany Landry MD               Where to Get Your Medications        These medications were sent to [id number], [address] AT [facility] & [address], [place] [address]      Phone: [phone]   azithromycin 250 MG tablet         Signing Physician:  Bethany Landry MD         [1]   Current Outpatient Medications on File Prior to Visit   Medication Sig Dispense Refill    acetaminophen (TYLENOL) 500 MG tablet Take 500 mg by mouth every 6 (six) hours as needed for Pain.      albuterol (PROVENTIL/VENTOLIN HFA) 90 mcg/actuation inhaler Inhale 2 puffs into the lungs every 6 (six) hours as needed for Wheezing or Shortness of Breath. 18 g 6    albuterol-ipratropium (DUO-NEB) 2.5 mg-0.5 mg/3 mL nebulizer solution USE 3 ML VIA NEBULIZER EVERY 6 HOURS AS NEEDED FOR WHEEZING 360 mL 0    alendronate (FOSAMAX) 35 MG tablet Take 35 mg by mouth every Tuesday. (Patient not taking: Reported on 4/22/2025)      aspirin 81 MG Chew Take 81 mg by mouth once daily.      budesonide-glycopyr-formoterol (BREZTRI AEROSPHERE) 160-9-4.8 mcg/actuation HFAA Inhale 2 puffs into the lungs 2 (two) times daily. 5.9 g 3    calcium carb-mag hydrox-simeth 1,200 mg-270 mg -80 mg/10 mL Susp Take  1,200 mg by mouth once daily.      cholecalciferol, vitamin D3, (VITAMIN D3) 50 mcg (2,000 unit) Tab Take 2,000 Units by mouth once daily.      cyclobenzaprine (FLEXERIL) 5 MG tablet Take 1 tablet (5 mg total) by mouth daily as needed for Muscle spasms. 30 tablet 0    dextromethorphan-guaiFENesin  mg (MUCINEX DM)  mg per 12 hr tablet Take 1 tablet by mouth 2 (two) times daily as needed (cough). 20 tablet 1    EScitalopram oxalate (LEXAPRO) 20 MG tablet Take 1 tablet (20 mg total) by mouth once daily. 30 tablet 11    ezetimibe (ZETIA) 10 mg tablet TAKE 1 TABLET(10 MG) BY MOUTH DAILY 90 tablet 3    fluticasone propionate (FLONASE) 50 mcg/actuation nasal spray SHAKE LIQUID AND USE 1 SPRAY(50 MCG) IN EACH NOSTRIL DAILY AS NEEDED FOR ALLERGIES 16 g 11    furosemide (LASIX) 40 MG tablet Take 40 mg by mouth once daily.      guaiFENesin (MUCINEX) 600 mg 12 hr tablet Take 1 tablet (600 mg total) by mouth 2 (two) times daily. 10 tablet 0    hydrOXYzine HCL (ATARAX) 25 MG tablet TAKE 1 TABLET(25 MG) BY MOUTH THREE TIMES DAILY AS NEEDED FOR ANXIETY OR ITCHING 30 tablet 3    losartan (COZAAR) 25 MG tablet Take 1 tablet (25 mg total) by mouth once daily. 90 tablet 3    meloxicam (MOBIC) 15 MG tablet Take 1 tablet (15 mg total) by mouth once daily. 30 tablet 2    OXYGEN-AIR DELIVERY SYSTEMS MISC 2 L by Nasal route continuous.      potassium chloride (MICRO-K) 10 MEQ CpSR TAKE 1 CAPSULE BY MOUTH EVERY DAY 90 capsule 0    roflumilast (DALIRESP) 500 mcg Tab Take 1 tablet (500 mcg total) by mouth once daily. 30 tablet 0     No current facility-administered medications on file prior to visit.

## 2025-05-03 ENCOUNTER — HOSPITAL ENCOUNTER (EMERGENCY)
Facility: HOSPITAL | Age: 70
Discharge: HOME | End: 2025-05-03
Attending: EMERGENCY MEDICINE
Payer: MEDICARE

## 2025-05-03 VITALS
TEMPERATURE: 99 F | HEART RATE: 70 BPM | RESPIRATION RATE: 25 BRPM | OXYGEN SATURATION: 97 % | SYSTOLIC BLOOD PRESSURE: 128 MMHG | DIASTOLIC BLOOD PRESSURE: 58 MMHG

## 2025-05-03 DIAGNOSIS — R00.2 PALPITATIONS: ICD-10-CM

## 2025-05-03 DIAGNOSIS — I47.10 SVT (SUPRAVENTRICULAR TACHYCARDIA): Primary | ICD-10-CM

## 2025-05-03 LAB
ABSOLUTE EOSINOPHIL (OHS): 0.13 K/UL
ABSOLUTE MONOCYTE (OHS): 1.18 K/UL (ref 0.3–1)
ABSOLUTE NEUTROPHIL COUNT (OHS): 4.98 K/UL (ref 1.8–7.7)
ALBUMIN SERPL BCP-MCNC: 3 G/DL (ref 3.5–5.2)
ALP SERPL-CCNC: 48 UNIT/L (ref 40–150)
ALT SERPL W/O P-5'-P-CCNC: 14 UNIT/L (ref 10–44)
ANION GAP (OHS): 11 MMOL/L (ref 8–16)
AST SERPL-CCNC: 16 UNIT/L (ref 11–45)
BASOPHILS # BLD AUTO: 0.04 K/UL
BASOPHILS NFR BLD AUTO: 0.5 %
BILIRUB SERPL-MCNC: 0.3 MG/DL (ref 0.1–1)
BNP SERPL-MCNC: 21 PG/ML (ref 0–99)
BUN SERPL-MCNC: 13 MG/DL (ref 8–23)
CALCIUM SERPL-MCNC: 9.3 MG/DL (ref 8.7–10.5)
CHLORIDE SERPL-SCNC: 102 MMOL/L (ref 95–110)
CO2 SERPL-SCNC: 31 MMOL/L (ref 23–29)
CREAT SERPL-MCNC: 0.7 MG/DL (ref 0.5–1.4)
CTP QC/QA: YES
CTP QC/QA: YES
ERYTHROCYTE [DISTWIDTH] IN BLOOD BY AUTOMATED COUNT: 13.2 % (ref 11.5–14.5)
GFR SERPLBLD CREATININE-BSD FMLA CKD-EPI: >60 ML/MIN/1.73/M2
GLUCOSE SERPL-MCNC: 97 MG/DL (ref 70–110)
HCT VFR BLD AUTO: 42.2 % (ref 37–48.5)
HGB BLD-MCNC: 12.4 GM/DL (ref 12–16)
IMM GRANULOCYTES # BLD AUTO: 0.03 K/UL (ref 0–0.04)
IMM GRANULOCYTES NFR BLD AUTO: 0.4 % (ref 0–0.5)
LYMPHOCYTES # BLD AUTO: 1.35 K/UL (ref 1–4.8)
MAGNESIUM SERPL-MCNC: 1.9 MG/DL (ref 1.6–2.6)
MCH RBC QN AUTO: 26.6 PG (ref 27–31)
MCHC RBC AUTO-ENTMCNC: 29.4 G/DL (ref 32–36)
MCV RBC AUTO: 91 FL (ref 82–98)
NUCLEATED RBC (/100WBC) (OHS): 0 /100 WBC
OHS QRS DURATION: 76 MS
OHS QTC CALCULATION: 446 MS
PLATELET # BLD AUTO: 235 K/UL (ref 150–450)
PMV BLD AUTO: 10.7 FL (ref 9.2–12.9)
POC MOLECULAR INFLUENZA A AGN: NEGATIVE
POC MOLECULAR INFLUENZA B AGN: NEGATIVE
POTASSIUM SERPL-SCNC: 3.8 MMOL/L (ref 3.5–5.1)
PROT SERPL-MCNC: 7.4 GM/DL (ref 6–8.4)
RBC # BLD AUTO: 4.66 M/UL (ref 4–5.4)
RELATIVE EOSINOPHIL (OHS): 1.7 %
RELATIVE LYMPHOCYTE (OHS): 17.5 % (ref 18–48)
RELATIVE MONOCYTE (OHS): 15.3 % (ref 4–15)
RELATIVE NEUTROPHIL (OHS): 64.6 % (ref 38–73)
SARS-COV-2 RDRP RESP QL NAA+PROBE: NEGATIVE
SODIUM SERPL-SCNC: 144 MMOL/L (ref 136–145)
TROPONIN I SERPL DL<=0.01 NG/ML-MCNC: <0.006 NG/ML
WBC # BLD AUTO: 7.71 K/UL (ref 3.9–12.7)

## 2025-05-03 PROCEDURE — 83735 ASSAY OF MAGNESIUM: CPT | Mod: HCNC | Performed by: EMERGENCY MEDICINE

## 2025-05-03 PROCEDURE — 85025 COMPLETE CBC W/AUTO DIFF WBC: CPT | Mod: HCNC | Performed by: EMERGENCY MEDICINE

## 2025-05-03 PROCEDURE — 87502 INFLUENZA DNA AMP PROBE: CPT | Mod: HCNC

## 2025-05-03 PROCEDURE — 84484 ASSAY OF TROPONIN QUANT: CPT | Mod: HCNC | Performed by: EMERGENCY MEDICINE

## 2025-05-03 PROCEDURE — 93005 ELECTROCARDIOGRAM TRACING: CPT | Mod: HCNC

## 2025-05-03 PROCEDURE — 87635 SARS-COV-2 COVID-19 AMP PRB: CPT | Mod: HCNC | Performed by: EMERGENCY MEDICINE

## 2025-05-03 PROCEDURE — 93010 ELECTROCARDIOGRAM REPORT: CPT | Mod: HCNC,,, | Performed by: INTERNAL MEDICINE

## 2025-05-03 PROCEDURE — 83880 ASSAY OF NATRIURETIC PEPTIDE: CPT | Mod: HCNC | Performed by: EMERGENCY MEDICINE

## 2025-05-03 PROCEDURE — 99285 EMERGENCY DEPT VISIT HI MDM: CPT | Mod: 25,HCNC

## 2025-05-03 PROCEDURE — 80053 COMPREHEN METABOLIC PANEL: CPT | Mod: HCNC | Performed by: EMERGENCY MEDICINE

## 2025-05-03 PROCEDURE — 25000003 PHARM REV CODE 250: Mod: HCNC | Performed by: EMERGENCY MEDICINE

## 2025-05-03 RX ORDER — ACETAMINOPHEN 500 MG
1000 TABLET ORAL
Status: COMPLETED | OUTPATIENT
Start: 2025-05-03 | End: 2025-05-03

## 2025-05-03 RX ADMIN — ACETAMINOPHEN 1000 MG: 500 TABLET ORAL at 07:05

## 2025-05-03 NOTE — ED PROVIDER NOTES
Encounter Date: 5/3/2025       History     Chief Complaint   Patient presents with    Tachycardia     Presents awake, alert with c/o palpitations and SOB since last night. EMS reports initial  - converted with Adenosine 6 mg and now with HR in 110s, feeling better. States no h/o SVT.      Patient is a 69-year-old female brought in by EMS from home where she awoke at about 5:00 a.m. this morning diaphoretic.  She was also short of breath and says her heart rate was in the 140s.  She had a recent admission here for pneumonia and has still had a cough productive for clear sputum.  EMS reports an initial heart rate upon their arrival of 170 for which she was given adenosine.  She presents to the ED with a pulse of 112.  She denies chest pain.      Review of patient's allergies indicates:   Allergen Reactions    Codeine Nausea And Vomiting    Lipitor [atorvastatin] Other (See Comments)     Muscle fatigue      Morphine Hallucinations     Past Medical History:   Diagnosis Date    Acute exacerbation of chronic obstructive pulmonary disease (COPD) 2023    Recently hospitalized at [facility] from  to 2024 for exacerbation.  Did not require ICU care or significant escalation of intervention(s).      Adenoma of ascending colon 2022    Anxiety     COPD (chronic obstructive pulmonary disease)     COPD exacerbation 2014    Heart failure     Hypertension     Major depression 2023     Past Surgical History:   Procedure Laterality Date    BREAST BIOPSY N/A     pt unsure which breast but it was benign-(calcium)     SECTION      HERNIA REPAIR      HYSTERECTOMY       No family history on file.  Social History[1]  Review of Systems   Constitutional:  Positive for diaphoresis.   Respiratory:  Positive for cough and shortness of breath.    Cardiovascular:  Positive for palpitations. Negative for chest pain.   All other systems reviewed and are negative.      Physical Exam     Initial Vitals    BP Pulse Resp Temp SpO2   05/03/25 0628 05/03/25 0628 05/03/25 0628 05/03/25 0641 05/03/25 0628   133/80 (!) 112 16 99.3 °F (37.4 °C) 97 %      MAP       --                Physical Exam    Nursing note and vitals reviewed.  Constitutional: No distress.   HENT:   Head: Atraumatic.   Eyes: EOM are normal.   Cardiovascular:            Tachycardic.   Pulmonary/Chest:   Diminished breath sounds bilaterally.  No wheezing.   Abdominal: Abdomen is soft.   Musculoskeletal:         General: No edema. Normal range of motion.     Neurological: She is alert and oriented to person, place, and time.   Skin: Skin is warm and dry.   Psychiatric: Thought content normal.         ED Course   Procedures  Labs Reviewed   COMPREHENSIVE METABOLIC PANEL - Abnormal       Result Value    Sodium 144      Potassium 3.8      Chloride 102      CO2 31 (*)     Glucose 97      BUN 13      Creatinine 0.7      Calcium 9.3      Protein Total 7.4      Albumin 3.0 (*)     Bilirubin Total 0.3      ALP 48      AST 16      ALT 14      Anion Gap 11      eGFR >60     CBC WITH DIFFERENTIAL - Abnormal    WBC 7.71      RBC 4.66      HGB 12.4      HCT 42.2      MCV 91      MCH 26.6 (*)     MCHC 29.4 (*)     RDW 13.2      Platelet Count 235      MPV 10.7      Nucleated RBC 0      Neut % 64.6      Lymph % 17.5 (*)     Mono % 15.3 (*)     Eos % 1.7      Basophil % 0.5      Imm Grans % 0.4      Neut # 4.98      Lymph # 1.35      Mono # 1.18 (*)     Eos # 0.13      Baso # 0.04      Imm Grans # 0.03     TROPONIN I - Normal    Troponin-I <0.006     B-TYPE NATRIURETIC PEPTIDE - Normal    BNP 21     MAGNESIUM - Normal    Magnesium  1.9     CBC W/ AUTO DIFFERENTIAL    Narrative:     The following orders were created for panel order CBC auto differential.  Procedure                               Abnormality         Status                     ---------                               -----------         ------                     CBC with Differential[[id number]]        Abnormal            Final result                 Please view results for these tests on the individual orders.   SARS-COV-2 RDRP GENE    POC Rapid COVID Negative       Acceptable Yes     POCT INFLUENZA A/B MOLECULAR    POC Molecular Influenza A Ag Negative      POC Molecular Influenza B Ag Negative       Acceptable Yes       EKG Readings: (Independently Interpreted)   Initial Reading: No STEMI. Rhythm: Sinus Tachycardia. Heart Rate: 111. ST Segments: Normal ST Segments. T Waves: Normal.     ECG Results              EKG 12-lead (In process)        Collection Time Result Time QRS Duration OHS QTC Calculation    05/03/25 06:38:28 05/03/25 09:23:39 76 446                     In process by Interface, Lab In [facility] (05/03/25 09:23:47)                   Narrative:    Test Reason : R00.2,    Vent. Rate : 111 BPM     Atrial Rate : 111 BPM     P-R Int : 152 ms          QRS Dur :  76 ms      QT Int : 328 ms       P-R-T Axes :  73  70  69 degrees    QTcB Int : 446 ms    Sinus tachycardia  Otherwise normal ECG  When compared with ECG of 15-Apr-2025 14:10,  No significant change was found    Referred By: AAAREFERRAL SELF           Confirmed By:                                   Imaging Results              X-Ray Chest 1 View (Final result)  Result time 05/03/25 08:26:27      Final result by Greg Schilling DO (05/03/25 08:26:27)                   Impression:      As above      Electronically signed by: Greg Schilling DO  Date:    05/03/2025  Time:    08:26               Narrative:    EXAMINATION:  XR CHEST 1 VIEW    CLINICAL HISTORY:  Palpitations;    TECHNIQUE:  Single frontal view of the chest was performed.    COMPARISON:  04/15/2025    FINDINGS:  No infiltrates or effusions are suggested.  Monitoring leads overlying the chest.  The heart is not appear to be enlarged for a portable exam.  There is calcification of the aortic knob.  Stable area of scar versus atelectasis suspected within the  left lung base.                                       Medications   acetaminophen tablet 1,000 mg (1,000 mg Oral Given 5/3/25 0736)     Medical Decision Making  Emergent evaluation of a 69-year-old female who had palpitations this morning and was found to have an elevated heart rate upon EMS arrival.  She was given 6 mg of adenosine and presents to the ED with a pulse of 112.  Workup is negative.  Pulse eventually reduced into the 70s where it has maintained.  I feel she may be safely discharged home with close follow up.  She will return to the ED for any recurrence of symptoms or any other urgent concerns.    Amount and/or Complexity of Data Reviewed  Labs: ordered.     Details: CBC is unremarkable.  CMP is unremarkable.  Troponin is 0.  BNP is 21.  Radiology: ordered.     Details: Chest x-ray without acute cardiopulmonary abnormality.    Risk  OTC drugs.                                      Clinical Impression:  Final diagnoses:  [R00.2] Palpitations  [I47.10] SVT (supraventricular tachycardia) (Primary)          ED Disposition Condition    Discharge Stable          ED Prescriptions    None       Follow-up Information       Follow up With Specialties Details Why Contact Info    Sussy Mason MD Internal Medicine Schedule an appointment as soon as possible for a visit   [address] [facility] [address]  [phone]      [facility] Emergency Medicine  If symptoms reoccur [address] [facility] [address] [phone]                 [1]   Social History  Tobacco Use    Smoking status: Former     Current packs/day: 0.00     Average packs/day: 1 pack/day for 20.0 years (20.0 ttl pk-yrs)     Types: Cigarettes     Quit date: 2021     Years since quittin.3     Passive exposure: Past    Smokeless tobacco: Never   Substance Use Topics    Alcohol use: Not Currently     Alcohol/week: 1.0 standard drink of alcohol     Types: 1 Glasses of wine per week    Drug use:  No        Alirio Talbot MD  05/03/25 [id number]

## 2025-05-03 NOTE — ED NOTES
Assumed care of pt. Pt Aaox4, VSS, NAD noted. Pt reports 8/10 frontal FONTENOT. Dr. Talbot notified. Pt on 2.5L NC, pt reports she's normally on 2L at home. Denies pain and other needs at this time. Daughter at bedside. Call light within reach.

## 2025-05-03 NOTE — ED NOTES
Pt reports improvement to HA, denies pain at this time. Pt AAOx4, VSS, NAD noted. Denies needs at this time. Daughter at bedside. Call light within reach.

## 2025-05-05 ENCOUNTER — PATIENT OUTREACH (OUTPATIENT)
Facility: OTHER | Age: 70
End: 2025-05-05
Payer: MEDICARE

## 2025-05-05 NOTE — PROGRESS NOTES
Jenna Chan  ED Navigator  Emergency Department    Project: [facility] [place] ED Navigator  Role: Community Health Worker    Date: 2025  Patient Name: Terri Phelan  MRN: [id number]  PCP: Sussy Mason MD    Assessment:     Terri Phelan is a 69 y.o. female who has presented to ED for SVT. Patient has visited the ED 1 times in the past 3 months. Patient did not contact PCP.     ED Navigator Initial Assessment    ED Navigator Enrollment Documentation  Consent to Services  Does patient consent to completing the assessment?: Yes  Contact  Method of Initial Contact: Phone  Transportation  Insurance Coverage  Do you have coverage/adequate coverage?: Yes  Specialist Appointment  Did the patient come to the ED to see a specialist?: No  Does the patient have a pending specialist referral?: No  Does the patient have a specialist appointment made?: No  PCP Follow Up Appointment  Medications  Is patient able to afford medication?: Yes  Psychological  Food  Communication/Education  Other Financial Concerns  Other Social Barriers/Concerns  Primary Barrier         Social History     Socioeconomic History    Marital status:    Tobacco Use    Smoking status: Former     Current packs/day: 0.00     Average packs/day: 1 pack/day for 20.0 years (20.0 ttl pk-yrs)     Types: Cigarettes     Quit date: 2021     Years since quittin.3     Passive exposure: Past    Smokeless tobacco: Never   Substance and Sexual Activity    Alcohol use: Not Currently     Alcohol/week: 1.0 standard drink of alcohol     Types: 1 Glasses of wine per week    Drug use: No    Sexual activity: Not Currently     Partners: Male     Social Drivers of Health     Financial Resource Strain: Low Risk  (2025)    Overall Financial Resource Strain (CARDIA)     Difficulty of Paying Living Expenses: Not very hard   Food Insecurity: No Food Insecurity (2025)    Hunger Vital Sign     Worried About Running Out of Food in the Last Year: Never true     Ran  Out of Food in the Last Year: Never true   Transportation Needs: No Transportation Needs (4/16/2025)    PRAPARE - Transportation     Lack of Transportation (Medical): No     Lack of Transportation (Non-Medical): No   Recent Concern: Transportation Needs - Unmet Transportation Needs (4/15/2025)    PRAPARE - Transportation     Lack of Transportation (Medical): Yes     Lack of Transportation (Non-Medical): No   Physical Activity: Inactive (4/16/2025)    Exercise Vital Sign     Days of Exercise per Week: 0 days     Minutes of Exercise per Session: 0 min   Stress: Stress Concern Present (4/17/2025)    [employer] [place] [employer] - Occupational Stress Questionnaire     Feeling of Stress : To some extent   Housing Stability: Low Risk  (4/17/2025)    Housing Stability Vital Sign     Unable to Pay for Housing in the Last Year: No     Number of Times Moved in the Last Year: 0     Homeless in the Last Year: No       Plan:   Patient was contacted for post ED discharge navigation. They have an appointment scheduled with Sussy Mason NP on 5/13/25 at 4:40. ED navigator will remind patient of appointment.

## 2025-05-06 ENCOUNTER — TELEPHONE (OUTPATIENT)
Dept: PRIMARY CARE CLINIC | Facility: CLINIC | Age: 70
End: 2025-05-06
Payer: MEDICARE

## 2025-05-06 NOTE — TELEPHONE ENCOUNTER
Call to patient to follow-up with  ER visit for SVT, palpitations. Patient reported she is feeling better today.    No episodes of SOB, palpitations . Has pulse ox in home for 02 reading and to check HR. HR today 62. Discussed drinking fluids and using relaxation techniques.     No changes in medication. Appt changed to in person for PCP on 05/12/2025.Patient reported she needs to see cardiology as recommended by ER. No referral in system yet. Discussed PCP to review and advise.

## 2025-05-07 ENCOUNTER — TELEPHONE (OUTPATIENT)
Dept: PRIMARY CARE CLINIC | Facility: CLINIC | Age: 70
End: 2025-05-07
Payer: MEDICARE

## 2025-05-09 ENCOUNTER — TELEPHONE (OUTPATIENT)
Dept: PRIMARY CARE CLINIC | Facility: CLINIC | Age: 70
End: 2025-05-09
Payer: MEDICARE

## 2025-05-09 RX ORDER — PREDNISONE 20 MG/1
40 TABLET ORAL DAILY
Qty: 10 TABLET | Refills: 0 | Status: SHIPPED | OUTPATIENT
Start: 2025-05-09 | End: 2025-05-14

## 2025-05-09 NOTE — TELEPHONE ENCOUNTER
Call to patient for follow-up.    Reports she is still coughing up white  thick sputum, taking mucinex. No nasal congestion. No SOB while at rest.   02 level is low in morning in 80's.  02 levels drop with exertion. Uses CPAP with 02 during night.    Pulmonary appt not till end of this month.     Has had 2 rounds of po abx, and 1 prescription of steroids x 3 days post hospital DC on 04/18/2025    Discussed cool mist, drinking plenty of fluids, and performing breathing exercises.     Patient feels like the continued coughing up sputum is not normal for her and she should be better by now. Patient requesting another po abx just in case she still has PNA.    .

## 2025-05-09 NOTE — TELEPHONE ENCOUNTER
Spoke to patient. She feels congested. Finished prednisone, zpak and augmentin days ago. She received them when she went to the ER April 15th for PNA. She is still feeling congested. Says her oxygen level is dropping when ambulating. Its 88% right now when sitting. She is doing duoneb 3 times a day. Using breztri bid. Has pulm appt may 30th. I will give her one more course of prednisone. She went to ER on may 3rd for palpitations. Had SVT, resolved with adenosine. CXR on may 3rd without evidence of PNA. She will see me on Monday.

## 2025-05-09 NOTE — TELEPHONE ENCOUNTER
----- Message from Clinton sent at 5/9/2025  9:39 AM CDT -----  Contact: [phone]  .1MEDICALADVICE Patient is calling for Medical Advice regarding: congestion and tired How long has patient had these symptoms: on going Pharmacy name and phone#:[id number] - [place] - [address] [facility] [address] [facility] [id number] [facility] [address] [facility] [address]Ljrut: [phone] Fax: [phone] wants a call back or thru [facility]: callComments: Finished all of medication but symptoms are still hangingPlease advise patient replies from provider may take up to 48 hours.

## 2025-05-12 ENCOUNTER — OFFICE VISIT (OUTPATIENT)
Dept: PRIMARY CARE CLINIC | Facility: CLINIC | Age: 70
End: 2025-05-12
Payer: MEDICARE

## 2025-05-12 VITALS — DIASTOLIC BLOOD PRESSURE: 64 MMHG | OXYGEN SATURATION: 92 % | SYSTOLIC BLOOD PRESSURE: 138 MMHG | HEART RATE: 71 BPM

## 2025-05-12 DIAGNOSIS — J43.2 CENTRILOBULAR EMPHYSEMA: ICD-10-CM

## 2025-05-12 DIAGNOSIS — F41.9 ANXIETY: Primary | Chronic | ICD-10-CM

## 2025-05-12 PROCEDURE — 3075F SYST BP GE 130 - 139MM HG: CPT | Mod: CPTII,HCNC,S$GLB, | Performed by: STUDENT IN AN ORGANIZED HEALTH CARE EDUCATION/TRAINING PROGRAM

## 2025-05-12 PROCEDURE — 99999 PR PBB SHADOW E&M-EST. PATIENT-LVL III: CPT | Mod: PBBFAC,HCNC,, | Performed by: STUDENT IN AN ORGANIZED HEALTH CARE EDUCATION/TRAINING PROGRAM

## 2025-05-12 PROCEDURE — 3078F DIAST BP <80 MM HG: CPT | Mod: CPTII,HCNC,S$GLB, | Performed by: STUDENT IN AN ORGANIZED HEALTH CARE EDUCATION/TRAINING PROGRAM

## 2025-05-12 PROCEDURE — 3288F FALL RISK ASSESSMENT DOCD: CPT | Mod: CPTII,HCNC,S$GLB, | Performed by: STUDENT IN AN ORGANIZED HEALTH CARE EDUCATION/TRAINING PROGRAM

## 2025-05-12 PROCEDURE — 1159F MED LIST DOCD IN RCRD: CPT | Mod: CPTII,HCNC,S$GLB, | Performed by: STUDENT IN AN ORGANIZED HEALTH CARE EDUCATION/TRAINING PROGRAM

## 2025-05-12 PROCEDURE — 1101F PT FALLS ASSESS-DOCD LE1/YR: CPT | Mod: CPTII,HCNC,S$GLB, | Performed by: STUDENT IN AN ORGANIZED HEALTH CARE EDUCATION/TRAINING PROGRAM

## 2025-05-12 PROCEDURE — 1126F AMNT PAIN NOTED NONE PRSNT: CPT | Mod: CPTII,HCNC,S$GLB, | Performed by: STUDENT IN AN ORGANIZED HEALTH CARE EDUCATION/TRAINING PROGRAM

## 2025-05-12 PROCEDURE — 99214 OFFICE O/P EST MOD 30 MIN: CPT | Mod: HCNC,S$GLB,, | Performed by: STUDENT IN AN ORGANIZED HEALTH CARE EDUCATION/TRAINING PROGRAM

## 2025-05-12 PROCEDURE — 1160F RVW MEDS BY RX/DR IN RCRD: CPT | Mod: CPTII,HCNC,S$GLB, | Performed by: STUDENT IN AN ORGANIZED HEALTH CARE EDUCATION/TRAINING PROGRAM

## 2025-05-12 PROCEDURE — 4010F ACE/ARB THERAPY RXD/TAKEN: CPT | Mod: CPTII,HCNC,S$GLB, | Performed by: STUDENT IN AN ORGANIZED HEALTH CARE EDUCATION/TRAINING PROGRAM

## 2025-05-12 PROCEDURE — 1111F DSCHRG MED/CURRENT MED MERGE: CPT | Mod: CPTII,HCNC,S$GLB, | Performed by: STUDENT IN AN ORGANIZED HEALTH CARE EDUCATION/TRAINING PROGRAM

## 2025-05-12 PROCEDURE — 1157F ADVNC CARE PLAN IN RCRD: CPT | Mod: CPTII,HCNC,S$GLB, | Performed by: STUDENT IN AN ORGANIZED HEALTH CARE EDUCATION/TRAINING PROGRAM

## 2025-05-12 RX ORDER — PREDNISONE 20 MG/1
TABLET ORAL
Qty: 10 TABLET | Refills: 0 | OUTPATIENT
Start: 2025-05-12

## 2025-05-12 RX ORDER — SERTRALINE HYDROCHLORIDE 100 MG/1
TABLET, FILM COATED ORAL
Qty: 30 TABLET | Refills: 11 | Status: SHIPPED | OUTPATIENT
Start: 2025-05-12

## 2025-05-12 NOTE — TELEPHONE ENCOUNTER
----- Message from Clinton sent at 5/12/2025 12:58 PM CDT -----  Contact: [phone]  Patient is returning a phone call.Who left a message for the patient: Mike patient know what this is regarding:  Would you like a call back, or a response through your [facility] portal?:   callComments: Patient apologizes call dropped

## 2025-05-12 NOTE — ASSESSMENT & PLAN NOTE
I feel like her disease process gives her anxiety. The thought of moving around make her anxious. She is taking lexapro 20 mg daily. Also takes hydroxyzine as needed. She thinks she tried buspirone but she thinks they took her off of buspirone, she thinks she might have been hallucinating on it. She is considering cbd gummies. I also discussed ashwagandha. I will also switch her from lexapro to zoloft. Will do 50 mg a day for a week and increase to 100 mg daily.

## 2025-05-12 NOTE — PROGRESS NOTES
Clinic Note  5/12/2025      Subjective:       Patient ID:  Terri is a 69 y.o. female being seen for an established visit.    Chief Complaint: Hospital Follow Up    HPI  Terri Phelan is a 69 y.o. AA female who presents with COPD on 2L of O2 via NC (uses 3L with exertion), diastolic HF, HTN, HLD, anxiety, depression, JUAN (uses bipap every night) is here for a hospital f/u visit. Did acp 2025.   -went to the ER twice in the last month. One for copd exacerbation on April 15th and once on may 3rd for SVT. Patient was admitted on April 15th for sob. Xray reported patchy opacity projects over the right lower lung zone possibly edema with a developing consolidation also possible. She was treated with abx at that time. Her xray on may 3rd without evidence of opacities.   -may 3rd went to the ER for palpitations. Was found to be in SVT which was resolved after adenosine. She was not set up with cardiology. I will refer her to cardiology.   -I spoke to her on Friday. She was still having congestion and sob. I treated her with a course of prednisone. Was feeling better this morning. Says she has anxiety which makes her sob worse.   -will switch to sertraline from lexapro. Discussed ashwagandha. Pt asked about cbd gummies which I am fine with  -no falls  -does not want to do LDCT. Told her why we do it.         Review of Systems   Constitutional:  Negative for chills and fever.   HENT:  Negative for congestion.    Respiratory:  Negative for cough and shortness of breath.    Cardiovascular:  Negative for chest pain.   Gastrointestinal:  Negative for abdominal pain, blood in stool, constipation and diarrhea.   Genitourinary:  Negative for dysuria and hematuria.   Neurological:  Negative for dizziness and headaches.       Medication List with Changes/Refills   New Medications    SERTRALINE (ZOLOFT) 100 MG TABLET    Take half tablet (50 mg) daily for one week and then increase to 1 tablet (100 mg) daily.   Current Medications     ACETAMINOPHEN (TYLENOL) 500 MG TABLET    Take 500 mg by mouth every 6 (six) hours as needed for Pain.    ALBUTEROL (PROVENTIL/VENTOLIN HFA) 90 MCG/ACTUATION INHALER    Inhale 2 puffs into the lungs every 6 (six) hours as needed for Wheezing or Shortness of Breath.    ALBUTEROL-IPRATROPIUM (DUO-NEB) 2.5 MG-0.5 MG/3 ML NEBULIZER SOLUTION    USE 3 ML VIA NEBULIZER EVERY 6 HOURS AS NEEDED FOR WHEEZING    ASPIRIN 81 MG CHEW    Take 81 mg by mouth once daily.    AZITHROMYCIN (Z-OLY) 250 MG TABLET    Take 1 tablet (250 mg total) by mouth 3 (three) times a week. Take three times weekly on Monday, Wednesday, and Friday.    BUDESONIDE-GLYCOPYR-FORMOTEROL (BREZTRI AEROSPHERE) 160-9-4.8 MCG/ACTUATION HFAA    Inhale 2 puffs into the lungs 2 (two) times daily.    CALCIUM CARB-MAG HYDROX-SIMETH 1,200 MG-270 MG -80 MG/10 ML SUSP    Take 1,200 mg by mouth once daily.    CHOLECALCIFEROL, VITAMIN D3, (VITAMIN D3) 50 MCG (2,000 UNIT) TAB    Take 2,000 Units by mouth once daily.    CYCLOBENZAPRINE (FLEXERIL) 5 MG TABLET    Take 1 tablet (5 mg total) by mouth daily as needed for Muscle spasms.    DEXTROMETHORPHAN-GUAIFENESIN  MG (MUCINEX DM)  MG PER 12 HR TABLET    Take 1 tablet by mouth 2 (two) times daily as needed (cough).    EZETIMIBE (ZETIA) 10 MG TABLET    TAKE 1 TABLET(10 MG) BY MOUTH DAILY    FLUTICASONE PROPIONATE (FLONASE) 50 MCG/ACTUATION NASAL SPRAY    SHAKE LIQUID AND USE 1 SPRAY(50 MCG) IN EACH NOSTRIL DAILY AS NEEDED FOR ALLERGIES    FUROSEMIDE (LASIX) 40 MG TABLET    Take 40 mg by mouth once daily.    GUAIFENESIN (MUCINEX) 600 MG 12 HR TABLET    Take 1 tablet (600 mg total) by mouth 2 (two) times daily.    HYDROXYZINE HCL (ATARAX) 25 MG TABLET    TAKE 1 TABLET(25 MG) BY MOUTH THREE TIMES DAILY AS NEEDED FOR ANXIETY OR ITCHING    LOSARTAN (COZAAR) 25 MG TABLET    Take 1 tablet (25 mg total) by mouth once daily.    MELOXICAM (MOBIC) 15 MG TABLET    Take 1 tablet (15 mg total) by mouth once daily.     "OXYGEN-AIR DELIVERY SYSTEMS MISC    2 L by Nasal route continuous.    POTASSIUM CHLORIDE (MICRO-K) 10 MEQ CPSR    TAKE 1 CAPSULE BY MOUTH EVERY DAY    PREDNISONE (DELTASONE) 20 MG TABLET    Take 2 tablets (40 mg total) by mouth once daily. for 5 days    ROFLUMILAST (DALIRESP) 500 MCG TAB    Take 1 tablet (500 mcg total) by mouth once daily.   Discontinued Medications    ESCITALOPRAM OXALATE (LEXAPRO) 20 MG TABLET    Take 1 tablet (20 mg total) by mouth once daily.           Objective:      /64 (BP Location: Right arm, Patient Position: Sitting)   Pulse 71   SpO2 (!) 92%   Estimated body mass index is 29.21 kg/m² as calculated from the following:    Height as of 4/28/25: 5' 6" (1.676 m).    Weight as of 4/28/25: 82.1 kg (181 lb).  Physical Exam  Constitutional:       Appearance: Normal appearance.   Eyes:      Conjunctiva/sclera: Conjunctivae normal.   Cardiovascular:      Rate and Rhythm: Normal rate and regular rhythm.   Pulmonary:      Effort: Pulmonary effort is normal. No respiratory distress.      Breath sounds: No wheezing.      Comments: Diminished breath sounds BL  Musculoskeletal:      Right lower leg: No edema.      Left lower leg: No edema.   Skin:     General: Skin is warm.   Neurological:      Mental Status: She is alert and oriented to person, place, and time.   Psychiatric:         Mood and Affect: Mood normal.         Behavior: Behavior normal.           Assessment and Plan:     1. Anxiety  Overview:  Anxiety associated with feelings of SOB, diagnosis, and hope for new medications that can help. Exacerbated by chronic need to check oxygen numbers and frequent hospitalizations. Following with palliative.     Assessment & Plan:  I feel like her disease process gives her anxiety. The thought of moving around make her anxious. She is taking lexapro 20 mg daily. Also takes hydroxyzine as needed. She thinks she tried buspirone but she thinks they took her off of buspirone, she thinks she might have " been hallucinating on it. She is considering cbd gummies. I also discussed ashwagandha. I will also switch her from lexapro to zoloft. Will do 50 mg a day for a week and increase to 100 mg daily.       2. Centrilobular emphysema  Overview:  - Severe obstructive disease, on 2-3L home O2 and NIPPV (BiPap) at night and prn.   - Chest CTA from July 2023 with apical centrilobular emphysema present.  - Managed with home O2, ICS/LABA, PRN LISA, and PD4i        Assessment & Plan:  Was treated for a pneumonia last month. I spoke to her on Friday. She was still having congestion and sob. I treated her with a course of prednisone. She is feeling better this morning and has 3 more days left. Continue breztri and prednisone and nebulizer.        Other orders  -     sertraline (ZOLOFT) 100 MG tablet; Take half tablet (50 mg) daily for one week and then increase to 1 tablet (100 mg) daily.  Dispense: 30 tablet; Refill: 11            Follow Up:   Follow up in about 2 weeks (around 5/26/2025), or with me or arvin, for Virtual Visit.        Sussy Mason

## 2025-05-15 ENCOUNTER — CLINICAL SUPPORT (OUTPATIENT)
Dept: PRIMARY CARE CLINIC | Facility: CLINIC | Age: 70
End: 2025-05-15
Payer: MEDICARE

## 2025-05-15 ENCOUNTER — PATIENT MESSAGE (OUTPATIENT)
Dept: PRIMARY CARE CLINIC | Facility: CLINIC | Age: 70
End: 2025-05-15

## 2025-05-15 DIAGNOSIS — F43.21 GRIEF: Primary | ICD-10-CM

## 2025-05-15 DIAGNOSIS — F41.9 ANXIETY: ICD-10-CM

## 2025-05-15 NOTE — PROGRESS NOTES
Patient logged in for 11am 30 min virtual visit at 11:25am.  Session was rescheduled after speaking with patient.

## 2025-05-16 ENCOUNTER — TELEPHONE (OUTPATIENT)
Dept: PRIMARY CARE CLINIC | Facility: CLINIC | Age: 70
End: 2025-05-16
Payer: MEDICARE

## 2025-05-19 ENCOUNTER — RESEARCH ENCOUNTER (OUTPATIENT)
Dept: RESEARCH | Facility: CLINIC | Age: 70
End: 2025-05-19
Payer: MEDICARE

## 2025-05-19 RX ORDER — IPRATROPIUM BROMIDE AND ALBUTEROL SULFATE 2.5; .5 MG/3ML; MG/3ML
3 SOLUTION RESPIRATORY (INHALATION)
Qty: 360 ML | Refills: 0 | Status: SHIPPED | OUTPATIENT
Start: 2025-05-19

## 2025-05-19 NOTE — PROGRESS NOTES
Visit 2 / Day 30 / Final Assessment *Data Collection Visit Only   Sponsor: Pfizer  Study: Vaccine Effectiveness of 20-Valent Pneumococcal Conjugate Vaccine Against Vaccine-Type CAP Using a Test-Negative Design (RECAP-20)  IRB/Protocol #: 2022.215/[id number]  Principle Investigator: Dr. Stuart Mcdonough   Site Number: Site Selection: [facility]    Subject Number:  [id number]       Visit 2 Date: 19 MAY 2025      Final Diagnosis:  Date of Diagnosis: 18 APR 2025     This is the date on which final diagnosis of suspect CAP is determined and cannot be left blank.  (or designee) should determine the participant's final diagnosis based on clinical information available from the hospitalization event. Final diagnosis should be assessed upon hospital discharge up to Day 30. Record final diagnosis from the following categories in the CRF:  Final Diagnosis:  [x] Community Acquired Pneumonia (CAP)  [] Other diagnosis (not pneumonia):  [] Acute Bronchitis  [] Exacerbation of COPD  [] Empyema/lung abscess  [] Other acute lower respiratory tract infection  [] Acute pulmonary exacerbation of CHF  [] Non-infectious process  [] Early-Onset HAP    If Final Diagnosis is CAP, please specify if radiologically confirmed:   [x] Confirmed  [] Not Confirmed  [] Not Done

## 2025-05-19 NOTE — PROGRESS NOTES
Visit 2 / Day 30 / Final Assessment *Data Collection Visit Only   Sponsor: Pfizer  Study: Vaccine Effectiveness of 20-Valent Pneumococcal Conjugate Vaccine Against Vaccine-Type CAP Using a Test-Negative Design (RECAP-20)  IRB/Protocol #: 2022.215/[id number]  Principle Investigator: Dr. Stuart Mcdonough   Site Number: Site Selection: [facility]    Subject Number: [id number]    Visit 2 Date: 19 May 2025      Hospitalization Details  Hospital Admission Date: 15 Apr 2025   Ongoing: No   Discharge Date: 18 Apr 2025     Discharge Disposition:  Participants will have discharge disposition recorded on the CRF if available. Discharge  disposition should be collected upon discharge up to Day 30. If a participant remains  hospitalized beyond the Day 30 visit, discharge disposition will be documented as Other,  'Not Discharged'.  [] In-hospital death   [] Patient still in study site hospital  [x] Discharged home with self care  [] Discharged home with health home care provided by family or friends  [] Discharged home with professional health home care  [] Transferred to another acute care hospital  [] Transferred to a skilled nursing facility/other rehab facility (low level care)  [] Transferred to a skilled nursing facility/other rehab facility (high level care)  [] Discharged against medical advice  [] Other, specify    Hospitalization Details - Mechanical Ventilation  (BiPap/CPAP should  not be recorded in the CRF as mechanical ventilation. Mechanical ventilation is meant to capture invasive mechanical ventilation.  Routine BiPap/CPAP home use should not be recorded in the CRF as mechanical ventilation. Mechanical ventilation (invasive or non-invasive) initiated after presentation to the hospital should be recorded in the CRF.)    Did Procedure Occur [] Yes      Type of mechanical ventilation:      [] Intubation      [] Non-invasive positive pressure ventilation       Start Date: N/A      Ongoing: N/A     [x]  No    Hospitalization Details - Intensive Care Unit: if Participant is admitted to and discharged multiple times from ICU, depending on admission, discharge and readmission dates, multiple records should be created.    ICU Admission Date: N/A   Ongoing [] Yes [] No  ICU Discharge Date:N/A    Did Procedure Occur? [] Yes [x] No  If yes, Extracorporeal Membrane Oxygenation (ECMO) will populate     ECMO start date:N/A  Ongoing [] Yes [] No if no, ECMO end date:N/A        Survival / Vital Status:  Record vital status at Visit 2 at Day 30 (regardless of whether the patient was discharged before Day 30). Vital status should be determined using hospital data or information received from the Death [facility].    Survival Follow-Up Current Subject Status: []  or [x] Living [] Unknown  If death occurred, include death details: N/A  Date of Collection/ Notification of Death (if available): N/A  Date of Death:N/A  Is this death within TI reporting requirement? N/A    Cause of Death status: [] Primary Cause of Death [] Secondary Cause of Death  Cause of Death category: [] Disease under study [] Unknown  [] Other      Day 30 Vaccination History   Report from physician record, pharmacy records, EMR, LINKS, etc. If known provide generic drug name (including salt form if applicable). If generic name unknown, provide trade name or propriety name. Include route, dose, lot number and date received if known. Record if received more than once. If vaccination history is available from different sources, the data from the most reliable source should be recorded. Documentation of pneumococcal vaccines beyond 5 years prior to enrollment is allowed when reliable data is available per Pfizer PACL.    Date and Type(s) of pneumococcal vaccination in past 5 years:  23-Valent Polysaccharide: No  Date Received:    Route:  :      Lot #:    13-Valent Pneumococcal Conjugate Vaccine:  Yes  Date Received: 2022   Route:  Intramuscular (Right Deltoid)  :  Pfizer    Lot #: [id number]    15-Valent Pneumococcal Conjugate Vaccine: No  Date Received:    Route:  :      Lot #:    20-Valent Pneumococcal Conjugate Vaccine: Yes  Date Received: 26 Apr 2023    Route: Intramuscular (Left Arm)  :  Veda    Lot #: [id number]    21-Valent Pneumococcal Conjugate Vaccine: No  Date Received:    Route:  :      Lot #:      PACL Change/Update: Date and Type(s) of pneumococcal vaccination beyond the past 5 year-window:    Please note any additional pneumococcal vaccines outside 5-year window below if available.     N/A    Influenza Vaccine(s): Yes  Date of influenza vaccinations and lot number (if available) within the past 5 years will be collected.    Flu Shot # Date  Lot # Route   1  24 Oct 2022  Sanofi Pasteur  [id number]  Intramuscular (Left Arm)   2  21 Nov 2023  Seqirus   [id number]  Intramuscular (Left Deltoid)   3  20 Sep 2024  Seqirus  [id number]  Intramuscular (Left Deltoid)   4       5         RSV Vaccine(s): Yes    Date Received: 04 Dec 2023   Route: Intramuscular (Left Arm)  :  [employer]  Lot #: [id number]    Date Received:    Route:  :      Lot #:    COVID Vaccinations:   Date of any COVID-19 vaccines and lot number will be collected, if available.        Dose/Booster # Date  Lot # Route   0.5 mL 25 Feb 2021 Moderna  [id number] Intramuscular (Left Arm)   2.   0.5 mL 25 Mar 2021 Moderna [id number] Intramuscular (Left Arm)   3.   0.5 mL 03 Nov 2021 Moderna [id number] Intramuscular (Right Arm)   4.   0.5 mL 06 Apr 2022 Moderna [id number] Intramuscular (Right Arm)   5.   0.5 mL 17 Oct 2023 Moderna [id number] Intramuscualr (Left Deltoid)   6.   0.3 mL tri-sucrose 23 Nov 2024 Pfizer [id number] Intramuscualr (Right Deltoid)   7         Completeness of Vaccine History Ascertainment  Select Sources of Vaccine History information searched (if applicable):  [x] Hospital Record  (Paper/EHR/EMR)  [x] Non-hospital Electronic Medical Record (if applicable)  [x] Vaccine Card (if applicable)  [x] Vaccine [place]/State Registry  [x] Pharmacy Record  [x] Primary Care or other medical provider    CRC Notes: N/A

## 2025-05-22 ENCOUNTER — DOCUMENTATION ONLY (OUTPATIENT)
Dept: PRIMARY CARE CLINIC | Facility: CLINIC | Age: 70
End: 2025-05-22
Payer: MEDICARE

## 2025-05-27 NOTE — PROGRESS NOTES
Pt with pmh chronic respiratory failure with hypoxia and hypercapnia, COPD (FEV1: 0.52L) on deliresp and MWF azithromycin, CAD, pulmonary hypertension, JUAN on BiPAP who presents for follow up.   Last EKG: Qtc- 446    PFT: 10/10/23  FEV1: 0.52L (26.6%)  FVC: 1.13L (44.8%)  FEV1/FVC: 47  T.74L (55.5%)  DLCO: 3.91 (18%)    Echo: 7/3/23  The left ventricle is normal in size with normal systolic function.  The estimated ejection fraction is 65%.  Normal left ventricular diastolic function.  With normal right ventricular systolic function.  The estimated PA systolic pressure is 37 mmHg.  Intermediate central venous pressure (8 mmHg).    - continue Breztri, PRN albuterol/duonebs  - Continue MWF azithromycin  - Continue roflumilast  - Likely has post hospitalization deconditioning and should return to pulmonary rehab vs re referral to  PT.     Stuart Reyes MD  [facility]

## 2025-05-28 ENCOUNTER — OFFICE VISIT (OUTPATIENT)
Dept: PRIMARY CARE CLINIC | Facility: CLINIC | Age: 70
End: 2025-05-28
Payer: MEDICARE

## 2025-05-28 ENCOUNTER — TELEPHONE (OUTPATIENT)
Dept: PRIMARY CARE CLINIC | Facility: CLINIC | Age: 70
End: 2025-05-28

## 2025-05-28 DIAGNOSIS — I10 PRIMARY HYPERTENSION: ICD-10-CM

## 2025-05-28 DIAGNOSIS — J43.2 CENTRILOBULAR EMPHYSEMA: ICD-10-CM

## 2025-05-28 DIAGNOSIS — R00.2 PALPITATIONS: ICD-10-CM

## 2025-05-28 DIAGNOSIS — Z76.89 ENCOUNTER FOR NAIL CARE: ICD-10-CM

## 2025-05-28 DIAGNOSIS — F41.9 ANXIETY: Primary | Chronic | ICD-10-CM

## 2025-05-28 PROBLEM — J15.9 COMMUNITY ACQUIRED BACTERIAL PNEUMONIA: Status: RESOLVED | Noted: 2024-09-29 | Resolved: 2025-05-28

## 2025-05-28 PROCEDURE — 4010F ACE/ARB THERAPY RXD/TAKEN: CPT | Mod: CPTII,HCNC,95, | Performed by: STUDENT IN AN ORGANIZED HEALTH CARE EDUCATION/TRAINING PROGRAM

## 2025-05-28 PROCEDURE — 1157F ADVNC CARE PLAN IN RCRD: CPT | Mod: CPTII,HCNC,95, | Performed by: STUDENT IN AN ORGANIZED HEALTH CARE EDUCATION/TRAINING PROGRAM

## 2025-05-28 PROCEDURE — 1159F MED LIST DOCD IN RCRD: CPT | Mod: CPTII,HCNC,95, | Performed by: STUDENT IN AN ORGANIZED HEALTH CARE EDUCATION/TRAINING PROGRAM

## 2025-05-28 PROCEDURE — 98005 SYNCH AUDIO-VIDEO EST LOW 20: CPT | Mod: HCNC,95,, | Performed by: STUDENT IN AN ORGANIZED HEALTH CARE EDUCATION/TRAINING PROGRAM

## 2025-05-28 PROCEDURE — 1160F RVW MEDS BY RX/DR IN RCRD: CPT | Mod: CPTII,HCNC,95, | Performed by: STUDENT IN AN ORGANIZED HEALTH CARE EDUCATION/TRAINING PROGRAM

## 2025-05-28 NOTE — ASSESSMENT & PLAN NOTE
No issues. I treated her last with prednisone 3 weeks ago and she is still doing well. Not having any symptoms right now. Wearing her O2 24/7. Continue breztri. Has appt with pulm on may 30th.

## 2025-05-28 NOTE — PROGRESS NOTES
Clinic Note  5/28/2025      Subjective:       Patient ID:  Terri is a 69 y.o. female being seen for an established visit.    Chief Complaint: No chief complaint on file.    HPI  Terri Phelan is a 69 y.o. AA female who presents with COPD on 2L of O2 via NC (uses 3L with exertion), diastolic HF, HTN, HLD, anxiety, depression, JUAN (uses bipap every night) is here for a virtual f/u visit. Did acp 2025.   -may 3rd went to the ER for palpitations. Was found to be in SVT by paramedics who gave her adenosine. resolved after adenosine. When she presented to the ER was in sinus tachy. She made an appt with cardiology which is scheduled for June 9th  -feeling good today.   -will switch to sertraline from lexapro. Discussed ashwagandha. Pt asked about cbd gummies which I am fine with  -no falls  -does not want to do LDCT. Told her why we do it.         Review of Systems   Constitutional:  Negative for chills and fever.   HENT:  Negative for congestion and hearing loss.    Respiratory:  Negative for cough and shortness of breath.    Cardiovascular:  Negative for chest pain.   Gastrointestinal:  Negative for abdominal pain, blood in stool, constipation, diarrhea and vomiting.   Genitourinary:  Negative for dysuria and hematuria.   Neurological:  Negative for dizziness, weakness and headaches.       Medication List with Changes/Refills   Current Medications    ACETAMINOPHEN (TYLENOL) 500 MG TABLET    Take 500 mg by mouth every 6 (six) hours as needed for Pain.    ALBUTEROL (PROVENTIL/VENTOLIN HFA) 90 MCG/ACTUATION INHALER    Inhale 2 puffs into the lungs every 6 (six) hours as needed for Wheezing or Shortness of Breath.    ALBUTEROL-IPRATROPIUM (DUO-NEB) 2.5 MG-0.5 MG/3 ML NEBULIZER SOLUTION    Take 3 mLs by nebulization every 6 (six) hours while awake. Rescue    ASPIRIN 81 MG CHEW    Take 81 mg by mouth once daily.    AZITHROMYCIN (Z-OLY) 250 MG TABLET    Take 1 tablet (250 mg total) by mouth 3 (three) times a week. Take  "three times weekly on Monday, Wednesday, and Friday.    BUDESONIDE-GLYCOPYR-FORMOTEROL (BREZTRI AEROSPHERE) 160-9-4.8 MCG/ACTUATION HFAA    Inhale 2 puffs into the lungs 2 (two) times daily.    CALCIUM CARB-MAG HYDROX-SIMETH 1,200 MG-270 MG -80 MG/10 ML SUSP    Take 1,200 mg by mouth once daily.    CHOLECALCIFEROL, VITAMIN D3, (VITAMIN D3) 50 MCG (2,000 UNIT) TAB    Take 2,000 Units by mouth once daily.    CYCLOBENZAPRINE (FLEXERIL) 5 MG TABLET    Take 1 tablet (5 mg total) by mouth daily as needed for Muscle spasms.    DEXTROMETHORPHAN-GUAIFENESIN  MG (MUCINEX DM)  MG PER 12 HR TABLET    Take 1 tablet by mouth 2 (two) times daily as needed (cough).    EZETIMIBE (ZETIA) 10 MG TABLET    TAKE 1 TABLET(10 MG) BY MOUTH DAILY    FLUTICASONE PROPIONATE (FLONASE) 50 MCG/ACTUATION NASAL SPRAY    SHAKE LIQUID AND USE 1 SPRAY(50 MCG) IN EACH NOSTRIL DAILY AS NEEDED FOR ALLERGIES    FUROSEMIDE (LASIX) 40 MG TABLET    Take 40 mg by mouth once daily.    GUAIFENESIN (MUCINEX) 600 MG 12 HR TABLET    Take 1 tablet (600 mg total) by mouth 2 (two) times daily.    HYDROXYZINE HCL (ATARAX) 25 MG TABLET    TAKE 1 TABLET(25 MG) BY MOUTH THREE TIMES DAILY AS NEEDED FOR ANXIETY OR ITCHING    LOSARTAN (COZAAR) 25 MG TABLET    Take 1 tablet (25 mg total) by mouth once daily.    MELOXICAM (MOBIC) 15 MG TABLET    Take 1 tablet (15 mg total) by mouth once daily.    OXYGEN-AIR DELIVERY SYSTEMS MISC    2 L by Nasal route continuous.    POTASSIUM CHLORIDE (MICRO-K) 10 MEQ CPSR    TAKE 1 CAPSULE BY MOUTH EVERY DAY    ROFLUMILAST (DALIRESP) 500 MCG TAB    Take 1 tablet (500 mcg total) by mouth once daily.    SERTRALINE (ZOLOFT) 100 MG TABLET    Take half tablet (50 mg) daily for one week and then increase to 1 tablet (100 mg) daily.           Objective:      There were no vitals taken for this visit.  Estimated body mass index is 29.21 kg/m² as calculated from the following:    Height as of 4/28/25: 5' 6" (1.676 m).    Weight as of " 4/28/25: 82.1 kg (181 lb).  Physical Exam  Constitutional:       Appearance: Normal appearance.   Eyes:      Conjunctiva/sclera: Conjunctivae normal.   Pulmonary:      Effort: Pulmonary effort is normal. No respiratory distress.      Comments: Diminished breath sounds BL  Neurological:      Mental Status: She is alert and oriented to person, place, and time.   Psychiatric:         Mood and Affect: Mood normal.         Behavior: Behavior normal.           Assessment and Plan:     1. Anxiety  Overview:  Anxiety associated with feelings of SOB, diagnosis, and hope for new medications that can help. Exacerbated by chronic need to check oxygen numbers and frequent hospitalizations. Following with palliative.     Assessment & Plan:  I had switched her from lexapro to zoloft last visit. Asked her to take 50 mg daily for a week and then increase to 100 mg. She felt weird the first day she took the 100 mg dose (tremors and fatigue) but yesterday she took 100 mg again and felt fine. She did not take it today, she will now. Anxiety is not worse but she feels like she is making progress. She was supposed to see amanda on may 22nd virtually for therapy but pt forgot. Messaged amanda just now to see if she can reschedule.       2. Centrilobular emphysema  Overview:  - Severe obstructive disease, on 2-3L home O2 and NIPPV (BiPap) at night and prn.   - Chest CTA from July 2023 with apical centrilobular emphysema present.  - Managed with home O2, ICS/LABA, PRN LISA, and PD4i        Assessment & Plan:  No issues. I treated her last with prednisone 3 weeks ago and she is still doing well. Not having any symptoms right now. Wearing her O2 24/7. Continue breztri. Has appt with pulm on may 30th.       3. Primary hypertension  Overview:  - Managed on losartan 25mg daily    Assessment & Plan:  BP is controlled per patient. Continue losartan 25 mg daily. Will monitor       4. Palpitations  Assessment & Plan:  may 3rd went to the ER for  palpitations. Was found to be in SVT by paramedics who gave her adenosine. resolved after adenosine. When she presented to the ER was in sinus tachy. She made an appt with cardiology which is scheduled for June 9th    Orders:  -     Ambulatory referral/consult to Cardiology; Future; Expected date: 05/28/2025              Follow Up:   Follow up in about 2 weeks (around 6/11/2025) for Virtual Visit.        Sussy Mason        The patient location is: home  The chief complaint leading to consultation is: anxiety    Visit type: audiovisual    Face to Face time with patient: 15 mins  20 minutes of total time spent on the encounter, which includes face to face time and non-face to face time preparing to see the patient (eg, review of tests), Obtaining and/or reviewing separately obtained history, Documenting clinical information in the electronic or other health record, Independently interpreting results (not separately reported) and communicating results to the patient/family/caregiver, or Care coordination (not separately reported).         Each patient to whom he or she provides medical services by telemedicine is:  (1) informed of the relationship between the physician and patient and the respective role of any other health care provider with respect to management of the patient; and (2) notified that he or she may decline to receive medical services by telemedicine and may withdraw from such care at any time.

## 2025-05-28 NOTE — ASSESSMENT & PLAN NOTE
I had switched her from lexapro to zoloft last visit. Asked her to take 50 mg daily for a week and then increase to 100 mg. She felt weird the first day she took the 100 mg dose (tremors and fatigue) but yesterday she took 100 mg again and felt fine. She did not take it today, she will now. Anxiety is not worse but she feels like she is making progress. She was supposed to see amanda on may 22nd virtually for therapy but pt forgot. Messaged amanda just now to see if she can reschedule.

## 2025-05-28 NOTE — ASSESSMENT & PLAN NOTE
may 3rd went to the ER for palpitations. Was found to be in SVT by paramedics who gave her adenosine. resolved after adenosine. When she presented to the ER was in sinus tachy. She made an appt with cardiology which is scheduled for June 9th

## 2025-05-28 NOTE — TELEPHONE ENCOUNTER
Call to patient who reported she would rather make a clinic visit for podiatry.     Patient has appt with podiatrist in [place]. She reported she wants to got [facility]. Given scheduling number to change appt to [facility].

## 2025-05-30 ENCOUNTER — OFFICE VISIT (OUTPATIENT)
Dept: PULMONOLOGY | Facility: CLINIC | Age: 70
End: 2025-05-30
Payer: MEDICARE

## 2025-05-30 VITALS
OXYGEN SATURATION: 92 % | BODY MASS INDEX: 29.09 KG/M2 | HEART RATE: 83 BPM | SYSTOLIC BLOOD PRESSURE: 115 MMHG | DIASTOLIC BLOOD PRESSURE: 56 MMHG | HEIGHT: 66 IN | WEIGHT: 181 LBS

## 2025-05-30 DIAGNOSIS — J96.11 CHRONIC RESPIRATORY FAILURE WITH HYPOXIA AND HYPERCAPNIA: Primary | ICD-10-CM

## 2025-05-30 DIAGNOSIS — J96.12 CHRONIC RESPIRATORY FAILURE WITH HYPOXIA AND HYPERCAPNIA: Primary | ICD-10-CM

## 2025-05-30 DIAGNOSIS — J44.9 END STAGE COPD: ICD-10-CM

## 2025-05-30 PROCEDURE — 99999 PR PBB SHADOW E&M-EST. PATIENT-LVL IV: CPT | Mod: PBBFAC,HCNC,GC, | Performed by: STUDENT IN AN ORGANIZED HEALTH CARE EDUCATION/TRAINING PROGRAM

## 2025-05-30 NOTE — PROGRESS NOTES
"[facility]    Subjective:     Reason for visit: COPD f/u     Patient ID:  Terri Phelan is a 69 y.o. female with severe COPD GOLD4E, chronic hypercapnic respiratory failure on NIPPV, JUAN.    Interval History:  She was last seen in pulmonary clinic on January 14, 2025.  At that time she was continued on home positive pressure ventilator, supplemental oxygen, triple therapy plus roflumilast and 3 time weekly azithromycin.  She has also been attending pulmonary rehab.  Her IgE was rechecked and was normal.  She was hospitalized for a COPD exacerbation April.    At this visit (05/30/2025):   She states she has been short of breath on waking. During the day she has been having trouble getting around short distances (bedroom to living room). She still feels like she has some symptoms from her hospitalization in April. She has not been coughing up mucous, no fevers, no weight loss, no chest pains, no pre-syncope/dizziness.     Inhalers using breztri daily, using rescue inhaler 3x/day  Roflumilast still using  Azithromycin still using  pulmonary rehab, has a not been going as she has not felt well.   Still using NIPPV        Objective:     Vitals:    05/30/25 1303   BP: (!) 115/56   BP Location: Right arm   Patient Position: Sitting   Pulse: 83   SpO2: (!) 92%  Comment: 2.0   Weight: 82.1 kg (181 lb)   Height: 5' 6" (1.676 m)         Physical Exam:  Gen: well developed, NAD  HEENT: NCAT, no LAD  CV: RRR, no murmur, radial pulses equal  Pulm: poor air movement but non distressed, on 3LPM, lung sounds CTAB   Abd: soft, NTND  Ext: 1+ pitting edema BLE  Neuro: AAOx3, no FND  Skin: warm, dry, intact, no rash  Psych:  Appropriate mood and affect, normal judgement        Labs:  Lab Results   Component Value Date    WBC 7.71 05/03/2025    HGB 12.4 05/03/2025    HCT 42.2 05/03/2025    MCV 91 05/03/2025     05/03/2025         Eosinophils:   Most recent was 130    Pertinent Studies Reviewed & " Interpreted:     CXR:  reviewed    CT Chest:  reviewed  -    Pulmonary Function Tests:   Date: 4/19/2023          Assessment & Plan:       Problem List Items Addressed This Visit          Pulmonary    Chronic respiratory failure with hypoxia and hypercapnia - Primary    Overview   - Secondary to advanced/end-stage COPD/emphysema.  - On home oxygen and NIPPV with baseline PaCO2 in the 80s.  - Occasionally reports headaches but relatively asymptomatic with severe CO2 retention.           Relevant Orders    Ambulatory referral/consult to Home Health     Other Visit Diagnoses         End stage COPD        Relevant Orders    Ambulatory referral/consult to Home Health           No problems updated.    Plan:  COPD GOLD 4E  Discussed that her   Continue Breztri and p.r.n. albuterol inhlaer/nebs; confirmed she does not need refills.  Continue roflumilast and azithromycin MWF  Continue pulmonary rehab  Keep up-to-date with vaccinations  Home health PT referral made  Chronic hypoxemic/hypercapnic respiratory failure  Continue home oxygen (2-3L)  Serum bicarb has been stable   Continue VAPS-AE per medicine   Settings are: EPAP 8-10, PSV 10 with max 20, target Vt 450  Plans to call the sleep clinic and see who she will follow up with    RETURN TO CLINIC IN 3 MONTHS       Chucky Santamaria MD  [facility]    Portions of the record may have been created with voice-recognition software. Occasional wrong-word or sound-a-like substitutions may have occurred due to the inherent limitations of voice-recognition software. Read the chart carefully and recognize, using context, where substitutions have occurred.

## 2025-06-06 RX ORDER — POTASSIUM CHLORIDE 750 MG/1
10 CAPSULE, EXTENDED RELEASE ORAL
Qty: 90 CAPSULE | Refills: 0 | Status: SHIPPED | OUTPATIENT
Start: 2025-06-06

## 2025-06-09 ENCOUNTER — OFFICE VISIT (OUTPATIENT)
Dept: CARDIOLOGY | Facility: CLINIC | Age: 70
End: 2025-06-09
Payer: MEDICARE

## 2025-06-09 DIAGNOSIS — R00.2 PALPITATIONS: ICD-10-CM

## 2025-06-09 DIAGNOSIS — I70.0 AORTIC ATHEROSCLEROSIS: ICD-10-CM

## 2025-06-09 DIAGNOSIS — I10 PRIMARY HYPERTENSION: ICD-10-CM

## 2025-06-09 DIAGNOSIS — I25.10 CORONARY ARTERY DISEASE INVOLVING NATIVE CORONARY ARTERY OF NATIVE HEART WITHOUT ANGINA PECTORIS: ICD-10-CM

## 2025-06-09 DIAGNOSIS — I27.20 PULMONARY HYPERTENSION: ICD-10-CM

## 2025-06-09 DIAGNOSIS — E78.2 MIXED HYPERLIPIDEMIA: ICD-10-CM

## 2025-06-09 DIAGNOSIS — I47.10 SVT (SUPRAVENTRICULAR TACHYCARDIA): Primary | ICD-10-CM

## 2025-06-09 PROCEDURE — 99999 PR PBB SHADOW E&M-EST. PATIENT-LVL IV: CPT | Mod: PBBFAC,,, | Performed by: INTERNAL MEDICINE

## 2025-06-09 NOTE — PROGRESS NOTES
Subjective:   06/09/2025     Patient ID:  Terri Phelan is a 69 y.o. female who presents for [facility] Follow Up       History of Present Illness    CHIEF COMPLAINT:  Patient presents for follow-up after a recent ED visit for rapid heart rate, diagnosed as SVT.    HPI:  Patient recently visited the ED due to episodes of rapid heart rate and was diagnosed with SVT. She was given medication which may have reversed the condition. Since then, she reports no further episodes of rapid heart rate.    She has a history of significant COPD. In 10/2017, she had a cardiac event after lifting boxes. She was hospitalized and given morphine. The hospital staff informed her that she had an MI, although an angiogram showed no stenosis. She was discharged the same night after the angiogram.    She reports recently having tremors, describing them as increased shaking.    3 years ago on New Year's Tammy, she went to [facility] due to a significant exacerbation, during which BP was extremely high. Prior to this incident, she reports never having had problems with BP or being on BP medication.    She denies currently having episodes of rapid heart rate or palpitations. She denies a history of coronary artery stenosis based on a past angiogram.    CARDIAC HISTORY:  Echo: No pulmonary HTN 10/2017: Hospitalized for suspected MI, angiogram showed no stenosis  CAD: Calcium in heart arteries ER visit (recent): SVT, given medication which reversed it    MEDICATIONS:  Ezetimibe for high cholesterol, recently started  BP medication 25 mg for HTN during COPD exacerbations, reduced from 50 mg Patient is on Furosemide.    ALLERGIES:  She reports allergies to Lipitor.    TEST RESULTS:  Patient's LDL cholesterol was measured last year, with a high result of 95. Patient underwent an EKG.    IMAGING:  She had an echocardiogram two years ago. A cardiac calcium scoring was also performed, which revealed calcium in the heart  arteries.    MEDICAL HISTORY:  Patient has a history of COPD and hypercholesterolemia.      ROS:  General: -fever, -chills, -fatigue, -weight gain, -weight loss  Eyes: -vision changes, -redness, -discharge  ENT: -ear pain, -nasal congestion, -sore throat  Cardiovascular: -chest pain, -palpitations, -lower extremity edema  Respiratory: -cough, -shortness of breath  Gastrointestinal: -abdominal pain, -nausea, -vomiting, -diarrhea, -constipation, -blood in stool  Genitourinary: -dysuria, -hematuria, -frequency  Musculoskeletal: -joint pain, -muscle pain  Skin: -rash, -lesion  Neurological: -headache, -dizziness, -numbness, -tingling, +tremors  Psychiatric: -anxiety, -depression, -sleep difficulty          Problem List[1]     Review of patient's allergies indicates:   Allergen Reactions    Codeine Nausea And Vomiting    Lipitor [atorvastatin] Other (See Comments)     Muscle fatigue      Morphine Hallucinations       Current Medications[2]     Objective:   Physical Exam    General: No acute distress. Well-developed. Well-nourished.  Eyes: EOMI. Sclerae anicteric.  HENT: Normocephalic. Atraumatic. Nares patent. Moist oral mucosa.  Cardiovascular: Regular rate. Regular rhythm. No murmurs. No rubs. No gallops. Normal S1, S2.  Respiratory: Normal respiratory effort. Clear to auscultation bilaterally. No rales. No rhonchi. No wheezing.  Musculoskeletal: No  obvious deformity.  Extremities: No lower extremity edema.  Neurological: Alert & oriented x3. No slurred speech. Normal gait.  Psychiatric: Normal mood. Normal affect. Good insight. Good judgment.  Skin: Warm. Dry. No rash.          Vitals:    06/09/25 1435   BP: (P) 134/67   Pulse: (P) 67     Wt Readings from Last 3 Encounters:   06/09/25 (P) 81.3 kg (179 lb 3.7 oz)   05/30/25 82.1 kg (181 lb)   04/28/25 82.1 kg (181 lb)     Temp Readings from Last 3 Encounters:   05/03/25 99.3 °F (37.4 °C)   04/28/25 99 °F (37.2 °C) (Oral)   04/18/25 98.1 °F (36.7 °C) (Axillary)     BP  Readings from Last 3 Encounters:   06/09/25 (P) 134/67   05/30/25 (!) 115/56   05/12/25 138/64     Pulse Readings from Last 3 Encounters:   06/09/25 (P) 67   05/30/25 83   05/12/25 71               Lab Results   Component Value Date    CHOL 181 07/08/2024    CHOL 198 07/02/2023     Lab Results   Component Value Date    HDL 75 07/08/2024    HDL 75 07/02/2023     Lab Results   Component Value Date    LDLCALC 95.8 07/08/2024    LDLCALC 113.6 07/02/2023     Lab Results   Component Value Date    ALT 14 05/03/2025    AST 16 05/03/2025    AST 22 04/15/2025    AST 27 01/18/2025     Lab Results   Component Value Date    CREATININE 0.7 05/03/2025    BUN 13 05/03/2025     05/03/2025    K 3.8 05/03/2025    CO2 31 (H) 05/03/2025    CO2 35 (H) 04/18/2025    CO2 35 (H) 04/17/2025     Lab Results   Component Value Date    HGB 12.4 05/03/2025    HCT 42.2 05/03/2025    HCT 38.8 04/18/2025    HCT 40.6 04/17/2025       Assessment and Plan:   Assessment & Plan    I27.20 Pulmonary hypertension  I47.10 SVT (supraventricular tachycardia)  R00.2 Palpitations  I70.0 Aortic atherosclerosis  I25.10 Coronary artery disease involving native coronary artery of native heart without angina pectoris  I10 Primary hypertension  E78.2 Mixed hyperlipidemia    IMPRESSION:  - Assessed recent ER visit for SVT, noting no current recurrence of rapid heart rate episodes.  - Current cholesterol management is suboptimal with ezetimibe monotherapy.  - Added statin therapy (rosuvastatin) to optimize cholesterol management given CAD and elevated LDL, pending discussion with Dr. Anton.  - No immediate need for anti-arrhythmic medication due to absence of ongoing SVT episodes.    PULMONARY HYPERTENSION:  - Ordered echocardiogram to reassess cardiac function and evaluate for pulmonary HTN.    SVT (SUPRAVENTRICULAR TACHYCARDIA):  - Explained that SVT episodes can occur and resolve without requiring ongoing medication in the absence of recurrence.    CORONARY  ARTERY DISEASE INVOLVING NATIVE CORONARY ARTERY OF NATIVE HEART WITHOUT ANGINA PECTORIS:  - Discussed that heart attacks can occur without coronary artery stenosis.    MIXED HYPERLIPIDEMIA:  - Continued ezetimibe for cholesterol.  - Follow up after echocardiogram is completed.        Patient with an SVT, she has a lot of problems with COPD, she has not had a recurrence.  Would not treat her with beta-blocker certainly.  She could be treated with either diltiazem or verapamil if you had a recurrence, she really appears to be stable though from that standpoint.      She does have coronary artery disease manifested as coronary calcium.  I would recommend that her LDL goal be less than 70 mg/dL.  She is currently on ezetimibe, if possible, would begin moderate dose statin therapy.      She does have a history of pulmonary hypertension.  Her last echo did not show this.  I will repeat an echocardiogram.      No follow-ups on file.  Follow-up with PCP        Future Appointments   Date Time Provider Department Center   6/11/2025  9:40 AM Sussy Mason MD [id number] [place]   6/23/2025 10:45 AM Meka Christensen DPM [facility] CARL [address]   7/14/2025 10:15 AM CV [facility] [place]   8/14/2025  1:00 PM Kaitlin Donovan NP [facility] PRIC[facility] [place]       This note was generated with the assistance of ambient listening technology. Verbal consent was obtained by the patient and accompanying visitor(s) for the recording of patient appointment to facilitate this note. I attest to having reviewed and edited the generated note for accuracy, though some syntax or spelling errors may persist. Please contact the author of this note for any clarification.                      [1]   Patient Active Problem List  Diagnosis    HTN (hypertension)    Former smoker    Acute on chronic respiratory failure with hypoxia and hypercapnia    CAD (coronary artery disease)    Centrilobular emphysema    Diverticular disease of  colon    Hemorrhoids    History of myocardial infarction    Mucous in stools    Obstructive sleep apnea syndrome    Oxygen dependent    Prolapsed internal hemorrhoids    Palliative care encounter    Depression    Abnormal screening mammogram    Debility    Shortness of breath    Athscl heart disease of native coronary artery w/o ang pctrs    Pulmonary nodule    History of tobacco abuse    Chronic respiratory failure with hypoxia and hypercapnia    Aortic atherosclerosis    History of falling    Long term (current) use of aspirin    Current chronic use of systemic steroids    Anxiety    CO2 narcosis    Mild episode of recurrent major depressive disorder    ACP (advance care planning)    Eczema    Decreased ROM of left shoulder    Pulmonary hypertension    Palpitations    SVT (supraventricular tachycardia)   [2]   Current Outpatient Medications:     acetaminophen (TYLENOL) 500 MG tablet, Take 500 mg by mouth every 6 (six) hours as needed for Pain., Disp: , Rfl:     albuterol (PROVENTIL/VENTOLIN HFA) 90 mcg/actuation inhaler, Inhale 2 puffs into the lungs every 6 (six) hours as needed for Wheezing or Shortness of Breath., Disp: 18 g, Rfl: 6    albuterol-ipratropium (DUO-NEB) 2.5 mg-0.5 mg/3 mL nebulizer solution, Take 3 mLs by nebulization every 6 (six) hours while awake. Rescue, Disp: 360 mL, Rfl: 0    aspirin 81 MG Chew, Take 81 mg by mouth once daily., Disp: , Rfl:     azithromycin (Z-OLY) 250 MG tablet, Take 1 tablet (250 mg total) by mouth 3 (three) times a week. Take three times weekly on Monday, Wednesday, and Friday., Disp: 36 tablet, Rfl: 3    budesonide-glycopyr-formoterol (BREZTRI AEROSPHERE) 160-9-4.8 mcg/actuation HFAA, Inhale 2 puffs into the lungs 2 (two) times daily., Disp: 5.9 g, Rfl: 3    calcium carb-mag hydrox-simeth 1,200 mg-270 mg -80 mg/10 mL Susp, Take 1,200 mg by mouth once daily., Disp: , Rfl:     cholecalciferol, vitamin D3, (VITAMIN D3) 50 mcg (2,000 unit) Tab, Take 2,000 Units by mouth once  daily., Disp: , Rfl:     cyclobenzaprine (FLEXERIL) 5 MG tablet, Take 1 tablet (5 mg total) by mouth daily as needed for Muscle spasms., Disp: 30 tablet, Rfl: 0    dextromethorphan-guaiFENesin  mg (MUCINEX DM)  mg per 12 hr tablet, Take 1 tablet by mouth 2 (two) times daily as needed (cough)., Disp: 20 tablet, Rfl: 1    ezetimibe (ZETIA) 10 mg tablet, TAKE 1 TABLET(10 MG) BY MOUTH DAILY, Disp: 90 tablet, Rfl: 3    fluticasone propionate (FLONASE) 50 mcg/actuation nasal spray, SHAKE LIQUID AND USE 1 SPRAY(50 MCG) IN EACH NOSTRIL DAILY AS NEEDED FOR ALLERGIES, Disp: 16 g, Rfl: 11    furosemide (LASIX) 40 MG tablet, Take 40 mg by mouth once daily., Disp: , Rfl:     guaiFENesin (MUCINEX) 600 mg 12 hr tablet, Take 1 tablet (600 mg total) by mouth 2 (two) times daily., Disp: 10 tablet, Rfl: 0    hydrOXYzine HCL (ATARAX) 25 MG tablet, TAKE 1 TABLET(25 MG) BY MOUTH THREE TIMES DAILY AS NEEDED FOR ANXIETY OR ITCHING, Disp: 30 tablet, Rfl: 3    losartan (COZAAR) 25 MG tablet, Take 1 tablet (25 mg total) by mouth once daily., Disp: 90 tablet, Rfl: 3    meloxicam (MOBIC) 15 MG tablet, Take 1 tablet (15 mg total) by mouth once daily., Disp: 30 tablet, Rfl: 2    OXYGEN-AIR DELIVERY SYSTEMS MISC, 2 L by Nasal route continuous., Disp: , Rfl:     potassium chloride (MICRO-K) 10 MEQ CpSR, TAKE 1 CAPSULE BY MOUTH EVERY DAY, Disp: 90 capsule, Rfl: 0    roflumilast (DALIRESP) 500 mcg Tab, Take 1 tablet (500 mcg total) by mouth once daily., Disp: 30 tablet, Rfl: 0    sertraline (ZOLOFT) 100 MG tablet, Take half tablet (50 mg) daily for one week and then increase to 1 tablet (100 mg) daily., Disp: 30 tablet, Rfl: 11

## 2025-06-11 ENCOUNTER — OFFICE VISIT (OUTPATIENT)
Dept: PRIMARY CARE CLINIC | Facility: CLINIC | Age: 70
End: 2025-06-11
Payer: MEDICARE

## 2025-06-11 DIAGNOSIS — E78.49 OTHER HYPERLIPIDEMIA: ICD-10-CM

## 2025-06-11 DIAGNOSIS — F41.9 ANXIETY: Primary | Chronic | ICD-10-CM

## 2025-06-11 DIAGNOSIS — I47.10 SVT (SUPRAVENTRICULAR TACHYCARDIA): ICD-10-CM

## 2025-06-11 DIAGNOSIS — J43.2 CENTRILOBULAR EMPHYSEMA: ICD-10-CM

## 2025-06-11 DIAGNOSIS — I10 PRIMARY HYPERTENSION: ICD-10-CM

## 2025-06-11 RX ORDER — ROSUVASTATIN CALCIUM 10 MG/1
10 TABLET, COATED ORAL NIGHTLY
Qty: 30 TABLET | Refills: 11 | Status: SHIPPED | OUTPATIENT
Start: 2025-06-11 | End: 2026-06-11

## 2025-06-11 NOTE — ASSESSMENT & PLAN NOTE
Patient is on ezetimibe since she did not tolerate atorvastatin. Cardiology recommended trying rosuvastatin. Pt is willing to try a lose dose and see if she has myalgias with that. Asked pt to avoid grapefruit or grapefruit juice. Asked her to let me know if she develops myalgias.

## 2025-06-11 NOTE — ASSESSMENT & PLAN NOTE
Saw pulm. They said continue breztri. Recommended pulm rehab and HH. HH was ordered. She has pulm rehab scheduled. Has appt on June 13th. Continue azithromycin MWF.

## 2025-06-11 NOTE — ASSESSMENT & PLAN NOTE
having tremors with increased dose of sertraline. When she was on 50 mg, she did not but with 100 mg she is seeing that. Not constant. Coming and going. She wants to try this dose for a bit longer. She feels like this is helping her with anxiety. Will continue.

## 2025-06-11 NOTE — PROGRESS NOTES
Clinic Note  6/11/2025      Subjective:       Patient ID:  Terri is a 69 y.o. female being seen for an established visit.    Chief Complaint: No chief complaint on file.    HPI  Terri Phelan is a 69 y.o. AA female who presents with COPD on 2L of O2 via NC (uses 3L with exertion), diastolic HF, HTN, HLD, anxiety, depression, JUAN (uses bipap every night), hx of MI (but no stenosis noted on angiogram) is here for a virtual f/u visit. Did acp 2025.   -Saw pulm. She is deconditioned. They said continue breztri. Recommended pulm rehab and HH. HH was ordered. She has pulm rehab scheduled. Has appt on June 13th.   -having tremors with increased dose of sertraline. When she was on 50 mg, she did not but with 100 mg she is seeing that. Not constant. Coming and going. She wants to try to stay on this dose for a bit longer. She feels like this is helping her with anxiety.   -saw cardiology on June 9th for the episode of SVT. No recurrence of SVT so cardiology will not start any medication at this point but if she did have a recurrence, can treat with diltiazem and verapamil per cardiology. Echo was also ordered to look at pulm artery pressures. Cardiology wanted me to discuss starting rosuvastatin. Pt did not tolerate atorvastatin.   -has a headache right now. She will take ibuprofen after our visit  -no falls  -does not want to do LDCT. Told her why we do it.         Review of Systems   Constitutional:  Negative for chills and fever.   HENT:  Negative for congestion and hearing loss.    Respiratory:  Positive for shortness of breath (with exertion). Negative for cough and wheezing.    Cardiovascular:  Negative for chest pain and palpitations.   Gastrointestinal:  Negative for abdominal pain, blood in stool, constipation and diarrhea.   Genitourinary:  Negative for dysuria and hematuria.   Musculoskeletal:  Negative for neck pain.   Neurological:  Positive for headaches. Negative for dizziness.       Medication List with  Changes/Refills   New Medications    ROSUVASTATIN (CRESTOR) 10 MG TABLET    Take 1 tablet (10 mg total) by mouth every evening.   Current Medications    ACETAMINOPHEN (TYLENOL) 500 MG TABLET    Take 500 mg by mouth every 6 (six) hours as needed for Pain.    ALBUTEROL (PROVENTIL/VENTOLIN HFA) 90 MCG/ACTUATION INHALER    Inhale 2 puffs into the lungs every 6 (six) hours as needed for Wheezing or Shortness of Breath.    ALBUTEROL-IPRATROPIUM (DUO-NEB) 2.5 MG-0.5 MG/3 ML NEBULIZER SOLUTION    Take 3 mLs by nebulization every 6 (six) hours while awake. Rescue    ASPIRIN 81 MG CHEW    Take 81 mg by mouth once daily.    AZITHROMYCIN (Z-OLY) 250 MG TABLET    Take 1 tablet (250 mg total) by mouth 3 (three) times a week. Take three times weekly on Monday, Wednesday, and Friday.    BUDESONIDE-GLYCOPYR-FORMOTEROL (BREZTRI AEROSPHERE) 160-9-4.8 MCG/ACTUATION HFAA    Inhale 2 puffs into the lungs 2 (two) times daily.    CALCIUM CARB-MAG HYDROX-SIMETH 1,200 MG-270 MG -80 MG/10 ML SUSP    Take 1,200 mg by mouth once daily.    CHOLECALCIFEROL, VITAMIN D3, (VITAMIN D3) 50 MCG (2,000 UNIT) TAB    Take 2,000 Units by mouth once daily.    CYCLOBENZAPRINE (FLEXERIL) 5 MG TABLET    Take 1 tablet (5 mg total) by mouth daily as needed for Muscle spasms.    DEXTROMETHORPHAN-GUAIFENESIN  MG (MUCINEX DM)  MG PER 12 HR TABLET    Take 1 tablet by mouth 2 (two) times daily as needed (cough).    EZETIMIBE (ZETIA) 10 MG TABLET    TAKE 1 TABLET(10 MG) BY MOUTH DAILY    FLUTICASONE PROPIONATE (FLONASE) 50 MCG/ACTUATION NASAL SPRAY    SHAKE LIQUID AND USE 1 SPRAY(50 MCG) IN EACH NOSTRIL DAILY AS NEEDED FOR ALLERGIES    FUROSEMIDE (LASIX) 40 MG TABLET    Take 40 mg by mouth once daily.    GUAIFENESIN (MUCINEX) 600 MG 12 HR TABLET    Take 1 tablet (600 mg total) by mouth 2 (two) times daily.    HYDROXYZINE HCL (ATARAX) 25 MG TABLET    TAKE 1 TABLET(25 MG) BY MOUTH THREE TIMES DAILY AS NEEDED FOR ANXIETY OR ITCHING    LOSARTAN (COZAAR) 25 MG  "TABLET    Take 1 tablet (25 mg total) by mouth once daily.    MELOXICAM (MOBIC) 15 MG TABLET    Take 1 tablet (15 mg total) by mouth once daily.    OXYGEN-AIR DELIVERY SYSTEMS MISC    2 L by Nasal route continuous.    POTASSIUM CHLORIDE (MICRO-K) 10 MEQ CPSR    TAKE 1 CAPSULE BY MOUTH EVERY DAY    ROFLUMILAST (DALIRESP) 500 MCG TAB    Take 1 tablet (500 mcg total) by mouth once daily.    SERTRALINE (ZOLOFT) 100 MG TABLET    Take half tablet (50 mg) daily for one week and then increase to 1 tablet (100 mg) daily.           Objective:      There were no vitals taken for this visit.  Estimated body mass index is 28.93 kg/m² (pended) as calculated from the following:    Height as of 5/30/25: 5' 6" (1.676 m).    Weight as of 6/9/25: (P) 81.3 kg (179 lb 3.7 oz).  Physical Exam  Constitutional:       Appearance: Normal appearance.   Eyes:      Conjunctiva/sclera: Conjunctivae normal.   Pulmonary:      Effort: Pulmonary effort is normal. No respiratory distress.      Comments: Diminished breath sounds BL  Neurological:      Mental Status: She is alert and oriented to person, place, and time.   Psychiatric:         Mood and Affect: Mood normal.         Behavior: Behavior normal.         Assessment and Plan:     1. Anxiety  Overview:  Anxiety associated with feelings of SOB, diagnosis, and hope for new medications that can help. Exacerbated by chronic need to check oxygen numbers and frequent hospitalizations. Following with palliative.     Assessment & Plan:  having tremors with increased dose of sertraline. When she was on 50 mg, she did not but with 100 mg she is seeing that. Not constant. Coming and going. She wants to try this dose for a bit longer. She feels like this is helping her with anxiety. Will continue.       2. Centrilobular emphysema  Overview:  - Severe obstructive disease, on 2-3L home O2 and NIPPV (BiPap) at night and prn.   - Chest CTA from July 2023 with apical centrilobular emphysema present.  - Managed " with home O2, ICS/LABA, PRN LISA, and PD4i        Assessment & Plan:  Saw pulm. They said continue breztri. Recommended pulm rehab and HH. HH was ordered. She has pulm rehab scheduled. Has appt on June 13th. Continue azithromycin MWF.       3. Primary hypertension  Overview:  - Managed on losartan 25mg daily    Assessment & Plan:  BP is controlled. Continue losartan 25 mg daily. Will monitor       4. SVT (supraventricular tachycardia)  Assessment & Plan:  saw cardiology on June 9th for the episode of SVT. No recurrence of SVT so cardiology will not start any medication at this point but if she did have a recurrence, can treat with diltiazem and verapamil per cardiology.      5. Other hyperlipidemia  Assessment & Plan:  Patient is on ezetimibe since she did not tolerate atorvastatin. Cardiology recommended trying rosuvastatin. Pt is willing to try a lose dose and see if she has myalgias with that. Asked pt to avoid grapefruit or grapefruit juice. Asked her to let me know if she develops myalgias.       Other orders  -     rosuvastatin (CRESTOR) 10 MG tablet; Take 1 tablet (10 mg total) by mouth every evening.  Dispense: 30 tablet; Refill: 11                 Follow Up:   Follow up in about 2 weeks (around 6/25/2025) for Virtual Visit.        Sussy Mason        The patient location is: [place], [place]  The chief complaint leading to consultation is: anxiety, svt, copd    Visit type: audiovisual    Face to Face time with patient: 17 mins  20 minutes of total time spent on the encounter, which includes face to face time and non-face to face time preparing to see the patient (eg, review of tests), Obtaining and/or reviewing separately obtained history, Documenting clinical information in the electronic or other health record, Independently interpreting results (not separately reported) and communicating results to the patient/family/caregiver, or Care coordination (not separately reported).         Each patient to  whom he or she provides medical services by telemedicine is:  (1) informed of the relationship between the physician and patient and the respective role of any other health care provider with respect to management of the patient; and (2) notified that he or she may decline to receive medical services by telemedicine and may withdraw from such care at any time.

## 2025-06-12 ENCOUNTER — TELEPHONE (OUTPATIENT)
Dept: PRIMARY CARE CLINIC | Facility: CLINIC | Age: 70
End: 2025-06-12
Payer: MEDICARE

## 2025-06-12 NOTE — TELEPHONE ENCOUNTER
Copied from CRM #[id number]. Topic: General Inquiry - Patient Advice  >> Jun 12, 2025 11:14 AM Olivia wrote:  .1MEDICALADVICE     Patient is calling for Medical Advice regarding: [facility] called and stated that the Hospital ist left the facility for good and did not sign orders and they wanted to know if you can sign them     How long has patient had these symptoms:    Pharmacy name and phone#:    Patient wants a call back or thru [facility], provide patient's call back phone number: Call Back [phone] [facility]     Comments:    Please advise patient replies from provider may take up to 48 hours.

## 2025-06-16 ENCOUNTER — DOCUMENTATION ONLY (OUTPATIENT)
Dept: PRIMARY CARE CLINIC | Facility: CLINIC | Age: 70
End: 2025-06-16
Payer: MEDICARE

## 2025-06-23 ENCOUNTER — TELEPHONE (OUTPATIENT)
Dept: PRIMARY CARE CLINIC | Facility: CLINIC | Age: 70
End: 2025-06-23
Payer: MEDICARE

## 2025-06-23 NOTE — TELEPHONE ENCOUNTER
LCSW spoke with patient to reschedule appt from last week due to provider being out of office.  Scheduled for 6/26.

## 2025-06-24 ENCOUNTER — TELEPHONE (OUTPATIENT)
Dept: PRIMARY CARE CLINIC | Facility: CLINIC | Age: 70
End: 2025-06-24
Payer: MEDICARE

## 2025-06-25 ENCOUNTER — OFFICE VISIT (OUTPATIENT)
Dept: PRIMARY CARE CLINIC | Facility: CLINIC | Age: 70
End: 2025-06-25
Payer: MEDICARE

## 2025-06-25 DIAGNOSIS — J96.21 ACUTE ON CHRONIC RESPIRATORY FAILURE WITH HYPOXIA AND HYPERCAPNIA: Primary | ICD-10-CM

## 2025-06-25 DIAGNOSIS — E78.49 OTHER HYPERLIPIDEMIA: ICD-10-CM

## 2025-06-25 DIAGNOSIS — J96.22 ACUTE ON CHRONIC RESPIRATORY FAILURE WITH HYPOXIA AND HYPERCAPNIA: Primary | ICD-10-CM

## 2025-06-25 DIAGNOSIS — F41.9 ANXIETY: Chronic | ICD-10-CM

## 2025-06-25 PROCEDURE — 1159F MED LIST DOCD IN RCRD: CPT | Mod: CPTII,HCNC,95, | Performed by: STUDENT IN AN ORGANIZED HEALTH CARE EDUCATION/TRAINING PROGRAM

## 2025-06-25 PROCEDURE — 98005 SYNCH AUDIO-VIDEO EST LOW 20: CPT | Mod: HCNC,95,, | Performed by: STUDENT IN AN ORGANIZED HEALTH CARE EDUCATION/TRAINING PROGRAM

## 2025-06-25 PROCEDURE — 1160F RVW MEDS BY RX/DR IN RCRD: CPT | Mod: CPTII,HCNC,95, | Performed by: STUDENT IN AN ORGANIZED HEALTH CARE EDUCATION/TRAINING PROGRAM

## 2025-06-25 PROCEDURE — 1157F ADVNC CARE PLAN IN RCRD: CPT | Mod: CPTII,HCNC,95, | Performed by: STUDENT IN AN ORGANIZED HEALTH CARE EDUCATION/TRAINING PROGRAM

## 2025-06-25 PROCEDURE — 4010F ACE/ARB THERAPY RXD/TAKEN: CPT | Mod: CPTII,HCNC,95, | Performed by: STUDENT IN AN ORGANIZED HEALTH CARE EDUCATION/TRAINING PROGRAM

## 2025-06-25 RX ORDER — PREDNISONE 20 MG/1
40 TABLET ORAL DAILY
Qty: 10 TABLET | Refills: 0 | Status: ON HOLD | OUTPATIENT
Start: 2025-06-25 | End: 2025-06-30

## 2025-06-25 RX ORDER — AZITHROMYCIN 250 MG/1
TABLET, FILM COATED ORAL
Qty: 6 TABLET | Refills: 0 | Status: SHIPPED | OUTPATIENT
Start: 2025-06-25 | End: 2025-06-26

## 2025-06-25 NOTE — ASSESSMENT & PLAN NOTE
She is taking 100 mg daily. Not having the tremors anymore. Taking it at night but wants to see if she can take it in the morning. Told her that's fine.

## 2025-06-25 NOTE — ASSESSMENT & PLAN NOTE
She has end stage COPD. Told her to do the nebulizer 4 times a day. Will do prednisone for another 5 days. Continue breztri, pulm rehab and home health. She needs to make an appt with pulm. Says she called them but not been able to make an appt. Told her to message them. If she does not hear back by Monday, asked her to let me know and I can message them. Also she lost azithromycin a week ago. Can't find it. Asked her to call her pharmacy to see if they can refill it.

## 2025-06-25 NOTE — ASSESSMENT & PLAN NOTE
I started her on rosuvastatin last visit to see if she tolerates it but pt just picked it up. Did not try it yet. She will let me know if she tolerates it.

## 2025-06-25 NOTE — PROGRESS NOTES
Clinic Note  6/25/2025      Subjective:       Patient ID:  Terri is a 69 y.o. female being seen for an established visit.    Chief Complaint: No chief complaint on file.    HPI  Terri Phelan is a 69 y.o. AA female who presents with COPD on 2L of O2 via NC (uses 3L with exertion), diastolic HF, HTN, HLD, anxiety, depression, JUAN (uses bipap every night), hx of MI (but no stenosis noted on angiogram) is here for a virtual f/u visit. Did acp 2025.   -having a headache and sob today. Says this was also the case last week but she felt great yesterday morning. She finished the prednisone course last week. She thinks she is going back into a copd exacerbation. Might need a longer steroid course this time. She is doing the nebulizer twice a day. She did not use it daily last week. She is using breztri twice daily. Told her during exacerbations, doing nebs qid for first 4 days.   -I started her on rosuvastatin last visit to see if she tolerates it but pt just picked it up. Did not try it yet. She will let me know if she tolerates it.   -no falls  -does not want to do LDCT. Told her why we do it.         Review of Systems   Constitutional:  Negative for chills and fever.   HENT:  Negative for congestion and hearing loss.    Eyes:  Negative for discharge.   Respiratory:  Positive for shortness of breath (with exertion). Negative for cough and wheezing.    Cardiovascular:  Negative for chest pain and palpitations.   Gastrointestinal:  Negative for abdominal pain, blood in stool, constipation, diarrhea and vomiting.   Genitourinary:  Negative for dysuria and hematuria.   Musculoskeletal:  Negative for neck pain.   Neurological:  Positive for headaches. Negative for dizziness and weakness.       Medication List with Changes/Refills   New Medications    AZITHROMYCIN (Z-OLY) 250 MG TABLET    Take 2 tablets by mouth on day 1; Take 1 tablet by mouth on days 2-5    PREDNISONE (DELTASONE) 20 MG TABLET    Take 2 tablets (40 mg  total) by mouth once daily. for 5 days   Current Medications    ACETAMINOPHEN (TYLENOL) 500 MG TABLET    Take 500 mg by mouth every 6 (six) hours as needed for Pain.    ALBUTEROL (PROVENTIL/VENTOLIN HFA) 90 MCG/ACTUATION INHALER    Inhale 2 puffs into the lungs every 6 (six) hours as needed for Wheezing or Shortness of Breath.    ALBUTEROL-IPRATROPIUM (DUO-NEB) 2.5 MG-0.5 MG/3 ML NEBULIZER SOLUTION    Take 3 mLs by nebulization every 6 (six) hours while awake. Rescue    ASPIRIN 81 MG CHEW    Take 81 mg by mouth once daily.    AZITHROMYCIN (Z-OLY) 250 MG TABLET    Take 1 tablet (250 mg total) by mouth 3 (three) times a week. Take three times weekly on Monday, Wednesday, and Friday.    BUDESONIDE-GLYCOPYR-FORMOTEROL (BREZTRI AEROSPHERE) 160-9-4.8 MCG/ACTUATION HFAA    Inhale 2 puffs into the lungs 2 (two) times daily.    CALCIUM CARB-MAG HYDROX-SIMETH 1,200 MG-270 MG -80 MG/10 ML SUSP    Take 1,200 mg by mouth once daily.    CHOLECALCIFEROL, VITAMIN D3, (VITAMIN D3) 50 MCG (2,000 UNIT) TAB    Take 2,000 Units by mouth once daily.    CYCLOBENZAPRINE (FLEXERIL) 5 MG TABLET    Take 1 tablet (5 mg total) by mouth daily as needed for Muscle spasms.    DEXTROMETHORPHAN-GUAIFENESIN  MG (MUCINEX DM)  MG PER 12 HR TABLET    Take 1 tablet by mouth 2 (two) times daily as needed (cough).    EZETIMIBE (ZETIA) 10 MG TABLET    TAKE 1 TABLET(10 MG) BY MOUTH DAILY    FLUTICASONE PROPIONATE (FLONASE) 50 MCG/ACTUATION NASAL SPRAY    SHAKE LIQUID AND USE 1 SPRAY(50 MCG) IN EACH NOSTRIL DAILY AS NEEDED FOR ALLERGIES    FUROSEMIDE (LASIX) 40 MG TABLET    Take 40 mg by mouth once daily.    GUAIFENESIN (MUCINEX) 600 MG 12 HR TABLET    Take 1 tablet (600 mg total) by mouth 2 (two) times daily.    HYDROXYZINE HCL (ATARAX) 25 MG TABLET    TAKE 1 TABLET(25 MG) BY MOUTH THREE TIMES DAILY AS NEEDED FOR ANXIETY OR ITCHING    LOSARTAN (COZAAR) 25 MG TABLET    Take 1 tablet (25 mg total) by mouth once daily.    MELOXICAM (MOBIC) 15 MG  "TABLET    Take 1 tablet (15 mg total) by mouth once daily.    OXYGEN-AIR DELIVERY SYSTEMS MISC    2 L by Nasal route continuous.    POTASSIUM CHLORIDE (MICRO-K) 10 MEQ CPSR    TAKE 1 CAPSULE BY MOUTH EVERY DAY    ROFLUMILAST (DALIRESP) 500 MCG TAB    Take 1 tablet (500 mcg total) by mouth once daily.    ROSUVASTATIN (CRESTOR) 10 MG TABLET    Take 1 tablet (10 mg total) by mouth every evening.    SERTRALINE (ZOLOFT) 100 MG TABLET    Take half tablet (50 mg) daily for one week and then increase to 1 tablet (100 mg) daily.           Objective:      There were no vitals taken for this visit.  Estimated body mass index is 28.93 kg/m² (pended) as calculated from the following:    Height as of 5/30/25: 5' 6" (1.676 m).    Weight as of 6/9/25: (P) 81.3 kg (179 lb 3.7 oz).  Physical Exam  Constitutional:       Appearance: Normal appearance.   Eyes:      Conjunctiva/sclera: Conjunctivae normal.   Pulmonary:      Effort: Pulmonary effort is normal. No respiratory distress.      Comments: Diminished breath sounds BL  Neurological:      Mental Status: She is alert and oriented to person, place, and time.   Psychiatric:         Mood and Affect: Mood normal.         Behavior: Behavior normal.           Assessment and Plan:     1. Acute on chronic respiratory failure with hypoxia and hypercapnia  Assessment & Plan:  She has end stage COPD. Told her to do the nebulizer 4 times a day. Will do prednisone for another 5 days. Continue breztri, pulm rehab and home health. She needs to make an appt with pul. Says she called them but not been able to make an appt. Told her to message them. If she does not hear back by Monday, asked her to let me know and I can message them. Also she lost azithromycin a week ago. Can't find it. Asked her to call her pharmacy to see if they can refill it.       2. Anxiety  Overview:  Anxiety associated with feelings of SOB, diagnosis, and hope for new medications that can help. Exacerbated by chronic need " to check oxygen numbers and frequent hospitalizations. Following with palliative.     Assessment & Plan:  She is taking 100 mg daily. Not having the tremors anymore. Taking it at night but wants to see if she can take it in the morning. Told her that's fine.       3. Other hyperlipidemia  Assessment & Plan:  I started her on rosuvastatin last visit to see if she tolerates it but pt just picked it up. Did not try it yet. She will let me know if she tolerates it.       Other orders  -     predniSONE (DELTASONE) 20 MG tablet; Take 2 tablets (40 mg total) by mouth once daily. for 5 days  Dispense: 10 tablet; Refill: 0  -     azithromycin (Z-OLY) 250 MG tablet; Take 2 tablets by mouth on day 1; Take 1 tablet by mouth on days 2-5  Dispense: 6 tablet; Refill: 0               Follow Up:   Follow up in about 2 weeks (around 7/9/2025) for Virtual Visit.        Sussy Mason        The patient location is: [place], [place]  The chief complaint leading to consultation is: anxiety, svt, copd    Visit type: audiovisual    Face to Face time with patient: 23 mins  24 minutes of total time spent on the encounter, which includes face to face time and non-face to face time preparing to see the patient (eg, review of tests), Obtaining and/or reviewing separately obtained history, Documenting clinical information in the electronic or other health record, Independently interpreting results (not separately reported) and communicating results to the patient/family/caregiver, or Care coordination (not separately reported).         Each patient to whom he or she provides medical services by telemedicine is:  (1) informed of the relationship between the physician and patient and the respective role of any other health care provider with respect to management of the patient; and (2) notified that he or she may decline to receive medical services by telemedicine and may withdraw from such care at any time.

## 2025-06-26 ENCOUNTER — HOSPITAL ENCOUNTER (INPATIENT)
Facility: HOSPITAL | Age: 70
LOS: 7 days | Discharge: NURSING, REHAB OR LONG-TERM CARE | DRG: 190 | End: 2025-07-03
Attending: EMERGENCY MEDICINE | Admitting: INTERNAL MEDICINE
Payer: MEDICARE

## 2025-06-26 DIAGNOSIS — R06.02 EXERTIONAL SHORTNESS OF BREATH: ICD-10-CM

## 2025-06-26 DIAGNOSIS — J96.22 ACUTE ON CHRONIC RESPIRATORY FAILURE WITH HYPOXIA AND HYPERCAPNIA: Primary | ICD-10-CM

## 2025-06-26 DIAGNOSIS — J96.21 ACUTE ON CHRONIC RESPIRATORY FAILURE WITH HYPOXIA AND HYPERCAPNIA: Primary | ICD-10-CM

## 2025-06-26 DIAGNOSIS — R06.02 SHORTNESS OF BREATH: ICD-10-CM

## 2025-06-26 PROBLEM — R25.2 MUSCLE CRAMP: Status: ACTIVE | Noted: 2025-06-26

## 2025-06-26 LAB
ABSOLUTE EOSINOPHIL (OHS): 0 K/UL
ABSOLUTE MONOCYTE (OHS): 0.3 K/UL (ref 0.3–1)
ABSOLUTE NEUTROPHIL COUNT (OHS): 5.47 K/UL (ref 1.8–7.7)
ALBUMIN SERPL BCP-MCNC: 3.1 G/DL (ref 3.5–5.2)
ALLENS TEST: ABNORMAL
ALLENS TEST: YES
ALP SERPL-CCNC: 58 UNIT/L (ref 40–150)
ALT SERPL W/O P-5'-P-CCNC: 17 UNIT/L (ref 10–44)
ANION GAP (OHS): 7 MMOL/L (ref 8–16)
AORTIC SIZE INDEX (SOV): 1.3 CM/M2
AORTIC SIZE INDEX: 1.7 CM/M2
ASCENDING AORTA: 3.2 CM
AST SERPL-CCNC: 16 UNIT/L (ref 11–45)
AV INDEX (PROSTH): 0.75
AV MEAN GRADIENT: 11 MMHG
AV PEAK GRADIENT: 21 MMHG
AV VALVE AREA BY VELOCITY RATIO: 2 CM²
AV VALVE AREA: 2.3 CM²
AV VELOCITY RATIO: 0.65
BASOPHILS # BLD AUTO: 0.03 K/UL
BASOPHILS NFR BLD AUTO: 0.5 %
BILIRUB SERPL-MCNC: 0.1 MG/DL (ref 0.1–1)
BNP SERPL-MCNC: 47 PG/ML (ref 0–99)
BSA FOR ECHO PROCEDURE: 1.95 M2
BUN SERPL-MCNC: 14 MG/DL (ref 8–23)
CALCIUM SERPL-MCNC: 9.3 MG/DL (ref 8.7–10.5)
CHLORIDE SERPL-SCNC: 103 MMOL/L (ref 95–110)
CO2 SERPL-SCNC: 34 MMOL/L (ref 23–29)
CREAT SERPL-MCNC: 0.7 MG/DL (ref 0.5–1.4)
DOP CALC AO PEAK VEL: 2.3 M/S
DOP CALC AO VTI: 39.7 CM
DOP CALC LVOT AREA: 3.1 CM2
DOP CALC LVOT DIAMETER: 2 CM
DOP CALC LVOT PEAK VEL: 1.5 M/S
DOP CALC LVOT STROKE VOLUME: 92.9 CM3
DOP CALC MV VTI: 25.3 CM
DOP CALCLVOT PEAK VEL VTI: 29.6 CM
E WAVE DECELERATION TIME: 124 MSEC
E/A RATIO: 0.61
E/E' RATIO: 10 M/S
ERYTHROCYTE [DISTWIDTH] IN BLOOD BY AUTOMATED COUNT: 13.7 % (ref 11.5–14.5)
FIO2: 35 %
FIO2: 40 %
GFR SERPLBLD CREATININE-BSD FMLA CKD-EPI: >60 ML/MIN/1.73/M2
GLUCOSE SERPL-MCNC: 140 MG/DL (ref 70–110)
HCO3 UR-SCNC: 37 MMOL/L (ref 24–28)
HCT VFR BLD AUTO: 43.4 % (ref 37–48.5)
HGB BLD-MCNC: 12.3 GM/DL (ref 12–16)
IMM GRANULOCYTES # BLD AUTO: 0.03 K/UL (ref 0–0.04)
IMM GRANULOCYTES NFR BLD AUTO: 0.5 % (ref 0–0.5)
IVC DIAMETER: 1.64 CM
LEFT ATRIUM AREA SYSTOLIC (APICAL 2 CHAMBER): 17.12 CM2
LEFT ATRIUM AREA SYSTOLIC (APICAL 4 CHAMBER): 15.88 CM2
LEFT ATRIUM VOLUME INDEX MOD: 23 ML/M2
LEFT ATRIUM VOLUME MOD: 44 ML
LEFT VENTRICLE END SYSTOLIC VOLUME APICAL 2 CHAMBER: 47.08 ML
LEFT VENTRICLE END SYSTOLIC VOLUME APICAL 4 CHAMBER: 40.79 ML
LPM: 5
LV LATERAL E/E' RATIO: 9.2 M/S
LV SEPTAL E/E' RATIO: 10.4 M/S
LVOT MG: 6.21 MMHG
LVOT MV: 1.22 CM/S
LYMPHOCYTES # BLD AUTO: 0.48 K/UL (ref 1–4.8)
MAGNESIUM SERPL-MCNC: 2.9 MG/DL (ref 1.6–2.6)
MCH RBC QN AUTO: 26.2 PG (ref 27–31)
MCHC RBC AUTO-ENTMCNC: 28.3 G/DL (ref 32–36)
MCV RBC AUTO: 92 FL (ref 82–98)
MV PEAK A VEL: 1.36 M/S
MV PEAK E VEL: 0.83 M/S
MV PEAK GRADIENT: 8 MMHG
MV STENOSIS PRESSURE HALF TIME: 35.87 MS
MV VALVE AREA BY CONTINUITY EQUATION: 3.67 CM2
MV VALVE AREA P 1/2 METHOD: 6.13 CM2
NUCLEATED RBC (/100WBC) (OHS): 0 /100 WBC
OHS QRS DURATION: 76 MS
OHS QTC CALCULATION: 472 MS
PCO2 BLDA: 42 MMHG (ref 35–45)
PCO2 BLDA: 79.2 MMHG (ref 35–45)
PCO2 BLDA: 89 MMHG (ref 35–45)
PCO2 BLDA: 90.7 MMHG (ref 35–45)
PH SMN: 7.24 [PH] (ref 7.35–7.45)
PH SMN: 7.25 [PH] (ref 7.35–7.45)
PH SMN: 7.29 [PH] (ref 7.35–7.45)
PH SMN: 7.55 [PH] (ref 7.35–7.45)
PISA TR MAX VEL: 1.9 M/S
PLATELET # BLD AUTO: 173 K/UL (ref 150–450)
PMV BLD AUTO: 11.5 FL (ref 9.2–12.9)
PO2 BLDA: 186 MMHG (ref 80–100)
PO2 BLDA: 47.5 MMHG (ref 40–60)
PO2 BLDA: 60.7 MMHG (ref 40–60)
PO2 BLDA: 72.5 MMHG (ref 80–100)
POC BASE DEFICIT: 8.1 MMOL/L (ref -2–2)
POC BASE DEFICIT: 8.4 MMOL/L (ref -2–2)
POC BASE DEFICIT: 8.8 MMOL/L (ref -2–2)
POC BE: 15 MMOL/L (ref -2–2)
POC HCO3: 38.2 MMOL/L (ref 24–28)
POC HCO3: 38.5 MMOL/L (ref 24–28)
POC HCO3: 39.1 MMOL/L (ref 24–28)
POC PERFORMED BY: ABNORMAL
POC SATURATED O2: 100 % (ref 95–100)
POC SATURATED O2: 82.1 % (ref 95–100)
POC SATURATED O2: 88.9 % (ref 95–100)
POC SATURATED O2: 94.3 % (ref 95–100)
POC TCO2: 38 MMOL/L (ref 23–27)
POTASSIUM SERPL-SCNC: 4 MMOL/L (ref 3.5–5.1)
PROT SERPL-MCNC: 7.3 GM/DL (ref 6–8.4)
PULM VEIN S/D RATIO: 1.33
PV PEAK D VEL: 0.4 M/S
PV PEAK GRADIENT: 9 MMHG
PV PEAK S VEL: 0.53 M/S
PV PEAK VELOCITY: 1.49 M/S
RA PRESSURE ESTIMATED: 8 MMHG
RA VOL SYS: 44.04 ML
RBC # BLD AUTO: 4.7 M/UL (ref 4–5.4)
RELATIVE EOSINOPHIL (OHS): 0 %
RELATIVE LYMPHOCYTE (OHS): 7.6 % (ref 18–48)
RELATIVE MONOCYTE (OHS): 4.8 % (ref 4–15)
RELATIVE NEUTROPHIL (OHS): 86.6 % (ref 38–73)
RIGHT ATRIAL AREA: 15.4 CM2
RIGHT ATRIUM END SYSTOLIC VOLUME APICAL 4 CHAMBER INDEX BSA: 21.7 ML/M2
RIGHT ATRIUM VOLUME AREA LENGTH APICAL 4 CHAMBER: 41.66 ML
RV TB RVSP: 10 MMHG
RV TISSUE DOPPLER FREE WALL SYSTOLIC VELOCITY 1 (APICAL 4 CHAMBER VIEW): 17.59 CM/S
SAMPLE: ABNORMAL
SINUS: 2.5 CM
SITE: ABNORMAL
SODIUM SERPL-SCNC: 144 MMOL/L (ref 136–145)
SPECIMEN SOURCE: ABNORMAL
STJ: 2.9 CM
TDI LATERAL: 0.09 M/S
TDI SEPTAL: 0.08 M/S
TDI: 0.09 M/S
TR MAX PG: 15 MMHG
TRICUSPID ANNULAR PLANE SYSTOLIC EXCURSION: 2 CM
TROPONIN I SERPL DL<=0.01 NG/ML-MCNC: <0.006 NG/ML
TV REST PULMONARY ARTERY PRESSURE: 22 MMHG
WBC # BLD AUTO: 6.31 K/UL (ref 3.9–12.7)

## 2025-06-26 PROCEDURE — 94761 N-INVAS EAR/PLS OXIMETRY MLT: CPT | Mod: HCNC,XB

## 2025-06-26 PROCEDURE — 27100171 HC OXYGEN HIGH FLOW UP TO 24 HOURS: Mod: HCNC

## 2025-06-26 PROCEDURE — 94640 AIRWAY INHALATION TREATMENT: CPT | Mod: HCNC,XB

## 2025-06-26 PROCEDURE — 94640 AIRWAY INHALATION TREATMENT: CPT | Mod: HCNC

## 2025-06-26 PROCEDURE — 96374 THER/PROPH/DIAG INJ IV PUSH: CPT | Mod: HCNC

## 2025-06-26 PROCEDURE — 11000001 HC ACUTE MED/SURG PRIVATE ROOM: Mod: HCNC

## 2025-06-26 PROCEDURE — 25000003 PHARM REV CODE 250: Mod: HCNC | Performed by: EMERGENCY MEDICINE

## 2025-06-26 PROCEDURE — 27000190 HC CPAP FULL FACE MASK W/VALVE: Mod: HCNC

## 2025-06-26 PROCEDURE — 99900035 HC TECH TIME PER 15 MIN (STAT): Mod: HCNC

## 2025-06-26 PROCEDURE — 36600 WITHDRAWAL OF ARTERIAL BLOOD: CPT | Mod: HCNC

## 2025-06-26 PROCEDURE — 93005 ELECTROCARDIOGRAM TRACING: CPT | Mod: HCNC

## 2025-06-26 PROCEDURE — 85025 COMPLETE CBC W/AUTO DIFF WBC: CPT | Mod: HCNC | Performed by: EMERGENCY MEDICINE

## 2025-06-26 PROCEDURE — 25000242 PHARM REV CODE 250 ALT 637 W/ HCPCS: Mod: HCNC | Performed by: INTERNAL MEDICINE

## 2025-06-26 PROCEDURE — 63600175 PHARM REV CODE 636 W HCPCS: Mod: JZ,TB,HCNC | Performed by: INTERNAL MEDICINE

## 2025-06-26 PROCEDURE — 21400001 HC TELEMETRY ROOM: Mod: HCNC

## 2025-06-26 PROCEDURE — 25500020 PHARM REV CODE 255: Mod: HCNC | Performed by: INTERNAL MEDICINE

## 2025-06-26 PROCEDURE — 25000003 PHARM REV CODE 250: Mod: HCNC | Performed by: NURSE PRACTITIONER

## 2025-06-26 PROCEDURE — 82803 BLOOD GASES ANY COMBINATION: CPT | Mod: HCNC

## 2025-06-26 PROCEDURE — 27000221 HC OXYGEN, UP TO 24 HOURS: Mod: HCNC

## 2025-06-26 PROCEDURE — 0202U NFCT DS 22 TRGT SARS-COV-2: CPT | Mod: HCNC | Performed by: STUDENT IN AN ORGANIZED HEALTH CARE EDUCATION/TRAINING PROGRAM

## 2025-06-26 PROCEDURE — 93010 ELECTROCARDIOGRAM REPORT: CPT | Mod: HCNC,,, | Performed by: INTERNAL MEDICINE

## 2025-06-26 PROCEDURE — 94660 CPAP INITIATION&MGMT: CPT | Mod: HCNC

## 2025-06-26 PROCEDURE — 63600175 PHARM REV CODE 636 W HCPCS: Mod: JZ,TB,HCNC | Performed by: EMERGENCY MEDICINE

## 2025-06-26 PROCEDURE — 87040 BLOOD CULTURE FOR BACTERIA: CPT | Mod: HCNC | Performed by: EMERGENCY MEDICINE

## 2025-06-26 PROCEDURE — 25000003 PHARM REV CODE 250: Mod: HCNC | Performed by: INTERNAL MEDICINE

## 2025-06-26 PROCEDURE — 99285 EMERGENCY DEPT VISIT HI MDM: CPT | Mod: 25,HCNC

## 2025-06-26 PROCEDURE — 63600175 PHARM REV CODE 636 W HCPCS: Mod: HCNC | Performed by: INTERNAL MEDICINE

## 2025-06-26 PROCEDURE — 83880 ASSAY OF NATRIURETIC PEPTIDE: CPT | Mod: HCNC | Performed by: EMERGENCY MEDICINE

## 2025-06-26 PROCEDURE — 36415 COLL VENOUS BLD VENIPUNCTURE: CPT | Mod: HCNC | Performed by: EMERGENCY MEDICINE

## 2025-06-26 PROCEDURE — 5A09357 ASSISTANCE WITH RESPIRATORY VENTILATION, LESS THAN 24 CONSECUTIVE HOURS, CONTINUOUS POSITIVE AIRWAY PRESSURE: ICD-10-PCS | Performed by: EMERGENCY MEDICINE

## 2025-06-26 PROCEDURE — 83735 ASSAY OF MAGNESIUM: CPT | Mod: HCNC | Performed by: EMERGENCY MEDICINE

## 2025-06-26 PROCEDURE — 80053 COMPREHEN METABOLIC PANEL: CPT | Mod: HCNC | Performed by: EMERGENCY MEDICINE

## 2025-06-26 PROCEDURE — 25000242 PHARM REV CODE 250 ALT 637 W/ HCPCS: Mod: HCNC | Performed by: EMERGENCY MEDICINE

## 2025-06-26 PROCEDURE — 84484 ASSAY OF TROPONIN QUANT: CPT | Mod: HCNC | Performed by: EMERGENCY MEDICINE

## 2025-06-26 RX ORDER — FUROSEMIDE 40 MG/1
40 TABLET ORAL DAILY
Status: DISCONTINUED | OUTPATIENT
Start: 2025-06-27 | End: 2025-06-28

## 2025-06-26 RX ORDER — ROFLUMILAST 500 UG/1
500 TABLET ORAL DAILY
Status: DISCONTINUED | OUTPATIENT
Start: 2025-06-27 | End: 2025-07-03 | Stop reason: HOSPADM

## 2025-06-26 RX ORDER — ACETAMINOPHEN 325 MG/1
650 TABLET ORAL EVERY 8 HOURS PRN
Status: DISCONTINUED | OUTPATIENT
Start: 2025-06-26 | End: 2025-07-03 | Stop reason: HOSPADM

## 2025-06-26 RX ORDER — GUAIFENESIN 600 MG/1
600 TABLET, EXTENDED RELEASE ORAL 2 TIMES DAILY
Status: DISCONTINUED | OUTPATIENT
Start: 2025-06-26 | End: 2025-06-29

## 2025-06-26 RX ORDER — ESCITALOPRAM OXALATE 10 MG/1
20 TABLET ORAL DAILY
Status: DISCONTINUED | OUTPATIENT
Start: 2025-06-27 | End: 2025-06-28

## 2025-06-26 RX ORDER — ALBUTEROL SULFATE 2.5 MG/.5ML
2.5 SOLUTION RESPIRATORY (INHALATION)
Status: COMPLETED | OUTPATIENT
Start: 2025-06-26 | End: 2025-06-26

## 2025-06-26 RX ORDER — FLUTICASONE FUROATE AND VILANTEROL 100; 25 UG/1; UG/1
1 POWDER RESPIRATORY (INHALATION) DAILY
Status: DISCONTINUED | OUTPATIENT
Start: 2025-06-26 | End: 2025-07-03 | Stop reason: HOSPADM

## 2025-06-26 RX ORDER — METHYLPREDNISOLONE SOD SUCC 125 MG
125 VIAL (EA) INJECTION
Status: COMPLETED | OUTPATIENT
Start: 2025-06-26 | End: 2025-06-26

## 2025-06-26 RX ORDER — NAPROXEN SODIUM 220 MG/1
81 TABLET, FILM COATED ORAL DAILY
Status: DISCONTINUED | OUTPATIENT
Start: 2025-06-27 | End: 2025-07-03 | Stop reason: HOSPADM

## 2025-06-26 RX ORDER — MELOXICAM 7.5 MG/1
15 TABLET ORAL DAILY
Status: DISCONTINUED | OUTPATIENT
Start: 2025-06-27 | End: 2025-06-28

## 2025-06-26 RX ORDER — SERTRALINE HYDROCHLORIDE 100 MG/1
100 TABLET, FILM COATED ORAL DAILY
Status: DISCONTINUED | OUTPATIENT
Start: 2025-06-27 | End: 2025-07-03 | Stop reason: HOSPADM

## 2025-06-26 RX ORDER — CHOLECALCIFEROL (VITAMIN D3) 25 MCG
2000 TABLET ORAL DAILY
Status: DISCONTINUED | OUTPATIENT
Start: 2025-06-27 | End: 2025-07-03 | Stop reason: HOSPADM

## 2025-06-26 RX ORDER — ONDANSETRON HYDROCHLORIDE 2 MG/ML
4 INJECTION, SOLUTION INTRAVENOUS EVERY 12 HOURS PRN
Status: DISCONTINUED | OUTPATIENT
Start: 2025-06-26 | End: 2025-07-03 | Stop reason: HOSPADM

## 2025-06-26 RX ORDER — ENOXAPARIN SODIUM 100 MG/ML
40 INJECTION SUBCUTANEOUS EVERY 24 HOURS
Status: DISCONTINUED | OUTPATIENT
Start: 2025-06-26 | End: 2025-07-03 | Stop reason: HOSPADM

## 2025-06-26 RX ORDER — HYDROXYZINE HYDROCHLORIDE 25 MG/1
25 TABLET, FILM COATED ORAL 3 TIMES DAILY PRN
Status: DISCONTINUED | OUTPATIENT
Start: 2025-06-26 | End: 2025-07-03 | Stop reason: HOSPADM

## 2025-06-26 RX ORDER — LOSARTAN POTASSIUM 25 MG/1
25 TABLET ORAL DAILY
Status: DISCONTINUED | OUTPATIENT
Start: 2025-06-27 | End: 2025-07-03 | Stop reason: HOSPADM

## 2025-06-26 RX ORDER — IPRATROPIUM BROMIDE AND ALBUTEROL SULFATE 2.5; .5 MG/3ML; MG/3ML
3 SOLUTION RESPIRATORY (INHALATION)
Status: DISCONTINUED | OUTPATIENT
Start: 2025-06-26 | End: 2025-07-03 | Stop reason: HOSPADM

## 2025-06-26 RX ORDER — ESCITALOPRAM OXALATE 20 MG/1
20 TABLET ORAL DAILY
Status: ON HOLD | COMMUNITY
Start: 2025-06-04 | End: 2025-06-30 | Stop reason: ALTCHOICE

## 2025-06-26 RX ORDER — EZETIMIBE 10 MG/1
10 TABLET ORAL DAILY
Status: DISCONTINUED | OUTPATIENT
Start: 2025-06-27 | End: 2025-07-02

## 2025-06-26 RX ORDER — PREDNISONE 20 MG/1
40 TABLET ORAL DAILY
Status: DISCONTINUED | OUTPATIENT
Start: 2025-06-27 | End: 2025-06-26

## 2025-06-26 RX ORDER — DOXYCYCLINE HYCLATE 100 MG
100 TABLET ORAL
Status: COMPLETED | OUTPATIENT
Start: 2025-06-26 | End: 2025-06-26

## 2025-06-26 RX ORDER — CYCLOBENZAPRINE HCL 5 MG
5 TABLET ORAL 3 TIMES DAILY PRN
Status: DISPENSED | OUTPATIENT
Start: 2025-06-26 | End: 2025-06-27

## 2025-06-26 RX ORDER — CYCLOBENZAPRINE HCL 5 MG
5 TABLET ORAL 3 TIMES DAILY PRN
COMMUNITY

## 2025-06-26 RX ORDER — SODIUM CHLORIDE 0.9 % (FLUSH) 0.9 %
3 SYRINGE (ML) INJECTION
Status: DISCONTINUED | OUTPATIENT
Start: 2025-06-26 | End: 2025-07-03 | Stop reason: HOSPADM

## 2025-06-26 RX ORDER — ATORVASTATIN CALCIUM 40 MG/1
40 TABLET, FILM COATED ORAL NIGHTLY
Status: DISCONTINUED | OUTPATIENT
Start: 2025-06-26 | End: 2025-07-01

## 2025-06-26 RX ADMIN — IPRATROPIUM BROMIDE AND ALBUTEROL SULFATE 3 ML: 2.5; .5 SOLUTION RESPIRATORY (INHALATION) at 08:06

## 2025-06-26 RX ADMIN — IPRATROPIUM BROMIDE AND ALBUTEROL SULFATE 3 ML: 2.5; .5 SOLUTION RESPIRATORY (INHALATION) at 02:06

## 2025-06-26 RX ADMIN — HUMAN ALBUMIN MICROSPHERES AND PERFLUTREN 0.11 MG: 10; .22 INJECTION, SOLUTION INTRAVENOUS at 03:06

## 2025-06-26 RX ADMIN — ALBUTEROL SULFATE 2.5 MG: 2.5 SOLUTION RESPIRATORY (INHALATION) at 04:06

## 2025-06-26 RX ADMIN — CYCLOBENZAPRINE HYDROCHLORIDE 5 MG: 5 TABLET, FILM COATED ORAL at 05:06

## 2025-06-26 RX ADMIN — METHYLPREDNISOLONE SODIUM SUCCINATE 40 MG: 40 INJECTION, POWDER, FOR SOLUTION INTRAMUSCULAR; INTRAVENOUS at 04:06

## 2025-06-26 RX ADMIN — METHYLPREDNISOLONE SODIUM SUCCINATE 40 MG: 40 INJECTION, POWDER, FOR SOLUTION INTRAMUSCULAR; INTRAVENOUS at 09:06

## 2025-06-26 RX ADMIN — HYDROXYZINE HYDROCHLORIDE 25 MG: 25 TABLET, FILM COATED ORAL at 10:06

## 2025-06-26 RX ADMIN — METHYLPREDNISOLONE SODIUM SUCCINATE 125 MG: 125 INJECTION, POWDER, FOR SOLUTION INTRAMUSCULAR; INTRAVENOUS at 06:06

## 2025-06-26 RX ADMIN — ENOXAPARIN SODIUM 40 MG: 40 INJECTION SUBCUTANEOUS at 04:06

## 2025-06-26 RX ADMIN — ATORVASTATIN CALCIUM 40 MG: 40 TABLET, FILM COATED ORAL at 08:06

## 2025-06-26 RX ADMIN — ACETAMINOPHEN 650 MG: 325 TABLET ORAL at 04:06

## 2025-06-26 RX ADMIN — DOXYCYCLINE HYCLATE 100 MG: 100 TABLET, COATED ORAL at 06:06

## 2025-06-26 RX ADMIN — GUAIFENESIN 600 MG: 600 TABLET, EXTENDED RELEASE ORAL at 08:06

## 2025-06-26 NOTE — ASSESSMENT & PLAN NOTE
-patient complaining of cramps in her right lower extremity  -states she uses Robaxin at home, has also use Flexeril in the past  -Flexeril ordered, monitor

## 2025-06-26 NOTE — SUBJECTIVE & OBJECTIVE
Past Medical History:   Diagnosis Date    Acute exacerbation of chronic obstructive pulmonary disease (COPD) 2023    Recently hospitalized at [facility] from  to 2024 for exacerbation.  Did not require ICU care or significant escalation of intervention(s).      Adenoma of ascending colon 2022    Anxiety     COPD (chronic obstructive pulmonary disease)     COPD exacerbation 2014    Heart failure     Hypertension     Major depression 2023       Past Surgical History:   Procedure Laterality Date    BREAST BIOPSY N/A     pt unsure which breast but it was benign-(calcium)     SECTION      HERNIA REPAIR      HYSTERECTOMY         Review of patient's allergies indicates:   Allergen Reactions    Codeine Nausea And Vomiting    Lipitor [atorvastatin] Other (See Comments)     Muscle fatigue      Morphine Hallucinations       No current facility-administered medications on file prior to encounter.     Current Outpatient Medications on File Prior to Encounter   Medication Sig    acetaminophen (TYLENOL) 500 MG tablet Take 500 mg by mouth every 6 (six) hours as needed for Pain.    albuterol (PROVENTIL/VENTOLIN HFA) 90 mcg/actuation inhaler Inhale 2 puffs into the lungs every 6 (six) hours as needed for Wheezing or Shortness of Breath.    albuterol-ipratropium (DUO-NEB) 2.5 mg-0.5 mg/3 mL nebulizer solution Take 3 mLs by nebulization every 6 (six) hours while awake. Rescue    aspirin 81 MG Chew Take 81 mg by mouth once daily.    azithromycin (Z-OLY) 250 MG tablet Take 1 tablet (250 mg total) by mouth 3 (three) times a week. Take three times weekly on Monday, Wednesday, and Friday.    azithromycin (Z-OLY) 250 MG tablet Take 2 tablets by mouth on day 1; Take 1 tablet by mouth on days 2-5    budesonide-glycopyr-formoterol (BREZTRI AEROSPHERE) 160-9-4.8 mcg/actuation HFAA Inhale 2 puffs into the lungs 2 (two) times daily.    calcium carb-mag hydrox-simeth 1,200 mg-270 mg -80 mg/10 mL Susp Take  1,200 mg by mouth once daily.    cholecalciferol, vitamin D3, (VITAMIN D3) 50 mcg (2,000 unit) Tab Take 2,000 Units by mouth once daily.    cyclobenzaprine (FLEXERIL) 5 MG tablet Take 1 tablet (5 mg total) by mouth daily as needed for Muscle spasms.    dextromethorphan-guaiFENesin  mg (MUCINEX DM)  mg per 12 hr tablet Take 1 tablet by mouth 2 (two) times daily as needed (cough).    ezetimibe (ZETIA) 10 mg tablet TAKE 1 TABLET(10 MG) BY MOUTH DAILY    fluticasone propionate (FLONASE) 50 mcg/actuation nasal spray SHAKE LIQUID AND USE 1 SPRAY(50 MCG) IN EACH NOSTRIL DAILY AS NEEDED FOR ALLERGIES    furosemide (LASIX) 40 MG tablet Take 40 mg by mouth once daily.    guaiFENesin (MUCINEX) 600 mg 12 hr tablet Take 1 tablet (600 mg total) by mouth 2 (two) times daily.    hydrOXYzine HCL (ATARAX) 25 MG tablet TAKE 1 TABLET(25 MG) BY MOUTH THREE TIMES DAILY AS NEEDED FOR ANXIETY OR ITCHING    losartan (COZAAR) 25 MG tablet Take 1 tablet (25 mg total) by mouth once daily.    meloxicam (MOBIC) 15 MG tablet Take 1 tablet (15 mg total) by mouth once daily.    OXYGEN-AIR DELIVERY SYSTEMS MISC 2 L by Nasal route continuous.    potassium chloride (MICRO-K) 10 MEQ CpSR TAKE 1 CAPSULE BY MOUTH EVERY DAY    predniSONE (DELTASONE) 20 MG tablet Take 2 tablets (40 mg total) by mouth once daily. for 5 days    roflumilast (DALIRESP) 500 mcg Tab Take 1 tablet (500 mcg total) by mouth once daily.    rosuvastatin (CRESTOR) 10 MG tablet Take 1 tablet (10 mg total) by mouth every evening.    sertraline (ZOLOFT) 100 MG tablet Take half tablet (50 mg) daily for one week and then increase to 1 tablet (100 mg) daily.     Family History    None       Tobacco Use    Smoking status: Former     Current packs/day: 0.00     Average packs/day: 1 pack/day for 20.0 years (20.0 ttl pk-yrs)     Types: Cigarettes     Quit date: 2021     Years since quittin.4     Passive exposure: Past    Smokeless tobacco: Never   Substance and Sexual  Activity    Alcohol use: Not Currently     Alcohol/week: 1.0 standard drink of alcohol     Types: 1 Glasses of wine per week    Drug use: No    Sexual activity: Not Currently     Partners: Male     Review of Systems   Constitutional:  Negative for activity change, chills and fever.   HENT:  Negative for congestion, rhinorrhea and sore throat.    Eyes:  Negative for discharge and redness.   Respiratory:  Positive for cough, shortness of breath and wheezing. Negative for chest tightness.    Cardiovascular:  Negative for chest pain and palpitations.   Gastrointestinal:  Negative for abdominal pain, constipation, diarrhea, nausea and vomiting.   Genitourinary:  Negative for dysuria, flank pain and hematuria.   Musculoskeletal:  Negative for back pain, myalgias and neck pain.        Right lower extremity muscle cramping   Skin:  Negative for pallor, rash and wound.   Neurological:  Negative for dizziness, tremors, syncope, weakness, numbness and headaches.   Psychiatric/Behavioral:  Negative for agitation, confusion and hallucinations.      Objective:     Vital Signs (Most Recent):  Temp: 98.7 °F (37.1 °C) (06/26/25 0310)  Pulse: 75 (06/26/25 0702)  Resp: 20 (06/26/25 0703)  BP: (!) 120/59 (06/26/25 0702)  SpO2: 97 % (06/26/25 0702) Vital Signs (24h Range):  Temp:  [98.7 °F (37.1 °C)] 98.7 °F (37.1 °C)  Pulse:  [] 75  Resp:  [16-29] 20  SpO2:  [94 %-100 %] 97 %  BP: (120-162)/(58-72) 120/59     Weight: 82.1 kg (181 lb)  Body mass index is 29.23 kg/m².     Physical Exam  Vitals and nursing note reviewed.   Constitutional:       General: She is not in acute distress.  HENT:      Head: Normocephalic and atraumatic.      Mouth/Throat:      Mouth: Mucous membranes are moist.   Eyes:      General:         Right eye: No discharge.         Left eye: No discharge.      Conjunctiva/sclera: Conjunctivae normal.   Cardiovascular:      Rate and Rhythm: Regular rhythm. Tachycardia present.      Pulses: Normal pulses.    Pulmonary:      Effort: Respiratory distress present.      Breath sounds: Wheezing present.   Abdominal:      General: Bowel sounds are normal.      Palpations: Abdomen is soft.   Musculoskeletal:         General: No swelling. Normal range of motion.      Cervical back: Normal range of motion and neck supple.   Skin:     General: Skin is warm and dry.   Neurological:      Mental Status: She is alert and oriented to person, place, and time. Mental status is at baseline.   Psychiatric:         Mood and Affect: Mood normal.                Significant Labs: All pertinent labs within the past 24 hours have been reviewed.    Significant Imaging: I have reviewed all pertinent imaging results/findings within the past 24 hours.

## 2025-06-26 NOTE — ED NOTES
Patient resting in bed. ECHO completed. Patient repositioned in bed. Reports much improvement to SOB. On 3 L NC. Tachypnea. No use of accessory muscles. Dyspnea upon exertion.

## 2025-06-26 NOTE — CONSULTS
Monitored by specialist- no acute findings meriting change in the plan Pulm/CC Fellow Consult Note    Attending Physician: Herbert Betts MD    Date of Admit: 2025    Reason for Consult: Acute on Chronic Hypercapnic Respiratory Failure    History of Present Illness:  Terri Phelan is a 69 y.o.  female with a PMHx of COPD (FEV1 26% predicted) CAD, JUAN, and Pulmonary hypertension who presented to [facility] ED on  with complaints of shortness of breath with exertion and increased in sputum production for 5 dayys. She checks her O2 sats at home and noted to be in the 60's so EMS was called. Of note the patient was prescribed steroids and azithro as an outpatient. The patient had poor air movement on presentation and rales bilaterally. She was noted to be lethargic and blood gas showed acute on chronic hypercapnic respiratory failure. She was paced on BiPAP and pulmonary was consulted.    On my interview the patient was more responsive. She was alert and interactive carrying full conversations. She was taken off BIPAP back to her home 3L of O2. She reports that she has been taking her medication as prescribed and hasn't run out.    Past Medical History:  Past Medical History:   Diagnosis Date    Acute exacerbation of chronic obstructive pulmonary disease (COPD) 2023    Recently hospitalized at [facility] from  to 2024 for exacerbation.  Did not require ICU care or significant escalation of intervention(s).      Adenoma of ascending colon 2022    Anxiety     COPD (chronic obstructive pulmonary disease)     COPD exacerbation 2014    Heart failure     Hypertension     Major depression 2023       Past Surgical History:  Past Surgical History:   Procedure Laterality Date    BREAST BIOPSY N/A     pt unsure which breast but it was benign-(calcium)     SECTION      HERNIA REPAIR      HYSTERECTOMY         Allergies:  Review of patient's allergies indicates:   Allergen Reactions    Codeine Nausea And Vomiting    Lipitor [atorvastatin] Other  "(See Comments)     Muscle fatigue      Morphine Hallucinations       Family History:  No family history on file.    Social History:  Social History[1]    Review of Systems:  Review of Systems   All other systems reviewed and are negative.       Objective:   Last 24 Hour Vital Signs:  BP  Min: 115/56  Max: 163/67  Temp  Av.5 °F (36.9 °C)  Min: 98.1 °F (36.7 °C)  Max: 98.9 °F (37.2 °C)  Pulse  Av.4  Min: 69  Max: 110  Resp  Av.7  Min: 16  Max: 33  SpO2  Av.3 %  Min: 92 %  Max: 100 %  Height  Av' 6" (167.6 cm)  Min: 5' 5.98" (167.6 cm)  Max: 5' 6" (167.6 cm)  Weight  Av.7 kg (180 lb 0.7 oz)  Min: 80.8 kg (178 lb 2.1 oz)  Max: 82.1 kg (181 lb)  Body mass index is 28.75 kg/m².  No intake/output data recorded.    Physical Exam:  General: Alert and awake in NAD  HENT:  NCAT; anicteric sclera; OP clear with MMM  Cardio:  Regular rate and rhythm with normal S1 and S2; no murmurs or rubs  Resp:  Decreased breath sounds globally. Breathing comfortably on 3L  Abdom:  Soft, Non-tender  Extrem:  WWP with no clubbing, cyanosis or edema  Pulses:  2+ and symmetric distally  Neuro:  AAOx3; cooperative and pleasant with no focal deficits    Imaging:    CXR: 25  Stable from previous, no focal consolidation on pleural effusion    Assessment & Plan:     Pulm  - Patient with severe COPD presenting with COPD exacerbation. She is already on triple therapy + roflumilast and 3x weekly azithromycin. She continues to have exacerbations despite medication adherence. She has quit smoking 5 years ago. Initially requiring bipap for somnolence with hypercapnic respiratory failure. Improved significantly and alert on my exam. Transitioned off bipap back to home O2.    - Would treat for severe COPD exacerbation with steroids and abx  - Respiratory culture ordered  - BiPAP QHS and with naps (how patient uses it at home).   - Patient with severe COPD and frequent exacerbations despite triple inhaler therapy, " roflumilast, and azithromycin.   - Review of Eos as high as 240, patient would qualify for Tezpire as an outpatient for COPD  - Please refer patient back to her outpatient pulmonologist on discharge.    Patient stable to be admitted to the floor. Pulmonology will sign off please reach out if we can be of any further assistance in the care of this patient.    William Barreto M.D.  [facility] Pulmonary & Critical Care Fellow    Pt seen and examined with Pulmonary/Critical Care team and this note reviewed and validated with the following additional comments: Has improved with NIV.  We were able to transition her to nasal cannula. Looks comfortable.  OK to downgrade admissin.    The complexity of Medical Decision Making (MDM) was high.  The number of problems addressed was 2.  The risk of complications and/or morbidity/mortality was high.  The tests ordered were NC.  I communicated with the following providers HM.  Time spent in the care of this patient was 50 minutes.    Sage Patrick MD  Phone [phone]                [1]   Social History  Tobacco Use    Smoking status: Former     Current packs/day: 0.00     Average packs/day: 1 pack/day for 20.0 years (20.0 ttl pk-yrs)     Types: Cigarettes     Quit date: 2021     Years since quittin.4     Passive exposure: Past    Smokeless tobacco: Never   Substance Use Topics    Alcohol use: Not Currently     Alcohol/week: 1.0 standard drink of alcohol     Types: 1 Glasses of wine per week    Drug use: No

## 2025-06-26 NOTE — ASSESSMENT & PLAN NOTE
Patient with Hypercapnic Respiratory failure which is Acute on chronic.  she is on home oxygen at 3 LPM. Supplemental oxygen was provided and noted- Oxygen Concentration (%):  [3-40] 3    .   Signs/symptoms of respiratory failure include- tachypnea, increased work of breathing, respiratory distress, and wheezing. Contributing diagnoses includes - COPD Labs and images were reviewed. Patient Has recent ABG, which has been reviewed. Will treat underlying causes and adjust management of respiratory failure as follows- Bipap at night

## 2025-06-26 NOTE — Clinical Note
Diagnosis: Acute on chronic respiratory failure with hypoxia and hypercapnia [[id number]]   Future Attending Provider: MILVIA SHARP [[id number]]   Reason for IP Medical Treatment  (Clinical interventions that can only be accomplished in the IP setting? ) :: BIPAP, cont pulse ox, resp support   Plans for Post-Acute care--if anticipated (pick the single best option):: C. Discharge home with home health services

## 2025-06-26 NOTE — ED PROVIDER NOTES
Emergency Department Provider Note    Terri Phelan   69 y.o. female   [id number]      2025       History     Worsening exertional SOB x 5d with acute productive cough (white sputum). On 3L NC at all times. Becomes very winded with minimal activity. Family member reports sats in the 60s. Patient started prednisone and azithromycin yesterday morning. Denies fever, chills, chest pain, abdominal pain, nausea, vomiting, and peripheral edema. Did not perform home neb treatments. EMS reported poor air movement and gave neb treatment during transport.         Past Medical History:   Diagnosis Date    Acute exacerbation of chronic obstructive pulmonary disease (COPD) 2023    Recently hospitalized at [facility] from  to 2024 for exacerbation.  Did not require ICU care or significant escalation of intervention(s).      Adenoma of ascending colon 2022    Anxiety     COPD (chronic obstructive pulmonary disease)     COPD exacerbation 2014    Heart failure     Hypertension     Major depression 2023      Past Surgical History:   Procedure Laterality Date    BREAST BIOPSY N/A     pt unsure which breast but it was benign-(calcium)     SECTION      HERNIA REPAIR      HYSTERECTOMY        No family history on file.   Social History     Socioeconomic History    Marital status:    Tobacco Use    Smoking status: Former     Current packs/day: 0.00     Average packs/day: 1 pack/day for 20.0 years (20.0 ttl pk-yrs)     Types: Cigarettes     Quit date: 2021     Years since quittin.5     Passive exposure: Past    Smokeless tobacco: Never   Substance and Sexual Activity    Alcohol use: Not Currently     Alcohol/week: 1.0 standard drink of alcohol     Types: 1 Glasses of wine per week    Drug use: No    Sexual activity: Not Currently     Partners: Male     Social Drivers of Health     Financial Resource Strain: Low Risk  (2025)    Overall Financial Resource Strain (CARDIA)      Difficulty of Paying Living Expenses: Not hard at all   Food Insecurity: No Food Insecurity (6/30/2025)    Hunger Vital Sign     Worried About Running Out of Food in the Last Year: Never true     Ran Out of Food in the Last Year: Never true   Transportation Needs: No Transportation Needs (6/30/2025)    PRAPARE - Transportation     Lack of Transportation (Medical): No     Lack of Transportation (Non-Medical): No   Recent Concern: Transportation Needs - Unmet Transportation Needs (4/15/2025)    PRAPARE - Transportation     Lack of Transportation (Medical): Yes     Lack of Transportation (Non-Medical): No   Physical Activity: Inactive (4/16/2025)    Exercise Vital Sign     Days of Exercise per Week: 0 days     Minutes of Exercise per Session: 0 min   Stress: No Stress Concern Present (6/26/2025)    [employer] - Occupational Stress Questionnaire     Feeling of Stress : Only a little   Recent Concern: Stress - Stress Concern Present (4/17/2025)    [employer] - Occupational Stress Questionnaire     Feeling of Stress : To some extent   Housing Stability: Low Risk  (6/30/2025)    Housing Stability Vital Sign     Unable to Pay for Housing in the Last Year: No     Number of Times Moved in the Last Year: 0     Homeless in the Last Year: No      Review of patient's allergies indicates:   Allergen Reactions    Codeine Nausea And Vomiting    Lipitor [atorvastatin] Other (See Comments)     Muscle fatigue      Morphine Hallucinations           Physical Examination     Initial Vitals [06/26/25 0310]   BP Pulse Resp Temp SpO2   (!) 158/72 110 (!) 24 98.7 °F (37.1 °C) 98 %      MAP       --           Physical Exam    Nursing note and vitals reviewed.  Constitutional: She appears lethargic. She is not diaphoretic. No distress.   HENT: Mouth/Throat: Mucous membranes are dry.   Cardiovascular:  Normal rate, regular rhythm and normal heart sounds.     Exam reveals no gallop and  no friction rub.       No murmur heard.  Pulmonary/Chest: No stridor. No respiratory distress. She has decreased breath sounds in the right upper field, the right middle field, the right lower field, the left upper field, the left middle field and the left lower field. She has no wheezes. She has no rhonchi. She has rales in the right lower field and the left lower field.   Abdominal: Abdomen is soft. There is no abdominal tenderness.   Musculoskeletal:         General: No tenderness or edema.     Neurological: She is oriented to person, place, and time. She appears lethargic. GCS score is 15. GCS eye subscore is 4. GCS verbal subscore is 5. GCS motor subscore is 6.   Skin: Skin is warm and dry. No pallor.            Labs     Labs Reviewed   COMPREHENSIVE METABOLIC PANEL - Abnormal       Result Value    Sodium 144      Potassium 4.0      Chloride 103      CO2 34 (*)     Glucose 140 (*)     BUN 14      Creatinine 0.7      Calcium 9.3      Protein Total 7.3      Albumin 3.1 (*)     Bilirubin Total 0.1      ALP 58      AST 16      ALT 17      Anion Gap 7 (*)     eGFR >60     MAGNESIUM - Abnormal    Magnesium  2.9 (*)    CBC WITH DIFFERENTIAL - Abnormal    WBC 6.31      RBC 4.70      HGB 12.3      HCT 43.4      MCV 92      MCH 26.2 (*)     MCHC 28.3 (*)     RDW 13.7      Platelet Count 173      MPV 11.5      Nucleated RBC 0      Neut % 86.6 (*)     Lymph % 7.6 (*)     Mono % 4.8      Eos % 0.0      Basophil % 0.5      Imm Grans % 0.5      Neut # 5.47      Lymph # 0.48 (*)     Mono # 0.30      Eos # 0.00      Baso # 0.03      Imm Grans # 0.03     ISTAT PROCEDURE - Abnormal    POC PH 7.553 (*)     POC PCO2 42.0      POC PO2 186 (*)     POC HCO3 37.0 (*)     POC BE 15 (*)     POC SATURATED O2 100      POC TCO2 38 (*)     Sample ARTERIAL      Site RR      Allens Test Pass     TROPONIN I - Normal    Troponin-I <0.006     B-TYPE NATRIURETIC PEPTIDE - Normal    BNP 47     CBC W/ AUTO DIFFERENTIAL    Narrative:     The  following orders were created for panel order CBC auto differential.  Procedure                               Abnormality         Status                     ---------                               -----------         ------                     CBC with Differential[[id number]]       Abnormal            Final result                 Please view results for these tests on the individual orders.   LAB REPORT   LAB REPORT        Imaging     Imaging Results               X-Ray Chest AP Portable (Final result)  Result time 06/26/25 05:32:48      Final result by Gregg Choudhary MD (06/26/25 05:32:48)                   Impression:      Probable pulmonary emphysema.    Suspected bibasilar atelectatic changes.  Pneumonia or other pathology not excluded.    Mild parahilar predominant interstitial pulmonary changes, possibly related to interstitial pulmonary edema, interstitial pneumonia, or bronchitis, etc..    Recommendations include clinical correlation and continued radiographic follow-up.    This report was flagged in Epic as abnormal.      Electronically signed by: Gregg Choudhary  Date:    06/26/2025  Time:    05:32               Narrative:    EXAMINATION:  XR CHEST AP PORTABLE    CLINICAL HISTORY:  Exertional shortness of breath;    TECHNIQUE:  Single frontal view of the chest was performed.    COMPARISON:  One-view chest x-ray 05/03/2025    FINDINGS:  Midline thoracic trachea, allowing for leftward patient rotation.    Pre-existing atherosclerotic calcification of the aortic arch.    The margins of the cardiopericardial silhouette are poorly visualized but appears grossly similar in size and contour 2 the comparison study.    Hazy bibasilar pulmonary opacities, left greater than right, possibly related to atelectasis.  Pneumonia other pathology not excluded.    Minimally increased fine bilateral parahilar peribronchial reticulonodular pulmonary opacities.    Parahilar pulmonary parenchyma remains hyperlucent  bilaterally, as may be seen with pulmonary emphysema.    Right lateral costophrenic angle sharp, the left is poorly visualized (such that some left pleural fluid is not excluded).    No discrete pneumothorax.    Scattered degenerative changes in the visualized skeleton, including within the suboptimally visualized spine.    Diffuse osteopenia.    External leads project over the torso.    A linear subcentimeter opacity projecting over the right lower lung is likely in the chest wall or external artifact.    External leads, oxygen tubing, and a aerosol/nebulizer mask project over the torso.                                        ED Course     The patient received the following medications:  Medications   cyclobenzaprine tablet 5 mg (5 mg Oral Given 6/26/25 1746)   albuterol sulfate nebulizer solution 2.5 mg (2.5 mg Nebulization Given by Other 6/26/25 0420)   methylPREDNISolone sodium succinate injection 125 mg (125 mg Intravenous Given 6/26/25 0608)   doxycycline tablet 100 mg (100 mg Oral Given 6/26/25 0630)   perflutren protein-A microsphr 0.22 mg/mL IV susp (0.11 mg Intravenous Given 6/26/25 1500)   acetaminophen tablet 650 mg (650 mg Oral Given 6/27/25 1639)   potassium, sodium phosphates 280-160-250 mg packet 1 packet (1 packet Oral Given 6/28/25 1729)   tuberculin injection 5 Units (5 Units Intradermal Given 7/1/25 1302)           ED Course as of 07/12/25 0740   Thu Jun 26, 2025   0556 EKG 12-lead  Nsr. Rate 96. Nml axis. Nml qrs. Prolonged qtc. No ST elevation/depression. [LP]   0742 Assumed care from previous physician at 6:00 a.m..  Patient reassessed, breathing improved on BiPAP.  Patient still has mild wheezing bilaterally with diminished breath sounds.  Otherwise resting comfortably and vital signs are stable.    Repeat VBG as just reviewed shows minimal change in pH and pCO2.  Will increase BiPAP settings to 15/8 [NP]   0804 Case discussed with hospitalist, Dr. Betts, regarding patient's status,  response to BiPAP and further medical treatment required including continued NIPPV, continuous pulse ox and cardiac monitoring, breathing treatments and steroids.  Also given doxycycline 100 mg p.o. here in the ED. [NP]   0805 Attending Critical Care:   Critical Care Times:   ==============================================================  · Total Critical Care Time - exclusive of procedural time: 35 minutes.  ==============================================================  Critical Care Condition:  Potentially life-threatening   Critical Care Comments: Critical Care time was inclusive of direct patient care, review of previous records, interpretation of labs, imaging and ekg, as well as discussion of my impression and plan of care with the patient, family and other clinicians/consultants. This time is exclusive of any separate billable procedures and of treating other patients. Critical care was required for this patient who presented with acute respiratory distress and respiratory failure with hypoxia and hypercapnia, requiring my emergent evaluation and management in the emergency department including NIPPV and admission to the ICU    [NP]      ED Course User Index  [LP] Neo De La Vega III, MD  [NP] Juanpablo Padgett MD        Medical Decision Making                 Medical Decision Making  Differential diagnoses:  Acute respiratory failure, pneumonia, pleural effusion/empyema, spontaneous pneumothorax    Amount and/or Complexity of Data Reviewed  Labs: ordered.  Radiology: ordered.  ECG/medicine tests:  Decision-making details documented in ED Course.    Risk  Prescription drug management.  Decision regarding hospitalization.           Critical Care   Date: 07/12/2025  Performed by: Neo De La Vega III, MD   Authorized by: Neo De La Vega III, MD    Total critical care time (exclusive of procedural time) :  30 minutes  Critical care was necessary to treat or prevent imminent or life-threatening deterioration of the  following conditions:  Acute respiratory failure     Diagnoses       ICD-10-CM ICD-9-CM   1. Acute on chronic respiratory failure with hypoxia and hypercapnia  J96.21 518.84    J96.22 786.09     799.02   2. Exertional shortness of breath  R06.02 786.05   3. Shortness of breath  R06.02 786.05         Dispostion      ED Disposition Condition    Admit              Neo De La Vega III, MD  07/12/25 0742

## 2025-06-26 NOTE — ASSESSMENT & PLAN NOTE
Patient's blood pressure range in the last 24 hours was: BP  Min: 115/56  Max: 162/69.The patient's inpatient anti-hypertensive regimen is listed below:  Current Antihypertensives  furosemide tablet 40 mg, Daily, Oral  losartan tablet 25 mg, Daily, Oral    Plan  - BP is controlled, no changes needed to their regimen  - monitor vitals

## 2025-06-26 NOTE — PHARMACY MED REC
"[facility]           Pharmacy  Admission Medication History     The home medication history was taken by Yana Mcdonough.      Medication history obtained from Medications listed below were obtained from: Analytic software- Padmini;Unable to assess patient, medications verified per dr first profile    Based on information gathered for medication list, you may go to "Admission" then "Reconcile Home Medications" tabs to review and/or act upon those items.     The home medication list has been updated by the Pharmacy department.   Please read ALL comments highlighted in yellow.   Please address this information as you see fit.    Feel free to contact us if you have any questions or require assistance.      No current facility-administered medications on file prior to encounter.     Current Outpatient Medications on File Prior to Encounter   Medication Sig Dispense Refill    albuterol (PROVENTIL/VENTOLIN HFA) 90 mcg/actuation inhaler Inhale 2 puffs into the lungs every 6 (six) hours as needed for Wheezing or Shortness of Breath. 18 g 6    albuterol-ipratropium (DUO-NEB) 2.5 mg-0.5 mg/3 mL nebulizer solution Take 3 mLs by nebulization every 6 (six) hours while awake. Rescue 360 mL 0    azithromycin (Z-OLY) 250 MG tablet Take 1 tablet (250 mg total) by mouth 3 (three) times a week. Take three times weekly on Monday, Wednesday, and Friday. 36 tablet 3    EScitalopram oxalate (LEXAPRO) 20 MG tablet Take 20 mg by mouth once daily.      ezetimibe (ZETIA) 10 mg tablet TAKE 1 TABLET(10 MG) BY MOUTH DAILY (Patient taking differently: Take 10 mg by mouth once daily.) 90 tablet 3    fluticasone propionate (FLONASE) 50 mcg/actuation nasal spray SHAKE LIQUID AND USE 1 SPRAY(50 MCG) IN EACH NOSTRIL DAILY AS NEEDED FOR ALLERGIES (Patient taking differently: 1 spray by Each Nostril route daily as needed for Allergies.) 16 g 11    hydrOXYzine HCL (ATARAX) 25 MG tablet TAKE 1 TABLET(25 MG) BY MOUTH THREE TIMES DAILY " AS NEEDED FOR ANXIETY OR ITCHING (Patient taking differently: Take 25 mg by mouth 3 (three) times daily as needed for Itching or Anxiety.) 30 tablet 3    losartan (COZAAR) 25 MG tablet Take 1 tablet (25 mg total) by mouth once daily. 90 tablet 3    meloxicam (MOBIC) 15 MG tablet Take 1 tablet (15 mg total) by mouth once daily. 30 tablet 2    potassium chloride (MICRO-K) 10 MEQ CpSR TAKE 1 CAPSULE BY MOUTH EVERY DAY (Patient taking differently: Take 10 mEq by mouth once daily.) 90 capsule 0    roflumilast (DALIRESP) 500 mcg Tab Take 1 tablet (500 mcg total) by mouth once daily. 30 tablet 0    rosuvastatin (CRESTOR) 10 MG tablet Take 1 tablet (10 mg total) by mouth every evening. 30 tablet 11    sertraline (ZOLOFT) 100 MG tablet Take half tablet (50 mg) daily for one week and then increase to 1 tablet (100 mg) daily. 30 tablet 11    acetaminophen (TYLENOL) 500 MG tablet Take 500 mg by mouth every 6 (six) hours as needed for Pain.      aspirin 81 MG Chew Take 81 mg by mouth once daily.      calcium carb-mag hydrox-simeth 1,200 mg-270 mg -80 mg/10 mL Susp Take 1,200 mg by mouth once daily.      cholecalciferol, vitamin D3, (VITAMIN D3) 50 mcg (2,000 unit) Tab Take 2,000 Units by mouth once daily.      dextromethorphan-guaiFENesin  mg (MUCINEX DM)  mg per 12 hr tablet Take 1 tablet by mouth 2 (two) times daily as needed (cough). 20 tablet 1    furosemide (LASIX) 40 MG tablet Take 40 mg by mouth once daily.      guaiFENesin (MUCINEX) 600 mg 12 hr tablet Take 1 tablet (600 mg total) by mouth 2 (two) times daily. 10 tablet 0    OXYGEN-AIR DELIVERY SYSTEMS MISC 2 L by Nasal route continuous.      predniSONE (DELTASONE) 20 MG tablet Take 2 tablets (40 mg total) by mouth once daily. for 5 days 10 tablet 0       Please address this information as you see fit.  Feel free to contact us if you have any questions or require assistance.    Yana Mcdonough  [phone]                  .

## 2025-06-26 NOTE — LETTER
Fax Transmission                                                                                                                                                       Date: June 30, 2025       To:   From:    Fax:   Fax:    Phone:   Phone:      Special Instructions:     Patient is a SNF to home and will be ready tomorrow.                             IF THERE ARE ANY PROBLEMS WITH THIS TRANSMISSION, PLEASE CALL IMMEDIATELY. THANK YOU    CONFIDENTIALITY NOTICE: The accompanying facsimile is intended solely for the use of the recipient designated above. Document(s) transmitted herewith may contain information that is confidential and privileged. Delivery, distribution of dissemination of this communication other than to the intended recipient is strictly prohibited. If you are another healthcare provider and have received this facsimile in error, please properly dispose and notify the sender. If you are NOT a healthcare provider and have received this facsimile in error, please notify [facility]s Compliance & Privacy Department immediately by email at [email or web address].

## 2025-06-26 NOTE — ED NOTES
Received report from ELLIE Moore and assumed care of patient. Patient resting in stretcher comfortably on BiPap. Respirations even and unlabored, equal rise and fall to chest. Will continue to monitor.

## 2025-06-26 NOTE — HPI
69-year-old female with a past medical history of COPD on 2L of O2 via NC (uses 3L with exertion), diastolic HF, HTN, HLD, anxiety, depression, JUAN (uses bipap every night), hx of MI (but no stenosis noted on angiogram); came to emergency department due to shortness for breath and cough.  She states she has recently treated for COPD exacerbation and discharged on oral steroids and Augmentin, which she recently completed.  She states she was feeling well until yesterday when her symptoms returned.  She denied chest pain palpitation but complains of shortness for breath, cough and right lower extremity cramping.    In the emergency department the patient had a blood pressure of 122/58, heart rate of 110, temperature 98.7°, respiratory rate of 29.  ABG showed a pH of 7.245 5, bicarb of 89.  She was on BiPAP continuously.  She was seen by pulmonology team we will request the BiPAP be switched to at night only.  WBC 6.31, hemoglobin 12.3, platelet 173.  Sodium 144, bicarb 34, anion gap 7, magnesium 2.9.  ABG results reviewed.  Chest x-ray shows possible pulmonary emphysema.  Patient will be admitted for further management of COPD exacerbation.

## 2025-06-26 NOTE — ASSESSMENT & PLAN NOTE
Patient with Hypercapnic Respiratory failure which is Acute on chronic.  she is on home oxygen at 3 LPM. Supplemental oxygen was provided and noted- Oxygen Concentration (%):  [3-40] 3    .   Signs/symptoms of respiratory failure include- tachypnea, respiratory distress, wheezing, and lethargy. Contributing diagnoses includes - COPD Labs and images were reviewed. Patient Has recent ABG, which has been reviewed. Will treat underlying causes and adjust management of respiratory failure as follows- steroids, Bipap at night

## 2025-06-26 NOTE — ASSESSMENT & PLAN NOTE
Patient's COPD is with exacerbation noted by continued dyspnea currently.  Patient is currently on COPD Pathway. Continue scheduled inhalers Steroids and Supplemental oxygen and monitor respiratory status closely.     -seen by pulmonology  -continue with IV steroids  -BiPAP at night

## 2025-06-26 NOTE — H&P
[facility] Emergency [facility] Medicine  History & Physical    Patient Name: Terri Phelan  MRN: [id number]  Patient Class: IP- Inpatient  Admission Date: 6/26/2025  Attending Physician: Alex Pendleton MD   Primary Care Provider: Sussy Mason MD         Patient information was obtained from patient and ER records.     Subjective:     Principal Problem:Acute on chronic respiratory failure with hypoxia and hypercapnia    Chief Complaint:   Chief Complaint   Patient presents with    Shortness of Breath     Pt bib [facility] c/o shortness of breath x3.  Pt received duo neb per EMS. Hx COPD. Initial sat on home 3L was 90, and that is her baseline. Little air movement per EMS.          HPI: 69-year-old female with a past medical history of COPD on 2L of O2 via NC (uses 3L with exertion), diastolic HF, HTN, HLD, anxiety, depression, JUAN (uses bipap every night), hx of MI (but no stenosis noted on angiogram); came to emergency department due to shortness for breath and cough.  She states she has recently treated for COPD exacerbation and discharged on oral steroids and Augmentin, which she recently completed.  She states she was feeling well until yesterday when her symptoms returned.  She denied chest pain palpitation but complains of shortness for breath, cough and right lower extremity cramping.    In the emergency department the patient had a blood pressure of 122/58, heart rate of 110, temperature 98.7°, respiratory rate of 29.  ABG showed a pH of 7.245 5, bicarb of 89.  She was on BiPAP continuously.  She was seen by pulmonology team we will request the BiPAP be switched to at night only.  WBC 6.31, hemoglobin 12.3, platelet 173.  Sodium 144, bicarb 34, anion gap 7, magnesium 2.9.  ABG results reviewed.  Chest x-ray shows possible pulmonary emphysema.  Patient will be admitted for further management of COPD exacerbation.              Past Medical History:   Diagnosis Date    Acute exacerbation of chronic  obstructive pulmonary disease (COPD) 2023    Recently hospitalized at [facility] from  to 2024 for exacerbation.  Did not require ICU care or significant escalation of intervention(s).      Adenoma of ascending colon 2022    Anxiety     COPD (chronic obstructive pulmonary disease)     COPD exacerbation 2014    Heart failure     Hypertension     Major depression 2023       Past Surgical History:   Procedure Laterality Date    BREAST BIOPSY N/A     pt unsure which breast but it was benign-(calcium)     SECTION      HERNIA REPAIR      HYSTERECTOMY         Review of patient's allergies indicates:   Allergen Reactions    Codeine Nausea And Vomiting    Lipitor [atorvastatin] Other (See Comments)     Muscle fatigue      Morphine Hallucinations       No current facility-administered medications on file prior to encounter.     Current Outpatient Medications on File Prior to Encounter   Medication Sig    acetaminophen (TYLENOL) 500 MG tablet Take 500 mg by mouth every 6 (six) hours as needed for Pain.    albuterol (PROVENTIL/VENTOLIN HFA) 90 mcg/actuation inhaler Inhale 2 puffs into the lungs every 6 (six) hours as needed for Wheezing or Shortness of Breath.    albuterol-ipratropium (DUO-NEB) 2.5 mg-0.5 mg/3 mL nebulizer solution Take 3 mLs by nebulization every 6 (six) hours while awake. Rescue    aspirin 81 MG Chew Take 81 mg by mouth once daily.    azithromycin (Z-OLY) 250 MG tablet Take 1 tablet (250 mg total) by mouth 3 (three) times a week. Take three times weekly on Monday, Wednesday, and Friday.    azithromycin (Z-OLY) 250 MG tablet Take 2 tablets by mouth on day 1; Take 1 tablet by mouth on days 2-5    budesonide-glycopyr-formoterol (BREZTRI AEROSPHERE) 160-9-4.8 mcg/actuation HFAA Inhale 2 puffs into the lungs 2 (two) times daily.    calcium carb-mag hydrox-simeth 1,200 mg-270 mg -80 mg/10 mL Susp Take 1,200 mg by mouth once daily.    cholecalciferol, vitamin D3, (VITAMIN D3)  50 mcg (2,000 unit) Tab Take 2,000 Units by mouth once daily.    cyclobenzaprine (FLEXERIL) 5 MG tablet Take 1 tablet (5 mg total) by mouth daily as needed for Muscle spasms.    dextromethorphan-guaiFENesin  mg (MUCINEX DM)  mg per 12 hr tablet Take 1 tablet by mouth 2 (two) times daily as needed (cough).    ezetimibe (ZETIA) 10 mg tablet TAKE 1 TABLET(10 MG) BY MOUTH DAILY    fluticasone propionate (FLONASE) 50 mcg/actuation nasal spray SHAKE LIQUID AND USE 1 SPRAY(50 MCG) IN EACH NOSTRIL DAILY AS NEEDED FOR ALLERGIES    furosemide (LASIX) 40 MG tablet Take 40 mg by mouth once daily.    guaiFENesin (MUCINEX) 600 mg 12 hr tablet Take 1 tablet (600 mg total) by mouth 2 (two) times daily.    hydrOXYzine HCL (ATARAX) 25 MG tablet TAKE 1 TABLET(25 MG) BY MOUTH THREE TIMES DAILY AS NEEDED FOR ANXIETY OR ITCHING    losartan (COZAAR) 25 MG tablet Take 1 tablet (25 mg total) by mouth once daily.    meloxicam (MOBIC) 15 MG tablet Take 1 tablet (15 mg total) by mouth once daily.    OXYGEN-AIR DELIVERY SYSTEMS MISC 2 L by Nasal route continuous.    potassium chloride (MICRO-K) 10 MEQ CpSR TAKE 1 CAPSULE BY MOUTH EVERY DAY    predniSONE (DELTASONE) 20 MG tablet Take 2 tablets (40 mg total) by mouth once daily. for 5 days    roflumilast (DALIRESP) 500 mcg Tab Take 1 tablet (500 mcg total) by mouth once daily.    rosuvastatin (CRESTOR) 10 MG tablet Take 1 tablet (10 mg total) by mouth every evening.    sertraline (ZOLOFT) 100 MG tablet Take half tablet (50 mg) daily for one week and then increase to 1 tablet (100 mg) daily.     Family History    None       Tobacco Use    Smoking status: Former     Current packs/day: 0.00     Average packs/day: 1 pack/day for 20.0 years (20.0 ttl pk-yrs)     Types: Cigarettes     Quit date: 2021     Years since quittin.4     Passive exposure: Past    Smokeless tobacco: Never   Substance and Sexual Activity    Alcohol use: Not Currently     Alcohol/week: 1.0 standard drink of  alcohol     Types: 1 Glasses of wine per week    Drug use: No    Sexual activity: Not Currently     Partners: Male     Review of Systems   Constitutional:  Negative for activity change, chills and fever.   HENT:  Negative for congestion, rhinorrhea and sore throat.    Eyes:  Negative for discharge and redness.   Respiratory:  Positive for cough, shortness of breath and wheezing. Negative for chest tightness.    Cardiovascular:  Negative for chest pain and palpitations.   Gastrointestinal:  Negative for abdominal pain, constipation, diarrhea, nausea and vomiting.   Genitourinary:  Negative for dysuria, flank pain and hematuria.   Musculoskeletal:  Negative for back pain, myalgias and neck pain.        Right lower extremity muscle cramping   Skin:  Negative for pallor, rash and wound.   Neurological:  Negative for dizziness, tremors, syncope, weakness, numbness and headaches.   Psychiatric/Behavioral:  Negative for agitation, confusion and hallucinations.      Objective:     Vital Signs (Most Recent):  Temp: 98.7 °F (37.1 °C) (06/26/25 0310)  Pulse: 75 (06/26/25 0702)  Resp: 20 (06/26/25 0703)  BP: (!) 120/59 (06/26/25 0702)  SpO2: 97 % (06/26/25 0702) Vital Signs (24h Range):  Temp:  [98.7 °F (37.1 °C)] 98.7 °F (37.1 °C)  Pulse:  [] 75  Resp:  [16-29] 20  SpO2:  [94 %-100 %] 97 %  BP: (120-162)/(58-72) 120/59     Weight: 82.1 kg (181 lb)  Body mass index is 29.23 kg/m².     Physical Exam  Vitals and nursing note reviewed.   Constitutional:       General: She is not in acute distress.  HENT:      Head: Normocephalic and atraumatic.      Mouth/Throat:      Mouth: Mucous membranes are moist.   Eyes:      General:         Right eye: No discharge.         Left eye: No discharge.      Conjunctiva/sclera: Conjunctivae normal.   Cardiovascular:      Rate and Rhythm: Regular rhythm. Tachycardia present.      Pulses: Normal pulses.   Pulmonary:      Effort: Respiratory distress present.      Breath sounds: Wheezing  present.   Abdominal:      General: Bowel sounds are normal.      Palpations: Abdomen is soft.   Musculoskeletal:         General: No swelling. Normal range of motion.      Cervical back: Normal range of motion and neck supple.   Skin:     General: Skin is warm and dry.   Neurological:      Mental Status: She is alert and oriented to person, place, and time. Mental status is at baseline.   Psychiatric:         Mood and Affect: Mood normal.                Significant Labs: All pertinent labs within the past 24 hours have been reviewed.    Significant Imaging: I have reviewed all pertinent imaging results/findings within the past 24 hours.  Assessment/Plan:     Assessment & Plan  Acute on chronic respiratory failure with hypoxia and hypercapnia  Patient with Hypercapnic Respiratory failure which is Acute on chronic.  she is on home oxygen at 3 LPM. Supplemental oxygen was provided and noted- Oxygen Concentration (%):  [3-40] 3    .   Signs/symptoms of respiratory failure include- tachypnea, increased work of breathing, respiratory distress, and wheezing. Contributing diagnoses includes - COPD Labs and images were reviewed. Patient Has recent ABG, which has been reviewed. Will treat underlying causes and adjust management of respiratory failure as follows- Bipap at night  HTN (hypertension)  Patient's blood pressure range in the last 24 hours was: BP  Min: 115/56  Max: 162/69.The patient's inpatient anti-hypertensive regimen is listed below:  Current Antihypertensives  furosemide tablet 40 mg, Daily, Oral  losartan tablet 25 mg, Daily, Oral    Plan  - BP is controlled, no changes needed to their regimen  - monitor vitals  Centrilobular emphysema  Patient's COPD is with exacerbation noted by continued dyspnea currently.  Patient is currently on COPD Pathway. Continue scheduled inhalers Steroids and Supplemental oxygen and monitor respiratory status closely.     -seen by pulmonology  -continue with IV steroids  -BiPAP at  night  Oxygen dependent  -3L nasal cannula at home    Chronic respiratory failure with hypoxia and hypercapnia  Patient with Hypercapnic Respiratory failure which is Acute on chronic.  she is on home oxygen at 3 LPM. Supplemental oxygen was provided and noted- Oxygen Concentration (%):  [3-40] 3    .   Signs/symptoms of respiratory failure include- tachypnea, respiratory distress, wheezing, and lethargy. Contributing diagnoses includes - COPD Labs and images were reviewed. Patient Has recent ABG, which has been reviewed. Will treat underlying causes and adjust management of respiratory failure as follows- steroids, Bipap at night  Muscle cramp  -patient complaining of cramps in her right lower extremity  -states she uses Robaxin at home, has also use Flexeril in the past  -Flexeril ordered, monitor    VTE Risk Mitigation (From admission, onward)           Ordered     enoxaparin injection 40 mg  Daily         06/26/25 1004     IP VTE HIGH RISK PATIENT  Once         06/26/25 1004     Place sequential compression device  Until discontinued         06/26/25 1004                                                Alex Pendleton MD  Department of Hospital Medicine  [place] - Emergency Dept

## 2025-06-27 LAB
ABSOLUTE EOSINOPHIL (OHS): 0 K/UL
ABSOLUTE MONOCYTE (OHS): 0.63 K/UL (ref 0.3–1)
ABSOLUTE NEUTROPHIL COUNT (OHS): 8.18 K/UL (ref 1.8–7.7)
ANION GAP (OHS): 5 MMOL/L (ref 8–16)
B PERT DNA NPH QL NAA+PROBE: NOT DETECTED
BASOPHILS # BLD AUTO: 0.02 K/UL
BASOPHILS NFR BLD AUTO: 0.2 %
BUN SERPL-MCNC: 19 MG/DL (ref 8–23)
C PNEUM DNA LOWER RESP QL NAA+NON-PROBE: NOT DETECTED
CALCIUM SERPL-MCNC: 8.9 MG/DL (ref 8.7–10.5)
CHLORIDE SERPL-SCNC: 101 MMOL/L (ref 95–110)
CO2 SERPL-SCNC: 35 MMOL/L (ref 23–29)
CREAT SERPL-MCNC: 0.6 MG/DL (ref 0.5–1.4)
ERYTHROCYTE [DISTWIDTH] IN BLOOD BY AUTOMATED COUNT: 13.7 % (ref 11.5–14.5)
FLUAV RNA NPH QL NAA+NON-PROBE: NOT DETECTED
FLUBV RNA NPH QL NAA+NON-PROBE: NOT DETECTED
GFR SERPLBLD CREATININE-BSD FMLA CKD-EPI: >60 ML/MIN/1.73/M2
GLUCOSE SERPL-MCNC: 120 MG/DL (ref 70–110)
HADV DNA NPH QL NAA+NON-PROBE: NOT DETECTED
HCOV 229E RNA NPH QL NAA+NON-PROBE: NOT DETECTED
HCOV HKU1 RNA NPH QL NAA+NON-PROBE: NOT DETECTED
HCOV NL63 RNA NPH QL NAA+NON-PROBE: NOT DETECTED
HCOV OC43 RNA NPH QL NAA+NON-PROBE: NOT DETECTED
HCT VFR BLD AUTO: 39.4 % (ref 37–48.5)
HGB BLD-MCNC: 11.5 GM/DL (ref 12–16)
HMPV RNA LOWER RESP QL NAA+NON-PROBE: NOT DETECTED
HMPV RNA NPH QL NAA+NON-PROBE: NOT DETECTED
HPIV1 RNA NPH QL NAA+NON-PROBE: NOT DETECTED
HPIV2 RNA NPH QL NAA+NON-PROBE: NOT DETECTED
HPIV3 RNA NPH QL NAA+NON-PROBE: DETECTED
HPIV4 RNA NPH QL NAA+NON-PROBE: NOT DETECTED
IMM GRANULOCYTES # BLD AUTO: 0.03 K/UL (ref 0–0.04)
IMM GRANULOCYTES NFR BLD AUTO: 0.3 % (ref 0–0.5)
LYMPHOCYTES # BLD AUTO: 0.67 K/UL (ref 1–4.8)
MAGNESIUM SERPL-MCNC: 2.3 MG/DL (ref 1.6–2.6)
MCH RBC QN AUTO: 26.1 PG (ref 27–31)
MCHC RBC AUTO-ENTMCNC: 29.2 G/DL (ref 32–36)
MCV RBC AUTO: 90 FL (ref 82–98)
NUCLEATED RBC (/100WBC) (OHS): 0 /100 WBC
PHOSPHATE SERPL-MCNC: 2.1 MG/DL (ref 2.7–4.5)
PLATELET # BLD AUTO: 175 K/UL (ref 150–450)
PMV BLD AUTO: 11.5 FL (ref 9.2–12.9)
POTASSIUM SERPL-SCNC: 4.4 MMOL/L (ref 3.5–5.1)
RBC # BLD AUTO: 4.4 M/UL (ref 4–5.4)
RELATIVE EOSINOPHIL (OHS): 0 %
RELATIVE LYMPHOCYTE (OHS): 7 % (ref 18–48)
RELATIVE MONOCYTE (OHS): 6.6 % (ref 4–15)
RELATIVE NEUTROPHIL (OHS): 85.9 % (ref 38–73)
RSV RNA NPH QL NAA+NON-PROBE: NOT DETECTED
RSV RNA NPH QL NAA+NON-PROBE: NOT DETECTED
RV+EV RNA NPH QL NAA+NON-PROBE: NOT DETECTED
SARS-COV-2 RNA RESP QL NAA+PROBE: NOT DETECTED
SODIUM SERPL-SCNC: 141 MMOL/L (ref 136–145)
SPECIMEN SOURCE: ABNORMAL
WBC # BLD AUTO: 9.53 K/UL (ref 3.9–12.7)

## 2025-06-27 PROCEDURE — 21400001 HC TELEMETRY ROOM: Mod: HCNC

## 2025-06-27 PROCEDURE — 99900035 HC TECH TIME PER 15 MIN (STAT): Mod: HCNC

## 2025-06-27 PROCEDURE — 94660 CPAP INITIATION&MGMT: CPT | Mod: HCNC

## 2025-06-27 PROCEDURE — 94640 AIRWAY INHALATION TREATMENT: CPT | Mod: HCNC

## 2025-06-27 PROCEDURE — 82310 ASSAY OF CALCIUM: CPT | Mod: HCNC | Performed by: INTERNAL MEDICINE

## 2025-06-27 PROCEDURE — 25000003 PHARM REV CODE 250: Mod: HCNC | Performed by: INTERNAL MEDICINE

## 2025-06-27 PROCEDURE — 63600175 PHARM REV CODE 636 W HCPCS: Mod: JZ,TB,HCNC | Performed by: INTERNAL MEDICINE

## 2025-06-27 PROCEDURE — 97166 OT EVAL MOD COMPLEX 45 MIN: CPT | Mod: HCNC

## 2025-06-27 PROCEDURE — 87070 CULTURE OTHR SPECIMN AEROBIC: CPT | Mod: HCNC | Performed by: STUDENT IN AN ORGANIZED HEALTH CARE EDUCATION/TRAINING PROGRAM

## 2025-06-27 PROCEDURE — 97535 SELF CARE MNGMENT TRAINING: CPT | Mod: HCNC

## 2025-06-27 PROCEDURE — 27000221 HC OXYGEN, UP TO 24 HOURS: Mod: HCNC

## 2025-06-27 PROCEDURE — 25000003 PHARM REV CODE 250: Mod: HCNC | Performed by: NURSE PRACTITIONER

## 2025-06-27 PROCEDURE — 25000242 PHARM REV CODE 250 ALT 637 W/ HCPCS: Mod: HCNC | Performed by: INTERNAL MEDICINE

## 2025-06-27 PROCEDURE — 84100 ASSAY OF PHOSPHORUS: CPT | Mod: HCNC | Performed by: INTERNAL MEDICINE

## 2025-06-27 PROCEDURE — 36415 COLL VENOUS BLD VENIPUNCTURE: CPT | Mod: HCNC | Performed by: INTERNAL MEDICINE

## 2025-06-27 PROCEDURE — 11000001 HC ACUTE MED/SURG PRIVATE ROOM: Mod: HCNC

## 2025-06-27 PROCEDURE — 83735 ASSAY OF MAGNESIUM: CPT | Mod: HCNC | Performed by: INTERNAL MEDICINE

## 2025-06-27 PROCEDURE — 94761 N-INVAS EAR/PLS OXIMETRY MLT: CPT | Mod: HCNC

## 2025-06-27 PROCEDURE — 85025 COMPLETE CBC W/AUTO DIFF WBC: CPT | Mod: HCNC | Performed by: INTERNAL MEDICINE

## 2025-06-27 RX ORDER — ACETAMINOPHEN 325 MG/1
650 TABLET ORAL ONCE
Status: COMPLETED | OUTPATIENT
Start: 2025-06-27 | End: 2025-06-27

## 2025-06-27 RX ORDER — SODIUM,POTASSIUM PHOSPHATES 280-250MG
1 POWDER IN PACKET (EA) ORAL
Status: COMPLETED | OUTPATIENT
Start: 2025-06-27 | End: 2025-06-28

## 2025-06-27 RX ADMIN — SERTRALINE 100 MG: 100 TABLET, FILM COATED ORAL at 09:06

## 2025-06-27 RX ADMIN — FLUTICASONE FUROATE AND VILANTEROL TRIFENATATE 1 PUFF: 100; 25 POWDER RESPIRATORY (INHALATION) at 07:06

## 2025-06-27 RX ADMIN — ATORVASTATIN CALCIUM 40 MG: 40 TABLET, FILM COATED ORAL at 08:06

## 2025-06-27 RX ADMIN — Medication 2000 UNITS: at 09:06

## 2025-06-27 RX ADMIN — METHYLPREDNISOLONE SODIUM SUCCINATE 40 MG: 40 INJECTION, POWDER, FOR SOLUTION INTRAMUSCULAR; INTRAVENOUS at 05:06

## 2025-06-27 RX ADMIN — HYDROXYZINE HYDROCHLORIDE 25 MG: 25 TABLET, FILM COATED ORAL at 08:06

## 2025-06-27 RX ADMIN — ASPIRIN 81 MG CHEWABLE TABLET 81 MG: 81 TABLET CHEWABLE at 09:06

## 2025-06-27 RX ADMIN — MELOXICAM 15 MG: 7.5 TABLET ORAL at 09:06

## 2025-06-27 RX ADMIN — GUAIFENESIN 600 MG: 600 TABLET, EXTENDED RELEASE ORAL at 09:06

## 2025-06-27 RX ADMIN — EZETIMIBE 10 MG: 10 TABLET ORAL at 09:06

## 2025-06-27 RX ADMIN — METHYLPREDNISOLONE SODIUM SUCCINATE 40 MG: 40 INJECTION, POWDER, FOR SOLUTION INTRAMUSCULAR; INTRAVENOUS at 01:06

## 2025-06-27 RX ADMIN — POTASSIUM & SODIUM PHOSPHATES POWDER PACK 280-160-250 MG 1 PACKET: 280-160-250 PACK at 08:06

## 2025-06-27 RX ADMIN — IPRATROPIUM BROMIDE AND ALBUTEROL SULFATE 3 ML: 2.5; .5 SOLUTION RESPIRATORY (INHALATION) at 07:06

## 2025-06-27 RX ADMIN — ROFLUMILAST 500 MCG: 500 TABLET ORAL at 09:06

## 2025-06-27 RX ADMIN — HYDROXYZINE HYDROCHLORIDE 25 MG: 25 TABLET, FILM COATED ORAL at 09:06

## 2025-06-27 RX ADMIN — FUROSEMIDE 40 MG: 40 TABLET ORAL at 09:06

## 2025-06-27 RX ADMIN — GUAIFENESIN 600 MG: 600 TABLET, EXTENDED RELEASE ORAL at 08:06

## 2025-06-27 RX ADMIN — LOSARTAN POTASSIUM 25 MG: 25 TABLET, FILM COATED ORAL at 09:06

## 2025-06-27 RX ADMIN — ENOXAPARIN SODIUM 40 MG: 40 INJECTION SUBCUTANEOUS at 04:06

## 2025-06-27 RX ADMIN — ACETAMINOPHEN 650 MG: 325 TABLET ORAL at 04:06

## 2025-06-27 RX ADMIN — IPRATROPIUM BROMIDE AND ALBUTEROL SULFATE 3 ML: 2.5; .5 SOLUTION RESPIRATORY (INHALATION) at 01:06

## 2025-06-27 RX ADMIN — METHYLPREDNISOLONE SODIUM SUCCINATE 40 MG: 40 INJECTION, POWDER, FOR SOLUTION INTRAMUSCULAR; INTRAVENOUS at 09:06

## 2025-06-27 RX ADMIN — ACETAMINOPHEN 650 MG: 325 TABLET ORAL at 10:06

## 2025-06-27 NOTE — PLAN OF CARE
Problem: Occupational Therapy  Goal: Occupational Therapy Goal  Description: Goals to be met by: 07/27/2025      Patient will increase functional independence with ADLs by performing:    UE Dressing with Modified [place].  LE Dressing with Modified [place].  Grooming while standing with Modified [place].  Toileting from toilet with Modified [place] for hygiene and clothing management.   Toilet transfer to toilet with Modified Russell.  Increased functional strength to WFL for self care.  Upper extremity exercise program x10 reps per handout, with independence.     Outcome: Progressing    Pt would benefit from continued OT to address deficits in self care and functional mobility. Recommending moderate intensity therapy; DME needs likely none

## 2025-06-27 NOTE — SUBJECTIVE & OBJECTIVE
Interval History:  Patient was examined in her bed with family by her side.  She was calm and not in distress.  She denies chest pain, palpitation, GI/ symptoms.  She states his shortness for breath and cough improving.    Review of Systems   Constitutional:  Negative for activity change, chills and fever.   HENT:  Negative for congestion, rhinorrhea and sore throat.    Eyes:  Negative for discharge and redness.   Respiratory:  Positive for cough and shortness of breath. Negative for chest tightness and wheezing.    Cardiovascular:  Negative for chest pain and palpitations.   Gastrointestinal:  Negative for abdominal pain, constipation, diarrhea, nausea and vomiting.   Genitourinary:  Negative for dysuria, flank pain and hematuria.   Musculoskeletal:  Negative for back pain, myalgias and neck pain.        Right lower extremity muscle cramping   Skin:  Negative for pallor, rash and wound.   Neurological:  Negative for dizziness, tremors, syncope, weakness, numbness and headaches.   Psychiatric/Behavioral:  Negative for agitation, confusion and hallucinations.      Objective:     Vital Signs (Most Recent):  Temp: 98.4 °F (36.9 °C) (06/27/25 1548)  Pulse: 66 (06/27/25 1548)  Resp: 18 (06/27/25 1548)  BP: (!) 163/74 (06/27/25 1548)  SpO2: (!) 93 % (06/27/25 1548) Vital Signs (24h Range):  Temp:  [97.4 °F (36.3 °C)-98.9 °F (37.2 °C)] 98.4 °F (36.9 °C)  Pulse:  [56-97] 66  Resp:  [18-33] 18  SpO2:  [91 %-98 %] 93 %  BP: (116-163)/(55-74) 163/74     Weight: 80.8 kg (178 lb 2.1 oz)  Body mass index is 28.75 kg/m².    Intake/Output Summary (Last 24 hours) at 6/27/2025 1602  Last data filed at 6/27/2025 0940  Gross per 24 hour   Intake --   Output 401 ml   Net -401 ml         Physical Exam  Vitals and nursing note reviewed.   Constitutional:       General: She is not in acute distress.  HENT:      Head: Normocephalic and atraumatic.      Mouth/Throat:      Mouth: Mucous membranes are moist.   Eyes:      General:          Right eye: No discharge.         Left eye: No discharge.      Conjunctiva/sclera: Conjunctivae normal.   Cardiovascular:      Rate and Rhythm: Regular rhythm. Tachycardia present.      Pulses: Normal pulses.   Pulmonary:      Effort: No respiratory distress.      Breath sounds: No wheezing.   Abdominal:      General: Bowel sounds are normal.      Palpations: Abdomen is soft.   Musculoskeletal:         General: No swelling. Normal range of motion.      Cervical back: Normal range of motion and neck supple.   Skin:     General: Skin is warm and dry.   Neurological:      Mental Status: She is alert and oriented to person, place, and time. Mental status is at baseline.   Psychiatric:         Mood and Affect: Mood normal.               Significant Labs: All pertinent labs within the past 24 hours have been reviewed.    Significant Imaging: I have reviewed all pertinent imaging results/findings within the past 24 hours.

## 2025-06-27 NOTE — ASSESSMENT & PLAN NOTE
Patient's blood pressure range in the last 24 hours was: BP  Min: 116/67  Max: 163/74.The patient's inpatient anti-hypertensive regimen is listed below:  Current Antihypertensives  furosemide tablet 40 mg, Daily, Oral  losartan tablet 25 mg, Daily, Oral    Plan  - BP is controlled, no changes needed to their regimen  - monitor vitals

## 2025-06-27 NOTE — PLAN OF CARE
Problem: Adult Inpatient Plan of Care  Goal: Plan of Care Review  Outcome: Progressing     VIRTUAL NURSE:  Cued into patient's room.  Permission received per patient to turn camera.  Introduced as VN for night shift that will be working with floor nurse and nursing assistant.  Educated on VN's role in patient care.  Plan of care reviewed.  Education per flowsheet.   Informed that staff will round on patient every 2 hours but to use call light for any other needs they may have.  Informed of fall risk and fall precautions; verbalized understanding.  Call light within reach; bed siderails up x2.  Opportunity given for questions and questions answered.  Admission assessment questions answered.  Denies complaints or any needs at this time.  Instructed to call for assistance.  Will cont to monitor and intervene as needed.    Labs, notes, orders, and careplan initiated.       06/26/25 2111   Admission Complete   Admission Complete by VN Complete      06/26/25 2111   Patient Request   Patient Requested no complaints or needs currently   Admission   Initial VN Admission Questions Complete   Communication Issues? Technical Issue   Shift   Virtual Nurse - Rounding Complete   Pain Management Interventions pain management plan reviewed with patient/caregiver   Virtual Nurse - Patient Verbalized Approval Of Camera Use;VN Rounding   Type of Frequent Check   Type Patient Rounds   Safety/Activity   Patient Rounds bed in low position;placement of personal items at bedside;bed wheels locked;call light in patient/parent reach;visualized patient;clutter free environment maintained   Safety Promotion/Fall Prevention assistive device/personal item within reach;bed alarm set;commode/urinal/bedpan at bedside;high risk medications identified;Fall Risk reviewed with patient/family;diversional activities provided;medications reviewed;nonskid shoes/socks when out of bed;supervised activity;instructed to call staff for mobility;side rails  raised x 2   Safety Precautions emergency equipment at bedside   Positioning   Body Position neutral body alignment;neutral head position;position changed independently   Head of Bed (HOB) Positioning HOB at 60-90 degrees   Pain/Comfort/Sleep   Preferred Pain Scale number (Numeric Rating Pain Scale)   Comfort/Acceptable Pain Level 0   Pain Rating (0-10): Rest 0   Sleep/Rest/Relaxation no problem identified;awake

## 2025-06-27 NOTE — NURSING
Pt became more SOB when getting off the commode and walking to the chair. Pt stated this is usual when she is in an exacerbated episode of her COPD. O-2 sat 89 and then up to 91%. She was anxious and asked for hydroxyzine for it which was given.

## 2025-06-27 NOTE — PLAN OF CARE
Problem: Adult Inpatient Plan of Care  Goal: Plan of Care Review  Outcome: Progressing  Goal: Patient-Specific Goal (Individualized)  Outcome: Progressing  Goal: Absence of Hospital-Acquired Illness or Injury  Outcome: Progressing  Goal: Optimal Comfort and Wellbeing  Outcome: Progressing  Goal: Readiness for Transition of Care  Outcome: Progressing     Problem: COPD (Chronic Obstructive Pulmonary Disease)  Goal: Optimal Chronic Illness Coping  Outcome: Progressing  Goal: Optimal Level of Functional [place]  Outcome: Progressing  Goal: Absence of Infection Signs and Symptoms  Outcome: Progressing

## 2025-06-27 NOTE — ASSESSMENT & PLAN NOTE
Patient with Hypercapnic Respiratory failure which is Acute on chronic.  she is on home oxygen at 3 LPM. Supplemental oxygen was provided and noted- Oxygen Concentration (%):  [32] 32    .   Signs/symptoms of respiratory failure include- tachypnea, respiratory distress, wheezing, and lethargy. Contributing diagnoses includes - COPD Labs and images were reviewed. Patient Has recent ABG, which has been reviewed. Will treat underlying causes and adjust management of respiratory failure as follows- steroids, Bipap at night  -symptoms improving

## 2025-06-27 NOTE — PT/OT/SLP EVAL
Occupational Therapy   Evaluation    Name: Terri Phelan  MRN: [id number]  Admitting Diagnosis: Acute on chronic respiratory failure with hypoxia and hypercapnia  Recent Surgery: * No surgery found *      Recommendations:     Discharge Recommendations: Moderate Intensity Therapy  Discharge Equipment Recommendations:  none  Barriers to discharge:  Decreased caregiver support    Assessment:     Terri Phelan is a 69 y.o. female with a medical diagnosis of Acute on chronic respiratory failure with hypoxia and hypercapnia.  She presents with deconditioning SANDERS. Performance deficits affecting function: weakness, impaired endurance, impaired self care skills, impaired functional mobility, gait instability, impaired balance, decreased lower extremity function, decreased upper extremity function, impaired cardiopulmonary response to activity (tremulous).      Rehab Prognosis: Good; patient would benefit from acute skilled OT services to address these deficits and reach maximum level of function.       Plan:     Patient to be seen 3 x/week to address the above listed problems via self-care/home management, therapeutic activities, therapeutic exercises  Plan of Care Expires: 07/27/25  Plan of Care Reviewed with: patient    Subjective     Chief Complaint: Pt reports SOB after ambulation and while seated  Patient/Family Comments/goals: To return to PLOF, for SOB to be resolved    Occupational Profile:  Living Environment: Pt lives alone in 1st floor apt, ramp access, tub/sh; daughter lives next door  Previous level of function: Prior to pneumonia earlier this spring, Mod Indep ADLs and functional mobility but more recently requires assistance for bathing, unable to complete grocery shopping or go to [employer] as she is no longer driving.   Roles and Routines: Caretaker to self and home. Normally pt cooks, cleans, drives, completes own grocery shopping. But since pneumonia diagnosis in Spring, very light meal prep and  cleaning; no longer driving or grocery shopping 2/2 SANDERS/SOB  Equipment Used at Home: shower chair, walker, rolling, oxygen, rollator, BIPAP  Assistance upon Discharge: Limited, daughter works full time    Pain/Comfort:  Pain Rating 1: 0/10    Patients cultural, spiritual, Advent conflicts given the current situation:      Objective:     Communicated with: veronika prior to session.  Patient found up in chair with bed alarm, peripheral IV, telemetry, oxygen, PureWick upon OT entry to room.    General Precautions: Standard, fall, respiratory  Orthopedic Precautions: N/A  Braces: N/A  Respiratory Status: Nasal cannula, flow 3 L/min    Occupational Performance:    Functional Mobility/Transfers:  Patient completed Sit <> Stand Transfer with contact guard assistance  with  rolling walker   Patient completed Toilet Transfer Step Transfer technique with contact guard assistance with  rolling walker  Functional Mobility: Pt with fair dynamic seated and standing balance. Tremulous throughout session, increased with activity    Activities of Daily Living:  Lower Body Dressing: maximal assistance to don B socks per report, she does not complete this task at home  Toileting: supervision for pericare and clothing management     Cognitive/Visual Perceptual:  Cognitive/Psychosocial Skills:     -       Oriented to: Person, Place, Time and Situation   -       Follows Commands/attention:Follows multistep  commands  -       Communication: clear/fluent  -       Memory: No Deficits noted  -       Safety awareness/insight to disability: intact   -       Mood/Affect/Coping skills/emotional control: Appropriate to situation    Physical Exam:  Postural examination/scapula alignment:    -       No postural abnormalities identified  Skin integrity: Visible skin intact  Sensation:    -       Intact  Motor Planning:    -       WFL  Dominant hand:    -       R handed  Upper Extremity Range of Motion: BUE WFL     Upper Extremity Strength:  BUE  shoulder flexion 3/5; distally 3+ to 4-/5   Strength:  B hands WFL  Fine Motor Coordination:    -       Intact  Gross motor coordination:   WFL though tremulous    AMPAC 6 Click ADL:  AMPAC Total Score: 18    Treatment & Education:  Pt educated on role of OT and POC.   Pt performing skills as listed above.     Patient left up in chair with all lines intact, call button in reach, and nsg notified    GOALS:   Multidisciplinary Problems       Occupational Therapy Goals          Problem: Occupational Therapy    Goal Priority Disciplines Outcome Interventions   Occupational Therapy Goal     OT, PT/OT Progressing    Description: Goals to be met by: 2025      Patient will increase functional independence with ADLs by performing:    UE Dressing with Modified [place].  LE Dressing with Modified [place].  Grooming while standing with Modified [place].  Toileting from toilet with Modified [place] for hygiene and clothing management.   Toilet transfer to toilet with Modified [place].  Increased functional strength to WFL for self care.  Upper extremity exercise program x10 reps per handout, with independence.                            History:     Past Medical History:   Diagnosis Date    Acute exacerbation of chronic obstructive pulmonary disease (COPD) 2023    Recently hospitalized at [facility] from  to 2024 for exacerbation.  Did not require ICU care or significant escalation of intervention(s).      Adenoma of ascending colon 2022    Anxiety     COPD (chronic obstructive pulmonary disease)     COPD exacerbation 2014    Heart failure     Hypertension     Major depression 2023         Past Surgical History:   Procedure Laterality Date    BREAST BIOPSY N/A     pt unsure which breast but it was benign-(calcium)     SECTION      HERNIA REPAIR      HYSTERECTOMY         Time Tracking:     OT Date of Treatment: 25  OT Start Time:   OT Stop Time:  1637  OT Total Time (min): 25 min    Billable Minutes:Evaluation 10  Self Care/Home Management 15    6/27/2025

## 2025-06-27 NOTE — PROGRESS NOTES
[facility]  Progress Note    Patient Name: Terri Phelan  MRN: [id number]  Patient Class: IP- Inpatient   Admission Date: 6/26/2025  Length of Stay: 1 days  Attending Physician: Alex Pendleton MD  Primary Care Provider: Sussy Mason MD        Subjective     Principal Problem:Acute on chronic respiratory failure with hypoxia and hypercapnia        HPI:  69-year-old female with a past medical history of COPD on 2L of O2 via NC (uses 3L with exertion), diastolic HF, HTN, HLD, anxiety, depression, JUAN (uses bipap every night), hx of MI (but no stenosis noted on angiogram); came to emergency department due to shortness for breath and cough.  She states she has recently treated for COPD exacerbation and discharged on oral steroids and Augmentin, which she recently completed.  She states she was feeling well until yesterday when her symptoms returned.  She denied chest pain palpitation but complains of shortness for breath, cough and right lower extremity cramping.    In the emergency department the patient had a blood pressure of 122/58, heart rate of 110, temperature 98.7°, respiratory rate of 29.  ABG showed a pH of 7.245 5, bicarb of 89.  She was on BiPAP continuously.  She was seen by pulmonology team we will request the BiPAP be switched to at night only.  WBC 6.31, hemoglobin 12.3, platelet 173.  Sodium 144, bicarb 34, anion gap 7, magnesium 2.9.  ABG results reviewed.  Chest x-ray shows possible pulmonary emphysema.  Patient will be admitted for further management of COPD exacerbation.              Overview/Hospital Course:  No notes on file    Interval History:  Patient was examined in her bed with family by her side.  She was calm and not in distress.  She denies chest pain, palpitation, GI/ symptoms.  She states his shortness for breath and cough improving.    Review of Systems   Constitutional:  Negative for activity change, chills and fever.   HENT:  Negative for  congestion, rhinorrhea and sore throat.    Eyes:  Negative for discharge and redness.   Respiratory:  Positive for cough and shortness of breath. Negative for chest tightness and wheezing.    Cardiovascular:  Negative for chest pain and palpitations.   Gastrointestinal:  Negative for abdominal pain, constipation, diarrhea, nausea and vomiting.   Genitourinary:  Negative for dysuria, flank pain and hematuria.   Musculoskeletal:  Negative for back pain, myalgias and neck pain.        Right lower extremity muscle cramping   Skin:  Negative for pallor, rash and wound.   Neurological:  Negative for dizziness, tremors, syncope, weakness, numbness and headaches.   Psychiatric/Behavioral:  Negative for agitation, confusion and hallucinations.      Objective:     Vital Signs (Most Recent):  Temp: 98.4 °F (36.9 °C) (06/27/25 1548)  Pulse: 66 (06/27/25 1548)  Resp: 18 (06/27/25 1548)  BP: (!) 163/74 (06/27/25 1548)  SpO2: (!) 93 % (06/27/25 1548) Vital Signs (24h Range):  Temp:  [97.4 °F (36.3 °C)-98.9 °F (37.2 °C)] 98.4 °F (36.9 °C)  Pulse:  [56-97] 66  Resp:  [18-33] 18  SpO2:  [91 %-98 %] 93 %  BP: (116-163)/(55-74) 163/74     Weight: 80.8 kg (178 lb 2.1 oz)  Body mass index is 28.75 kg/m².    Intake/Output Summary (Last 24 hours) at 6/27/2025 1602  Last data filed at 6/27/2025 0940  Gross per 24 hour   Intake --   Output 401 ml   Net -401 ml         Physical Exam  Vitals and nursing note reviewed.   Constitutional:       General: She is not in acute distress.  HENT:      Head: Normocephalic and atraumatic.      Mouth/Throat:      Mouth: Mucous membranes are moist.   Eyes:      General:         Right eye: No discharge.         Left eye: No discharge.      Conjunctiva/sclera: Conjunctivae normal.   Cardiovascular:      Rate and Rhythm: Regular rhythm. Tachycardia present.      Pulses: Normal pulses.   Pulmonary:      Effort: No respiratory distress.      Breath sounds: No wheezing.   Abdominal:      General: Bowel sounds are  normal.      Palpations: Abdomen is soft.   Musculoskeletal:         General: No swelling. Normal range of motion.      Cervical back: Normal range of motion and neck supple.   Skin:     General: Skin is warm and dry.   Neurological:      Mental Status: She is alert and oriented to person, place, and time. Mental status is at baseline.   Psychiatric:         Mood and Affect: Mood normal.               Significant Labs: All pertinent labs within the past 24 hours have been reviewed.    Significant Imaging: I have reviewed all pertinent imaging results/findings within the past 24 hours.      Assessment & Plan  Acute on chronic respiratory failure with hypoxia and hypercapnia  Patient with Hypercapnic Respiratory failure which is Acute on chronic.  she is on home oxygen at 3 LPM. Supplemental oxygen was provided and noted- Oxygen Concentration (%):  [32] 32    .   Signs/symptoms of respiratory failure include- tachypnea, increased work of breathing, respiratory distress, and wheezing. Contributing diagnoses includes - COPD Labs and images were reviewed. Patient Has recent ABG, which has been reviewed. Will treat underlying causes and adjust management of respiratory failure as follows- Bipap at night  HTN (hypertension)  Patient's blood pressure range in the last 24 hours was: BP  Min: 116/67  Max: 163/74.The patient's inpatient anti-hypertensive regimen is listed below:  Current Antihypertensives  furosemide tablet 40 mg, Daily, Oral  losartan tablet 25 mg, Daily, Oral    Plan  - BP is controlled, no changes needed to their regimen  - monitor vitals  Centrilobular emphysema  Patient's COPD is with exacerbation noted by continued dyspnea currently.  Patient is currently on COPD Pathway. Continue scheduled inhalers Steroids and Supplemental oxygen and monitor respiratory status closely.     -seen by pulmonology  -continue with IV steroids  -BiPAP at night  -shortness for breath improving  Oxygen dependent  -3L nasal  cannula at home    Chronic respiratory failure with hypoxia and hypercapnia  Patient with Hypercapnic Respiratory failure which is Acute on chronic.  she is on home oxygen at 3 LPM. Supplemental oxygen was provided and noted- Oxygen Concentration (%):  [32] 32    .   Signs/symptoms of respiratory failure include- tachypnea, respiratory distress, wheezing, and lethargy. Contributing diagnoses includes - COPD Labs and images were reviewed. Patient Has recent ABG, which has been reviewed. Will treat underlying causes and adjust management of respiratory failure as follows- steroids, Bipap at night  -symptoms improving  Muscle cramp  -patient complaining of cramps in her right lower extremity  -states she uses Robaxin at home, has also use Flexeril in the past  -Flexeril ordered, monitor    VTE Risk Mitigation (From admission, onward)           Ordered     enoxaparin injection 40 mg  Daily         06/26/25 1004     IP VTE HIGH RISK PATIENT  Once         06/26/25 1004     Place sequential compression device  Until discontinued         06/26/25 1004                    Discharge Planning   RADHA: 6/28/2025     Code Status: Full Code   Medical Readiness for Discharge Date:                      Please place Justification for DME      Alex Pendleton MD  Department of Hospital Medicine   [place] - Telemetry

## 2025-06-27 NOTE — ASSESSMENT & PLAN NOTE
Patient with Hypercapnic Respiratory failure which is Acute on chronic.  she is on home oxygen at 3 LPM. Supplemental oxygen was provided and noted- Oxygen Concentration (%):  [32] 32    .   Signs/symptoms of respiratory failure include- tachypnea, increased work of breathing, respiratory distress, and wheezing. Contributing diagnoses includes - COPD Labs and images were reviewed. Patient Has recent ABG, which has been reviewed. Will treat underlying causes and adjust management of respiratory failure as follows- Bipap at night

## 2025-06-27 NOTE — ASSESSMENT & PLAN NOTE
Patient's COPD is with exacerbation noted by continued dyspnea currently.  Patient is currently on COPD Pathway. Continue scheduled inhalers Steroids and Supplemental oxygen and monitor respiratory status closely.     -seen by pulmonology  -continue with IV steroids  -BiPAP at night  -shortness for breath improving

## 2025-06-27 NOTE — PLAN OF CARE
Problem: Adult Inpatient Plan of Care  Goal: Plan of Care Review  Outcome: Progressing  Goal: Patient-Specific Goal (Individualized)  Outcome: Progressing  Goal: Absence of Hospital-Acquired Illness or Injury  Outcome: Progressing  Goal: Optimal Comfort and Wellbeing  Outcome: Progressing  Goal: Readiness for Transition of Care  Outcome: Progressing     Problem: COPD (Chronic Obstructive Pulmonary Disease)  Goal: Optimal Chronic Illness Coping  Outcome: Progressing  Goal: Optimal Level of Functional [place]  Outcome: Progressing  Goal: Absence of Infection Signs and Symptoms  Outcome: Progressing  Goal: Improved Oral Intake  Outcome: Progressing  Goal: Effective Oxygenation and Ventilation  Outcome: Progressing     Problem: Comorbidity Management  Goal: Maintenance of Asthma Control  Outcome: Progressing  Goal: Maintenance of COPD Symptom Control  Outcome: Progressing  Goal: Blood Pressure in Desired Range  Outcome: Progressing     Problem: Fall Injury Risk  Goal: Absence of Fall and Fall-Related Injury  Outcome: Progressing     Problem: Skin Injury Risk Increased  Goal: Skin Health and Integrity  Outcome: Progressing     Problem: Pain Acute  Goal: Optimal Pain Control and Function  Outcome: Progressing     Problem: Infection  Goal: Absence of Infection Signs and Symptoms  Outcome: Progressing

## 2025-06-28 PROBLEM — R25.1 TREMOR: Status: ACTIVE | Noted: 2025-06-28

## 2025-06-28 LAB
ABSOLUTE EOSINOPHIL (OHS): 0 K/UL
ABSOLUTE MONOCYTE (OHS): 0.47 K/UL (ref 0.3–1)
ABSOLUTE NEUTROPHIL COUNT (OHS): 8.42 K/UL (ref 1.8–7.7)
ALLENS TEST: YES
ANION GAP (OHS): 6 MMOL/L (ref 8–16)
BASOPHILS # BLD AUTO: 0.01 K/UL
BASOPHILS NFR BLD AUTO: 0.1 %
BUN SERPL-MCNC: 39 MG/DL (ref 8–23)
CALCIUM SERPL-MCNC: 9.1 MG/DL (ref 8.7–10.5)
CHLORIDE SERPL-SCNC: 101 MMOL/L (ref 95–110)
CO2 SERPL-SCNC: 34 MMOL/L (ref 23–29)
CREAT SERPL-MCNC: 0.7 MG/DL (ref 0.5–1.4)
ERYTHROCYTE [DISTWIDTH] IN BLOOD BY AUTOMATED COUNT: 14 % (ref 11.5–14.5)
FIO2: 21 %
GFR SERPLBLD CREATININE-BSD FMLA CKD-EPI: >60 ML/MIN/1.73/M2
GLUCOSE SERPL-MCNC: 113 MG/DL (ref 70–110)
HCT VFR BLD AUTO: 41.3 % (ref 37–48.5)
HGB BLD-MCNC: 12 GM/DL (ref 12–16)
IMM GRANULOCYTES # BLD AUTO: 0.04 K/UL (ref 0–0.04)
IMM GRANULOCYTES NFR BLD AUTO: 0.4 % (ref 0–0.5)
LYMPHOCYTES # BLD AUTO: 0.8 K/UL (ref 1–4.8)
MAGNESIUM SERPL-MCNC: 2.3 MG/DL (ref 1.6–2.6)
MCH RBC QN AUTO: 26 PG (ref 27–31)
MCHC RBC AUTO-ENTMCNC: 29.1 G/DL (ref 32–36)
MCV RBC AUTO: 90 FL (ref 82–98)
NUCLEATED RBC (/100WBC) (OHS): 0 /100 WBC
PCO2 BLDA: 63.6 MMHG (ref 35–45)
PH SMN: 7.34 [PH] (ref 7.35–7.45)
PHOSPHATE SERPL-MCNC: 2.9 MG/DL (ref 2.7–4.5)
PLATELET # BLD AUTO: 195 K/UL (ref 150–450)
PMV BLD AUTO: 11.7 FL (ref 9.2–12.9)
PO2 BLDA: 56.8 MMHG (ref 80–100)
POC BASE DEFICIT: 6.8 MMOL/L (ref -2–2)
POC HCO3: 34.6 MMOL/L (ref 24–28)
POC PERFORMED BY: ABNORMAL
POC SATURATED O2: 89.4 % (ref 95–100)
POTASSIUM SERPL-SCNC: 4.9 MMOL/L (ref 3.5–5.1)
RBC # BLD AUTO: 4.61 M/UL (ref 4–5.4)
RELATIVE EOSINOPHIL (OHS): 0 %
RELATIVE LYMPHOCYTE (OHS): 8.2 % (ref 18–48)
RELATIVE MONOCYTE (OHS): 4.8 % (ref 4–15)
RELATIVE NEUTROPHIL (OHS): 86.5 % (ref 38–73)
SODIUM SERPL-SCNC: 141 MMOL/L (ref 136–145)
SPECIMEN SOURCE: ABNORMAL
WBC # BLD AUTO: 9.74 K/UL (ref 3.9–12.7)

## 2025-06-28 PROCEDURE — 97535 SELF CARE MNGMENT TRAINING: CPT | Mod: HCNC

## 2025-06-28 PROCEDURE — 83735 ASSAY OF MAGNESIUM: CPT | Mod: HCNC | Performed by: INTERNAL MEDICINE

## 2025-06-28 PROCEDURE — 36600 WITHDRAWAL OF ARTERIAL BLOOD: CPT | Mod: HCNC

## 2025-06-28 PROCEDURE — 94640 AIRWAY INHALATION TREATMENT: CPT | Mod: HCNC

## 2025-06-28 PROCEDURE — 11000001 HC ACUTE MED/SURG PRIVATE ROOM: Mod: HCNC

## 2025-06-28 PROCEDURE — 25000003 PHARM REV CODE 250: Mod: HCNC | Performed by: NURSE PRACTITIONER

## 2025-06-28 PROCEDURE — 94799 UNLISTED PULMONARY SVC/PX: CPT | Mod: HCNC

## 2025-06-28 PROCEDURE — 97530 THERAPEUTIC ACTIVITIES: CPT | Mod: HCNC

## 2025-06-28 PROCEDURE — 82803 BLOOD GASES ANY COMBINATION: CPT | Mod: HCNC

## 2025-06-28 PROCEDURE — 25000242 PHARM REV CODE 250 ALT 637 W/ HCPCS: Mod: HCNC | Performed by: INTERNAL MEDICINE

## 2025-06-28 PROCEDURE — 94660 CPAP INITIATION&MGMT: CPT | Mod: HCNC

## 2025-06-28 PROCEDURE — 84100 ASSAY OF PHOSPHORUS: CPT | Mod: HCNC | Performed by: INTERNAL MEDICINE

## 2025-06-28 PROCEDURE — 21400001 HC TELEMETRY ROOM: Mod: HCNC

## 2025-06-28 PROCEDURE — 63600175 PHARM REV CODE 636 W HCPCS: Mod: HCNC | Performed by: INTERNAL MEDICINE

## 2025-06-28 PROCEDURE — 36415 COLL VENOUS BLD VENIPUNCTURE: CPT | Mod: HCNC | Performed by: INTERNAL MEDICINE

## 2025-06-28 PROCEDURE — 94761 N-INVAS EAR/PLS OXIMETRY MLT: CPT | Mod: HCNC

## 2025-06-28 PROCEDURE — 97110 THERAPEUTIC EXERCISES: CPT | Mod: HCNC

## 2025-06-28 PROCEDURE — 25000003 PHARM REV CODE 250: Mod: HCNC | Performed by: INTERNAL MEDICINE

## 2025-06-28 PROCEDURE — 85025 COMPLETE CBC W/AUTO DIFF WBC: CPT | Mod: HCNC | Performed by: INTERNAL MEDICINE

## 2025-06-28 PROCEDURE — 99900035 HC TECH TIME PER 15 MIN (STAT): Mod: HCNC

## 2025-06-28 PROCEDURE — 27000221 HC OXYGEN, UP TO 24 HOURS: Mod: HCNC

## 2025-06-28 PROCEDURE — 97161 PT EVAL LOW COMPLEX 20 MIN: CPT | Mod: HCNC

## 2025-06-28 PROCEDURE — 80048 BASIC METABOLIC PNL TOTAL CA: CPT | Mod: HCNC | Performed by: INTERNAL MEDICINE

## 2025-06-28 RX ORDER — IPRATROPIUM BROMIDE AND ALBUTEROL SULFATE 2.5; .5 MG/3ML; MG/3ML
3 SOLUTION RESPIRATORY (INHALATION) EVERY 4 HOURS PRN
Status: DISCONTINUED | OUTPATIENT
Start: 2025-06-28 | End: 2025-07-03 | Stop reason: HOSPADM

## 2025-06-28 RX ORDER — BENZONATATE 100 MG/1
100 CAPSULE ORAL 3 TIMES DAILY PRN
Status: DISCONTINUED | OUTPATIENT
Start: 2025-06-28 | End: 2025-07-03 | Stop reason: HOSPADM

## 2025-06-28 RX ADMIN — METHYLPREDNISOLONE SODIUM SUCCINATE 40 MG: 40 INJECTION, POWDER, FOR SOLUTION INTRAMUSCULAR; INTRAVENOUS at 05:06

## 2025-06-28 RX ADMIN — IPRATROPIUM BROMIDE AND ALBUTEROL SULFATE 3 ML: 2.5; .5 SOLUTION RESPIRATORY (INHALATION) at 07:06

## 2025-06-28 RX ADMIN — GUAIFENESIN 600 MG: 600 TABLET, EXTENDED RELEASE ORAL at 09:06

## 2025-06-28 RX ADMIN — EZETIMIBE 10 MG: 10 TABLET ORAL at 09:06

## 2025-06-28 RX ADMIN — ROFLUMILAST 500 MCG: 500 TABLET ORAL at 09:06

## 2025-06-28 RX ADMIN — POTASSIUM & SODIUM PHOSPHATES POWDER PACK 280-160-250 MG 1 PACKET: 280-160-250 PACK at 11:06

## 2025-06-28 RX ADMIN — ATORVASTATIN CALCIUM 40 MG: 40 TABLET, FILM COATED ORAL at 08:06

## 2025-06-28 RX ADMIN — Medication 2000 UNITS: at 09:06

## 2025-06-28 RX ADMIN — FLUTICASONE FUROATE AND VILANTEROL TRIFENATATE 1 PUFF: 100; 25 POWDER RESPIRATORY (INHALATION) at 07:06

## 2025-06-28 RX ADMIN — METHYLPREDNISOLONE SODIUM SUCCINATE 40 MG: 40 INJECTION, POWDER, FOR SOLUTION INTRAMUSCULAR; INTRAVENOUS at 09:06

## 2025-06-28 RX ADMIN — IPRATROPIUM BROMIDE AND ALBUTEROL SULFATE 3 ML: 2.5; .5 SOLUTION RESPIRATORY (INHALATION) at 03:06

## 2025-06-28 RX ADMIN — GUAIFENESIN AND DEXTROMETHORPHAN HYDROBROMIDE 1 TABLET: 600; 30 TABLET, EXTENDED RELEASE ORAL at 09:06

## 2025-06-28 RX ADMIN — IPRATROPIUM BROMIDE AND ALBUTEROL SULFATE 3 ML: .5; 3 SOLUTION RESPIRATORY (INHALATION) at 12:06

## 2025-06-28 RX ADMIN — METHYLPREDNISOLONE SODIUM SUCCINATE 40 MG: 40 INJECTION, POWDER, FOR SOLUTION INTRAMUSCULAR; INTRAVENOUS at 02:06

## 2025-06-28 RX ADMIN — HYDROXYZINE HYDROCHLORIDE 25 MG: 25 TABLET, FILM COATED ORAL at 09:06

## 2025-06-28 RX ADMIN — IPRATROPIUM BROMIDE AND ALBUTEROL SULFATE 3 ML: .5; 3 SOLUTION RESPIRATORY (INHALATION) at 08:06

## 2025-06-28 RX ADMIN — POTASSIUM & SODIUM PHOSPHATES POWDER PACK 280-160-250 MG 1 PACKET: 280-160-250 PACK at 05:06

## 2025-06-28 RX ADMIN — POTASSIUM & SODIUM PHOSPHATES POWDER PACK 280-160-250 MG 1 PACKET: 280-160-250 PACK at 09:06

## 2025-06-28 RX ADMIN — LOSARTAN POTASSIUM 25 MG: 25 TABLET, FILM COATED ORAL at 09:06

## 2025-06-28 RX ADMIN — ASPIRIN 81 MG CHEWABLE TABLET 81 MG: 81 TABLET CHEWABLE at 09:06

## 2025-06-28 RX ADMIN — SERTRALINE 100 MG: 100 TABLET, FILM COATED ORAL at 09:06

## 2025-06-28 RX ADMIN — HYDROXYZINE HYDROCHLORIDE 25 MG: 25 TABLET, FILM COATED ORAL at 08:06

## 2025-06-28 RX ADMIN — ENOXAPARIN SODIUM 40 MG: 40 INJECTION SUBCUTANEOUS at 05:06

## 2025-06-28 NOTE — PROGRESS NOTES
RAPID RESPONSE NURSE PROACTIVE ROUNDING NOTE       Time of Visit: 805    Admit Date: 2025  LOS: 2  Code Status: Full Code   Date of Visit: 2025  : 1955  Age: 69 y.o.  Sex: female  Race: Black or   Bed: K429/K429 A:   MRN: [id number]  Was the patient discharged from an ICU this admission? No   Was the patient discharged from a PACU within last 24 hours? No   Did the patient receive conscious sedation/general anesthesia in last 24 hours? No   Was the patient in the ED within the past 24 hours? No   Was the patient on NIPPV within the past 24 hours? Yes   Attending Physician: Alex Pendleton MD  Primary Service: Hospitalist   Time spent at the bedside: < 15 min    SITUATION    Notified by RT Bert  Reason for alert: Acute respiratory distress    Diagnosis: Acute on chronic respiratory failure with hypoxia and hypercapnia   has a past medical history of Acute exacerbation of chronic obstructive pulmonary disease (COPD), Adenoma of ascending colon, Anxiety, COPD (chronic obstructive pulmonary disease), COPD exacerbation, Heart failure, Hypertension, and Major depression.    Last Vitals:  Temp: 97.2 °F (36.2 °C) ( 0754)  Pulse: 74 ( 0754)  Resp: 22 ( 0754)  BP: 152/61 ( 0754)  SpO2: 89 % ( 0754)    24 Hour Vitals Range:  Temp:  [97 °F (36.1 °C)-98.5 °F (36.9 °C)]   Pulse:  [58-97]   Resp:  [18-30]   BP: (117-163)/(61-74)   SpO2:  [89 %-99 %]     Clinical Issues: Respiratory    ASSESSMENT/INTERVENTIONS    Pt moved to the chair from bed. Unable to catch her breath. On 2.5L NC and 02 sat 89%, but now back up to 90s. Able to talk clearly. Notified Dr Pendleton and ordered additional prn albuterol treatment. RT Bert at bedside.    RECOMMENDATIONS  Give treatment and continue to monitor.    Discussed plan of care with n/a    PROVIDER ESCALATION    Physician escalation: Yes    Orders received and case discussed with Dr. Pendleton.    Disposition:Remain in room  429    FOLLOW UP    Call back the Rapid Response NurseSurinder at [phone] for additional questions or concerns.

## 2025-06-28 NOTE — ASSESSMENT & PLAN NOTE
-patient complaining of cramps in her right lower extremity  -states she uses Robaxin at home, has also use Flexeril in the past  -improved.  Flexeril discontinued

## 2025-06-28 NOTE — PROGRESS NOTES
[facility]  Progress Note    Patient Name: Terri Phelan  MRN: [id number]  Patient Class: IP- Inpatient   Admission Date: 6/26/2025  Length of Stay: 2 days  Attending Physician: Alex Pendleton MD  Primary Care Provider: Sussy Mason MD        Subjective     Principal Problem:Acute on chronic respiratory failure with hypoxia and hypercapnia        HPI:  69-year-old female with a past medical history of COPD on 2L of O2 via NC (uses 3L with exertion), diastolic HF, HTN, HLD, anxiety, depression, JUAN (uses bipap every night), hx of MI (but no stenosis noted on angiogram); came to emergency department due to shortness for breath and cough.  She states she has recently treated for COPD exacerbation and discharged on oral steroids and Augmentin, which she recently completed.  She states she was feeling well until yesterday when her symptoms returned.  She denied chest pain palpitation but complains of shortness for breath, cough and right lower extremity cramping.    In the emergency department the patient had a blood pressure of 122/58, heart rate of 110, temperature 98.7°, respiratory rate of 29.  ABG showed a pH of 7.245 5, bicarb of 89.  She was on BiPAP continuously.  She was seen by pulmonology team we will request the BiPAP be switched to at night only.  WBC 6.31, hemoglobin 12.3, platelet 173.  Sodium 144, bicarb 34, anion gap 7, magnesium 2.9.  ABG results reviewed.  Chest x-ray shows possible pulmonary emphysema.  Patient will be admitted for further management of COPD exacerbation.              Overview/Hospital Course:  No notes on file    Interval History:  Patient was examined in her chair.  She is calm and not in distress.  She denies chest pain, palpitation but states she still short of breath and jittery.    Review of Systems   Constitutional:  Negative for activity change, chills and fever.   HENT:  Negative for congestion, rhinorrhea and sore throat.    Eyes:   Negative for discharge and redness.   Respiratory:  Positive for cough and shortness of breath. Negative for chest tightness and wheezing.    Cardiovascular:  Negative for chest pain and palpitations.   Gastrointestinal:  Negative for abdominal pain, constipation, diarrhea, nausea and vomiting.   Genitourinary:  Negative for dysuria, flank pain and hematuria.   Musculoskeletal:  Negative for back pain, myalgias and neck pain.        Right lower extremity muscle cramping   Skin:  Negative for pallor, rash and wound.   Neurological:  Positive for tremors. Negative for dizziness, syncope, weakness, numbness and headaches.   Psychiatric/Behavioral:  Negative for agitation, confusion and hallucinations.      Objective:     Vital Signs (Most Recent):  Temp: 97.2 °F (36.2 °C) (06/28/25 0754)  Pulse: 89 (06/28/25 0819)  Resp: (!) 22 (06/28/25 0819)  BP: (!) 152/61 (06/28/25 0754)  SpO2: (!) 91 % (06/28/25 0819) Vital Signs (24h Range):  Temp:  [97 °F (36.1 °C)-98.5 °F (36.9 °C)] 97.2 °F (36.2 °C)  Pulse:  [58-89] 89  Resp:  [18-22] 22  SpO2:  [89 %-99 %] 91 %  BP: (117-163)/(61-74) 152/61     Weight: 81.2 kg (179 lb 0.2 oz)  Body mass index is 28.89 kg/m².    Intake/Output Summary (Last 24 hours) at 6/28/2025 0937  Last data filed at 6/27/2025 2029  Gross per 24 hour   Intake --   Output 301 ml   Net -301 ml         Physical Exam  Vitals and nursing note reviewed.   Constitutional:       General: She is not in acute distress.  HENT:      Head: Normocephalic and atraumatic.      Mouth/Throat:      Mouth: Mucous membranes are moist.   Eyes:      General:         Right eye: No discharge.         Left eye: No discharge.      Conjunctiva/sclera: Conjunctivae normal.   Cardiovascular:      Rate and Rhythm: Regular rhythm. Tachycardia present.      Pulses: Normal pulses.   Pulmonary:      Effort: No respiratory distress.      Breath sounds: No wheezing.      Comments: Reduced breath sounds in bases  Chest:      Chest wall: No  tenderness.   Abdominal:      General: Bowel sounds are normal.      Palpations: Abdomen is soft.   Musculoskeletal:         General: No swelling. Normal range of motion.      Cervical back: Normal range of motion and neck supple.   Skin:     General: Skin is warm and dry.   Neurological:      Mental Status: She is alert and oriented to person, place, and time. Mental status is at baseline.      Motor: No weakness.      Comments: Patient observed to have tremors both at rest and with action   Psychiatric:         Mood and Affect: Mood normal.               Significant Labs: All pertinent labs within the past 24 hours have been reviewed.    Significant Imaging: I have reviewed all pertinent imaging results/findings within the past 24 hours.      Assessment & Plan  Acute on chronic respiratory failure with hypoxia and hypercapnia  Patient with Hypercapnic Respiratory failure which is Acute on chronic.  she is on home oxygen at 3 LPM. Supplemental oxygen was provided and noted- Oxygen Concentration (%):  [32] 32    .   Signs/symptoms of respiratory failure include- tachypnea, increased work of breathing, respiratory distress, and wheezing. Contributing diagnoses includes - COPD Labs and images were reviewed. Patient Has recent ABG, which has been reviewed. Will treat underlying causes and adjust management of respiratory failure as follows- Bipap at night  -(06/28)patient complaining of continued shortness for breath.  ABG ordered.  Continue supplemental oxygen and bronchodilators  HTN (hypertension)  Patient's blood pressure range in the last 24 hours was: BP  Min: 117/66  Max: 163/74.The patient's inpatient anti-hypertensive regimen is listed below:  Current Antihypertensives  losartan tablet 25 mg, Daily, Oral    Plan  - BP is controlled, no changes needed to their regimen  -blood pressure appears elevated today(06/28) we will follow up after administration of medications.  If needed losartan will be increased in  dose  -low-salt diet  - monitor vitals  Centrilobular emphysema  Patient's COPD is with exacerbation noted by continued dyspnea currently.  Patient is currently on COPD Pathway. Continue scheduled inhalers Steroids and Supplemental oxygen and monitor respiratory status closely.     -seen by pulmonology  -continue with IV steroids  -BiPAP at night  -shortness for breath.  Follow up ABG and continue bronchodilators  Oxygen dependent  -3L nasal cannula at home    Chronic respiratory failure with hypoxia and hypercapnia  Patient with Hypercapnic Respiratory failure which is Acute on chronic.  she is on home oxygen at 3 LPM. Supplemental oxygen was provided and noted- Oxygen Concentration (%):  [32] 32    .   Signs/symptoms of respiratory failure include- tachypnea, respiratory distress, wheezing, and lethargy. Contributing diagnoses includes - COPD Labs and images were reviewed. Patient Has recent ABG, which has been reviewed. Will treat underlying causes and adjust management of respiratory failure as follows- steroids, Bipap at night  -(06/28)complained of shortness for breath this morning.  ABG ordered  Muscle cramp  -patient complaining of cramps in her right lower extremity  -states she uses Robaxin at home, has also use Flexeril in the past  -improved.  Flexeril discontinued    Tremor  -observe tremors of head and bilateral upper extremities.  Patient states she feels it has worsened with Zoloft  -patient was on Zoloft and Lexapro, states Lexapro was discontinued before admission.  It has been stopped  -ABG ordered  -PT/OT ordered, recommended SNF    VTE Risk Mitigation (From admission, onward)           Ordered     enoxaparin injection 40 mg  Daily         06/26/25 1004     IP VTE HIGH RISK PATIENT  Once         06/26/25 1004     Place sequential compression device  Until discontinued         06/26/25 1004                    Discharge Planning   RADHA: 6/28/2025     Code Status: Full Code   Medical Readiness for  Discharge Date:                      Please place Justification for DME      Alex Pendleton MD  Department of Hospital Medicine   [facility]

## 2025-06-28 NOTE — ASSESSMENT & PLAN NOTE
Patient with Hypercapnic Respiratory failure which is Acute on chronic.  she is on home oxygen at 3 LPM. Supplemental oxygen was provided and noted- Oxygen Concentration (%):  [32] 32    .   Signs/symptoms of respiratory failure include- tachypnea, increased work of breathing, respiratory distress, and wheezing. Contributing diagnoses includes - COPD Labs and images were reviewed. Patient Has recent ABG, which has been reviewed. Will treat underlying causes and adjust management of respiratory failure as follows- Bipap at night  -(06/28)patient complaining of continued shortness for breath.  ABG ordered.  Continue supplemental oxygen and bronchodilators

## 2025-06-28 NOTE — SUBJECTIVE & OBJECTIVE
Interval History:  Patient was examined in her chair.  She is calm and not in distress.  She denies chest pain, palpitation but states she still short of breath and jittery.    Review of Systems   Constitutional:  Negative for activity change, chills and fever.   HENT:  Negative for congestion, rhinorrhea and sore throat.    Eyes:  Negative for discharge and redness.   Respiratory:  Positive for cough and shortness of breath. Negative for chest tightness and wheezing.    Cardiovascular:  Negative for chest pain and palpitations.   Gastrointestinal:  Negative for abdominal pain, constipation, diarrhea, nausea and vomiting.   Genitourinary:  Negative for dysuria, flank pain and hematuria.   Musculoskeletal:  Negative for back pain, myalgias and neck pain.        Right lower extremity muscle cramping   Skin:  Negative for pallor, rash and wound.   Neurological:  Positive for tremors. Negative for dizziness, syncope, weakness, numbness and headaches.   Psychiatric/Behavioral:  Negative for agitation, confusion and hallucinations.      Objective:     Vital Signs (Most Recent):  Temp: 97.2 °F (36.2 °C) (06/28/25 0754)  Pulse: 89 (06/28/25 0819)  Resp: (!) 22 (06/28/25 0819)  BP: (!) 152/61 (06/28/25 0754)  SpO2: (!) 91 % (06/28/25 0819) Vital Signs (24h Range):  Temp:  [97 °F (36.1 °C)-98.5 °F (36.9 °C)] 97.2 °F (36.2 °C)  Pulse:  [58-89] 89  Resp:  [18-22] 22  SpO2:  [89 %-99 %] 91 %  BP: (117-163)/(61-74) 152/61     Weight: 81.2 kg (179 lb 0.2 oz)  Body mass index is 28.89 kg/m².    Intake/Output Summary (Last 24 hours) at 6/28/2025 0937  Last data filed at 6/27/2025 2029  Gross per 24 hour   Intake --   Output 301 ml   Net -301 ml         Physical Exam  Vitals and nursing note reviewed.   Constitutional:       General: She is not in acute distress.  HENT:      Head: Normocephalic and atraumatic.      Mouth/Throat:      Mouth: Mucous membranes are moist.   Eyes:      General:         Right eye: No discharge.          Left eye: No discharge.      Conjunctiva/sclera: Conjunctivae normal.   Cardiovascular:      Rate and Rhythm: Regular rhythm. Tachycardia present.      Pulses: Normal pulses.   Pulmonary:      Effort: No respiratory distress.      Breath sounds: No wheezing.      Comments: Reduced breath sounds in bases  Chest:      Chest wall: No tenderness.   Abdominal:      General: Bowel sounds are normal.      Palpations: Abdomen is soft.   Musculoskeletal:         General: No swelling. Normal range of motion.      Cervical back: Normal range of motion and neck supple.   Skin:     General: Skin is warm and dry.   Neurological:      Mental Status: She is alert and oriented to person, place, and time. Mental status is at baseline.      Motor: No weakness.      Comments: Patient observed to have tremors both at rest and with action   Psychiatric:         Mood and Affect: Mood normal.               Significant Labs: All pertinent labs within the past 24 hours have been reviewed.    Significant Imaging: I have reviewed all pertinent imaging results/findings within the past 24 hours.

## 2025-06-28 NOTE — PLAN OF CARE
Pt on documented O2, no respiratory distress noted. Will continue to monitor. Breathing tx. Given, SALVADORN

## 2025-06-28 NOTE — PLAN OF CARE
Problem: Adult Inpatient Plan of Care  Goal: Plan of Care Review  Outcome: Progressing  Goal: Patient-Specific Goal (Individualized)  Outcome: Progressing  Goal: Absence of Hospital-Acquired Illness or Injury  Outcome: Progressing  Goal: Optimal Comfort and Wellbeing  Outcome: Progressing  Goal: Readiness for Transition of Care  Outcome: Progressing     Problem: COPD (Chronic Obstructive Pulmonary Disease)  Goal: Optimal Chronic Illness Coping  Outcome: Progressing  Goal: Optimal Level of Functional [place]  Outcome: Progressing  Goal: Absence of Infection Signs and Symptoms  Outcome: Progressing  Goal: Improved Oral Intake  Outcome: Progressing  Goal: Effective Oxygenation and Ventilation  Outcome: Progressing     Problem: Comorbidity Management  Goal: Maintenance of Asthma Control  Outcome: Progressing  Goal: Maintenance of COPD Symptom Control  Outcome: Progressing  Goal: Blood Pressure in Desired Range  Outcome: Progressing     Problem: Fall Injury Risk  Goal: Absence of Fall and Fall-Related Injury  Outcome: Progressing

## 2025-06-28 NOTE — PT/OT/SLP PROGRESS
Occupational Therapy   Treatment    Name: Terri Phelan  MRN: [id number]  Admitting Diagnosis:  Acute on chronic respiratory failure with hypoxia and hypercapnia       Recommendations:     Discharge Recommendations: Moderate Intensity Therapy  Discharge Equipment Recommendations:  other (see comments), hip kit, walker, rolling, rollator, oxygen (life alert)  Barriers to discharge:  Decreased caregiver support    Assessment:    Pt w/ signif SANDERS/SOB & demo'ed signif whole body tremulousness throughout tx. Pt states she stays home alone while dgtr is at work. Pt would greatly benefit from Life Alert, hip kit & mod intensity tx. Cont OT per POC.    Terri Phelan is a 69 y.o. female with a medical diagnosis of Acute on chronic respiratory failure with hypoxia and hypercapnia.  She presents with performance deficits affecting function are weakness, impaired endurance, impaired self care skills, impaired functional mobility, gait instability, impaired balance, decreased coordination, decreased upper extremity function, decreased lower extremity function, decreased safety awareness, impaired cardiopulmonary response to activity.     Rehab Prognosis:  Fair; patient would benefit from acute skilled OT services to address these deficits and reach maximum level of function.       Plan:     Patient to be seen 3 x/week to address the above listed problems via self-care/home management, therapeutic activities, therapeutic exercises  Plan of Care Expires: 07/27/25  Plan of Care Reviewed with: patient    Subjective     Chief Complaint: SANDERS/SOB  Patient/Family Comments/goals: increase endurance  Pain/Comfort:  Pain Rating 1: 0/10  Pain Rating Post-Intervention 1: 0/10    Objective:     Communicated with: nsmarkell prior to session.  Patient found up in chair with bed alarm, peripheral IV, oxygen, telemetry, PureWick upon OT entry to room.    General Precautions: Standard, fall, respiratory    Orthopedic Precautions:N/A  Braces:  N/A  Respiratory Status: Nasal cannula, flow 3 L/min     Occupational Performance:     Bed Mobility:         Functional Mobility/Transfers:  Patient completed Sit <> Stand Transfer with minimum assistance  with  rolling walker   Patient completed Bed <> Chair Transfer using Step Transfer technique with contact guard assistance with rolling walker  Patient completed Toilet Transfer Step Transfer technique with contact guard assistance with  rolling walker and grab bars  Functional Mobility: see tx note below    Activities of Daily Living:  Toileting: minimum assistance overall      AMPAC 6 Click ADL: 18    Treatment & Education:   Pt found [facility] & agreeable to OT/PT co-tx this date.  Pt w/ conversational SOB/signif tremulousness & perf the following:  -standing via RW w/ Min A for fxnl mobility around room for ADLs w/ CGA  -toilet t/f via GB w/ CGA  -toileting tasks w/ CGA for standing safety  -don pullups on toilet w/ Mod A for threading BLEs; pt too SOB to attempt  -returned to b/s chair w/ CGA w/ 3 standing RBs 2/2 SANDERS  -instruct/demo w/ return demo of UB TE/HEP via towel roll w/ emph on PLBing  Edu/tx re: endurance tx, energy conservation techs, rec hip kit AE to reduce incidences of SANDERS, compensatory techs w/ ADLs, PLBing techs, general safety techs & HEP. Pt verbalized understanding.  Pt left [facility] w/ nsg made aware.      Patient left up in chair with all lines intact, call button in reach, chair alarm on, and nsg notified    GOALS:   Multidisciplinary Problems       Occupational Therapy Goals          Problem: Occupational Therapy    Goal Priority Disciplines Outcome Interventions   Occupational Therapy Goal     OT, PT/OT Progressing    Description: Goals to be met by: 07/27/2025      Patient will increase functional independence with ADLs by performing:    UE Dressing with Modified [place].  LE Dressing with Modified [place].  Grooming while standing with Modified [place].  Toileting from toilet  with Modified [place] for hygiene and clothing management.   Toilet transfer to toilet with Modified [place].  Increased functional strength to WFL for self care.  Upper extremity exercise program x10 reps per handout, with independence.                          DME Justifications:  No DME recommended requiring DME justifications    Time Tracking:     OT Date of Treatment: 06/28/25  OT Start Time: 0925  OT Stop Time: 0952  OT Total Time (min): 27 min    Billable Minutes:Self Care/Home Management 9  Therapeutic Activity 9  Therapeutic Exercise 9  Total Time 27--co-tx w/ PT    OT/HUNTER: OT     Number of HUNTER visits since last OT visit: 0    6/28/2025     A client care conference was performed between the LOTR and DIEGO, prior to treatment by DIEGO, to discuss the patient's status, treatment plan and established goals.

## 2025-06-28 NOTE — PT/OT/SLP EVAL
Physical Therapy Evaluation    Patient Name:  Terri Phelan   MRN:  [id number]    Recommendations:     Discharge Recommendations: Moderate Intensity Therapy   Discharge Equipment Recommendations: hip kit   Barriers to discharge: Decreased caregiver support    Assessment:     Terri Phelan is a 69 y.o. female admitted with a medical diagnosis of Acute on chronic respiratory failure with hypoxia and hypercapnia.  She presents with the following impairments/functional limitations: weakness, impaired endurance, impaired self care skills, impaired functional mobility, gait instability, impaired balance, decreased upper extremity function, decreased lower extremity function, decreased safety awareness, impaired coordination, impaired fine motor, impaired cardiopulmonary response to activity. Pt found sitting up in chair.  She required Min A to transfer sit to stand with a RW.  She was able to ambulate 20 ft x 2 to the toilet and back with a RW and Min A. Pt exhibited tremors while ambulating.  She transferred on and off the toilet with CGA and grab bars.    Rec: Moderate Intensity Therapy  DME: none.    Rehab Prognosis: Good; patient would benefit from acute skilled PT services to address these deficits and reach maximum level of function.    Recent Surgery: * No surgery found *      Plan:     During this hospitalization, patient to be seen 5 x/week to address the identified rehab impairments via gait training, therapeutic activities, therapeutic exercises, neuromuscular re-education and progress toward the following goals:    Plan of Care Expires:  07/28/25    Subjective     Chief Complaint: Trouble with bathing  Patient/Family Comments/goals: Pt agreeable to the evaluation  Pain/Comfort:  Pain Rating 1: 0/10  Pain Rating Post-Intervention 1: 0/10    Patients cultural, spiritual, Druze conflicts given the current situation: no    Living Environment:  Pt lives in a 1st floor apt with ramp.  Her daughter lives  next door, tyler works full time  Prior to admission, patients level of function was Mod I with assist with bathing.  Equipment used at home: bedside commode, bath bench, walker, rolling, rollator, oxygen, BIPAP.  DME owned (not currently used): none.  Upon discharge, patient will have assistance from limited from her daughter.    Objective:     Communicated with Nurse prior to session.  Patient found up in chair with bed alarm, oxygen, PureWick, peripheral IV, telemetry  upon PT entry to room.    General Precautions: Standard, fall, respiratory  Orthopedic Precautions:N/A   Braces: N/A  Respiratory Status: Nasal cannula, flow 3 L/min    Exams:  Cognitive Exam:  Patient is oriented to Person, Place, Time, and Situation  Sensation:    -       Intact  RLE ROM: WFL  RLE Strength: WFL except Hip flexion 2+/5  LLE ROM: WFL  LLE Strength: Deficits: Hip flex 2+/5, Knee flexion and extend 3+/5, DF 5/5    Functional Mobility:  Transfers:     Sit to Stand:  minimum assistance with rolling walker  Gait: Pt ambulated 20 ft x 2 to and from the toilet with Min A and a RW      AM-PAC 6 CLICK MOBILITY  Total Score:18       Treatment & Education:  Role of Physical Therapy  Plan of Care  Transfer and gait training with a RW.    Patient left up in chair with all lines intact, call button in reach, and Nurse notified.    GOALS:   Multidisciplinary Problems       Physical Therapy Goals          Problem: Physical Therapy    Goal Priority Disciplines Outcome Interventions   Physical Therapy Goal     PT, PT/OT Progressing    Description: Goals to be met by: 2025    Patient will increase functional independence with mobility by performin. Sit<>stand with SBA with RW.  2. Gait x 100 feet with RW with SBA.  3. Supine<>sit with SBA.                           DME Justifications:  No DME recommended requiring DME justifications    History:     Past Medical History:   Diagnosis Date    Acute exacerbation of chronic obstructive  pulmonary disease (COPD) 2023    Recently hospitalized at [facility] from  to 2024 for exacerbation.  Did not require ICU care or significant escalation of intervention(s).      Adenoma of ascending colon 2022    Anxiety     COPD (chronic obstructive pulmonary disease)     COPD exacerbation 2014    Heart failure     Hypertension     Major depression 2023       Past Surgical History:   Procedure Laterality Date    BREAST BIOPSY N/A     pt unsure which breast but it was benign-(calcium)     SECTION      HERNIA REPAIR      HYSTERECTOMY         Time Tracking:     PT Received On: 25  PT Start Time: 927     PT Stop Time: 951  PT Total Time (min): 24 min     Billable Minutes: Evaluation 12 and Therapeutic Activity 12      2025

## 2025-06-28 NOTE — PLAN OF CARE
Problem: Occupational Therapy  Goal: Occupational Therapy Goal  Description: Goals to be met by: 07/27/2025      Patient will increase functional independence with ADLs by performing:    UE Dressing with Modified [place].  LE Dressing with Modified [place].  Grooming while standing with Modified [place].  Toileting from toilet with Modified [place] for hygiene and clothing management.   Toilet transfer to toilet with Modified [place].  Increased functional strength to WFL for self care.  Upper extremity exercise program x10 reps per handout, with independence.     Outcome: Progressing   Pt found [facility] & agreeable to OT/PT co-tx this date.  Pt w/ conversational SOB/signif tremulousness & perf the following:  -standing via RW w/ Min A for fxnl mobility around room for ADLs w/ CGA  -toilet t/f via GB w/ CGA  -toileting tasks w/ CGA for standing safety  -don pullups on toilet w/ Mod A for threading BLEs; pt too SOB to attempt  -returned to b/s chair w/ CGA w/ 3 standing RBs 2/2 SANDERS  -instruct/demo w/ return demo of UB TE/HEP via towel roll w/ emph on PLBing  Edu/tx re: endurance tx, energy conservation techs, rec hip kit AE to reduce incidences of SANDERS, compensatory techs w/ ADLs, PLBing techs, general safety techs & HEP. Pt verbalized understanding.  Pt left [facility] w/ nsg made aware.    Pt w/ signif SANDERS/SOB & demo'ed signif whole body tremulousness throughout tx. Pt states she stays home alone while dgtr is at work. Pt would greatly benefit from Life Alert, hip kit & mod intensity tx. Cont OT per POC.

## 2025-06-28 NOTE — ASSESSMENT & PLAN NOTE
Patient's blood pressure range in the last 24 hours was: BP  Min: 117/66  Max: 163/74.The patient's inpatient anti-hypertensive regimen is listed below:  Current Antihypertensives  losartan tablet 25 mg, Daily, Oral    Plan  - BP is controlled, no changes needed to their regimen  -blood pressure appears elevated today(06/28) we will follow up after administration of medications.  If needed losartan will be increased in dose  -low-salt diet  - monitor vitals

## 2025-06-28 NOTE — ASSESSMENT & PLAN NOTE
-observe tremors of head and bilateral upper extremities.  Patient states she feels it has worsened with Zoloft  -patient was on Zoloft and Lexapro, states Lexapro was discontinued before admission.  It has been stopped  -ABG ordered  -PT/OT ordered, recommended SNF

## 2025-06-28 NOTE — ASSESSMENT & PLAN NOTE
Patient with Hypercapnic Respiratory failure which is Acute on chronic.  she is on home oxygen at 3 LPM. Supplemental oxygen was provided and noted- Oxygen Concentration (%):  [32] 32    .   Signs/symptoms of respiratory failure include- tachypnea, respiratory distress, wheezing, and lethargy. Contributing diagnoses includes - COPD Labs and images were reviewed. Patient Has recent ABG, which has been reviewed. Will treat underlying causes and adjust management of respiratory failure as follows- steroids, Bipap at night  -(06/28)complained of shortness for breath this morning.  ABG ordered

## 2025-06-28 NOTE — PLAN OF CARE
Problem: Physical Therapy  Goal: Physical Therapy Goal  Description: Goals to be met by: 2025    Patient will increase functional independence with mobility by performin. Sit<>stand with SBA with RW.  2. Gait x 100 feet with RW with SBA.  3. Supine<>sit with SBA.      Outcome: Progressing   Orders received and Physical Therapy evaluation completed.    Pt found sitting up in chair.  She required Min A to transfer sit to stand with a RW.  She was able to ambulate 20 ft x 2 to the toilet and back with a RW and Min A. Pt exhibited tremors while ambulating.  She transferred on and off the toilet with CGA and grab bars.    Rec: Moderate Intensity Therapy  DME: none

## 2025-06-28 NOTE — ASSESSMENT & PLAN NOTE
Patient's COPD is with exacerbation noted by continued dyspnea currently.  Patient is currently on COPD Pathway. Continue scheduled inhalers Steroids and Supplemental oxygen and monitor respiratory status closely.     -seen by pulmonology  -continue with IV steroids  -BiPAP at night  -shortness for breath.  Follow up ABG and continue bronchodilators

## 2025-06-29 PROBLEM — J96.11 CHRONIC RESPIRATORY FAILURE WITH HYPOXIA AND HYPERCAPNIA: Status: RESOLVED | Noted: 2023-08-09 | Resolved: 2025-06-29

## 2025-06-29 PROBLEM — J96.12 CHRONIC RESPIRATORY FAILURE WITH HYPOXIA AND HYPERCAPNIA: Status: RESOLVED | Noted: 2023-08-09 | Resolved: 2025-06-29

## 2025-06-29 LAB
ABSOLUTE EOSINOPHIL (OHS): 0 K/UL
ABSOLUTE MONOCYTE (OHS): 0.61 K/UL (ref 0.3–1)
ABSOLUTE NEUTROPHIL COUNT (OHS): 7.87 K/UL (ref 1.8–7.7)
ANION GAP (OHS): 6 MMOL/L (ref 8–16)
BASOPHILS # BLD AUTO: 0.01 K/UL
BASOPHILS NFR BLD AUTO: 0.1 %
BUN SERPL-MCNC: 25 MG/DL (ref 8–23)
CALCIUM SERPL-MCNC: 9.3 MG/DL (ref 8.7–10.5)
CHLORIDE SERPL-SCNC: 101 MMOL/L (ref 95–110)
CO2 SERPL-SCNC: 35 MMOL/L (ref 23–29)
CREAT SERPL-MCNC: 0.7 MG/DL (ref 0.5–1.4)
ERYTHROCYTE [DISTWIDTH] IN BLOOD BY AUTOMATED COUNT: 14.3 % (ref 11.5–14.5)
GFR SERPLBLD CREATININE-BSD FMLA CKD-EPI: >60 ML/MIN/1.73/M2
GLUCOSE SERPL-MCNC: 114 MG/DL (ref 70–110)
HCT VFR BLD AUTO: 40.5 % (ref 37–48.5)
HGB BLD-MCNC: 12 GM/DL (ref 12–16)
IMM GRANULOCYTES # BLD AUTO: 0.04 K/UL (ref 0–0.04)
IMM GRANULOCYTES NFR BLD AUTO: 0.4 % (ref 0–0.5)
LYMPHOCYTES # BLD AUTO: 0.7 K/UL (ref 1–4.8)
MAGNESIUM SERPL-MCNC: 2.3 MG/DL (ref 1.6–2.6)
MCH RBC QN AUTO: 26.4 PG (ref 27–31)
MCHC RBC AUTO-ENTMCNC: 29.6 G/DL (ref 32–36)
MCV RBC AUTO: 89 FL (ref 82–98)
NUCLEATED RBC (/100WBC) (OHS): 0 /100 WBC
PHOSPHATE SERPL-MCNC: 2.7 MG/DL (ref 2.7–4.5)
PLATELET # BLD AUTO: 193 K/UL (ref 150–450)
PMV BLD AUTO: 11.2 FL (ref 9.2–12.9)
POTASSIUM SERPL-SCNC: 4.5 MMOL/L (ref 3.5–5.1)
RBC # BLD AUTO: 4.55 M/UL (ref 4–5.4)
RELATIVE EOSINOPHIL (OHS): 0 %
RELATIVE LYMPHOCYTE (OHS): 7.6 % (ref 18–48)
RELATIVE MONOCYTE (OHS): 6.6 % (ref 4–15)
RELATIVE NEUTROPHIL (OHS): 85.3 % (ref 38–73)
SODIUM SERPL-SCNC: 142 MMOL/L (ref 136–145)
WBC # BLD AUTO: 9.23 K/UL (ref 3.9–12.7)

## 2025-06-29 PROCEDURE — 94660 CPAP INITIATION&MGMT: CPT | Mod: HCNC

## 2025-06-29 PROCEDURE — 84100 ASSAY OF PHOSPHORUS: CPT | Mod: HCNC | Performed by: INTERNAL MEDICINE

## 2025-06-29 PROCEDURE — 94761 N-INVAS EAR/PLS OXIMETRY MLT: CPT | Mod: HCNC

## 2025-06-29 PROCEDURE — 63600175 PHARM REV CODE 636 W HCPCS: Mod: HCNC | Performed by: STUDENT IN AN ORGANIZED HEALTH CARE EDUCATION/TRAINING PROGRAM

## 2025-06-29 PROCEDURE — 83735 ASSAY OF MAGNESIUM: CPT | Mod: HCNC | Performed by: INTERNAL MEDICINE

## 2025-06-29 PROCEDURE — 25000003 PHARM REV CODE 250: Mod: HCNC | Performed by: NURSE PRACTITIONER

## 2025-06-29 PROCEDURE — 99900035 HC TECH TIME PER 15 MIN (STAT): Mod: HCNC

## 2025-06-29 PROCEDURE — 21400001 HC TELEMETRY ROOM: Mod: HCNC

## 2025-06-29 PROCEDURE — 27000221 HC OXYGEN, UP TO 24 HOURS: Mod: HCNC

## 2025-06-29 PROCEDURE — 94640 AIRWAY INHALATION TREATMENT: CPT | Mod: HCNC

## 2025-06-29 PROCEDURE — 94799 UNLISTED PULMONARY SVC/PX: CPT | Mod: HCNC

## 2025-06-29 PROCEDURE — 94799 UNLISTED PULMONARY SVC/PX: CPT | Mod: HCNC,XB

## 2025-06-29 PROCEDURE — 27100171 HC OXYGEN HIGH FLOW UP TO 24 HOURS: Mod: HCNC

## 2025-06-29 PROCEDURE — 36415 COLL VENOUS BLD VENIPUNCTURE: CPT | Mod: HCNC | Performed by: INTERNAL MEDICINE

## 2025-06-29 PROCEDURE — 80048 BASIC METABOLIC PNL TOTAL CA: CPT | Mod: HCNC | Performed by: INTERNAL MEDICINE

## 2025-06-29 PROCEDURE — 85025 COMPLETE CBC W/AUTO DIFF WBC: CPT | Mod: HCNC | Performed by: INTERNAL MEDICINE

## 2025-06-29 PROCEDURE — 25000242 PHARM REV CODE 250 ALT 637 W/ HCPCS: Mod: HCNC | Performed by: INTERNAL MEDICINE

## 2025-06-29 PROCEDURE — 25000003 PHARM REV CODE 250: Mod: HCNC | Performed by: INTERNAL MEDICINE

## 2025-06-29 PROCEDURE — 25000003 PHARM REV CODE 250: Mod: HCNC | Performed by: STUDENT IN AN ORGANIZED HEALTH CARE EDUCATION/TRAINING PROGRAM

## 2025-06-29 PROCEDURE — 63600175 PHARM REV CODE 636 W HCPCS: Mod: HCNC | Performed by: INTERNAL MEDICINE

## 2025-06-29 RX ORDER — PREDNISONE 20 MG/1
40 TABLET ORAL DAILY
Status: DISCONTINUED | OUTPATIENT
Start: 2025-06-29 | End: 2025-07-02

## 2025-06-29 RX ADMIN — METHYLPREDNISOLONE SODIUM SUCCINATE 40 MG: 40 INJECTION, POWDER, FOR SOLUTION INTRAMUSCULAR; INTRAVENOUS at 05:06

## 2025-06-29 RX ADMIN — GUAIFENESIN AND DEXTROMETHORPHAN HYDROBROMIDE 1 TABLET: 600; 30 TABLET, EXTENDED RELEASE ORAL at 08:06

## 2025-06-29 RX ADMIN — HYDROXYZINE HYDROCHLORIDE 25 MG: 25 TABLET, FILM COATED ORAL at 08:06

## 2025-06-29 RX ADMIN — IPRATROPIUM BROMIDE AND ALBUTEROL SULFATE 3 ML: 2.5; .5 SOLUTION RESPIRATORY (INHALATION) at 07:06

## 2025-06-29 RX ADMIN — LOSARTAN POTASSIUM 25 MG: 25 TABLET, FILM COATED ORAL at 08:06

## 2025-06-29 RX ADMIN — SERTRALINE 100 MG: 100 TABLET, FILM COATED ORAL at 08:06

## 2025-06-29 RX ADMIN — ASPIRIN 81 MG CHEWABLE TABLET 81 MG: 81 TABLET CHEWABLE at 08:06

## 2025-06-29 RX ADMIN — ENOXAPARIN SODIUM 40 MG: 40 INJECTION SUBCUTANEOUS at 04:06

## 2025-06-29 RX ADMIN — ROFLUMILAST 500 MCG: 500 TABLET ORAL at 08:06

## 2025-06-29 RX ADMIN — ATORVASTATIN CALCIUM 40 MG: 40 TABLET, FILM COATED ORAL at 08:06

## 2025-06-29 RX ADMIN — EZETIMIBE 10 MG: 10 TABLET ORAL at 08:06

## 2025-06-29 RX ADMIN — PREDNISONE 40 MG: 20 TABLET ORAL at 12:06

## 2025-06-29 RX ADMIN — FLUTICASONE FUROATE AND VILANTEROL TRIFENATATE 1 PUFF: 100; 25 POWDER RESPIRATORY (INHALATION) at 07:06

## 2025-06-29 RX ADMIN — Medication 2000 UNITS: at 08:06

## 2025-06-29 RX ADMIN — IPRATROPIUM BROMIDE AND ALBUTEROL SULFATE 3 ML: 2.5; .5 SOLUTION RESPIRATORY (INHALATION) at 02:06

## 2025-06-29 NOTE — PROGRESS NOTES
[facility] Medicine  Progress Note    Patient Name: Terri Phelan  MRN: [id number]  Patient Class: IP- Inpatient   Admission Date: 6/26/2025  Length of Stay: 3 days  Attending Physician: Tirso Valenzuela,*  Primary Care Provider: Sussy Mason MD        Subjective     Principal Problem:Acute on chronic respiratory failure with hypoxia and hypercapnia        HPI:  69-year-old female with a past medical history of COPD on 2L of O2 via NC (uses 3L with exertion), diastolic HF, HTN, HLD, anxiety, depression, JUAN (uses bipap every night), hx of MI (but no stenosis noted on angiogram); came to emergency department due to shortness for breath and cough.  She states she has recently treated for COPD exacerbation and discharged on oral steroids and Augmentin, which she recently completed.  She states she was feeling well until yesterday when her symptoms returned.  She denied chest pain palpitation but complains of shortness for breath, cough and right lower extremity cramping.    In the emergency department the patient had a blood pressure of 122/58, heart rate of 110, temperature 98.7°, respiratory rate of 29.  ABG showed a pH of 7.245 5, bicarb of 89.  She was on BiPAP continuously.  She was seen by pulmonology team we will request the BiPAP be switched to at night only.  WBC 6.31, hemoglobin 12.3, platelet 173.  Sodium 144, bicarb 34, anion gap 7, magnesium 2.9.  ABG results reviewed.  Chest x-ray shows possible pulmonary emphysema.  Patient will be admitted for further management of COPD exacerbation.              Overview/Hospital Course:  No notes on file    Interval History:  NAEON. This feels she is not at her baseline. Continue complain of some chest tighness and wheezing but much improved since admission. Pending resolution of COPD exacerbation and SNF placement.     Review of Systems   Constitutional:  Negative for activity change, chills and fever.   HENT:  Negative for  congestion, rhinorrhea and sore throat.    Eyes:  Negative for discharge and redness.   Respiratory:  Positive for cough and shortness of breath. Negative for chest tightness and wheezing.    Cardiovascular:  Negative for chest pain and palpitations.   Gastrointestinal:  Negative for abdominal pain, constipation, diarrhea, nausea and vomiting.   Genitourinary:  Negative for dysuria, flank pain and hematuria.   Musculoskeletal:  Negative for back pain, myalgias and neck pain.        Right lower extremity muscle cramping   Skin:  Negative for pallor, rash and wound.   Neurological:  Positive for tremors. Negative for dizziness, syncope, weakness, numbness and headaches.   Psychiatric/Behavioral:  Negative for agitation, confusion and hallucinations.      Objective:     Vital Signs (Most Recent):  Temp: 98.5 °F (36.9 °C) (06/29/25 1217)  Pulse: 65 (06/29/25 1217)  Resp: 20 (06/29/25 1217)  BP: (!) 152/65 (06/29/25 1217)  SpO2: (!) 93 % (06/29/25 1217) Vital Signs (24h Range):  Temp:  [97.6 °F (36.4 °C)-98.5 °F (36.9 °C)] 98.5 °F (36.9 °C)  Pulse:  [52-79] 65  Resp:  [18-21] 20  SpO2:  [93 %-98 %] 93 %  BP: (123-163)/(51-73) 152/65     Weight: 81.2 kg (179 lb 0.2 oz)  Body mass index is 28.89 kg/m².    Intake/Output Summary (Last 24 hours) at 6/29/2025 1337  Last data filed at 6/29/2025 0511  Gross per 24 hour   Intake 180 ml   Output 1150 ml   Net -970 ml         Physical Exam  Vitals and nursing note reviewed.   Constitutional:       General: She is not in acute distress.  HENT:      Head: Normocephalic and atraumatic.      Mouth/Throat:      Mouth: Mucous membranes are moist.   Eyes:      General:         Right eye: No discharge.         Left eye: No discharge.      Conjunctiva/sclera: Conjunctivae normal.   Cardiovascular:      Rate and Rhythm: Regular rhythm. Tachycardia present.      Pulses: Normal pulses.   Pulmonary:      Effort: No respiratory distress.      Breath sounds: No wheezing.      Comments: Reduced  breath sounds in bases  Chest:      Chest wall: No tenderness.   Abdominal:      General: Bowel sounds are normal.      Palpations: Abdomen is soft.   Musculoskeletal:         General: No swelling. Normal range of motion.      Cervical back: Normal range of motion and neck supple.   Skin:     General: Skin is warm and dry.   Neurological:      Mental Status: She is alert and oriented to person, place, and time. Mental status is at baseline.      Motor: No weakness.      Comments: Patient observed to have tremors both at rest and with action   Psychiatric:         Mood and Affect: Mood normal.               Significant Labs: All pertinent labs within the past 24 hours have been reviewed.    Significant Imaging: I have reviewed all pertinent imaging results/findings within the past 24 hours.      Assessment & Plan  Acute on chronic respiratory failure with hypoxia and hypercapnia  Patient with Hypercapnic Respiratory failure which is Acute on chronic.  she is on home oxygen at 3 LPM. Supplemental oxygen was provided and noted- Oxygen Concentration (%):  [32] 32    .   Signs/symptoms of respiratory failure include- tachypnea, increased work of breathing, respiratory distress, and wheezing. Contributing diagnoses includes - COPD Labs and images were reviewed. Patient Has recent ABG, which has been reviewed. Will treat underlying causes and adjust management of respiratory failure as follows- Bipap at night    -Continue supplemental oxygen and bronchodilators  -Stop IV methylprednisolone and start prednisone 40mg daily.   HTN (hypertension)  Patient's blood pressure range in the last 24 hours was: BP  Min: 123/51  Max: 163/73.The patient's inpatient anti-hypertensive regimen is listed below:  Current Antihypertensives  losartan tablet 25 mg, Daily, Oral    Plan  - BP is controlled, no changes needed to their regimen  -blood pressure appears elevated today(06/28) we will follow up after administration of medications.  If  needed losartan will be increased in dose  -low-salt diet  - monitor vitals  Centrilobular emphysema  Patient's COPD is with exacerbation noted by continued dyspnea currently.  Patient is currently on COPD Pathway. Continue scheduled inhalers Steroids and Supplemental oxygen and monitor respiratory status closely.     -seen by pulmonology  -Start PO prednisone 40mg daily.   -BiPAP at night  -shortness for breath.  Follow up ABG and continue bronchodilators  Oxygen dependent  -3L nasal cannula at home    Chronic respiratory failure with hypoxia and hypercapnia (Resolved: 6/29/2025)  Patient with Hypercapnic Respiratory failure which is Acute on chronic.  she is on home oxygen at 3 LPM. Supplemental oxygen was provided and noted- Oxygen Concentration (%):  [32] 32    .   Signs/symptoms of respiratory failure include- tachypnea, respiratory distress, wheezing, and lethargy. Contributing diagnoses includes - COPD Labs and images were reviewed. Patient Has recent ABG, which has been reviewed. Will treat underlying causes and adjust management of respiratory failure as follows- steroids, Bipap at night  -(06/28)complained of shortness for breath this morning.  ABG ordered  Muscle cramp  -patient complaining of cramps in her right lower extremity  -states she uses Robaxin at home, has also use Flexeril in the past  -improved.  Flexeril discontinued    Tremor  -observe tremors of head and bilateral upper extremities.  Patient states she feels it has worsened with Zoloft  -patient was on Zoloft and Lexapro, states Lexapro was discontinued before admission.  It has been stopped  -ABG ordered  -PT/OT ordered, recommended SNF    VTE Risk Mitigation (From admission, onward)           Ordered     enoxaparin injection 40 mg  Daily         06/26/25 1004     IP VTE HIGH RISK PATIENT  Once         06/26/25 1004     Place sequential compression device  Until discontinued         06/26/25 1004                    Discharge Planning    RADHA: 6/28/2025     Code Status: Full Code   Medical Readiness for Discharge Date:                      Please place Justification for DME      Rosa Montes MD  Department of Hospital Medicine   [facility]

## 2025-06-29 NOTE — PLAN OF CARE
06/28/25 1935   Patient Assessment/Suction   Level of Consciousness (AVPU) alert   Respiratory Effort Normal;Unlabored   Expansion/Accessory Muscles/Retractions no retractions;no use of accessory muscles   All Lung Fields Breath Sounds diminished;Anterior:;Lateral:   Rhythm/Pattern, Respiratory shortness of breath   Cough Frequency no cough   Skin Integrity   $ Wound Care Tech Time 15 min   Area Observed Behind ear;Cheek;Bridge of nose   Skin Appearance without discoloration   PRE-TX-O2   Device (Oxygen Therapy) nasal cannula   $ Is the patient on Low Flow Oxygen? Yes   Flow (L/min) (Oxygen Therapy) 3   SpO2 98 %   Pulse Oximetry Type Intermittent   $ Pulse Oximetry - Multiple Charge Pulse Oximetry - Multiple   Pulse 78   Resp 18   Aerosol Therapy   $ Aerosol Therapy Charges Aerosol Treatment   Daily Review of Necessity (SVN) completed   Respiratory Treatment Status (SVN) given   Treatment Route (SVN) mask   Patient Position HOB elevated   Post Treatment Assessment (SVN) breath sounds unchanged   Signs of Intolerance (SVN) none   Breath Sounds Post-Respiratory Treatment   Throughout All Fields Post-Treatment All Fields   Throughout All Fields Post-Treatment no change   Post-treatment Heart Rate (beats/min) 80   Post-treatment Resp Rate (breaths/min) 18   Preset CPAP/BiPAP Settings   Mode Of Delivery BiPAP;standby   CPAP/BIPAP charged w/in last 24 h YES   Respiratory Evaluation   $ Care Plan Tech Time 15 min

## 2025-06-29 NOTE — ASSESSMENT & PLAN NOTE
Patient's COPD is with exacerbation noted by continued dyspnea currently.  Patient is currently on COPD Pathway. Continue scheduled inhalers Steroids and Supplemental oxygen and monitor respiratory status closely.     -seen by pulmonology  -Start PO prednisone 40mg daily.   -BiPAP at night  -shortness for breath.  Follow up ABG and continue bronchodilators

## 2025-06-29 NOTE — ASSESSMENT & PLAN NOTE
Patient's blood pressure range in the last 24 hours was: BP  Min: 123/51  Max: 163/73.The patient's inpatient anti-hypertensive regimen is listed below:  Current Antihypertensives  losartan tablet 25 mg, Daily, Oral    Plan  - BP is controlled, no changes needed to their regimen  -blood pressure appears elevated today(06/28) we will follow up after administration of medications.  If needed losartan will be increased in dose  -low-salt diet  - monitor vitals

## 2025-06-29 NOTE — SUBJECTIVE & OBJECTIVE
Interval History:  NAEON. This feels she is not at her baseline. Continue complain of some chest tighness and wheezing but much improved since admission. Pending resolution of COPD exacerbation and SNF placement.     Review of Systems   Constitutional:  Negative for activity change, chills and fever.   HENT:  Negative for congestion, rhinorrhea and sore throat.    Eyes:  Negative for discharge and redness.   Respiratory:  Positive for cough and shortness of breath. Negative for chest tightness and wheezing.    Cardiovascular:  Negative for chest pain and palpitations.   Gastrointestinal:  Negative for abdominal pain, constipation, diarrhea, nausea and vomiting.   Genitourinary:  Negative for dysuria, flank pain and hematuria.   Musculoskeletal:  Negative for back pain, myalgias and neck pain.        Right lower extremity muscle cramping   Skin:  Negative for pallor, rash and wound.   Neurological:  Positive for tremors. Negative for dizziness, syncope, weakness, numbness and headaches.   Psychiatric/Behavioral:  Negative for agitation, confusion and hallucinations.      Objective:     Vital Signs (Most Recent):  Temp: 98.5 °F (36.9 °C) (06/29/25 1217)  Pulse: 65 (06/29/25 1217)  Resp: 20 (06/29/25 1217)  BP: (!) 152/65 (06/29/25 1217)  SpO2: (!) 93 % (06/29/25 1217) Vital Signs (24h Range):  Temp:  [97.6 °F (36.4 °C)-98.5 °F (36.9 °C)] 98.5 °F (36.9 °C)  Pulse:  [52-79] 65  Resp:  [18-21] 20  SpO2:  [93 %-98 %] 93 %  BP: (123-163)/(51-73) 152/65     Weight: 81.2 kg (179 lb 0.2 oz)  Body mass index is 28.89 kg/m².    Intake/Output Summary (Last 24 hours) at 6/29/2025 1337  Last data filed at 6/29/2025 0511  Gross per 24 hour   Intake 180 ml   Output 1150 ml   Net -970 ml         Physical Exam  Vitals and nursing note reviewed.   Constitutional:       General: She is not in acute distress.  HENT:      Head: Normocephalic and atraumatic.      Mouth/Throat:      Mouth: Mucous membranes are moist.   Eyes:      General:          Right eye: No discharge.         Left eye: No discharge.      Conjunctiva/sclera: Conjunctivae normal.   Cardiovascular:      Rate and Rhythm: Regular rhythm. Tachycardia present.      Pulses: Normal pulses.   Pulmonary:      Effort: No respiratory distress.      Breath sounds: No wheezing.      Comments: Reduced breath sounds in bases  Chest:      Chest wall: No tenderness.   Abdominal:      General: Bowel sounds are normal.      Palpations: Abdomen is soft.   Musculoskeletal:         General: No swelling. Normal range of motion.      Cervical back: Normal range of motion and neck supple.   Skin:     General: Skin is warm and dry.   Neurological:      Mental Status: She is alert and oriented to person, place, and time. Mental status is at baseline.      Motor: No weakness.      Comments: Patient observed to have tremors both at rest and with action   Psychiatric:         Mood and Affect: Mood normal.               Significant Labs: All pertinent labs within the past 24 hours have been reviewed.    Significant Imaging: I have reviewed all pertinent imaging results/findings within the past 24 hours.

## 2025-06-29 NOTE — PLAN OF CARE
Problem: Adult Inpatient Plan of Care  Goal: Plan of Care Review  Outcome: Progressing  Goal: Patient-Specific Goal (Individualized)  Outcome: Progressing  Goal: Absence of Hospital-Acquired Illness or Injury  Outcome: Progressing  Goal: Optimal Comfort and Wellbeing  Outcome: Progressing  Goal: Readiness for Transition of Care  Outcome: Progressing     Problem: COPD (Chronic Obstructive Pulmonary Disease)  Goal: Optimal Chronic Illness Coping  Outcome: Progressing  Goal: Optimal Level of Functional [place]  Outcome: Progressing  Goal: Absence of Infection Signs and Symptoms  Outcome: Progressing  Goal: Improved Oral Intake  Outcome: Progressing  Goal: Effective Oxygenation and Ventilation  Outcome: Progressing     Problem: Comorbidity Management  Goal: Maintenance of Asthma Control  Outcome: Progressing  Goal: Maintenance of COPD Symptom Control  Outcome: Progressing  Goal: Blood Pressure in Desired Range  Outcome: Progressing     Problem: Fall Injury Risk  Goal: Absence of Fall and Fall-Related Injury  Outcome: Progressing     Problem: Pain Acute  Goal: Optimal Pain Control and Function  Outcome: Progressing     Problem: Infection  Goal: Absence of Infection Signs and Symptoms  Outcome: Progressing

## 2025-06-29 NOTE — ASSESSMENT & PLAN NOTE
Online Visit    Date/Time: 10/10/2018 10:11:20 AM  To: MARCELA GHOTRA  From: KALI OREILLY  Subject:    No gonorrhea or chlamydia.     Verified Results  CHLAMYDIA / GC BY NUCLEIC ACID AMPLIFICATION [id number] 11:01AM SANTO SPANN     Test Name Result Flag Reference   CHLAMYDIA TRACHOMATIS BY NUCLEIC ACID AMPLIFICATION NEGATIVE  NEGATIVE   Positive results are reported to the [facility] Department of Public Health.  The Aptima Combo 2 Assay is not intended for the evaluation of suspected sexual abuse or for other medico-legal indications, nor has it been evaluated in adolescents less than 14 years of age.   In these scenarios, and in clinical settings where the prevalence of infection is low, confirmatory testing on positive results is recommended.     The Aptima Combo 2 Assay is not FDA approved for testing on throat, rectal and female urine specimens. [employer] has internally validated these specimen sources. The expected normal reference range is negative.   NEISSERIA GONORRHOEAE BY NUCLEIC ACID AMPLIFICATION NEGATIVE  NEGATIVE   Positive results are reported to the [facility] Department of Public Health.  The Aptima Combo 2 Assay is not intended for the evaluation of suspected sexual abuse or for other medico-legal indications, nor has it been evaluated in adolescents less than 14 years of age.   In these scenarios, and in clinical settings where the prevalence of infection is low, confirmatory testing on positive results is recommended.     The Aptima Combo 2 Assay is not FDA approved for testing on throat, rectal and female urine specimens. [employer] has internally validated these specimen sources. The expected normal reference range is negative.   SOURCE URINE          Patient with Hypercapnic Respiratory failure which is Acute on chronic.  she is on home oxygen at 3 LPM. Supplemental oxygen was provided and noted- Oxygen Concentration (%):  [32] 32    .   Signs/symptoms of respiratory failure include- tachypnea, increased work of breathing, respiratory distress, and wheezing. Contributing diagnoses includes - COPD Labs and images were reviewed. Patient Has recent ABG, which has been reviewed. Will treat underlying causes and adjust management of respiratory failure as follows- Bipap at night    -Continue supplemental oxygen and bronchodilators  -Stop IV methylprednisolone and start prednisone 40mg daily.

## 2025-06-30 LAB
ABSOLUTE EOSINOPHIL (OHS): 0.01 K/UL
ABSOLUTE MONOCYTE (OHS): 1.39 K/UL (ref 0.3–1)
ABSOLUTE NEUTROPHIL COUNT (OHS): 7.11 K/UL (ref 1.8–7.7)
ANION GAP (OHS): 7 MMOL/L (ref 8–16)
BACTERIA SPT CULT: NORMAL
BASOPHILS # BLD AUTO: 0.01 K/UL
BASOPHILS NFR BLD AUTO: 0.1 %
BUN SERPL-MCNC: 30 MG/DL (ref 8–23)
CALCIUM SERPL-MCNC: 8.7 MG/DL (ref 8.7–10.5)
CHLORIDE SERPL-SCNC: 103 MMOL/L (ref 95–110)
CO2 SERPL-SCNC: 30 MMOL/L (ref 23–29)
CREAT SERPL-MCNC: 0.7 MG/DL (ref 0.5–1.4)
ERYTHROCYTE [DISTWIDTH] IN BLOOD BY AUTOMATED COUNT: 14.2 % (ref 11.5–14.5)
GFR SERPLBLD CREATININE-BSD FMLA CKD-EPI: >60 ML/MIN/1.73/M2
GLUCOSE SERPL-MCNC: 82 MG/DL (ref 70–110)
GRAM STN SPEC: NORMAL
HCT VFR BLD AUTO: 39.1 % (ref 37–48.5)
HGB BLD-MCNC: 11.5 GM/DL (ref 12–16)
IMM GRANULOCYTES # BLD AUTO: 0.05 K/UL (ref 0–0.04)
IMM GRANULOCYTES NFR BLD AUTO: 0.5 % (ref 0–0.5)
LYMPHOCYTES # BLD AUTO: 2.13 K/UL (ref 1–4.8)
MAGNESIUM SERPL-MCNC: 2.1 MG/DL (ref 1.6–2.6)
MCH RBC QN AUTO: 26.1 PG (ref 27–31)
MCHC RBC AUTO-ENTMCNC: 29.4 G/DL (ref 32–36)
MCV RBC AUTO: 89 FL (ref 82–98)
NUCLEATED RBC (/100WBC) (OHS): 0 /100 WBC
PHOSPHATE SERPL-MCNC: 3.2 MG/DL (ref 2.7–4.5)
PLATELET # BLD AUTO: 174 K/UL (ref 150–450)
PMV BLD AUTO: 12.2 FL (ref 9.2–12.9)
POTASSIUM SERPL-SCNC: 4.8 MMOL/L (ref 3.5–5.1)
RBC # BLD AUTO: 4.41 M/UL (ref 4–5.4)
RELATIVE EOSINOPHIL (OHS): 0.1 %
RELATIVE LYMPHOCYTE (OHS): 19.9 % (ref 18–48)
RELATIVE MONOCYTE (OHS): 13 % (ref 4–15)
RELATIVE NEUTROPHIL (OHS): 66.4 % (ref 38–73)
SODIUM SERPL-SCNC: 140 MMOL/L (ref 136–145)
WBC # BLD AUTO: 10.7 K/UL (ref 3.9–12.7)

## 2025-06-30 PROCEDURE — 84100 ASSAY OF PHOSPHORUS: CPT | Mod: HCNC | Performed by: INTERNAL MEDICINE

## 2025-06-30 PROCEDURE — 94799 UNLISTED PULMONARY SVC/PX: CPT | Mod: HCNC

## 2025-06-30 PROCEDURE — 25000003 PHARM REV CODE 250: Mod: HCNC | Performed by: STUDENT IN AN ORGANIZED HEALTH CARE EDUCATION/TRAINING PROGRAM

## 2025-06-30 PROCEDURE — 94761 N-INVAS EAR/PLS OXIMETRY MLT: CPT | Mod: HCNC

## 2025-06-30 PROCEDURE — 97116 GAIT TRAINING THERAPY: CPT | Mod: HCNC,CQ

## 2025-06-30 PROCEDURE — 94640 AIRWAY INHALATION TREATMENT: CPT | Mod: HCNC

## 2025-06-30 PROCEDURE — 21400001 HC TELEMETRY ROOM: Mod: HCNC

## 2025-06-30 PROCEDURE — 80048 BASIC METABOLIC PNL TOTAL CA: CPT | Mod: HCNC | Performed by: INTERNAL MEDICINE

## 2025-06-30 PROCEDURE — 99900035 HC TECH TIME PER 15 MIN (STAT): Mod: HCNC

## 2025-06-30 PROCEDURE — 36415 COLL VENOUS BLD VENIPUNCTURE: CPT | Mod: HCNC | Performed by: INTERNAL MEDICINE

## 2025-06-30 PROCEDURE — 85025 COMPLETE CBC W/AUTO DIFF WBC: CPT | Mod: HCNC | Performed by: INTERNAL MEDICINE

## 2025-06-30 PROCEDURE — 97110 THERAPEUTIC EXERCISES: CPT | Mod: HCNC,CQ

## 2025-06-30 PROCEDURE — 97530 THERAPEUTIC ACTIVITIES: CPT | Mod: HCNC

## 2025-06-30 PROCEDURE — 25000003 PHARM REV CODE 250: Mod: HCNC | Performed by: INTERNAL MEDICINE

## 2025-06-30 PROCEDURE — 63600175 PHARM REV CODE 636 W HCPCS: Mod: HCNC | Performed by: INTERNAL MEDICINE

## 2025-06-30 PROCEDURE — 25000242 PHARM REV CODE 250 ALT 637 W/ HCPCS: Mod: HCNC | Performed by: INTERNAL MEDICINE

## 2025-06-30 PROCEDURE — 27100171 HC OXYGEN HIGH FLOW UP TO 24 HOURS: Mod: HCNC

## 2025-06-30 PROCEDURE — 94660 CPAP INITIATION&MGMT: CPT | Mod: HCNC

## 2025-06-30 PROCEDURE — 63600175 PHARM REV CODE 636 W HCPCS: Mod: HCNC | Performed by: STUDENT IN AN ORGANIZED HEALTH CARE EDUCATION/TRAINING PROGRAM

## 2025-06-30 PROCEDURE — 97535 SELF CARE MNGMENT TRAINING: CPT | Mod: HCNC

## 2025-06-30 PROCEDURE — 25000003 PHARM REV CODE 250: Mod: HCNC | Performed by: NURSE PRACTITIONER

## 2025-06-30 PROCEDURE — 83735 ASSAY OF MAGNESIUM: CPT | Mod: HCNC | Performed by: INTERNAL MEDICINE

## 2025-06-30 PROCEDURE — 27000221 HC OXYGEN, UP TO 24 HOURS: Mod: HCNC

## 2025-06-30 RX ADMIN — FLUTICASONE FUROATE AND VILANTEROL TRIFENATATE 1 PUFF: 100; 25 POWDER RESPIRATORY (INHALATION) at 08:06

## 2025-06-30 RX ADMIN — IPRATROPIUM BROMIDE AND ALBUTEROL SULFATE 3 ML: 2.5; .5 SOLUTION RESPIRATORY (INHALATION) at 08:06

## 2025-06-30 RX ADMIN — Medication 2000 UNITS: at 10:06

## 2025-06-30 RX ADMIN — IPRATROPIUM BROMIDE AND ALBUTEROL SULFATE 3 ML: 2.5; .5 SOLUTION RESPIRATORY (INHALATION) at 07:06

## 2025-06-30 RX ADMIN — ENOXAPARIN SODIUM 40 MG: 40 INJECTION SUBCUTANEOUS at 05:06

## 2025-06-30 RX ADMIN — HYDROXYZINE HYDROCHLORIDE 25 MG: 25 TABLET, FILM COATED ORAL at 06:06

## 2025-06-30 RX ADMIN — GUAIFENESIN AND DEXTROMETHORPHAN HYDROBROMIDE 1 TABLET: 600; 30 TABLET, EXTENDED RELEASE ORAL at 10:06

## 2025-06-30 RX ADMIN — ATORVASTATIN CALCIUM 40 MG: 40 TABLET, FILM COATED ORAL at 08:06

## 2025-06-30 RX ADMIN — LOSARTAN POTASSIUM 25 MG: 25 TABLET, FILM COATED ORAL at 10:06

## 2025-06-30 RX ADMIN — SERTRALINE 100 MG: 100 TABLET, FILM COATED ORAL at 10:06

## 2025-06-30 RX ADMIN — EZETIMIBE 10 MG: 10 TABLET ORAL at 10:06

## 2025-06-30 RX ADMIN — GUAIFENESIN AND DEXTROMETHORPHAN HYDROBROMIDE 1 TABLET: 600; 30 TABLET, EXTENDED RELEASE ORAL at 08:06

## 2025-06-30 RX ADMIN — PREDNISONE 40 MG: 20 TABLET ORAL at 10:06

## 2025-06-30 RX ADMIN — ROFLUMILAST 500 MCG: 500 TABLET ORAL at 10:06

## 2025-06-30 RX ADMIN — IPRATROPIUM BROMIDE AND ALBUTEROL SULFATE 3 ML: 2.5; .5 SOLUTION RESPIRATORY (INHALATION) at 01:06

## 2025-06-30 RX ADMIN — ASPIRIN 81 MG CHEWABLE TABLET 81 MG: 81 TABLET CHEWABLE at 10:06

## 2025-06-30 NOTE — CARE UPDATE
06/29/25 5540   Skin Integrity   $ Wound Care Tech Time 15 min   Area Observed Bridge of nose   Skin Appearance without discoloration   PRE-TX-O2   Device (Oxygen Therapy) BIPAP   $ Is the patient on High Flow Oxygen? Yes   $ Noninvasive Daily Charge Noninvasive Daily   Oxygen Concentration (%) 32   SpO2 (!) 94 %   Pulse Oximetry Type Intermittent   $ Pulse Oximetry - Multiple Charge Pulse Oximetry - Multiple   Ready to Wean/Extubation Screen   FIO2<=50 (chart decimal) 0.32   Preset CPAP/BiPAP Settings   Mode Of Delivery BiPAP   $ CPAP/BiPAP Daily Charge 1   CPAP/BIPAP charged w/in last 24 h YES   $ Initial CPAP/BiPAP Setup? No   $ Is patient using? Yes   Size of Mask Medium/Large   Sized Appropriately? No   Equipment Type Trilogy    Airway Device Type medium full face mask   Humidifier not applicable   Ipap 14   EPAP (cm H2O) 8   Pressure Support (cm H2O) 6   Set Rate (Breaths/Min) 18   ITime (sec) 0.9   Patient CPAP/BiPAP Settings   RR Total (Breaths/Min) 19   Tidal Volume (mL) 565   VE Minute Ventilation (L/min) 9.8 L/min   Peak Inspiratory Pressure (cm H2O) 14   Total Leak (L/Min) 31   Patient Trigger - ST Mode Only (%) 88   CPAP/BiPAP Alarms   Minute Ventilation (L/Min) 2   High RR (breaths/min) 45   Low RR (breaths/min) 5   Respiratory Evaluation   $ Care Plan Tech Time 15 min

## 2025-06-30 NOTE — CARE UPDATE
06/29/25 1928   Patient Assessment/Suction   Level of Consciousness (AVPU) alert   Respiratory Effort Normal;Unlabored   Expansion/Accessory Muscles/Retractions no retractions;no use of accessory muscles   All Lung Fields Breath Sounds Anterior:;diminished;clear   Rhythm/Pattern, Respiratory unlabored;pattern regular;depth regular;no shortness of breath reported   Cough Frequency infrequent   Cough Type no productive sputum   Skin Integrity   $ Wound Care Tech Time 15 min   Area Observed Left;Right;Nares   Skin Appearance without discoloration   PRE-TX-O2   Device (Oxygen Therapy) nasal cannula   $ Is the patient on Low Flow Oxygen? Yes   Flow (L/min) (Oxygen Therapy) 3   SpO2 97 %   Pulse Oximetry Type Intermittent   $ Pulse Oximetry - Multiple Charge Pulse Oximetry - Multiple   Pulse 73   Resp 20   Aerosol Therapy   $ Aerosol Therapy Charges Aerosol Treatment   Daily Review of Necessity (SVN) completed   Respiratory Treatment Status (SVN) given   Treatment Route (SVN) air;mask   Patient Position HOB elevated   Post Treatment Assessment (SVN) breath sounds unchanged   Signs of Intolerance (SVN) none   Breath Sounds Post-Respiratory Treatment   Post-treatment Heart Rate (beats/min) 73   Post-treatment Resp Rate (breaths/min) 20   Incentive Spirometer   $ Incentive Spirometer Charges done with encouragement   Administration (IS) instruction provided, follow-up;mouthpiece utilized;proper technique demonstrated   Number of Repetitions (IS) 10   Level Incentive Spirometer (mL) 1000   Patient Tolerance (IS) good;no adverse signs/symptoms present   Preset CPAP/BiPAP Settings   Mode Of Delivery BiPAP;standby   CPAP/BIPAP charged w/in last 24 h YES   Respiratory Evaluation   $ Care Plan Tech Time 30 min

## 2025-06-30 NOTE — ASSESSMENT & PLAN NOTE
Patient's blood pressure range in the last 24 hours was: BP  Min: 118/52  Max: 162/66.The patient's inpatient anti-hypertensive regimen is listed below:  Current Antihypertensives  losartan tablet 25 mg, Daily, Oral    Plan  - BP is controlled, no changes needed to their regimen  -blood pressure appears elevated today(06/28) we will follow up after administration of medications.  If needed losartan will be increased in dose  -low-salt diet  - monitor vitals

## 2025-06-30 NOTE — PROGRESS NOTES
[facility] Medicine  Progress Note    Patient Name: Terri Phelan  MRN: [id number]  Patient Class: IP- Inpatient   Admission Date: 6/26/2025  Length of Stay: 4 days  Attending Physician: Kasi Tellez MD  Primary Care Provider: Sussy Mason MD        Subjective     Principal Problem:Acute on chronic respiratory failure with hypoxia and hypercapnia        HPI:  69-year-old female with a past medical history of COPD on 2L of O2 via NC (uses 3L with exertion), diastolic HF, HTN, HLD, anxiety, depression, JUAN (uses bipap every night), hx of MI (but no stenosis noted on angiogram); came to emergency department due to shortness for breath and cough.  She states she has recently treated for COPD exacerbation and discharged on oral steroids and Augmentin, which she recently completed.  She states she was feeling well until yesterday when her symptoms returned.  She denied chest pain palpitation but complains of shortness for breath, cough and right lower extremity cramping.    In the emergency department the patient had a blood pressure of 122/58, heart rate of 110, temperature 98.7°, respiratory rate of 29.  ABG showed a pH of 7.245 5, bicarb of 89.  She was on BiPAP continuously.  She was seen by pulmonology team we will request the BiPAP be switched to at night only.  WBC 6.31, hemoglobin 12.3, platelet 173.  Sodium 144, bicarb 34, anion gap 7, magnesium 2.9.  ABG results reviewed.  Chest x-ray shows possible pulmonary emphysema.  Patient will be admitted for further management of COPD exacerbation.              Overview/Hospital Course:  No notes on file    Interval History:   NAEO  Pt back to her baseline O2. She's still symptomatic with exertional dyspnea only able to get to bathroom before getting SOB. Reports nasal congestion but no coughing    Review of Systems   Respiratory:  Positive for shortness of breath.    All other systems reviewed and are negative.    Objective:     Vital Signs  (Most Recent):  Temp: 98 °F (36.7 °C) (06/30/25 1159)  Pulse: 62 (06/30/25 1215)  Resp: 18 (06/30/25 1159)  BP: 129/68 (06/30/25 1159)  SpO2: 96 % (06/30/25 1159) Vital Signs (24h Range):  Temp:  [97.7 °F (36.5 °C)-99 °F (37.2 °C)] 98 °F (36.7 °C)  Pulse:  [57-91] 62  Resp:  [17-20] 18  SpO2:  [91 %-98 %] 96 %  BP: (118-162)/(52-73) 129/68     Weight: 81.2 kg (179 lb 0.2 oz)  Body mass index is 28.89 kg/m².    Intake/Output Summary (Last 24 hours) at 6/30/2025 1221  Last data filed at 6/30/2025 0427  Gross per 24 hour   Intake 840 ml   Output 900 ml   Net -60 ml         Physical Exam  Vitals reviewed.   Constitutional:       General: She is not in acute distress.     Appearance: She is obese. She is ill-appearing.   Cardiovascular:      Rate and Rhythm: Normal rate and regular rhythm.      Pulses: Normal pulses.      Heart sounds: Normal heart sounds. No murmur heard.  Pulmonary:      Effort: Pulmonary effort is normal. No respiratory distress.      Breath sounds: Normal breath sounds. No wheezing.      Comments: Reduced breath sounds in bases  Abdominal:      General: Abdomen is flat. Bowel sounds are normal. There is no distension.      Palpations: Abdomen is soft.   Musculoskeletal:         General: No swelling. Normal range of motion.   Skin:     General: Skin is warm and dry.      Capillary Refill: Capillary refill takes less than 2 seconds.      Coloration: Skin is not jaundiced.   Neurological:      General: No focal deficit present.      Mental Status: She is alert and oriented to person, place, and time.               Significant Labs: All pertinent labs within the past 24 hours have been reviewed.  Recent Lab Results         06/30/25  0458        Anion Gap 7       Baso # 0.01       Basophil % 0.1       BUN 30       Calcium 8.7       Chloride 103       CO2 30       Creatinine 0.7       eGFR >60  Comment: Estimated GFR calculated using the CKD-EPI creatinine (2021) equation.       Eos # 0.01       Eos %  0.1       Glucose 82       Gran # (ANC) 7.11       Hematocrit 39.1       Hemoglobin 11.5       Immature Grans (Abs) 0.05  Comment: Mild elevation in immature granulocytes is non specific and can be seen in a variety of conditions including stress response, acute inflammation, trauma and pregnancy. Correlation with other laboratory and clinical findings is essential.       Immature Granulocytes 0.5       Lymph # 2.13       Lymph % 19.9       Magnesium  2.1       MCH 26.1       MCHC 29.4       MCV 89       Mono # 1.39       Mono % 13.0       MPV 12.2       Neut % 66.4       nRBC 0       Phosphorus Level 3.2       Platelet Count 174       Potassium 4.8       RBC 4.41       RDW 14.2       Sodium 140       WBC 10.70               Significant Imaging: I have reviewed all pertinent imaging results/findings within the past 24 hours.      Assessment & Plan  Acute on chronic respiratory failure with hypoxia and hypercapnia  Patient with Hypercapnic Respiratory failure which is Acute on chronic.  she is on home oxygen at 3 LPM. Supplemental oxygen was provided and noted- Oxygen Concentration (%):  [32] 32    .   Signs/symptoms of respiratory failure include- tachypnea, increased work of breathing, respiratory distress, and wheezing. Contributing diagnoses includes - COPD Labs and images were reviewed. Patient Has recent ABG, which has been reviewed. Will treat underlying causes and adjust management of respiratory failure as follows- Bipap at night    -Continue supplemental oxygen and bronchodilators  -Stop IV methylprednisolone and start prednisone 40mg daily.   - duoneb q6h  HTN (hypertension)  Patient's blood pressure range in the last 24 hours was: BP  Min: 118/52  Max: 162/66.The patient's inpatient anti-hypertensive regimen is listed below:  Current Antihypertensives  losartan tablet 25 mg, Daily, Oral    Plan  - BP is controlled, no changes needed to their regimen  -blood pressure appears elevated today(06/28) we will  follow up after administration of medications.  If needed losartan will be increased in dose  -low-salt diet  - monitor vitals  Centrilobular emphysema  Patient's COPD is with exacerbation noted by continued dyspnea currently.  Patient is currently on COPD Pathway. Continue scheduled inhalers Steroids and Supplemental oxygen and monitor respiratory status closely.     -seen by pulmonology  -Start PO prednisone 40mg daily.   -BiPAP at night  Oxygen dependent  -3L nasal cannula at home    Muscle cramp  -patient complaining of cramps in her right lower extremity  -states she uses Robaxin at home, has also use Flexeril in the past  -improved.  Flexeril discontinued    Tremor  -observe tremors of head and bilateral upper extremities.  Patient states she feels it has worsened with Zoloft  -patient was on Zoloft and Lexapro, states Lexapro was discontinued before admission.  It has been stopped  -PT/OT ordered, recommended SNF    VTE Risk Mitigation (From admission, onward)           Ordered     enoxaparin injection 40 mg  Daily         06/26/25 1004     IP VTE HIGH RISK PATIENT  Once         06/26/25 1004     Place sequential compression device  Until discontinued         06/26/25 1004                    Discharge Planning   RADHA: 7/1/2025     Code Status: Full Code   Medical Readiness for Discharge Date:   Discharge Plan A: Skilled Nursing Facility                  Please place Justification for DME      Kasi Tellez MD  Department of Hospital Medicine   [place] [facility]

## 2025-06-30 NOTE — PLAN OF CARE
Problem: Physical Therapy  Goal: Physical Therapy Goal  Description: Goals to be met by: 2025    Patient will increase functional independence with mobility by performin. Sit<>stand with SBA with RW.  2. Gait x 100 feet with RW with SBA.  3. Supine<>sit with SBA.      Outcome: Progressing

## 2025-06-30 NOTE — PT/OT/SLP PROGRESS
Physical Therapy Treatment    Patient Name:  Terri Phelan   MRN:  [id number]    Recommendations:     Discharge Recommendations: Moderate Intensity Therapy  Discharge Equipment Recommendations: hip kit  Barriers to discharge: decreased mobility,strength and endurance    Assessment:     Terri Phelan is a 69 y.o. female admitted with a medical diagnosis of Acute on chronic respiratory failure with hypoxia and hypercapnia.  She presents with the following impairments/functional limitations: weakness, impaired endurance, impaired functional mobility, gait instability, impaired balance, decreased lower extremity function, impaired coordination, impaired cardiopulmonary response to activity,pt with good participation and requires assistance with mobility at this time,decreased endurance,pt will benefit from moderate intensity therapy upon discharge.    Rehab Prognosis: Good; patient would benefit from acute skilled PT services to address these deficits and reach maximum level of function.    Recent Surgery: * No surgery found *      Plan:     During this hospitalization, patient to be seen 5 x/week to address the identified rehab impairments via gait training, therapeutic activities, therapeutic exercises, neuromuscular re-education and progress toward the following goals:    Plan of Care Expires:  07/28/25    Subjective     Chief Complaint: n/a  Patient/Family Comments/goals: pt agreeable to rx.  Pain/Comfort:  Pain Rating 1: 0/10      Objective:     Communicated with nsg prior to session.  Patient found up in chair with oxygen, PureWick, peripheral IV, telemetry upon PT entry to room.     General Precautions: Standard, fall, respiratory  Orthopedic Precautions: N/A  Braces: N/A  Respiratory Status: supplemental O2     Functional Mobility:  Transfers:     Sit to Stand:  contact guard assistance and minimum assistance with rolling walker  Gait: amb ~28' with RW and SBA with decreased pace and O2 donned  Balance:  fair standing balance with RW      AM-PAC 6 CLICK MOBILITY  Turning over in bed (including adjusting bedclothes, sheets and blankets)?: 3  Sitting down on and standing up from a chair with arms (e.g., wheelchair, bedside commode, etc.): 3  Moving from lying on back to sitting on the side of the bed?: 3  Moving to and from a bed to a chair (including a wheelchair)?: 3  Need to walk in hospital room?: 3  Climbing 3-5 steps with a railing?: 3  Basic Mobility Total Score: 18       Treatment & Education: le seated ex's x 10 reps inc: ap,laq's and hip flex,pt's requests met.      Patient left up in chair with all lines intact, call button in reach, and nsg notified..    GOALS: see general POC  Multidisciplinary Problems       Physical Therapy Goals          Problem: Physical Therapy    Goal Priority Disciplines Outcome Interventions   Physical Therapy Goal     PT, PT/OT Progressing    Description: Goals to be met by: 2025    Patient will increase functional independence with mobility by performin. Sit<>stand with SBA with RW.  2. Gait x 100 feet with RW with SBA.  3. Supine<>sit with SBA.                           DME Justifications: see PT recs      Time Tracking:     PT Received On: 25  PT Start Time: 1016     PT Stop Time: 1040  PT Total Time (min): 24 min     Billable Minutes: Gait Training 13 and Therapeutic Exercise 11    Treatment Type: Treatment  PT/PTA: PTA     Number of PTA visits since last PT visit: 2025

## 2025-06-30 NOTE — PLAN OF CARE
Problem: Adult Inpatient Plan of Care  Goal: Plan of Care Review  Outcome: Progressing  Goal: Patient-Specific Goal (Individualized)  Outcome: Progressing  Goal: Absence of Hospital-Acquired Illness or Injury  Outcome: Progressing  Goal: Optimal Comfort and Wellbeing  Outcome: Progressing  Goal: Readiness for Transition of Care  Outcome: Progressing     Problem: COPD (Chronic Obstructive Pulmonary Disease)  Goal: Optimal Chronic Illness Coping  Outcome: Progressing  Goal: Optimal Level of Functional [place]  Outcome: Progressing  Goal: Absence of Infection Signs and Symptoms  Outcome: Progressing  Goal: Improved Oral Intake  Outcome: Progressing  Goal: Effective Oxygenation and Ventilation  Outcome: Progressing     Problem: Comorbidity Management  Goal: Maintenance of Asthma Control  Outcome: Progressing  Goal: Maintenance of COPD Symptom Control  Outcome: Progressing  Goal: Blood Pressure in Desired Range  Outcome: Progressing     Problem: Fall Injury Risk  Goal: Absence of Fall and Fall-Related Injury  Outcome: Progressing

## 2025-06-30 NOTE — ASSESSMENT & PLAN NOTE
Patient's COPD is with exacerbation noted by continued dyspnea currently.  Patient is currently on COPD Pathway. Continue scheduled inhalers Steroids and Supplemental oxygen and monitor respiratory status closely.     -seen by pulmonology  -Start PO prednisone 40mg daily.   -BiPAP at night

## 2025-06-30 NOTE — PT/OT/SLP PROGRESS
Occupational Therapy   Treatment    Name: Terri Phelan  MRN: [id number]  Admitting Diagnosis:  Acute on chronic respiratory failure with hypoxia and hypercapnia       Recommendations:     Discharge Recommendations: Moderate Intensity Therapy  Discharge Equipment Recommendations:  other (see comments), hip kit, walker, rolling, rollator, oxygen (life alert)  Barriers to discharge:  Decreased caregiver support    Assessment:     Terri Phelan is a 69 y.o. female with a medical diagnosis of Acute on chronic respiratory failure with hypoxia and hypercapnia.  She presents with the following performance deficits affecting function: weakness, impaired endurance, impaired sensation, impaired self care skills, impaired functional mobility, gait instability, impaired balance, decreased coordination, decreased lower extremity function, decreased safety awareness, impaired cardiopulmonary response to activity.     Pt was agreeable to and participated in OT  session.  During today's session, pt worked on func mobility and ADLS.  Pt able to complete ADLS with SBA and func mobility with SBA - CGA.  Pt able to walk within room using RW with CGA.  She continues to present with UE tremors throughout her session.  Pt's goals remain appropriate at this time.  She will continue to benefit from skilled OT services in order to assist her with increasing her safety and level of independence with self care and mobility tasks.       Rehab Prognosis:  Good; patient would benefit from acute skilled OT services to address these deficits and reach maximum level of function.       Plan:     Patient to be seen 3 x/week to address the above listed problems via self-care/home management, therapeutic activities, therapeutic exercises  Plan of Care Expires: 07/27/25  Plan of Care Reviewed with: patient    Subjective     Chief Complaint: fatigue and weakness  Patient/Family Comments/goals: get stronger  Pain/Comfort:  Pain Rating 1: 0/10  Pain  Rating Post-Intervention 1: 0/10    Objective:     Communicated with: nurse prior to session.  Patient found HOB elevated with oxygen, PureWick, telemetry, bed alarm upon OT entry to room.    General Precautions: Standard, fall    Orthopedic Precautions:N/A  Braces: N/A  Respiratory Status: Nasal cannula, flow 3 L/min     Occupational Performance:     Bed Mobility:    Patient completed Scooting/Bridging with stand by assistance  Patient completed Supine to Sit with stand by assistance     Functional Mobility/Transfers:  Patient completed Sit <> Stand Transfer with contact guard assistance  with  rolling walker   Patient completed Bed <> Chair Transfer using Step Transfer technique with contact guard assistance with rolling walker  Patient completed Toilet Transfer Step Transfer technique with stand by assistance with  rolling walker and grab bars  Functional Mobility: pt able to walk within the room using RW and CGA    Activities of Daily Living:  Grooming: stand by assistance standing at sink to wash hands  Toileting: stand by assistance to complete all steps in toileting      [facility] 6 Click ADL: 19    Treatment & Education:  Pt completed ADLs and func mobility activities for tx session as noted above  Pt educated on role of OT and POC      Patient left up in chair with all lines intact, call button in reach, and chair alarm on    GOALS:   Multidisciplinary Problems       Occupational Therapy Goals          Problem: Occupational Therapy    Goal Priority Disciplines Outcome Interventions   Occupational Therapy Goal     OT, PT/OT Progressing    Description: Goals to be met by: 07/27/2025      Patient will increase functional independence with ADLs by performing:    UE Dressing with Modified [place].  LE Dressing with Modified [place].  Grooming while standing with Modified [place].  Toileting from toilet with Modified [place] for hygiene and clothing management.   Toilet transfer to toilet with  Modified [place].  Increased functional strength to WFL for self care.  Upper extremity exercise program x10 reps per handout, with independence.                          DME Justifications:  TBD    Time Tracking:     OT Date of Treatment: 06/30/25  OT Start Time: 0904  OT Stop Time: 0942  OT Total Time (min): 38 min    Billable Minutes:Self Care/Home Management 15  Therapeutic Activity 23    OT/HUNTER: OT     Number of HUNTER visits since last OT visit: 0    6/30/2025

## 2025-06-30 NOTE — SUBJECTIVE & OBJECTIVE
Interval History:   NAEO  Pt back to her baseline O2. She's still symptomatic with exertional dyspnea only able to get to bathroom before getting SOB. Reports nasal congestion but no coughing    Review of Systems   Respiratory:  Positive for shortness of breath.    All other systems reviewed and are negative.    Objective:     Vital Signs (Most Recent):  Temp: 98 °F (36.7 °C) (06/30/25 1159)  Pulse: 62 (06/30/25 1215)  Resp: 18 (06/30/25 1159)  BP: 129/68 (06/30/25 1159)  SpO2: 96 % (06/30/25 1159) Vital Signs (24h Range):  Temp:  [97.7 °F (36.5 °C)-99 °F (37.2 °C)] 98 °F (36.7 °C)  Pulse:  [57-91] 62  Resp:  [17-20] 18  SpO2:  [91 %-98 %] 96 %  BP: (118-162)/(52-73) 129/68     Weight: 81.2 kg (179 lb 0.2 oz)  Body mass index is 28.89 kg/m².    Intake/Output Summary (Last 24 hours) at 6/30/2025 1221  Last data filed at 6/30/2025 0427  Gross per 24 hour   Intake 840 ml   Output 900 ml   Net -60 ml         Physical Exam  Vitals reviewed.   Constitutional:       General: She is not in acute distress.     Appearance: She is obese. She is ill-appearing.   Cardiovascular:      Rate and Rhythm: Normal rate and regular rhythm.      Pulses: Normal pulses.      Heart sounds: Normal heart sounds. No murmur heard.  Pulmonary:      Effort: Pulmonary effort is normal. No respiratory distress.      Breath sounds: Normal breath sounds. No wheezing.      Comments: Reduced breath sounds in bases  Abdominal:      General: Abdomen is flat. Bowel sounds are normal. There is no distension.      Palpations: Abdomen is soft.   Musculoskeletal:         General: No swelling. Normal range of motion.   Skin:     General: Skin is warm and dry.      Capillary Refill: Capillary refill takes less than 2 seconds.      Coloration: Skin is not jaundiced.   Neurological:      General: No focal deficit present.      Mental Status: She is alert and oriented to person, place, and time.               Significant Labs: All pertinent labs within the past 24  hours have been reviewed.  Recent Lab Results         06/30/25  0458        Anion Gap 7       Baso # 0.01       Basophil % 0.1       BUN 30       Calcium 8.7       Chloride 103       CO2 30       Creatinine 0.7       eGFR >60  Comment: Estimated GFR calculated using the CKD-EPI creatinine (2021) equation.       Eos # 0.01       Eos % 0.1       Glucose 82       Gran # (ANC) 7.11       Hematocrit 39.1       Hemoglobin 11.5       Immature Grans (Abs) 0.05  Comment: Mild elevation in immature granulocytes is non specific and can be seen in a variety of conditions including stress response, acute inflammation, trauma and pregnancy. Correlation with other laboratory and clinical findings is essential.       Immature Granulocytes 0.5       Lymph # 2.13       Lymph % 19.9       Magnesium  2.1       MCH 26.1       MCHC 29.4       MCV 89       Mono # 1.39       Mono % 13.0       MPV 12.2       Neut % 66.4       nRBC 0       Phosphorus Level 3.2       Platelet Count 174       Potassium 4.8       RBC 4.41       RDW 14.2       Sodium 140       WBC 10.70               Significant Imaging: I have reviewed all pertinent imaging results/findings within the past 24 hours.

## 2025-06-30 NOTE — PLAN OF CARE
SW met with pt at bedside and pts daughter was on phone to participate in dc assessment. All information on chart confirmed. Pt resides in home alone. Pt requires assistance with bathing. Pt reports that she is independent but requires assistance with some ADLs. Pt reports that her daughter works so support is limited. Pt has dme stated below. Pt expressed that she is on 3L O2 at baseline. Pt has portable O2 and concentrator at home. Pt reports no HH at this time. Pt reports that she is agreeable to SNF facility at time of dc. Pt prefers hospital based SNF facilities. Pts first preference is [facility], second choice [facility] bed. [facility] no [facility] can [facility] pt. Pts daughter will complete admission paperwork. List of local snf agencies sent to daughters phone from pts phone. SW will continue to follow pt throughout her transitions of care and assist with any dc needs.       Mulugeta Phelan (Daughter)  [phone]        06/30/25 1125   Discharge Assessment   Assessment Type Discharge Planning Assessment   Confirmed/corrected address, phone number and insurance Yes   Confirmed Demographics Correct on Facesheet   Source of Information patient   Communicated RADHA with patient/caregiver Yes   People in Home alone   Do you expect to return to your current living situation? Yes   Do you have help at home or someone to help you manage your care at home? Yes   Who are your caregiver(s) and their phone number(s)? Mulugeta Phelan (Daughter)  [phone] (   Prior to hospitilization cognitive status: Alert/Oriented   Current cognitive status: Alert/Oriented   Walking or Climbing Stairs Difficulty no   Dressing/Bathing Difficulty yes   Dressing/Bathing bathing difficulty, assistance 1 person   Home Layout Able to live on 1st floor   Equipment Currently Used at Home oxygen;CPAP;rollator;walker, rolling;nebulizer   Readmission within 30 days? No   Patient  currently being followed by outpatient case management? No   Do you currently have service(s) that help you manage your care at home? No   Do you take prescription medications? Yes   Do you have prescription coverage? Yes   Do you have any problems affording any of your prescribed medications? No   Is the patient taking medications as prescribed? yes   Who is going to help you get home at discharge? Mulugeta Phelan (Daughter)  [phone]   How do you get to doctors appointments? family or friend will provide   Are you on dialysis? No   Do you take coumadin? No   Discharge Plan A Skilled Nursing Facility   Discharge Plan B Home Health   DME Needed Upon Discharge  none   Discharge Plan discussed with: Patient   Transition of Care Barriers None   Financial Resource Strain   How hard is it for you to pay for the very basics like food, housing, medical care, and heating? Not hard   Housing Stability   In the last 12 months, was there a time when you were not able to pay the mortgage or rent on time? N   At any time in the past 12 months, were you homeless or living in a shelter (including now)? N   Transportation Needs   In the past 12 months, has lack of transportation kept you from medical appointments or from getting medications? no   In the past 12 months, has lack of transportation kept you from meetings, work, or from getting things needed for daily living? No   Food Insecurity   Within the past 12 months, you worried that your food would run out before you got the money to buy more. Never true   Within the past 12 months, the food you bought just didn't last and you didn't have money to get more. Never true   Alcohol Use   Q1: How often do you have a drink containing alcohol? Never   Q2: How many drinks containing alcohol do you have on a typical day when you are drinking? None   Q3: How often do you have six or more drinks on one occasion? Never   Utilities   In the past 12 months has the electric, gas, oil, or  water company threatened to shut off services in your home? No   Health Literacy   How often do you need to have someone help you when you read instructions, pamphlets, or other written material from your doctor or pharmacy? Sometimes

## 2025-06-30 NOTE — ASSESSMENT & PLAN NOTE
Patient with Hypercapnic Respiratory failure which is Acute on chronic.  she is on home oxygen at 3 LPM. Supplemental oxygen was provided and noted- Oxygen Concentration (%):  [32] 32    .   Signs/symptoms of respiratory failure include- tachypnea, increased work of breathing, respiratory distress, and wheezing. Contributing diagnoses includes - COPD Labs and images were reviewed. Patient Has recent ABG, which has been reviewed. Will treat underlying causes and adjust management of respiratory failure as follows- Bipap at night    -Continue supplemental oxygen and bronchodilators  -Stop IV methylprednisolone and start prednisone 40mg daily.   - duoneb q6h

## 2025-06-30 NOTE — ASSESSMENT & PLAN NOTE
-observe tremors of head and bilateral upper extremities.  Patient states she feels it has worsened with Zoloft  -patient was on Zoloft and Lexapro, states Lexapro was discontinued before admission.  It has been stopped  -PT/OT ordered, recommended SNF

## 2025-07-01 LAB
BACTERIA BLD CULT: NORMAL
BACTERIA BLD CULT: NORMAL
SARS-COV-2 RDRP RESP QL NAA+PROBE: NEGATIVE

## 2025-07-01 PROCEDURE — 25000003 PHARM REV CODE 250: Mod: HCNC | Performed by: INTERNAL MEDICINE

## 2025-07-01 PROCEDURE — 94799 UNLISTED PULMONARY SVC/PX: CPT | Mod: HCNC

## 2025-07-01 PROCEDURE — 63600175 PHARM REV CODE 636 W HCPCS: Mod: HCNC | Performed by: STUDENT IN AN ORGANIZED HEALTH CARE EDUCATION/TRAINING PROGRAM

## 2025-07-01 PROCEDURE — 97530 THERAPEUTIC ACTIVITIES: CPT | Mod: HCNC

## 2025-07-01 PROCEDURE — 94640 AIRWAY INHALATION TREATMENT: CPT | Mod: HCNC

## 2025-07-01 PROCEDURE — 86580 TB INTRADERMAL TEST: CPT | Mod: HCNC | Performed by: HOSPITALIST

## 2025-07-01 PROCEDURE — U0002 COVID-19 LAB TEST NON-CDC: HCPCS | Mod: HCNC | Performed by: HOSPITALIST

## 2025-07-01 PROCEDURE — 21400001 HC TELEMETRY ROOM: Mod: HCNC

## 2025-07-01 PROCEDURE — 97530 THERAPEUTIC ACTIVITIES: CPT | Mod: HCNC,CQ

## 2025-07-01 PROCEDURE — 63600175 PHARM REV CODE 636 W HCPCS: Mod: HCNC | Performed by: INTERNAL MEDICINE

## 2025-07-01 PROCEDURE — 97535 SELF CARE MNGMENT TRAINING: CPT | Mod: HCNC

## 2025-07-01 PROCEDURE — 25000242 PHARM REV CODE 250 ALT 637 W/ HCPCS: Mod: HCNC | Performed by: INTERNAL MEDICINE

## 2025-07-01 PROCEDURE — 94761 N-INVAS EAR/PLS OXIMETRY MLT: CPT | Mod: HCNC

## 2025-07-01 PROCEDURE — 99900035 HC TECH TIME PER 15 MIN (STAT): Mod: HCNC

## 2025-07-01 PROCEDURE — 97116 GAIT TRAINING THERAPY: CPT | Mod: HCNC,CQ

## 2025-07-01 PROCEDURE — 94660 CPAP INITIATION&MGMT: CPT | Mod: HCNC

## 2025-07-01 PROCEDURE — 25000003 PHARM REV CODE 250: Mod: HCNC | Performed by: NURSE PRACTITIONER

## 2025-07-01 PROCEDURE — 25000003 PHARM REV CODE 250: Mod: HCNC | Performed by: STUDENT IN AN ORGANIZED HEALTH CARE EDUCATION/TRAINING PROGRAM

## 2025-07-01 PROCEDURE — 30200315 PPD INTRADERMAL TEST REV CODE 302: Mod: HCNC | Performed by: HOSPITALIST

## 2025-07-01 RX ADMIN — ASPIRIN 81 MG CHEWABLE TABLET 81 MG: 81 TABLET CHEWABLE at 10:07

## 2025-07-01 RX ADMIN — IPRATROPIUM BROMIDE AND ALBUTEROL SULFATE 3 ML: 2.5; .5 SOLUTION RESPIRATORY (INHALATION) at 07:07

## 2025-07-01 RX ADMIN — HYDROXYZINE HYDROCHLORIDE 25 MG: 25 TABLET, FILM COATED ORAL at 08:07

## 2025-07-01 RX ADMIN — SERTRALINE 100 MG: 100 TABLET, FILM COATED ORAL at 10:07

## 2025-07-01 RX ADMIN — LOSARTAN POTASSIUM 25 MG: 25 TABLET, FILM COATED ORAL at 10:07

## 2025-07-01 RX ADMIN — HYDROXYZINE HYDROCHLORIDE 25 MG: 25 TABLET, FILM COATED ORAL at 01:07

## 2025-07-01 RX ADMIN — IPRATROPIUM BROMIDE AND ALBUTEROL SULFATE 3 ML: 2.5; .5 SOLUTION RESPIRATORY (INHALATION) at 11:07

## 2025-07-01 RX ADMIN — ENOXAPARIN SODIUM 40 MG: 40 INJECTION SUBCUTANEOUS at 05:07

## 2025-07-01 RX ADMIN — TUBERCULIN PURIFIED PROTEIN DERIVATIVE 5 UNITS: 5 INJECTION INTRADERMAL at 01:07

## 2025-07-01 RX ADMIN — Medication 2000 UNITS: at 10:07

## 2025-07-01 RX ADMIN — PREDNISONE 40 MG: 20 TABLET ORAL at 10:07

## 2025-07-01 RX ADMIN — GUAIFENESIN AND DEXTROMETHORPHAN HYDROBROMIDE 1 TABLET: 600; 30 TABLET, EXTENDED RELEASE ORAL at 08:07

## 2025-07-01 RX ADMIN — FLUTICASONE FUROATE AND VILANTEROL TRIFENATATE 1 PUFF: 100; 25 POWDER RESPIRATORY (INHALATION) at 07:07

## 2025-07-01 RX ADMIN — GUAIFENESIN AND DEXTROMETHORPHAN HYDROBROMIDE 1 TABLET: 600; 30 TABLET, EXTENDED RELEASE ORAL at 10:07

## 2025-07-01 RX ADMIN — ROFLUMILAST 500 MCG: 500 TABLET ORAL at 10:07

## 2025-07-01 NOTE — PLAN OF CARE
Medicare Message     Important Message from Medicare regarding Discharge Appeal Rights Other (comments)Important Message from Medicare regarding Discharge Appeal Rights. Other (comments). Has comment. Taken on 7/1/25 1028   Date IMM was signed 7/1/2025   Time IMM was signed [id number]

## 2025-07-01 NOTE — ASSESSMENT & PLAN NOTE
Patient with Hypercapnic Respiratory failure which is Acute on chronic.  she is on home oxygen at 3 LPM. Supplemental oxygen was provided and noted- Oxygen Concentration (%):  [32] 32    .   Signs/symptoms of respiratory failure include- tachypnea, increased work of breathing, respiratory distress, and wheezing. Contributing diagnoses includes - COPD Labs and images were reviewed. Patient Has recent ABG, which has been reviewed. Will treat underlying causes and adjust management of respiratory failure as follows- Bipap at night    Patient's COPD is with exacerbation noted by continued dyspnea currently.  Patient is currently on COPD Pathway. Continue scheduled inhalers Steroids and Supplemental oxygen and monitor respiratory status closely.     -seen by pulmonology  -continue PO prednisone 40mg daily for 5 day course   -BiPAP at night  -continue controller inhaler and scheduled nebulizer; continue roflumilast

## 2025-07-01 NOTE — PLAN OF CARE
SW met with pt at bedside to discuss dc planning. No dc today. At time of dc pt will dc to snf facility. Accepting snf facility still pending at this time. SW will continue to follow pt throughout her transitions of care and assist with any dc needs.     Future Appointments   Date Time Provider Department Center   7/9/2025  1:20 PM Sussy Mason MD [id number] [place]   7/16/2025  2:30 PM CV [facility] E[facility] [place]   7/21/2025 10:30 AM Meka Christensen DPM [facility] [address]   8/14/2025  1:00 PM aKitlin Donovan, RAJEEV [facility] [place]        07/01/25 0908   Rounds   Attendance ;Assigned nurse;Pharmacist;Provider   Discharge Plan A Skilled Nursing Facility   Why the patient remains in the hospital Requires continued medical care   Transition of Care Barriers None

## 2025-07-01 NOTE — ASSESSMENT & PLAN NOTE
Patient's blood pressure range in the last 24 hours was: BP  Min: 110/63  Max: 134/63.The patient's inpatient anti-hypertensive regimen is listed below:  Current Antihypertensives  losartan tablet 25 mg, Daily, Oral    Plan  - BP is controlled, no changes needed to their regimen

## 2025-07-01 NOTE — SUBJECTIVE & OBJECTIVE
Interval History:  Reports feeling significantly weaker than baseline; some of this is associated with her breathing which is improving daily but still not completely back to baseline.  She discussed significant muscle cramps with statin use; will discontinue for now.  Can revisit benefit of statin with her PCP    Review of Systems  Objective:     Vital Signs (Most Recent):  Temp: 98.1 °F (36.7 °C) (07/01/25 0752)  Pulse: 65 (07/01/25 0752)  Resp: 18 (07/01/25 0752)  BP: (!) 128/52 (07/01/25 0752)  SpO2: (!) 93 % (07/01/25 0752) Vital Signs (24h Range):  Temp:  [98 °F (36.7 °C)-98.7 °F (37.1 °C)] 98.1 °F (36.7 °C)  Pulse:  [60-72] 65  Resp:  [18-22] 18  SpO2:  [92 %-97 %] 93 %  BP: (110-134)/(52-68) 128/52     Weight: 81.2 kg (179 lb 0.2 oz)  Body mass index is 28.89 kg/m².    Intake/Output Summary (Last 24 hours) at 7/1/2025 1132  Last data filed at 6/30/2025 2145  Gross per 24 hour   Intake 180 ml   Output --   Net 180 ml         Physical Exam  Vitals reviewed.   Constitutional:       General: She is not in acute distress.     Appearance: She is obese. She is ill-appearing.   Cardiovascular:      Rate and Rhythm: Normal rate and regular rhythm.      Pulses: Normal pulses.      Heart sounds: Normal heart sounds. No murmur heard.  Pulmonary:      Effort: Pulmonary effort is normal. No respiratory distress.      Breath sounds: Normal breath sounds. No wheezing.      Comments: Reduced breath sounds in bases.  On home O2  Abdominal:      General: Abdomen is flat. Bowel sounds are normal. There is no distension.      Palpations: Abdomen is soft.   Musculoskeletal:         General: No swelling. Normal range of motion.   Skin:     General: Skin is warm and dry.      Capillary Refill: Capillary refill takes less than 2 seconds.      Coloration: Skin is not jaundiced.   Neurological:      General: No focal deficit present.      Mental Status: She is alert and oriented to person, place, and time.               Significant  Labs: All pertinent labs within the past 24 hours have been reviewed.    Significant Imaging: I have reviewed all pertinent imaging results/findings within the past 24 hours.

## 2025-07-01 NOTE — PROGRESS NOTES
[facility] Medicine  Progress Note    Patient Name: Terri Phelan  MRN: [id number]  Patient Class: IP- Inpatient   Admission Date: 6/26/2025  Length of Stay: 5 days  Attending Physician: Tisro Valenzuela,*  Primary Care Provider: Sussy Mason MD        Subjective     Principal Problem:Acute on chronic respiratory failure with hypoxia and hypercapnia        HPI:  69-year-old female with a past medical history of COPD on 2L of O2 via NC (uses 3L with exertion), diastolic HF, HTN, HLD, anxiety, depression, JUAN (uses bipap every night), hx of MI (but no stenosis noted on angiogram); came to emergency department due to shortness for breath and cough.  She states she has recently treated for COPD exacerbation and discharged on oral steroids and Augmentin, which she recently completed.  She states she was feeling well until yesterday when her symptoms returned.  She denied chest pain palpitation but complains of shortness for breath, cough and right lower extremity cramping.    In the emergency department the patient had a blood pressure of 122/58, heart rate of 110, temperature 98.7°, respiratory rate of 29.  ABG showed a pH of 7.245 5, bicarb of 89.  She was on BiPAP continuously.  She was seen by pulmonology team we will request the BiPAP be switched to at night only.  WBC 6.31, hemoglobin 12.3, platelet 173.  Sodium 144, bicarb 34, anion gap 7, magnesium 2.9.  ABG results reviewed.  Chest x-ray shows possible pulmonary emphysema.  Patient will be admitted for further management of COPD exacerbation.              Overview/Hospital Course:  No notes on file    Interval History:  Reports feeling significantly weaker than baseline; some of this is associated with her breathing which is improving daily but still not completely back to baseline.  She discussed significant muscle cramps with statin use; will discontinue for now.  Can revisit benefit of statin with her PCP    Review of  Systems  Objective:     Vital Signs (Most Recent):  Temp: 98.1 °F (36.7 °C) (07/01/25 0752)  Pulse: 65 (07/01/25 0752)  Resp: 18 (07/01/25 0752)  BP: (!) 128/52 (07/01/25 0752)  SpO2: (!) 93 % (07/01/25 0752) Vital Signs (24h Range):  Temp:  [98 °F (36.7 °C)-98.7 °F (37.1 °C)] 98.1 °F (36.7 °C)  Pulse:  [60-72] 65  Resp:  [18-22] 18  SpO2:  [92 %-97 %] 93 %  BP: (110-134)/(52-68) 128/52     Weight: 81.2 kg (179 lb 0.2 oz)  Body mass index is 28.89 kg/m².    Intake/Output Summary (Last 24 hours) at 7/1/2025 1132  Last data filed at 6/30/2025 2145  Gross per 24 hour   Intake 180 ml   Output --   Net 180 ml         Physical Exam  Vitals reviewed.   Constitutional:       General: She is not in acute distress.     Appearance: She is obese. She is ill-appearing.   Cardiovascular:      Rate and Rhythm: Normal rate and regular rhythm.      Pulses: Normal pulses.      Heart sounds: Normal heart sounds. No murmur heard.  Pulmonary:      Effort: Pulmonary effort is normal. No respiratory distress.      Breath sounds: Normal breath sounds. No wheezing.      Comments: Reduced breath sounds in bases.  On home O2  Abdominal:      General: Abdomen is flat. Bowel sounds are normal. There is no distension.      Palpations: Abdomen is soft.   Musculoskeletal:         General: No swelling. Normal range of motion.   Skin:     General: Skin is warm and dry.      Capillary Refill: Capillary refill takes less than 2 seconds.      Coloration: Skin is not jaundiced.   Neurological:      General: No focal deficit present.      Mental Status: She is alert and oriented to person, place, and time.               Significant Labs: All pertinent labs within the past 24 hours have been reviewed.    Significant Imaging: I have reviewed all pertinent imaging results/findings within the past 24 hours.      Assessment & Plan  Acute on chronic respiratory failure with hypoxia and hypercapnia  COPD exacerbation  Centrilobular emphysema  Patient with  Hypercapnic Respiratory failure which is Acute on chronic.  she is on home oxygen at 3 LPM. Supplemental oxygen was provided and noted- Oxygen Concentration (%):  [32] 32    .   Signs/symptoms of respiratory failure include- tachypnea, increased work of breathing, respiratory distress, and wheezing. Contributing diagnoses includes - COPD Labs and images were reviewed. Patient Has recent ABG, which has been reviewed. Will treat underlying causes and adjust management of respiratory failure as follows- Bipap at night    Patient's COPD is with exacerbation noted by continued dyspnea currently.  Patient is currently on COPD Pathway. Continue scheduled inhalers Steroids and Supplemental oxygen and monitor respiratory status closely.     -seen by pulmonology  -continue PO prednisone 40mg daily for 5 day course   -BiPAP at night  -continue controller inhaler and scheduled nebulizer; continue roflumilast  HTN (hypertension)  Patient's blood pressure range in the last 24 hours was: BP  Min: 110/63  Max: 134/63.The patient's inpatient anti-hypertensive regimen is listed below:  Current Antihypertensives  losartan tablet 25 mg, Daily, Oral    Plan  - BP is controlled, no changes needed to their regimen    Oxygen dependent  -3L nasal cannula at home    Muscle cramp  -patient complaining of cramps in her right lower extremity  -states she uses Robaxin at home, has also use Flexeril in the past  -improved.  Flexeril discontinued  -reports that this may be associated with statin use; have discontinued for now   -can revisit need for statin as outpatient with PCP; given her end-stage COPD she is less likely to derive significant mortality benefit from this medication    Tremor  -observe tremors of head and bilateral upper extremities.  Patient states she feels it has worsened with Zoloft  -patient was on Zoloft and Lexapro, states Lexapro was discontinued before admission.  It has been stopped  -PT/OT ordered, recommended  SNF    Debility  Patient with Acute on chronic debility due to age-related physical debility. The patient's latest AMPAC (Activity Measure for Post Acute Care) Score is listed below.    AM-PAC Score - How much help does the patient need for each activity listed  Basic Mobility Total Score: 18  Turning over in bed (including adjusting bedclothes, sheets and blankets)?: A little  Sitting down on and standing up from a chair with arms (e.g., wheelchair, bedside commode, etc.): A little  Moving from lying on back to sitting on the side of the bed?: A little  Moving to and from a bed to a chair (including a wheelchair)?: A little  Need to walk in hospital room?: A little  Climbing 3-5 steps with a railing?: A little    Plan  - Progressive mobility protocol initated  - PT/OT consulted  - Fall precautions in place  - SNF placement        VTE Risk Mitigation (From admission, onward)           Ordered     enoxaparin injection 40 mg  Daily         06/26/25 1004     IP VTE HIGH RISK PATIENT  Once         06/26/25 1004     Place sequential compression device  Until discontinued         06/26/25 1004                    Discharge Planning   RADHA: 7/2/2025     Code Status: Full Code   Medical Readiness for Discharge Date:   Discharge Plan A: Skilled Nursing Facility                Tirso Valenzuela MD  Department of Hospital Medicine   [place] [facility] - Telemetry

## 2025-07-01 NOTE — ASSESSMENT & PLAN NOTE
Patient with Acute on chronic debility due to age-related physical debility. The patient's latest AMPAC (Activity Measure for Post Acute Care) Score is listed below.    AM-PAC Score - How much help does the patient need for each activity listed  Basic Mobility Total Score: 18  Turning over in bed (including adjusting bedclothes, sheets and blankets)?: A little  Sitting down on and standing up from a chair with arms (e.g., wheelchair, bedside commode, etc.): A little  Moving from lying on back to sitting on the side of the bed?: A little  Moving to and from a bed to a chair (including a wheelchair)?: A little  Need to walk in hospital room?: A little  Climbing 3-5 steps with a railing?: A little    Plan  - Progressive mobility protocol initated  - PT/OT consulted  - Fall precautions in place  - SNF placement

## 2025-07-01 NOTE — ASSESSMENT & PLAN NOTE
-patient complaining of cramps in her right lower extremity  -states she uses Robaxin at home, has also use Flexeril in the past  -improved.  Flexeril discontinued  -reports that this may be associated with statin use; have discontinued for now   -can revisit need for statin as outpatient with PCP; given her end-stage COPD she is less likely to derive significant mortality benefit from this medication

## 2025-07-01 NOTE — PLAN OF CARE
Problem: Occupational Therapy  Goal: Occupational Therapy Goal  Description: Goals to be met by: 07/27/2025      Patient will increase functional independence with ADLs by performing:    UE Dressing with Modified [place].--MET 07/01  LE Dressing with Modified [place].--MET 07/01  Grooming while standing with Modified [place].--MET 07/01  Toileting from toilet with Modified [place] for hygiene and clothing management.   Toilet transfer to toilet with Modified [place].  Increased functional strength to WFL for self care.  Upper extremity exercise program x10 reps per handout, with independence.     Outcome: Progressing   Pt found [facility] w/ dgtr in room & agreeable to OT this date.  Pt reports signif improvement in essential tremors & perf the following:  -doff/don socks via figure 4 tech w/ MI  -standing via RW w/ S-SBA for fxnl mobility w/ S-SBA  -G/H standing at sink w/ S-MI  -returned to b/s chair w/ S-MI  -instruct/demo w/ return demo of furnished red Tband UB TE/HEP  Edu/tx re: as stated above & rec reacher for LBD, general safety techs & HEP. Pt/dgtr verbalized understanding.  Pt left [facility] w/ dgtr in room.    Pt w/ good progress to date & demo's signif increased safety awareness w/ minimal tremulousness. Pt w/ good potential for cont improvements in overall phys/fxnl status w/ cont OT per POC for rec mod intensity tx w/ rec HB & reacher.

## 2025-07-01 NOTE — PLAN OF CARE
Problem: Adult Inpatient Plan of Care  Goal: Plan of Care Review  Outcome: Progressing  Goal: Patient-Specific Goal (Individualized)  Outcome: Progressing  Goal: Absence of Hospital-Acquired Illness or Injury  Outcome: Progressing  Goal: Optimal Comfort and Wellbeing  Outcome: Progressing  Goal: Readiness for Transition of Care  Outcome: Progressing     Problem: COPD (Chronic Obstructive Pulmonary Disease)  Goal: Optimal Chronic Illness Coping  Outcome: Progressing  Goal: Optimal Level of Functional [place]  Outcome: Progressing  Goal: Absence of Infection Signs and Symptoms  Outcome: Progressing  Goal: Improved Oral Intake  Outcome: Progressing  Goal: Effective Oxygenation and Ventilation  Outcome: Progressing     Problem: Comorbidity Management  Goal: Maintenance of Asthma Control  Outcome: Progressing  Goal: Maintenance of COPD Symptom Control  Outcome: Progressing  Goal: Blood Pressure in Desired Range  Outcome: Progressing     Problem: Fall Injury Risk  Goal: Absence of Fall and Fall-Related Injury  Outcome: Progressing     Problem: Skin Injury Risk Increased  Goal: Skin Health and Integrity  Outcome: Progressing

## 2025-07-01 NOTE — PT/OT/SLP PROGRESS
Occupational Therapy   Treatment    Name: Terri Phelan  MRN: [id number]  Admitting Diagnosis:  Acute on chronic respiratory failure with hypoxia and hypercapnia       Recommendations:     Discharge Recommendations: Moderate Intensity Therapy  Discharge Equipment Recommendations:  hip kit  Barriers to discharge:  Decreased caregiver support    Assessment:      Pt w/ good progress to date & demo's signif increased safety awareness w/ minimal tremulousness/SOB. Pt w/ good potential for cont improvements in overall phys/fxnl status w/ cont OT per POC for rec mod intensity tx w/ rec hip kit & life alert.    Terri Phelan is a 69 y.o. female with a medical diagnosis of Acute on chronic respiratory failure with hypoxia and hypercapnia.  She presents with performance deficits affecting function are weakness, impaired endurance, impaired self care skills, impaired functional mobility, gait instability, impaired balance, decreased coordination, decreased lower extremity function, decreased upper extremity function.     Rehab Prognosis:  Good; patient would benefit from acute skilled OT services to address these deficits and reach maximum level of function.       Plan:     Patient to be seen 3 x/week to address the above listed problems via self-care/home management, therapeutic activities, therapeutic exercises  Plan of Care Expires: 07/27/25  Plan of Care Reviewed with: patient, daughter    Subjective     Chief Complaint: none stated  Patient/Family Comments/goals: return to PLOF  Pain/Comfort:  Pain Rating 1: 0/10  Pain Rating Post-Intervention 1: 0/10    Objective:     Communicated with: veronika prior to session.  Patient found up in chair with peripheral IV upon OT entry to room.    General Precautions: Standard, fall    Orthopedic Precautions:N/A  Braces: N/A  Respiratory Status: Nasal cannula, flow 3 L/min     Occupational Performance:     Bed Mobility:         Functional Mobility/Transfers:  Patient completed Sit <>  Stand Transfer with supervision and stand by assistance  with  rolling walker   Functional Mobility: see tx note below    Activities of Daily Living:  Grooming: modified independence and supervision standing at sink  Upper Body Dressing: modified independence don robe  Lower Body Dressing: modified independence doff/don socks      [facility] 6 Click ADL: 22    Treatment & Education:  Pt found UIC w/ dgtr in room & agreeable to OT this date.  Pt reports signif improvement in essential tremors & perf the following:  -doff/don socks via figure 4 tech w/ MI  -don robe w/ MI  -standing via RW w/ S-SBA for fxnl mobility w/ S-SBA  -G/H standing at sink w/ S-MI  -returned to b/s chair w/ S-MI  -instruct/demo w/ return demo of furnished red Tband UB TE/HEP  Edu/tx re: as stated above & rec reacher for LBD, general safety techs & HEP. Pt/dgtr verbalized understanding.  Pt left UIC w/ dgtr in room.    Patient left up in chair with all lines intact, call button in reach, and dgtr present    GOALS:   Multidisciplinary Problems       Occupational Therapy Goals          Problem: Occupational Therapy    Goal Priority Disciplines Outcome Interventions   Occupational Therapy Goal     OT, PT/OT Progressing    Description: Goals to be met by: 07/27/2025      Patient will increase functional independence with ADLs by performing:    UE Dressing with Modified [place].--MET 07/01  LE Dressing with Modified [place].--MET 07/01  Grooming while standing with Modified [place].--MET 07/01  Toileting from toilet with Modified [place] for hygiene and clothing management.   Toilet transfer to toilet with Modified [place].  Increased functional strength to WFL for self care.  Upper extremity exercise program x10 reps per handout, with independence.                          DME Justifications:  No DME recommended requiring DME justifications    Time Tracking:     OT Date of Treatment: 07/01/25  OT Start Time: 1101  OT Stop Time:  1121  OT Total Time (min): 20 min    Billable Minutes:Self Care/Home Management 10  Therapeutic Activity 10  Total Time 20    OT/HUNTER: OT     Number of HUNTER visits since last OT visit: 0   A client care conference was performed between the LOTR and DIEGO, prior to treatment by DIEGO, to discuss the patient's status, treatment plan and established goals.    7/1/2025

## 2025-07-01 NOTE — PT/OT/SLP PROGRESS
Physical Therapy Treatment    Patient Name:  Terri Phelan   MRN:  [id number]    Recommendations:     Discharge Recommendations: Moderate Intensity Therapy  Discharge Equipment Recommendations: hip kit  Barriers to discharge: Decreased caregiver support and decreased mobility,strength and endurance    Assessment:     Terri Phelan is a 69 y.o. female admitted with a medical diagnosis of Acute on chronic respiratory failure with hypoxia and hypercapnia.  She presents with the following impairments/functional limitations: weakness, impaired endurance, impaired functional mobility, gait instability, impaired balance, decreased lower extremity function, impaired skin, impaired cardiopulmonary response to activity,pt with good participation and requires assistance with mobility at this time,pt will benefit from moderate intensity therapy upon discharge.    Rehab Prognosis: Good; patient would benefit from acute skilled PT services to address these deficits and reach maximum level of function.    Recent Surgery: * No surgery found *      Plan:     During this hospitalization, patient to be seen 5 x/week to address the identified rehab impairments via gait training, therapeutic activities, therapeutic exercises, neuromuscular re-education and progress toward the following goals:    Plan of Care Expires:  07/28/25    Subjective     Chief Complaint: n/a  Patient/Family Comments/goals: pt may transfer today.  Pain/Comfort:  Pain Rating 1: 0/10      Objective:     Communicated with nsg prior to session.  Patient found supine with oxygen, telemetry upon PT entry to room.     General Precautions: Standard, fall  Orthopedic Precautions: N/A  Braces: N/A  Respiratory Status: supplemental O2     Functional Mobility:  Bed Mobility:     Supine to Sit: supervision  Transfers:     Sit to Stand:  stand by assistance with rolling walker  Bed to Chair: stand by assistance and contact guard assistance with  rolling walker  using   ambulation  Toilet Transfer: stand by assistance and contact guard assistance with  rolling walker  using  ambulation  Gait: amb ~10' and ~18' with RW and SBA/CGA with O2 donned  Balance: fair standing balance with RW      AM-PAC 6 CLICK MOBILITY  Turning over in bed (including adjusting bedclothes, sheets and blankets)?: 4  Sitting down on and standing up from a chair with arms (e.g., wheelchair, bedside commode, etc.): 3  Moving from lying on back to sitting on the side of the bed?: 3  Moving to and from a bed to a chair (including a wheelchair)?: 3  Need to walk in hospital room?: 3  Climbing 3-5 steps with a railing?: 2  Basic Mobility Total Score: 18       Treatment & Education: pt used bathroom during rx,pt remains with decreased endurance      Patient left up in chair with all lines intact, call button in reach, and nsg notified..    GOALS: see general POC  Multidisciplinary Problems       Physical Therapy Goals          Problem: Physical Therapy    Goal Priority Disciplines Outcome Interventions   Physical Therapy Goal     PT, PT/OT Progressing    Description: Goals to be met by: 2025    Patient will increase functional independence with mobility by performin. Sit<>stand with SBA with RW.  2. Gait x 100 feet with RW with SBA.  3. Supine<>sit with SBA.                           DME Justifications: see PT recs      Time Tracking:     PT Received On: 25  PT Start Time: 1011     PT Stop Time: 1036  PT Total Time (min): 25 min     Billable Minutes: Gait Training 14 and Therapeutic Activity 11    Treatment Type: Treatment  PT/PTA: PTA     Number of PTA visits since last PT visit: 2     2025

## 2025-07-02 LAB
ABSOLUTE EOSINOPHIL (OHS): 0.08 K/UL
ABSOLUTE MONOCYTE (OHS): 1.1 K/UL (ref 0.3–1)
ABSOLUTE NEUTROPHIL COUNT (OHS): 6.89 K/UL (ref 1.8–7.7)
ANION GAP (OHS): 5 MMOL/L (ref 8–16)
BASOPHILS # BLD AUTO: 0.03 K/UL
BASOPHILS NFR BLD AUTO: 0.3 %
BUN SERPL-MCNC: 30 MG/DL (ref 8–23)
CALCIUM SERPL-MCNC: 8.5 MG/DL (ref 8.7–10.5)
CHLORIDE SERPL-SCNC: 102 MMOL/L (ref 95–110)
CO2 SERPL-SCNC: 35 MMOL/L (ref 23–29)
CREAT SERPL-MCNC: 0.8 MG/DL (ref 0.5–1.4)
ERYTHROCYTE [DISTWIDTH] IN BLOOD BY AUTOMATED COUNT: 14.2 % (ref 11.5–14.5)
GFR SERPLBLD CREATININE-BSD FMLA CKD-EPI: >60 ML/MIN/1.73/M2
GLUCOSE SERPL-MCNC: 72 MG/DL (ref 70–110)
HCT VFR BLD AUTO: 37 % (ref 37–48.5)
HGB BLD-MCNC: 11.3 GM/DL (ref 12–16)
IMM GRANULOCYTES # BLD AUTO: 0.05 K/UL (ref 0–0.04)
IMM GRANULOCYTES NFR BLD AUTO: 0.5 % (ref 0–0.5)
LYMPHOCYTES # BLD AUTO: 2.41 K/UL (ref 1–4.8)
MAGNESIUM SERPL-MCNC: 2 MG/DL (ref 1.6–2.6)
MCH RBC QN AUTO: 26.7 PG (ref 27–31)
MCHC RBC AUTO-ENTMCNC: 30.5 G/DL (ref 32–36)
MCV RBC AUTO: 87 FL (ref 82–98)
NUCLEATED RBC (/100WBC) (OHS): 0 /100 WBC
PHOSPHATE SERPL-MCNC: 3.4 MG/DL (ref 2.7–4.5)
PLATELET # BLD AUTO: 155 K/UL (ref 150–450)
PMV BLD AUTO: 13.3 FL (ref 9.2–12.9)
POTASSIUM SERPL-SCNC: 4.1 MMOL/L (ref 3.5–5.1)
RBC # BLD AUTO: 4.24 M/UL (ref 4–5.4)
RELATIVE EOSINOPHIL (OHS): 0.8 %
RELATIVE LYMPHOCYTE (OHS): 22.8 % (ref 18–48)
RELATIVE MONOCYTE (OHS): 10.4 % (ref 4–15)
RELATIVE NEUTROPHIL (OHS): 65.2 % (ref 38–73)
SODIUM SERPL-SCNC: 142 MMOL/L (ref 136–145)
WBC # BLD AUTO: 10.56 K/UL (ref 3.9–12.7)

## 2025-07-02 PROCEDURE — 63600175 PHARM REV CODE 636 W HCPCS: Mod: HCNC | Performed by: STUDENT IN AN ORGANIZED HEALTH CARE EDUCATION/TRAINING PROGRAM

## 2025-07-02 PROCEDURE — 36415 COLL VENOUS BLD VENIPUNCTURE: CPT | Mod: HCNC | Performed by: HOSPITALIST

## 2025-07-02 PROCEDURE — 97116 GAIT TRAINING THERAPY: CPT | Mod: HCNC,CQ

## 2025-07-02 PROCEDURE — 94660 CPAP INITIATION&MGMT: CPT | Mod: HCNC

## 2025-07-02 PROCEDURE — 27000221 HC OXYGEN, UP TO 24 HOURS: Mod: HCNC

## 2025-07-02 PROCEDURE — 85025 COMPLETE CBC W/AUTO DIFF WBC: CPT | Mod: HCNC | Performed by: HOSPITALIST

## 2025-07-02 PROCEDURE — 63600175 PHARM REV CODE 636 W HCPCS: Mod: HCNC | Performed by: INTERNAL MEDICINE

## 2025-07-02 PROCEDURE — 83735 ASSAY OF MAGNESIUM: CPT | Mod: HCNC | Performed by: HOSPITALIST

## 2025-07-02 PROCEDURE — 25000003 PHARM REV CODE 250: Mod: HCNC | Performed by: INTERNAL MEDICINE

## 2025-07-02 PROCEDURE — 94761 N-INVAS EAR/PLS OXIMETRY MLT: CPT | Mod: HCNC

## 2025-07-02 PROCEDURE — 21400001 HC TELEMETRY ROOM: Mod: HCNC

## 2025-07-02 PROCEDURE — 25000003 PHARM REV CODE 250: Mod: HCNC | Performed by: NURSE PRACTITIONER

## 2025-07-02 PROCEDURE — 94640 AIRWAY INHALATION TREATMENT: CPT | Mod: HCNC

## 2025-07-02 PROCEDURE — 97530 THERAPEUTIC ACTIVITIES: CPT | Mod: HCNC,CQ

## 2025-07-02 PROCEDURE — 94799 UNLISTED PULMONARY SVC/PX: CPT | Mod: HCNC

## 2025-07-02 PROCEDURE — 99900035 HC TECH TIME PER 15 MIN (STAT): Mod: HCNC

## 2025-07-02 PROCEDURE — 82374 ASSAY BLOOD CARBON DIOXIDE: CPT | Mod: HCNC | Performed by: HOSPITALIST

## 2025-07-02 PROCEDURE — 25000003 PHARM REV CODE 250: Mod: HCNC | Performed by: STUDENT IN AN ORGANIZED HEALTH CARE EDUCATION/TRAINING PROGRAM

## 2025-07-02 PROCEDURE — 25000242 PHARM REV CODE 250 ALT 637 W/ HCPCS: Mod: HCNC | Performed by: INTERNAL MEDICINE

## 2025-07-02 PROCEDURE — 97530 THERAPEUTIC ACTIVITIES: CPT | Mod: HCNC,CO

## 2025-07-02 PROCEDURE — 97535 SELF CARE MNGMENT TRAINING: CPT | Mod: HCNC,CO

## 2025-07-02 PROCEDURE — 84100 ASSAY OF PHOSPHORUS: CPT | Mod: HCNC | Performed by: HOSPITALIST

## 2025-07-02 RX ORDER — ALBUTEROL SULFATE 90 UG/1
2 INHALANT RESPIRATORY (INHALATION) EVERY 6 HOURS PRN
Qty: 8.5 G | Refills: 11 | Status: SHIPPED | OUTPATIENT
Start: 2025-07-02

## 2025-07-02 RX ORDER — PREDNISONE 20 MG/1
20 TABLET ORAL DAILY
Status: DISCONTINUED | OUTPATIENT
Start: 2025-07-03 | End: 2025-07-03 | Stop reason: HOSPADM

## 2025-07-02 RX ADMIN — SERTRALINE 100 MG: 100 TABLET, FILM COATED ORAL at 09:07

## 2025-07-02 RX ADMIN — LOSARTAN POTASSIUM 25 MG: 25 TABLET, FILM COATED ORAL at 09:07

## 2025-07-02 RX ADMIN — ENOXAPARIN SODIUM 40 MG: 40 INJECTION SUBCUTANEOUS at 04:07

## 2025-07-02 RX ADMIN — IPRATROPIUM BROMIDE AND ALBUTEROL SULFATE 3 ML: 2.5; .5 SOLUTION RESPIRATORY (INHALATION) at 08:07

## 2025-07-02 RX ADMIN — PREDNISONE 40 MG: 20 TABLET ORAL at 09:07

## 2025-07-02 RX ADMIN — HYDROXYZINE HYDROCHLORIDE 25 MG: 25 TABLET, FILM COATED ORAL at 08:07

## 2025-07-02 RX ADMIN — ASPIRIN 81 MG CHEWABLE TABLET 81 MG: 81 TABLET CHEWABLE at 09:07

## 2025-07-02 RX ADMIN — Medication 2000 UNITS: at 09:07

## 2025-07-02 RX ADMIN — FLUTICASONE FUROATE AND VILANTEROL TRIFENATATE 1 PUFF: 100; 25 POWDER RESPIRATORY (INHALATION) at 08:07

## 2025-07-02 RX ADMIN — ROFLUMILAST 500 MCG: 500 TABLET ORAL at 09:07

## 2025-07-02 RX ADMIN — GUAIFENESIN AND DEXTROMETHORPHAN HYDROBROMIDE 1 TABLET: 600; 30 TABLET, EXTENDED RELEASE ORAL at 09:07

## 2025-07-02 RX ADMIN — IPRATROPIUM BROMIDE AND ALBUTEROL SULFATE 3 ML: 2.5; .5 SOLUTION RESPIRATORY (INHALATION) at 02:07

## 2025-07-02 RX ADMIN — IPRATROPIUM BROMIDE AND ALBUTEROL SULFATE 3 ML: 2.5; .5 SOLUTION RESPIRATORY (INHALATION) at 07:07

## 2025-07-02 RX ADMIN — GUAIFENESIN AND DEXTROMETHORPHAN HYDROBROMIDE 1 TABLET: 600; 30 TABLET, EXTENDED RELEASE ORAL at 08:07

## 2025-07-02 NOTE — PLAN OF CARE
Problem: Adult Inpatient Plan of Care  Goal: Plan of Care Review  Outcome: Progressing  Flowsheets (Taken 7/2/2025 0201)  Plan of Care Reviewed With: patient     Problem: COPD (Chronic Obstructive Pulmonary Disease)  Goal: Optimal Chronic Illness Coping  Outcome: Progressing  Intervention: Support and Optimize Psychosocial Response  Flowsheets (Taken 7/2/2025 0201)  Supportive Measures:   active listening utilized   self-care encouraged   self-reflection promoted   positive reinforcement provided   problem-solving facilitated   self-responsibility promoted   verbalization of feelings encouraged   relaxation techniques promoted     Problem: Comorbidity Management  Goal: Maintenance of Asthma Control  Outcome: Progressing  Intervention: Maintain Asthma Symptom Control  Flowsheets (Taken 7/2/2025 0201)  Medication Review/Management:   medications reviewed   high-risk medications identified     Problem: Fall Injury Risk  Goal: Absence of Fall and Fall-Related Injury  Outcome: Progressing  Intervention: Identify and Manage Contributors  Flowsheets (Taken 7/2/2025 0201)  Self-Care Promotion:   independence encouraged   BADL personal objects within reach   BADL personal routines maintained  Medication Review/Management:   medications reviewed   high-risk medications identified     Problem: Skin Injury Risk Increased  Goal: Skin Health and Integrity  Outcome: Progressing  Intervention: Optimize Skin Protection  Flowsheets (Taken 7/2/2025 0201)  Skin Protection:   transparent dressing maintained   incontinence pads utilized  Activity Management: Up to bedside commode - L3  Head of Bed (HOB) Positioning: HOB elevated     Problem: Pain Acute  Goal: Optimal Pain Control and Function  Outcome: Progressing  Intervention: Develop Pain Management Plan  Flowsheets (Taken 7/2/2025 0201)  Pain Management Interventions:   care clustered   pain management plan reviewed with patient/caregiver   pillow support provided   quiet  environment facilitated   relaxation techniques promoted   Plan of care progressing.

## 2025-07-02 NOTE — ASSESSMENT & PLAN NOTE
Patient's blood pressure range in the last 24 hours was: BP  Min: 113/54  Max: 171/62.The patient's inpatient anti-hypertensive regimen is listed below:  Current Antihypertensives  losartan tablet 25 mg, Daily, Oral    Plan  - BP is controlled, no changes needed to their regimen

## 2025-07-02 NOTE — ASSESSMENT & PLAN NOTE
Patient with Acute on chronic debility due to age-related physical debility. The patient's latest AMPAC (Activity Measure for Post Acute Care) Score is listed below.    AM-PAC Score - How much help does the patient need for each activity listed  Basic Mobility Total Score: 18  Turning over in bed (including adjusting bedclothes, sheets and blankets)?: None  Sitting down on and standing up from a chair with arms (e.g., wheelchair, bedside commode, etc.): A little  Moving from lying on back to sitting on the side of the bed?: A little  Moving to and from a bed to a chair (including a wheelchair)?: A little  Need to walk in hospital room?: A little  Climbing 3-5 steps with a railing?: A lot    Plan  - Progressive mobility protocol initated  - PT/OT consulted  - Fall precautions in place  - SNF placement

## 2025-07-02 NOTE — PLAN OF CARE
SW met with pt during rounding with MD. Pts daughter via telephone. Pt accepted to [facility] for snf. Pending auth and paperwork at this time. SW will continue to follow pt throughout her transitions of care and assist with any dc needs.     Future Appointments   Date Time Provider Department Center   7/9/2025  1:20 PM Sussy Mason MD [facility] 65[facility] [place]   7/16/2025  2:30 PM CV [facility] ECHO [facility] C[facility]IA [place]   7/21/2025 10:30 AM Meka Christensen, TRAVIS [facility] CARL [address]   8/14/2025  1:00 PM Kaitlin Donovan, RAJEEV [facility] [place]        07/02/25 1024   Rounds   Attendance Provider;   Discharge Plan A Skilled Nursing Facility   Why the patient remains in the hospital Requires continued medical care   Transition of Care Barriers None

## 2025-07-02 NOTE — PT/OT/SLP PROGRESS
"Occupational Therapy   Treatment    Name: Terri Phelan  MRN: [id number]  Admitting Diagnosis:  Acute on chronic respiratory failure with hypoxia and hypercapnia       Recommendations:     Discharge Recommendations: Moderate Intensity Therapy  Discharge Equipment Recommendations:  hip kit  Barriers to discharge:  Decreased caregiver support    Assessment:     Terri Phelan is a 69 y.o. female with a medical diagnosis of Acute on chronic respiratory failure with hypoxia and hypercapnia.   Performance deficits affecting function are weakness, impaired endurance, impaired self care skills, impaired functional mobility, gait instability, impaired balance, impaired cardiopulmonary response to activity.    Pt found in bed, agreeable to therapy.  Pt progressing well towards goals. Continue OT services to address functional goals, progressing as able.      Rehab Prognosis:  Good; patient would benefit from acute skilled OT services to address these deficits and reach maximum level of function.       Plan:     Patient to be seen 3 x/week to address the above listed problems via self-care/home management, therapeutic activities, therapeutic exercises  Plan of Care Expires: 07/27/25  Plan of Care Reviewed with: patient    Subjective     Chief Complaint: "I feel good today."  Patient/Family Comments/goals: to go to SNF then home   Pain/Comfort:  Pain Rating 1: 0/10  Pain Rating Post-Intervention 1: 0/10    Objective:     Communicated with: nurse prior to session.  Patient found up in chair with bed alarm, peripheral IV, oxygen upon OT entry to room.    General Precautions: Standard, fall    Orthopedic Precautions:N/A  Braces: N/A  Respiratory Status: Nasal cannula, flow 2 L/min  Increased to 3L during ambulation      Occupational Performance:     Bed Mobility:    Declined back to bed    Functional Mobility/Transfers:  Patient completed Sit <> Stand Transfer with supervision with  rolling walker-vc's for hand placement " when sitting<>standing  Patient completed Bed <> Chair Transfer using Stand Pivot technique with supervision  with rolling walker  Patient completed Toilet Transfer Stand Pivot technique with supervision with  rolling walker  Functional Mobility: Pt ambulated room distances with SBA using RW.  Pt ambulated extended distance in [place] 2 x 30 feet with SBA/CGA using RW with 1-2 standing rest breaks. O2 sats 88% on 3L O2.    Activities of Daily Living:  Grooming: modified independence seated for energy conservation   Lower Body Dressing: modified independence dof/don adult brief   Toileting: modified independence        [facility] 6 Click ADL: 23    Treatment & Education:  Pt provided with handout, and instructed on theraband exercises  for shld flex, abd, hori abd/add, bicep curls and tricep extensions to be performed daily x 10 reps and increasing reps as able.   Pt provided with handout on Energy Conservation and reviewed various techniques.       Encourage Rollator use and sitting for ADL tasks as able.   Pt instructed on Pursed Lip Breathing Technique requiring min verbal cues to perform efficiently.    Pt returned to chair.       Patient left up in chair with all lines intact, call button in reach, and nurse notified    GOALS:   Multidisciplinary Problems       Occupational Therapy Goals          Problem: Occupational Therapy    Goal Priority Disciplines Outcome Interventions   Occupational Therapy Goal     OT, PT/OT Progressing    Description: Goals to be met by: 07/27/2025      Patient will increase functional independence with ADLs by performing:    UE Dressing with Modified [place].--MET 07/01  LE Dressing with Modified [place].--MET 07/01  Grooming while standing with Modified [place].--MET 07/01  Toileting from toilet with Modified [place] for hygiene and clothing management.   Toilet transfer to toilet with Modified [place].  Increased functional strength to WFL for self care.  Upper  extremity exercise program x10 reps per handout, with independence.                          Time Tracking:     OT Date of Treatment: 07/02/25  OT Start Time: 1140  OT Stop Time: 1215  OT Total Time (min): 35 min    Billable Minutes:Self Care/Home Management 23  Therapeutic Activity 15          [place] OTR/L readily available to answer questions about the patient's intervention at the time of the provision of services.      7/2/2025

## 2025-07-02 NOTE — PROGRESS NOTES
[facility] Medicine  Progress Note    Patient Name: Terri Phelan  MRN: [id number]  Patient Class: IP- Inpatient   Admission Date: 6/26/2025  Length of Stay: 6 days  Attending Physician: Tirso Valenzuela,*  Primary Care Provider: Sussy Mason MD        Subjective     Principal Problem:Acute on chronic respiratory failure with hypoxia and hypercapnia        HPI:  69-year-old female with a past medical history of COPD on 2L of O2 via NC (uses 3L with exertion), diastolic HF, HTN, HLD, anxiety, depression, JUAN (uses bipap every night), hx of MI (but no stenosis noted on angiogram); came to emergency department due to shortness for breath and cough.  She states she has recently treated for COPD exacerbation and discharged on oral steroids and Augmentin, which she recently completed.  She states she was feeling well until yesterday when her symptoms returned.  She denied chest pain palpitation but complains of shortness for breath, cough and right lower extremity cramping.    In the emergency department the patient had a blood pressure of 122/58, heart rate of 110, temperature 98.7°, respiratory rate of 29.  ABG showed a pH of 7.245 5, bicarb of 89.  She was on BiPAP continuously.  She was seen by pulmonology team we will request the BiPAP be switched to at night only.  WBC 6.31, hemoglobin 12.3, platelet 173.  Sodium 144, bicarb 34, anion gap 7, magnesium 2.9.  ABG results reviewed.  Chest x-ray shows possible pulmonary emphysema.  Patient will be admitted for further management of COPD exacerbation.              Overview/Hospital Course:  No notes on file    Interval History:  Breathing doing better today, states she is essentially back towards baseline.  Awaiting insurance authorization for skilled nursing facility.    Review of Systems  Objective:     Vital Signs (Most Recent):  Temp: 98.4 °F (36.9 °C) (07/02/25 1120)  Pulse: 76 (07/02/25 1153)  Resp: (!) 22 (07/02/25 0802)  BP: (!)  129/53 (07/02/25 1120)  SpO2: (!) 88 % (07/02/25 1153) Vital Signs (24h Range):  Temp:  [97.4 °F (36.3 °C)-98.4 °F (36.9 °C)] 98.4 °F (36.9 °C)  Pulse:  [61-76] 76  Resp:  [18-24] 22  SpO2:  [88 %-99 %] 88 %  BP: (113-171)/(53-70) 129/53     Weight: 81.2 kg (179 lb 0.2 oz)  Body mass index is 28.89 kg/m².  No intake or output data in the 24 hours ending 07/02/25 1313        Physical Exam  Vitals reviewed.   Constitutional:       General: She is not in acute distress.     Appearance: She is obese. She is ill-appearing.   Cardiovascular:      Rate and Rhythm: Normal rate and regular rhythm.      Pulses: Normal pulses.      Heart sounds: Normal heart sounds. No murmur heard.  Pulmonary:      Effort: Pulmonary effort is normal. No respiratory distress.      Breath sounds: Normal breath sounds. No wheezing.      Comments: Reduced breath sounds in bases.  On home O2  Abdominal:      General: Abdomen is flat. Bowel sounds are normal. There is no distension.      Palpations: Abdomen is soft.   Musculoskeletal:         General: No swelling. Normal range of motion.   Skin:     General: Skin is warm and dry.      Capillary Refill: Capillary refill takes less than 2 seconds.      Coloration: Skin is not jaundiced.   Neurological:      General: No focal deficit present.      Mental Status: She is alert and oriented to person, place, and time.               Significant Labs: All pertinent labs within the past 24 hours have been reviewed.    Significant Imaging: I have reviewed all pertinent imaging results/findings within the past 24 hours.      Assessment & Plan  Acute on chronic respiratory failure with hypoxia and hypercapnia  COPD exacerbation  Centrilobular emphysema  Patient with Hypercapnic Respiratory failure which is Acute on chronic.  she is on home oxygen at 3 LPM. Supplemental oxygen was provided and noted- Oxygen Concentration (%):  [24-32] 24    .   Signs/symptoms of respiratory failure include- tachypnea, increased  work of breathing, respiratory distress, and wheezing. Contributing diagnoses includes - COPD Labs and images were reviewed. Patient Has recent ABG, which has been reviewed. Will treat underlying causes and adjust management of respiratory failure as follows- Bipap at night    Patient's COPD is with exacerbation noted by continued dyspnea currently.  Patient is currently on COPD Pathway. Continue scheduled inhalers Steroids and Supplemental oxygen and monitor respiratory status closely.     -seen by pulmonology  -continue PO prednisone 40mg daily; deescalate tomorrow for short taper  -BiPAP at night  -continue controller inhaler and scheduled nebulizer; continue roflumilast  HTN (hypertension)  Patient's blood pressure range in the last 24 hours was: BP  Min: 113/54  Max: 171/62.The patient's inpatient anti-hypertensive regimen is listed below:  Current Antihypertensives  losartan tablet 25 mg, Daily, Oral    Plan  - BP is controlled, no changes needed to their regimen    Oxygen dependent  -3L nasal cannula at home    Muscle cramp  -patient complaining of cramps in her right lower extremity  -states she uses Robaxin at home, has also use Flexeril in the past  -improved.  Flexeril discontinued  -reports that this may be associated with statin use; have discontinued for now   -can revisit need for statin as outpatient with PCP; given her end-stage COPD she is less likely to derive significant mortality benefit from this medication    Tremor  -observe tremors of head and bilateral upper extremities.  Patient states she feels it has worsened with Zoloft  -patient was on Zoloft and Lexapro, states Lexapro was discontinued before admission.  It has been stopped  -PT/OT ordered, recommended SNF    Debility  Patient with Acute on chronic debility due to age-related physical debility. The patient's latest AMPAC (Activity Measure for Post Acute Care) Score is listed below.    AM-PAC Score - How much help does the patient need  for each activity listed  Basic Mobility Total Score: 18  Turning over in bed (including adjusting bedclothes, sheets and blankets)?: None  Sitting down on and standing up from a chair with arms (e.g., wheelchair, bedside commode, etc.): A little  Moving from lying on back to sitting on the side of the bed?: A little  Moving to and from a bed to a chair (including a wheelchair)?: A little  Need to walk in hospital room?: A little  Climbing 3-5 steps with a railing?: A lot    Plan  - Progressive mobility protocol initated  - PT/OT consulted  - Fall precautions in place  - SNF placement        VTE Risk Mitigation (From admission, onward)           Ordered     enoxaparin injection 40 mg  Daily         06/26/25 1004     IP VTE HIGH RISK PATIENT  Once         06/26/25 1004     Place sequential compression device  Until discontinued         06/26/25 1004                    Discharge Planning   RADHA: 7/2/2025     Code Status: Full Code   Medical Readiness for Discharge Date:   Discharge Plan A: Skilled Nursing Facility              Tirso Valenzuela MD  Department of Hospital Medicine   [facility]

## 2025-07-02 NOTE — SUBJECTIVE & OBJECTIVE
Interval History:  Breathing doing better today, states she is essentially back towards baseline.  Awaiting insurance authorization for skilled nursing facility.    Review of Systems  Objective:     Vital Signs (Most Recent):  Temp: 98.4 °F (36.9 °C) (07/02/25 1120)  Pulse: 76 (07/02/25 1153)  Resp: (!) 22 (07/02/25 0802)  BP: (!) 129/53 (07/02/25 1120)  SpO2: (!) 88 % (07/02/25 1153) Vital Signs (24h Range):  Temp:  [97.4 °F (36.3 °C)-98.4 °F (36.9 °C)] 98.4 °F (36.9 °C)  Pulse:  [61-76] 76  Resp:  [18-24] 22  SpO2:  [88 %-99 %] 88 %  BP: (113-171)/(53-70) 129/53     Weight: 81.2 kg (179 lb 0.2 oz)  Body mass index is 28.89 kg/m².  No intake or output data in the 24 hours ending 07/02/25 1313        Physical Exam  Vitals reviewed.   Constitutional:       General: She is not in acute distress.     Appearance: She is obese. She is ill-appearing.   Cardiovascular:      Rate and Rhythm: Normal rate and regular rhythm.      Pulses: Normal pulses.      Heart sounds: Normal heart sounds. No murmur heard.  Pulmonary:      Effort: Pulmonary effort is normal. No respiratory distress.      Breath sounds: Normal breath sounds. No wheezing.      Comments: Reduced breath sounds in bases.  On home O2  Abdominal:      General: Abdomen is flat. Bowel sounds are normal. There is no distension.      Palpations: Abdomen is soft.   Musculoskeletal:         General: No swelling. Normal range of motion.   Skin:     General: Skin is warm and dry.      Capillary Refill: Capillary refill takes less than 2 seconds.      Coloration: Skin is not jaundiced.   Neurological:      General: No focal deficit present.      Mental Status: She is alert and oriented to person, place, and time.               Significant Labs: All pertinent labs within the past 24 hours have been reviewed.    Significant Imaging: I have reviewed all pertinent imaging results/findings within the past 24 hours.

## 2025-07-02 NOTE — PT/OT/SLP PROGRESS
Physical Therapy Treatment    Patient Name:  Terri Phelan   MRN:  [id number]    Recommendations:     Discharge Recommendations: Moderate Intensity Therapy  Discharge Equipment Recommendations: hip kit  Barriers to discharge: Decreased caregiver support and decreased mobility,strength and endurance    Assessment:     Terri Phelan is a 69 y.o. female admitted with a medical diagnosis of Acute on chronic respiratory failure with hypoxia and hypercapnia.  She presents with the following impairments/functional limitations: weakness, impaired endurance, impaired functional mobility, gait instability, impaired balance, decreased upper extremity function, decreased lower extremity function, impaired coordination, impaired skin, impaired cardiopulmonary response to activity,pt with good participation and requires assistance with mobility at this time,pt will benefit from moderate intensity therapy upon discharge.    Rehab Prognosis: Good; patient would benefit from acute skilled PT services to address these deficits and reach maximum level of function.    Recent Surgery: * No surgery found *      Plan:     During this hospitalization, patient to be seen 5 x/week to address the identified rehab impairments via gait training, therapeutic activities, therapeutic exercises, neuromuscular re-education and progress toward the following goals:    Plan of Care Expires:  07/28/25    Subjective     Chief Complaint: n/a  Patient/Family Comments/goals: pt agreeable to rx.  Pain/Comfort:  Pain Rating 1: 0/10      Objective:     Communicated with nsg prior to session.  Patient found supine with oxygen, peripheral IV upon PT entry to room.     General Precautions: Standard, fall  Orthopedic Precautions: N/A  Braces: N/A  Respiratory Status: supplemental O2     Functional Mobility:  Bed Mobility:     Supine to Sit: modified independence  Transfers:     Sit to Stand:  contact guard assistance and X 3 trials with rolling walker  Bed  to Chair: contact guard assistance with  rolling walker  using  ambulation  Toilet Transfer: contact guard assistance with  rolling walker  using  ambulation  Gait: amb 10' X 1 and 18' X 2 with RW and SBA/CGA X 2 seated rests with O2 donned  Balance: fair standing balance with RW      AM-PAC 6 CLICK MOBILITY  Turning over in bed (including adjusting bedclothes, sheets and blankets)?: 4  Sitting down on and standing up from a chair with arms (e.g., wheelchair, bedside commode, etc.): 3  Moving from lying on back to sitting on the side of the bed?: 3  Moving to and from a bed to a chair (including a wheelchair)?: 3  Need to walk in hospital room?: 3  Climbing 3-5 steps with a railing?: 2  Basic Mobility Total Score: 18       Treatment & Education: pt used bathroom,needs and requests met.        Patient left up in chair with all lines intact, call button in reach, and nsg notified..    GOALS: see general POC  Multidisciplinary Problems       Physical Therapy Goals          Problem: Physical Therapy    Goal Priority Disciplines Outcome Interventions   Physical Therapy Goal     PT, PT/OT Progressing    Description: Goals to be met by: 2025    Patient will increase functional independence with mobility by performin. Sit<>stand with SBA with RW.  2. Gait x 100 feet with RW with SBA.  3. Supine<>sit with SBA.                           DME Justifications: see PT recs      Time Tracking:     PT Received On: 25  PT Start Time: 0948     PT Stop Time: 1014  PT Total Time (min): 26 min     Billable Minutes: Gait Training 16 and Therapeutic Activity 10    Treatment Type: Treatment  PT/PTA: PTA     Number of PTA visits since last PT visit: 3     2025

## 2025-07-02 NOTE — PLAN OF CARE
Problem: Occupational Therapy  Goal: Occupational Therapy Goal  Description: Goals to be met by: 07/27/2025      Patient will increase functional independence with ADLs by performing:    UE Dressing with Modified [place].--MET 07/01  LE Dressing with Modified Castro.--MET 07/01  Grooming while standing with Modified Castro.--MET 07/01  Toileting from toilet with Modified Castro for hygiene and clothing management.   Toilet transfer to toilet with Modified Castro.  Increased functional strength to WF for self care.  Upper extremity exercise program x10 reps per handout, with independence.     Outcome: Yakov Phelan is a 69 y.o. female with a medical diagnosis of Acute on chronic respiratory failure with hypoxia and hypercapnia.   Performance deficits affecting function are weakness, impaired endurance, impaired self care skills, impaired functional mobility, gait instability, impaired balance, impaired cardiopulmonary response to activity.    Pt found in bed, agreeable to therapy.  Pt progressing well towards goals. Continue OT services to address functional goals, progressing as able.

## 2025-07-02 NOTE — ASSESSMENT & PLAN NOTE
Patient with Hypercapnic Respiratory failure which is Acute on chronic.  she is on home oxygen at 3 LPM. Supplemental oxygen was provided and noted- Oxygen Concentration (%):  [24-32] 24    .   Signs/symptoms of respiratory failure include- tachypnea, increased work of breathing, respiratory distress, and wheezing. Contributing diagnoses includes - COPD Labs and images were reviewed. Patient Has recent ABG, which has been reviewed. Will treat underlying causes and adjust management of respiratory failure as follows- Bipap at night    Patient's COPD is with exacerbation noted by continued dyspnea currently.  Patient is currently on COPD Pathway. Continue scheduled inhalers Steroids and Supplemental oxygen and monitor respiratory status closely.     -seen by pulmonology  -continue PO prednisone 40mg daily; deescalate tomorrow for short taper  -BiPAP at night  -continue controller inhaler and scheduled nebulizer; continue roflumilast

## 2025-07-02 NOTE — PLAN OF CARE
Problem: Adult Inpatient Plan of Care  Goal: Plan of Care Review  Outcome: Progressing  Goal: Patient-Specific Goal (Individualized)  Outcome: Progressing  Goal: Absence of Hospital-Acquired Illness or Injury  Outcome: Progressing  Goal: Optimal Comfort and Wellbeing  Outcome: Progressing  Goal: Readiness for Transition of Care  Outcome: Progressing     Problem: COPD (Chronic Obstructive Pulmonary Disease)  Goal: Optimal Chronic Illness Coping  Outcome: Progressing  Goal: Optimal Level of Functional [place]  Outcome: Progressing  Goal: Absence of Infection Signs and Symptoms  Outcome: Progressing  Goal: Improved Oral Intake  Outcome: Progressing  Goal: Effective Oxygenation and Ventilation  Outcome: Progressing     Problem: Comorbidity Management  Goal: Maintenance of Asthma Control  Outcome: Progressing  Goal: Maintenance of COPD Symptom Control  Outcome: Progressing  Goal: Blood Pressure in Desired Range  Outcome: Progressing     Problem: Fall Injury Risk  Goal: Absence of Fall and Fall-Related Injury  Outcome: Progressing     Problem: Skin Injury Risk Increased  Goal: Skin Health and Integrity  Outcome: Progressing     Problem: Pain Acute  Goal: Optimal Pain Control and Function  Outcome: Progressing     Problem: Infection  Goal: Absence of Infection Signs and Symptoms  Outcome: Progressing

## 2025-07-03 VITALS
OXYGEN SATURATION: 92 % | HEART RATE: 72 BPM | HEIGHT: 66 IN | DIASTOLIC BLOOD PRESSURE: 59 MMHG | SYSTOLIC BLOOD PRESSURE: 123 MMHG | BODY MASS INDEX: 28.77 KG/M2 | WEIGHT: 179 LBS | TEMPERATURE: 97 F | RESPIRATION RATE: 17 BRPM

## 2025-07-03 LAB
TB INDURATION 48 - 72 HR READ: ABNORMAL
TB SKIN TEST 48 - 72 HR READ: ABNORMAL

## 2025-07-03 PROCEDURE — 63600175 PHARM REV CODE 636 W HCPCS: Mod: HCNC | Performed by: INTERNAL MEDICINE

## 2025-07-03 PROCEDURE — 94761 N-INVAS EAR/PLS OXIMETRY MLT: CPT | Mod: HCNC

## 2025-07-03 PROCEDURE — 94799 UNLISTED PULMONARY SVC/PX: CPT | Mod: HCNC

## 2025-07-03 PROCEDURE — 25000242 PHARM REV CODE 250 ALT 637 W/ HCPCS: Mod: HCNC | Performed by: INTERNAL MEDICINE

## 2025-07-03 PROCEDURE — 97535 SELF CARE MNGMENT TRAINING: CPT | Mod: HCNC,CO

## 2025-07-03 PROCEDURE — 25000003 PHARM REV CODE 250: Mod: HCNC | Performed by: STUDENT IN AN ORGANIZED HEALTH CARE EDUCATION/TRAINING PROGRAM

## 2025-07-03 PROCEDURE — 94640 AIRWAY INHALATION TREATMENT: CPT | Mod: HCNC

## 2025-07-03 PROCEDURE — 27000221 HC OXYGEN, UP TO 24 HOURS: Mod: HCNC

## 2025-07-03 PROCEDURE — 99900035 HC TECH TIME PER 15 MIN (STAT): Mod: HCNC

## 2025-07-03 PROCEDURE — 25000003 PHARM REV CODE 250: Mod: HCNC | Performed by: INTERNAL MEDICINE

## 2025-07-03 PROCEDURE — 25000003 PHARM REV CODE 250: Mod: HCNC | Performed by: NURSE PRACTITIONER

## 2025-07-03 PROCEDURE — 63600175 PHARM REV CODE 636 W HCPCS: Mod: HCNC | Performed by: HOSPITALIST

## 2025-07-03 PROCEDURE — 94660 CPAP INITIATION&MGMT: CPT | Mod: HCNC

## 2025-07-03 RX ORDER — POTASSIUM CHLORIDE 750 MG/1
10 CAPSULE, EXTENDED RELEASE ORAL DAILY
Start: 2025-07-03

## 2025-07-03 RX ORDER — MELOXICAM 15 MG/1
15 TABLET ORAL DAILY PRN
Start: 2025-07-03

## 2025-07-03 RX ORDER — FLUTICASONE FUROATE, UMECLIDINIUM BROMIDE AND VILANTEROL TRIFENATATE 200; 62.5; 25 UG/1; UG/1; UG/1
1 POWDER RESPIRATORY (INHALATION) DAILY
Start: 2025-07-03

## 2025-07-03 RX ORDER — PREDNISONE 20 MG/1
TABLET ORAL
Start: 2025-07-04 | End: 2025-07-09

## 2025-07-03 RX ORDER — BENZONATATE 100 MG/1
100 CAPSULE ORAL 3 TIMES DAILY PRN
Start: 2025-07-03 | End: 2025-07-13

## 2025-07-03 RX ADMIN — ASPIRIN 81 MG CHEWABLE TABLET 81 MG: 81 TABLET CHEWABLE at 10:07

## 2025-07-03 RX ADMIN — IPRATROPIUM BROMIDE AND ALBUTEROL SULFATE 3 ML: 2.5; .5 SOLUTION RESPIRATORY (INHALATION) at 08:07

## 2025-07-03 RX ADMIN — ENOXAPARIN SODIUM 40 MG: 40 INJECTION SUBCUTANEOUS at 04:07

## 2025-07-03 RX ADMIN — LOSARTAN POTASSIUM 25 MG: 25 TABLET, FILM COATED ORAL at 10:07

## 2025-07-03 RX ADMIN — ROFLUMILAST 500 MCG: 500 TABLET ORAL at 10:07

## 2025-07-03 RX ADMIN — IPRATROPIUM BROMIDE AND ALBUTEROL SULFATE 3 ML: 2.5; .5 SOLUTION RESPIRATORY (INHALATION) at 02:07

## 2025-07-03 RX ADMIN — HYDROXYZINE HYDROCHLORIDE 25 MG: 25 TABLET, FILM COATED ORAL at 11:07

## 2025-07-03 RX ADMIN — PREDNISONE 20 MG: 20 TABLET ORAL at 10:07

## 2025-07-03 RX ADMIN — FLUTICASONE FUROATE AND VILANTEROL TRIFENATATE 1 PUFF: 100; 25 POWDER RESPIRATORY (INHALATION) at 08:07

## 2025-07-03 RX ADMIN — GUAIFENESIN AND DEXTROMETHORPHAN HYDROBROMIDE 1 TABLET: 600; 30 TABLET, EXTENDED RELEASE ORAL at 10:07

## 2025-07-03 RX ADMIN — Medication 2000 UNITS: at 10:07

## 2025-07-03 RX ADMIN — SERTRALINE 100 MG: 100 TABLET, FILM COATED ORAL at 10:07

## 2025-07-03 NOTE — DISCHARGE SUMMARY
[facility] Medicine  Discharge Summary      Patient Name: Terri Phelan  MRN: [id number]  SUNDEEP: [id number]  Patient Class: IP- Inpatient  Admission Date: 6/26/2025  Hospital Length of Stay: 7 days  Discharge Date and Time: 07/03/2025 12:38 PM  Attending Physician: Tirso Valenzuela,*   Discharging Provider: Tirso Valenzuela MD  Primary Care Provider: Sussy Mason MD    Primary Care Team: Networked reference to record PCT     HPI:   69-year-old female with a past medical history of COPD on 2L of O2 via NC (uses 3L with exertion), diastolic HF, HTN, HLD, anxiety, depression, JUAN (uses bipap every night), hx of MI (but no stenosis noted on angiogram); came to emergency department due to shortness for breath and cough.  She states she has recently treated for COPD exacerbation and discharged on oral steroids and Augmentin, which she recently completed.  She states she was feeling well until yesterday when her symptoms returned.  She denied chest pain palpitation but complains of shortness for breath, cough and right lower extremity cramping.    In the emergency department the patient had a blood pressure of 122/58, heart rate of 110, temperature 98.7°, respiratory rate of 29.  ABG showed a pH of 7.245 5, bicarb of 89.  She was on BiPAP continuously.  She was seen by pulmonology team we will request the BiPAP be switched to at night only.  WBC 6.31, hemoglobin 12.3, platelet 173.  Sodium 144, bicarb 34, anion gap 7, magnesium 2.9.  ABG results reviewed.  Chest x-ray shows possible pulmonary emphysema.  Patient will be admitted for further management of COPD exacerbation.              * No surgery found *      Hospital Course:   Ms. Phelan presented with acute on chronic hypoxemic respiratory failure due to COPD exacerbation.  Initially treated with BiPAP in the ED than deescalated to nasal cannula oxygen.  She was treated on COPD pathway with steroids, nebulizers, and supplemental  "oxygen.  Breathing has slowly returned back close to her baseline.  Clinical course complicated by significant weakness and debility; she was evaluated by PT/OT and she will discharge to SNF for ongoing therapy prior to return home.  She is medically stable for discharge at this time.    BP (!) 113/52   Pulse 64   Temp 97.2 °F (36.2 °C)   Resp 17   Ht 5' 6" (1.676 m)   Wt 81.2 kg (179 lb 0.2 oz)   SpO2 (!) 94%   Breastfeeding No   BMI 28.89 kg/m²   Physical Exam  Vitals reviewed.   Constitutional:       General: She is not in acute distress.     Appearance: She is obese. She is ill-appearing.   Cardiovascular:      Rate and Rhythm: Normal rate and regular rhythm.      Pulses: Normal pulses.      Heart sounds: Normal heart sounds. No murmur heard.  Pulmonary:      Effort: Pulmonary effort is normal. No respiratory distress.      Breath sounds: Normal breath sounds. No wheezing.      Comments: Reduced breath sounds in bases.  On home O2  Abdominal:      General: Abdomen is flat. Bowel sounds are normal. There is no distension.      Palpations: Abdomen is soft.   Musculoskeletal:         General: No swelling. Normal range of motion.   Skin:     General: Skin is warm and dry.      Capillary Refill: Capillary refill takes less than 2 seconds.      Coloration: Skin is not jaundiced.   Neurological:      General: No focal deficit present.      Mental Status: She is alert and oriented to person, place, and time.     Goals of Care Treatment Preferences:  Code Status: Full Code    Health care agent: Mulugeta Phelan  [facility] agent number: [phone]    Living Will: Yes                 Consults:   Consults (From admission, onward)          Status Ordering Provider     Inpatient consult to Pulmonology  Once        Provider:  (Not yet assigned)    Completed AMARJIT MCCURDY            Assessment & Plan  Acute on chronic respiratory failure with hypoxia and hypercapnia  COPD exacerbation  Centrilobular " emphysema  Patient with Hypercapnic Respiratory failure which is Acute on chronic.  she is on home oxygen at 3 LPM. Supplemental oxygen was provided and noted- Oxygen Concentration (%):  [24] 24    .   Signs/symptoms of respiratory failure include- tachypnea, increased work of breathing, respiratory distress, and wheezing. Contributing diagnoses includes - COPD Labs and images were reviewed. Patient Has recent ABG, which has been reviewed. Will treat underlying causes and adjust management of respiratory failure as follows- Bipap at night    Patient's COPD is with exacerbation noted by continued dyspnea currently.  Patient is currently on COPD Pathway. Continue scheduled inhalers Steroids and Supplemental oxygen and monitor respiratory status closely.     -seen by pulmonology  -continue PO prednisone 40mg daily; deescalate tomorrow for short taper  -BiPAP at night  -continue controller inhaler and scheduled nebulizer; continue roflumilast  HTN (hypertension)  Patient's blood pressure range in the last 24 hours was: BP  Min: 111/58  Max: 130/61.The patient's inpatient anti-hypertensive regimen is listed below:  Current Antihypertensives  losartan tablet 25 mg, Daily, Oral    Plan  - BP is controlled, no changes needed to their regimen    Oxygen dependent  -3L nasal cannula at home    Muscle cramp  -patient complaining of cramps in her right lower extremity  -states she uses Robaxin at home, has also use Flexeril in the past  -improved.  Flexeril discontinued  -reports that this may be associated with statin use; have discontinued for now   -can revisit need for statin as outpatient with PCP; given her end-stage COPD she is less likely to derive significant mortality benefit from this medication    Tremor  -observe tremors of head and bilateral upper extremities.  Patient states she feels it has worsened with Zoloft  -patient was on Zoloft and Lexapro, states Lexapro was discontinued before admission.  It has been  stopped  -PT/OT ordered, recommended SNF    Debility  Patient with Acute on chronic debility due to age-related physical debility. The patient's latest AMPAC (Activity Measure for Post Acute Care) Score is listed below.    AM-PAC Score - How much help does the patient need for each activity listed  Basic Mobility Total Score: 18  Turning over in bed (including adjusting bedclothes, sheets and blankets)?: None  Sitting down on and standing up from a chair with arms (e.g., wheelchair, bedside commode, etc.): A little  Moving from lying on back to sitting on the side of the bed?: A little  Moving to and from a bed to a chair (including a wheelchair)?: A little  Need to walk in hospital room?: A little  Climbing 3-5 steps with a railing?: A lot    Plan  - Progressive mobility protocol initated  - PT/OT consulted  - Fall precautions in place  - SNF placement        Final Active Diagnoses:    Diagnosis Date Noted POA    PRINCIPAL PROBLEM:  Acute on chronic respiratory failure with hypoxia and hypercapnia [J96.21, J96.22] 05/01/2017 Yes    Tremor [R25.1] 06/28/2025 Yes    Muscle cramp [R25.2] 06/26/2025 Yes    Debility [R53.81] 07/03/2023 Yes    Oxygen dependent [Z99.81] 09/09/2022 Not Applicable    Centrilobular emphysema [J43.2] 06/16/2022 Yes    HTN (hypertension) [I10] 09/08/2015 Yes    COPD exacerbation [J44.1] 12/17/2014 Yes      Problems Resolved During this Admission:    Diagnosis Date Noted Date Resolved POA    Chronic respiratory failure with hypoxia and hypercapnia [J96.11, J96.12] 08/09/2023 06/29/2025 Yes       Discharged Condition: good    Disposition: Skilled Nursing Facility    Follow Up:   Contact information for after-discharge care       Destination       [facility] .    Service: Skilled Nursing  Contact information:  [address] [facility] [address] [phone]                                 Patient Instructions:      Ambulatory referral[facility]ult to [facility]   Standing Status:  Future   Referral Priority: Routine Referral Type: Consultation   Referral Reason: Specialty Services Required   Number of Visits Requested: 1     Ambulatory referral/consult to Pulmonology   Standing Status: Future   Referral Priority: Routine Referral Type: Consultation   Referral Reason: Specialty Services Required   Requested Specialty: Pulmonary Disease   Number of Visits Requested: 1     Ambulatory referral/consult to Pulmonary Rehab   Standing Status: Future   Referral Priority: Routine Referral Type: Consultation   Referral Reason: Specialty Services Required   Requested Specialty: Pulmonary Disease   Number of Visits Requested: 1       Significant Diagnostic Studies: N/A    Pending Diagnostic Studies:       None           Medications:  Reconciled Home Medications:      Medication List        START taking these medications      benzonatate 100 MG capsule  Commonly known as: TESSALON  Take 1 capsule (100 mg total) by mouth 3 (three) times daily as needed for Cough.     TRELEGY ELLIPTA 200-62.5-25 mcg inhaler  Generic drug: fluticasone-umeclidin-vilanter  Inhale 1 puff into the lungs once daily.            CHANGE how you take these medications      albuterol 90 mcg/actuation inhaler  Commonly known as: PROVENTIL/VENTOLIN HFA  INHALE 2 PUFFS INTO THE LUNGS EVERY 6 HOURS AS NEEDED FOR WHEEZING OR SHORTNESS OF BREATH  What changed: reasons to take this     fluticasone propionate 50 mcg/actuation nasal spray  Commonly known as: FLONASE  SHAKE LIQUID AND USE 1 SPRAY(50 MCG) IN EACH NOSTRIL DAILY AS NEEDED FOR ALLERGIES  What changed: See the new instructions.     hydrOXYzine HCL 25 MG tablet  Commonly known as: ATARAX  TAKE 1 TABLET(25 MG) BY MOUTH THREE TIMES DAILY AS NEEDED FOR ANXIETY OR ITCHING  What changed: See the new instructions.     meloxicam 15 MG tablet  Commonly known as: MOBIC  Take 1 tablet (15 mg total) by mouth daily as needed for Pain.  What changed:   when to take this  reasons to take this      predniSONE 20 MG tablet  Commonly known as: DELTASONE  Take 1 tablet (20 mg total) by mouth once daily for 2 days, THEN 0.5 tablets (10 mg total) once daily for 3 days.  Start taking on: July 4, 2025  What changed: See the new instructions.            CONTINUE taking these medications      acetaminophen 500 MG tablet  Commonly known as: TYLENOL  Take 500 mg by mouth every 6 (six) hours as needed for Pain.     albuterol-ipratropium 2.5 mg-0.5 mg/3 mL nebulizer solution  Commonly known as: DUO-NEB  Take 3 mLs by nebulization every 6 (six) hours while awake. Rescue     aspirin 81 MG Chew  Take 81 mg by mouth once daily.     azithromycin 250 MG tablet  Commonly known as: Z-OLY  Take 1 tablet (250 mg total) by mouth 3 (three) times a week. Take three times weekly on Monday, Wednesday, and Friday.     calcium carb-mag hydrox-simeth 1,200 mg-270 mg -80 mg/10 mL Susp  Take 1,200 mg by mouth once daily.     cholecalciferol (vitamin D3) 50 mcg (2,000 unit) Tab  Commonly known as: VITAMIN D3  Take 2,000 Units by mouth once daily.     cyclobenzaprine 5 MG tablet  Commonly known as: FLEXERIL  Take 5 mg by mouth 3 (three) times daily as needed for Muscle spasms.     dextromethorphan-guaiFENesin  mg  mg per 12 hr tablet  Commonly known as: MUCINEX DM  Take 1 tablet by mouth 2 (two) times daily as needed (cough).     guaiFENesin 600 mg 12 hr tablet  Commonly known as: MUCINEX  Take 1 tablet (600 mg total) by mouth 2 (two) times daily.     losartan 25 MG tablet  Commonly known as: COZAAR  Take 1 tablet (25 mg total) by mouth once daily.     OXYGEN-AIR DELIVERY SYSTEMS MISC  2 L by Nasal route continuous.     potassium chloride 10 MEQ Cpsr  Commonly known as: MICRO-K  Take 1 capsule (10 mEq total) by mouth once daily.     roflumilast 500 mcg Tab  Commonly known as: DALIRESP  Take 1 tablet (500 mcg total) by mouth once daily.     sertraline 100 MG tablet  Commonly known as: ZOLOFT  Take half tablet (50 mg) daily for one  week and then increase to 1 tablet (100 mg) daily.            STOP taking these medications      ezetimibe 10 mg tablet  Commonly known as: ZETIA     furosemide 40 MG tablet  Commonly known as: LASIX     rosuvastatin 10 MG tablet  Commonly known as: CRESTOR              Indwelling Lines/Drains at time of discharge:   Lines/Drains/Airways       None                       Time spent on the discharge of patient: 32 minutes         Tirso Valenzuela MD  Department of Hospital Medicine  [place] [facility]

## 2025-07-03 NOTE — PLAN OF CARE
Problem: Adult Inpatient Plan of Care  Goal: Plan of Care Review  Outcome: Progressing  Flowsheets (Taken 7/2/2025 2257)  Plan of Care Reviewed With: patient     Problem: COPD (Chronic Obstructive Pulmonary Disease)  Goal: Optimal Chronic Illness Coping  Outcome: Progressing  Intervention: Support and Optimize Psychosocial Response  Flowsheets (Taken 7/2/2025 2257)  Supportive Measures:   active listening utilized   self-care encouraged   self-reflection promoted   positive reinforcement provided   problem-solving facilitated   self-responsibility promoted   verbalization of feelings encouraged   relaxation techniques promoted     Problem: Comorbidity Management  Goal: Maintenance of Asthma Control  Outcome: Progressing  Intervention: Maintain Asthma Symptom Control  Flowsheets (Taken 7/2/2025 2257)  Medication Review/Management:   medications reviewed   high-risk medications identified     Problem: Fall Injury Risk  Goal: Absence of Fall and Fall-Related Injury  Outcome: Progressing  Intervention: Identify and Manage Contributors  Flowsheets (Taken 7/2/2025 2257)  Self-Care Promotion:   independence encouraged   BADL personal objects within reach   BADL personal routines maintained  Medication Review/Management:   medications reviewed   high-risk medications identified     Problem: Skin Injury Risk Increased  Goal: Skin Health and Integrity  Outcome: Progressing  Intervention: Optimize Skin Protection  Flowsheets (Taken 7/2/2025 2257)  Pressure Reduction Techniques: frequent weight shift encouraged  Skin Protection:   transparent dressing maintained   incontinence pads utilized  Activity Management: Sitting at edge of bed - L2  Head of Bed (HOB) Positioning: HOB elevated     Problem: Pain Acute  Goal: Optimal Pain Control and Function  Outcome: Progressing  Intervention: Develop Pain Management Plan  Flowsheets (Taken 7/2/2025 2257)  Pain Management Interventions:   quiet environment facilitated   pain management  plan reviewed with patient/caregiver   relaxation techniques promoted   position adjusted   care clustered     Problem: Infection  Goal: Absence of Infection Signs and Symptoms  Outcome: Progressing  Intervention: Prevent or Manage Infection  Flowsheets (Taken 7/2/2025 3555)  Fever Reduction/Comfort Measures:   lightweight bedding   lightweight clothing  Infection Management: aseptic technique maintained   Plan of care progressing.

## 2025-07-03 NOTE — PLAN OF CARE
[facility]    FACILITY TRANSFER ORDERS      Patient Name: Terri Phelan  YOB: 1955    PCP: Sussy Mason MD   PCP Address: [address] [place] [address] / Ricardo ALEXANDER [facility]  PCP Phone Number: [phone]  PCP Fax: [phone]    Encounter Date: 07/03/2025    Admit to: SNF    Vital Signs:  Routine    Diagnoses:   Active Hospital Problems    Diagnosis  POA    *Acute on chronic respiratory failure with hypoxia and hypercapnia [J96.21, J96.22]  Yes    Tremor [R25.1]  Yes    Muscle cramp [R25.2]  Yes    Debility [R53.81]  Yes    Oxygen dependent [Z99.81]  Not Applicable     Requires 2-3L NC.      Centrilobular emphysema [J43.2]  Yes     - Severe obstructive disease, on 2-3L home O2 and NIPPV (BiPap) at night and prn.   - Chest CTA from July 2023 with apical centrilobular emphysema present.  - Managed with home O2, ICS/LABA, PRN LISA, and PD4i          HTN (hypertension) [I10]  Yes     - Managed on losartan 25mg daily      COPD exacerbation [J44.1]  Yes      Resolved Hospital Problems    Diagnosis Date Resolved POA    Chronic respiratory failure with hypoxia and hypercapnia [J96.11, J96.12] 06/29/2025 Yes     - Secondary to advanced/end-stage COPD/emphysema.  - On home oxygen and NIPPV with baseline PaCO2 in the 80s.  - Occasionally reports headaches but relatively asymptomatic with severe CO2 retention.           Allergies:  Review of patient's allergies indicates:   Allergen Reactions    Codeine Nausea And Vomiting    Lipitor [atorvastatin] Other (See Comments)     Muscle fatigue      Morphine Hallucinations       Diet: 2 gram sodium diet    Activities: Activity as tolerated    Goals of Care Treatment Preferences:  Code Status: Full Code    Health care agent: Mulugeta Phelan  [facility] agent number: [phone]    Living Will: Yes              Nursing: per facility protocol     Labs: per facility protocol    CONSULTS:    Physical Therapy to evaluate and treat. , Occupational  Therapy to evaluate and treat., and  to evaluate for community resources/long-range planning.    MISCELLANEOUS CARE:  Routine Skin for Bedridden Patients: Apply moisture barrier cream to all skin folds and wet areas in perineal area daily and after baths and all bowel movements.  Home Oxygen:  Oxygen at 3 L/min nasal canula to be used:  Continuously and As needed for SOB.  BiPAP qhs and with naps; 10/5 at 28% fio2    WOUND CARE ORDERS  None    Medications: Review discharge medications with patient and family and provide education.         Medication List        START taking these medications      benzonatate 100 MG capsule  Commonly known as: TESSALON  Take 1 capsule (100 mg total) by mouth 3 (three) times daily as needed for Cough.     TRELEGY ELLIPTA 200-62.5-25 mcg inhaler  Generic drug: fluticasone-umeclidin-vilanter  Inhale 1 puff into the lungs once daily.            CHANGE how you take these medications      albuterol 90 mcg/actuation inhaler  Commonly known as: PROVENTIL/VENTOLIN HFA  INHALE 2 PUFFS INTO THE LUNGS EVERY 6 HOURS AS NEEDED FOR WHEEZING OR SHORTNESS OF BREATH  What changed: reasons to take this     fluticasone propionate 50 mcg/actuation nasal spray  Commonly known as: FLONASE  SHAKE LIQUID AND USE 1 SPRAY(50 MCG) IN EACH NOSTRIL DAILY AS NEEDED FOR ALLERGIES  What changed: See the new instructions.     hydrOXYzine HCL 25 MG tablet  Commonly known as: ATARAX  TAKE 1 TABLET(25 MG) BY MOUTH THREE TIMES DAILY AS NEEDED FOR ANXIETY OR ITCHING  What changed: See the new instructions.     meloxicam 15 MG tablet  Commonly known as: MOBIC  Take 1 tablet (15 mg total) by mouth daily as needed for Pain.  What changed:   when to take this  reasons to take this     predniSONE 20 MG tablet  Commonly known as: DELTASONE  Take 1 tablet (20 mg total) by mouth once daily for 2 days, THEN 0.5 tablets (10 mg total) once daily for 3 days.  Start taking on: July 4, 2025  What changed: See the new  instructions.            CONTINUE taking these medications      acetaminophen 500 MG tablet  Commonly known as: TYLENOL  Take 500 mg by mouth every 6 (six) hours as needed for Pain.     albuterol-ipratropium 2.5 mg-0.5 mg/3 mL nebulizer solution  Commonly known as: DUO-NEB  Take 3 mLs by nebulization every 6 (six) hours while awake. Rescue     aspirin 81 MG Chew  Take 81 mg by mouth once daily.     azithromycin 250 MG tablet  Commonly known as: Z-OLY  Take 1 tablet (250 mg total) by mouth 3 (three) times a week. Take three times weekly on Monday, Wednesday, and Friday.     calcium carb-mag hydrox-simeth 1,200 mg-270 mg -80 mg/10 mL Susp  Take 1,200 mg by mouth once daily.     cholecalciferol (vitamin D3) 50 mcg (2,000 unit) Tab  Commonly known as: VITAMIN D3  Take 2,000 Units by mouth once daily.     cyclobenzaprine 5 MG tablet  Commonly known as: FLEXERIL  Take 5 mg by mouth 3 (three) times daily as needed for Muscle spasms.     dextromethorphan-guaiFENesin  mg  mg per 12 hr tablet  Commonly known as: MUCINEX DM  Take 1 tablet by mouth 2 (two) times daily as needed (cough).     guaiFENesin 600 mg 12 hr tablet  Commonly known as: MUCINEX  Take 1 tablet (600 mg total) by mouth 2 (two) times daily.     losartan 25 MG tablet  Commonly known as: COZAAR  Take 1 tablet (25 mg total) by mouth once daily.     OXYGEN-AIR DELIVERY SYSTEMS MISC  2 L by Nasal route continuous.     potassium chloride 10 MEQ Cpsr  Commonly known as: MICRO-K  Take 1 capsule (10 mEq total) by mouth once daily.     roflumilast 500 mcg Tab  Commonly known as: DALIRESP  Take 1 tablet (500 mcg total) by mouth once daily.     sertraline 100 MG tablet  Commonly known as: ZOLOFT  Take half tablet (50 mg) daily for one week and then increase to 1 tablet (100 mg) daily.            STOP taking these medications      ezetimibe 10 mg tablet  Commonly known as: ZETIA     furosemide 40 MG tablet  Commonly known as: LASIX     rosuvastatin 10 MG  tablet  Commonly known as: CRESTOR                Immunizations Administered as of 7/3/2025       Name Date Dose VIS Date Route Exp Date    COVID-19, MRNA, LN-S, PF (Moderna) 3/25/2021 0.5 mL 1/23/2021 Intramuscular --    Site: Left arm     : [employer]     Lot: [id number]     Comment: Adminis     COVID-19, MRNA, LN-S, PF (Moderna) 2/25/2021 0.5 mL 1/23/2021 Intramuscular --    Site: Left arm     : Modern[employer]     Lot: [id number]     Comment: Adminis             This patient has had both covid vaccinations    Some patients may experience side effects after vaccination.  These may include fever, headache, muscle or joint aches.  Most symptoms resolve with 24-48 hours and do not require urgent medical evaluation unless they persist for more than 72 hours or symptoms are concerning for an unrelated medical condition.          _________________________________  Tirso Valenzuela MD  07/03/2025

## 2025-07-03 NOTE — PLAN OF CARE
SNF orders sent to SNF facility. SW awaiting report and room number at this time.     Pt aware of transport today to [facility]. Pt agreeable to dc to [facility] for snf.     [phone]   Room:18 B  Transport packet:At nursing station  Transportation:SW setup wheelchair transport for 3:30 pm.   Facility: [address] [address]     SW will continue to follow pt throughout her transitions of care and assist with any dc needs.     18 B report can be called in 30 minutes to [phone]     Cleared from CM . Bedside Nurse and VN notified.    Future Appointments   Date Time Provider Department Center   7/9/2025  1:20 PM Sussy Mason MD [facility] 65[facility] 65+ [place]   7/16/2025  2:30 PM CV [facility] ECHO [facility] [place]   7/21/2025 10:30 AM Meka Christensen DPM [facility] CARL [address]   8/14/2025  1:00 PM Kaitlin Donovan NP [facility] PRICAR [facility] [place]        07/03/25 1124   Final Note   Assessment Type Final Discharge Note   Anticipated Discharge Disposition SNF   Hospital Resources/Appts/Education Provided Appointments scheduled and added to AVS   Post-Acute Status   Discharge Delays None known at this time

## 2025-07-03 NOTE — PLAN OF CARE
Problem: Occupational Therapy  Goal: Occupational Therapy Goal  Description: Goals to be met by: 07/27/2025      Patient will increase functional independence with ADLs by performing:    UE Dressing with Modified [place].--MET 07/01  LE Dressing with Modified [place].--MET 07/01  Grooming while standing with Modified [place].--MET 07/01  Toileting from toilet with Modified [place] for hygiene and clothing management.   Toilet transfer to toilet with Modified [place].  Increased functional strength to [facility] for self care.  Upper extremity exercise program x10 reps per handout, with independence.     Outcome: Yakov Phelan is a 69 y.o. female with a medical diagnosis of Acute on chronic respiratory failure with hypoxia and hypercapnia.  Performance deficits affecting function are weakness, impaired endurance, impaired self care skills, impaired functional mobility, gait instability, impaired balance, decreased upper extremity function, decreased lower extremity function, impaired cardiopulmonary response to activity.    Pt found in bed, agreeable to therapy.  Pt is progressing towards goals. Continue OT services to address functional goals, progressing as able.

## 2025-07-03 NOTE — ASSESSMENT & PLAN NOTE
Patient's blood pressure range in the last 24 hours was: BP  Min: 111/58  Max: 130/61.The patient's inpatient anti-hypertensive regimen is listed below:  Current Antihypertensives  losartan tablet 25 mg, Daily, Oral    Plan  - BP is controlled, no changes needed to their regimen

## 2025-07-03 NOTE — HOSPITAL COURSE
"Ms. Phelan presented with acute on chronic hypoxemic respiratory failure due to COPD exacerbation.  Initially treated with BiPAP in the ED than deescalated to nasal cannula oxygen.  She was treated on COPD pathway with steroids, nebulizers, and supplemental oxygen.  Breathing has slowly returned back close to her baseline.  Clinical course complicated by significant weakness and debility; she was evaluated by PT/OT and she will discharge to SNF for ongoing therapy prior to return home.  She is medically stable for discharge at this time.    BP (!) 113/52   Pulse 64   Temp 97.2 °F (36.2 °C)   Resp 17   Ht 5' 6" (1.676 m)   Wt 81.2 kg (179 lb 0.2 oz)   SpO2 (!) 94%   Breastfeeding No   BMI 28.89 kg/m²   Physical Exam  Vitals reviewed.   Constitutional:       General: She is not in acute distress.     Appearance: She is obese. She is ill-appearing.   Cardiovascular:      Rate and Rhythm: Normal rate and regular rhythm.      Pulses: Normal pulses.      Heart sounds: Normal heart sounds. No murmur heard.  Pulmonary:      Effort: Pulmonary effort is normal. No respiratory distress.      Breath sounds: Normal breath sounds. No wheezing.      Comments: Reduced breath sounds in bases.  On home O2  Abdominal:      General: Abdomen is flat. Bowel sounds are normal. There is no distension.      Palpations: Abdomen is soft.   Musculoskeletal:         General: No swelling. Normal range of motion.   Skin:     General: Skin is warm and dry.      Capillary Refill: Capillary refill takes less than 2 seconds.      Coloration: Skin is not jaundiced.   Neurological:      General: No focal deficit present.      Mental Status: She is alert and oriented to person, place, and time.  "

## 2025-07-03 NOTE — PLAN OF CARE
SNF Summary   06/30/2025  12:23 PM- Sw sent a referral to [facility]  1:50 PM- LOCET/PASRR completed  2:18 PM- [facility] cannot accept   4:00 PM- [facility]  4:01 PM- Sw updated patient's daughter and sent out more referrals   4:27 PM- Sw uploaded patient's 142  07/01/2025  8:47 AM- Sw requested an update on patient's referral from [facility] and [facility]  10:04 AM- Sw reviewed accepting facilities with patient's daughter who is agreeable to [facility]   10:07 AM- Sw updated [facility] who will submit for authorization today   07/02/2025  11:32 AM- Sw sent PPD, COVID, CXR, 142, and PASRR and requested an update on auth from [facility]  11:54 AM- Sw spoke with [facility] and no update on auth   1:46 PM- Sw requested an update on auth from [facility]   2:21 PM-Sw spoke with [facility] who still does not have auth   4:20 PM- Sw spoke with [facility] who still does not have auth  07/03/2025  8:49 AM- [facility] requested an update on auth from [facility]  10:19 AM-  [facility] has auth. [facility] requested orders  11:11 AM- [facility] sent over orders   12:37 PM- Trevor requested an update from [facility]   1:19 PM- Sw spoke with [facility] who said they will call back in 20 minutes with room and report   2:06 PM- Trevor requested an update from  [facility]   2:48 PM- [facility] received room and report and updated frontline CM

## 2025-07-03 NOTE — PT/OT/SLP PROGRESS
Occupational Therapy   Treatment    Name: Terri Phelan  MRN: [id number]  Admitting Diagnosis:  Acute on chronic respiratory failure with hypoxia and hypercapnia       Recommendations:     Discharge Recommendations: Moderate Intensity Therapy  Discharge Equipment Recommendations:  to be determined by next level of care  Barriers to discharge:  Decreased caregiver support    Assessment:     Terri Phelan is a 69 y.o. female with a medical diagnosis of Acute on chronic respiratory failure with hypoxia and hypercapnia.  Performance deficits affecting function are weakness, impaired endurance, impaired self care skills, impaired functional mobility, gait instability, impaired balance, decreased upper extremity function, decreased lower extremity function, impaired cardiopulmonary response to activity.    Pt found in bed, agreeable to therapy.  Pt is progressing towards goals. Continue OT services to address functional goals, progressing as able.    Rehab Prognosis:  Good; patient would benefit from acute skilled OT services to address these deficits and reach maximum level of function.       Plan:     Patient to be seen 3 x/week to address the above listed problems via self-care/home management, therapeutic activities, therapeutic exercises  Plan of Care Expires: 07/27/25  Plan of Care Reviewed with: patient    Subjective     Chief Complaint: anxious about transfer to SNF  Patient/Family Comments/goals: to return to Prior level of functioning   Pain/Comfort:  Pain Rating 1: 0/10  Pain Rating Post-Intervention 1: 0/10    Objective:     Communicated with: nurse prior to session.  Patient found HOB elevated with bed alarm, peripheral IV, oxygen upon OT entry to room.    General Precautions: Standard, fall    Orthopedic Precautions:N/A  Braces: N/A  Respiratory Status: Nasal cannula, flow 2 L/min     Occupational Performance:     Bed Mobility:    Patient completed Rolling/Turning to Right with modified  independence  Patient completed Scooting/Bridging with modified independence  Patient completed Supine to Sit with modified independence     Functional Mobility/Transfers:  Patient completed Sit <> Stand Transfer with modified independence  with  no assistive device   Patient completed Bed <> Chair Transfer using Stand Pivot technique with supervision with no assistive device  Patient completed Toilet Transfer Stand Pivot technique with supervision with  no AD  Functional Mobility: Pt ambulated short room distances with Supervision with no AD    Activities of Daily Living:  Grooming: modified independence with supervision for standing balance at sink   Toileting: modified independence        [facility] 6 Click ADL: 23    Treatment & Education:  Pt declined further therapy 2/2 feeling anxious about transfer to SNF.  Nurse notified per pt request for anxiety medication.   Pt very thankful for therapy and reports reading over all handouts provided and performing UE exercise program every morning.     Patient left up in chair with all lines intact, call button in reach, and nurse notified    GOALS:   Multidisciplinary Problems       Occupational Therapy Goals          Problem: Occupational Therapy    Goal Priority Disciplines Outcome Interventions   Occupational Therapy Goal     OT, PT/OT Progressing    Description: Goals to be met by: 07/27/2025      Patient will increase functional independence with ADLs by performing:    UE Dressing with Modified [place].--MET 07/01  LE Dressing with Modified [place].--MET 07/01  Grooming while standing with Modified [place].--MET 07/01  Toileting from toilet with Modified [place] for hygiene and clothing management.   Toilet transfer to toilet with Modified [place].  Increased functional strength to WFL for self care.  Upper extremity exercise program x10 reps per handout, with independence.                          Time Tracking:     OT Date of Treatment:  07/03/25  OT Start Time: 1140  OT Stop Time: 1155  OT Total Time (min): 15 min    Billable Minutes:Self Care/Home Management 15    Jannet OTR/L readily available to answer questions about the patient's intervention at the time of the provision of services.              7/3/2025

## 2025-07-03 NOTE — ASSESSMENT & PLAN NOTE
Patient with Hypercapnic Respiratory failure which is Acute on chronic.  she is on home oxygen at 3 LPM. Supplemental oxygen was provided and noted- Oxygen Concentration (%):  [24] 24    .   Signs/symptoms of respiratory failure include- tachypnea, increased work of breathing, respiratory distress, and wheezing. Contributing diagnoses includes - COPD Labs and images were reviewed. Patient Has recent ABG, which has been reviewed. Will treat underlying causes and adjust management of respiratory failure as follows- Bipap at night    Patient's COPD is with exacerbation noted by continued dyspnea currently.  Patient is currently on COPD Pathway. Continue scheduled inhalers Steroids and Supplemental oxygen and monitor respiratory status closely.     -seen by pulmonology  -continue PO prednisone 40mg daily; deescalate tomorrow for short taper  -BiPAP at night  -continue controller inhaler and scheduled nebulizer; continue roflumilast

## 2025-07-09 ENCOUNTER — OFFICE VISIT (OUTPATIENT)
Dept: PRIMARY CARE CLINIC | Facility: CLINIC | Age: 70
End: 2025-07-09
Payer: MEDICARE

## 2025-07-09 ENCOUNTER — PATIENT MESSAGE (OUTPATIENT)
Dept: PRIMARY CARE CLINIC | Facility: CLINIC | Age: 70
End: 2025-07-09

## 2025-07-09 DIAGNOSIS — F41.9 ANXIETY: Chronic | ICD-10-CM

## 2025-07-09 DIAGNOSIS — J44.1 COPD EXACERBATION: Primary | ICD-10-CM

## 2025-07-09 PROCEDURE — 4010F ACE/ARB THERAPY RXD/TAKEN: CPT | Mod: CPTII,HCNC,95, | Performed by: STUDENT IN AN ORGANIZED HEALTH CARE EDUCATION/TRAINING PROGRAM

## 2025-07-09 PROCEDURE — 1157F ADVNC CARE PLAN IN RCRD: CPT | Mod: CPTII,HCNC,95, | Performed by: STUDENT IN AN ORGANIZED HEALTH CARE EDUCATION/TRAINING PROGRAM

## 2025-07-09 PROCEDURE — 98005 SYNCH AUDIO-VIDEO EST LOW 20: CPT | Mod: HCNC,95,, | Performed by: STUDENT IN AN ORGANIZED HEALTH CARE EDUCATION/TRAINING PROGRAM

## 2025-07-09 RX ORDER — BUDESONIDE, GLYCOPYRROLATE, AND FORMOTEROL FUMARATE 160; 9; 4.8 UG/1; UG/1; UG/1
2 AEROSOL, METERED RESPIRATORY (INHALATION) 2 TIMES DAILY
Start: 2025-07-09

## 2025-07-09 NOTE — PROGRESS NOTES
Clinic Note  7/9/2025      Subjective:       Patient ID:  Terri is a 69 y.o. female being seen for an established visit.    Chief Complaint: No chief complaint on file.      HPI  Terri Phelan is a 69 y.o. AA female who presents with COPD on 2L of O2 via NC (uses 3L with exertion), diastolic HF, HTN, HLD, anxiety, depression, JUAN (uses bipap every night), hx of MI (but no stenosis noted on angiogram) is here for a virtual f/u visit. Did acp 2025.   -she went to the ER on June 26th 2 am. We did a virtual visit on June 25th and she states overnight she could not breathe which made her present to the ER. I had prescribed prednisone on June 25th even though she had finished steroids a few days before thinking that maybe she needed a longer course considering she was still having symptoms. She does have advanced COPD. She was admitted for copd exacerbation. She was treated with steroids, nebulizer, oxygen. Her breathing returned to baseline and she was discharged to SNF. They stopped her cholesterol medications. Ezetimibe and rosuvastatin.   -she is taking mucinex daily. She is taking trelegy in snf instead of breztri while she is there but she will go back to breztri when she goes home. Doing nebs every 6 hrs.  She finished steroids yesterday.   -no falls  -does not want to do LDCT. Told her why we do it.         Review of Systems   Constitutional:  Negative for chills and fever.   HENT:  Negative for congestion.    Respiratory:  Positive for sputum production and shortness of breath (with exertion). Negative for cough.    Cardiovascular:  Negative for chest pain.   Gastrointestinal:  Negative for abdominal pain, blood in stool, constipation and diarrhea.   Genitourinary:  Negative for dysuria and hematuria.   Musculoskeletal:  Negative for neck pain.   Neurological:  Positive for headaches. Negative for dizziness and weakness.       Medication List with Changes/Refills   Current Medications    ACETAMINOPHEN  (TYLENOL) 500 MG TABLET    Take 500 mg by mouth every 6 (six) hours as needed for Pain.    ALBUTEROL (PROVENTIL/VENTOLIN HFA) 90 MCG/ACTUATION INHALER    INHALE 2 PUFFS INTO THE LUNGS EVERY 6 HOURS AS NEEDED FOR WHEEZING OR SHORTNESS OF BREATH    ALBUTEROL-IPRATROPIUM (DUO-NEB) 2.5 MG-0.5 MG/3 ML NEBULIZER SOLUTION    Take 3 mLs by nebulization every 6 (six) hours while awake. Rescue    ASPIRIN 81 MG CHEW    Take 81 mg by mouth once daily.    AZITHROMYCIN (Z-OLY) 250 MG TABLET    Take 1 tablet (250 mg total) by mouth 3 (three) times a week. Take three times weekly on Monday, Wednesday, and Friday.    BENZONATATE (TESSALON) 100 MG CAPSULE    Take 1 capsule (100 mg total) by mouth 3 (three) times daily as needed for Cough.    CALCIUM CARB-MAG HYDROX-SIMETH 1,200 MG-270 MG -80 MG/10 ML SUSP    Take 1,200 mg by mouth once daily.    CHOLECALCIFEROL, VITAMIN D3, (VITAMIN D3) 50 MCG (2,000 UNIT) TAB    Take 2,000 Units by mouth once daily.    CYCLOBENZAPRINE (FLEXERIL) 5 MG TABLET    Take 5 mg by mouth 3 (three) times daily as needed for Muscle spasms.    DEXTROMETHORPHAN-GUAIFENESIN  MG (MUCINEX DM)  MG PER 12 HR TABLET    Take 1 tablet by mouth 2 (two) times daily as needed (cough).    FLUTICASONE PROPIONATE (FLONASE) 50 MCG/ACTUATION NASAL SPRAY    SHAKE LIQUID AND USE 1 SPRAY(50 MCG) IN EACH NOSTRIL DAILY AS NEEDED FOR ALLERGIES    FLUTICASONE-UMECLIDIN-VILANTER (TRELEGY ELLIPTA) 200-62.5-25 MCG INHALER    Inhale 1 puff into the lungs once daily.    GUAIFENESIN (MUCINEX) 600 MG 12 HR TABLET    Take 1 tablet (600 mg total) by mouth 2 (two) times daily.    HYDROXYZINE HCL (ATARAX) 25 MG TABLET    TAKE 1 TABLET(25 MG) BY MOUTH THREE TIMES DAILY AS NEEDED FOR ANXIETY OR ITCHING    LOSARTAN (COZAAR) 25 MG TABLET    Take 1 tablet (25 mg total) by mouth once daily.    MELOXICAM (MOBIC) 15 MG TABLET    Take 1 tablet (15 mg total) by mouth daily as needed for Pain.    OXYGEN-AIR DELIVERY SYSTEMS MISC    2 L by Nasal  "route continuous.    POTASSIUM CHLORIDE (MICRO-K) 10 MEQ CPSR    Take 1 capsule (10 mEq total) by mouth once daily.    PREDNISONE (DELTASONE) 20 MG TABLET    Take 1 tablet (20 mg total) by mouth once daily for 2 days, THEN 0.5 tablets (10 mg total) once daily for 3 days.    ROFLUMILAST (DALIRESP) 500 MCG TAB    Take 1 tablet (500 mcg total) by mouth once daily.    SERTRALINE (ZOLOFT) 100 MG TABLET    Take half tablet (50 mg) daily for one week and then increase to 1 tablet (100 mg) daily.   Changed and/or Refilled Medications    Modified Medication Previous Medication    BUDESONIDE-GLYCOPYR-FORMOTEROL (BREZTRI AEROSPHERE) 160-9-4.8 MCG/ACTUATION HFAA budesonide-glycopyr-formoterol (BREZTRI AEROSPHERE) 160-9-4.8 mcg/actuation HFAA       Inhale 2 puffs into the lungs 2 (two) times daily.    Inhale 2 puffs into the lungs 2 (two) times daily.           Objective:      There were no vitals taken for this visit.  Estimated body mass index is 28.89 kg/m² as calculated from the following:    Height as of 6/26/25: 5' 6" (1.676 m).    Weight as of 6/29/25: 81.2 kg (179 lb 0.2 oz).  Physical Exam  Constitutional:       Appearance: Normal appearance.   Eyes:      Conjunctiva/sclera: Conjunctivae normal.   Pulmonary:      Effort: Pulmonary effort is normal. No respiratory distress.      Comments: Diminished breath sounds BL  Neurological:      Mental Status: She is alert and oriented to person, place, and time.   Psychiatric:         Mood and Affect: Mood normal.         Behavior: Behavior normal.           Assessment and Plan:     1. COPD exacerbation  Assessment & Plan:  She finished steroid taper yesterday. she is taking mucinex daily. She is taking trelegy in snf instead of breztri while she is there but she will go back to breztri when she goes home. Doing nebs every 6 hrs. Also on daliresp and azithromycin. Continue.       2. Anxiety  Overview:  Anxiety associated with feelings of SOB, diagnosis, and hope for new " medications that can help. Exacerbated by chronic need to check oxygen numbers and frequent hospitalizations. Following with palliative.     Assessment & Plan:  She is taking 100 mg sertraline daily. Not having the tremors anymore. She is taking hydroxyzine tid for anxiety. Thinks its helping. Continue.        Other orders  -     budesonide-glycopyr-formoterol (BREZTRI AEROSPHERE) 160-9-4.8 mcg/actuation HFAA; Inhale 2 puffs into the lungs 2 (two) times daily.                Follow Up:   Follow up in about 2 weeks (around 7/23/2025) for Virtual Visit.        Sussy Mason        The patient location is: [place], [place]  The chief complaint leading to consultation is: anxiety, copd    Visit type: audiovisual    Face to Face time with patient: 20 mins  20 minutes of total time spent on the encounter, which includes face to face time and non-face to face time preparing to see the patient (eg, review of tests), Obtaining and/or reviewing separately obtained history, Documenting clinical information in the electronic or other health record, Independently interpreting results (not separately reported) and communicating results to the patient/family/caregiver, or Care coordination (not separately reported).         Each patient to whom he or she provides medical services by telemedicine is:  (1) informed of the relationship between the physician and patient and the respective role of any other health care provider with respect to management of the patient; and (2) notified that he or she may decline to receive medical services by telemedicine and may withdraw from such care at any time.

## 2025-07-09 NOTE — ASSESSMENT & PLAN NOTE
She finished steroid taper yesterday. she is taking mucinex daily. She is taking trelegy in snf instead of breztri while she is there but she will go back to breztri when she goes home. Doing nebs every 6 hrs. Also on daliresp and azithromycin. Continue.

## 2025-07-09 NOTE — ASSESSMENT & PLAN NOTE
She is taking 100 mg sertraline daily. Not having the tremors anymore. She is taking hydroxyzine tid for anxiety. Thinks its helping. Continue.

## 2025-07-10 ENCOUNTER — PATIENT MESSAGE (OUTPATIENT)
Dept: PULMONOLOGY | Facility: CLINIC | Age: 70
End: 2025-07-10
Payer: MEDICARE

## 2025-07-11 ENCOUNTER — TELEPHONE (OUTPATIENT)
Dept: PULMONOLOGY | Facility: CLINIC | Age: 70
End: 2025-07-11
Payer: MEDICARE

## 2025-07-11 NOTE — TELEPHONE ENCOUNTER
Spoke with pt stated that she would like a sooner appt with Dr Cunningham, I advised pt Dr Cunningham do not have no sooner appt. Appt was placed on the wait list. Pt confirmed and verbalized understanding.

## 2025-07-14 ENCOUNTER — TELEPHONE (OUTPATIENT)
Dept: PULMONOLOGY | Facility: CLINIC | Age: 70
End: 2025-07-14
Payer: MEDICARE

## 2025-07-14 NOTE — TELEPHONE ENCOUNTER
----- Message from Alex Cunningham MD sent at 7/14/2025  3:07 PM CDT -----  Regarding: Overbook appointment  Cassy:  Okay to overbook Ms. Phelan for 1 pm in a couple weeks -- Tuesday, July 29.  Thanks, DET  ----- Message -----  From: Cassy Reyes MA  Sent: 7/9/2025   1:50 PM CDT  To: Alex Cunningham MD    Good afternoon Dr Cunningham,    I received a message (secure message) from Dr Mason about hospital follow up for Ms Phelan. Please advise.       Thank you,   Cassy

## 2025-07-14 NOTE — TELEPHONE ENCOUNTER
Patient scheduled for hospital follow up with Dr Cunningham on 7/29/25 at 1pm per Dr Cunningham. Appointment mailed.

## 2025-07-15 ENCOUNTER — EXTERNAL HOME HEALTH (OUTPATIENT)
Dept: HOME HEALTH SERVICES | Facility: HOSPITAL | Age: 70
End: 2025-07-15
Payer: MEDICARE

## 2025-07-16 ENCOUNTER — TELEPHONE (OUTPATIENT)
Dept: PRIMARY CARE CLINIC | Facility: CLINIC | Age: 70
End: 2025-07-16
Payer: MEDICARE

## 2025-07-16 NOTE — TELEPHONE ENCOUNTER
Patient has cancelled/no show multiple appts and has not called to reschedule.  Will close at this time.  Updated PCP team.

## 2025-07-18 ENCOUNTER — TELEPHONE (OUTPATIENT)
Dept: PRIMARY CARE CLINIC | Facility: CLINIC | Age: 70
End: 2025-07-18
Payer: MEDICARE

## 2025-07-18 NOTE — TELEPHONE ENCOUNTER
Copied from CRM #[id number]. Topic: General Inquiry - Patient Advice  >> Jul 18, 2025  8:53 AM Clinton wrote:  Patient is returning a phone call.    Who left a message for the patient: Nurse Anjali     Does patient know what this is regarding:  Podiatry    Would you like a call back, or a response through your [facility] portal?:   call     Best call back number: patient [phone] (M)      Comments: patient states please contact her about emily in home podiatry appt.

## 2025-07-18 NOTE — TELEPHONE ENCOUNTER
Call to patient for follow-up with hospital/ER stay. DEVORAH from [facility] on 07/17/2025. Reports feeling good, feeling a little shaky, but no issues at this time.    Denies respiratory difficulty and using 02 at 3L, feels stronger.     Discussed medications, no changes, and follow-up plan noted in discharge summary    Reviewed referrals and assessed appointment status. Pulmonary rehab referral [facility] , [facility] referral. Reviewed patient has podiatry appt Monday 07/21/2025, and she decided to keep in person appt instead of home visit from podiatry.     Assessed needs and concerns post hospital, including transportation- daughter and [employer] transportation    Scheduled HFU visit - 07/23/2025, and patient changed to in clinic visit    Reviewed all new meds, continued medications, follow up appointments and signs and symptoms to report to PCP or seek emergency medical care.     [facility] on call-contact information given. Reviewed purse lip breathing and exercises.     Patient reported CPAP stopped working during night, instructed to call or have daughter call for repair today, and also can take CPAP to [facility] office in [place]. This CM placed call to [phone] and spoke with Sharonda who reported she would give respiratory therapist message about urgency to have CPAP replaced.     Pt verbalized understanding to all instructions and education. Pt denies any complaints or concerns at this time

## 2025-07-18 NOTE — TELEPHONE ENCOUNTER
Call to patient for follow-up with message. Patient reported she has not heard from [facility] regarding CPAP. Patient reported pulse ox drops to 58 last night, she placed her 02 @3l on and pulse ox went to normal.     Patient instructed to have dtr call [facility] and have daughter bring non-working CPAP to facility to change out or get resolution if she can.     Call back to [facility] and given emergent need for working CPAP, respiratory therapy to call patient after next appt.         Copied from CRM #[id number]. Topic: General Inquiry - Patient Advice  >> Jul 18, 2025 12:10 PM Olivia wrote:  .[email or web address]     Patient is calling for Medical Advice regarding: Pt would like a call back regarding ER Visit     How long has patient had these symptoms:    Pharmacy name and phone#:    Patient wants a call back or thru [facility], provide patient's call back phone number: Call back [phone]    Comments:    Please advise patient replies from provider may take up to 48 hours.

## 2025-07-21 ENCOUNTER — TELEPHONE (OUTPATIENT)
Dept: PRIMARY CARE CLINIC | Facility: CLINIC | Age: 70
End: 2025-07-21
Payer: MEDICARE

## 2025-07-21 NOTE — TELEPHONE ENCOUNTER
Call to patient for FU with message. Patient wanting to change appt to 07/24/2025. This CM unable to reschedule to 07/24. Patient agreed to keep 07/23 appt.    Patient has visit today from [facility] to repair and service CPAP. Patient reported she was able to trouble shoot on Friday for use over weekend.       Copied from CRM #[id number]. Topic: General Inquiry - Patient Advice  >> Jul 21, 2025 10:33 AM Genet wrote:  .1MEDICALADVICE     Patient is calling for Medical Advice regarding:pt is asking for a callback from Nurse Alba. Medical issues Please call her back and advise.    How long has patient had these symptoms:    Pharmacy name and phone#:    Patient wants a call back or thru [facility], provide patient's call back phone number:[phone] Terri    Comments:    Please advise patient replies from provider may take up to 48 hours.

## 2025-07-23 ENCOUNTER — TELEPHONE (OUTPATIENT)
Dept: PRIMARY CARE CLINIC | Facility: CLINIC | Age: 70
End: 2025-07-23

## 2025-07-23 ENCOUNTER — OFFICE VISIT (OUTPATIENT)
Dept: PRIMARY CARE CLINIC | Facility: CLINIC | Age: 70
End: 2025-07-23
Payer: MEDICARE

## 2025-07-23 DIAGNOSIS — J43.2 CENTRILOBULAR EMPHYSEMA: Primary | ICD-10-CM

## 2025-07-23 DIAGNOSIS — F41.9 ANXIETY: Chronic | ICD-10-CM

## 2025-07-23 NOTE — ASSESSMENT & PLAN NOTE
She is taking 100 mg sertraline daily. She is taking hydroxyzine tid for anxiety. She is also doing breathing exercises. Hydroxyzine is helping. Continue

## 2025-07-23 NOTE — ASSESSMENT & PLAN NOTE
Feeling slightly sob but part of it due to anxiety. Did not start pulm rehab yet. Has to wait till she sees pulm first. Has appt with him on the 29th. She has HH coming today. She has advanced end stage COPD. She is using the nebulizer 2 or 3 times a day. Told her when she is feeling more sob, she can do it every 6 hrs. She is using breztri daily. She is not on any prednisone.

## 2025-07-23 NOTE — PROGRESS NOTES
Clinic Note  7/23/2025      Subjective:       Patient ID:  Terri is a 69 y.o. female being seen for an established visit.    Chief Complaint: No chief complaint on file.      HPI  Terri Phelan is a 69 y.o. AA female who presents with COPD on 2L of O2 via NC (uses 3L with exertion), diastolic HF, HTN, HLD, anxiety, depression, JUAN (uses bipap every night), hx of MI (but no stenosis noted on angiogram) is here for a virtual f/u visit. Did acp 2025.   -says not feeling that great today. Feeling a little yucky. Feeling slightly sob but part of it due to anxiety. Did not start pulm rehab yet. Has to wait till she sees pulm first. Has appt with him on the 29th. She has HH coming today. She has advanced end stage COPD. She is using the nebulizer 2 or 3 times a day. Told her when she is feeling more sob, she can do it every 6 hrs. She is using breztri daily. She is not on any prednisone. Pt had 2 copd exacerbations this yr so far and had to be admitted. 4 ER visits so far. One for SVT and one for anxiety.   -no falls  -does not want to do LDCT. Told her why we do it.         Review of Systems   Constitutional:  Negative for chills and fever.   HENT:  Negative for congestion.    Respiratory:  Positive for sputum production and shortness of breath (with exertion). Negative for cough.    Cardiovascular:  Negative for chest pain.   Gastrointestinal:  Negative for abdominal pain, blood in stool, constipation and diarrhea.   Genitourinary:  Negative for dysuria and hematuria.   Musculoskeletal:  Negative for neck pain.   Neurological:  Positive for headaches. Negative for dizziness and weakness.       Medication List with Changes/Refills   Current Medications    ACETAMINOPHEN (TYLENOL) 500 MG TABLET    Take 500 mg by mouth every 6 (six) hours as needed for Pain.    ALBUTEROL (PROVENTIL/VENTOLIN HFA) 90 MCG/ACTUATION INHALER    INHALE 2 PUFFS INTO THE LUNGS EVERY 6 HOURS AS NEEDED FOR WHEEZING OR SHORTNESS OF BREATH     ALBUTEROL-IPRATROPIUM (DUO-NEB) 2.5 MG-0.5 MG/3 ML NEBULIZER SOLUTION    Take 3 mLs by nebulization every 6 (six) hours while awake. Rescue    ASPIRIN 81 MG CHEW    Take 81 mg by mouth once daily.    AZITHROMYCIN (Z-OLY) 250 MG TABLET    Take 1 tablet (250 mg total) by mouth 3 (three) times a week. Take three times weekly on Monday, Wednesday, and Friday.    BUDESONIDE-GLYCOPYR-FORMOTEROL (BREZTRI AEROSPHERE) 160-9-4.8 MCG/ACTUATION HFAA    Inhale 2 puffs into the lungs 2 (two) times daily.    CALCIUM CARB-MAG HYDROX-SIMETH 1,200 MG-270 MG -80 MG/10 ML SUSP    Take 1,200 mg by mouth once daily.    CHOLECALCIFEROL, VITAMIN D3, (VITAMIN D3) 50 MCG (2,000 UNIT) TAB    Take 2,000 Units by mouth once daily.    CYCLOBENZAPRINE (FLEXERIL) 5 MG TABLET    Take 5 mg by mouth 3 (three) times daily as needed for Muscle spasms.    DEXTROMETHORPHAN-GUAIFENESIN  MG (MUCINEX DM)  MG PER 12 HR TABLET    Take 1 tablet by mouth 2 (two) times daily as needed (cough).    FLUTICASONE PROPIONATE (FLONASE) 50 MCG/ACTUATION NASAL SPRAY    SHAKE LIQUID AND USE 1 SPRAY(50 MCG) IN EACH NOSTRIL DAILY AS NEEDED FOR ALLERGIES    FLUTICASONE-UMECLIDIN-VILANTER (TRELEGY ELLIPTA) 200-62.5-25 MCG INHALER    Inhale 1 puff into the lungs once daily.    GUAIFENESIN (MUCINEX) 600 MG 12 HR TABLET    Take 1 tablet (600 mg total) by mouth 2 (two) times daily.    HYDROXYZINE HCL (ATARAX) 25 MG TABLET    TAKE 1 TABLET(25 MG) BY MOUTH THREE TIMES DAILY AS NEEDED FOR ANXIETY OR ITCHING    LOSARTAN (COZAAR) 25 MG TABLET    Take 1 tablet (25 mg total) by mouth once daily.    MELOXICAM (MOBIC) 15 MG TABLET    Take 1 tablet (15 mg total) by mouth daily as needed for Pain.    OXYGEN-AIR DELIVERY SYSTEMS MISC    2 L by Nasal route continuous.    POTASSIUM CHLORIDE (MICRO-K) 10 MEQ CPSR    Take 1 capsule (10 mEq total) by mouth once daily.    ROFLUMILAST (DALIRESP) 500 MCG TAB    Take 1 tablet (500 mcg total) by mouth once daily.    SERTRALINE (ZOLOFT) 100  "MG TABLET    Take half tablet (50 mg) daily for one week and then increase to 1 tablet (100 mg) daily.           Objective:      There were no vitals taken for this visit.  Estimated body mass index is 28.89 kg/m² as calculated from the following:    Height as of 6/26/25: 5' 6" (1.676 m).    Weight as of 6/29/25: 81.2 kg (179 lb 0.2 oz).  Physical Exam  Constitutional:       Appearance: Normal appearance.   Eyes:      Conjunctiva/sclera: Conjunctivae normal.   Pulmonary:      Effort: Pulmonary effort is normal. No respiratory distress.      Comments: Diminished breath sounds BL  Neurological:      Mental Status: She is alert and oriented to person, place, and time.   Psychiatric:         Mood and Affect: Mood normal.         Behavior: Behavior normal.           Assessment and Plan:     1. Centrilobular emphysema  Overview:  - Severe obstructive disease, on 2-3L home O2 and NIPPV (BiPap) at night and prn.   - Chest CTA from July 2023 with apical centrilobular emphysema present.  - Managed with home O2, ICS/LABA, PRN LISA, and PD4i        Assessment & Plan:  Feeling slightly sob but part of it due to anxiety. Did not start pulm rehab yet. Has to wait till she sees pulm first. Has appt with him on the 29th. She has HH coming today. She has advanced end stage COPD. She is using the nebulizer 2 or 3 times a day. Told her when she is feeling more sob, she can do it every 6 hrs. She is using breztri daily. She is not on any prednisone.       2. Anxiety  Overview:  Anxiety associated with feelings of SOB, diagnosis, and hope for new medications that can help. Exacerbated by chronic need to check oxygen numbers and frequent hospitalizations. Following with palliative.     Assessment & Plan:  She is taking 100 mg sertraline daily. She is taking hydroxyzine tid for anxiety. She is also doing breathing exercises. Hydroxyzine is helping. Continue                 Follow Up:   Follow up in about 2 weeks (around 8/6/2025).      "   Sussy Mason      Audio Only Telehealth Visit     The patient location is: [place]   The chief complaint leading to consultation is: emphysema  Visit type: Virtual visit with audio only (telephone)  Total time spent in medical discussion with patient: 18 minutes  Total time spent on date of the encounter:20 minutes       The reason for the audio only service rather than synchronous audio and video virtual visit was related to technical difficulties or patient preference/necessity.       Each patient to whom I provide medical services by telemedicine is:  (1) informed of the relationship between the physician and patient and the respective role of any other health care provider with respect to management of the patient; and (2) notified that they may decline to receive medical services by telemedicine and may withdraw from such care at any time. Patient verbally consented to receive this service via voice-only telephone call.        This service was not originating from a related E/M service provided within the previous 7 days nor will  to an E/M service or procedure within the next 24 hours or my soonest available appointment.  Prevailing standard of care was able to be met in this audio-only visit.

## 2025-07-29 ENCOUNTER — OFFICE VISIT (OUTPATIENT)
Dept: PULMONOLOGY | Facility: CLINIC | Age: 70
End: 2025-07-29
Payer: MEDICARE

## 2025-07-29 ENCOUNTER — LAB VISIT (OUTPATIENT)
Dept: LAB | Facility: HOSPITAL | Age: 70
End: 2025-07-29
Attending: INTERNAL MEDICINE
Payer: MEDICARE

## 2025-07-29 VITALS
HEIGHT: 66 IN | SYSTOLIC BLOOD PRESSURE: 128 MMHG | HEART RATE: 83 BPM | OXYGEN SATURATION: 96 % | DIASTOLIC BLOOD PRESSURE: 68 MMHG | BODY MASS INDEX: 28.89 KG/M2

## 2025-07-29 DIAGNOSIS — J44.9 END STAGE COPD: Primary | ICD-10-CM

## 2025-07-29 DIAGNOSIS — Z99.81 OXYGEN DEPENDENT: ICD-10-CM

## 2025-07-29 DIAGNOSIS — J96.21 ACUTE ON CHRONIC RESPIRATORY FAILURE WITH HYPOXIA AND HYPERCAPNIA: ICD-10-CM

## 2025-07-29 DIAGNOSIS — J96.22 ACUTE ON CHRONIC RESPIRATORY FAILURE WITH HYPOXIA AND HYPERCAPNIA: ICD-10-CM

## 2025-07-29 DIAGNOSIS — J43.2 CENTRILOBULAR EMPHYSEMA: ICD-10-CM

## 2025-07-29 DIAGNOSIS — J44.9 END STAGE COPD: ICD-10-CM

## 2025-07-29 LAB
ABSOLUTE EOSINOPHIL (OHS): 0 K/UL
ABSOLUTE MONOCYTE (OHS): 0.65 K/UL (ref 0.3–1)
ABSOLUTE NEUTROPHIL COUNT (OHS): 5.68 K/UL (ref 1.8–7.7)
BASOPHILS # BLD AUTO: 0.05 K/UL
BASOPHILS NFR BLD AUTO: 0.7 %
ERYTHROCYTE [DISTWIDTH] IN BLOOD BY AUTOMATED COUNT: 14.5 % (ref 11.5–14.5)
HCT VFR BLD AUTO: 41.9 % (ref 37–48.5)
HGB BLD-MCNC: 12.4 GM/DL (ref 12–16)
IMM GRANULOCYTES # BLD AUTO: 0.06 K/UL (ref 0–0.04)
IMM GRANULOCYTES NFR BLD AUTO: 0.8 % (ref 0–0.5)
LYMPHOCYTES # BLD AUTO: 1.12 K/UL (ref 1–4.8)
MCH RBC QN AUTO: 26.7 PG (ref 27–31)
MCHC RBC AUTO-ENTMCNC: 29.6 G/DL (ref 32–36)
MCV RBC AUTO: 90 FL (ref 82–98)
NUCLEATED RBC (/100WBC) (OHS): 0 /100 WBC
PLATELET # BLD AUTO: 293 K/UL (ref 150–450)
PMV BLD AUTO: 12.1 FL (ref 9.2–12.9)
RBC # BLD AUTO: 4.65 M/UL (ref 4–5.4)
RELATIVE EOSINOPHIL (OHS): 0 %
RELATIVE LYMPHOCYTE (OHS): 14.8 % (ref 18–48)
RELATIVE MONOCYTE (OHS): 8.6 % (ref 4–15)
RELATIVE NEUTROPHIL (OHS): 75.1 % (ref 38–73)
WBC # BLD AUTO: 7.56 K/UL (ref 3.9–12.7)

## 2025-07-29 PROCEDURE — 3074F SYST BP LT 130 MM HG: CPT | Mod: CPTII,HCNC,S$GLB, | Performed by: INTERNAL MEDICINE

## 2025-07-29 PROCEDURE — 99999 PR PBB SHADOW E&M-EST. PATIENT-LVL V: CPT | Mod: PBBFAC,HCNC,, | Performed by: INTERNAL MEDICINE

## 2025-07-29 PROCEDURE — 1157F ADVNC CARE PLAN IN RCRD: CPT | Mod: CPTII,HCNC,S$GLB, | Performed by: INTERNAL MEDICINE

## 2025-07-29 PROCEDURE — 3288F FALL RISK ASSESSMENT DOCD: CPT | Mod: CPTII,HCNC,S$GLB, | Performed by: INTERNAL MEDICINE

## 2025-07-29 PROCEDURE — 36415 COLL VENOUS BLD VENIPUNCTURE: CPT | Mod: HCNC

## 2025-07-29 PROCEDURE — 1111F DSCHRG MED/CURRENT MED MERGE: CPT | Mod: CPTII,HCNC,S$GLB, | Performed by: INTERNAL MEDICINE

## 2025-07-29 PROCEDURE — 85025 COMPLETE CBC W/AUTO DIFF WBC: CPT | Mod: HCNC

## 2025-07-29 PROCEDURE — 4010F ACE/ARB THERAPY RXD/TAKEN: CPT | Mod: CPTII,HCNC,S$GLB, | Performed by: INTERNAL MEDICINE

## 2025-07-29 PROCEDURE — 3008F BODY MASS INDEX DOCD: CPT | Mod: CPTII,HCNC,S$GLB, | Performed by: INTERNAL MEDICINE

## 2025-07-29 PROCEDURE — 3078F DIAST BP <80 MM HG: CPT | Mod: CPTII,HCNC,S$GLB, | Performed by: INTERNAL MEDICINE

## 2025-07-29 PROCEDURE — 99213 OFFICE O/P EST LOW 20 MIN: CPT | Mod: HCNC,S$GLB,, | Performed by: INTERNAL MEDICINE

## 2025-07-29 PROCEDURE — 1101F PT FALLS ASSESS-DOCD LE1/YR: CPT | Mod: CPTII,HCNC,S$GLB, | Performed by: INTERNAL MEDICINE

## 2025-07-29 RX ORDER — EZETIMIBE 10 MG/1
10 TABLET ORAL
COMMUNITY
Start: 2025-07-14

## 2025-07-29 RX ORDER — ONDANSETRON 4 MG/1
4 TABLET, FILM COATED ORAL EVERY 8 HOURS PRN
COMMUNITY
Start: 2025-07-07

## 2025-07-29 RX ORDER — PREDNISONE 10 MG/1
TABLET ORAL
Status: ON HOLD | COMMUNITY
Start: 2025-07-03 | End: 2025-08-12 | Stop reason: HOSPADM

## 2025-08-01 NOTE — TELEPHONE ENCOUNTER
Lvm for pt to call office back   Patient with known Cirrhosis with Child's class B. Co-morbidities are present and inclusive of portal hypertension, esophageal varices, portal venous thrombosis, malnutrition, and anemia/thrombocytopenia.  MELD-Na score calculated; MELD 3.0: 12 at 7/28/2025  4:47 AM  MELD-Na: 13 at 7/28/2025  4:47 AM  Calculated from:  Serum Creatinine: 0.5 mg/dL (Using min of 1 mg/dL) at 7/28/2025  4:47 AM  Serum Sodium: 136 mmol/L at 7/28/2025  4:47 AM  Total Bilirubin: 1.8 mg/dL at 7/28/2025  4:47 AM  Serum Albumin: 3.2 g/dL at 7/28/2025  4:47 AM  INR(ratio): 1.3 at 7/26/2025  2:37 PM  Age at listing (hypothetical): 53 years  Sex: Male at 7/28/2025  4:47 AM    Continue chronic meds. Etiology likely Hepatitis C which was cured. Will avoid any hepatotoxic meds, and monitor CBC/CMP/INR for synthetic function.  Continue home lasix. Follows with hepatology and has had recent EGD   Please read and follow these instructions.  Please call the office and speak to the MA or LPN with any questions regarding these instructions.    Afrin rhinogenic headache test:  Use 0.05% oxymetazoline nasal decongestant spray (eg. Afrin, Duration, 4-Way) two sprays in both nostrils at the start of your \"sinus headache.\"  Use never use nasal decongestant sprays for longer than six to ten consecutive days.      ALLERGY MEDICATION  Use loratadine/Claritin and fluticasone/Flonase daily from Feb 1 to May 1 and from August 1 until first ground freeze, and use as needed in summer and winter.

## 2025-08-06 ENCOUNTER — HOSPITAL ENCOUNTER (INPATIENT)
Facility: HOSPITAL | Age: 70
LOS: 6 days | Discharge: HOME HEALTH | DRG: 190 | End: 2025-08-12
Attending: EMERGENCY MEDICINE | Admitting: FAMILY MEDICINE
Payer: MEDICARE

## 2025-08-06 DIAGNOSIS — J96.12 ACUTE HYPOXIC ON CHRONIC HYPERCAPNIC RESPIRATORY FAILURE: Primary | ICD-10-CM

## 2025-08-06 DIAGNOSIS — J96.90 RESPIRATORY FAILURE: ICD-10-CM

## 2025-08-06 DIAGNOSIS — J96.01 ACUTE HYPOXIC ON CHRONIC HYPERCAPNIC RESPIRATORY FAILURE: Primary | ICD-10-CM

## 2025-08-06 DIAGNOSIS — J98.11 ATELECTASIS: ICD-10-CM

## 2025-08-06 DIAGNOSIS — R06.02 SHORTNESS OF BREATH: ICD-10-CM

## 2025-08-06 LAB
ABSOLUTE EOSINOPHIL (OHS): 0.02 K/UL
ABSOLUTE MONOCYTE (OHS): 1.23 K/UL (ref 0.3–1)
ABSOLUTE NEUTROPHIL COUNT (OHS): 8.74 K/UL (ref 1.8–7.7)
ALBUMIN SERPL BCP-MCNC: 3.9 G/DL (ref 3.5–5.2)
ALP SERPL-CCNC: 60 UNIT/L (ref 40–150)
ALT SERPL W/O P-5'-P-CCNC: 13 UNIT/L (ref 10–44)
ANION GAP (OHS): 10 MMOL/L (ref 8–16)
AST SERPL-CCNC: 22 UNIT/L (ref 11–45)
BASOPHILS # BLD AUTO: 0.04 K/UL
BASOPHILS NFR BLD AUTO: 0.4 %
BILIRUB SERPL-MCNC: 0.3 MG/DL (ref 0.1–1)
BILIRUB UR QL STRIP.AUTO: NEGATIVE
BUN SERPL-MCNC: 13 MG/DL (ref 8–23)
CALCIUM SERPL-MCNC: 9.1 MG/DL (ref 8.7–10.5)
CHLORIDE SERPL-SCNC: 98 MMOL/L (ref 95–110)
CLARITY UR: CLEAR
CO2 SERPL-SCNC: 35 MMOL/L (ref 23–29)
COLOR UR AUTO: YELLOW
CREAT SERPL-MCNC: 0.5 MG/DL (ref 0.5–1.4)
CTP QC/QA: YES
ERYTHROCYTE [DISTWIDTH] IN BLOOD BY AUTOMATED COUNT: 14.6 % (ref 11.5–14.5)
FIO2: 21 %
FIO2: 45 %
GFR SERPLBLD CREATININE-BSD FMLA CKD-EPI: >60 ML/MIN/1.73/M2
GLUCOSE SERPL-MCNC: 132 MG/DL (ref 70–110)
GLUCOSE UR QL STRIP: NEGATIVE
HCT VFR BLD AUTO: 42.9 % (ref 37–48.5)
HGB BLD-MCNC: 12.3 GM/DL (ref 12–16)
HGB UR QL STRIP: NEGATIVE
HOLD SPECIMEN: NORMAL
IMM GRANULOCYTES # BLD AUTO: 0.07 K/UL (ref 0–0.04)
IMM GRANULOCYTES NFR BLD AUTO: 0.6 % (ref 0–0.5)
KETONES UR QL STRIP: ABNORMAL
LDH SERPL L TO P-CCNC: 0.4 MMOL/L (ref 0.5–2.2)
LEUKOCYTE ESTERASE UR QL STRIP: NEGATIVE
LYMPHOCYTES # BLD AUTO: 1.31 K/UL (ref 1–4.8)
MCH RBC QN AUTO: 26.3 PG (ref 27–31)
MCHC RBC AUTO-ENTMCNC: 28.7 G/DL (ref 32–36)
MCV RBC AUTO: 92 FL (ref 82–98)
NITRITE UR QL STRIP: NEGATIVE
NT-PROBNP SERPL-MCNC: 243 PG/ML
NUCLEATED RBC (/100WBC) (OHS): 0 /100 WBC
PCO2 BLDA: 87 MMHG (ref 35–45)
PCO2 BLDA: 98.1 MMHG (ref 35–45)
PCO2 BLDA: 99.6 MMHG (ref 35–45)
PEEP: 5
PH SMN: 7.2 [PH] (ref 7.35–7.45)
PH SMN: 7.21 [PH] (ref 7.35–7.45)
PH SMN: 7.26 [PH] (ref 7.35–7.45)
PH UR STRIP: 7 [PH]
PLATELET # BLD AUTO: 180 K/UL (ref 150–450)
PMV BLD AUTO: 11.2 FL (ref 9.2–12.9)
PO2 BLDA: 18.1 MMHG (ref 40–60)
PO2 BLDA: 41.6 MMHG (ref 40–60)
PO2 BLDA: 61.1 MMHG (ref 40–60)
POC BASE DEFICIT: 6.9 MMOL/L (ref -2–2)
POC BASE DEFICIT: 8.3 MMOL/L (ref -2–2)
POC BASE DEFICIT: 9.1 MMOL/L (ref -2–2)
POC HCO3: 38.2 MMOL/L (ref 24–28)
POC HCO3: 39.2 MMOL/L (ref 24–28)
POC HCO3: 39.9 MMOL/L (ref 24–28)
POC PERFORMED BY: ABNORMAL
POC SATURATED O2: 24 % (ref 95–100)
POC SATURATED O2: 72.1 % (ref 95–100)
POC SATURATED O2: 88.3 % (ref 95–100)
POC SET RR: 28
POCT GLUCOSE: 119 MG/DL (ref 70–110)
POTASSIUM SERPL-SCNC: 4.1 MMOL/L (ref 3.5–5.1)
PROT SERPL-MCNC: 7.4 GM/DL (ref 6–8.4)
PROT UR QL STRIP: NEGATIVE
RBC # BLD AUTO: 4.68 M/UL (ref 4–5.4)
RELATIVE EOSINOPHIL (OHS): 0.2 %
RELATIVE LYMPHOCYTE (OHS): 11.5 % (ref 18–48)
RELATIVE MONOCYTE (OHS): 10.8 % (ref 4–15)
RELATIVE NEUTROPHIL (OHS): 76.5 % (ref 38–73)
SARS-COV-2 RDRP RESP QL NAA+PROBE: NEGATIVE
SODIUM SERPL-SCNC: 143 MMOL/L (ref 136–145)
SP GR UR STRIP: 1.02
SPECIMEN SOURCE: ABNORMAL
TROPONIN I SERPL HS-MCNC: 3 NG/L
TROPONIN I SERPL HS-MCNC: 4 NG/L
UROBILINOGEN UR STRIP-ACNC: NEGATIVE EU/DL
VT: 500
WBC # BLD AUTO: 11.41 K/UL (ref 3.9–12.7)

## 2025-08-06 PROCEDURE — 84484 ASSAY OF TROPONIN QUANT: CPT | Mod: HCNC

## 2025-08-06 PROCEDURE — 5A09357 ASSISTANCE WITH RESPIRATORY VENTILATION, LESS THAN 24 CONSECUTIVE HOURS, CONTINUOUS POSITIVE AIRWAY PRESSURE: ICD-10-PCS | Performed by: EMERGENCY MEDICINE

## 2025-08-06 PROCEDURE — 96375 TX/PRO/DX INJ NEW DRUG ADDON: CPT | Mod: HCNC

## 2025-08-06 PROCEDURE — 81003 URINALYSIS AUTO W/O SCOPE: CPT | Mod: HCNC | Performed by: EMERGENCY MEDICINE

## 2025-08-06 PROCEDURE — 63600175 PHARM REV CODE 636 W HCPCS: Mod: JZ,TB,HCNC | Performed by: EMERGENCY MEDICINE

## 2025-08-06 PROCEDURE — 96361 HYDRATE IV INFUSION ADD-ON: CPT | Mod: HCNC

## 2025-08-06 PROCEDURE — 93005 ELECTROCARDIOGRAM TRACING: CPT | Mod: HCNC

## 2025-08-06 PROCEDURE — 63600175 PHARM REV CODE 636 W HCPCS: Mod: HCNC

## 2025-08-06 PROCEDURE — 36600 WITHDRAWAL OF ARTERIAL BLOOD: CPT | Mod: HCNC

## 2025-08-06 PROCEDURE — 83880 ASSAY OF NATRIURETIC PEPTIDE: CPT | Mod: HCNC

## 2025-08-06 PROCEDURE — 25500020 PHARM REV CODE 255: Mod: HCNC | Performed by: EMERGENCY MEDICINE

## 2025-08-06 PROCEDURE — 80053 COMPREHEN METABOLIC PANEL: CPT | Mod: HCNC

## 2025-08-06 PROCEDURE — 27000221 HC OXYGEN, UP TO 24 HOURS: Mod: HCNC

## 2025-08-06 PROCEDURE — 94660 CPAP INITIATION&MGMT: CPT | Mod: HCNC

## 2025-08-06 PROCEDURE — 96374 THER/PROPH/DIAG INJ IV PUSH: CPT | Mod: HCNC

## 2025-08-06 PROCEDURE — 87635 SARS-COV-2 COVID-19 AMP PRB: CPT | Mod: HCNC

## 2025-08-06 PROCEDURE — 27000190 HC CPAP FULL FACE MASK W/VALVE: Mod: HCNC

## 2025-08-06 PROCEDURE — 82962 GLUCOSE BLOOD TEST: CPT | Mod: HCNC

## 2025-08-06 PROCEDURE — 99900035 HC TECH TIME PER 15 MIN (STAT): Mod: HCNC

## 2025-08-06 PROCEDURE — 99291 CRITICAL CARE FIRST HOUR: CPT | Mod: HCNC

## 2025-08-06 PROCEDURE — 82803 BLOOD GASES ANY COMBINATION: CPT | Mod: HCNC

## 2025-08-06 PROCEDURE — 36415 COLL VENOUS BLD VENIPUNCTURE: CPT | Mod: HCNC | Performed by: EMERGENCY MEDICINE

## 2025-08-06 PROCEDURE — 85025 COMPLETE CBC W/AUTO DIFF WBC: CPT | Mod: HCNC

## 2025-08-06 PROCEDURE — 84484 ASSAY OF TROPONIN QUANT: CPT | Mod: HCNC | Performed by: EMERGENCY MEDICINE

## 2025-08-06 PROCEDURE — 83605 ASSAY OF LACTIC ACID: CPT | Mod: HCNC

## 2025-08-06 PROCEDURE — 94761 N-INVAS EAR/PLS OXIMETRY MLT: CPT | Mod: HCNC

## 2025-08-06 PROCEDURE — 25000003 PHARM REV CODE 250: Mod: HCNC | Performed by: EMERGENCY MEDICINE

## 2025-08-06 PROCEDURE — 12000002 HC ACUTE/MED SURGE SEMI-PRIVATE ROOM: Mod: HCNC

## 2025-08-06 PROCEDURE — 87040 BLOOD CULTURE FOR BACTERIA: CPT | Mod: HCNC | Performed by: EMERGENCY MEDICINE

## 2025-08-06 PROCEDURE — 25000242 PHARM REV CODE 250 ALT 637 W/ HCPCS: Mod: HCNC | Performed by: EMERGENCY MEDICINE

## 2025-08-06 PROCEDURE — 93010 ELECTROCARDIOGRAM REPORT: CPT | Mod: HCNC,,, | Performed by: INTERNAL MEDICINE

## 2025-08-06 PROCEDURE — 93010 ELECTROCARDIOGRAM REPORT: CPT | Mod: 76,HCNC,, | Performed by: INTERNAL MEDICINE

## 2025-08-06 RX ORDER — ONDANSETRON HYDROCHLORIDE 2 MG/ML
4 INJECTION, SOLUTION INTRAVENOUS
Status: COMPLETED | OUTPATIENT
Start: 2025-08-06 | End: 2025-08-06

## 2025-08-06 RX ORDER — NOREPINEPHRINE BITARTRATE/D5W 4MG/250ML
0-3 PLASTIC BAG, INJECTION (ML) INTRAVENOUS CONTINUOUS
Status: DISCONTINUED | OUTPATIENT
Start: 2025-08-06 | End: 2025-08-07

## 2025-08-06 RX ORDER — METHYLPREDNISOLONE SOD SUCC 125 MG
125 VIAL (EA) INJECTION
Status: COMPLETED | OUTPATIENT
Start: 2025-08-06 | End: 2025-08-06

## 2025-08-06 RX ORDER — SODIUM CHLORIDE 0.9 % (FLUSH) 0.9 %
10 SYRINGE (ML) INJECTION
Status: DISCONTINUED | OUTPATIENT
Start: 2025-08-07 | End: 2025-08-12 | Stop reason: HOSPADM

## 2025-08-06 RX ORDER — IPRATROPIUM BROMIDE 0.5 MG/2.5ML
1 SOLUTION RESPIRATORY (INHALATION) ONCE
Status: COMPLETED | OUTPATIENT
Start: 2025-08-06 | End: 2025-08-06

## 2025-08-06 RX ORDER — ALBUTEROL SULFATE 2.5 MG/.5ML
10 SOLUTION RESPIRATORY (INHALATION) ONCE
Status: COMPLETED | OUTPATIENT
Start: 2025-08-06 | End: 2025-08-06

## 2025-08-06 RX ORDER — NOREPINEPHRINE BITARTRATE/D5W 4MG/250ML
PLASTIC BAG, INJECTION (ML) INTRAVENOUS
Status: DISPENSED
Start: 2025-08-06 | End: 2025-08-07

## 2025-08-06 RX ORDER — IPRATROPIUM BROMIDE AND ALBUTEROL SULFATE 2.5; .5 MG/3ML; MG/3ML
3 SOLUTION RESPIRATORY (INHALATION) EVERY 6 HOURS
Status: DISCONTINUED | OUTPATIENT
Start: 2025-08-07 | End: 2025-08-11

## 2025-08-06 RX ORDER — IPRATROPIUM BROMIDE AND ALBUTEROL SULFATE 2.5; .5 MG/3ML; MG/3ML
3 SOLUTION RESPIRATORY (INHALATION)
Status: DISCONTINUED | OUTPATIENT
Start: 2025-08-06 | End: 2025-08-06

## 2025-08-06 RX ORDER — ONDANSETRON HYDROCHLORIDE 2 MG/ML
4 INJECTION, SOLUTION INTRAVENOUS EVERY 8 HOURS PRN
Status: DISCONTINUED | OUTPATIENT
Start: 2025-08-07 | End: 2025-08-12 | Stop reason: HOSPADM

## 2025-08-06 RX ADMIN — ALBUTEROL SULFATE 10 MG: 2.5 SOLUTION RESPIRATORY (INHALATION) at 05:08

## 2025-08-06 RX ADMIN — IPRATROPIUM BROMIDE 1 MG: 0.5 SOLUTION RESPIRATORY (INHALATION) at 05:08

## 2025-08-06 RX ADMIN — ONDANSETRON 4 MG: 2 INJECTION, SOLUTION INTRAMUSCULAR; INTRAVENOUS at 06:08

## 2025-08-06 RX ADMIN — IOHEXOL 100 ML: 350 INJECTION, SOLUTION INTRAVENOUS at 08:08

## 2025-08-06 RX ADMIN — METHYLPREDNISOLONE SODIUM SUCCINATE 125 MG: 125 INJECTION, POWDER, FOR SOLUTION INTRAMUSCULAR; INTRAVENOUS at 06:08

## 2025-08-06 RX ADMIN — SODIUM CHLORIDE 1000 ML: 9 INJECTION, SOLUTION INTRAVENOUS at 06:08

## 2025-08-07 PROBLEM — J96.12 ACUTE HYPOXIC ON CHRONIC HYPERCAPNIC RESPIRATORY FAILURE: Status: ACTIVE | Noted: 2025-08-07

## 2025-08-07 PROBLEM — J96.00 ACUTE RESPIRATORY FAILURE: Status: ACTIVE | Noted: 2025-08-07

## 2025-08-07 PROBLEM — J96.01 ACUTE HYPOXIC ON CHRONIC HYPERCAPNIC RESPIRATORY FAILURE: Status: ACTIVE | Noted: 2025-08-07

## 2025-08-07 PROBLEM — J98.11 ATELECTASIS: Status: ACTIVE | Noted: 2025-08-07

## 2025-08-07 PROBLEM — E78.5 HYPERLIPIDEMIA: Status: ACTIVE | Noted: 2025-06-11

## 2025-08-07 LAB
ABSOLUTE EOSINOPHIL (OHS): 0 K/UL
ABSOLUTE MONOCYTE (OHS): 0.12 K/UL (ref 0.3–1)
ABSOLUTE NEUTROPHIL COUNT (OHS): 9.2 K/UL (ref 1.8–7.7)
ALBUMIN SERPL BCP-MCNC: 3.2 G/DL (ref 3.5–5.2)
ALLENS TEST: ABNORMAL
ALLENS TEST: ABNORMAL
ALLENS TEST: YES
ALP SERPL-CCNC: 52 UNIT/L (ref 40–150)
ALT SERPL W/O P-5'-P-CCNC: 15 UNIT/L (ref 10–44)
ANION GAP (OHS): 5 MMOL/L (ref 8–16)
AST SERPL-CCNC: 31 UNIT/L (ref 11–45)
B PERT DNA NPH QL NAA+PROBE: NOT DETECTED
BASOPHILS # BLD AUTO: 0.02 K/UL
BASOPHILS NFR BLD AUTO: 0.2 %
BILIRUB SERPL-MCNC: 0.2 MG/DL (ref 0.1–1)
BUN SERPL-MCNC: 17 MG/DL (ref 8–23)
C PNEUM DNA LOWER RESP QL NAA+NON-PROBE: NOT DETECTED
CALCIUM SERPL-MCNC: 8.5 MG/DL (ref 8.7–10.5)
CHLORIDE SERPL-SCNC: 101 MMOL/L (ref 95–110)
CO2 SERPL-SCNC: 39 MMOL/L (ref 23–29)
CREAT SERPL-MCNC: 0.5 MG/DL (ref 0.5–1.4)
DELSYS: ABNORMAL
DELSYS: ABNORMAL
ERYTHROCYTE [DISTWIDTH] IN BLOOD BY AUTOMATED COUNT: 14.6 % (ref 11.5–14.5)
ERYTHROCYTE [SEDIMENTATION RATE] IN BLOOD BY WESTERGREN METHOD: 24 MM/H
ERYTHROCYTE [SEDIMENTATION RATE] IN BLOOD BY WESTERGREN METHOD: 28 MM/H
FIO2: 21 %
FIO2: 40
FIO2: 41
FLUAV RNA NPH QL NAA+NON-PROBE: NOT DETECTED
FLUBV RNA NPH QL NAA+NON-PROBE: NOT DETECTED
GFR SERPLBLD CREATININE-BSD FMLA CKD-EPI: >60 ML/MIN/1.73/M2
GLUCOSE SERPL-MCNC: 120 MG/DL (ref 70–110)
HADV DNA NPH QL NAA+NON-PROBE: NOT DETECTED
HCO3 UR-SCNC: 39.1 MMOL/L (ref 24–28)
HCO3 UR-SCNC: 41.9 MMOL/L (ref 24–28)
HCOV 229E RNA NPH QL NAA+NON-PROBE: NOT DETECTED
HCOV HKU1 RNA NPH QL NAA+NON-PROBE: NOT DETECTED
HCOV NL63 RNA NPH QL NAA+NON-PROBE: NOT DETECTED
HCOV OC43 RNA NPH QL NAA+NON-PROBE: NOT DETECTED
HCT VFR BLD AUTO: 40.3 % (ref 37–48.5)
HCT VFR BLD CALC: 37 %PCV (ref 36–54)
HCT VFR BLD CALC: 37.4 % (ref 36–54)
HCT VFR BLD CALC: 39 %PCV (ref 36–54)
HGB BLD-MCNC: 11.5 GM/DL (ref 12–16)
HGB BLD-MCNC: 12.2 G/DL (ref 9–18)
HGB BLD-MCNC: 13 G/DL
HGB BLD-MCNC: 13 G/DL
HMPV RNA LOWER RESP QL NAA+NON-PROBE: NOT DETECTED
HMPV RNA NPH QL NAA+NON-PROBE: NOT DETECTED
HPIV1 RNA NPH QL NAA+NON-PROBE: NOT DETECTED
HPIV2 RNA NPH QL NAA+NON-PROBE: NOT DETECTED
HPIV3 RNA NPH QL NAA+NON-PROBE: NOT DETECTED
HPIV4 RNA NPH QL NAA+NON-PROBE: NOT DETECTED
IMM GRANULOCYTES # BLD AUTO: 0.03 K/UL (ref 0–0.04)
IMM GRANULOCYTES NFR BLD AUTO: 0.3 % (ref 0–0.5)
LDH SERPL L TO P-CCNC: <0.3 MMOL/L (ref 0.4–1.3)
LYMPHOCYTES # BLD AUTO: 0.39 K/UL (ref 1–4.8)
MCH RBC QN AUTO: 26.5 PG (ref 27–31)
MCHC RBC AUTO-ENTMCNC: 28.5 G/DL (ref 32–36)
MCV RBC AUTO: 93 FL (ref 82–98)
MODE: ABNORMAL
MODE: ABNORMAL
NUCLEATED RBC (/100WBC) (OHS): 0 /100 WBC
PCO2 BLDA: 108.7 MMHG (ref 35–45)
PCO2 BLDA: 95.9 MMHG (ref 35–45)
PCO2 BLDA: >110 MMHG (ref 35–45)
PEEP: 8
PH SMN: 7.14 [PH] (ref 7.35–7.45)
PH SMN: 7.19 [PH] (ref 7.35–7.45)
PH SMN: 7.22 [PH] (ref 7.35–7.45)
PIP: 22
PIP: 23
PLATELET # BLD AUTO: 160 K/UL (ref 150–450)
PMV BLD AUTO: 10.9 FL (ref 9.2–12.9)
PO2 BLDA: 58 MMHG (ref 40–60)
PO2 BLDA: 59 MMHG (ref 40–60)
PO2 BLDA: 63.4 MMHG (ref 80–100)
POC BE: 11 MMOL/L (ref -2–2)
POC BE: 14 MMOL/L (ref -2–2)
POC IONIZED CALCIUM: 1.17 MMOL/L (ref 1.06–1.42)
POC PERFORMED BY: ABNORMAL
POC SATURATED O2: 80 % (ref 95–100)
POC SATURATED O2: 81 % (ref 95–100)
POC SATURATED O2: 87.6 % (ref 95–100)
POC SET RR: 18
POC TCO2: 42 MMOL/L (ref 24–29)
POC TCO2: 45 MMOL/L (ref 24–29)
POCT GLUCOSE: 128 MG/DL (ref 70–110)
POTASSIUM BLD-SCNC: 4.1 MMOL/L (ref 3.5–5.1)
POTASSIUM BLD-SCNC: 4.3 MMOL/L (ref 3.5–5.1)
POTASSIUM BLD-SCNC: 4.6 MMOL/L (ref 3.5–5.1)
POTASSIUM SERPL-SCNC: 4.6 MMOL/L (ref 3.5–5.1)
PROT SERPL-MCNC: 6.4 GM/DL (ref 6–8.4)
RBC # BLD AUTO: 4.34 M/UL (ref 4–5.4)
RELATIVE EOSINOPHIL (OHS): 0 %
RELATIVE LYMPHOCYTE (OHS): 4 % (ref 18–48)
RELATIVE MONOCYTE (OHS): 1.2 % (ref 4–15)
RELATIVE NEUTROPHIL (OHS): 94.3 % (ref 38–73)
RSV RNA NPH QL NAA+NON-PROBE: NOT DETECTED
RSV RNA NPH QL NAA+NON-PROBE: NOT DETECTED
RV+EV RNA NPH QL NAA+NON-PROBE: NOT DETECTED
SAMPLE: ABNORMAL
SAMPLE: ABNORMAL
SARS-COV-2 RNA RESP QL NAA+PROBE: NOT DETECTED
SITE: ABNORMAL
SODIUM BLD-SCNC: 142 MMOL/L (ref 136–145)
SODIUM BLD-SCNC: 142 MMOL/L (ref 136–145)
SODIUM BLD-SCNC: 145 MMOL/L (ref 136–145)
SODIUM SERPL-SCNC: 145 MMOL/L (ref 136–145)
SPECIMEN SOURCE: ABNORMAL
SPECIMEN SOURCE: NORMAL
TROPONIN I SERPL HS-MCNC: 8 NG/L
VT: 500
VT: 500
WBC # BLD AUTO: 9.76 K/UL (ref 3.9–12.7)

## 2025-08-07 PROCEDURE — 94761 N-INVAS EAR/PLS OXIMETRY MLT: CPT | Mod: HCNC,XB

## 2025-08-07 PROCEDURE — 25000003 PHARM REV CODE 250: Mod: HCNC | Performed by: FAMILY MEDICINE

## 2025-08-07 PROCEDURE — 87070 CULTURE OTHR SPECIMN AEROBIC: CPT | Mod: HCNC

## 2025-08-07 PROCEDURE — 25000003 PHARM REV CODE 250: Mod: HCNC | Performed by: REGISTERED NURSE

## 2025-08-07 PROCEDURE — 25000242 PHARM REV CODE 250 ALT 637 W/ HCPCS: Mod: HCNC | Performed by: REGISTERED NURSE

## 2025-08-07 PROCEDURE — 25000003 PHARM REV CODE 250: Mod: HCNC | Performed by: STUDENT IN AN ORGANIZED HEALTH CARE EDUCATION/TRAINING PROGRAM

## 2025-08-07 PROCEDURE — 63600175 PHARM REV CODE 636 W HCPCS: Mod: JZ,TB,HCNC | Performed by: REGISTERED NURSE

## 2025-08-07 PROCEDURE — 80053 COMPREHEN METABOLIC PANEL: CPT | Mod: HCNC | Performed by: REGISTERED NURSE

## 2025-08-07 PROCEDURE — 27100171 HC OXYGEN HIGH FLOW UP TO 24 HOURS: Mod: HCNC

## 2025-08-07 PROCEDURE — 84295 ASSAY OF SERUM SODIUM: CPT | Mod: HCNC

## 2025-08-07 PROCEDURE — 63600175 PHARM REV CODE 636 W HCPCS: Mod: HCNC | Performed by: INTERNAL MEDICINE

## 2025-08-07 PROCEDURE — 84484 ASSAY OF TROPONIN QUANT: CPT | Mod: HCNC | Performed by: REGISTERED NURSE

## 2025-08-07 PROCEDURE — 25000003 PHARM REV CODE 250: Mod: HCNC

## 2025-08-07 PROCEDURE — 93010 ELECTROCARDIOGRAM REPORT: CPT | Mod: HCNC,,, | Performed by: INTERNAL MEDICINE

## 2025-08-07 PROCEDURE — 94640 AIRWAY INHALATION TREATMENT: CPT | Mod: HCNC

## 2025-08-07 PROCEDURE — 93005 ELECTROCARDIOGRAM TRACING: CPT | Mod: HCNC

## 2025-08-07 PROCEDURE — 82803 BLOOD GASES ANY COMBINATION: CPT | Mod: HCNC

## 2025-08-07 PROCEDURE — 36415 COLL VENOUS BLD VENIPUNCTURE: CPT | Mod: HCNC | Performed by: REGISTERED NURSE

## 2025-08-07 PROCEDURE — 99900035 HC TECH TIME PER 15 MIN (STAT): Mod: HCNC

## 2025-08-07 PROCEDURE — 82800 BLOOD PH: CPT | Mod: HCNC

## 2025-08-07 PROCEDURE — 11000001 HC ACUTE MED/SURG PRIVATE ROOM: Mod: HCNC

## 2025-08-07 PROCEDURE — 0202U NFCT DS 22 TRGT SARS-COV-2: CPT | Mod: HCNC

## 2025-08-07 PROCEDURE — 82330 ASSAY OF CALCIUM: CPT | Mod: HCNC

## 2025-08-07 PROCEDURE — 85025 COMPLETE CBC W/AUTO DIFF WBC: CPT | Mod: HCNC | Performed by: REGISTERED NURSE

## 2025-08-07 PROCEDURE — 85014 HEMATOCRIT: CPT | Mod: HCNC

## 2025-08-07 PROCEDURE — 84132 ASSAY OF SERUM POTASSIUM: CPT | Mod: HCNC

## 2025-08-07 RX ORDER — NAPROXEN SODIUM 220 MG/1
81 TABLET, FILM COATED ORAL DAILY
Status: DISCONTINUED | OUTPATIENT
Start: 2025-08-07 | End: 2025-08-12 | Stop reason: HOSPADM

## 2025-08-07 RX ORDER — HYDROXYZINE PAMOATE 25 MG/1
25 CAPSULE ORAL ONCE AS NEEDED
Status: COMPLETED | OUTPATIENT
Start: 2025-08-07 | End: 2025-08-07

## 2025-08-07 RX ORDER — ALBUTEROL SULFATE 2.5 MG/.5ML
2.5 SOLUTION RESPIRATORY (INHALATION)
Status: DISCONTINUED | OUTPATIENT
Start: 2025-08-07 | End: 2025-08-09

## 2025-08-07 RX ORDER — SODIUM CHLORIDE FOR INHALATION 3 %
4 VIAL, NEBULIZER (ML) INHALATION
Status: DISCONTINUED | OUTPATIENT
Start: 2025-08-07 | End: 2025-08-09

## 2025-08-07 RX ORDER — SERTRALINE HYDROCHLORIDE 50 MG/1
50 TABLET, FILM COATED ORAL NIGHTLY
Status: DISCONTINUED | OUTPATIENT
Start: 2025-08-07 | End: 2025-08-12 | Stop reason: HOSPADM

## 2025-08-07 RX ORDER — ROFLUMILAST 500 UG/1
500 TABLET ORAL DAILY
Status: DISCONTINUED | OUTPATIENT
Start: 2025-08-07 | End: 2025-08-12 | Stop reason: HOSPADM

## 2025-08-07 RX ORDER — FAMOTIDINE 10 MG/ML
20 INJECTION, SOLUTION INTRAVENOUS 2 TIMES DAILY
Status: DISCONTINUED | OUTPATIENT
Start: 2025-08-07 | End: 2025-08-07

## 2025-08-07 RX ORDER — CEFTRIAXONE 2 G/1
2 INJECTION, POWDER, FOR SOLUTION INTRAMUSCULAR; INTRAVENOUS
Status: DISCONTINUED | OUTPATIENT
Start: 2025-08-07 | End: 2025-08-07

## 2025-08-07 RX ORDER — ACETAMINOPHEN 325 MG/1
650 TABLET ORAL EVERY 6 HOURS PRN
Status: DISCONTINUED | OUTPATIENT
Start: 2025-08-07 | End: 2025-08-12 | Stop reason: HOSPADM

## 2025-08-07 RX ORDER — EZETIMIBE 10 MG/1
10 TABLET ORAL DAILY
Status: DISCONTINUED | OUTPATIENT
Start: 2025-08-07 | End: 2025-08-08

## 2025-08-07 RX ORDER — HYDROXYZINE 50 MG/ML
25 INJECTION, SOLUTION INTRAMUSCULAR ONCE
Status: DISCONTINUED | OUTPATIENT
Start: 2025-08-07 | End: 2025-08-07

## 2025-08-07 RX ORDER — MUPIROCIN 20 MG/G
OINTMENT TOPICAL 2 TIMES DAILY
Status: DISPENSED | OUTPATIENT
Start: 2025-08-07 | End: 2025-08-12

## 2025-08-07 RX ORDER — ENOXAPARIN SODIUM 100 MG/ML
40 INJECTION SUBCUTANEOUS EVERY 24 HOURS
Status: DISCONTINUED | OUTPATIENT
Start: 2025-08-07 | End: 2025-08-12 | Stop reason: HOSPADM

## 2025-08-07 RX ORDER — HYDROXYZINE 50 MG/ML
25 INJECTION, SOLUTION INTRAMUSCULAR ONCE AS NEEDED
Status: DISCONTINUED | OUTPATIENT
Start: 2025-08-07 | End: 2025-08-07

## 2025-08-07 RX ADMIN — EZETIMIBE 10 MG: 10 TABLET ORAL at 01:08

## 2025-08-07 RX ADMIN — CEFTRIAXONE SODIUM 2 G: 2 INJECTION, POWDER, FOR SOLUTION INTRAMUSCULAR; INTRAVENOUS at 07:08

## 2025-08-07 RX ADMIN — IPRATROPIUM BROMIDE AND ALBUTEROL SULFATE 3 ML: .5; 3 SOLUTION RESPIRATORY (INHALATION) at 11:08

## 2025-08-07 RX ADMIN — ACETAMINOPHEN 650 MG: 325 TABLET ORAL at 05:08

## 2025-08-07 RX ADMIN — SERTRALINE HYDROCHLORIDE 50 MG: 50 TABLET ORAL at 09:08

## 2025-08-07 RX ADMIN — IPRATROPIUM BROMIDE AND ALBUTEROL SULFATE 3 ML: .5; 3 SOLUTION RESPIRATORY (INHALATION) at 12:08

## 2025-08-07 RX ADMIN — FAMOTIDINE 20 MG: 10 INJECTION, SOLUTION INTRAVENOUS at 09:08

## 2025-08-07 RX ADMIN — HYDROXYZINE PAMOATE 25 MG: 25 CAPSULE ORAL at 12:08

## 2025-08-07 RX ADMIN — MUPIROCIN: 20 OINTMENT TOPICAL at 09:08

## 2025-08-07 RX ADMIN — ENOXAPARIN SODIUM 40 MG: 40 INJECTION SUBCUTANEOUS at 04:08

## 2025-08-07 RX ADMIN — AZITHROMYCIN 500 MG: 500 INJECTION, POWDER, LYOPHILIZED, FOR SOLUTION INTRAVENOUS at 07:08

## 2025-08-07 RX ADMIN — METHYLPREDNISOLONE SODIUM SUCCINATE 40 MG: 40 INJECTION, POWDER, FOR SOLUTION INTRAMUSCULAR; INTRAVENOUS at 06:08

## 2025-08-07 RX ADMIN — ROFLUMILAST 500 MCG: 500 TABLET ORAL at 01:08

## 2025-08-07 RX ADMIN — ASPIRIN 81 MG CHEWABLE TABLET 81 MG: 81 TABLET CHEWABLE at 01:08

## 2025-08-07 RX ADMIN — IPRATROPIUM BROMIDE AND ALBUTEROL SULFATE 3 ML: .5; 3 SOLUTION RESPIRATORY (INHALATION) at 07:08

## 2025-08-08 LAB
ALBUMIN SERPL BCP-MCNC: 3 G/DL (ref 3.5–5.2)
ALP SERPL-CCNC: 45 UNIT/L (ref 40–150)
ALT SERPL W/O P-5'-P-CCNC: 13 UNIT/L (ref 10–44)
ANION GAP (OHS): 5 MMOL/L (ref 8–16)
AST SERPL-CCNC: 23 UNIT/L (ref 11–45)
BILIRUB SERPL-MCNC: 0.2 MG/DL (ref 0.1–1)
BUN SERPL-MCNC: 18 MG/DL (ref 8–23)
CALCIUM SERPL-MCNC: 8.5 MG/DL (ref 8.7–10.5)
CHLORIDE SERPL-SCNC: 100 MMOL/L (ref 95–110)
CO2 SERPL-SCNC: 41 MMOL/L (ref 23–29)
CREAT SERPL-MCNC: 0.7 MG/DL (ref 0.5–1.4)
GFR SERPLBLD CREATININE-BSD FMLA CKD-EPI: >60 ML/MIN/1.73/M2
GLUCOSE SERPL-MCNC: 75 MG/DL (ref 70–110)
HOLD SPECIMEN: NORMAL
POTASSIUM SERPL-SCNC: 3.9 MMOL/L (ref 3.5–5.1)
PROT SERPL-MCNC: 5.9 GM/DL (ref 6–8.4)
SODIUM SERPL-SCNC: 146 MMOL/L (ref 136–145)

## 2025-08-08 PROCEDURE — 25000003 PHARM REV CODE 250: Mod: HCNC

## 2025-08-08 PROCEDURE — 82947 ASSAY GLUCOSE BLOOD QUANT: CPT | Mod: HCNC | Performed by: REGISTERED NURSE

## 2025-08-08 PROCEDURE — 36415 COLL VENOUS BLD VENIPUNCTURE: CPT | Mod: HCNC | Performed by: REGISTERED NURSE

## 2025-08-08 PROCEDURE — 94640 AIRWAY INHALATION TREATMENT: CPT | Mod: HCNC

## 2025-08-08 PROCEDURE — 97165 OT EVAL LOW COMPLEX 30 MIN: CPT | Mod: HCNC

## 2025-08-08 PROCEDURE — 92610 EVALUATE SWALLOWING FUNCTION: CPT | Mod: HCNC

## 2025-08-08 PROCEDURE — 94660 CPAP INITIATION&MGMT: CPT | Mod: HCNC

## 2025-08-08 PROCEDURE — 63600175 PHARM REV CODE 636 W HCPCS: Mod: HCNC | Performed by: INTERNAL MEDICINE

## 2025-08-08 PROCEDURE — 99900035 HC TECH TIME PER 15 MIN (STAT): Mod: HCNC

## 2025-08-08 PROCEDURE — 94761 N-INVAS EAR/PLS OXIMETRY MLT: CPT | Mod: HCNC

## 2025-08-08 PROCEDURE — 25000242 PHARM REV CODE 250 ALT 637 W/ HCPCS: Mod: HCNC | Performed by: REGISTERED NURSE

## 2025-08-08 PROCEDURE — 97535 SELF CARE MNGMENT TRAINING: CPT | Mod: HCNC

## 2025-08-08 PROCEDURE — 63600175 PHARM REV CODE 636 W HCPCS: Mod: JZ,TB,HCNC

## 2025-08-08 PROCEDURE — 25000003 PHARM REV CODE 250: Mod: HCNC | Performed by: FAMILY MEDICINE

## 2025-08-08 PROCEDURE — 25000003 PHARM REV CODE 250: Mod: HCNC | Performed by: REGISTERED NURSE

## 2025-08-08 PROCEDURE — 27000221 HC OXYGEN, UP TO 24 HOURS: Mod: HCNC

## 2025-08-08 PROCEDURE — 97162 PT EVAL MOD COMPLEX 30 MIN: CPT | Mod: HCNC

## 2025-08-08 PROCEDURE — 25000242 PHARM REV CODE 250 ALT 637 W/ HCPCS: Mod: HCNC | Performed by: STUDENT IN AN ORGANIZED HEALTH CARE EDUCATION/TRAINING PROGRAM

## 2025-08-08 PROCEDURE — 11000001 HC ACUTE MED/SURG PRIVATE ROOM: Mod: HCNC

## 2025-08-08 PROCEDURE — 25000003 PHARM REV CODE 250: Mod: HCNC | Performed by: STUDENT IN AN ORGANIZED HEALTH CARE EDUCATION/TRAINING PROGRAM

## 2025-08-08 PROCEDURE — 63600175 PHARM REV CODE 636 W HCPCS: Mod: HCNC | Performed by: REGISTERED NURSE

## 2025-08-08 RX ORDER — GUAIFENESIN 100 MG/5ML
200 LIQUID ORAL EVERY 4 HOURS PRN
Status: DISCONTINUED | OUTPATIENT
Start: 2025-08-08 | End: 2025-08-12 | Stop reason: HOSPADM

## 2025-08-08 RX ADMIN — IPRATROPIUM BROMIDE AND ALBUTEROL SULFATE 3 ML: .5; 3 SOLUTION RESPIRATORY (INHALATION) at 12:08

## 2025-08-08 RX ADMIN — ROFLUMILAST 500 MCG: 500 TABLET ORAL at 08:08

## 2025-08-08 RX ADMIN — GUAIFENESIN 200 MG: 200 SOLUTION ORAL at 08:08

## 2025-08-08 RX ADMIN — AZITHROMYCIN 500 MG: 500 INJECTION, POWDER, LYOPHILIZED, FOR SOLUTION INTRAVENOUS at 08:08

## 2025-08-08 RX ADMIN — MUPIROCIN: 20 OINTMENT TOPICAL at 09:08

## 2025-08-08 RX ADMIN — BUDESONIDE, GLYCOPYRROLATE, AND FORMOTEROL FUMARATE 2 PUFF: 160; 9; 4.8 AEROSOL, METERED RESPIRATORY (INHALATION) at 07:08

## 2025-08-08 RX ADMIN — IPRATROPIUM BROMIDE AND ALBUTEROL SULFATE 3 ML: .5; 3 SOLUTION RESPIRATORY (INHALATION) at 07:08

## 2025-08-08 RX ADMIN — METHYLPREDNISOLONE SODIUM SUCCINATE 40 MG: 40 INJECTION, POWDER, FOR SOLUTION INTRAMUSCULAR; INTRAVENOUS at 05:08

## 2025-08-08 RX ADMIN — ASPIRIN 81 MG CHEWABLE TABLET 81 MG: 81 TABLET CHEWABLE at 08:08

## 2025-08-08 RX ADMIN — ENOXAPARIN SODIUM 40 MG: 40 INJECTION SUBCUTANEOUS at 05:08

## 2025-08-08 RX ADMIN — SERTRALINE HYDROCHLORIDE 50 MG: 50 TABLET ORAL at 09:08

## 2025-08-08 RX ADMIN — IPRATROPIUM BROMIDE AND ALBUTEROL SULFATE 3 ML: .5; 3 SOLUTION RESPIRATORY (INHALATION) at 08:08

## 2025-08-08 RX ADMIN — ACETAMINOPHEN 650 MG: 325 TABLET ORAL at 09:08

## 2025-08-08 RX ADMIN — IPRATROPIUM BROMIDE AND ALBUTEROL SULFATE 3 ML: .5; 3 SOLUTION RESPIRATORY (INHALATION) at 01:08

## 2025-08-08 RX ADMIN — MUPIROCIN: 20 OINTMENT TOPICAL at 08:08

## 2025-08-09 LAB
ALBUMIN SERPL BCP-MCNC: 3.3 G/DL (ref 3.5–5.2)
ALP SERPL-CCNC: 48 UNIT/L (ref 40–150)
ALT SERPL W/O P-5'-P-CCNC: 14 UNIT/L (ref 10–44)
ANION GAP (OHS): 6 MMOL/L (ref 8–16)
AST SERPL-CCNC: 28 UNIT/L (ref 11–45)
BILIRUB SERPL-MCNC: 0.4 MG/DL (ref 0.1–1)
BUN SERPL-MCNC: 14 MG/DL (ref 8–23)
CALCIUM SERPL-MCNC: 8.9 MG/DL (ref 8.7–10.5)
CHLORIDE SERPL-SCNC: 99 MMOL/L (ref 95–110)
CO2 SERPL-SCNC: 40 MMOL/L (ref 23–29)
CREAT SERPL-MCNC: 0.6 MG/DL (ref 0.5–1.4)
GFR SERPLBLD CREATININE-BSD FMLA CKD-EPI: >60 ML/MIN/1.73/M2
GLUCOSE SERPL-MCNC: 107 MG/DL (ref 70–110)
HOLD SPECIMEN: NORMAL
POTASSIUM SERPL-SCNC: 4.2 MMOL/L (ref 3.5–5.1)
PROT SERPL-MCNC: 6.4 GM/DL (ref 6–8.4)
SODIUM SERPL-SCNC: 145 MMOL/L (ref 136–145)

## 2025-08-09 PROCEDURE — 25000003 PHARM REV CODE 250: Mod: HCNC | Performed by: STUDENT IN AN ORGANIZED HEALTH CARE EDUCATION/TRAINING PROGRAM

## 2025-08-09 PROCEDURE — 63600175 PHARM REV CODE 636 W HCPCS: Mod: JZ,TB,HCNC

## 2025-08-09 PROCEDURE — 94640 AIRWAY INHALATION TREATMENT: CPT | Mod: HCNC

## 2025-08-09 PROCEDURE — 97110 THERAPEUTIC EXERCISES: CPT | Mod: HCNC

## 2025-08-09 PROCEDURE — 11000001 HC ACUTE MED/SURG PRIVATE ROOM: Mod: HCNC

## 2025-08-09 PROCEDURE — 94761 N-INVAS EAR/PLS OXIMETRY MLT: CPT | Mod: HCNC

## 2025-08-09 PROCEDURE — 63600175 PHARM REV CODE 636 W HCPCS: Mod: HCNC | Performed by: REGISTERED NURSE

## 2025-08-09 PROCEDURE — 97530 THERAPEUTIC ACTIVITIES: CPT | Mod: HCNC

## 2025-08-09 PROCEDURE — 27000221 HC OXYGEN, UP TO 24 HOURS: Mod: HCNC

## 2025-08-09 PROCEDURE — 80053 COMPREHEN METABOLIC PANEL: CPT | Mod: HCNC | Performed by: REGISTERED NURSE

## 2025-08-09 PROCEDURE — 25000003 PHARM REV CODE 250: Mod: HCNC | Performed by: REGISTERED NURSE

## 2025-08-09 PROCEDURE — 25000003 PHARM REV CODE 250: Mod: HCNC

## 2025-08-09 PROCEDURE — 25000242 PHARM REV CODE 250 ALT 637 W/ HCPCS: Mod: HCNC | Performed by: REGISTERED NURSE

## 2025-08-09 PROCEDURE — 25000242 PHARM REV CODE 250 ALT 637 W/ HCPCS: Mod: HCNC | Performed by: STUDENT IN AN ORGANIZED HEALTH CARE EDUCATION/TRAINING PROGRAM

## 2025-08-09 PROCEDURE — 99900035 HC TECH TIME PER 15 MIN (STAT): Mod: HCNC

## 2025-08-09 PROCEDURE — 25000003 PHARM REV CODE 250: Mod: HCNC | Performed by: FAMILY MEDICINE

## 2025-08-09 PROCEDURE — 97535 SELF CARE MNGMENT TRAINING: CPT | Mod: HCNC

## 2025-08-09 PROCEDURE — 36415 COLL VENOUS BLD VENIPUNCTURE: CPT | Mod: HCNC | Performed by: REGISTERED NURSE

## 2025-08-09 PROCEDURE — 63600175 PHARM REV CODE 636 W HCPCS: Mod: HCNC | Performed by: INTERNAL MEDICINE

## 2025-08-09 RX ORDER — AMLODIPINE BESYLATE 5 MG/1
5 TABLET ORAL DAILY
Status: DISCONTINUED | OUTPATIENT
Start: 2025-08-09 | End: 2025-08-11

## 2025-08-09 RX ORDER — HYDROXYZINE HYDROCHLORIDE 25 MG/1
25 TABLET, FILM COATED ORAL 3 TIMES DAILY PRN
Status: DISCONTINUED | OUTPATIENT
Start: 2025-08-09 | End: 2025-08-12 | Stop reason: HOSPADM

## 2025-08-09 RX ADMIN — IPRATROPIUM BROMIDE AND ALBUTEROL SULFATE 3 ML: .5; 3 SOLUTION RESPIRATORY (INHALATION) at 08:08

## 2025-08-09 RX ADMIN — IPRATROPIUM BROMIDE AND ALBUTEROL SULFATE 3 ML: .5; 3 SOLUTION RESPIRATORY (INHALATION) at 01:08

## 2025-08-09 RX ADMIN — ENOXAPARIN SODIUM 40 MG: 40 INJECTION SUBCUTANEOUS at 05:08

## 2025-08-09 RX ADMIN — HYDROXYZINE HYDROCHLORIDE 25 MG: 25 TABLET, FILM COATED ORAL at 02:08

## 2025-08-09 RX ADMIN — BUDESONIDE, GLYCOPYRROLATE, AND FORMOTEROL FUMARATE 2 PUFF: 160; 9; 4.8 AEROSOL, METERED RESPIRATORY (INHALATION) at 08:08

## 2025-08-09 RX ADMIN — IPRATROPIUM BROMIDE AND ALBUTEROL SULFATE 3 ML: .5; 3 SOLUTION RESPIRATORY (INHALATION) at 07:08

## 2025-08-09 RX ADMIN — AZITHROMYCIN 500 MG: 500 INJECTION, POWDER, LYOPHILIZED, FOR SOLUTION INTRAVENOUS at 08:08

## 2025-08-09 RX ADMIN — BUDESONIDE, GLYCOPYRROLATE, AND FORMOTEROL FUMARATE 2 PUFF: 160; 9; 4.8 AEROSOL, METERED RESPIRATORY (INHALATION) at 01:08

## 2025-08-09 RX ADMIN — MUPIROCIN: 20 OINTMENT TOPICAL at 08:08

## 2025-08-09 RX ADMIN — METHYLPREDNISOLONE SODIUM SUCCINATE 40 MG: 40 INJECTION, POWDER, FOR SOLUTION INTRAMUSCULAR; INTRAVENOUS at 05:08

## 2025-08-09 RX ADMIN — SERTRALINE HYDROCHLORIDE 50 MG: 50 TABLET ORAL at 08:08

## 2025-08-09 RX ADMIN — AMLODIPINE BESYLATE 5 MG: 5 TABLET ORAL at 08:08

## 2025-08-09 RX ADMIN — ROFLUMILAST 500 MCG: 500 TABLET ORAL at 08:08

## 2025-08-09 RX ADMIN — HYDROXYZINE HYDROCHLORIDE 25 MG: 25 TABLET, FILM COATED ORAL at 11:08

## 2025-08-09 RX ADMIN — BUDESONIDE, GLYCOPYRROLATE, AND FORMOTEROL FUMARATE 2 PUFF: 160; 9; 4.8 AEROSOL, METERED RESPIRATORY (INHALATION) at 07:08

## 2025-08-09 RX ADMIN — ASPIRIN 81 MG CHEWABLE TABLET 81 MG: 81 TABLET CHEWABLE at 08:08

## 2025-08-10 LAB
ALBUMIN SERPL BCP-MCNC: 3.3 G/DL (ref 3.5–5.2)
ALP SERPL-CCNC: 47 UNIT/L (ref 40–150)
ALT SERPL W/O P-5'-P-CCNC: 12 UNIT/L (ref 10–44)
ANION GAP (OHS): 3 MMOL/L (ref 8–16)
AST SERPL-CCNC: 18 UNIT/L (ref 11–45)
BILIRUB SERPL-MCNC: 0.3 MG/DL (ref 0.1–1)
BUN SERPL-MCNC: 19 MG/DL (ref 8–23)
CALCIUM SERPL-MCNC: 9.2 MG/DL (ref 8.7–10.5)
CHLORIDE SERPL-SCNC: 99 MMOL/L (ref 95–110)
CO2 SERPL-SCNC: 43 MMOL/L (ref 23–29)
CREAT SERPL-MCNC: 0.7 MG/DL (ref 0.5–1.4)
GFR SERPLBLD CREATININE-BSD FMLA CKD-EPI: >60 ML/MIN/1.73/M2
GLUCOSE SERPL-MCNC: 110 MG/DL (ref 70–110)
HOLD SPECIMEN: NORMAL
OHS QRS DURATION: 80 MS
OHS QRS DURATION: 82 MS
OHS QTC CALCULATION: 382 MS
OHS QTC CALCULATION: 431 MS
OHS QTC CALCULATION: 440 MS
OHS QTC CALCULATION: 466 MS
POTASSIUM SERPL-SCNC: 4.6 MMOL/L (ref 3.5–5.1)
PROT SERPL-MCNC: 6.4 GM/DL (ref 6–8.4)
SODIUM SERPL-SCNC: 145 MMOL/L (ref 136–145)

## 2025-08-10 PROCEDURE — 63600175 PHARM REV CODE 636 W HCPCS: Mod: HCNC | Performed by: REGISTERED NURSE

## 2025-08-10 PROCEDURE — 27000221 HC OXYGEN, UP TO 24 HOURS: Mod: HCNC

## 2025-08-10 PROCEDURE — 25000242 PHARM REV CODE 250 ALT 637 W/ HCPCS: Mod: HCNC | Performed by: REGISTERED NURSE

## 2025-08-10 PROCEDURE — 63600175 PHARM REV CODE 636 W HCPCS: Mod: JZ,TB,HCNC

## 2025-08-10 PROCEDURE — 25000242 PHARM REV CODE 250 ALT 637 W/ HCPCS: Mod: HCNC | Performed by: STUDENT IN AN ORGANIZED HEALTH CARE EDUCATION/TRAINING PROGRAM

## 2025-08-10 PROCEDURE — 94640 AIRWAY INHALATION TREATMENT: CPT | Mod: HCNC

## 2025-08-10 PROCEDURE — 25000003 PHARM REV CODE 250: Mod: HCNC

## 2025-08-10 PROCEDURE — 25000003 PHARM REV CODE 250: Mod: HCNC | Performed by: REGISTERED NURSE

## 2025-08-10 PROCEDURE — 11000001 HC ACUTE MED/SURG PRIVATE ROOM: Mod: HCNC

## 2025-08-10 PROCEDURE — 94660 CPAP INITIATION&MGMT: CPT | Mod: HCNC

## 2025-08-10 PROCEDURE — 97530 THERAPEUTIC ACTIVITIES: CPT | Mod: HCNC

## 2025-08-10 PROCEDURE — 25000003 PHARM REV CODE 250: Mod: HCNC | Performed by: FAMILY MEDICINE

## 2025-08-10 PROCEDURE — 25000003 PHARM REV CODE 250: Mod: HCNC | Performed by: STUDENT IN AN ORGANIZED HEALTH CARE EDUCATION/TRAINING PROGRAM

## 2025-08-10 PROCEDURE — 63600175 PHARM REV CODE 636 W HCPCS: Mod: HCNC | Performed by: INTERNAL MEDICINE

## 2025-08-10 PROCEDURE — 99900035 HC TECH TIME PER 15 MIN (STAT): Mod: HCNC

## 2025-08-10 PROCEDURE — 97116 GAIT TRAINING THERAPY: CPT | Mod: HCNC

## 2025-08-10 PROCEDURE — 80053 COMPREHEN METABOLIC PANEL: CPT | Mod: HCNC | Performed by: REGISTERED NURSE

## 2025-08-10 PROCEDURE — 36415 COLL VENOUS BLD VENIPUNCTURE: CPT | Mod: HCNC | Performed by: REGISTERED NURSE

## 2025-08-10 PROCEDURE — 94761 N-INVAS EAR/PLS OXIMETRY MLT: CPT | Mod: HCNC

## 2025-08-10 RX ADMIN — AMLODIPINE BESYLATE 5 MG: 5 TABLET ORAL at 08:08

## 2025-08-10 RX ADMIN — IPRATROPIUM BROMIDE AND ALBUTEROL SULFATE 3 ML: .5; 3 SOLUTION RESPIRATORY (INHALATION) at 07:08

## 2025-08-10 RX ADMIN — ROFLUMILAST 500 MCG: 500 TABLET ORAL at 08:08

## 2025-08-10 RX ADMIN — GUAIFENESIN 200 MG: 200 SOLUTION ORAL at 08:08

## 2025-08-10 RX ADMIN — AZITHROMYCIN 500 MG: 500 INJECTION, POWDER, LYOPHILIZED, FOR SOLUTION INTRAVENOUS at 08:08

## 2025-08-10 RX ADMIN — BUDESONIDE, GLYCOPYRROLATE, AND FORMOTEROL FUMARATE 2 PUFF: 160; 9; 4.8 AEROSOL, METERED RESPIRATORY (INHALATION) at 07:08

## 2025-08-10 RX ADMIN — MUPIROCIN: 20 OINTMENT TOPICAL at 08:08

## 2025-08-10 RX ADMIN — IPRATROPIUM BROMIDE AND ALBUTEROL SULFATE 3 ML: .5; 3 SOLUTION RESPIRATORY (INHALATION) at 01:08

## 2025-08-10 RX ADMIN — SERTRALINE HYDROCHLORIDE 50 MG: 50 TABLET ORAL at 08:08

## 2025-08-10 RX ADMIN — HYDROXYZINE HYDROCHLORIDE 25 MG: 25 TABLET, FILM COATED ORAL at 08:08

## 2025-08-10 RX ADMIN — ENOXAPARIN SODIUM 40 MG: 40 INJECTION SUBCUTANEOUS at 04:08

## 2025-08-10 RX ADMIN — METHYLPREDNISOLONE SODIUM SUCCINATE 40 MG: 40 INJECTION, POWDER, FOR SOLUTION INTRAMUSCULAR; INTRAVENOUS at 05:08

## 2025-08-10 RX ADMIN — ASPIRIN 81 MG CHEWABLE TABLET 81 MG: 81 TABLET CHEWABLE at 08:08

## 2025-08-11 LAB
ALBUMIN SERPL BCP-MCNC: 3.2 G/DL (ref 3.5–5.2)
ALP SERPL-CCNC: 46 UNIT/L (ref 40–150)
ALT SERPL W/O P-5'-P-CCNC: 11 UNIT/L (ref 10–44)
ANION GAP (OHS): 7 MMOL/L (ref 8–16)
AST SERPL-CCNC: 17 UNIT/L (ref 11–45)
BACTERIA BLD CULT: NORMAL
BACTERIA BLD CULT: NORMAL
BACTERIA SPEC CULT: NORMAL
BILIRUB SERPL-MCNC: 0.2 MG/DL (ref 0.1–1)
BUN SERPL-MCNC: 18 MG/DL (ref 8–23)
CALCIUM SERPL-MCNC: 8.9 MG/DL (ref 8.7–10.5)
CHLORIDE SERPL-SCNC: 100 MMOL/L (ref 95–110)
CO2 SERPL-SCNC: 39 MMOL/L (ref 23–29)
CREAT SERPL-MCNC: 0.7 MG/DL (ref 0.5–1.4)
GFR SERPLBLD CREATININE-BSD FMLA CKD-EPI: >60 ML/MIN/1.73/M2
GLUCOSE SERPL-MCNC: 85 MG/DL (ref 70–110)
GRAM STN SPEC: NORMAL
POTASSIUM SERPL-SCNC: 4 MMOL/L (ref 3.5–5.1)
PROT SERPL-MCNC: 6.1 GM/DL (ref 6–8.4)
SODIUM SERPL-SCNC: 146 MMOL/L (ref 136–145)

## 2025-08-11 PROCEDURE — 63600175 PHARM REV CODE 636 W HCPCS: Mod: HCNC | Performed by: REGISTERED NURSE

## 2025-08-11 PROCEDURE — 97530 THERAPEUTIC ACTIVITIES: CPT | Mod: HCNC,CO

## 2025-08-11 PROCEDURE — 63600175 PHARM REV CODE 636 W HCPCS: Mod: HCNC | Performed by: INTERNAL MEDICINE

## 2025-08-11 PROCEDURE — 80053 COMPREHEN METABOLIC PANEL: CPT | Mod: HCNC | Performed by: REGISTERED NURSE

## 2025-08-11 PROCEDURE — 36415 COLL VENOUS BLD VENIPUNCTURE: CPT | Mod: HCNC | Performed by: REGISTERED NURSE

## 2025-08-11 PROCEDURE — 63600175 PHARM REV CODE 636 W HCPCS: Mod: JZ,TB,HCNC

## 2025-08-11 PROCEDURE — 25000003 PHARM REV CODE 250: Mod: HCNC | Performed by: STUDENT IN AN ORGANIZED HEALTH CARE EDUCATION/TRAINING PROGRAM

## 2025-08-11 PROCEDURE — 97530 THERAPEUTIC ACTIVITIES: CPT | Mod: HCNC,CQ

## 2025-08-11 PROCEDURE — 11000001 HC ACUTE MED/SURG PRIVATE ROOM: Mod: HCNC

## 2025-08-11 PROCEDURE — 94761 N-INVAS EAR/PLS OXIMETRY MLT: CPT | Mod: HCNC

## 2025-08-11 PROCEDURE — 27100098 HC SPACER: Mod: HCNC

## 2025-08-11 PROCEDURE — 25000242 PHARM REV CODE 250 ALT 637 W/ HCPCS: Mod: HCNC | Performed by: REGISTERED NURSE

## 2025-08-11 PROCEDURE — 94640 AIRWAY INHALATION TREATMENT: CPT | Mod: HCNC

## 2025-08-11 PROCEDURE — 25000003 PHARM REV CODE 250: Mod: HCNC | Performed by: REGISTERED NURSE

## 2025-08-11 PROCEDURE — 27000221 HC OXYGEN, UP TO 24 HOURS: Mod: HCNC

## 2025-08-11 PROCEDURE — 25000003 PHARM REV CODE 250: Mod: HCNC | Performed by: FAMILY MEDICINE

## 2025-08-11 PROCEDURE — 94660 CPAP INITIATION&MGMT: CPT | Mod: HCNC

## 2025-08-11 PROCEDURE — 25000003 PHARM REV CODE 250: Mod: HCNC

## 2025-08-11 PROCEDURE — 97535 SELF CARE MNGMENT TRAINING: CPT | Mod: HCNC,CO

## 2025-08-11 PROCEDURE — 25000003 PHARM REV CODE 250: Mod: HCNC | Performed by: HOSPITALIST

## 2025-08-11 PROCEDURE — 97116 GAIT TRAINING THERAPY: CPT | Mod: HCNC,CQ

## 2025-08-11 PROCEDURE — 27100171 HC OXYGEN HIGH FLOW UP TO 24 HOURS: Mod: HCNC

## 2025-08-11 PROCEDURE — 99900035 HC TECH TIME PER 15 MIN (STAT): Mod: HCNC

## 2025-08-11 PROCEDURE — 25000242 PHARM REV CODE 250 ALT 637 W/ HCPCS: Mod: HCNC | Performed by: STUDENT IN AN ORGANIZED HEALTH CARE EDUCATION/TRAINING PROGRAM

## 2025-08-11 RX ORDER — LOSARTAN POTASSIUM 25 MG/1
25 TABLET ORAL DAILY
Status: DISCONTINUED | OUTPATIENT
Start: 2025-08-12 | End: 2025-08-12 | Stop reason: HOSPADM

## 2025-08-11 RX ORDER — TALC
6 POWDER (GRAM) TOPICAL NIGHTLY PRN
Status: DISCONTINUED | OUTPATIENT
Start: 2025-08-11 | End: 2025-08-12 | Stop reason: HOSPADM

## 2025-08-11 RX ORDER — IPRATROPIUM BROMIDE AND ALBUTEROL SULFATE 2.5; .5 MG/3ML; MG/3ML
3 SOLUTION RESPIRATORY (INHALATION) EVERY 6 HOURS PRN
Status: DISCONTINUED | OUTPATIENT
Start: 2025-08-11 | End: 2025-08-12 | Stop reason: HOSPADM

## 2025-08-11 RX ADMIN — ROFLUMILAST 500 MCG: 500 TABLET ORAL at 09:08

## 2025-08-11 RX ADMIN — ASPIRIN 81 MG CHEWABLE TABLET 81 MG: 81 TABLET CHEWABLE at 09:08

## 2025-08-11 RX ADMIN — HYDROXYZINE HYDROCHLORIDE 25 MG: 25 TABLET, FILM COATED ORAL at 09:08

## 2025-08-11 RX ADMIN — AMLODIPINE BESYLATE 5 MG: 5 TABLET ORAL at 09:08

## 2025-08-11 RX ADMIN — IPRATROPIUM BROMIDE AND ALBUTEROL SULFATE 3 ML: .5; 3 SOLUTION RESPIRATORY (INHALATION) at 11:08

## 2025-08-11 RX ADMIN — IPRATROPIUM BROMIDE AND ALBUTEROL SULFATE 3 ML: .5; 3 SOLUTION RESPIRATORY (INHALATION) at 12:08

## 2025-08-11 RX ADMIN — ENOXAPARIN SODIUM 40 MG: 40 INJECTION SUBCUTANEOUS at 05:08

## 2025-08-11 RX ADMIN — Medication 6 MG: at 08:08

## 2025-08-11 RX ADMIN — IPRATROPIUM BROMIDE AND ALBUTEROL SULFATE 3 ML: .5; 3 SOLUTION RESPIRATORY (INHALATION) at 01:08

## 2025-08-11 RX ADMIN — MUPIROCIN: 20 OINTMENT TOPICAL at 09:08

## 2025-08-11 RX ADMIN — METHYLPREDNISOLONE SODIUM SUCCINATE 40 MG: 40 INJECTION, POWDER, FOR SOLUTION INTRAMUSCULAR; INTRAVENOUS at 06:08

## 2025-08-11 RX ADMIN — BUDESONIDE, GLYCOPYRROLATE, AND FORMOTEROL FUMARATE 2 PUFF: 160; 9; 4.8 AEROSOL, METERED RESPIRATORY (INHALATION) at 08:08

## 2025-08-11 RX ADMIN — BUDESONIDE, GLYCOPYRROLATE, AND FORMOTEROL FUMARATE 2 PUFF: 160; 9; 4.8 AEROSOL, METERED RESPIRATORY (INHALATION) at 07:08

## 2025-08-11 RX ADMIN — MUPIROCIN: 20 OINTMENT TOPICAL at 08:08

## 2025-08-11 RX ADMIN — SERTRALINE HYDROCHLORIDE 50 MG: 50 TABLET ORAL at 08:08

## 2025-08-11 RX ADMIN — IPRATROPIUM BROMIDE AND ALBUTEROL SULFATE 3 ML: .5; 3 SOLUTION RESPIRATORY (INHALATION) at 07:08

## 2025-08-11 RX ADMIN — AZITHROMYCIN 500 MG: 500 INJECTION, POWDER, LYOPHILIZED, FOR SOLUTION INTRAVENOUS at 09:08

## 2025-08-12 VITALS
DIASTOLIC BLOOD PRESSURE: 66 MMHG | RESPIRATION RATE: 20 BRPM | HEART RATE: 64 BPM | OXYGEN SATURATION: 97 % | HEIGHT: 66 IN | TEMPERATURE: 98 F | SYSTOLIC BLOOD PRESSURE: 120 MMHG | BODY MASS INDEX: 28.53 KG/M2 | WEIGHT: 177.5 LBS

## 2025-08-12 LAB
ALBUMIN SERPL BCP-MCNC: 3.1 G/DL (ref 3.5–5.2)
ALP SERPL-CCNC: 52 UNIT/L (ref 40–150)
ALT SERPL W/O P-5'-P-CCNC: 15 UNIT/L (ref 10–44)
ANION GAP (OHS): 7 MMOL/L (ref 8–16)
AST SERPL-CCNC: 23 UNIT/L (ref 11–45)
BILIRUB SERPL-MCNC: 0.1 MG/DL (ref 0.1–1)
BUN SERPL-MCNC: 20 MG/DL (ref 8–23)
CALCIUM SERPL-MCNC: 9 MG/DL (ref 8.7–10.5)
CHLORIDE SERPL-SCNC: 100 MMOL/L (ref 95–110)
CO2 SERPL-SCNC: 40 MMOL/L (ref 23–29)
CREAT SERPL-MCNC: 0.6 MG/DL (ref 0.5–1.4)
GFR SERPLBLD CREATININE-BSD FMLA CKD-EPI: >60 ML/MIN/1.73/M2
GLUCOSE SERPL-MCNC: 86 MG/DL (ref 70–110)
POTASSIUM SERPL-SCNC: 4 MMOL/L (ref 3.5–5.1)
PROT SERPL-MCNC: 5.9 GM/DL (ref 6–8.4)
SODIUM SERPL-SCNC: 147 MMOL/L (ref 136–145)

## 2025-08-12 PROCEDURE — 94660 CPAP INITIATION&MGMT: CPT | Mod: HCNC

## 2025-08-12 PROCEDURE — 99900035 HC TECH TIME PER 15 MIN (STAT): Mod: HCNC

## 2025-08-12 PROCEDURE — 27000221 HC OXYGEN, UP TO 24 HOURS: Mod: HCNC

## 2025-08-12 PROCEDURE — 82565 ASSAY OF CREATININE: CPT | Mod: HCNC | Performed by: REGISTERED NURSE

## 2025-08-12 PROCEDURE — 36415 COLL VENOUS BLD VENIPUNCTURE: CPT | Mod: HCNC | Performed by: REGISTERED NURSE

## 2025-08-12 PROCEDURE — 94640 AIRWAY INHALATION TREATMENT: CPT | Mod: HCNC

## 2025-08-12 PROCEDURE — 97530 THERAPEUTIC ACTIVITIES: CPT | Mod: HCNC,CO

## 2025-08-12 PROCEDURE — 94761 N-INVAS EAR/PLS OXIMETRY MLT: CPT | Mod: HCNC

## 2025-08-12 PROCEDURE — 25000003 PHARM REV CODE 250: Mod: HCNC | Performed by: STUDENT IN AN ORGANIZED HEALTH CARE EDUCATION/TRAINING PROGRAM

## 2025-08-12 PROCEDURE — 25000242 PHARM REV CODE 250 ALT 637 W/ HCPCS: Mod: HCNC | Performed by: STUDENT IN AN ORGANIZED HEALTH CARE EDUCATION/TRAINING PROGRAM

## 2025-08-12 RX ORDER — ALBUTEROL SULFATE 90 UG/1
2 INHALANT RESPIRATORY (INHALATION) EVERY 6 HOURS PRN
Qty: 8.5 G | Refills: 0 | Status: SHIPPED | OUTPATIENT
Start: 2025-08-12

## 2025-08-12 RX ADMIN — ROFLUMILAST 500 MCG: 500 TABLET ORAL at 08:08

## 2025-08-12 RX ADMIN — BUDESONIDE, GLYCOPYRROLATE, AND FORMOTEROL FUMARATE 2 PUFF: 160; 9; 4.8 AEROSOL, METERED RESPIRATORY (INHALATION) at 08:08

## 2025-08-12 RX ADMIN — LOSARTAN POTASSIUM 25 MG: 25 TABLET, FILM COATED ORAL at 08:08

## 2025-08-12 RX ADMIN — ASPIRIN 81 MG CHEWABLE TABLET 81 MG: 81 TABLET CHEWABLE at 08:08

## 2025-08-13 ENCOUNTER — PATIENT MESSAGE (OUTPATIENT)
Dept: PULMONOLOGY | Facility: CLINIC | Age: 70
End: 2025-08-13
Payer: MEDICARE

## 2025-08-13 ENCOUNTER — PATIENT OUTREACH (OUTPATIENT)
Dept: ADMINISTRATIVE | Facility: CLINIC | Age: 70
End: 2025-08-13
Payer: MEDICARE

## 2025-08-13 RX ORDER — BUDESONIDE, GLYCOPYRROLATE, AND FORMOTEROL FUMARATE 160; 9; 4.8 UG/1; UG/1; UG/1
2 AEROSOL, METERED RESPIRATORY (INHALATION) 2 TIMES DAILY
Status: CANCELLED
Start: 2025-08-13

## 2025-08-13 RX ORDER — BUDESONIDE, GLYCOPYRROLATE, AND FORMOTEROL FUMARATE 160; 9; 4.8 UG/1; UG/1; UG/1
2 AEROSOL, METERED RESPIRATORY (INHALATION) 2 TIMES DAILY
Start: 2025-08-13

## 2025-08-15 ENCOUNTER — TELEPHONE (OUTPATIENT)
Dept: ADMINISTRATIVE | Facility: CLINIC | Age: 70
End: 2025-08-15
Payer: MEDICARE

## 2025-08-15 ENCOUNTER — NURSE TRIAGE (OUTPATIENT)
Dept: ADMINISTRATIVE | Facility: CLINIC | Age: 70
End: 2025-08-15
Payer: MEDICARE

## 2025-08-15 RX ORDER — PREDNISONE 20 MG/1
20 TABLET ORAL DAILY
Qty: 5 TABLET | Refills: 0 | Status: SHIPPED | OUTPATIENT
Start: 2025-08-15 | End: 2025-08-20

## 2025-08-17 ENCOUNTER — HOSPITAL ENCOUNTER (INPATIENT)
Facility: HOSPITAL | Age: 70
LOS: 6 days | Discharge: HOME HEALTH | DRG: 189 | End: 2025-08-23
Attending: EMERGENCY MEDICINE | Admitting: HOSPITALIST
Payer: MEDICARE

## 2025-08-17 DIAGNOSIS — J96.12 ACUTE HYPOXIC ON CHRONIC HYPERCAPNIC RESPIRATORY FAILURE: ICD-10-CM

## 2025-08-17 DIAGNOSIS — R05.9 COUGH: ICD-10-CM

## 2025-08-17 DIAGNOSIS — J43.2 CENTRILOBULAR EMPHYSEMA: ICD-10-CM

## 2025-08-17 DIAGNOSIS — R07.9 CHEST PAIN: ICD-10-CM

## 2025-08-17 DIAGNOSIS — J44.1 COPD EXACERBATION: Primary | ICD-10-CM

## 2025-08-17 DIAGNOSIS — R06.89 HYPERCAPNIA: ICD-10-CM

## 2025-08-17 DIAGNOSIS — J96.01 ACUTE HYPOXIC ON CHRONIC HYPERCAPNIC RESPIRATORY FAILURE: ICD-10-CM

## 2025-08-17 PROBLEM — D64.9 ANEMIA: Status: ACTIVE | Noted: 2025-08-17

## 2025-08-17 LAB
ABSOLUTE EOSINOPHIL (OHS): 0.13 K/UL
ABSOLUTE MONOCYTE (OHS): 1.81 K/UL (ref 0.3–1)
ABSOLUTE NEUTROPHIL COUNT (OHS): 4.61 K/UL (ref 1.8–7.7)
ALBUMIN SERPL BCP-MCNC: 3.6 G/DL (ref 3.5–5.2)
ALLENS TEST: YES
ALP SERPL-CCNC: 49 UNIT/L (ref 40–150)
ALT SERPL W/O P-5'-P-CCNC: 33 UNIT/L (ref 10–44)
ANION GAP (OHS): 8 MMOL/L (ref 8–16)
AST SERPL-CCNC: 29 UNIT/L (ref 11–45)
BASOPHILS # BLD AUTO: 0.04 K/UL
BASOPHILS NFR BLD AUTO: 0.5 %
BILIRUB SERPL-MCNC: 0.2 MG/DL (ref 0.1–1)
BUN SERPL-MCNC: 21 MG/DL (ref 8–23)
CALCIUM SERPL-MCNC: 8.5 MG/DL (ref 8.7–10.5)
CHLORIDE SERPL-SCNC: 99 MMOL/L (ref 95–110)
CO2 SERPL-SCNC: 37 MMOL/L (ref 23–29)
CREAT SERPL-MCNC: 0.9 MG/DL (ref 0.5–1.4)
ERYTHROCYTE [DISTWIDTH] IN BLOOD BY AUTOMATED COUNT: 15.2 % (ref 11.5–14.5)
FIO2: 21 %
FIO2: 21 %
FIO2: 28 %
GFR SERPLBLD CREATININE-BSD FMLA CKD-EPI: >60 ML/MIN/1.73/M2
GLUCOSE SERPL-MCNC: 143 MG/DL (ref 70–110)
HCT VFR BLD AUTO: 40.1 % (ref 37–48.5)
HGB BLD-MCNC: 11.7 GM/DL (ref 12–16)
IMM GRANULOCYTES # BLD AUTO: 0.04 K/UL (ref 0–0.04)
IMM GRANULOCYTES NFR BLD AUTO: 0.5 % (ref 0–0.5)
LYMPHOCYTES # BLD AUTO: 1.9 K/UL (ref 1–4.8)
MCH RBC QN AUTO: 26.7 PG (ref 27–31)
MCHC RBC AUTO-ENTMCNC: 29.2 G/DL (ref 32–36)
MCV RBC AUTO: 92 FL (ref 82–98)
NUCLEATED RBC (/100WBC) (OHS): 0 /100 WBC
PCO2 BLDA: 104 MMHG (ref 35–45)
PCO2 BLDA: 85.4 MMHG (ref 35–45)
PCO2 BLDA: 85.7 MMHG (ref 35–45)
PH SMN: 7.14 [PH] (ref 7.35–7.45)
PH SMN: 7.23 [PH] (ref 7.35–7.45)
PH SMN: 7.25 [PH] (ref 7.35–7.45)
PLATELET # BLD AUTO: 167 K/UL (ref 150–450)
PMV BLD AUTO: 11.1 FL (ref 9.2–12.9)
PO2 BLDA: 131 MMHG (ref 80–100)
PO2 BLDA: 44.6 MMHG (ref 40–60)
PO2 BLDA: 48.6 MMHG (ref 40–60)
POC BASE DEFICIT: 3.2 MMOL/L (ref -2–2)
POC BASE DEFICIT: 6 MMOL/L (ref -2–2)
POC BASE DEFICIT: 7.7 MMOL/L (ref -2–2)
POC HCO3: 35.2 MMOL/L (ref 24–28)
POC HCO3: 36 MMOL/L (ref 24–28)
POC HCO3: 37.5 MMOL/L (ref 24–28)
POC PERFORMED BY: ABNORMAL
POC SATURATED O2: 77.4 % (ref 95–100)
POC SATURATED O2: 80.6 % (ref 95–100)
POC SATURATED O2: 99.1 % (ref 95–100)
POCT GLUCOSE: 100 MG/DL (ref 70–110)
POCT GLUCOSE: 195 MG/DL (ref 70–110)
POTASSIUM SERPL-SCNC: 4.2 MMOL/L (ref 3.5–5.1)
PROT SERPL-MCNC: 7.1 GM/DL (ref 6–8.4)
RBC # BLD AUTO: 4.38 M/UL (ref 4–5.4)
RELATIVE EOSINOPHIL (OHS): 1.5 %
RELATIVE LYMPHOCYTE (OHS): 22.3 % (ref 18–48)
RELATIVE MONOCYTE (OHS): 21.2 % (ref 4–15)
RELATIVE NEUTROPHIL (OHS): 54 % (ref 38–73)
SARS-COV-2 RDRP RESP QL NAA+PROBE: NEGATIVE
SITE: ABNORMAL
SODIUM SERPL-SCNC: 144 MMOL/L (ref 136–145)
SPECIMEN SOURCE: ABNORMAL
TROPONIN I SERPL HS-MCNC: 3 NG/L
WBC # BLD AUTO: 8.53 K/UL (ref 3.9–12.7)

## 2025-08-17 PROCEDURE — 27000221 HC OXYGEN, UP TO 24 HOURS: Mod: HCNC

## 2025-08-17 PROCEDURE — 99900035 HC TECH TIME PER 15 MIN (STAT): Mod: HCNC

## 2025-08-17 PROCEDURE — 94644 CONT INHLJ TX 1ST HOUR: CPT | Mod: HCNC

## 2025-08-17 PROCEDURE — 96374 THER/PROPH/DIAG INJ IV PUSH: CPT | Mod: HCNC

## 2025-08-17 PROCEDURE — 63600175 PHARM REV CODE 636 W HCPCS: Mod: HCNC | Performed by: HOSPITALIST

## 2025-08-17 PROCEDURE — 94640 AIRWAY INHALATION TREATMENT: CPT | Mod: HCNC

## 2025-08-17 PROCEDURE — 25000003 PHARM REV CODE 250: Mod: HCNC | Performed by: EMERGENCY MEDICINE

## 2025-08-17 PROCEDURE — 25000003 PHARM REV CODE 250: Mod: HCNC | Performed by: HOSPITALIST

## 2025-08-17 PROCEDURE — 25000242 PHARM REV CODE 250 ALT 637 W/ HCPCS: Mod: HCNC | Performed by: STUDENT IN AN ORGANIZED HEALTH CARE EDUCATION/TRAINING PROGRAM

## 2025-08-17 PROCEDURE — U0002 COVID-19 LAB TEST NON-CDC: HCPCS | Mod: HCNC | Performed by: EMERGENCY MEDICINE

## 2025-08-17 PROCEDURE — 94660 CPAP INITIATION&MGMT: CPT | Mod: HCNC

## 2025-08-17 PROCEDURE — 85025 COMPLETE CBC W/AUTO DIFF WBC: CPT | Mod: HCNC | Performed by: EMERGENCY MEDICINE

## 2025-08-17 PROCEDURE — 63600175 PHARM REV CODE 636 W HCPCS: Mod: JZ,TB,HCNC | Performed by: EMERGENCY MEDICINE

## 2025-08-17 PROCEDURE — 25000242 PHARM REV CODE 250 ALT 637 W/ HCPCS: Mod: HCNC | Performed by: EMERGENCY MEDICINE

## 2025-08-17 PROCEDURE — 80053 COMPREHEN METABOLIC PANEL: CPT | Mod: HCNC | Performed by: EMERGENCY MEDICINE

## 2025-08-17 PROCEDURE — 94761 N-INVAS EAR/PLS OXIMETRY MLT: CPT | Mod: HCNC,XB

## 2025-08-17 PROCEDURE — 27000190 HC CPAP FULL FACE MASK W/VALVE: Mod: HCNC

## 2025-08-17 PROCEDURE — 84484 ASSAY OF TROPONIN QUANT: CPT | Mod: HCNC | Performed by: HOSPITALIST

## 2025-08-17 PROCEDURE — 25000242 PHARM REV CODE 250 ALT 637 W/ HCPCS: Mod: HCNC | Performed by: HOSPITALIST

## 2025-08-17 PROCEDURE — 20000000 HC ICU ROOM: Mod: HCNC

## 2025-08-17 PROCEDURE — 63600175 PHARM REV CODE 636 W HCPCS: Mod: JZ,TB,HCNC | Performed by: STUDENT IN AN ORGANIZED HEALTH CARE EDUCATION/TRAINING PROGRAM

## 2025-08-17 PROCEDURE — 82803 BLOOD GASES ANY COMBINATION: CPT | Mod: HCNC

## 2025-08-17 PROCEDURE — 99285 EMERGENCY DEPT VISIT HI MDM: CPT | Mod: 25,HCNC

## 2025-08-17 PROCEDURE — 36600 WITHDRAWAL OF ARTERIAL BLOOD: CPT | Mod: HCNC

## 2025-08-17 PROCEDURE — 5A09357 ASSISTANCE WITH RESPIRATORY VENTILATION, LESS THAN 24 CONSECUTIVE HOURS, CONTINUOUS POSITIVE AIRWAY PRESSURE: ICD-10-PCS | Performed by: EMERGENCY MEDICINE

## 2025-08-17 PROCEDURE — 27100171 HC OXYGEN HIGH FLOW UP TO 24 HOURS: Mod: HCNC

## 2025-08-17 RX ORDER — POLYETHYLENE GLYCOL 3350 17 G/17G
17 POWDER, FOR SOLUTION ORAL 2 TIMES DAILY PRN
Status: DISCONTINUED | OUTPATIENT
Start: 2025-08-17 | End: 2025-08-23 | Stop reason: HOSPADM

## 2025-08-17 RX ORDER — IPRATROPIUM BROMIDE AND ALBUTEROL SULFATE 2.5; .5 MG/3ML; MG/3ML
9 SOLUTION RESPIRATORY (INHALATION)
Status: COMPLETED | OUTPATIENT
Start: 2025-08-17 | End: 2025-08-17

## 2025-08-17 RX ORDER — ONDANSETRON HYDROCHLORIDE 2 MG/ML
4 INJECTION, SOLUTION INTRAVENOUS EVERY 8 HOURS PRN
Status: DISCONTINUED | OUTPATIENT
Start: 2025-08-17 | End: 2025-08-23 | Stop reason: HOSPADM

## 2025-08-17 RX ORDER — SIMETHICONE 80 MG
1 TABLET,CHEWABLE ORAL 4 TIMES DAILY PRN
Status: DISCONTINUED | OUTPATIENT
Start: 2025-08-17 | End: 2025-08-23 | Stop reason: HOSPADM

## 2025-08-17 RX ORDER — IBUPROFEN 200 MG
24 TABLET ORAL
Status: DISCONTINUED | OUTPATIENT
Start: 2025-08-17 | End: 2025-08-23 | Stop reason: HOSPADM

## 2025-08-17 RX ORDER — SODIUM CHLORIDE 0.9 % (FLUSH) 0.9 %
10 SYRINGE (ML) INJECTION EVERY 12 HOURS PRN
Status: DISCONTINUED | OUTPATIENT
Start: 2025-08-17 | End: 2025-08-23 | Stop reason: HOSPADM

## 2025-08-17 RX ORDER — PROCHLORPERAZINE EDISYLATE 5 MG/ML
5 INJECTION INTRAMUSCULAR; INTRAVENOUS EVERY 6 HOURS PRN
Status: DISCONTINUED | OUTPATIENT
Start: 2025-08-17 | End: 2025-08-23 | Stop reason: HOSPADM

## 2025-08-17 RX ORDER — NALOXONE HCL 0.4 MG/ML
0.02 VIAL (ML) INJECTION
Status: DISCONTINUED | OUTPATIENT
Start: 2025-08-17 | End: 2025-08-23 | Stop reason: HOSPADM

## 2025-08-17 RX ORDER — IBUPROFEN 200 MG
16 TABLET ORAL
Status: DISCONTINUED | OUTPATIENT
Start: 2025-08-17 | End: 2025-08-23 | Stop reason: HOSPADM

## 2025-08-17 RX ORDER — AMOXICILLIN 250 MG
1 CAPSULE ORAL 2 TIMES DAILY PRN
Status: DISCONTINUED | OUTPATIENT
Start: 2025-08-17 | End: 2025-08-23 | Stop reason: HOSPADM

## 2025-08-17 RX ORDER — ACETAMINOPHEN 325 MG/1
650 TABLET ORAL EVERY 4 HOURS PRN
Status: DISCONTINUED | OUTPATIENT
Start: 2025-08-17 | End: 2025-08-23 | Stop reason: HOSPADM

## 2025-08-17 RX ORDER — TALC
6 POWDER (GRAM) TOPICAL NIGHTLY PRN
Status: DISCONTINUED | OUTPATIENT
Start: 2025-08-17 | End: 2025-08-23 | Stop reason: HOSPADM

## 2025-08-17 RX ORDER — BISACODYL 10 MG/1
10 SUPPOSITORY RECTAL DAILY PRN
Status: DISCONTINUED | OUTPATIENT
Start: 2025-08-17 | End: 2025-08-23 | Stop reason: HOSPADM

## 2025-08-17 RX ORDER — IPRATROPIUM BROMIDE AND ALBUTEROL SULFATE 2.5; .5 MG/3ML; MG/3ML
3 SOLUTION RESPIRATORY (INHALATION)
Status: DISCONTINUED | OUTPATIENT
Start: 2025-08-17 | End: 2025-08-17

## 2025-08-17 RX ORDER — SERTRALINE HYDROCHLORIDE 50 MG/1
50 TABLET, FILM COATED ORAL DAILY
Status: DISCONTINUED | OUTPATIENT
Start: 2025-08-17 | End: 2025-08-23 | Stop reason: HOSPADM

## 2025-08-17 RX ORDER — METHYLPREDNISOLONE SOD SUCC 125 MG
125 VIAL (EA) INJECTION
Status: COMPLETED | OUTPATIENT
Start: 2025-08-17 | End: 2025-08-17

## 2025-08-17 RX ORDER — POTASSIUM CHLORIDE 750 MG/1
10 TABLET, EXTENDED RELEASE ORAL DAILY
Status: DISCONTINUED | OUTPATIENT
Start: 2025-08-18 | End: 2025-08-20

## 2025-08-17 RX ORDER — ENOXAPARIN SODIUM 100 MG/ML
40 INJECTION SUBCUTANEOUS EVERY 24 HOURS
Status: DISCONTINUED | OUTPATIENT
Start: 2025-08-17 | End: 2025-08-23 | Stop reason: HOSPADM

## 2025-08-17 RX ORDER — NAPROXEN SODIUM 220 MG/1
81 TABLET, FILM COATED ORAL DAILY
Status: DISCONTINUED | OUTPATIENT
Start: 2025-08-17 | End: 2025-08-23 | Stop reason: HOSPADM

## 2025-08-17 RX ORDER — ROFLUMILAST 500 UG/1
500 TABLET ORAL DAILY
Status: DISCONTINUED | OUTPATIENT
Start: 2025-08-17 | End: 2025-08-23 | Stop reason: HOSPADM

## 2025-08-17 RX ORDER — ALBUTEROL SULFATE 2.5 MG/.5ML
15 SOLUTION RESPIRATORY (INHALATION)
Status: COMPLETED | OUTPATIENT
Start: 2025-08-17 | End: 2025-08-17

## 2025-08-17 RX ORDER — GLUCAGON 1 MG
1 KIT INJECTION
Status: DISCONTINUED | OUTPATIENT
Start: 2025-08-17 | End: 2025-08-23 | Stop reason: HOSPADM

## 2025-08-17 RX ORDER — SODIUM CHLORIDE 9 MG/ML
INJECTION, SOLUTION INTRAVENOUS CONTINUOUS
Status: DISCONTINUED | OUTPATIENT
Start: 2025-08-17 | End: 2025-08-17

## 2025-08-17 RX ORDER — IPRATROPIUM BROMIDE AND ALBUTEROL SULFATE 2.5; .5 MG/3ML; MG/3ML
3 SOLUTION RESPIRATORY (INHALATION) EVERY 4 HOURS
Status: DISCONTINUED | OUTPATIENT
Start: 2025-08-17 | End: 2025-08-22

## 2025-08-17 RX ORDER — CEFTRIAXONE 1 G/1
1 INJECTION, POWDER, FOR SOLUTION INTRAMUSCULAR; INTRAVENOUS
Status: DISCONTINUED | OUTPATIENT
Start: 2025-08-17 | End: 2025-08-22

## 2025-08-17 RX ORDER — SODIUM CHLORIDE 0.9 % (FLUSH) 0.9 %
3 SYRINGE (ML) INJECTION
Status: DISCONTINUED | OUTPATIENT
Start: 2025-08-17 | End: 2025-08-23 | Stop reason: HOSPADM

## 2025-08-17 RX ORDER — LORAZEPAM 0.5 MG/1
0.5 TABLET ORAL
Status: COMPLETED | OUTPATIENT
Start: 2025-08-17 | End: 2025-08-17

## 2025-08-17 RX ADMIN — IPRATROPIUM BROMIDE AND ALBUTEROL SULFATE 9 ML: 2.5; .5 SOLUTION RESPIRATORY (INHALATION) at 05:08

## 2025-08-17 RX ADMIN — METHYLPREDNISOLONE SODIUM SUCCINATE 40 MG: 40 INJECTION, POWDER, FOR SOLUTION INTRAMUSCULAR; INTRAVENOUS at 09:08

## 2025-08-17 RX ADMIN — ASPIRIN 81 MG CHEWABLE TABLET 81 MG: 81 TABLET CHEWABLE at 03:08

## 2025-08-17 RX ADMIN — IPRATROPIUM BROMIDE AND ALBUTEROL SULFATE 3 ML: 2.5; .5 SOLUTION RESPIRATORY (INHALATION) at 12:08

## 2025-08-17 RX ADMIN — CEFTRIAXONE SODIUM 1 G: 1 INJECTION, POWDER, FOR SOLUTION INTRAMUSCULAR; INTRAVENOUS at 08:08

## 2025-08-17 RX ADMIN — ENOXAPARIN SODIUM 40 MG: 40 INJECTION SUBCUTANEOUS at 05:08

## 2025-08-17 RX ADMIN — LORAZEPAM 0.5 MG: 0.5 TABLET ORAL at 06:08

## 2025-08-17 RX ADMIN — IPRATROPIUM BROMIDE AND ALBUTEROL SULFATE 3 ML: 2.5; .5 SOLUTION RESPIRATORY (INHALATION) at 11:08

## 2025-08-17 RX ADMIN — ALBUTEROL SULFATE 15 MG: 2.5 SOLUTION RESPIRATORY (INHALATION) at 06:08

## 2025-08-17 RX ADMIN — AZITHROMYCIN 500 MG: 500 INJECTION, POWDER, LYOPHILIZED, FOR SOLUTION INTRAVENOUS at 08:08

## 2025-08-17 RX ADMIN — IPRATROPIUM BROMIDE AND ALBUTEROL SULFATE 3 ML: 2.5; .5 SOLUTION RESPIRATORY (INHALATION) at 08:08

## 2025-08-17 RX ADMIN — SERTRALINE HYDROCHLORIDE 50 MG: 50 TABLET ORAL at 03:08

## 2025-08-17 RX ADMIN — IPRATROPIUM BROMIDE AND ALBUTEROL SULFATE 3 ML: 2.5; .5 SOLUTION RESPIRATORY (INHALATION) at 07:08

## 2025-08-17 RX ADMIN — METHYLPREDNISOLONE SODIUM SUCCINATE 125 MG: 125 INJECTION, POWDER, FOR SOLUTION INTRAMUSCULAR; INTRAVENOUS at 05:08

## 2025-08-17 RX ADMIN — IPRATROPIUM BROMIDE AND ALBUTEROL SULFATE 3 ML: 2.5; .5 SOLUTION RESPIRATORY (INHALATION) at 03:08

## 2025-08-18 LAB
ABSOLUTE EOSINOPHIL (OHS): 0 K/UL
ABSOLUTE MONOCYTE (OHS): 1.43 K/UL (ref 0.3–1)
ABSOLUTE NEUTROPHIL COUNT (OHS): 3.37 K/UL (ref 1.8–7.7)
ANION GAP (OHS): 9 MMOL/L (ref 8–16)
BASOPHILS # BLD AUTO: 0.01 K/UL
BASOPHILS NFR BLD AUTO: 0.2 %
BUN SERPL-MCNC: 18 MG/DL (ref 8–23)
CALCIUM SERPL-MCNC: 8.5 MG/DL (ref 8.7–10.5)
CHLORIDE SERPL-SCNC: 98 MMOL/L (ref 95–110)
CO2 SERPL-SCNC: 37 MMOL/L (ref 23–29)
CREAT SERPL-MCNC: 0.7 MG/DL (ref 0.5–1.4)
ERYTHROCYTE [DISTWIDTH] IN BLOOD BY AUTOMATED COUNT: 15.2 % (ref 11.5–14.5)
FERRITIN SERPL-MCNC: 113.4 NG/ML (ref 20–300)
FIO2: 0 %
FIO2: 28 %
FOLATE SERPL-MCNC: 8.7 NG/ML (ref 4–24)
GFR SERPLBLD CREATININE-BSD FMLA CKD-EPI: >60 ML/MIN/1.73/M2
GLUCOSE SERPL-MCNC: 95 MG/DL (ref 70–110)
HCT VFR BLD AUTO: 35.5 % (ref 37–48.5)
HGB BLD-MCNC: 10.4 GM/DL (ref 12–16)
IMM GRANULOCYTES # BLD AUTO: 0.02 K/UL (ref 0–0.04)
IMM GRANULOCYTES NFR BLD AUTO: 0.3 % (ref 0–0.5)
IRON SATN MFR SERPL: 11 % (ref 20–50)
IRON SERPL-MCNC: 36 UG/DL (ref 30–160)
LYMPHOCYTES # BLD AUTO: 1.01 K/UL (ref 1–4.8)
MAGNESIUM SERPL-MCNC: 2.2 MG/DL (ref 1.6–2.6)
MCH RBC QN AUTO: 26.3 PG (ref 27–31)
MCHC RBC AUTO-ENTMCNC: 29.3 G/DL (ref 32–36)
MCV RBC AUTO: 90 FL (ref 82–98)
NUCLEATED RBC (/100WBC) (OHS): 0 /100 WBC
PCO2 BLDA: 51.6 MMHG (ref 35–45)
PCO2 BLDA: 63.8 MMHG (ref 35–45)
PH SMN: 7.41 [PH] (ref 7.35–7.45)
PH SMN: 7.45 [PH] (ref 7.35–7.45)
PHOSPHATE SERPL-MCNC: 1.9 MG/DL (ref 2.7–4.5)
PLATELET # BLD AUTO: 158 K/UL (ref 150–450)
PMV BLD AUTO: 11.4 FL (ref 9.2–12.9)
PO2 BLDA: 129 MMHG (ref 40–60)
PO2 BLDA: 44.7 MMHG (ref 40–60)
POC BASE DEFICIT: 10.3 MMOL/L (ref -2–2)
POC BASE DEFICIT: 13.4 MMOL/L (ref -2–2)
POC HCO3: 35.9 MMOL/L (ref 24–28)
POC HCO3: 40.3 MMOL/L (ref 24–28)
POC PERFORMED BY: ABNORMAL
POC PERFORMED BY: ABNORMAL
POC SATURATED O2: 81.9 % (ref 95–100)
POC SATURATED O2: 99.4 % (ref 95–100)
POTASSIUM SERPL-SCNC: 4.2 MMOL/L (ref 3.5–5.1)
RBC # BLD AUTO: 3.96 M/UL (ref 4–5.4)
RELATIVE EOSINOPHIL (OHS): 0 %
RELATIVE LYMPHOCYTE (OHS): 17.3 % (ref 18–48)
RELATIVE MONOCYTE (OHS): 24.5 % (ref 4–15)
RELATIVE NEUTROPHIL (OHS): 57.7 % (ref 38–73)
SODIUM SERPL-SCNC: 144 MMOL/L (ref 136–145)
SPECIMEN SOURCE: ABNORMAL
SPECIMEN SOURCE: ABNORMAL
T4 FREE SERPL-MCNC: 0.98 NG/DL (ref 0.71–1.51)
T4 FREE SERPL-MCNC: 0.98 NG/DL (ref 0.71–1.51)
TIBC SERPL-MCNC: 324 UG/DL (ref 250–450)
TRANSFERRIN SERPL-MCNC: 219 MG/DL (ref 200–375)
TSH SERPL-ACNC: 0.22 UIU/ML (ref 0.4–4)
VIT B12 SERPL-MCNC: 994 PG/ML (ref 210–950)
WBC # BLD AUTO: 5.84 K/UL (ref 3.9–12.7)

## 2025-08-18 PROCEDURE — 99900035 HC TECH TIME PER 15 MIN (STAT): Mod: HCNC

## 2025-08-18 PROCEDURE — 25000003 PHARM REV CODE 250: Mod: HCNC | Performed by: HOSPITALIST

## 2025-08-18 PROCEDURE — 36415 COLL VENOUS BLD VENIPUNCTURE: CPT | Mod: HCNC | Performed by: HOSPITALIST

## 2025-08-18 PROCEDURE — 20000000 HC ICU ROOM: Mod: HCNC

## 2025-08-18 PROCEDURE — 63600175 PHARM REV CODE 636 W HCPCS: Mod: JZ,TB,HCNC | Performed by: STUDENT IN AN ORGANIZED HEALTH CARE EDUCATION/TRAINING PROGRAM

## 2025-08-18 PROCEDURE — 63600175 PHARM REV CODE 636 W HCPCS: Mod: HCNC | Performed by: HOSPITALIST

## 2025-08-18 PROCEDURE — 27000221 HC OXYGEN, UP TO 24 HOURS: Mod: HCNC

## 2025-08-18 PROCEDURE — 83540 ASSAY OF IRON: CPT | Mod: HCNC | Performed by: HOSPITALIST

## 2025-08-18 PROCEDURE — 27100171 HC OXYGEN HIGH FLOW UP TO 24 HOURS: Mod: HCNC

## 2025-08-18 PROCEDURE — 25000242 PHARM REV CODE 250 ALT 637 W/ HCPCS: Mod: HCNC | Performed by: STUDENT IN AN ORGANIZED HEALTH CARE EDUCATION/TRAINING PROGRAM

## 2025-08-18 PROCEDURE — 82728 ASSAY OF FERRITIN: CPT | Mod: HCNC | Performed by: HOSPITALIST

## 2025-08-18 PROCEDURE — 84100 ASSAY OF PHOSPHORUS: CPT | Mod: HCNC | Performed by: HOSPITALIST

## 2025-08-18 PROCEDURE — 82746 ASSAY OF FOLIC ACID SERUM: CPT | Mod: HCNC | Performed by: HOSPITALIST

## 2025-08-18 PROCEDURE — 63600175 PHARM REV CODE 636 W HCPCS: Mod: HCNC

## 2025-08-18 PROCEDURE — 94660 CPAP INITIATION&MGMT: CPT | Mod: HCNC

## 2025-08-18 PROCEDURE — 80048 BASIC METABOLIC PNL TOTAL CA: CPT | Mod: HCNC | Performed by: HOSPITALIST

## 2025-08-18 PROCEDURE — 94640 AIRWAY INHALATION TREATMENT: CPT | Mod: HCNC

## 2025-08-18 PROCEDURE — 84443 ASSAY THYROID STIM HORMONE: CPT | Mod: HCNC | Performed by: HOSPITALIST

## 2025-08-18 PROCEDURE — 85025 COMPLETE CBC W/AUTO DIFF WBC: CPT | Mod: HCNC | Performed by: HOSPITALIST

## 2025-08-18 PROCEDURE — 82607 VITAMIN B-12: CPT | Mod: HCNC | Performed by: HOSPITALIST

## 2025-08-18 PROCEDURE — 25000003 PHARM REV CODE 250: Mod: HCNC

## 2025-08-18 PROCEDURE — 94761 N-INVAS EAR/PLS OXIMETRY MLT: CPT | Mod: HCNC

## 2025-08-18 PROCEDURE — 83735 ASSAY OF MAGNESIUM: CPT | Mod: HCNC | Performed by: HOSPITALIST

## 2025-08-18 RX ORDER — EZETIMIBE 10 MG/1
10 TABLET ORAL DAILY
Status: DISCONTINUED | OUTPATIENT
Start: 2025-08-18 | End: 2025-08-23 | Stop reason: HOSPADM

## 2025-08-18 RX ORDER — LORAZEPAM 1 MG/1
1 TABLET ORAL EVERY 4 HOURS PRN
Status: DISCONTINUED | OUTPATIENT
Start: 2025-08-18 | End: 2025-08-19

## 2025-08-18 RX ORDER — GUAIFENESIN 100 MG/5ML
200 LIQUID ORAL EVERY 4 HOURS PRN
Status: DISCONTINUED | OUTPATIENT
Start: 2025-08-18 | End: 2025-08-18

## 2025-08-18 RX ORDER — HYDROXYZINE HYDROCHLORIDE 25 MG/1
25 TABLET, FILM COATED ORAL EVERY 4 HOURS PRN
Status: DISCONTINUED | OUTPATIENT
Start: 2025-08-18 | End: 2025-08-18

## 2025-08-18 RX ORDER — GUAIFENESIN AND DEXTROMETHORPHAN HYDROBROMIDE 10; 100 MG/5ML; MG/5ML
10 SYRUP ORAL EVERY 4 HOURS PRN
Status: DISCONTINUED | OUTPATIENT
Start: 2025-08-18 | End: 2025-08-19

## 2025-08-18 RX ORDER — PANTOPRAZOLE SODIUM 40 MG/1
40 TABLET, DELAYED RELEASE ORAL DAILY
Status: DISCONTINUED | OUTPATIENT
Start: 2025-08-19 | End: 2025-08-19

## 2025-08-18 RX ORDER — LOSARTAN POTASSIUM 25 MG/1
25 TABLET ORAL DAILY
Status: DISCONTINUED | OUTPATIENT
Start: 2025-08-18 | End: 2025-08-22

## 2025-08-18 RX ORDER — PANTOPRAZOLE SODIUM 40 MG/10ML
40 INJECTION, POWDER, LYOPHILIZED, FOR SOLUTION INTRAVENOUS DAILY
Status: DISCONTINUED | OUTPATIENT
Start: 2025-08-18 | End: 2025-08-18

## 2025-08-18 RX ADMIN — ASPIRIN 81 MG CHEWABLE TABLET 81 MG: 81 TABLET CHEWABLE at 08:08

## 2025-08-18 RX ADMIN — IPRATROPIUM BROMIDE AND ALBUTEROL SULFATE 3 ML: 2.5; .5 SOLUTION RESPIRATORY (INHALATION) at 11:08

## 2025-08-18 RX ADMIN — IPRATROPIUM BROMIDE AND ALBUTEROL SULFATE 3 ML: 2.5; .5 SOLUTION RESPIRATORY (INHALATION) at 07:08

## 2025-08-18 RX ADMIN — ENOXAPARIN SODIUM 40 MG: 40 INJECTION SUBCUTANEOUS at 03:08

## 2025-08-18 RX ADMIN — IPRATROPIUM BROMIDE AND ALBUTEROL SULFATE 3 ML: 2.5; .5 SOLUTION RESPIRATORY (INHALATION) at 03:08

## 2025-08-18 RX ADMIN — EZETIMIBE 10 MG: 10 TABLET ORAL at 08:08

## 2025-08-18 RX ADMIN — GUAIFENESIN AND DEXTROMETHORPHAN 10 ML: 100; 10 SYRUP ORAL at 07:08

## 2025-08-18 RX ADMIN — GUAIFENESIN 200 MG: 200 SOLUTION ORAL at 03:08

## 2025-08-18 RX ADMIN — GUAIFENESIN 200 MG: 200 SOLUTION ORAL at 11:08

## 2025-08-18 RX ADMIN — ROFLUMILAST 500 MCG: 500 TABLET ORAL at 08:08

## 2025-08-18 RX ADMIN — HYDROXYZINE HYDROCHLORIDE 25 MG: 25 TABLET ORAL at 08:08

## 2025-08-18 RX ADMIN — SERTRALINE HYDROCHLORIDE 50 MG: 50 TABLET ORAL at 08:08

## 2025-08-18 RX ADMIN — AZITHROMYCIN 500 MG: 500 INJECTION, POWDER, LYOPHILIZED, FOR SOLUTION INTRAVENOUS at 08:08

## 2025-08-18 RX ADMIN — LOSARTAN POTASSIUM 25 MG: 25 TABLET, FILM COATED ORAL at 08:08

## 2025-08-18 RX ADMIN — GUAIFENESIN AND DEXTROMETHORPHAN 10 ML: 100; 10 SYRUP ORAL at 04:08

## 2025-08-18 RX ADMIN — ACETAMINOPHEN 650 MG: 325 TABLET ORAL at 03:08

## 2025-08-18 RX ADMIN — SODIUM PHOSPHATE, MONOBASIC, MONOHYDRATE AND SODIUM PHOSPHATE, DIBASIC, ANHYDROUS 30 MMOL: 142; 276 INJECTION, SOLUTION INTRAVENOUS at 08:08

## 2025-08-18 RX ADMIN — PANTOPRAZOLE SODIUM 40 MG: 40 INJECTION, POWDER, FOR SOLUTION INTRAVENOUS at 03:08

## 2025-08-18 RX ADMIN — LORAZEPAM 1 MG: 1 TABLET ORAL at 04:08

## 2025-08-18 RX ADMIN — POTASSIUM CHLORIDE 10 MEQ: 750 TABLET, EXTENDED RELEASE ORAL at 08:08

## 2025-08-18 RX ADMIN — METHYLPREDNISOLONE SODIUM SUCCINATE 40 MG: 40 INJECTION, POWDER, FOR SOLUTION INTRAMUSCULAR; INTRAVENOUS at 08:08

## 2025-08-18 RX ADMIN — CEFTRIAXONE SODIUM 1 G: 1 INJECTION, POWDER, FOR SOLUTION INTRAMUSCULAR; INTRAVENOUS at 08:08

## 2025-08-19 LAB
ABSOLUTE EOSINOPHIL (OHS): 0.01 K/UL
ABSOLUTE MONOCYTE (OHS): 1.29 K/UL (ref 0.3–1)
ABSOLUTE NEUTROPHIL COUNT (OHS): 2.84 K/UL (ref 1.8–7.7)
ALLENS TEST: YES
ANION GAP (OHS): 7 MMOL/L (ref 8–16)
BASOPHILS # BLD AUTO: 0.02 K/UL
BASOPHILS NFR BLD AUTO: 0.3 %
BUN SERPL-MCNC: 21 MG/DL (ref 8–23)
CALCIUM SERPL-MCNC: 9.1 MG/DL (ref 8.7–10.5)
CHLORIDE SERPL-SCNC: 103 MMOL/L (ref 95–110)
CO2 SERPL-SCNC: 38 MMOL/L (ref 23–29)
CREAT SERPL-MCNC: 0.7 MG/DL (ref 0.5–1.4)
ERYTHROCYTE [DISTWIDTH] IN BLOOD BY AUTOMATED COUNT: 15.4 % (ref 11.5–14.5)
GFR SERPLBLD CREATININE-BSD FMLA CKD-EPI: >60 ML/MIN/1.73/M2
GLUCOSE SERPL-MCNC: 84 MG/DL (ref 70–110)
HCT VFR BLD AUTO: 36.4 % (ref 37–48.5)
HGB BLD-MCNC: 10.5 GM/DL (ref 12–16)
IMM GRANULOCYTES # BLD AUTO: 0.02 K/UL (ref 0–0.04)
IMM GRANULOCYTES NFR BLD AUTO: 0.3 % (ref 0–0.5)
LPM: 3
LYMPHOCYTES # BLD AUTO: 2.25 K/UL (ref 1–4.8)
MCH RBC QN AUTO: 26.1 PG (ref 27–31)
MCHC RBC AUTO-ENTMCNC: 28.8 G/DL (ref 32–36)
MCV RBC AUTO: 91 FL (ref 82–98)
NUCLEATED RBC (/100WBC) (OHS): 0 /100 WBC
PCO2 BLDA: 78.8 MMHG (ref 35–45)
PH SMN: 7.32 [PH] (ref 7.35–7.45)
PHOSPHATE SERPL-MCNC: 2.3 MG/DL (ref 2.7–4.5)
PLATELET # BLD AUTO: 180 K/UL (ref 150–450)
PMV BLD AUTO: 11.5 FL (ref 9.2–12.9)
PO2 BLDA: 80.2 MMHG (ref 80–100)
POC BASE DEFICIT: 11.2 MMOL/L (ref -2–2)
POC HCO3: 40.2 MMOL/L (ref 24–28)
POC PERFORMED BY: ABNORMAL
POC SATURATED O2: 95.7 % (ref 95–100)
POTASSIUM SERPL-SCNC: 4.7 MMOL/L (ref 3.5–5.1)
RBC # BLD AUTO: 4.02 M/UL (ref 4–5.4)
RELATIVE EOSINOPHIL (OHS): 0.2 %
RELATIVE LYMPHOCYTE (OHS): 35 % (ref 18–48)
RELATIVE MONOCYTE (OHS): 20.1 % (ref 4–15)
RELATIVE NEUTROPHIL (OHS): 44.1 % (ref 38–73)
SITE: ABNORMAL
SODIUM SERPL-SCNC: 148 MMOL/L (ref 136–145)
SPECIMEN SOURCE: ABNORMAL
WBC # BLD AUTO: 6.43 K/UL (ref 3.9–12.7)

## 2025-08-19 PROCEDURE — 25000242 PHARM REV CODE 250 ALT 637 W/ HCPCS: Mod: HCNC | Performed by: STUDENT IN AN ORGANIZED HEALTH CARE EDUCATION/TRAINING PROGRAM

## 2025-08-19 PROCEDURE — 63600175 PHARM REV CODE 636 W HCPCS: Mod: HCNC | Performed by: HOSPITALIST

## 2025-08-19 PROCEDURE — 97165 OT EVAL LOW COMPLEX 30 MIN: CPT | Mod: HCNC

## 2025-08-19 PROCEDURE — 99900035 HC TECH TIME PER 15 MIN (STAT): Mod: HCNC

## 2025-08-19 PROCEDURE — 94640 AIRWAY INHALATION TREATMENT: CPT | Mod: HCNC

## 2025-08-19 PROCEDURE — 36600 WITHDRAWAL OF ARTERIAL BLOOD: CPT | Mod: HCNC

## 2025-08-19 PROCEDURE — 25000242 PHARM REV CODE 250 ALT 637 W/ HCPCS: Mod: HCNC

## 2025-08-19 PROCEDURE — 25000003 PHARM REV CODE 250: Mod: HCNC | Performed by: HOSPITALIST

## 2025-08-19 PROCEDURE — 94761 N-INVAS EAR/PLS OXIMETRY MLT: CPT | Mod: HCNC

## 2025-08-19 PROCEDURE — 27000221 HC OXYGEN, UP TO 24 HOURS: Mod: HCNC

## 2025-08-19 PROCEDURE — 63600175 PHARM REV CODE 636 W HCPCS: Mod: JZ,TB,HCNC | Performed by: STUDENT IN AN ORGANIZED HEALTH CARE EDUCATION/TRAINING PROGRAM

## 2025-08-19 PROCEDURE — 25000003 PHARM REV CODE 250: Mod: HCNC

## 2025-08-19 PROCEDURE — 80048 BASIC METABOLIC PNL TOTAL CA: CPT | Mod: HCNC | Performed by: HOSPITALIST

## 2025-08-19 PROCEDURE — 85025 COMPLETE CBC W/AUTO DIFF WBC: CPT | Mod: HCNC | Performed by: HOSPITALIST

## 2025-08-19 PROCEDURE — 97530 THERAPEUTIC ACTIVITIES: CPT | Mod: HCNC

## 2025-08-19 PROCEDURE — 82803 BLOOD GASES ANY COMBINATION: CPT | Mod: HCNC

## 2025-08-19 PROCEDURE — 84100 ASSAY OF PHOSPHORUS: CPT | Mod: HCNC | Performed by: HOSPITALIST

## 2025-08-19 PROCEDURE — 97162 PT EVAL MOD COMPLEX 30 MIN: CPT | Mod: HCNC

## 2025-08-19 PROCEDURE — 27100171 HC OXYGEN HIGH FLOW UP TO 24 HOURS: Mod: HCNC

## 2025-08-19 PROCEDURE — 36415 COLL VENOUS BLD VENIPUNCTURE: CPT | Mod: HCNC | Performed by: HOSPITALIST

## 2025-08-19 PROCEDURE — 20000000 HC ICU ROOM: Mod: HCNC

## 2025-08-19 PROCEDURE — 94660 CPAP INITIATION&MGMT: CPT | Mod: HCNC

## 2025-08-19 RX ORDER — MUPIROCIN 20 MG/G
OINTMENT TOPICAL 2 TIMES DAILY
Status: DISCONTINUED | OUTPATIENT
Start: 2025-08-19 | End: 2025-08-23 | Stop reason: HOSPADM

## 2025-08-19 RX ORDER — BUDESONIDE 0.5 MG/2ML
0.5 INHALANT ORAL EVERY 12 HOURS
Status: DISCONTINUED | OUTPATIENT
Start: 2025-08-19 | End: 2025-08-23 | Stop reason: HOSPADM

## 2025-08-19 RX ORDER — AZITHROMYCIN 250 MG/1
500 TABLET, FILM COATED ORAL DAILY
Status: DISCONTINUED | OUTPATIENT
Start: 2025-08-20 | End: 2025-08-21

## 2025-08-19 RX ADMIN — MUPIROCIN: 20 OINTMENT TOPICAL at 08:08

## 2025-08-19 RX ADMIN — IPRATROPIUM BROMIDE AND ALBUTEROL SULFATE 3 ML: 2.5; .5 SOLUTION RESPIRATORY (INHALATION) at 03:08

## 2025-08-19 RX ADMIN — ROFLUMILAST 500 MCG: 500 TABLET ORAL at 08:08

## 2025-08-19 RX ADMIN — SERTRALINE HYDROCHLORIDE 50 MG: 50 TABLET ORAL at 08:08

## 2025-08-19 RX ADMIN — CEFTRIAXONE SODIUM 1 G: 1 INJECTION, POWDER, FOR SOLUTION INTRAMUSCULAR; INTRAVENOUS at 08:08

## 2025-08-19 RX ADMIN — LOSARTAN POTASSIUM 25 MG: 25 TABLET, FILM COATED ORAL at 08:08

## 2025-08-19 RX ADMIN — GUAIFENESIN AND DEXTROMETHORPHAN HYDROBROMIDE 2 TABLET: 600; 30 TABLET, EXTENDED RELEASE ORAL at 08:08

## 2025-08-19 RX ADMIN — ASPIRIN 81 MG CHEWABLE TABLET 81 MG: 81 TABLET CHEWABLE at 08:08

## 2025-08-19 RX ADMIN — BUDESONIDE 0.5 MG: 0.5 INHALANT RESPIRATORY (INHALATION) at 11:08

## 2025-08-19 RX ADMIN — IPRATROPIUM BROMIDE AND ALBUTEROL SULFATE 3 ML: 2.5; .5 SOLUTION RESPIRATORY (INHALATION) at 08:08

## 2025-08-19 RX ADMIN — GUAIFENESIN AND DEXTROMETHORPHAN 10 ML: 100; 10 SYRUP ORAL at 06:08

## 2025-08-19 RX ADMIN — AZITHROMYCIN 500 MG: 500 INJECTION, POWDER, LYOPHILIZED, FOR SOLUTION INTRAVENOUS at 09:08

## 2025-08-19 RX ADMIN — BUDESONIDE 0.5 MG: 0.5 INHALANT RESPIRATORY (INHALATION) at 08:08

## 2025-08-19 RX ADMIN — LORAZEPAM 1 MG: 1 TABLET ORAL at 12:08

## 2025-08-19 RX ADMIN — IPRATROPIUM BROMIDE AND ALBUTEROL SULFATE 3 ML: 2.5; .5 SOLUTION RESPIRATORY (INHALATION) at 11:08

## 2025-08-19 RX ADMIN — EZETIMIBE 10 MG: 10 TABLET ORAL at 08:08

## 2025-08-19 RX ADMIN — ENOXAPARIN SODIUM 40 MG: 40 INJECTION SUBCUTANEOUS at 05:08

## 2025-08-19 RX ADMIN — POTASSIUM CHLORIDE 10 MEQ: 750 TABLET, EXTENDED RELEASE ORAL at 08:08

## 2025-08-19 RX ADMIN — MUPIROCIN: 20 OINTMENT TOPICAL at 12:08

## 2025-08-19 RX ADMIN — METHYLPREDNISOLONE SODIUM SUCCINATE 40 MG: 40 INJECTION, POWDER, FOR SOLUTION INTRAMUSCULAR; INTRAVENOUS at 08:08

## 2025-08-20 PROBLEM — K64.8 PROLAPSED INTERNAL HEMORRHOIDS: Status: RESOLVED | Noted: 2022-09-09 | Resolved: 2025-08-20

## 2025-08-20 PROBLEM — J96.12 ACUTE HYPOXIC ON CHRONIC HYPERCAPNIC RESPIRATORY FAILURE: Status: RESOLVED | Noted: 2025-08-07 | Resolved: 2025-08-20

## 2025-08-20 PROBLEM — R25.2 MUSCLE CRAMP: Status: RESOLVED | Noted: 2025-06-26 | Resolved: 2025-08-20

## 2025-08-20 PROBLEM — K64.9 HEMORRHOIDS: Status: RESOLVED | Noted: 2022-09-09 | Resolved: 2025-08-20

## 2025-08-20 PROBLEM — K57.30 DIVERTICULAR DISEASE OF COLON: Status: RESOLVED | Noted: 2022-09-09 | Resolved: 2025-08-20

## 2025-08-20 PROBLEM — R19.5 MUCUS IN STOOL: Status: RESOLVED | Noted: 2022-09-09 | Resolved: 2025-08-20

## 2025-08-20 PROBLEM — Z79.82 LONG TERM (CURRENT) USE OF ASPIRIN: Status: RESOLVED | Noted: 2023-07-17 | Resolved: 2025-08-20

## 2025-08-20 PROBLEM — R06.02 SHORTNESS OF BREATH: Status: RESOLVED | Noted: 2023-07-25 | Resolved: 2025-08-20

## 2025-08-20 PROBLEM — I47.10 SVT (SUPRAVENTRICULAR TACHYCARDIA): Status: RESOLVED | Noted: 2025-06-09 | Resolved: 2025-08-20

## 2025-08-20 PROBLEM — J96.21 ACUTE ON CHRONIC RESPIRATORY FAILURE WITH HYPOXIA AND HYPERCAPNIA: Status: RESOLVED | Noted: 2017-05-01 | Resolved: 2025-08-20

## 2025-08-20 PROBLEM — R06.89 HYPERCAPNIA: Status: ACTIVE | Noted: 2025-08-20

## 2025-08-20 PROBLEM — J96.01 ACUTE HYPOXIC ON CHRONIC HYPERCAPNIC RESPIRATORY FAILURE: Status: RESOLVED | Noted: 2025-08-07 | Resolved: 2025-08-20

## 2025-08-20 PROBLEM — R06.89 CO2 NARCOSIS: Chronic | Status: RESOLVED | Noted: 2023-12-27 | Resolved: 2025-08-20

## 2025-08-20 PROBLEM — J96.22 ACUTE ON CHRONIC RESPIRATORY FAILURE WITH HYPOXIA AND HYPERCAPNIA: Status: RESOLVED | Noted: 2017-05-01 | Resolved: 2025-08-20

## 2025-08-20 PROBLEM — Z87.891 HISTORY OF TOBACCO ABUSE: Status: RESOLVED | Noted: 2022-06-16 | Resolved: 2025-08-20

## 2025-08-20 PROBLEM — F33.0 MILD EPISODE OF RECURRENT MAJOR DEPRESSIVE DISORDER: Status: RESOLVED | Noted: 2024-01-22 | Resolved: 2025-08-20

## 2025-08-20 PROBLEM — M25.612 DECREASED ROM OF LEFT SHOULDER: Status: RESOLVED | Noted: 2024-11-26 | Resolved: 2025-08-20

## 2025-08-20 PROBLEM — J98.11 ATELECTASIS: Status: RESOLVED | Noted: 2025-08-07 | Resolved: 2025-08-20

## 2025-08-20 PROBLEM — J96.00 ACUTE RESPIRATORY FAILURE: Status: RESOLVED | Noted: 2025-08-07 | Resolved: 2025-08-20

## 2025-08-20 PROBLEM — R25.1 TREMOR: Status: RESOLVED | Noted: 2025-06-28 | Resolved: 2025-08-20

## 2025-08-20 PROBLEM — R00.2 PALPITATIONS: Status: RESOLVED | Noted: 2025-05-28 | Resolved: 2025-08-20

## 2025-08-20 PROBLEM — R53.81 DEBILITY: Status: RESOLVED | Noted: 2023-07-03 | Resolved: 2025-08-20

## 2025-08-20 PROBLEM — Z91.81 HISTORY OF FALLING: Status: RESOLVED | Noted: 2023-07-17 | Resolved: 2025-08-20

## 2025-08-20 LAB
ABSOLUTE NEUTROPHIL MANUAL (OHS): 3.3 K/UL (ref 1.8–7.7)
ANION GAP (OHS): 5 MMOL/L (ref 8–16)
BASOPHILS NFR BLD MANUAL: 3 %
BUN SERPL-MCNC: 19 MG/DL (ref 8–23)
CALCIUM SERPL-MCNC: 9.3 MG/DL (ref 8.7–10.5)
CHLORIDE SERPL-SCNC: 104 MMOL/L (ref 95–110)
CO2 SERPL-SCNC: 40 MMOL/L (ref 23–29)
CREAT SERPL-MCNC: 0.7 MG/DL (ref 0.5–1.4)
ERYTHROCYTE [DISTWIDTH] IN BLOOD BY AUTOMATED COUNT: 15.5 % (ref 11.5–14.5)
GFR SERPLBLD CREATININE-BSD FMLA CKD-EPI: >60 ML/MIN/1.73/M2
GLUCOSE SERPL-MCNC: 88 MG/DL (ref 70–110)
HCT VFR BLD AUTO: 37.1 % (ref 37–48.5)
HGB BLD-MCNC: 10.6 GM/DL (ref 12–16)
LYMPHOCYTES NFR BLD MANUAL: 44 % (ref 18–48)
MAGNESIUM SERPL-MCNC: 2.2 MG/DL (ref 1.6–2.6)
MCH RBC QN AUTO: 26.1 PG (ref 27–31)
MCHC RBC AUTO-ENTMCNC: 28.6 G/DL (ref 32–36)
MCV RBC AUTO: 91 FL (ref 82–98)
MONOCYTES NFR BLD MANUAL: 7 % (ref 4–15)
NEUTROPHILS NFR BLD MANUAL: 46 % (ref 38–73)
NUCLEATED RBC (/100WBC) (OHS): 0 /100 WBC
PHOSPHATE SERPL-MCNC: 2.8 MG/DL (ref 2.7–4.5)
PLATELET # BLD AUTO: 195 K/UL (ref 150–450)
PLATELET BLD QL SMEAR: ABNORMAL
PMV BLD AUTO: 11.5 FL (ref 9.2–12.9)
POTASSIUM SERPL-SCNC: 4.6 MMOL/L (ref 3.5–5.1)
RBC # BLD AUTO: 4.06 M/UL (ref 4–5.4)
SODIUM SERPL-SCNC: 149 MMOL/L (ref 136–145)
WBC # BLD AUTO: 7.23 K/UL (ref 3.9–12.7)

## 2025-08-20 PROCEDURE — 80048 BASIC METABOLIC PNL TOTAL CA: CPT | Mod: HCNC | Performed by: HOSPITALIST

## 2025-08-20 PROCEDURE — 36415 COLL VENOUS BLD VENIPUNCTURE: CPT | Mod: HCNC | Performed by: HOSPITALIST

## 2025-08-20 PROCEDURE — 63600175 PHARM REV CODE 636 W HCPCS: Mod: HCNC | Performed by: HOSPITALIST

## 2025-08-20 PROCEDURE — 63600175 PHARM REV CODE 636 W HCPCS: Mod: JZ,TB,HCNC | Performed by: STUDENT IN AN ORGANIZED HEALTH CARE EDUCATION/TRAINING PROGRAM

## 2025-08-20 PROCEDURE — 25000242 PHARM REV CODE 250 ALT 637 W/ HCPCS: Mod: HCNC | Performed by: STUDENT IN AN ORGANIZED HEALTH CARE EDUCATION/TRAINING PROGRAM

## 2025-08-20 PROCEDURE — 97116 GAIT TRAINING THERAPY: CPT | Mod: HCNC

## 2025-08-20 PROCEDURE — 27100171 HC OXYGEN HIGH FLOW UP TO 24 HOURS: Mod: HCNC

## 2025-08-20 PROCEDURE — 97110 THERAPEUTIC EXERCISES: CPT | Mod: HCNC

## 2025-08-20 PROCEDURE — 85027 COMPLETE CBC AUTOMATED: CPT | Mod: HCNC | Performed by: HOSPITALIST

## 2025-08-20 PROCEDURE — 25000003 PHARM REV CODE 250: Mod: HCNC | Performed by: HOSPITALIST

## 2025-08-20 PROCEDURE — 83735 ASSAY OF MAGNESIUM: CPT | Mod: HCNC

## 2025-08-20 PROCEDURE — 97530 THERAPEUTIC ACTIVITIES: CPT | Mod: HCNC

## 2025-08-20 PROCEDURE — 63700000 PHARM REV CODE 250 ALT 637 W/O HCPCS: Mod: HCNC

## 2025-08-20 PROCEDURE — 94660 CPAP INITIATION&MGMT: CPT | Mod: HCNC

## 2025-08-20 PROCEDURE — 20000000 HC ICU ROOM: Mod: HCNC

## 2025-08-20 PROCEDURE — 94761 N-INVAS EAR/PLS OXIMETRY MLT: CPT | Mod: HCNC

## 2025-08-20 PROCEDURE — 94640 AIRWAY INHALATION TREATMENT: CPT | Mod: HCNC

## 2025-08-20 PROCEDURE — 99900035 HC TECH TIME PER 15 MIN (STAT): Mod: HCNC

## 2025-08-20 PROCEDURE — 63600175 PHARM REV CODE 636 W HCPCS: Mod: HCNC

## 2025-08-20 PROCEDURE — 25000003 PHARM REV CODE 250: Mod: HCNC

## 2025-08-20 PROCEDURE — 84100 ASSAY OF PHOSPHORUS: CPT | Mod: HCNC | Performed by: HOSPITALIST

## 2025-08-20 PROCEDURE — 25000242 PHARM REV CODE 250 ALT 637 W/ HCPCS: Mod: HCNC

## 2025-08-20 RX ORDER — HYDROXYZINE PAMOATE 25 MG/1
25 CAPSULE ORAL EVERY 8 HOURS PRN
Status: DISCONTINUED | OUTPATIENT
Start: 2025-08-20 | End: 2025-08-20

## 2025-08-20 RX ORDER — BUSPIRONE HYDROCHLORIDE 5 MG/1
5 TABLET ORAL 3 TIMES DAILY
Status: DISCONTINUED | OUTPATIENT
Start: 2025-08-20 | End: 2025-08-23 | Stop reason: HOSPADM

## 2025-08-20 RX ADMIN — IPRATROPIUM BROMIDE AND ALBUTEROL SULFATE 3 ML: 2.5; .5 SOLUTION RESPIRATORY (INHALATION) at 11:08

## 2025-08-20 RX ADMIN — LOSARTAN POTASSIUM 25 MG: 25 TABLET, FILM COATED ORAL at 08:08

## 2025-08-20 RX ADMIN — ASPIRIN 81 MG CHEWABLE TABLET 81 MG: 81 TABLET CHEWABLE at 08:08

## 2025-08-20 RX ADMIN — ROFLUMILAST 500 MCG: 500 TABLET ORAL at 08:08

## 2025-08-20 RX ADMIN — MUPIROCIN: 20 OINTMENT TOPICAL at 08:08

## 2025-08-20 RX ADMIN — IPRATROPIUM BROMIDE AND ALBUTEROL SULFATE 3 ML: 2.5; .5 SOLUTION RESPIRATORY (INHALATION) at 12:08

## 2025-08-20 RX ADMIN — IPRATROPIUM BROMIDE AND ALBUTEROL SULFATE 3 ML: 2.5; .5 SOLUTION RESPIRATORY (INHALATION) at 07:08

## 2025-08-20 RX ADMIN — BUSPIRONE HYDROCHLORIDE 5 MG: 5 TABLET ORAL at 08:08

## 2025-08-20 RX ADMIN — SERTRALINE HYDROCHLORIDE 50 MG: 50 TABLET ORAL at 08:08

## 2025-08-20 RX ADMIN — METHYLPREDNISOLONE SODIUM SUCCINATE 40 MG: 40 INJECTION, POWDER, FOR SOLUTION INTRAMUSCULAR; INTRAVENOUS at 08:08

## 2025-08-20 RX ADMIN — POTASSIUM CHLORIDE 10 MEQ: 750 TABLET, EXTENDED RELEASE ORAL at 08:08

## 2025-08-20 RX ADMIN — ENOXAPARIN SODIUM 40 MG: 40 INJECTION SUBCUTANEOUS at 07:08

## 2025-08-20 RX ADMIN — EZETIMIBE 10 MG: 10 TABLET ORAL at 08:08

## 2025-08-20 RX ADMIN — GUAIFENESIN AND DEXTROMETHORPHAN HYDROBROMIDE 2 TABLET: 600; 30 TABLET, EXTENDED RELEASE ORAL at 08:08

## 2025-08-20 RX ADMIN — IPRATROPIUM BROMIDE AND ALBUTEROL SULFATE 3 ML: 2.5; .5 SOLUTION RESPIRATORY (INHALATION) at 03:08

## 2025-08-20 RX ADMIN — SODIUM CHLORIDE, POTASSIUM CHLORIDE, SODIUM LACTATE AND CALCIUM CHLORIDE 1000 ML: 600; 310; 30; 20 INJECTION, SOLUTION INTRAVENOUS at 09:08

## 2025-08-20 RX ADMIN — BUDESONIDE 0.5 MG: 0.5 INHALANT RESPIRATORY (INHALATION) at 07:08

## 2025-08-20 RX ADMIN — Medication 6 MG: at 08:08

## 2025-08-20 RX ADMIN — AZITHROMYCIN DIHYDRATE 500 MG: 250 TABLET ORAL at 08:08

## 2025-08-20 RX ADMIN — BUSPIRONE HYDROCHLORIDE 5 MG: 5 TABLET ORAL at 02:08

## 2025-08-20 RX ADMIN — CEFTRIAXONE SODIUM 1 G: 1 INJECTION, POWDER, FOR SOLUTION INTRAMUSCULAR; INTRAVENOUS at 08:08

## 2025-08-21 LAB
ABSOLUTE EOSINOPHIL (OHS): 0.03 K/UL
ABSOLUTE MONOCYTE (OHS): 0.85 K/UL (ref 0.3–1)
ABSOLUTE NEUTROPHIL COUNT (OHS): 5.04 K/UL (ref 1.8–7.7)
ANION GAP (OHS): 7 MMOL/L (ref 8–16)
BASOPHILS # BLD AUTO: 0.01 K/UL
BASOPHILS NFR BLD AUTO: 0.1 %
BUN SERPL-MCNC: 16 MG/DL (ref 8–23)
CALCIUM SERPL-MCNC: 9 MG/DL (ref 8.7–10.5)
CHLORIDE SERPL-SCNC: 101 MMOL/L (ref 95–110)
CO2 SERPL-SCNC: 37 MMOL/L (ref 23–29)
CREAT SERPL-MCNC: 0.6 MG/DL (ref 0.5–1.4)
ERYTHROCYTE [DISTWIDTH] IN BLOOD BY AUTOMATED COUNT: 15.2 % (ref 11.5–14.5)
GFR SERPLBLD CREATININE-BSD FMLA CKD-EPI: >60 ML/MIN/1.73/M2
GLUCOSE SERPL-MCNC: 75 MG/DL (ref 70–110)
HCT VFR BLD AUTO: 36.2 % (ref 37–48.5)
HGB BLD-MCNC: 10.4 GM/DL (ref 12–16)
IMM GRANULOCYTES # BLD AUTO: 0.02 K/UL (ref 0–0.04)
IMM GRANULOCYTES NFR BLD AUTO: 0.2 % (ref 0–0.5)
LYMPHOCYTES # BLD AUTO: 2.67 K/UL (ref 1–4.8)
MCH RBC QN AUTO: 25.9 PG (ref 27–31)
MCHC RBC AUTO-ENTMCNC: 28.7 G/DL (ref 32–36)
MCV RBC AUTO: 90 FL (ref 82–98)
NUCLEATED RBC (/100WBC) (OHS): 0 /100 WBC
PHOSPHATE SERPL-MCNC: 3.6 MG/DL (ref 2.7–4.5)
PLATELET # BLD AUTO: 186 K/UL (ref 150–450)
PMV BLD AUTO: 11.3 FL (ref 9.2–12.9)
POTASSIUM SERPL-SCNC: 4 MMOL/L (ref 3.5–5.1)
RBC # BLD AUTO: 4.01 M/UL (ref 4–5.4)
RELATIVE EOSINOPHIL (OHS): 0.3 %
RELATIVE LYMPHOCYTE (OHS): 31 % (ref 18–48)
RELATIVE MONOCYTE (OHS): 9.9 % (ref 4–15)
RELATIVE NEUTROPHIL (OHS): 58.5 % (ref 38–73)
SODIUM SERPL-SCNC: 145 MMOL/L (ref 136–145)
WBC # BLD AUTO: 8.62 K/UL (ref 3.9–12.7)

## 2025-08-21 PROCEDURE — 99497 ADVNCD CARE PLAN 30 MIN: CPT | Mod: HCNC,25,, | Performed by: STUDENT IN AN ORGANIZED HEALTH CARE EDUCATION/TRAINING PROGRAM

## 2025-08-21 PROCEDURE — 63700000 PHARM REV CODE 250 ALT 637 W/O HCPCS: Mod: HCNC

## 2025-08-21 PROCEDURE — 27000221 HC OXYGEN, UP TO 24 HOURS: Mod: HCNC

## 2025-08-21 PROCEDURE — 63600175 PHARM REV CODE 636 W HCPCS: Mod: HCNC | Performed by: HOSPITALIST

## 2025-08-21 PROCEDURE — 97530 THERAPEUTIC ACTIVITIES: CPT | Mod: HCNC

## 2025-08-21 PROCEDURE — 94761 N-INVAS EAR/PLS OXIMETRY MLT: CPT | Mod: HCNC

## 2025-08-21 PROCEDURE — 80048 BASIC METABOLIC PNL TOTAL CA: CPT | Mod: HCNC | Performed by: HOSPITALIST

## 2025-08-21 PROCEDURE — 11000001 HC ACUTE MED/SURG PRIVATE ROOM: Mod: HCNC

## 2025-08-21 PROCEDURE — 94660 CPAP INITIATION&MGMT: CPT | Mod: HCNC

## 2025-08-21 PROCEDURE — 97116 GAIT TRAINING THERAPY: CPT | Mod: HCNC

## 2025-08-21 PROCEDURE — 99498 ADVNCD CARE PLAN ADDL 30 MIN: CPT | Mod: HCNC,,, | Performed by: STUDENT IN AN ORGANIZED HEALTH CARE EDUCATION/TRAINING PROGRAM

## 2025-08-21 PROCEDURE — 94640 AIRWAY INHALATION TREATMENT: CPT | Mod: HCNC

## 2025-08-21 PROCEDURE — 85025 COMPLETE CBC W/AUTO DIFF WBC: CPT | Mod: HCNC | Performed by: HOSPITALIST

## 2025-08-21 PROCEDURE — 63600175 PHARM REV CODE 636 W HCPCS: Mod: JZ,TB,HCNC | Performed by: STUDENT IN AN ORGANIZED HEALTH CARE EDUCATION/TRAINING PROGRAM

## 2025-08-21 PROCEDURE — 1153F DOC ADVNCD DIS CMFRT NOT 1ST: CPT | Mod: CPTII,HCNC,, | Performed by: STUDENT IN AN ORGANIZED HEALTH CARE EDUCATION/TRAINING PROGRAM

## 2025-08-21 PROCEDURE — 25000003 PHARM REV CODE 250: Mod: HCNC | Performed by: HOSPITALIST

## 2025-08-21 PROCEDURE — 84100 ASSAY OF PHOSPHORUS: CPT | Mod: HCNC

## 2025-08-21 PROCEDURE — 25000242 PHARM REV CODE 250 ALT 637 W/ HCPCS: Mod: HCNC | Performed by: STUDENT IN AN ORGANIZED HEALTH CARE EDUCATION/TRAINING PROGRAM

## 2025-08-21 PROCEDURE — 1158F ADVNC CARE PLAN TLK DOCD: CPT | Mod: CPTII,HCNC,, | Performed by: STUDENT IN AN ORGANIZED HEALTH CARE EDUCATION/TRAINING PROGRAM

## 2025-08-21 PROCEDURE — 99223 1ST HOSP IP/OBS HIGH 75: CPT | Mod: HCNC,25,, | Performed by: STUDENT IN AN ORGANIZED HEALTH CARE EDUCATION/TRAINING PROGRAM

## 2025-08-21 PROCEDURE — 25000242 PHARM REV CODE 250 ALT 637 W/ HCPCS: Mod: HCNC

## 2025-08-21 PROCEDURE — 99900035 HC TECH TIME PER 15 MIN (STAT): Mod: HCNC

## 2025-08-21 PROCEDURE — 25000003 PHARM REV CODE 250: Mod: HCNC

## 2025-08-21 RX ORDER — PREDNISONE 5 MG/1
10 TABLET ORAL DAILY
Status: DISCONTINUED | OUTPATIENT
Start: 2025-09-03 | End: 2025-08-23 | Stop reason: HOSPADM

## 2025-08-21 RX ORDER — HYDROCODONE BITARTRATE AND ACETAMINOPHEN 7.5; 325 MG/15ML; MG/15ML
5 SOLUTION ORAL ONCE
Refills: 0 | Status: DISCONTINUED | OUTPATIENT
Start: 2025-08-21 | End: 2025-08-21

## 2025-08-21 RX ORDER — HYDROCHLOROTHIAZIDE 12.5 MG/1
12.5 TABLET ORAL DAILY
Status: DISCONTINUED | OUTPATIENT
Start: 2025-08-21 | End: 2025-08-23 | Stop reason: HOSPADM

## 2025-08-21 RX ORDER — AZITHROMYCIN 250 MG/1
250 TABLET, FILM COATED ORAL
Status: DISCONTINUED | OUTPATIENT
Start: 2025-08-22 | End: 2025-08-23 | Stop reason: HOSPADM

## 2025-08-21 RX ORDER — HYDROCODONE BITARTRATE AND ACETAMINOPHEN 7.5; 325 MG/1; MG/1
1 TABLET ORAL ONCE
Refills: 0 | Status: COMPLETED | OUTPATIENT
Start: 2025-08-21 | End: 2025-08-21

## 2025-08-21 RX ORDER — HYDROXYZINE PAMOATE 25 MG/1
25 CAPSULE ORAL EVERY 8 HOURS PRN
Status: DISCONTINUED | OUTPATIENT
Start: 2025-08-21 | End: 2025-08-23 | Stop reason: HOSPADM

## 2025-08-21 RX ORDER — PREDNISONE 20 MG/1
40 TABLET ORAL DAILY
Status: DISCONTINUED | OUTPATIENT
Start: 2025-08-22 | End: 2025-08-23 | Stop reason: HOSPADM

## 2025-08-21 RX ORDER — PREDNISONE 5 MG/1
5 TABLET ORAL DAILY
Status: DISCONTINUED | OUTPATIENT
Start: 2025-09-07 | End: 2025-08-23 | Stop reason: HOSPADM

## 2025-08-21 RX ORDER — PREDNISONE 2.5 MG/1
2.5 TABLET ORAL DAILY
Status: DISCONTINUED | OUTPATIENT
Start: 2025-09-11 | End: 2025-08-23 | Stop reason: HOSPADM

## 2025-08-21 RX ORDER — PREDNISONE 20 MG/1
20 TABLET ORAL DAILY
Status: DISCONTINUED | OUTPATIENT
Start: 2025-08-30 | End: 2025-08-23 | Stop reason: HOSPADM

## 2025-08-21 RX ORDER — IPRATROPIUM BROMIDE AND ALBUTEROL SULFATE 2.5; .5 MG/3ML; MG/3ML
3 SOLUTION RESPIRATORY (INHALATION)
Status: DISCONTINUED | OUTPATIENT
Start: 2025-08-21 | End: 2025-08-23 | Stop reason: HOSPADM

## 2025-08-21 RX ORDER — PREDNISONE 1 MG/1
1 TABLET ORAL DAILY
Status: DISCONTINUED | OUTPATIENT
Start: 2025-09-15 | End: 2025-08-23 | Stop reason: HOSPADM

## 2025-08-21 RX ADMIN — HYDROCODONE BITARTRATE AND ACETAMINOPHEN 1 TABLET: 7.5; 325 TABLET ORAL at 01:08

## 2025-08-21 RX ADMIN — BUDESONIDE 0.5 MG: 0.5 INHALANT RESPIRATORY (INHALATION) at 08:08

## 2025-08-21 RX ADMIN — LOSARTAN POTASSIUM 25 MG: 25 TABLET, FILM COATED ORAL at 08:08

## 2025-08-21 RX ADMIN — MUPIROCIN: 20 OINTMENT TOPICAL at 10:08

## 2025-08-21 RX ADMIN — AZITHROMYCIN DIHYDRATE 500 MG: 250 TABLET ORAL at 08:08

## 2025-08-21 RX ADMIN — IPRATROPIUM BROMIDE AND ALBUTEROL SULFATE 3 ML: 2.5; .5 SOLUTION RESPIRATORY (INHALATION) at 04:08

## 2025-08-21 RX ADMIN — BUSPIRONE HYDROCHLORIDE 5 MG: 5 TABLET ORAL at 02:08

## 2025-08-21 RX ADMIN — GUAIFENESIN AND DEXTROMETHORPHAN HYDROBROMIDE 2 TABLET: 600; 30 TABLET, EXTENDED RELEASE ORAL at 10:08

## 2025-08-21 RX ADMIN — GUAIFENESIN AND DEXTROMETHORPHAN HYDROBROMIDE 2 TABLET: 600; 30 TABLET, EXTENDED RELEASE ORAL at 08:08

## 2025-08-21 RX ADMIN — Medication 6 MG: at 10:08

## 2025-08-21 RX ADMIN — EZETIMIBE 10 MG: 10 TABLET ORAL at 08:08

## 2025-08-21 RX ADMIN — MUPIROCIN: 20 OINTMENT TOPICAL at 08:08

## 2025-08-21 RX ADMIN — SERTRALINE HYDROCHLORIDE 50 MG: 50 TABLET ORAL at 08:08

## 2025-08-21 RX ADMIN — ENOXAPARIN SODIUM 40 MG: 40 INJECTION SUBCUTANEOUS at 05:08

## 2025-08-21 RX ADMIN — ROFLUMILAST 500 MCG: 500 TABLET ORAL at 08:08

## 2025-08-21 RX ADMIN — CEFTRIAXONE SODIUM 1 G: 1 INJECTION, POWDER, FOR SOLUTION INTRAMUSCULAR; INTRAVENOUS at 08:08

## 2025-08-21 RX ADMIN — HYDROXYZINE PAMOATE 25 MG: 25 CAPSULE ORAL at 12:08

## 2025-08-21 RX ADMIN — BUSPIRONE HYDROCHLORIDE 5 MG: 5 TABLET ORAL at 08:08

## 2025-08-21 RX ADMIN — IPRATROPIUM BROMIDE AND ALBUTEROL SULFATE 3 ML: 2.5; .5 SOLUTION RESPIRATORY (INHALATION) at 12:08

## 2025-08-21 RX ADMIN — BUSPIRONE HYDROCHLORIDE 5 MG: 5 TABLET ORAL at 10:08

## 2025-08-21 RX ADMIN — IPRATROPIUM BROMIDE AND ALBUTEROL SULFATE 3 ML: 2.5; .5 SOLUTION RESPIRATORY (INHALATION) at 08:08

## 2025-08-21 RX ADMIN — IPRATROPIUM BROMIDE AND ALBUTEROL SULFATE 3 ML: 2.5; .5 SOLUTION RESPIRATORY (INHALATION) at 07:08

## 2025-08-21 RX ADMIN — HYDROCHLOROTHIAZIDE 12.5 MG: 12.5 TABLET ORAL at 01:08

## 2025-08-21 RX ADMIN — METHYLPREDNISOLONE SODIUM SUCCINATE 40 MG: 40 INJECTION, POWDER, FOR SOLUTION INTRAMUSCULAR; INTRAVENOUS at 08:08

## 2025-08-21 RX ADMIN — ASPIRIN 81 MG CHEWABLE TABLET 81 MG: 81 TABLET CHEWABLE at 08:08

## 2025-08-21 RX ADMIN — BUDESONIDE 0.5 MG: 0.5 INHALANT RESPIRATORY (INHALATION) at 07:08

## 2025-08-22 LAB
ABSOLUTE EOSINOPHIL (OHS): 0.05 K/UL
ABSOLUTE MONOCYTE (OHS): 0.85 K/UL (ref 0.3–1)
ABSOLUTE NEUTROPHIL COUNT (OHS): 5.47 K/UL (ref 1.8–7.7)
ANION GAP (OHS): 8 MMOL/L (ref 8–16)
BASOPHILS # BLD AUTO: 0.02 K/UL
BASOPHILS NFR BLD AUTO: 0.2 %
BUN SERPL-MCNC: 15 MG/DL (ref 8–23)
CALCIUM SERPL-MCNC: 9.1 MG/DL (ref 8.7–10.5)
CHLORIDE SERPL-SCNC: 97 MMOL/L (ref 95–110)
CO2 SERPL-SCNC: 40 MMOL/L (ref 23–29)
CREAT SERPL-MCNC: 0.6 MG/DL (ref 0.5–1.4)
ERYTHROCYTE [DISTWIDTH] IN BLOOD BY AUTOMATED COUNT: 15.2 % (ref 11.5–14.5)
GFR SERPLBLD CREATININE-BSD FMLA CKD-EPI: >60 ML/MIN/1.73/M2
GLUCOSE SERPL-MCNC: 86 MG/DL (ref 70–110)
HCT VFR BLD AUTO: 37.1 % (ref 37–48.5)
HGB BLD-MCNC: 10.6 GM/DL (ref 12–16)
IMM GRANULOCYTES # BLD AUTO: 0.04 K/UL (ref 0–0.04)
IMM GRANULOCYTES NFR BLD AUTO: 0.5 % (ref 0–0.5)
LYMPHOCYTES # BLD AUTO: 2.4 K/UL (ref 1–4.8)
MCH RBC QN AUTO: 25.9 PG (ref 27–31)
MCHC RBC AUTO-ENTMCNC: 28.6 G/DL (ref 32–36)
MCV RBC AUTO: 91 FL (ref 82–98)
NUCLEATED RBC (/100WBC) (OHS): 0 /100 WBC
PHOSPHATE SERPL-MCNC: 4.1 MG/DL (ref 2.7–4.5)
PLATELET # BLD AUTO: 194 K/UL (ref 150–450)
PMV BLD AUTO: 10.6 FL (ref 9.2–12.9)
POTASSIUM SERPL-SCNC: 3.7 MMOL/L (ref 3.5–5.1)
RBC # BLD AUTO: 4.09 M/UL (ref 4–5.4)
RELATIVE EOSINOPHIL (OHS): 0.6 %
RELATIVE LYMPHOCYTE (OHS): 27.2 % (ref 18–48)
RELATIVE MONOCYTE (OHS): 9.6 % (ref 4–15)
RELATIVE NEUTROPHIL (OHS): 61.9 % (ref 38–73)
SODIUM SERPL-SCNC: 145 MMOL/L (ref 136–145)
WBC # BLD AUTO: 8.83 K/UL (ref 3.9–12.7)

## 2025-08-22 PROCEDURE — 97535 SELF CARE MNGMENT TRAINING: CPT | Mod: HCNC,CO

## 2025-08-22 PROCEDURE — 97116 GAIT TRAINING THERAPY: CPT | Mod: HCNC,CQ

## 2025-08-22 PROCEDURE — 99233 SBSQ HOSP IP/OBS HIGH 50: CPT | Mod: HCNC,,, | Performed by: STUDENT IN AN ORGANIZED HEALTH CARE EDUCATION/TRAINING PROGRAM

## 2025-08-22 PROCEDURE — 27000221 HC OXYGEN, UP TO 24 HOURS: Mod: HCNC

## 2025-08-22 PROCEDURE — 63600175 PHARM REV CODE 636 W HCPCS: Mod: HCNC

## 2025-08-22 PROCEDURE — 25000003 PHARM REV CODE 250: Mod: HCNC | Performed by: HOSPITALIST

## 2025-08-22 PROCEDURE — 63700000 PHARM REV CODE 250 ALT 637 W/O HCPCS: Mod: HCNC

## 2025-08-22 PROCEDURE — 97530 THERAPEUTIC ACTIVITIES: CPT | Mod: HCNC,CQ

## 2025-08-22 PROCEDURE — 11000001 HC ACUTE MED/SURG PRIVATE ROOM: Mod: HCNC

## 2025-08-22 PROCEDURE — 25000242 PHARM REV CODE 250 ALT 637 W/ HCPCS: Mod: HCNC

## 2025-08-22 PROCEDURE — 36415 COLL VENOUS BLD VENIPUNCTURE: CPT | Mod: HCNC | Performed by: HOSPITALIST

## 2025-08-22 PROCEDURE — 63600175 PHARM REV CODE 636 W HCPCS: Mod: HCNC | Performed by: HOSPITALIST

## 2025-08-22 PROCEDURE — 80048 BASIC METABOLIC PNL TOTAL CA: CPT | Mod: HCNC | Performed by: HOSPITALIST

## 2025-08-22 PROCEDURE — 85025 COMPLETE CBC W/AUTO DIFF WBC: CPT | Mod: HCNC | Performed by: HOSPITALIST

## 2025-08-22 PROCEDURE — 94640 AIRWAY INHALATION TREATMENT: CPT | Mod: HCNC

## 2025-08-22 PROCEDURE — 99900035 HC TECH TIME PER 15 MIN (STAT): Mod: HCNC

## 2025-08-22 PROCEDURE — 97530 THERAPEUTIC ACTIVITIES: CPT | Mod: HCNC,CO

## 2025-08-22 PROCEDURE — 84100 ASSAY OF PHOSPHORUS: CPT | Mod: HCNC | Performed by: HOSPITALIST

## 2025-08-22 PROCEDURE — 25000003 PHARM REV CODE 250: Mod: HCNC

## 2025-08-22 PROCEDURE — 25000242 PHARM REV CODE 250 ALT 637 W/ HCPCS: Mod: HCNC | Performed by: STUDENT IN AN ORGANIZED HEALTH CARE EDUCATION/TRAINING PROGRAM

## 2025-08-22 PROCEDURE — 94761 N-INVAS EAR/PLS OXIMETRY MLT: CPT | Mod: HCNC

## 2025-08-22 PROCEDURE — 94660 CPAP INITIATION&MGMT: CPT | Mod: HCNC

## 2025-08-22 RX ORDER — LOSARTAN POTASSIUM 25 MG/1
25 TABLET ORAL ONCE
Status: COMPLETED | OUTPATIENT
Start: 2025-08-22 | End: 2025-08-22

## 2025-08-22 RX ORDER — HYDROCODONE BITARTRATE AND ACETAMINOPHEN 7.5; 325 MG/15ML; MG/15ML
5 SOLUTION ORAL EVERY 8 HOURS PRN
Refills: 0 | Status: DISCONTINUED | OUTPATIENT
Start: 2025-08-22 | End: 2025-08-23 | Stop reason: HOSPADM

## 2025-08-22 RX ORDER — IPRATROPIUM BROMIDE AND ALBUTEROL SULFATE 2.5; .5 MG/3ML; MG/3ML
3 SOLUTION RESPIRATORY (INHALATION) EVERY 6 HOURS
Status: DISCONTINUED | OUTPATIENT
Start: 2025-08-22 | End: 2025-08-23 | Stop reason: HOSPADM

## 2025-08-22 RX ORDER — LOSARTAN POTASSIUM 50 MG/1
50 TABLET ORAL DAILY
Status: DISCONTINUED | OUTPATIENT
Start: 2025-08-23 | End: 2025-08-23 | Stop reason: HOSPADM

## 2025-08-22 RX ADMIN — IPRATROPIUM BROMIDE AND ALBUTEROL SULFATE 3 ML: 2.5; .5 SOLUTION RESPIRATORY (INHALATION) at 08:08

## 2025-08-22 RX ADMIN — BUSPIRONE HYDROCHLORIDE 5 MG: 5 TABLET ORAL at 04:08

## 2025-08-22 RX ADMIN — SERTRALINE HYDROCHLORIDE 50 MG: 50 TABLET ORAL at 09:08

## 2025-08-22 RX ADMIN — IPRATROPIUM BROMIDE AND ALBUTEROL SULFATE 3 ML: 2.5; .5 SOLUTION RESPIRATORY (INHALATION) at 12:08

## 2025-08-22 RX ADMIN — BUSPIRONE HYDROCHLORIDE 5 MG: 5 TABLET ORAL at 08:08

## 2025-08-22 RX ADMIN — PREDNISONE 40 MG: 20 TABLET ORAL at 09:08

## 2025-08-22 RX ADMIN — ROFLUMILAST 500 MCG: 500 TABLET ORAL at 09:08

## 2025-08-22 RX ADMIN — MUPIROCIN: 20 OINTMENT TOPICAL at 09:08

## 2025-08-22 RX ADMIN — LOSARTAN POTASSIUM 25 MG: 25 TABLET, FILM COATED ORAL at 11:08

## 2025-08-22 RX ADMIN — IPRATROPIUM BROMIDE AND ALBUTEROL SULFATE 3 ML: 2.5; .5 SOLUTION RESPIRATORY (INHALATION) at 04:08

## 2025-08-22 RX ADMIN — Medication 6 MG: at 08:08

## 2025-08-22 RX ADMIN — IPRATROPIUM BROMIDE AND ALBUTEROL SULFATE 3 ML: 2.5; .5 SOLUTION RESPIRATORY (INHALATION) at 07:08

## 2025-08-22 RX ADMIN — MUPIROCIN: 20 OINTMENT TOPICAL at 08:08

## 2025-08-22 RX ADMIN — EZETIMIBE 10 MG: 10 TABLET ORAL at 09:08

## 2025-08-22 RX ADMIN — GUAIFENESIN AND DEXTROMETHORPHAN HYDROBROMIDE 2 TABLET: 600; 30 TABLET, EXTENDED RELEASE ORAL at 08:08

## 2025-08-22 RX ADMIN — HYDROXYZINE PAMOATE 25 MG: 25 CAPSULE ORAL at 01:08

## 2025-08-22 RX ADMIN — ASPIRIN 81 MG CHEWABLE TABLET 81 MG: 81 TABLET CHEWABLE at 09:08

## 2025-08-22 RX ADMIN — HYDROCHLOROTHIAZIDE 12.5 MG: 12.5 TABLET ORAL at 09:08

## 2025-08-22 RX ADMIN — GUAIFENESIN AND DEXTROMETHORPHAN HYDROBROMIDE 2 TABLET: 600; 30 TABLET, EXTENDED RELEASE ORAL at 09:08

## 2025-08-22 RX ADMIN — AZITHROMYCIN DIHYDRATE 250 MG: 250 TABLET ORAL at 09:08

## 2025-08-22 RX ADMIN — BUSPIRONE HYDROCHLORIDE 5 MG: 5 TABLET ORAL at 09:08

## 2025-08-22 RX ADMIN — BUDESONIDE 0.5 MG: 0.5 INHALANT RESPIRATORY (INHALATION) at 07:08

## 2025-08-22 RX ADMIN — CEFTRIAXONE SODIUM 1 G: 1 INJECTION, POWDER, FOR SOLUTION INTRAMUSCULAR; INTRAVENOUS at 09:08

## 2025-08-22 RX ADMIN — ENOXAPARIN SODIUM 40 MG: 40 INJECTION SUBCUTANEOUS at 04:08

## 2025-08-22 RX ADMIN — IPRATROPIUM BROMIDE AND ALBUTEROL SULFATE 3 ML: 2.5; .5 SOLUTION RESPIRATORY (INHALATION) at 02:08

## 2025-08-22 RX ADMIN — BUDESONIDE 0.5 MG: 0.5 INHALANT RESPIRATORY (INHALATION) at 08:08

## 2025-08-23 VITALS
OXYGEN SATURATION: 94 % | SYSTOLIC BLOOD PRESSURE: 122 MMHG | WEIGHT: 178.56 LBS | TEMPERATURE: 99 F | RESPIRATION RATE: 20 BRPM | HEIGHT: 66 IN | DIASTOLIC BLOOD PRESSURE: 78 MMHG | HEART RATE: 74 BPM | BODY MASS INDEX: 28.7 KG/M2

## 2025-08-23 LAB
ABSOLUTE EOSINOPHIL (OHS): 0.08 K/UL
ABSOLUTE MONOCYTE (OHS): 0.83 K/UL (ref 0.3–1)
ABSOLUTE NEUTROPHIL COUNT (OHS): 5.55 K/UL (ref 1.8–7.7)
ANION GAP (OHS): 7 MMOL/L (ref 8–16)
BASOPHILS # BLD AUTO: 0.02 K/UL
BASOPHILS NFR BLD AUTO: 0.2 %
BUN SERPL-MCNC: 17 MG/DL (ref 8–23)
CALCIUM SERPL-MCNC: 9.2 MG/DL (ref 8.7–10.5)
CHLORIDE SERPL-SCNC: 98 MMOL/L (ref 95–110)
CO2 SERPL-SCNC: 41 MMOL/L (ref 23–29)
CREAT SERPL-MCNC: 0.7 MG/DL (ref 0.5–1.4)
ERYTHROCYTE [DISTWIDTH] IN BLOOD BY AUTOMATED COUNT: 15.1 % (ref 11.5–14.5)
GFR SERPLBLD CREATININE-BSD FMLA CKD-EPI: >60 ML/MIN/1.73/M2
GLUCOSE SERPL-MCNC: 94 MG/DL (ref 70–110)
HCT VFR BLD AUTO: 38.2 % (ref 37–48.5)
HGB BLD-MCNC: 11.1 GM/DL (ref 12–16)
IMM GRANULOCYTES # BLD AUTO: 0.04 K/UL (ref 0–0.04)
IMM GRANULOCYTES NFR BLD AUTO: 0.4 % (ref 0–0.5)
LYMPHOCYTES # BLD AUTO: 2.73 K/UL (ref 1–4.8)
MCH RBC QN AUTO: 25.7 PG (ref 27–31)
MCHC RBC AUTO-ENTMCNC: 29.1 G/DL (ref 32–36)
MCV RBC AUTO: 88 FL (ref 82–98)
NUCLEATED RBC (/100WBC) (OHS): 0 /100 WBC
PHOSPHATE SERPL-MCNC: 3.9 MG/DL (ref 2.7–4.5)
PLATELET # BLD AUTO: 215 K/UL (ref 150–450)
PMV BLD AUTO: 11.1 FL (ref 9.2–12.9)
POTASSIUM SERPL-SCNC: 3.8 MMOL/L (ref 3.5–5.1)
RBC # BLD AUTO: 4.32 M/UL (ref 4–5.4)
RELATIVE EOSINOPHIL (OHS): 0.9 %
RELATIVE LYMPHOCYTE (OHS): 29.5 % (ref 18–48)
RELATIVE MONOCYTE (OHS): 9 % (ref 4–15)
RELATIVE NEUTROPHIL (OHS): 60 % (ref 38–73)
SODIUM SERPL-SCNC: 146 MMOL/L (ref 136–145)
WBC # BLD AUTO: 9.25 K/UL (ref 3.9–12.7)

## 2025-08-23 PROCEDURE — 97530 THERAPEUTIC ACTIVITIES: CPT | Mod: HCNC,CQ

## 2025-08-23 PROCEDURE — 80048 BASIC METABOLIC PNL TOTAL CA: CPT | Mod: HCNC | Performed by: HOSPITALIST

## 2025-08-23 PROCEDURE — 85025 COMPLETE CBC W/AUTO DIFF WBC: CPT | Mod: HCNC | Performed by: HOSPITALIST

## 2025-08-23 PROCEDURE — 36415 COLL VENOUS BLD VENIPUNCTURE: CPT | Mod: HCNC | Performed by: HOSPITALIST

## 2025-08-23 PROCEDURE — 97116 GAIT TRAINING THERAPY: CPT | Mod: HCNC,CQ

## 2025-08-23 PROCEDURE — 99900035 HC TECH TIME PER 15 MIN (STAT): Mod: HCNC

## 2025-08-23 PROCEDURE — 84100 ASSAY OF PHOSPHORUS: CPT | Mod: HCNC | Performed by: HOSPITALIST

## 2025-08-23 PROCEDURE — 25000003 PHARM REV CODE 250: Mod: HCNC

## 2025-08-23 PROCEDURE — 94761 N-INVAS EAR/PLS OXIMETRY MLT: CPT | Mod: HCNC

## 2025-08-23 PROCEDURE — 27000221 HC OXYGEN, UP TO 24 HOURS: Mod: HCNC

## 2025-08-23 PROCEDURE — 94640 AIRWAY INHALATION TREATMENT: CPT | Mod: HCNC

## 2025-08-23 PROCEDURE — 25000003 PHARM REV CODE 250: Mod: HCNC | Performed by: HOSPITALIST

## 2025-08-23 PROCEDURE — 63600175 PHARM REV CODE 636 W HCPCS: Mod: HCNC

## 2025-08-23 PROCEDURE — 25000242 PHARM REV CODE 250 ALT 637 W/ HCPCS: Mod: HCNC

## 2025-08-23 RX ORDER — PREDNISONE 1 MG/1
TABLET ORAL
Qty: 543 TABLET | Refills: 0 | Status: SHIPPED | OUTPATIENT
Start: 2025-08-23 | End: 2025-08-25

## 2025-08-23 RX ORDER — POLYETHYLENE GLYCOL 3350 17 G/17G
17 POWDER, FOR SOLUTION ORAL DAILY
Qty: 510 G | Refills: 0 | Status: SHIPPED | OUTPATIENT
Start: 2025-08-23 | End: 2025-09-22

## 2025-08-23 RX ORDER — IPRATROPIUM BROMIDE AND ALBUTEROL SULFATE 2.5; .5 MG/3ML; MG/3ML
3 SOLUTION RESPIRATORY (INHALATION) EVERY 6 HOURS
Qty: 75 ML | Refills: 0 | Status: SHIPPED | OUTPATIENT
Start: 2025-08-23 | End: 2025-11-21

## 2025-08-23 RX ORDER — HYDROCHLOROTHIAZIDE 12.5 MG/1
12.5 TABLET ORAL DAILY
Qty: 30 TABLET | Refills: 2 | Status: SHIPPED | OUTPATIENT
Start: 2025-08-23 | End: 2025-08-23

## 2025-08-23 RX ORDER — HYDROCHLOROTHIAZIDE 12.5 MG/1
12.5 TABLET ORAL DAILY
Qty: 30 TABLET | Refills: 2 | Status: SHIPPED | OUTPATIENT
Start: 2025-08-23 | End: 2025-11-21

## 2025-08-23 RX ORDER — LOSARTAN POTASSIUM 50 MG/1
50 TABLET ORAL DAILY
Qty: 90 TABLET | Refills: 3 | Status: SHIPPED | OUTPATIENT
Start: 2025-08-23 | End: 2025-08-23

## 2025-08-23 RX ORDER — BUSPIRONE HYDROCHLORIDE 5 MG/1
5 TABLET ORAL 3 TIMES DAILY
Qty: 90 TABLET | Refills: 2 | Status: SHIPPED | OUTPATIENT
Start: 2025-08-23 | End: 2025-11-21

## 2025-08-23 RX ORDER — POLYETHYLENE GLYCOL 3350 17 G/17G
17 POWDER, FOR SOLUTION ORAL DAILY
Qty: 510 G | Refills: 0 | Status: SHIPPED | OUTPATIENT
Start: 2025-08-23 | End: 2025-08-23

## 2025-08-23 RX ORDER — HYDROCODONE BITARTRATE AND ACETAMINOPHEN 7.5; 325 MG/15ML; MG/15ML
5 SOLUTION ORAL EVERY 8 HOURS PRN
Qty: 118 ML | Refills: 0 | Status: SHIPPED | OUTPATIENT
Start: 2025-08-23 | End: 2025-08-29 | Stop reason: SDUPTHER

## 2025-08-23 RX ORDER — SERTRALINE HYDROCHLORIDE 50 MG/1
50 TABLET, FILM COATED ORAL DAILY
Qty: 30 TABLET | Refills: 11 | Status: SHIPPED | OUTPATIENT
Start: 2025-08-23 | End: 2025-08-23

## 2025-08-23 RX ORDER — SERTRALINE HYDROCHLORIDE 50 MG/1
50 TABLET, FILM COATED ORAL DAILY
Qty: 30 TABLET | Refills: 11 | Status: SHIPPED | OUTPATIENT
Start: 2025-08-23 | End: 2025-08-29 | Stop reason: ALTCHOICE

## 2025-08-23 RX ORDER — PREDNISONE 1 MG/1
TABLET ORAL
Qty: 543 TABLET | Refills: 0 | Status: SHIPPED | OUTPATIENT
Start: 2025-08-23 | End: 2025-08-23

## 2025-08-23 RX ORDER — BUSPIRONE HYDROCHLORIDE 5 MG/1
5 TABLET ORAL 3 TIMES DAILY
Qty: 90 TABLET | Refills: 2 | Status: SHIPPED | OUTPATIENT
Start: 2025-08-23 | End: 2025-08-23

## 2025-08-23 RX ORDER — IPRATROPIUM BROMIDE AND ALBUTEROL SULFATE 2.5; .5 MG/3ML; MG/3ML
3 SOLUTION RESPIRATORY (INHALATION) EVERY 6 HOURS
Qty: 75 ML | Refills: 0 | Status: SHIPPED | OUTPATIENT
Start: 2025-08-23 | End: 2025-08-23

## 2025-08-23 RX ORDER — LOSARTAN POTASSIUM 50 MG/1
50 TABLET ORAL DAILY
Qty: 90 TABLET | Refills: 3 | Status: SHIPPED | OUTPATIENT
Start: 2025-08-23 | End: 2026-08-23

## 2025-08-23 RX ADMIN — LOSARTAN POTASSIUM 50 MG: 50 TABLET, FILM COATED ORAL at 09:08

## 2025-08-23 RX ADMIN — IPRATROPIUM BROMIDE AND ALBUTEROL SULFATE 3 ML: 2.5; .5 SOLUTION RESPIRATORY (INHALATION) at 12:08

## 2025-08-23 RX ADMIN — ROFLUMILAST 500 MCG: 500 TABLET ORAL at 09:08

## 2025-08-23 RX ADMIN — IPRATROPIUM BROMIDE AND ALBUTEROL SULFATE 3 ML: 2.5; .5 SOLUTION RESPIRATORY (INHALATION) at 01:08

## 2025-08-23 RX ADMIN — ASPIRIN 81 MG CHEWABLE TABLET 81 MG: 81 TABLET CHEWABLE at 09:08

## 2025-08-23 RX ADMIN — MUPIROCIN: 20 OINTMENT TOPICAL at 09:08

## 2025-08-23 RX ADMIN — HYDROCHLOROTHIAZIDE 12.5 MG: 12.5 TABLET ORAL at 09:08

## 2025-08-23 RX ADMIN — HYDROXYZINE PAMOATE 25 MG: 25 CAPSULE ORAL at 01:08

## 2025-08-23 RX ADMIN — EZETIMIBE 10 MG: 10 TABLET ORAL at 09:08

## 2025-08-23 RX ADMIN — BUDESONIDE 0.5 MG: 0.5 INHALANT RESPIRATORY (INHALATION) at 07:08

## 2025-08-23 RX ADMIN — IPRATROPIUM BROMIDE AND ALBUTEROL SULFATE 3 ML: 2.5; .5 SOLUTION RESPIRATORY (INHALATION) at 07:08

## 2025-08-23 RX ADMIN — SERTRALINE HYDROCHLORIDE 50 MG: 50 TABLET ORAL at 09:08

## 2025-08-23 RX ADMIN — BUSPIRONE HYDROCHLORIDE 5 MG: 5 TABLET ORAL at 09:08

## 2025-08-23 RX ADMIN — PREDNISONE 40 MG: 20 TABLET ORAL at 09:08

## 2025-08-23 RX ADMIN — GUAIFENESIN AND DEXTROMETHORPHAN HYDROBROMIDE 2 TABLET: 600; 30 TABLET, EXTENDED RELEASE ORAL at 09:08

## 2025-08-25 ENCOUNTER — TELEPHONE (OUTPATIENT)
Dept: PRIMARY CARE CLINIC | Facility: CLINIC | Age: 70
End: 2025-08-25
Payer: MEDICARE

## 2025-08-25 RX ORDER — PREDNISONE 20 MG/1
40 TABLET ORAL DAILY
Qty: 14 TABLET | Refills: 0 | Status: SHIPPED | OUTPATIENT
Start: 2025-08-25 | End: 2025-09-01

## 2025-08-28 ENCOUNTER — EXTERNAL HOME HEALTH (OUTPATIENT)
Dept: HOME HEALTH SERVICES | Facility: HOSPITAL | Age: 70
End: 2025-08-28
Payer: MEDICARE

## 2025-08-29 ENCOUNTER — OFFICE VISIT (OUTPATIENT)
Dept: PRIMARY CARE CLINIC | Facility: CLINIC | Age: 70
End: 2025-08-29
Payer: MEDICARE

## 2025-08-29 VITALS
DIASTOLIC BLOOD PRESSURE: 58 MMHG | HEART RATE: 64 BPM | BODY MASS INDEX: 27.68 KG/M2 | OXYGEN SATURATION: 96 % | WEIGHT: 171.5 LBS | TEMPERATURE: 99 F | SYSTOLIC BLOOD PRESSURE: 118 MMHG

## 2025-08-29 DIAGNOSIS — J44.1 COPD EXACERBATION: Primary | ICD-10-CM

## 2025-08-29 DIAGNOSIS — R05.9 COUGH: ICD-10-CM

## 2025-08-29 DIAGNOSIS — R05.3 CHRONIC COUGH: ICD-10-CM

## 2025-08-29 DIAGNOSIS — J44.1 COPD EXACERBATION: ICD-10-CM

## 2025-08-29 DIAGNOSIS — J43.2 CENTRILOBULAR EMPHYSEMA: Primary | ICD-10-CM

## 2025-08-29 DIAGNOSIS — R06.09 DYSPNEA ON MINIMAL EXERTION: ICD-10-CM

## 2025-08-29 PROCEDURE — 99999 PR PBB SHADOW E&M-EST. PATIENT-LVL IV: CPT | Mod: PBBFAC,HCNC,, | Performed by: INTERNAL MEDICINE

## 2025-08-29 RX ORDER — BUDESONIDE, GLYCOPYRROLATE, AND FORMOTEROL FUMARATE 160; 9; 4.8 UG/1; UG/1; UG/1
2 AEROSOL, METERED RESPIRATORY (INHALATION) 2 TIMES DAILY
Qty: 10.7 G | Refills: 6 | Status: SHIPPED | OUTPATIENT
Start: 2025-08-29 | End: 2026-08-24

## 2025-08-29 RX ORDER — HYDROCODONE BITARTRATE AND ACETAMINOPHEN 7.5; 325 MG/15ML; MG/15ML
5 SOLUTION ORAL EVERY 8 HOURS PRN
Qty: 118 ML | Refills: 0 | Status: CANCELLED | OUTPATIENT
Start: 2025-08-29 | End: 2025-09-05

## 2025-08-29 RX ORDER — HYDROCODONE BITARTRATE AND ACETAMINOPHEN 7.5; 325 MG/15ML; MG/15ML
5 SOLUTION ORAL EVERY 8 HOURS PRN
Qty: 473 ML | Refills: 0 | Status: SHIPPED | OUTPATIENT
Start: 2025-08-29 | End: 2025-09-30

## 2025-09-03 ENCOUNTER — TELEPHONE (OUTPATIENT)
Dept: PRIMARY CARE CLINIC | Facility: CLINIC | Age: 70
End: 2025-09-03
Payer: MEDICARE

## 2025-09-04 DIAGNOSIS — R06.09 DYSPNEA ON MINIMAL EXERTION: ICD-10-CM

## 2025-09-04 DIAGNOSIS — J43.2 CENTRILOBULAR EMPHYSEMA: ICD-10-CM

## 2025-09-04 RX ORDER — HYDROCODONE BITARTRATE AND ACETAMINOPHEN 7.5; 325 MG/15ML; MG/15ML
5 SOLUTION ORAL EVERY 8 HOURS PRN
Qty: 473 ML | Refills: 0 | Status: SHIPPED | OUTPATIENT
Start: 2025-09-04 | End: 2025-10-04